# Patient Record
Sex: MALE | Race: WHITE | NOT HISPANIC OR LATINO
[De-identification: names, ages, dates, MRNs, and addresses within clinical notes are randomized per-mention and may not be internally consistent; named-entity substitution may affect disease eponyms.]

---

## 2017-01-03 ENCOUNTER — APPOINTMENT (OUTPATIENT)
Dept: ORTHOPEDIC SURGERY | Facility: CLINIC | Age: 47
End: 2017-01-03

## 2017-01-04 RX ORDER — HYALURONATE SODIUM 20 MG/2 ML
20 SYRINGE (ML) INTRAARTICULAR
Qty: 1 | Refills: 0 | Status: DISCONTINUED | COMMUNITY
Start: 2017-01-04 | End: 2017-01-04

## 2017-01-08 ENCOUNTER — APPOINTMENT (OUTPATIENT)
Dept: MRI IMAGING | Facility: CLINIC | Age: 47
End: 2017-01-08

## 2017-01-08 ENCOUNTER — OUTPATIENT (OUTPATIENT)
Dept: OUTPATIENT SERVICES | Facility: HOSPITAL | Age: 47
LOS: 1 days | End: 2017-01-08
Payer: COMMERCIAL

## 2017-01-08 DIAGNOSIS — Z90.79 ACQUIRED ABSENCE OF OTHER GENITAL ORGAN(S): Chronic | ICD-10-CM

## 2017-01-08 DIAGNOSIS — Z00.8 ENCOUNTER FOR OTHER GENERAL EXAMINATION: ICD-10-CM

## 2017-01-08 DIAGNOSIS — Z98.89 OTHER SPECIFIED POSTPROCEDURAL STATES: Chronic | ICD-10-CM

## 2017-01-08 PROCEDURE — 72148 MRI LUMBAR SPINE W/O DYE: CPT

## 2017-01-17 ENCOUNTER — APPOINTMENT (OUTPATIENT)
Dept: ORTHOPEDIC SURGERY | Facility: CLINIC | Age: 47
End: 2017-01-17

## 2017-01-24 ENCOUNTER — APPOINTMENT (OUTPATIENT)
Dept: ORTHOPEDIC SURGERY | Facility: CLINIC | Age: 47
End: 2017-01-24

## 2017-01-31 ENCOUNTER — APPOINTMENT (OUTPATIENT)
Dept: ORTHOPEDIC SURGERY | Facility: CLINIC | Age: 47
End: 2017-01-31

## 2017-01-31 VITALS — WEIGHT: 168 LBS | HEIGHT: 71 IN | BODY MASS INDEX: 23.52 KG/M2

## 2017-02-01 ENCOUNTER — RESULT REVIEW (OUTPATIENT)
Age: 47
End: 2017-02-01

## 2017-02-14 ENCOUNTER — APPOINTMENT (OUTPATIENT)
Dept: ORTHOPEDIC SURGERY | Facility: CLINIC | Age: 47
End: 2017-02-14

## 2017-04-11 ENCOUNTER — APPOINTMENT (OUTPATIENT)
Dept: ORTHOPEDIC SURGERY | Facility: CLINIC | Age: 47
End: 2017-04-11

## 2017-04-11 DIAGNOSIS — M25.531 PAIN IN RIGHT WRIST: ICD-10-CM

## 2017-04-15 ENCOUNTER — APPOINTMENT (OUTPATIENT)
Dept: MRI IMAGING | Facility: CLINIC | Age: 47
End: 2017-04-15

## 2017-04-15 ENCOUNTER — OUTPATIENT (OUTPATIENT)
Dept: OUTPATIENT SERVICES | Facility: HOSPITAL | Age: 47
LOS: 1 days | End: 2017-04-15
Payer: COMMERCIAL

## 2017-04-15 DIAGNOSIS — M25.531 PAIN IN RIGHT WRIST: ICD-10-CM

## 2017-04-15 DIAGNOSIS — Z98.89 OTHER SPECIFIED POSTPROCEDURAL STATES: Chronic | ICD-10-CM

## 2017-04-15 DIAGNOSIS — Z90.79 ACQUIRED ABSENCE OF OTHER GENITAL ORGAN(S): Chronic | ICD-10-CM

## 2017-04-15 PROCEDURE — 73221 MRI JOINT UPR EXTREM W/O DYE: CPT

## 2017-04-19 RX ORDER — HYALURONATE SODIUM 20 MG/2 ML
20 SYRINGE (ML) INTRAARTICULAR
Qty: 1 | Refills: 0 | Status: COMPLETED | OUTPATIENT
Start: 2017-01-04 | End: 2017-04-19

## 2017-09-12 ENCOUNTER — APPOINTMENT (OUTPATIENT)
Dept: ORTHOPEDIC SURGERY | Facility: CLINIC | Age: 47
End: 2017-09-12

## 2017-09-18 ENCOUNTER — APPOINTMENT (OUTPATIENT)
Dept: MRI IMAGING | Facility: CLINIC | Age: 47
End: 2017-09-18
Payer: COMMERCIAL

## 2017-09-18 ENCOUNTER — OUTPATIENT (OUTPATIENT)
Dept: OUTPATIENT SERVICES | Facility: HOSPITAL | Age: 47
LOS: 1 days | End: 2017-09-18
Payer: COMMERCIAL

## 2017-09-18 DIAGNOSIS — Z00.8 ENCOUNTER FOR OTHER GENERAL EXAMINATION: ICD-10-CM

## 2017-09-18 DIAGNOSIS — Z90.79 ACQUIRED ABSENCE OF OTHER GENITAL ORGAN(S): Chronic | ICD-10-CM

## 2017-09-18 DIAGNOSIS — Z98.89 OTHER SPECIFIED POSTPROCEDURAL STATES: Chronic | ICD-10-CM

## 2017-09-18 PROCEDURE — 72148 MRI LUMBAR SPINE W/O DYE: CPT | Mod: 26

## 2017-09-18 PROCEDURE — 72148 MRI LUMBAR SPINE W/O DYE: CPT

## 2017-09-19 ENCOUNTER — APPOINTMENT (OUTPATIENT)
Dept: ORTHOPEDIC SURGERY | Facility: CLINIC | Age: 47
End: 2017-09-19
Payer: COMMERCIAL

## 2017-09-19 DIAGNOSIS — M17.11 UNILATERAL PRIMARY OSTEOARTHRITIS, RIGHT KNEE: ICD-10-CM

## 2017-09-19 DIAGNOSIS — M17.12 UNILATERAL PRIMARY OSTEOARTHRITIS, LEFT KNEE: ICD-10-CM

## 2017-09-19 PROCEDURE — 99213 OFFICE O/P EST LOW 20 MIN: CPT | Mod: 25

## 2017-09-19 PROCEDURE — 20553 NJX 1/MLT TRIGGER POINTS 3/>: CPT | Mod: LT

## 2018-01-09 ENCOUNTER — APPOINTMENT (OUTPATIENT)
Dept: ORTHOPEDIC SURGERY | Facility: CLINIC | Age: 48
End: 2018-01-09
Payer: COMMERCIAL

## 2018-01-09 PROCEDURE — 20553 NJX 1/MLT TRIGGER POINTS 3/>: CPT

## 2018-01-09 PROCEDURE — 99213 OFFICE O/P EST LOW 20 MIN: CPT | Mod: 25

## 2018-02-28 ENCOUNTER — APPOINTMENT (OUTPATIENT)
Dept: ORTHOPEDIC SURGERY | Facility: CLINIC | Age: 48
End: 2018-02-28
Payer: COMMERCIAL

## 2018-02-28 DIAGNOSIS — M76.899 OTHER SPECIFIED ENTHESOPATHIES OF UNSPECIFIED LOWER LIMB, EXCLUDING FOOT: ICD-10-CM

## 2018-02-28 PROCEDURE — 99213 OFFICE O/P EST LOW 20 MIN: CPT

## 2018-08-04 ENCOUNTER — RESULT REVIEW (OUTPATIENT)
Age: 48
End: 2018-08-04

## 2018-08-13 ENCOUNTER — APPOINTMENT (OUTPATIENT)
Dept: SURGICAL ONCOLOGY | Facility: CLINIC | Age: 48
End: 2018-08-13
Payer: COMMERCIAL

## 2018-08-13 VITALS
RESPIRATION RATE: 15 BRPM | SYSTOLIC BLOOD PRESSURE: 121 MMHG | WEIGHT: 185 LBS | BODY MASS INDEX: 25.9 KG/M2 | HEIGHT: 71 IN | HEART RATE: 84 BPM | DIASTOLIC BLOOD PRESSURE: 78 MMHG

## 2018-08-13 DIAGNOSIS — N40.0 BENIGN PROSTATIC HYPERPLASIA WITHOUT LOWER URINARY TRACT SYMPMS: ICD-10-CM

## 2018-08-13 DIAGNOSIS — Z86.69 PERSONAL HISTORY OF OTHER DISEASES OF THE NERVOUS SYSTEM AND SENSE ORGANS: ICD-10-CM

## 2018-08-13 DIAGNOSIS — Z85.72 PERSONAL HISTORY OF NON-HODGKIN LYMPHOMAS: ICD-10-CM

## 2018-08-13 PROCEDURE — 99245 OFF/OP CONSLTJ NEW/EST HI 55: CPT

## 2018-08-15 ENCOUNTER — APPOINTMENT (OUTPATIENT)
Dept: PLASTIC SURGERY | Facility: CLINIC | Age: 48
End: 2018-08-15
Payer: COMMERCIAL

## 2018-08-15 VITALS — BODY MASS INDEX: 25.9 KG/M2 | WEIGHT: 185 LBS | HEIGHT: 71 IN

## 2018-08-15 PROCEDURE — 99202 OFFICE O/P NEW SF 15 MIN: CPT

## 2018-08-17 ENCOUNTER — OUTPATIENT (OUTPATIENT)
Dept: OUTPATIENT SERVICES | Facility: HOSPITAL | Age: 48
LOS: 1 days | End: 2018-08-17
Payer: COMMERCIAL

## 2018-08-17 VITALS
HEIGHT: 71 IN | DIASTOLIC BLOOD PRESSURE: 79 MMHG | TEMPERATURE: 98 F | SYSTOLIC BLOOD PRESSURE: 114 MMHG | OXYGEN SATURATION: 97 % | HEART RATE: 70 BPM | RESPIRATION RATE: 16 BRPM | WEIGHT: 184.09 LBS

## 2018-08-17 DIAGNOSIS — C43.9 MALIGNANT MELANOMA OF SKIN, UNSPECIFIED: ICD-10-CM

## 2018-08-17 DIAGNOSIS — Z98.89 OTHER SPECIFIED POSTPROCEDURAL STATES: Chronic | ICD-10-CM

## 2018-08-17 DIAGNOSIS — I89.9 NONINFECTIVE DISORDER OF LYMPHATIC VESSELS AND LYMPH NODES, UNSPECIFIED: Chronic | ICD-10-CM

## 2018-08-17 DIAGNOSIS — I10 ESSENTIAL (PRIMARY) HYPERTENSION: ICD-10-CM

## 2018-08-17 DIAGNOSIS — Z01.818 ENCOUNTER FOR OTHER PREPROCEDURAL EXAMINATION: ICD-10-CM

## 2018-08-17 DIAGNOSIS — Z90.79 ACQUIRED ABSENCE OF OTHER GENITAL ORGAN(S): Chronic | ICD-10-CM

## 2018-08-17 LAB
ANION GAP SERPL CALC-SCNC: 13 MMOL/L — SIGNIFICANT CHANGE UP (ref 5–17)
BUN SERPL-MCNC: 21 MG/DL — SIGNIFICANT CHANGE UP (ref 7–23)
CALCIUM SERPL-MCNC: 9.4 MG/DL — SIGNIFICANT CHANGE UP (ref 8.4–10.5)
CHLORIDE SERPL-SCNC: 97 MMOL/L — SIGNIFICANT CHANGE UP (ref 96–108)
CO2 SERPL-SCNC: 25 MMOL/L — SIGNIFICANT CHANGE UP (ref 22–31)
CREAT SERPL-MCNC: 1.16 MG/DL — SIGNIFICANT CHANGE UP (ref 0.5–1.3)
GLUCOSE SERPL-MCNC: 89 MG/DL — SIGNIFICANT CHANGE UP (ref 70–99)
HCT VFR BLD CALC: 47.2 % — SIGNIFICANT CHANGE UP (ref 39–50)
HCV AB S/CO SERPL IA: 0.06 S/CO — SIGNIFICANT CHANGE UP
HCV AB SERPL-IMP: SIGNIFICANT CHANGE UP
HGB BLD-MCNC: 15.4 G/DL — SIGNIFICANT CHANGE UP (ref 13–17)
HIV 1+2 AB+HIV1 P24 AG SERPL QL IA: SIGNIFICANT CHANGE UP
MCHC RBC-ENTMCNC: 28.6 PG — SIGNIFICANT CHANGE UP (ref 27–34)
MCHC RBC-ENTMCNC: 32.6 GM/DL — SIGNIFICANT CHANGE UP (ref 32–36)
MCV RBC AUTO: 87.7 FL — SIGNIFICANT CHANGE UP (ref 80–100)
PLATELET # BLD AUTO: 329 K/UL — SIGNIFICANT CHANGE UP (ref 150–400)
POTASSIUM SERPL-MCNC: 4.2 MMOL/L — SIGNIFICANT CHANGE UP (ref 3.5–5.3)
POTASSIUM SERPL-SCNC: 4.2 MMOL/L — SIGNIFICANT CHANGE UP (ref 3.5–5.3)
RBC # BLD: 5.38 M/UL — SIGNIFICANT CHANGE UP (ref 4.2–5.8)
RBC # FLD: 13.4 % — SIGNIFICANT CHANGE UP (ref 10.3–14.5)
SODIUM SERPL-SCNC: 135 MMOL/L — SIGNIFICANT CHANGE UP (ref 135–145)
WBC # BLD: 5.77 K/UL — SIGNIFICANT CHANGE UP (ref 3.8–10.5)
WBC # FLD AUTO: 5.77 K/UL — SIGNIFICANT CHANGE UP (ref 3.8–10.5)

## 2018-08-17 PROCEDURE — 87389 HIV-1 AG W/HIV-1&-2 AB AG IA: CPT

## 2018-08-17 PROCEDURE — 85027 COMPLETE CBC AUTOMATED: CPT

## 2018-08-17 PROCEDURE — 86803 HEPATITIS C AB TEST: CPT

## 2018-08-17 PROCEDURE — 80048 BASIC METABOLIC PNL TOTAL CA: CPT

## 2018-08-17 PROCEDURE — G0463: CPT

## 2018-08-17 RX ORDER — SODIUM CHLORIDE 9 MG/ML
3 INJECTION INTRAMUSCULAR; INTRAVENOUS; SUBCUTANEOUS EVERY 8 HOURS
Qty: 0 | Refills: 0 | Status: DISCONTINUED | OUTPATIENT
Start: 2018-08-29 | End: 2018-09-13

## 2018-08-17 RX ORDER — LIDOCAINE HCL 20 MG/ML
0.2 VIAL (ML) INJECTION ONCE
Qty: 0 | Refills: 0 | Status: DISCONTINUED | OUTPATIENT
Start: 2018-08-29 | End: 2018-09-13

## 2018-08-17 RX ORDER — CEFAZOLIN SODIUM 1 G
2000 VIAL (EA) INJECTION ONCE
Qty: 0 | Refills: 0 | Status: DISCONTINUED | OUTPATIENT
Start: 2018-08-29 | End: 2018-09-13

## 2018-08-17 NOTE — H&P PST ADULT - PMH
Headache, Migraine    HTN (hypertension)    NHL (non-Hodgkin's lymphoma)    S/P Orchiectomy    Testicular Cancer BPH (benign prostatic hyperplasia)    Colitis    GERD (gastroesophageal reflux disease)    Headache, Migraine    History of bone marrow transplant    HTN (hypertension)    Hypertriglyceridemia    NHL (non-Hodgkin's lymphoma)  1999, Radiation to left neck region  Osteoarthritis    Testicular Cancer  1994, chemotherapy

## 2018-08-17 NOTE — H&P PST ADULT - PSH
History of orchiectomy    S/P colon resection History of orchiectomy  left 1994  Lymph node disorder  s/p abdominal lymph node dissection 1994, 1996  S/P colon resection  1996

## 2018-08-17 NOTE — H&P PST ADULT - NSANTHOSAYNRD_GEN_A_CORE
No. SUE screening performed.  STOP BANG Legend: 0-2 = LOW Risk; 3-4 = INTERMEDIATE Risk; 5-8 = HIGH Risk

## 2018-08-17 NOTE — H&P PST ADULT - NEUROLOGICAL DETAILS
responds to pain/sensation intact/responds to verbal commands/normal strength/alert and oriented x 3

## 2018-08-17 NOTE — H&P PST ADULT - HISTORY OF PRESENT ILLNESS
48 year old male presents for melanoma excision of scalp & lymph node biopsy. Pt. reports recent biopsy with dermatology revealed melanoma. Pt. with a history of testicular cancer 1994 and Non Hodgkins Lymphoma 1999.

## 2018-08-22 PROBLEM — K21.9 GASTRO-ESOPHAGEAL REFLUX DISEASE WITHOUT ESOPHAGITIS: Chronic | Status: ACTIVE | Noted: 2018-08-17

## 2018-08-22 PROBLEM — N40.0 BENIGN PROSTATIC HYPERPLASIA WITHOUT LOWER URINARY TRACT SYMPTOMS: Chronic | Status: ACTIVE | Noted: 2018-08-17

## 2018-08-22 PROBLEM — E78.1 PURE HYPERGLYCERIDEMIA: Chronic | Status: ACTIVE | Noted: 2018-08-17

## 2018-08-22 PROBLEM — Z94.81 BONE MARROW TRANSPLANT STATUS: Chronic | Status: ACTIVE | Noted: 2018-08-17

## 2018-08-28 ENCOUNTER — TRANSCRIPTION ENCOUNTER (OUTPATIENT)
Age: 48
End: 2018-08-28

## 2018-08-28 ENCOUNTER — FORM ENCOUNTER (OUTPATIENT)
Age: 48
End: 2018-08-28

## 2018-08-29 ENCOUNTER — OUTPATIENT (OUTPATIENT)
Dept: OUTPATIENT SERVICES | Facility: HOSPITAL | Age: 48
LOS: 1 days | End: 2018-08-29
Payer: COMMERCIAL

## 2018-08-29 ENCOUNTER — RESULT REVIEW (OUTPATIENT)
Age: 48
End: 2018-08-29

## 2018-08-29 ENCOUNTER — APPOINTMENT (OUTPATIENT)
Dept: NUCLEAR MEDICINE | Facility: HOSPITAL | Age: 48
End: 2018-08-29

## 2018-08-29 ENCOUNTER — APPOINTMENT (OUTPATIENT)
Dept: SURGICAL ONCOLOGY | Facility: HOSPITAL | Age: 48
End: 2018-08-29

## 2018-08-29 VITALS
RESPIRATION RATE: 16 BRPM | HEIGHT: 71 IN | DIASTOLIC BLOOD PRESSURE: 77 MMHG | WEIGHT: 184.09 LBS | SYSTOLIC BLOOD PRESSURE: 121 MMHG | HEART RATE: 61 BPM | TEMPERATURE: 98 F | OXYGEN SATURATION: 98 %

## 2018-08-29 VITALS
OXYGEN SATURATION: 97 % | SYSTOLIC BLOOD PRESSURE: 102 MMHG | TEMPERATURE: 97 F | RESPIRATION RATE: 19 BRPM | HEART RATE: 82 BPM | DIASTOLIC BLOOD PRESSURE: 57 MMHG

## 2018-08-29 DIAGNOSIS — Z90.79 ACQUIRED ABSENCE OF OTHER GENITAL ORGAN(S): Chronic | ICD-10-CM

## 2018-08-29 DIAGNOSIS — C43.9 MALIGNANT MELANOMA OF SKIN, UNSPECIFIED: ICD-10-CM

## 2018-08-29 DIAGNOSIS — I89.9 NONINFECTIVE DISORDER OF LYMPHATIC VESSELS AND LYMPH NODES, UNSPECIFIED: Chronic | ICD-10-CM

## 2018-08-29 DIAGNOSIS — Z98.89 OTHER SPECIFIED POSTPROCEDURAL STATES: Chronic | ICD-10-CM

## 2018-08-29 PROCEDURE — 14302 TIS TRNFR ADDL 30 SQ CM: CPT

## 2018-08-29 PROCEDURE — 88341 IMHCHEM/IMCYTCHM EA ADD ANTB: CPT

## 2018-08-29 PROCEDURE — 38792 RA TRACER ID OF SENTINL NODE: CPT

## 2018-08-29 PROCEDURE — 38792 RA TRACER ID OF SENTINL NODE: CPT | Mod: 59

## 2018-08-29 PROCEDURE — 78195 LYMPH SYSTEM IMAGING: CPT

## 2018-08-29 PROCEDURE — 88342 IMHCHEM/IMCYTCHM 1ST ANTB: CPT | Mod: 26

## 2018-08-29 PROCEDURE — 38308 INCISION OF LYMPH CHANNELS: CPT

## 2018-08-29 PROCEDURE — 88342 IMHCHEM/IMCYTCHM 1ST ANTB: CPT

## 2018-08-29 PROCEDURE — 88305 TISSUE EXAM BY PATHOLOGIST: CPT

## 2018-08-29 PROCEDURE — 38790 INJECT FOR LYMPHATIC X-RAY: CPT

## 2018-08-29 PROCEDURE — 78195 LYMPH SYSTEM IMAGING: CPT | Mod: 26

## 2018-08-29 PROCEDURE — 88341 IMHCHEM/IMCYTCHM EA ADD ANTB: CPT | Mod: 26

## 2018-08-29 PROCEDURE — 38724 REMOVAL OF LYMPH NODES NECK: CPT

## 2018-08-29 PROCEDURE — 13121 CMPLX RPR S/A/L 2.6-7.5 CM: CPT | Mod: 59

## 2018-08-29 PROCEDURE — 14301 TIS TRNFR ANY 30.1-60 SQ CM: CPT

## 2018-08-29 PROCEDURE — A9541: CPT

## 2018-08-29 PROCEDURE — 21015 RESECT FACE/SCALP TUM < 2 CM: CPT

## 2018-08-29 PROCEDURE — 88305 TISSUE EXAM BY PATHOLOGIST: CPT | Mod: 26

## 2018-08-29 PROCEDURE — 15220 FTH/GFT FR S/A/L 20 SQ CM/<: CPT

## 2018-08-29 RX ORDER — OXYCODONE HYDROCHLORIDE 5 MG/1
5 TABLET ORAL ONCE
Qty: 0 | Refills: 0 | Status: DISCONTINUED | OUTPATIENT
Start: 2018-08-29 | End: 2018-08-29

## 2018-08-29 RX ORDER — ONDANSETRON 8 MG/1
4 TABLET, FILM COATED ORAL ONCE
Qty: 0 | Refills: 0 | Status: COMPLETED | OUTPATIENT
Start: 2018-08-29 | End: 2018-08-29

## 2018-08-29 RX ORDER — CELECOXIB 200 MG/1
200 CAPSULE ORAL ONCE
Qty: 0 | Refills: 0 | Status: COMPLETED | OUTPATIENT
Start: 2018-08-29 | End: 2018-08-29

## 2018-08-29 RX ORDER — SODIUM CHLORIDE 9 MG/ML
1000 INJECTION, SOLUTION INTRAVENOUS
Qty: 0 | Refills: 0 | Status: DISCONTINUED | OUTPATIENT
Start: 2018-08-29 | End: 2018-09-13

## 2018-08-29 RX ORDER — OXYCODONE HYDROCHLORIDE 5 MG/1
10 TABLET ORAL ONCE
Qty: 0 | Refills: 0 | Status: DISCONTINUED | OUTPATIENT
Start: 2018-08-29 | End: 2018-08-29

## 2018-08-29 RX ORDER — ACETAMINOPHEN 500 MG
1000 TABLET ORAL ONCE
Qty: 0 | Refills: 0 | Status: COMPLETED | OUTPATIENT
Start: 2018-08-29 | End: 2018-08-29

## 2018-08-29 RX ORDER — FAMOTIDINE 10 MG/ML
20 INJECTION INTRAVENOUS ONCE
Qty: 0 | Refills: 0 | Status: COMPLETED | OUTPATIENT
Start: 2018-08-29 | End: 2018-08-29

## 2018-08-29 RX ADMIN — Medication 1000 MILLIGRAM(S): at 09:53

## 2018-08-29 RX ADMIN — SODIUM CHLORIDE 3 MILLILITER(S): 9 INJECTION INTRAMUSCULAR; INTRAVENOUS; SUBCUTANEOUS at 09:54

## 2018-08-29 RX ADMIN — OXYCODONE HYDROCHLORIDE 10 MILLIGRAM(S): 5 TABLET ORAL at 12:41

## 2018-08-29 RX ADMIN — CELECOXIB 200 MILLIGRAM(S): 200 CAPSULE ORAL at 12:41

## 2018-08-29 RX ADMIN — FAMOTIDINE 20 MILLIGRAM(S): 10 INJECTION INTRAVENOUS at 10:06

## 2018-08-29 RX ADMIN — CELECOXIB 200 MILLIGRAM(S): 200 CAPSULE ORAL at 09:53

## 2018-08-29 RX ADMIN — ONDANSETRON 4 MILLIGRAM(S): 8 TABLET, FILM COATED ORAL at 12:41

## 2018-08-29 NOTE — BRIEF OPERATIVE NOTE - PROCEDURE
<<-----Click on this checkbox to enter Procedure Melanoma excision  08/29/2018  Wide local excision of scalp melanoma  Active  KRISTIE

## 2018-08-29 NOTE — ASU DISCHARGE PLAN (ADULT/PEDIATRIC). - NURSING INSTRUCTIONS
Pain medications as prescribed by MD.  Next dose OK @ 6:30pm this evening.  Please eat prior to taking any more medications.  Follow up with MD's as requested; call for appts.  You may resume all preop medications/supplements as before.

## 2018-08-29 NOTE — BRIEF OPERATIVE NOTE - OPERATION/FINDINGS
Wide local excision of scalp melanoma with local tissue rearrangement, sentinel lymph node biopsy
none

## 2018-08-29 NOTE — ASU DISCHARGE PLAN (ADULT/PEDIATRIC). - INSTRUCTIONS
Regular diet Please follow up with Dr. Chandler within 1-2 weeks after discharge from the hospital. You may call [phone] to schedule an appointment.     Please follow up with Dr. Toussaint within 1-2 weeks after discharge from the hospital. You may call [phone] to schedule an appointment.

## 2018-08-29 NOTE — ASU PATIENT PROFILE, ADULT - PSH
History of orchiectomy  left 1994  Lymph node disorder  s/p abdominal lymph node dissection 1994, 1996  S/P colon resection  1996

## 2018-08-29 NOTE — ASU PATIENT PROFILE, ADULT - PMH
BPH (benign prostatic hyperplasia)    Colitis    GERD (gastroesophageal reflux disease)    Headache, Migraine    History of bone marrow transplant    HTN (hypertension)    Hypertriglyceridemia    NHL (non-Hodgkin's lymphoma)  1999, Radiation to left neck region  Osteoarthritis    Testicular Cancer  1994, chemotherapy

## 2018-08-29 NOTE — ASU DISCHARGE PLAN (ADULT/PEDIATRIC). - SPECIAL INSTRUCTIONS
You may remove your dressings after 48 hours. You may shower at that time, but avoid shampooing your head until follow up with Dr. Toussaint. You have a transparent/purple liquid dressing called Dermabond over your neck incision. Do NOT remove the Dermabond. In a few days, the Dermabond will fall off or peel off on its own. Pat your incisions dry after getting them wet.    Please follow up with Dr. Chandler within 1-2 weeks after discharge from the hospital. You may call [phone] to schedule an appointment.   Please follow up with Dr. Toussaint within 1 week after discharge from the hospital. You may call [phone] to schedule an appointment. Keep the head dressing dry until you follow up with Dr. Toussaint. You may bathe the other parts of your body. You have a transparent/purple liquid dressing called Dermabond over your neck incision. Do NOT remove the Dermabond. In a few days, the Dermabond will fall off or peel off on its own.    Please follow up with Dr. Chandler within 1-2 weeks after discharge from the hospital. You may call [phone] to schedule an appointment.   Please follow up with Dr. Toussaint within 1 week after discharge from the hospital. You may call [phone] to schedule an appointment. Keep the head dressing dry until you follow up with Dr. Toussaint. You may remove the head wrap in 48 hours. You may bathe the other parts of your body. You have a transparent/purple liquid dressing called Dermabond over your neck incision. Do NOT remove the Dermabond. In a few days, the Dermabond will fall off or peel off on its own.    Please follow up with Dr. Chandler within 1-2 weeks after discharge from the hospital. You may call [phone] to schedule an appointment.   Please follow up with Dr. Toussaint within 1 week after discharge from the hospital. You may call [phone] to schedule an appointment.

## 2018-08-29 NOTE — ASU DISCHARGE PLAN (ADULT/PEDIATRIC). - DRESSING FT
Keep dry for 48 hours. You may remove dressing after and shower, but avoid shampoo until cleared. Keep head dressing dry until you see Dr. Toussaint in the office Keep head dressing dry until you see Dr. Toussaint in the office, you may remove head wrap in 48 hours

## 2018-08-29 NOTE — BRIEF OPERATIVE NOTE - COMMENTS
Excision performed and second part of the procedure pending plastics reconstruction and disposition.

## 2018-08-29 NOTE — BRIEF OPERATIVE NOTE - PROCEDURE
<<-----Click on this checkbox to enter Procedure Rearrangement of local tissue  08/29/2018  Scalp  Active  [id number]

## 2018-08-29 NOTE — ASU DISCHARGE PLAN (ADULT/PEDIATRIC). - MEDICATION SUMMARY - MEDICATIONS TO TAKE
I will START or STAY ON the medications listed below when I get home from the hospital:    Percocet 5/325 oral tablet  -- 1 tab(s) by mouth every 6 hours, As Needed -for severe pain MDD:4 tabs   -- Caution federal law prohibits the transfer of this drug to any person other  than the person for whom it was prescribed.  May cause drowsiness.  Alcohol may intensify this effect.  Use care when operating dangerous machinery.  This prescription cannot be refilled.  This product contains acetaminophen.  Do not use  with any other product containing acetaminophen to prevent possible liver damage.  Using more of this medication than prescribed may cause serious breathing problems.    -- Indication: For For severe pain as needed    Ultram 50 mg oral tablet  -- 1 tab(s) by mouth every 4 hours, As Needed  -- Indication: For Home med    TriCor 145 mg oral tablet  -- 1 tab(s) by mouth once a day  -- Indication: For Home med    Lipitor 40 mg oral tablet  -- 1 tab(s) by mouth once a day  -- Indication: For Home med    Toprol-XL 50 mg oral tablet, extended release  -- 1 tab(s) by mouth once a day  -- Indication: For Home med    Norvasc 5 mg oral tablet  -- 1 tab(s) by mouth once a day  -- Indication: For Home med    Zantac  -- 1 tab(s) by mouth once a day, As Needed  -- Indication: For Home med

## 2018-08-31 ENCOUNTER — EMERGENCY (EMERGENCY)
Facility: HOSPITAL | Age: 48
LOS: 1 days | Discharge: HOME | End: 2018-08-31
Attending: EMERGENCY MEDICINE
Payer: COMMERCIAL

## 2018-08-31 VITALS
SYSTOLIC BLOOD PRESSURE: 145 MMHG | RESPIRATION RATE: 17 BRPM | DIASTOLIC BLOOD PRESSURE: 84 MMHG | HEIGHT: 71 IN | OXYGEN SATURATION: 100 % | WEIGHT: 177.91 LBS | TEMPERATURE: 98 F | HEART RATE: 83 BPM

## 2018-08-31 VITALS
RESPIRATION RATE: 16 BRPM | TEMPERATURE: 98 F | OXYGEN SATURATION: 100 % | DIASTOLIC BLOOD PRESSURE: 95 MMHG | HEART RATE: 76 BPM | SYSTOLIC BLOOD PRESSURE: 136 MMHG

## 2018-08-31 DIAGNOSIS — Z98.89 OTHER SPECIFIED POSTPROCEDURAL STATES: Chronic | ICD-10-CM

## 2018-08-31 DIAGNOSIS — G89.18 OTHER ACUTE POSTPROCEDURAL PAIN: ICD-10-CM

## 2018-08-31 DIAGNOSIS — I89.9 NONINFECTIVE DISORDER OF LYMPHATIC VESSELS AND LYMPH NODES, UNSPECIFIED: Chronic | ICD-10-CM

## 2018-08-31 DIAGNOSIS — Z90.79 ACQUIRED ABSENCE OF OTHER GENITAL ORGAN(S): Chronic | ICD-10-CM

## 2018-08-31 PROCEDURE — 99283 EMERGENCY DEPT VISIT LOW MDM: CPT

## 2018-08-31 RX ORDER — OXYCODONE HYDROCHLORIDE 5 MG/1
5 TABLET ORAL ONCE
Qty: 0 | Refills: 0 | Status: DISCONTINUED | OUTPATIENT
Start: 2018-08-31 | End: 2018-08-31

## 2018-08-31 RX ADMIN — OXYCODONE HYDROCHLORIDE 5 MILLIGRAM(S): 5 TABLET ORAL at 11:29

## 2018-08-31 NOTE — ED PROVIDER NOTE - MEDICAL DECISION MAKING DETAILS
Will evaluate for skin scalp bleeding. Xeroform and dry dressing then discharge in all likelihood. Will evaluate for skin scalp bleeding. Xeroform and dry dressing then discharge in all likelihood.    Radha Payan MD - Attending Physician: Pt here with bleeding at incision. Stopped prior to arrival. Re-dress for dc

## 2018-08-31 NOTE — ED ADULT TRIAGE NOTE - CHIEF COMPLAINT QUOTE
Surgery on 08/29 for Melanoma removal, Dressing removed today and incision actively bleeding, also "ran out of pain medication and is still uncomfortable."

## 2018-08-31 NOTE — ED PROVIDER NOTE - OBJECTIVE STATEMENT
Patient is a 48 year old male PMHx non Hodgkin lymphoma who recently underpresents for melanoma excision of scalp & lymph node biopsy. Pt. reports recent biopsy with dermatology revealed melanoma. Pt. with a history of testicular cancer 1994 and Non Hodgkins Lymphoma 1999. Patient is a 48 year old male PMHx non Hodgkin lymphoma who recently underwent melanoma excision of scalp & lymph node biopsy on 8/29 (2d PTA) due to recent dermatology scalp biopsy found melanoma. At the skin site of the scalp skin graft, patient endorses  blood oozing from the suture site. Denies fatigue, palpitations, chest pain, palpitations, SOB, cough, abdominal pain, dysuria, urgency, melena or hematochezia. Patient is a 48 year old male PMHx non Hodgkin lymphoma who recently underwent melanoma excision of scalp & lymph node biopsy on 8/29 (2d PTA) due to recent dermatology scalp biopsy found melanoma. At the skin site of the scalp skin graft, patient endorses blood oozing from the suture site. Denies fatigue, palpitations, chest pain, palpitations, SOB, cough, abdominal pain, dysuria, urgency, melena or hematochezia.

## 2018-08-31 NOTE — ED PROVIDER NOTE - NS ED ROS FT
REVIEW OF SYSTEMS:  CONSTITUTIONAL: No weakness, fevers or chills  EYES/ENT: No visual changes;  No vertigo or throat pain   NECK: No pain or stiffness  RESPIRATORY: No cough, wheezing, hemoptysis; No shortness of breath  CARDIOVASCULAR: No chest pain or palpitations  GASTROINTESTINAL: No abdominal or epigastric pain. No nausea, vomiting, or hematemesis; No diarrhea or constipation. No melena or hematochezia.  GENITOURINARY: No dysuria, frequency or hematuria  NEUROLOGICAL: No numbness or weakness  SKIN: + bloody skin graft of scalp  All other review of systems is negative unless indicated above.

## 2018-08-31 NOTE — CONSULT NOTE ADULT - SUBJECTIVE AND OBJECTIVE BOX
Requesting Physician : Dr. Romano    Reason for Consultation: EASLEY    HISTORY OF PRESENT ILLNESS:  47 yo M with history of HTN, HLD who is being seen for dyspnea on exertion.  The patient presented to the ED with oozing from a melanoma biopsy site on his head.  The patient has been having EASLEY for approximately 6 months.  His symptoms have improved but he continues to have dyspnea when walking up a flight of stairs.  No chest pain, orthopnea, or le edema.  He is currently symptom free upon examination.  The patient was seen by Dr. Romano in April of 2018 at which time an echocardiogram revealed a normal EF.  He was scheduled for a nst but failed to make the appointment.        PAST MEDICAL & SURGICAL HISTORY:  Hypertriglyceridemia  History of bone marrow transplant  Osteoarthritis  BPH (benign prostatic hyperplasia)  Colitis  GERD (gastroesophageal reflux disease)  NHL (non-Hodgkin's lymphoma): 1999, Radiation to left neck region  HTN (hypertension)  Headache, Migraine  Testicular Cancer: 1994, chemotherapy  Lymph node disorder: s/p abdominal lymph node dissection 1994, 1996  S/P colon resection: 1996  History of orchiectomy: left 1994          MEDICATIONS:  MEDICATIONS  (STANDING):      Allergies    No Known Allergies    Intolerances        FAMILY HISTORY:  No pertinent family history in first degree relatives    Non-contributary for premature coronary disease or sudden cardiac death    SOCIAL HISTORY:    [x ] Non-smoker  [ ] Smoker  [ ] Alcohol      REVIEW OF SYSTEMS:  [ ]chest pain  [ x ]shortness of breath  [  ]palpitations  [  ]syncope  [ ]near syncope [ ]upper extremity weakness   [ ] lower extremity weakness  [  ]diplopia  [  ]altered mental status   [  ]fevers  [ ]chills [ ]nausea  [ ]vomitting  [  ]dysphagia    [ ]abdominal pain  [ ]melena  [ ]BRBPR    [  ]epistaxis  [  ]rash    [ ]lower extremity edema        [x ] All others negative	  [ ] Unable to obtain    PHYSICAL EXAM:  T(C): 36.7 (08-31-18 @ 11:30), Max: 37.1 (08-31-18 @ 10:37)  HR: 76 (08-31-18 @ 11:30) (70 - 83)  BP: 136/95 (08-31-18 @ 11:30) (136/95 - 145/84)  RR: 16 (08-31-18 @ 11:30) (16 - 17)  SpO2: 100% (08-31-18 @ 11:30) (95% - 100%)  Wt(kg): --  I&O's Summary        HEENT:   Normal oral mucosa, PERRL, EOMI	  Lymphatic: No lymphadenopathy , no edema  Cardiovascular: Normal S1 S2, No JVD, No murmurs , Peripheral pulses palpable 2+ bilaterally  Respiratory: Lungs clear to auscultation, normal effort 	  Gastrointestinal:  Soft, Non-tender, + BS	  Skin: No rashes, No ecchymoses, No cyanosis, warm to touch  Musculoskeletal: Normal range of motion, normal strength  Psychiatry:  Mood & affect appropriate      TELEMETRY: 	    ECG:  	  RADIOLOGY:  OTHER:     DIAGNOSTIC TESTING:  [ ] Echocardiogram: normal EF by office echo in April of 2018  [ ]  Catheterization:  [ ] Stress Test:    	  	  LABS:	 	    CARDIAC MARKERS:                proBNP:   Lipid Profile:   HgA1c:   TSH:     ASSESSMENT/PLAN: 	48yMale with history of HTN, HLD who is being seen for easley.   -currently the patient has no anginal symptoms or clinical heart failure  -his recent TTE showed normal LV function  -would recommend outpatient nst to rule out cad  -pt. to follow up with Dr. Romano next week for follow up appointment and NST  -no further inpatient cardiac workup needed at this time.     Apurva Lopez MD

## 2018-08-31 NOTE — ED ADULT NURSE NOTE - OBJECTIVE STATEMENT
48 year old male alert and oriented x 4 came to the ED accompanied by family c/o right sided head pain.  Patient had a melanoma removed on Wednesday with a flap procedure done.  Patient had a dressing in place for 48 hours which he removed this morning and had bright red bleeding from the right posterior sutures.  Patient arrived with no active bleeding and said pain is 6/10 sharp pain localized to the right posterior part of the head with swelling around the site, but unclear if swelling is new.  Patient hs tender on palpation to the area.  Patient's other sutures are CDI.  Patient has bandage on right side of neck and bandage is CDI.  Patient denies; fevers, headache, visual changes, N/V, purulent drainage.  Patient said he had been taking percocet which helped with pain, but is not helping with current pain.  Family at bedside and bed in lowest position.

## 2018-08-31 NOTE — ED ADULT NURSE NOTE - NSFALLRSKASSESSDT_ED_ALL_ED
"Rafita Baugh is a 76year old male presenting to the infusion department for Sandostatin injection. Pre-Injection Assessment: Vital signs entered., Allergies assessed as required for injection type. , Pt states feeling well, no complaints. , Pt denies signs and symptoms of infection. and Authorization completed if applicable. Refer to [facility] for type of injection and medication given. Needle Size: 21g. 1 1/2""  Patient tolerated well:Stable    Vernell Ricardo NP is supervising clinician today.       " 31-Aug-2018 10:52

## 2018-08-31 NOTE — ED PROVIDER NOTE - PHYSICAL EXAMINATION
General: WN/WD NAD  Neurology: A&Ox3, nonfocal, DOLAN x 4  Head:  Normocephalic, atraumatic  ENT:  Mucosa moist, no ulcerations  Neck:  Supple, no sinuses or palpable masses  Lymphatic:  No palpable cervical, supraclavicular, axillary or inguinal adenopathy  Respiratory: CTA B/L  CV: RRR, S1S2, no murmur  Abdominal: Soft, NT, ND no palpable mass  MSK: No edema, + peripheral pulses, FROM all 4 extremity  Incisions: intact, no erythema or drainage General: WN/WD NAD  Neurology: A&Ox3, nonfocal, DOLAN x 4  Head:  Normocephalic, atraumatic  ENT:  Mucosa moist, no ulcerations  Neck:  Supple, no sinuses or palpable masses  Lymphatic:  No palpable cervical, supraclavicular, axillary or inguinal adenopathy  Respiratory: CTA B/L  CV: RRR, S1S2, no murmur  Abdominal: Soft, NT, ND no palpable mass  MSK: No edema, + peripheral pulses, FROM all 4 extremity  Incisions: Large 10cm arch incision to back of head with sutures in place, 1cm area to right parietal scalp with overlying eschar, no active bleeding

## 2018-08-31 NOTE — ED ADULT NURSE NOTE - NSIMPLEMENTINTERV_GEN_ALL_ED
Implemented All Universal Safety Interventions:  [place] to call system. Call bell, personal items and telephone within reach. Instruct patient to call for assistance. Room bathroom lighting operational. Non-slip footwear when patient is off stretcher. Physically safe environment: no spills, clutter or unnecessary equipment. Stretcher in lowest position, wheels locked, appropriate side rails in place.

## 2018-08-31 NOTE — ED PROVIDER NOTE - ATTENDING CONTRIBUTION TO CARE
Radha Payan MD - Attending Physician: I have personally seen and examined this patient with the resident/fellow.  I have fully participated in the care of this patient. I have reviewed all pertinent clinical information, including history, physical exam, plan and the Resident/Fellow’s note and agree except as noted. See MDM

## 2018-09-05 ENCOUNTER — APPOINTMENT (OUTPATIENT)
Dept: PLASTIC SURGERY | Facility: CLINIC | Age: 48
End: 2018-09-05
Payer: COMMERCIAL

## 2018-09-05 PROCEDURE — 99024 POSTOP FOLLOW-UP VISIT: CPT

## 2018-09-06 LAB — SURGICAL PATHOLOGY STUDY: SIGNIFICANT CHANGE UP

## 2018-09-10 ENCOUNTER — APPOINTMENT (OUTPATIENT)
Dept: SURGICAL ONCOLOGY | Facility: CLINIC | Age: 48
End: 2018-09-10
Payer: COMMERCIAL

## 2018-09-10 VITALS
SYSTOLIC BLOOD PRESSURE: 149 MMHG | RESPIRATION RATE: 16 BRPM | OXYGEN SATURATION: 100 % | WEIGHT: 185 LBS | DIASTOLIC BLOOD PRESSURE: 87 MMHG | BODY MASS INDEX: 25.9 KG/M2 | HEART RATE: 73 BPM | HEIGHT: 71 IN

## 2018-09-10 DIAGNOSIS — Z72.3 LACK OF PHYSICAL EXERCISE: ICD-10-CM

## 2018-09-10 PROCEDURE — 99214 OFFICE O/P EST MOD 30 MIN: CPT | Mod: 24

## 2018-09-12 ENCOUNTER — APPOINTMENT (OUTPATIENT)
Dept: PLASTIC SURGERY | Facility: CLINIC | Age: 48
End: 2018-09-12

## 2018-09-14 ENCOUNTER — FORM ENCOUNTER (OUTPATIENT)
Age: 48
End: 2018-09-14

## 2018-09-15 ENCOUNTER — OUTPATIENT (OUTPATIENT)
Dept: OUTPATIENT SERVICES | Facility: HOSPITAL | Age: 48
LOS: 1 days | End: 2018-09-15
Payer: COMMERCIAL

## 2018-09-15 ENCOUNTER — APPOINTMENT (OUTPATIENT)
Dept: NUCLEAR MEDICINE | Facility: IMAGING CENTER | Age: 48
End: 2018-09-15
Payer: COMMERCIAL

## 2018-09-15 DIAGNOSIS — I89.9 NONINFECTIVE DISORDER OF LYMPHATIC VESSELS AND LYMPH NODES, UNSPECIFIED: Chronic | ICD-10-CM

## 2018-09-15 DIAGNOSIS — Z98.89 OTHER SPECIFIED POSTPROCEDURAL STATES: Chronic | ICD-10-CM

## 2018-09-15 DIAGNOSIS — Z90.79 ACQUIRED ABSENCE OF OTHER GENITAL ORGAN(S): Chronic | ICD-10-CM

## 2018-09-15 DIAGNOSIS — C79.9 SECONDARY MALIGNANT NEOPLASM OF UNSPECIFIED SITE: ICD-10-CM

## 2018-09-15 PROCEDURE — A9552: CPT

## 2018-09-15 PROCEDURE — 78816 PET IMAGE W/CT FULL BODY: CPT | Mod: 26,PI

## 2018-09-15 PROCEDURE — 78816 PET IMAGE W/CT FULL BODY: CPT

## 2018-09-20 ENCOUNTER — RESULT REVIEW (OUTPATIENT)
Age: 48
End: 2018-09-20

## 2018-10-03 ENCOUNTER — APPOINTMENT (OUTPATIENT)
Dept: PLASTIC SURGERY | Facility: CLINIC | Age: 48
End: 2018-10-03
Payer: COMMERCIAL

## 2018-10-03 PROCEDURE — 99024 POSTOP FOLLOW-UP VISIT: CPT

## 2018-10-04 ENCOUNTER — OUTPATIENT (OUTPATIENT)
Dept: OUTPATIENT SERVICES | Facility: HOSPITAL | Age: 48
LOS: 1 days | End: 2018-10-04
Payer: COMMERCIAL

## 2018-10-04 VITALS
HEIGHT: 71 IN | HEART RATE: 69 BPM | SYSTOLIC BLOOD PRESSURE: 130 MMHG | WEIGHT: 188.94 LBS | OXYGEN SATURATION: 98 % | DIASTOLIC BLOOD PRESSURE: 82 MMHG | RESPIRATION RATE: 16 BRPM | TEMPERATURE: 98 F

## 2018-10-04 DIAGNOSIS — K21.9 GASTRO-ESOPHAGEAL REFLUX DISEASE WITHOUT ESOPHAGITIS: ICD-10-CM

## 2018-10-04 DIAGNOSIS — C79.9 SECONDARY MALIGNANT NEOPLASM OF UNSPECIFIED SITE: ICD-10-CM

## 2018-10-04 DIAGNOSIS — Z90.79 ACQUIRED ABSENCE OF OTHER GENITAL ORGAN(S): Chronic | ICD-10-CM

## 2018-10-04 DIAGNOSIS — I10 ESSENTIAL (PRIMARY) HYPERTENSION: ICD-10-CM

## 2018-10-04 DIAGNOSIS — I89.9 NONINFECTIVE DISORDER OF LYMPHATIC VESSELS AND LYMPH NODES, UNSPECIFIED: Chronic | ICD-10-CM

## 2018-10-04 DIAGNOSIS — C43.4 MALIGNANT MELANOMA OF SCALP AND NECK: Chronic | ICD-10-CM

## 2018-10-04 DIAGNOSIS — Z98.89 OTHER SPECIFIED POSTPROCEDURAL STATES: Chronic | ICD-10-CM

## 2018-10-04 LAB
ALBUMIN SERPL ELPH-MCNC: 4.9 G/DL — SIGNIFICANT CHANGE UP (ref 3.3–5)
ALP SERPL-CCNC: 74 U/L — SIGNIFICANT CHANGE UP (ref 40–120)
ALT FLD-CCNC: 24 U/L — SIGNIFICANT CHANGE UP (ref 4–41)
AST SERPL-CCNC: 20 U/L — SIGNIFICANT CHANGE UP (ref 4–40)
BILIRUB SERPL-MCNC: 0.4 MG/DL — SIGNIFICANT CHANGE UP (ref 0.2–1.2)
BLD GP AB SCN SERPL QL: NEGATIVE — SIGNIFICANT CHANGE UP
BUN SERPL-MCNC: 21 MG/DL — SIGNIFICANT CHANGE UP (ref 7–23)
CALCIUM SERPL-MCNC: 9.5 MG/DL — SIGNIFICANT CHANGE UP (ref 8.4–10.5)
CHLORIDE SERPL-SCNC: 98 MMOL/L — SIGNIFICANT CHANGE UP (ref 98–107)
CO2 SERPL-SCNC: 27 MMOL/L — SIGNIFICANT CHANGE UP (ref 22–31)
CREAT SERPL-MCNC: 1.12 MG/DL — SIGNIFICANT CHANGE UP (ref 0.5–1.3)
GLUCOSE SERPL-MCNC: 80 MG/DL — SIGNIFICANT CHANGE UP (ref 70–99)
HCT VFR BLD CALC: 43.7 % — SIGNIFICANT CHANGE UP (ref 39–50)
HGB BLD-MCNC: 14.2 G/DL — SIGNIFICANT CHANGE UP (ref 13–17)
MCHC RBC-ENTMCNC: 29 PG — SIGNIFICANT CHANGE UP (ref 27–34)
MCHC RBC-ENTMCNC: 32.5 % — SIGNIFICANT CHANGE UP (ref 32–36)
MCV RBC AUTO: 89.4 FL — SIGNIFICANT CHANGE UP (ref 80–100)
NRBC # FLD: 0 — SIGNIFICANT CHANGE UP
PLATELET # BLD AUTO: 236 K/UL — SIGNIFICANT CHANGE UP (ref 150–400)
PMV BLD: 10 FL — SIGNIFICANT CHANGE UP (ref 7–13)
POTASSIUM SERPL-MCNC: 3.7 MMOL/L — SIGNIFICANT CHANGE UP (ref 3.5–5.3)
POTASSIUM SERPL-SCNC: 3.7 MMOL/L — SIGNIFICANT CHANGE UP (ref 3.5–5.3)
PROT SERPL-MCNC: 7.8 G/DL — SIGNIFICANT CHANGE UP (ref 6–8.3)
RBC # BLD: 4.89 M/UL — SIGNIFICANT CHANGE UP (ref 4.2–5.8)
RBC # FLD: 13.6 % — SIGNIFICANT CHANGE UP (ref 10.3–14.5)
RH IG SCN BLD-IMP: POSITIVE — SIGNIFICANT CHANGE UP
SODIUM SERPL-SCNC: 137 MMOL/L — SIGNIFICANT CHANGE UP (ref 135–145)
WBC # BLD: 5.43 K/UL — SIGNIFICANT CHANGE UP (ref 3.8–10.5)
WBC # FLD AUTO: 5.43 K/UL — SIGNIFICANT CHANGE UP (ref 3.8–10.5)

## 2018-10-04 PROCEDURE — 93010 ELECTROCARDIOGRAM REPORT: CPT

## 2018-10-04 RX ORDER — RANITIDINE HYDROCHLORIDE 150 MG/1
1 TABLET, FILM COATED ORAL
Qty: 0 | Refills: 0 | COMMUNITY

## 2018-10-04 RX ORDER — SODIUM CHLORIDE 9 MG/ML
1000 INJECTION, SOLUTION INTRAVENOUS
Qty: 0 | Refills: 0 | Status: DISCONTINUED | OUTPATIENT
Start: 2018-10-19 | End: 2018-10-19

## 2018-10-04 NOTE — H&P PST ADULT - MUSCULOSKELETAL
details… detailed exam ROM intact/no joint warmth/no joint swelling/no joint erythema/no calf tenderness

## 2018-10-04 NOTE — H&P PST ADULT - PSH
History of orchiectomy  left 1994  Lymph node disorder  s/p abdominal lymph node dissection 1994, 1996  Melanoma of scalp or neck  excision 8/18  S/P colon resection  1996

## 2018-10-04 NOTE — H&P PST ADULT - PMH
BPH (benign prostatic hyperplasia)    Colitis    GERD (gastroesophageal reflux disease)    Headache, Migraine    History of bone marrow transplant    HTN (hypertension)    Hypertriglyceridemia    NHL (non-Hodgkin's lymphoma)  1999, Radiation to left neck region  Osteoarthritis  degenerative disc L3-4  Testicular Cancer  1994, chemotherapy

## 2018-10-04 NOTE — H&P PST ADULT - HISTORY OF PRESENT ILLNESS
This is a 48 y.o. male s/p excision of melanoma of scalp 8/18  - biopsy done . Pt has secondary malignant neoplasm of unspecified site . Pt had PEt scan done . Pt now for surgery .

## 2018-10-04 NOTE — H&P PST ADULT - LYMPHATIC
anterior cervical R/posterior cervical L/posterior cervical R/supraclavicular L/anterior cervical L/supraclavicular R

## 2018-10-04 NOTE — H&P PST ADULT - RS GEN PE MLT RESP DETAILS PC
no rales/clear to auscultation bilaterally/no wheezes/breath sounds equal/respirations non-labored/no rhonchi/good air movement

## 2018-10-04 NOTE — H&P PST ADULT - TEACHING/LEARNING LEARNING PREFERENCES
pictorial/group instruction/verbal instruction/skill demonstration/computer/internet/video/written material/audio/individual instruction

## 2018-10-05 PROBLEM — M19.90 UNSPECIFIED OSTEOARTHRITIS, UNSPECIFIED SITE: Chronic | Status: ACTIVE | Noted: 2018-08-17

## 2018-10-17 ENCOUNTER — APPOINTMENT (OUTPATIENT)
Dept: PLASTIC SURGERY | Facility: CLINIC | Age: 48
End: 2018-10-17
Payer: COMMERCIAL

## 2018-10-17 PROCEDURE — 99024 POSTOP FOLLOW-UP VISIT: CPT

## 2018-10-18 ENCOUNTER — TRANSCRIPTION ENCOUNTER (OUTPATIENT)
Age: 48
End: 2018-10-18

## 2018-10-18 NOTE — ASU PATIENT PROFILE, ADULT - TEACHING/LEARNING LEARNING PREFERENCES
individual instruction/pictorial/verbal instruction/group instruction/audio/computer/internet/skill demonstration/video/written material

## 2018-10-19 ENCOUNTER — APPOINTMENT (OUTPATIENT)
Dept: SURGICAL ONCOLOGY | Facility: HOSPITAL | Age: 48
End: 2018-10-19

## 2018-10-19 ENCOUNTER — RESULT REVIEW (OUTPATIENT)
Age: 48
End: 2018-10-19

## 2018-10-19 ENCOUNTER — INPATIENT (INPATIENT)
Facility: HOSPITAL | Age: 48
LOS: 0 days | Discharge: HOME | End: 2018-10-20
Attending: SPECIALIST | Admitting: SPECIALIST
Payer: COMMERCIAL

## 2018-10-19 VITALS
WEIGHT: 188.94 LBS | HEART RATE: 70 BPM | TEMPERATURE: 98 F | DIASTOLIC BLOOD PRESSURE: 71 MMHG | SYSTOLIC BLOOD PRESSURE: 125 MMHG | RESPIRATION RATE: 16 BRPM | OXYGEN SATURATION: 97 % | HEIGHT: 71 IN

## 2018-10-19 DIAGNOSIS — Z98.89 OTHER SPECIFIED POSTPROCEDURAL STATES: Chronic | ICD-10-CM

## 2018-10-19 DIAGNOSIS — C79.9 SECONDARY MALIGNANT NEOPLASM OF UNSPECIFIED SITE: ICD-10-CM

## 2018-10-19 DIAGNOSIS — I89.9 NONINFECTIVE DISORDER OF LYMPHATIC VESSELS AND LYMPH NODES, UNSPECIFIED: Chronic | ICD-10-CM

## 2018-10-19 DIAGNOSIS — Z90.79 ACQUIRED ABSENCE OF OTHER GENITAL ORGAN(S): Chronic | ICD-10-CM

## 2018-10-19 DIAGNOSIS — C43.4 MALIGNANT MELANOMA OF SCALP AND NECK: Chronic | ICD-10-CM

## 2018-10-19 LAB — RH IG SCN BLD-IMP: POSITIVE — SIGNIFICANT CHANGE UP

## 2018-10-19 PROCEDURE — 88342 IMHCHEM/IMCYTCHM 1ST ANTB: CPT | Mod: 26

## 2018-10-19 PROCEDURE — 99238 HOSP IP/OBS DSCHRG MGMT 30/<: CPT | Mod: 24

## 2018-10-19 PROCEDURE — 38724 REMOVAL OF LYMPH NODES NECK: CPT

## 2018-10-19 PROCEDURE — 38724 REMOVAL OF LYMPH NODES NECK: CPT | Mod: 82

## 2018-10-19 PROCEDURE — 64716 REVISION OF CRANIAL NERVE: CPT

## 2018-10-19 PROCEDURE — 88307 TISSUE EXAM BY PATHOLOGIST: CPT | Mod: 26

## 2018-10-19 PROCEDURE — 12045 INTMD RPR N-HF/GENIT12.6-20: CPT | Mod: 59

## 2018-10-19 PROCEDURE — 64716 REVISION OF CRANIAL NERVE: CPT | Mod: 82

## 2018-10-19 RX ORDER — METOPROLOL TARTRATE 50 MG
50 TABLET ORAL DAILY
Qty: 0 | Refills: 0 | Status: DISCONTINUED | OUTPATIENT
Start: 2018-10-19 | End: 2018-10-20

## 2018-10-19 RX ORDER — OXYMETAZOLINE HYDROCHLORIDE 0.5 MG/ML
1 SPRAY NASAL
Qty: 0 | Refills: 0 | Status: DISCONTINUED | OUTPATIENT
Start: 2018-10-19 | End: 2018-10-20

## 2018-10-19 RX ORDER — ONDANSETRON 8 MG/1
4 TABLET, FILM COATED ORAL ONCE
Qty: 0 | Refills: 0 | Status: DISCONTINUED | OUTPATIENT
Start: 2018-10-19 | End: 2018-10-19

## 2018-10-19 RX ORDER — HYDROMORPHONE HYDROCHLORIDE 2 MG/ML
4 INJECTION INTRAMUSCULAR; INTRAVENOUS; SUBCUTANEOUS EVERY 4 HOURS
Qty: 0 | Refills: 0 | Status: DISCONTINUED | OUTPATIENT
Start: 2018-10-19 | End: 2018-10-20

## 2018-10-19 RX ORDER — ACETAMINOPHEN 500 MG
1000 TABLET ORAL ONCE
Qty: 0 | Refills: 0 | Status: COMPLETED | OUTPATIENT
Start: 2018-10-19 | End: 2018-10-19

## 2018-10-19 RX ORDER — HYDROMORPHONE HYDROCHLORIDE 2 MG/ML
0.5 INJECTION INTRAMUSCULAR; INTRAVENOUS; SUBCUTANEOUS
Qty: 0 | Refills: 0 | Status: DISCONTINUED | OUTPATIENT
Start: 2018-10-19 | End: 2018-10-19

## 2018-10-19 RX ORDER — FENTANYL CITRATE 50 UG/ML
25 INJECTION INTRAVENOUS
Qty: 0 | Refills: 0 | Status: DISCONTINUED | OUTPATIENT
Start: 2018-10-19 | End: 2018-10-19

## 2018-10-19 RX ORDER — OXYCODONE HYDROCHLORIDE 5 MG/1
10 TABLET ORAL EVERY 4 HOURS
Qty: 0 | Refills: 0 | Status: DISCONTINUED | OUTPATIENT
Start: 2018-10-19 | End: 2018-10-19

## 2018-10-19 RX ORDER — HYDROMORPHONE HYDROCHLORIDE 2 MG/ML
2 INJECTION INTRAMUSCULAR; INTRAVENOUS; SUBCUTANEOUS EVERY 4 HOURS
Qty: 0 | Refills: 0 | Status: DISCONTINUED | OUTPATIENT
Start: 2018-10-19 | End: 2018-10-20

## 2018-10-19 RX ORDER — AMLODIPINE BESYLATE 2.5 MG/1
5 TABLET ORAL DAILY
Qty: 0 | Refills: 0 | Status: DISCONTINUED | OUTPATIENT
Start: 2018-10-19 | End: 2018-10-20

## 2018-10-19 RX ORDER — SODIUM CHLORIDE 9 MG/ML
1000 INJECTION, SOLUTION INTRAVENOUS
Qty: 0 | Refills: 0 | Status: DISCONTINUED | OUTPATIENT
Start: 2018-10-19 | End: 2018-10-20

## 2018-10-19 RX ORDER — ENOXAPARIN SODIUM 100 MG/ML
40 INJECTION SUBCUTANEOUS DAILY
Qty: 0 | Refills: 0 | Status: DISCONTINUED | OUTPATIENT
Start: 2018-10-19 | End: 2018-10-20

## 2018-10-19 RX ORDER — ATORVASTATIN CALCIUM 80 MG/1
40 TABLET, FILM COATED ORAL AT BEDTIME
Qty: 0 | Refills: 0 | Status: DISCONTINUED | OUTPATIENT
Start: 2018-10-19 | End: 2018-10-20

## 2018-10-19 RX ORDER — OXYCODONE HYDROCHLORIDE 5 MG/1
5 TABLET ORAL EVERY 4 HOURS
Qty: 0 | Refills: 0 | Status: DISCONTINUED | OUTPATIENT
Start: 2018-10-19 | End: 2018-10-19

## 2018-10-19 RX ORDER — ACETAMINOPHEN 500 MG
650 TABLET ORAL EVERY 6 HOURS
Qty: 0 | Refills: 0 | Status: DISCONTINUED | OUTPATIENT
Start: 2018-10-19 | End: 2018-10-20

## 2018-10-19 RX ADMIN — SODIUM CHLORIDE 100 MILLILITER(S): 9 INJECTION, SOLUTION INTRAVENOUS at 21:39

## 2018-10-19 RX ADMIN — Medication 1000 MILLIGRAM(S): at 23:06

## 2018-10-19 RX ADMIN — HYDROMORPHONE HYDROCHLORIDE 2 MILLIGRAM(S): 2 INJECTION INTRAMUSCULAR; INTRAVENOUS; SUBCUTANEOUS at 16:31

## 2018-10-19 RX ADMIN — Medication 400 MILLIGRAM(S): at 22:36

## 2018-10-19 RX ADMIN — HYDROMORPHONE HYDROCHLORIDE 0.5 MILLIGRAM(S): 2 INJECTION INTRAMUSCULAR; INTRAVENOUS; SUBCUTANEOUS at 12:15

## 2018-10-19 RX ADMIN — HYDROMORPHONE HYDROCHLORIDE 0.5 MILLIGRAM(S): 2 INJECTION INTRAMUSCULAR; INTRAVENOUS; SUBCUTANEOUS at 12:00

## 2018-10-19 RX ADMIN — ATORVASTATIN CALCIUM 40 MILLIGRAM(S): 80 TABLET, FILM COATED ORAL at 21:39

## 2018-10-19 RX ADMIN — HYDROMORPHONE HYDROCHLORIDE 4 MILLIGRAM(S): 2 INJECTION INTRAMUSCULAR; INTRAVENOUS; SUBCUTANEOUS at 14:20

## 2018-10-19 RX ADMIN — HYDROMORPHONE HYDROCHLORIDE 4 MILLIGRAM(S): 2 INJECTION INTRAMUSCULAR; INTRAVENOUS; SUBCUTANEOUS at 15:00

## 2018-10-19 RX ADMIN — HYDROMORPHONE HYDROCHLORIDE 2 MILLIGRAM(S): 2 INJECTION INTRAMUSCULAR; INTRAVENOUS; SUBCUTANEOUS at 20:36

## 2018-10-19 RX ADMIN — ENOXAPARIN SODIUM 40 MILLIGRAM(S): 100 INJECTION SUBCUTANEOUS at 13:38

## 2018-10-19 NOTE — CONSULT NOTE ADULT - SUBJECTIVE AND OBJECTIVE BOX
Requesting Physician : Dr. Romano    Reason for Consultation: Abnormal ECG    HISTORY OF PRESENT ILLNESS:  49 yo M with history of HLD, HTN who was admitted s/p neck mass excision and LN dissection who is being seen for abnormal ECG.  The patient was admitted for mass resection and tolerated procedure well in terms of cv perspective.  He denies chest pain, sob, orthopena, lower extremity edema, or any other cardiac complaints.  The patient was recently seen by Dr. Romano at which time TTE was performed showing normal LV function.  ECG done in hospital showing early repolarization; cardiology consulted to further evaluate.         PAST MEDICAL & SURGICAL HISTORY:  Hypertriglyceridemia  History of bone marrow transplant  Osteoarthritis: degenerative disc L3-4  BPH (benign prostatic hyperplasia)  Colitis  GERD (gastroesophageal reflux disease)  NHL (non-Hodgkin's lymphoma): 1999, Radiation to left neck region  HTN (hypertension)  Headache, Migraine  Testicular Cancer: 1994, chemotherapy  Melanoma of scalp or neck: excision 8/18  Lymph node disorder: s/p abdominal lymph node dissection 1994, 1996  S/P colon resection: 1996  History of orchiectomy: left 1994          MEDICATIONS:  MEDICATIONS  (STANDING):  amLODIPine   Tablet 5 milliGRAM(s) Oral daily  atorvastatin 40 milliGRAM(s) Oral at bedtime  enoxaparin Injectable 40 milliGRAM(s) SubCutaneous daily  lactated ringers. 1000 milliLiter(s) (100 mL/Hr) IV Continuous <Continuous>  metoprolol succinate ER 50 milliGRAM(s) Oral daily      Allergies    No Known Allergies    Intolerances        FAMILY HISTORY:  No pertinent family history in first degree relatives    Non-contributary for premature coronary disease or sudden cardiac death    SOCIAL HISTORY:    [x ] Non-smoker  [ ] Smoker  [ ] Alcohol      REVIEW OF SYSTEMS:  [ ]chest pain  [  ]shortness of breath  [  ]palpitations  [  ]syncope  [ ]near syncope [ ]upper extremity weakness   [ ] lower extremity weakness  [  ]diplopia  [  ]altered mental status   [  ]fevers  [ ]chills [ ]nausea  [ ]vomitting  [  ]dysphagia    [ ]abdominal pain  [ ]melena  [ ]BRBPR    [  ]epistaxis  [  ]rash    [ ]lower extremity edema        [x ] All others negative	  [ ] Unable to obtain    PHYSICAL EXAM:  T(C): 36.9 (10-19-18 @ 10:50), Max: 36.9 (10-19-18 @ 10:50)  HR: 78 (10-19-18 @ 12:00) (70 - 90)  BP: 112/62 (10-19-18 @ 12:00) (101/61 - 125/71)  RR: 13 (10-19-18 @ 12:00) (12 - 24)  SpO2: 95% (10-19-18 @ 12:00) (94% - 97%)  Wt(kg): --  I&O's Summary        	  Lymphatic: No lymphadenopathy , no edema  Cardiovascular: Normal S1 S2,RRR No JVD, No murmurs , Peripheral pulses palpable 2+ bilaterally  Respiratory: Lungs clear to auscultation, normal effort 	  Gastrointestinal:  Soft, Non-tender, + BS	  Skin: No rashes, No ecchymoses, No cyanosis, warm to touch      TELEMETRY: SR	    ECG:  NSR, early repol	  RADIOLOGY:  OTHER:     DIAGNOSTIC TESTING:  [ ] Echocardiogram:  [ ]  Catheterization:  [ ] Stress Test:    	  	  LABS:	 	    CARDIAC MARKERS:                proBNP:   Lipid Profile:   HgA1c:   TSH:     ASSESSMENT/PLAN: 	48yMale with history of HTN, HLD admitted post neck mass resection being seen for abnormal ECG.   -Pt. with no anginal symptoms or clinical heart failure  -ECG with early repolarization and no st-t changes suggestive of ischemia  -tolerated procedure well in terms of cv perspective  -no further cardiac workup needed at this time    Apurva Lopez MD

## 2018-10-19 NOTE — PROGRESS NOTE ADULT - SUBJECTIVE AND OBJECTIVE BOX
Post op Note    s/p R modified radial neck dissection for melanoma     SUBJECTIVE: Patient seen and examined at bedside with surgical team, patient without complaints.     Vital Signs Last 24 Hrs  T(C): 36.8 (19 Oct 2018 14:00), Max: 36.9 (19 Oct 2018 10:50)  T(F): 98.2 (19 Oct 2018 14:00), Max: 98.4 (19 Oct 2018 10:50)  HR: 85 (19 Oct 2018 14:00) (70 - 90)  BP: 116/64 (19 Oct 2018 14:00) (100/63 - 125/71)  RR: 12 (19 Oct 2018 14:00) (11 - 24)  SpO2: 96% (19 Oct 2018 14:00) (94% - 97%)I&O's Detail    19 Oct 2018 07:01  -  19 Oct 2018 14:49  --------------------------------------------------------  IN:  Total IN: 0 mL    OUT:    Bulb: 25 mL  Total OUT: 25 mL  Total NET: -25 mL      MEDICATIONS  (STANDING):  amLODIPine   Tablet 5 milliGRAM(s) Oral daily  atorvastatin 40 milliGRAM(s) Oral at bedtime  enoxaparin Injectable 40 milliGRAM(s) SubCutaneous daily  lactated ringers. 1000 milliLiter(s) (100 mL/Hr) IV Continuous <Continuous>  metoprolol succinate ER 50 milliGRAM(s) Oral daily    MEDICATIONS  (PRN):  acetaminophen   Tablet .. 650 milliGRAM(s) Oral every 6 hours PRN Mild Pain (1 - 3)  fentaNYL    Injectable 25 MICROGram(s) IV Push every 5 minutes PRN Moderate Pain (4 - 6)  HYDROmorphone   Tablet 2 milliGRAM(s) Oral every 4 hours PRN Moderate Pain (4 - 6)  HYDROmorphone   Tablet 4 milliGRAM(s) Oral every 4 hours PRN Severe Pain (7 - 10)  HYDROmorphone  Injectable 0.5 milliGRAM(s) IV Push every 10 minutes PRN Severe Pain (7 - 10)  ondansetron Injectable 4 milliGRAM(s) IV Push once PRN Nausea and/or Vomiting      Physical Exam  General: A&Ox3, NAD Post op Note    s/p R modified radial neck dissection for melanoma     SUBJECTIVE: Patient seen and examined at bedside with surgical team, patient without complaints. He denies pain at this time but states he is anticipating pain. He denies nausea and vomiting, chest pain and shortness of breath    Vital Signs Last 24 Hrs  T(C): 36.8 (19 Oct 2018 14:00), Max: 36.9 (19 Oct 2018 10:50)  T(F): 98.2 (19 Oct 2018 14:00), Max: 98.4 (19 Oct 2018 10:50)  HR: 85 (19 Oct 2018 14:00) (70 - 90)  BP: 116/64 (19 Oct 2018 14:00) (100/63 - 125/71)  RR: 12 (19 Oct 2018 14:00) (11 - 24)  SpO2: 96% (19 Oct 2018 14:00) (94% - 97%)I&O's Detail    19 Oct 2018 07:01  -  19 Oct 2018 14:49  --------------------------------------------------------  IN:  Total IN: 0 mL    OUT:    Bulb: 25 mL  Total OUT: 25 mL  Total NET: -25 mL    MEDICATIONS  (STANDING):  amLODIPine   Tablet 5 milliGRAM(s) Oral daily  atorvastatin 40 milliGRAM(s) Oral at bedtime  enoxaparin Injectable 40 milliGRAM(s) SubCutaneous daily  lactated ringers. 1000 milliLiter(s) (100 mL/Hr) IV Continuous <Continuous>  metoprolol succinate ER 50 milliGRAM(s) Oral daily  MEDICATIONS  (PRN):  acetaminophen   Tablet .. 650 milliGRAM(s) Oral every 6 hours PRN Mild Pain (1 - 3)  fentaNYL    Injectable 25 MICROGram(s) IV Push every 5 minutes PRN Moderate Pain (4 - 6)  HYDROmorphone   Tablet 2 milliGRAM(s) Oral every 4 hours PRN Moderate Pain (4 - 6)  HYDROmorphone   Tablet 4 milliGRAM(s) Oral every 4 hours PRN Severe Pain (7 - 10)  HYDROmorphone  Injectable 0.5 milliGRAM(s) IV Push every 10 minutes PRN Severe Pain (7 - 10)  ondansetron Injectable 4 milliGRAM(s) IV Push once PRN Nausea and/or Vomiting    Physical Exam  General: A&Ox3, NAD  Neck: Right neck soft nontender, no drainage, no hematoma, SERJIO sanguineous

## 2018-10-19 NOTE — BRIEF OPERATIVE NOTE - PROCEDURE
<<-----Click on this checkbox to enter Procedure Modified neck dissection on right  10/19/2018    Active  [email or web address]

## 2018-10-19 NOTE — PROGRESS NOTE ADULT - ASSESSMENT
This is a 48 y.o. male s/p excision of melanoma of scalp 8/18 now s/p R modified radial neck dissection for melanoma This is a 48 y.o. male s/p excision of melanoma of scalp 8/18 now s/p R modified radial neck dissection for melanoma POD 0 seen and examined in PACU doing well    - Regular diet  - Pain control with dilaudid  - DC IVF when tolerating a regular diet  - DVT ppx  - OOB to chair, ambulate as tolerated  - SERJIO to self suction  - D/w D team

## 2018-10-20 ENCOUNTER — TRANSCRIPTION ENCOUNTER (OUTPATIENT)
Age: 48
End: 2018-10-20

## 2018-10-20 VITALS
HEART RATE: 83 BPM | SYSTOLIC BLOOD PRESSURE: 134 MMHG | TEMPERATURE: 99 F | OXYGEN SATURATION: 97 % | RESPIRATION RATE: 18 BRPM | DIASTOLIC BLOOD PRESSURE: 72 MMHG

## 2018-10-20 LAB
BUN SERPL-MCNC: 19 MG/DL — SIGNIFICANT CHANGE UP (ref 7–23)
CALCIUM SERPL-MCNC: 9.2 MG/DL — SIGNIFICANT CHANGE UP (ref 8.4–10.5)
CHLORIDE SERPL-SCNC: 97 MMOL/L — LOW (ref 98–107)
CO2 SERPL-SCNC: 23 MMOL/L — SIGNIFICANT CHANGE UP (ref 22–31)
CREAT SERPL-MCNC: 0.91 MG/DL — SIGNIFICANT CHANGE UP (ref 0.5–1.3)
GLUCOSE SERPL-MCNC: 131 MG/DL — HIGH (ref 70–99)
HCT VFR BLD CALC: 39.6 % — SIGNIFICANT CHANGE UP (ref 39–50)
HGB BLD-MCNC: 12.9 G/DL — LOW (ref 13–17)
MCHC RBC-ENTMCNC: 28.2 PG — SIGNIFICANT CHANGE UP (ref 27–34)
MCHC RBC-ENTMCNC: 32.6 % — SIGNIFICANT CHANGE UP (ref 32–36)
MCV RBC AUTO: 86.7 FL — SIGNIFICANT CHANGE UP (ref 80–100)
NRBC # FLD: 0 — SIGNIFICANT CHANGE UP
PLATELET # BLD AUTO: 279 K/UL — SIGNIFICANT CHANGE UP (ref 150–400)
PMV BLD: 10 FL — SIGNIFICANT CHANGE UP (ref 7–13)
POTASSIUM SERPL-MCNC: 3.6 MMOL/L — SIGNIFICANT CHANGE UP (ref 3.5–5.3)
POTASSIUM SERPL-SCNC: 3.6 MMOL/L — SIGNIFICANT CHANGE UP (ref 3.5–5.3)
RBC # BLD: 4.57 M/UL — SIGNIFICANT CHANGE UP (ref 4.2–5.8)
RBC # FLD: 13.6 % — SIGNIFICANT CHANGE UP (ref 10.3–14.5)
SODIUM SERPL-SCNC: 137 MMOL/L — SIGNIFICANT CHANGE UP (ref 135–145)
WBC # BLD: 9.88 K/UL — SIGNIFICANT CHANGE UP (ref 3.8–10.5)
WBC # FLD AUTO: 9.88 K/UL — SIGNIFICANT CHANGE UP (ref 3.8–10.5)

## 2018-10-20 RX ORDER — HYDROMORPHONE HYDROCHLORIDE 2 MG/ML
1 INJECTION INTRAMUSCULAR; INTRAVENOUS; SUBCUTANEOUS ONCE
Qty: 0 | Refills: 0 | Status: DISCONTINUED | OUTPATIENT
Start: 2018-10-20 | End: 2018-10-20

## 2018-10-20 RX ORDER — ATORVASTATIN CALCIUM 80 MG/1
1 TABLET, FILM COATED ORAL
Qty: 0 | Refills: 0 | COMMUNITY

## 2018-10-20 RX ORDER — POTASSIUM CHLORIDE 20 MEQ
40 PACKET (EA) ORAL ONCE
Qty: 0 | Refills: 0 | Status: COMPLETED | OUTPATIENT
Start: 2018-10-20 | End: 2018-10-20

## 2018-10-20 RX ORDER — HYDROMORPHONE HYDROCHLORIDE 2 MG/ML
1 INJECTION INTRAMUSCULAR; INTRAVENOUS; SUBCUTANEOUS
Qty: 10 | Refills: 0 | OUTPATIENT
Start: 2018-10-20 | End: 2018-10-20

## 2018-10-20 RX ORDER — ACETAMINOPHEN 500 MG
975 TABLET ORAL EVERY 6 HOURS
Qty: 0 | Refills: 0 | Status: DISCONTINUED | OUTPATIENT
Start: 2018-10-20 | End: 2018-10-20

## 2018-10-20 RX ADMIN — HYDROMORPHONE HYDROCHLORIDE 4 MILLIGRAM(S): 2 INJECTION INTRAMUSCULAR; INTRAVENOUS; SUBCUTANEOUS at 09:37

## 2018-10-20 RX ADMIN — HYDROMORPHONE HYDROCHLORIDE 4 MILLIGRAM(S): 2 INJECTION INTRAMUSCULAR; INTRAVENOUS; SUBCUTANEOUS at 13:37

## 2018-10-20 RX ADMIN — HYDROMORPHONE HYDROCHLORIDE 4 MILLIGRAM(S): 2 INJECTION INTRAMUSCULAR; INTRAVENOUS; SUBCUTANEOUS at 09:07

## 2018-10-20 RX ADMIN — HYDROMORPHONE HYDROCHLORIDE 2 MILLIGRAM(S): 2 INJECTION INTRAMUSCULAR; INTRAVENOUS; SUBCUTANEOUS at 05:06

## 2018-10-20 RX ADMIN — HYDROMORPHONE HYDROCHLORIDE 4 MILLIGRAM(S): 2 INJECTION INTRAMUSCULAR; INTRAVENOUS; SUBCUTANEOUS at 13:07

## 2018-10-20 RX ADMIN — HYDROMORPHONE HYDROCHLORIDE 1 MILLIGRAM(S): 2 INJECTION INTRAMUSCULAR; INTRAVENOUS; SUBCUTANEOUS at 00:42

## 2018-10-20 RX ADMIN — Medication 40 MILLIEQUIVALENT(S): at 11:54

## 2018-10-20 RX ADMIN — ENOXAPARIN SODIUM 40 MILLIGRAM(S): 100 INJECTION SUBCUTANEOUS at 11:54

## 2018-10-20 RX ADMIN — HYDROMORPHONE HYDROCHLORIDE 1 MILLIGRAM(S): 2 INJECTION INTRAMUSCULAR; INTRAVENOUS; SUBCUTANEOUS at 01:12

## 2018-10-20 RX ADMIN — Medication 975 MILLIGRAM(S): at 09:06

## 2018-10-20 RX ADMIN — Medication 975 MILLIGRAM(S): at 08:18

## 2018-10-20 RX ADMIN — HYDROMORPHONE HYDROCHLORIDE 2 MILLIGRAM(S): 2 INJECTION INTRAMUSCULAR; INTRAVENOUS; SUBCUTANEOUS at 05:36

## 2018-10-20 NOTE — PROGRESS NOTE ADULT - SUBJECTIVE AND OBJECTIVE BOX
Patient denies chest pain or shortness of breath.   Review of systems otherwise (-)  	  MEDICATIONS:  MEDICATIONS  (STANDING):  acetaminophen   Tablet .. 975 milliGRAM(s) Oral every 6 hours  amLODIPine   Tablet 5 milliGRAM(s) Oral daily  atorvastatin 40 milliGRAM(s) Oral at bedtime  enoxaparin Injectable 40 milliGRAM(s) SubCutaneous daily  metoprolol succinate ER 50 milliGRAM(s) Oral daily      LABS:	 	    CARDIAC MARKERS:                                12.9   9.88  )-----------( 279      ( 20 Oct 2018 07:00 )             39.6     Hemoglobin: 12.9 g/dL (10-20 @ 07:00)      10-20    137  |  97<L>  |  19  ----------------------------<  131<H>  3.6   |  23  |  0.91    Ca    9.2      20 Oct 2018 07:00      Creatinine Trend: 0.91<--, 1.12<--    COAGS:       proBNP:   Lipid Profile:   HgA1c:   TSH:       PHYSICAL EXAM:  T(C): 37 (10-20-18 @ 13:22), Max: 37.1 (10-19-18 @ 19:00)  HR: 83 (10-20-18 @ 13:22) (73 - 88)  BP: 134/72 (10-20-18 @ 13:22) (109/62 - 134/72)  RR: 18 (10-20-18 @ 13:22) (12 - 18)  SpO2: 97% (10-20-18 @ 13:22) (96% - 100%)  Wt(kg): --  I&O's Summary    19 Oct 2018 07:01  -  20 Oct 2018 07:00  --------------------------------------------------------  IN: 800 mL / OUT: 565.5 mL / NET: 234.5 mL    20 Oct 2018 07:01  -  20 Oct 2018 13:35  --------------------------------------------------------  IN: 480 mL / OUT: 17.5 mL / NET: 462.5 mL          Gen: Appears well in NAD  HEENT:  (-)icterus (-)pallor  CV: N S1 S2 1/6 ARMIN (+)2 Pulses B/l  Resp:  Clear to ausculatation B/L, normal effort  GI: (+) BS Soft, NT, ND  Lymph:  (-)Edema, (-)obvious lymphadenopathy  Skin: Warm to touch, Normal turgor  Psych: Appropriate mood and affect      ASSESSMENT/PLAN: 	48y  Male with history of HTN, HLD admitted post neck mass resection being seen for abnormal ECG.     -Pt. with no anginal symptoms or clinical heart failure  -ECG with early repolarization and no st-t changes suggestive of ischemia  -tolerated procedure well in terms of cv perspective  -no further cardiac workup needed at this time  - D/C planning per primary team    Harsh Sparks MD, FACC

## 2018-10-20 NOTE — PROGRESS NOTE ADULT - SUBJECTIVE AND OBJECTIVE BOX
ANESTHESIA POSTOP CHECK    48y Male POSTOP DAY 1 S/P     Vital Signs Last 24 Hrs  T(C): 36.8 (20 Oct 2018 06:20), Max: 37.1 (19 Oct 2018 19:00)  T(F): 98.2 (20 Oct 2018 06:20), Max: 98.7 (19 Oct 2018 19:00)  HR: 73 (20 Oct 2018 06:20) (73 - 90)  BP: 109/62 (20 Oct 2018 06:20) (100/63 - 132/60)  BP(mean): --  RR: 18 (20 Oct 2018 06:20) (11 - 24)  SpO2: 98% (20 Oct 2018 06:20) (94% - 100%)  I&O's Summary    19 Oct 2018 07:01  -  20 Oct 2018 07:00  --------------------------------------------------------  IN: 800 mL / OUT: 565.5 mL / NET: 234.5 mL        [x ] NO APPARENT ANESTHESIA COMPLICATIONS      Comments:

## 2018-10-20 NOTE — DISCHARGE NOTE ADULT - HOSPITAL COURSE
This is a 48 y.o. male s/p excision of melanoma of scalp 8/18. Pt has secondary malignant neoplasm. Patient underwent a right modified neck dissection on 10/19/18.  Patient recovered well in PACU, and was transferred to the floor.  Tolerating regular diet.  Pain controlled with PO analgesics.  Stable for discharge with outpatient follow up.

## 2018-10-20 NOTE — DISCHARGE NOTE ADULT - MEDICATION SUMMARY - MEDICATIONS TO TAKE
I will START or STAY ON the medications listed below when I get home from the hospital:    Ultram 50 mg oral tablet  -- 1 tab(s) by mouth every 4 hours, As Needed  -- Indication: For Home med    Dilaudid 2 mg oral tablet  -- 1 tab(s) by mouth every 6 hours x 1 days, As Needed  for pain MDD:4  -- Caution federal law prohibits the transfer of this drug to any person other  than the person for whom it was prescribed.  May cause drowsiness.  Alcohol may intensify this effect.  Use care when operating dangerous machinery.  This prescription cannot be refilled.  Using more of this medication than prescribed may cause serious breathing problems.    -- Indication: For Pain    naproxen  -- 400 milligram(s) by mouth once a day  -- Indication: For home med    TriCor 145 mg oral tablet  -- 1 tab(s) by mouth once a day  -- Indication: For home med    Toprol-XL 50 mg oral tablet, extended release  -- 1 tab(s) by mouth once a day  -- Indication: For home med    Norvasc 5 mg oral tablet  -- 1 tab(s) by mouth once a day  -- Indication: For home med

## 2018-10-20 NOTE — DISCHARGE NOTE ADULT - CARE PLAN
Principal Discharge DX:	Melanoma of scalp or neck  Goal:	s/p Excision of melanoma 8/18, no s/p R modified radical neck dissection  Assessment and plan of treatment:	Please follow up with your physician regarding your hospitalization. Please schedule an appointment with your provider within two weeks after your discharge to review your hospital course.  You will be discharged with a SERJIO drain. You will need to empty it and record outputs accurately. This will be taught to you by the nursing staff. Please do not remove the SERJIO drain. It will be removed in the office. Please bring to the office accurate records of output.

## 2018-10-20 NOTE — DISCHARGE NOTE ADULT - PATIENT PORTAL LINK FT
You can access the [facility] Patient Portal, offered by [facility], by registering with the following website: [email or web address]

## 2018-10-20 NOTE — DISCHARGE NOTE ADULT - CARE PROVIDER_API CALL
Sarwat Chandler (MD), Surgery  [address] [place] [id number]  Phone: [phone]  Fax: [phone]

## 2018-10-20 NOTE — PROGRESS NOTE ADULT - SUBJECTIVE AND OBJECTIVE BOX
Post op Note      SUBJECTIVE: Patient seen and examined.  No acute events overnight.  Pain controlled.     Vital Signs Last 24 Hrs  T(C): 36.9 (20 Oct 2018 08:36), Max: 37.1 (19 Oct 2018 19:00)  T(F): 98.5 (20 Oct 2018 08:36), Max: 98.7 (19 Oct 2018 19:00)  HR: 85 (20 Oct 2018 08:36) (73 - 88)  BP: 110/55 (20 Oct 2018 08:36) (100/63 - 132/60)  BP(mean): --  RR: 18 (20 Oct 2018 08:36) (11 - 18)  SpO2: 100% (20 Oct 2018 08:36) (94% - 100%)    I&O's Summary    19 Oct 2018 07:01  -  20 Oct 2018 07:00  --------------------------------------------------------  IN: 800 mL / OUT: 565.5 mL / NET: 234.5 mL    20 Oct 2018 07:01  -  20 Oct 2018 11:34  --------------------------------------------------------  IN: 480 mL / OUT: 15 mL / NET: 465 mL        MEDICATIONS  (STANDING):  amLODIPine   Tablet 5 milliGRAM(s) Oral daily  atorvastatin 40 milliGRAM(s) Oral at bedtime  enoxaparin Injectable 40 milliGRAM(s) SubCutaneous daily  lactated ringers. 1000 milliLiter(s) (100 mL/Hr) IV Continuous <Continuous>  metoprolol succinate ER 50 milliGRAM(s) Oral daily  MEDICATIONS  (PRN):  acetaminophen   Tablet .. 650 milliGRAM(s) Oral every 6 hours PRN Mild Pain (1 - 3)  fentaNYL    Injectable 25 MICROGram(s) IV Push every 5 minutes PRN Moderate Pain (4 - 6)  HYDROmorphone   Tablet 2 milliGRAM(s) Oral every 4 hours PRN Moderate Pain (4 - 6)  HYDROmorphone   Tablet 4 milliGRAM(s) Oral every 4 hours PRN Severe Pain (7 - 10)  HYDROmorphone  Injectable 0.5 milliGRAM(s) IV Push every 10 minutes PRN Severe Pain (7 - 10)  ondansetron Injectable 4 milliGRAM(s) IV Push once PRN Nausea and/or Vomiting    Physical Exam  General: A&Ox3, NAD  Neck: Right neck soft nontender, no drainage, no hematoma, SERJIO sanguinous

## 2018-10-20 NOTE — DISCHARGE NOTE ADULT - CONTRAINDICATIONS & PRECAUTIONS (SELECT ALL THAT APPLY)
Yes Moderate to severe acute illness with or without fever. Delay administration of vaccination until patient has been afebrile or illness resolved for 24hrs

## 2018-10-20 NOTE — PROGRESS NOTE ADULT - ASSESSMENT
48 y.o. male s/p excision of melanoma of scalp 8/18 now s/p R modified radial neck dissection for melanoma POD 1    - Regular diet  - Pain control with PO  - Regular diet  - DVT ppx  - OOB to chair, ambulate as tolerated  - SERJIO to self suction    Patient seen and examined with Dr. Conte

## 2018-10-20 NOTE — DISCHARGE NOTE ADULT - PLAN OF CARE
s/p Excision of melanoma 8/18, no s/p R modified radical neck dissection Please follow up with your physician regarding your hospitalization. Please schedule an appointment with your provider within two weeks after your discharge to review your hospital course.  You will be discharged with a SERJIO drain. You will need to empty it and record outputs accurately. This will be taught to you by the nursing staff. Please do not remove the SERJIO drain. It will be removed in the office. Please bring to the office accurate records of output.

## 2018-10-23 ENCOUNTER — OUTPATIENT (OUTPATIENT)
Dept: OUTPATIENT SERVICES | Facility: HOSPITAL | Age: 48
LOS: 1 days | End: 2018-10-23
Payer: COMMERCIAL

## 2018-10-23 VITALS
TEMPERATURE: 97 F | HEIGHT: 71 IN | RESPIRATION RATE: 16 BRPM | OXYGEN SATURATION: 99 % | HEART RATE: 94 BPM | DIASTOLIC BLOOD PRESSURE: 80 MMHG | SYSTOLIC BLOOD PRESSURE: 118 MMHG | WEIGHT: 184.97 LBS

## 2018-10-23 DIAGNOSIS — I89.9 NONINFECTIVE DISORDER OF LYMPHATIC VESSELS AND LYMPH NODES, UNSPECIFIED: Chronic | ICD-10-CM

## 2018-10-23 DIAGNOSIS — J34.2 DEVIATED NASAL SEPTUM: ICD-10-CM

## 2018-10-23 DIAGNOSIS — Z01.818 ENCOUNTER FOR OTHER PREPROCEDURAL EXAMINATION: ICD-10-CM

## 2018-10-23 DIAGNOSIS — J34.3 HYPERTROPHY OF NASAL TURBINATES: ICD-10-CM

## 2018-10-23 DIAGNOSIS — Z90.79 ACQUIRED ABSENCE OF OTHER GENITAL ORGAN(S): Chronic | ICD-10-CM

## 2018-10-23 DIAGNOSIS — Z98.89 OTHER SPECIFIED POSTPROCEDURAL STATES: Chronic | ICD-10-CM

## 2018-10-23 DIAGNOSIS — C43.4 MALIGNANT MELANOMA OF SCALP AND NECK: Chronic | ICD-10-CM

## 2018-10-23 LAB
ANION GAP SERPL CALC-SCNC: 13 MMOL/L — SIGNIFICANT CHANGE UP (ref 5–17)
BUN SERPL-MCNC: 24 MG/DL — HIGH (ref 7–23)
CALCIUM SERPL-MCNC: 10.7 MG/DL — HIGH (ref 8.4–10.5)
CHLORIDE SERPL-SCNC: 98 MMOL/L — SIGNIFICANT CHANGE UP (ref 96–108)
CO2 SERPL-SCNC: 25 MMOL/L — SIGNIFICANT CHANGE UP (ref 22–31)
CREAT SERPL-MCNC: 0.97 MG/DL — SIGNIFICANT CHANGE UP (ref 0.5–1.3)
GLUCOSE SERPL-MCNC: 113 MG/DL — HIGH (ref 70–99)
HCT VFR BLD CALC: 45.8 % — SIGNIFICANT CHANGE UP (ref 39–50)
HGB BLD-MCNC: 15.3 G/DL — SIGNIFICANT CHANGE UP (ref 13–17)
INR BLD: 0.98 RATIO — SIGNIFICANT CHANGE UP (ref 0.88–1.16)
MCHC RBC-ENTMCNC: 28.3 PG — SIGNIFICANT CHANGE UP (ref 27–34)
MCHC RBC-ENTMCNC: 33.4 GM/DL — SIGNIFICANT CHANGE UP (ref 32–36)
MCV RBC AUTO: 84.7 FL — SIGNIFICANT CHANGE UP (ref 80–100)
PLATELET # BLD AUTO: 320 K/UL — SIGNIFICANT CHANGE UP (ref 150–400)
POTASSIUM SERPL-MCNC: 4.3 MMOL/L — SIGNIFICANT CHANGE UP (ref 3.5–5.3)
POTASSIUM SERPL-SCNC: 4.3 MMOL/L — SIGNIFICANT CHANGE UP (ref 3.5–5.3)
PROTHROM AB SERPL-ACNC: 11.1 SEC — SIGNIFICANT CHANGE UP (ref 10–13.1)
RBC # BLD: 5.41 M/UL — SIGNIFICANT CHANGE UP (ref 4.2–5.8)
RBC # FLD: 13.6 % — SIGNIFICANT CHANGE UP (ref 10.3–14.5)
SODIUM SERPL-SCNC: 136 MMOL/L — SIGNIFICANT CHANGE UP (ref 135–145)
WBC # BLD: 6.89 K/UL — SIGNIFICANT CHANGE UP (ref 3.8–10.5)
WBC # FLD AUTO: 6.89 K/UL — SIGNIFICANT CHANGE UP (ref 3.8–10.5)

## 2018-10-23 PROCEDURE — 80048 BASIC METABOLIC PNL TOTAL CA: CPT

## 2018-10-23 PROCEDURE — 85610 PROTHROMBIN TIME: CPT

## 2018-10-23 PROCEDURE — G0463: CPT

## 2018-10-23 PROCEDURE — 85027 COMPLETE CBC AUTOMATED: CPT

## 2018-10-23 RX ORDER — SODIUM CHLORIDE 9 MG/ML
3 INJECTION INTRAMUSCULAR; INTRAVENOUS; SUBCUTANEOUS EVERY 8 HOURS
Qty: 0 | Refills: 0 | Status: DISCONTINUED | OUTPATIENT
Start: 2018-11-02 | End: 2018-11-17

## 2018-10-23 RX ORDER — LIDOCAINE HCL 20 MG/ML
0.2 VIAL (ML) INJECTION ONCE
Qty: 0 | Refills: 0 | Status: DISCONTINUED | OUTPATIENT
Start: 2018-11-02 | End: 2018-11-17

## 2018-10-23 NOTE — H&P PST ADULT - HISTORY OF PRESENT ILLNESS
48 year old male excision of melanoma of scalp 8/18 & s/p modified radical neck mass dissection/ LN dissection 10/19/18 , present in PST reports having chronic problems with deviated nasal septum, scheduled for Septoplasty, bilateral turbinectomy on 11/02/18. .

## 2018-10-23 NOTE — H&P PST ADULT - SKIN COMMENTS
steri strips dsg right neck attached to SERJIO drain with minimal drainage( about 5 prasanth)-dry & intact

## 2018-10-23 NOTE — H&P PST ADULT - PMH
BPH (benign prostatic hyperplasia)    Colitis  surgery 1996  GERD (gastroesophageal reflux disease)    Headache, Migraine    History of bone marrow transplant    HTN (hypertension)    Hypertriglyceridemia    Malignant melanoma of scalp  RSX 08/18  R neck mass dsx/ LN dsx 10/19/18  NHL (non-Hodgkin's lymphoma)  1999, Radiation to left neck region  Osteoarthritis  degenerative disc L3-4  Testicular Cancer  1994, chemotherapy

## 2018-10-23 NOTE — H&P PST ADULT - PSH
History of orchiectomy  left 1994  Lymph node disorder  s/p abdominal lymph node dissection 1994, 1996  Melanoma of scalp or neck  excision 8/18  s/p excision right neck mass & LN dissx  S/P colon resection  1996

## 2018-10-24 ENCOUNTER — APPOINTMENT (OUTPATIENT)
Dept: SURGICAL ONCOLOGY | Facility: CLINIC | Age: 48
End: 2018-10-24
Payer: COMMERCIAL

## 2018-10-24 VITALS
BODY MASS INDEX: 25.9 KG/M2 | HEIGHT: 71 IN | HEART RATE: 75 BPM | DIASTOLIC BLOOD PRESSURE: 84 MMHG | SYSTOLIC BLOOD PRESSURE: 128 MMHG | WEIGHT: 185 LBS | RESPIRATION RATE: 15 BRPM

## 2018-10-24 PROBLEM — K52.9 NONINFECTIVE GASTROENTERITIS AND COLITIS, UNSPECIFIED: Chronic | Status: ACTIVE | Noted: 2018-08-17

## 2018-10-24 PROCEDURE — 99024 POSTOP FOLLOW-UP VISIT: CPT

## 2018-10-24 PROCEDURE — 10140 I&D HMTMA SEROMA/FLUID COLLJ: CPT | Mod: 58

## 2018-10-25 PROBLEM — C43.4 MALIGNANT MELANOMA OF SCALP AND NECK: Chronic | Status: ACTIVE | Noted: 2018-10-23

## 2018-10-29 ENCOUNTER — APPOINTMENT (OUTPATIENT)
Dept: SURGICAL ONCOLOGY | Facility: CLINIC | Age: 48
End: 2018-10-29
Payer: COMMERCIAL

## 2018-10-29 VITALS
RESPIRATION RATE: 16 BRPM | DIASTOLIC BLOOD PRESSURE: 98 MMHG | HEIGHT: 71 IN | HEART RATE: 98 BPM | SYSTOLIC BLOOD PRESSURE: 146 MMHG | BODY MASS INDEX: 25.9 KG/M2 | WEIGHT: 185 LBS | OXYGEN SATURATION: 100 %

## 2018-10-29 LAB — SURGICAL PATHOLOGY STUDY: SIGNIFICANT CHANGE UP

## 2018-10-29 PROCEDURE — 99214 OFFICE O/P EST MOD 30 MIN: CPT | Mod: 24

## 2018-11-01 ENCOUNTER — TRANSCRIPTION ENCOUNTER (OUTPATIENT)
Age: 48
End: 2018-11-01

## 2018-11-01 VITALS — WEIGHT: 185.19 LBS

## 2018-11-02 ENCOUNTER — RESULT REVIEW (OUTPATIENT)
Age: 48
End: 2018-11-02

## 2018-11-02 ENCOUNTER — OUTPATIENT (OUTPATIENT)
Dept: OUTPATIENT SERVICES | Facility: HOSPITAL | Age: 48
LOS: 1 days | End: 2018-11-02
Payer: COMMERCIAL

## 2018-11-02 VITALS
DIASTOLIC BLOOD PRESSURE: 60 MMHG | RESPIRATION RATE: 20 BRPM | SYSTOLIC BLOOD PRESSURE: 102 MMHG | TEMPERATURE: 98 F | HEART RATE: 81 BPM | OXYGEN SATURATION: 98 %

## 2018-11-02 DIAGNOSIS — J34.2 DEVIATED NASAL SEPTUM: ICD-10-CM

## 2018-11-02 DIAGNOSIS — Z98.89 OTHER SPECIFIED POSTPROCEDURAL STATES: Chronic | ICD-10-CM

## 2018-11-02 DIAGNOSIS — C43.4 MALIGNANT MELANOMA OF SCALP AND NECK: Chronic | ICD-10-CM

## 2018-11-02 DIAGNOSIS — Z01.818 ENCOUNTER FOR OTHER PREPROCEDURAL EXAMINATION: ICD-10-CM

## 2018-11-02 DIAGNOSIS — Z90.79 ACQUIRED ABSENCE OF OTHER GENITAL ORGAN(S): Chronic | ICD-10-CM

## 2018-11-02 DIAGNOSIS — J34.3 HYPERTROPHY OF NASAL TURBINATES: ICD-10-CM

## 2018-11-02 DIAGNOSIS — I89.9 NONINFECTIVE DISORDER OF LYMPHATIC VESSELS AND LYMPH NODES, UNSPECIFIED: Chronic | ICD-10-CM

## 2018-11-02 PROCEDURE — 88300 SURGICAL PATH GROSS: CPT

## 2018-11-02 PROCEDURE — 30520 REPAIR OF NASAL SEPTUM: CPT

## 2018-11-02 PROCEDURE — 88300 SURGICAL PATH GROSS: CPT | Mod: 26

## 2018-11-02 PROCEDURE — 30930 THER FX NASAL INF TURBINATE: CPT | Mod: 50

## 2018-11-02 RX ORDER — SODIUM CHLORIDE 9 MG/ML
1000 INJECTION, SOLUTION INTRAVENOUS
Qty: 0 | Refills: 0 | Status: DISCONTINUED | OUTPATIENT
Start: 2018-11-02 | End: 2018-11-17

## 2018-11-02 RX ORDER — ONDANSETRON 8 MG/1
4 TABLET, FILM COATED ORAL ONCE
Qty: 0 | Refills: 0 | Status: DISCONTINUED | OUTPATIENT
Start: 2018-11-02 | End: 2018-11-17

## 2018-11-02 RX ORDER — ACETAMINOPHEN 500 MG
1000 TABLET ORAL ONCE
Qty: 0 | Refills: 0 | Status: DISCONTINUED | OUTPATIENT
Start: 2018-11-02 | End: 2018-11-17

## 2018-11-02 RX ORDER — OXYCODONE HYDROCHLORIDE 5 MG/1
5 TABLET ORAL ONCE
Qty: 0 | Refills: 0 | Status: DISCONTINUED | OUTPATIENT
Start: 2018-11-02 | End: 2018-11-02

## 2018-11-02 RX ORDER — HYDROMORPHONE HYDROCHLORIDE 2 MG/ML
0.5 INJECTION INTRAMUSCULAR; INTRAVENOUS; SUBCUTANEOUS
Qty: 0 | Refills: 0 | Status: DISCONTINUED | OUTPATIENT
Start: 2018-11-02 | End: 2018-11-02

## 2018-11-02 RX ORDER — ATORVASTATIN CALCIUM 80 MG/1
1 TABLET, FILM COATED ORAL
Qty: 0 | Refills: 0 | COMMUNITY

## 2018-11-02 NOTE — BRIEF OPERATIVE NOTE - POST-OP DX
Deviated nasal septum  11/02/2018    Active  Sameer Sheth  Nasal turbinate hypertrophy  11/02/2018    Active  Sameer Sheth

## 2018-11-02 NOTE — BRIEF OPERATIVE NOTE - PROCEDURE
<<-----Click on this checkbox to enter Procedure Septoplasty with bilateral reduction of nasal turbinates  11/02/2018    Active  [email or web address]

## 2018-11-06 LAB — SURGICAL PATHOLOGY STUDY: SIGNIFICANT CHANGE UP

## 2018-11-07 ENCOUNTER — APPOINTMENT (OUTPATIENT)
Dept: SURGICAL ONCOLOGY | Facility: CLINIC | Age: 48
End: 2018-11-07
Payer: COMMERCIAL

## 2018-11-07 VITALS
HEART RATE: 73 BPM | RESPIRATION RATE: 15 BRPM | SYSTOLIC BLOOD PRESSURE: 108 MMHG | DIASTOLIC BLOOD PRESSURE: 74 MMHG | WEIGHT: 180 LBS | HEIGHT: 71 IN | BODY MASS INDEX: 25.2 KG/M2

## 2018-11-07 PROCEDURE — 99213 OFFICE O/P EST LOW 20 MIN: CPT | Mod: 24

## 2018-11-09 ENCOUNTER — RX RENEWAL (OUTPATIENT)
Age: 48
End: 2018-11-09

## 2018-11-09 RX ORDER — OXYCODONE AND ACETAMINOPHEN 5; 325 MG/1; MG/1
5-325 TABLET ORAL
Qty: 25 | Refills: 0 | Status: COMPLETED | COMMUNITY
Start: 2018-10-22 | End: 2018-11-12

## 2018-12-18 ENCOUNTER — OUTPATIENT (OUTPATIENT)
Dept: OUTPATIENT SERVICES | Facility: HOSPITAL | Age: 48
LOS: 1 days | Discharge: HOME | End: 2018-12-18

## 2018-12-18 DIAGNOSIS — I89.9 NONINFECTIVE DISORDER OF LYMPHATIC VESSELS AND LYMPH NODES, UNSPECIFIED: Chronic | ICD-10-CM

## 2018-12-18 DIAGNOSIS — C43.4 MALIGNANT MELANOMA OF SCALP AND NECK: Chronic | ICD-10-CM

## 2018-12-18 DIAGNOSIS — Z90.79 ACQUIRED ABSENCE OF OTHER GENITAL ORGAN(S): Chronic | ICD-10-CM

## 2018-12-18 DIAGNOSIS — Z98.89 OTHER SPECIFIED POSTPROCEDURAL STATES: Chronic | ICD-10-CM

## 2018-12-18 DIAGNOSIS — C43.8 MALIGNANT MELANOMA OF OVERLAPPING SITES OF SKIN: ICD-10-CM

## 2018-12-21 ENCOUNTER — RESULT REVIEW (OUTPATIENT)
Age: 48
End: 2018-12-21

## 2018-12-21 ENCOUNTER — APPOINTMENT (OUTPATIENT)
Dept: HEMATOLOGY ONCOLOGY | Facility: CLINIC | Age: 48
End: 2018-12-21
Payer: COMMERCIAL

## 2018-12-21 VITALS
BODY MASS INDEX: 26.05 KG/M2 | HEART RATE: 95 BPM | SYSTOLIC BLOOD PRESSURE: 128 MMHG | WEIGHT: 186.07 LBS | TEMPERATURE: 98 F | DIASTOLIC BLOOD PRESSURE: 86 MMHG | HEIGHT: 70.87 IN | OXYGEN SATURATION: 96 % | RESPIRATION RATE: 16 BRPM

## 2018-12-21 DIAGNOSIS — Z86.69 PERSONAL HISTORY OF OTHER DISEASES OF THE NERVOUS SYSTEM AND SENSE ORGANS: ICD-10-CM

## 2018-12-21 DIAGNOSIS — Z87.891 PERSONAL HISTORY OF NICOTINE DEPENDENCE: ICD-10-CM

## 2018-12-21 LAB
BASOPHILS # BLD AUTO: 0 K/UL — SIGNIFICANT CHANGE UP (ref 0–0.2)
BASOPHILS NFR BLD AUTO: 0.5 % — SIGNIFICANT CHANGE UP (ref 0–2)
EOSINOPHIL # BLD AUTO: 0.1 K/UL — SIGNIFICANT CHANGE UP (ref 0–0.5)
EOSINOPHIL NFR BLD AUTO: 2.2 % — SIGNIFICANT CHANGE UP (ref 0–6)
HCT VFR BLD CALC: 41.8 % — SIGNIFICANT CHANGE UP (ref 39–50)
HGB BLD-MCNC: 14.8 G/DL — SIGNIFICANT CHANGE UP (ref 13–17)
LYMPHOCYTES # BLD AUTO: 2 K/UL — SIGNIFICANT CHANGE UP (ref 1–3.3)
LYMPHOCYTES # BLD AUTO: 32.3 % — SIGNIFICANT CHANGE UP (ref 13–44)
MCHC RBC-ENTMCNC: 29.8 PG — SIGNIFICANT CHANGE UP (ref 27–34)
MCHC RBC-ENTMCNC: 35.3 G/DL — SIGNIFICANT CHANGE UP (ref 32–36)
MCV RBC AUTO: 84.4 FL — SIGNIFICANT CHANGE UP (ref 80–100)
MONOCYTES # BLD AUTO: 0.3 K/UL — SIGNIFICANT CHANGE UP (ref 0–0.9)
MONOCYTES NFR BLD AUTO: 5 % — SIGNIFICANT CHANGE UP (ref 2–14)
NEUTROPHILS # BLD AUTO: 3.8 K/UL — SIGNIFICANT CHANGE UP (ref 1.8–7.4)
NEUTROPHILS NFR BLD AUTO: 59.9 % — SIGNIFICANT CHANGE UP (ref 43–77)
PLATELET # BLD AUTO: 242 K/UL — SIGNIFICANT CHANGE UP (ref 150–400)
RBC # BLD: 4.96 M/UL — SIGNIFICANT CHANGE UP (ref 4.2–5.8)
RBC # FLD: 12.2 % — SIGNIFICANT CHANGE UP (ref 10.3–14.5)
WBC # BLD: 6.3 K/UL — SIGNIFICANT CHANGE UP (ref 3.8–10.5)
WBC # FLD AUTO: 6.3 K/UL — SIGNIFICANT CHANGE UP (ref 3.8–10.5)

## 2018-12-21 PROCEDURE — 99244 OFF/OP CNSLTJ NEW/EST MOD 40: CPT

## 2018-12-21 NOTE — CONSULT LETTER
[Dear  ___] : Dear  [unfilled], [Courtesy Letter:] : I had the pleasure of seeing your patient, [unfilled], in my office today. [Please see my note below.] : Please see my note below. [Sincerely,] : Sincerely, [DrNichole  ___] : Dr. ALVARADO

## 2018-12-21 NOTE — PHYSICAL EXAM
[Fully active, able to carry on all pre-disease performance without restriction] : Status 0 - Fully active, able to carry on all pre-disease performance without restriction [Normal] : affect appropriate [de-identified] : mild stye superior lid right eye [de-identified] : chronic sun damage; surgical site well healed on right neck and right parietal scalp; no evidence of local skin relapse.

## 2018-12-23 LAB
AFP-TM SERPL-MCNC: 3.9 NG/ML
ALBUMIN SERPL ELPH-MCNC: 5 G/DL
ALP BLD-CCNC: 77 U/L
ALT SERPL-CCNC: 27 U/L
ANA SER IF-ACNC: NEGATIVE
ANION GAP SERPL CALC-SCNC: 12 MMOL/L
APTT BLD: 26.9 SEC
AST SERPL-CCNC: 17 U/L
BILIRUB SERPL-MCNC: 0.4 MG/DL
BUN SERPL-MCNC: 22 MG/DL
CALCIUM SERPL-MCNC: 10.1 MG/DL
CHLORIDE SERPL-SCNC: 101 MMOL/L
CO2 SERPL-SCNC: 26 MMOL/L
CREAT SERPL-MCNC: 0.91 MG/DL
CRP SERPL-MCNC: 0.1 MG/DL
FSH SERPL-MCNC: 26.8 IU/L
GLUCOSE SERPL-MCNC: 166 MG/DL
HBV CORE IGG+IGM SER QL: NONREACTIVE
HBV SURFACE AB SER QL: NONREACTIVE
HBV SURFACE AG SER QL: NONREACTIVE
HCG SERPL-MCNC: <1 MIU/ML
HCV AB SER QL: NONREACTIVE
HCV S/CO RATIO: 0.04 S/CO
INR PPP: 0.93 RATIO
LDH SERPL-CCNC: 140 U/L
POTASSIUM SERPL-SCNC: 3.8 MMOL/L
PROT SERPL-MCNC: 7.1 G/DL
PT BLD: 10.6 SEC
SODIUM SERPL-SCNC: 139 MMOL/L
TSH SERPL-ACNC: 1.56 UIU/ML

## 2018-12-28 ENCOUNTER — LABORATORY RESULT (OUTPATIENT)
Age: 48
End: 2018-12-28

## 2018-12-28 ENCOUNTER — RESULT REVIEW (OUTPATIENT)
Age: 48
End: 2018-12-28

## 2018-12-28 ENCOUNTER — APPOINTMENT (OUTPATIENT)
Dept: INFUSION THERAPY | Facility: HOSPITAL | Age: 48
End: 2018-12-28

## 2018-12-28 LAB
BASOPHILS # BLD AUTO: 0 K/UL — SIGNIFICANT CHANGE UP (ref 0–0.2)
BASOPHILS NFR BLD AUTO: 0.7 % — SIGNIFICANT CHANGE UP (ref 0–2)
EOSINOPHIL # BLD AUTO: 0.1 K/UL — SIGNIFICANT CHANGE UP (ref 0–0.5)
EOSINOPHIL NFR BLD AUTO: 2.1 % — SIGNIFICANT CHANGE UP (ref 0–6)
HCT VFR BLD CALC: 44.3 % — SIGNIFICANT CHANGE UP (ref 39–50)
HGB BLD-MCNC: 15.3 G/DL — SIGNIFICANT CHANGE UP (ref 13–17)
LYMPHOCYTES # BLD AUTO: 2.2 K/UL — SIGNIFICANT CHANGE UP (ref 1–3.3)
LYMPHOCYTES # BLD AUTO: 38.7 % — SIGNIFICANT CHANGE UP (ref 13–44)
MCHC RBC-ENTMCNC: 29 PG — SIGNIFICANT CHANGE UP (ref 27–34)
MCHC RBC-ENTMCNC: 34.5 G/DL — SIGNIFICANT CHANGE UP (ref 32–36)
MCV RBC AUTO: 84.1 FL — SIGNIFICANT CHANGE UP (ref 80–100)
MONOCYTES # BLD AUTO: 0.6 K/UL — SIGNIFICANT CHANGE UP (ref 0–0.9)
MONOCYTES NFR BLD AUTO: 11.1 % — SIGNIFICANT CHANGE UP (ref 2–14)
NEUTROPHILS # BLD AUTO: 2.7 K/UL — SIGNIFICANT CHANGE UP (ref 1.8–7.4)
NEUTROPHILS NFR BLD AUTO: 47.3 % — SIGNIFICANT CHANGE UP (ref 43–77)
PLATELET # BLD AUTO: 262 K/UL — SIGNIFICANT CHANGE UP (ref 150–400)
RBC # BLD: 5.27 M/UL — SIGNIFICANT CHANGE UP (ref 4.2–5.8)
RBC # FLD: 12.6 % — SIGNIFICANT CHANGE UP (ref 10.3–14.5)
WBC # BLD: 5.8 K/UL — SIGNIFICANT CHANGE UP (ref 3.8–10.5)
WBC # FLD AUTO: 5.8 K/UL — SIGNIFICANT CHANGE UP (ref 3.8–10.5)

## 2018-12-31 DIAGNOSIS — Z51.11 ENCOUNTER FOR ANTINEOPLASTIC CHEMOTHERAPY: ICD-10-CM

## 2018-12-31 DIAGNOSIS — C43.9 MALIGNANT MELANOMA OF SKIN, UNSPECIFIED: ICD-10-CM

## 2019-01-10 ENCOUNTER — FORM ENCOUNTER (OUTPATIENT)
Age: 49
End: 2019-01-10

## 2019-01-11 ENCOUNTER — OUTPATIENT (OUTPATIENT)
Dept: OUTPATIENT SERVICES | Facility: HOSPITAL | Age: 49
LOS: 1 days | End: 2019-01-11
Payer: COMMERCIAL

## 2019-01-11 DIAGNOSIS — C43.9 MALIGNANT MELANOMA OF SKIN, UNSPECIFIED: ICD-10-CM

## 2019-01-11 DIAGNOSIS — C43.4 MALIGNANT MELANOMA OF SCALP AND NECK: Chronic | ICD-10-CM

## 2019-01-11 DIAGNOSIS — C79.9 SECONDARY MALIGNANT NEOPLASM OF UNSPECIFIED SITE: ICD-10-CM

## 2019-01-11 DIAGNOSIS — Z90.79 ACQUIRED ABSENCE OF OTHER GENITAL ORGAN(S): Chronic | ICD-10-CM

## 2019-01-11 DIAGNOSIS — Z98.89 OTHER SPECIFIED POSTPROCEDURAL STATES: Chronic | ICD-10-CM

## 2019-01-11 DIAGNOSIS — I89.9 NONINFECTIVE DISORDER OF LYMPHATIC VESSELS AND LYMPH NODES, UNSPECIFIED: Chronic | ICD-10-CM

## 2019-01-11 PROCEDURE — C1788: CPT

## 2019-01-11 PROCEDURE — 76937 US GUIDE VASCULAR ACCESS: CPT

## 2019-01-11 PROCEDURE — C1894: CPT

## 2019-01-11 PROCEDURE — C1769: CPT

## 2019-01-11 PROCEDURE — 36560 INSERT TUNNELED CV CATH: CPT

## 2019-01-11 PROCEDURE — 77001 FLUOROGUIDE FOR VEIN DEVICE: CPT

## 2019-01-11 PROCEDURE — 36561 INSERT TUNNELED CV CATH: CPT

## 2019-01-11 PROCEDURE — 76937 US GUIDE VASCULAR ACCESS: CPT | Mod: 26

## 2019-01-11 PROCEDURE — 77001 FLUOROGUIDE FOR VEIN DEVICE: CPT | Mod: 26

## 2019-01-11 PROCEDURE — 36561 INSERT TUNNELED CV CATH: CPT | Mod: RT

## 2019-01-16 ENCOUNTER — APPOINTMENT (OUTPATIENT)
Dept: PLASTIC SURGERY | Facility: CLINIC | Age: 49
End: 2019-01-16
Payer: COMMERCIAL

## 2019-01-16 DIAGNOSIS — C43.9 MALIGNANT MELANOMA OF SKIN, UNSPECIFIED: ICD-10-CM

## 2019-01-16 DIAGNOSIS — Z45.2 ENCOUNTER FOR ADJUSTMENT AND MANAGEMENT OF VASCULAR ACCESS DEVICE: ICD-10-CM

## 2019-01-16 PROCEDURE — 99212 OFFICE O/P EST SF 10 MIN: CPT

## 2019-01-18 ENCOUNTER — OUTPATIENT (OUTPATIENT)
Dept: OUTPATIENT SERVICES | Facility: HOSPITAL | Age: 49
LOS: 1 days | Discharge: HOME | End: 2019-01-18

## 2019-01-18 DIAGNOSIS — Z90.79 ACQUIRED ABSENCE OF OTHER GENITAL ORGAN(S): Chronic | ICD-10-CM

## 2019-01-18 DIAGNOSIS — Z98.89 OTHER SPECIFIED POSTPROCEDURAL STATES: Chronic | ICD-10-CM

## 2019-01-18 DIAGNOSIS — I89.9 NONINFECTIVE DISORDER OF LYMPHATIC VESSELS AND LYMPH NODES, UNSPECIFIED: Chronic | ICD-10-CM

## 2019-01-18 DIAGNOSIS — C43.8 MALIGNANT MELANOMA OF OVERLAPPING SITES OF SKIN: ICD-10-CM

## 2019-01-18 DIAGNOSIS — C43.4 MALIGNANT MELANOMA OF SCALP AND NECK: Chronic | ICD-10-CM

## 2019-01-25 ENCOUNTER — APPOINTMENT (OUTPATIENT)
Dept: INFUSION THERAPY | Facility: HOSPITAL | Age: 49
End: 2019-01-25

## 2019-01-25 ENCOUNTER — LABORATORY RESULT (OUTPATIENT)
Age: 49
End: 2019-01-25

## 2019-01-25 ENCOUNTER — RESULT REVIEW (OUTPATIENT)
Age: 49
End: 2019-01-25

## 2019-01-25 ENCOUNTER — APPOINTMENT (OUTPATIENT)
Dept: HEMATOLOGY ONCOLOGY | Facility: CLINIC | Age: 49
End: 2019-01-25
Payer: COMMERCIAL

## 2019-01-25 VITALS
DIASTOLIC BLOOD PRESSURE: 82 MMHG | HEART RATE: 84 BPM | RESPIRATION RATE: 16 BRPM | TEMPERATURE: 98.3 F | BODY MASS INDEX: 26.26 KG/M2 | SYSTOLIC BLOOD PRESSURE: 145 MMHG | WEIGHT: 187.59 LBS | OXYGEN SATURATION: 98 %

## 2019-01-25 LAB
BASOPHILS # BLD AUTO: 0.1 K/UL — SIGNIFICANT CHANGE UP (ref 0–0.2)
BASOPHILS NFR BLD AUTO: 1 % — SIGNIFICANT CHANGE UP (ref 0–2)
EOSINOPHIL # BLD AUTO: 0.2 K/UL — SIGNIFICANT CHANGE UP (ref 0–0.5)
EOSINOPHIL NFR BLD AUTO: 3.4 % — SIGNIFICANT CHANGE UP (ref 0–6)
HCT VFR BLD CALC: 42.7 % — SIGNIFICANT CHANGE UP (ref 39–50)
HGB BLD-MCNC: 14.2 G/DL — SIGNIFICANT CHANGE UP (ref 13–17)
LYMPHOCYTES # BLD AUTO: 1.9 K/UL — SIGNIFICANT CHANGE UP (ref 1–3.3)
LYMPHOCYTES # BLD AUTO: 29.1 % — SIGNIFICANT CHANGE UP (ref 13–44)
MCHC RBC-ENTMCNC: 28.1 PG — SIGNIFICANT CHANGE UP (ref 27–34)
MCHC RBC-ENTMCNC: 33.3 G/DL — SIGNIFICANT CHANGE UP (ref 32–36)
MCV RBC AUTO: 84.2 FL — SIGNIFICANT CHANGE UP (ref 80–100)
MONOCYTES # BLD AUTO: 0.5 K/UL — SIGNIFICANT CHANGE UP (ref 0–0.9)
MONOCYTES NFR BLD AUTO: 8 % — SIGNIFICANT CHANGE UP (ref 2–14)
NEUTROPHILS # BLD AUTO: 3.8 K/UL — SIGNIFICANT CHANGE UP (ref 1.8–7.4)
NEUTROPHILS NFR BLD AUTO: 58.5 % — SIGNIFICANT CHANGE UP (ref 43–77)
PLATELET # BLD AUTO: 250 K/UL — SIGNIFICANT CHANGE UP (ref 150–400)
RBC # BLD: 5.07 M/UL — SIGNIFICANT CHANGE UP (ref 4.2–5.8)
RBC # FLD: 12.6 % — SIGNIFICANT CHANGE UP (ref 10.3–14.5)
WBC # BLD: 6.5 K/UL — SIGNIFICANT CHANGE UP (ref 3.8–10.5)
WBC # FLD AUTO: 6.5 K/UL — SIGNIFICANT CHANGE UP (ref 3.8–10.5)

## 2019-01-25 PROCEDURE — 99213 OFFICE O/P EST LOW 20 MIN: CPT

## 2019-01-28 DIAGNOSIS — Z51.11 ENCOUNTER FOR ANTINEOPLASTIC CHEMOTHERAPY: ICD-10-CM

## 2019-02-04 ENCOUNTER — APPOINTMENT (OUTPATIENT)
Dept: MRI IMAGING | Facility: CLINIC | Age: 49
End: 2019-02-04
Payer: COMMERCIAL

## 2019-02-04 ENCOUNTER — APPOINTMENT (OUTPATIENT)
Dept: RADIOLOGY | Facility: CLINIC | Age: 49
End: 2019-02-04
Payer: COMMERCIAL

## 2019-02-04 ENCOUNTER — OUTPATIENT (OUTPATIENT)
Dept: OUTPATIENT SERVICES | Facility: HOSPITAL | Age: 49
LOS: 1 days | End: 2019-02-04
Payer: COMMERCIAL

## 2019-02-04 DIAGNOSIS — Z00.8 ENCOUNTER FOR OTHER GENERAL EXAMINATION: ICD-10-CM

## 2019-02-04 DIAGNOSIS — I89.9 NONINFECTIVE DISORDER OF LYMPHATIC VESSELS AND LYMPH NODES, UNSPECIFIED: Chronic | ICD-10-CM

## 2019-02-04 DIAGNOSIS — Z98.89 OTHER SPECIFIED POSTPROCEDURAL STATES: Chronic | ICD-10-CM

## 2019-02-04 DIAGNOSIS — Z90.79 ACQUIRED ABSENCE OF OTHER GENITAL ORGAN(S): Chronic | ICD-10-CM

## 2019-02-04 DIAGNOSIS — C43.4 MALIGNANT MELANOMA OF SCALP AND NECK: Chronic | ICD-10-CM

## 2019-02-04 PROCEDURE — 73221 MRI JOINT UPR EXTREM W/O DYE: CPT | Mod: 26,RT

## 2019-02-04 PROCEDURE — 72040 X-RAY EXAM NECK SPINE 2-3 VW: CPT

## 2019-02-04 PROCEDURE — 72040 X-RAY EXAM NECK SPINE 2-3 VW: CPT | Mod: 26

## 2019-02-04 PROCEDURE — 73030 X-RAY EXAM OF SHOULDER: CPT | Mod: 26,RT

## 2019-02-04 PROCEDURE — 73221 MRI JOINT UPR EXTREM W/O DYE: CPT

## 2019-02-04 PROCEDURE — 73030 X-RAY EXAM OF SHOULDER: CPT

## 2019-02-04 NOTE — HISTORY OF PRESENT ILLNESS
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  \par \par He noticed a raised, black, pruritic lesion on his right parietal scalp.  Biopsy was performed on 8/14/2018 which was consistent with a 1.1mm melanoma with no significant mitotic figures, no ulceration and no regression.\par He was seen by Dr Sarwat Chandler on 8/13/2018 who recommended wide excision of melanoma with SLNB and reconstruction.\par On 8/29/2018 patient underwent radical resection of scalp melanoma, right posterior modified neck dissection with closure by Dr Bradley Toussaint (Plastic)\par Final Path: metastatic melanoma present in 2/2 LN, residual melanoma, negative margins, no lymphovascular invasion.  Tumor stage pT2a pN2a\par PET/CT 9/15/2018: Minimally FDG avid soft tissue thickening right scalp probably postsurgical.FDG avid focal area of soft tissue thickening right neck, favor postsurgical changes rather than residual disease. Suggest correlation with clinical exam and surgical margins. No FDG avid distant metastatic disease. \par On 10/19/2018 underwent right functional neck dissection with Dr Sarwat Chandler\par Path: no metastatic melanoma seen in eighteen LN (0/18) and benign skin/tissue findings.\par He was referred to Dr Kendall Sands (Medical Oncology) at [facility]. \par He was originally seen by Dr Kendall Sands who referred the patient to Dr Gopi Arredondo at [facility].  Patient has stage IIIa (T2aN2) BRAF testing was performed and mutation was detected.\par The patient was offered adjuvant Nivolumab 480mg every four weeks by Dr Arredondo for 13 cycles.\par \par 1/25/2019: Mr Mclaughlin is here for follow up.  He is scheduled for C#2 Nivolumab today.  He tolerated C#1 extremely well and reports no adverse events.\par \par \par \par  [de-identified] : melanoma [de-identified] : Surgical Oncology: Dr Sarwat Chandler [phone]\par Plastics: Dr Kendall Toussaint [phone]\par Medical Oncology ([facility]): Dr Kendall Sands  [phone]\par Medical Oncology: Melanoma ([facility]): Dr Gopi Arredondo [phone]\par  [Date: ____________] : Patient's last distress assessment performed on [unfilled]. [0 - No Distress] : Distress Level: 0 [Patient given social work contact information and resource sheet] : Patient was given social work contact information and resource sheet

## 2019-02-04 NOTE — PHYSICAL EXAM
[Fully active, able to carry on all pre-disease performance without restriction] : Status 0 - Fully active, able to carry on all pre-disease performance without restriction [Normal] : affect appropriate [de-identified] : mild stye superior lid right eye [de-identified] : chronic sun damage; surgical site well healed on right neck and right parietal scalp; no evidence of local skin relapse.

## 2019-02-11 ENCOUNTER — OUTPATIENT (OUTPATIENT)
Dept: OUTPATIENT SERVICES | Facility: HOSPITAL | Age: 49
LOS: 1 days | Discharge: HOME | End: 2019-02-11

## 2019-02-11 DIAGNOSIS — Z98.89 OTHER SPECIFIED POSTPROCEDURAL STATES: Chronic | ICD-10-CM

## 2019-02-11 DIAGNOSIS — C43.4 MALIGNANT MELANOMA OF SCALP AND NECK: Chronic | ICD-10-CM

## 2019-02-11 DIAGNOSIS — C43.8 MALIGNANT MELANOMA OF OVERLAPPING SITES OF SKIN: ICD-10-CM

## 2019-02-11 DIAGNOSIS — I89.9 NONINFECTIVE DISORDER OF LYMPHATIC VESSELS AND LYMPH NODES, UNSPECIFIED: Chronic | ICD-10-CM

## 2019-02-11 DIAGNOSIS — Z90.79 ACQUIRED ABSENCE OF OTHER GENITAL ORGAN(S): Chronic | ICD-10-CM

## 2019-02-22 ENCOUNTER — RESULT REVIEW (OUTPATIENT)
Age: 49
End: 2019-02-22

## 2019-02-22 ENCOUNTER — APPOINTMENT (OUTPATIENT)
Dept: HEMATOLOGY ONCOLOGY | Facility: CLINIC | Age: 49
End: 2019-02-22
Payer: COMMERCIAL

## 2019-02-22 ENCOUNTER — LABORATORY RESULT (OUTPATIENT)
Age: 49
End: 2019-02-22

## 2019-02-22 ENCOUNTER — APPOINTMENT (OUTPATIENT)
Dept: INFUSION THERAPY | Facility: HOSPITAL | Age: 49
End: 2019-02-22

## 2019-02-22 VITALS
WEIGHT: 188.72 LBS | OXYGEN SATURATION: 98 % | SYSTOLIC BLOOD PRESSURE: 120 MMHG | BODY MASS INDEX: 26.42 KG/M2 | RESPIRATION RATE: 18 BRPM | DIASTOLIC BLOOD PRESSURE: 79 MMHG | TEMPERATURE: 97.7 F | HEART RATE: 87 BPM

## 2019-02-22 LAB
BASOPHILS # BLD AUTO: 0.1 K/UL — SIGNIFICANT CHANGE UP (ref 0–0.2)
BASOPHILS NFR BLD AUTO: 1 % — SIGNIFICANT CHANGE UP (ref 0–2)
EOSINOPHIL # BLD AUTO: 0.1 K/UL — SIGNIFICANT CHANGE UP (ref 0–0.5)
EOSINOPHIL NFR BLD AUTO: 2.1 % — SIGNIFICANT CHANGE UP (ref 0–6)
HCT VFR BLD CALC: 44.1 % — SIGNIFICANT CHANGE UP (ref 39–50)
HGB BLD-MCNC: 15 G/DL — SIGNIFICANT CHANGE UP (ref 13–17)
LYMPHOCYTES # BLD AUTO: 1.8 K/UL — SIGNIFICANT CHANGE UP (ref 1–3.3)
LYMPHOCYTES # BLD AUTO: 26.9 % — SIGNIFICANT CHANGE UP (ref 13–44)
MCHC RBC-ENTMCNC: 28.9 PG — SIGNIFICANT CHANGE UP (ref 27–34)
MCHC RBC-ENTMCNC: 34 G/DL — SIGNIFICANT CHANGE UP (ref 32–36)
MCV RBC AUTO: 84.9 FL — SIGNIFICANT CHANGE UP (ref 80–100)
MONOCYTES # BLD AUTO: 0.4 K/UL — SIGNIFICANT CHANGE UP (ref 0–0.9)
MONOCYTES NFR BLD AUTO: 6 % — SIGNIFICANT CHANGE UP (ref 2–14)
NEUTROPHILS # BLD AUTO: 4.2 K/UL — SIGNIFICANT CHANGE UP (ref 1.8–7.4)
NEUTROPHILS NFR BLD AUTO: 63.9 % — SIGNIFICANT CHANGE UP (ref 43–77)
PLATELET # BLD AUTO: 301 K/UL — SIGNIFICANT CHANGE UP (ref 150–400)
RBC # BLD: 5.2 M/UL — SIGNIFICANT CHANGE UP (ref 4.2–5.8)
RBC # FLD: 12.5 % — SIGNIFICANT CHANGE UP (ref 10.3–14.5)
WBC # BLD: 6.6 K/UL — SIGNIFICANT CHANGE UP (ref 3.8–10.5)
WBC # FLD AUTO: 6.6 K/UL — SIGNIFICANT CHANGE UP (ref 3.8–10.5)

## 2019-02-22 PROCEDURE — 99213 OFFICE O/P EST LOW 20 MIN: CPT

## 2019-02-23 NOTE — HISTORY OF PRESENT ILLNESS
[Disease: _____________________] : Disease: [unfilled] [T: ___] : T[unfilled] [N: ___] : N[unfilled] [M: ___] : M[unfilled] [AJCC Stage: ____] : AJCC Stage: [unfilled] [Date: ____________] : Patient's last distress assessment performed on [unfilled]. 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  \par \par He noticed a raised, black, pruritic lesion on his right parietal scalp.  Biopsy was performed on 8/14/2018 which was consistent with a 1.1mm melanoma with no significant mitotic figures, no ulceration and no regression.\par He was seen by Dr Sarwat Chandler on 8/13/2018 who recommended wide excision of melanoma with SLNB and reconstruction.\par On 8/29/2018 patient underwent radical resection of scalp melanoma, right posterior modified neck dissection with closure by Dr Bradley Toussaint (Plastic)\par Final Path: metastatic melanoma present in 2/2 LN, residual melanoma, negative margins, no lymphovascular invasion.  Tumor stage pT2a pN2a\par PET/CT 9/15/2018: Minimally FDG avid soft tissue thickening right scalp probably postsurgical.FDG avid focal area of soft tissue thickening right neck, favor postsurgical changes rather than residual disease. Suggest correlation with clinical exam and surgical margins. No FDG avid distant metastatic disease. \par On 10/19/2018 underwent right functional neck dissection with Dr Sarwat Chandler\par Path: no metastatic melanoma seen in eighteen LN (0/18) and benign skin/tissue findings.\par He was referred to Dr Kendall Sands (Medical Oncology) at [facility]. \par He was originally seen by Dr Kendall Sands who referred the patient to Dr Gopi Arredondo at [facility].  Patient has stage IIIa (T2aN2) BRAF testing was performed and mutation was detected.\par The patient was offered adjuvant Nivolumab 480mg every four weeks by Dr Arredondo for 13 cycles.\par \par 1/25/2019: Mr Mclaughlin is here for follow up.  He is scheduled for C#2 Nivolumab today.  He tolerated C#1 extremely well and reports no adverse events.\par \par 2/22/2019: Mr Mclaughlin is here for C#3 Nivolumab today.  He feels well today and is tolerating Nivo without any adverse events.  He continues to remain active and works as a .  He does reports two episode of diarrhea x 3 weeks after his last treatment.  He took Imodium with excellent relief. \par  [de-identified] : melanoma [de-identified] : Surgical Oncology: Dr Sarwat Chandler [phone]\par Plastics: Dr Kendall Toussaint [phone]\par Medical Oncology ([facility]): Dr Kendall Sands  [phone]\par Medical Oncology: Melanoma ([facility]): Dr Gopi Arredondo [phone]\par

## 2019-02-23 NOTE — PHYSICAL EXAM
[Fully active, able to carry on all pre-disease performance without restriction] : Status 0 - Fully active, able to carry on all pre-disease performance without restriction [Normal] : affect appropriate [de-identified] : mild stye superior lid right eye [de-identified] : chronic sun damage; surgical site well healed on right neck and right parietal scalp; no evidence of local skin relapse.

## 2019-02-26 ENCOUNTER — FORM ENCOUNTER (OUTPATIENT)
Age: 49
End: 2019-02-26

## 2019-02-27 ENCOUNTER — OUTPATIENT (OUTPATIENT)
Dept: OUTPATIENT SERVICES | Facility: HOSPITAL | Age: 49
LOS: 1 days | End: 2019-02-27
Payer: COMMERCIAL

## 2019-02-27 ENCOUNTER — APPOINTMENT (OUTPATIENT)
Dept: RADIOLOGY | Facility: IMAGING CENTER | Age: 49
End: 2019-02-27
Payer: COMMERCIAL

## 2019-02-27 DIAGNOSIS — C43.9 MALIGNANT MELANOMA OF SKIN, UNSPECIFIED: ICD-10-CM

## 2019-02-27 DIAGNOSIS — C43.4 MALIGNANT MELANOMA OF SCALP AND NECK: Chronic | ICD-10-CM

## 2019-02-27 DIAGNOSIS — Z90.79 ACQUIRED ABSENCE OF OTHER GENITAL ORGAN(S): Chronic | ICD-10-CM

## 2019-02-27 DIAGNOSIS — I89.9 NONINFECTIVE DISORDER OF LYMPHATIC VESSELS AND LYMPH NODES, UNSPECIFIED: Chronic | ICD-10-CM

## 2019-02-27 DIAGNOSIS — Z98.89 OTHER SPECIFIED POSTPROCEDURAL STATES: Chronic | ICD-10-CM

## 2019-02-27 PROCEDURE — 77080 DXA BONE DENSITY AXIAL: CPT | Mod: 26

## 2019-02-27 PROCEDURE — 77080 DXA BONE DENSITY AXIAL: CPT

## 2019-02-28 ENCOUNTER — TRANSCRIPTION ENCOUNTER (OUTPATIENT)
Age: 49
End: 2019-02-28

## 2019-03-01 DIAGNOSIS — Z51.11 ENCOUNTER FOR ANTINEOPLASTIC CHEMOTHERAPY: ICD-10-CM

## 2019-03-01 DIAGNOSIS — R11.2 NAUSEA WITH VOMITING, UNSPECIFIED: ICD-10-CM

## 2019-03-12 ENCOUNTER — OUTPATIENT (OUTPATIENT)
Dept: OUTPATIENT SERVICES | Facility: HOSPITAL | Age: 49
LOS: 1 days | Discharge: HOME | End: 2019-03-12

## 2019-03-12 DIAGNOSIS — Z90.79 ACQUIRED ABSENCE OF OTHER GENITAL ORGAN(S): Chronic | ICD-10-CM

## 2019-03-12 DIAGNOSIS — C43.4 MALIGNANT MELANOMA OF SCALP AND NECK: Chronic | ICD-10-CM

## 2019-03-12 DIAGNOSIS — Z98.89 OTHER SPECIFIED POSTPROCEDURAL STATES: Chronic | ICD-10-CM

## 2019-03-12 DIAGNOSIS — I89.9 NONINFECTIVE DISORDER OF LYMPHATIC VESSELS AND LYMPH NODES, UNSPECIFIED: Chronic | ICD-10-CM

## 2019-03-12 DIAGNOSIS — C43.8 MALIGNANT MELANOMA OF OVERLAPPING SITES OF SKIN: ICD-10-CM

## 2019-03-19 ENCOUNTER — APPOINTMENT (OUTPATIENT)
Dept: HEMATOLOGY ONCOLOGY | Facility: CLINIC | Age: 49
End: 2019-03-19
Payer: SELF-PAY

## 2019-03-19 ENCOUNTER — LABORATORY RESULT (OUTPATIENT)
Age: 49
End: 2019-03-19

## 2019-03-19 PROCEDURE — 99499A: CUSTOM | Mod: NC

## 2019-03-19 PROCEDURE — 96040M: CUSTOM

## 2019-03-22 ENCOUNTER — RESULT REVIEW (OUTPATIENT)
Age: 49
End: 2019-03-22

## 2019-03-22 ENCOUNTER — LABORATORY RESULT (OUTPATIENT)
Age: 49
End: 2019-03-22

## 2019-03-22 ENCOUNTER — APPOINTMENT (OUTPATIENT)
Dept: INFUSION THERAPY | Facility: HOSPITAL | Age: 49
End: 2019-03-22

## 2019-03-22 ENCOUNTER — APPOINTMENT (OUTPATIENT)
Dept: HEMATOLOGY ONCOLOGY | Facility: CLINIC | Age: 49
End: 2019-03-22
Payer: COMMERCIAL

## 2019-03-22 VITALS
SYSTOLIC BLOOD PRESSURE: 103 MMHG | DIASTOLIC BLOOD PRESSURE: 68 MMHG | HEART RATE: 82 BPM | BODY MASS INDEX: 26.6 KG/M2 | WEIGHT: 189.99 LBS | RESPIRATION RATE: 16 BRPM | TEMPERATURE: 97.9 F | OXYGEN SATURATION: 99 %

## 2019-03-22 LAB
BASOPHILS # BLD AUTO: 0 K/UL — SIGNIFICANT CHANGE UP (ref 0–0.2)
BASOPHILS NFR BLD AUTO: 1 % — SIGNIFICANT CHANGE UP (ref 0–2)
EOSINOPHIL # BLD AUTO: 0.2 K/UL — SIGNIFICANT CHANGE UP (ref 0–0.5)
EOSINOPHIL NFR BLD AUTO: 3.2 % — SIGNIFICANT CHANGE UP (ref 0–6)
HCT VFR BLD CALC: 42.7 % — SIGNIFICANT CHANGE UP (ref 39–50)
HGB BLD-MCNC: 15.2 G/DL — SIGNIFICANT CHANGE UP (ref 13–17)
LYMPHOCYTES # BLD AUTO: 1.6 K/UL — SIGNIFICANT CHANGE UP (ref 1–3.3)
LYMPHOCYTES # BLD AUTO: 32.7 % — SIGNIFICANT CHANGE UP (ref 13–44)
MCHC RBC-ENTMCNC: 29.9 PG — SIGNIFICANT CHANGE UP (ref 27–34)
MCHC RBC-ENTMCNC: 35.5 G/DL — SIGNIFICANT CHANGE UP (ref 32–36)
MCV RBC AUTO: 84.2 FL — SIGNIFICANT CHANGE UP (ref 80–100)
MONOCYTES # BLD AUTO: 0.4 K/UL — SIGNIFICANT CHANGE UP (ref 0–0.9)
MONOCYTES NFR BLD AUTO: 8.3 % — SIGNIFICANT CHANGE UP (ref 2–14)
NEUTROPHILS # BLD AUTO: 2.7 K/UL — SIGNIFICANT CHANGE UP (ref 1.8–7.4)
NEUTROPHILS NFR BLD AUTO: 54.8 % — SIGNIFICANT CHANGE UP (ref 43–77)
PLATELET # BLD AUTO: 278 K/UL — SIGNIFICANT CHANGE UP (ref 150–400)
RBC # BLD: 5.07 M/UL — SIGNIFICANT CHANGE UP (ref 4.2–5.8)
RBC # FLD: 12.5 % — SIGNIFICANT CHANGE UP (ref 10.3–14.5)
WBC # BLD: 4.9 K/UL — SIGNIFICANT CHANGE UP (ref 3.8–10.5)
WBC # FLD AUTO: 4.9 K/UL — SIGNIFICANT CHANGE UP (ref 3.8–10.5)

## 2019-03-22 PROCEDURE — 99214 OFFICE O/P EST MOD 30 MIN: CPT

## 2019-03-22 NOTE — HISTORY OF PRESENT ILLNESS
[Disease: _____________________] : Disease: [unfilled] [T: ___] : T[unfilled] [N: ___] : N[unfilled] [M: ___] : M[unfilled] [AJCC Stage: ____] : AJCC Stage: [unfilled] [Date: ____________] : Patient's last distress assessment performed on [unfilled]. 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  \par \par He noticed a raised, black, pruritic lesion on his right parietal scalp.  Biopsy was performed on 8/14/2018 which was consistent with a 1.1mm melanoma with no significant mitotic figures, no ulceration and no regression.\par He was seen by Dr Sarwat Chandler on 8/13/2018 who recommended wide excision of melanoma with SLNB and reconstruction.\par On 8/29/2018 patient underwent radical resection of scalp melanoma, right posterior modified neck dissection with closure by Dr Bradley Toussaint (Plastic)\par Final Path: metastatic melanoma present in 2/2 LN, residual melanoma, negative margins, no lymphovascular invasion.  Tumor stage pT2a pN2a\par PET/CT 9/15/2018: Minimally FDG avid soft tissue thickening right scalp probably postsurgical.FDG avid focal area of soft tissue thickening right neck, favor postsurgical changes rather than residual disease. Suggest correlation with clinical exam and surgical margins. No FDG avid distant metastatic disease. \par On 10/19/2018 underwent right functional neck dissection with Dr Sarwat Chandler\par Path: no metastatic melanoma seen in eighteen LN (0/18) and benign skin/tissue findings.\par He was referred to Dr Kendall Sands (Medical Oncology) at [facility]. \par He was originally seen by Dr Kendall Sands who referred the patient to Dr Gopi Arredondo at [facility].  Patient has stage IIIa (T2aN2) BRAF testing was performed and mutation was detected.\par The patient was offered adjuvant Nivolumab 480mg every four weeks by Dr Arredondo for 13 cycles.\par \par 1/25/2019: Mr Mclaughlin is here for follow up.  He is scheduled for C#2 Nivolumab today.  He tolerated C#1 extremely well and reports no adverse events.\par \par 2/22/2019: Mr Mclaughlin is here for C#3 Nivolumab today.  He feels well today and is tolerating Nivo without any adverse events.  He continues to remain active and works as a .  He does reports two episode of diarrhea x 3 weeks after his last treatment.  He took Imodium with excellent relief. \par \par \par 3/22/2019: Patient Here for C#4 today. DEXA shows osteopenia. His Vit D level is 20 despite 50k units per week. No irAEs from treatment.  [de-identified] : melanoma [de-identified] : Surgical Oncology: Dr Sarwat Chandler [phone]\par Plastics: Dr Kendall Toussaint [phone]\par Medical Oncology ([facility]): Dr Kendall Sands  [phone]\par Medical Oncology: Melanoma ([facility]): Dr Gopi Arredondo [phone]\par

## 2019-03-22 NOTE — PHYSICAL EXAM
[Fully active, able to carry on all pre-disease performance without restriction] : Status 0 - Fully active, able to carry on all pre-disease performance without restriction [Normal] : affect appropriate [de-identified] : mild stye superior lid right eye [de-identified] : chronic sun damage; surgical site well healed on right neck and right parietal scalp; no evidence of local skin relapse.

## 2019-03-25 DIAGNOSIS — Z51.11 ENCOUNTER FOR ANTINEOPLASTIC CHEMOTHERAPY: ICD-10-CM

## 2019-04-08 ENCOUNTER — RESULT REVIEW (OUTPATIENT)
Age: 49
End: 2019-04-08

## 2019-04-08 ENCOUNTER — APPOINTMENT (OUTPATIENT)
Dept: HEMATOLOGY ONCOLOGY | Facility: CLINIC | Age: 49
End: 2019-04-08
Payer: COMMERCIAL

## 2019-04-08 VITALS
TEMPERATURE: 98.6 F | HEART RATE: 69 BPM | BODY MASS INDEX: 27 KG/M2 | WEIGHT: 192.88 LBS | RESPIRATION RATE: 18 BRPM | OXYGEN SATURATION: 100 % | SYSTOLIC BLOOD PRESSURE: 132 MMHG | DIASTOLIC BLOOD PRESSURE: 83 MMHG

## 2019-04-08 DIAGNOSIS — R19.7 DIARRHEA, UNSPECIFIED: ICD-10-CM

## 2019-04-08 LAB
BASOPHILS # BLD AUTO: 0 K/UL — SIGNIFICANT CHANGE UP (ref 0–0.2)
BASOPHILS NFR BLD AUTO: 0.8 % — SIGNIFICANT CHANGE UP (ref 0–2)
EOSINOPHIL # BLD AUTO: 0.1 K/UL — SIGNIFICANT CHANGE UP (ref 0–0.5)
EOSINOPHIL NFR BLD AUTO: 2.2 % — SIGNIFICANT CHANGE UP (ref 0–6)
ERYTHROCYTE [SEDIMENTATION RATE] IN BLOOD: 2 MM/HR — SIGNIFICANT CHANGE UP (ref 0–15)
HCT VFR BLD CALC: 44.7 % — SIGNIFICANT CHANGE UP (ref 39–50)
HGB BLD-MCNC: 15.9 G/DL — SIGNIFICANT CHANGE UP (ref 13–17)
LYMPHOCYTES # BLD AUTO: 1.8 K/UL — SIGNIFICANT CHANGE UP (ref 1–3.3)
LYMPHOCYTES # BLD AUTO: 30.3 % — SIGNIFICANT CHANGE UP (ref 13–44)
MCHC RBC-ENTMCNC: 30 PG — SIGNIFICANT CHANGE UP (ref 27–34)
MCHC RBC-ENTMCNC: 35.6 G/DL — SIGNIFICANT CHANGE UP (ref 32–36)
MCV RBC AUTO: 84.3 FL — SIGNIFICANT CHANGE UP (ref 80–100)
MONOCYTES # BLD AUTO: 0.6 K/UL — SIGNIFICANT CHANGE UP (ref 0–0.9)
MONOCYTES NFR BLD AUTO: 10 % — SIGNIFICANT CHANGE UP (ref 2–14)
NEUTROPHILS # BLD AUTO: 3.5 K/UL — SIGNIFICANT CHANGE UP (ref 1.8–7.4)
NEUTROPHILS NFR BLD AUTO: 56.6 % — SIGNIFICANT CHANGE UP (ref 43–77)
PLATELET # BLD AUTO: 263 K/UL — SIGNIFICANT CHANGE UP (ref 150–400)
RBC # BLD: 5.31 M/UL — SIGNIFICANT CHANGE UP (ref 4.2–5.8)
RBC # FLD: 12.6 % — SIGNIFICANT CHANGE UP (ref 10.3–14.5)
WBC # BLD: 6.1 K/UL — SIGNIFICANT CHANGE UP (ref 3.8–10.5)
WBC # FLD AUTO: 6.1 K/UL — SIGNIFICANT CHANGE UP (ref 3.8–10.5)

## 2019-04-08 PROCEDURE — 99214 OFFICE O/P EST MOD 30 MIN: CPT

## 2019-04-08 NOTE — PHYSICAL EXAM
[Fully active, able to carry on all pre-disease performance without restriction] : Status 0 - Fully active, able to carry on all pre-disease performance without restriction [Normal] : affect appropriate [de-identified] : mild stye superior lid right eye [de-identified] : Limited ROM during straight leg rise noted.  This is baseline as per patient.  Bilateral LE strength 2+.  Non TTP along spinous+paraspinous process. [de-identified] : chronic sun damage; surgical site well healed on right neck and right parietal scalp; no evidence of local skin relapse.

## 2019-04-08 NOTE — HISTORY OF PRESENT ILLNESS
[Disease: _____________________] : Disease: [unfilled] [T: ___] : T[unfilled] [N: ___] : N[unfilled] [M: ___] : M[unfilled] [AJCC Stage: ____] : AJCC Stage: [unfilled] [Date: ____________] : Patient's last distress assessment performed on [unfilled]. 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  \par \par He noticed a raised, black, pruritic lesion on his right parietal scalp.  Biopsy was performed on 8/14/2018 which was consistent with a 1.1mm melanoma with no significant mitotic figures, no ulceration and no regression.\par He was seen by Dr Sarwat Chandler on 8/13/2018 who recommended wide excision of melanoma with SLNB and reconstruction.\par On 8/29/2018 patient underwent radical resection of scalp melanoma, right posterior modified neck dissection with closure by Dr Bradley Toussaint (Plastic)\par Final Path: metastatic melanoma present in 2/2 LN, residual melanoma, negative margins, no lymphovascular invasion.  Tumor stage pT2a pN2a\par PET/CT 9/15/2018: Minimally FDG avid soft tissue thickening right scalp probably postsurgical.FDG avid focal area of soft tissue thickening right neck, favor postsurgical changes rather than residual disease. Suggest correlation with clinical exam and surgical margins. No FDG avid distant metastatic disease. \par On 10/19/2018 underwent right functional neck dissection with Dr Sarwat Chandler\par Path: no metastatic melanoma seen in eighteen LN (0/18) and benign skin/tissue findings.\par He was referred to Dr Kendall Sands (Medical Oncology) at [facility]. \par He was originally seen by Dr Kendall Sands who referred the patient to Dr Gopi Arredondo at [facility].  Patient has stage IIIa (T2aN2) BRAF testing was performed and mutation was detected.\par The patient was offered adjuvant Nivolumab 480mg every four weeks by Dr Arredondo for 13 cycles.\par \par 1/25/2019: Mr Mclaughlin is here for follow up.  He is scheduled for C#2 Nivolumab today.  He tolerated C#1 extremely well and reports no adverse events.\par \par 2/22/2019: Mr Mclaughlin is here for C#3 Nivolumab today.  He feels well today and is tolerating Nivo without any adverse events.  He continues to remain active and works as a .  He does reports two episode of diarrhea x 3 weeks after his last treatment.  He took Imodium with excellent relief. \par \par 3/22/2019: Patient Here for C#4 today. DEXA shows osteopenia. His Vit D level is 20 despite 50k units per week. No irAEs from treatment. \par \par 4/8/2019: Patient is here for an urgent visit.  Today his main complaints are diarrhea and leg cramps\par Diarrhea: As per patient had intermittent episodes of diarrhea while starting treatment.  At most had 1-2 loose bowel movements and took Imodium with excellent relief.  On 4/3/2019 noticed 4-5 bowel movement that were loose and foul smelling.  Denies eating outside food or recent sick contact.  Their is no associated nausea/vomiting, abdominal or fever/chills.  He took Imodium with moderate relief.  Still continues to complain about "loose bm".  Today only had 1 BM.  Their are no restrictions with his appetite.\par Leg cramps: Started on 4/5/2019.  Cramp is located bilateral posterior femur.  Over the weekend the pain was severe, 9/10 in severity and lasted 10-12 hours.  He was unable to sleep due to the pain.  He tried OTC NSAIDs and acetaminophen with no relief.  Pain eventually subsided on its own.  He denies any associated neuropathy, difficulty ambulating. fecal/urine incontinence or trauma to area.  Currently their is no pain.\par \par  [de-identified] : melanoma [de-identified] : Surgical Oncology: Dr Sarwat Chandler [phone]\par Plastics: Dr Kendall Toussaint [phone]\par Medical Oncology ([facility]): Dr Kendall Sands  [phone]\par Medical Oncology: Melanoma ([facility]): Dr Gopi Arredondo [phone]\par

## 2019-04-08 NOTE — REVIEW OF SYSTEMS
[Negative] : Allergic/Immunologic [Diarrhea] : diarrhea [Muscle Pain] : muscle pain [FreeTextEntry7] : Diarrhea at worst 4-5 episodes/day.  Currently having 1-2 "loose BM"  [FreeTextEntry9] : Mild curvature of thoracic spine noted. B/L Muscle cramping noted

## 2019-04-10 LAB
ALBUMIN SERPL ELPH-MCNC: 4.9 G/DL
ALDOLASE SERPL-CCNC: 6.3 U/L
ALP BLD-CCNC: 80 U/L
ALT SERPL-CCNC: 26 U/L
ANION GAP SERPL CALC-SCNC: 15 MMOL/L
AST SERPL-CCNC: 22 U/L
BILIRUB SERPL-MCNC: 0.4 MG/DL
BUN SERPL-MCNC: 22 MG/DL
CALCIUM SERPL-MCNC: 10.2 MG/DL
CHLORIDE SERPL-SCNC: 100 MMOL/L
CK SERPL-CCNC: 122 U/L
CO2 SERPL-SCNC: 23 MMOL/L
CREAT SERPL-MCNC: 1.04 MG/DL
CRP SERPL-MCNC: 0.25 MG/DL
GLUCOSE SERPL-MCNC: 82 MG/DL
LDH SERPL-CCNC: 187 U/L
POTASSIUM SERPL-SCNC: 4.4 MMOL/L
PROT SERPL-MCNC: 7.7 G/DL
SODIUM SERPL-SCNC: 138 MMOL/L

## 2019-04-19 ENCOUNTER — OUTPATIENT (OUTPATIENT)
Dept: OUTPATIENT SERVICES | Facility: HOSPITAL | Age: 49
LOS: 1 days | Discharge: HOME | End: 2019-04-19

## 2019-04-19 DIAGNOSIS — Z98.89 OTHER SPECIFIED POSTPROCEDURAL STATES: Chronic | ICD-10-CM

## 2019-04-19 DIAGNOSIS — I89.9 NONINFECTIVE DISORDER OF LYMPHATIC VESSELS AND LYMPH NODES, UNSPECIFIED: Chronic | ICD-10-CM

## 2019-04-19 DIAGNOSIS — C43.8 MALIGNANT MELANOMA OF OVERLAPPING SITES OF SKIN: ICD-10-CM

## 2019-04-19 DIAGNOSIS — Z90.79 ACQUIRED ABSENCE OF OTHER GENITAL ORGAN(S): Chronic | ICD-10-CM

## 2019-04-19 DIAGNOSIS — C43.4 MALIGNANT MELANOMA OF SCALP AND NECK: Chronic | ICD-10-CM

## 2019-04-24 ENCOUNTER — APPOINTMENT (OUTPATIENT)
Dept: INFUSION THERAPY | Facility: HOSPITAL | Age: 49
End: 2019-04-24

## 2019-04-24 ENCOUNTER — APPOINTMENT (OUTPATIENT)
Dept: HEMATOLOGY ONCOLOGY | Facility: CLINIC | Age: 49
End: 2019-04-24
Payer: COMMERCIAL

## 2019-04-24 VITALS
TEMPERATURE: 98.1 F | SYSTOLIC BLOOD PRESSURE: 122 MMHG | WEIGHT: 192.99 LBS | RESPIRATION RATE: 16 BRPM | OXYGEN SATURATION: 96 % | BODY MASS INDEX: 27.02 KG/M2 | DIASTOLIC BLOOD PRESSURE: 81 MMHG | HEART RATE: 93 BPM

## 2019-04-24 PROCEDURE — 99214 OFFICE O/P EST MOD 30 MIN: CPT

## 2019-04-24 NOTE — HISTORY OF PRESENT ILLNESS
[Disease: _____________________] : Disease: [unfilled] [N: ___] : N[unfilled] [T: ___] : T[unfilled] [AJCC Stage: ____] : AJCC Stage: [unfilled] [M: ___] : M[unfilled] [Date: ____________] : Patient's last distress assessment performed on [unfilled]. 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  \par \par He noticed a raised, black, pruritic lesion on his right parietal scalp.  Biopsy was performed on 8/14/2018 which was consistent with a 1.1mm melanoma with no significant mitotic figures, no ulceration and no regression.\par He was seen by Dr Sarwat Chandler on 8/13/2018 who recommended wide excision of melanoma with SLNB and reconstruction.\par On 8/29/2018 patient underwent radical resection of scalp melanoma, right posterior modified neck dissection with closure by Dr Bradley Toussaint (Plastic)\par Final Path: metastatic melanoma present in 2/2 LN, residual melanoma, negative margins, no lymphovascular invasion.  Tumor stage pT2a pN2a\par PET/CT 9/15/2018: Minimally FDG avid soft tissue thickening right scalp probably postsurgical.FDG avid focal area of soft tissue thickening right neck, favor postsurgical changes rather than residual disease. Suggest correlation with clinical exam and surgical margins. No FDG avid distant metastatic disease. \par On 10/19/2018 underwent right functional neck dissection with Dr Sarwat Chandler\par Path: no metastatic melanoma seen in eighteen LN (0/18) and benign skin/tissue findings.\par He was referred to Dr Kendall Sands (Medical Oncology) at [facility]. \par He was originally seen by Dr Kendall Sands who referred the patient to Dr Gopi Arredondo at [facility].  Patient has stage IIIa (T2aN2a) BRAF testing was performed and mutation was detected. The patient was offered adjuvant Nivolumab 480mg every four weeks by Dr Arredondo for 13 cycles.\par \par 1/25/2019: Mr Mclaughlin is here for follow up.  He is scheduled for C#2 Nivolumab today.  He tolerated C#1 extremely well and reports no adverse events.\par \par 2/22/2019: Mr Mclaughlin is here for C#3 Nivolumab today.  He feels well today and is tolerating Nivo without any adverse events.  He continues to remain active and works as a .  He does reports two episode of diarrhea x 3 weeks after his last treatment.  He took Imodium with excellent relief. \par \par 3/22/2019: Patient Here for C#4 today. DEXA shows osteopenia. His Vit D level is 20 despite 50k units per week. No irAEs from treatment. \par \par 4/24/2019: As of 4/5/19 patient had leg cramps in the front and back of the legs. The intensity became severe. Took Tylenol and motrin with no relief. He also noted some loose stool just prior to these onset of the leg pain. Prednisone 60 mg daily started. After 1 week there was no impact on cramps. Flexeril helps sleep but no benefit with leg pain. The prednisone was only taken for only 3 days when changed to Flexeril. He passes stool 2-3 times daily which is more than usual. No bleeding. Overall the leg cramping is less severe but still recurring. He is off of atorvastatin and remains on tricor. The muscle pains do not happen every day, but occur 3-4 days per week. He is working and is working from home more often at present.  [de-identified] : melanoma [de-identified] : Surgical Oncology: Dr Sarwat Chandler [phone]\par Plastics: Dr Kendall Toussaint [phone]\par Medical Oncology ([facility]): Dr Kendall Sands  [phone]\par Medical Oncology: Melanoma ([facility]): Dr Gopi Arredondo [phone]\par

## 2019-04-24 NOTE — CONSULT LETTER
[Dear  ___] : Dear  [unfilled], [Courtesy Letter:] : I had the pleasure of seeing your patient, [unfilled], in my office today. [Sincerely,] : Sincerely, [Please see my note below.] : Please see my note below. [DrNichole  ___] : Dr. ALVARADO

## 2019-04-24 NOTE — PHYSICAL EXAM
[Fully active, able to carry on all pre-disease performance without restriction] : Status 0 - Fully active, able to carry on all pre-disease performance without restriction [Normal] : affect appropriate [de-identified] : chronic sun damage; surgical site well healed on right neck and right parietal scalp; no evidence of local skin relapse. [de-identified] : mild stye superior lid right eye

## 2019-05-01 ENCOUNTER — APPOINTMENT (OUTPATIENT)
Dept: HEMATOLOGY ONCOLOGY | Facility: CLINIC | Age: 49
End: 2019-05-01
Payer: SELF-PAY

## 2019-05-01 PROCEDURE — 99499A: CUSTOM | Mod: NC

## 2019-05-07 ENCOUNTER — APPOINTMENT (OUTPATIENT)
Dept: HEMATOLOGY ONCOLOGY | Facility: CLINIC | Age: 49
End: 2019-05-07
Payer: COMMERCIAL

## 2019-05-07 ENCOUNTER — RESULT REVIEW (OUTPATIENT)
Age: 49
End: 2019-05-07

## 2019-05-07 VITALS
HEART RATE: 91 BPM | DIASTOLIC BLOOD PRESSURE: 89 MMHG | BODY MASS INDEX: 27.06 KG/M2 | OXYGEN SATURATION: 97 % | WEIGHT: 193.32 LBS | SYSTOLIC BLOOD PRESSURE: 132 MMHG | TEMPERATURE: 98.2 F | RESPIRATION RATE: 14 BRPM

## 2019-05-07 LAB
BASOPHILS # BLD AUTO: 0.1 K/UL — SIGNIFICANT CHANGE UP (ref 0–0.2)
BASOPHILS NFR BLD AUTO: 0.9 % — SIGNIFICANT CHANGE UP (ref 0–2)
EOSINOPHIL # BLD AUTO: 0.1 K/UL — SIGNIFICANT CHANGE UP (ref 0–0.5)
EOSINOPHIL NFR BLD AUTO: 1.4 % — SIGNIFICANT CHANGE UP (ref 0–6)
HCT VFR BLD CALC: 45.4 % — SIGNIFICANT CHANGE UP (ref 39–50)
HGB BLD-MCNC: 15.9 G/DL — SIGNIFICANT CHANGE UP (ref 13–17)
LYMPHOCYTES # BLD AUTO: 2.1 K/UL — SIGNIFICANT CHANGE UP (ref 1–3.3)
LYMPHOCYTES # BLD AUTO: 35 % — SIGNIFICANT CHANGE UP (ref 13–44)
MCHC RBC-ENTMCNC: 29.3 PG — SIGNIFICANT CHANGE UP (ref 27–34)
MCHC RBC-ENTMCNC: 35.1 G/DL — SIGNIFICANT CHANGE UP (ref 32–36)
MCV RBC AUTO: 83.6 FL — SIGNIFICANT CHANGE UP (ref 80–100)
MONOCYTES # BLD AUTO: 0.4 K/UL — SIGNIFICANT CHANGE UP (ref 0–0.9)
MONOCYTES NFR BLD AUTO: 7.2 % — SIGNIFICANT CHANGE UP (ref 2–14)
NEUTROPHILS # BLD AUTO: 3.3 K/UL — SIGNIFICANT CHANGE UP (ref 1.8–7.4)
NEUTROPHILS NFR BLD AUTO: 55.4 % — SIGNIFICANT CHANGE UP (ref 43–77)
PLATELET # BLD AUTO: 322 K/UL — SIGNIFICANT CHANGE UP (ref 150–400)
RBC # BLD: 5.43 M/UL — SIGNIFICANT CHANGE UP (ref 4.2–5.8)
RBC # FLD: 12.3 % — SIGNIFICANT CHANGE UP (ref 10.3–14.5)
WBC # BLD: 6 K/UL — SIGNIFICANT CHANGE UP (ref 3.8–10.5)
WBC # FLD AUTO: 6 K/UL — SIGNIFICANT CHANGE UP (ref 3.8–10.5)

## 2019-05-07 PROCEDURE — 99214 OFFICE O/P EST MOD 30 MIN: CPT

## 2019-05-07 RX ORDER — INDOMETHACIN 20 MG/1
20 CAPSULE ORAL
Qty: 60 | Refills: 0 | Status: DISCONTINUED | COMMUNITY
Start: 2018-01-09 | End: 2019-05-07

## 2019-05-07 RX ORDER — GABAPENTIN 300 MG/1
300 CAPSULE ORAL
Qty: 30 | Refills: 3 | Status: DISCONTINUED | COMMUNITY
Start: 2018-12-21 | End: 2019-05-07

## 2019-05-07 RX ORDER — CYCLOBENZAPRINE HYDROCHLORIDE 10 MG/1
10 TABLET, FILM COATED ORAL
Qty: 30 | Refills: 0 | Status: DISCONTINUED | COMMUNITY
Start: 2019-04-10 | End: 2019-05-07

## 2019-05-07 RX ORDER — OXYCODONE AND ACETAMINOPHEN 5; 325 MG/1; MG/1
5-325 TABLET ORAL
Qty: 20 | Refills: 0 | Status: DISCONTINUED | COMMUNITY
Start: 2018-08-31 | End: 2019-05-07

## 2019-05-08 LAB — ERYTHROCYTE [SEDIMENTATION RATE] IN BLOOD: 2 MM/HR — SIGNIFICANT CHANGE UP (ref 0–15)

## 2019-05-10 LAB
ALBUMIN SERPL ELPH-MCNC: 4.9 G/DL
ALDOLASE SERPL-CCNC: 6 U/L
ALP BLD-CCNC: 83 U/L
ALT SERPL-CCNC: 28 U/L
ANA SER IF-ACNC: NEGATIVE
ANION GAP SERPL CALC-SCNC: 14 MMOL/L
AST SERPL-CCNC: 21 U/L
BILIRUB SERPL-MCNC: 0.3 MG/DL
BUN SERPL-MCNC: 17 MG/DL
CALCIUM SERPL-MCNC: 10.1 MG/DL
CHLORIDE SERPL-SCNC: 99 MMOL/L
CHOLEST SERPL-MCNC: 205 MG/DL
CHOLEST/HDLC SERPL: 7.6 RATIO
CK SERPL-CCNC: 79 U/L
CO2 SERPL-SCNC: 24 MMOL/L
CREAT SERPL-MCNC: 1.11 MG/DL
CRP SERPL-MCNC: 0.27 MG/DL
GLUCOSE SERPL-MCNC: 86 MG/DL
HDLC SERPL-MCNC: 27 MG/DL
LDH SERPL-CCNC: 188 U/L
LDLC SERPL CALC-MCNC: NORMAL MG/DL
POTASSIUM SERPL-SCNC: 4.6 MMOL/L
PROT SERPL-MCNC: 7.5 G/DL
SODIUM SERPL-SCNC: 137 MMOL/L
TESTOST BND SERPL-MCNC: 5.6 PG/ML
TESTOST SERPL-MCNC: 283.4 NG/DL
TRIGL SERPL-MCNC: 845 MG/DL
TSH SERPL-ACNC: 2.75 UIU/ML

## 2019-05-11 ENCOUNTER — FORM ENCOUNTER (OUTPATIENT)
Age: 49
End: 2019-05-11

## 2019-05-12 ENCOUNTER — APPOINTMENT (OUTPATIENT)
Dept: MRI IMAGING | Facility: CLINIC | Age: 49
End: 2019-05-12
Payer: COMMERCIAL

## 2019-05-12 ENCOUNTER — OUTPATIENT (OUTPATIENT)
Dept: OUTPATIENT SERVICES | Facility: HOSPITAL | Age: 49
LOS: 1 days | End: 2019-05-12
Payer: COMMERCIAL

## 2019-05-12 DIAGNOSIS — Z98.89 OTHER SPECIFIED POSTPROCEDURAL STATES: Chronic | ICD-10-CM

## 2019-05-12 DIAGNOSIS — I89.9 NONINFECTIVE DISORDER OF LYMPHATIC VESSELS AND LYMPH NODES, UNSPECIFIED: Chronic | ICD-10-CM

## 2019-05-12 DIAGNOSIS — C43.4 MALIGNANT MELANOMA OF SCALP AND NECK: Chronic | ICD-10-CM

## 2019-05-12 DIAGNOSIS — C43.9 MALIGNANT MELANOMA OF SKIN, UNSPECIFIED: ICD-10-CM

## 2019-05-12 DIAGNOSIS — Z90.79 ACQUIRED ABSENCE OF OTHER GENITAL ORGAN(S): Chronic | ICD-10-CM

## 2019-05-12 PROCEDURE — 73718 MRI LOWER EXTREMITY W/O DYE: CPT

## 2019-05-12 PROCEDURE — 73718 MRI LOWER EXTREMITY W/O DYE: CPT | Mod: 26,RT

## 2019-05-21 ENCOUNTER — APPOINTMENT (OUTPATIENT)
Dept: SURGERY | Facility: CLINIC | Age: 49
End: 2019-05-21

## 2019-05-21 ENCOUNTER — INPATIENT (INPATIENT)
Facility: HOSPITAL | Age: 49
LOS: 2 days | Discharge: HOME | DRG: 948 | End: 2019-05-24
Payer: COMMERCIAL

## 2019-05-21 ENCOUNTER — APPOINTMENT (OUTPATIENT)
Dept: SURGERY | Facility: CLINIC | Age: 49
End: 2019-05-21
Payer: COMMERCIAL

## 2019-05-21 VITALS
RESPIRATION RATE: 16 BRPM | HEART RATE: 113 BPM | WEIGHT: 190.04 LBS | SYSTOLIC BLOOD PRESSURE: 118 MMHG | DIASTOLIC BLOOD PRESSURE: 76 MMHG | TEMPERATURE: 101 F | OXYGEN SATURATION: 95 % | HEIGHT: 71 IN

## 2019-05-21 DIAGNOSIS — Z98.89 OTHER SPECIFIED POSTPROCEDURAL STATES: Chronic | ICD-10-CM

## 2019-05-21 DIAGNOSIS — C43.4 MALIGNANT MELANOMA OF SCALP AND NECK: Chronic | ICD-10-CM

## 2019-05-21 DIAGNOSIS — Z90.79 ACQUIRED ABSENCE OF OTHER GENITAL ORGAN(S): Chronic | ICD-10-CM

## 2019-05-21 DIAGNOSIS — I89.9 NONINFECTIVE DISORDER OF LYMPHATIC VESSELS AND LYMPH NODES, UNSPECIFIED: Chronic | ICD-10-CM

## 2019-05-21 LAB
ALBUMIN SERPL ELPH-MCNC: 4.3 G/DL — SIGNIFICANT CHANGE UP (ref 3.3–5)
ALP SERPL-CCNC: 64 U/L — SIGNIFICANT CHANGE UP (ref 40–120)
ALT FLD-CCNC: 29 U/L — SIGNIFICANT CHANGE UP (ref 10–45)
ANION GAP SERPL CALC-SCNC: 12 MMOL/L — SIGNIFICANT CHANGE UP (ref 5–17)
APTT BLD: 32.2 SEC — SIGNIFICANT CHANGE UP (ref 27.5–36.3)
AST SERPL-CCNC: 28 U/L — SIGNIFICANT CHANGE UP (ref 10–40)
BASOPHILS # BLD AUTO: 0 K/UL — SIGNIFICANT CHANGE UP (ref 0–0.2)
BASOPHILS NFR BLD AUTO: 0.2 % — SIGNIFICANT CHANGE UP (ref 0–2)
BILIRUB SERPL-MCNC: 0.6 MG/DL — SIGNIFICANT CHANGE UP (ref 0.2–1.2)
BUN SERPL-MCNC: 17 MG/DL — SIGNIFICANT CHANGE UP (ref 7–23)
CALCIUM SERPL-MCNC: 9.5 MG/DL — SIGNIFICANT CHANGE UP (ref 8.4–10.5)
CHLORIDE SERPL-SCNC: 95 MMOL/L — LOW (ref 96–108)
CO2 SERPL-SCNC: 25 MMOL/L — SIGNIFICANT CHANGE UP (ref 22–31)
CREAT SERPL-MCNC: 1.09 MG/DL — SIGNIFICANT CHANGE UP (ref 0.5–1.3)
EOSINOPHIL # BLD AUTO: 0.1 K/UL — SIGNIFICANT CHANGE UP (ref 0–0.5)
EOSINOPHIL NFR BLD AUTO: 0.6 % — SIGNIFICANT CHANGE UP (ref 0–6)
GAS PNL BLDV: SIGNIFICANT CHANGE UP
GLUCOSE SERPL-MCNC: 127 MG/DL — HIGH (ref 70–99)
HCT VFR BLD CALC: 41.9 % — SIGNIFICANT CHANGE UP (ref 39–50)
HGB BLD-MCNC: 14.5 G/DL — SIGNIFICANT CHANGE UP (ref 13–17)
INR BLD: 1.06 RATIO — SIGNIFICANT CHANGE UP (ref 0.88–1.16)
LYMPHOCYTES # BLD AUTO: 1.2 K/UL — SIGNIFICANT CHANGE UP (ref 1–3.3)
LYMPHOCYTES # BLD AUTO: 13 % — SIGNIFICANT CHANGE UP (ref 13–44)
MCHC RBC-ENTMCNC: 29.7 PG — SIGNIFICANT CHANGE UP (ref 27–34)
MCHC RBC-ENTMCNC: 34.7 GM/DL — SIGNIFICANT CHANGE UP (ref 32–36)
MCV RBC AUTO: 85.7 FL — SIGNIFICANT CHANGE UP (ref 80–100)
MONOCYTES # BLD AUTO: 0.8 K/UL — SIGNIFICANT CHANGE UP (ref 0–0.9)
MONOCYTES NFR BLD AUTO: 8.2 % — SIGNIFICANT CHANGE UP (ref 2–14)
NEUTROPHILS # BLD AUTO: 7.2 K/UL — SIGNIFICANT CHANGE UP (ref 1.8–7.4)
NEUTROPHILS NFR BLD AUTO: 78 % — HIGH (ref 43–77)
PLATELET # BLD AUTO: 254 K/UL — SIGNIFICANT CHANGE UP (ref 150–400)
POTASSIUM SERPL-MCNC: 4.2 MMOL/L — SIGNIFICANT CHANGE UP (ref 3.5–5.3)
POTASSIUM SERPL-SCNC: 4.2 MMOL/L — SIGNIFICANT CHANGE UP (ref 3.5–5.3)
PROT SERPL-MCNC: 7.1 G/DL — SIGNIFICANT CHANGE UP (ref 6–8.3)
PROTHROM AB SERPL-ACNC: 12.1 SEC — SIGNIFICANT CHANGE UP (ref 10–12.9)
RBC # BLD: 4.89 M/UL — SIGNIFICANT CHANGE UP (ref 4.2–5.8)
RBC # FLD: 12.7 % — SIGNIFICANT CHANGE UP (ref 10.3–14.5)
SODIUM SERPL-SCNC: 132 MMOL/L — LOW (ref 135–145)
WBC # BLD: 9.2 K/UL — SIGNIFICANT CHANGE UP (ref 3.8–10.5)
WBC # FLD AUTO: 9.2 K/UL — SIGNIFICANT CHANGE UP (ref 3.8–10.5)

## 2019-05-21 PROCEDURE — 45381 COLONOSCOPY SUBMUCOUS NJX: CPT

## 2019-05-21 PROCEDURE — 71046 X-RAY EXAM CHEST 2 VIEWS: CPT | Mod: 26

## 2019-05-21 PROCEDURE — 45385 COLONOSCOPY W/LESION REMOVAL: CPT

## 2019-05-21 PROCEDURE — 99285 EMERGENCY DEPT VISIT HI MDM: CPT

## 2019-05-21 RX ORDER — HYDROMORPHONE HYDROCHLORIDE 2 MG/ML
1 INJECTION INTRAMUSCULAR; INTRAVENOUS; SUBCUTANEOUS ONCE
Refills: 0 | Status: DISCONTINUED | OUTPATIENT
Start: 2019-05-21 | End: 2019-05-21

## 2019-05-21 RX ORDER — SODIUM CHLORIDE 9 MG/ML
1000 INJECTION INTRAMUSCULAR; INTRAVENOUS; SUBCUTANEOUS ONCE
Refills: 0 | Status: COMPLETED | OUTPATIENT
Start: 2019-05-21 | End: 2019-05-21

## 2019-05-21 RX ADMIN — HYDROMORPHONE HYDROCHLORIDE 1 MILLIGRAM(S): 2 INJECTION INTRAMUSCULAR; INTRAVENOUS; SUBCUTANEOUS at 23:24

## 2019-05-21 RX ADMIN — SODIUM CHLORIDE 1000 MILLILITER(S): 9 INJECTION INTRAMUSCULAR; INTRAVENOUS; SUBCUTANEOUS at 21:10

## 2019-05-21 RX ADMIN — HYDROMORPHONE HYDROCHLORIDE 1 MILLIGRAM(S): 2 INJECTION INTRAMUSCULAR; INTRAVENOUS; SUBCUTANEOUS at 21:10

## 2019-05-21 NOTE — ED PROVIDER NOTE - ATTENDING CONTRIBUTION TO CARE
49y M hx of testicular CA (resolved), NHL (remission), melanoma on immunotx, partial colon resection w primary anastomosis 2/2 low flow state here with fever and abd pain after colonoscopy today. States had multiple polyps removed. Initially feeling fine afterward and then developed chills, body aches, nausea, fever to 102F (took 2 tylenol PTA), abd pain, pain worse with deep inspiration. No URI sx. No sick contacts. On exam pt is febrile, tachy, with epigastric, L upper and L lower abd TTP. Pain greatest in LLQ. Concern for perforation, sepsis. Check labs, upright CXR, CTaP, IVF, will need abx if perf.

## 2019-05-21 NOTE — ED PROVIDER NOTE - OBJECTIVE STATEMENT
49 year-old male with history of testicular cancer (resolved), non-Hodgkin's (resolved), melanoma and currently on immunotherapy, colon resection 2011 by Dr. Rice at [facility] presents to the Emergency Department for abdominal pain.  Patient mentions he had routine colonoscopy; multiple polyp resection performed.  Patient began to develop fever (TMax 102 at home), chills, diffuse body aches for the past few hours.  + nausea; + SOB.  No chest pain, URI symptoms, dysuria, BIS.  PCP - Flower Rice

## 2019-05-21 NOTE — ED PROVIDER NOTE - CLINICAL SUMMARY MEDICAL DECISION MAKING FREE TEXT BOX
49 year-old male with history of testicular cancer (resolved), non-Hodgkin's (resolved), melanoma and currently on immunotherapy, colon resection 2011 by Dr. Rice at [facility] presents to the Emergency Department for abdominal pain; concerns for perforation - plan to eval. with x-ray, CT and lab work. 49 year-old male with history of testicular cancer (resolved), non-Hodgkin's (resolved), melanoma and currently on immunotherapy, colon resection 2011 by Dr. Rice at [facility] presents to the Emergency Department for abdominal pain; concerns for perforation - plan to eval. with x-ray, CT and lab work.  [place]: See attending statement below

## 2019-05-21 NOTE — ED PROVIDER NOTE - PHYSICAL EXAMINATION
*Gen: NAD, AAO*3  *HEENT: NC/AT, MMM, airway patent, trachea midline  *CV: RRR, S1/S2 present, no murmurs  *Resp: no respiratory distress, LCTAB, no wheezing  *Abd: diffuse TTP; surgical scars; no guarding  *Neuro: no focal neuro deficits, moving all limbs appropriately  *Extremities: no gross deformity  *Skin: no rashes, no wounds   ~ Carlos Donis M.D.

## 2019-05-22 DIAGNOSIS — K21.9 GASTRO-ESOPHAGEAL REFLUX DISEASE WITHOUT ESOPHAGITIS: ICD-10-CM

## 2019-05-22 DIAGNOSIS — I10 ESSENTIAL (PRIMARY) HYPERTENSION: ICD-10-CM

## 2019-05-22 DIAGNOSIS — R50.9 FEVER, UNSPECIFIED: ICD-10-CM

## 2019-05-22 DIAGNOSIS — R10.9 UNSPECIFIED ABDOMINAL PAIN: ICD-10-CM

## 2019-05-22 LAB — LIDOCAIN IGE QN: 30 U/L — SIGNIFICANT CHANGE UP (ref 7–60)

## 2019-05-22 PROCEDURE — 99223 1ST HOSP IP/OBS HIGH 75: CPT

## 2019-05-22 PROCEDURE — 74177 CT ABD & PELVIS W/CONTRAST: CPT | Mod: 26

## 2019-05-22 RX ORDER — PANTOPRAZOLE SODIUM 20 MG/1
40 TABLET, DELAYED RELEASE ORAL
Refills: 0 | Status: DISCONTINUED | OUTPATIENT
Start: 2019-05-22 | End: 2019-05-24

## 2019-05-22 RX ORDER — DEXTROSE MONOHYDRATE, SODIUM CHLORIDE, AND POTASSIUM CHLORIDE 50; .745; 4.5 G/1000ML; G/1000ML; G/1000ML
1000 INJECTION, SOLUTION INTRAVENOUS
Refills: 0 | Status: DISCONTINUED | OUTPATIENT
Start: 2019-05-22 | End: 2019-05-23

## 2019-05-22 RX ORDER — ACETAMINOPHEN 500 MG
1000 TABLET ORAL ONCE
Refills: 0 | Status: COMPLETED | OUTPATIENT
Start: 2019-05-22 | End: 2019-05-22

## 2019-05-22 RX ORDER — ENOXAPARIN SODIUM 100 MG/ML
40 INJECTION SUBCUTANEOUS DAILY
Refills: 0 | Status: DISCONTINUED | OUTPATIENT
Start: 2019-05-22 | End: 2019-05-24

## 2019-05-22 RX ORDER — ACETAMINOPHEN 500 MG
1000 TABLET ORAL ONCE
Refills: 0 | Status: DISCONTINUED | OUTPATIENT
Start: 2019-05-22 | End: 2019-05-24

## 2019-05-22 RX ORDER — HYDROMORPHONE HYDROCHLORIDE 2 MG/ML
0.5 INJECTION INTRAMUSCULAR; INTRAVENOUS; SUBCUTANEOUS EVERY 4 HOURS
Refills: 0 | Status: DISCONTINUED | OUTPATIENT
Start: 2019-05-22 | End: 2019-05-23

## 2019-05-22 RX ORDER — FENOFIBRATE,MICRONIZED 130 MG
145 CAPSULE ORAL DAILY
Refills: 0 | Status: DISCONTINUED | OUTPATIENT
Start: 2019-05-22 | End: 2019-05-24

## 2019-05-22 RX ORDER — HYDROMORPHONE HYDROCHLORIDE 2 MG/ML
1 INJECTION INTRAMUSCULAR; INTRAVENOUS; SUBCUTANEOUS ONCE
Refills: 0 | Status: DISCONTINUED | OUTPATIENT
Start: 2019-05-22 | End: 2019-05-22

## 2019-05-22 RX ORDER — AMLODIPINE BESYLATE 2.5 MG/1
5 TABLET ORAL DAILY
Refills: 0 | Status: DISCONTINUED | OUTPATIENT
Start: 2019-05-22 | End: 2019-05-24

## 2019-05-22 RX ORDER — METOPROLOL TARTRATE 50 MG
25 TABLET ORAL
Refills: 0 | Status: DISCONTINUED | OUTPATIENT
Start: 2019-05-22 | End: 2019-05-24

## 2019-05-22 RX ORDER — SODIUM CHLORIDE 9 MG/ML
1000 INJECTION, SOLUTION INTRAVENOUS
Refills: 0 | Status: DISCONTINUED | OUTPATIENT
Start: 2019-05-22 | End: 2019-05-22

## 2019-05-22 RX ORDER — KETOROLAC TROMETHAMINE 30 MG/ML
15 SYRINGE (ML) INJECTION ONCE
Refills: 0 | Status: DISCONTINUED | OUTPATIENT
Start: 2019-05-22 | End: 2019-05-24

## 2019-05-22 RX ORDER — PIPERACILLIN AND TAZOBACTAM 4; .5 G/20ML; G/20ML
3.38 INJECTION, POWDER, LYOPHILIZED, FOR SOLUTION INTRAVENOUS EVERY 8 HOURS
Refills: 0 | Status: DISCONTINUED | OUTPATIENT
Start: 2019-05-22 | End: 2019-05-24

## 2019-05-22 RX ORDER — ATORVASTATIN CALCIUM 80 MG/1
40 TABLET, FILM COATED ORAL AT BEDTIME
Refills: 0 | Status: DISCONTINUED | OUTPATIENT
Start: 2019-05-22 | End: 2019-05-24

## 2019-05-22 RX ADMIN — HYDROMORPHONE HYDROCHLORIDE 1 MILLIGRAM(S): 2 INJECTION INTRAMUSCULAR; INTRAVENOUS; SUBCUTANEOUS at 02:35

## 2019-05-22 RX ADMIN — Medication 400 MILLIGRAM(S): at 11:01

## 2019-05-22 RX ADMIN — Medication 1000 MILLIGRAM(S): at 11:28

## 2019-05-22 RX ADMIN — HYDROMORPHONE HYDROCHLORIDE 0.5 MILLIGRAM(S): 2 INJECTION INTRAMUSCULAR; INTRAVENOUS; SUBCUTANEOUS at 08:09

## 2019-05-22 RX ADMIN — Medication 145 MILLIGRAM(S): at 15:49

## 2019-05-22 RX ADMIN — HYDROMORPHONE HYDROCHLORIDE 1 MILLIGRAM(S): 2 INJECTION INTRAMUSCULAR; INTRAVENOUS; SUBCUTANEOUS at 05:28

## 2019-05-22 RX ADMIN — HYDROMORPHONE HYDROCHLORIDE 0.5 MILLIGRAM(S): 2 INJECTION INTRAMUSCULAR; INTRAVENOUS; SUBCUTANEOUS at 12:12

## 2019-05-22 RX ADMIN — HYDROMORPHONE HYDROCHLORIDE 0.5 MILLIGRAM(S): 2 INJECTION INTRAMUSCULAR; INTRAVENOUS; SUBCUTANEOUS at 11:37

## 2019-05-22 RX ADMIN — AMLODIPINE BESYLATE 5 MILLIGRAM(S): 2.5 TABLET ORAL at 08:08

## 2019-05-22 RX ADMIN — ATORVASTATIN CALCIUM 40 MILLIGRAM(S): 80 TABLET, FILM COATED ORAL at 21:43

## 2019-05-22 RX ADMIN — PANTOPRAZOLE SODIUM 40 MILLIGRAM(S): 20 TABLET, DELAYED RELEASE ORAL at 08:07

## 2019-05-22 RX ADMIN — SODIUM CHLORIDE 100 MILLILITER(S): 9 INJECTION, SOLUTION INTRAVENOUS at 07:05

## 2019-05-22 RX ADMIN — HYDROMORPHONE HYDROCHLORIDE 0.5 MILLIGRAM(S): 2 INJECTION INTRAMUSCULAR; INTRAVENOUS; SUBCUTANEOUS at 16:15

## 2019-05-22 RX ADMIN — HYDROMORPHONE HYDROCHLORIDE 0.5 MILLIGRAM(S): 2 INJECTION INTRAMUSCULAR; INTRAVENOUS; SUBCUTANEOUS at 15:46

## 2019-05-22 RX ADMIN — HYDROMORPHONE HYDROCHLORIDE 0.5 MILLIGRAM(S): 2 INJECTION INTRAMUSCULAR; INTRAVENOUS; SUBCUTANEOUS at 20:02

## 2019-05-22 RX ADMIN — PIPERACILLIN AND TAZOBACTAM 25 GRAM(S): 4; .5 INJECTION, POWDER, LYOPHILIZED, FOR SOLUTION INTRAVENOUS at 21:43

## 2019-05-22 RX ADMIN — HYDROMORPHONE HYDROCHLORIDE 0.5 MILLIGRAM(S): 2 INJECTION INTRAMUSCULAR; INTRAVENOUS; SUBCUTANEOUS at 20:17

## 2019-05-22 RX ADMIN — DEXTROSE MONOHYDRATE, SODIUM CHLORIDE, AND POTASSIUM CHLORIDE 100 MILLILITER(S): 50; .745; 4.5 INJECTION, SOLUTION INTRAVENOUS at 11:37

## 2019-05-22 RX ADMIN — PIPERACILLIN AND TAZOBACTAM 25 GRAM(S): 4; .5 INJECTION, POWDER, LYOPHILIZED, FOR SOLUTION INTRAVENOUS at 13:45

## 2019-05-22 RX ADMIN — ENOXAPARIN SODIUM 40 MILLIGRAM(S): 100 INJECTION SUBCUTANEOUS at 11:02

## 2019-05-22 NOTE — H&P ADULT - NSICDXPASTSURGICALHX_GEN_ALL_CORE_FT
PAST SURGICAL HISTORY:  History of orchiectomy left 1994    Lymph node disorder s/p abdominal lymph node dissection 1994, 1996    Melanoma of scalp or neck excision 8/18  s/p excision right neck mass & LN dissx    S/P colon resection 1996

## 2019-05-22 NOTE — H&P ADULT - NSHPPHYSICALEXAM_GEN_ALL_CORE
Gen: AAOx3, NAD, mentating normally  Neuro: Cranial nerves II-XII grossly intact  HEENT: Atraumatic, normocephalic  CV: RRR, normal S1/S2, no audible m/r/g  Pulm: Breathing comfortably on RA. Equal chest rise b/l. Lung fields CTAB  Abd: Soft, non-distended, tender to gentle palpation. No rebound or guarding.  Back/flank: No CVA tenderness  Extremities: WWP. Moving all 4 extremities spontaneously. Strength 5/5. Sensation intact  Skin: No rashes or suspicious lesions

## 2019-05-22 NOTE — CONSULT NOTE ADULT - ASSESSMENT
49M pmhx testicular ca s/p orchiectomy ('94) NHL s/p chemo/rad ('98), ischemic colitis s/p gxf-[id number]-mf-open left colectomy ('11) who presents with 1 day of abdominal pain following a colonoscopy with associated fever and chills- no free air or colitis on CT  Suspect retained gas/air post-colonoscopy (s/p polypectomy x8)    RECS:  -ambulate  -limit narcotics as this may slow bowel function; this was discussed with pt and spouse  -On Zosyn  -IVF  -serial abdominal exams    Discussed with pt and spouse at bedside  Discussed with Dr Dwayne Bryan PAERIC    [facility]  [phone]  After hours and weekend coverage [phone]
49M PMH testicular ca s/p orchiectomy ('94) NHL s/p chemo/rad ('98), ischemic colitis s/p [id number]-iy-open left colectomy ('11) who presents with 1 day of abdominal pain following a colonoscopy

## 2019-05-22 NOTE — CHART NOTE - NSCHARTNOTEFT_GEN_A_CORE
General Surgery Progress Note    SUBJECTIVE:  The patient was seen and examined overnight. C/o mild diffuse abd pain, but not worse than prior. Passing minimal gas, no BM. Denies CP, SOB, N/V.     OBJECTIVE:     ** VITAL SIGNS / I&O's **    Vital Signs Last 24 Hrs  T(C): 36.6 (22 May 2019 21:18), Max: 37.1 (22 May 2019 05:31)  T(F): 97.8 (22 May 2019 21:18), Max: 98.7 (22 May 2019 05:31)  HR: 81 (22 May 2019 21:18) (69 - 90)  BP: 115/72 (22 May 2019 21:18) (104/67 - 132/78)  BP(mean): --  RR: 18 (22 May 2019 21:18) (16 - 18)  SpO2: 98% (22 May 2019 21:18) (94% - 100%)      22 May 2019 07:01  -  22 May 2019 22:08  --------------------------------------------------------  IN:    dextrose 5% + sodium chloride 0.45% with potassium chloride 20 mEq/L: 1000 mL    Solution: 100 mL  Total IN: 1100 mL    OUT:  Total OUT: 0 mL    Total NET: 1100 mL          ** PHYSICAL EXAM **    -- CONSTITUTIONAL: Alert, NAD  -- PULMONARY: non-labored respirations  -- ABDOMEN: soft, non-distended, diffuse mild TTP, no peritoneal signs  -- NEURO: A&Ox3    ** LABS **                          14.5   9.2   )-----------( 254      ( 21 May 2019 20:46 )             41.9     21 May 2019 20:46    132    |  95     |  17     ----------------------------<  127    4.2     |  25     |  1.09     Ca    9.5        21 May 2019 20:46    TPro  7.1    /  Alb  4.3    /  TBili  0.6    /  DBili  x      /  AST  28     /  ALT  29     /  AlkPhos  64     21 May 2019 20:46    PT/INR - ( 21 May 2019 20:46 )   PT: 12.1 sec;   INR: 1.06 ratio         PTT - ( 21 May 2019 20:46 )  PTT:32.2 sec  CAPILLARY BLOOD GLUCOSE            LIVER FUNCTIONS - ( 21 May 2019 20:46 )  Alb: 4.3 g/dL / Pro: 7.1 g/dL / ALK PHOS: 64 U/L / ALT: 29 U/L / AST: 28 U/L / GGT: x                 MEDICATIONS  (STANDING):  acetaminophen  IVPB .. 1000 milliGRAM(s) IV Intermittent once  amLODIPine   Tablet 5 milliGRAM(s) Oral daily  atorvastatin 40 milliGRAM(s) Oral at bedtime  dextrose 5% + sodium chloride 0.45% with potassium chloride 20 mEq/L 1000 milliLiter(s) (100 mL/Hr) IV Continuous <Continuous>  enoxaparin Injectable 40 milliGRAM(s) SubCutaneous daily  fenofibrate Tablet 145 milliGRAM(s) Oral daily  ketorolac   Injectable 15 milliGRAM(s) IV Push once  metoprolol tartrate 25 milliGRAM(s) Oral two times a day  pantoprazole    Tablet 40 milliGRAM(s) Oral before breakfast  piperacillin/tazobactam IVPB. 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    P:  - Continue to monitor for worsening abd pain  - Pain control PRN: IV tyl, dilaudid, toradol  - DIET: NPO/IVF      General Surgery Green Team [phone]

## 2019-05-22 NOTE — ED ADULT NURSE REASSESSMENT NOTE - NS ED NURSE REASSESS COMMENT FT1
Report received from COLTON Hernández . Pt AAOx4, NAD, resp nonlabored, skin warm/dry, resting comfortably in bed. Pt c/o abd pain 8/10 will medicate as per order . Pt denies headache, dizziness, chest pain, palpitations, SOB, n/v/d, urinary symptoms, fevers, chills, weakness at this time. Pt awaiting bed . Safety maintained.

## 2019-05-22 NOTE — CONSULT NOTE ADULT - SUBJECTIVE AND OBJECTIVE BOX
Patient is a 49y old  Male who presents with a chief complaint of abdominal pain    HPI:  49M pmhx testicular ca s/p orchiectomy ('94) NHL s/p chemo/rad ('98), ischemic colitis s/p mtq-idkhbiyeg-gw-open left colectomy ('11) who presents with 1 day of abdominal pain following a colonoscopy. The patient had a colonoscopy in office with Dr. Rice yesterday morning around 11am, during which he reports 8 polyps were removed (all requiring removal with snare, 3 of which were reported to have required submucosal lift). He reports feeling fine until about 6pm last night, when he suddenly felt fever (102F at home), chills, severe abdominal pain and joint pain. He called Dr. Rice's office who told him to present to the ED. In the ED, an upright CXR and CT a/p were both negative for free air, or any other significant findings. He has been requiring IV narcotics to manage his pain. (22 May 2019 06:37)    no nausea or vomiting  passing some gas earlier, feels colicky type pain  Had some passage of blood (small amount) earlier today  no diarrhea  Follows up with Dr Mcginnis at [email or web address]], had recently tested + genetic evaluation for predisposition to colon ca    Prior history of ischemic colitis requiring partial colectomy 2011- follow-up colon 2012 reportedly negative      PAST MEDICAL & SURGICAL HISTORY:  Malignant melanoma of scalp: RSX 08/18  R neck mass dsx/ LN dsx 10/19/18  Hypertriglyceridemia  History of bone marrow transplant  Osteoarthritis: degenerative disc L3-4  BPH (benign prostatic hyperplasia)  Colitis: surgery 1996  GERD (gastroesophageal reflux disease)  NHL (non-Hodgkin's lymphoma): 1999, Radiation to left neck region  HTN (hypertension)  Headache, Migraine  Testicular Cancer: 1994, chemotherapy  Melanoma of scalp or neck: excision 8/18  s/p excision right neck mass &amp; LN dissx  Lymph node disorder: s/p abdominal lymph node dissection 1994, 1996  S/P colon resection: 1996  History of orchiectomy: left 1994    Allergies  No Known Allergies      MEDICATIONS  (STANDING):  amLODIPine   Tablet 5 milliGRAM(s) Oral daily  atorvastatin 40 milliGRAM(s) Oral at bedtime  dextrose 5% + sodium chloride 0.45% with potassium chloride 20 mEq/L 1000 milliLiter(s) (100 mL/Hr) IV Continuous <Continuous>  enoxaparin Injectable 40 milliGRAM(s) SubCutaneous daily  fenofibrate Tablet 145 milliGRAM(s) Oral daily  metoprolol tartrate 25 milliGRAM(s) Oral two times a day  pantoprazole    Tablet 40 milliGRAM(s) Oral before breakfast  piperacillin/tazobactam IVPB. 3.375 Gram(s) IV Intermittent every 8 hours    MEDICATIONS  (PRN):  HYDROmorphone  Injectable 0.5 milliGRAM(s) IV Push every 4 hours PRN Severe Pain (7 - 10)      Social History:    Marital Status:  ( X  )    (   ) Single    (   )    (  )    Lives with: (  ) alone  (  ) children   (X  ) spouse   (  ) parents  (  ) other      Advanced Directives: (  X   ) None    (      ) DNR    (     ) DNI    (     ) Health Care Proxy:     Review of Systems:    General:  No wt loss,  chills, night sweats, +fever  CV:  No pain, palpitations, hypo/hypertension  Resp:  No dyspnea, cough, tachypnea, wheezing  GI:  see HPI  :  No pain, bleeding, +Hx testicualr CA s/p orchiectomy  Muscle:  No pain, weakness, see HPI  Neuro:  No weakness, tingling, memory problems  Psych:  No fatigue, insomnia, mood problems, depression  Endocrine:  No polyuria, polydypsia, cold/heat intolerance  Heme:  No petechiae, ecchymosis, easy bruisability Hx NHL, Hx testicular CA, Hx melanoma  Skin:  Hx melanoma, multiple surgeries, excision of right neck mass      Vital Signs Last 24 Hrs  T(C): 37 (22 May 2019 14:09), Max: 38.3 (21 May 2019 19:59)  T(F): 98.6 (22 May 2019 14:09), Max: 100.9 (21 May 2019 19:59)  HR: 78 (22 May 2019 14:09) (69 - 113)  BP: 132/78 (22 May 2019 14:09) (104/67 - 132/78)  BP(mean): --  RR: 18 (22 May 2019 14:09) (16 - 18)  SpO2: 98% (22 May 2019 14:09) (94% - 100%)    PHYSICAL EXAM:    Constitutional: NAD, well-developed non toxic appearing male sitting up in bed.  spouse at bedside.   Neck: +scars c/w prior surgery, s/p right neck mass excision  Respiratory: good air entry b/l  Cardiovascular: S1 and S2, RRR,  Gastrointestinal: normo-active BS x4, soft sl. distended.  WH midline scar +tender to gentle palpation, no rebound or rigidity  Extremities: No peripheral edema, neg clubbing, cyanosis  Vascular: 2+ peripheral pulses  Neurological: A/O x 3, no focal deficits  Psychiatric: Normal mood, normal affect  Skin: multiple well healed scars, no rash        LABS:                        14.5   9.2   )-----------( 254      ( 21 May 2019 20:46 )             41.9     05-21    132<L>  |  95<L>  |  17  ----------------------------<  127<H>  4.2   |  25  |  1.09    Ca    9.5      21 May 2019 20:46    TPro  7.1  /  Alb  4.3  /  TBili  0.6  /  DBili  x   /  AST  28  /  ALT  29  /  AlkPhos  64  05-21    PT/INR - ( 21 May 2019 20:46 )   PT: 12.1 sec;   INR: 1.06 ratio    PTT - ( 21 May 2019 20:46 )  PTT:32.2 sec        RADIOLOGY & ADDITIONAL TESTS:  < from: CT Abdomen and Pelvis w/ Oral Cont and w/ IV Cont (05.22.19 @ 01:28) >  FINDINGS:    LOWER CHEST: Within normal limits.    LIVER: Within normal limits.  BILE DUCTS: Normal caliber.  GALLBLADDER: Mild thickening of the gallbladder wall which is likely due   to partial contraction. No cholelithiasis.  SPLEEN: Within normal limits.  PANCREAS: Within normal limits.  ADRENALS: Within normal limits.  KIDNEYS/URETERS: Bilateral subcentimeter hypodensities, too small to   characterize. Nonobstructing 2 mm right lower pole renal calculus..    BLADDER: Within normal limits.  REPRODUCTIVE ORGANS: Prostate within normal limits.    BOWEL: Status post left hemicolectomy. No evidence of bowel obstruction   or wall thickening. Appendix within normal limits.  PERITONEUM: No pneumoperitoneum or ascites.  VESSELS:  Within normal limits.  RETROPERITONEUM: No lymphadenopathy.    ABDOMINAL WALL: Fat-containing umbilical hernia.  BONES: Stable T12 superior endplate irregularity.    IMPRESSION:     No pneumoperitoneum. No acute intra-abdominal or pelvic finding.
Patient is a 49y old  Male who presents with a chief complaint of abdominal pain    HPI:    49M PMH testicular ca s/p orchiectomy ('94) NHL s/p chemo/rad ('98), ischemic colitis s/p kcr-kxjblllup-ce-open left colectomy ('11) who presents with 1 day of abdominal pain following a colonoscopy. The patient had an outpatient colonoscopy yesterday morning around 11am, during which he reports 8 polyps were removed. He reports feeling fine until about 6pm last night, when he suddenly felt fever (102F at home), chills, severe abdominal pain and joint pain. He called Dr. Rice's office who told him to present to the ED. In the ED, an upright CXR and CT a/p were both negative for free air, or any other significant findings. Admitted for observation and pain management     PAST MEDICAL & SURGICAL HISTORY:  Malignant melanoma of scalp: RSX 08/18  R neck mass dsx/ LN dsx 10/19/18  Hypertriglyceridemia  History of bone marrow transplant  Osteoarthritis: degenerative disc L3-4  BPH (benign prostatic hyperplasia)  Colitis: surgery 1996  GERD (gastroesophageal reflux disease)  NHL (non-Hodgkin's lymphoma): 1999, Radiation to left neck region  HTN (hypertension)  Headache, Migraine  Testicular Cancer: 1994, chemotherapy  Melanoma of scalp or neck: excision 8/18  s/p excision right neck mass &amp; LN dissx  Lymph node disorder: s/p abdominal lymph node dissection 1994, 1996  S/P colon resection: 1996  History of orchiectomy: left 1994      Review of Systems:   CONSTITUTIONAL: + fever, no weight loss, or fatigue  EYES: No eye pain, visual disturbances, or discharge  ENMT:  No difficulty hearing, tinnitus, vertigo; No sinus or throat pain  NECK: No pain or stiffness  RESPIRATORY: No cough, wheezing, chills or hemoptysis; No shortness of breath  CARDIOVASCULAR: No chest pain, palpitations, dizziness, or leg swelling  GASTROINTESTINAL: see above HPI   GENITOURINARY: No dysuria, frequency, hematuria, or incontinence  NEUROLOGICAL: No headaches, memory loss, loss of strength, numbness, or tremors  SKIN: No itching, burning, rashes, or lesions   LYMPH NODES: No enlarged glands  ENDOCRINE: No heat or cold intolerance; No hair loss  MUSCULOSKELETAL: No joint pain or swelling; No muscle, back, or extremity pain  PSYCHIATRIC: No depression, anxiety, mood swings, or difficulty sleeping  HEME/LYMPH: No easy bruising, or bleeding gums  ALLERGY AND IMMUNOLOGIC: No hives or eczema    Allergies    No Known Allergies    Social History:     non smoker  no IVDA  no ETOH abuse   lives with spouse    FAMILY HISTORY:  No pertinent family history in first degree relatives      MEDICATIONS  (STANDING):  acetaminophen  IVPB .. 1000 milliGRAM(s) IV Intermittent once  amLODIPine   Tablet 5 milliGRAM(s) Oral daily  atorvastatin 40 milliGRAM(s) Oral at bedtime  dextrose 5% + sodium chloride 0.45% with potassium chloride 20 mEq/L 1000 milliLiter(s) (100 mL/Hr) IV Continuous <Continuous>  enoxaparin Injectable 40 milliGRAM(s) SubCutaneous daily  fenofibrate Tablet 145 milliGRAM(s) Oral daily  ketorolac   Injectable 15 milliGRAM(s) IV Push once  metoprolol tartrate 25 milliGRAM(s) Oral two times a day  pantoprazole    Tablet 40 milliGRAM(s) Oral before breakfast  piperacillin/tazobactam IVPB. 3.375 Gram(s) IV Intermittent every 8 hours    MEDICATIONS  (PRN):  HYDROmorphone  Injectable 0.5 milliGRAM(s) IV Push every 4 hours PRN Severe Pain (7 - 10)      CAPILLARY BLOOD GLUCOSE        I&O's Summary    22 May 2019 07:01  -  22 May 2019 15:24  --------------------------------------------------------  IN: 0 mL / OUT: 0 mL / NET: 0 mL        24hrs Vital:  T(C): 37 (05-22-19 @ 14:09), Max: 38.3 (05-21-19 @ 19:59)  HR: 78 (05-22-19 @ 14:09) (69 - 113)  BP: 132/78 (05-22-19 @ 14:09) (104/67 - 132/78)  RR: 18 (05-22-19 @ 14:09) (16 - 18)  SpO2: 98% (05-22-19 @ 14:09) (94% - 100%)    PHYSICAL EXAM:  GENERAL: NAD, well-developed  HEAD:  Atraumatic, Normocephalic  EYES: EOMI, PERRLA, conjunctiva and sclera clear  NECK: Supple, No JVD  CHEST/LUNG: Clear to auscultation bilaterally; No wheeze  port right chest  HEART: S1S2; No rubs, or gallops, no murmurs  ABDOMEN: Soft, tenderness epigastrium, Nondistended; Bowel sounds present  EXTREMITIES:  + Peripheral Pulses, No clubbing or cyanosis, no edema  PSYCH: AO x 3,   NEUROLOGY: Alert, no focal motor or sensory deficits  SKIN: No rashes or lesions    LABS:                        14.5   9.2   )-----------( 254      ( 21 May 2019 20:46 )             41.9     05-21    132<L>  |  95<L>  |  17  ----------------------------<  127<H>  4.2   |  25  |  1.09    Ca    9.5      21 May 2019 20:46    TPro  7.1  /  Alb  4.3  /  TBili  0.6  /  DBili  x   /  AST  28  /  ALT  29  /  AlkPhos  64  05-21    PT/INR - ( 21 May 2019 20:46 )   PT: 12.1 sec;   INR: 1.06 ratio         PTT - ( 21 May 2019 20:46 )  PTT:32.2 sec          RADIOLOGY & ADDITIONAL TESTS:    Consultant(s) Notes Reviewed:      Care Discussed with Consultants/Other Providers:

## 2019-05-22 NOTE — H&P ADULT - ATTENDING COMMENTS
I saw and evaluated patient at 645 this morning. I reviewed the chart, data, and CT films. Agree with resident assessment and plan

## 2019-05-22 NOTE — ED ADULT NURSE NOTE - NSIMPLEMENTINTERV_GEN_ALL_ED
Implemented All Universal Safety Interventions:  [address] to call system. Call bell, personal items and telephone within reach. Instruct patient to call for assistance. Room bathroom lighting operational. Non-slip footwear when patient is off stretcher. Physically safe environment: no spills, clutter or unnecessary equipment. Stretcher in lowest position, wheels locked, appropriate side rails in place.

## 2019-05-22 NOTE — H&P ADULT - HISTORY OF PRESENT ILLNESS
49M pmhx testicular ca s/p orchiectomy ('94) NHL s/p chemo/rad ('98), ischemic colitis s/p amy-qoxpjxbru-po-open left colectomy ('11) who presents with 1 day of abdominal pain following a colonoscopy. The patient had a colonoscopy with Dr. Rice yesterday morning around 11am, during which he reports 8 polyps were removed. He reports feeling fine until about 6pm last night, when he suddenly felt fever (102F at home), chills, severe abdominal pain and joint pain. He called Dr. Rice's office who told him to present to the ED. In the ED, an upright CXR and CT a/p were both negative for free air, or any other significant findings. He has been requiring IV narcotics to manage his pain.

## 2019-05-22 NOTE — H&P ADULT - NSHPLABSRESULTS_GEN_ALL_CORE
CBC (05-21 @ 20:46)                              14.5                           9.2     )----------------(  254        78.0<H>% Neutrophils, 13.0  % Lymphocytes, ANC: 7.2                                 41.9      BMP (05-21 @ 20:46)             132<L>  |  95<L>   |  17    		Ca++ --      Ca 9.5                ---------------------------------( 127<H>		Mg --                 4.2     |  25      |  1.09  			Ph --        LFTs (05-21 @ 20:46)      TPro 7.1 / Alb 4.3 / TBili 0.6 / DBili -- / AST 28 / ALT 29 / AlkPhos 64    Coags (05-21 @ 20:46)  aPTT 32.2 / INR 1.06 / PT 12.1        VBG (05-21 @ 20:46)     7.40 / 46 / 39 / 28 / 3.0<H> / 69%     Lactate: 1.6      < from: CT Abdomen and Pelvis w/ Oral Cont and w/ IV Cont (05.22.19 @ 01:28) >    FINDINGS:    LOWER CHEST: Within normal limits.    LIVER: Within normal limits.  BILE DUCTS: Normal caliber.  GALLBLADDER: Mild thickening of the gallbladder wall which is likely due   to partial contraction. No cholelithiasis.  SPLEEN: Within normal limits.  PANCREAS: Within normal limits.  ADRENALS: Within normal limits.  KIDNEYS/URETERS: Bilateral subcentimeter hypodensities, too small to   characterize. Nonobstructing 2 mm right lower pole renal calculus..    BLADDER: Within normal limits.  REPRODUCTIVE ORGANS: Prostate within normal limits.    BOWEL: Status post left hemicolectomy. No evidence of bowel obstruction   or wall thickening. Appendix within normal limits.  PERITONEUM: No pneumoperitoneum or ascites.  VESSELS:  Within normal limits.  RETROPERITONEUM: No lymphadenopathy.    ABDOMINAL WALL: Fat-containing umbilical hernia.  BONES: Stable T12 superior endplate irregularity.    IMPRESSION:     No pneumoperitoneum. No acute intra-abdominal or pelvic finding.    < end of copied text >      < from: Xray Chest 2 Views PA/Lat (05.21.19 @ 22:08) >    ******PRELIMINARY REPORT******    ******PRELIMINARY REPORT******              INTERPRETATION:  Clear lungs. Relative luceny under the right   hemidiaphragm likely secondary to overlying rib.    SCL              ******PRELIMINARY REPORT******    ******PRELIMINARY REPORT******         < end of copied text >

## 2019-05-22 NOTE — H&P ADULT - NSICDXPASTMEDICALHX_GEN_ALL_CORE_FT
PAST MEDICAL HISTORY:  BPH (benign prostatic hyperplasia)     Colitis surgery 1996    GERD (gastroesophageal reflux disease)     Headache, Migraine     History of bone marrow transplant     HTN (hypertension)     Hypertriglyceridemia     Malignant melanoma of scalp RSX 08/18  R neck mass dsx/ LN dsx 10/19/18    NHL (non-Hodgkin's lymphoma) 1999, Radiation to left neck region    Osteoarthritis degenerative disc L3-4    Testicular Cancer 1994, chemotherapy

## 2019-05-22 NOTE — H&P ADULT - ASSESSMENT
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    PLAN:  - Admit to [facility] under Dr. Rice  - Zosyn  - NPO  - IV fluids  - Pain control as needed  - Home meds ordered    Discussed with Dr. Dwayne Iyer, PGY-2  [facility] [phone]

## 2019-05-22 NOTE — CONSULT NOTE ADULT - PROBLEM SELECTOR RECOMMENDATION 9
at home of unclear etiology  will continue to monitor  now resolved  on IV antibiotics   pan-culture if recurs

## 2019-05-22 NOTE — ED ADULT NURSE NOTE - OBJECTIVE STATEMENT
BPH (benign prostatic hyperplasia)    Colitis  surgery 1996  GERD (gastroesophageal reflux disease)    Headache, Migraine    History of bone marrow transplant    HTN (hypertension)    Hypertriglyceridemia    Malignant melanoma of scalp  RSX 08/18  R neck mass dsx/ LN dsx 10/19/18  NHL (non-Hodgkin's lymphoma)  1999, Radiation to left neck region  Osteoarthritis  degenerative disc L3-4  Testicular Cancer  1994, chemotherap Received a 49 year-old male aaox4 ambulatory with history of testicular cancer (resolved), non-Hodgkin's (resolved), melanoma and currently on immunotherapy, colon resection 2011 by Dr. Rice at [facility] presents to the Emergency Department for abdominal pain.  Patient mentions he had routine colonoscopy; multiple polyp resection performed.  Patient began to develop fever (TMax 102 at home), chills, diffuse body aches for the past few hours.  + nausea; + SOB.  No chest pain, URI symptoms, dysuria, BIS. h/o BPH (benign prostatic hyperplasia)  Colitis  surgery 1996GERD (gastroesophageal reflux disease)  Headache, Migraine  History of bone marrow transplant  HTN (hypertension)  Hypertriglyceridemia  Malignant melanoma of scalp  RSX 08/18  R neck mass dsx/ LN dsx 10/19/18NHL (non-Hodgkin's lymphoma)  1999, Radiation to left neck region Osteoarthritis  degenerative disc L3-4Testicular Cancer  1994, chemotherapy.  Rt Power port accessed under sterile technique.

## 2019-05-23 LAB
ANION GAP SERPL CALC-SCNC: 12 MMOL/L — SIGNIFICANT CHANGE UP (ref 5–17)
BUN SERPL-MCNC: 10 MG/DL — SIGNIFICANT CHANGE UP (ref 7–23)
CALCIUM SERPL-MCNC: 9 MG/DL — SIGNIFICANT CHANGE UP (ref 8.4–10.5)
CHLORIDE SERPL-SCNC: 100 MMOL/L — SIGNIFICANT CHANGE UP (ref 96–108)
CO2 SERPL-SCNC: 24 MMOL/L — SIGNIFICANT CHANGE UP (ref 22–31)
CORE LAB BIOPSY: NORMAL
CREAT SERPL-MCNC: 0.96 MG/DL — SIGNIFICANT CHANGE UP (ref 0.5–1.3)
GLUCOSE SERPL-MCNC: 103 MG/DL — HIGH (ref 70–99)
HCT VFR BLD CALC: 43 % — SIGNIFICANT CHANGE UP (ref 39–50)
HGB BLD-MCNC: 13.7 G/DL — SIGNIFICANT CHANGE UP (ref 13–17)
MAGNESIUM SERPL-MCNC: 2 MG/DL — SIGNIFICANT CHANGE UP (ref 1.6–2.6)
MCHC RBC-ENTMCNC: 28 PG — SIGNIFICANT CHANGE UP (ref 27–34)
MCHC RBC-ENTMCNC: 31.9 GM/DL — LOW (ref 32–36)
MCV RBC AUTO: 87.9 FL — SIGNIFICANT CHANGE UP (ref 80–100)
PHOSPHATE SERPL-MCNC: 2.8 MG/DL — SIGNIFICANT CHANGE UP (ref 2.5–4.5)
PLATELET # BLD AUTO: 210 K/UL — SIGNIFICANT CHANGE UP (ref 150–400)
POTASSIUM SERPL-MCNC: 4.3 MMOL/L — SIGNIFICANT CHANGE UP (ref 3.5–5.3)
POTASSIUM SERPL-SCNC: 4.3 MMOL/L — SIGNIFICANT CHANGE UP (ref 3.5–5.3)
RBC # BLD: 4.89 M/UL — SIGNIFICANT CHANGE UP (ref 4.2–5.8)
RBC # FLD: 13.3 % — SIGNIFICANT CHANGE UP (ref 10.3–14.5)
SODIUM SERPL-SCNC: 136 MMOL/L — SIGNIFICANT CHANGE UP (ref 135–145)
WBC # BLD: 4.52 K/UL — SIGNIFICANT CHANGE UP (ref 3.8–10.5)
WBC # FLD AUTO: 4.52 K/UL — SIGNIFICANT CHANGE UP (ref 3.8–10.5)

## 2019-05-23 PROCEDURE — 99233 SBSQ HOSP IP/OBS HIGH 50: CPT

## 2019-05-23 RX ORDER — ACETAMINOPHEN 500 MG
650 TABLET ORAL EVERY 6 HOURS
Refills: 0 | Status: DISCONTINUED | OUTPATIENT
Start: 2019-05-23 | End: 2019-05-24

## 2019-05-23 RX ORDER — HYDROMORPHONE HYDROCHLORIDE 2 MG/ML
1 INJECTION INTRAMUSCULAR; INTRAVENOUS; SUBCUTANEOUS EVERY 4 HOURS
Refills: 0 | Status: DISCONTINUED | OUTPATIENT
Start: 2019-05-23 | End: 2019-05-23

## 2019-05-23 RX ORDER — DEXTROSE MONOHYDRATE, SODIUM CHLORIDE, AND POTASSIUM CHLORIDE 50; .745; 4.5 G/1000ML; G/1000ML; G/1000ML
1000 INJECTION, SOLUTION INTRAVENOUS
Refills: 0 | Status: DISCONTINUED | OUTPATIENT
Start: 2019-05-23 | End: 2019-05-24

## 2019-05-23 RX ORDER — HYDROMORPHONE HYDROCHLORIDE 2 MG/ML
1 INJECTION INTRAMUSCULAR; INTRAVENOUS; SUBCUTANEOUS EVERY 6 HOURS
Refills: 0 | Status: DISCONTINUED | OUTPATIENT
Start: 2019-05-23 | End: 2019-05-24

## 2019-05-23 RX ADMIN — HYDROMORPHONE HYDROCHLORIDE 0.5 MILLIGRAM(S): 2 INJECTION INTRAMUSCULAR; INTRAVENOUS; SUBCUTANEOUS at 05:50

## 2019-05-23 RX ADMIN — HYDROMORPHONE HYDROCHLORIDE 1 MILLIGRAM(S): 2 INJECTION INTRAMUSCULAR; INTRAVENOUS; SUBCUTANEOUS at 16:15

## 2019-05-23 RX ADMIN — HYDROMORPHONE HYDROCHLORIDE 0.5 MILLIGRAM(S): 2 INJECTION INTRAMUSCULAR; INTRAVENOUS; SUBCUTANEOUS at 00:18

## 2019-05-23 RX ADMIN — HYDROMORPHONE HYDROCHLORIDE 1 MILLIGRAM(S): 2 INJECTION INTRAMUSCULAR; INTRAVENOUS; SUBCUTANEOUS at 15:58

## 2019-05-23 RX ADMIN — PIPERACILLIN AND TAZOBACTAM 25 GRAM(S): 4; .5 INJECTION, POWDER, LYOPHILIZED, FOR SOLUTION INTRAVENOUS at 05:30

## 2019-05-23 RX ADMIN — HYDROMORPHONE HYDROCHLORIDE 1 MILLIGRAM(S): 2 INJECTION INTRAMUSCULAR; INTRAVENOUS; SUBCUTANEOUS at 21:42

## 2019-05-23 RX ADMIN — Medication 145 MILLIGRAM(S): at 12:18

## 2019-05-23 RX ADMIN — HYDROMORPHONE HYDROCHLORIDE 1 MILLIGRAM(S): 2 INJECTION INTRAMUSCULAR; INTRAVENOUS; SUBCUTANEOUS at 22:15

## 2019-05-23 RX ADMIN — PIPERACILLIN AND TAZOBACTAM 25 GRAM(S): 4; .5 INJECTION, POWDER, LYOPHILIZED, FOR SOLUTION INTRAVENOUS at 14:06

## 2019-05-23 RX ADMIN — ENOXAPARIN SODIUM 40 MILLIGRAM(S): 100 INJECTION SUBCUTANEOUS at 12:18

## 2019-05-23 RX ADMIN — ATORVASTATIN CALCIUM 40 MILLIGRAM(S): 80 TABLET, FILM COATED ORAL at 21:34

## 2019-05-23 RX ADMIN — HYDROMORPHONE HYDROCHLORIDE 1 MILLIGRAM(S): 2 INJECTION INTRAMUSCULAR; INTRAVENOUS; SUBCUTANEOUS at 09:42

## 2019-05-23 RX ADMIN — PANTOPRAZOLE SODIUM 40 MILLIGRAM(S): 20 TABLET, DELAYED RELEASE ORAL at 05:30

## 2019-05-23 RX ADMIN — Medication 650 MILLIGRAM(S): at 14:07

## 2019-05-23 RX ADMIN — Medication 25 MILLIGRAM(S): at 05:30

## 2019-05-23 RX ADMIN — HYDROMORPHONE HYDROCHLORIDE 0.5 MILLIGRAM(S): 2 INJECTION INTRAMUSCULAR; INTRAVENOUS; SUBCUTANEOUS at 05:36

## 2019-05-23 RX ADMIN — Medication 650 MILLIGRAM(S): at 14:37

## 2019-05-23 RX ADMIN — HYDROMORPHONE HYDROCHLORIDE 1 MILLIGRAM(S): 2 INJECTION INTRAMUSCULAR; INTRAVENOUS; SUBCUTANEOUS at 10:00

## 2019-05-23 RX ADMIN — AMLODIPINE BESYLATE 5 MILLIGRAM(S): 2.5 TABLET ORAL at 05:30

## 2019-05-23 RX ADMIN — PIPERACILLIN AND TAZOBACTAM 25 GRAM(S): 4; .5 INJECTION, POWDER, LYOPHILIZED, FOR SOLUTION INTRAVENOUS at 21:34

## 2019-05-23 RX ADMIN — HYDROMORPHONE HYDROCHLORIDE 0.5 MILLIGRAM(S): 2 INJECTION INTRAMUSCULAR; INTRAVENOUS; SUBCUTANEOUS at 00:03

## 2019-05-23 NOTE — PROGRESS NOTE ADULT - ASSESSMENT
49M PMH testicular ca s/p orchiectomy ('94) NHL s/p chemo/rad ('98), ischemic colitis s/p zse-epuadhsth-il-open left colectomy ('11) who presents with 1 day of abdominal pain following a colonoscopy

## 2019-05-23 NOTE — PROGRESS NOTE ADULT - SUBJECTIVE AND OBJECTIVE BOX
Surgery Progress Note    Subjective:  Yesterday pt was admitted for abdominal pain.   No acute events overnight. Pt seen at bedside. He denies nausea or vomiting, but wants to have his dilaudid increased from 0.5 to 1 mg.     Objective:  Physical Exam:  Gen: NAD  Resp: No increased work of breathing  Abd: soft, non-distended, diffuse mild TTP, no peritoneal signs        T(C): 36.5 (05-23-19 @ 05:22), Max: 37 (05-22-19 @ 14:09)  HR: 72 (05-23-19 @ 05:22) (69 - 94)  BP: 118/73 (05-23-19 @ 05:22) (104/67 - 132/78)  RR: 18 (05-23-19 @ 05:22) (16 - 18)  SpO2: 97% (05-23-19 @ 05:22) (94% - 100%)    05-22-19 @ 07:01  -  05-23-19 @ 06:49  --------------------------------------------------------  IN: 2500 mL / OUT: 0 mL / NET: 2500 mL        LABS                        14.5   9.2   )-----------( 254      ( 21 May 2019 20:46 )             41.9     05-23    136  |  100  |  10  ----------------------------<  103<H>  4.3   |  24  |  0.96    Ca    9.0      23 May 2019 05:54  Phos  2.8     05-23  Mg     2.0     05-23    TPro  7.1  /  Alb  4.3  /  TBili  0.6  /  DBili  x   /  AST  28  /  ALT  29  /  AlkPhos  64  05-21    PT/INR - ( 21 May 2019 20:46 )   PT: 12.1 sec;   INR: 1.06 ratio         PTT - ( 21 May 2019 20:46 )  PTT:32.2 sec

## 2019-05-23 NOTE — PROGRESS NOTE ADULT - SUBJECTIVE AND OBJECTIVE BOX
Patient is a 49y old  Male who presents with a chief complaint of abdominal pain (22 May 2019 15:23)      SUBJECTIVE / OVERNIGHT EVENTS: overnight events noted    ROS:  Resp: No cough no sputum production  CVS: No chest pain no palpitations no orthopnea  GI: no N/V/D  : no dysuria, no hematuria  Neuro: no weakness no paresthesias  Heme: No petechiae no easy bruising  Msk: No joint pain no swelling  Skin: No rash no itching        MEDICATIONS  (STANDING):  acetaminophen  IVPB .. 1000 milliGRAM(s) IV Intermittent once  amLODIPine   Tablet 5 milliGRAM(s) Oral daily  atorvastatin 40 milliGRAM(s) Oral at bedtime  dextrose 5% + sodium chloride 0.45% with potassium chloride 20 mEq/L 1000 milliLiter(s) (100 mL/Hr) IV Continuous <Continuous>  enoxaparin Injectable 40 milliGRAM(s) SubCutaneous daily  fenofibrate Tablet 145 milliGRAM(s) Oral daily  ketorolac   Injectable 15 milliGRAM(s) IV Push once  metoprolol tartrate 25 milliGRAM(s) Oral two times a day  pantoprazole    Tablet 40 milliGRAM(s) Oral before breakfast  piperacillin/tazobactam IVPB. 3.375 Gram(s) IV Intermittent every 8 hours    MEDICATIONS  (PRN):  acetaminophen   Tablet .. 650 milliGRAM(s) Oral every 6 hours PRN Moderate Pain (4 - 6)  HYDROmorphone  Injectable 1 milliGRAM(s) IV Push every 6 hours PRN Severe Pain (7 - 10)        CAPILLARY BLOOD GLUCOSE        I&O's Summary    22 May 2019 07:01  -  23 May 2019 07:00  --------------------------------------------------------  IN: 2500 mL / OUT: 0 mL / NET: 2500 mL    23 May 2019 07:01  -  23 May 2019 12:45  --------------------------------------------------------  IN: 260 mL / OUT: 0 mL / NET: 260 mL        Vital Signs Last 24 Hrs  T(C): 36.8 (23 May 2019 09:30), Max: 37 (22 May 2019 14:09)  T(F): 98.2 (23 May 2019 09:30), Max: 98.6 (22 May 2019 14:09)  HR: 76 (23 May 2019 09:30) (72 - 94)  BP: 128/80 (23 May 2019 09:30) (105/68 - 132/78)  BP(mean): --  RR: 18 (23 May 2019 09:30) (18 - 18)  SpO2: 96% (23 May 2019 09:30) (96% - 98%)    PHYSICAL EXAM:  GENERAL: NAD, well-developed  HEAD:  Atraumatic, Normocephalic  EYES: EOMI, PERRLA, conjunctiva and sclera clear  NECK: Supple, No JVD  CHEST/LUNG: Clear to auscultation bilaterally; No wheeze  port right chest  HEART: S1S2; No rubs, or gallops, no murmurs  ABDOMEN: Soft, still with tenderness epigastrium, Nondistended; Bowel sounds present  EXTREMITIES:  + Peripheral Pulses, No clubbing or cyanosis, no edema  PSYCH: AO x 3,   NEUROLOGY: Alert, no focal motor or sensory deficits  SKIN: No rashes or lesions    LABS:                        13.7   4.52  )-----------( 210      ( 23 May 2019 09:36 )             43.0     05-23    136  |  100  |  10  ----------------------------<  103<H>  4.3   |  24  |  0.96    Ca    9.0      23 May 2019 05:54  Phos  2.8     05-23  Mg     2.0     05-23    TPro  7.1  /  Alb  4.3  /  TBili  0.6  /  DBili  x   /  AST  28  /  ALT  29  /  AlkPhos  64  05-21    PT/INR - ( 21 May 2019 20:46 )   PT: 12.1 sec;   INR: 1.06 ratio         PTT - ( 21 May 2019 20:46 )  PTT:32.2 sec            All consultant(s) notes reviewed and care discussed with other providers        Contact Number, Dr Wilson [phone]

## 2019-05-23 NOTE — PROGRESS NOTE ADULT - SUBJECTIVE AND OBJECTIVE BOX
Patient is a 49y old  Male who presented with a chief complaint of abdominal pain (22 May 2019 15:23)      INTERVAL HPI/OVERNIGHT EVENTS:  slight improvement in abdominal pain  some flatus  no nausea, vomiting, fever or chills  started on clears    MEDICATIONS  (STANDING):  acetaminophen  IVPB .. 1000 milliGRAM(s) IV Intermittent once  amLODIPine   Tablet 5 milliGRAM(s) Oral daily  atorvastatin 40 milliGRAM(s) Oral at bedtime  dextrose 5% + sodium chloride 0.45% with potassium chloride 20 mEq/L 1000 milliLiter(s) (100 mL/Hr) IV Continuous <Continuous>  enoxaparin Injectable 40 milliGRAM(s) SubCutaneous daily  fenofibrate Tablet 145 milliGRAM(s) Oral daily  ketorolac   Injectable 15 milliGRAM(s) IV Push once  metoprolol tartrate 25 milliGRAM(s) Oral two times a day  pantoprazole    Tablet 40 milliGRAM(s) Oral before breakfast  piperacillin/tazobactam IVPB. 3.375 Gram(s) IV Intermittent every 8 hours    MEDICATIONS  (PRN):  acetaminophen   Tablet .. 650 milliGRAM(s) Oral every 6 hours PRN Moderate Pain (4 - 6)  HYDROmorphone  Injectable 1 milliGRAM(s) IV Push every 6 hours PRN Severe Pain (7 - 10)      Allergies  No Known Allergies      Review of Systems:  General:  No wt loss,  chills, night sweats, +fever  CV:  No pain, palpitations, hypo/hypertension  Resp:  No dyspnea, cough, tachypnea, wheezing  GI:  see HPI  :  No pain, bleeding, +Hx testicualr CA s/p orchiectomy  Muscle:  No pain, weakness, see HPI  Neuro:  No weakness, tingling, memory problems  Psych:  No fatigue, insomnia, mood problems, depression  Endocrine:  No polyuria, polydypsia, cold/heat intolerance  Heme:  No petechiae, ecchymosis, easy bruisability Hx NHL, Hx testicular CA, Hx melanoma  Skin:  Hx melanoma, multiple surgeries, excision of right neck mass    Vital Signs Last 24 Hrs  T(C): 36.5 (23 May 2019 05:22), Max: 37 (22 May 2019 14:09)  T(F): 97.7 (23 May 2019 05:22), Max: 98.6 (22 May 2019 14:09)  HR: 72 (23 May 2019 05:22) (72 - 94)  BP: 118/73 (23 May 2019 05:22) (105/68 - 132/78)  BP(mean): --  RR: 18 (23 May 2019 05:22) (18 - 18)  SpO2: 97% (23 May 2019 05:22) (97% - 98%)    PHYSICAL EXAM:  Constitutional: NAD, well-developed non toxic appearing male sitting up in bed.   Neck: +scars c/w prior surgery, s/p right neck mass excision  Respiratory: good air entry b/l  Cardiovascular: S1 and S2, RRR,  Gastrointestinal: + BS x4, soft sl. distended RLQ, decreased distension LLQ (left of midline scar).  WH midline scar +tender to gentle palpation, no rebound or rigidity  Extremities: No peripheral edema, neg clubbing, cyanosis  Vascular: 2+ peripheral pulses  Neurological: A/O x 3, no focal deficits  Psychiatric: Normal mood, normal affect  Skin: multiple well healed scars, no rash    LABS:                        14.5   9.2   )-----------( 254      ( 21 May 2019 20:46 )             41.9     05-23    136  |  100  |  10  ----------------------------<  103<H>  4.3   |  24  |  0.96    Ca    9.0      23 May 2019 05:54  Phos  2.8     05-23  Mg     2.0     05-23    TPro  7.1  /  Alb  4.3  /  TBili  0.6  /  DBili  x   /  AST  28  /  ALT  29  /  AlkPhos  64  05-21    PT/INR - ( 21 May 2019 20:46 )   PT: 12.1 sec;   INR: 1.06 ratio    PTT - ( 21 May 2019 20:46 )  PTT:32.2 sec    RADIOLOGY & ADDITIONAL TESTS:

## 2019-05-23 NOTE — PROGRESS NOTE ADULT - ASSESSMENT
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    - c/w zosyn  - Continue to monitor for worsening abd pain  - Pain control PRN: IV tyl, dilaudid, toradol  - NPO/IVF    [facility]  [id number]

## 2019-05-23 NOTE — PROGRESS NOTE ADULT - ASSESSMENT
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

## 2019-05-24 ENCOUNTER — TRANSCRIPTION ENCOUNTER (OUTPATIENT)
Age: 49
End: 2019-05-24

## 2019-05-24 VITALS
SYSTOLIC BLOOD PRESSURE: 118 MMHG | TEMPERATURE: 99 F | RESPIRATION RATE: 18 BRPM | OXYGEN SATURATION: 96 % | HEART RATE: 90 BPM | DIASTOLIC BLOOD PRESSURE: 85 MMHG

## 2019-05-24 LAB
ANION GAP SERPL CALC-SCNC: 11 MMOL/L — SIGNIFICANT CHANGE UP (ref 5–17)
BUN SERPL-MCNC: 10 MG/DL — SIGNIFICANT CHANGE UP (ref 7–23)
CALCIUM SERPL-MCNC: 9.5 MG/DL — SIGNIFICANT CHANGE UP (ref 8.4–10.5)
CHLORIDE SERPL-SCNC: 99 MMOL/L — SIGNIFICANT CHANGE UP (ref 96–108)
CO2 SERPL-SCNC: 26 MMOL/L — SIGNIFICANT CHANGE UP (ref 22–31)
CREAT SERPL-MCNC: 1.06 MG/DL — SIGNIFICANT CHANGE UP (ref 0.5–1.3)
GLUCOSE SERPL-MCNC: 95 MG/DL — SIGNIFICANT CHANGE UP (ref 70–99)
HCT VFR BLD CALC: 42 % — SIGNIFICANT CHANGE UP (ref 39–50)
HGB BLD-MCNC: 13.5 G/DL — SIGNIFICANT CHANGE UP (ref 13–17)
MAGNESIUM SERPL-MCNC: 2 MG/DL — SIGNIFICANT CHANGE UP (ref 1.6–2.6)
MCHC RBC-ENTMCNC: 27.7 PG — SIGNIFICANT CHANGE UP (ref 27–34)
MCHC RBC-ENTMCNC: 32.1 GM/DL — SIGNIFICANT CHANGE UP (ref 32–36)
MCV RBC AUTO: 86.2 FL — SIGNIFICANT CHANGE UP (ref 80–100)
PHOSPHATE SERPL-MCNC: 3.3 MG/DL — SIGNIFICANT CHANGE UP (ref 2.5–4.5)
PLATELET # BLD AUTO: 237 K/UL — SIGNIFICANT CHANGE UP (ref 150–400)
POTASSIUM SERPL-MCNC: 4.3 MMOL/L — SIGNIFICANT CHANGE UP (ref 3.5–5.3)
POTASSIUM SERPL-SCNC: 4.3 MMOL/L — SIGNIFICANT CHANGE UP (ref 3.5–5.3)
RBC # BLD: 4.87 M/UL — SIGNIFICANT CHANGE UP (ref 4.2–5.8)
RBC # FLD: 13.4 % — SIGNIFICANT CHANGE UP (ref 10.3–14.5)
SODIUM SERPL-SCNC: 136 MMOL/L — SIGNIFICANT CHANGE UP (ref 135–145)
WBC # BLD: 4.08 K/UL — SIGNIFICANT CHANGE UP (ref 3.8–10.5)
WBC # FLD AUTO: 4.08 K/UL — SIGNIFICANT CHANGE UP (ref 3.8–10.5)

## 2019-05-24 PROCEDURE — 83735 ASSAY OF MAGNESIUM: CPT

## 2019-05-24 PROCEDURE — 82803 BLOOD GASES ANY COMBINATION: CPT

## 2019-05-24 PROCEDURE — 84295 ASSAY OF SERUM SODIUM: CPT

## 2019-05-24 PROCEDURE — 82435 ASSAY OF BLOOD CHLORIDE: CPT

## 2019-05-24 PROCEDURE — 83690 ASSAY OF LIPASE: CPT

## 2019-05-24 PROCEDURE — 71046 X-RAY EXAM CHEST 2 VIEWS: CPT

## 2019-05-24 PROCEDURE — 99232 SBSQ HOSP IP/OBS MODERATE 35: CPT

## 2019-05-24 PROCEDURE — 85730 THROMBOPLASTIN TIME PARTIAL: CPT

## 2019-05-24 PROCEDURE — 83605 ASSAY OF LACTIC ACID: CPT

## 2019-05-24 PROCEDURE — 82330 ASSAY OF CALCIUM: CPT

## 2019-05-24 PROCEDURE — 85610 PROTHROMBIN TIME: CPT

## 2019-05-24 PROCEDURE — 74177 CT ABD & PELVIS W/CONTRAST: CPT

## 2019-05-24 PROCEDURE — 80053 COMPREHEN METABOLIC PANEL: CPT

## 2019-05-24 PROCEDURE — 96376 TX/PRO/DX INJ SAME DRUG ADON: CPT

## 2019-05-24 PROCEDURE — 96374 THER/PROPH/DIAG INJ IV PUSH: CPT | Mod: XU

## 2019-05-24 PROCEDURE — 84132 ASSAY OF SERUM POTASSIUM: CPT

## 2019-05-24 PROCEDURE — 85027 COMPLETE CBC AUTOMATED: CPT

## 2019-05-24 PROCEDURE — 80048 BASIC METABOLIC PNL TOTAL CA: CPT

## 2019-05-24 PROCEDURE — 85014 HEMATOCRIT: CPT

## 2019-05-24 PROCEDURE — 84100 ASSAY OF PHOSPHORUS: CPT

## 2019-05-24 PROCEDURE — 99285 EMERGENCY DEPT VISIT HI MDM: CPT | Mod: 25

## 2019-05-24 PROCEDURE — 82947 ASSAY GLUCOSE BLOOD QUANT: CPT

## 2019-05-24 RX ORDER — ACETAMINOPHEN 500 MG
2 TABLET ORAL
Qty: 0 | Refills: 0 | DISCHARGE
Start: 2019-05-24

## 2019-05-24 RX ORDER — TRAMADOL HYDROCHLORIDE 50 MG/1
1 TABLET ORAL
Qty: 0 | Refills: 0 | DISCHARGE

## 2019-05-24 RX ADMIN — HYDROMORPHONE HYDROCHLORIDE 1 MILLIGRAM(S): 2 INJECTION INTRAMUSCULAR; INTRAVENOUS; SUBCUTANEOUS at 03:50

## 2019-05-24 RX ADMIN — PANTOPRAZOLE SODIUM 40 MILLIGRAM(S): 20 TABLET, DELAYED RELEASE ORAL at 05:18

## 2019-05-24 RX ADMIN — HYDROMORPHONE HYDROCHLORIDE 1 MILLIGRAM(S): 2 INJECTION INTRAMUSCULAR; INTRAVENOUS; SUBCUTANEOUS at 04:20

## 2019-05-24 RX ADMIN — Medication 650 MILLIGRAM(S): at 12:28

## 2019-05-24 RX ADMIN — Medication 145 MILLIGRAM(S): at 12:29

## 2019-05-24 RX ADMIN — PIPERACILLIN AND TAZOBACTAM 25 GRAM(S): 4; .5 INJECTION, POWDER, LYOPHILIZED, FOR SOLUTION INTRAVENOUS at 05:17

## 2019-05-24 RX ADMIN — ENOXAPARIN SODIUM 40 MILLIGRAM(S): 100 INJECTION SUBCUTANEOUS at 12:29

## 2019-05-24 RX ADMIN — Medication 650 MILLIGRAM(S): at 13:00

## 2019-05-24 NOTE — DISCHARGE NOTE PROVIDER - HOSPITAL COURSE
49M pmhx testicular ca s/p orchiectomy ('94) NHL s/p chemo/rad ('98), ischemic colitis s/p mcd-mrwqwyobk-kl-open left colectomy ('11) who presents with 1 day of abdominal pain following a colonoscopy. The patient had a colonoscopy with Dr. Rice yesterday morning around 11am, during which he reports 8 polyps were removed. He reports feeling fine until about 6pm last night, when he suddenly felt fever (102F at home), chills, severe abdominal pain and joint pain. He called Dr. Rice's office who told him to present to the ED. In the ED, an upright CXR and CT a/p were both negative for free air, or any other significant findings. He has been requiring IV narcotics to manage his pain.        Pt was admitted under Dr. Rice. NPO/IV abx.  Medicine consulted for medical management. Pain improved HD1.  HD2- pt diet adv to clears, which he tolerated.  HD3- Diet adv to LRD. At the time of discharge, the patient was hemodynamically stable, was tolerating PO diet, was voiding urine and passing stool, was ambulating, and was comfortable with adequate pain control. The patient was instructed to follow up with Dr. Rice within 1-2 weeks after discharge from the hospital. The patient felt comfortable with discharge. The patient was discharged to home with po abx. The patient had no other issues.

## 2019-05-24 NOTE — DISCHARGE NOTE PROVIDER - CARE PROVIDER_API CALL
Boyd Rice)  ColonRectal Surgery; Surgery  [address], [address]  [place] [address]  Phone: [phone]  Fax: [phone]  Follow Up Time: 1 week

## 2019-05-24 NOTE — PROGRESS NOTE ADULT - SUBJECTIVE AND OBJECTIVE BOX
Patient is a 49y old  Male who presents with a chief complaint of abdominal pain (24 May 2019 06:39)    SUBJECTIVE / OVERNIGHT EVENTS: overnight events noted    ROS:  Resp: No cough no sputum production  CVS: No chest pain no palpitations no orthopnea  GI: no N/V/D  : no dysuria, no hematuria  Neuro: no weakness no paresthesias  Heme: No petechiae no easy bruising  Msk: No joint pain no swelling  Skin: No rash no itching        MEDICATIONS  (STANDING):  amLODIPine   Tablet 5 milliGRAM(s) Oral daily  atorvastatin 40 milliGRAM(s) Oral at bedtime  enoxaparin Injectable 40 milliGRAM(s) SubCutaneous daily  fenofibrate Tablet 145 milliGRAM(s) Oral daily  metoprolol tartrate 25 milliGRAM(s) Oral two times a day  pantoprazole    Tablet 40 milliGRAM(s) Oral before breakfast  piperacillin/tazobactam IVPB. 3.375 Gram(s) IV Intermittent every 8 hours    MEDICATIONS  (PRN):  acetaminophen   Tablet .. 650 milliGRAM(s) Oral every 6 hours PRN Moderate Pain (4 - 6)        CAPILLARY BLOOD GLUCOSE        I&O's Summary    23 May 2019 07:01  -  24 May 2019 07:00  --------------------------------------------------------  IN: 2820 mL / OUT: 0 mL / NET: 2820 mL    24 May 2019 07:01  -  24 May 2019 13:45  --------------------------------------------------------  IN: 340 mL / OUT: 0 mL / NET: 340 mL        Vital Signs Last 24 Hrs  T(C): 36.4 (24 May 2019 09:33), Max: 36.9 (23 May 2019 17:00)  T(F): 97.5 (24 May 2019 09:33), Max: 98.4 (23 May 2019 17:00)  HR: 91 (24 May 2019 09:33) (75 - 91)  BP: 133/86 (24 May 2019 09:33) (100/65 - 133/86)  BP(mean): --  RR: 18 (24 May 2019 09:33) (18 - 18)  SpO2: 96% (24 May 2019 09:33) (95% - 97%)    PHYSICAL EXAM:  GENERAL: NAD, well-developed  HEAD:  Atraumatic, Normocephalic  EYES: EOMI, PERRLA, conjunctiva and sclera clear  NECK: Supple, No JVD  CHEST/LUNG: Clear to auscultation bilaterally; No wheeze  port right chest  HEART: S1S2; No rubs, or gallops, no murmurs  ABDOMEN: Soft, still with tenderness epigastrium, Nondistended; Bowel sounds present  EXTREMITIES:  + Peripheral Pulses, No clubbing or cyanosis, no edema  PSYCH: AO x 3,   NEUROLOGY: Alert, no focal motor or sensory deficits  SKIN: No rashes or lesions    LABS:                        13.5   4.08  )-----------( 237      ( 24 May 2019 08:30 )             42.0     05-24    136  |  99  |  10  ----------------------------<  95  4.3   |  26  |  1.06    Ca    9.5      24 May 2019 06:34  Phos  3.3     05-24  Mg     2.0     05-24                  All consultant(s) notes reviewed and care discussed with other providers        Contact Number, Dr Wilson [phone]

## 2019-05-24 NOTE — PROGRESS NOTE ADULT - SUBJECTIVE AND OBJECTIVE BOX
Patient is a 49y old  Male who presented with a chief complaint of abdominal pain (23 May 2019 12:45)      INTERVAL HPI/OVERNIGHT EVENTS:  passed some flatus  tolerated clears  still with abdominal pain but improved since yesterday  no fever or chills  no rectal bleeding      MEDICATIONS  (STANDING):  amLODIPine   Tablet 5 milliGRAM(s) Oral daily  atorvastatin 40 milliGRAM(s) Oral at bedtime  enoxaparin Injectable 40 milliGRAM(s) SubCutaneous daily  fenofibrate Tablet 145 milliGRAM(s) Oral daily  metoprolol tartrate 25 milliGRAM(s) Oral two times a day  pantoprazole    Tablet 40 milliGRAM(s) Oral before breakfast  piperacillin/tazobactam IVPB. 3.375 Gram(s) IV Intermittent every 8 hours    MEDICATIONS  (PRN):  acetaminophen   Tablet .. 650 milliGRAM(s) Oral every 6 hours PRN Moderate Pain (4 - 6)      Allergies  No Known Allergies      Review of Systems:  General:  No wt loss,  chills, night sweats, +fever  CV:  No pain, palpitations, hypo/hypertension  Resp:  No dyspnea, cough, tachypnea, wheezing  GI:  see HPI  :  No pain, bleeding, +Hx testicualr CA s/p orchiectomy  Muscle:  No pain, weakness, see HPI  Neuro:  No weakness, tingling, memory problems  Psych:  No fatigue, insomnia, mood problems, depression  Endocrine:  No polyuria, polydypsia, cold/heat intolerance  Heme:  No petechiae, ecchymosis, easy bruisability Hx NHL, Hx testicular CA, Hx melanoma  Skin:  Hx melanoma, multiple surgeries, excision of right neck mass    Vital Signs Last 24 Hrs  T(C): 36.4 (24 May 2019 09:33), Max: 36.9 (23 May 2019 17:00)  T(F): 97.5 (24 May 2019 09:33), Max: 98.4 (23 May 2019 17:00)  HR: 91 (24 May 2019 09:33) (75 - 91)  BP: 133/86 (24 May 2019 09:33) (100/65 - 133/86)  BP(mean): --  RR: 18 (24 May 2019 09:33) (16 - 18)  SpO2: 96% (24 May 2019 09:33) (95% - 97%)    PHYSICAL EXAM:  Constitutional: NAD, well-developed non toxic appearing male sitting up in bed.   Neck: +scars c/w prior surgery, s/p right neck mass excision  Respiratory: good air entry b/l  Cardiovascular: S1 and S2, RRR,  Gastrointestinal: + BS x4, soft sl. distended RLQ (decreased from prior exam), mild distension LLQ (left of midline scar).  WH midline scar +tender to gentle palpation (improved from yesterdays exam), no rebound or rigidity  Extremities: No peripheral edema, neg clubbing, cyanosis  Vascular: 2+ peripheral pulses  Neurological: A/O x 3, no focal deficits  Psychiatric: Normal mood, normal affect  Skin: multiple well healed scars, no rash    LABS:                        13.5   4.08  )-----------( 237      ( 24 May 2019 08:30 )             42.0     05-24    136  |  99  |  10  ----------------------------<  95  4.3   |  26  |  1.06    Ca    9.5      24 May 2019 06:34  Phos  3.3     05-24  Mg     2.0     05-24        RADIOLOGY & ADDITIONAL TESTS:

## 2019-05-24 NOTE — DISCHARGE NOTE PROVIDER - NSDCCPCAREPLAN_GEN_ALL_CORE_FT
PRINCIPAL DISCHARGE DIAGNOSIS  Diagnosis: Abdominal pain  Assessment and Plan of Treatment: ANTIBIOTICS: Take Augmentin twice daily for 1 week until course completed.  NOTIFY YOUR SURGEON IF: You have any bleeding that does not stop, any fever (over 100.4 F) or chills, persistent nausea/vomiting with inability to tolerate food or liquids, persistent diarrhea, or if your pain is not controlled on your discharge pain medications.  FOLLOW-UP:  1. Please call to make a follow-up appointment within one week of discharge.   2. Please follow up with your primary care physician in one week regarding your hospitalization.      SECONDARY DISCHARGE DIAGNOSES  Diagnosis: HTN (hypertension)  Assessment and Plan of Treatment: cont home meds    Diagnosis: Fever  Assessment and Plan of Treatment: treated    Diagnosis: GERD (gastroesophageal reflux disease)  Assessment and Plan of Treatment: cont home meds

## 2019-05-24 NOTE — DISCHARGE NOTE NURSING/CASE MANAGEMENT/SOCIAL WORK - NSDCDPATPORTLINK_GEN_ALL_CORE
You can access the [facility] Patient Portal, offered by [facility], by registering with the following website: [email or web address]

## 2019-05-24 NOTE — PROGRESS NOTE ADULT - ASSESSMENT
49M PMH testicular ca s/p orchiectomy ('94) NHL s/p chemo/rad ('98), ischemic colitis s/p luy-zqjvqbikx-ad-open left colectomy ('11) who presents with 1 day of abdominal pain following a colonoscopy

## 2019-05-24 NOTE — DISCHARGE NOTE NURSING/CASE MANAGEMENT/SOCIAL WORK - NSDCPEPT PROEDMA_GEN_ALL_CORE
"NEUROLOGY NOTE    Referring Physician  Dolly Araya M.D.    CHIEF COMPLAINT:  Hot flashes in both of  Ears , lihgt headed, shaky and weak, whole body sweating, constant drastic temperature,  turning head left or right made it worse    Chief Complaint   Patient presents with   • New Patient     dizziness, light headed, blurred vision, ear issues       PRESENT ILLNESS:   Hot flashes in both of  Ears , lihgt headed, shaky and weak, whole body sweating, constant drastic temperature,  turning head left or right made it worse    Saw ENT dr Carmichael,   Inflammation in the nerves---steroid helped--the patient saw periodontist    The patient developed these spells of dizziness -on average 3 the worse 7 days each time--50 % bad days 50% good days since April 2017    Lupus was diagnosed for 2 years--- skin was affected     Arthritis and underwent hip replacement---     PAST MEDICAL HISTORY:  Past Medical History:   Diagnosis Date   • Anxiety    • Fibromyalgia        PAST SURGICAL HISTORY:  No past surgical history on file.    FAMILY HISTORY:  No family history on file.    SOCIAL HISTORY:  Social History     Social History   • Marital status:      Spouse name: N/A   • Number of children: N/A   • Years of education: N/A     Occupational History   • Not on file.     Social History Main Topics   • Smoking status: Never Smoker   • Smokeless tobacco: Not on file   • Alcohol use No   • Drug use: No   • Sexual activity: Not on file     Other Topics Concern   • Not on file     Social History Narrative   • No narrative on file     ALLERGIES:  Allergies   Allergen Reactions   • Ciprofloxacin Rash     States \"something always goes wrong\"   • Morphine Unspecified     Family history of becoming violent     TOBHX  History   Smoking Status   • Never Smoker   Smokeless Tobacco   • Not on file     ALCHX  History   Alcohol Use No     DRUGHX  History   Drug Use No           MEDICATIONS:  Current Outpatient Prescriptions   Medication " No "  • oxycodone-acetaminophen (PERCOCET-10)  MG Tab   • hydroxychloroquine (PLAQUENIL) 200 MG Tab   • meloxicam (MOBIC) 7.5 MG Tab   • alprazolam (XANAX) 0.5 MG Tab   • fluoxetine (PROZAC) 20 MG Cap   • gabapentin (NEURONTIN) 100 MG Cap   • hydrocodone/acetaminophen (NORCO)  MG Tab     No current facility-administered medications for this visit.        REVIEW OF SYSTEM:    Constitutional: Denies fevers, Denies weight changes   Eyes: Denies changes in vision, no eye pain   Ears/Nose/Throat/Mouth: Denies nasal congestion or sore throat   Cardiovascular: Denies chest pain or palpitations   Respiratory: Denies SOB.   Gastrointestinal/Hepatic: Denies abdominal pain, nausea, vomiting, diarrhea, constipation or GI bleeding   Genitourinary: Denies bladder dysfunction, dysuria or frequency   Musculoskeletal/Rheum: Denies joint pain and swelling   Skin/Breast: Denies rash, denies breast lumps or discharge   Neurological: dizziness spells - the patient preferred to call these lightheadness  Psychiatric: denies mood disorder   Endocrine: denies hx of diabetes or thyroid dysfunction   Heme/Oncology/Lymph Nodes: Denies enlarged lymph nodes, denies brusing or known bleeding disorder   Allergic/Immunologic: Denies hx of allergies         PHYSICAL AND NEUROLOGICAL EXMAINATIONS:  VITAL SIGNS: /70   Pulse 85   Temp 36.9 °C (98.4 °F)   Resp 16   Ht 1.676 m (5' 6\")   Wt 107 kg (236 lb)   SpO2 98%   BMI 38.09 kg/m²   CURRENT WEIGHT: [unfilled]  BMI: Body mass index is 38.09 kg/m².  PREVIOUS WEIGHTS:  Wt Readings from Last 25 Encounters:   11/29/17 107 kg (236 lb)   09/16/15 102.1 kg (225 lb)       General appearance of patient: WDWN(+) NAD(+)    EYES  o Fundus : Papilledem(-) Exudates(-) Hemorrhage(-)  Nervous System  Orientation to time, place and person(+)  Memory normal(-)  Language: aphasia(-)  Knowledge: past(+) Current(+)  Attention(+)  Cranial Nerves  • Nerve 2: intact  • Nerve 3,4,6: intact  • Nerve 5 : " intact  • Nerve 7: intact  • Nerve 8: intact  • Nerve 9 & 10: intact  • Nerve 11: intact  • Nerve 12: intact  Muscle Power and muscle tone: symmetric, normal in upper and lower  Sensory System: Pin sensation intact(+)  Reflexes: symmetric throughout  Cerebellar Function FNP normal   Gait : Steady(+) TandemGait steady(+)  Heart and Vascular  Peripheral Vasucular system : Edema (-) Swelling(-)  RHB, Breathing sound clear  abdomen bowel sound normoactive  Extremities freely moveable  Joints no contracture       NEUROIMAGING: I reviewed the MRI/CT of brain     Spiritual teacher      IMPRESSION:    1. Dizziness --since April 2017-- 50% of time, the patient could not even function, could not focus, come and go--responded to steroid-- the detailed symptoms -- see the patient's self-reports  2. Hot flashes in the both ears -- since April 2017  3. Hx of lupus, Fibromyalgia, Osteoarthritis (hip replacement)    PLAN/RECOMMENDATIONS:      Explain to the patient, I do agree with the CNS origin of these dizziness  These are stereotyped, recurrent and somewhat reversible-- which are characteristic of brain malfunction-- like subtle seizures  And these brain malfunction could be secondary to brain inflammation-- such as CNS lupus or autoimmune cerebritis    Infection and carcinoma are remotely possible --- less likely in my opinions    Therefore, we will offer  MRI of brain  EEG  Blood Tests    We may need to offer spinal tap after blood tests            SIGNATURE:  Lin aRscon    CC:  Dolly Araya M.D.

## 2019-05-24 NOTE — PROGRESS NOTE ADULT - SUBJECTIVE AND OBJECTIVE BOX
Surgery Progress Note    Subjective:  Yesterday pt's abdominal pain improved. Pt passed flatus and was advanced to clears. No acute events overnight. Pt seen at bedside. He denies nausea or vomiting.     Objective:  Physical Exam:  Gen: NAD  Resp: No increased work of breathing  Abd: soft, non-distended, diffuse mild TTP, no peritoneal signs        T(C): 36.7 (05-24-19 @ 05:08), Max: 36.9 (05-23-19 @ 17:00)  HR: 75 (05-24-19 @ 05:08) (75 - 79)  BP: 100/65 (05-24-19 @ 05:08) (100/65 - 128/80)  RR: 18 (05-24-19 @ 05:08) (16 - 18)  SpO2: 95% (05-24-19 @ 05:08) (95% - 97%)    05-22-19 @ 07:01  -  05-23-19 @ 07:00  --------------------------------------------------------  IN: 2500 mL / OUT: 0 mL / NET: 2500 mL    05-23-19 @ 07:01  -  05-24-19 @ 05:40  --------------------------------------------------------  IN: 2020 mL / OUT: 0 mL / NET: 2020 mL        LABS                        13.7   4.52  )-----------( 210      ( 23 May 2019 09:36 )             43.0     05-23    136  |  100  |  10  ----------------------------<  103<H>  4.3   |  24  |  0.96    Ca    9.0      23 May 2019 05:54  Phos  2.8     05-23  Mg     2.0     05-23

## 2019-05-24 NOTE — PROGRESS NOTE ADULT - ASSESSMENT
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    - c/w zosyn  - Continue to monitor for worsening abd pain  - Pain control PRN: IV tyl, dilaudid, toradol  - advance diet    [facility]  [id number]

## 2019-05-24 NOTE — PROGRESS NOTE ADULT - ASSESSMENT
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

## 2019-05-29 ENCOUNTER — APPOINTMENT (OUTPATIENT)
Dept: RHEUMATOLOGY | Facility: CLINIC | Age: 49
End: 2019-05-29
Payer: COMMERCIAL

## 2019-05-29 VITALS
RESPIRATION RATE: 16 BRPM | BODY MASS INDEX: 27.3 KG/M2 | DIASTOLIC BLOOD PRESSURE: 78 MMHG | OXYGEN SATURATION: 97 % | HEART RATE: 88 BPM | SYSTOLIC BLOOD PRESSURE: 118 MMHG | TEMPERATURE: 98.1 F | HEIGHT: 70.87 IN | WEIGHT: 195 LBS

## 2019-05-29 DIAGNOSIS — M72.9 FIBROBLASTIC DISORDER, UNSPECIFIED: ICD-10-CM

## 2019-05-29 DIAGNOSIS — Z82.49 FAMILY HISTORY OF ISCHEMIC HEART DISEASE AND OTHER DISEASES OF THE CIRCULATORY SYSTEM: ICD-10-CM

## 2019-05-29 DIAGNOSIS — M35.4 DIFFUSE (EOSINOPHILIC) FASCIITIS: ICD-10-CM

## 2019-05-29 DIAGNOSIS — Z82.61 FAMILY HISTORY OF ARTHRITIS: ICD-10-CM

## 2019-05-29 PROCEDURE — 99205 OFFICE O/P NEW HI 60 MIN: CPT

## 2019-06-19 ENCOUNTER — APPOINTMENT (OUTPATIENT)
Dept: RHEUMATOLOGY | Facility: CLINIC | Age: 49
End: 2019-06-19
Payer: COMMERCIAL

## 2019-06-19 VITALS
HEART RATE: 92 BPM | TEMPERATURE: 98.2 F | DIASTOLIC BLOOD PRESSURE: 88 MMHG | BODY MASS INDEX: 26.34 KG/M2 | RESPIRATION RATE: 16 BRPM | WEIGHT: 184 LBS | HEIGHT: 70 IN | SYSTOLIC BLOOD PRESSURE: 126 MMHG | OXYGEN SATURATION: 97 %

## 2019-06-19 PROCEDURE — 99214 OFFICE O/P EST MOD 30 MIN: CPT

## 2019-06-19 RX ORDER — ERGOCALCIFEROL 1.25 MG/1
1.25 MG CAPSULE, LIQUID FILLED ORAL
Qty: 4 | Refills: 0 | Status: ACTIVE | COMMUNITY
Start: 2019-05-06

## 2019-06-19 RX ORDER — SILDENAFIL 50 MG/1
50 TABLET ORAL
Qty: 6 | Refills: 0 | Status: ACTIVE | COMMUNITY
Start: 2019-06-10

## 2019-06-19 RX ORDER — PREDNISONE 20 MG/1
20 TABLET ORAL
Qty: 120 | Refills: 0 | Status: DISCONTINUED | COMMUNITY
Start: 2019-04-08 | End: 2019-06-19

## 2019-06-24 NOTE — PROGRESS NOTE ADULT - ATTENDING COMMENTS
I have seen and evaluated patient, and I corroborated all nursing input into this note. Patient passing gas. No nausea vomiting. I agree with resident assessment and plan. We will try to avoid narcotics because of the constipating effects. Tylenol and heating pad encouraged. Trial of clear liquids
I have seen and evaluated patient and agree with resident assessment and plan.  Much improved.  LRD and if 2 meals tolerated then DC home on 1 week augmentin
discussed with patient in detail, all questions answered
no medical contraindication to discharge if cleared by surgery
Admitted

## 2019-06-25 ENCOUNTER — RX RENEWAL (OUTPATIENT)
Age: 49
End: 2019-06-25

## 2019-07-24 ENCOUNTER — APPOINTMENT (OUTPATIENT)
Dept: RHEUMATOLOGY | Facility: CLINIC | Age: 49
End: 2019-07-24

## 2019-08-18 ENCOUNTER — APPOINTMENT (OUTPATIENT)
Dept: MRI IMAGING | Facility: CLINIC | Age: 49
End: 2019-08-18
Payer: COMMERCIAL

## 2019-08-18 ENCOUNTER — OUTPATIENT (OUTPATIENT)
Dept: OUTPATIENT SERVICES | Facility: HOSPITAL | Age: 49
LOS: 1 days | End: 2019-08-18
Payer: COMMERCIAL

## 2019-08-18 DIAGNOSIS — Z00.8 ENCOUNTER FOR OTHER GENERAL EXAMINATION: ICD-10-CM

## 2019-08-18 DIAGNOSIS — Z98.89 OTHER SPECIFIED POSTPROCEDURAL STATES: Chronic | ICD-10-CM

## 2019-08-18 DIAGNOSIS — Z90.79 ACQUIRED ABSENCE OF OTHER GENITAL ORGAN(S): Chronic | ICD-10-CM

## 2019-08-18 DIAGNOSIS — I89.9 NONINFECTIVE DISORDER OF LYMPHATIC VESSELS AND LYMPH NODES, UNSPECIFIED: Chronic | ICD-10-CM

## 2019-08-18 DIAGNOSIS — C43.4 MALIGNANT MELANOMA OF SCALP AND NECK: Chronic | ICD-10-CM

## 2019-08-18 PROCEDURE — 72148 MRI LUMBAR SPINE W/O DYE: CPT | Mod: 26

## 2019-08-18 PROCEDURE — 72148 MRI LUMBAR SPINE W/O DYE: CPT

## 2019-09-15 ENCOUNTER — EMERGENCY (EMERGENCY)
Facility: HOSPITAL | Age: 49
LOS: 1 days | Discharge: HOME | End: 2019-09-15
Attending: EMERGENCY MEDICINE
Payer: COMMERCIAL

## 2019-09-15 VITALS
TEMPERATURE: 98 F | DIASTOLIC BLOOD PRESSURE: 99 MMHG | HEART RATE: 97 BPM | WEIGHT: 184.97 LBS | OXYGEN SATURATION: 97 % | SYSTOLIC BLOOD PRESSURE: 152 MMHG | RESPIRATION RATE: 17 BRPM

## 2019-09-15 DIAGNOSIS — C43.4 MALIGNANT MELANOMA OF SCALP AND NECK: Chronic | ICD-10-CM

## 2019-09-15 DIAGNOSIS — Z90.79 ACQUIRED ABSENCE OF OTHER GENITAL ORGAN(S): Chronic | ICD-10-CM

## 2019-09-15 DIAGNOSIS — I89.9 NONINFECTIVE DISORDER OF LYMPHATIC VESSELS AND LYMPH NODES, UNSPECIFIED: Chronic | ICD-10-CM

## 2019-09-15 DIAGNOSIS — Z98.89 OTHER SPECIFIED POSTPROCEDURAL STATES: Chronic | ICD-10-CM

## 2019-09-15 PROCEDURE — 99283 EMERGENCY DEPT VISIT LOW MDM: CPT

## 2019-09-15 PROCEDURE — 73562 X-RAY EXAM OF KNEE 3: CPT

## 2019-09-15 RX ORDER — ACETAMINOPHEN 500 MG
650 TABLET ORAL ONCE
Refills: 0 | Status: COMPLETED | OUTPATIENT
Start: 2019-09-15 | End: 2019-09-15

## 2019-09-15 RX ORDER — OXYCODONE HYDROCHLORIDE 5 MG/1
5 TABLET ORAL ONCE
Refills: 0 | Status: DISCONTINUED | OUTPATIENT
Start: 2019-09-15 | End: 2019-09-15

## 2019-09-15 RX ORDER — IBUPROFEN 200 MG
600 TABLET ORAL ONCE
Refills: 0 | Status: COMPLETED | OUTPATIENT
Start: 2019-09-15 | End: 2019-09-15

## 2019-09-15 RX ADMIN — Medication 600 MILLIGRAM(S): at 23:41

## 2019-09-15 RX ADMIN — Medication 600 MILLIGRAM(S): at 23:42

## 2019-09-15 RX ADMIN — Medication 650 MILLIGRAM(S): at 23:42

## 2019-09-15 RX ADMIN — OXYCODONE HYDROCHLORIDE 5 MILLIGRAM(S): 5 TABLET ORAL at 23:42

## 2019-09-15 RX ADMIN — Medication 650 MILLIGRAM(S): at 23:41

## 2019-09-15 RX ADMIN — OXYCODONE HYDROCHLORIDE 5 MILLIGRAM(S): 5 TABLET ORAL at 23:41

## 2019-09-15 NOTE — ED PROVIDER NOTE - PHYSICAL EXAMINATION
Attending note. Patient is alert and in no acute distress when not moving his knee. When moving his knee, patient has severe pain in the left knee. Examination of the left lower extremity reveals no deformity, swelling, effusion or ecchymosis. Patient has no palpable tenderness on external structures of the knee. He is able to flex his knee to only 15°. There is no lateral or medial joint line tenderness. There is no erythema or increased heat. There is no leg edema. Distal pulses are intact. Skin is normal. Sensation is normal.

## 2019-09-15 NOTE — ED PROVIDER NOTE - CLINICAL SUMMARY MEDICAL DECISION MAKING FREE TEXT BOX
Attending note. Acute on chronic left knee pain in patient with known osteoarthritis. X-ray today we knee. Analgesia. The patient's wife works at an orthopedist office and will arrange to have him seen tomorrow.  No concern for infection or fx.

## 2019-09-15 NOTE — ED PROVIDER NOTE - PATIENT PORTAL LINK FT
You can access the FollowMyHealth Patient Portal offered by [facility] by registering at the following website: [email or web address]. By joining [facility]’s FollowMyHealth portal, you will also be able to view your health information using other applications (apps) compatible with our system.

## 2019-09-15 NOTE — ED PROVIDER NOTE - CARE PLAN
Principal Discharge DX:	Knee pain, unspecified chronicity, unspecified laterality  Goal:	acute on chronic left knee pain

## 2019-09-15 NOTE — ED PROVIDER NOTE - OBJECTIVE STATEMENT
Attending note. Patient was seen in fast track room #2. Patient is complaining of severe left anterior knee pain. Patient has known osteoarthritis and states pain became severe when he is bending over and bending his knee this evening. He denies any locking or giving out. He has received multiple injections in his left knee previously. Denies any fever or history of gout. He has no other joint pains except for the right knee. Denies any rash or lesion. It is exacerbated by bending his knee. Patient is able to weight-bear.

## 2019-09-15 NOTE — ED PROVIDER NOTE - NSFOLLOWUPINSTRUCTIONS_ED_ALL_ED_FT
Tylenol two tablets every 4-6 hours as needed or Motrin every 6-8 hours.  Call your orthopedist tomorrow for further evaluation and treatment.  Rx - Oxycodone 5 mgs every 6 hours only for severe pain if needed.

## 2019-09-16 ENCOUNTER — APPOINTMENT (OUTPATIENT)
Dept: ORTHOPEDIC SURGERY | Facility: CLINIC | Age: 49
End: 2019-09-16
Payer: COMMERCIAL

## 2019-09-16 ENCOUNTER — APPOINTMENT (OUTPATIENT)
Dept: MRI IMAGING | Facility: CLINIC | Age: 49
End: 2019-09-16
Payer: COMMERCIAL

## 2019-09-16 ENCOUNTER — OUTPATIENT (OUTPATIENT)
Dept: OUTPATIENT SERVICES | Facility: HOSPITAL | Age: 49
LOS: 1 days | End: 2019-09-16
Payer: COMMERCIAL

## 2019-09-16 VITALS
BODY MASS INDEX: 26.48 KG/M2 | SYSTOLIC BLOOD PRESSURE: 151 MMHG | WEIGHT: 185 LBS | HEIGHT: 70 IN | HEART RATE: 82 BPM | DIASTOLIC BLOOD PRESSURE: 99 MMHG

## 2019-09-16 DIAGNOSIS — M25.562 PAIN IN LEFT KNEE: ICD-10-CM

## 2019-09-16 DIAGNOSIS — Z00.8 ENCOUNTER FOR OTHER GENERAL EXAMINATION: ICD-10-CM

## 2019-09-16 DIAGNOSIS — I89.9 NONINFECTIVE DISORDER OF LYMPHATIC VESSELS AND LYMPH NODES, UNSPECIFIED: Chronic | ICD-10-CM

## 2019-09-16 DIAGNOSIS — C43.4 MALIGNANT MELANOMA OF SCALP AND NECK: Chronic | ICD-10-CM

## 2019-09-16 DIAGNOSIS — Z90.79 ACQUIRED ABSENCE OF OTHER GENITAL ORGAN(S): Chronic | ICD-10-CM

## 2019-09-16 DIAGNOSIS — Z98.89 OTHER SPECIFIED POSTPROCEDURAL STATES: Chronic | ICD-10-CM

## 2019-09-16 PROCEDURE — 99213 OFFICE O/P EST LOW 20 MIN: CPT

## 2019-09-16 PROCEDURE — 72195 MRI PELVIS W/O DYE: CPT

## 2019-09-16 PROCEDURE — 72195 MRI PELVIS W/O DYE: CPT | Mod: 26

## 2019-09-16 PROCEDURE — 73562 X-RAY EXAM OF KNEE 3: CPT | Mod: 26,LT

## 2019-09-16 RX ORDER — OXYCODONE HYDROCHLORIDE 5 MG/1
1 TABLET ORAL
Qty: 10 | Refills: 0
Start: 2019-09-16

## 2019-09-16 NOTE — ED ADULT NURSE NOTE - OBJECTIVE STATEMENT
pt c/o "left knee pain that has gotten worse since this evening. It gets worse whenever I bend over or when I bend the knee." pt denies any fall, injury, or trauma to the knee. pt has PMH of osteoarthritis and gets injections for pain control.

## 2019-09-16 NOTE — CONSULT NOTE ADULT - SUBJECTIVE AND OBJECTIVE BOX
CC:   HPI:   49yMale with chronic b/l knee pain due to known b/l knee OA presents c/o worsening L knee pain. The patient was kneeling in his home and when he stood up he felt severe pain in his left knee. Denies any trauma. No fevers/chills. Denies numbness/tingling. Denies any knee swelling. No other bone/joint complaints.     Review of systems: As per HPI, otherwise negative.     PAST MEDICAL & SURGICAL HISTORY:  First degree heart block  Malignant melanoma of scalp: RSX 08/18  R neck mass dsx/ LN dsx 10/19/18  Hypertriglyceridemia  History of bone marrow transplant  Osteoarthritis: degenerative disc L3-4  BPH (benign prostatic hyperplasia)  Colitis: surgery 1996  GERD (gastroesophageal reflux disease)  NHL (non-Hodgkin's lymphoma): 1999, Radiation to left neck region  HTN (hypertension)  No pertinent past medical history  [place]): secondary to chemo  Headache, Migraine  S/P Lymph Node Biopsy: abd LN via midline laparotomy  S/P Orchiectomy  Colitis  Testicular Cancer: 1994, chemotherapy  Melanoma of scalp or neck: excision 8/18  s/p excision right neck mass &amp; LN dissx  Lymph node disorder: s/p abdominal lymph node dissection 1994, 1996  S/P colon resection: 1996  History of orchiectomy: left 1994  No significant past surgical history    FAMILY HISTORY:  No pertinent family history in first degree relatives      MEDICATIONS  (STANDING):    MEDICATIONS  (PRN):      T(C): 36.7 (09-15-19 @ 21:47), Max: 36.7 (09-15-19 @ 21:47)  HR: 97 (09-15-19 @ 21:47) (97 - 97)  BP: 152/99 (09-15-19 @ 21:47) (152/99 - 152/99)  RR: 17 (09-15-19 @ 21:47) (17 - 17)  SpO2: 97% (09-15-19 @ 21:47) (97% - 97%)  Wt(kg): --      XRs L knee: OA of the knee      EXAM:  Awake, Alert and in no acute distress  Resp: Nonlabored  LLE:  skin intact  ROM 0-90 (pain at 90)  no effusion  no ecchymosis  SILT SP/DP/Solis/Sap/T  Fires EHL/FHL/TA/G/S  ABle to SLR  WWP  2+ DP  No pain on compression of patella        A/P: 49yoM with exacerbation of left knee OA    - pain control (NSAIDs)  - ice  - elevation  - rest  - FU PRN with Dr. Randhawa. [phone]

## 2019-09-30 ENCOUNTER — RX RENEWAL (OUTPATIENT)
Age: 49
End: 2019-09-30

## 2019-10-11 ENCOUNTER — RESULT REVIEW (OUTPATIENT)
Age: 49
End: 2019-10-11

## 2019-10-11 ENCOUNTER — FORM ENCOUNTER (OUTPATIENT)
Age: 49
End: 2019-10-11

## 2019-10-12 ENCOUNTER — APPOINTMENT (OUTPATIENT)
Dept: MRI IMAGING | Facility: CLINIC | Age: 49
End: 2019-10-12
Payer: COMMERCIAL

## 2019-10-12 ENCOUNTER — OUTPATIENT (OUTPATIENT)
Dept: OUTPATIENT SERVICES | Facility: HOSPITAL | Age: 49
LOS: 1 days | End: 2019-10-12
Payer: COMMERCIAL

## 2019-10-12 DIAGNOSIS — C43.4 MALIGNANT MELANOMA OF SCALP AND NECK: Chronic | ICD-10-CM

## 2019-10-12 DIAGNOSIS — Z98.89 OTHER SPECIFIED POSTPROCEDURAL STATES: Chronic | ICD-10-CM

## 2019-10-12 DIAGNOSIS — M25.562 PAIN IN LEFT KNEE: ICD-10-CM

## 2019-10-12 DIAGNOSIS — I89.9 NONINFECTIVE DISORDER OF LYMPHATIC VESSELS AND LYMPH NODES, UNSPECIFIED: Chronic | ICD-10-CM

## 2019-10-12 DIAGNOSIS — Z90.79 ACQUIRED ABSENCE OF OTHER GENITAL ORGAN(S): Chronic | ICD-10-CM

## 2019-10-12 PROCEDURE — 73721 MRI JNT OF LWR EXTRE W/O DYE: CPT | Mod: 26,LT

## 2019-10-12 PROCEDURE — 73721 MRI JNT OF LWR EXTRE W/O DYE: CPT

## 2019-10-19 ENCOUNTER — OUTPATIENT (OUTPATIENT)
Dept: OUTPATIENT SERVICES | Facility: HOSPITAL | Age: 49
LOS: 1 days | End: 2019-10-19
Payer: COMMERCIAL

## 2019-10-19 ENCOUNTER — APPOINTMENT (OUTPATIENT)
Dept: MRI IMAGING | Facility: CLINIC | Age: 49
End: 2019-10-19
Payer: COMMERCIAL

## 2019-10-19 DIAGNOSIS — I89.9 NONINFECTIVE DISORDER OF LYMPHATIC VESSELS AND LYMPH NODES, UNSPECIFIED: Chronic | ICD-10-CM

## 2019-10-19 DIAGNOSIS — Z00.8 ENCOUNTER FOR OTHER GENERAL EXAMINATION: ICD-10-CM

## 2019-10-19 DIAGNOSIS — Z90.79 ACQUIRED ABSENCE OF OTHER GENITAL ORGAN(S): Chronic | ICD-10-CM

## 2019-10-19 DIAGNOSIS — C43.4 MALIGNANT MELANOMA OF SCALP AND NECK: Chronic | ICD-10-CM

## 2019-10-19 DIAGNOSIS — Z98.89 OTHER SPECIFIED POSTPROCEDURAL STATES: Chronic | ICD-10-CM

## 2019-10-19 PROCEDURE — 72146 MRI CHEST SPINE W/O DYE: CPT

## 2019-10-19 PROCEDURE — 72146 MRI CHEST SPINE W/O DYE: CPT | Mod: 26

## 2019-10-20 ENCOUNTER — TRANSCRIPTION ENCOUNTER (OUTPATIENT)
Age: 49
End: 2019-10-20

## 2019-10-24 ENCOUNTER — RESULT REVIEW (OUTPATIENT)
Age: 49
End: 2019-10-24

## 2019-10-24 ENCOUNTER — OUTPATIENT (OUTPATIENT)
Dept: OUTPATIENT SERVICES | Facility: HOSPITAL | Age: 49
LOS: 1 days | Discharge: HOME | End: 2019-10-24

## 2019-10-24 DIAGNOSIS — I89.9 NONINFECTIVE DISORDER OF LYMPHATIC VESSELS AND LYMPH NODES, UNSPECIFIED: Chronic | ICD-10-CM

## 2019-10-24 DIAGNOSIS — Z90.79 ACQUIRED ABSENCE OF OTHER GENITAL ORGAN(S): Chronic | ICD-10-CM

## 2019-10-24 DIAGNOSIS — C43.4 MALIGNANT MELANOMA OF SCALP AND NECK: Chronic | ICD-10-CM

## 2019-10-24 DIAGNOSIS — Z98.89 OTHER SPECIFIED POSTPROCEDURAL STATES: Chronic | ICD-10-CM

## 2019-10-24 DIAGNOSIS — C43.8 MALIGNANT MELANOMA OF OVERLAPPING SITES OF SKIN: ICD-10-CM

## 2019-10-24 NOTE — HISTORY OF PRESENT ILLNESS
[Disease: _____________________] : Disease: [unfilled] [N: ___] : N[unfilled] [T: ___] : T[unfilled] [AJCC Stage: ____] : AJCC Stage: [unfilled] [M: ___] : M[unfilled] [de-identified] : Mr Mclaughlin is a 48 year old male with past medical history of stage II testicular CA (1994) treated with surgery, chemotherapy (BEP) and an autologous BMT and Non-Hodgkins lymphoma right neck neck (1998) for which he underwent radiation therapy presenting for transfer of care for melanoma.  \par \par He noticed a raised, black, pruritic lesion on his right parietal scalp.  Biopsy was performed on 8/14/2018 which was consistent with a 1.1mm melanoma with no significant mitotic figures, no ulceration and no regression.\par He was seen by Dr Sarwat Chandler on 8/13/2018 who recommended wide excision of melanoma with SLNB and reconstruction.\par On 8/29/2018 patient underwent radical resection of scalp melanoma, right posterior modified neck dissection with closure by Dr Bradley Toussaint (Plastic)\par Final Path: metastatic melanoma present in 2/2 LN, residual melanoma, negative margins, no lymphovascular invasion.  Tumor stage pT2a pN2a\par PET/CT 9/15/2018: Minimally FDG avid soft tissue thickening right scalp probably postsurgical.FDG avid focal area of soft tissue thickening right neck, favor postsurgical changes rather than residual disease. Suggest correlation with clinical exam and surgical margins. No FDG avid distant metastatic disease. \par On 10/19/2018 underwent right functional neck dissection with Dr Sarwat Chandler\par Path: no metastatic melanoma seen in eighteen LN (0/18) and benign skin/tissue findings.\par He was referred to Dr Kendall Sands (Medical Oncology) at [facility]. \par He was originally seen by Dr Kendall Sands who referred the patient to Dr Gopi Arredondo at [facility].  Patient has stage IIIa (T2aN2a) BRAF testing was performed and mutation was detected. The patient was offered adjuvant Nivolumab 480mg every four weeks by Dr Arredondo for 13 cycles.\par \par 1/25/2019: Mr Mclaughlin is here for follow up.  He is scheduled for C#2 Nivolumab today.  He tolerated C#1 extremely well and reports no adverse events.\par \par 2/22/2019: Mr Mclaughlin is here for C#3 Nivolumab today.  He feels well today and is tolerating Nivo without any adverse events.  He continues to remain active and works as a .  He does reports two episode of diarrhea x 3 weeks after his last treatment.  He took Imodium with excellent relief. \par \par 3/22/2019: Patient Here for C#4 today. DEXA shows osteopenia. His Vit D level is 20 despite 50k units per week. No irAEs from treatment. \par \par 4/24/2019: As of 4/5/19 patient had leg cramps in the front and back of the legs. The intensity became severe. Took Tylenol and motrin with no relief. He also noted some loose stool just prior to these onset of the leg pain. Prednisone 60 mg daily started. After 1 week there was no impact on cramps. Flexeril helps sleep but no benefit with leg pain. The prednisone was only taken for only 3 days when changed to Flexeril. He passes stool 2-3 times daily which is more than usual. No bleeding. Overall the leg cramping is less severe but still recurring. He is off of atorvastatin and remains on tricor. The muscle pains do not happen every day, but occur 3-4 days per week. He is working and is working from home more often at present. \par \par 5/7/2019: Mr Mclaughlin is here for follow up.  He is currently on Prednisone 40mg PO for LE myalgia with little to no benefit.  As per patient the "cramping" is intermittent , last episode was on Sunday.  Generally each episodes starts before going to bed and last 8-10 hours. He uses Percoset 2 pills 1 - 2 times daily. It does not occur daily; rather 3 times per week. \par The diarrhea he was having is resolved.\par He feels emotionally better at present. [de-identified] : melanoma [de-identified] : Surgical Oncology: Dr Sarwat Chandler [phone]\par Plastics: Dr Kendall Toussaint [phone]\par Medical Oncology ([facility]): Dr Kendall Sands  [phone]\par Medical Oncology: Melanoma ([facility]): Dr Gopi Arredondo [phone]\par

## 2019-10-24 NOTE — PHYSICAL EXAM
[Restricted in physically strenuous activity but ambulatory and able to carry out work of a light or sedentary nature] : Status 1- Restricted in physically strenuous activity but ambulatory and able to carry out work of a light or sedentary nature, e.g., light house work, office work [Normal] : PERRL, EOMI, no conjunctival infection, anicteric [de-identified] : chronic sun damage; surgical site well healed on right neck and right parietal scalp; no evidence of local skin relapse.

## 2019-10-25 ENCOUNTER — LABORATORY RESULT (OUTPATIENT)
Age: 49
End: 2019-10-25

## 2019-10-25 ENCOUNTER — RESULT REVIEW (OUTPATIENT)
Age: 49
End: 2019-10-25

## 2019-10-25 ENCOUNTER — APPOINTMENT (OUTPATIENT)
Dept: HEMATOLOGY ONCOLOGY | Facility: CLINIC | Age: 49
End: 2019-10-25
Payer: COMMERCIAL

## 2019-10-25 ENCOUNTER — APPOINTMENT (OUTPATIENT)
Dept: INFUSION THERAPY | Facility: HOSPITAL | Age: 49
End: 2019-10-25

## 2019-10-25 VITALS
BODY MASS INDEX: 26.6 KG/M2 | RESPIRATION RATE: 18 BRPM | TEMPERATURE: 97.8 F | WEIGHT: 185.39 LBS | DIASTOLIC BLOOD PRESSURE: 89 MMHG | SYSTOLIC BLOOD PRESSURE: 154 MMHG | HEART RATE: 86 BPM | OXYGEN SATURATION: 98 %

## 2019-10-25 LAB
BASOPHILS # BLD AUTO: 0 K/UL — SIGNIFICANT CHANGE UP (ref 0–0.2)
BASOPHILS NFR BLD AUTO: 0.5 % — SIGNIFICANT CHANGE UP (ref 0–2)
EOSINOPHIL # BLD AUTO: 0.2 K/UL — SIGNIFICANT CHANGE UP (ref 0–0.5)
EOSINOPHIL NFR BLD AUTO: 2.5 % — SIGNIFICANT CHANGE UP (ref 0–6)
HCT VFR BLD CALC: 48 % — SIGNIFICANT CHANGE UP (ref 39–50)
HGB BLD-MCNC: 16.8 G/DL — SIGNIFICANT CHANGE UP (ref 13–17)
LYMPHOCYTES # BLD AUTO: 2.6 K/UL — SIGNIFICANT CHANGE UP (ref 1–3.3)
LYMPHOCYTES # BLD AUTO: 41.9 % — SIGNIFICANT CHANGE UP (ref 13–44)
MCHC RBC-ENTMCNC: 30.5 PG — SIGNIFICANT CHANGE UP (ref 27–34)
MCHC RBC-ENTMCNC: 35 G/DL — SIGNIFICANT CHANGE UP (ref 32–36)
MCV RBC AUTO: 87.3 FL — SIGNIFICANT CHANGE UP (ref 80–100)
MONOCYTES # BLD AUTO: 0.4 K/UL — SIGNIFICANT CHANGE UP (ref 0–0.9)
MONOCYTES NFR BLD AUTO: 6.6 % — SIGNIFICANT CHANGE UP (ref 2–14)
NEUTROPHILS # BLD AUTO: 3 K/UL — SIGNIFICANT CHANGE UP (ref 1.8–7.4)
NEUTROPHILS NFR BLD AUTO: 48.5 % — SIGNIFICANT CHANGE UP (ref 43–77)
PLATELET # BLD AUTO: 238 K/UL — SIGNIFICANT CHANGE UP (ref 150–400)
RBC # BLD: 5.5 M/UL — SIGNIFICANT CHANGE UP (ref 4.2–5.8)
RBC # FLD: 14.3 % — SIGNIFICANT CHANGE UP (ref 10.3–14.5)
WBC # BLD: 6.3 K/UL — SIGNIFICANT CHANGE UP (ref 3.8–10.5)
WBC # FLD AUTO: 6.3 K/UL — SIGNIFICANT CHANGE UP (ref 3.8–10.5)

## 2019-10-25 PROCEDURE — 99215 OFFICE O/P EST HI 40 MIN: CPT

## 2019-10-25 RX ORDER — SODIUM PICOSULFATE, MAGNESIUM OXIDE, AND ANHYDROUS CITRIC ACID 10; 3.5; 12 MG/160ML; G/160ML; G/160ML
10-3.5-12 MG-GM LIQUID ORAL
Qty: 1 | Refills: 0 | Status: DISCONTINUED | COMMUNITY
Start: 2019-05-15 | End: 2019-10-25

## 2019-10-25 RX ORDER — DICLOFENAC SODIUM 10 MG/G
1 GEL TOPICAL
Qty: 240 | Refills: 0 | Status: DISCONTINUED | COMMUNITY
Start: 2017-09-19 | End: 2019-10-25

## 2019-10-25 RX ORDER — CHOLECALCIFEROL (VITAMIN D3) 125 MCG
TABLET ORAL
Refills: 0 | Status: DISCONTINUED | COMMUNITY
End: 2019-10-25

## 2019-10-25 RX ORDER — METHYLPREDNISOLONE 8 MG/1
8 TABLET ORAL
Qty: 100 | Refills: 0 | Status: DISCONTINUED | COMMUNITY
Start: 2019-05-29 | End: 2019-10-25

## 2019-10-25 RX ORDER — TIZANIDINE 2 MG/1
2 TABLET ORAL EVERY 6 HOURS
Qty: 120 | Refills: 0 | Status: DISCONTINUED | COMMUNITY
Start: 2019-05-29 | End: 2019-10-25

## 2019-10-25 NOTE — CONSULT LETTER
[Courtesy Letter:] : I had the pleasure of seeing your patient, [unfilled], in my office today. [Please see my note below.] : Please see my note below. [Sincerely,] : Sincerely, [Dear  ___] : Dear  [unfilled], [DrNichole  ___] : Dr. ALVARADO

## 2019-10-25 NOTE — HISTORY OF PRESENT ILLNESS
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  \par \par He noticed a raised, black, pruritic lesion on his right parietal scalp.  Biopsy was performed on 8/14/2018 which was consistent with a 1.1mm melanoma with no significant mitotic figures, no ulceration and no regression.\par He was seen by Dr Sarwat Chandler on 8/13/2018 who recommended wide excision of melanoma with SLNB and reconstruction.\par On 8/29/2018 patient underwent radical resection of scalp melanoma, right posterior modified neck dissection with closure by Dr Bradley Toussaint (Plastic)\par Final Path: metastatic melanoma present in 2/2 LN, residual melanoma, negative margins, no lymphovascular invasion.  Tumor stage pT2a pN2a\par PET/CT 9/15/2018: Minimally FDG avid soft tissue thickening right scalp probably postsurgical.FDG avid focal area of soft tissue thickening right neck, favor postsurgical changes rather than residual disease. Suggest correlation with clinical exam and surgical margins. No FDG avid distant metastatic disease. \par On 10/19/2018 underwent right functional neck dissection with Dr Sarwat Chandler\par Path: no metastatic melanoma seen in eighteen LN (0/18) and benign skin/tissue findings.\par He was referred to Dr Kendall Sands (Medical Oncology) at [facility]. \par He was originally seen by Dr Kendall Sands who referred the patient to Dr Gopi Arredondo at [facility].  Patient has stage IIIa (T2aN2a) BRAF testing was performed and mutation was detected. The patient was offered adjuvant Nivolumab 480mg every four weeks by Dr Arredondo for 13 cycles.\par \par 1/25/2019: Mr Mclaughlin is here for follow up.  He is scheduled for C#2 Nivolumab today.  He tolerated C#1 extremely well and reports no adverse events.\par \par 2/22/2019: Mr Mclaughlin is here for C#3 Nivolumab today.  He feels well today and is tolerating Nivo without any adverse events.  He continues to remain active and works as a .  He does reports two episode of diarrhea x 3 weeks after his last treatment.  He took Imodium with excellent relief. \par \par 3/22/2019: Patient Here for C#4 today. DEXA shows osteopenia. His Vit D level is 20 despite 50k units per week. No irAEs from treatment. \par \par 4/24/2019: As of 4/5/19 patient had leg cramps in the front and back of the legs. The intensity became severe. Took Tylenol and motrin with no relief. He also noted some loose stool just prior to these onset of the leg pain. Prednisone 60 mg daily started. After 1 week there was no impact on cramps. Flexeril helps sleep but no benefit with leg pain. The prednisone was only taken for only 3 days when changed to Flexeril. He passes stool 2-3 times daily which is more than usual. No bleeding. Overall the leg cramping is less severe but still recurring. He is off of atorvastatin and remains on Tricor. The muscle pains do not happen every day, but occur 3-4 days per week. He is working and is working from home more often at present. \par \par 10/25/2019: Patient has noticed the development of multiple new pigmented lesions on his scalp and right upper arm. He saw his dermatologist and 3 biopsies were done. The one on the arm had an epidermal component; whereas neither of the scalp lesions had epidermal component. Both were dermal only, and suggested metastatic disease. This would suggest in-transit relapse since the lesions are close to his right scalp resection from 2018. To summarize, he had received adjuvant nivolumab from 1228/2018 to 3/22/2019. It was discontinued due to persistent thigh pain and fasciitis discovered on MRI. He had also had grade 2 diarrhea that was self limited. Since the biopsies 2 weeks ago, he has noticed additional lesions on the left supraclavicular area and the left pectoral region. These are both pigmented and ~4-5 mm in diameter. There are also new lesions on the scalp. Overall he feels well and has no new symptoms, and no residual immune related symptoms from the prior treatment.  [de-identified] : melanoma [de-identified] : Surgical Oncology: Sarwat Chandler [phone]\par Plastics: Kendall Toussaint [phone]\par Medical Oncology ([facility]): Kendall Sands  [phone]\par Medical Oncology: Melanoma ([facility]): Gopi Arredondo [phone]\par Dermatology: Mariah Spring; [phone]\par

## 2019-10-25 NOTE — REVIEW OF SYSTEMS
[Negative] : Allergic/Immunologic [FreeTextEntry9] : Mild curvature of thoracic spine noted. Has chronic back pain and left knee pain.  [de-identified] : new skin lesions.

## 2019-10-25 NOTE — PHYSICAL EXAM
[Restricted in physically strenuous activity but ambulatory and able to carry out work of a light or sedentary nature] : Status 1- Restricted in physically strenuous activity but ambulatory and able to carry out work of a light or sedentary nature, e.g., light house work, office work [Normal] : PERRL, EOMI, no conjunctival infection, anicteric [de-identified] : several small (~2-3 mm) pigmented lesions on the scalp. 2 more nodular lesions on the left pectoral and supraclavicular area (~4-5 mm).

## 2019-10-29 ENCOUNTER — FORM ENCOUNTER (OUTPATIENT)
Age: 49
End: 2019-10-29

## 2019-10-30 ENCOUNTER — APPOINTMENT (OUTPATIENT)
Dept: NUCLEAR MEDICINE | Facility: IMAGING CENTER | Age: 49
End: 2019-10-30
Payer: COMMERCIAL

## 2019-10-30 ENCOUNTER — APPOINTMENT (OUTPATIENT)
Dept: MRI IMAGING | Facility: IMAGING CENTER | Age: 49
End: 2019-10-30
Payer: COMMERCIAL

## 2019-10-30 ENCOUNTER — OUTPATIENT (OUTPATIENT)
Dept: OUTPATIENT SERVICES | Facility: HOSPITAL | Age: 49
LOS: 1 days | End: 2019-10-30
Payer: COMMERCIAL

## 2019-10-30 DIAGNOSIS — C43.4 MALIGNANT MELANOMA OF SCALP AND NECK: Chronic | ICD-10-CM

## 2019-10-30 DIAGNOSIS — Z98.89 OTHER SPECIFIED POSTPROCEDURAL STATES: Chronic | ICD-10-CM

## 2019-10-30 DIAGNOSIS — C43.9 MALIGNANT MELANOMA OF SKIN, UNSPECIFIED: ICD-10-CM

## 2019-10-30 DIAGNOSIS — Z90.79 ACQUIRED ABSENCE OF OTHER GENITAL ORGAN(S): Chronic | ICD-10-CM

## 2019-10-30 DIAGNOSIS — I89.9 NONINFECTIVE DISORDER OF LYMPHATIC VESSELS AND LYMPH NODES, UNSPECIFIED: Chronic | ICD-10-CM

## 2019-10-30 PROCEDURE — 78816 PET IMAGE W/CT FULL BODY: CPT | Mod: 26,PI

## 2019-10-30 PROCEDURE — 70553 MRI BRAIN STEM W/O & W/DYE: CPT

## 2019-10-30 PROCEDURE — A9585: CPT

## 2019-10-30 PROCEDURE — 70553 MRI BRAIN STEM W/O & W/DYE: CPT | Mod: 26

## 2019-10-30 PROCEDURE — 78816 PET IMAGE W/CT FULL BODY: CPT

## 2019-10-30 PROCEDURE — A9552: CPT

## 2019-11-11 NOTE — CONSULT LETTER
[Dear  ___] : Dear  [unfilled], [Courtesy Letter:] : I had the pleasure of seeing your patient, [unfilled], in my office today. [Please see my note below.] : Please see my note below. [DrNichole  ___] : Dr. ALVARADO [Sincerely,] : Sincerely,

## 2019-11-12 ENCOUNTER — RESULT REVIEW (OUTPATIENT)
Age: 49
End: 2019-11-12

## 2019-11-12 ENCOUNTER — LABORATORY RESULT (OUTPATIENT)
Age: 49
End: 2019-11-12

## 2019-11-12 ENCOUNTER — APPOINTMENT (OUTPATIENT)
Dept: HEMATOLOGY ONCOLOGY | Facility: CLINIC | Age: 49
End: 2019-11-12
Payer: COMMERCIAL

## 2019-11-12 ENCOUNTER — APPOINTMENT (OUTPATIENT)
Dept: INFUSION THERAPY | Facility: HOSPITAL | Age: 49
End: 2019-11-12

## 2019-11-12 VITALS
SYSTOLIC BLOOD PRESSURE: 122 MMHG | BODY MASS INDEX: 26.79 KG/M2 | OXYGEN SATURATION: 99 % | WEIGHT: 186.73 LBS | HEART RATE: 89 BPM | RESPIRATION RATE: 20 BRPM | DIASTOLIC BLOOD PRESSURE: 86 MMHG

## 2019-11-12 LAB
BASOPHILS # BLD AUTO: 0.1 K/UL — SIGNIFICANT CHANGE UP (ref 0–0.2)
BASOPHILS NFR BLD AUTO: 1 % — SIGNIFICANT CHANGE UP (ref 0–2)
EOSINOPHIL # BLD AUTO: 0.1 K/UL — SIGNIFICANT CHANGE UP (ref 0–0.5)
EOSINOPHIL NFR BLD AUTO: 1.8 % — SIGNIFICANT CHANGE UP (ref 0–6)
HCT VFR BLD CALC: 44.8 % — SIGNIFICANT CHANGE UP (ref 39–50)
HGB BLD-MCNC: 15.7 G/DL — SIGNIFICANT CHANGE UP (ref 13–17)
LYMPHOCYTES # BLD AUTO: 2.6 K/UL — SIGNIFICANT CHANGE UP (ref 1–3.3)
LYMPHOCYTES # BLD AUTO: 35.7 % — SIGNIFICANT CHANGE UP (ref 13–44)
MCHC RBC-ENTMCNC: 30.5 PG — SIGNIFICANT CHANGE UP (ref 27–34)
MCHC RBC-ENTMCNC: 35 G/DL — SIGNIFICANT CHANGE UP (ref 32–36)
MCV RBC AUTO: 87.1 FL — SIGNIFICANT CHANGE UP (ref 80–100)
MONOCYTES # BLD AUTO: 0.6 K/UL — SIGNIFICANT CHANGE UP (ref 0–0.9)
MONOCYTES NFR BLD AUTO: 7.9 % — SIGNIFICANT CHANGE UP (ref 2–14)
NEUTROPHILS # BLD AUTO: 3.8 K/UL — SIGNIFICANT CHANGE UP (ref 1.8–7.4)
NEUTROPHILS NFR BLD AUTO: 53.7 % — SIGNIFICANT CHANGE UP (ref 43–77)
PLATELET # BLD AUTO: 256 K/UL — SIGNIFICANT CHANGE UP (ref 150–400)
RBC # BLD: 5.14 M/UL — SIGNIFICANT CHANGE UP (ref 4.2–5.8)
RBC # FLD: 12.8 % — SIGNIFICANT CHANGE UP (ref 10.3–14.5)
WBC # BLD: 7.2 K/UL — SIGNIFICANT CHANGE UP (ref 3.8–10.5)
WBC # FLD AUTO: 7.2 K/UL — SIGNIFICANT CHANGE UP (ref 3.8–10.5)

## 2019-11-12 PROCEDURE — 99214 OFFICE O/P EST MOD 30 MIN: CPT

## 2019-11-13 DIAGNOSIS — Z51.11 ENCOUNTER FOR ANTINEOPLASTIC CHEMOTHERAPY: ICD-10-CM

## 2019-11-13 LAB — ERYTHROCYTE [SEDIMENTATION RATE] IN BLOOD: 2 MM/HR — SIGNIFICANT CHANGE UP (ref 0–15)

## 2019-11-20 NOTE — PHYSICAL EXAM
[Restricted in physically strenuous activity but ambulatory and able to carry out work of a light or sedentary nature] : Status 1- Restricted in physically strenuous activity but ambulatory and able to carry out work of a light or sedentary nature, e.g., light house work, office work [Normal] : affect appropriate [de-identified] : several small (~2-3 mm) pigmented lesions on the scalp. 2 more nodular lesions on the left pectoral and supraclavicular area (~4-5 mm).

## 2019-11-20 NOTE — REVIEW OF SYSTEMS
[Negative] : Allergic/Immunologic [FreeTextEntry9] : Mild curvature of thoracic spine noted. Has chronic back pain and left knee pain.  [de-identified] : new skin lesions.

## 2019-11-20 NOTE — HISTORY OF PRESENT ILLNESS
[Disease: _____________________] : Disease: [unfilled] [T: ___] : T[unfilled] [N: ___] : N[unfilled] [M: ___] : M[unfilled] [AJCC Stage: ____] : AJCC Stage: [unfilled] [de-identified] : melanoma [de-identified] : Mr Mclaughlin is a 48 year old male with past medical history of stage II testicular CA (1994) treated with surgery, chemotherapy (BEP) and an autologous BMT and Non-Hodgkins lymphoma right neck neck (1998) for which he underwent radiation therapy presenting for transfer of care for melanoma.  \par \par He noticed a raised, black, pruritic lesion on his right parietal scalp.  Biopsy was performed on 8/14/2018 which was consistent with a 1.1mm melanoma with no significant mitotic figures, no ulceration and no regression.\par He was seen by Dr Sarwat Chandler on 8/13/2018 who recommended wide excision of melanoma with SLNB and reconstruction.\par On 8/29/2018 patient underwent radical resection of scalp melanoma, right posterior modified neck dissection with closure by Dr Bradley Toussaint (Plastic)\par Final Path: metastatic melanoma present in 2/2 LN, residual melanoma, negative margins, no lymphovascular invasion.  Tumor stage pT2a pN2a\par PET/CT 9/15/2018: Minimally FDG avid soft tissue thickening right scalp probably postsurgical.FDG avid focal area of soft tissue thickening right neck, favor postsurgical changes rather than residual disease. Suggest correlation with clinical exam and surgical margins. No FDG avid distant metastatic disease. \par On 10/19/2018 underwent right functional neck dissection with Dr Sarwat Chandler\par Path: no metastatic melanoma seen in eighteen LN (0/18) and benign skin/tissue findings.\par He was referred to Dr Kendall Sands (Medical Oncology) at [facility]. \par He was originally seen by Dr Kendall Sands who referred the patient to Dr Gopi Arredondo at [facility].  Patient has stage IIIa (T2aN2a) BRAF testing was performed and mutation was detected. The patient was offered adjuvant Nivolumab 480mg every four weeks by Dr Arredondo for 13 cycles.\par \par 1/25/2019: Mr Mclaughlin is here for follow up.  He is scheduled for C#2 Nivolumab today.  He tolerated C#1 extremely well and reports no adverse events.\par \par 2/22/2019: Mr Mclaughlin is here for C#3 Nivolumab today.  He feels well today and is tolerating Nivo without any adverse events.  He continues to remain active and works as a .  He does reports two episode of diarrhea x 3 weeks after his last treatment.  He took Imodium with excellent relief. \par \par 3/22/2019: Patient Here for C#4 today. DEXA shows osteopenia. His Vit D level is 20 despite 50k units per week. No irAEs from treatment. \par \par 4/24/2019: As of 4/5/19 patient had leg cramps in the front and back of the legs. The intensity became severe. Took Tylenol and motrin with no relief. He also noted some loose stool just prior to these onset of the leg pain. Prednisone 60 mg daily started. After 1 week there was no impact on cramps. Flexeril helps sleep but no benefit with leg pain. The prednisone was only taken for only 3 days when changed to Flexeril. He passes stool 2-3 times daily which is more than usual. No bleeding. Overall the leg cramping is less severe but still recurring. He is off of atorvastatin and remains on Tricor. The muscle pains do not happen every day, but occur 3-4 days per week. He is working and is working from home more often at present. \par \par 10/25/2019: Patient has noticed the development of multiple new pigmented lesions on his scalp and right upper arm. He saw his dermatologist and 3 biopsies were done. The one on the arm had an epidermal component; whereas neither of the scalp lesions had epidermal component. Both were dermal only, and suggested metastatic disease. This would suggest in-transit relapse since the lesions are close to his right scalp resection from 2018. To summarize, he had received adjuvant nivolumab from 1228/2018 to 3/22/2019. It was discontinued due to persistent thigh pain and fasciitis discovered on MRI. He had also had grade 2 diarrhea that was self limited. Since the biopsies 2 weeks ago, he has noticed additional lesions on the left supraclavicular area and the left pectoral region. These are both pigmented and ~4-5 mm in diameter. There are also new lesions on the scalp. Overall he feels well and has no new symptoms, and no residual immune related symptoms from the prior treatment. \par \par 11/12/2019: Patient here for C#1 D#1 of ipilimumab and nivolumab. We re-reviewed potential side effects and benefits. He continues to note the development of new small melanoma lesions on his scalp and upper chest. He is aware that PET and MRI showed no internal disease. We discussed that BRAFi+MEKi could be considered as an alternative based on his historic BRAF V600 mutation. He wants to proceed with immunotherapy and understands that side effects can emerge. [de-identified] : Surgical Oncology: Sarwat Chandler [phone]\par Plastics: Kendall Toussaint [phone]\par Medical Oncology ([facility]): Kendall Sands  [phone]\par Medical Oncology: Melanoma ([facility]): Gopi Arredondo [phone]\par Dermatology: Mariah Spring; [phone]\par

## 2019-11-25 ENCOUNTER — OUTPATIENT (OUTPATIENT)
Dept: OUTPATIENT SERVICES | Facility: HOSPITAL | Age: 49
LOS: 1 days | End: 2019-11-25
Payer: COMMERCIAL

## 2019-11-25 ENCOUNTER — RESULT REVIEW (OUTPATIENT)
Age: 49
End: 2019-11-25

## 2019-11-25 ENCOUNTER — APPOINTMENT (OUTPATIENT)
Dept: SURGERY | Facility: HOSPITAL | Age: 49
End: 2019-11-25
Payer: COMMERCIAL

## 2019-11-25 DIAGNOSIS — Z98.89 OTHER SPECIFIED POSTPROCEDURAL STATES: Chronic | ICD-10-CM

## 2019-11-25 DIAGNOSIS — I89.9 NONINFECTIVE DISORDER OF LYMPHATIC VESSELS AND LYMPH NODES, UNSPECIFIED: Chronic | ICD-10-CM

## 2019-11-25 DIAGNOSIS — C43.4 MALIGNANT MELANOMA OF SCALP AND NECK: Chronic | ICD-10-CM

## 2019-11-25 DIAGNOSIS — Z86.010 PERSONAL HISTORY OF COLONIC POLYPS: ICD-10-CM

## 2019-11-25 DIAGNOSIS — Z90.79 ACQUIRED ABSENCE OF OTHER GENITAL ORGAN(S): Chronic | ICD-10-CM

## 2019-11-25 PROCEDURE — 45385 COLONOSCOPY W/LESION REMOVAL: CPT | Mod: PT

## 2019-11-25 PROCEDURE — 45385 COLONOSCOPY W/LESION REMOVAL: CPT

## 2019-11-25 PROCEDURE — 45380 COLONOSCOPY AND BIOPSY: CPT | Mod: PT,XS

## 2019-11-25 PROCEDURE — 88305 TISSUE EXAM BY PATHOLOGIST: CPT | Mod: 26

## 2019-11-25 PROCEDURE — 88305 TISSUE EXAM BY PATHOLOGIST: CPT

## 2019-11-26 ENCOUNTER — OUTPATIENT (OUTPATIENT)
Dept: OUTPATIENT SERVICES | Facility: HOSPITAL | Age: 49
LOS: 1 days | Discharge: HOME | End: 2019-11-26

## 2019-11-26 DIAGNOSIS — C43.4 MALIGNANT MELANOMA OF SCALP AND NECK: Chronic | ICD-10-CM

## 2019-11-26 DIAGNOSIS — Z98.89 OTHER SPECIFIED POSTPROCEDURAL STATES: Chronic | ICD-10-CM

## 2019-11-26 DIAGNOSIS — I89.9 NONINFECTIVE DISORDER OF LYMPHATIC VESSELS AND LYMPH NODES, UNSPECIFIED: Chronic | ICD-10-CM

## 2019-11-26 DIAGNOSIS — C43.8 MALIGNANT MELANOMA OF OVERLAPPING SITES OF SKIN: ICD-10-CM

## 2019-11-26 DIAGNOSIS — Z90.79 ACQUIRED ABSENCE OF OTHER GENITAL ORGAN(S): Chronic | ICD-10-CM

## 2019-11-26 LAB — SURGICAL PATHOLOGY STUDY: SIGNIFICANT CHANGE UP

## 2019-11-27 ENCOUNTER — RESULT REVIEW (OUTPATIENT)
Age: 49
End: 2019-11-27

## 2019-11-27 ENCOUNTER — APPOINTMENT (OUTPATIENT)
Dept: HEMATOLOGY ONCOLOGY | Facility: CLINIC | Age: 49
End: 2019-11-27

## 2019-11-27 LAB
ALBUMIN SERPL ELPH-MCNC: 4.7 G/DL
ALP BLD-CCNC: 73 U/L
ALT SERPL-CCNC: 34 U/L
ANION GAP SERPL CALC-SCNC: 12 MMOL/L
AST SERPL-CCNC: 22 U/L
BASOPHILS # BLD AUTO: 0 K/UL — SIGNIFICANT CHANGE UP (ref 0–0.2)
BASOPHILS NFR BLD AUTO: 0.6 % — SIGNIFICANT CHANGE UP (ref 0–2)
BILIRUB SERPL-MCNC: 0.7 MG/DL
BUN SERPL-MCNC: 23 MG/DL
CALCIUM SERPL-MCNC: 9.9 MG/DL
CHLORIDE SERPL-SCNC: 99 MMOL/L
CO2 SERPL-SCNC: 26 MMOL/L
CREAT SERPL-MCNC: 1.02 MG/DL
CRP SERPL-MCNC: 0.28 MG/DL
EOSINOPHIL # BLD AUTO: 0.3 K/UL — SIGNIFICANT CHANGE UP (ref 0–0.5)
EOSINOPHIL NFR BLD AUTO: 4.2 % — SIGNIFICANT CHANGE UP (ref 0–6)
ERYTHROCYTE [SEDIMENTATION RATE] IN BLOOD: 6 MM/HR — SIGNIFICANT CHANGE UP (ref 0–15)
GLUCOSE SERPL-MCNC: 107 MG/DL
HCT VFR BLD CALC: 45.9 % — SIGNIFICANT CHANGE UP (ref 39–50)
HGB BLD-MCNC: 15.4 G/DL — SIGNIFICANT CHANGE UP (ref 13–17)
LDH SERPL-CCNC: 173 U/L
LYMPHOCYTES # BLD AUTO: 2.3 K/UL — SIGNIFICANT CHANGE UP (ref 1–3.3)
LYMPHOCYTES # BLD AUTO: 32.8 % — SIGNIFICANT CHANGE UP (ref 13–44)
MCHC RBC-ENTMCNC: 29.1 PG — SIGNIFICANT CHANGE UP (ref 27–34)
MCHC RBC-ENTMCNC: 33.6 G/DL — SIGNIFICANT CHANGE UP (ref 32–36)
MCV RBC AUTO: 86.8 FL — SIGNIFICANT CHANGE UP (ref 80–100)
MONOCYTES # BLD AUTO: 0.6 K/UL — SIGNIFICANT CHANGE UP (ref 0–0.9)
MONOCYTES NFR BLD AUTO: 8.5 % — SIGNIFICANT CHANGE UP (ref 2–14)
NEUTROPHILS # BLD AUTO: 3.7 K/UL — SIGNIFICANT CHANGE UP (ref 1.8–7.4)
NEUTROPHILS NFR BLD AUTO: 53.8 % — SIGNIFICANT CHANGE UP (ref 43–77)
PLATELET # BLD AUTO: 246 K/UL — SIGNIFICANT CHANGE UP (ref 150–400)
POTASSIUM SERPL-SCNC: 5 MMOL/L
PROT SERPL-MCNC: 7.4 G/DL
RBC # BLD: 5.29 M/UL — SIGNIFICANT CHANGE UP (ref 4.2–5.8)
RBC # FLD: 12.8 % — SIGNIFICANT CHANGE UP (ref 10.3–14.5)
SODIUM SERPL-SCNC: 137 MMOL/L
TSH SERPL-ACNC: 2.46 UIU/ML
WBC # BLD: 6.9 K/UL — SIGNIFICANT CHANGE UP (ref 3.8–10.5)
WBC # FLD AUTO: 6.9 K/UL — SIGNIFICANT CHANGE UP (ref 3.8–10.5)

## 2019-12-03 ENCOUNTER — RESULT REVIEW (OUTPATIENT)
Age: 49
End: 2019-12-03

## 2019-12-03 ENCOUNTER — APPOINTMENT (OUTPATIENT)
Dept: INFUSION THERAPY | Facility: HOSPITAL | Age: 49
End: 2019-12-03

## 2019-12-03 ENCOUNTER — LABORATORY RESULT (OUTPATIENT)
Age: 49
End: 2019-12-03

## 2019-12-03 ENCOUNTER — OTHER (OUTPATIENT)
Age: 49
End: 2019-12-03

## 2019-12-03 LAB
BASOPHILS # BLD AUTO: 0.1 K/UL — SIGNIFICANT CHANGE UP (ref 0–0.2)
BASOPHILS NFR BLD AUTO: 1.2 % — SIGNIFICANT CHANGE UP (ref 0–2)
EOSINOPHIL # BLD AUTO: 0.3 K/UL — SIGNIFICANT CHANGE UP (ref 0–0.5)
EOSINOPHIL NFR BLD AUTO: 4.9 % — SIGNIFICANT CHANGE UP (ref 0–6)
HCT VFR BLD CALC: 42.2 % — SIGNIFICANT CHANGE UP (ref 39–50)
HGB BLD-MCNC: 14.2 G/DL — SIGNIFICANT CHANGE UP (ref 13–17)
LYMPHOCYTES # BLD AUTO: 1.9 K/UL — SIGNIFICANT CHANGE UP (ref 1–3.3)
LYMPHOCYTES # BLD AUTO: 32 % — SIGNIFICANT CHANGE UP (ref 13–44)
MCHC RBC-ENTMCNC: 29.2 PG — SIGNIFICANT CHANGE UP (ref 27–34)
MCHC RBC-ENTMCNC: 33.7 G/DL — SIGNIFICANT CHANGE UP (ref 32–36)
MCV RBC AUTO: 86.7 FL — SIGNIFICANT CHANGE UP (ref 80–100)
MONOCYTES # BLD AUTO: 0.7 K/UL — SIGNIFICANT CHANGE UP (ref 0–0.9)
MONOCYTES NFR BLD AUTO: 11.2 % — SIGNIFICANT CHANGE UP (ref 2–14)
NEUTROPHILS # BLD AUTO: 3.1 K/UL — SIGNIFICANT CHANGE UP (ref 1.8–7.4)
NEUTROPHILS NFR BLD AUTO: 50.7 % — SIGNIFICANT CHANGE UP (ref 43–77)
PLATELET # BLD AUTO: 217 K/UL — SIGNIFICANT CHANGE UP (ref 150–400)
RBC # BLD: 4.86 M/UL — SIGNIFICANT CHANGE UP (ref 4.2–5.8)
RBC # FLD: 12.9 % — SIGNIFICANT CHANGE UP (ref 10.3–14.5)
WBC # BLD: 6.1 K/UL — SIGNIFICANT CHANGE UP (ref 3.8–10.5)
WBC # FLD AUTO: 6.1 K/UL — SIGNIFICANT CHANGE UP (ref 3.8–10.5)

## 2019-12-04 DIAGNOSIS — Z51.11 ENCOUNTER FOR ANTINEOPLASTIC CHEMOTHERAPY: ICD-10-CM

## 2019-12-13 ENCOUNTER — TRANSCRIPTION ENCOUNTER (OUTPATIENT)
Age: 49
End: 2019-12-13

## 2019-12-20 ENCOUNTER — RESULT REVIEW (OUTPATIENT)
Age: 49
End: 2019-12-20

## 2019-12-20 ENCOUNTER — APPOINTMENT (OUTPATIENT)
Dept: HEMATOLOGY ONCOLOGY | Facility: CLINIC | Age: 49
End: 2019-12-20

## 2019-12-20 LAB
ALBUMIN SERPL ELPH-MCNC: 4.6 G/DL
ALP BLD-CCNC: 73 U/L
ALT SERPL-CCNC: 46 U/L
ANION GAP SERPL CALC-SCNC: 16 MMOL/L
AST SERPL-CCNC: 26 U/L
BASOPHILS # BLD AUTO: 0 K/UL — SIGNIFICANT CHANGE UP (ref 0–0.2)
BASOPHILS NFR BLD AUTO: 0.1 % — SIGNIFICANT CHANGE UP (ref 0–2)
BILIRUB SERPL-MCNC: 0.3 MG/DL
BUN SERPL-MCNC: 21 MG/DL
CALCIUM SERPL-MCNC: 10.2 MG/DL
CHLORIDE SERPL-SCNC: 98 MMOL/L
CO2 SERPL-SCNC: 24 MMOL/L
CREAT SERPL-MCNC: 0.9 MG/DL
CRP SERPL-MCNC: 0.32 MG/DL
EOSINOPHIL # BLD AUTO: 0 K/UL — SIGNIFICANT CHANGE UP (ref 0–0.5)
EOSINOPHIL NFR BLD AUTO: 0.1 % — SIGNIFICANT CHANGE UP (ref 0–6)
ERYTHROCYTE [SEDIMENTATION RATE] IN BLOOD: 8 MM/HR — SIGNIFICANT CHANGE UP (ref 0–15)
GLUCOSE SERPL-MCNC: 167 MG/DL
HCT VFR BLD CALC: 40.9 % — SIGNIFICANT CHANGE UP (ref 39–50)
HGB BLD-MCNC: 13.8 G/DL — SIGNIFICANT CHANGE UP (ref 13–17)
LYMPHOCYTES # BLD AUTO: 1.6 K/UL — SIGNIFICANT CHANGE UP (ref 1–3.3)
LYMPHOCYTES # BLD AUTO: 18.7 % — SIGNIFICANT CHANGE UP (ref 13–44)
MCHC RBC-ENTMCNC: 29.6 PG — SIGNIFICANT CHANGE UP (ref 27–34)
MCHC RBC-ENTMCNC: 33.8 G/DL — SIGNIFICANT CHANGE UP (ref 32–36)
MCV RBC AUTO: 87.7 FL — SIGNIFICANT CHANGE UP (ref 80–100)
MONOCYTES # BLD AUTO: 0.2 K/UL — SIGNIFICANT CHANGE UP (ref 0–0.9)
MONOCYTES NFR BLD AUTO: 2.8 % — SIGNIFICANT CHANGE UP (ref 2–14)
NEUTROPHILS # BLD AUTO: 6.6 K/UL — SIGNIFICANT CHANGE UP (ref 1.8–7.4)
NEUTROPHILS NFR BLD AUTO: 78.3 % — HIGH (ref 43–77)
PLATELET # BLD AUTO: 269 K/UL — SIGNIFICANT CHANGE UP (ref 150–400)
POTASSIUM SERPL-SCNC: 4.3 MMOL/L
PROT SERPL-MCNC: 7.1 G/DL
RBC # BLD: 4.66 M/UL — SIGNIFICANT CHANGE UP (ref 4.2–5.8)
RBC # FLD: 13.3 % — SIGNIFICANT CHANGE UP (ref 10.3–14.5)
RHEUMATOID FACT SER QL: 13 IU/ML
SODIUM SERPL-SCNC: 138 MMOL/L
TSH SERPL-ACNC: 0.5 UIU/ML
WBC # BLD: 8.4 K/UL — SIGNIFICANT CHANGE UP (ref 3.8–10.5)
WBC # FLD AUTO: 8.4 K/UL — SIGNIFICANT CHANGE UP (ref 3.8–10.5)

## 2019-12-24 ENCOUNTER — RESULT REVIEW (OUTPATIENT)
Age: 49
End: 2019-12-24

## 2019-12-24 ENCOUNTER — LABORATORY RESULT (OUTPATIENT)
Age: 49
End: 2019-12-24

## 2019-12-24 ENCOUNTER — APPOINTMENT (OUTPATIENT)
Dept: INFUSION THERAPY | Facility: HOSPITAL | Age: 49
End: 2019-12-24

## 2019-12-24 LAB
BASOPHILS # BLD AUTO: 0.1 K/UL — SIGNIFICANT CHANGE UP (ref 0–0.2)
BASOPHILS NFR BLD AUTO: 0.7 % — SIGNIFICANT CHANGE UP (ref 0–2)
DSDNA AB SER-ACNC: <12 IU/ML
EOSINOPHIL # BLD AUTO: 0.4 K/UL — SIGNIFICANT CHANGE UP (ref 0–0.5)
EOSINOPHIL NFR BLD AUTO: 4.9 % — SIGNIFICANT CHANGE UP (ref 0–6)
ERYTHROCYTE [SEDIMENTATION RATE] IN BLOOD: 7 MM/HR — SIGNIFICANT CHANGE UP (ref 0–15)
HCT VFR BLD CALC: 46.6 % — SIGNIFICANT CHANGE UP (ref 39–50)
HGB BLD-MCNC: 15.8 G/DL — SIGNIFICANT CHANGE UP (ref 13–17)
LYMPHOCYTES # BLD AUTO: 3.1 K/UL — SIGNIFICANT CHANGE UP (ref 1–3.3)
LYMPHOCYTES # BLD AUTO: 37.6 % — SIGNIFICANT CHANGE UP (ref 13–44)
MCHC RBC-ENTMCNC: 29.5 PG — SIGNIFICANT CHANGE UP (ref 27–34)
MCHC RBC-ENTMCNC: 33.9 G/DL — SIGNIFICANT CHANGE UP (ref 32–36)
MCV RBC AUTO: 87 FL — SIGNIFICANT CHANGE UP (ref 80–100)
MONOCYTES # BLD AUTO: 0.6 K/UL — SIGNIFICANT CHANGE UP (ref 0–0.9)
MONOCYTES NFR BLD AUTO: 7.9 % — SIGNIFICANT CHANGE UP (ref 2–14)
NEUTROPHILS # BLD AUTO: 4 K/UL — SIGNIFICANT CHANGE UP (ref 1.8–7.4)
NEUTROPHILS NFR BLD AUTO: 48.9 % — SIGNIFICANT CHANGE UP (ref 43–77)
PLATELET # BLD AUTO: 293 K/UL — SIGNIFICANT CHANGE UP (ref 150–400)
RBC # BLD: 5.36 M/UL — SIGNIFICANT CHANGE UP (ref 4.2–5.8)
RBC # FLD: 13 % — SIGNIFICANT CHANGE UP (ref 10.3–14.5)
WBC # BLD: 8.2 K/UL — SIGNIFICANT CHANGE UP (ref 3.8–10.5)
WBC # FLD AUTO: 8.2 K/UL — SIGNIFICANT CHANGE UP (ref 3.8–10.5)

## 2019-12-25 LAB — ANA SER IF-ACNC: NEGATIVE

## 2019-12-26 ENCOUNTER — INBOUND DOCUMENT (OUTPATIENT)
Age: 49
End: 2019-12-26

## 2019-12-30 ENCOUNTER — INBOUND DOCUMENT (OUTPATIENT)
Age: 49
End: 2019-12-30

## 2020-01-01 ENCOUNTER — TRANSCRIPTION ENCOUNTER (OUTPATIENT)
Age: 50
End: 2020-01-01

## 2020-01-08 NOTE — ASU PATIENT PROFILE, ADULT - ALCOHOL USE HISTORY SINGLE SELECT
never Double O-Z Plasty Text: The defect edges were debeveled with a #15 scalpel blade.  Given the location of the defect, shape of the defect and the proximity to free margins a Double O-Z plasty (double transposition flap) was deemed most appropriate.  Using a sterile surgical marker, the appropriate transposition flaps were drawn incorporating the defect and placing the expected incisions within the relaxed skin tension lines where possible. The area thus outlined was incised deep to adipose tissue with a #15 scalpel blade.  The skin margins were undermined to an appropriate distance in all directions utilizing iris scissors.  Hemostasis was achieved with electrocautery.  The flaps were then transposed into place, one clockwise and the other counterclockwise, and anchored with interrupted buried subcutaneous sutures.

## 2020-01-10 ENCOUNTER — OUTPATIENT (OUTPATIENT)
Dept: OUTPATIENT SERVICES | Facility: HOSPITAL | Age: 50
LOS: 1 days | Discharge: HOME | End: 2020-01-10

## 2020-01-10 DIAGNOSIS — Z98.89 OTHER SPECIFIED POSTPROCEDURAL STATES: Chronic | ICD-10-CM

## 2020-01-10 DIAGNOSIS — C43.8 MALIGNANT MELANOMA OF OVERLAPPING SITES OF SKIN: ICD-10-CM

## 2020-01-10 DIAGNOSIS — I89.9 NONINFECTIVE DISORDER OF LYMPHATIC VESSELS AND LYMPH NODES, UNSPECIFIED: Chronic | ICD-10-CM

## 2020-01-10 DIAGNOSIS — Z90.79 ACQUIRED ABSENCE OF OTHER GENITAL ORGAN(S): Chronic | ICD-10-CM

## 2020-01-10 DIAGNOSIS — C43.4 MALIGNANT MELANOMA OF SCALP AND NECK: Chronic | ICD-10-CM

## 2020-01-13 NOTE — CONSULT LETTER
[Courtesy Letter:] : I had the pleasure of seeing your patient, [unfilled], in my office today. [Dear  ___] : Dear  [unfilled], [Sincerely,] : Sincerely, [Please see my note below.] : Please see my note below. [DrNichole  ___] : Dr. ALVARADO

## 2020-01-14 ENCOUNTER — RESULT REVIEW (OUTPATIENT)
Age: 50
End: 2020-01-14

## 2020-01-14 ENCOUNTER — APPOINTMENT (OUTPATIENT)
Dept: HEMATOLOGY ONCOLOGY | Facility: CLINIC | Age: 50
End: 2020-01-14
Payer: SELF-PAY

## 2020-01-14 VITALS
SYSTOLIC BLOOD PRESSURE: 123 MMHG | BODY MASS INDEX: 25.72 KG/M2 | OXYGEN SATURATION: 99 % | WEIGHT: 179.21 LBS | HEART RATE: 80 BPM | RESPIRATION RATE: 18 BRPM | TEMPERATURE: 97.6 F | DIASTOLIC BLOOD PRESSURE: 83 MMHG

## 2020-01-14 LAB
BASOPHILS # BLD AUTO: 0.1 K/UL — SIGNIFICANT CHANGE UP (ref 0–0.2)
BASOPHILS NFR BLD AUTO: 0.8 % — SIGNIFICANT CHANGE UP (ref 0–2)
EOSINOPHIL # BLD AUTO: 0.2 K/UL — SIGNIFICANT CHANGE UP (ref 0–0.5)
EOSINOPHIL NFR BLD AUTO: 2.4 % — SIGNIFICANT CHANGE UP (ref 0–6)
ERYTHROCYTE [SEDIMENTATION RATE] IN BLOOD: 19 MM/HR — SIGNIFICANT CHANGE UP (ref 0–20)
HCT VFR BLD CALC: 47.2 % — SIGNIFICANT CHANGE UP (ref 39–50)
HGB BLD-MCNC: 15.8 G/DL — SIGNIFICANT CHANGE UP (ref 13–17)
LYMPHOCYTES # BLD AUTO: 3 K/UL — SIGNIFICANT CHANGE UP (ref 1–3.3)
LYMPHOCYTES # BLD AUTO: 34.4 % — SIGNIFICANT CHANGE UP (ref 13–44)
MCHC RBC-ENTMCNC: 29.6 PG — SIGNIFICANT CHANGE UP (ref 27–34)
MCHC RBC-ENTMCNC: 33.6 G/DL — SIGNIFICANT CHANGE UP (ref 32–36)
MCV RBC AUTO: 88.1 FL — SIGNIFICANT CHANGE UP (ref 80–100)
MONOCYTES # BLD AUTO: 0.6 K/UL — SIGNIFICANT CHANGE UP (ref 0–0.9)
MONOCYTES NFR BLD AUTO: 6.7 % — SIGNIFICANT CHANGE UP (ref 2–14)
NEUTROPHILS # BLD AUTO: 4.8 K/UL — SIGNIFICANT CHANGE UP (ref 1.8–7.4)
NEUTROPHILS NFR BLD AUTO: 55.7 % — SIGNIFICANT CHANGE UP (ref 43–77)
PLATELET # BLD AUTO: 367 K/UL — SIGNIFICANT CHANGE UP (ref 150–400)
RBC # BLD: 5.36 M/UL — SIGNIFICANT CHANGE UP (ref 4.2–5.8)
RBC # FLD: 12.6 % — SIGNIFICANT CHANGE UP (ref 10.3–14.5)
WBC # BLD: 8.6 K/UL — SIGNIFICANT CHANGE UP (ref 3.8–10.5)
WBC # FLD AUTO: 8.6 K/UL — SIGNIFICANT CHANGE UP (ref 3.8–10.5)

## 2020-01-14 PROCEDURE — 99214 OFFICE O/P EST MOD 30 MIN: CPT

## 2020-01-14 NOTE — REVIEW OF SYSTEMS
[Negative] : Allergic/Immunologic [de-identified] : new skin lesions. [FreeTextEntry9] : Mild curvature of thoracic spine noted. Has chronic back pain and left knee pain.

## 2020-01-14 NOTE — PHYSICAL EXAM
[Restricted in physically strenuous activity but ambulatory and able to carry out work of a light or sedentary nature] : Status 1- Restricted in physically strenuous activity but ambulatory and able to carry out work of a light or sedentary nature, e.g., light house work, office work [Normal] : grossly intact [de-identified] : scattered small (~2-3 mm) pigmented lesions on the scalp. These are not changed in size or number.

## 2020-01-14 NOTE — HISTORY OF PRESENT ILLNESS
[Disease: _____________________] : Disease: [unfilled] [N: ___] : N[unfilled] [T: ___] : T[unfilled] [M: ___] : M[unfilled] [AJCC Stage: ____] : AJCC Stage: [unfilled] [de-identified] : Mr Mclaughlin is a 48 year old male with past medical history of stage II testicular CA (1994) treated with surgery, chemotherapy (BEP) and an autologous BMT and Non-Hodgkins lymphoma right neck neck (1998) for which he underwent radiation therapy presenting for transfer of care for melanoma.  \par \par He noticed a raised, black, pruritic lesion on his right parietal scalp.  Biopsy was performed on 8/14/2018 which was consistent with a 1.1mm melanoma with no significant mitotic figures, no ulceration and no regression.\par He was seen by Dr Sarwat Chandler on 8/13/2018 who recommended wide excision of melanoma with SLNB and reconstruction.\par On 8/29/2018 patient underwent radical resection of scalp melanoma, right posterior modified neck dissection with closure by Dr Bradley Toussaint (Plastic)\par Final Path: metastatic melanoma present in 2/2 LN, residual melanoma, negative margins, no lymphovascular invasion.  Tumor stage pT2a pN2a\par PET/CT 9/15/2018: Minimally FDG avid soft tissue thickening right scalp probably postsurgical.FDG avid focal area of soft tissue thickening right neck, favor postsurgical changes rather than residual disease. Suggest correlation with clinical exam and surgical margins. No FDG avid distant metastatic disease. \par On 10/19/2018 underwent right functional neck dissection with Dr Sarwat Chandler\par Path: no metastatic melanoma seen in eighteen LN (0/18) and benign skin/tissue findings.\par He was referred to Dr Kendall Sands (Medical Oncology) at [facility]. \par He was originally seen by Dr Kendall Sands who referred the patient to Dr Gopi Arredondo at [facility].  Patient has stage IIIa (T2aN2a) BRAF testing was performed and mutation was detected. The patient was offered adjuvant Nivolumab 480mg every four weeks by Dr Arredondo for 13 cycles.\par \par 1/25/2019: Mr Mclaughlin is here for follow up.  He is scheduled for C#2 Nivolumab today.  He tolerated C#1 extremely well and reports no adverse events.\par \par 2/22/2019: Mr Mclaughlin is here for C#3 Nivolumab today.  He feels well today and is tolerating Nivo without any adverse events.  He continues to remain active and works as a .  He does reports two episode of diarrhea x 3 weeks after his last treatment.  He took Imodium with excellent relief. \par \par 3/22/2019: Patient Here for C#4 today. DEXA shows osteopenia. His Vit D level is 20 despite 50k units per week. No irAEs from treatment. \par \par 4/24/2019: As of 4/5/19 patient had leg cramps in the front and back of the legs. The intensity became severe. Took Tylenol and motrin with no relief. He also noted some loose stool just prior to these onset of the leg pain. Prednisone 60 mg daily started. After 1 week there was no impact on cramps. Flexeril helps sleep but no benefit with leg pain. The prednisone was only taken for only 3 days when changed to Flexeril. He passes stool 2-3 times daily which is more than usual. No bleeding. Overall the leg cramping is less severe but still recurring. He is off of atorvastatin and remains on Tricor. The muscle pains do not happen every day, but occur 3-4 days per week. He is working and is working from home more often at present. \par \par 10/25/2019: Patient has noticed the development of multiple new pigmented lesions on his scalp and right upper arm. He saw his dermatologist and 3 biopsies were done. The one on the arm had an epidermal component; whereas neither of the scalp lesions had epidermal component. Both were dermal only, and suggested metastatic disease. This would suggest in-transit relapse since the lesions are close to his right scalp resection from 2018. To summarize, he had received adjuvant nivolumab from 1228/2018 to 3/22/2019. It was discontinued due to persistent thigh pain and fasciitis discovered on MRI. He had also had grade 2 diarrhea that was self limited. Since the biopsies 2 weeks ago, he has noticed additional lesions on the left supraclavicular area and the left pectoral region. These are both pigmented and ~4-5 mm in diameter. There are also new lesions on the scalp. Overall he feels well and has no new symptoms, and no residual immune related symptoms from the prior treatment. \par \par 1/14/2020: On 11/12/2019 C#1 D#1 of ipilimumab and nivolumab. He notes no development of new small melanoma lesions on his scalp or chest. Existing ones remian stable. He is aware that PET and MRI showed no internal disease. We discussed that BRAFi+MEKi could be considered as an alternative based on his historic BRAF V600 mutation. He is using Immodium 2 tablets daily on occasion, and not needed daily. Given lack of progression or significant irAEs, will continue with IPI and NIVO.  [de-identified] : melanoma [de-identified] : Surgical Oncology: Sarwat Chandler [phone]\par Plastics: Kendall Toussaint [phone]\par Medical Oncology ([facility]): Kendall Sands  [phone]\par Medical Oncology: Melanoma ([facility]): Gopi Arredondo [phone]\par Dermatology: Mariah Spring; [phone]\par

## 2020-01-15 ENCOUNTER — APPOINTMENT (OUTPATIENT)
Dept: INFUSION THERAPY | Facility: HOSPITAL | Age: 50
End: 2020-01-15

## 2020-01-16 DIAGNOSIS — Z51.11 ENCOUNTER FOR ANTINEOPLASTIC CHEMOTHERAPY: ICD-10-CM

## 2020-01-16 DIAGNOSIS — R11.2 NAUSEA WITH VOMITING, UNSPECIFIED: ICD-10-CM

## 2020-01-16 LAB
ALBUMIN SERPL ELPH-MCNC: 4.9 G/DL
ALDOLASE SERPL-CCNC: 3.6 U/L
ALP BLD-CCNC: 97 U/L
ALT SERPL-CCNC: 20 U/L
ANION GAP SERPL CALC-SCNC: 16 MMOL/L
AST SERPL-CCNC: 15 U/L
BILIRUB SERPL-MCNC: 0.4 MG/DL
BUN SERPL-MCNC: 20 MG/DL
CALCIUM SERPL-MCNC: 10.7 MG/DL
CHLORIDE SERPL-SCNC: 95 MMOL/L
CK SERPL-CCNC: 66 U/L
CO2 SERPL-SCNC: 27 MMOL/L
CREAT SERPL-MCNC: 0.96 MG/DL
CRP SERPL-MCNC: 0.29 MG/DL
GLUCOSE SERPL-MCNC: 102 MG/DL
POTASSIUM SERPL-SCNC: 4.5 MMOL/L
PROT SERPL-MCNC: 7.7 G/DL
SODIUM SERPL-SCNC: 138 MMOL/L
TSH SERPL-ACNC: 2.08 UIU/ML

## 2020-02-02 ENCOUNTER — EMERGENCY (EMERGENCY)
Facility: HOSPITAL | Age: 50
LOS: 1 days | Discharge: HOME | End: 2020-02-02
Attending: STUDENT IN AN ORGANIZED HEALTH CARE EDUCATION/TRAINING PROGRAM
Payer: COMMERCIAL

## 2020-02-02 VITALS
OXYGEN SATURATION: 95 % | DIASTOLIC BLOOD PRESSURE: 113 MMHG | SYSTOLIC BLOOD PRESSURE: 149 MMHG | HEIGHT: 71 IN | TEMPERATURE: 97 F | WEIGHT: 184.97 LBS | HEART RATE: 92 BPM | RESPIRATION RATE: 18 BRPM

## 2020-02-02 VITALS
SYSTOLIC BLOOD PRESSURE: 135 MMHG | HEART RATE: 75 BPM | TEMPERATURE: 98 F | RESPIRATION RATE: 18 BRPM | OXYGEN SATURATION: 97 % | DIASTOLIC BLOOD PRESSURE: 90 MMHG

## 2020-02-02 DIAGNOSIS — C43.4 MALIGNANT MELANOMA OF SCALP AND NECK: Chronic | ICD-10-CM

## 2020-02-02 DIAGNOSIS — Z98.89 OTHER SPECIFIED POSTPROCEDURAL STATES: Chronic | ICD-10-CM

## 2020-02-02 DIAGNOSIS — I89.9 NONINFECTIVE DISORDER OF LYMPHATIC VESSELS AND LYMPH NODES, UNSPECIFIED: Chronic | ICD-10-CM

## 2020-02-02 DIAGNOSIS — Z90.79 ACQUIRED ABSENCE OF OTHER GENITAL ORGAN(S): Chronic | ICD-10-CM

## 2020-02-02 LAB
ALBUMIN SERPL ELPH-MCNC: 4.5 G/DL — SIGNIFICANT CHANGE UP (ref 3.3–5)
ALP SERPL-CCNC: 70 U/L — SIGNIFICANT CHANGE UP (ref 40–120)
ALT FLD-CCNC: 21 U/L — SIGNIFICANT CHANGE UP (ref 10–45)
ANION GAP SERPL CALC-SCNC: 11 MMOL/L — SIGNIFICANT CHANGE UP (ref 5–17)
AST SERPL-CCNC: 18 U/L — SIGNIFICANT CHANGE UP (ref 10–40)
BASOPHILS # BLD AUTO: 0.07 K/UL — SIGNIFICANT CHANGE UP (ref 0–0.2)
BASOPHILS NFR BLD AUTO: 1 % — SIGNIFICANT CHANGE UP (ref 0–2)
BILIRUB SERPL-MCNC: 0.3 MG/DL — SIGNIFICANT CHANGE UP (ref 0.2–1.2)
BUN SERPL-MCNC: 20 MG/DL — SIGNIFICANT CHANGE UP (ref 7–23)
CALCIUM SERPL-MCNC: 9.8 MG/DL — SIGNIFICANT CHANGE UP (ref 8.4–10.5)
CHLORIDE SERPL-SCNC: 98 MMOL/L — SIGNIFICANT CHANGE UP (ref 96–108)
CO2 SERPL-SCNC: 25 MMOL/L — SIGNIFICANT CHANGE UP (ref 22–31)
CREAT SERPL-MCNC: 0.95 MG/DL — SIGNIFICANT CHANGE UP (ref 0.5–1.3)
EOSINOPHIL # BLD AUTO: 0.29 K/UL — SIGNIFICANT CHANGE UP (ref 0–0.5)
EOSINOPHIL NFR BLD AUTO: 4.2 % — SIGNIFICANT CHANGE UP (ref 0–6)
GLUCOSE SERPL-MCNC: 89 MG/DL — SIGNIFICANT CHANGE UP (ref 70–99)
HCT VFR BLD CALC: 46.8 % — SIGNIFICANT CHANGE UP (ref 39–50)
HGB BLD-MCNC: 14.6 G/DL — SIGNIFICANT CHANGE UP (ref 13–17)
IMM GRANULOCYTES NFR BLD AUTO: 0.1 % — SIGNIFICANT CHANGE UP (ref 0–1.5)
LYMPHOCYTES # BLD AUTO: 2.62 K/UL — SIGNIFICANT CHANGE UP (ref 1–3.3)
LYMPHOCYTES # BLD AUTO: 38.1 % — SIGNIFICANT CHANGE UP (ref 13–44)
MCHC RBC-ENTMCNC: 28.2 PG — SIGNIFICANT CHANGE UP (ref 27–34)
MCHC RBC-ENTMCNC: 31.2 GM/DL — LOW (ref 32–36)
MCV RBC AUTO: 90.5 FL — SIGNIFICANT CHANGE UP (ref 80–100)
MONOCYTES # BLD AUTO: 0.46 K/UL — SIGNIFICANT CHANGE UP (ref 0–0.9)
MONOCYTES NFR BLD AUTO: 6.7 % — SIGNIFICANT CHANGE UP (ref 2–14)
NEUTROPHILS # BLD AUTO: 3.43 K/UL — SIGNIFICANT CHANGE UP (ref 1.8–7.4)
NEUTROPHILS NFR BLD AUTO: 49.9 % — SIGNIFICANT CHANGE UP (ref 43–77)
NRBC # BLD: 0 /100 WBCS — SIGNIFICANT CHANGE UP (ref 0–0)
PLATELET # BLD AUTO: 241 K/UL — SIGNIFICANT CHANGE UP (ref 150–400)
POTASSIUM SERPL-MCNC: 3.9 MMOL/L — SIGNIFICANT CHANGE UP (ref 3.5–5.3)
POTASSIUM SERPL-SCNC: 3.9 MMOL/L — SIGNIFICANT CHANGE UP (ref 3.5–5.3)
PROT SERPL-MCNC: 7.5 G/DL — SIGNIFICANT CHANGE UP (ref 6–8.3)
RBC # BLD: 5.17 M/UL — SIGNIFICANT CHANGE UP (ref 4.2–5.8)
RBC # FLD: 13.7 % — SIGNIFICANT CHANGE UP (ref 10.3–14.5)
SODIUM SERPL-SCNC: 134 MMOL/L — LOW (ref 135–145)
WBC # BLD: 6.88 K/UL — SIGNIFICANT CHANGE UP (ref 3.8–10.5)
WBC # FLD AUTO: 6.88 K/UL — SIGNIFICANT CHANGE UP (ref 3.8–10.5)

## 2020-02-02 PROCEDURE — 80053 COMPREHEN METABOLIC PANEL: CPT

## 2020-02-02 PROCEDURE — 96375 TX/PRO/DX INJ NEW DRUG ADDON: CPT

## 2020-02-02 PROCEDURE — 85027 COMPLETE CBC AUTOMATED: CPT

## 2020-02-02 PROCEDURE — 96366 THER/PROPH/DIAG IV INF ADDON: CPT

## 2020-02-02 PROCEDURE — 96365 THER/PROPH/DIAG IV INF INIT: CPT

## 2020-02-02 PROCEDURE — 70450 CT HEAD/BRAIN W/O DYE: CPT | Mod: 26

## 2020-02-02 PROCEDURE — 99285 EMERGENCY DEPT VISIT HI MDM: CPT

## 2020-02-02 PROCEDURE — 96372 THER/PROPH/DIAG INJ SC/IM: CPT | Mod: XU

## 2020-02-02 PROCEDURE — 70450 CT HEAD/BRAIN W/O DYE: CPT

## 2020-02-02 PROCEDURE — 99284 EMERGENCY DEPT VISIT MOD MDM: CPT | Mod: 25

## 2020-02-02 RX ORDER — SUMATRIPTAN SUCCINATE 4 MG/.5ML
6 INJECTION, SOLUTION SUBCUTANEOUS ONCE
Refills: 0 | Status: COMPLETED | OUTPATIENT
Start: 2020-02-02 | End: 2020-02-02

## 2020-02-02 RX ORDER — METOCLOPRAMIDE HCL 10 MG
10 TABLET ORAL ONCE
Refills: 0 | Status: COMPLETED | OUTPATIENT
Start: 2020-02-02 | End: 2020-02-02

## 2020-02-02 RX ORDER — ACETAMINOPHEN 500 MG
975 TABLET ORAL ONCE
Refills: 0 | Status: COMPLETED | OUTPATIENT
Start: 2020-02-02 | End: 2020-02-02

## 2020-02-02 RX ORDER — MAGNESIUM SULFATE 500 MG/ML
2 VIAL (ML) INJECTION ONCE
Refills: 0 | Status: COMPLETED | OUTPATIENT
Start: 2020-02-02 | End: 2020-02-02

## 2020-02-02 RX ORDER — METOCLOPRAMIDE HCL 10 MG
10 TABLET ORAL ONCE
Refills: 0 | Status: DISCONTINUED | OUTPATIENT
Start: 2020-02-02 | End: 2020-02-02

## 2020-02-02 RX ORDER — KETOROLAC TROMETHAMINE 30 MG/ML
15 SYRINGE (ML) INJECTION ONCE
Refills: 0 | Status: DISCONTINUED | OUTPATIENT
Start: 2020-02-02 | End: 2020-02-02

## 2020-02-02 RX ORDER — SODIUM CHLORIDE 9 MG/ML
1000 INJECTION INTRAMUSCULAR; INTRAVENOUS; SUBCUTANEOUS ONCE
Refills: 0 | Status: COMPLETED | OUTPATIENT
Start: 2020-02-02 | End: 2020-02-02

## 2020-02-02 RX ADMIN — Medication 15 MILLIGRAM(S): at 18:14

## 2020-02-02 RX ADMIN — Medication 2 GRAM(S): at 18:35

## 2020-02-02 RX ADMIN — Medication 975 MILLIGRAM(S): at 18:35

## 2020-02-02 RX ADMIN — SODIUM CHLORIDE 1000 MILLILITER(S): 9 INJECTION INTRAMUSCULAR; INTRAVENOUS; SUBCUTANEOUS at 15:12

## 2020-02-02 RX ADMIN — Medication 10 MILLIGRAM(S): at 15:11

## 2020-02-02 RX ADMIN — SODIUM CHLORIDE 1000 MILLILITER(S): 9 INJECTION INTRAMUSCULAR; INTRAVENOUS; SUBCUTANEOUS at 18:35

## 2020-02-02 RX ADMIN — Medication 50 GRAM(S): at 15:12

## 2020-02-02 RX ADMIN — SUMATRIPTAN SUCCINATE 6 MILLIGRAM(S): 4 INJECTION, SOLUTION SUBCUTANEOUS at 21:52

## 2020-02-02 RX ADMIN — Medication 975 MILLIGRAM(S): at 18:14

## 2020-02-02 RX ADMIN — Medication 15 MILLIGRAM(S): at 18:35

## 2020-02-02 RX ADMIN — SUMATRIPTAN SUCCINATE 6 MILLIGRAM(S): 4 INJECTION, SOLUTION SUBCUTANEOUS at 23:20

## 2020-02-02 NOTE — ED PROVIDER NOTE - NS ED ROS FT
REVIEW OF SYSTEMS:    CONSTITUTIONAL: No weakness, fevers or chills  EYES/ENT: No visual changes;  No vertigo or throat pain   NECK: No pain or stiffness  RESPIRATORY: No cough, wheezing, hemoptysis; No shortness of breath  CARDIOVASCULAR: No chest pain or palpitations  GASTROINTESTINAL: No abdominal or epigastric pain. No nausea, vomiting, or hematemesis; No diarrhea or constipation. No melena or hematochezia.  GENITOURINARY: No dysuria, frequency or hematuria  NEUROLOGICAL: + HA  SKIN: No itching, burning, rashes, or lesions   All other review of systems is negative unless indicated above.

## 2020-02-02 NOTE — CONSULT NOTE ADULT - SUBJECTIVE AND OBJECTIVE BOX
HPI: 50M pmhx testicular ca s/p orchiectomy ('94) NHL s/p chemo/rad ('98), ischemic colitis s/p prk-iujcmjicm-pi-open left colectomy ('11) here w/ intractable HA. Has been ongoing since past Monday, waxing and waning, 10/10 intensity.     ROS: as above    PAST MEDICAL & SURGICAL HISTORY:  Malignant melanoma of scalp: RSX 08/18  R neck mass dsx/ LN dsx 10/19/18  Hypertriglyceridemia  History of bone marrow transplant  Osteoarthritis: degenerative disc L3-4  BPH (benign prostatic hyperplasia)  Colitis: surgery 1996  GERD (gastroesophageal reflux disease)  NHL (non-Hodgkin's lymphoma): 1999, Radiation to left neck region  HTN (hypertension)  Headache, Migraine  Testicular Cancer: 1994, chemotherapy  Melanoma of scalp or neck: excision 8/18  s/p excision right neck mass &amp; LN dissx  Lymph node disorder: s/p abdominal lymph node dissection 1994, 1996  S/P colon resection: 1996  History of orchiectomy: left 1994    FAMILY HISTORY:  No pertinent family history in first degree relatives    SOCIAL HISTORY:   T/E/D:   Occupation:   Lives with:     MEDICATIONS (HOME):  Home Medications:  acetaminophen 325 mg oral tablet: 2 tab(s) orally every 6 hours, As needed, Moderate Pain (4 - 6) (24 Dec 2019 10:18)  Lipitor 40 mg oral tablet: 1 tab(s) orally once a day (24 Dec 2019 10:18)  naproxen: 400 milligram(s) orally once a day (24 Dec 2019 10:18)  Norvasc 5 mg oral tablet: 1 tab(s) orally once a day (24 Dec 2019 10:18)  omeprazole 40 mg oral delayed release capsule: 1 cap(s) orally once a day (24 Dec 2019 10:18)  Toprol-XL 50 mg oral tablet, extended release: 1 tab(s) orally once a day (24 Dec 2019 10:18)  TriCor 145 mg oral tablet: 1 tab(s) orally once a day (24 Dec 2019 10:18)  Vitamin B12 50 mcg oral tablet: 1 tab(s) orally once a day (24 Dec 2019 10:18)    MEDICATIONS  (STANDING):    MEDICATIONS  (PRN):    ALLERGIES/INTOLERANCES:  Allergies  No Known Allergies    Intolerances    VITALS & EXAMINATION:  Vital Signs Last 24 Hrs  T(C): 36.6 (02 Feb 2020 19:40), Max: 36.9 (02 Feb 2020 15:32)  T(F): 97.8 (02 Feb 2020 19:40), Max: 98.4 (02 Feb 2020 15:32)  HR: 99 (02 Feb 2020 19:40) (70 - 99)  BP: 146/84 (02 Feb 2020 19:40) (142/89 - 160/98)  BP(mean): 100 (02 Feb 2020 19:40) (100 - 100)  RR: 18 (02 Feb 2020 19:40) (12 - 18)  SpO2: 97% (02 Feb 2020 19:40) (95% - 100%)    PE:  MS: Awake, alert, oriented to person, place, situation, time. Normal affect. Follows all commands.    Language: Speech is clear, fluent with good repetition & comprehension     CNs: eyes moving spontaneously in all directions. well developed masseter muscles b/l. No facial asymmetry b/l. Symmetric palate elevation in midline. Gag reflex deferred. Tongue midline, normal movements, no atrophy.    Motor: Normal muscle bulk & tone. No noticeable tremor or seizure. No pronator drift.  Moving all extremities equally without laterality    Sensation: Intact to LT      LABORATORY:  CBC                       14.6   6.88  )-----------( 241      ( 02 Feb 2020 15:27 )             46.8     Chem 02-02    134<L>  |  98  |  20  ----------------------------<  89  3.9   |  25  |  0.95    Ca    9.8      02 Feb 2020 15:27    TPro  7.5  /  Alb  4.5  /  TBili  0.3  /  DBili  x   /  AST  18  /  ALT  21  /  AlkPhos  70  02-02    LFTs LIVER FUNCTIONS - ( 02 Feb 2020 15:27 )  Alb: 4.5 g/dL / Pro: 7.5 g/dL / ALK PHOS: 70 U/L / ALT: 21 U/L / AST: 18 U/L / GGT: x           Coagulopathy   Lipid Panel   A1c   Cardiac enzymes     U/A   CSF  Immunological  Other    STUDIES & IMAGING:  Studies (EKG, EEG, EMG, etc):     Radiology (XR, CT, MR, U/S, TTE/TITO): HPI: 50M pmhx testicular ca s/p orchiectomy ('94) NHL s/p chemo/rad ('98), ischemic colitis s/p wzm-endlfepzr-yx-open left colectomy ('11) here w/ intractable HA. Has been ongoing since past Monday, waxing and waning, 10/10 intensity. Very focused in the front right above R eye. Patient states that he's gotten dilaudid in the past for pains, nothing else has helped. No reported n/v/photo or phonophobia. Has seen Dr. Hawkins in the past.     ROS: as above    PAST MEDICAL & SURGICAL HISTORY:  Malignant melanoma of scalp: RSX 08/18  R neck mass dsx/ LN dsx 10/19/18  Hypertriglyceridemia  History of bone marrow transplant  Osteoarthritis: degenerative disc L3-4  BPH (benign prostatic hyperplasia)  Colitis: surgery 1996  GERD (gastroesophageal reflux disease)  NHL (non-Hodgkin's lymphoma): 1999, Radiation to left neck region  HTN (hypertension)  Headache, Migraine  Testicular Cancer: 1994, chemotherapy  Melanoma of scalp or neck: excision 8/18  s/p excision right neck mass &amp; LN dissx  Lymph node disorder: s/p abdominal lymph node dissection 1994, 1996  S/P colon resection: 1996  History of orchiectomy: left 1994    FAMILY HISTORY:  No pertinent family history in first degree relatives    SOCIAL HISTORY:   T/E/D:   Occupation:   Lives with:     MEDICATIONS (HOME):  Home Medications:  acetaminophen 325 mg oral tablet: 2 tab(s) orally every 6 hours, As needed, Moderate Pain (4 - 6) (24 Dec 2019 10:18)  Lipitor 40 mg oral tablet: 1 tab(s) orally once a day (24 Dec 2019 10:18)  naproxen: 400 milligram(s) orally once a day (24 Dec 2019 10:18)  Norvasc 5 mg oral tablet: 1 tab(s) orally once a day (24 Dec 2019 10:18)  omeprazole 40 mg oral delayed release capsule: 1 cap(s) orally once a day (24 Dec 2019 10:18)  Toprol-XL 50 mg oral tablet, extended release: 1 tab(s) orally once a day (24 Dec 2019 10:18)  TriCor 145 mg oral tablet: 1 tab(s) orally once a day (24 Dec 2019 10:18)  Vitamin B12 50 mcg oral tablet: 1 tab(s) orally once a day (24 Dec 2019 10:18)    MEDICATIONS  (STANDING):    MEDICATIONS  (PRN):    ALLERGIES/INTOLERANCES:  Allergies  No Known Allergies    Intolerances    VITALS & EXAMINATION:  Vital Signs Last 24 Hrs  T(C): 36.6 (02 Feb 2020 19:40), Max: 36.9 (02 Feb 2020 15:32)  T(F): 97.8 (02 Feb 2020 19:40), Max: 98.4 (02 Feb 2020 15:32)  HR: 99 (02 Feb 2020 19:40) (70 - 99)  BP: 146/84 (02 Feb 2020 19:40) (142/89 - 160/98)  BP(mean): 100 (02 Feb 2020 19:40) (100 - 100)  RR: 18 (02 Feb 2020 19:40) (12 - 18)  SpO2: 97% (02 Feb 2020 19:40) (95% - 100%)      PE:  MS: Awake, alert, oriented to person, place, situation, time. Normal affect. Follows all commands.    Language: Speech is clear, fluent with good repetition & comprehension     CNs: eyes moving spontaneously in all directions. well developed masseter muscles b/l. No facial asymmetry b/l. Symmetric palate elevation in midline. Gag reflex deferred. Tongue midline, normal movements, no atrophy.    Motor: Normal muscle bulk & tone. No noticeable tremor or seizure. No pronator drift.  Moving all extremities equally without laterality    Sensation: Intact to LT      LABORATORY:  CBC                       14.6   6.88  )-----------( 241      ( 02 Feb 2020 15:27 )             46.8     Chem 02-02    134<L>  |  98  |  20  ----------------------------<  89  3.9   |  25  |  0.95    Ca    9.8      02 Feb 2020 15:27    TPro  7.5  /  Alb  4.5  /  TBili  0.3  /  DBili  x   /  AST  18  /  ALT  21  /  AlkPhos  70  02-02    LFTs LIVER FUNCTIONS - ( 02 Feb 2020 15:27 )  Alb: 4.5 g/dL / Pro: 7.5 g/dL / ALK PHOS: 70 U/L / ALT: 21 U/L / AST: 18 U/L / GGT: x           Coagulopathy   Lipid Panel   A1c   Cardiac enzymes     U/A   CSF  Immunological  Other    STUDIES & IMAGING:  Studies (EKG, EEG, EMG, etc):     Radiology (XR, CT, MR, U/S, TTE/TITO):

## 2020-02-02 NOTE — ED ADULT NURSE REASSESSMENT NOTE - NS ED NURSE REASSESS COMMENT FT1
Pt says Reglan did not help headache. MD Grossman made aware
Pt says Toradol did not help, MD Grossman made aware. Patient eating, family at the bed side
Report received from COLTON Zazueta. Pt is resting comfortably in bed. Pt A&Ox4. Pt informed of POC and awaiting neuro consult. Pt has no complaining of 7/10 pain at this time with no relief since arrival. Pt safety maintained.
pt continues to c/o persistent frontal h/a 7/10 even post ivf's.  meds administered per md's orders.  will continue to monitor.
Received report this PM from Carlita SEGURA and Sanjay RN. PT observed resting comfortably in bed. Pt states that his headache has not changed in severity. pt updated on plan of care and pending neurology consult. Pt denies any needs at this time. Warm blankets provided to patient.  Pt educated on call bell system and provided call bell. Bed in lowest position, wheels locked, and patient placed in position of comfort.

## 2020-02-02 NOTE — ED PROVIDER NOTE - OBJECTIVE STATEMENT
50M pmhx testicular ca s/p orchiectomy ('94) NHL s/p chemo/rad ('98), ischemic colitis s/p vzy-gvgbjgscy-sg-open left colectomy ('11) presents for HA.      Last chemo tx w/ Nivolumab and Iplilimumab was on 1/15/2020. 50M pmhx testicular ca s/p orchiectomy ('94) NHL s/p chemo/rad ('98), ischemic colitis s/p ibg-rwjyfhqgz-ns-open left colectomy ('11) presents for HA.  BERNAL is central, 10/10 when present, started on Monday and has not improved w/ Tylenol, Motrin, and Tramadol.  Pt denies associated vision change, nausea, vomiting, weakness, or changes in sensation.  Pt also denies concomitant photo and phonophobia.  Pt recently got over a viral illness but denies fevers, chills, SOB, CP, no sick contacts or recent travel.  Pt requested dilaudid 2 mg saying "that's what usually works."      Last chemo tx w/ Nivolumab and Iplilimumab was on 1/15/2020.

## 2020-02-02 NOTE — ED ADULT NURSE NOTE - RESPIRATORY WDL
ARTIFICIAL TEARS to affected eye(s) as needed. Breathing spontaneous and unlabored. Breath sounds clear and equal bilaterally with regular rhythm.

## 2020-02-02 NOTE — ED ADULT NURSE NOTE - NSIMPLEMENTINTERV_GEN_ALL_ED
Implemented All Universal Safety Interventions:  [place] to call system. Call bell, personal items and telephone within reach. Instruct patient to call for assistance. Room bathroom lighting operational. Non-slip footwear when patient is off stretcher. Physically safe environment: no spills, clutter or unnecessary equipment. Stretcher in lowest position, wheels locked, appropriate side rails in place.

## 2020-02-02 NOTE — ED PROVIDER NOTE - CLINICAL SUMMARY MEDICAL DECISION MAKING FREE TEXT BOX
50 M w/ multiple oncologic issues on  PD1 inhibitors presenting for HA since Monday; will check CTH, give reglan and magnesium

## 2020-02-02 NOTE — ED PROVIDER NOTE - PATIENT PORTAL LINK FT
You can access the FollowMyHealth Patient Portal offered by [facility] by registering at the following website: [email or web address]. By joining [email or web address]’s FollowMyHealth portal, you will also be able to view your health information using other applications (apps) compatible with our system.

## 2020-02-02 NOTE — ED PROVIDER NOTE - NSFOLLOWUPCLINICS_GEN_ALL_ED_FT
[facility]  Neurology  [address] [place] [address] [address] [place] [address]  Phone: [phone]  Fax:   Follow Up Time:

## 2020-02-02 NOTE — ED ADULT NURSE NOTE - CHPI ED NUR SYMPTOMS NEG
no confusion/no weakness/no change in level of consciousness/no loss of consciousness/no dizziness/no nausea/no numbness/no vomiting/no blurred vision/no fever

## 2020-02-02 NOTE — ED PROVIDER NOTE - PROGRESS NOTE DETAILS
[id number]: pt received at sign-out. Spoke to neurology resident re: intractable headache, will see patient in ED shortly. attending Zackary: pt with significantly improved HA. Will dc with neuro follow-up and strict return precautions

## 2020-02-02 NOTE — ED PROVIDER NOTE - PHYSICAL EXAMINATION
PHYSICAL EXAM:  GENERAL: NAD, well-groomed, well-developed  HEAD:  Atraumatic, Normocephalic  EYES: EOMI, PERRLA, conjunctiva and sclera clear  ENMT: No tonsillar erythema, exudates, or enlargement; Moist mucous membranes, Good dentition, No lesions  NECK: Supple, No JVD, Normal thyroid  CHEST/LUNG: Clear to percussion bilaterally; No rales, rhonchi, wheezing, or rubs  HEART: Regular rate and rhythm; No murmurs, rubs, or gallops  ABDOMEN: Soft, Nontender, Nondistended; Bowel sounds present  EXTREMITIES:  2+ Peripheral Pulses, No clubbing, cyanosis, or edema  LYMPH: No lymphadenopathy noted  SKIN: No rashes or lesions  NERVOUS SYSTEM:  Alert & Oriented X3, Good concentration; Motor Strength 5/5 B/L upper and lower extremities; DTRs 2+ intact and symmetric PHYSICAL EXAM:  GENERAL: NAD  HEAD:  Atraumatic, Normocephalic  EYES: EOMI, PERRLA, conjunctiva and sclera clear  ENMT: mmm  NECK: Supple, No JVD, Normal thyroid  CHEST/LUNG: Clear to ausc bilaterally; No rales, rhonchi, wheezing, or rubs  HEART: Regular rate and rhythm; No murmurs, rubs, or gallops  ABDOMEN: Soft, Nontender, Nondistended; Bowel sounds present  EXTREMITIES:  2+ Peripheral Pulses, No clubbing, cyanosis, or edema  LYMPH: No lymphadenopathy noted  SKIN: No rashes or lesions  NERVOUS SYSTEM:  Alert & Oriented X3, Good concentration; Motor Strength 5/5 B/L upper and lower extremities

## 2020-02-02 NOTE — ED PROVIDER NOTE - NSFOLLOWUPINSTRUCTIONS_ED_ALL_ED_FT
Stay well hydrated.  Follow-up with neurology- info attached- within 1 week.   Bring a copy of your results with you  Return to an ER for worsening symptoms or any other concerns.

## 2020-02-02 NOTE — ED ADULT NURSE NOTE - OBJECTIVE STATEMENT
Pt is a 51 y/o male complaining of a headache for 5/6 days. Pt says Tylenol and Motrin have not helped. Pt has testicular cancer and He arrives with port in his upper right chest, his last chemo treatment was 2 weeks ago. Pt denies shortness of breath, chest pain, N + V, fever, chills, changes in vision and changes in voiding. On cardiac monitor, normal sinus rhythm. Will continue to monitor. Pt is a 49 y/o male complaining of a headache for 5/6 days. Pt says Tylenol and Motrin have not helped. Pt PERRL, strength strong in all extremities. No altered gait. Pt has testicular cancer and He arrives with port in his upper right chest, his last chemo treatment was 2 weeks ago. Port clean and dry, no dressing no redness. Pt denies shortness of breath, chest pain, N + V, fever, chills, changes in vision and changes in voiding. On cardiac monitor, normal sinus rhythm. Will continue to monitor.

## 2020-02-02 NOTE — ED ADULT NURSE NOTE - SUICIDE SCREENING QUESTION 1
"Chief Complaint   Patient presents with     RECHECK     Follow Up Preauricular Cyst       Initial /60   Pulse 80   Temp 98.1  F (36.7  C) (Tympanic)   Ht 1.676 m (5' 6\")   Wt 77.1 kg (170 lb)   SpO2 94%   BMI 27.44 kg/m   Estimated body mass index is 27.44 kg/m  as calculated from the following:    Height as of this encounter: 1.676 m (5' 6\").    Weight as of this encounter: 77.1 kg (170 lb).  Medication Reconciliation: complete  Aubree Tirado LPN  " No

## 2020-02-02 NOTE — ED PROVIDER NOTE - CARE PROVIDER_API CALL
Michael Hawkins (MD)  Neurology; Pain Medicine; Psychiatry  [address]  [place] [address]  Phone: [phone]  Fax: [phone]  Follow Up Time:

## 2020-02-02 NOTE — CONSULT NOTE ADULT - ASSESSMENT
50M pmhx testicular ca s/p orchiectomy ('94) NHL s/p chemo/rad ('98), ischemic colitis s/p esc-vyujxygsg-tk-open left colectomy ('11) here w/ intractable HA. CT head w/o contrast negative.     Recs: 50M pmhx testicular ca s/p orchiectomy ('94) NHL s/p chemo/rad ('98), ischemic colitis s/p hfk-hetpaxavh-sb-open left colectomy ('11) here w/ intractable HA. CT head w/o contrast negative.     Recs:  Imitrex 6mg subq    If patient without relief, would try VPA, then corticosteroids, then Thorazine    Patient can follow up with Dr. Michael Hawkins([phone])

## 2020-02-02 NOTE — ED PROVIDER NOTE - ATTENDING CONTRIBUTION TO CARE
50 M w/ PMH of metastatic melanoma of the scalp to the L shoulder and cervical lymph nodes s/p resection of lymph nodes on the R in October 2019, currently on immunologic immunotherapy Yerovoy and optivo w/ last infusion on Jan 18 2020, presents w/ headache since monday intermittent in the center frontal aspect of the head tried ecedrin, ultram and percocet w/ no relief today. he states that it is slow in onset, lasts for 1 hour and resolves w/ no specific activity. no arm nor leg weakness, no numbness, no nausea no vomiting, no shortness of breath, no chest pain, reports recent runny nose w/ ear infection that has self resolved, no diziness no lightheadedness.   Neuro: 5/5 bilateral elbow flexion/extension, 5/5 bilateral wrist flexion/extension, 5/5 bilateral hand , 5/5 bilateral hip flexion/extension, 5/5 bilateral knee flexion/extension, 5/5 bilateral dorsiflexion/plantarflexion, intact sensation in the bilateral arms and legs to light touch, normal finger to nose bilaterally w/ no dysmetria, EOMI, CN2-12 intact, pupils are 4 mm and reactive bilaterally  plan for labs, and ct head  suspect tension headache, will treat w/ ivf, mg and reglan w/ reassessment. CT head given malignancy hx

## 2020-02-04 ENCOUNTER — APPOINTMENT (OUTPATIENT)
Dept: PAIN MANAGEMENT | Facility: CLINIC | Age: 50
End: 2020-02-04
Payer: COMMERCIAL

## 2020-02-04 VITALS
DIASTOLIC BLOOD PRESSURE: 101 MMHG | SYSTOLIC BLOOD PRESSURE: 146 MMHG | HEIGHT: 70 IN | BODY MASS INDEX: 26.48 KG/M2 | WEIGHT: 185 LBS | HEART RATE: 80 BPM

## 2020-02-04 DIAGNOSIS — G43.909 MIGRAINE, UNSPECIFIED, NOT INTRACTABLE, W/OUT STATUS MIGRAINOSUS: ICD-10-CM

## 2020-02-04 DIAGNOSIS — G44.51 HEMICRANIA CONTINUA: ICD-10-CM

## 2020-02-04 DIAGNOSIS — R51 HEADACHE: ICD-10-CM

## 2020-02-04 PROCEDURE — 99204 OFFICE O/P NEW MOD 45 MIN: CPT

## 2020-02-07 ENCOUNTER — APPOINTMENT (OUTPATIENT)
Dept: HEMATOLOGY ONCOLOGY | Facility: CLINIC | Age: 50
End: 2020-02-07

## 2020-02-09 PROBLEM — R51 CHRONIC DAILY HEADACHE: Status: ACTIVE | Noted: 2020-02-09

## 2020-02-09 PROBLEM — G44.51 HEMICRANIA CONTINUA: Status: ACTIVE | Noted: 2020-02-09

## 2020-02-09 NOTE — REVIEW OF SYSTEMS
[Feeling Poorly] : feeling poorly [Feeling Tired] : feeling tired [Eye Pain] : eye pain [Back Pain] : ~T back pain [Arthralgias] : arthralgias [Neck Pain] : neck pain [Chills] : no chills [Fever] : no fever [Recent Weight Loss (___ Lbs)] : no recent weight loss [Recent Weight Gain (___ Lbs)] : no recent weight gain [Loss Of Hearing] : no hearing loss [Palpitations] : no palpitations [Chest Pain] : no chest pain [Cough] : no cough [Constipation] : no constipation [Skin Wound] : no skin wound [Skin Lesions] : no skin lesions [Itching] : no itching [As Noted in HPI] : as noted in HPI [Sleep Disturbances] : sleep disturbances

## 2020-02-09 NOTE — PHYSICAL EXAM
[General Appearance - Well Nourished] : well nourished [General Appearance - Well Developed] : well developed [General Appearance - Alert] : alert [General Appearance - Well-Appearing] : healthy appearing [Oriented To Time, Place, And Person] : oriented to person, place, and time [Memory Recent] : recent memory was not impaired [Impaired Insight] : insight and judgment were intact [Person] : oriented to person [Memory Remote] : remote memory was not impaired [Place] : oriented to place [Time] : oriented to time [Remote Intact] : remote memory intact [Short Term Intact] : short term memory intact [Concentration Intact] : a decrease in concentrating ability was observed [Registration Intact] : recent registration memory intact [Naming Objects] : no difficulty naming common objects [Writing A Sentence] : no difficulty writing a sentence [Fluency] : fluency intact [Reading] : reading intact [Comprehension] : comprehension intact [Current Events] : adequate knowledge of current events [Past History] : adequate knowledge of personal past history [Vocabulary] : adequate range of vocabulary [Cranial Nerves Oculomotor (III)] : extraocular motion intact [Cranial Nerves Trigeminal (V)] : facial sensation intact symmetrically [Cranial Nerves Facial (VII)] : face symmetrical [Cranial Nerves Hypoglossal (XII)] : there was no tongue deviation with protrusion [Cranial Nerves Vestibulocochlear (VIII)] : hearing was intact bilaterally [Involuntary Movements] : no involuntary movements were seen [Motor Handedness Right-Handed] : the patient is right hand dominant [Motor Strength Upper Extremities Bilaterally] : strength was normal in both upper extremities [Motor Strength Lower Extremities Bilaterally] : strength was normal in both lower extremities [Allodynia] : ~T allodynia present [Past-pointing] : there was no past-pointing [Limited Balance] : balance was intact [No EAN] : no internuclear ophthalmoplegia [Sclera] : the sclera and conjunctiva were normal [Neck Cervical Mass (___cm)] : no neck mass was observed [Outer Ear] : the ears and nose were normal in appearance [FreeTextEntry1] : Decrease range of motion in neck with ? fragmented dystonia to right [Abnormal Walk] : normal gait [Edema] : there was no peripheral edema [Exaggerated Use Of Accessory Muscles For Inspiration] : no accessory muscle use [Musculoskeletal - Swelling] : no joint swelling seen [Skin Color & Pigmentation] : normal skin color and pigmentation [Skin Turgor] : normal skin turgor [] : no rash

## 2020-02-09 NOTE — ASSESSMENT
[FreeTextEntry1] : Chronic daily headache with acute exacerbation.  r/o hemicrania continua with minimal autonomic symptoms. \par r/o chronic migraine vs chronic migrainous headache.  \par No other red flags for secondary headaches.\par To start nabumetone/ hydroxyzine combination.\par WOuld consider for indomethacin acutely\par consider use of Ubrelvy\par May consider for Occipital nerve block\par would not use chronic opiate or barbiturate use in patient with high frequency headache disorder.  Likely to cause, or exacerbate rebound issue and worsen condition.\par Consider use of cgrp antibody\par consider use of Lasmiditan.

## 2020-02-09 NOTE — HISTORY OF PRESENT ILLNESS
[Chronic Headache] : chronic headache [FreeTextEntry1] : Pt notes a long history of headaches for which he overall has been cared for by Dr. Salamanca.  Feels that he is overall doing well but does note continued issues.\par About 1 week ago noted that he had "small localized headache" over the right eye for which he used Excedrin/ tylenol without much benefit.  Founds that this has continually returned and has gotten progressively more severe.  Denies pain to neck, side and back.  Has tried "many different meds for this" which have not been beneficial.  Had been treated at [facility] where they gave him iv reglan/ benadryl/ toradol and did not choose to give narcotic "which has helped in the past."\par Had been about [age] with 1st severe headache.  No family history\par Notes that he had seen Dr. Salamanca starting about 5 years ago and has been through a range of medications. was diagnosed with cluster in the past after he was getting increased frequency over time and that led to referral from PMD to Dr. Salamanca.  Had gotten to be more regular over time.  Was getting those every other day at a period of time.  Was "subtle compared to what is happening now."  Had been on Celebrex, Effexor, Elavil, Flexeril, Lyrica, Mg, Gabapnetin, Norvasc, Toprol, Ultram, Xanax, ibuprofen, aleve, asa, celebrex, fdarvocet, dilaudid, excedrin, fioricet, flexeril, hydrocodone, sumatriptan, ibuprofen, oxycodone, metoclopramide, ketorolac, diazepm, vioxx, tizanidine.\par  Has found little relief from meds except for the use of ultram and excedrin.\par Will usually use escedrin then onto tramadol.\par "Never had a headache I was unable to get rid of" and has been up for > 1-2 hour.\par Feels exacerbation currently and with some light sensitivity.  Does note regular use of afrin as well and does have nasal congestion.  \par Has history of nasal septum repair as well.\par Does have some concentration and attention on the headache as well.\par Notes poor sleep with about 4 hours/ night and wakes feeling rested.  Can wake up 4-5 times/ night and then will use ambien on occasion as well with moderate efficacy.\par Does have a history of stage 2 testicular cancer currently being treated.\par Slight change in weight recently when he was travelling out of country with general stability 160-180 over time.  High of 190-195.\par Mild neck pain in past with lymph node dissection of neck with some benefit with use of gabapentin for which he was on for 6 months with moderate benefit.  Still notes numbness on the right side of face over the area where he had dissection on right.  (Form ear to around right nipple)  Does have port on right which he receives immunotherapy.\par As our discussion continued, his initial comment of "overall feeling better with more acute issue" becomes less clear as he has had continued care with a wide range of medications over course of life.  While this may represent acute exacerbation, he has clearly had a chronic refractory condition.  He and wife note benefit from use of opiates historically.  I noted that this was not something that we use for chronic, primary headache disorders.  The risk of overuse and rebound may occur at more than 5 day use/ month and not worth the brief benefit over longer risk.\par We did discuss his extensive primary medical conditions and the implication that has with care as well.    Last ov for that in Jan. [Neck Pain] : no neck pain

## 2020-02-10 ENCOUNTER — OUTPATIENT (OUTPATIENT)
Dept: OUTPATIENT SERVICES | Facility: HOSPITAL | Age: 50
LOS: 1 days | Discharge: HOME | End: 2020-02-10

## 2020-02-10 DIAGNOSIS — C43.4 MALIGNANT MELANOMA OF SCALP AND NECK: Chronic | ICD-10-CM

## 2020-02-10 DIAGNOSIS — C43.8 MALIGNANT MELANOMA OF OVERLAPPING SITES OF SKIN: ICD-10-CM

## 2020-02-10 DIAGNOSIS — Z90.79 ACQUIRED ABSENCE OF OTHER GENITAL ORGAN(S): Chronic | ICD-10-CM

## 2020-02-10 DIAGNOSIS — I89.9 NONINFECTIVE DISORDER OF LYMPHATIC VESSELS AND LYMPH NODES, UNSPECIFIED: Chronic | ICD-10-CM

## 2020-02-10 DIAGNOSIS — Z98.89 OTHER SPECIFIED POSTPROCEDURAL STATES: Chronic | ICD-10-CM

## 2020-02-14 ENCOUNTER — LABORATORY RESULT (OUTPATIENT)
Age: 50
End: 2020-02-14

## 2020-02-14 ENCOUNTER — APPOINTMENT (OUTPATIENT)
Dept: INFUSION THERAPY | Facility: HOSPITAL | Age: 50
End: 2020-02-14

## 2020-02-18 ENCOUNTER — APPOINTMENT (OUTPATIENT)
Dept: PAIN MANAGEMENT | Facility: CLINIC | Age: 50
End: 2020-02-18

## 2020-02-18 DIAGNOSIS — Z51.11 ENCOUNTER FOR ANTINEOPLASTIC CHEMOTHERAPY: ICD-10-CM

## 2020-02-24 ENCOUNTER — APPOINTMENT (OUTPATIENT)
Dept: INFUSION THERAPY | Facility: HOSPITAL | Age: 50
End: 2020-02-24

## 2020-02-24 ENCOUNTER — LABORATORY RESULT (OUTPATIENT)
Age: 50
End: 2020-02-24

## 2020-02-24 ENCOUNTER — APPOINTMENT (OUTPATIENT)
Dept: HEMATOLOGY ONCOLOGY | Facility: CLINIC | Age: 50
End: 2020-02-24

## 2020-02-24 ENCOUNTER — APPOINTMENT (OUTPATIENT)
Dept: HEMATOLOGY ONCOLOGY | Facility: CLINIC | Age: 50
End: 2020-02-24
Payer: COMMERCIAL

## 2020-02-24 VITALS
HEART RATE: 92 BPM | RESPIRATION RATE: 16 BRPM | DIASTOLIC BLOOD PRESSURE: 95 MMHG | BODY MASS INDEX: 26.3 KG/M2 | OXYGEN SATURATION: 99 % | TEMPERATURE: 98.1 F | WEIGHT: 183.29 LBS | SYSTOLIC BLOOD PRESSURE: 148 MMHG

## 2020-02-24 PROCEDURE — 99215 OFFICE O/P EST HI 40 MIN: CPT

## 2020-02-24 RX ORDER — RIZATRIPTAN BENZOATE 10 MG/1
10 TABLET ORAL
Qty: 18 | Refills: 1 | Status: DISCONTINUED | COMMUNITY
Start: 2020-02-07 | End: 2020-02-24

## 2020-02-24 RX ORDER — INDOMETHACIN 50 MG/1
50 CAPSULE ORAL 3 TIMES DAILY
Qty: 30 | Refills: 0 | Status: DISCONTINUED | COMMUNITY
Start: 2020-02-04 | End: 2020-02-24

## 2020-02-24 RX ORDER — SODIUM PICOSULFATE, MAGNESIUM OXIDE, AND ANHYDROUS CITRIC ACID 10; 3.5; 12 MG/160ML; G/160ML; G/160ML
10-3.5-12 MG-GM LIQUID ORAL
Qty: 1 | Refills: 0 | Status: DISCONTINUED | COMMUNITY
Start: 2019-11-13 | End: 2020-02-24

## 2020-02-24 RX ORDER — GABAPENTIN 300 MG/1
300 CAPSULE ORAL
Qty: 90 | Refills: 1 | Status: DISCONTINUED | COMMUNITY
Start: 2019-06-19 | End: 2020-02-24

## 2020-02-24 RX ORDER — NABUMETONE 750 MG/1
750 TABLET, FILM COATED ORAL
Qty: 14 | Refills: 1 | Status: DISCONTINUED | COMMUNITY
Start: 2020-02-05 | End: 2020-02-24

## 2020-02-24 NOTE — PHYSICAL EXAM
[Restricted in physically strenuous activity but ambulatory and able to carry out work of a light or sedentary nature] : Status 1- Restricted in physically strenuous activity but ambulatory and able to carry out work of a light or sedentary nature, e.g., light house work, office work [Normal] : affect appropriate [de-identified] : scattered small (~2-3 mm) pigmented lesions on the scalp. There are no new skin lesions. Of note, several of the scalp skin lesions appear smaller and less pigmented than prior exams.

## 2020-02-24 NOTE — HISTORY OF PRESENT ILLNESS
[Disease: _____________________] : Disease: [unfilled] [T: ___] : T[unfilled] [N: ___] : N[unfilled] [AJCC Stage: ____] : AJCC Stage: [unfilled] [M: ___] : M[unfilled] [de-identified] : melanoma [de-identified] : Mr Mclaughlin is a 48 year old male with past medical history of stage II testicular CA (1994) treated with surgery, chemotherapy (BEP) and an autologous BMT and Non-Hodgkins lymphoma right neck neck (1998) for which he underwent radiation therapy presenting for transfer of care for melanoma.  \par \par He noticed a raised, black, pruritic lesion on his right parietal scalp.  Biopsy was performed on 8/14/2018 which was consistent with a 1.1mm melanoma with no significant mitotic figures, no ulceration and no regression.\par He was seen by Dr Sarwat Chandler on 8/13/2018 who recommended wide excision of melanoma with SLNB and reconstruction.\par On 8/29/2018 patient underwent radical resection of scalp melanoma, right posterior modified neck dissection with closure by Dr Bradley Toussaint (Plastic)\par Final Path: metastatic melanoma present in 2/2 LN, residual melanoma, negative margins, no lymphovascular invasion.  Tumor stage pT2a pN2a\par PET/CT 9/15/2018: Minimally FDG avid soft tissue thickening right scalp probably postsurgical.FDG avid focal area of soft tissue thickening right neck, favor postsurgical changes rather than residual disease. Suggest correlation with clinical exam and surgical margins. No FDG avid distant metastatic disease. \par On 10/19/2018 underwent right functional neck dissection with Dr Sarwat Chandler\par Path: no metastatic melanoma seen in eighteen LN (0/18) and benign skin/tissue findings.\par He was referred to Dr Kendall Sands (Medical Oncology) at [facility]. \par He was originally seen by Dr Kendall Sands who referred the patient to Dr Gopi Arredondo at [facility].  \par Patient has stage IIIa (T2aN2a) BRAF testing was performed and mutation was detected. The patient was offered adjuvant Nivolumab 480mg every four weeks by Dr Arredondo for 13 cycles.\par \par 1/25/2019: Mr Mclaughlin is here for follow up.  He is scheduled for C#2 Nivolumab today.  He tolerated C#1 extremely well and reports no adverse events.\par \par 2/22/2019: Mr Mclaughlin is here for C#3 Nivolumab today.  He feels well today and is tolerating Nivo without any adverse events.  He continues to remain active and works as a .  He does reports two episode of diarrhea x 3 weeks after his last treatment.  He took Imodium with excellent relief. \par \par 3/22/2019: Patient Here for C#4 today. DEXA shows osteopenia. His Vit D level is 20 despite 50k units per week. No irAEs from treatment. \par \par 4/24/2019: As of 4/5/19 patient had leg cramps in the front and back of the legs. The intensity became severe. Took Tylenol and motrin with no relief. He also noted some loose stool just prior to these onset of the leg pain. Prednisone 60 mg daily started. After 1 week there was no impact on cramps. Flexeril helps sleep but no benefit with leg pain. The prednisone was only taken for only 3 days when changed to Flexeril. He passes stool 2-3 times daily which is more than usual. No bleeding. Overall the leg cramping is less severe but still recurring. He is off of atorvastatin and remains on Tricor. The muscle pains do not happen every day, but occur 3-4 days per week. He is working and is working from home more often at present. \par \par 10/25/2019: Patient has noticed the development of multiple new pigmented lesions on his scalp and right upper arm. He saw his dermatologist and 3 biopsies were done. The one on the arm had an epidermal component; whereas neither of the scalp lesions had epidermal component. Both were dermal only, and suggested metastatic disease. This would suggest in-transit relapse since the lesions are close to his right scalp resection from 2018. To summarize, he had received adjuvant nivolumab from 1228/2018 to 3/22/2019. It was discontinued due to persistent thigh pain and fasciitis discovered on MRI. He had also had grade 2 diarrhea that was self limited. Since the biopsies 2 weeks ago, he has noticed additional lesions on the left supraclavicular area and the left pectoral region. These are both pigmented and ~4-5 mm in diameter. There are also new lesions on the scalp. Overall he feels well and has no new symptoms, and no residual immune related symptoms from the prior treatment. \par \par 1/14/2020: On 11/12/2019 C#1 D#1 of ipilimumab and nivolumab. He notes no development of new small melanoma lesions on his scalp or chest. Existing ones remian stable. He is aware that PET and MRI showed no internal disease. We discussed that BRAFi+MEKi could be considered as an alternative based on his historic BRAF V600 mutation. He is using Immodium 2 tablets daily on occasion, and not needed daily. Given lack of progression or significant irAEs, will continue with IPI and NIVO. \par \par 2/24/2020: Patient completed all 4 cycles of Ipilimumab and Nivolumab. Over past week he has noticed new stiffness & soreness in his bilateral shoulders and knees. He continues to have thigh pain with moderate discomfort that requires up to 6 tablets of Percoset daily. He has also noted an increase in right orbital head pain that is consistent with his prior history of migrane/cluster headache. He states that the pains are worse than the past, and has been to his neurologist and tried several medications. He has been successfully helped with Ubelvy. He is here to start maintenance nivolumab post-induction. He has no other complaints of symptoms or other issues attributable to immune-related adverse events. [de-identified] : Surgical Oncology: Sarwat Chandler [phone]\par Plastics: Kendall Toussaint [phone]\par Medical Oncology ([facility]): Kendall Sands  [phone]\par Medical Oncology: Melanoma ([facility]): Gopi Arredondo [phone]\par Dermatology: Mariah Spring; [phone]\par

## 2020-02-24 NOTE — REVIEW OF SYSTEMS
[Negative] : Allergic/Immunologic [de-identified] : No new skin lesions. [FreeTextEntry9] : Mild curvature of thoracic spine noted. Has chronic back pain and left knee pain.

## 2020-03-07 ENCOUNTER — RESULT REVIEW (OUTPATIENT)
Age: 50
End: 2020-03-07

## 2020-03-07 ENCOUNTER — LABORATORY RESULT (OUTPATIENT)
Age: 50
End: 2020-03-07

## 2020-03-07 ENCOUNTER — APPOINTMENT (OUTPATIENT)
Dept: INFUSION THERAPY | Facility: HOSPITAL | Age: 50
End: 2020-03-07

## 2020-03-07 LAB
BASOPHILS # BLD AUTO: 0.09 K/UL — SIGNIFICANT CHANGE UP (ref 0–0.2)
BASOPHILS NFR BLD AUTO: 1.3 % — SIGNIFICANT CHANGE UP (ref 0–2)
EOSINOPHIL # BLD AUTO: 0.19 K/UL — SIGNIFICANT CHANGE UP (ref 0–0.5)
EOSINOPHIL NFR BLD AUTO: 2.8 % — SIGNIFICANT CHANGE UP (ref 0–6)
HCT VFR BLD CALC: 42.1 % — SIGNIFICANT CHANGE UP (ref 39–50)
HGB BLD-MCNC: 13.9 G/DL — SIGNIFICANT CHANGE UP (ref 13–17)
IMM GRANULOCYTES NFR BLD AUTO: 0.1 % — SIGNIFICANT CHANGE UP (ref 0–1.5)
LYMPHOCYTES # BLD AUTO: 2.7 K/UL — SIGNIFICANT CHANGE UP (ref 1–3.3)
LYMPHOCYTES # BLD AUTO: 39.7 % — SIGNIFICANT CHANGE UP (ref 13–44)
MCHC RBC-ENTMCNC: 29.1 PG — SIGNIFICANT CHANGE UP (ref 27–34)
MCHC RBC-ENTMCNC: 33 GM/DL — SIGNIFICANT CHANGE UP (ref 32–36)
MCV RBC AUTO: 88.3 FL — SIGNIFICANT CHANGE UP (ref 80–100)
MONOCYTES # BLD AUTO: 0.4 K/UL — SIGNIFICANT CHANGE UP (ref 0–0.9)
MONOCYTES NFR BLD AUTO: 5.9 % — SIGNIFICANT CHANGE UP (ref 2–14)
NEUTROPHILS # BLD AUTO: 3.41 K/UL — SIGNIFICANT CHANGE UP (ref 1.8–7.4)
NEUTROPHILS NFR BLD AUTO: 50.2 % — SIGNIFICANT CHANGE UP (ref 43–77)
NRBC # BLD: 0 /100 WBCS — SIGNIFICANT CHANGE UP (ref 0–0)
PLATELET # BLD AUTO: 362 K/UL — SIGNIFICANT CHANGE UP (ref 150–400)
RBC # BLD: 4.77 M/UL — SIGNIFICANT CHANGE UP (ref 4.2–5.8)
RBC # FLD: 13.5 % — SIGNIFICANT CHANGE UP (ref 10.3–14.5)
WBC # BLD: 7.11 K/UL — SIGNIFICANT CHANGE UP (ref 3.8–10.5)
WBC # FLD AUTO: 7.11 K/UL — SIGNIFICANT CHANGE UP (ref 3.8–10.5)

## 2020-03-27 ENCOUNTER — OUTPATIENT (OUTPATIENT)
Dept: OUTPATIENT SERVICES | Facility: HOSPITAL | Age: 50
LOS: 1 days | Discharge: HOME | End: 2020-03-27

## 2020-03-27 DIAGNOSIS — Z98.89 OTHER SPECIFIED POSTPROCEDURAL STATES: Chronic | ICD-10-CM

## 2020-03-27 DIAGNOSIS — I89.9 NONINFECTIVE DISORDER OF LYMPHATIC VESSELS AND LYMPH NODES, UNSPECIFIED: Chronic | ICD-10-CM

## 2020-03-27 DIAGNOSIS — Z90.79 ACQUIRED ABSENCE OF OTHER GENITAL ORGAN(S): Chronic | ICD-10-CM

## 2020-03-27 DIAGNOSIS — C43.4 MALIGNANT MELANOMA OF SCALP AND NECK: Chronic | ICD-10-CM

## 2020-03-27 DIAGNOSIS — C43.8 MALIGNANT MELANOMA OF OVERLAPPING SITES OF SKIN: ICD-10-CM

## 2020-04-04 ENCOUNTER — RESULT REVIEW (OUTPATIENT)
Age: 50
End: 2020-04-04

## 2020-04-04 ENCOUNTER — LABORATORY RESULT (OUTPATIENT)
Age: 50
End: 2020-04-04

## 2020-04-04 ENCOUNTER — APPOINTMENT (OUTPATIENT)
Dept: INFUSION THERAPY | Facility: HOSPITAL | Age: 50
End: 2020-04-04

## 2020-04-04 LAB
BASOPHILS # BLD AUTO: 0.08 K/UL — SIGNIFICANT CHANGE UP (ref 0–0.2)
BASOPHILS NFR BLD AUTO: 1.2 % — SIGNIFICANT CHANGE UP (ref 0–2)
EOSINOPHIL # BLD AUTO: 0.37 K/UL — SIGNIFICANT CHANGE UP (ref 0–0.5)
EOSINOPHIL NFR BLD AUTO: 5.3 % — SIGNIFICANT CHANGE UP (ref 0–6)
HCT VFR BLD CALC: 42.2 % — SIGNIFICANT CHANGE UP (ref 39–50)
HGB BLD-MCNC: 13.8 G/DL — SIGNIFICANT CHANGE UP (ref 13–17)
IMM GRANULOCYTES NFR BLD AUTO: 0.3 % — SIGNIFICANT CHANGE UP (ref 0–1.5)
LYMPHOCYTES # BLD AUTO: 2.89 K/UL — SIGNIFICANT CHANGE UP (ref 1–3.3)
LYMPHOCYTES # BLD AUTO: 41.6 % — SIGNIFICANT CHANGE UP (ref 13–44)
MCHC RBC-ENTMCNC: 29 PG — SIGNIFICANT CHANGE UP (ref 27–34)
MCHC RBC-ENTMCNC: 32.7 GM/DL — SIGNIFICANT CHANGE UP (ref 32–36)
MCV RBC AUTO: 88.7 FL — SIGNIFICANT CHANGE UP (ref 80–100)
MONOCYTES # BLD AUTO: 0.49 K/UL — SIGNIFICANT CHANGE UP (ref 0–0.9)
MONOCYTES NFR BLD AUTO: 7.1 % — SIGNIFICANT CHANGE UP (ref 2–14)
NEUTROPHILS # BLD AUTO: 3.09 K/UL — SIGNIFICANT CHANGE UP (ref 1.8–7.4)
NEUTROPHILS NFR BLD AUTO: 44.5 % — SIGNIFICANT CHANGE UP (ref 43–77)
NRBC # BLD: 0 /100 WBCS — SIGNIFICANT CHANGE UP (ref 0–0)
PLATELET # BLD AUTO: 294 K/UL — SIGNIFICANT CHANGE UP (ref 150–400)
RBC # BLD: 4.76 M/UL — SIGNIFICANT CHANGE UP (ref 4.2–5.8)
RBC # FLD: 13.7 % — SIGNIFICANT CHANGE UP (ref 10.3–14.5)
WBC # BLD: 6.94 K/UL — SIGNIFICANT CHANGE UP (ref 3.8–10.5)
WBC # FLD AUTO: 6.94 K/UL — SIGNIFICANT CHANGE UP (ref 3.8–10.5)

## 2020-04-06 DIAGNOSIS — Z51.11 ENCOUNTER FOR ANTINEOPLASTIC CHEMOTHERAPY: ICD-10-CM

## 2020-04-22 ENCOUNTER — RESULT REVIEW (OUTPATIENT)
Age: 50
End: 2020-04-22

## 2020-04-22 ENCOUNTER — APPOINTMENT (OUTPATIENT)
Dept: NUCLEAR MEDICINE | Facility: IMAGING CENTER | Age: 50
End: 2020-04-22
Payer: COMMERCIAL

## 2020-04-22 ENCOUNTER — OUTPATIENT (OUTPATIENT)
Dept: OUTPATIENT SERVICES | Facility: HOSPITAL | Age: 50
LOS: 1 days | End: 2020-04-22
Payer: COMMERCIAL

## 2020-04-22 DIAGNOSIS — C43.4 MALIGNANT MELANOMA OF SCALP AND NECK: Chronic | ICD-10-CM

## 2020-04-22 DIAGNOSIS — Z90.79 ACQUIRED ABSENCE OF OTHER GENITAL ORGAN(S): Chronic | ICD-10-CM

## 2020-04-22 DIAGNOSIS — I89.9 NONINFECTIVE DISORDER OF LYMPHATIC VESSELS AND LYMPH NODES, UNSPECIFIED: Chronic | ICD-10-CM

## 2020-04-22 DIAGNOSIS — Z98.89 OTHER SPECIFIED POSTPROCEDURAL STATES: Chronic | ICD-10-CM

## 2020-04-22 DIAGNOSIS — C43.9 MALIGNANT MELANOMA OF SKIN, UNSPECIFIED: ICD-10-CM

## 2020-04-22 PROCEDURE — A9552: CPT

## 2020-04-22 PROCEDURE — 78816 PET IMAGE W/CT FULL BODY: CPT

## 2020-04-22 PROCEDURE — 78816 PET IMAGE W/CT FULL BODY: CPT | Mod: 26,PS

## 2020-04-23 ENCOUNTER — RESULT REVIEW (OUTPATIENT)
Age: 50
End: 2020-04-23

## 2020-04-23 ENCOUNTER — APPOINTMENT (OUTPATIENT)
Dept: HEMATOLOGY ONCOLOGY | Facility: CLINIC | Age: 50
End: 2020-04-23

## 2020-04-23 LAB
BASOPHILS # BLD AUTO: 0.06 K/UL — SIGNIFICANT CHANGE UP (ref 0–0.2)
BASOPHILS NFR BLD AUTO: 0.7 % — SIGNIFICANT CHANGE UP (ref 0–2)
EOSINOPHIL # BLD AUTO: 0.42 K/UL — SIGNIFICANT CHANGE UP (ref 0–0.5)
EOSINOPHIL NFR BLD AUTO: 5.1 % — SIGNIFICANT CHANGE UP (ref 0–6)
ERYTHROCYTE [SEDIMENTATION RATE] IN BLOOD: 11 MM/HR — SIGNIFICANT CHANGE UP (ref 0–20)
HCT VFR BLD CALC: 42.7 % — SIGNIFICANT CHANGE UP (ref 39–50)
HGB BLD-MCNC: 14.4 G/DL — SIGNIFICANT CHANGE UP (ref 13–17)
IMM GRANULOCYTES NFR BLD AUTO: 0.2 % — SIGNIFICANT CHANGE UP (ref 0–1.5)
LYMPHOCYTES # BLD AUTO: 2.72 K/UL — SIGNIFICANT CHANGE UP (ref 1–3.3)
LYMPHOCYTES # BLD AUTO: 33.2 % — SIGNIFICANT CHANGE UP (ref 13–44)
MCHC RBC-ENTMCNC: 29.6 PG — SIGNIFICANT CHANGE UP (ref 27–34)
MCHC RBC-ENTMCNC: 33.7 GM/DL — SIGNIFICANT CHANGE UP (ref 32–36)
MCV RBC AUTO: 87.9 FL — SIGNIFICANT CHANGE UP (ref 80–100)
MONOCYTES # BLD AUTO: 0.51 K/UL — SIGNIFICANT CHANGE UP (ref 0–0.9)
MONOCYTES NFR BLD AUTO: 6.2 % — SIGNIFICANT CHANGE UP (ref 2–14)
NEUTROPHILS # BLD AUTO: 4.46 K/UL — SIGNIFICANT CHANGE UP (ref 1.8–7.4)
NEUTROPHILS NFR BLD AUTO: 54.6 % — SIGNIFICANT CHANGE UP (ref 43–77)
NRBC # BLD: 0 /100 WBCS — SIGNIFICANT CHANGE UP (ref 0–0)
PLATELET # BLD AUTO: 289 K/UL — SIGNIFICANT CHANGE UP (ref 150–400)
RBC # BLD: 4.86 M/UL — SIGNIFICANT CHANGE UP (ref 4.2–5.8)
RBC # FLD: 13.3 % — SIGNIFICANT CHANGE UP (ref 10.3–14.5)
WBC # BLD: 8.19 K/UL — SIGNIFICANT CHANGE UP (ref 3.8–10.5)
WBC # FLD AUTO: 8.19 K/UL — SIGNIFICANT CHANGE UP (ref 3.8–10.5)

## 2020-04-25 LAB
ALBUMIN SERPL ELPH-MCNC: 4.8 G/DL
ALP BLD-CCNC: 49 U/L
ALT SERPL-CCNC: 16 U/L
ANION GAP SERPL CALC-SCNC: 12 MMOL/L
AST SERPL-CCNC: 20 U/L
BILIRUB SERPL-MCNC: 0.4 MG/DL
BUN SERPL-MCNC: 23 MG/DL
CALCIUM SERPL-MCNC: 10 MG/DL
CHLORIDE SERPL-SCNC: 101 MMOL/L
CO2 SERPL-SCNC: 27 MMOL/L
CREAT SERPL-MCNC: 1.13 MG/DL
GLUCOSE SERPL-MCNC: 102 MG/DL
LDH SERPL-CCNC: 159 U/L
POTASSIUM SERPL-SCNC: 4.4 MMOL/L
PROT SERPL-MCNC: 7 G/DL
SODIUM SERPL-SCNC: 141 MMOL/L

## 2020-04-27 ENCOUNTER — OUTPATIENT (OUTPATIENT)
Dept: OUTPATIENT SERVICES | Facility: HOSPITAL | Age: 50
LOS: 1 days | Discharge: HOME | End: 2020-04-27

## 2020-04-27 ENCOUNTER — LABORATORY RESULT (OUTPATIENT)
Age: 50
End: 2020-04-27

## 2020-04-27 DIAGNOSIS — Z90.79 ACQUIRED ABSENCE OF OTHER GENITAL ORGAN(S): Chronic | ICD-10-CM

## 2020-04-27 DIAGNOSIS — C43.8 MALIGNANT MELANOMA OF OVERLAPPING SITES OF SKIN: ICD-10-CM

## 2020-04-27 DIAGNOSIS — Z98.89 OTHER SPECIFIED POSTPROCEDURAL STATES: Chronic | ICD-10-CM

## 2020-04-27 DIAGNOSIS — I89.9 NONINFECTIVE DISORDER OF LYMPHATIC VESSELS AND LYMPH NODES, UNSPECIFIED: Chronic | ICD-10-CM

## 2020-04-27 DIAGNOSIS — C43.4 MALIGNANT MELANOMA OF SCALP AND NECK: Chronic | ICD-10-CM

## 2020-04-30 NOTE — CONSULT LETTER
[Courtesy Letter:] : I had the pleasure of seeing your patient, [unfilled], in my office today. [Dear  ___] : Dear  [unfilled], [Please see my note below.] : Please see my note below. [Sincerely,] : Sincerely, [DrNichole  ___] : Dr. ALVARADO

## 2020-05-01 ENCOUNTER — APPOINTMENT (OUTPATIENT)
Dept: HEMATOLOGY ONCOLOGY | Facility: CLINIC | Age: 50
End: 2020-05-01
Payer: COMMERCIAL

## 2020-05-01 ENCOUNTER — LABORATORY RESULT (OUTPATIENT)
Age: 50
End: 2020-05-01

## 2020-05-01 PROCEDURE — 99214 OFFICE O/P EST MOD 30 MIN: CPT | Mod: 95

## 2020-05-01 NOTE — HISTORY OF PRESENT ILLNESS
[Disease: _____________________] : Disease: [unfilled] [T: ___] : T[unfilled] [N: ___] : N[unfilled] [M: ___] : M[unfilled] [AJCC Stage: ____] : AJCC Stage: [unfilled] [Home] : at home, [unfilled] , at the time of the visit. [Medical Office: ([facility])___] : at the medical office located in  [de-identified] : Mr Mclaughlin is a 48 year old male with past medical history of stage II testicular CA (1994) treated with surgery, chemotherapy (BEP) and an autologous BMT and Non-Hodgkins lymphoma right neck neck (1998) for which he underwent radiation therapy presenting for transfer of care for melanoma.  \par \par He noticed a raised, black, pruritic lesion on his right parietal scalp.  Biopsy was performed on 8/14/2018 which was consistent with a 1.1mm melanoma with no significant mitotic figures, no ulceration and no regression.\par He was seen by Dr Sarwat Chandler on 8/13/2018 who recommended wide excision of melanoma with SLNB and reconstruction.\par On 8/29/2018 patient underwent radical resection of scalp melanoma, right posterior modified neck dissection with closure by Dr Bradley Toussaint (Plastic)\par Final Path: metastatic melanoma present in 2/2 LN, residual melanoma, negative margins, no lymphovascular invasion.  Tumor stage pT2a pN2a\par PET/CT 9/15/2018: Minimally FDG avid soft tissue thickening right scalp probably postsurgical.FDG avid focal area of soft tissue thickening right neck, favor postsurgical changes rather than residual disease. Suggest correlation with clinical exam and surgical margins. No FDG avid distant metastatic disease. \par On 10/19/2018 underwent right functional neck dissection with Dr Sarwat Chandler\par Path: no metastatic melanoma seen in eighteen LN (0/18) and benign skin/tissue findings.\par He was referred to Dr Kendall Sands (Medical Oncology) at [facility]. \par He was originally seen by Dr Kendall Sands who referred the patient to Dr Gopi Arredondo at [facility].  \par Patient has stage IIIa (T2aN2a) BRAF testing was performed and mutation was detected. The patient was offered adjuvant Nivolumab 480mg every four weeks by Dr Arredondo for 13 cycles.\par \par 1/25/2019: Mr Mclaughlin is here for follow up.  He is scheduled for C#2 Nivolumab today.  He tolerated C#1 extremely well and reports no adverse events.\par \par 2/22/2019: Mr Mclaughlin is here for C#3 Nivolumab today.  He feels well today and is tolerating Nivo without any adverse events.  He continues to remain active and works as a .  He does reports two episode of diarrhea x 3 weeks after his last treatment.  He took Imodium with excellent relief. \par \par 3/22/2019: Patient Here for C#4 today. DEXA shows osteopenia. His Vit D level is 20 despite 50k units per week. No irAEs from treatment. \par \par 4/24/2019: As of 4/5/19 patient had leg cramps in the front and back of the legs. The intensity became severe. Took Tylenol and motrin with no relief. He also noted some loose stool just prior to these onset of the leg pain. Prednisone 60 mg daily started. After 1 week there was no impact on cramps. Flexeril helps sleep but no benefit with leg pain. The prednisone was only taken for only 3 days when changed to Flexeril. He passes stool 2-3 times daily which is more than usual. No bleeding. Overall the leg cramping is less severe but still recurring. He is off of atorvastatin and remains on Tricor. The muscle pains do not happen every day, but occur 3-4 days per week. He is working and is working from home more often at present. \par \par 10/25/2019: Patient has noticed the development of multiple new pigmented lesions on his scalp and right upper arm. He saw his dermatologist and 3 biopsies were done. The one on the arm had an epidermal component; whereas neither of the scalp lesions had epidermal component. Both were dermal only, and suggested metastatic disease. This would suggest in-transit relapse since the lesions are close to his right scalp resection from 2018. To summarize, he had received adjuvant nivolumab from 1228/2018 to 3/22/2019. It was discontinued due to persistent thigh pain and fasciitis discovered on MRI. He had also had grade 2 diarrhea that was self limited. Since the biopsies 2 weeks ago, he has noticed additional lesions on the left supraclavicular area and the left pectoral region. These are both pigmented and ~4-5 mm in diameter. There are also new lesions on the scalp. Overall he feels well and has no new symptoms, and no residual immune related symptoms from the prior treatment. \par \par 1/14/2020: On 11/12/2019 C#1 D#1 of ipilimumab and nivolumab. He notes no development of new small melanoma lesions on his scalp or chest. Existing ones remian stable. He is aware that PET and MRI showed no internal disease. We discussed that BRAFi+MEKi could be considered as an alternative based on his historic BRAF V600 mutation. He is using Immodium 2 tablets daily on occasion, and not needed daily. Given lack of progression or significant irAEs, will continue with IPI and NIVO. \par \par 2/24/2020: Patient completed all 4 cycles of Ipilimumab and Nivolumab. Over past week he has noticed new stiffness & soreness in his bilateral shoulders and knees. He continues to have thigh pain with moderate discomfort that requires up to 6 tablets of Percoset daily. He has also noted an increase in right orbital head pain that is consistent with his prior history of migrane/cluster headache. He states that the pains are worse than the past, and has been to his neurologist and tried several medications. He has been successfully helped with Ubelvy. He is here to start maintenance nivolumab post-induction. He has no other complaints of symptoms or other issues attributable to immune-related adverse events.\par \par 5/1/2020: TEB follow up appointment today, verbal consent obtained.\par 1) Melanoma: He completed Ipi/Nivo x 4 on 1/15/2020.  Afterwards patient started on single agent Nivolumab.  Tomorrow (5/2/2020) is C7 Nivolumab.\par As per patient he's tolerating treatment well and reports no significant adverse events.  Patient underwent PET/CT on 4/22/2020 which demonstrated colitis.  No evidence of metastatic disease or recurrent disease.\par 2) Colitis: Patient was advised to blood work and stool samples.  No acute pathology was noted.  He denies diarrhea today but does admit to blood when wiping on his toilet paper.  \par 3) Nausea: started x 2 weeks ago.  Denies vomiting.  Patient is using Zofran with excellent relief.  As per patient eating "triggers" the nausea.  \par 4) Appetite loss: started ~ 2 weeks ago.  Denies fever, chills, cough or change in taste.  As per patient he lost ~ 20 lbs in 4 weeks.\par 5) Myositis: Patient is currently off prednisone 10 mg.  Patient is using Percocet 2 tablets three times daily.  At times the pain does wake patient up in the middle of the night.\par 6) Fatigue: Started ~ x 1 week.  As per patient he feels more fatigued over the last couple of days.  Patient states he is taking more naps then usual.\par \par \par  \par  [de-identified] : melanoma [de-identified] : Surgical Oncology: Sarwat Chandler [phone]\par Plastics: Kendall Toussaint [phone]\par Medical Oncology ([facility]): Kendall Sands  [phone]\par Medical Oncology: Melanoma ([facility]): Gopi Arredondo [phone]\par Dermatology: Mariah Spring; [phone]\par

## 2020-05-01 NOTE — PHYSICAL EXAM
[Restricted in physically strenuous activity but ambulatory and able to carry out work of a light or sedentary nature] : Status 1- Restricted in physically strenuous activity but ambulatory and able to carry out work of a light or sedentary nature, e.g., light house work, office work [Normal] : affect appropriate [de-identified] : scattered small (~2-3 mm) pigmented lesions on the scalp. There are no new skin lesions. Of note, several of the scalp skin lesions appear smaller and less pigmented than prior exams.

## 2020-05-01 NOTE — REVIEW OF SYSTEMS
[Negative] : Endocrine [Fatigue] : fatigue [Recent Change In Weight] : ~T recent weight change [FreeTextEntry2] : grade one fatigue (CTCAE 5.0). Weight loss 20 lb weight loss in 4 weeks. [FreeTextEntry7] : +nausea. [FreeTextEntry9] : Mild curvature of thoracic spine noted. Has chronic back pain and left knee pain.  [de-identified] : No new skin lesions.

## 2020-05-02 ENCOUNTER — APPOINTMENT (OUTPATIENT)
Dept: INFUSION THERAPY | Facility: HOSPITAL | Age: 50
End: 2020-05-02

## 2020-05-02 LAB
ALBUMIN SERPL ELPH-MCNC: 4.5 G/DL
ALP BLD-CCNC: 56 U/L
ALT SERPL-CCNC: 17 U/L
ANION GAP SERPL CALC-SCNC: 12 MMOL/L
AST SERPL-CCNC: 15 U/L
BASOPHILS # BLD AUTO: 0.07 K/UL
BASOPHILS NFR BLD AUTO: 0.9 %
BILIRUB SERPL-MCNC: 0.5 MG/DL
BUN SERPL-MCNC: 20 MG/DL
CALCIUM SERPL-MCNC: 10 MG/DL
CHLORIDE SERPL-SCNC: 98 MMOL/L
CK SERPL-CCNC: 55 U/L
CO2 SERPL-SCNC: 28 MMOL/L
CREAT SERPL-MCNC: 1.16 MG/DL
CRP SERPL-MCNC: 0.49 MG/DL
EOSINOPHIL # BLD AUTO: 0.33 K/UL
EOSINOPHIL NFR BLD AUTO: 4.4 %
ERYTHROCYTE [SEDIMENTATION RATE] IN BLOOD BY WESTERGREN METHOD: 17 MM/HR
FSH SERPL-MCNC: 20 IU/L
GLUCOSE SERPL-MCNC: 91 MG/DL
HCT VFR BLD CALC: 44.9 %
HGB BLD-MCNC: 14.7 G/DL
IMM GRANULOCYTES NFR BLD AUTO: 0.4 %
LDH SERPL-CCNC: 142 U/L
LH SERPL-ACNC: 8.6 IU/L
LYMPHOCYTES # BLD AUTO: 2.87 K/UL
LYMPHOCYTES NFR BLD AUTO: 38.2 %
MAN DIFF?: NORMAL
MCHC RBC-ENTMCNC: 29.3 PG
MCHC RBC-ENTMCNC: 32.7 GM/DL
MCV RBC AUTO: 89.4 FL
MONOCYTES # BLD AUTO: 0.46 K/UL
MONOCYTES NFR BLD AUTO: 6.1 %
NEUTROPHILS # BLD AUTO: 3.76 K/UL
NEUTROPHILS NFR BLD AUTO: 50 %
PLATELET # BLD AUTO: 296 K/UL
POTASSIUM SERPL-SCNC: 4.4 MMOL/L
PROLACTIN SERPL-MCNC: 14.4 NG/ML
PROT SERPL-MCNC: 6.8 G/DL
RBC # BLD: 5.02 M/UL
RBC # FLD: 13.2 %
SODIUM SERPL-SCNC: 138 MMOL/L
TSH SERPL-ACNC: 5.55 UIU/ML
WBC # FLD AUTO: 7.52 K/UL

## 2020-05-05 ENCOUNTER — LABORATORY RESULT (OUTPATIENT)
Age: 50
End: 2020-05-05

## 2020-05-06 LAB — ALDOLASE SERPL-CCNC: 4 U/L

## 2020-05-07 ENCOUNTER — APPOINTMENT (OUTPATIENT)
Dept: ENDOCRINOLOGY | Facility: CLINIC | Age: 50
End: 2020-05-07
Payer: COMMERCIAL

## 2020-05-07 ENCOUNTER — RECORD ABSTRACTING (OUTPATIENT)
Age: 50
End: 2020-05-07

## 2020-05-07 ENCOUNTER — APPOINTMENT (OUTPATIENT)
Dept: CARDIOLOGY | Facility: CLINIC | Age: 50
End: 2020-05-07
Payer: COMMERCIAL

## 2020-05-07 VITALS
SYSTOLIC BLOOD PRESSURE: 118 MMHG | HEIGHT: 70 IN | OXYGEN SATURATION: 98 % | HEART RATE: 104 BPM | TEMPERATURE: 98 F | BODY MASS INDEX: 24.97 KG/M2 | DIASTOLIC BLOOD PRESSURE: 80 MMHG | WEIGHT: 174.38 LBS

## 2020-05-07 DIAGNOSIS — R79.89 OTHER SPECIFIED ABNORMAL FINDINGS OF BLOOD CHEMISTRY: ICD-10-CM

## 2020-05-07 PROCEDURE — 93356 MYOCRD STRAIN IMG SPCKL TRCK: CPT

## 2020-05-07 PROCEDURE — 99204 OFFICE O/P NEW MOD 45 MIN: CPT

## 2020-05-07 PROCEDURE — 93306 TTE W/DOPPLER COMPLETE: CPT

## 2020-05-08 ENCOUNTER — RESULT REVIEW (OUTPATIENT)
Age: 50
End: 2020-05-08

## 2020-05-08 ENCOUNTER — LABORATORY RESULT (OUTPATIENT)
Age: 50
End: 2020-05-08

## 2020-05-08 ENCOUNTER — APPOINTMENT (OUTPATIENT)
Dept: HEMATOLOGY ONCOLOGY | Facility: CLINIC | Age: 50
End: 2020-05-08

## 2020-05-08 LAB
ACTH SER-ACNC: <1.5 PG/ML
BASOPHILS # BLD AUTO: 0.07 K/UL — SIGNIFICANT CHANGE UP (ref 0–0.2)
BASOPHILS NFR BLD AUTO: 0.9 % — SIGNIFICANT CHANGE UP (ref 0–2)
EOSINOPHIL # BLD AUTO: 0.33 K/UL — SIGNIFICANT CHANGE UP (ref 0–0.5)
EOSINOPHIL NFR BLD AUTO: 4.3 % — SIGNIFICANT CHANGE UP (ref 0–6)
HCT VFR BLD CALC: 45 % — SIGNIFICANT CHANGE UP (ref 39–50)
HGB BLD-MCNC: 14.6 G/DL — SIGNIFICANT CHANGE UP (ref 13–17)
IMM GRANULOCYTES NFR BLD AUTO: 0.3 % — SIGNIFICANT CHANGE UP (ref 0–1.5)
LYMPHOCYTES # BLD AUTO: 3.61 K/UL — HIGH (ref 1–3.3)
LYMPHOCYTES # BLD AUTO: 46.6 % — HIGH (ref 13–44)
MCHC RBC-ENTMCNC: 29.2 PG — SIGNIFICANT CHANGE UP (ref 27–34)
MCHC RBC-ENTMCNC: 32.4 GM/DL — SIGNIFICANT CHANGE UP (ref 32–36)
MCV RBC AUTO: 90 FL — SIGNIFICANT CHANGE UP (ref 80–100)
MONOCYTES # BLD AUTO: 0.4 K/UL — SIGNIFICANT CHANGE UP (ref 0–0.9)
MONOCYTES NFR BLD AUTO: 5.2 % — SIGNIFICANT CHANGE UP (ref 2–14)
NEUTROPHILS # BLD AUTO: 3.31 K/UL — SIGNIFICANT CHANGE UP (ref 1.8–7.4)
NEUTROPHILS NFR BLD AUTO: 42.7 % — LOW (ref 43–77)
NRBC # BLD: 0 /100 WBCS — SIGNIFICANT CHANGE UP (ref 0–0)
PLATELET # BLD AUTO: 360 K/UL — SIGNIFICANT CHANGE UP (ref 150–400)
RBC # BLD: 5 M/UL — SIGNIFICANT CHANGE UP (ref 4.2–5.8)
RBC # FLD: 13.1 % — SIGNIFICANT CHANGE UP (ref 10.3–14.5)
TROPONIN-T, HIGH SENSITIVITY: 14 NG/L
WBC # BLD: 7.74 K/UL — SIGNIFICANT CHANGE UP (ref 3.8–10.5)
WBC # FLD AUTO: 7.74 K/UL — SIGNIFICANT CHANGE UP (ref 3.8–10.5)

## 2020-05-11 LAB
CORTICOSTEROID BIND GLOBULIN: 2.4 MG/DL
CORTIS SERPL-MCNC: <1 UG/DL
CORTISOL, FREE: <0.02 UG/DL
PFCX: <2.3 %
TESTOST BND SERPL-MCNC: 6.3 PG/ML
TESTOST SERPL-MCNC: 369.8 NG/DL

## 2020-05-13 ENCOUNTER — APPOINTMENT (OUTPATIENT)
Dept: CARDIOLOGY | Facility: CLINIC | Age: 50
End: 2020-05-13

## 2020-05-13 ENCOUNTER — APPOINTMENT (OUTPATIENT)
Dept: HEMATOLOGY ONCOLOGY | Facility: CLINIC | Age: 50
End: 2020-05-13
Payer: COMMERCIAL

## 2020-05-13 PROCEDURE — 99442: CPT

## 2020-05-14 PROBLEM — R79.89 ABNORMAL TSH: Status: ACTIVE | Noted: 2020-05-14

## 2020-05-14 NOTE — REVIEW OF SYSTEMS
[Decreased Appetite] : decreased appetite [Fatigue] : fatigue [Shortness Of Breath] : shortness of breath [Recent Weight Loss (___ Lbs)] : recent weight loss: [unfilled] lbs [Myalgia] : myalgia  [Muscle Weakness] : muscle weakness [Galactorrhea] : no galactorrhea  [Insomnia] : insomnia [Negative] : Genitourinary

## 2020-05-14 NOTE — HISTORY OF PRESENT ILLNESS
[FreeTextEntry1] : Mr. CANTU  is a 50 year year old male  who presents for endocrine consultation. He presents via the kind courtesy of Dr Shawn Mcginnis .  . 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 re his immune therapy.\par He does have hx of testicualr ca along with hx of NHL and of alte has been treated for scalp melanoma\par Now on 7th cycle of Nivilumab\par Has had LE myositis, colitis loss of appetite and weight loss along with significant fatigue.\par Has been on Prednisone 10 mg and has been tapering off.He feels he may have last used about 1 month go.\par TSH on 050/2020 /1was slightly up at 5.5\par Additional medical history includes that of hyperlipidemia and hypertension\par \par OPn 05/01/2020 too cortisol total and free were suppressed and acth was less than 1.5 LH, FSH and Prolactin were normal.

## 2020-05-14 NOTE — ADDENDUM
[FreeTextEntry1] : I will discuss results with pt and plan for adrenal replacement and too keep Dr. Mcginnis updated along the way.

## 2020-05-18 ENCOUNTER — OUTPATIENT (OUTPATIENT)
Dept: OUTPATIENT SERVICES | Facility: HOSPITAL | Age: 50
LOS: 1 days | End: 2020-05-18
Payer: COMMERCIAL

## 2020-05-18 ENCOUNTER — APPOINTMENT (OUTPATIENT)
Dept: MRI IMAGING | Facility: CLINIC | Age: 50
End: 2020-05-18

## 2020-05-18 DIAGNOSIS — Z00.8 ENCOUNTER FOR OTHER GENERAL EXAMINATION: ICD-10-CM

## 2020-05-18 DIAGNOSIS — I89.9 NONINFECTIVE DISORDER OF LYMPHATIC VESSELS AND LYMPH NODES, UNSPECIFIED: Chronic | ICD-10-CM

## 2020-05-18 DIAGNOSIS — Z98.89 OTHER SPECIFIED POSTPROCEDURAL STATES: Chronic | ICD-10-CM

## 2020-05-18 DIAGNOSIS — C43.4 MALIGNANT MELANOMA OF SCALP AND NECK: Chronic | ICD-10-CM

## 2020-05-18 DIAGNOSIS — Z90.79 ACQUIRED ABSENCE OF OTHER GENITAL ORGAN(S): Chronic | ICD-10-CM

## 2020-05-18 PROCEDURE — 70553 MRI BRAIN STEM W/O & W/DYE: CPT

## 2020-05-18 PROCEDURE — A9585: CPT

## 2020-05-18 PROCEDURE — 70553 MRI BRAIN STEM W/O & W/DYE: CPT | Mod: 26

## 2020-05-30 ENCOUNTER — INPATIENT (INPATIENT)
Facility: HOSPITAL | Age: 50
LOS: 4 days | Discharge: HOME | DRG: 643 | End: 2020-06-04
Attending: HOSPITALIST | Admitting: HOSPITALIST
Payer: COMMERCIAL

## 2020-05-30 VITALS
WEIGHT: 164.91 LBS | DIASTOLIC BLOOD PRESSURE: 85 MMHG | HEIGHT: 71 IN | TEMPERATURE: 98 F | HEART RATE: 84 BPM | OXYGEN SATURATION: 98 % | SYSTOLIC BLOOD PRESSURE: 118 MMHG | RESPIRATION RATE: 16 BRPM

## 2020-05-30 DIAGNOSIS — Z98.89 OTHER SPECIFIED POSTPROCEDURAL STATES: Chronic | ICD-10-CM

## 2020-05-30 DIAGNOSIS — Z90.79 ACQUIRED ABSENCE OF OTHER GENITAL ORGAN(S): Chronic | ICD-10-CM

## 2020-05-30 DIAGNOSIS — C43.4 MALIGNANT MELANOMA OF SCALP AND NECK: Chronic | ICD-10-CM

## 2020-05-30 DIAGNOSIS — R53.83 OTHER FATIGUE: ICD-10-CM

## 2020-05-30 DIAGNOSIS — I89.9 NONINFECTIVE DISORDER OF LYMPHATIC VESSELS AND LYMPH NODES, UNSPECIFIED: Chronic | ICD-10-CM

## 2020-05-30 LAB
ALBUMIN SERPL ELPH-MCNC: 4.6 G/DL — SIGNIFICANT CHANGE UP (ref 3.3–5)
ALP SERPL-CCNC: 50 U/L — SIGNIFICANT CHANGE UP (ref 40–120)
ALT FLD-CCNC: 15 U/L — SIGNIFICANT CHANGE UP (ref 10–45)
ANION GAP SERPL CALC-SCNC: 10 MMOL/L — SIGNIFICANT CHANGE UP (ref 5–17)
APPEARANCE UR: CLEAR — SIGNIFICANT CHANGE UP
APTT BLD: 60.4 SEC — HIGH (ref 27.5–36.3)
AST SERPL-CCNC: 12 U/L — SIGNIFICANT CHANGE UP (ref 10–40)
BACTERIA # UR AUTO: NEGATIVE — SIGNIFICANT CHANGE UP
BASOPHILS # BLD AUTO: 0.07 K/UL — SIGNIFICANT CHANGE UP (ref 0–0.2)
BASOPHILS NFR BLD AUTO: 0.8 % — SIGNIFICANT CHANGE UP (ref 0–2)
BILIRUB SERPL-MCNC: 0.4 MG/DL — SIGNIFICANT CHANGE UP (ref 0.2–1.2)
BILIRUB UR-MCNC: NEGATIVE — SIGNIFICANT CHANGE UP
BUN SERPL-MCNC: 20 MG/DL — SIGNIFICANT CHANGE UP (ref 7–23)
CALCIUM SERPL-MCNC: 10 MG/DL — SIGNIFICANT CHANGE UP (ref 8.4–10.5)
CHLORIDE SERPL-SCNC: 102 MMOL/L — SIGNIFICANT CHANGE UP (ref 96–108)
CO2 SERPL-SCNC: 26 MMOL/L — SIGNIFICANT CHANGE UP (ref 22–31)
COLOR SPEC: SIGNIFICANT CHANGE UP
CREAT SERPL-MCNC: 1.02 MG/DL — SIGNIFICANT CHANGE UP (ref 0.5–1.3)
DIFF PNL FLD: NEGATIVE — SIGNIFICANT CHANGE UP
EOSINOPHIL # BLD AUTO: 0.28 K/UL — SIGNIFICANT CHANGE UP (ref 0–0.5)
EOSINOPHIL NFR BLD AUTO: 3 % — SIGNIFICANT CHANGE UP (ref 0–6)
EPI CELLS # UR: 0 /HPF — SIGNIFICANT CHANGE UP
GLUCOSE SERPL-MCNC: 90 MG/DL — SIGNIFICANT CHANGE UP (ref 70–99)
GLUCOSE UR QL: NEGATIVE — SIGNIFICANT CHANGE UP
HCT VFR BLD CALC: 43 % — SIGNIFICANT CHANGE UP (ref 39–50)
HGB BLD-MCNC: 13.7 G/DL — SIGNIFICANT CHANGE UP (ref 13–17)
IMM GRANULOCYTES NFR BLD AUTO: 0.2 % — SIGNIFICANT CHANGE UP (ref 0–1.5)
INR BLD: 1.02 RATIO — SIGNIFICANT CHANGE UP (ref 0.88–1.16)
KETONES UR-MCNC: NEGATIVE — SIGNIFICANT CHANGE UP
LEUKOCYTE ESTERASE UR-ACNC: NEGATIVE — SIGNIFICANT CHANGE UP
LYMPHOCYTES # BLD AUTO: 3.5 K/UL — HIGH (ref 1–3.3)
LYMPHOCYTES # BLD AUTO: 38 % — SIGNIFICANT CHANGE UP (ref 13–44)
MAGNESIUM SERPL-MCNC: 2 MG/DL — SIGNIFICANT CHANGE UP (ref 1.6–2.6)
MCHC RBC-ENTMCNC: 28.2 PG — SIGNIFICANT CHANGE UP (ref 27–34)
MCHC RBC-ENTMCNC: 31.9 GM/DL — LOW (ref 32–36)
MCV RBC AUTO: 88.7 FL — SIGNIFICANT CHANGE UP (ref 80–100)
MONOCYTES # BLD AUTO: 0.49 K/UL — SIGNIFICANT CHANGE UP (ref 0–0.9)
MONOCYTES NFR BLD AUTO: 5.3 % — SIGNIFICANT CHANGE UP (ref 2–14)
NEUTROPHILS # BLD AUTO: 4.84 K/UL — SIGNIFICANT CHANGE UP (ref 1.8–7.4)
NEUTROPHILS NFR BLD AUTO: 52.7 % — SIGNIFICANT CHANGE UP (ref 43–77)
NITRITE UR-MCNC: NEGATIVE — SIGNIFICANT CHANGE UP
NRBC # BLD: 0 /100 WBCS — SIGNIFICANT CHANGE UP (ref 0–0)
NT-PROBNP SERPL-SCNC: 42 PG/ML — SIGNIFICANT CHANGE UP (ref 0–300)
PH UR: 6 — SIGNIFICANT CHANGE UP (ref 5–8)
PHOSPHATE SERPL-MCNC: 4.4 MG/DL — SIGNIFICANT CHANGE UP (ref 2.5–4.5)
PLATELET # BLD AUTO: 266 K/UL — SIGNIFICANT CHANGE UP (ref 150–400)
POTASSIUM SERPL-MCNC: 3.6 MMOL/L — SIGNIFICANT CHANGE UP (ref 3.5–5.3)
POTASSIUM SERPL-SCNC: 3.6 MMOL/L — SIGNIFICANT CHANGE UP (ref 3.5–5.3)
PROT SERPL-MCNC: 6.9 G/DL — SIGNIFICANT CHANGE UP (ref 6–8.3)
PROT UR-MCNC: SIGNIFICANT CHANGE UP
PROTHROM AB SERPL-ACNC: 11.7 SEC — SIGNIFICANT CHANGE UP (ref 10–12.9)
RBC # BLD: 4.85 M/UL — SIGNIFICANT CHANGE UP (ref 4.2–5.8)
RBC # FLD: 12.4 % — SIGNIFICANT CHANGE UP (ref 10.3–14.5)
RBC CASTS # UR COMP ASSIST: 1 /HPF — SIGNIFICANT CHANGE UP (ref 0–4)
SARS-COV-2 RNA SPEC QL NAA+PROBE: SIGNIFICANT CHANGE UP
SODIUM SERPL-SCNC: 138 MMOL/L — SIGNIFICANT CHANGE UP (ref 135–145)
SP GR SPEC: 1.05 — HIGH (ref 1.01–1.02)
TROPONIN T, HIGH SENSITIVITY RESULT: <6 NG/L — SIGNIFICANT CHANGE UP (ref 0–51)
UROBILINOGEN FLD QL: NEGATIVE — SIGNIFICANT CHANGE UP
WBC # BLD: 9.2 K/UL — SIGNIFICANT CHANGE UP (ref 3.8–10.5)
WBC # FLD AUTO: 9.2 K/UL — SIGNIFICANT CHANGE UP (ref 3.8–10.5)
WBC UR QL: 0 /HPF — SIGNIFICANT CHANGE UP (ref 0–5)

## 2020-05-30 PROCEDURE — 99223 1ST HOSP IP/OBS HIGH 75: CPT | Mod: GC

## 2020-05-30 PROCEDURE — 71275 CT ANGIOGRAPHY CHEST: CPT | Mod: 26

## 2020-05-30 PROCEDURE — 93010 ELECTROCARDIOGRAM REPORT: CPT

## 2020-05-30 PROCEDURE — 99285 EMERGENCY DEPT VISIT HI MDM: CPT

## 2020-05-30 RX ORDER — OXYCODONE AND ACETAMINOPHEN 5; 325 MG/1; MG/1
1 TABLET ORAL ONCE
Refills: 0 | Status: DISCONTINUED | OUTPATIENT
Start: 2020-05-30 | End: 2020-05-30

## 2020-05-30 RX ORDER — SODIUM CHLORIDE 9 MG/ML
1000 INJECTION INTRAMUSCULAR; INTRAVENOUS; SUBCUTANEOUS ONCE
Refills: 0 | Status: COMPLETED | OUTPATIENT
Start: 2020-05-30 | End: 2020-05-30

## 2020-05-30 RX ORDER — HYDROCORTISONE 20 MG
100 TABLET ORAL ONCE
Refills: 0 | Status: COMPLETED | OUTPATIENT
Start: 2020-05-30 | End: 2020-05-30

## 2020-05-30 RX ADMIN — SODIUM CHLORIDE 1000 MILLILITER(S): 9 INJECTION INTRAMUSCULAR; INTRAVENOUS; SUBCUTANEOUS at 21:11

## 2020-05-30 RX ADMIN — Medication 100 MILLIGRAM(S): at 19:13

## 2020-05-30 RX ADMIN — OXYCODONE AND ACETAMINOPHEN 1 TABLET(S): 5; 325 TABLET ORAL at 20:02

## 2020-05-30 RX ADMIN — SODIUM CHLORIDE 1000 MILLILITER(S): 9 INJECTION INTRAMUSCULAR; INTRAVENOUS; SUBCUTANEOUS at 19:13

## 2020-05-30 NOTE — ED ADULT NURSE REASSESSMENT NOTE - NS ED NURSE REASSESS COMMENT FT1
Ok to access port as per MD. Right chest wall power port accessed under sterile technique with 2 RNs present to maintain sterility. Pt tolerated procedure well, labs drawn and sent as ordered. Pt resting comfortably in bed, call bell at bedside, safety maintained. Ok to access port as per MD. Right chest wall power port accessed under sterile technique with 2 RNs present to maintain sterility. Port flushed easily, no resistance/swelling noted. Pt tolerated procedure well, labs drawn and sent as ordered. Pt resting comfortably in bed, call bell at bedside, safety maintained.

## 2020-05-30 NOTE — ED PROVIDER NOTE - OBJECTIVE STATEMENT
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 Pt states he becomes very fatigued and short of breath with exertion. Also reports BL LE cramps x months since beginning immunotherapy. Pt has been worked up by his oncologist Dr. Shawn Mcginnis and endocrinologist Dr. Shahram Sanchez. Was found to have abnormal hormone levels. Completed 4 day course of steroids without improvement in symptoms. Had normal MRI 2 weeks ago for concerns of pituitary mass. PET scan 1 month ago showed incidental finding of colitis, otherwise normal, denies abdominal pain, NVD. Denies fever, chills, CP, SOB at rest, URI sx, sick contacts. Tested negative for COVID on 5/2/20 with negative antibodies. Denies any leg swelling, hemoptysis, history of DVT/PE, recent surgery, immobilization, or trauma. Was recommended by his oncologist and endocrinologist to present to ER for further eval. 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 Pt states he becomes very fatigued and short of breath with exertion. Also reports BL LE cramps x months since beginning immunotherapy. Pt has been worked up by his oncologist Dr. Shawn Mcginnis and endocrinologist Dr. Shahram Sanchez. Was found to have abnormal hormone levels, had concern . Completed 4 day course of steroids without improvement in symptoms. Had normal MRI 2 weeks ago for concerns of pituitary mass. PET scan 1 month ago showed incidental finding of colitis, otherwise normal, denies abdominal pain, NVD. Denies fever, chills, CP, SOB at rest, URI sx, sick contacts. Tested negative for COVID on 5/2/20 with negative antibodies. Denies any leg swelling, hemoptysis, history of DVT/PE, recent surgery, immobilization, or trauma. Was recommended by his oncologist and endocrinologist to present to ER for further eval.

## 2020-05-30 NOTE — H&P ADULT - PROBLEM SELECTOR PLAN 6
Transitions of Care Status:  1.  Name of PCP: Shawn Mcginnis   2.  PCP Contacted on Admission: [ ] Y  [ ] N   [ ] N/A  3.  PCP contacted at Discharge:    [ ] Y  [ ] N   [ ] N/A  4.  Post-Discharge Appointment Date and Location:  5.  Summary of Handoff given to PCP:

## 2020-05-30 NOTE — ED PROVIDER NOTE - ATTENDING CONTRIBUTION TO CARE
Attending MD Irizarry: I personally have seen and examined this patient.  Resident note reviewed and agree on plan of care and except where noted.  See below for details.     Seen in [facility] (Old 40)  Heme/Onc Dr. Shawn Mcginnis  Endo Dr. Shahram Sanchez    50M  with extensive PMH/PSH including Non Hodgkins Lymphoma (93, chemo/rads), testicular cancer s/p orchiectomy, LE myositis and bilateral LE cramping, R scalp melanoma (dx'ed 8/2019) on immunotherapy (6 cycles, last in March) presents to the ED with Fatigue, shortness of breath, weight loss.  Review of EMR ([facility]) reveals last PET scan was 1 mo ago and showed incidental colitis (from Heme/Onc note from 4/22/20: PET/CT on 4/22/2020 demonstrated nonspecific hypermetabolic wall thickening, distal descending colon/proximal sigmoid colon, new as compared to PET/CT dated 10/30/2019, concerning for colitis, no evidence of metastatic or recurrent disease), MRI of head to rule out pituitary pathology (review of endo note from 5/7/20 reveals possible hypothalamic pituitary dysfunction, cortisol level possibly from adrenal insufficiency from immune therapy, adrenal hypofunction, TSH also abnormal) about 2 weeks ago. Attending MD Irizarry: I personally have seen and examined this patient.  Resident note reviewed and agree on plan of care and except where noted.  See below for details.     Seen in [facility] (Old 40)  Heme/Onc Dr. Shawn Sanchez    50M  with extensive PMH/PSH including Non Hodgkins Lymphoma (93, chemo/rads), testicular cancer s/p orchiectomy, LE myositis and bilateral LE cramping, s/p ex lap and colon resection (reports had colonic damage after high dose chemo), R scalp melanoma (dx'ed 8/2019) on immunotherapy (6 cycles, last in March) presents to the ED with fatigue, shortness of breath, weight loss.  Review of EMR ([facility]) reveals last PET scan was 1 mo ago and showed incidental colitis (from Heme/Onc note from 4/22/20: PET/CT on 4/22/2020 demonstrated nonspecific hypermetabolic wall thickening, distal descending colon/proximal sigmoid colon, new as compared to PET/CT dated 10/30/2019, concerning for colitis, no evidence of metastatic or recurrent disease), MRI of head to rule out pituitary pathology (review of endo note from 5/7/20 reveals possible hypothalamic pituitary dysfunction, cortisol level possibly from adrenal insufficiency from immune therapy, adrenal hypofunction, TSH also abnormal) about 2 weeks ago.  Reports that the symptoms have been ongoing since his last immunotherapy (end of March 2020).  Reports that was seen by Endo and Heme/Onc.  Reports was found to have hormonal abnormality and was started on hydrocortisone but it has not changed his symptoms.  Reports dyspnea on exertion with basic tasks like showering, walking from room to room that previously did not elicit shortness of breath.  Denies chest pain, palpitations. Denies abdominal pain, nausea, vomiting, diarrhea, blood in stools, dark stools.  Denies dysuria, hematuria, change in urinary habits including frequency, urgency. Reports chronic LE cramps, unchanged.  Denies change in vision, double vision, sudden loss of vision, headache, change in hearing, tinnitus, anosmia.  Denies recent illness, cough, fevers, chills, known COVID contacts.  Reports that was tested for COVID in early May and was negative.  On exam, NAD, head NCAT, healed surgical scar, PERRL, FROM at neck, no tenderness to midline palpation, no stepoffs along length of spine, lungs CTAB with good inspiratory effort, no wheezing, no rhonchi, no rales, +S1S2, no m/r/g, abdomen soft with +BS, NT, ND, well healed surgical scar, no CVAT, moving all extremities with 5/5 strength bilateral upper and lower extremities, good and equal  strength bilaterally, no calf tenderness, swelling, erythema or warmth; A/P: 50M with fatigue, dyspnea on exertion DDx includes PE given active malignancy and history, will obtain labs, CTA Chest r/o PE, will swab for COVID given prevalence but lower suspicion, unclear etiology of fatigue, will also obtain EKG trop and discuss with Heme/Onc or Endo given has had visits and some work up since onset of symptoms

## 2020-05-30 NOTE — ED PROVIDER NOTE - CLINICAL SUMMARY MEDICAL DECISION MAKING FREE TEXT BOX
Donnie, PGY1 - 50M with extensive medical hx including R scalp melanoma on immunotherapy (s/p 6 cycles, last session in March) p/w fatigue, EASLEY x 2 weeks. A/w unintentional weight loss of 25lb in past month, intermittent BL leg cramps x months. Vitals WNL, well-appearing on exam. DDx includes PE, electrolyte/metabolic derangement, infectious etiology less likely. Plan: labs, CTA. Will discuss case further with pt's oncologist and endocrinologist.

## 2020-05-30 NOTE — ED PROVIDER NOTE - CARE PLAN
Principal Discharge DX:	Fatigue  Secondary Diagnosis:	EASLEY (dyspnea on exertion)  Secondary Diagnosis:	Malignant melanoma of scalp

## 2020-05-30 NOTE — ED PROVIDER NOTE - NS ED ROS FT
General: +Unintentional weight loss of 20lb in past month, fatigue. Denies fevers or chills  Eyes: Denies vision changes  ENMT: Denies trouble swallowing or speaking, or sore throat  CV: Denies chest pain or palpitations  Resp: +EASLEY. Denies cough, SOB at rest  GI: Denies abdominal pain, nausea, vomiting, diarrhea, or constipation   : Denies painful urination, increased urinary frequency, or blood in urine  Skin: Denies new rashes  Neuro: Denies headache, numbness, or focal weakness  MSK: +Intermittent BL leg cramps. Denies recent trauma

## 2020-05-30 NOTE — H&P ADULT - NSHPPHYSICALEXAM_GEN_ALL_CORE
Vital Signs Last 24 Hrs  T(C): 36.7 (30 May 2020 23:07), Max: 36.8 (30 May 2020 21:43)  T(F): 98.1 (30 May 2020 23:07), Max: 98.2 (30 May 2020 21:43)  HR: 70 (30 May 2020 23:07) (60 - 84)  BP: 137/86 (30 May 2020 23:07) (106/75 - 137/86)  RR: 16 (30 May 2020 23:07) (16 - 16)  SpO2: 100% (30 May 2020 23:07) (97% - 100%)    PHYSICAL EXAM:  GENERAL: NAD, well-groomed, well-appearing   HEAD:  Atraumatic, Normocephalic  EYES: EOMI, conjunctiva and sclera clear  ENMT: No tonsillar erythema, exudates, or enlargement; Moist mucous membranes, Good dentition  NECK: Supple, No JVD  NERVOUS SYSTEM: AOX3, motor and sensation grossly intact in b/l UE and b/l LE, no facial droop, smile symmetrical   PSYCHIATRIC: Appropriate affect and mood  CHEST/LUNG: Clear to auscultation bilaterally; No rales, rhonchi, wheezing, or rubs  HEART: Regular rate and rhythm; No murmurs, rubs, or gallops. No LE edema  ABDOMEN: Soft, Nontender, Nondistended; Bowel sounds present  EXTREMITIES:  2+ Peripheral Pulses, No clubbing, cyanosis  SKIN: No rashes or lesions

## 2020-05-30 NOTE — H&P ADULT - ATTENDING COMMENTS
Pt seen and examined and case discussed with resident physician Dr. Elliott. Will need endocrine and cardiology evaluation given new onset EASLEY and endocrine abnormalities. Would consider MUGA +- stress testing for cardiac workup. Has already had significant endo workup previously, is s/p stress dose steroids in ED so doubt value of ACTH/Cortisol levels at this time.

## 2020-05-30 NOTE — H&P ADULT - PROBLEM SELECTOR PLAN 1
Patient reporting 2 weeks of progressive SOB and dyspnea on exertion; impacting his quality of his life  DDx fatigue related to immunotherapy, hypothyroidism, cardiovascular, or adrenal/pituitary insufficiency   Outpatient TTE reviewed EF 49%, no sig valve abnormalities noted  Patient additionally reports recent PFT that is within normal limits   COVID neg PCR x2, neg antibodies   - f/u TSH, ATCH, am cortisol levels   - Endocrine consult in AM   - Cardiology consult, if patient meets criteria for inpatient ischemia workup  - consider repeat TTE Patient reporting 2 weeks of progressive SOB and dyspnea on exertion; impacting his quality of his life  DDx fatigue related to immunotherapy, hypothyroidism, cardiovascular, or adrenal/pituitary insufficiency   Outpatient TTE reviewed EF 49%, no sig valve abnormalities noted  Patient additionally reports recent PFT that is within normal limits   COVID neg PCR x2, neg antibodies   - c/w cortisol 50mg IV Q8hr   - Endocrine consult in AM   - Cardiology consult, if patient meets criteria for inpatient ischemia workup  - consider repeat TTE Patient reporting 2 weeks of progressive SOB and dyspnea on exertion; impacting his quality of his life  DDx fatigue related to immunotherapy, hypothyroidism, cardiovascular, or adrenal/pituitary insufficiency   Outpatient TTE reviewed EF 49%, no sig valve abnormalities noted  Patient additionally reports recent PFT that is within normal limits   COVID neg PCR x2, neg antibodies   - c/w cortisol 50mg IV Q8hr   - Endocrine consult in AM   - Cardiology consult, if patient meets criteria for inpatient nuclear stress test vs MUGA

## 2020-05-30 NOTE — ED ADULT NURSE REASSESSMENT NOTE - NS ED NURSE REASSESS COMMENT FT1
Ok to access port as per MD. Right chest wall power port accessed under sterile technique with 2 RNs present to maintain sterility. Pt tolerated procedure well, labs drawn and sent as ordered. Pt resting comfortably in bed, call bell at bedside, safety maintained.

## 2020-05-30 NOTE — H&P ADULT - PROBLEM SELECTOR PLAN 3
On immunotherapy (Ipilimumab and Nivolumab s/p 6 cycles)- Last session 03/2020  - Oncology consult in AM,  - further immunotherapy per Onc

## 2020-05-30 NOTE — H&P ADULT - NSHPREVIEWOFSYSTEMS_GEN_ALL_CORE
CONSTITUTIONAL: +Fatigue, weight loss (25 lbs)  EYES: No eye pain, visual disturbances, or discharge  ENMT:   No sinus or throat pain  RESPIRATORY: +shortness of breath. No cough, wheezing, chills or hemoptysis;   CARDIOVASCULAR: No chest pain, palpitations, dizziness, or leg swelling  GASTROINTESTINAL: No abdominal pain. No nausea, vomiting. No diarrhea or constipation.  GENITOURINARY: No dysuria  NEUROLOGICAL: No headaches, loss of strength, numbness, or tremors  SKIN: No itching, rashes  LYMPH NODES: No enlarged glands  ENDOCRINE: No polydipsia or polyuria  MUSCULOSKELETAL: No joint pain or swelling;   PSYCHIATRIC: Denies depression, anxiety

## 2020-05-30 NOTE — ED ADULT NURSE NOTE - OBJECTIVE STATEMENT
Pt is a 51 y/o M, PMH BPH, HTN, BMT, NHL ('99), testicular cancer ('94, s/p orchiectomy), malignant melanoma of scalp ('18, s/p excision '19, on immunotherapy 6 cycles so far last in march), presenting to the ED from home c/o fatigue, weight loss, leg spasms x months. Pt states that he has EASLEY and an unintentional 25 lb weight loss over the past month. Pt states his leg cramps began when he started immunotherapy. Pt went to MD, found abnormal hormone levels in blood work, was prescribed steroids (took for 4 days) with improvement of sx. Pt is A&Ox4, moves all extremities, VSS. Pt has a power port in his right upper chest wall. Pt denies headache, chest pain, palpitations, cough, SOB, abdominal pain, n/v/d, urinary symptoms, fevers, chills at this time. Pt is resting comfortably in bed, call bell at bedside, safety maintained. Pt is a 49 y/o M, PMH BPH, HTN, Non-Hodgkin's Lymphoma ('99) s/p BMT, testicular cancer ('94, s/p orchiectomy), malignant melanoma of scalp ('18, s/p excision '19, on immunotherapy 6 cycles so far last in march), presenting to the ED from home c/o fatigue, weight loss, leg spasms x months. Pt states that he has EASLEY and an unintentional 25 lb weight loss over the past month. Pt states his leg cramps began when he started immunotherapy. Pt went to MD, found abnormal hormone levels in blood work, was prescribed steroids (took for 4 days) with improvement of sx. Pt is A&Ox4, moves all extremities, VSS. Pt states he has a power port in his right upper chest wall. Pt denies headache, chest pain, palpitations, cough, SOB, abdominal pain, n/v/d, urinary symptoms, fevers, chills at this time. Pt is resting comfortably in bed, call bell at bedside, safety maintained.

## 2020-05-30 NOTE — ED PROVIDER NOTE - PHYSICAL EXAMINATION
I have reviewed the triage vital signs.  Const: AAOx3, in NAD, well-appearing male  Eyes: no conjunctival injection  HENT: NCAT, Neck supple, oral mucosa moist  CV: RRR, +S1, S2  Resp: CTAB, no respiratory distress  GI: Abdomen soft, NTND, no guarding, no CVA tenderness  Extremities: No peripheral edema,  2+ radial and DP pulses  Skin: Warm, well perfused, no rash  MSK: No gross deformities appreciated. No leg swelling or TTP  Neuro: No focal sensory or motor deficits  Psych: Appropriate mood and affect

## 2020-05-30 NOTE — H&P ADULT - ASSESSMENT
49 yo M PMHx of testicular ca s/p orchiectomy, NHL s/p chemo/rad, ischemic colitis s/p lap left colectomy, malignant melanoma of the scalp on immunotherapy s/p 6 cycles last session 03/2020REBEL myositis presents with two weeks of fatigue and dyspnea on exertion

## 2020-05-30 NOTE — H&P ADULT - NSHPLABSRESULTS_GEN_ALL_CORE
LABS:                        13.7   9.20  )-----------( 266      ( 30 May 2020 18:29 )             43.0     30 May 2020 18:29    138    |  102    |  20     ----------------------------<  90     3.6     |  26     |  1.02     Ca    10.0       30 May 2020 18:29  Phos  4.4       30 May 2020 18:29  Mg     2.0       30 May 2020 18:29    TPro  6.9    /  Alb  4.6    /  TBili  0.4    /  DBili  x      /  AST  12     /  ALT  15     /  AlkPhos  50     30 May 2020 18:29    PT/INR - ( 30 May 2020 18:29 )   PT: 11.7 sec;   INR: 1.02 ratio    PTT - ( 30 May 2020 18:29 )  PTT:60.4 sec    EKG: NSR, no REGAN   RADIOLOGY & ADDITIONAL TESTS:  CTA chest, clear lungs, no PE     Imaging Personally Reviewed:  [x] YES

## 2020-05-30 NOTE — ED ADULT NURSE REASSESSMENT NOTE - NS ED NURSE REASSESS COMMENT FT1
received a 49y/o male from RN Ramonita, patient aox4 ambulatory with c/o generalized weakness accompanied by EASLEY and bilateral lower extremity cramping for 2 months now after he started on immunotherapy for malignant melanoma of the scalp. Rt chest wall power port accessed by previous RN, due meds given tolerated well. Covid swab performed, patient awaiting CT scan. Currently c/o lower extremity cramping, MD made aware. VS wnl. will continue to monitor.

## 2020-05-30 NOTE — H&P ADULT - HISTORY OF PRESENT ILLNESS
49 yo M PMHx of testicular ca s/p orchiectomy, NHL s/p chemo/rad, ischemic colitis s/p lap left colectomy, malignant melanoma of the scalp on immunotherapy s/p 6 cycles last session 03/2020, LE myositis presents with two weeks of fatigue and dyspnea on exertion; also with 25 lb weight loss. He reports being in his usual state of health prior to onset of his symptoms. He cannot walk more than a few feet in his home before becoming significantly short of breath. He also reports that he can only go up half a flight of steps without having to stop. Prior to these past two weeks he ambulated without difficulty. He was had outpatient workup including TTE and PFT which were both reportedly within normal limits. Outpatient workup concerning for adrenal insufficiency so he was started on oral hydrocortisone 40-60mg PO daily divided in BID dosing. He felt an improvement in his symptoms when steroids were initiated but the effect soon wore off and his symptoms worsened. Presents today to the ED per instruction of his oncologist for further workup. He denies fevers, chill, chest pain, leg swelling, palpitations. No cough or wheezing.

## 2020-05-30 NOTE — ED PROVIDER NOTE - PROGRESS NOTE DETAILS
Donnie, PGY1 - Attempted to contact oncologist Dr. Mcginnis. Left callback number with answering service. Donnie, PGY1 - Pt with adrenal insufficiency, ACTH and adrenal cortisol low, was treated with PO stress dose steroids x 1 month without adequate improvement. Recommending stress dose steroids, solucortef 100mg IV x1, ivf, reassess. States he spoke to pt last night & to Dr. Mcginnis, would like pt evaluated for SOB. Donnie, PGY1 - Spoke to oncologist fellow, who discussed case with Dr. Mcginnis. Would like pt TBA for pituitary failure possible 2/2 to immunotherapy, pneumonitis, and cardiac workup. To be seen by heme/onc in the AM. Attending MD Irizarry: MD called to bedside, reports that has leg cramping and that typically is given Dilaudid.  Reviewed [facility] (Ref #[id number]), no Rx for Dilaudid.  Asked patient about Dilaudid as none prescribed.  Then stated that at home takes something else but in hospital gets Dilaudid.  Review of [facility] reveals 4/13/20 dispensed Percocet 150 tabs, 25 day supply by Dr. Asher.  Will give dose now. Donnie, PGY1 - Pt endorsed to Dr. Campos. Requested repeat EKG.

## 2020-05-31 ENCOUNTER — TRANSCRIPTION ENCOUNTER (OUTPATIENT)
Age: 50
End: 2020-05-31

## 2020-05-31 DIAGNOSIS — R06.00 DYSPNEA, UNSPECIFIED: ICD-10-CM

## 2020-05-31 DIAGNOSIS — E03.9 HYPOTHYROIDISM, UNSPECIFIED: ICD-10-CM

## 2020-05-31 DIAGNOSIS — Z02.9 ENCOUNTER FOR ADMINISTRATIVE EXAMINATIONS, UNSPECIFIED: ICD-10-CM

## 2020-05-31 DIAGNOSIS — C43.4 MALIGNANT MELANOMA OF SCALP AND NECK: ICD-10-CM

## 2020-05-31 DIAGNOSIS — Z29.9 ENCOUNTER FOR PROPHYLACTIC MEASURES, UNSPECIFIED: ICD-10-CM

## 2020-05-31 DIAGNOSIS — E27.40 UNSPECIFIED ADRENOCORTICAL INSUFFICIENCY: ICD-10-CM

## 2020-05-31 DIAGNOSIS — E23.0 HYPOPITUITARISM: ICD-10-CM

## 2020-05-31 LAB
GLUCOSE BLDC GLUCOMTR-MCNC: 113 MG/DL — HIGH (ref 70–99)
GLUCOSE BLDC GLUCOMTR-MCNC: 84 MG/DL — SIGNIFICANT CHANGE UP (ref 70–99)

## 2020-05-31 PROCEDURE — 99233 SBSQ HOSP IP/OBS HIGH 50: CPT | Mod: GC

## 2020-05-31 PROCEDURE — 99223 1ST HOSP IP/OBS HIGH 75: CPT | Mod: GC

## 2020-05-31 RX ORDER — HYDROCORTISONE 20 MG
50 TABLET ORAL EVERY 8 HOURS
Refills: 0 | Status: DISCONTINUED | OUTPATIENT
Start: 2020-05-31 | End: 2020-05-31

## 2020-05-31 RX ORDER — METOPROLOL TARTRATE 50 MG
50 TABLET ORAL DAILY
Refills: 0 | Status: DISCONTINUED | OUTPATIENT
Start: 2020-05-31 | End: 2020-06-04

## 2020-05-31 RX ORDER — OXYCODONE AND ACETAMINOPHEN 5; 325 MG/1; MG/1
2 TABLET ORAL EVERY 6 HOURS
Refills: 0 | Status: DISCONTINUED | OUTPATIENT
Start: 2020-05-31 | End: 2020-06-04

## 2020-05-31 RX ORDER — SODIUM CHLORIDE 9 MG/ML
1000 INJECTION, SOLUTION INTRAVENOUS
Refills: 0 | Status: DISCONTINUED | OUTPATIENT
Start: 2020-05-31 | End: 2020-06-04

## 2020-05-31 RX ORDER — HYDROCORTISONE 20 MG
40 TABLET ORAL
Refills: 0 | Status: DISCONTINUED | OUTPATIENT
Start: 2020-05-31 | End: 2020-06-03

## 2020-05-31 RX ORDER — DEXTROSE 50 % IN WATER 50 %
15 SYRINGE (ML) INTRAVENOUS ONCE
Refills: 0 | Status: DISCONTINUED | OUTPATIENT
Start: 2020-05-31 | End: 2020-06-04

## 2020-05-31 RX ORDER — DRONABINOL 2.5 MG
2.5 CAPSULE ORAL
Refills: 0 | Status: DISCONTINUED | OUTPATIENT
Start: 2020-05-31 | End: 2020-06-04

## 2020-05-31 RX ORDER — HYDROCORTISONE 20 MG
20 TABLET ORAL
Refills: 0 | Status: DISCONTINUED | OUTPATIENT
Start: 2020-05-31 | End: 2020-06-04

## 2020-05-31 RX ORDER — DEXTROSE 50 % IN WATER 50 %
25 SYRINGE (ML) INTRAVENOUS ONCE
Refills: 0 | Status: DISCONTINUED | OUTPATIENT
Start: 2020-05-31 | End: 2020-06-04

## 2020-05-31 RX ORDER — ENOXAPARIN SODIUM 100 MG/ML
40 INJECTION SUBCUTANEOUS DAILY
Refills: 0 | Status: DISCONTINUED | OUTPATIENT
Start: 2020-05-31 | End: 2020-06-04

## 2020-05-31 RX ORDER — LEVOTHYROXINE SODIUM 125 MCG
50 TABLET ORAL DAILY
Refills: 0 | Status: DISCONTINUED | OUTPATIENT
Start: 2020-05-31 | End: 2020-06-04

## 2020-05-31 RX ORDER — OMEPRAZOLE 10 MG/1
1 CAPSULE, DELAYED RELEASE ORAL
Qty: 0 | Refills: 0 | DISCHARGE

## 2020-05-31 RX ORDER — AMLODIPINE BESYLATE 2.5 MG/1
5 TABLET ORAL DAILY
Refills: 0 | Status: DISCONTINUED | OUTPATIENT
Start: 2020-05-31 | End: 2020-06-04

## 2020-05-31 RX ORDER — GLUCAGON INJECTION, SOLUTION 0.5 MG/.1ML
1 INJECTION, SOLUTION SUBCUTANEOUS ONCE
Refills: 0 | Status: DISCONTINUED | OUTPATIENT
Start: 2020-05-31 | End: 2020-06-04

## 2020-05-31 RX ORDER — ATORVASTATIN CALCIUM 80 MG/1
40 TABLET, FILM COATED ORAL AT BEDTIME
Refills: 0 | Status: DISCONTINUED | OUTPATIENT
Start: 2020-05-31 | End: 2020-06-04

## 2020-05-31 RX ORDER — FENOFIBRATE,MICRONIZED 130 MG
145 CAPSULE ORAL DAILY
Refills: 0 | Status: DISCONTINUED | OUTPATIENT
Start: 2020-05-31 | End: 2020-06-04

## 2020-05-31 RX ORDER — HYDROMORPHONE HYDROCHLORIDE 2 MG/ML
0.5 INJECTION INTRAMUSCULAR; INTRAVENOUS; SUBCUTANEOUS
Refills: 0 | Status: DISCONTINUED | OUTPATIENT
Start: 2020-05-31 | End: 2020-06-04

## 2020-05-31 RX ORDER — DEXTROSE 50 % IN WATER 50 %
12.5 SYRINGE (ML) INTRAVENOUS ONCE
Refills: 0 | Status: DISCONTINUED | OUTPATIENT
Start: 2020-05-31 | End: 2020-06-04

## 2020-05-31 RX ADMIN — AMLODIPINE BESYLATE 5 MILLIGRAM(S): 2.5 TABLET ORAL at 05:12

## 2020-05-31 RX ADMIN — Medication 50 MILLIGRAM(S): at 14:10

## 2020-05-31 RX ADMIN — OXYCODONE AND ACETAMINOPHEN 2 TABLET(S): 5; 325 TABLET ORAL at 15:38

## 2020-05-31 RX ADMIN — OXYCODONE AND ACETAMINOPHEN 2 TABLET(S): 5; 325 TABLET ORAL at 03:51

## 2020-05-31 RX ADMIN — Medication 50 MILLIGRAM(S): at 05:12

## 2020-05-31 RX ADMIN — Medication 50 MILLIGRAM(S): at 08:31

## 2020-05-31 RX ADMIN — Medication 145 MILLIGRAM(S): at 12:40

## 2020-05-31 RX ADMIN — Medication 50 MICROGRAM(S): at 05:12

## 2020-05-31 RX ADMIN — OXYCODONE AND ACETAMINOPHEN 2 TABLET(S): 5; 325 TABLET ORAL at 22:46

## 2020-05-31 RX ADMIN — HYDROMORPHONE HYDROCHLORIDE 0.5 MILLIGRAM(S): 2 INJECTION INTRAMUSCULAR; INTRAVENOUS; SUBCUTANEOUS at 14:10

## 2020-05-31 RX ADMIN — ENOXAPARIN SODIUM 40 MILLIGRAM(S): 100 INJECTION SUBCUTANEOUS at 12:40

## 2020-05-31 RX ADMIN — HYDROMORPHONE HYDROCHLORIDE 0.5 MILLIGRAM(S): 2 INJECTION INTRAMUSCULAR; INTRAVENOUS; SUBCUTANEOUS at 21:58

## 2020-05-31 RX ADMIN — ATORVASTATIN CALCIUM 40 MILLIGRAM(S): 80 TABLET, FILM COATED ORAL at 21:54

## 2020-05-31 RX ADMIN — Medication 2.5 MILLIGRAM(S): at 17:38

## 2020-05-31 NOTE — CONSULT NOTE ADULT - SUBJECTIVE AND OBJECTIVE BOX
Hematology/Oncology Consult Note    HPI:  50 year old male with PMHx of Non Hodgkins Lymphoma (93, chemo/rads), testicular cancer s/p orchiectomy, R scalp melanoma (dx'ed 2019) on immunotherapy (iplimumab and nivolumab6 cycles, last in March), ischemic colitis s/p lap left colectomy, LE myositis and bilateral LE cramping  presents with 2-week history of fatigue and dyspnea on exertion, and 25 lb weight loss.     He reports being in his usual state of health until 2 weeks ago when he developed symptoms as above. He had outpatient workup including TTE and PFT which were both reportedly within normal limits. Outpatient workup concerning for adrenal insufficiency so he was started on oral hydrocortisone 40-60mg PO daily divided in BID dosing. He felt an improvement in his symptoms when steroids were initiated but the effect soon wore off and his symptoms worsened.     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 Trial of steroid w/o improvement. MRI 2 weeks ago showed unremarkable pituitary, no mass.       PAST MEDICAL & SURGICAL HISTORY:  Malignant melanoma of scalp: RSX   R neck mass dsx/ LN dsx 10/19/18  Hypertriglyceridemia  History of bone marrow transplant  Osteoarthritis: degenerative disc L3-4  BPH (benign prostatic hyperplasia)  Colitis: surgery   GERD (gastroesophageal reflux disease)  NHL (non-Hodgkin's lymphoma): , Radiation to left neck region  HTN (hypertension)  Headache, Migraine  Testicular Cancer: , chemotherapy  Melanoma of scalp or neck: excision   s/p excision right neck mass &amp; LN dissx  Lymph node disorder: s/p abdominal lymph node dissection ,   S/P colon resection:   History of orchiectomy: left       FAMILY HISTORY:      MEDICATIONS  (STANDING):  amLODIPine   Tablet 5 milliGRAM(s) Oral daily  atorvastatin 40 milliGRAM(s) Oral at bedtime  enoxaparin Injectable 40 milliGRAM(s) SubCutaneous daily  fenofibrate Tablet 145 milliGRAM(s) Oral daily  hydrocortisone sodium succinate Injectable 50 milliGRAM(s) IV Push every 8 hours  levothyroxine 50 MICROGram(s) Oral daily  metoprolol succinate ER 50 milliGRAM(s) Oral daily    MEDICATIONS  (PRN):  HYDROmorphone  Injectable 0.5 milliGRAM(s) IV Push two times a day PRN Severe Pain (7 - 10)  oxycodone    5 mG/acetaminophen 325 mG 2 Tablet(s) Oral every 6 hours PRN Severe Pain (7 - 10)      Allergies    No Known Allergies      VITAL SIGNS:  Height (cm): 180.3 ( @ 23:07)  Weight (kg): 76.7 ( @ 23:07)  BMI (kg/m2): 23.6 ( @ 23:07)  BSA (m2): 1.96 ( @ 23:07)  T(F): 97.9 (20 @ 05:11), Max: 98.2 (20 @ 21:43)  HR: 66 (20 @ 08:22)  BP: 122/72 (20 @ 08:22)  RR: 18 (20 @ 08:22)  SpO2: 98% (20 @ 08:22)  Wt(kg): --    PHYSICAL EXAMINATION:  Constitutional: NAD   Eyes: PERRLA, EOMI, sclera non-icteric, conjunctiva non-anemia  Neck: supple, no masses, no elevated JVP  Mouth: No mucositis, no exudate, no ulcer.   Respiratory: CTA b/l  Cardiovascular: Normal rate regular rhythm. normal S1S2, no murmur  Gastrointestinal: soft and flat, no tenderness to palpation, no masses palpable, normoactive bowel sound, no HSM  Extremities:  No c/c/e  MSK: No joint swelling, erythema, or tenderness   Neurological: AOx3, Cranial nerves II-XII grossly intact  Skin: No rash  Psych: Normal affect    LABS:                        13.7   9.20  )-----------( 266      ( 30 May 2020 18:29 )             43.0     05    138  |  102  |  20  ----------------------------<  90  3.6   |  26  |  1.02    Ca    10.0      30 May 2020 18:29  Phos  4.4     0530  Mg     2.0         TPro  6.9  /  Alb  4.6  /  TBili  0.4  /  DBili  x   /  AST  12  /  ALT  15  /  AlkPhos  50  0530    PT/INR - ( 30 May 2020 18:29 )   PT: 11.7 sec;   INR: 1.02 ratio         PTT - ( 30 May 2020 18:29 )  PTT:60.4 sec Magnesium, Serum: 2.0 mg/dL ( @ 18:29)  Phosphorus Level, Serum: 4.4 mg/dL ( @ 18:29)    Urinalysis Basic - ( 30 May 2020 23:11 )    Color: Light Yellow / Appearance: Clear / S.049 / pH: x  Gluc: x / Ketone: Negative  / Bili: Negative / Urobili: Negative   Blood: x / Protein: Trace / Nitrite: Negative   Leuk Esterase: Negative / RBC: 1 /hpf / WBC 0 /HPF   Sq Epi: x / Non Sq Epi: 0 /hpf / Bacteria: Negative        RADIOLOGY & ADDITIONAL TESTS: Hematology/Oncology Consult Note    HPI:  50 year old male with PMHx of Non Hodgkins Lymphoma (93, chemo/rads), testicular cancer s/p orchiectomy, R scalp melanoma (dx'ed 2019) on immunotherapy (iplimumab and nivolumab6 cycles, last in March), ischemic colitis s/p lap left colectomy, LE myositis and bilateral LE cramping  presents with 2-week history of fatigue and dyspnea on exertion, and 25 lb weight loss. He reports being in his usual state of health until 2 weeks ago when he developed symptoms as above. He had outpatient workup including TTE and PFT which were both reportedly within normal limits. Outpatient workup concerning for adrenal insufficiency so he was started on oral hydrocortisone 40-60mg PO daily divided in BID dosing. He felt an improvement in his symptoms when steroids were initiated but the effect soon wore off and his symptoms worsened.     PAST MEDICAL & SURGICAL HISTORY:  Malignant melanoma of scalp: RSX   R neck mass dsx/ LN dsx 10/19/18  Hypertriglyceridemia  History of bone marrow transplant  Osteoarthritis: degenerative disc L3-4  BPH (benign prostatic hyperplasia)  Colitis: surgery   GERD (gastroesophageal reflux disease)  NHL (non-Hodgkin's lymphoma): , Radiation to left neck region  HTN (hypertension)  Headache, Migraine  Testicular Cancer: , chemotherapy  Melanoma of scalp or neck: excision   s/p excision right neck mass &amp; LN dissx  Lymph node disorder: s/p abdominal lymph node dissection ,   S/P colon resection:   History of orchiectomy: left       FAMILY HISTORY:      MEDICATIONS  (STANDING):  amLODIPine   Tablet 5 milliGRAM(s) Oral daily  atorvastatin 40 milliGRAM(s) Oral at bedtime  enoxaparin Injectable 40 milliGRAM(s) SubCutaneous daily  fenofibrate Tablet 145 milliGRAM(s) Oral daily  hydrocortisone sodium succinate Injectable 50 milliGRAM(s) IV Push every 8 hours  levothyroxine 50 MICROGram(s) Oral daily  metoprolol succinate ER 50 milliGRAM(s) Oral daily    MEDICATIONS  (PRN):  HYDROmorphone  Injectable 0.5 milliGRAM(s) IV Push two times a day PRN Severe Pain (7 - 10)  oxycodone    5 mG/acetaminophen 325 mG 2 Tablet(s) Oral every 6 hours PRN Severe Pain (7 - 10)      Allergies    No Known Allergies      VITAL SIGNS:  Height (cm): 180.3 ( @ 23:07)  Weight (kg): 76.7 ( 23:07)  BMI (kg/m2): 23.6 ( 23:07)  BSA (m2): 1.96 ( 23:07)  T(F): 97.9 (20 @ 05:11), Max: 98.2 (20 @ 21:43)  HR: 66 (20 @ 08:22)  BP: 122/72 (20 @ 08:22)  RR: 18 (20 @ 08:22)  SpO2: 98% (20 @ 08:22)  Wt(kg): --    PHYSICAL EXAMINATION:  Constitutional: NAD   Eyes: PERRLA, EOMI, sclera non-icteric, conjunctiva non-anemia  Neck: supple, no masses, no elevated JVP  Mouth: No mucositis, no exudate, no ulcer.   Respiratory: CTA b/l  Cardiovascular: Normal rate regular rhythm. normal S1S2, no murmur  Gastrointestinal: soft and flat, no tenderness to palpation, no masses palpable, normoactive bowel sound, no HSM  Extremities:  No c/c/e  MSK: No joint swelling, erythema, or tenderness   Neurological: AOx3, Cranial nerves II-XII grossly intact  Skin: No rash  Psych: Normal affect    LABS:                        13.7   9.20  )-----------( 266      ( 30 May 2020 18:29 )             43.0         138  |  102  |  20  ----------------------------<  90  3.6   |  26  |  1.02    Ca    10.0      30 May 2020 18:29  Phos  4.4     05-  Mg     2.0         TPro  6.9  /  Alb  4.6  /  TBili  0.4  /  DBili  x   /  AST  12  /  ALT  15  /  AlkPhos  50  30    PT/INR - ( 30 May 2020 18:29 )   PT: 11.7 sec;   INR: 1.02 ratio         PTT - ( 30 May 2020 18:29 )  PTT:60.4 sec Magnesium, Serum: 2.0 mg/dL ( @ 18:29)  Phosphorus Level, Serum: 4.4 mg/dL ( @ 18:29)    Urinalysis Basic - ( 30 May 2020 23:11 )    Color: Light Yellow / Appearance: Clear / S.049 / pH: x  Gluc: x / Ketone: Negative  / Bili: Negative / Urobili: Negative   Blood: x / Protein: Trace / Nitrite: Negative   Leuk Esterase: Negative / RBC: 1 /hpf / WBC 0 /HPF   Sq Epi: x / Non Sq Epi: 0 /hpf / Bacteria: Negative        RADIOLOGY & ADDITIONAL TESTS: Hematology/Oncology Consult Note    HPI:  50 year old male with PMHx of Non Hodgkins Lymphoma (93, chemo/rads), testicular cancer s/p orchiectomy, R scalp melanoma (dx'ed 2019) on immunotherapy (iplimumab and nivolumab6 cycles, last in March), ischemic colitis s/p lap left colectomy, LE myositis and bilateral LE cramping  presents with 2-week history of fatigue and dyspnea on exertion, and 25 lb weight loss. He reports being in his usual state of health until 2 weeks ago when he developed symptoms as above. He had outpatient workup including TTE and PFT which were both reportedly within normal limits. Outpatient workup concerning for adrenal insufficiency so he was started on oral hydrocortisone 40-60mg PO daily divided in BID dosing. He felt an improvement in his symptoms when steroids were initiated but the effect soon wore off and his symptoms worsened.     PAST MEDICAL & SURGICAL HISTORY:  Malignant melanoma of scalp: RSX   R neck mass dsx/ LN dsx 10/19/18  Hypertriglyceridemia  History of bone marrow transplant  Osteoarthritis: degenerative disc L3-4  BPH (benign prostatic hyperplasia)  Colitis: surgery   GERD (gastroesophageal reflux disease)  NHL (non-Hodgkin's lymphoma): , Radiation to left neck region  HTN (hypertension)  Headache, Migraine  Testicular Cancer: , chemotherapy  Melanoma of scalp or neck: excision   s/p excision right neck mass &amp; LN dissx  Lymph node disorder: s/p abdominal lymph node dissection ,   S/P colon resection:   History of orchiectomy: left       FAMILY HISTORY:      MEDICATIONS  (STANDING):  amLODIPine   Tablet 5 milliGRAM(s) Oral daily  atorvastatin 40 milliGRAM(s) Oral at bedtime  enoxaparin Injectable 40 milliGRAM(s) SubCutaneous daily  fenofibrate Tablet 145 milliGRAM(s) Oral daily  hydrocortisone sodium succinate Injectable 50 milliGRAM(s) IV Push every 8 hours  levothyroxine 50 MICROGram(s) Oral daily  metoprolol succinate ER 50 milliGRAM(s) Oral daily    MEDICATIONS  (PRN):  HYDROmorphone  Injectable 0.5 milliGRAM(s) IV Push two times a day PRN Severe Pain (7 - 10)  oxycodone    5 mG/acetaminophen 325 mG 2 Tablet(s) Oral every 6 hours PRN Severe Pain (7 - 10)      Allergies    No Known Allergies      VITAL SIGNS:  Height (cm): 180.3 ( @ 23:07)  Weight (kg): 76.7 ( 23:07)  BMI (kg/m2): 23.6 ( @ 23:07)  BSA (m2): 1.96 ( 23:07)  T(F): 97.9 (20 @ 05:11), Max: 98.2 (20 @ 21:43)  HR: 66 (20 @ 08:22)  BP: 122/72 (20 @ 08:22)  RR: 18 (20 @ 08:22)  SpO2: 98% (20 @ 08:22)  Wt(kg): --    PHYSICAL EXAMINATION:  Defer to primary team    LABS:                        13.7   9.20  )-----------( 266      ( 30 May 2020 18:29 )             43.0     05    138  |  102  |  20  ----------------------------<  90  3.6   |  26  |  1.02    Ca    10.0      30 May 2020 18:29  Phos  4.4       Mg     2.0         TPro  6.9  /  Alb  4.6  /  TBili  0.4  /  DBili  x   /  AST  12  /  ALT  15  /  AlkPhos  50  -30    PT/INR - ( 30 May 2020 18:29 )   PT: 11.7 sec;   INR: 1.02 ratio         PTT - ( 30 May 2020 18:29 )  PTT:60.4 sec Magnesium, Serum: 2.0 mg/dL ( @ 18:29)  Phosphorus Level, Serum: 4.4 mg/dL ( @ 18:29)    Urinalysis Basic - ( 30 May 2020 23:11 )    Color: Light Yellow / Appearance: Clear / S.049 / pH: x  Gluc: x / Ketone: Negative  / Bili: Negative / Urobili: Negative   Blood: x / Protein: Trace / Nitrite: Negative   Leuk Esterase: Negative / RBC: 1 /hpf / WBC 0 /HPF   Sq Epi: x / Non Sq Epi: 0 /hpf / Bacteria: Negative      RADIOLOGY & ADDITIONAL TESTS:

## 2020-05-31 NOTE — DIETITIAN INITIAL EVALUATION ADULT. - ENERGY NEEDS
Pertinent information as per chart: Pt 51 y/o M with PMH: testicular cancer S/P orchiectomy, NHL S/P chemo/rad, ischemic colitis S/P lap left colectomy, malignant melanoma of the scalp on immunotherapy S/P 6 cycles last session (03/2020), LE myositis presents with two weeks of fatigue and dyspnea on exertion, Pituitary hypofunction.

## 2020-05-31 NOTE — DIETITIAN INITIAL EVALUATION ADULT. - ADD RECOMMEND
1. Will continue to monitor PO intake, weight, labs, skin, GI status, diet. 2. Encourage PO intake and obtain food preferences. 3. Continue appetite stimulant as medically feasible. 4. Provided recommendations to optimize PO and protein intake - made aware RD remains available. 5. Malnutrition notification placed in chart - spoke to provider.

## 2020-05-31 NOTE — DIETITIAN INITIAL EVALUATION ADULT. - NS FNS WEIGHT CHANGE REASON
Pt reports 20 pounds weight loss x 1.5 months PTA due to decreased PO intake, from 190 to 170 pounds. Weight as per flow sheets (05/30) 169 pounds - will continue to monitor.

## 2020-05-31 NOTE — DISCHARGE NOTE PROVIDER - NSDCMRMEDTOKEN_GEN_ALL_CORE_FT
acetaminophen 325 mg oral tablet: 2 tab(s) orally every 6 hours, As needed, Moderate Pain (4 - 6)  hydrOXYzine hydrochloride 50 mg oral tablet: 1 tab(s) orally every 8 hours  levothyroxine 50 mcg (0.05 mg) oral tablet: 1 tab(s) orally once a day  Lipitor 40 mg oral tablet: 1 tab(s) orally once a day  Norvasc 5 mg oral tablet: 1 tab(s) orally once a day  oxyCODONE 5 mg oral tablet: 1 tab(s) orally every 4 to 6 hours, As Needed - for severe pain MDD:6 tabs  predniSONE 10 mg oral tablet: 1 tab(s) orally 2 times a day  sildenafil 50 mg oral tablet: 1 tab(s) orally once a day  Toprol-XL 50 mg oral tablet, extended release: 1 tab(s) orally once a day  TriCor 145 mg oral tablet: 1 tab(s) orally once a day  Ubrelvy 100 mg oral tablet: 1 tab(s) orally once, As Needed Migraine   Vitamin B12 50 mcg oral tablet: 1 tab(s) orally once a day  Vitamin D3 50,000 intl units (1250 mcg) oral capsule: 1 cap(s) orally once a week acetaminophen 325 mg oral tablet: 2 tab(s) orally every 6 hours, As needed, Moderate Pain (4 - 6)  dronabinol 2.5 mg oral capsule: 1 cap(s) orally 2 times a day (before meals) MDD:5mg  hydrocortisone 10 mg oral tablet: 3 tab(s) orally   hydrocortisone 20 mg oral tablet: 1 tab(s) orally   hydrOXYzine hydrochloride 50 mg oral tablet: 1 tab(s) orally every 8 hours  levothyroxine 50 mcg (0.05 mg) oral tablet: 1 tab(s) orally once a day  Lipitor 40 mg oral tablet: 1 tab(s) orally once a day  magnesium oxide 400 mg (241.3 mg elemental magnesium) oral tablet: 1 tab(s) orally 3 times a day (with meals)  Norvasc 5 mg oral tablet: 1 tab(s) orally once a day  oxyCODONE 5 mg oral tablet: 1 tab(s) orally every 4 to 6 hours, As Needed - for severe pain MDD:6 tabs  sildenafil 50 mg oral tablet: 1 tab(s) orally once a day  Toprol-XL 50 mg oral tablet, extended release: 1 tab(s) orally once a day  TriCor 145 mg oral tablet: 1 tab(s) orally once a day  Ubrelvy 100 mg oral tablet: 1 tab(s) orally once, As Needed Migraine   Vitamin B12 50 mcg oral tablet: 1 tab(s) orally once a day  Vitamin D3 50,000 intl units (1250 mcg) oral capsule: 1 cap(s) orally once a week

## 2020-05-31 NOTE — PROGRESS NOTE ADULT - ASSESSMENT
51 yo M PMHx of testicular ca s/p orchiectomy, NHL s/p chemo/rad, ischemic colitis s/p lap left colectomy, malignant melanoma of the scalp on immunotherapy s/p 6 cycles last session 03/[id number] myositis presents with two weeks of fatigue and dyspnea on exertion 51 yo M PMHx of testicular ca s/p orchiectomy, NHL s/p chemo/rad, ischemic colitis s/p lap left colectomy, malignant melanoma of the scalp on immunotherapy s/p 6 cycles last session 03/2020, REBEL myositis presents with two weeks of fatigue and dyspnea on exertion.

## 2020-05-31 NOTE — PROGRESS NOTE ADULT - ATTENDING COMMENTS
Seen, examined the patient with house staff  - Resting in bed, c/o low appetite, tired and loss of weight about 25 Lbs since March, no acute SOB. O2 sat 98% in RA  - reviewed labs, imaging. CTA chest no PE  - His weight loss and low appetite possibly related to Immunotherapy and adrenal insufficiency    Heme consult and plan appreciated. Trial of Megace or Marinol if Heme agrees   - will ask Endocrine consult ( for adrenal insufficiency, steroid adjust), repeat TSH, nutrition consult    daily weight  - Will do Echo ( reportedly normal, PFTs ok)  - c/w other meds

## 2020-05-31 NOTE — CONSULT NOTE ADULT - ASSESSMENT
49 yo M PMHx of testicular ca s/p orchiectomy, NHL s/p chemo/rad, ischemic colitis s/p lap left colectomy, malignant melanoma of the scalp on immunotherapy s/p 6 cycles last session 04/2020, LE myositis presents with two weeks of fatigue and dyspnea on exertion; also with 20 lb weight loss over the past month. Was recently seen by outpatient Endo and started on hydrocortisone for Adrenal Insufficiency secondary to immunotherapy , but even on high doses patient not feeling well so endocrine team consulted for inpatient management and further evaluation. 51 yo M PMHx of testicular ca s/p orchiectomy, NHL s/p chemo/rad, ischemic colitis s/p lap left colectomy, malignant melanoma of the scalp on immunotherapy (currently on maintenance Nivo (PDL-1) previously on ipi,  s/p 6 cycles last session 04/2020, LE myositis presents with two weeks of fatigue and dyspnea on exertion; also with 20 lb weight loss over the past month. Was recently seen by outpatient Endo and started on hydrocortisone for secondary Adrenal Insufficiency thought to be due to mmunotherapy , but even on high doses patient not feeling well so endocrine team consulted for inpatient management and further evaluation.

## 2020-05-31 NOTE — PROGRESS NOTE ADULT - PROBLEM SELECTOR PLAN 3
On immunotherapy (Ipilimumab and Nivolumab s/p 6 cycles)- Last session 03/2020  - Oncology consult in AM,  - further immunotherapy per Onc On immunotherapy (Ipilimumab and Nivolumab s/p 6 cycles)- Last session 03/2020  - Oncology consult appreciated    - further immunotherapy per Onc

## 2020-05-31 NOTE — PHYSICAL THERAPY INITIAL EVALUATION ADULT - PERTINENT HX OF CURRENT PROBLEM, REHAB EVAL
51 yo M PMHx of testicular ca s/p orchiectomy, NHL s/p chemo/rad, ischemic colitis s/p lap left colectomy, malignant melanoma of the scalp on immunotherapy s/p 6 cycles last session 03/[id number] myositis presents with two weeks of fatigue and dyspnea on exertion

## 2020-05-31 NOTE — DIETITIAN INITIAL EVALUATION ADULT. - PHYSICAL APPEARANCE
other (specify)/well nourished/No visual signs of muscle/fat loss noted; nutrition focused physical exam deferred at this time 2/2 precautionary measures surrounding the current COVID-19 pandemic. Ht: 71 inches Wt: 169 pounds BMI: 23.6 kg/m2 IBW: 172 (+/-10%) 98.3 %IBW  No noted edema as per flow sheets.   Skin: no noted pressure injuries as per documentation.

## 2020-05-31 NOTE — DIETITIAN INITIAL EVALUATION ADULT. - CONTINUE CURRENT NUTRITION CARE PLAN
yes/1. Recommend continue regular diet. Will continue to monitor and adjust as needed. 2. Recommend Ensure Enlive 3x daily (1050 evin and 60 gm protein) to optimize kcal and protein intake (one in each meal). Spoke to provider.

## 2020-05-31 NOTE — CONSULT NOTE ADULT - PROBLEM SELECTOR RECOMMENDATION 2
- TSH checked on 5/1/20 as outpatient noted to be 5.5, currently on LT4 50 mcg qd  - Recommend to recheck TSH and free T4 in am. 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  - TSH checked on 5/1/20 as outpatient noted to be 5.5, currently on LT4 50 mcg qd  - Recommend to recheck TSH and also check free T4 in am.  - Free T4 is preferred test to evaluate secondary hypothyroidism related to immunotherapy. - unclear of this is primary or secondary. IPI/nivo combination can lead to both primary or secondary hypothyroidism due to hypophysitis   - TSH checked on 5/1/20 as outpatient noted to be 5.5, currently on LT4 50 mcg qd  - Recommend to recheck TSH and also check free T4 in am.  - Free T4 is preferred test to evaluate secondary hypothyroidism related to immunotherapy.

## 2020-05-31 NOTE — PHYSICAL THERAPY INITIAL EVALUATION ADULT - ADDITIONAL COMMENTS
Pt lives in a private house, no stairs to enter, 1 flight to bed/bathroom, +handrail. Pt does not own any DMEs.    CT ANGIO CHEST 5/30: No pulmonary embolism.

## 2020-05-31 NOTE — DISCHARGE NOTE PROVIDER - NSDCFUADDAPPT_GEN_ALL_CORE_FT
You should follow up with your endocrinologist and your oncologist in 1 week.  Endocrine will likely want to go down on your steroids as an outpatient as your tolerate it.

## 2020-05-31 NOTE — CONSULT NOTE ADULT - SUBJECTIVE AND OBJECTIVE BOX
HPI:  49 yo M PMHx of testicular ca s/p orchiectomy, NHL s/p chemo/rad, ischemic colitis s/p lap left colectomy, malignant melanoma of the scalp on immunotherapy s/p 6 cycles last session 03/2020, LE myositis presents with two weeks of fatigue and dyspnea on exertion; also with 20 lb weight loss over the past month. (30 May 2020 23:15)      Endocrine history:  Patient is a 50 year old male here with dyspnea on minimal exertion, poor appetite and fatigue x 2 weeks and weight loss of 20 lbs over the past month. Patient with diagnosis of malignant melanoma and being managed with immunotherapy, per oncology was treated with Ipilimumab and Nivolumab initially and now on maintenance Nivolumab, last dose in 4/2020. Patient was seen by Endo Dr. Shahram Sanchez earlier this month for concern for AI / hypophysitis. Lab work done earlier this month showed low AM cortisol total <1.0 and free <0.02 with low ACTH of <1.5. Patient was initially started on prednisone, says did not help so was switched over to high dose hydrocortisone 40 mg in am and 20 mg in pm for few days and when tapered to low doses he complained of poor appetite and fatigue so was again started back on high doses but still no improvement so sent to ED. Additionally patient stated that in past he has received steroids on multiple occasions due to lower back pain, most recently was on steroid taper earlier this year, but says was off of it for a month prior to the lab work done with Dr. Sanchez. Patient also uses percocet q6h as needed at home for his lower back pain.  Per outpatient labs review, other hormonal testing including Prolactin, FSH and LH were wnl. TSH was initially noted to be 5.5, was started on :T4 50 mcg qd.   Currently denies any abdominal pain, nausea, vomiting, urinary complaints, dizziness, headache, salt craving. Only complaints of EASLEY, leg cramping , lower back pain and poor appetite.         PAST MEDICAL & SURGICAL HISTORY:  Malignant melanoma of scalp: RSX 08/18  R neck mass dsx/ LN dsx 10/19/18  Hypertriglyceridemia  History of bone marrow transplant  Osteoarthritis: degenerative disc L3-4  BPH (benign prostatic hyperplasia)  Colitis: surgery 1996  GERD (gastroesophageal reflux disease)  NHL (non-Hodgkin's lymphoma): 1999, Radiation to left neck region  HTN (hypertension)  Headache, Migraine  Testicular Cancer: 1994, chemotherapy  Melanoma of scalp or neck: excision 8/18  s/p excision right neck mass &amp; LN dissx  Lymph node disorder: s/p abdominal lymph node dissection 1994, 1996  S/P colon resection: 1996  History of orchiectomy: left 1994      FAMILY HISTORY:  No significant family history of endocrine disorders     Social History:  No tobacco, alcohol or illicit drug use reported.      Outpatient Medications:  · 	oxyCODONE 5 mg oral tablet: Last Dose Taken:  , 1 tab(s) orally every 4 to 6 hours, As Needed - for severe pain MDD:6 tabs  · 	acetaminophen 325 mg oral tablet: Last Dose Taken:  , 2 tab(s) orally every 6 hours, As needed, Moderate Pain (4 - 6)  · 	TriCor 145 mg oral tablet: Last Dose Taken:  , 1 tab(s) orally once a day  · 	Toprol-XL 50 mg oral tablet, extended release: Last Dose Taken:  , 1 tab(s) orally once a day  · 	Norvasc 5 mg oral tablet: Last Dose Taken:  , 1 tab(s) orally once a day  · 	Lipitor 40 mg oral tablet: Last Dose Taken:  , 1 tab(s) orally once a day  · 	Vitamin B12 50 mcg oral tablet: Last Dose Taken:  , 1 tab(s) orally once a day  · 	hydrOXYzine hydrochloride 50 mg oral tablet: Last Dose Taken:  , 1 tab(s) orally every 8 hours  · 	predniSONE 10 mg oral tablet: Last Dose Taken:  , 1 tab(s) orally 2 times a day  · 	sildenafil 50 mg oral tablet: Last Dose Taken:  , 1 tab(s) orally once a day  · 	Ubrelvy 100 mg oral tablet: 1 tab(s) orally once, As Needed Migraine   · 	Vitamin D3 50,000 intl units (1250 mcg) oral capsule: 1 cap(s) orally once a week  · 	levothyroxine 50 mcg (0.05 mg) oral tablet: Last Dose Taken:  , 1 tab(s) orally once a day        MEDICATIONS  (STANDING):  amLODIPine   Tablet 5 milliGRAM(s) Oral daily  atorvastatin 40 milliGRAM(s) Oral at bedtime  dextrose 5%. 1000 milliLiter(s) (50 mL/Hr) IV Continuous <Continuous>  dextrose 50% Injectable 12.5 Gram(s) IV Push once  dextrose 50% Injectable 25 Gram(s) IV Push once  dextrose 50% Injectable 25 Gram(s) IV Push once  dronabinol 2.5 milliGRAM(s) Oral two times a day before meals  enoxaparin Injectable 40 milliGRAM(s) SubCutaneous daily  fenofibrate Tablet 145 milliGRAM(s) Oral daily  hydrocortisone sodium succinate Injectable 50 milliGRAM(s) IV Push every 8 hours  levothyroxine 50 MICROGram(s) Oral daily  metoprolol succinate ER 50 milliGRAM(s) Oral daily    MEDICATIONS  (PRN):  dextrose 40% Gel 15 Gram(s) Oral once PRN Blood Glucose LESS THAN 70 milliGRAM(s)/deciLiter  glucagon  Injectable 1 milliGRAM(s) IntraMuscular once PRN Glucose <70 milliGRAM(s)/deciLiter  HYDROmorphone  Injectable 0.5 milliGRAM(s) IV Push two times a day PRN Severe Pain (7 - 10)  oxycodone    5 mG/acetaminophen 325 mG 2 Tablet(s) Oral every 6 hours PRN Severe Pain (7 - 10)      Allergies  No Known Allergies      Review of Systems:  Constitutional: No fever, + poor appetite/po intake, + weight loss   Eyes: No blurry vision, no diplopia  Neuro: No tremors  HEENT: No pain  Cardiovascular: No chest pain, palpitations  Respiratory: + SOB, no cough  GI: No nausea, vomiting, no abdominal pain, no diarrhea   : No dysuria, hematuria  Skin: no rash or skin darkening   Endocrine: no polyuria, polydipsia  Hem/lymph: no swelling  Musculoskeletal lower back pain, leg cramps   ALL OTHER SYSTEMS REVIEWED AND NEGATIVE      PHYSICAL EXAM:  VITALS: T(C): 36.6 (05-31-20 @ 12:57)  T(F): 97.8 (05-31-20 @ 12:57), Max: 98.2 (05-30-20 @ 21:43)  HR: 72 (05-31-20 @ 12:58) (58 - 84)  BP: 130/82 (05-31-20 @ 12:58) (106/75 - 137/86)  RR:  (16 - 19)  SpO2:  (96% - 100%)  Wt(kg): --  GENERAL: NAD, well-groomed, well-developed  EYES: No proptosis, anicteric  HEENT:  Atraumatic, Normocephalic, moist mucous membranes  THYROID: Normal size, no palpable nodules  RESPIRATORY: Clear to auscultation bilaterally; No rales, rhonchi, wheezing, or rubs  CARDIOVASCULAR: Regular rate and rhythm; No murmurs; no peripheral edema  GI: Soft, nontender, non distended, normal bowel sounds  SKIN: Dry, intact, No rashes or lesions, no skin pigmentation noted   NEURO: AAO x 3, no gross or focal deficit   PSYCH: reactive affect, euthymic mood      POCT Blood Glucose.: 84 mg/dL (05-31-20 @ 12:46)                            13.7   9.20  )-----------( 266      ( 30 May 2020 18:29 )             43.0       05-30    138  |  102  |  20  ----------------------------<  90  3.6   |  26  |  1.02    EGFR if : 99  EGFR if non : 85    Ca    10.0      05-30  Mg     2.0     05-30  Phos  4.4     05-30    TPro  6.9  /  Alb  4.6  /  TBili  0.4  /  DBili  x   /  AST  12  /  ALT  15  /  AlkPhos  50  05-30

## 2020-05-31 NOTE — DISCHARGE NOTE PROVIDER - NSDCCPCAREPLAN_GEN_ALL_CORE_FT
PRINCIPAL DISCHARGE DIAGNOSIS  Diagnosis: Fatigue  Assessment and Plan of Treatment: You came into the hospital with fatigue, weakness, and shortness of breath. We obtained an echocardiogram which showed your heart pumping function was normal. There was no evidence that you had ischemia or poor blood flow to your heart based on your labs and EKG. A CT scan of your chest showed you didn't have any blood clots or any abnormalities in your lungs. It is possible your symptoms are related to your adrenal insufficiency, however, endocrine did not want to increase the dose of your steroids any further because you symptoms did not improve quickly with stress dose steroids (which is a higher dose). We also sent off some lab work that showed your muscle cells were not dying. It is unclear what is causing your symptoms but we have ruled out most of the dangerous things which is reassuring. We are starting marinol for you which will help with your appetite. We are also starting magnesium supplements for you to see if it can help with your muscle cramping.   You should continue to follow up with your oncologist as well as endocrinology. PRINCIPAL DISCHARGE DIAGNOSIS  Diagnosis: Fatigue  Assessment and Plan of Treatment: You came into the hospital with fatigue, weakness, and shortness of breath. We obtained an echocardiogram which showed your heart pumping function was normal. There was no evidence that you had ischemia or poor blood flow to your heart based on your labs and EKG. A CT scan of your chest showed you didn't have any blood clots or any abnormalities in your lungs. It is possible your symptoms are related to your adrenal insufficiency, however, endocrine did not want to increase the dose of your steroids any further because you symptoms did not improve quickly with stress dose steroids (which is a higher dose). We also sent off some lab work that showed your muscle cells were not dying. It is unclear what is causing your symptoms but we have ruled out most of the dangerous things which is reassuring. We are starting marinol for you which will help with your appetite. We are also starting magnesium supplements for you to see if it can help with your muscle cramping.   You should continue to follow up with your oncologist as well as endocrinology.  Please continue to take Hydrocortisone 30mg every day in the morning (around 8 AM), and hydrocortisone 20mg every day around 3pm until you see your endocrinologist.

## 2020-05-31 NOTE — CONSULT NOTE ADULT - PROBLEM SELECTOR RECOMMENDATION 9
- Patient likely has secondary AI related to immunotherapy use in addition to periodic high dose steroid and opoid use   - Currently hemodynamically stable and no electrolyte abnormalities noted on lab work done inpatient.   - Recommend to .... - Patient likely has secondary AI related to immunotherapy  - MRI 5/18/20 did not demonstrate hypophysitis   - Currently hemodynamically stable and no electrolyte abnormalities noted on lab work done inpatient concerning for AI such as hyponatremia, hyperkalemia, hypercalcemia, hypoglycemia.   - Ideally stress dose steroids are given in hemodynamically unstable and critically ill patients and also patient should start feeling better if symptoms are related to AI after single dose of stress dose steroids.  - Since staring stress dose steroids , patient does not report any improvement in his symptoms, thus his symptoms are unlikely related to AI and other etiology needs to be evaluated. Note that immunotherapy is known to cause wide range of dysfunction in various organ systems.   - Recommend to discontinue stress dose steroids, starting tomorrow resume hydrocortisone 40 mg at 8 am and 20 mg at 3 pm and eventually taper down to 30/20 , 20/20 and 20/10.   - If patient becomes hemodynamically unstable at any point , can resume stress doses steroids hydrocortisone 50 IV q8h.   Discussed with team. 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   - Ideally stress dose steroids are given in hemodynamically unstable and critically ill patients.  Additionally, if pts were truly adrenally insufficent, the patient should start feeling better if symptoms are related to AI after single dose of stress dose steroids.  - Since staring stress dose steroids , patient does not report any improvement in his symptoms, thus his symptoms are unlikely related to AI and other etiologies need to be evaluated.   Please Note that immunotherapy is known to cause wide range of dysfunction in various organ systems.   Immuntx is associated with autoimmune toxicities, which can affect multiple organ systems.   Among these are rheumatologic irAE, including inflammatory arthritis, myositis, vasculitis, and others.  Other etiologies of his SOB and weakness need to be evaluated in the setting of immunotx.  - Recommend to discontinue stress dose steroids, starting tomorrow resume hydrocortisone 40 mg at 8 am and 20 mg at 3 pm and eventually taper down to 30/20 , 20/20 and 20/10 based on clinical status   - If patient becomes hemodynamically unstable at any point , can resume stress doses steroids hydrocortisone 50 IV q8h.   Discussed with team.

## 2020-05-31 NOTE — CONSULT NOTE ADULT - ASSESSMENT
50 year old male with PMHx of Non Hodgkins Lymphoma (93, chemo/rads), testicular cancer s/p orchiectomy, R scalp melanoma (dx'ed 8/2019) on immunotherapy (iplimumab and nivolumab6 cycles, last in March), ischemic colitis s/p lap left colectomy, LE myositis and bilateral LE cramping  presents with 2-week history of fatigue and dyspnea on exertion, and 25 lb weight loss.     He reports being in his usual state of health until 2 weeks ago when he developed symptoms as above. He had outpatient workup including TTE and PFT which were both reportedly within normal limits. Outpatient workup concerning for adrenal insufficiency so he was started on oral hydrocortisone 40-60mg PO daily divided in BID dosing. He felt an improvement in his symptoms when steroids were initiated but the effect soon wore off and his symptoms worsened.     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 Trial of steroid w/o improvement. MRI 2 weeks ago showed unremarkable pituitary, no mass.       # Adrenal insufficiency secondary to ipi and nivo  primary vs hypophysiti    TSH on 050/2020 /1was slightly up at 5.5  On 05/01/2020 cortisol total and free were suppressed and acth was less than 1.5 LH, FSH and Prolactin were normal.   total and free cortisol level low end.     Followed by Dr. Sanchez, endocrine outpatient    s/p hydrocortison 100mg IV followed by 50mg q8H  lovothyroxine 50 mcg  -Endocrine (Followed by Dr. Sanchez, endocrine outpatient)    # Melanoma   -Hold systemic therapy while in hospital  -F/U with Dr. Mcginnis outpatient after discharge    # EASLEY  CTA negative for PE on this admission. SpO2 % on RA.     cardiac vs pulmonary vs adrenal insufficiency 50 year old male with PMHx of Non Hodgkins Lymphoma (93, chemo/rads), testicular cancer s/p orchiectomy, adjuvant BEP and autoBMT in 1994, R scalp melanoma (dx'ed 8/2019), stage IIIa (T2aN2a) BRAF+, s/p resection followed by adjuvant nivvo with recurrence, currently on ipi and nivolumab s/p 6 cycles, last in March, ischemic colitis s/p lap left colectomy, LE myositis and bilateral LE cramping  presents with 2-week history of fatigue and dyspnea on exertion, and 25 lb weight loss.     He reports being in his usual state of health until 2 weeks ago when he developed symptoms as above. He had outpatient workup including TTE and PFT which were both reportedly within normal limits. Outpatient workup concerning for adrenal insufficiency so he was started on oral hydrocortisone 40-60mg PO daily divided in BID dosing. He felt an improvement in his symptoms when steroids were initiated but the effect soon wore off and his symptoms worsened.     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 Trial of steroid w/o improvement. MRI 2 weeks ago showed unremarkable pituitary, no mass.       # Adrenal insufficiency secondary to ipi and nivo  primary vs hypophysiti    TSH on 050/2020 /1was slightly up at 5.5  On 05/01/2020 cortisol total and free were suppressed and acth was less than 1.5 LH, FSH and Prolactin were normal.   total and free cortisol level low end.     Followed by Dr. Sanchez, endocrine outpatient    s/p hydrocortison 100mg IV followed by 50mg q8H  lovothyroxine 50 mcg  -Endocrine (Followed by Dr. Sanchez, endocrine outpatient)    # Melanoma   Diagnosed with melanoma in [place] in 8/2018. Initially stage IIIa (T2aN2a) [id number]+, s/p resection followed by adjuvant nivo with recurrence to skin. Treated with ipi and nivo x4 cycles followed by maintenance nivo. last in March. PET in 4/2020 showed no active disease.     -Hold systemic therapy while in hospital  -F/U with Dr. Mcginnis outpatient after discharge    # EASLEY  CTA negative for PE on this admission. SpO2 % on RA.     cardiac vs pulmonary vs adrenal insufficiency 50 year old male with PMHx of Non Hodgkins Lymphoma (93, chemo/rads), testicular cancer s/p orchiectomy, adjuvant BEP and autoBMT in 1994, R scalp melanoma (dx'ed 8/2019), stage IIIa (T2aN2a) BRAF+, s/p resection followed by adjuvant nivvo with recurrence, ischemic colitis s/p lap left colectomy, LE myositis and bilateral LE cramping  presents with 2-week history of fatigue and dyspnea on exertion, and 25 lb weight loss.     He reports being in his usual state of health until 2 weeks ago when he developed symptoms as above. He had outpatient workup including TTE and PFT which were both reportedly within normal limits. Outpatient workup concerning for adrenal insufficiency so he was started on oral hydrocortisone 40-60mg PO daily divided in BID dosing. He felt an improvement in his symptoms when steroids were initiated but the effect soon wore off and his symptoms worsened.     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 Trial of steroid w/o improvement. MRI 2 weeks ago showed unremarkable pituitary, no mass.       # Adrenal insufficiency secondary to ipi and nivo  primary vs hypophysiti    TSH on 050/2020 /1was slightly up at 5.5  On 05/01/2020 cortisol total and free were suppressed and acth was less than 1.5 LH, FSH and Prolactin were normal.   total and free cortisol level low end.     Followed by Dr. Sanchez, endocrine outpatient    s/p hydrocortison 100mg IV followed by 50mg q8H  lovothyroxine 50 mcg  -Endocrine (Followed by Dr. Sanchez, endocrine outpatient)    # Melanoma   Diagnosed with melanoma in [place] in 8/2018. Initially stage IIIa (T2aN2a) [id number]+, s/p resection followed by adjuvant nivo with recurrence to skin. Treated with ipi and nivo x4 cycles followed by maintenance nivo x2 (Last dose on 4/4/2020). PET in 4/2020 showed no active disease.     -Hold systemic therapy while in hospital  -F/U with Dr. Mcginnis outpatient after discharge    # EASLEY  CTA negative for PE on this admission. SpO2 % on RA.     cardiac vs pulmonary vs adrenal insufficiency 50 year old male with PMHx of Non Hodgkins Lymphoma (93, chemo/rads), testicular cancer s/p orchiectomy, adjuvant BEP and autoBMT in 1994, R scalp melanoma (dx'ed 8/2019), stage IIIa (T2aN2a) BRAF+, s/p resection followed by adjuvant nivvo with recurrence, ischemic colitis s/p lap left colectomy, LE myositis and bilateral LE cramping  presents with 2-week history of fatigue and dyspnea on exertion, and 25 lb weight loss. Outpatient workup concerning for adrenal insufficiency so he was started on oral hydrocortisone. He felt an improvement in his symptoms when steroids were initiated but the effect soon wore off and his symptoms worsened.     # Adrenal insufficiency secondary to ipi and nivo  primary vs hypophysiti. TSH on 050/2020 was slightly up at 5.55. On 05/01/2020 cortisol total and free were low with low ACTH. LH, FSH and Prolactin were normal. Followed by Dr. Sanchez, endocrine outpatient. Since aadmission s/p hydrocortison 100mg IV followed by 50mg q8H  -Appreciate Endocrine's recs (Followed by Dr. Sanchez, endocrine outpatient)  -Continue lovothyroxine and systemic steroid    # Melanoma   Diagnosed with melanoma in [place] in 8/2018. Initially stage IIIa (T2aN2a) [id number] s/p resection followed by adjuvant nivo with recurrence to skin. Treated with ipi and nivo x4 cycles followed by maintenance nivo x2 (Last dose on 4/4/2020). PET in 4/2020 showed no active disease.   -Hold systemic therapy while in hospital  -F/U with Dr. Mcginnis outpatient after discharge    # EASLEY  CTA negative for PE on this admission. SpO2 % on RA. Cardiac vs pulmonary vs adrenal insufficiency.   -F/U TTE    The case was discussed with Dr. Suzanne Mandel MD, MPH  Hematology/Oncology Fellow  Pager: [phone]  Please page on call person on weekends and after 5pm on weekdays

## 2020-05-31 NOTE — DISCHARGE NOTE PROVIDER - HOSPITAL COURSE
49 yo M with a distant history of testicular cancer and NHL now with malignant melanoma on his right scalp s/p excision and then lymph node dissection revealing malignancy and then a further dissection revealing clean lymph nodes now on immunotherapy (initially ipilimumab and nivolumab, and now nivolumab only) last received April 2020. He presented on 5/30 with 2-3 weeks of dyspnea on exertion. Outpatient work up included normal TTE and PFTs. He was found to have adrenal insufficiency when AM cortisol total was < 1.0 and free < 0.02 with a low ACTH of <1.5. As an outpatient, he was initially tried on prednisone 10 mg BID. He was then transitioned to hydrocortisone 40 mg in AM and 20 mg in PM, and when tapered to low doses he complained of relapse of symptoms refractory to returning to original dose. When he presented to the ED on 5/30/2020 with these symptoms, he was started on stress dose steroids at hydrocortisone 50 mg q8h. There were no electrolyte abnormalities. CTA was negative for PE. Endocrinology was consulted and recommended decreasing to hydrocortisone 40 mg in AM and 20 mg in PM. 49 yo M with a distant history of testicular cancer and NHL now with malignant melanoma on his right scalp s/p excision and then lymph node dissection revealing malignancy and then a further dissection revealing clean lymph nodes now on immunotherapy (initially ipilimumab and nivolumab, and now nivolumab only) last received April 2020. He presented on 5/30 with 2-3 weeks of dyspnea on exertion. Outpatient work up included normal TTE and PFTs. He was found to have adrenal insufficiency when AM cortisol total was < 1.0 and free < 0.02 with a low ACTH of <1.5. As an outpatient, he was initially tried on prednisone 10 mg BID. He was then transitioned to hydrocortisone 40 mg in AM and 20 mg in PM, and when tapered to low doses he complained of relapse of symptoms refractory to returning to original dose. When he presented to the ED on 5/30/2020 with these symptoms, he was started on stress dose steroids at hydrocortisone 50 mg q8h. There were no electrolyte abnormalities. CTA was negative for PE. Endocrinology was consulted and recommended decreasing to hydrocortisone 40 mg in AM and 20 mg in PM. He was weaned down to 30/20. Although his symptoms persisted endocrine did not want any further increases in his steroids as he did not obviously improve even with stress dose steroids which they would expect if his symptoms were due purely to his adrenal insufficiency. He had a TTE during his admission with EF 55-60% grossly normal LV function. His CK was wnl. He was continued on percocet for his pain related to muscle cramping        5/31: CTA- No PE, endocrine consulted, s/p Solu-cortef 100mg in ED, on 50mg q8 right now    6/1: on 40/20 hydrocortisone. symptoms persistent. Pending TTE. on marinol for appetite.     6/2: TTE performed with EF 55-60% grossly normal LV function. Symptoms unchanged    6/3:  CK within normal limits. Symptoms improving. Will begin hydrocortisone 30/20 as per endo 49 yo M with a distant history of testicular cancer and NHL now with malignant melanoma on his right scalp s/p excision and then lymph node dissection revealing malignancy and then a further dissection revealing clean lymph nodes now on immunotherapy (initially ipilimumab and nivolumab, and now nivolumab only) last received April 2020. He presented on 5/30 with 2-3 weeks of dyspnea on exertion. Outpatient work up included normal TTE and PFTs. He was found to have adrenal insufficiency when AM cortisol total was < 1.0 and free < 0.02 with a low ACTH of <1.5. As an outpatient, he was initially tried on prednisone 10 mg BID. He was then transitioned to hydrocortisone 40 mg in AM and 20 mg in PM, and when tapered to low doses he complained of relapse of symptoms refractory to returning to original dose. When he presented to the ED on 5/30/2020 with these symptoms, he was started on stress dose steroids at hydrocortisone 50 mg q8h. There were no electrolyte abnormalities. CTA was negative for PE. Endocrinology was consulted and recommended decreasing to hydrocortisone 40 mg in AM and 20 mg in PM. He was weaned down to 30/20. Although his symptoms persisted endocrine did not want any further increases in his steroids as he did not obviously improve even with stress dose steroids which they would expect if his symptoms were due purely to his adrenal insufficiency. He had a TTE during his admission with EF 55-60% grossly normal LV function. His CK was wnl. He was continued on percocet for his pain related to muscle cramping and was started on magnesium oxide supplementation. His symptoms were not thought to be due to steroid myopathy given the time course of his symptoms.

## 2020-05-31 NOTE — DISCHARGE NOTE PROVIDER - CARE PROVIDER_API CALL
Shawn Mcginnis [place] [id number]  Phone: [phone]  Fax: [phone]  Follow Up Time: 1 week Shawn Mcginnis [place] [id number]  Phone: [phone]  Fax: [phone]  Follow Up Time: 1 week

## 2020-05-31 NOTE — DIETITIAN INITIAL EVALUATION ADULT. - REASON INDICATOR FOR ASSESSMENT
Nutrition consult received for assessment and education.  Information obtained from: medical record and pt.

## 2020-05-31 NOTE — DIETITIAN INITIAL EVALUATION ADULT. - OTHER INFO
Pt reports decreased appetite and PO intake x 1.5 months PTA due to immunotherapy. Confirms NKFA. Pt reports not following any type of diet or restriction at home. Reports taking Vitamin D3 and drinking "[place] supplement" PTA.     Pt reports fair appetite and PO intake. Offered nutritional supplement - pt agreed to Ensure Enlive. Denies difficulty chewing/swallowing. Pt denies nausea or vomiting. Reports constipation with last BM 1 week ago; refused prunes/prune juice.     Provided recommendations to optimize PO and protein intake, recommended small frequent meals by ordering nutrient-dense snacks and leaving non-perishable food away from tray for later consumption during the day or between meals, to start with protein, and sips of supplement throughout the day; reviewed foods with protein, nutrient-dense snacks, and menu order procedures in hospital. Pt amenable for recommendations - made aware RD remains available.

## 2020-05-31 NOTE — DIETITIAN INITIAL EVALUATION ADULT. - PROBLEM SELECTOR PLAN 2
Likely an adverse effect of immunotherapy   Worked up by Endocrine as outpatient, and has been treated with pulse dose steroids in the past week   s/p stress dose steroids hydrocortisone 100mg IV, will assess response  Vitals wnl  - Endocrine consult in AM in regards to further steroid dosing

## 2020-05-31 NOTE — CONSULT NOTE ADULT - ATTENDING COMMENTS
50 year old male with PMHx of Non Hodgkins Lymphoma (93, chemo/rads), testicular cancer s/p orchiectomy, adjuvant BEP and autoBMT in 1994, R scalp melanoma (dx'ed 8/2019), stage IIIa (T2aN2a) BRAF+, s/p resection followed by adjuvant nivo with recurrence, ischemic colitis s/p lap left colectomy, LE myositis and bilateral LE cramping  presents with 2-week history of fatigue and dyspnea on exertion, and 25 lb weight loss. Outpatient workup concerning for adrenal insufficiency so he was started on oral hydrocortisone. He felt an improvement in his symptoms when steroids were initiated but the effect soon wore off and his symptoms worsened. Sx likely 2/2 Adrenal insufficiency. TSH on 050/2020 was slightly up at 5.55. On 05/01/2020 cortisol total and free were low with low ACTH. LH, FSH and Prolactin were normal. Followed by Dr. Sanchez, endocrine outpatient. Since admission s/p hydrocortison 100mg IV followed by 50mg q8H. Endocrine f/u. Last nivo 4/4/2020/ Hold chemo. CTA negative for PE on this admission. SpO2 % on RA. Cardiac vs pulmonary vs adrenal insufficiency. TTE pending

## 2020-05-31 NOTE — PROGRESS NOTE ADULT - PROBLEM SELECTOR PLAN 1
Patient reporting 2 weeks of progressive SOB and dyspnea on exertion; impacting his quality of his life  DDx fatigue related to immunotherapy, hypothyroidism, cardiovascular, or adrenal/pituitary insufficiency   Outpatient TTE reviewed EF 49%, no sig valve abnormalities noted  Patient additionally reports recent PFT that is within normal limits   COVID neg PCR x2, neg antibodies   - c/w cortisol 50mg IV Q8hr   - Endocrine consult in AM   - Cardiology consult, if patient meets criteria for inpatient nuclear stress test vs MUGA Dx included  fatigue related to immunotherapy, hypothyroidism, cardiovascular, or adrenal/pituitary insufficiency   - CT angio: negative for PE   - EKG reviewed: no ischemic change   - Outpatient TTE on 5/7/20: reviewed EF 54%, no sig valve abnormalities noted  - Patient additionally reports recent PFT that is within normal limits   - COVID neg PCR x2, neg antibodies   - pt follows up Dr. Shahram Sanchez, per chart review, free cortisol very low, ACTH low, testosterone low, FSH/LH/TSH wnl, concern for primary hypophysitis  - Endocrine consulted, will f/u recs regarding starting steroid   - pt was given stress steroid in outpt with minimal improvement in symptoms

## 2020-05-31 NOTE — CHART NOTE - NSCHARTNOTEFT_GEN_A_CORE
Upon Nutritional Assessment by the Registered Dietitian your patient was determined to meet criteria / has evidence of the following diagnosis/diagnoses:          [ ]  Mild Protein Calorie Malnutrition        [ ]  Moderate Protein Calorie Malnutrition        [x] Severe Protein Calorie Malnutrition        [ ] Unspecified Protein Calorie Malnutrition        [ ] Underweight / BMI <19        [ ] Morbid Obesity / BMI > 40      Findings as based on:  [x] Comprehensive nutrition assessment   [ ] Nutrition Focused Physical Exam  [x] Other: PO intake <75% and 11.0% weight loss x 1.5 months      Nutrition Plan/Recommendations:    1. Recommend continue regular diet. Will continue to monitor and adjust as needed.   2. Recommend Ensure Enlive 3x daily (1050 evin and 60 gm protein) to optimize kcal and protein intake (one in each meal).   3. Encourage PO intake and obtain food preferences.   4. Continue appetite stimulant as medically feasible.   5. Provided recommendations to optimize PO and protein intake - made aware RD remains available.    RD remains available  Anum Ballard MS, RDN CDN #[id number]    PROVIDER Section:     By signing this assessment you are acknowledging and agree with the diagnosis/diagnoses assigned by the Registered Dietitian    Comments:

## 2020-05-31 NOTE — PROGRESS NOTE ADULT - SUBJECTIVE AND OBJECTIVE BOX
INCOMPLETE NOTE Jace Louis, PGY2  Pager: [phone] ([facility]), [phone] ([facility])   After 7: Night Float pager    Patient is a 50y old  Male who presents with a chief complaint of Dyspnea on exertion (31 May 2020 10:17)    SUBJECTIVE / OVERNIGHT EVENTS: pt admitted overnight, seen and examined in AM, denies fever, chills, SOB, CP, n/v.  + reduced appetite, + weight loss, eats about 1 meal per day.      MEDICATIONS  (STANDING):  amLODIPine   Tablet 5 milliGRAM(s) Oral daily  atorvastatin 40 milliGRAM(s) Oral at bedtime  dextrose 5%. 1000 milliLiter(s) (50 mL/Hr) IV Continuous <Continuous>  dextrose 50% Injectable 12.5 Gram(s) IV Push once  dextrose 50% Injectable 25 Gram(s) IV Push once  dextrose 50% Injectable 25 Gram(s) IV Push once  enoxaparin Injectable 40 milliGRAM(s) SubCutaneous daily  fenofibrate Tablet 145 milliGRAM(s) Oral daily  hydrocortisone sodium succinate Injectable 50 milliGRAM(s) IV Push every 8 hours  levothyroxine 50 MICROGram(s) Oral daily  metoprolol succinate ER 50 milliGRAM(s) Oral daily    MEDICATIONS  (PRN):  dextrose 40% Gel 15 Gram(s) Oral once PRN Blood Glucose LESS THAN 70 milliGRAM(s)/deciLiter  glucagon  Injectable 1 milliGRAM(s) IntraMuscular once PRN Glucose <70 milliGRAM(s)/deciLiter  HYDROmorphone  Injectable 0.5 milliGRAM(s) IV Push two times a day PRN Severe Pain (7 - 10)  oxycodone    5 mG/acetaminophen 325 mG 2 Tablet(s) Oral every 6 hours PRN Severe Pain (7 - 10)      Vital Signs Last 24 Hrs  T(C): 36.6 (31 May 2020 05:11), Max: 36.8 (30 May 2020 21:43)  T(F): 97.9 (31 May 2020 05:11), Max: 98.2 (30 May 2020 21:43)  HR: 66 (31 May 2020 08:22) (58 - 84)  BP: 122/72 (31 May 2020 08:22) (106/75 - 137/86)  BP(mean): --  RR: 18 (31 May 2020 08:22) (16 - 18)  SpO2: 98% (31 May 2020 08:22) (96% - 100%)  CAPILLARY BLOOD GLUCOSE        I&O's Summary    30 May 2020 07:01  -  31 May 2020 07:00  --------------------------------------------------------  IN: 0 mL / OUT: 400 mL / NET: -400 mL    PHYSICAL EXAM:  GENERAL: NAD, well-developed  EYES: EOMI, PERRLA, conjunctiva and sclera clear  NECK:  No JVD  CHEST/LUNG: CTABL ; No wheeze  HEART: RRR; No murmurs  ABDOMEN: Soft, Nontender, Nondistended; Bowel sounds present  EXTREMITIES:  2+ Peripheral Pulses, No edema  MUSCULOSKEL: no deformity    PSYCH: AAOx 3   NEUROLOGY: moves all 4 extremities spontaneously   SKIN: No rashes or lesions. No sacral ulcer    LABS:                        13.7   9.20  )-----------( 266      ( 30 May 2020 18:29 )             43.0     05-30    138  |  102  |  20  ----------------------------<  90  3.6   |  26  |  1.02    Ca    10.0      30 May 2020 18:29  Phos  4.4     05-30  Mg     2.0     05-30    TPro  6.9  /  Alb  4.6  /  TBili  0.4  /  DBili  x   /  AST  12  /  ALT  15  /  AlkPhos  50  05-30    PT/INR - ( 30 May 2020 18:29 )   PT: 11.7 sec;   INR: 1.02 ratio    PTT - ( 30 May 2020 18:29 )  PTT:60.4 sec    Urinalysis Basic - ( 30 May 2020 23:11 )  Color: Light Yellow / Appearance: Clear / S.049 / pH: x  Gluc: x / Ketone: Negative  / Bili: Negative / Urobili: Negative   Blood: x / Protein: Trace / Nitrite: Negative   Leuk Esterase: Negative / RBC: 1 /hpf / WBC 0 /HPF   Sq Epi: x / Non Sq Epi: 0 /hpf / Bacteria: Negative      RADIOLOGY & ADDITIONAL TESTS:   < from: CT Angio Chest w/ IV Cont (20 @ 20:35) >  FINDINGS:  LUNGS AND AIRWAYS: Patent central airways.  Bilateral dependent atelectasis. Lungs are clear.  PLEURA: No pleural effusion.  MEDIASTINUM AND RIOS: No lymphadenopathy.  VESSELS: No pulmonary embolism.  HEART: Heart size is normal. No pericardial effusion.  CHEST WALL AND LOWER NECK: Within normal limits.  VISUALIZED UPPER ABDOMEN: Surgical clips are seen in the retroperitoneum.  BONES: Well-circumscribed sclerotic focus seen in the anterior L2 vertebral body, likely represents a bone island.    IMPRESSION:   No pulmonary embolism.  < end of copied text >    < from: MR Head w/wo IV Cont (20 @ 11:03) >  BRAIN MR:  Redemonstration sequela status post right melanoma scalp resection, partially seen right radical neck dissection with right parietal scalp scar tissue and foci decreased T1 signal in left parietal scalp (15:167), correlate with visual inspection. Brain parenchyma is unchanged and unremarkable from prior, there is redemonstration of mild volume loss, with enlarged ventricles and sulci and nonspecific T2 and white matter FLAIR hyperintensity likely microvascular disease. There is no new intracranial mass, extra-axial fluid collection, midline shift, infarct or focal mass effect. No new susceptibility foci are identified. There is no abnormal enhancement postcontrast. Basal cisterns remain patent and unchanged from prior. 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 Superior pituitary border remains convex in the midline.  No new abnormal signal or contrast-enhancement is noted in the pituitary gland.  The pituitary stalk is unchanged from prior. Posterior pituitary bright spot is identified. Cavernous sinuses and cavernous carotid arteries remain unchanged and unremarkable. No new evidence for any compression of the optic nerves or optic chiasm.    EXTRACRANIAL: Orbital globes are unchanged from prior, there is mucosal thickening within the paranasal sinuses without air-fluid levels there is opacification with air-fluid levels in the right greater than left mastoid tip. Redemonstration of postsurgical sequela with hardware right maxillary antra with susceptibly artifact limiting assessment, suggest correlation with visual inspection.    IMPRESSION:  No significant change from 10/30/2019, redemonstration of postsurgical sequela right scalp with tiny foci decreased decreased T1 signal in the left scalp correlate with visual inspection.    Redemonstration mild volume loss and microvascular disease, pituitary remains unchanged in appearance from prior, no new abnormal enhancement, hemorrhage infarct or midline shift.  < end of copied text >    Consultant(s) Notes Reviewed:  [id number]

## 2020-05-31 NOTE — PROGRESS NOTE ADULT - PROBLEM SELECTOR PLAN 2
Likely an adverse effect of immunotherapy   Worked up by Endocrine as outpatient, and has been treated with pulse dose steroids in the past week   s/p stress dose steroids hydrocortisone 100mg IV, will assess response  Vitals wnl  - Endocrine consult in AM in regards to further steroid dosing Likely an adverse effect of immunotherapy   - pt was given stress steroid in outpt with minimal improvement in symptoms  - s/p Solu-cortef 100mg in ED, will continue 500mg q8 pending endocrine recs Likely an adverse effect of immunotherapy   - pt was given stress steroid in outpt with minimal improvement in symptoms  - 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

## 2020-05-31 NOTE — DIETITIAN INITIAL EVALUATION ADULT. - PROBLEM SELECTOR PLAN 1
Patient reporting 2 weeks of progressive SOB and dyspnea on exertion; impacting his quality of his life  DDx fatigue related to immunotherapy, hypothyroidism, cardiovascular, or adrenal/pituitary insufficiency   Outpatient TTE reviewed EF 49%, no sig valve abnormalities noted  Patient additionally reports recent PFT that is within normal limits   COVID neg PCR x2, neg antibodies   - c/w cortisol 50mg IV Q8hr   - Endocrine consult in AM   - Cardiology consult, if patient meets criteria for inpatient nuclear stress test vs MUGA

## 2020-06-01 LAB
ALBUMIN SERPL ELPH-MCNC: 4.2 G/DL — SIGNIFICANT CHANGE UP (ref 3.3–5)
ALP SERPL-CCNC: 43 U/L — SIGNIFICANT CHANGE UP (ref 40–120)
ALT FLD-CCNC: 11 U/L — SIGNIFICANT CHANGE UP (ref 10–45)
ANION GAP SERPL CALC-SCNC: 10 MMOL/L — SIGNIFICANT CHANGE UP (ref 5–17)
AST SERPL-CCNC: 10 U/L — SIGNIFICANT CHANGE UP (ref 10–40)
BASOPHILS # BLD AUTO: 0.04 K/UL — SIGNIFICANT CHANGE UP (ref 0–0.2)
BASOPHILS NFR BLD AUTO: 0.6 % — SIGNIFICANT CHANGE UP (ref 0–2)
BILIRUB SERPL-MCNC: 0.6 MG/DL — SIGNIFICANT CHANGE UP (ref 0.2–1.2)
BUN SERPL-MCNC: 15 MG/DL — SIGNIFICANT CHANGE UP (ref 7–23)
CALCIUM SERPL-MCNC: 9.4 MG/DL — SIGNIFICANT CHANGE UP (ref 8.4–10.5)
CHLORIDE SERPL-SCNC: 102 MMOL/L — SIGNIFICANT CHANGE UP (ref 96–108)
CO2 SERPL-SCNC: 26 MMOL/L — SIGNIFICANT CHANGE UP (ref 22–31)
CREAT SERPL-MCNC: 0.94 MG/DL — SIGNIFICANT CHANGE UP (ref 0.5–1.3)
CULTURE RESULTS: NO GROWTH — SIGNIFICANT CHANGE UP
EOSINOPHIL # BLD AUTO: 0.28 K/UL — SIGNIFICANT CHANGE UP (ref 0–0.5)
EOSINOPHIL NFR BLD AUTO: 3.9 % — SIGNIFICANT CHANGE UP (ref 0–6)
GLUCOSE BLDC GLUCOMTR-MCNC: 114 MG/DL — HIGH (ref 70–99)
GLUCOSE BLDC GLUCOMTR-MCNC: 85 MG/DL — SIGNIFICANT CHANGE UP (ref 70–99)
GLUCOSE BLDC GLUCOMTR-MCNC: 86 MG/DL — SIGNIFICANT CHANGE UP (ref 70–99)
GLUCOSE SERPL-MCNC: 90 MG/DL — SIGNIFICANT CHANGE UP (ref 70–99)
HCT VFR BLD CALC: 41.1 % — SIGNIFICANT CHANGE UP (ref 39–50)
HGB BLD-MCNC: 13.1 G/DL — SIGNIFICANT CHANGE UP (ref 13–17)
IMM GRANULOCYTES NFR BLD AUTO: 0.4 % — SIGNIFICANT CHANGE UP (ref 0–1.5)
LYMPHOCYTES # BLD AUTO: 3.25 K/UL — SIGNIFICANT CHANGE UP (ref 1–3.3)
LYMPHOCYTES # BLD AUTO: 44.8 % — HIGH (ref 13–44)
MAGNESIUM SERPL-MCNC: 1.9 MG/DL — SIGNIFICANT CHANGE UP (ref 1.6–2.6)
MCHC RBC-ENTMCNC: 28.2 PG — SIGNIFICANT CHANGE UP (ref 27–34)
MCHC RBC-ENTMCNC: 31.9 GM/DL — LOW (ref 32–36)
MCV RBC AUTO: 88.6 FL — SIGNIFICANT CHANGE UP (ref 80–100)
MONOCYTES # BLD AUTO: 0.39 K/UL — SIGNIFICANT CHANGE UP (ref 0–0.9)
MONOCYTES NFR BLD AUTO: 5.4 % — SIGNIFICANT CHANGE UP (ref 2–14)
NEUTROPHILS # BLD AUTO: 3.26 K/UL — SIGNIFICANT CHANGE UP (ref 1.8–7.4)
NEUTROPHILS NFR BLD AUTO: 44.9 % — SIGNIFICANT CHANGE UP (ref 43–77)
NRBC # BLD: 0 /100 WBCS — SIGNIFICANT CHANGE UP (ref 0–0)
PHOSPHATE SERPL-MCNC: 3.1 MG/DL — SIGNIFICANT CHANGE UP (ref 2.5–4.5)
PLATELET # BLD AUTO: 250 K/UL — SIGNIFICANT CHANGE UP (ref 150–400)
POTASSIUM SERPL-MCNC: 3.5 MMOL/L — SIGNIFICANT CHANGE UP (ref 3.5–5.3)
POTASSIUM SERPL-SCNC: 3.5 MMOL/L — SIGNIFICANT CHANGE UP (ref 3.5–5.3)
PROT SERPL-MCNC: 6.5 G/DL — SIGNIFICANT CHANGE UP (ref 6–8.3)
RBC # BLD: 4.64 M/UL — SIGNIFICANT CHANGE UP (ref 4.2–5.8)
RBC # FLD: 12.3 % — SIGNIFICANT CHANGE UP (ref 10.3–14.5)
SODIUM SERPL-SCNC: 138 MMOL/L — SIGNIFICANT CHANGE UP (ref 135–145)
SPECIMEN SOURCE: SIGNIFICANT CHANGE UP
T4 AB SER-ACNC: 7.3 UG/DL — SIGNIFICANT CHANGE UP (ref 4.6–12)
T4 FREE SERPL-MCNC: 1.3 NG/DL — SIGNIFICANT CHANGE UP (ref 0.9–1.8)
TSH SERPL-MCNC: 2.48 UIU/ML — SIGNIFICANT CHANGE UP (ref 0.27–4.2)
WBC # BLD: 7.25 K/UL — SIGNIFICANT CHANGE UP (ref 3.8–10.5)
WBC # FLD AUTO: 7.25 K/UL — SIGNIFICANT CHANGE UP (ref 3.8–10.5)

## 2020-06-01 PROCEDURE — 99233 SBSQ HOSP IP/OBS HIGH 50: CPT | Mod: GC

## 2020-06-01 PROCEDURE — 93306 TTE W/DOPPLER COMPLETE: CPT | Mod: 26

## 2020-06-01 RX ADMIN — Medication 20 MILLIGRAM(S): at 17:21

## 2020-06-01 RX ADMIN — OXYCODONE AND ACETAMINOPHEN 2 TABLET(S): 5; 325 TABLET ORAL at 21:10

## 2020-06-01 RX ADMIN — ENOXAPARIN SODIUM 40 MILLIGRAM(S): 100 INJECTION SUBCUTANEOUS at 12:01

## 2020-06-01 RX ADMIN — Medication 2.5 MILLIGRAM(S): at 07:52

## 2020-06-01 RX ADMIN — Medication 145 MILLIGRAM(S): at 12:01

## 2020-06-01 RX ADMIN — Medication 40 MILLIGRAM(S): at 05:58

## 2020-06-01 RX ADMIN — OXYCODONE AND ACETAMINOPHEN 2 TABLET(S): 5; 325 TABLET ORAL at 12:51

## 2020-06-01 RX ADMIN — Medication 2.5 MILLIGRAM(S): at 17:17

## 2020-06-01 RX ADMIN — HYDROMORPHONE HYDROCHLORIDE 0.5 MILLIGRAM(S): 2 INJECTION INTRAMUSCULAR; INTRAVENOUS; SUBCUTANEOUS at 11:49

## 2020-06-01 RX ADMIN — HYDROMORPHONE HYDROCHLORIDE 0.5 MILLIGRAM(S): 2 INJECTION INTRAMUSCULAR; INTRAVENOUS; SUBCUTANEOUS at 23:05

## 2020-06-01 RX ADMIN — AMLODIPINE BESYLATE 5 MILLIGRAM(S): 2.5 TABLET ORAL at 05:58

## 2020-06-01 RX ADMIN — Medication 50 MILLIGRAM(S): at 05:58

## 2020-06-01 RX ADMIN — Medication 50 MICROGRAM(S): at 05:58

## 2020-06-01 NOTE — PROGRESS NOTE ADULT - ATTENDING COMMENTS
Seen, examined the patient with house staff on 6/1/20 in am  Afebrile, tired, has low appetite, tired and loss of weight about 25 Lbs since March, O2 sat 98% in RA  - reviewed labs, imaging. CTA chest no PE  - His weight loss and low appetite possibly related to Immunotherapy and adrenal insufficiency, noted severe protein calorie malnutrition     Endocrine f/u plan noted. Off IV steroid. On Hydrocortisone 40mg 8 am, 20mg 3 pm now fro 2ndary adrenal insufficiency, repeat TSH,  - would ask Heme if he would need CT abd/pelvis now given his weight loss     appreciated Nutrition consult and plan- supplementing Ensure Enlive 3 cans daily  - Will do Echo ( reportedly normal, PFTs ok)  - c/w other meds

## 2020-06-01 NOTE — PROGRESS NOTE ADULT - PROBLEM SELECTOR PLAN 3
On immunotherapy (Ipilimumab and Nivolumab s/p 6 cycles)- Last session 03/2020  - Oncology consult appreciated    - further immunotherapy per Onc

## 2020-06-01 NOTE — PROGRESS NOTE ADULT - SUBJECTIVE AND OBJECTIVE BOX
Jace Heriberto, PGY2  Pager: [phone] ([facility]), [phone] ([facility])   After 7: Night Float pager    Patient is a 50y old male who presents with a chief complaint of dyspnea on exertion.    SUBJECTIVE / OVERNIGHT EVENTS:    No acute events overnight. Patient was seen at bedside this morning. He denies any change in symptoms since admission. Continues to endorse fatigue, loss of appetite, and shortness of breath after walking to the bathroom or speaking for long periods of time. Patient says he is most uncomfortable due to bilateral leg pain and cramping, which he experienced prior to admission and associates with immunotherapy treatment. He denies fever, cough, chest pain, palpitations, nausea, vomiting, or changes in bowel/bladder pattern.       MEDICATIONS  (STANDING):  amLODIPine   Tablet 5 milliGRAM(s) Oral daily  atorvastatin 40 milliGRAM(s) Oral at bedtime  dextrose 5%. 1000 milliLiter(s) (50 mL/Hr) IV Continuous <Continuous>  dextrose 50% Injectable 12.5 Gram(s) IV Push once  dextrose 50% Injectable 25 Gram(s) IV Push once  dextrose 50% Injectable 25 Gram(s) IV Push once  enoxaparin Injectable 40 milliGRAM(s) SubCutaneous daily  fenofibrate Tablet 145 milliGRAM(s) Oral daily  hydrocortisone sodium succinate Injectable 50 milliGRAM(s) IV Push every 8 hours  levothyroxine 50 MICROGram(s) Oral daily  metoprolol succinate ER 50 milliGRAM(s) Oral daily    MEDICATIONS  (PRN):  dextrose 40% Gel 15 Gram(s) Oral once PRN Blood Glucose LESS THAN 70 milliGRAM(s)/deciLiter  glucagon  Injectable 1 milliGRAM(s) IntraMuscular once PRN Glucose <70 milliGRAM(s)/deciLiter  HYDROmorphone  Injectable 0.5 milliGRAM(s) IV Push two times a day PRN Severe Pain (7 - 10)  oxycodone    5 mG/acetaminophen 325 mG 2 Tablet(s) Oral every 6 hours PRN Severe Pain (7 - 10)      Vital Signs Last 24 Hrs  T(C): 36.6 (2020 05:33), Max: 36.6 (31 May 2020 12:57)  T(F): 97.9 (2020 05:33), Max: 97.9 (2020 05:33)  HR: 70 (2020 05:33) (68 - 72)  BP: 121/73 (2020 05:33) (99/63 - 130/82)  BP(mean): --  RR: 18 (2020 05:33) (18 - 19)  SpO2: 96% (2020 05:33) (96% - 100%)      PHYSICAL EXAM:  GENERAL: NAD, well-developed  EYES: EOMI, PERRLA, conjunctiva and sclera clear  NECK:  No JVD  CHEST/LUNG: CTABL ; No wheeze  HEART: RRR; No murmurs  ABDOMEN: Soft, Nontender, Nondistended; Bowel sounds present  EXTREMITIES:  2+ Peripheral Pulses, No edema  MUSCULOSKEL: no deformity, tenderness to palpation on left knee  PSYCH: AAOx 3   NEUROLOGY: moves all 4 extremities spontaneously   SKIN: No rashes or lesions. No sacral ulcer    LABS: All Labs Reviewed                        13.1   7.25  )-----------( 250      ( 2020 06:11 )             41.1     06-01      138  |  102  |  15  ----------------------------<  90  3.5   |  26  |  0.94    Ca    9.4      2020 06:11  Phos  3.1     06-  Mg     1.9     06-    TPro  6.5  /  Alb  4.2  /  TBili  0.6  /  DBili  x   /  AST  10  /  ALT  11  /  AlkPhos  43  06-01    PT/INR - ( 30 May 2020 18:29 )   PT: 11.7 sec;   INR: 1.02 ratio         PTT - ( 30 May 2020 18:29 )  PTT:60.4 sec    Urinalysis Basic - ( 30 May 2020 23:11 )    Color: Light Yellow / Appearance: Clear / S.049 / pH: x  Gluc: x / Ketone: Negative  / Bili: Negative / Urobili: Negative   Blood: x / Protein: Trace / Nitrite: Negative   Leuk Esterase: Negative / RBC: 1 /hpf / WBC 0 /HPF   Sq Epi: x / Non Sq Epi: 0 /hpf / Bacteria: Negative    Blood Culture:  @ 04:06  Organism --  Gram Stain Blood -- Gram Stain --  Specimen Source .Urine Clean Catch (Midstream)  Culture-Blood --    COVID-19 PCR: NotDetec (30 May 2020 19:04)    RADIOLOGY & ADDITIONAL TESTS:   < from: CT Angio Chest w/ IV Cont (20 @ 20:35) >  FINDINGS:  LUNGS AND AIRWAYS: Patent central airways.  Bilateral dependent atelectasis. Lungs are clear.  PLEURA: No pleural effusion.  MEDIASTINUM AND RIOS: No lymphadenopathy.  VESSELS: No pulmonary embolism.  HEART: Heart size is normal. No pericardial effusion.  CHEST WALL AND LOWER NECK: Within normal limits.  VISUALIZED UPPER ABDOMEN: Surgical clips are seen in the retroperitoneum.  BONES: Well-circumscribed sclerotic focus seen in the anterior L2 vertebral body, likely represents a bone island.    IMPRESSION:   No pulmonary embolism.  < end of copied text >    < from: MR Head w/wo IV Cont (20 @ 11:03) >  BRAIN MR:  Redemonstration sequela status post right melanoma scalp resection, partially seen right radical neck dissection with right parietal scalp scar tissue and foci decreased T1 signal in left parietal scalp (15:167), correlate with visual inspection. Brain parenchyma is unchanged and unremarkable from prior, there is redemonstration of mild volume loss, with enlarged ventricles and sulci and nonspecific T2 and white matter FLAIR hyperintensity likely microvascular disease. There is no new intracranial mass, extra-axial fluid collection, midline shift, infarct or focal mass effect. No new susceptibility foci are identified. There is no abnormal enhancement postcontrast. Basal cisterns remain patent and unchanged from prior. 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 Superior pituitary border remains convex in the midline.  No new abnormal signal or contrast-enhancement is noted in the pituitary gland.  The pituitary stalk is unchanged from prior. Posterior pituitary bright spot is identified. Cavernous sinuses and cavernous carotid arteries remain unchanged and unremarkable. No new evidence for any compression of the optic nerves or optic chiasm.    EXTRACRANIAL: Orbital globes are unchanged from prior, there is mucosal thickening within the paranasal sinuses without air-fluid levels there is opacification with air-fluid levels in the right greater than left mastoid tip. Redemonstration of postsurgical sequela with hardware right maxillary antra with susceptibly artifact limiting assessment, suggest correlation with visual inspection.    IMPRESSION:  No significant change from 10/30/2019, redemonstration of postsurgical sequela right scalp with tiny foci decreased decreased T1 signal in the left scalp correlate with visual inspection.    Redemonstration mild volume loss and microvascular disease, pituitary remains unchanged in appearance from prior, no new abnormal enhancement, hemorrhage infarct or midline shift.  < end of copied text >    Consultant(s) Notes Reviewed:  [email or web address]

## 2020-06-01 NOTE — PROGRESS NOTE ADULT - ATTENDING COMMENTS
Seen, examined the patient with house staff  - Resting in bed, c/o low appetite, tired and loss of weight about 25 Lbs since March, no acute SOB. O2 sat 98% in RA  - reviewed labs, imaging. CTA chest no PE  - His weight loss and low appetite possibly related to Immunotherapy and adrenal insufficiency    Heme consult and plan appreciated. Trial of Megace or Marinol if Heme agrees   - will ask Endocrine consult ( for adrenal insufficiency, steroid adjust), repeat TSH, nutrition consult    daily weight  - Will do Echo ( reportedly normal, PFTs ok)  - c/w other meds Seen, examined the patient  Afebrile, tired, has low appetite, tired and loss of weight about 25 Lbs since March, O2 sat 98% in RA  - reviewed labs, imaging. CTA chest no PE  - His weight loss and low appetite possibly related to Immunotherapy and adrenal insufficiency, noted severe protein calorie malnutrition     Endocrine f/u plan noted. Off IV steroid. On Hydrocortisone 40mg 8 am, 20mg 3 pm now fro 2ndary adrenal insufficiency, repeat TSH,  - would ask Heme if he would need CT abd/pelvis now given his weight loss     appreciated Nutrition consult and plan- supplementing Ensure Enlive 3 cans daily  - Will do Echo ( reportedly normal, PFTs ok)  - c/w other meds

## 2020-06-01 NOTE — PROGRESS NOTE ADULT - PROBLEM SELECTOR PLAN 2
Likely an adverse effect of immunotherapy   - pt was given stress steroid in outpt with minimal improvement in symptoms  - MRI 05/2020 showed incidental pars intermedia cyst, no change from previous MR

## 2020-06-01 NOTE — PROGRESS NOTE ADULT - SUBJECTIVE AND OBJECTIVE BOX
PROGRESS NOTE:   Authored by   Ahmet Garcia PGY-1  Pager [phone]     Patient is a 50y old  Male who presents with a chief complaint of Dyspnea on exertion (2020 08:15)      SUBJECTIVE / OVERNIGHT EVENTS:  no events    ADDITIONAL REVIEW OF SYSTEMS:  fatigue worse as compared to yesterday. SOB present after walking back and forth to bathroom.     MEDICATIONS  (STANDING):  amLODIPine   Tablet 5 milliGRAM(s) Oral daily  atorvastatin 40 milliGRAM(s) Oral at bedtime  dextrose 5%. 1000 milliLiter(s) (50 mL/Hr) IV Continuous <Continuous>  dextrose 50% Injectable 12.5 Gram(s) IV Push once  dextrose 50% Injectable 25 Gram(s) IV Push once  dextrose 50% Injectable 25 Gram(s) IV Push once  dronabinol 2.5 milliGRAM(s) Oral two times a day before meals  enoxaparin Injectable 40 milliGRAM(s) SubCutaneous daily  fenofibrate Tablet 145 milliGRAM(s) Oral daily  hydrocortisone 40 milliGRAM(s) Oral <User Schedule>  hydrocortisone 20 milliGRAM(s) Oral <User Schedule>  levothyroxine 50 MICROGram(s) Oral daily  metoprolol succinate ER 50 milliGRAM(s) Oral daily    MEDICATIONS  (PRN):  dextrose 40% Gel 15 Gram(s) Oral once PRN Blood Glucose LESS THAN 70 milliGRAM(s)/deciLiter  glucagon  Injectable 1 milliGRAM(s) IntraMuscular once PRN Glucose <70 milliGRAM(s)/deciLiter  HYDROmorphone  Injectable 0.5 milliGRAM(s) IV Push two times a day PRN Severe Pain (7 - 10)  oxycodone    5 mG/acetaminophen 325 mG 2 Tablet(s) Oral every 6 hours PRN Severe Pain (7 - 10)      CAPILLARY BLOOD GLUCOSE      POCT Blood Glucose.: 86 mg/dL (2020 12:23)  POCT Blood Glucose.: 85 mg/dL (2020 06:04)  POCT Blood Glucose.: 113 mg/dL (31 May 2020 21:12)    I&O's Summary    31 May 2020 07:01  -  2020 07:00  --------------------------------------------------------  IN: 780 mL / OUT: 350 mL / NET: 430 mL        PHYSICAL EXAM:  Vital Signs Last 24 Hrs  T(C): 36.6 (2020 12:19), Max: 36.6 (2020 05:33)  T(F): 97.9 (2020 12:19), Max: 97.9 (2020 05:33)  HR: 54 (2020 12:) (54 - 70)  BP: 114/76 (2020 12:19) (99/63 - 130/78)  BP(mean): --  RR: 18 (2020 12:) (18 - 19)  SpO2: 96% (:) (96% - 100%)    GENERAL: NAD, well-developed  EYES: EOMI, PERRLA, conjunctiva and sclera clear  NECK:  No JVD  CHEST/LUNG: CTABL ; No wheeze  HEART: RRR; No murmurs  ABDOMEN: Soft, Nontender, Nondistended; Bowel sounds present  EXTREMITIES:  2+ Peripheral Pulses, No edema  MUSCULOSKEL: no deformity, tenderness to palpation on left knee  PSYCH: AAOx 3   NEUROLOGY: moves all 4 extremities spontaneously   SKIN: No rashes or lesions. No sacral ulcer    LABS:                        13.1   7.25  )-----------( 250      ( 2020 06:11 )             41.1     06-01    138  |  102  |  15  ----------------------------<  90  3.5   |  26  |  0.94    Ca    9.4      2020 06:11  Phos  3.1     06-01  Mg     1.9     06-01    TPro  6.5  /  Alb  4.2  /  TBili  0.6  /  DBili  x   /  AST  10  /  ALT  11  /  AlkPhos  43  06-01    PT/INR - ( 30 May 2020 18:29 )   PT: 11.7 sec;   INR: 1.02 ratio         PTT - ( 30 May 2020 18:29 )  PTT:60.4 sec      Urinalysis Basic - ( 30 May 2020 23:11 )    Color: Light Yellow / Appearance: Clear / S.049 / pH: x  Gluc: x / Ketone: Negative  / Bili: Negative / Urobili: Negative   Blood: x / Protein: Trace / Nitrite: Negative   Leuk Esterase: Negative / RBC: 1 /hpf / WBC 0 /HPF   Sq Epi: x / Non Sq Epi: 0 /hpf / Bacteria: Negative        Culture - Urine (collected 31 May 2020 04:06)  Source: .Urine Clean Catch (Midstream)  Final Report (2020 07:46):    No growth        RADIOLOGY & ADDITIONAL TESTS:  Results Reviewed:   Imaging Personally Reviewed:  Electrocardiogram Personally Reviewed:    COORDINATION OF CARE:  Care Discussed with Consultants/Other Providers [Y/N]:  Prior or Outpatient Records Reviewed [Y/N]:

## 2020-06-01 NOTE — PROGRESS NOTE ADULT - PROBLEM SELECTOR PLAN 1
Dx included  fatigue related to immunotherapy, hypothyroidism, cardiovascular, or adrenal/pituitary insufficiency   - CT angio: negative for PE. No evidence of autoimmune pneumonitis or bleomycin related fibrosis.   - EKG reviewed: no ischemic change   - Outpatient TTE on 5/7/20: reviewed EF 54%, no sig valve abnormalities noted  - Patient additionally reports recent PFT that is within normal limits   - COVID neg PCR x2, neg antibodies   - pt follows up Dr. 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  -TTE pending.

## 2020-06-01 NOTE — PROGRESS NOTE ADULT - PROBLEM SELECTOR PLAN 2
Likely an adverse effect of immunotherapy   - pt was given stress steroid in outpt with minimal improvement in symptoms  - 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

## 2020-06-01 NOTE — PROGRESS NOTE ADULT - PROBLEM SELECTOR PLAN 1
Dx included  fatigue related to immunotherapy, hypothyroidism, cardiovascular, or adrenal/pituitary insufficiency   - CT angio: negative for PE   - EKG reviewed: no ischemic change   - Outpatient TTE on 5/7/20: reviewed EF 54%, no sig valve abnormalities noted  - Patient additionally reports recent PFT that is within normal limits   - COVID neg PCR x2, neg antibodies   - pt follows up Dr. Shahram Sanchez, per chart review, free cortisol very low, ACTH low, testosterone low, FSH/LH/TSH wnl, concern for primary hypophysitis  - Endocrine consulted, will f/u recs regarding starting steroid   - pt was given stress steroid in outpt with minimal improvement in symptoms

## 2020-06-01 NOTE — PROGRESS NOTE ADULT - ASSESSMENT
51 yo M PMHx of testicular ca s/p orchiectomy, NHL s/p chemo/rad, ischemic colitis s/p lap left colectomy, malignant melanoma of the scalp on immunotherapy s/p 6 cycles last session 03/2020, REBEL myositis presents with two weeks of fatigue and dyspnea on exertion.

## 2020-06-02 LAB
ANION GAP SERPL CALC-SCNC: 14 MMOL/L — SIGNIFICANT CHANGE UP (ref 5–17)
BUN SERPL-MCNC: 20 MG/DL — SIGNIFICANT CHANGE UP (ref 7–23)
CALCIUM SERPL-MCNC: 9.9 MG/DL — SIGNIFICANT CHANGE UP (ref 8.4–10.5)
CHLORIDE SERPL-SCNC: 100 MMOL/L — SIGNIFICANT CHANGE UP (ref 96–108)
CO2 SERPL-SCNC: 24 MMOL/L — SIGNIFICANT CHANGE UP (ref 22–31)
CREAT SERPL-MCNC: 0.93 MG/DL — SIGNIFICANT CHANGE UP (ref 0.5–1.3)
GLUCOSE BLDC GLUCOMTR-MCNC: 104 MG/DL — HIGH (ref 70–99)
GLUCOSE BLDC GLUCOMTR-MCNC: 107 MG/DL — HIGH (ref 70–99)
GLUCOSE BLDC GLUCOMTR-MCNC: 136 MG/DL — HIGH (ref 70–99)
GLUCOSE BLDC GLUCOMTR-MCNC: 77 MG/DL — SIGNIFICANT CHANGE UP (ref 70–99)
GLUCOSE SERPL-MCNC: 95 MG/DL — SIGNIFICANT CHANGE UP (ref 70–99)
HCT VFR BLD CALC: 45.7 % — SIGNIFICANT CHANGE UP (ref 39–50)
HGB BLD-MCNC: 14.5 G/DL — SIGNIFICANT CHANGE UP (ref 13–17)
MAGNESIUM SERPL-MCNC: 2 MG/DL — SIGNIFICANT CHANGE UP (ref 1.6–2.6)
MCHC RBC-ENTMCNC: 28.2 PG — SIGNIFICANT CHANGE UP (ref 27–34)
MCHC RBC-ENTMCNC: 31.7 GM/DL — LOW (ref 32–36)
MCV RBC AUTO: 88.7 FL — SIGNIFICANT CHANGE UP (ref 80–100)
NRBC # BLD: 0 /100 WBCS — SIGNIFICANT CHANGE UP (ref 0–0)
PHOSPHATE SERPL-MCNC: 3.6 MG/DL — SIGNIFICANT CHANGE UP (ref 2.5–4.5)
PLATELET # BLD AUTO: 281 K/UL — SIGNIFICANT CHANGE UP (ref 150–400)
POTASSIUM SERPL-MCNC: 3.5 MMOL/L — SIGNIFICANT CHANGE UP (ref 3.5–5.3)
POTASSIUM SERPL-SCNC: 3.5 MMOL/L — SIGNIFICANT CHANGE UP (ref 3.5–5.3)
RBC # BLD: 5.15 M/UL — SIGNIFICANT CHANGE UP (ref 4.2–5.8)
RBC # FLD: 12.1 % — SIGNIFICANT CHANGE UP (ref 10.3–14.5)
SODIUM SERPL-SCNC: 138 MMOL/L — SIGNIFICANT CHANGE UP (ref 135–145)
WBC # BLD: 7.58 K/UL — SIGNIFICANT CHANGE UP (ref 3.8–10.5)
WBC # FLD AUTO: 7.58 K/UL — SIGNIFICANT CHANGE UP (ref 3.8–10.5)

## 2020-06-02 PROCEDURE — 99232 SBSQ HOSP IP/OBS MODERATE 35: CPT

## 2020-06-02 PROCEDURE — 99233 SBSQ HOSP IP/OBS HIGH 50: CPT

## 2020-06-02 RX ORDER — CHLORHEXIDINE GLUCONATE 213 G/1000ML
1 SOLUTION TOPICAL DAILY
Refills: 0 | Status: DISCONTINUED | OUTPATIENT
Start: 2020-06-02 | End: 2020-06-04

## 2020-06-02 RX ADMIN — Medication 40 MILLIGRAM(S): at 06:11

## 2020-06-02 RX ADMIN — OXYCODONE AND ACETAMINOPHEN 2 TABLET(S): 5; 325 TABLET ORAL at 14:05

## 2020-06-02 RX ADMIN — ENOXAPARIN SODIUM 40 MILLIGRAM(S): 100 INJECTION SUBCUTANEOUS at 11:49

## 2020-06-02 RX ADMIN — HYDROMORPHONE HYDROCHLORIDE 0.5 MILLIGRAM(S): 2 INJECTION INTRAMUSCULAR; INTRAVENOUS; SUBCUTANEOUS at 08:47

## 2020-06-02 RX ADMIN — OXYCODONE AND ACETAMINOPHEN 2 TABLET(S): 5; 325 TABLET ORAL at 20:46

## 2020-06-02 RX ADMIN — Medication 2.5 MILLIGRAM(S): at 06:14

## 2020-06-02 RX ADMIN — Medication 2.5 MILLIGRAM(S): at 16:28

## 2020-06-02 RX ADMIN — Medication 20 MILLIGRAM(S): at 15:13

## 2020-06-02 RX ADMIN — ATORVASTATIN CALCIUM 40 MILLIGRAM(S): 80 TABLET, FILM COATED ORAL at 06:11

## 2020-06-02 RX ADMIN — Medication 50 MICROGRAM(S): at 06:11

## 2020-06-02 RX ADMIN — Medication 145 MILLIGRAM(S): at 11:49

## 2020-06-02 RX ADMIN — OXYCODONE AND ACETAMINOPHEN 2 TABLET(S): 5; 325 TABLET ORAL at 07:44

## 2020-06-02 RX ADMIN — HYDROMORPHONE HYDROCHLORIDE 0.5 MILLIGRAM(S): 2 INJECTION INTRAMUSCULAR; INTRAVENOUS; SUBCUTANEOUS at 15:13

## 2020-06-02 NOTE — PROVIDER CONTACT NOTE (OTHER) - ASSESSMENT
pt a&o4, usually independent, denies SOB, chest pain, distress, headache, dizziness. pt orthostatics BP lying down 115/71 HR 66, Sitting 108/65 HR 76, standing 93/61 HR 78. Pt denies dizziness's or feeling light headed standing up, pt asymptomatic.

## 2020-06-02 NOTE — PROGRESS NOTE ADULT - SUBJECTIVE AND OBJECTIVE BOX
Chief Complaint:  Adrenal insuffiency and Hypothyroidism     History: Patient seen and examined at bedside. Reports continued weakness on ambulation. Reporting today however, appetite is slightly improved. Does not report nausea, vomiting or abdominal pain. Vitals are stable.     MEDICATIONS  (STANDING):  amLODIPine   Tablet 5 milliGRAM(s) Oral daily  atorvastatin 40 milliGRAM(s) Oral at bedtime  chlorhexidine 4% Liquid 1 Application(s) Topical daily  dextrose 5%. 1000 milliLiter(s) (50 mL/Hr) IV Continuous <Continuous>  dextrose 50% Injectable 12.5 Gram(s) IV Push once  dextrose 50% Injectable 25 Gram(s) IV Push once  dextrose 50% Injectable 25 Gram(s) IV Push once  dronabinol 2.5 milliGRAM(s) Oral two times a day before meals  enoxaparin Injectable 40 milliGRAM(s) SubCutaneous daily  fenofibrate Tablet 145 milliGRAM(s) Oral daily  hydrocortisone 40 milliGRAM(s) Oral <User Schedule>  hydrocortisone 20 milliGRAM(s) Oral <User Schedule>  levothyroxine 50 MICROGram(s) Oral daily  metoprolol succinate ER 50 milliGRAM(s) Oral daily    MEDICATIONS  (PRN):  dextrose 40% Gel 15 Gram(s) Oral once PRN Blood Glucose LESS THAN 70 milliGRAM(s)/deciLiter  glucagon  Injectable 1 milliGRAM(s) IntraMuscular once PRN Glucose <70 milliGRAM(s)/deciLiter  HYDROmorphone  Injectable 0.5 milliGRAM(s) IV Push two times a day PRN Severe Pain (7 - 10)  oxycodone    5 mG/acetaminophen 325 mG 2 Tablet(s) Oral every 6 hours PRN Severe Pain (7 - 10)    PHYSICAL EXAM:  VITALS: T(C): 36.4 (06-02-20 @ 12:14)  T(F): 97.6 (06-02-20 @ 12:14), Max: 97.8 (06-01-20 @ 20:39)  HR: 59 (06-02-20 @ 12:14) (58 - 62)  BP: 100/58 (06-02-20 @ 12:14) (100/58 - 104/66)  RR:  (18 - 18)  SpO2:  (97% - 99%)  Wt(kg): 76.7kg   GENERAL: NAD, well-groomed, well-developed  THYROID: Normal size, no palpable nodules, nontender   RESPIRATORY: Clear to auscultation bilaterally; No rales, rhonchi, wheezing, or rubs  CARDIOVASCULAR: Regular rate and rhythm; No murmurs; no peripheral edema  GI: Soft, nontender, non distended  MUSCULOSKELETAL: Full range of motion, normal strength  NEURO: sensation intact, extraocular movements intact, no tremor  PSYCH: Alert and oriented x 3, reactive affect     POCT Blood Glucose.: 136 mg/dL (06-02-20 @ 12:00)  POCT Blood Glucose.: 107 mg/dL (06-02-20 @ 06:16)  POCT Blood Glucose.: 77 mg/dL (06-02-20 @ 05:35)  POCT Blood Glucose.: 114 mg/dL (06-01-20 @ 21:06)  POCT Blood Glucose.: 86 mg/dL (06-01-20 @ 12:23)  POCT Blood Glucose.: 85 mg/dL (06-01-20 @ 06:04)  POCT Blood Glucose.: 113 mg/dL (05-31-20 @ 21:12)  POCT Blood Glucose.: 84 mg/dL (05-31-20 @ 12:46)      06-02    138  |  100  |  20  ----------------------------<  95  3.5   |  24  |  0.93    EGFR if : 111  EGFR if non : 95    Ca    9.9      06-02  Mg     2.0     06-02  Phos  3.6     06-02    TPro  6.5  /  Alb  4.2  /  TBili  0.6  /  DBili  x   /  AST  10  /  ALT  11  /  AlkPhos  43  06-01      Thyroid Function Tests:  06-01 @ 08:28 TSH 2.48 FreeT4 1.3 Chief Complaint:  Adrenal insuffiency and Hypothyroidism     History: Patient seen and examined at bedside. Reports continued weakness on ambulation. Reporting today however, appetite is slightly improved. Does not report nausea, vomiting or abdominal pain. Vitals are stable.     MEDICATIONS  (STANDING):  amLODIPine   Tablet 5 milliGRAM(s) Oral daily  atorvastatin 40 milliGRAM(s) Oral at bedtime  chlorhexidine 4% Liquid 1 Application(s) Topical daily  dextrose 5%. 1000 milliLiter(s) (50 mL/Hr) IV Continuous <Continuous>  dextrose 50% Injectable 12.5 Gram(s) IV Push once  dextrose 50% Injectable 25 Gram(s) IV Push once  dextrose 50% Injectable 25 Gram(s) IV Push once  dronabinol 2.5 milliGRAM(s) Oral two times a day before meals  enoxaparin Injectable 40 milliGRAM(s) SubCutaneous daily  fenofibrate Tablet 145 milliGRAM(s) Oral daily  hydrocortisone 40 milliGRAM(s) Oral <User Schedule>  hydrocortisone 20 milliGRAM(s) Oral <User Schedule>  levothyroxine 50 MICROGram(s) Oral daily  metoprolol succinate ER 50 milliGRAM(s) Oral daily    MEDICATIONS  (PRN):  dextrose 40% Gel 15 Gram(s) Oral once PRN Blood Glucose LESS THAN 70 milliGRAM(s)/deciLiter  glucagon  Injectable 1 milliGRAM(s) IntraMuscular once PRN Glucose <70 milliGRAM(s)/deciLiter  HYDROmorphone  Injectable 0.5 milliGRAM(s) IV Push two times a day PRN Severe Pain (7 - 10)  oxycodone    5 mG/acetaminophen 325 mG 2 Tablet(s) Oral every 6 hours PRN Severe Pain (7 - 10)    PHYSICAL EXAM:  VITALS: T(C): 36.4 (06-02-20 @ 12:14)  T(F): 97.6 (06-02-20 @ 12:14), Max: 97.8 (06-01-20 @ 20:39)  HR: 59 (06-02-20 @ 12:14) (58 - 62)  BP: 100/58 (06-02-20 @ 12:14) (100/58 - 104/66)  RR:  (18 - 18)  SpO2:  (97% - 99%)  Wt(kg): 76.7kg   GENERAL: NAD, well-groomed, well-developed  RESPIRATORY: Clear to auscultation bilaterally; No rales, rhonchi, wheezing, or rubs  CARDIOVASCULAR: Regular rate and rhythm; No murmurs; no peripheral edema  GI: Soft, nontender, non distended  PSYCH: Alert and oriented x 3, reactive affect     POCT Blood Glucose.: 136 mg/dL (06-02-20 @ 12:00)  POCT Blood Glucose.: 107 mg/dL (06-02-20 @ 06:16)  POCT Blood Glucose.: 77 mg/dL (06-02-20 @ 05:35)  POCT Blood Glucose.: 114 mg/dL (06-01-20 @ 21:06)  POCT Blood Glucose.: 86 mg/dL (06-01-20 @ 12:23)  POCT Blood Glucose.: 85 mg/dL (06-01-20 @ 06:04)  POCT Blood Glucose.: 113 mg/dL (05-31-20 @ 21:12)  POCT Blood Glucose.: 84 mg/dL (05-31-20 @ 12:46)      06-02    138  |  100  |  20  ----------------------------<  95  3.5   |  24  |  0.93    EGFR if : 111  EGFR if non : 95    Ca    9.9      06-02  Mg     2.0     06-02  Phos  3.6     06-02    TPro  6.5  /  Alb  4.2  /  TBili  0.6  /  DBili  x   /  AST  10  /  ALT  11  /  AlkPhos  43  06-01      Thyroid Function Tests:  06-01 @ 08:28 TSH 2.48 FreeT4 1.3

## 2020-06-02 NOTE — PROVIDER CONTACT NOTE (OTHER) - ACTION/TREATMENT ORDERED:
MD notified. No intervention at this time. Will continue to monitor. Pt instructed to call Rn for help out of bed.

## 2020-06-02 NOTE — PROVIDER CONTACT NOTE (OTHER) - ACTION/TREATMENT ORDERED:
MD Lopez notified. Stated he will pass onto day team. Will pass onto day RN. will continue to monitor

## 2020-06-02 NOTE — PROGRESS NOTE ADULT - ATTENDING COMMENTS
Seen, examined the patient  Little better overall, has low appetite, tired, O2 sat 98% in RA  - reviewed labs, imaging. CTA chest no PE, Echo- normal EF, LV function  - His weight loss and low appetite possibly related to Immunotherapy and adrenal insufficiency,     noted severe protein calorie malnutrition with loss of weight 25 Lbs    Endocrine f/u plan noted. On Hydrocortisone 40mg 8 am, 20mg 3 pm now from 2ndary adrenal insufficiency, normal TSH,  - Reviewed PET/CT recently ( april 2020) showed left sided possible colitis. Had colonoscopy 6 months ago, no cancerous polyp, mass  - Nutrition recommended supplements Ensure Enlive 3 cans daily  - c/w other meds w trial of Marinol  - will f/u progress and heme plans

## 2020-06-02 NOTE — PROGRESS NOTE ADULT - PROBLEM SELECTOR PLAN 1
Dx included  fatigue related to immunotherapy, hypothyroidism, cardiovascular, or adrenal/pituitary insufficiency   - CT angio: negative for PE. No evidence of autoimmune pneumonitis or bleomycin related fibrosis.   - EKG reviewed: no ischemic change   - Outpatient TTE on 5/7/20: reviewed EF 54%, no sig valve abnormalities noted  - Patient additionally reports recent PFT that is within normal limits   - COVID neg PCR x2, neg antibodies   - pt follows up Dr. 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  -TTE revealed EF 55-60% and grossly normal LV function Dx included  fatigue related to immunotherapy, hypothyroidism, cardiovascular, or adrenal/pituitary insufficiency   - CT angio: negative for PE. No evidence of autoimmune pneumonitis or bleomycin related fibrosis.   - EKG reviewed: no ischemic change   - Outpatient TTE on 5/7/20: reviewed EF 54%, no sig valve abnormalities noted  - Patient additionally reports recent PFT that is within normal limits   - COVID neg PCR x2, neg antibodies   - pt follows up Dr. 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  -TTE revealed EF 55-60% and grossly normal LV function  -will touch base with endocrine regarding steroid titration given that symptoms are unchanged.

## 2020-06-02 NOTE — PROGRESS NOTE ADULT - ASSESSMENT
51 yo M with history of testicular ca s/p orchiectomy, NHL s/p chemo/rad, ischemic colitis s/p lap left colectomy, malignant melanoma of the scalp on immunotherapy (currently on maintenance Nivo (PDL-1) previously on ipi,  s/p 6 cycles last session 04/2020, LE myositis presents with two weeks of fatigue and dyspnea on exertion; also with 20 lb weight loss over the past month. Was recently seen by outpatient Endo and started on hydrocortisone for secondary Adrenal Insufficiency thought to be due to immunotherapy. Even on high doses patient not feeling well. Endocrine following for management of AI.       Secondary adrenal insuffiencey   Due to immunotherapy   Patient was initially started on Solumedrol 100mg IV x1 dose, followed by Solumedrol 50mg q8h x2 doses. As patient saw minimal improvement on stress dosing, it was recommended to consider other etiology for weakness and weight. Patient was restarted on Hydrocortisone 40mg qam and 20mg qpm on 6/1.   Patient remain hemodynamically stable without electrolyte abnormalities. Patient reporting some improvement in symptoms.   Please order Hydrocortisone 40mg for 8am.    Please discontinue FS   If patient becomes hemodynamically unstable recommend restart stress dose steroids, hydrocortisone 50mg q8h.   On discharge patient to continue Hydrocortisone and titrate as outpt, per outpatient endocrinologist.       Hypothyroidism   TSH and FT4 wnl   Recommend continue Synthroid 50mcg qam   Synthroid should be taking on empty stomach, without other medications           Timoteo Beach MD  Endocrine Fellow   Pager  51 yo M with history of testicular ca s/p orchiectomy, NHL s/p chemo/rad, ischemic colitis s/p lap left colectomy, malignant melanoma of the scalp on immunotherapy (currently on maintenance Nivo (PDL-1) previously on ipi,  s/p 7 cycles last session 04/2020, LE myositis presents with two weeks of fatigue and dyspnea on exertion; also with 20 lb weight loss over the past month. Was recently seen by outpatient Endo and started on hydrocortisone for secondary Adrenal Insufficiency thought to be due to immunotherapy. Even on high doses patient not feeling well. Endocrine following for management of AI.       Secondary adrenal insuffiencey   Due to immunotherapy   Patient was initially started on Solumedrol 100mg IV x1 dose, followed by Solumedrol 50mg q8h x2 doses. As patient saw minimal improvement on stress dosing, it was recommended to consider other etiology for weakness and weight. Patient was restarted on Hydrocortisone 40mg qam and 20mg qpm on 6/1.   Patient remain hemodynamically stable without electrolyte abnormalities. Patient reporting some improvement in symptoms.   Current regimen is still higher than recommended physiologic dose. Would recommend to begin titrate of hydrocortisone starting tomorrow to prevent Cushings. Recommend decrease Hydrocortisone to 30mg q8am and continue Hydrocortisone 20mg q3pm starting tmw, 6/3/20.   Please discontinue FS   If patient becomes hemodynamically unstable recommend restart stress dose steroids, hydrocortisone 50mg q8h IV.   On discharge patient to continue Hydrocortisone, dose to be determined based on titration and follow up with outpatient endocrinologist, Dr Sanchez.      Hypothyroidism   TSH and FT4 wnl   Recommend continue Synthroid 50mcg qam   Synthroid should be taking on empty stomach, without other medications           Timoteo Beach MD  Endocrine Fellow   Pager  51 yo M with history of testicular ca s/p orchiectomy, NHL s/p chemo/rad, ischemic colitis s/p lap left colectomy, malignant melanoma of the scalp on immunotherapy (currently on maintenance Nivo (PDL-1) previously on ipi,  s/p 7 cycles last session 04/2020, LE myositis presents with two weeks of fatigue and dyspnea on exertion; also with 20 lb weight loss over the past month. Was recently seen by outpatient Endo and started on hydrocortisone for secondary Adrenal Insufficiency thought to be due to immunotherapy. Even on high doses patient not feeling well. Endocrine following for management of AI.       Secondary adrenal insuffiency   Due to immunotherapy   Patient was initially started on Solumedrol 100mg IV x1 dose, followed by Solumedrol 50mg q8h x2 doses. As patient saw minimal improvement on stress dosing, it was recommended to consider other etiology for weakness and weight. Patient was restarted on Hydrocortisone 40mg qam and 20mg qpm on 6/1.   Patient remain hemodynamically stable without electrolyte abnormalities. Patient reporting some improvement in symptoms.   Current regimen is still higher than recommended physiologic dose. Would recommend to begin titrate of hydrocortisone starting tomorrow to prevent Cushings. Recommend decrease Hydrocortisone to 30mg q8am and continue Hydrocortisone 20mg q3pm starting tmw, 6/3/20.   Please discontinue FS   If patient becomes hemodynamically unstable recommend restart stress dose steroids, hydrocortisone 50mg q8h IV.   On discharge patient to continue Hydrocortisone, dose to be determined based on titration and follow up with outpatient endocrinologist, Dr Sanchez.      Hypothyroidism   TSH and FT4 wnl   Recommend continue Synthroid 50mcg qam   Synthroid should be taking on empty stomach, without other medications           Timoteo Beach MD  Endocrine Fellow   Pager

## 2020-06-02 NOTE — PROGRESS NOTE ADULT - SUBJECTIVE AND OBJECTIVE BOX
PROGRESS NOTE:   Authored by   Ahmet Garcia PGY-1  Pager [phone]     Patient is a 50y old  Male who presents with a chief complaint of Dyspnea on exertion (01 Jun 2020 17:33)      SUBJECTIVE / OVERNIGHT EVENTS:    ADDITIONAL REVIEW OF SYSTEMS:    MEDICATIONS  (STANDING):  amLODIPine   Tablet 5 milliGRAM(s) Oral daily  atorvastatin 40 milliGRAM(s) Oral at bedtime  dextrose 5%. 1000 milliLiter(s) (50 mL/Hr) IV Continuous <Continuous>  dextrose 50% Injectable 12.5 Gram(s) IV Push once  dextrose 50% Injectable 25 Gram(s) IV Push once  dextrose 50% Injectable 25 Gram(s) IV Push once  dronabinol 2.5 milliGRAM(s) Oral two times a day before meals  enoxaparin Injectable 40 milliGRAM(s) SubCutaneous daily  fenofibrate Tablet 145 milliGRAM(s) Oral daily  hydrocortisone 40 milliGRAM(s) Oral <User Schedule>  hydrocortisone 20 milliGRAM(s) Oral <User Schedule>  levothyroxine 50 MICROGram(s) Oral daily  metoprolol succinate ER 50 milliGRAM(s) Oral daily    MEDICATIONS  (PRN):  dextrose 40% Gel 15 Gram(s) Oral once PRN Blood Glucose LESS THAN 70 milliGRAM(s)/deciLiter  glucagon  Injectable 1 milliGRAM(s) IntraMuscular once PRN Glucose <70 milliGRAM(s)/deciLiter  HYDROmorphone  Injectable 0.5 milliGRAM(s) IV Push two times a day PRN Severe Pain (7 - 10)  oxycodone    5 mG/acetaminophen 325 mG 2 Tablet(s) Oral every 6 hours PRN Severe Pain (7 - 10)      CAPILLARY BLOOD GLUCOSE      POCT Blood Glucose.: 136 mg/dL (02 Jun 2020 12:00)  POCT Blood Glucose.: 107 mg/dL (02 Jun 2020 06:16)  POCT Blood Glucose.: 77 mg/dL (02 Jun 2020 05:35)  POCT Blood Glucose.: 114 mg/dL (01 Jun 2020 21:06)    I&O's Summary      PHYSICAL EXAM:  Vital Signs Last 24 Hrs  T(C): 36.4 (02 Jun 2020 12:14), Max: 36.6 (01 Jun 2020 20:39)  T(F): 97.6 (02 Jun 2020 12:14), Max: 97.8 (01 Jun 2020 20:39)  HR: 59 (02 Jun 2020 12:14) (58 - 62)  BP: 100/58 (02 Jun 2020 12:14) (100/58 - 104/66)  BP(mean): --  RR: 18 (02 Jun 2020 12:14) (18 - 18)  SpO2: 97% (02 Jun 2020 12:14) (97% - 99%)    CONSTITUTIONAL: NAD, well-developed  RESPIRATORY: Normal respiratory effort; lungs are clear to auscultation bilaterally  CARDIOVASCULAR: Regular rate and rhythm, normal S1 and S2, no murmur/rub/gallop; No lower extremity edema; Peripheral pulses are 2+ bilaterally  ABDOMEN: Nontender to palpation, normoactive bowel sounds, no rebound/guarding  MUSCLOSKELETAL: no clubbing or cyanosis of digits; no joint swelling or tenderness to palpation  PSYCH: A+O to conversation; affect appropriate    LABS:                        14.5   7.58  )-----------( 281      ( 02 Jun 2020 06:57 )             45.7     06-02    138  |  100  |  20  ----------------------------<  95  3.5   |  24  |  0.93    Ca    9.9      02 Jun 2020 06:57  Phos  3.6     06-02  Mg     2.0     06-02    TPro  6.5  /  Alb  4.2  /  TBili  0.6  /  DBili  x   /  AST  10  /  ALT  11  /  AlkPhos  43  06-01              Culture - Urine (collected 31 May 2020 04:06)  Source: .Urine Clean Catch (Midstream)  Final Report (01 Jun 2020 07:46):    No growth        RADIOLOGY & ADDITIONAL TESTS:  Results Reviewed:   Imaging Personally Reviewed:  Electrocardiogram Personally Reviewed:    COORDINATION OF CARE:  Care Discussed with Consultants/Other Providers [Y/N]:  Prior or Outpatient Records Reviewed [Y/N]: PROGRESS NOTE:   Authored by   Ahmet Garcia PGY-1  Pager [phone]     Patient is a 50y old  Male who presents with a chief complaint of Dyspnea on exertion (01 Jun 2020 17:33)      SUBJECTIVE / OVERNIGHT EVENTS:    Patient was interviewed at bedside this morning. He continues to have fatigue and shortness of breath on exertion and occasionally while at rest. Appetite remains poor, but he reports improvement with Marinol.     ADDITIONAL REVIEW OF SYSTEMS:     Patient additionally endorses occasional salty food cravings and dizziness when standing or sitting up quickly.  All other review of systems is negative unless indicated above    MEDICATIONS  (STANDING):  amLODIPine   Tablet 5 milliGRAM(s) Oral daily  atorvastatin 40 milliGRAM(s) Oral at bedtime  dextrose 5%. 1000 milliLiter(s) (50 mL/Hr) IV Continuous <Continuous>  dextrose 50% Injectable 12.5 Gram(s) IV Push once  dextrose 50% Injectable 25 Gram(s) IV Push once  dextrose 50% Injectable 25 Gram(s) IV Push once  dronabinol 2.5 milliGRAM(s) Oral two times a day before meals  enoxaparin Injectable 40 milliGRAM(s) SubCutaneous daily  fenofibrate Tablet 145 milliGRAM(s) Oral daily  hydrocortisone 40 milliGRAM(s) Oral <User Schedule>  hydrocortisone 20 milliGRAM(s) Oral <User Schedule>  levothyroxine 50 MICROGram(s) Oral daily  metoprolol succinate ER 50 milliGRAM(s) Oral daily    MEDICATIONS  (PRN):  dextrose 40% Gel 15 Gram(s) Oral once PRN Blood Glucose LESS THAN 70 milliGRAM(s)/deciLiter  glucagon  Injectable 1 milliGRAM(s) IntraMuscular once PRN Glucose <70 milliGRAM(s)/deciLiter  HYDROmorphone  Injectable 0.5 milliGRAM(s) IV Push two times a day PRN Severe Pain (7 - 10)  oxycodone    5 mG/acetaminophen 325 mG 2 Tablet(s) Oral every 6 hours PRN Severe Pain (7 - 10)      CAPILLARY BLOOD GLUCOSE    POCT Blood Glucose.: 136 mg/dL (02 Jun 2020 12:00)  POCT Blood Glucose.: 107 mg/dL (02 Jun 2020 06:16)  POCT Blood Glucose.: 77 mg/dL (02 Jun 2020 05:35)  POCT Blood Glucose.: 114 mg/dL (01 Jun 2020 21:06)    PHYSICAL EXAM:  Vital Signs Last 24 Hrs  T(C): 36.4 (02 Jun 2020 12:14), Max: 36.6 (01 Jun 2020 20:39)  T(F): 97.6 (02 Jun 2020 12:14), Max: 97.8 (01 Jun 2020 20:39)  HR: 59 (02 Jun 2020 12:14) (58 - 62)  BP: 100/58 (02 Jun 2020 12:14) (100/58 - 104/66)  BP(mean): --  RR: 18 (02 Jun 2020 12:14) (18 - 18)  SpO2: 97% (02 Jun 2020 12:14) (97% - 99%)    CONSTITUTIONAL: NAD, well-developed  RESPIRATORY: Normal respiratory effort; lungs are clear to auscultation bilaterally  CARDIOVASCULAR: Regular rate and rhythm, normal S1 and S2, no murmur/rub/gallop; No lower extremity edema; Peripheral pulses are 2+ bilaterally  ABDOMEN: Nontender to palpation, normoactive bowel sounds, no rebound/guarding  MUSCLOSKELETAL: no clubbing or cyanosis of digits; no joint swelling or tenderness to palpation  PSYCH: A+O to conversation; affect appropriate    LABS:                        14.5   7.58  )-----------( 281      ( 02 Jun 2020 06:57 )             45.7     06-02    138  |  100  |  20  ----------------------------<  95  3.5   |  24  |  0.93    Ca    9.9      02 Jun 2020 06:57  Phos  3.6     06-02  Mg     2.0     06-02    TPro  6.5  /  Alb  4.2  /  TBili  0.6  /  DBili  x   /  AST  10  /  ALT  11  /  AlkPhos  43  06-01        Culture - Urine (collected 31 May 2020 04:06)  Source: .Urine Clean Catch (Midstream)  Final Report (01 Jun 2020 07:46):    No growth    RADIOLOGY & ADDITIONAL TESTS:    TRANSTHORACIC ECHOCARDIOGRAM:    Observations:  Mitral Valve: Normal mitral valve. Minimal mitral  regurgitation.  Aortic Valve/Aorta: Aortic valve not well visualized;  probably normal. Peak transaortic valve gradient equals 3  mm Hg, estimated aortic valve area equals 2.5 sqcm. Minimal  aortic regurgitation.  Peak left ventricular outflow tract gradient equals 1 mm Hg.  Aortic Root: 3.5 cm.  LVOT diameter: 2.2 cm.  Left Atrium: Normal left atrium.  LA volume index = 19  cc/m2.  Left Ventricle: Endocardium not well visualized; grossly normal left ventricular systolic function. Regional wall motion could not be accurately assessed. Consider repeat  limited study with definity contrast if clinically indicated.  Normal left ventricular internal dimensions and wall thicknesses. Normal diastolic function  Right Heart: Normal right atrium. Normal right ventricular size and function. Normal tricuspid valve. Minimal tricuspid regurgitation. Pulmonic valve not well visualized, probably normal. Minimal pulmonic regurgitation.  Pericardium/Pleura: Normal pericardium with trace pericardial effusion.  Hemodynamic: Estimated right atrial pressure is 8 mm Hg.  Estimated right ventricular systolic pressure equals 13 mmHg, assuming right atrial pressure equals 8 mm Hg, consistent with normal pulmonary pressures.  ------------------------------------------------------------------------  Conclusions:  1. Normal left ventricular internal dimensions and wallthicknesses.  2. Endocardium not well visualized; grossly normal leftventricular systolic function. Regional wall motion could  not be accurately assessed. Consider repeat limited study with definity contrast if clinically indicated.  3. Normal right ventricular size and function.  4. Estimated right ventricular systolic pressure equals 13mm Hg, assuming right atrial pressure equals 8 mm Hg,consistent with normal pulmonary pressures. PROGRESS NOTE:   Authored by   Ahmet Garcia PGY-1  Pager [phone]     Patient is a 50y old  Male who presents with a chief complaint of Dyspnea on exertion (01 Jun 2020 17:33)      SUBJECTIVE / OVERNIGHT EVENTS:    Patient was interviewed at bedside this morning. He continues to have fatigue and shortness of breath on exertion and occasionally while at rest. Appetite remains poor, but he reports improvement with Marinol.     ADDITIONAL REVIEW OF SYSTEMS:     Patient additionally endorses occasional salty food cravings and dizziness when standing or sitting up quickly.  All other review of systems is negative unless indicated above    MEDICATIONS  (STANDING):  amLODIPine   Tablet 5 milliGRAM(s) Oral daily  atorvastatin 40 milliGRAM(s) Oral at bedtime  dextrose 5%. 1000 milliLiter(s) (50 mL/Hr) IV Continuous <Continuous>  dextrose 50% Injectable 12.5 Gram(s) IV Push once  dextrose 50% Injectable 25 Gram(s) IV Push once  dextrose 50% Injectable 25 Gram(s) IV Push once  dronabinol 2.5 milliGRAM(s) Oral two times a day before meals  enoxaparin Injectable 40 milliGRAM(s) SubCutaneous daily  fenofibrate Tablet 145 milliGRAM(s) Oral daily  hydrocortisone 40 milliGRAM(s) Oral <User Schedule>  hydrocortisone 20 milliGRAM(s) Oral <User Schedule>  levothyroxine 50 MICROGram(s) Oral daily  metoprolol succinate ER 50 milliGRAM(s) Oral daily    MEDICATIONS  (PRN):  dextrose 40% Gel 15 Gram(s) Oral once PRN Blood Glucose LESS THAN 70 milliGRAM(s)/deciLiter  glucagon  Injectable 1 milliGRAM(s) IntraMuscular once PRN Glucose <70 milliGRAM(s)/deciLiter  HYDROmorphone  Injectable 0.5 milliGRAM(s) IV Push two times a day PRN Severe Pain (7 - 10)  oxycodone    5 mG/acetaminophen 325 mG 2 Tablet(s) Oral every 6 hours PRN Severe Pain (7 - 10)      CAPILLARY BLOOD GLUCOSE    POCT Blood Glucose.: 136 mg/dL (02 Jun 2020 12:00)  POCT Blood Glucose.: 107 mg/dL (02 Jun 2020 06:16)  POCT Blood Glucose.: 77 mg/dL (02 Jun 2020 05:35)  POCT Blood Glucose.: 114 mg/dL (01 Jun 2020 21:06)    PHYSICAL EXAM:  Vital Signs Last 24 Hrs  T(C): 36.4 (02 Jun 2020 12:14), Max: 36.6 (01 Jun 2020 20:39)  T(F): 97.6 (02 Jun 2020 12:14), Max: 97.8 (01 Jun 2020 20:39)  HR: 59 (02 Jun 2020 12:14) (58 - 62)  BP: 100/58 (02 Jun 2020 12:14) (100/58 - 104/66)  BP(mean): --  RR: 18 (02 Jun 2020 12:14) (18 - 18)  SpO2: 97% (02 Jun 2020 12:14) (97% - 99%)    CONSTITUTIONAL: NAD, well-developed  RESPIRATORY: Normal respiratory effort; lungs are clear to auscultation bilaterally  CARDIOVASCULAR: Regular rate and rhythm, normal S1 and S2, no murmur/rub/gallop; No lower extremity edema; Peripheral pulses are 2+ bilaterally  ABDOMEN: Nontender to palpation, normoactive bowel sounds, no rebound/guarding  MUSCLOSKELETAL: no clubbing or cyanosis of digits; no joint swelling or tenderness to palpation  PSYCH: A+O to conversation; affect appropriate    LABS:                        14.5   7.58  )-----------( 281      ( 02 Jun 2020 06:57 )             45.7     06-02    138  |  100  |  20  ----------------------------<  95  3.5   |  24  |  0.93    Ca    9.9      02 Jun 2020 06:57  Phos  3.6     06-02  Mg     2.0     06-02    TPro  6.5  /  Alb  4.2  /  TBili  0.6  /  DBili  x   /  AST  10  /  ALT  11  /  AlkPhos  43  06-01      Culture - Urine (collected 31 May 2020 04:06)  Source: .Urine Clean Catch (Midstream)  Final Report (01 Jun 2020 07:46):    No growth    RADIOLOGY & ADDITIONAL TESTS:    TRANSTHORACIC ECHOCARDIOGRAM:    Conclusions:  1. Normal left ventricular internal dimensions and wall thicknesses.  2. Endocardium not well visualized; grossly normal left ventricular systolic function. Regional wall motion could  not be accurately assessed. Consider repeat limited study with definity contrast if clinically indicated.  3. Normal right ventricular size and function.  4. Estimated right ventricular systolic pressure equals 13mm Hg, assuming right atrial pressure equals 8 mm Hg,consistent with normal pulmonary pressures.    12 LEAD ECG:    Ventricular Rate 74 BPM  Atrial Rate 74 BPM  P-R Interval 196 ms  QRS Duration 96 ms  Q-T Interval 394 ms  QTC Calculation(Bezet) 437 ms  P Axis 48 degrees  R Axis 18 degrees  T Axis 32 degrees  Diagnosis Line: NORMAL SINUS RHYTHM, ST ELEVATION, CONSIDER EARLY REPOLARIZATION    CT ANGIO CHEST w/ IV CONTRAST:    LUNGS AND AIRWAYS: Patent central airways.  Bilateral dependent atelectasis. Lungs are clear.  PLEURA: No pleural effusion.  MEDIASTINUM AND RIOS: No lymphadenopathy.  VESSELS: No pulmonary embolism.  HEART: Heart size is normal. No pericardial effusion.  CHEST WALL AND LOWER NECK: Within normal limits.  VISUALIZED UPPER ABDOMEN: Surgical clips are seen in the retroperitoneum.  BONES: Well-circumscribed sclerotic focus seen in the anterior L2 vertebral body, likely represents a bone island.  IMPRESSION:   No pulmonary embolism.    MR HEAD w/wo CONTRAST:    BRAIN MR:  Redemonstration sequela status post right melanoma scalp resection, partially seen right radical neck dissection with right parietal scalp scar tissue and foci decreased T1 signal in left parietal scalp (15:167), correlate with visual inspection. Brain parenchyma is unchanged and unremarkable from prior, there is redemonstration of mild volume loss, with enlarged ventricles and sulci and nonspecific T2 and white matter FLAIR hyperintensity likely microvascular disease. There is no new intracranial mass, extra-axial fluid collection, midline shift, infarct or focal mass effect. No new susceptibility foci are identified. There is no abnormal enhancement postcontrast. Basal cisterns remain patent and unchanged from prior. 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 Superior pituitary border remains convex in the midline.  No new abnormal signal or contrast-enhancement is noted in the pituitary gland.  The pituitary stalk is unchanged from prior. Posterior pituitary bright spot is identified. Cavernous sinuses and cavernous carotid arteries remain unchanged and unremarkable. No new evidence for any compression of the optic nerves or optic chiasm.    EXTRACRANIAL: Orbital globes are unchanged from prior, there is mucosal thickening within the paranasal sinuses without air-fluid levels there is opacification with air-fluid levels in the right greater than left mastoid tip. Redemonstration of postsurgical sequela with hardware right maxillary antra with susceptibly artifact limiting assessment, suggest correlation with visual inspection.    IMPRESSION:  No significant change from 10/30/2019, redemonstration of postsurgical sequela right scalp with tiny foci decreased decreased T1 signal in the left scalp correlate with visual inspection. Redemonstration mild volume loss and microvascular disease, pituitary remains unchanged in appearance from prior, no new abnormal enhancement, hemorrhage infarct or midline shift.

## 2020-06-02 NOTE — PROVIDER CONTACT NOTE (OTHER) - ASSESSMENT
pt a&o4, VSS, pt R chest wall mediport no blood return. Upon start of shift mediport was able to flush and had blood return. This AM unable to draw labs through port due to no blood return. No s/s of dislodge, redness, distress to site. pt a&o4, VSS, pt R chest wall mediport no blood return. Upon start of shift mediport was able to flush and had blood return. This AM unable to draw labs through port due to no blood return. No s/s of dislodge, redness, swelling, distress to site.

## 2020-06-02 NOTE — PROGRESS NOTE ADULT - ATTENDING COMMENTS
Patient seen and examined. Agree with note as above with addendum: discussed with patient my concern for Cushingoid appearance for using such a high dose of HC. Will need to taper to 20mg po qam and 10mg po qpm.  Lashae Grider MD  Pager: [phone], between 9am-6pm  Nights and Weekends: [phone]

## 2020-06-02 NOTE — PROGRESS NOTE ADULT - PROBLEM SELECTOR PLAN 3
On immunotherapy (Ipilimumab and Nivolumab s/p 6 cycles)- Last session 03/2020  - Oncology consult appreciated    - Further immunotherapy outpatient as per onc  - Follow up CK to rule out myositis related to immunotherapy On immunotherapy (Ipilimumab and Nivolumab s/p 6 cycles)- Last session 03/2020  - Oncology consult appreciated    - Further immunotherapy outpatient as per onc  - Follow up CK to rule out myositis related to immunotherapy  -onc not recommending any further imaging at this time. On immunotherapy (Ipilimumab and Nivolumab s/p 6 cycles)- Last session 03/2020  - Oncology consult appreciated    - Further immunotherapy outpatient as per onc  - Follow up CK to rule out myositis related to immunotherapy  -onc not recommending any further imaging at this time.  - colonoscopy from 11/2019- polyps removed with low grade dysplasia.

## 2020-06-03 LAB
ALBUMIN SERPL ELPH-MCNC: 4.4 G/DL — SIGNIFICANT CHANGE UP (ref 3.3–5)
ALP SERPL-CCNC: 42 U/L — SIGNIFICANT CHANGE UP (ref 40–120)
ALT FLD-CCNC: 13 U/L — SIGNIFICANT CHANGE UP (ref 10–45)
ANION GAP SERPL CALC-SCNC: 10 MMOL/L — SIGNIFICANT CHANGE UP (ref 5–17)
AST SERPL-CCNC: 12 U/L — SIGNIFICANT CHANGE UP (ref 10–40)
BASOPHILS # BLD AUTO: 0.04 K/UL — SIGNIFICANT CHANGE UP (ref 0–0.2)
BASOPHILS NFR BLD AUTO: 0.6 % — SIGNIFICANT CHANGE UP (ref 0–2)
BILIRUB SERPL-MCNC: 0.6 MG/DL — SIGNIFICANT CHANGE UP (ref 0.2–1.2)
BUN SERPL-MCNC: 20 MG/DL — SIGNIFICANT CHANGE UP (ref 7–23)
CALCIUM SERPL-MCNC: 9.6 MG/DL — SIGNIFICANT CHANGE UP (ref 8.4–10.5)
CANCER AG19-9 SERPL-ACNC: 9 U/ML — SIGNIFICANT CHANGE UP
CEA SERPL-MCNC: <0.6 NG/ML — SIGNIFICANT CHANGE UP (ref 0–3.8)
CHLORIDE SERPL-SCNC: 104 MMOL/L — SIGNIFICANT CHANGE UP (ref 96–108)
CK SERPL-CCNC: 35 U/L — SIGNIFICANT CHANGE UP (ref 30–200)
CO2 SERPL-SCNC: 27 MMOL/L — SIGNIFICANT CHANGE UP (ref 22–31)
CORTIS AM PEAK SERPL-MCNC: 0.6 UG/DL — LOW (ref 6–18.4)
CREAT SERPL-MCNC: 0.91 MG/DL — SIGNIFICANT CHANGE UP (ref 0.5–1.3)
EOSINOPHIL # BLD AUTO: 0.27 K/UL — SIGNIFICANT CHANGE UP (ref 0–0.5)
EOSINOPHIL NFR BLD AUTO: 4.3 % — SIGNIFICANT CHANGE UP (ref 0–6)
GLUCOSE BLDC GLUCOMTR-MCNC: 112 MG/DL — HIGH (ref 70–99)
GLUCOSE BLDC GLUCOMTR-MCNC: 79 MG/DL — SIGNIFICANT CHANGE UP (ref 70–99)
GLUCOSE SERPL-MCNC: 80 MG/DL — SIGNIFICANT CHANGE UP (ref 70–99)
HCT VFR BLD CALC: 41.4 % — SIGNIFICANT CHANGE UP (ref 39–50)
HGB BLD-MCNC: 13.8 G/DL — SIGNIFICANT CHANGE UP (ref 13–17)
IMM GRANULOCYTES NFR BLD AUTO: 0.2 % — SIGNIFICANT CHANGE UP (ref 0–1.5)
LYMPHOCYTES # BLD AUTO: 3.05 K/UL — SIGNIFICANT CHANGE UP (ref 1–3.3)
LYMPHOCYTES # BLD AUTO: 48.3 % — HIGH (ref 13–44)
MAGNESIUM SERPL-MCNC: 2 MG/DL — SIGNIFICANT CHANGE UP (ref 1.6–2.6)
MCHC RBC-ENTMCNC: 29.1 PG — SIGNIFICANT CHANGE UP (ref 27–34)
MCHC RBC-ENTMCNC: 33.3 GM/DL — SIGNIFICANT CHANGE UP (ref 32–36)
MCV RBC AUTO: 87.2 FL — SIGNIFICANT CHANGE UP (ref 80–100)
MONOCYTES # BLD AUTO: 0.33 K/UL — SIGNIFICANT CHANGE UP (ref 0–0.9)
MONOCYTES NFR BLD AUTO: 5.2 % — SIGNIFICANT CHANGE UP (ref 2–14)
NEUTROPHILS # BLD AUTO: 2.62 K/UL — SIGNIFICANT CHANGE UP (ref 1.8–7.4)
NEUTROPHILS NFR BLD AUTO: 41.4 % — LOW (ref 43–77)
NRBC # BLD: 0 /100 WBCS — SIGNIFICANT CHANGE UP (ref 0–0)
PHOSPHATE SERPL-MCNC: 3.5 MG/DL — SIGNIFICANT CHANGE UP (ref 2.5–4.5)
PLATELET # BLD AUTO: 240 K/UL — SIGNIFICANT CHANGE UP (ref 150–400)
POTASSIUM SERPL-MCNC: 3.5 MMOL/L — SIGNIFICANT CHANGE UP (ref 3.5–5.3)
POTASSIUM SERPL-SCNC: 3.5 MMOL/L — SIGNIFICANT CHANGE UP (ref 3.5–5.3)
PROT SERPL-MCNC: 6.7 G/DL — SIGNIFICANT CHANGE UP (ref 6–8.3)
RBC # BLD: 4.75 M/UL — SIGNIFICANT CHANGE UP (ref 4.2–5.8)
RBC # FLD: 12.1 % — SIGNIFICANT CHANGE UP (ref 10.3–14.5)
SODIUM SERPL-SCNC: 141 MMOL/L — SIGNIFICANT CHANGE UP (ref 135–145)
WBC # BLD: 6.32 K/UL — SIGNIFICANT CHANGE UP (ref 3.8–10.5)
WBC # FLD AUTO: 6.32 K/UL — SIGNIFICANT CHANGE UP (ref 3.8–10.5)

## 2020-06-03 PROCEDURE — 99232 SBSQ HOSP IP/OBS MODERATE 35: CPT | Mod: GC

## 2020-06-03 RX ORDER — GABAPENTIN 400 MG/1
100 CAPSULE ORAL THREE TIMES A DAY
Refills: 0 | Status: DISCONTINUED | OUTPATIENT
Start: 2020-06-03 | End: 2020-06-03

## 2020-06-03 RX ORDER — MAGNESIUM OXIDE 400 MG ORAL TABLET 241.3 MG
400 TABLET ORAL
Refills: 0 | Status: DISCONTINUED | OUTPATIENT
Start: 2020-06-03 | End: 2020-06-04

## 2020-06-03 RX ORDER — HYDROCORTISONE 20 MG
30 TABLET ORAL
Refills: 0 | Status: DISCONTINUED | OUTPATIENT
Start: 2020-06-04 | End: 2020-06-04

## 2020-06-03 RX ADMIN — Medication 2.5 MILLIGRAM(S): at 16:09

## 2020-06-03 RX ADMIN — HYDROMORPHONE HYDROCHLORIDE 0.5 MILLIGRAM(S): 2 INJECTION INTRAMUSCULAR; INTRAVENOUS; SUBCUTANEOUS at 07:51

## 2020-06-03 RX ADMIN — Medication 2.5 MILLIGRAM(S): at 05:39

## 2020-06-03 RX ADMIN — MAGNESIUM OXIDE 400 MG ORAL TABLET 400 MILLIGRAM(S): 241.3 TABLET ORAL at 17:50

## 2020-06-03 RX ADMIN — OXYCODONE AND ACETAMINOPHEN 2 TABLET(S): 5; 325 TABLET ORAL at 11:27

## 2020-06-03 RX ADMIN — HYDROMORPHONE HYDROCHLORIDE 0.5 MILLIGRAM(S): 2 INJECTION INTRAMUSCULAR; INTRAVENOUS; SUBCUTANEOUS at 20:29

## 2020-06-03 RX ADMIN — Medication 40 MILLIGRAM(S): at 05:39

## 2020-06-03 RX ADMIN — CHLORHEXIDINE GLUCONATE 1 APPLICATION(S): 213 SOLUTION TOPICAL at 17:32

## 2020-06-03 RX ADMIN — Medication 20 MILLIGRAM(S): at 17:33

## 2020-06-03 RX ADMIN — Medication 50 MICROGRAM(S): at 05:39

## 2020-06-03 RX ADMIN — Medication 50 MILLIGRAM(S): at 05:39

## 2020-06-03 RX ADMIN — ATORVASTATIN CALCIUM 40 MILLIGRAM(S): 80 TABLET, FILM COATED ORAL at 05:39

## 2020-06-03 RX ADMIN — GABAPENTIN 100 MILLIGRAM(S): 400 CAPSULE ORAL at 16:09

## 2020-06-03 RX ADMIN — ENOXAPARIN SODIUM 40 MILLIGRAM(S): 100 INJECTION SUBCUTANEOUS at 11:27

## 2020-06-03 RX ADMIN — OXYCODONE AND ACETAMINOPHEN 2 TABLET(S): 5; 325 TABLET ORAL at 17:33

## 2020-06-03 RX ADMIN — AMLODIPINE BESYLATE 5 MILLIGRAM(S): 2.5 TABLET ORAL at 05:39

## 2020-06-03 RX ADMIN — Medication 145 MILLIGRAM(S): at 11:27

## 2020-06-03 NOTE — CHART NOTE - NSCHARTNOTEFT_GEN_A_CORE
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 Chart reviewed, events noted.     Source: comprehensive chart review, patient    Diet: Regular  Supplement: Ensure Enlive 3 daily     Patient reports appetite and intake started to improve yesterday (6/2), reports now consuming >50% of meals and drinking 1-2 Ensure daily. Denies nausea/vomiting/diarrhea/constipation. Encouraged PO intake as tolerated with emphasis on protein. Discussed with pt to consume protein first at mealtimes. Pt with no nutrition-related questions or concerns at this time. Pt made aware RD remains available.     PO intake: 50-75%    Source for PO intake: patient    Enteral /Parenteral Nutrition: N/A    Current Weight/% Weight Change: no new weights to assess, will continue to monitor and trend weights     Pertinent Medications: MEDICATIONS  (STANDING):  amLODIPine   Tablet 5 milliGRAM(s) Oral daily  atorvastatin 40 milliGRAM(s) Oral at bedtime  chlorhexidine 4% Liquid 1 Application(s) Topical daily  dextrose 5%. 1000 milliLiter(s) (50 mL/Hr) IV Continuous <Continuous>  dextrose 50% Injectable 12.5 Gram(s) IV Push once  dextrose 50% Injectable 25 Gram(s) IV Push once  dextrose 50% Injectable 25 Gram(s) IV Push once  dronabinol 2.5 milliGRAM(s) Oral two times a day before meals  enoxaparin Injectable 40 milliGRAM(s) SubCutaneous daily  fenofibrate Tablet 145 milliGRAM(s) Oral daily  hydrocortisone 20 milliGRAM(s) Oral <User Schedule>  levothyroxine 50 MICROGram(s) Oral daily  metoprolol succinate ER 50 milliGRAM(s) Oral daily    MEDICATIONS  (PRN):  dextrose 40% Gel 15 Gram(s) Oral once PRN Blood Glucose LESS THAN 70 milliGRAM(s)/deciLiter  glucagon  Injectable 1 milliGRAM(s) IntraMuscular once PRN Glucose <70 milliGRAM(s)/deciLiter  HYDROmorphone  Injectable 0.5 milliGRAM(s) IV Push two times a day PRN Severe Pain (7 - 10)  oxycodone    5 mG/acetaminophen 325 mG 2 Tablet(s) Oral every 6 hours PRN Severe Pain (7 - 10)    Pertinent Labs:  06-03 Na141 mmol/L Glu 80 mg/dL K+ 3.5 mmol/L Cr  0.91 mg/dL BUN 20 mg/dL 06-03 Phos 3.5 mg/dL 06-03 Alb 4.4 g/dL    Skin: no pressure injuries per flowsheets   Edema: no edema per flowsheets     Estimated Needs: no change since previous assessment, based on dosing weight 169 pounds, defer fluids to team  Estimated Energy Needs (25-30 calories/kg): 4981-6998 kcal/day  Estimated Protein Needs (1.2-1.4 gm/kg):  g/day    Previous Nutrition Diagnosis: Severe malnutrition  Nutrition Diagnosis is ongoing, nutrition care plan in progress, being addressed with PO Regular diet, Ensure Enlive 3x/day, encouragement of PO intake    New Nutrition Diagnosis: not applicable    Recommend  1) Continue Regular diet with Ensure Enlive 3x/day (350 kcal, 20 g protein in each), monitor/adjust as needed   2) Encouraged PO intake as tolerated with emphasis on protein, pt made aware RD remains available  3) Obtain current weight to identify changes, if any     Monitoring and Evaluation: Monitor PO intake, weight, labs, skin, GI status, diet     RD remains available upon request and will follow up per protocol.   Osiris Garland, MS, RD, CDN Pager #[phone] Nutrition Follow Up Note  Patient seen for: malnutrition follow-up     Hospital course as per chart: Patient is a 51 yo male with PMH of testicular ca s/p orchiectomy, NHL s/p chemo/rad, ischemic colitis s/p lap left colectomy, malignant melanoma of the scalp on immunotherapy, LE myositis, who presented with 2 weeks of fatigue and EASLEY, weight loss, admitted 5/30. Chart reviewed, events noted.     Source: comprehensive chart review, patient    Diet: Regular  Supplement: Ensure Enlive 3 daily     Patient reports appetite and intake started to improve yesterday (6/2), reports now consuming >50% of meals and drinking 1-2 Ensure daily. Attributes increase in appetite to Marinol. Denies nausea/vomiting/diarrhea/constipation. Encouraged PO intake as tolerated with emphasis on protein. Discussed with pt to consume protein first at mealtimes. Pt with no nutrition-related questions or concerns at this time. Pt made aware RD remains available.     PO intake: 50-75% of meals + 1-2 Ensure Enlive daily    Source for PO intake: patient    Enteral /Parenteral Nutrition: N/A    Current Weight/% Weight Change: no new weights to assess, will continue to monitor and trend weights     Pertinent Medications: MEDICATIONS  (STANDING):  amLODIPine   Tablet 5 milliGRAM(s) Oral daily  atorvastatin 40 milliGRAM(s) Oral at bedtime  chlorhexidine 4% Liquid 1 Application(s) Topical daily  dextrose 5%. 1000 milliLiter(s) (50 mL/Hr) IV Continuous <Continuous>  dextrose 50% Injectable 12.5 Gram(s) IV Push once  dextrose 50% Injectable 25 Gram(s) IV Push once  dextrose 50% Injectable 25 Gram(s) IV Push once  dronabinol 2.5 milliGRAM(s) Oral two times a day before meals  enoxaparin Injectable 40 milliGRAM(s) SubCutaneous daily  fenofibrate Tablet 145 milliGRAM(s) Oral daily  hydrocortisone 20 milliGRAM(s) Oral <User Schedule>  levothyroxine 50 MICROGram(s) Oral daily  metoprolol succinate ER 50 milliGRAM(s) Oral daily    MEDICATIONS  (PRN):  dextrose 40% Gel 15 Gram(s) Oral once PRN Blood Glucose LESS THAN 70 milliGRAM(s)/deciLiter  glucagon  Injectable 1 milliGRAM(s) IntraMuscular once PRN Glucose <70 milliGRAM(s)/deciLiter  HYDROmorphone  Injectable 0.5 milliGRAM(s) IV Push two times a day PRN Severe Pain (7 - 10)  oxycodone    5 mG/acetaminophen 325 mG 2 Tablet(s) Oral every 6 hours PRN Severe Pain (7 - 10)    Pertinent Labs:  06-03 Na141 mmol/L Glu 80 mg/dL K+ 3.5 mmol/L Cr  0.91 mg/dL BUN 20 mg/dL 06-03 Phos 3.5 mg/dL 06-03 Alb 4.4 g/dL    Skin: no pressure injuries per flowsheets   Edema: no edema per flowsheets     Estimated Needs: no change since previous assessment, based on dosing weight 169 pounds, defer fluids to team  Estimated Energy Needs (25-30 calories/kg): 5126-3114 kcal/day  Estimated Protein Needs (1.2-1.4 gm/kg):  g/day    Previous Nutrition Diagnosis: Severe malnutrition  Nutrition Diagnosis is ongoing, nutrition care plan in progress, being addressed with PO Regular diet, Ensure Enlive 3x/day, encouragement of PO intake, medical management (Marinol)     New Nutrition Diagnosis: not applicable    Recommend  1) Continue Regular diet with Ensure Enlive 3x/day (350 kcal, 20 g protein in each), monitor/adjust as needed   2) Encouraged PO intake as tolerated with emphasis on protein, pt made aware RD remains available  3) Obtain current weight to identify changes, if any   4) Continue appetite stimulant as per team    Monitoring and Evaluation: Monitor PO intake, weight, labs, skin, GI status, diet     RD remains available upon request and will follow up per protocol.   Osiris Garland, MS, RD, CDN Pager #[phone]

## 2020-06-03 NOTE — PROGRESS NOTE ADULT - SUBJECTIVE AND OBJECTIVE BOX
PROGRESS NOTE:   Authored by   Ahmet Garcia PGY-1  Pager [phone]     Patient is a 50y old  Male who presents with a chief complaint of Dyspnea on exertion (02 Jun 2020 15:59)      SUBJECTIVE / OVERNIGHT EVENTS:    ADDITIONAL REVIEW OF SYSTEMS:    MEDICATIONS  (STANDING):  amLODIPine   Tablet 5 milliGRAM(s) Oral daily  atorvastatin 40 milliGRAM(s) Oral at bedtime  chlorhexidine 4% Liquid 1 Application(s) Topical daily  dextrose 5%. 1000 milliLiter(s) (50 mL/Hr) IV Continuous <Continuous>  dextrose 50% Injectable 12.5 Gram(s) IV Push once  dextrose 50% Injectable 25 Gram(s) IV Push once  dextrose 50% Injectable 25 Gram(s) IV Push once  dronabinol 2.5 milliGRAM(s) Oral two times a day before meals  enoxaparin Injectable 40 milliGRAM(s) SubCutaneous daily  fenofibrate Tablet 145 milliGRAM(s) Oral daily  hydrocortisone 20 milliGRAM(s) Oral <User Schedule>  levothyroxine 50 MICROGram(s) Oral daily  metoprolol succinate ER 50 milliGRAM(s) Oral daily    MEDICATIONS  (PRN):  dextrose 40% Gel 15 Gram(s) Oral once PRN Blood Glucose LESS THAN 70 milliGRAM(s)/deciLiter  glucagon  Injectable 1 milliGRAM(s) IntraMuscular once PRN Glucose <70 milliGRAM(s)/deciLiter  HYDROmorphone  Injectable 0.5 milliGRAM(s) IV Push two times a day PRN Severe Pain (7 - 10)  oxycodone    5 mG/acetaminophen 325 mG 2 Tablet(s) Oral every 6 hours PRN Severe Pain (7 - 10)      CAPILLARY BLOOD GLUCOSE      POCT Blood Glucose.: 112 mg/dL (03 Jun 2020 06:39)  POCT Blood Glucose.: 79 mg/dL (03 Jun 2020 05:28)  POCT Blood Glucose.: 104 mg/dL (02 Jun 2020 21:51)  POCT Blood Glucose.: 136 mg/dL (02 Jun 2020 12:00)    I&O's Summary    02 Jun 2020 07:01  -  03 Jun 2020 07:00  --------------------------------------------------------  IN: 400 mL / OUT: 0 mL / NET: 400 mL        PHYSICAL EXAM:  Vital Signs Last 24 Hrs  T(C): 36.6 (03 Jun 2020 05:40), Max: 36.6 (02 Jun 2020 20:32)  T(F): 97.9 (03 Jun 2020 05:40), Max: 97.9 (03 Jun 2020 05:40)  HR: 71 (03 Jun 2020 05:40) (59 - 71)  BP: 125/78 (03 Jun 2020 05:40) (100/58 - 125/78)  BP(mean): --  RR: 18 (03 Jun 2020 05:40) (18 - 18)  SpO2: 100% (03 Jun 2020 05:40) (96% - 100%)    CONSTITUTIONAL: NAD, well-developed  RESPIRATORY: Normal respiratory effort; lungs are clear to auscultation bilaterally  CARDIOVASCULAR: Regular rate and rhythm, normal S1 and S2, no murmur/rub/gallop; No lower extremity edema; Peripheral pulses are 2+ bilaterally  ABDOMEN: Nontender to palpation, normoactive bowel sounds, no rebound/guarding  MUSCLOSKELETAL: no clubbing or cyanosis of digits; no joint swelling or tenderness to palpation  PSYCH: A+O to conversation; affect appropriate    LABS:                        13.8   6.32  )-----------( 240      ( 03 Jun 2020 06:42 )             41.4     06-03    141  |  104  |  20  ----------------------------<  80  3.5   |  27  |  0.91    Ca    9.6      03 Jun 2020 06:42  Phos  3.5     06-03  Mg     2.0     06-03    TPro  6.7  /  Alb  4.4  /  TBili  0.6  /  DBili  x   /  AST  12  /  ALT  13  /  AlkPhos  42  06-03      CARDIAC MARKERS ( 03 Jun 2020 06:42 )  x     / x     / 35 U/L / x     / x                RADIOLOGY & ADDITIONAL TESTS:  Results Reviewed:   Imaging Personally Reviewed:  Electrocardiogram Personally Reviewed:    COORDINATION OF CARE:  Care Discussed with Consultants/Other Providers [Y/N]:  Prior or Outpatient Records Reviewed [Y/N]: PROGRESS NOTE:   Authored by   Ahmet Garcia PGY-1  Pager [phone]     Patient is a 50y old  Male who presents with a chief complaint of Dyspnea on exertion (02 Jun 2020 15:59)      SUBJECTIVE / OVERNIGHT EVENTS:    No acute events overnight. Patient reports improved appetite and shortness of breath. Continues to endorse weakness, fatigue, and leg cramping. Denies fever, chest pain, nausea, vomiting, constipation, or diarrhea.     ADDITIONAL REVIEW OF SYSTEMS:    Review of systems otherwise negative.     MEDICATIONS  (STANDING):  amLODIPine   Tablet 5 milliGRAM(s) Oral daily  atorvastatin 40 milliGRAM(s) Oral at bedtime  chlorhexidine 4% Liquid 1 Application(s) Topical daily  dextrose 5%. 1000 milliLiter(s) (50 mL/Hr) IV Continuous <Continuous>  dextrose 50% Injectable 12.5 Gram(s) IV Push once  dextrose 50% Injectable 25 Gram(s) IV Push once  dextrose 50% Injectable 25 Gram(s) IV Push once  dronabinol 2.5 milliGRAM(s) Oral two times a day before meals  enoxaparin Injectable 40 milliGRAM(s) SubCutaneous daily  fenofibrate Tablet 145 milliGRAM(s) Oral daily  hydrocortisone 20 milliGRAM(s) Oral <User Schedule>  levothyroxine 50 MICROGram(s) Oral daily  metoprolol succinate ER 50 milliGRAM(s) Oral daily    MEDICATIONS  (PRN):  dextrose 40% Gel 15 Gram(s) Oral once PRN Blood Glucose LESS THAN 70 milliGRAM(s)/deciLiter  glucagon  Injectable 1 milliGRAM(s) IntraMuscular once PRN Glucose <70 milliGRAM(s)/deciLiter  HYDROmorphone  Injectable 0.5 milliGRAM(s) IV Push two times a day PRN Severe Pain (7 - 10)  oxycodone    5 mG/acetaminophen 325 mG 2 Tablet(s) Oral every 6 hours PRN Severe Pain (7 - 10)    CAPILLARY BLOOD GLUCOSE    POCT Blood Glucose.: 112 mg/dL (03 Jun 2020 06:39)  POCT Blood Glucose.: 79 mg/dL (03 Jun 2020 05:28)  POCT Blood Glucose.: 104 mg/dL (02 Jun 2020 21:51)  POCT Blood Glucose.: 136 mg/dL (02 Jun 2020 12:00)    I&O's Summary    02 Jun 2020 07:01  -  03 Jun 2020 07:00  --------------------------------------------------------  IN: 400 mL / OUT: 0 mL / NET: 400 mL      PHYSICAL EXAM:  Vital Signs Last 24 Hrs  T(C): 36.6 (03 Jun 2020 05:40), Max: 36.6 (02 Jun 2020 20:32)  T(F): 97.9 (03 Jun 2020 05:40), Max: 97.9 (03 Jun 2020 05:40)  HR: 71 (03 Jun 2020 05:40) (59 - 71)  BP: 125/78 (03 Jun 2020 05:40) (100/58 - 125/78)  BP(mean): --  RR: 18 (03 Jun 2020 05:40) (18 - 18)  SpO2: 100% (03 Jun 2020 05:40) (96% - 100%)    CONSTITUTIONAL: NAD, well-developed  RESPIRATORY: Normal respiratory effort; lungs are clear to auscultation bilaterally  CARDIOVASCULAR: Regular rate and rhythm, normal S1 and S2, no murmur/rub/gallop; No lower extremity edema; Peripheral pulses are 2+ bilaterally  ABDOMEN: Nontender to palpation, normoactive bowel sounds, no rebound/guarding  MUSCLOSKELETAL: no clubbing or cyanosis of digits; no joint swelling or tenderness to palpation  PSYCH: A+O to conversation; affect appropriate    LABS:                        13.8   6.32  )-----------( 240      ( 03 Jun 2020 06:42 )             41.4     06-03    141  |  104  |  20  ----------------------------<  80  3.5   |  27  |  0.91    Ca    9.6      03 Jun 2020 06:42  Phos  3.5     06-03  Mg     2.0     06-03    TPro  6.7  /  Alb  4.4  /  TBili  0.6  /  DBili  x   /  AST  12  /  ALT  13  /  AlkPhos  42  06-03      CARDIAC MARKERS ( 03 Jun 2020 06:42 )  x     / x     / 35 U/L / x     / x        Creatinine Kinase: 35    RADIOLOGY & ADDITIONAL TESTS:    TRANSTHORACIC ECHOCARDIOGRAM:    Conclusions:  1. Normal left ventricular internal dimensions and wall thicknesses.  2. Endocardium not well visualized; grossly normal left ventricular systolic function. Regional wall motion could  not be accurately assessed. Consider repeat limited study with definity contrast if clinically indicated.  3. Normal right ventricular size and function.  4. Estimated right ventricular systolic pressure equals 13mm Hg, assuming right atrial pressure equals 8 mm Hg,consistent with normal pulmonary pressures.    12 LEAD ECG:    Ventricular Rate 74 BPM  Atrial Rate 74 BPM  P-R Interval 196 ms  QRS Duration 96 ms  Q-T Interval 394 ms  QTC Calculation(Bezet) 437 ms  P Axis 48 degrees  R Axis 18 degrees  T Axis 32 degrees  Diagnosis Line: NORMAL SINUS RHYTHM, ST ELEVATION, CONSIDER EARLY REPOLARIZATION    CT ANGIO CHEST w/ IV CONTRAST:    LUNGS AND AIRWAYS: Patent central airways.  Bilateral dependent atelectasis. Lungs are clear.  PLEURA: No pleural effusion.  MEDIASTINUM AND RIOS: No lymphadenopathy.  VESSELS: No pulmonary embolism.  HEART: Heart size is normal. No pericardial effusion.  CHEST WALL AND LOWER NECK: Within normal limits.  VISUALIZED UPPER ABDOMEN: Surgical clips are seen in the retroperitoneum.  BONES: Well-circumscribed sclerotic focus seen in the anterior L2 vertebral body, likely represents a bone island.  IMPRESSION:   No pulmonary embolism.    MR HEAD w/wo CONTRAST:    BRAIN MR:  Redemonstration sequela status post right melanoma scalp resection, partially seen right radical neck dissection with right parietal scalp scar tissue and foci decreased T1 signal in left parietal scalp (15:167), correlate with visual inspection. Brain parenchyma is unchanged and unremarkable from prior, there is redemonstration of mild volume loss, with enlarged ventricles and sulci and nonspecific T2 and white matter FLAIR hyperintensity likely microvascular disease. There is no new intracranial mass, extra-axial fluid collection, midline shift, infarct or focal mass effect. No new susceptibility foci are identified. There is no abnormal enhancement postcontrast. Basal cisterns remain patent and unchanged from prior. 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 Superior pituitary border remains convex in the midline.  No new abnormal signal or contrast-enhancement is noted in the pituitary gland.  The pituitary stalk is unchanged from prior. Posterior pituitary bright spot is identified. Cavernous sinuses and cavernous carotid arteries remain unchanged and unremarkable. No new evidence for any compression of the optic nerves or optic chiasm.    EXTRACRANIAL: Orbital globes are unchanged from prior, there is mucosal thickening within the paranasal sinuses without air-fluid levels there is opacification with air-fluid levels in the right greater than left mastoid tip. Redemonstration of postsurgical sequela with hardware right maxillary antra with susceptibly artifact limiting assessment, suggest correlation with visual inspection.    IMPRESSION:  No significant change from 10/30/2019, redemonstration of postsurgical sequela right scalp with tiny foci decreased decreased T1 signal in the left scalp correlate with visual inspection. Redemonstration mild volume loss and microvascular disease, pituitary remains unchanged in appearance from prior, no new abnormal enhancement, hemorrhage infarct or midline shift.                      RADIOLOGY & ADDITIONAL TESTS:  Results Reviewed:   Imaging Personally Reviewed:  Electrocardiogram Personally Reviewed:    COORDINATION OF CARE:  Care Discussed with Consultants/Other Providers [Y/N]:  Prior or Outpatient Records Reviewed [Y/N]: PROGRESS NOTE:   Authored by   Ahmet Garcia PGY-1  Pager [phone]     Patient is a 50y old  Male who presents with a chief complaint of Dyspnea on exertion (02 Jun 2020 15:59)      SUBJECTIVE / OVERNIGHT EVENTS:    No acute events overnight. Patient reports improved appetite and shortness of breath. Continues to endorse weakness, fatigue, and leg cramping. Denies fever, chest pain, nausea, vomiting, constipation, or diarrhea.     ADDITIONAL REVIEW OF SYSTEMS:    Review of systems otherwise negative.     MEDICATIONS  (STANDING):  amLODIPine   Tablet 5 milliGRAM(s) Oral daily  atorvastatin 40 milliGRAM(s) Oral at bedtime  chlorhexidine 4% Liquid 1 Application(s) Topical daily  dextrose 5%. 1000 milliLiter(s) (50 mL/Hr) IV Continuous <Continuous>  dextrose 50% Injectable 12.5 Gram(s) IV Push once  dextrose 50% Injectable 25 Gram(s) IV Push once  dextrose 50% Injectable 25 Gram(s) IV Push once  dronabinol 2.5 milliGRAM(s) Oral two times a day before meals  enoxaparin Injectable 40 milliGRAM(s) SubCutaneous daily  fenofibrate Tablet 145 milliGRAM(s) Oral daily  hydrocortisone 20 milliGRAM(s) Oral <User Schedule>  levothyroxine 50 MICROGram(s) Oral daily  metoprolol succinate ER 50 milliGRAM(s) Oral daily    MEDICATIONS  (PRN):  dextrose 40% Gel 15 Gram(s) Oral once PRN Blood Glucose LESS THAN 70 milliGRAM(s)/deciLiter  glucagon  Injectable 1 milliGRAM(s) IntraMuscular once PRN Glucose <70 milliGRAM(s)/deciLiter  HYDROmorphone  Injectable 0.5 milliGRAM(s) IV Push two times a day PRN Severe Pain (7 - 10)  oxycodone    5 mG/acetaminophen 325 mG 2 Tablet(s) Oral every 6 hours PRN Severe Pain (7 - 10)    CAPILLARY BLOOD GLUCOSE    POCT Blood Glucose.: 112 mg/dL (03 Jun 2020 06:39)  POCT Blood Glucose.: 79 mg/dL (03 Jun 2020 05:28)  POCT Blood Glucose.: 104 mg/dL (02 Jun 2020 21:51)  POCT Blood Glucose.: 136 mg/dL (02 Jun 2020 12:00)    I&O's Summary    02 Jun 2020 07:01  -  03 Jun 2020 07:00  --------------------------------------------------------  IN: 400 mL / OUT: 0 mL / NET: 400 mL      PHYSICAL EXAM:  Vital Signs Last 24 Hrs  T(C): 36.6 (03 Jun 2020 05:40), Max: 36.6 (02 Jun 2020 20:32)  T(F): 97.9 (03 Jun 2020 05:40), Max: 97.9 (03 Jun 2020 05:40)  HR: 71 (03 Jun 2020 05:40) (59 - 71)  BP: 125/78 (03 Jun 2020 05:40) (100/58 - 125/78)  BP(mean): --  RR: 18 (03 Jun 2020 05:40) (18 - 18)  SpO2: 100% (03 Jun 2020 05:40) (96% - 100%)    CONSTITUTIONAL: NAD, well-developed  RESPIRATORY: Normal respiratory effort; lungs are clear to auscultation bilaterally  CARDIOVASCULAR: Regular rate and rhythm, normal S1 and S2, no murmur/rub/gallop; No lower extremity edema; Peripheral pulses are 2+ bilaterally  ABDOMEN: Nontender to palpation, normoactive bowel sounds, no rebound/guarding  MUSCLOSKELETAL: no clubbing or cyanosis of digits; no joint swelling or tenderness to palpation  PSYCH: A+O to conversation; affect appropriate    LABS:                        13.8   6.32  )-----------( 240      ( 03 Jun 2020 06:42 )             41.4     06-03    141  |  104  |  20  ----------------------------<  80  3.5   |  27  |  0.91    Ca    9.6      03 Jun 2020 06:42  Phos  3.5     06-03  Mg     2.0     06-03    TPro  6.7  /  Alb  4.4  /  TBili  0.6  /  DBili  x   /  AST  12  /  ALT  13  /  AlkPhos  42  06-03      CARDIAC MARKERS ( 03 Jun 2020 06:42 )  x     / x     / 35 U/L / x     / x        Creatinine Kinase: 35    RADIOLOGY & ADDITIONAL TESTS:    TRANSTHORACIC ECHOCARDIOGRAM:    Conclusions:  1. Normal left ventricular internal dimensions and wall thicknesses.  2. Endocardium not well visualized; grossly normal left ventricular systolic function. Regional wall motion could  not be accurately assessed. Consider repeat limited study with definity contrast if clinically indicated.  3. Normal right ventricular size and function.  4. Estimated right ventricular systolic pressure equals 13mm Hg, assuming right atrial pressure equals 8 mm Hg,consistent with normal pulmonary pressures.    12 LEAD ECG:    Ventricular Rate 74 BPM  Atrial Rate 74 BPM  P-R Interval 196 ms  QRS Duration 96 ms  Q-T Interval 394 ms  QTC Calculation(Bezet) 437 ms  P Axis 48 degrees  R Axis 18 degrees  T Axis 32 degrees  Diagnosis Line: NORMAL SINUS RHYTHM, ST ELEVATION, CONSIDER EARLY REPOLARIZATION    CT ANGIO CHEST w/ IV CONTRAST:    LUNGS AND AIRWAYS: Patent central airways.  Bilateral dependent atelectasis. Lungs are clear.  PLEURA: No pleural effusion.  MEDIASTINUM AND RIOS: No lymphadenopathy.  VESSELS: No pulmonary embolism.  HEART: Heart size is normal. No pericardial effusion.  CHEST WALL AND LOWER NECK: Within normal limits.  VISUALIZED UPPER ABDOMEN: Surgical clips are seen in the retroperitoneum.  BONES: Well-circumscribed sclerotic focus seen in the anterior L2 vertebral body, likely represents a bone island.  IMPRESSION:   No pulmonary embolism.    MR HEAD w/wo CONTRAST:    BRAIN MR:  Redemonstration sequela status post right melanoma scalp resection, partially seen right radical neck dissection with right parietal scalp scar tissue and foci decreased T1 signal in left parietal scalp (15:167), correlate with visual inspection. Brain parenchyma is unchanged and unremarkable from prior, there is redemonstration of mild volume loss, with enlarged ventricles and sulci and nonspecific T2 and white matter FLAIR hyperintensity likely microvascular disease. There is no new intracranial mass, extra-axial fluid collection, midline shift, infarct or focal mass effect. No new susceptibility foci are identified. There is no abnormal enhancement postcontrast. Basal cisterns remain patent and unchanged from prior. 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 Superior pituitary border remains convex in the midline.  No new abnormal signal or contrast-enhancement is noted in the pituitary gland.  The pituitary stalk is unchanged from prior. Posterior pituitary bright spot is identified. Cavernous sinuses and cavernous carotid arteries remain unchanged and unremarkable. No new evidence for any compression of the optic nerves or optic chiasm.    EXTRACRANIAL: Orbital globes are unchanged from prior, there is mucosal thickening within the paranasal sinuses without air-fluid levels there is opacification with air-fluid levels in the right greater than left mastoid tip. Redemonstration of postsurgical sequela with hardware right maxillary antra with susceptibly artifact limiting assessment, suggest correlation with visual inspection.    IMPRESSION:  No significant change from 10/30/2019, redemonstration of postsurgical sequela right scalp with tiny foci decreased decreased T1 signal in the left scalp correlate with visual inspection. Redemonstration mild volume loss and microvascular disease, pituitary remains unchanged in appearance from prior, no new abnormal enhancement, hemorrhage infarct or midline shift.

## 2020-06-03 NOTE — PROGRESS NOTE ADULT - PROBLEM SELECTOR PLAN 3
On immunotherapy (Ipilimumab and Nivolumab s/p 6 cycles)- Last session 03/2020  - Oncology consult appreciated    - Further immunotherapy outpatient as per onc  - Follow up CK to rule out myositis related to immunotherapy  -onc not recommending any further imaging at this time.  - colonoscopy from 11/2019- polyps removed with low grade dysplasia. On immunotherapy (Ipilimumab and Nivolumab s/p 6 cycles)- Last session 03/2020  - Oncology consult appreciated    - Further immunotherapy outpatient as per onc  - CK to rule out myositis was within normal limits  - Onc not recommending any further imaging at this time.  - Colonoscopy from 11/2019- polyps removed with low grade dysplasia.

## 2020-06-03 NOTE — PROGRESS NOTE ADULT - PROBLEM SELECTOR PLAN 1
Dx included  fatigue related to immunotherapy, hypothyroidism, cardiovascular, or adrenal/pituitary insufficiency   - CT angio: negative for PE. No evidence of autoimmune pneumonitis or bleomycin related fibrosis.   - EKG reviewed: no ischemic change   - Outpatient TTE on 5/7/20: reviewed EF 54%, no sig valve abnormalities noted  - Patient additionally reports recent PFT that is within normal limits   - COVID neg PCR x2, neg antibodies   - pt follows up Dr. 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  -TTE revealed EF 55-60% and grossly normal LV function  -will touch base with endocrine regarding steroid titration given that symptoms are unchanged. Dx included  fatigue related to immunotherapy, hypothyroidism, cardiovascular, or adrenal/pituitary insufficiency   - CT angio: negative for PE. No evidence of autoimmune pneumonitis or bleomycin related fibrosis.   - EKG reviewed: no ischemic change   - Outpatient TTE on 5/7/20: reviewed EF 54%, no sig valve abnormalities noted  - Patient additionally reports recent PFT that is within normal limits   - COVID neg PCR x2, neg antibodies   - pt follows up Dr. 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

## 2020-06-03 NOTE — PROGRESS NOTE ADULT - ATTENDING COMMENTS
Seen, examined the patient  Sitting in bed, Getting better with appetite- eating more, but still tired and SOB. Also c/o leg cramp. O2 sat 98% in RA  - reviewed labs, imaging. CTA chest no PE, Echo- normal EF, LV function  - His weight loss and low appetite possibly related to Immunotherapy and adrenal insufficiency,     severe protein calorie malnutrition with loss of weight 25 Lbs    Endocrine f/u plan noted. On Hydrocortisone 30mg 8 am, 20mg 3 pm now from 2ndary adrenal insufficiency, normal TSH,  - Reviewed PET/CT recently ( april 2020) showed left sided possible colitis. Had colonoscopy 6 months ago, no cancerous polyp, mass  - Nutrition recommended supplements Ensure Enlive 3 cans daily  - Will ask Heme if he will need Neurontin or MgOxide for peripheral neuropathy from Immunotherapy ( Leg cramps)  - c/w other meds w trial of Marinol  - d/c planning tomorrow if stable

## 2020-06-04 ENCOUNTER — TRANSCRIPTION ENCOUNTER (OUTPATIENT)
Age: 50
End: 2020-06-04

## 2020-06-04 VITALS
TEMPERATURE: 97 F | OXYGEN SATURATION: 96 % | SYSTOLIC BLOOD PRESSURE: 128 MMHG | HEART RATE: 52 BPM | RESPIRATION RATE: 18 BRPM | DIASTOLIC BLOOD PRESSURE: 76 MMHG

## 2020-06-04 LAB
ANION GAP SERPL CALC-SCNC: 10 MMOL/L — SIGNIFICANT CHANGE UP (ref 5–17)
BUN SERPL-MCNC: 26 MG/DL — HIGH (ref 7–23)
CALCIUM SERPL-MCNC: 9.9 MG/DL — SIGNIFICANT CHANGE UP (ref 8.4–10.5)
CHLORIDE SERPL-SCNC: 100 MMOL/L — SIGNIFICANT CHANGE UP (ref 96–108)
CO2 SERPL-SCNC: 27 MMOL/L — SIGNIFICANT CHANGE UP (ref 22–31)
CREAT SERPL-MCNC: 0.91 MG/DL — SIGNIFICANT CHANGE UP (ref 0.5–1.3)
GLUCOSE SERPL-MCNC: 88 MG/DL — SIGNIFICANT CHANGE UP (ref 70–99)
HCT VFR BLD CALC: 42.5 % — SIGNIFICANT CHANGE UP (ref 39–50)
HGB BLD-MCNC: 14.2 G/DL — SIGNIFICANT CHANGE UP (ref 13–17)
MAGNESIUM SERPL-MCNC: 2.1 MG/DL — SIGNIFICANT CHANGE UP (ref 1.6–2.6)
MCHC RBC-ENTMCNC: 29.2 PG — SIGNIFICANT CHANGE UP (ref 27–34)
MCHC RBC-ENTMCNC: 33.4 GM/DL — SIGNIFICANT CHANGE UP (ref 32–36)
MCV RBC AUTO: 87.4 FL — SIGNIFICANT CHANGE UP (ref 80–100)
NRBC # BLD: 0 /100 WBCS — SIGNIFICANT CHANGE UP (ref 0–0)
PHOSPHATE SERPL-MCNC: 3.8 MG/DL — SIGNIFICANT CHANGE UP (ref 2.5–4.5)
PLATELET # BLD AUTO: 227 K/UL — SIGNIFICANT CHANGE UP (ref 150–400)
POTASSIUM SERPL-MCNC: 3.8 MMOL/L — SIGNIFICANT CHANGE UP (ref 3.5–5.3)
POTASSIUM SERPL-SCNC: 3.8 MMOL/L — SIGNIFICANT CHANGE UP (ref 3.5–5.3)
RBC # BLD: 4.86 M/UL — SIGNIFICANT CHANGE UP (ref 4.2–5.8)
RBC # FLD: 12.3 % — SIGNIFICANT CHANGE UP (ref 10.3–14.5)
SODIUM SERPL-SCNC: 137 MMOL/L — SIGNIFICANT CHANGE UP (ref 135–145)
WBC # BLD: 7.46 K/UL — SIGNIFICANT CHANGE UP (ref 3.8–10.5)
WBC # FLD AUTO: 7.46 K/UL — SIGNIFICANT CHANGE UP (ref 3.8–10.5)

## 2020-06-04 PROCEDURE — 93306 TTE W/DOPPLER COMPLETE: CPT

## 2020-06-04 PROCEDURE — 84443 ASSAY THYROID STIM HORMONE: CPT

## 2020-06-04 PROCEDURE — 83735 ASSAY OF MAGNESIUM: CPT

## 2020-06-04 PROCEDURE — 83880 ASSAY OF NATRIURETIC PEPTIDE: CPT

## 2020-06-04 PROCEDURE — 96361 HYDRATE IV INFUSION ADD-ON: CPT

## 2020-06-04 PROCEDURE — 82378 CARCINOEMBRYONIC ANTIGEN: CPT

## 2020-06-04 PROCEDURE — 99285 EMERGENCY DEPT VISIT HI MDM: CPT | Mod: 25

## 2020-06-04 PROCEDURE — 84484 ASSAY OF TROPONIN QUANT: CPT

## 2020-06-04 PROCEDURE — 80048 BASIC METABOLIC PNL TOTAL CA: CPT

## 2020-06-04 PROCEDURE — 85730 THROMBOPLASTIN TIME PARTIAL: CPT

## 2020-06-04 PROCEDURE — 87086 URINE CULTURE/COLONY COUNT: CPT

## 2020-06-04 PROCEDURE — 99232 SBSQ HOSP IP/OBS MODERATE 35: CPT | Mod: GC

## 2020-06-04 PROCEDURE — 82962 GLUCOSE BLOOD TEST: CPT

## 2020-06-04 PROCEDURE — 82533 TOTAL CORTISOL: CPT

## 2020-06-04 PROCEDURE — 85027 COMPLETE CBC AUTOMATED: CPT

## 2020-06-04 PROCEDURE — 86301 IMMUNOASSAY TUMOR CA 19-9: CPT

## 2020-06-04 PROCEDURE — 84436 ASSAY OF TOTAL THYROXINE: CPT

## 2020-06-04 PROCEDURE — 80053 COMPREHEN METABOLIC PANEL: CPT

## 2020-06-04 PROCEDURE — 71275 CT ANGIOGRAPHY CHEST: CPT

## 2020-06-04 PROCEDURE — 81001 URINALYSIS AUTO W/SCOPE: CPT

## 2020-06-04 PROCEDURE — 84439 ASSAY OF FREE THYROXINE: CPT

## 2020-06-04 PROCEDURE — 85610 PROTHROMBIN TIME: CPT

## 2020-06-04 PROCEDURE — 82550 ASSAY OF CK (CPK): CPT

## 2020-06-04 PROCEDURE — 93005 ELECTROCARDIOGRAM TRACING: CPT

## 2020-06-04 PROCEDURE — 84100 ASSAY OF PHOSPHORUS: CPT

## 2020-06-04 PROCEDURE — 97161 PT EVAL LOW COMPLEX 20 MIN: CPT

## 2020-06-04 PROCEDURE — 96374 THER/PROPH/DIAG INJ IV PUSH: CPT | Mod: XU

## 2020-06-04 PROCEDURE — 99239 HOSP IP/OBS DSCHRG MGMT >30: CPT | Mod: GC

## 2020-06-04 RX ORDER — HYDROCORTISONE 20 MG
3 TABLET ORAL
Qty: 45 | Refills: 0
Start: 2020-06-04 | End: 2020-06-18

## 2020-06-04 RX ORDER — HYDROCORTISONE 20 MG
2 TABLET ORAL
Qty: 0 | Refills: 0 | DISCHARGE
Start: 2020-06-04 | End: 2020-06-18

## 2020-06-04 RX ORDER — DRONABINOL 2.5 MG
1 CAPSULE ORAL
Qty: 60 | Refills: 0
Start: 2020-06-04 | End: 2020-07-03

## 2020-06-04 RX ORDER — HYDROCORTISONE 20 MG
1 TABLET ORAL
Qty: 0 | Refills: 0 | DISCHARGE
Start: 2020-06-04

## 2020-06-04 RX ORDER — HYDROCORTISONE 20 MG
3 TABLET ORAL
Qty: 0 | Refills: 0 | DISCHARGE
Start: 2020-06-04

## 2020-06-04 RX ORDER — MAGNESIUM OXIDE 400 MG ORAL TABLET 241.3 MG
1 TABLET ORAL
Qty: 42 | Refills: 0
Start: 2020-06-04 | End: 2020-06-17

## 2020-06-04 RX ORDER — HYDROCORTISONE 20 MG
1 TABLET ORAL
Qty: 15 | Refills: 0
Start: 2020-06-04 | End: 2020-06-18

## 2020-06-04 RX ORDER — CYCLOBENZAPRINE HYDROCHLORIDE 10 MG/1
5 TABLET, FILM COATED ORAL THREE TIMES A DAY
Refills: 0 | Status: DISCONTINUED | OUTPATIENT
Start: 2020-06-04 | End: 2020-06-04

## 2020-06-04 RX ADMIN — Medication 30 MILLIGRAM(S): at 10:15

## 2020-06-04 RX ADMIN — ENOXAPARIN SODIUM 40 MILLIGRAM(S): 100 INJECTION SUBCUTANEOUS at 13:10

## 2020-06-04 RX ADMIN — MAGNESIUM OXIDE 400 MG ORAL TABLET 400 MILLIGRAM(S): 241.3 TABLET ORAL at 09:17

## 2020-06-04 RX ADMIN — AMLODIPINE BESYLATE 5 MILLIGRAM(S): 2.5 TABLET ORAL at 09:18

## 2020-06-04 RX ADMIN — MAGNESIUM OXIDE 400 MG ORAL TABLET 400 MILLIGRAM(S): 241.3 TABLET ORAL at 13:10

## 2020-06-04 RX ADMIN — HYDROMORPHONE HYDROCHLORIDE 0.5 MILLIGRAM(S): 2 INJECTION INTRAMUSCULAR; INTRAVENOUS; SUBCUTANEOUS at 09:17

## 2020-06-04 RX ADMIN — OXYCODONE AND ACETAMINOPHEN 2 TABLET(S): 5; 325 TABLET ORAL at 05:28

## 2020-06-04 RX ADMIN — ATORVASTATIN CALCIUM 40 MILLIGRAM(S): 80 TABLET, FILM COATED ORAL at 05:28

## 2020-06-04 RX ADMIN — Medication 50 MILLIGRAM(S): at 09:17

## 2020-06-04 RX ADMIN — Medication 145 MILLIGRAM(S): at 13:10

## 2020-06-04 RX ADMIN — Medication 2.5 MILLIGRAM(S): at 05:28

## 2020-06-04 RX ADMIN — Medication 50 MICROGRAM(S): at 05:28

## 2020-06-04 NOTE — PROGRESS NOTE ADULT - PROBLEM SELECTOR PROBLEM 1
Adrenal insufficiency
EASLEY (dyspnea on exertion)

## 2020-06-04 NOTE — PROGRESS NOTE ADULT - SUBJECTIVE AND OBJECTIVE BOX
Oncology Follow-up    INTERVAL HPI/OVERNIGHT EVENTS:  Saw patient during rounds, reports that continues to be have fatigue and dyspnea on exertion, but improved and manageable. Denies any other symptoms at this time, including fevers, chills, nausea, loss of appetite, abdominal pain, or musculoskeletal pain.0    Review of Systems: ROS otherwise negative    VITAL SIGNS:  T(F): 97.2 (06-04-20 @ 13:18)  HR: 52 (06-04-20 @ 13:18)  BP: 128/76 (06-04-20 @ 13:18)  RR: 18 (06-04-20 @ 13:18)  SpO2: 96% (06-04-20 @ 13:18)  Wt(kg): --    06-03-20 @ 07:01  -  06-04-20 @ 07:00  --------------------------------------------------------  IN: 720 mL / OUT: 0 mL / NET: 720 mL        PHYSICAL EXAM:    Constitutional: AAOx3, NAD  Eyes: PERRL, EOMI, sclera non-icteric  Neck: supple, no masses, no JVD, no lymphadenopathy  Respiratory: CTA b/l, no wheezing, rhonchi, rales, with normal respiratory effort  Cardiovascular: RRR, normal S1S2, no M/R/G  Gastrointestinal: soft, NTND, no masses palpable, BS normal in all four quadrants, no HSM  Extremities:  no edema  MSK: no obvious abnormalities, normal ROM, no lymphadenopathy  Neurological: Grossly intact  Skin: Normal temperature, no rash, no echymoses, no petichiae  Psych: normal affect    MEDICATIONS  (STANDING):  amLODIPine   Tablet 5 milliGRAM(s) Oral daily  atorvastatin 40 milliGRAM(s) Oral at bedtime  chlorhexidine 4% Liquid 1 Application(s) Topical daily  dextrose 5%. 1000 milliLiter(s) (50 mL/Hr) IV Continuous <Continuous>  dextrose 50% Injectable 12.5 Gram(s) IV Push once  dextrose 50% Injectable 25 Gram(s) IV Push once  dextrose 50% Injectable 25 Gram(s) IV Push once  dronabinol 2.5 milliGRAM(s) Oral two times a day before meals  enoxaparin Injectable 40 milliGRAM(s) SubCutaneous daily  fenofibrate Tablet 145 milliGRAM(s) Oral daily  hydrocortisone 20 milliGRAM(s) Oral <User Schedule>  hydrocortisone 30 milliGRAM(s) Oral <User Schedule>  levothyroxine 50 MICROGram(s) Oral daily  magnesium oxide 400 milliGRAM(s) Oral three times a day with meals  metoprolol succinate ER 50 milliGRAM(s) Oral daily    MEDICATIONS  (PRN):  cyclobenzaprine 5 milliGRAM(s) Oral three times a day PRN Muscle Spasm  dextrose 40% Gel 15 Gram(s) Oral once PRN Blood Glucose LESS THAN 70 milliGRAM(s)/deciLiter  glucagon  Injectable 1 milliGRAM(s) IntraMuscular once PRN Glucose <70 milliGRAM(s)/deciLiter  HYDROmorphone  Injectable 0.5 milliGRAM(s) IV Push two times a day PRN Severe Pain (7 - 10)  oxycodone    5 mG/acetaminophen 325 mG 2 Tablet(s) Oral every 6 hours PRN Severe Pain (7 - 10)      No Known Allergies      LABS:                        14.2   7.46  )-----------( 227      ( 04 Jun 2020 05:45 )             42.5     06-04    137  |  100  |  26<H>  ----------------------------<  88  3.8   |  27  |  0.91    Ca    9.9      04 Jun 2020 05:45  Phos  3.8     06-04  Mg     2.1     06-04    TPro  6.7  /  Alb  4.4  /  TBili  0.6  /  DBili  x   /  AST  12  /  ALT  13  /  AlkPhos  42  06-03    ADIOLOGY & ADDITIONAL TESTS:  Studies reviewed.    < from: CT Angio Chest w/ IV Cont (05.30.20 @ 20:35) >    EXAM:  CT ANGIO CHEST (W)AW IC                          PROCEDURE DATE:  05/30/2020      INTERPRETATION:  CLINICAL INFORMATION: History of cancer, presenting with fatigue, evaluate for PE.     COMPARISON: CT chest 11/15/2011    PROCEDURE:   CT Angiography of the Chest.  43 ml of Omnipaque 350 was injected intravenously. 57 ml were discarded.  Sagittal and coronal reformats were performed as well as 3D (MIP) reconstructions.    FINDINGS:  LUNGS AND AIRWAYS: Patent central airways.  Bilateral dependent atelectasis. Lungs are clear.  PLEURA: No pleural effusion.  MEDIASTINUM AND RIOS: No lymphadenopathy.  VESSELS: No pulmonary embolism.  HEART: Heart size is normal. No pericardial effusion.  CHEST WALL AND LOWER NECK: Within normal limits.  VISUALIZED UPPER ABDOMEN: Surgical clips are seen in the retroperitoneum.  BONES: Well-circumscribed sclerotic focus seen in the anterior L2 vertebral body, likely represents a bone island.    IMPRESSION:   No pulmonary embolism.    TERRELL SIMONS M.D., RADIOLOGY RESIDENT  This document has been electronically signed.  YASIR CALLE   This document has been electronically signed. May 30 2020  9:13PM    < end of copied text >    < from: Transthoracic Echocardiogram (06.01.20 @ 17:43) >    Patient name: CARLOTA CANTU  YOB: 1970   Age: 50 (M)   MR#: [id number]  Study Date: 6/1/2020  Location: [address] [place], [place] [id number]: Arianne Moore RUST  Study quality: Technically fair  Referring Physician: Mohsinuzzaman Khan, MD  BloodPressure: 114/76 mmHg  Height: 180 cm  Weight: 77 kg  BSA: 2 m2  ------------------------------------------------------------------------  PROCEDURE: Transthoracic echocardiogram with 2-D, M-Mode  and complete spectral and color flow Doppler.  INDICATION: Heart failure, unspecified (I50.9)  ------------------------------------------------------------------------  Dimensions:    Normal Values:  LA:     4.2    2.0 - 4.0 cm  Ao:     3.5    2.0 - 3.8 cm  SEPTUM: 0.9    0.6 - 1.2 cm  PWT:    0.8    0.6 - 1.1 cm  LVIDd:  4.6    3.0 - 5.6 cm  LVIDs:  3.1    1.8 - 4.0 cm  Derived variables:  LVMI: 65 g/m2  RWT: 0.34  Fractional short: 33 %  EF (Visual Estimate): 55-60 %  Doppler Peak Velocity (m/sec): AoV=0.9  ------------------------------------------------------------------------  Observations:  Mitral Valve: Normal mitral valve. Minimal mitral  regurgitation.  Aortic Valve/Aorta: Aortic valve not well visualized;  probably normal. Peak transaortic valve gradient equals 3  mm Hg, estimated aortic valve area equals 2.5 sqcm. Minimal  aortic regurgitation.  Peak left ventricular outflow tract  gradient equals 1 mm Hg.  Aortic Root: 3.5 cm.  LVOT diameter: 2.2 cm.  Left Atrium: Normal left atrium.  LA volume index = 19  cc/m2.  Left Ventricle:Endocardium not well visualized; grossly  normal left ventricular systolic function. Regional wall  motion could not be accurately assessed. Consider repeat  limited study with definity contrast if clinically  indicated.  Normal left ventricular internal dimensions and  wall thicknesses. Normal diastolic function  Right Heart: Normal right atrium. Normal right ventricular  size and function. Normal tricuspid valve. Minimal  tricuspid regurgitation. Pulmonic valve not well  visualized, probably normal. Minimal pulmonic  regurgitation.  Pericardium/Pleura: Normal pericardium with trace  pericardial effusion.  Hemodynamic: Estimated right atrial pressure is 8 mm Hg.  Estimated right ventricular systolic pressure equals 13 mm  Hg, assuming right atrial pressure equals 8 mm Hg,  consistent with normal pulmonary pressures.  ------------------------------------------------------------------------  Conclusions:  1. Normal left ventricular internal dimensions and wall  thicknesses.  2. Endocardiumnot well visualized; grossly normal left  ventricular systolic function. Regional wall motion could  not be accurately assessed. Consider repeat limited study  with definity contrast if clinically indicated.  3. Normal right ventricular size and function.  4. Estimated right ventricular systolic pressure equals 13  mm Hg, assuming right atrial pressure equals 8 mm Hg,  consistent with normal pulmonary pressures.  *** No previous Echo exam.  ------------------------------------------------------------------------  Confirmed on  6/1/2020 - 20:10:31 by Marvin Gonzales M.D.  ------------------------------------------------------------------------    < end of copied text >

## 2020-06-04 NOTE — PROGRESS NOTE ADULT - ASSESSMENT
50 year old male with PMHx of Non Hodgkins Lymphoma (93, chemo/rads), testicular cancer s/p orchiectomy, adjuvant BEP and autoBMT in 1994, R scalp melanoma (dx'ed 8/2019), stage IIIa (T2aN2a) BRAF+, s/p resection followed by adjuvant nivvo with recurrence, ischemic colitis s/p lap left colectomy, LE myositis and bilateral LE cramping  presents with 2-week history of fatigue and dyspnea on exertion, and 25 lb weight loss. Outpatient workup concerning for adrenal insufficiency so he was started on oral hydrocortisone. He felt an improvement in his symptoms when steroids were initiated but the effect soon wore off and his symptoms worsened.     # [place]  CTA negative for PE on this admission. SpO2 % on RA. Cardiac vs pulmonary vs adrenal insufficiency.   -Repeat TTE shows preserved EF, no new changes  -PFTs as an outpatient reportedly normal, so less likely 2/2 bleomycin pulmonary toxicity from prior tx hx  -adrenal insufficiency as above  -occasionally immunotherapy can be associated with fatigue after treatment, though diagnosis of exclusion; given improvement and lack of other etiologies found despite appropriate work up, can continue to monitor and discuss with outpatient oncologist    # Adrenal insufficiency secondary to ipi and nivo  Primary vs hypophysitis. TSH on 050/2020 was slightly up at 5.55. On 05/01/2020 cortisol total and free were low with low ACTH. LH, FSH and Prolactin were normal. Followed by Dr. Sanchez, endocrine outpatient. Since admission s/p hydrocortisone 100mg IV followed by 50mg q8H  -Appreciate Endocrine's recs (Followed by Dr. Sanchez, endocrine outpatient); steroids titrated per endocrine  -Continue levothyroxine per endocrinology service  -despite decreasing steroids this admission per endocrinology recs, patient reports that fatigue not worse; would continue to monitor    # Melanoma   Diagnosed with melanoma in [place] in 8/2018. Initially stage IIIa (T2aN2a) [id number]+, s/p resection followed by adjuvant nivo with recurrence to skin. Treated with ipi and nivo x4 cycles followed by maintenance nivo x2 (Last dose on 4/4/2020). PET in 4/2020 showed no active disease.   -Hold systemic therapy while in hospital  -F/U with Dr. Mcginnis outpatient after discharge    Case discussed with primary team.    Ranjit Blake MD, MPH  Hematology/Oncology Fellow  p

## 2020-06-04 NOTE — DISCHARGE NOTE NURSING/CASE MANAGEMENT/SOCIAL WORK - PATIENT PORTAL LINK FT
You can access the FollowMyHealth Patient Portal offered by [facility] by registering at the following website: [email or web address]. By joining [employer]’s FollowMyHealth portal, you will also be able to view your health information using other applications (apps) compatible with our system.

## 2020-06-04 NOTE — PROGRESS NOTE ADULT - PROBLEM SELECTOR PLAN 1
Dx included  fatigue related to immunotherapy, hypothyroidism, cardiovascular, or adrenal/pituitary insufficiency   - CT angio: negative for PE. No evidence of autoimmune pneumonitis or bleomycin related fibrosis.   - EKG reviewed: no ischemic change   - Outpatient TTE on 5/7/20: reviewed EF 54%, no sig valve abnormalities noted  - Patient additionally reports recent PFT that is within normal limits   - COVID neg PCR x2, neg antibodies   - pt follows up Dr. 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

## 2020-06-04 NOTE — PROGRESS NOTE ADULT - SUBJECTIVE AND OBJECTIVE BOX
PROGRESS NOTE:   Authored by   Ahmet Garcia PGY-1  Pager [phone]     Patient is a 50y old  Male who presents with a chief complaint of Dyspnea on exertion (03 Jun 2020 07:49)      SUBJECTIVE / OVERNIGHT EVENTS:    ADDITIONAL REVIEW OF SYSTEMS:    MEDICATIONS  (STANDING):  amLODIPine   Tablet 5 milliGRAM(s) Oral daily  atorvastatin 40 milliGRAM(s) Oral at bedtime  chlorhexidine 4% Liquid 1 Application(s) Topical daily  dextrose 5%. 1000 milliLiter(s) (50 mL/Hr) IV Continuous <Continuous>  dextrose 50% Injectable 12.5 Gram(s) IV Push once  dextrose 50% Injectable 25 Gram(s) IV Push once  dextrose 50% Injectable 25 Gram(s) IV Push once  dronabinol 2.5 milliGRAM(s) Oral two times a day before meals  enoxaparin Injectable 40 milliGRAM(s) SubCutaneous daily  fenofibrate Tablet 145 milliGRAM(s) Oral daily  hydrocortisone 20 milliGRAM(s) Oral <User Schedule>  hydrocortisone 30 milliGRAM(s) Oral <User Schedule>  levothyroxine 50 MICROGram(s) Oral daily  magnesium oxide 400 milliGRAM(s) Oral three times a day with meals  metoprolol succinate ER 50 milliGRAM(s) Oral daily    MEDICATIONS  (PRN):  cyclobenzaprine 5 milliGRAM(s) Oral three times a day PRN Muscle Spasm  dextrose 40% Gel 15 Gram(s) Oral once PRN Blood Glucose LESS THAN 70 milliGRAM(s)/deciLiter  glucagon  Injectable 1 milliGRAM(s) IntraMuscular once PRN Glucose <70 milliGRAM(s)/deciLiter  HYDROmorphone  Injectable 0.5 milliGRAM(s) IV Push two times a day PRN Severe Pain (7 - 10)  oxycodone    5 mG/acetaminophen 325 mG 2 Tablet(s) Oral every 6 hours PRN Severe Pain (7 - 10)      CAPILLARY BLOOD GLUCOSE        I&O's Summary    03 Jun 2020 07:01  -  04 Jun 2020 07:00  --------------------------------------------------------  IN: 720 mL / OUT: 0 mL / NET: 720 mL        PHYSICAL EXAM:  Vital Signs Last 24 Hrs  T(C): 36.5 (04 Jun 2020 04:49), Max: 36.9 (03 Jun 2020 10:40)  T(F): 97.7 (04 Jun 2020 04:49), Max: 98.4 (03 Jun 2020 10:40)  HR: 65 (04 Jun 2020 08:58) (59 - 66)  BP: 127/78 (04 Jun 2020 08:58) (97/66 - 127/78)  BP(mean): --  RR: 18 (04 Jun 2020 04:49) (16 - 18)  SpO2: 96% (04 Jun 2020 04:49) (94% - 96%)    CONSTITUTIONAL: NAD, well-developed  RESPIRATORY: Normal respiratory effort; lungs are clear to auscultation bilaterally  CARDIOVASCULAR: Regular rate and rhythm, normal S1 and S2, no murmur/rub/gallop; No lower extremity edema; Peripheral pulses are 2+ bilaterally  ABDOMEN: Nontender to palpation, normoactive bowel sounds, no rebound/guarding  MUSCLOSKELETAL: no clubbing or cyanosis of digits; no joint swelling or tenderness to palpation  PSYCH: A+O to conversation; affect appropriate    LABS:                        14.2   7.46  )-----------( 227      ( 04 Jun 2020 05:45 )             42.5     06-04    137  |  100  |  26<H>  ----------------------------<  88  3.8   |  27  |  0.91    Ca    9.9      04 Jun 2020 05:45  Phos  3.8     06-04  Mg     2.1     06-04    TPro  6.7  /  Alb  4.4  /  TBili  0.6  /  DBili  x   /  AST  12  /  ALT  13  /  AlkPhos  42  06-03      CARDIAC MARKERS ( 03 Jun 2020 06:42 )  x     / x     / 35 U/L / x     / x                RADIOLOGY & ADDITIONAL TESTS:  Results Reviewed:   Imaging Personally Reviewed:  Electrocardiogram Personally Reviewed:    COORDINATION OF CARE:  Care Discussed with Consultants/Other Providers [Y/N]:  Prior or Outpatient Records Reviewed [Y/N]: PROGRESS NOTE:   Authored by   Ahmet Garcia PGY-1  Pager [phone]     Patient is a 50y old  Male who presents with a chief complaint of Dyspnea on exertion (03 Jun 2020 07:49)      SUBJECTIVE / OVERNIGHT EVENTS:    No acute events overnight. Patient reports improvement in fatigue, weakness, shortness of breath, and appetite. Continues to have leg pain requiring pain medication. Additionally endorses occasional lightheadedness and dizziness.    ADDITIONAL REVIEW OF SYSTEMS:    Review of systems otherwise negative unless stated in HPI.     MEDICATIONS  (STANDING):  amLODIPine   Tablet 5 milliGRAM(s) Oral daily  atorvastatin 40 milliGRAM(s) Oral at bedtime  chlorhexidine 4% Liquid 1 Application(s) Topical daily  dextrose 5%. 1000 milliLiter(s) (50 mL/Hr) IV Continuous <Continuous>  dextrose 50% Injectable 12.5 Gram(s) IV Push once  dextrose 50% Injectable 25 Gram(s) IV Push once  dextrose 50% Injectable 25 Gram(s) IV Push once  dronabinol 2.5 milliGRAM(s) Oral two times a day before meals  enoxaparin Injectable 40 milliGRAM(s) SubCutaneous daily  fenofibrate Tablet 145 milliGRAM(s) Oral daily  hydrocortisone 20 milliGRAM(s) Oral <User Schedule>  hydrocortisone 30 milliGRAM(s) Oral <User Schedule>  levothyroxine 50 MICROGram(s) Oral daily  magnesium oxide 400 milliGRAM(s) Oral three times a day with meals  metoprolol succinate ER 50 milliGRAM(s) Oral daily    MEDICATIONS  (PRN):  cyclobenzaprine 5 milliGRAM(s) Oral three times a day PRN Muscle Spasm  dextrose 40% Gel 15 Gram(s) Oral once PRN Blood Glucose LESS THAN 70 milliGRAM(s)/deciLiter  glucagon  Injectable 1 milliGRAM(s) IntraMuscular once PRN Glucose <70 milliGRAM(s)/deciLiter  HYDROmorphone  Injectable 0.5 milliGRAM(s) IV Push two times a day PRN Severe Pain (7 - 10)  oxycodone    5 mG/acetaminophen 325 mG 2 Tablet(s) Oral every 6 hours PRN Severe Pain (7 - 10)  CAPILLARY BLOOD GLUCOSE    I&O's Summary    03 Jun 2020 07:01  -  04 Jun 2020 07:00  --------------------------------------------------------  IN: 720 mL / OUT: 0 mL / NET: 720 mL    PHYSICAL EXAM:  Vital Signs Last 24 Hrs  T(C): 36.5 (04 Jun 2020 04:49), Max: 36.9 (03 Jun 2020 10:40)  T(F): 97.7 (04 Jun 2020 04:49), Max: 98.4 (03 Jun 2020 10:40)  HR: 65 (04 Jun 2020 08:58) (59 - 66)  BP: 127/78 (04 Jun 2020 08:58) (97/66 - 127/78)  BP(mean): --  RR: 18 (04 Jun 2020 04:49) (16 - 18)  SpO2: 96% (04 Jun 2020 04:49) (94% - 96%)    CONSTITUTIONAL: NAD, well-developed  RESPIRATORY: Normal respiratory effort; lungs are clear to auscultation bilaterally  CARDIOVASCULAR: Regular rate and rhythm, normal S1 and S2, no murmur/rub/gallop; No lower extremity edema; Peripheral pulses are 2+ bilaterally  ABDOMEN: Nontender to palpation, normoactive bowel sounds, no rebound/guarding  MUSCLOSKELETAL: no clubbing or cyanosis of digits; no joint swelling or tenderness to palpation  PSYCH: A+O to conversation; affect appropriate    LABS:                        14.2   7.46  )-----------( 227      ( 04 Jun 2020 05:45 )             42.5     06-04    137  |  100  |  26<H>  ----------------------------<  88  3.8   |  27  |  0.91    Ca    9.9      04 Jun 2020 05:45  Phos  3.8     06-04  Mg     2.1     06-04    TPro  6.7  /  Alb  4.4  /  TBili  0.6  /  DBili  x   /  AST  12  /  ALT  13  /  AlkPhos  42  06-03      CARDIAC MARKERS ( 03 Jun 2020 06:42 )  x     / x     / 35 U/L / x     / x          Creatinine Kinase: 35    RADIOLOGY & ADDITIONAL TESTS:    TRANSTHORACIC ECHOCARDIOGRAM:  1. Normal left ventricular internal dimensions and wall thicknesses.  2. Endocardium not well visualized; grossly normal left ventricular systolic function. Regional wall motion could  not be accurately assessed. Consider repeat limited study with definity contrast if clinically indicated.  3. Normal right ventricular size and function.  4. Estimated right ventricular systolic pressure equals 13mm Hg, assuming right atrial pressure equals 8 mm Hg,consistent with normal pulmonary pressures.    12 LEAD ECG:  Ventricular Rate 74 BPM  Atrial Rate 74 BPM  P-R Interval 196 ms  QRS Duration 96 ms  Q-T Interval 394 ms  QTC Calculation(Bezet) 437 ms  P Axis 48 degrees  R Axis 18 degrees  T Axis 32 degrees  Diagnosis Line: NORMAL SINUS RHYTHM, ST ELEVATION, CONSIDER EARLY REPOLARIZATION    CT ANGIO CHEST w/ IV CONTRAST:  LUNGS AND AIRWAYS: Patent central airways.  Bilateral dependent atelectasis. Lungs are clear.  PLEURA: No pleural effusion.  MEDIASTINUM AND RIOS: No lymphadenopathy.  VESSELS: No pulmonary embolism.  HEART: Heart size is normal. No pericardial effusion.  CHEST WALL AND LOWER NECK: Within normal limits.  VISUALIZED UPPER ABDOMEN: Surgical clips are seen in the retroperitoneum.  BONES: Well-circumscribed sclerotic focus seen in the anterior L2 vertebral body, likely represents a bone island.  IMPRESSION:   No pulmonary embolism.    MR HEAD w/wo CONTRAST:  BRAIN MR:  Redemonstration sequela status post right melanoma scalp resection, partially seen right radical neck dissection with right parietal scalp scar tissue and foci decreased T1 signal in left parietal scalp (15:167), correlate with visual inspection. Brain parenchyma is unchanged and unremarkable from prior, there is redemonstration of mild volume loss, with enlarged ventricles and sulci and nonspecific T2 and white matter FLAIR hyperintensity likely microvascular disease. There is no new intracranial mass, extra-axial fluid collection, midline shift, infarct or focal mass effect. No new susceptibility foci are identified. There is no abnormal enhancement postcontrast. Basal cisterns remain patent and unchanged from prior. 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 Superior pituitary border remains convex in the midline.  No new abnormal signal or contrast-enhancement is noted in the pituitary gland.  The pituitary stalk is unchanged from prior. Posterior pituitary bright spot is identified. Cavernous sinuses and cavernous carotid arteries remain unchanged and unremarkable. No new evidence for any compression of the optic nerves or optic chiasm.    EXTRACRANIAL: Orbital globes are unchanged from prior, there is mucosal thickening within the paranasal sinuses without air-fluid levels there is opacification with air-fluid levels in the right greater than left mastoid tip. Redemonstration of postsurgical sequela with hardware right maxillary antra with susceptibly artifact limiting assessment, suggest correlation with visual inspection.    IMPRESSION:  No significant change from 10/30/2019, redemonstration of postsurgical sequela right scalp with tiny foci decreased decreased T1 signal in the left scalp correlate with visual inspection. Redemonstration mild volume loss and microvascular disease, pituitary remains unchanged in appearance from prior, no new abnormal enhancement, hemorrhage infarct or midline shift.

## 2020-06-04 NOTE — PROGRESS NOTE ADULT - PROBLEM SELECTOR PLAN 5
DVT:  Lovenox  Diet: Regular   Dispo: Pending PT eval

## 2020-06-04 NOTE — PROGRESS NOTE ADULT - PROBLEM SELECTOR PROBLEM 4
Hypothyroidism, unspecified type

## 2020-06-04 NOTE — PROGRESS NOTE ADULT - PROBLEM SELECTOR PROBLEM 2
Hypothyroidism, unspecified type
Pituitary hypofunction

## 2020-06-04 NOTE — PROGRESS NOTE ADULT - PROBLEM SELECTOR PLAN 3
On immunotherapy (Ipilimumab and Nivolumab s/p 6 cycles)- Last session 03/2020  - Oncology consult appreciated    - Further immunotherapy outpatient as per onc  - CK to rule out myositis was within normal limits  - Onc not recommending any further imaging at this time.  - Colonoscopy from 11/2019- polyps removed with low grade dysplasia.

## 2020-06-04 NOTE — PROGRESS NOTE ADULT - PROBLEM SELECTOR PLAN 4
c/w Levothyroxine 50mcg daily

## 2020-06-04 NOTE — PROGRESS NOTE ADULT - PROBLEM SELECTOR PROBLEM 3
Malignant melanoma of scalp

## 2020-06-04 NOTE — PROGRESS NOTE ADULT - REASON FOR ADMISSION
Dyspnea on exertion

## 2020-06-11 ENCOUNTER — OUTPATIENT (OUTPATIENT)
Dept: OUTPATIENT SERVICES | Facility: HOSPITAL | Age: 50
LOS: 1 days | Discharge: HOME | End: 2020-06-11

## 2020-06-11 DIAGNOSIS — I89.9 NONINFECTIVE DISORDER OF LYMPHATIC VESSELS AND LYMPH NODES, UNSPECIFIED: Chronic | ICD-10-CM

## 2020-06-11 DIAGNOSIS — C43.4 MALIGNANT MELANOMA OF SCALP AND NECK: Chronic | ICD-10-CM

## 2020-06-11 DIAGNOSIS — C43.0 MALIGNANT MELANOMA OF LIP: ICD-10-CM

## 2020-06-11 DIAGNOSIS — Z98.89 OTHER SPECIFIED POSTPROCEDURAL STATES: Chronic | ICD-10-CM

## 2020-06-11 DIAGNOSIS — Z90.79 ACQUIRED ABSENCE OF OTHER GENITAL ORGAN(S): Chronic | ICD-10-CM

## 2020-06-15 ENCOUNTER — APPOINTMENT (OUTPATIENT)
Dept: CARDIOLOGY | Facility: CLINIC | Age: 50
End: 2020-06-15
Payer: COMMERCIAL

## 2020-06-15 ENCOUNTER — NON-APPOINTMENT (OUTPATIENT)
Age: 50
End: 2020-06-15

## 2020-06-15 VITALS
WEIGHT: 171 LBS | OXYGEN SATURATION: 95 % | DIASTOLIC BLOOD PRESSURE: 70 MMHG | HEART RATE: 79 BPM | HEIGHT: 70 IN | BODY MASS INDEX: 24.48 KG/M2 | TEMPERATURE: 98 F | SYSTOLIC BLOOD PRESSURE: 110 MMHG

## 2020-06-15 DIAGNOSIS — R51 HEADACHE: ICD-10-CM

## 2020-06-15 PROCEDURE — 99214 OFFICE O/P EST MOD 30 MIN: CPT

## 2020-06-15 PROCEDURE — 93000 ELECTROCARDIOGRAM COMPLETE: CPT

## 2020-06-15 NOTE — HISTORY OF PRESENT ILLNESS
[FreeTextEntry1] : Conrad is a 49yo male with PMH of testicular ca s/p orchiectomy, HTN, HLD, melanoma, low serum cortisol level. Patient reports that he was advised to go to the ED for fatigue, weakness, and dyspnea and was admitted to [facility] and discharged 5 days later. Patient was advised to f/u with his PCP and is here to establish care today. He had CT angio done which was negative for PE. patient reports his Hydrocortisone was changed to 50mg daily and he was also started on Marinol and Magnesium. patient reports mild improvement in his symptoms today. He also reports weight loss of 20lbs over the last 3-4 months. He has been experiencing dizziness when changing positions as well, this usually resolves after a few minutes.

## 2020-06-15 NOTE — PHYSICAL EXAM
[Normal Appearance] : normal appearance [General Appearance - Well Developed] : well developed [General Appearance - Well Nourished] : well nourished [Well Groomed] : well groomed [No Deformities] : no deformities [General Appearance - In No Acute Distress] : no acute distress [Normal Conjunctiva] : the conjunctiva exhibited no abnormalities [Eyelids - No Xanthelasma] : the eyelids demonstrated no xanthelasmas [No Oral Pallor] : no oral pallor [Normal Oral Mucosa] : normal oral mucosa [Normal Jugular Venous A Waves Present] : normal jugular venous A waves present [No Oral Cyanosis] : no oral cyanosis [Normal Jugular Venous V Waves Present] : normal jugular venous V waves present [No Jugular Venous Corbett A Waves] : no jugular venous corbett A waves [Heart Rate And Rhythm] : heart rate and rhythm were normal [Heart Sounds] : normal S1 and S2 [Murmurs] : no murmurs present [Respiration, Rhythm And Depth] : normal respiratory rhythm and effort [Exaggerated Use Of Accessory Muscles For Inspiration] : no accessory muscle use [Auscultation Breath Sounds / Voice Sounds] : lungs were clear to auscultation bilaterally [Abdomen Soft] : soft [Abdomen Tenderness] : non-tender [Abnormal Walk] : normal gait [Abdomen Mass (___ Cm)] : no abdominal mass palpated [Nail Clubbing] : no clubbing of the fingernails [Cyanosis, Localized] : no localized cyanosis [Gait - Sufficient For Exercise Testing] : the gait was sufficient for exercise testing [Petechial Hemorrhages (___cm)] : no petechial hemorrhages [Skin Color & Pigmentation] : normal skin color and pigmentation [] : no ischemic changes [Skin Lesions] : no skin lesions [No Venous Stasis] : no venous stasis [No Skin Ulcers] : no skin ulcer [No Xanthoma] : no  xanthoma was observed [Oriented To Time, Place, And Person] : oriented to person, place, and time [Affect] : the affect was normal [Mood] : the mood was normal [No Anxiety] : not feeling anxious

## 2020-06-15 NOTE — REVIEW OF SYSTEMS
[Headache] : headache [Feeling Fatigued] : feeling fatigued [Blurry Vision] : blurred vision [Shortness Of Breath] : shortness of breath [see HPI] : see HPI [Dizziness] : dizziness [Negative] : Heme/Lymph

## 2020-06-16 ENCOUNTER — APPOINTMENT (OUTPATIENT)
Dept: HEMATOLOGY ONCOLOGY | Facility: CLINIC | Age: 50
End: 2020-06-16
Payer: COMMERCIAL

## 2020-06-16 DIAGNOSIS — R11.0 NAUSEA: ICD-10-CM

## 2020-06-16 PROCEDURE — 99214 OFFICE O/P EST MOD 30 MIN: CPT | Mod: 95

## 2020-06-16 RX ORDER — ONDANSETRON 8 MG/1
8 TABLET ORAL
Qty: 90 | Refills: 1 | Status: ACTIVE | COMMUNITY
Start: 2020-04-15 | End: 1900-01-01

## 2020-06-16 RX ORDER — PREDNISONE 10 MG/1
10 TABLET ORAL DAILY
Qty: 60 | Refills: 1 | Status: DISCONTINUED | COMMUNITY
Start: 2020-02-24 | End: 2020-06-16

## 2020-06-16 NOTE — HISTORY OF PRESENT ILLNESS
[Disease: _____________________] : Disease: [unfilled] [T: ___] : T[unfilled] [N: ___] : N[unfilled] [M: ___] : M[unfilled] [AJCC Stage: ____] : AJCC Stage: [unfilled] [Medical Office: ([facility])___] : at the medical office located in  [Patient] : the patient [Home] : at home, [unfilled] , at the time of the visit. [de-identified] : Mr Mclaughlin is a 48 year old male with past medical history of stage II testicular CA (1994) treated with surgery, chemotherapy (BEP) and an autologous BMT and Non-Hodgkins lymphoma right neck neck (1998) for which he underwent radiation therapy presenting for transfer of care for melanoma.  \par \par He noticed a raised, black, pruritic lesion on his right parietal scalp.  Biopsy was performed on 8/14/2018 which was consistent with a 1.1mm melanoma with no significant mitotic figures, no ulceration and no regression.\par He was seen by Dr Sarwat Chandler on 8/13/2018 who recommended wide excision of melanoma with SLNB and reconstruction.\par On 8/29/2018 patient underwent radical resection of scalp melanoma, right posterior modified neck dissection with closure by Dr Bradley Toussaint (Plastic)\par Final Path: metastatic melanoma present in 2/2 LN, residual melanoma, negative margins, no lymphovascular invasion.  Tumor stage pT2a pN2a\par PET/CT 9/15/2018: Minimally FDG avid soft tissue thickening right scalp probably postsurgical.FDG avid focal area of soft tissue thickening right neck, favor postsurgical changes rather than residual disease. Suggest correlation with clinical exam and surgical margins. No FDG avid distant metastatic disease. \par On 10/19/2018 underwent right functional neck dissection with Dr Sarwat Chandler\par Path: no metastatic melanoma seen in eighteen LN (0/18) and benign skin/tissue findings.\par He was referred to Dr Kendall Sands (Medical Oncology) at [facility]. \par He was originally seen by Dr Kendall Sands who referred the patient to Dr Gopi Arredondo at [facility].  \par Patient has stage IIIa (T2aN2a) BRAF testing was performed and mutation was detected. The patient was offered adjuvant Nivolumab 480mg every four weeks by Dr Arredondo for 13 cycles.\par \par 1/25/2019: Mr Mclaughlin is here for follow up.  He is scheduled for C#2 Nivolumab today.  He tolerated C#1 extremely well and reports no adverse events.\par \par 2/22/2019: Mr Mclaughlin is here for C#3 Nivolumab today.  He feels well today and is tolerating Nivo without any adverse events.  He continues to remain active and works as a .  He does reports two episode of diarrhea x 3 weeks after his last treatment.  He took Imodium with excellent relief. \par \par 3/22/2019: Patient Here for C#4 today. DEXA shows osteopenia. His Vit D level is 20 despite 50k units per week. No irAEs from treatment. \par \par 4/24/2019: As of 4/5/19 patient had leg cramps in the front and back of the legs. The intensity became severe. Took Tylenol and motrin with no relief. He also noted some loose stool just prior to these onset of the leg pain. Prednisone 60 mg daily started. After 1 week there was no impact on cramps. Flexeril helps sleep but no benefit with leg pain. The prednisone was only taken for only 3 days when changed to Flexeril. He passes stool 2-3 times daily which is more than usual. No bleeding. Overall the leg cramping is less severe but still recurring. He is off of atorvastatin and remains on Tricor. The muscle pains do not happen every day, but occur 3-4 days per week. He is working and is working from home more often at present. \par \par 10/25/2019: Patient has noticed the development of multiple new pigmented lesions on his scalp and right upper arm. He saw his dermatologist and 3 biopsies were done. The one on the arm had an epidermal component; whereas neither of the scalp lesions had epidermal component. Both were dermal only, and suggested metastatic disease. This would suggest in-transit relapse since the lesions are close to his right scalp resection from 2018. To summarize, he had received adjuvant nivolumab from 1228/2018 to 3/22/2019. It was discontinued due to persistent thigh pain and fasciitis discovered on MRI. He had also had grade 2 diarrhea that was self limited. Since the biopsies 2 weeks ago, he has noticed additional lesions on the left supraclavicular area and the left pectoral region. These are both pigmented and ~4-5 mm in diameter. There are also new lesions on the scalp. Overall he feels well and has no new symptoms, and no residual immune related symptoms from the prior treatment. \par \par 1/14/2020: On 11/12/2019 C#1 D#1 of ipilimumab and nivolumab. He notes no development of new small melanoma lesions on his scalp or chest. Existing ones remian stable. He is aware that PET and MRI showed no internal disease. We discussed that BRAFi+MEKi could be considered as an alternative based on his historic BRAF V600 mutation. He is using Immodium 2 tablets daily on occasion, and not needed daily. Given lack of progression or significant irAEs, will continue with IPI and NIVO. \par \par 2/24/2020: Patient completed all 4 cycles of Ipilimumab and Nivolumab. Over past week he has noticed new stiffness & soreness in his bilateral shoulders and knees. He continues to have thigh pain with moderate discomfort that requires up to 6 tablets of Percoset daily. He has also noted an increase in right orbital head pain that is consistent with his prior history of migrane/cluster headache. He states that the pains are worse than the past, and has been to his neurologist and tried several medications. He has been successfully helped with Ubelvy. He is here to start maintenance nivolumab post-induction. He has no other complaints of symptoms or other issues attributable to immune-related adverse events.\par \par 6/16/2020: TEB follow up appointment today, verbal consent obtained.\par 1) Melanoma: He completed Ipi/Nivo x 4 on 1/15/2020.  Afterwards patient started on single agent Nivolumab.  He did not received the nivolumab on 5/2 due to severe fatigue and ? colitis on PET. he notes no new skin lesions. Patient underwent PET/CT on 4/22/2020 which demonstrated colitis.  No evidence of metastatic disease or recurrent disease.\par 2) ? Colitis: Patient was advised to blood work and stool samples.  No acute pathology was noted.  He denied diarrhea or any symptoms attributable to colitis.\par 3) Nausea: this has improved significantly. He is eating better since on steroid replacement and Marinol (from hospital). \par 4) Appetite loss: is improving on Marinol (also helps nausea).\par 5) Myositis: Patient is using Percocet 1-2 tablets q6 hours; three times per week. At times the pain still does wake patient up in the middle of the night.\par 6) Fatigue: Improving but not back to baseline. [de-identified] : Surgical Oncology: Sarwat Chandler [phone]\par Plastics: Kendall Toussaint [phone]\par Medical Oncology ([facility]): Kendall Sands  [phone]\par Medical Oncology: Melanoma ([facility]): Gopi Arredondo [phone]\par Dermatology: Mariah Spring; [phone]\par  [de-identified] : melanoma

## 2020-06-16 NOTE — PHYSICAL EXAM
[Restricted in physically strenuous activity but ambulatory and able to carry out work of a light or sedentary nature] : Status 1- Restricted in physically strenuous activity but ambulatory and able to carry out work of a light or sedentary nature, e.g., light house work, office work [Normal] : affect appropriate [de-identified] : scattered small (~2-3 mm) pigmented lesions on the scalp. There are no new skin lesions. Of note, several of the scalp skin lesions appear smaller and less pigmented than prior exams.

## 2020-06-16 NOTE — REVIEW OF SYSTEMS
[Fatigue] : fatigue [Recent Change In Weight] : ~T recent weight change [Negative] : Allergic/Immunologic [FreeTextEntry2] : grade one fatigue (CTCAE 5.0). Weight loss 20 lb weight loss in 4 weeks. [FreeTextEntry7] : +nausea. [FreeTextEntry9] : Mild curvature of thoracic spine noted. Has chronic back pain and left knee pain.  [de-identified] : No new skin lesions.

## 2020-06-17 ENCOUNTER — APPOINTMENT (OUTPATIENT)
Dept: HEMATOLOGY ONCOLOGY | Facility: CLINIC | Age: 50
End: 2020-06-17
Payer: COMMERCIAL

## 2020-06-17 ENCOUNTER — APPOINTMENT (OUTPATIENT)
Dept: HEMATOLOGY ONCOLOGY | Facility: CLINIC | Age: 50
End: 2020-06-17

## 2020-06-17 PROCEDURE — 99441: CPT

## 2020-06-21 LAB
25(OH)D3 SERPL-MCNC: 18.9 NG/ML
ACRM BINDING ANTIBODY: 0 NMOL/L
ALBUMIN SERPL ELPH-MCNC: 4.8 G/DL
ALP BLD-CCNC: 78 U/L
ALT SERPL-CCNC: 100 U/L
ANA SER IF-ACNC: NEGATIVE
ANION GAP SERPL CALC-SCNC: 20 MMOL/L
APPEARANCE: CLEAR
AST SERPL-CCNC: 83 U/L
B BURGDOR AB SER-IMP: NEGATIVE
B BURGDOR IGM PATRN SER IB-IMP: NEGATIVE
B BURGDOR18KD IGG SER QL IB: NORMAL
B BURGDOR23KD IGG SER QL IB: NORMAL
B BURGDOR23KD IGM SER QL IB: NORMAL
B BURGDOR28KD IGG SER QL IB: NORMAL
B BURGDOR30KD IGG SER QL IB: NORMAL
B BURGDOR31KD IGG SER QL IB: NORMAL
B BURGDOR39KD IGG SER QL IB: NORMAL
B BURGDOR39KD IGM SER QL IB: NORMAL
B BURGDOR41KD IGG SER QL IB: NORMAL
B BURGDOR41KD IGM SER QL IB: NORMAL
B BURGDOR45KD IGG SER QL IB: NORMAL
B BURGDOR58KD IGG SER QL IB: PRESENT
B BURGDOR66KD IGG SER QL IB: PRESENT
B BURGDOR93KD IGG SER QL IB: NORMAL
BACTERIA: NEGATIVE
BASOPHILS # BLD AUTO: 0.05 K/UL
BASOPHILS NFR BLD AUTO: 0.6 %
BILIRUB DIRECT SERPL-MCNC: 0.1 MG/DL
BILIRUB INDIRECT SERPL-MCNC: 0.1 MG/DL
BILIRUB SERPL-MCNC: 0.2 MG/DL
BILIRUBIN URINE: NEGATIVE
BLOOD URINE: NEGATIVE
BUN SERPL-MCNC: 19 MG/DL
CALCIUM SERPL-MCNC: 10.3 MG/DL
CCP AB SER IA-ACNC: <8 UNITS
CHLORIDE SERPL-SCNC: 97 MMOL/L
CHOLEST SERPL-MCNC: 276 MG/DL
CHOLEST/HDLC SERPL: 9.2 RATIO
CK SERPL-CCNC: 78 U/L
CO2 SERPL-SCNC: 20 MMOL/L
COLOR: NORMAL
CREAT SERPL-MCNC: 0.94 MG/DL
CRP SERPL-MCNC: 0.49 MG/DL
DSDNA AB SER-ACNC: <12 IU/ML
EOSINOPHIL # BLD AUTO: 0.41 K/UL
EOSINOPHIL NFR BLD AUTO: 4.9 %
ERYTHROCYTE [SEDIMENTATION RATE] IN BLOOD BY WESTERGREN METHOD: 17 MM/HR
ESTIMATED AVERAGE GLUCOSE: 117 MG/DL
FERRITIN SERPL-MCNC: 264 NG/ML
FOLATE SERPL-MCNC: 5.2 NG/ML
GLUCOSE QUALITATIVE U: NEGATIVE
GLUCOSE SERPL-MCNC: 96 MG/DL
HBA1C MFR BLD HPLC: 5.7 %
HCT VFR BLD CALC: 43.8 %
HDLC SERPL-MCNC: 30 MG/DL
HGB BLD-MCNC: 13.7 G/DL
HYALINE CASTS: 0 /LPF
IMM GRANULOCYTES NFR BLD AUTO: 0.1 %
IRON SATN MFR SERPL: 22 %
IRON SERPL-MCNC: 66 UG/DL
KETONES URINE: NEGATIVE
LDLC SERPL CALC-MCNC: NORMAL MG/DL
LDLC SERPL DIRECT ASSAY-MCNC: 47 MG/DL
LEUKOCYTE ESTERASE URINE: NEGATIVE
LYMPHOCYTES # BLD AUTO: 1.63 K/UL
LYMPHOCYTES NFR BLD AUTO: 19.3 %
MAN DIFF?: NORMAL
MCHC RBC-ENTMCNC: 28.7 PG
MCHC RBC-ENTMCNC: 31.3 GM/DL
MCV RBC AUTO: 91.8 FL
MICROSCOPIC-UA: NORMAL
MONOCYTES # BLD AUTO: 0.41 K/UL
MONOCYTES NFR BLD AUTO: 4.9 %
NEUTROPHILS # BLD AUTO: 5.94 K/UL
NEUTROPHILS NFR BLD AUTO: 70.2 %
NITRITE URINE: NEGATIVE
PH URINE: 5.5
PLATELET # BLD AUTO: 278 K/UL
POTASSIUM SERPL-SCNC: 4.7 MMOL/L
PROT SERPL-MCNC: 7.3 G/DL
PROTEIN URINE: NEGATIVE
RBC # BLD: 4.77 M/UL
RBC # FLD: 12.8 %
RED BLOOD CELLS URINE: 0 /HPF
RF+CCP IGG SER-IMP: NEGATIVE
RHEUMATOID FACT SER QL: 11 IU/ML
SODIUM SERPL-SCNC: 138 MMOL/L
SPECIFIC GRAVITY URINE: 1.02
SQUAMOUS EPITHELIAL CELLS: 0 /HPF
T4 FREE SERPL-MCNC: 1 NG/DL
TIBC SERPL-MCNC: 300 UG/DL
TRIGL SERPL-MCNC: 1207 MG/DL
TSH SERPL-ACNC: 2.15 UIU/ML
UIBC SERPL-MCNC: 234 UG/DL
UROBILINOGEN URINE: NORMAL
VIT B12 SERPL-MCNC: 398 PG/ML
WBC # FLD AUTO: 8.45 K/UL
WHITE BLOOD CELLS URINE: 0 /HPF

## 2020-06-23 ENCOUNTER — NON-APPOINTMENT (OUTPATIENT)
Age: 50
End: 2020-06-23

## 2020-06-23 ENCOUNTER — RESULT REVIEW (OUTPATIENT)
Age: 50
End: 2020-06-23

## 2020-06-23 ENCOUNTER — APPOINTMENT (OUTPATIENT)
Dept: HEMATOLOGY ONCOLOGY | Facility: CLINIC | Age: 50
End: 2020-06-23

## 2020-06-23 LAB
BASOPHILS # BLD AUTO: 0.04 K/UL — SIGNIFICANT CHANGE UP (ref 0–0.2)
BASOPHILS NFR BLD AUTO: 0.5 % — SIGNIFICANT CHANGE UP (ref 0–2)
EOSINOPHIL # BLD AUTO: 0.32 K/UL — SIGNIFICANT CHANGE UP (ref 0–0.5)
EOSINOPHIL NFR BLD AUTO: 4.1 % — SIGNIFICANT CHANGE UP (ref 0–6)
ERYTHROCYTE [SEDIMENTATION RATE] IN BLOOD: 9 MM/HR — SIGNIFICANT CHANGE UP (ref 0–20)
HCT VFR BLD CALC: 43 % — SIGNIFICANT CHANGE UP (ref 39–50)
HGB BLD-MCNC: 14 G/DL — SIGNIFICANT CHANGE UP (ref 13–17)
IMM GRANULOCYTES NFR BLD AUTO: 0.3 % — SIGNIFICANT CHANGE UP (ref 0–1.5)
LYMPHOCYTES # BLD AUTO: 2.86 K/UL — SIGNIFICANT CHANGE UP (ref 1–3.3)
LYMPHOCYTES # BLD AUTO: 36.9 % — SIGNIFICANT CHANGE UP (ref 13–44)
MCHC RBC-ENTMCNC: 28.7 PG — SIGNIFICANT CHANGE UP (ref 27–34)
MCHC RBC-ENTMCNC: 32.6 GM/DL — SIGNIFICANT CHANGE UP (ref 32–36)
MCV RBC AUTO: 88.3 FL — SIGNIFICANT CHANGE UP (ref 80–100)
MONOCYTES # BLD AUTO: 0.41 K/UL — SIGNIFICANT CHANGE UP (ref 0–0.9)
MONOCYTES NFR BLD AUTO: 5.3 % — SIGNIFICANT CHANGE UP (ref 2–14)
NEUTROPHILS # BLD AUTO: 4.1 K/UL — SIGNIFICANT CHANGE UP (ref 1.8–7.4)
NEUTROPHILS NFR BLD AUTO: 52.9 % — SIGNIFICANT CHANGE UP (ref 43–77)
NRBC # BLD: 0 /100 WBCS — SIGNIFICANT CHANGE UP (ref 0–0)
PLATELET # BLD AUTO: 309 K/UL — SIGNIFICANT CHANGE UP (ref 150–400)
RBC # BLD: 4.87 M/UL — SIGNIFICANT CHANGE UP (ref 4.2–5.8)
RBC # FLD: 13.1 % — SIGNIFICANT CHANGE UP (ref 10.3–14.5)
WBC # BLD: 7.75 K/UL — SIGNIFICANT CHANGE UP (ref 3.8–10.5)
WBC # FLD AUTO: 7.75 K/UL — SIGNIFICANT CHANGE UP (ref 3.8–10.5)

## 2020-06-23 PROCEDURE — 93224 XTRNL ECG REC UP TO 48 HRS: CPT

## 2020-06-24 LAB
ALBUMIN SERPL ELPH-MCNC: 4.8 G/DL
ALP BLD-CCNC: 64 U/L
ALT SERPL-CCNC: 26 U/L
ANION GAP SERPL CALC-SCNC: 15 MMOL/L
AST SERPL-CCNC: 14 U/L
BILIRUB SERPL-MCNC: 0.4 MG/DL
BUN SERPL-MCNC: 16 MG/DL
CALCIUM SERPL-MCNC: 10.5 MG/DL
CHLORIDE SERPL-SCNC: 99 MMOL/L
CO2 SERPL-SCNC: 25 MMOL/L
CREAT SERPL-MCNC: 0.95 MG/DL
CRP SERPL-MCNC: 0.23 MG/DL
GLUCOSE SERPL-MCNC: 95 MG/DL
LDH SERPL-CCNC: 131 U/L
POTASSIUM SERPL-SCNC: 4.2 MMOL/L
PROT SERPL-MCNC: 7.3 G/DL
SODIUM SERPL-SCNC: 140 MMOL/L
TSH SERPL-ACNC: 3.58 UIU/ML

## 2020-06-25 ENCOUNTER — APPOINTMENT (OUTPATIENT)
Dept: CARDIOLOGY | Facility: CLINIC | Age: 50
End: 2020-06-25

## 2020-06-29 LAB
CORTICOSTEROID BIND GLOBULIN: 2.6 MG/DL
CORTIS SERPL-MCNC: <1 UG/DL
CORTISOL, FREE: <0.02 UG/DL
PFCX: <2.1 %

## 2020-06-30 ENCOUNTER — TRANSCRIPTION ENCOUNTER (OUTPATIENT)
Age: 50
End: 2020-06-30

## 2020-07-07 ENCOUNTER — APPOINTMENT (OUTPATIENT)
Dept: CARDIOLOGY | Facility: CLINIC | Age: 50
End: 2020-07-07
Payer: COMMERCIAL

## 2020-07-07 PROCEDURE — 93015 CV STRESS TEST SUPVJ I&R: CPT

## 2020-07-07 PROCEDURE — 78452 HT MUSCLE IMAGE SPECT MULT: CPT

## 2020-07-07 PROCEDURE — A9500: CPT

## 2020-07-12 LAB — ACHR BLOCK AB SER QL: <15

## 2020-07-15 ENCOUNTER — APPOINTMENT (OUTPATIENT)
Dept: ENDOCRINOLOGY | Facility: CLINIC | Age: 50
End: 2020-07-15
Payer: COMMERCIAL

## 2020-07-15 VITALS
SYSTOLIC BLOOD PRESSURE: 120 MMHG | BODY MASS INDEX: 23.39 KG/M2 | RESPIRATION RATE: 16 BRPM | DIASTOLIC BLOOD PRESSURE: 68 MMHG | WEIGHT: 163 LBS | OXYGEN SATURATION: 98 % | HEART RATE: 93 BPM

## 2020-07-15 VITALS — TEMPERATURE: 97.8 F

## 2020-07-15 PROCEDURE — 36415 COLL VENOUS BLD VENIPUNCTURE: CPT

## 2020-07-15 PROCEDURE — 99214 OFFICE O/P EST MOD 30 MIN: CPT | Mod: 25

## 2020-07-17 ENCOUNTER — APPOINTMENT (OUTPATIENT)
Dept: HEMATOLOGY ONCOLOGY | Facility: CLINIC | Age: 50
End: 2020-07-17
Payer: COMMERCIAL

## 2020-07-17 PROCEDURE — 99441: CPT

## 2020-07-22 ENCOUNTER — RESULT REVIEW (OUTPATIENT)
Age: 50
End: 2020-07-22

## 2020-07-22 ENCOUNTER — OUTPATIENT (OUTPATIENT)
Dept: OUTPATIENT SERVICES | Facility: HOSPITAL | Age: 50
LOS: 1 days | Discharge: HOME | End: 2020-07-22

## 2020-07-22 ENCOUNTER — APPOINTMENT (OUTPATIENT)
Dept: HEMATOLOGY ONCOLOGY | Facility: CLINIC | Age: 50
End: 2020-07-22

## 2020-07-22 DIAGNOSIS — C43.8 MALIGNANT MELANOMA OF OVERLAPPING SITES OF SKIN: ICD-10-CM

## 2020-07-22 DIAGNOSIS — C43.4 MALIGNANT MELANOMA OF SCALP AND NECK: Chronic | ICD-10-CM

## 2020-07-22 DIAGNOSIS — Z98.89 OTHER SPECIFIED POSTPROCEDURAL STATES: Chronic | ICD-10-CM

## 2020-07-22 DIAGNOSIS — Z90.79 ACQUIRED ABSENCE OF OTHER GENITAL ORGAN(S): Chronic | ICD-10-CM

## 2020-07-22 DIAGNOSIS — I89.9 NONINFECTIVE DISORDER OF LYMPHATIC VESSELS AND LYMPH NODES, UNSPECIFIED: Chronic | ICD-10-CM

## 2020-07-22 LAB
BASOPHILS # BLD AUTO: 0.05 K/UL — SIGNIFICANT CHANGE UP (ref 0–0.2)
BASOPHILS NFR BLD AUTO: 0.6 % — SIGNIFICANT CHANGE UP (ref 0–2)
EOSINOPHIL # BLD AUTO: 0.25 K/UL — SIGNIFICANT CHANGE UP (ref 0–0.5)
EOSINOPHIL NFR BLD AUTO: 3.2 % — SIGNIFICANT CHANGE UP (ref 0–6)
ERYTHROCYTE [SEDIMENTATION RATE] IN BLOOD: 4 MM/HR — SIGNIFICANT CHANGE UP (ref 0–20)
HCT VFR BLD CALC: 47.4 % — SIGNIFICANT CHANGE UP (ref 39–50)
HGB BLD-MCNC: 15.4 G/DL — SIGNIFICANT CHANGE UP (ref 13–17)
IMM GRANULOCYTES NFR BLD AUTO: 0.3 % — SIGNIFICANT CHANGE UP (ref 0–1.5)
LYMPHOCYTES # BLD AUTO: 2.58 K/UL — SIGNIFICANT CHANGE UP (ref 1–3.3)
LYMPHOCYTES # BLD AUTO: 32.6 % — SIGNIFICANT CHANGE UP (ref 13–44)
MCHC RBC-ENTMCNC: 28.5 PG — SIGNIFICANT CHANGE UP (ref 27–34)
MCHC RBC-ENTMCNC: 32.5 GM/DL — SIGNIFICANT CHANGE UP (ref 32–36)
MCV RBC AUTO: 87.6 FL — SIGNIFICANT CHANGE UP (ref 80–100)
MONOCYTES # BLD AUTO: 0.3 K/UL — SIGNIFICANT CHANGE UP (ref 0–0.9)
MONOCYTES NFR BLD AUTO: 3.8 % — SIGNIFICANT CHANGE UP (ref 2–14)
NEUTROPHILS # BLD AUTO: 4.71 K/UL — SIGNIFICANT CHANGE UP (ref 1.8–7.4)
NEUTROPHILS NFR BLD AUTO: 59.5 % — SIGNIFICANT CHANGE UP (ref 43–77)
NRBC # BLD: 0 /100 WBCS — SIGNIFICANT CHANGE UP (ref 0–0)
PLATELET # BLD AUTO: 304 K/UL — SIGNIFICANT CHANGE UP (ref 150–400)
RBC # BLD: 5.41 M/UL — SIGNIFICANT CHANGE UP (ref 4.2–5.8)
RBC # FLD: 13.2 % — SIGNIFICANT CHANGE UP (ref 10.3–14.5)
WBC # BLD: 7.91 K/UL — SIGNIFICANT CHANGE UP (ref 3.8–10.5)
WBC # FLD AUTO: 7.91 K/UL — SIGNIFICANT CHANGE UP (ref 3.8–10.5)

## 2020-07-23 LAB
ALBUMIN SERPL ELPH-MCNC: 5.2 G/DL
ALP BLD-CCNC: 52 U/L
ALT SERPL-CCNC: 39 U/L
ANION GAP SERPL CALC-SCNC: 15 MMOL/L
AST SERPL-CCNC: 29 U/L
BILIRUB SERPL-MCNC: 0.8 MG/DL
BUN SERPL-MCNC: 18 MG/DL
CALCIUM SERPL-MCNC: 10.7 MG/DL
CHLORIDE SERPL-SCNC: 97 MMOL/L
CO2 SERPL-SCNC: 28 MMOL/L
CREAT SERPL-MCNC: 1.19 MG/DL
CRP SERPL-MCNC: 0.16 MG/DL
GLUCOSE SERPL-MCNC: 79 MG/DL
LDH SERPL-CCNC: 167 U/L
POTASSIUM SERPL-SCNC: 5.2 MMOL/L
PROT SERPL-MCNC: 7.8 G/DL
SODIUM SERPL-SCNC: 140 MMOL/L
TSH SERPL-ACNC: 1.13 UIU/ML

## 2020-07-29 LAB — MAGNESIUM SERPL-MCNC: 2 MG/DL

## 2020-07-29 NOTE — PHYSICAL EXAM
[Well Nourished] : well nourished [Alert] : alert [Well Developed] : well developed [Normal Sclera/Conjunctiva] : normal sclera/conjunctiva [No Acute Distress] : no acute distress [No Proptosis] : no proptosis [EOMI] : extra ocular movement intact [Normal Oropharynx] : the oropharynx was normal [No Respiratory Distress] : no respiratory distress [No Thyroid Nodules] : no palpable thyroid nodules [Thyroid Not Enlarged] : the thyroid was not enlarged [Normal S1, S2] : normal S1 and S2 [No Accessory Muscle Use] : no accessory muscle use [Clear to Auscultation] : lungs were clear to auscultation bilaterally [No Edema] : no peripheral edema [Regular Rhythm] : with a regular rhythm [Normal Rate] : heart rate was normal [Not Tender] : non-tender [Pedal Pulses Normal] : the pedal pulses are present [Normal Bowel Sounds] : normal bowel sounds [Soft] : abdomen soft [Not Distended] : not distended [Normal Anterior Cervical Nodes] : no anterior cervical lymphadenopathy [No Spinal Tenderness] : no spinal tenderness [Spine Straight] : spine straight [Normal Posterior Cervical Nodes] : no posterior cervical lymphadenopathy [No Stigmata of Cushings Syndrome] : no stigmata of Cushings Syndrome [Normal Gait] : normal gait [No Rash] : no rash [Normal Strength/Tone] : muscle strength and tone were normal [Acanthosis Nigricans] : no acanthosis nigricans [Normal Reflexes] : deep tendon reflexes were 2+ and symmetric [No Tremors] : no tremors [Oriented x3] : oriented to person, place, and time

## 2020-07-29 NOTE — HISTORY OF PRESENT ILLNESS
[FreeTextEntry1] : Mr. CANTU  is a 50 year year old male  who presents for endocrine follow up\par He does have hx of secondary adrenal insufficiency felt to be associated with his immune therapy treatment.\par Had cardiac eval and echo/stress neg however, with with sob.\par hs not seen pulm ct chest said to be stable.\par On Vit d   50,000 iu 2x per week.  Level not rising has been on for years  Not taking with fatty meal and taking the D2 form.\par He does have hx of testicular ca along with hx of NHL and of late has been treated for scalp melanoma\par Appetite is relatively better and his energy improves in spurts but still overall fatigued and notes sob with minimal exertion.\par Additional medical history includes that of hyperlipidemia and hypertension\par Is on Magnesium 400 mf daily of the oxide type\par Is on Lt4 50 mcg with normal tsh from mid June\par  Was hospitalized several weeks ago for fatigue and sob without clear etiologies note. Did receive "stress dose " corticosteroids without significant improvement in his symptoms of fatigue or sob. NOw on hydrocortisone 20 mg am and 10 mg about 3 pm. Bp has not been low and denies nausea or abdominal discomfort.\par Prior pituitary hormone eval was otherwise negative froml laboratory perspective.  testosterone lower end normal

## 2020-07-29 NOTE — REVIEW OF SYSTEMS
[Fatigue] : fatigue [Shortness Of Breath] : shortness of breath [Muscle Weakness] : muscle weakness [Negative] : Endocrine

## 2020-08-11 ENCOUNTER — APPOINTMENT (OUTPATIENT)
Dept: HEMATOLOGY ONCOLOGY | Facility: CLINIC | Age: 50
End: 2020-08-11
Payer: COMMERCIAL

## 2020-08-11 VITALS
SYSTOLIC BLOOD PRESSURE: 117 MMHG | DIASTOLIC BLOOD PRESSURE: 84 MMHG | RESPIRATION RATE: 17 BRPM | BODY MASS INDEX: 23.47 KG/M2 | TEMPERATURE: 97.2 F | WEIGHT: 163.58 LBS | HEART RATE: 111 BPM | OXYGEN SATURATION: 99 %

## 2020-08-11 PROCEDURE — 99214 OFFICE O/P EST MOD 30 MIN: CPT

## 2020-08-11 PROCEDURE — 99215 OFFICE O/P EST HI 40 MIN: CPT

## 2020-08-11 NOTE — HISTORY OF PRESENT ILLNESS
[Disease: _____________________] : Disease: [unfilled] [T: ___] : T[unfilled] [N: ___] : N[unfilled] [M: ___] : M[unfilled] [AJCC Stage: ____] : AJCC Stage: [unfilled] [Home] : at home, [unfilled] , at the time of the visit. [Medical Office: ([facility])___] : at the medical office located in  [Patient] : the patient [de-identified] : Mr Mclaughlin is a 48 year old male with past medical history of stage II testicular CA (1994) treated with surgery, chemotherapy (BEP) and an autologous BMT and Non-Hodgkins lymphoma right neck neck (1998) for which he underwent radiation therapy presenting for transfer of care for melanoma.  \par \par He noticed a raised, black, pruritic lesion on his right parietal scalp.  Biopsy was performed on 8/14/2018 which was consistent with a 1.1mm melanoma with no significant mitotic figures, no ulceration and no regression.\par He was seen by Dr Sarwat Chandler on 8/13/2018 who recommended wide excision of melanoma with SLNB and reconstruction.\par On 8/29/2018 patient underwent radical resection of scalp melanoma, right posterior modified neck dissection with closure by Dr Bradley Toussaint (Plastic)\par Final Path: metastatic melanoma present in 2/2 LN, residual melanoma, negative margins, no lymphovascular invasion.  Tumor stage pT2a pN2a\par PET/CT 9/15/2018: Minimally FDG avid soft tissue thickening right scalp probably postsurgical.FDG avid focal area of soft tissue thickening right neck, favor postsurgical changes rather than residual disease. Suggest correlation with clinical exam and surgical margins. No FDG avid distant metastatic disease. \par On 10/19/2018 underwent right functional neck dissection with Dr Sarwat Chandler\par Path: no metastatic melanoma seen in eighteen LN (0/18) and benign skin/tissue findings.\par He was referred to Dr Kendall Sands (Medical Oncology) at [facility]. \par He was originally seen by Dr Kendall Sansd who referred the patient to Dr Gopi Arredondo at [facility].  \par Patient has stage IIIa (T2aN2a) BRAF testing was performed and mutation was detected. The patient was offered adjuvant Nivolumab 480mg every four weeks by Dr Arredondo for 13 cycles.\par \par 1/25/2019: Mr Mclaughlin is here for follow up.  He is scheduled for C#2 Nivolumab today.  He tolerated C#1 extremely well and reports no adverse events.\par \par 2/22/2019: Mr Mclaughlin is here for C#3 Nivolumab today.  He feels well today and is tolerating Nivo without any adverse events.  He continues to remain active and works as a .  He does reports two episode of diarrhea x 3 weeks after his last treatment.  He took Imodium with excellent relief. \par \par 3/22/2019: Patient Here for C#4 today. DEXA shows osteopenia. His Vit D level is 20 despite 50k units per week. No irAEs from treatment. \par \par 4/24/2019: As of 4/5/19 patient had leg cramps in the front and back of the legs. The intensity became severe. Took Tylenol and motrin with no relief. He also noted some loose stool just prior to these onset of the leg pain. Prednisone 60 mg daily started. After 1 week there was no impact on cramps. Flexeril helps sleep but no benefit with leg pain. The prednisone was only taken for only 3 days when changed to Flexeril. He passes stool 2-3 times daily which is more than usual. No bleeding. Overall the leg cramping is less severe but still recurring. He is off of atorvastatin and remains on Tricor. The muscle pains do not happen every day, but occur 3-4 days per week. He is working and is working from home more often at present. \par \par 10/25/2019: Patient has noticed the development of multiple new pigmented lesions on his scalp and right upper arm. He saw his dermatologist and 3 biopsies were done. The one on the arm had an epidermal component; whereas neither of the scalp lesions had epidermal component. Both were dermal only, and suggested metastatic disease. This would suggest in-transit relapse since the lesions are close to his right scalp resection from 2018. To summarize, he had received adjuvant nivolumab from 1228/2018 to 3/22/2019. It was discontinued due to persistent thigh pain and fasciitis discovered on MRI. He had also had grade 2 diarrhea that was self limited. Since the biopsies 2 weeks ago, he has noticed additional lesions on the left supraclavicular area and the left pectoral region. These are both pigmented and ~4-5 mm in diameter. There are also new lesions on the scalp. Overall he feels well and has no new symptoms, and no residual immune related symptoms from the prior treatment. \par \par 1/14/2020: On 11/12/2019 C#1 D#1 of ipilimumab and nivolumab. He notes no development of new small melanoma lesions on his scalp or chest. Existing ones remian stable. He is aware that PET and MRI showed no internal disease. We discussed that BRAFi+MEKi could be considered as an alternative based on his historic BRAF V600 mutation. He is using Immodium 2 tablets daily on occasion, and not needed daily. Given lack of progression or significant irAEs, will continue with IPI and NIVO. \par \par 2/24/2020: Patient completed all 4 cycles of Ipilimumab and Nivolumab. Over past week he has noticed new stiffness & soreness in his bilateral shoulders and knees. He continues to have thigh pain with moderate discomfort that requires up to 6 tablets of Percoset daily. He has also noted an increase in right orbital head pain that is consistent with his prior history of migrane/cluster headache. He states that the pains are worse than the past, and has been to his neurologist and tried several medications. He has been successfully helped with Ubelvy. He is here to start maintenance nivolumab post-induction. He has no other complaints of symptoms or other issues attributable to immune-related adverse events.\par \par 6/16/2020: TEB follow up appointment today, verbal consent obtained.\par 1) Melanoma: He completed Ipi/Nivo x 4 on 1/15/2020.  Afterwards patient started on single agent Nivolumab x 2 cycles, the second of which was given in 4/2020, and stopped due to apparent immune-related side effects. He did not received the nivolumab on 5/2 due to severe fatigue and ? colitis on PET. he notes no new skin lesions. Patient underwent PET/CT on 4/22/2020 which demonstrated colitis.  No evidence of metastatic disease or recurrent disease.\par 2) ? Colitis: Patient was advised to blood work and stool samples.  No acute pathology was noted.  He denied diarrhea or any symptoms attributable to colitis.\par 3) Nausea: this has improved significantly. He is eating better since on steroid replacement and Marinol (from hospital). \par 4) Appetite loss: is improving on Marinol (also helps nausea).\par 5) Myositis: Patient is using Percocet 1-2 tablets q6 hours; three times per week. At times the pain still does wake patient up in the middle of the night.\par 6) Fatigue: Improving but not back to baseline.\par \par 8/11/2020:\par Patient here for follow-up of metastatic melanoma, especially located to skin of scalp. He has noted no new lesions and the prior ones continue to resolve or diminish. He completed Ipilimumab + Nivolumab x 4 from 11/2019 to 1/15/2020.  Afterwards, patient started on single agent Nivolumab x 2 cycles, the second of which was given in 4/2020, and stopped due to apparent immune-related side effects.\par His main complaints are as follows:\par Fatigue - this is continual and chronic. He has had to stop work and start disability. He is working with Dr. Sanchez for endocrine treatment of his adrenal gland and thyroid.\par SOB usually with mild exertion - unclear origin. He has seen pulmonary for PFTs and cardiology for echocardiogram and stress test without abnormality noted. \par Joint pains especially of the knees and shoulders - this has gotten worse and pain medication (Percoset) is not helping. \par Muscle cramps in thighs - remains mild to moderate, and has intermittent severe cramps. \par Weight loss - seems to be multifactorial, and he has no taste for food and poor appetite. [de-identified] : melanoma [de-identified] : Surgical Oncology: Sarwat Chandler [phone]\par Plastics: Kendall Toussaint [phone]\par Medical Oncology ([facility]): Kendall Sands  [phone]\par Medical Oncology: Melanoma ([facility]): Gopi Arredondo [phone]\par Dermatology: Mariah Spring; [phone]\par

## 2020-08-11 NOTE — REVIEW OF SYSTEMS
[Fatigue] : fatigue [Recent Change In Weight] : ~T recent weight change [Negative] : Heme/Lymph [FreeTextEntry2] : grade two fatigue (CTCAE 5.0). Weight loss 20 lb weight loss in 4 weeks. [FreeTextEntry7] : +nausea. [de-identified] : No new skin lesions. [FreeTextEntry9] : Mild curvature of thoracic spine noted. Has chronic back pain and left knee pain.

## 2020-08-11 NOTE — PHYSICAL EXAM
[Ambulatory and capable of all self care but unable to carry out any work activities] : Status 2- Ambulatory and capable of all self care but unable to carry out any work activities. Up and about more than 50% of waking hours [Thin] : thin [Normal] : grossly intact [de-identified] : old incision is normal and no hernia [de-identified] : scattered small (~2-3 mm) pigmented lesions on the scalp. There are no new skin lesions. Of note, several of the scalp skin lesions appear smaller and less pigmented than prior exams.

## 2020-08-12 NOTE — HISTORY OF PRESENT ILLNESS
[de-identified] : Case reviewed in detail w dr Shetty.In essence this 51 y/o w/m w h/o met melanoma has been treated w immunotherapy doublet f/b singlet Nivo completed in April, and has developed side effects from the treatment which have included muscle and joint pains, alyssia worse  recently.Of note is pt's prev cancer treatment history.Has received chemo ( BEP ) for testicular cancer as well as a BMT for NHL prior to developing melanoma.His pain in muscles and jopints were worsened this past week w no known reason, ie no trauma or other triggers.No overt swelling ;the pain and stiffness is worse in the mornings.Other rel s/s include poor appetite , improved w donabinol, after pt has lost about 30 lbs during this treatment course.

## 2020-08-12 NOTE — REVIEW OF SYSTEMS
[Joint Pain] : joint pain [Joint Stiffness] : joint stiffness [Muscle Pain] : muscle pain [Muscle Weakness] : muscle weakness [Difficulty Walking] : difficulty walking [Negative] : Allergic/Immunologic

## 2020-08-12 NOTE — ASSESSMENT
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 CBD as next step.

## 2020-08-17 ENCOUNTER — APPOINTMENT (OUTPATIENT)
Dept: ENDOCRINOLOGY | Facility: CLINIC | Age: 50
End: 2020-08-17
Payer: COMMERCIAL

## 2020-08-17 ENCOUNTER — NON-APPOINTMENT (OUTPATIENT)
Age: 50
End: 2020-08-17

## 2020-08-17 ENCOUNTER — APPOINTMENT (OUTPATIENT)
Dept: CARDIOLOGY | Facility: CLINIC | Age: 50
End: 2020-08-17
Payer: COMMERCIAL

## 2020-08-17 VITALS
WEIGHT: 162 LBS | HEART RATE: 91 BPM | HEIGHT: 70 IN | BODY MASS INDEX: 23.19 KG/M2 | DIASTOLIC BLOOD PRESSURE: 80 MMHG | OXYGEN SATURATION: 98 % | SYSTOLIC BLOOD PRESSURE: 100 MMHG | TEMPERATURE: 97.7 F

## 2020-08-17 VITALS
WEIGHT: 163.58 LBS | BODY MASS INDEX: 23.42 KG/M2 | OXYGEN SATURATION: 98 % | TEMPERATURE: 97.7 F | HEART RATE: 101 BPM | HEIGHT: 70 IN | SYSTOLIC BLOOD PRESSURE: 100 MMHG | DIASTOLIC BLOOD PRESSURE: 80 MMHG

## 2020-08-17 DIAGNOSIS — H53.9 UNSPECIFIED VISUAL DISTURBANCE: ICD-10-CM

## 2020-08-17 DIAGNOSIS — E55.9 VITAMIN D DEFICIENCY, UNSPECIFIED: ICD-10-CM

## 2020-08-17 DIAGNOSIS — R42 DIZZINESS AND GIDDINESS: ICD-10-CM

## 2020-08-17 DIAGNOSIS — R13.10 DYSPHAGIA, UNSPECIFIED: ICD-10-CM

## 2020-08-17 DIAGNOSIS — G43.709 CHRONIC MIGRAINE W/OUT AURA, NOT INTRACTABLE, W/OUT STATUS MIGRAINOSUS: ICD-10-CM

## 2020-08-17 DIAGNOSIS — E61.2 MAGNESIUM DEFICIENCY: ICD-10-CM

## 2020-08-17 PROCEDURE — 99214 OFFICE O/P EST MOD 30 MIN: CPT

## 2020-08-17 PROCEDURE — 93000 ELECTROCARDIOGRAM COMPLETE: CPT

## 2020-08-17 NOTE — HISTORY OF PRESENT ILLNESS
[FreeTextEntry1] : Mr. CANTU  is a 50 year year old male  who presents for endocrine follow up\par He does have hx of secondary adrenal insufficiency felt to be associated with his immune therapy treatment.\par Had cardiac eval and echo/stress- neg- however, still with sob.Still with some ongoing fatigue -but the fatigue is somewhat better.\par Now on Hydrocortisone 20 mg am and 10 mg late afternoon.\par On Vit d   50,000 iu 2x per week.  Level not rising has been on for years  Not taking with fatty meal and taking the D2 form.\par He does have hx of testicular ca along with hx of NHL and of late has been treated for scalp melanoma\par Appetite is relatively better and his energy improves in spurts but still overall fatigued and notes sob with minimal exertion.\par Additional medical history includes that of hyperlipidemia and hypertension\par Is on Magnesium 400 mf daily of the oxide type\par Is on Lt4 50 mcg with normal tsh from recent labs for Dr. Mcginnis.\par Prior pituitary hormone eval was otherwise negative from laboratory perspective.  testosterone lower end normal\par Is on hold with his immunotherapy since end of May.\par is due to see neurologist and Pulmonologist.

## 2020-08-17 NOTE — PHYSICAL EXAM
[Alert] : alert [Well Nourished] : well nourished [No Acute Distress] : no acute distress [Normal Sclera/Conjunctiva] : normal sclera/conjunctiva [Well Developed] : well developed [EOMI] : extra ocular movement intact [Normal Oropharynx] : the oropharynx was normal [No Proptosis] : no proptosis [Thyroid Not Enlarged] : the thyroid was not enlarged [No Respiratory Distress] : no respiratory distress [No Thyroid Nodules] : no palpable thyroid nodules [Clear to Auscultation] : lungs were clear to auscultation bilaterally [No Accessory Muscle Use] : no accessory muscle use [Normal S1, S2] : normal S1 and S2 [Normal Rate] : heart rate was normal [No Edema] : no peripheral edema [Regular Rhythm] : with a regular rhythm [Normal Bowel Sounds] : normal bowel sounds [Pedal Pulses Normal] : the pedal pulses are present [Not Tender] : non-tender [Soft] : abdomen soft [Not Distended] : not distended [Normal Anterior Cervical Nodes] : no anterior cervical lymphadenopathy [Normal Posterior Cervical Nodes] : no posterior cervical lymphadenopathy [No Spinal Tenderness] : no spinal tenderness [No Stigmata of Cushings Syndrome] : no stigmata of Cushings Syndrome [Spine Straight] : spine straight [Normal Strength/Tone] : muscle strength and tone were normal [Normal Gait] : normal gait [Acanthosis Nigricans] : no acanthosis nigricans [No Rash] : no rash [Normal Reflexes] : deep tendon reflexes were 2+ and symmetric [No Tremors] : no tremors [Oriented x3] : oriented to person, place, and time

## 2020-08-17 NOTE — PHYSICAL EXAM
[Normal Appearance] : normal appearance [General Appearance - Well Developed] : well developed [Well Groomed] : well groomed [General Appearance - In No Acute Distress] : no acute distress [General Appearance - Well Nourished] : well nourished [No Deformities] : no deformities [Eyelids - No Xanthelasma] : the eyelids demonstrated no xanthelasmas [Normal Conjunctiva] : the conjunctiva exhibited no abnormalities [No Oral Cyanosis] : no oral cyanosis [Normal Oral Mucosa] : normal oral mucosa [No Oral Pallor] : no oral pallor [Normal Jugular Venous V Waves Present] : normal jugular venous V waves present [Normal Jugular Venous A Waves Present] : normal jugular venous A waves present [No Jugular Venous Corbett A Waves] : no jugular venous corbett A waves [Respiration, Rhythm And Depth] : normal respiratory rhythm and effort [Exaggerated Use Of Accessory Muscles For Inspiration] : no accessory muscle use [Auscultation Breath Sounds / Voice Sounds] : lungs were clear to auscultation bilaterally [Heart Sounds] : normal S1 and S2 [Heart Rate And Rhythm] : heart rate and rhythm were normal [Murmurs] : no murmurs present [Abdomen Tenderness] : non-tender [Abdomen Soft] : soft [Abdomen Mass (___ Cm)] : no abdominal mass palpated [Nail Clubbing] : no clubbing of the fingernails [Gait - Sufficient For Exercise Testing] : the gait was sufficient for exercise testing [Abnormal Walk] : normal gait [Cyanosis, Localized] : no localized cyanosis [Petechial Hemorrhages (___cm)] : no petechial hemorrhages [Skin Color & Pigmentation] : normal skin color and pigmentation [No Venous Stasis] : no venous stasis [] : no rash [Skin Lesions] : no skin lesions [No Xanthoma] : no  xanthoma was observed [No Skin Ulcers] : no skin ulcer [Affect] : the affect was normal [Oriented To Time, Place, And Person] : oriented to person, place, and time [Mood] : the mood was normal [No Anxiety] : not feeling anxious

## 2020-08-17 NOTE — REVIEW OF SYSTEMS
[Headache] : headache [Feeling Fatigued] : feeling fatigued [Recent Weight Loss (___ Lbs)] : recent [unfilled] ~Ulb weight loss [Dyspnea on exertion] : dyspnea during exertion [Shortness Of Breath] : shortness of breath [Chest Pain] : chest pain [Joint Pain] : joint pain [Negative] : Heme/Lymph [Recent Weight Gain (___ Lbs)] : no recent weight gain [Fever] : no fever [Chills] : no chills [Chest  Pressure] : no chest pressure [Lower Ext Edema] : no extremity edema [Leg Claudication] : no intermittent leg claudication [Palpitations] : no palpitations [Joint Swelling] : no joint swelling [Joint Stiffness] : no joint stiffness [Muscle Cramps] : no muscle cramps [Limb Weakness (Paresis)] : no limb weakness

## 2020-08-17 NOTE — HISTORY OF PRESENT ILLNESS
[FreeTextEntry1] : This is a 50 year old gentlemen with a PMH of testicular ca s/p orchiectomy, HTN, HLD, melanoma, low serum cortisol level presents today for follow up. Patient was recently seen in our office in June 2020 with multiple issues including dyspnea, dizziness, fatigue, weightloss, visual changes, joint pain, swallowing problems and headaches. Patient presents today to readdress some of those issues. He states that the fatigue and SOB have become increasingly worse and have become daily issues. Patient had one episode of chest pain recently, s/p cardiac workup.  Patient denies palpitations, nausea, vomiting, dizziness and lightheadedness.\par

## 2020-08-18 ENCOUNTER — OUTPATIENT (OUTPATIENT)
Dept: OUTPATIENT SERVICES | Facility: HOSPITAL | Age: 50
LOS: 1 days | End: 2020-08-18
Payer: COMMERCIAL

## 2020-08-18 DIAGNOSIS — Z90.79 ACQUIRED ABSENCE OF OTHER GENITAL ORGAN(S): Chronic | ICD-10-CM

## 2020-08-18 DIAGNOSIS — Z98.89 OTHER SPECIFIED POSTPROCEDURAL STATES: Chronic | ICD-10-CM

## 2020-08-18 DIAGNOSIS — I89.9 NONINFECTIVE DISORDER OF LYMPHATIC VESSELS AND LYMPH NODES, UNSPECIFIED: Chronic | ICD-10-CM

## 2020-08-18 DIAGNOSIS — Z11.59 ENCOUNTER FOR SCREENING FOR OTHER VIRAL DISEASES: ICD-10-CM

## 2020-08-18 DIAGNOSIS — C43.4 MALIGNANT MELANOMA OF SCALP AND NECK: Chronic | ICD-10-CM

## 2020-08-18 LAB — SARS-COV-2 RNA SPEC QL NAA+PROBE: SIGNIFICANT CHANGE UP

## 2020-08-18 PROCEDURE — U0003: CPT

## 2020-08-21 ENCOUNTER — OUTPATIENT (OUTPATIENT)
Dept: OUTPATIENT SERVICES | Facility: HOSPITAL | Age: 50
LOS: 1 days | End: 2020-08-21
Payer: COMMERCIAL

## 2020-08-21 VITALS
HEART RATE: 65 BPM | DIASTOLIC BLOOD PRESSURE: 61 MMHG | SYSTOLIC BLOOD PRESSURE: 103 MMHG | RESPIRATION RATE: 16 BRPM | OXYGEN SATURATION: 97 %

## 2020-08-21 VITALS
OXYGEN SATURATION: 98 % | WEIGHT: 162.04 LBS | DIASTOLIC BLOOD PRESSURE: 80 MMHG | HEIGHT: 71 IN | SYSTOLIC BLOOD PRESSURE: 123 MMHG | RESPIRATION RATE: 18 BRPM | HEART RATE: 71 BPM | TEMPERATURE: 98 F

## 2020-08-21 DIAGNOSIS — Z90.79 ACQUIRED ABSENCE OF OTHER GENITAL ORGAN(S): Chronic | ICD-10-CM

## 2020-08-21 DIAGNOSIS — I89.9 NONINFECTIVE DISORDER OF LYMPHATIC VESSELS AND LYMPH NODES, UNSPECIFIED: Chronic | ICD-10-CM

## 2020-08-21 DIAGNOSIS — Z98.89 OTHER SPECIFIED POSTPROCEDURAL STATES: Chronic | ICD-10-CM

## 2020-08-21 DIAGNOSIS — C43.4 MALIGNANT MELANOMA OF SCALP AND NECK: Chronic | ICD-10-CM

## 2020-08-21 DIAGNOSIS — R06.00 DYSPNEA, UNSPECIFIED: ICD-10-CM

## 2020-08-21 LAB
ALBUMIN SERPL ELPH-MCNC: 4.9 G/DL — SIGNIFICANT CHANGE UP (ref 3.3–5)
ALP SERPL-CCNC: 58 U/L — SIGNIFICANT CHANGE UP (ref 40–120)
ALT FLD-CCNC: 17 U/L — SIGNIFICANT CHANGE UP (ref 10–45)
ANION GAP SERPL CALC-SCNC: 13 MMOL/L — SIGNIFICANT CHANGE UP (ref 5–17)
AST SERPL-CCNC: 23 U/L — SIGNIFICANT CHANGE UP (ref 10–40)
BILIRUB SERPL-MCNC: 0.4 MG/DL — SIGNIFICANT CHANGE UP (ref 0.2–1.2)
BUN SERPL-MCNC: 24 MG/DL — HIGH (ref 7–23)
CALCIUM SERPL-MCNC: 10.3 MG/DL — SIGNIFICANT CHANGE UP (ref 8.4–10.5)
CHLORIDE SERPL-SCNC: 99 MMOL/L — SIGNIFICANT CHANGE UP (ref 96–108)
CO2 SERPL-SCNC: 25 MMOL/L — SIGNIFICANT CHANGE UP (ref 22–31)
CREAT SERPL-MCNC: 1.07 MG/DL — SIGNIFICANT CHANGE UP (ref 0.5–1.3)
GLUCOSE SERPL-MCNC: 95 MG/DL — SIGNIFICANT CHANGE UP (ref 70–99)
HCT VFR BLD CALC: 46.3 % — SIGNIFICANT CHANGE UP (ref 39–50)
HGB BLD-MCNC: 14.7 G/DL — SIGNIFICANT CHANGE UP (ref 13–17)
MCHC RBC-ENTMCNC: 28.2 PG — SIGNIFICANT CHANGE UP (ref 27–34)
MCHC RBC-ENTMCNC: 31.7 GM/DL — LOW (ref 32–36)
MCV RBC AUTO: 88.7 FL — SIGNIFICANT CHANGE UP (ref 80–100)
NRBC # BLD: 0 /100 WBCS — SIGNIFICANT CHANGE UP (ref 0–0)
PLATELET # BLD AUTO: 418 K/UL — HIGH (ref 150–400)
POTASSIUM SERPL-MCNC: 4.3 MMOL/L — SIGNIFICANT CHANGE UP (ref 3.5–5.3)
POTASSIUM SERPL-SCNC: 4.3 MMOL/L — SIGNIFICANT CHANGE UP (ref 3.5–5.3)
PROT SERPL-MCNC: 7.6 G/DL — SIGNIFICANT CHANGE UP (ref 6–8.3)
RBC # BLD: 5.22 M/UL — SIGNIFICANT CHANGE UP (ref 4.2–5.8)
RBC # FLD: 14 % — SIGNIFICANT CHANGE UP (ref 10.3–14.5)
SODIUM SERPL-SCNC: 137 MMOL/L — SIGNIFICANT CHANGE UP (ref 135–145)
WBC # BLD: 8.92 K/UL — SIGNIFICANT CHANGE UP (ref 3.8–10.5)
WBC # FLD AUTO: 8.92 K/UL — SIGNIFICANT CHANGE UP (ref 3.8–10.5)

## 2020-08-21 PROCEDURE — 99152 MOD SED SAME PHYS/QHP 5/>YRS: CPT | Mod: GC

## 2020-08-21 PROCEDURE — 99152 MOD SED SAME PHYS/QHP 5/>YRS: CPT

## 2020-08-21 PROCEDURE — 93005 ELECTROCARDIOGRAM TRACING: CPT

## 2020-08-21 PROCEDURE — C1769: CPT

## 2020-08-21 PROCEDURE — 85027 COMPLETE CBC AUTOMATED: CPT

## 2020-08-21 PROCEDURE — 93010 ELECTROCARDIOGRAM REPORT: CPT

## 2020-08-21 PROCEDURE — C1887: CPT

## 2020-08-21 PROCEDURE — 93460 R&L HRT ART/VENTRICLE ANGIO: CPT

## 2020-08-21 PROCEDURE — 93460 R&L HRT ART/VENTRICLE ANGIO: CPT | Mod: 26,GC

## 2020-08-21 PROCEDURE — C1894: CPT

## 2020-08-21 PROCEDURE — 99153 MOD SED SAME PHYS/QHP EA: CPT

## 2020-08-21 PROCEDURE — 80053 COMPREHEN METABOLIC PANEL: CPT

## 2020-08-21 RX ORDER — CHOLECALCIFEROL (VITAMIN D3) 125 MCG
1 CAPSULE ORAL
Qty: 0 | Refills: 0 | DISCHARGE

## 2020-08-21 RX ORDER — ATORVASTATIN CALCIUM 80 MG/1
1 TABLET, FILM COATED ORAL
Qty: 0 | Refills: 0 | DISCHARGE

## 2020-08-21 RX ORDER — HYDROXYZINE HCL 10 MG
1 TABLET ORAL
Qty: 0 | Refills: 0 | DISCHARGE

## 2020-08-21 RX ORDER — PREGABALIN 225 MG/1
1 CAPSULE ORAL
Qty: 0 | Refills: 0 | DISCHARGE

## 2020-08-21 NOTE — ASU DISCHARGE PLAN (ADULT/PEDIATRIC) - ASU DC SPECIAL INSTRUCTIONSFT
Wound Care:   the day AFTER your procedure remove bandage GENTLY, and clean using  mild soap and gentle warm, water stream, pat dry. leave OPEN to air. YOU MAY SHOWER   DO NOT apply lotions, creams, ointments, powder, perfumes to your incision site  DO NOT SOAK your site for 1 week ( no baths, no pools, no tubs, etc...)  Check  your groin and /or wirst daily.A small amount of bruising, and soarness are normal    ACTIVITY: for 24 hours   - DO NOT DRIVE  - DO NOT make any important decisions or sign legal documents   - DO NOT operate heavy machineries   - you may resume sexual activity in 48 hours, unless otherwise instructed by your cardiologist     If your procedure was done through the WRIST: for the NEXT 3DAYS:  - avoid pushing, pulling, with that affected wrist   - avoid repeated movement of that hand and wrist ( eg: typing, hammering)  - DO NOT LIFT anything more than 5 lbs     If your procedure was done through the GROIN: for the NEXT 5 DAYS  - Limit climbing stairs, DO NOT soak in bathtub or pool  - no strenuous activities, pushing, pulling, straining  - Do not lift anything 10lbs or heavier     MEDICATION:   take your medications as explained ( see discharge paperwork)   If you received a STENT, you will be taking antiplatelet medications to KEEP YOUR STENT OPEN ( eg: Aspirin, Plavix, Brilinta, Effient, etc).  Take as prescribed DO NOT STOP taking them without consulting with your cardiologist first.     Follow heart healthy diet recommended by your doctor, , if you smoke STOP SMOKING ( may call [phone] for center of tobacco control if you need assistance)     CALL your doctor to make appointment in 2 WEEKS     ***CALL YOUR DOCTOR***  if you experience: fever, chills, body aches, or severe pain, swelling, redness, heat or yellow discharge at incision site  If you experience Bleeding or excruciating pain at the procedural site, swelling ( golf ball size) at your procedural site  If you experience CHEST PAIN  If you experience extremity numbness, tingling, temperature change ( of your procedural site)   If you are unable to reach your doctor, you may contact:   -Cardiology Office at [facility] at [phone] or   - [facility] [phone]  - [facility] [phone]

## 2020-08-21 NOTE — ASU DISCHARGE PLAN (ADULT/PEDIATRIC) - CARE PROVIDER_API CALL
Gopi Marks)  Cardiology; Internal Medicine  [address] [facility] [address], [address]  [place] [address]  Phone: [phone]  Fax: [phone]  Established Patient  Follow Up Time:

## 2020-08-21 NOTE — H&P CARDIOLOGY - HISTORY OF PRESENT ILLNESS
This is a 50 year old gentlemen with a PMH of testicular ca s/p orchiectomy, HTN, HLD, melanoma on immunotherapy last infusion 4/2020, low serum cortisol level presented to cardiology, Dr. Marks,  for follow up. Patient was recently seen June 2020 with multiple issues including dyspnea, dizziness, fatigue, weight loss, visual changes, joint pain, swallowing problems and headaches. He states that the fatigue and SOB have become increasingly worse and have become daily issues. Patient had one episode of chest pain recently, s/p cardiac workup. Patient denies palpitations, nausea, vomiting, dizziness and lightheadedness.  NST 7/7/2020 EF 63% normal myocardial perfusion scan. Pt. continues to have SOB and presents for further evaluation and RHC/LHC. This is a 50 year old gentlemen former smoker 20 pack years with a PMH of testicular ca s/p orchiectomy, HTN, HLD, melanoma on immunotherapy (right chest wall mediport) last infusion 4/2020, hypothyroidism secondary adrenal insufficiency, low serum cortisol level presented to cardiology, Dr. Marks,  for follow up. Patient was recently seen June 2020 with multiple issues including dyspnea, dizziness, fatigue, weight loss, visual changes, joint pain, swallowing problems and headaches. He states that the fatigue and SOB have become increasingly worse and have become daily issues. Patient had one episode of chest pain recently, s/p cardiac workup. Patient denies palpitations, nausea, vomiting, dizziness and lightheadedness.  TTE 6/1/2020 EF 55-60%. NST 7/7/2020 EF 63% normal myocardial perfusion scan. Pt. continues to have SOB and presents for further evaluation and RHC/LHC. This is a 50 year old gentlemen former smoker 20 pack years with a PMH of testicular ca s/p orchiectomy, HTN, HLD, melanoma on immunotherapy (right chest wall mediport) last infusion 4/2020, hypothyroidism secondary adrenal insufficiency, low serum cortisol level presented to cardiology, Dr. Marks,  for follow up. Patient was recently seen June 2020 with multiple issues including dyspnea, dizziness, fatigue, weight loss, visual changes, joint pain, swallowing problems and headaches. He states that the fatigue and SOB have become increasingly worse and have become daily issues. Patient had one episode of chest pain recently, s/p cardiac workup. Patient denies palpitations, nausea, vomiting, dizziness and lightheadedness.  TTE 6/1/2020 EF 55-60%. NST 7/7/2020 EF 63% normal myocardial perfusion scan. Pt. continues to have SOB and presents for further evaluation and RHC/LHC.     Symptoms:        Angina (Class):        Ischemic Symptoms: SOB  Heart Failure:        Systolic/Diastolic/Combined:        NYHA Class (within 2 weeks):   Assessment of LVEF:       EF: 55       Assessed by: TTE       Date: 6/1/2020  Prior Cardiac Interventions:       PCI's: no       CABG: no  Noninvasive Testing:   Stress Test: Date: 7/7/2020       Protocol: Shankar        Duration of Exercise: 8 minutes       Symptoms: fatigue       EKG Changes: none       DTS:        Myocardial Imaging:        Risk Assessment:     Echo: see HPI  Antianginal Therapies:        Beta Blockers:  Metoprolol       Calcium Channel Blockers: no	       Long Acting Nitrates: no       Ranexa: no    Associated Risk Factors:        Cerebrovascular Disease: N/A       Chronic Lung Disease: N/A       Peripheral Arterial Disease: N/A       Chronic Kidney Disease (if yes, what is GFR): N/A       Uncontrolled Diabetes (if yes, what is HgbA1C or FBS): N/A       Poorly Controlled Hypertension (if yes, what is SBP): N/A       Morbid Obesity (if yes, what is BMI): N/A       History of Recent Ventricular Arrhythmia: N/A       Inability to Ambulate Safely: N/A       Need for Therapeutic Anticoagulation: N/A       Antiplatelet or Contrast Allergy: N/A

## 2020-08-24 LAB
BASOPHILS # BLD AUTO: 0.05 K/UL
BASOPHILS NFR BLD AUTO: 0.6 %
DEPRECATED D DIMER PPP IA-ACNC: <150 NG/ML DDU
EOSINOPHIL # BLD AUTO: 0.25 K/UL
EOSINOPHIL NFR BLD AUTO: 2.9 %
HCT VFR BLD CALC: 47.3 %
HGB BLD-MCNC: 14.7 G/DL
IMM GRANULOCYTES NFR BLD AUTO: 0.3 %
LYMPHOCYTES # BLD AUTO: 3.01 K/UL
LYMPHOCYTES NFR BLD AUTO: 34.9 %
MAN DIFF?: NORMAL
MCHC RBC-ENTMCNC: 28.3 PG
MCHC RBC-ENTMCNC: 31.1 GM/DL
MCV RBC AUTO: 91 FL
MONOCYTES # BLD AUTO: 0.45 K/UL
MONOCYTES NFR BLD AUTO: 5.2 %
NEUTROPHILS # BLD AUTO: 4.84 K/UL
NEUTROPHILS NFR BLD AUTO: 56.1 %
PLATELET # BLD AUTO: 394 K/UL
RBC # BLD: 5.2 M/UL
RBC # FLD: 14.1 %
WBC # FLD AUTO: 8.63 K/UL

## 2020-08-25 ENCOUNTER — APPOINTMENT (OUTPATIENT)
Dept: HEMATOLOGY ONCOLOGY | Facility: CLINIC | Age: 50
End: 2020-08-25

## 2020-08-25 ENCOUNTER — OUTPATIENT (OUTPATIENT)
Dept: OUTPATIENT SERVICES | Facility: HOSPITAL | Age: 50
LOS: 1 days | Discharge: HOME | End: 2020-08-25

## 2020-08-25 ENCOUNTER — RESULT REVIEW (OUTPATIENT)
Age: 50
End: 2020-08-25

## 2020-08-25 ENCOUNTER — APPOINTMENT (OUTPATIENT)
Dept: NEUROLOGY | Facility: CLINIC | Age: 50
End: 2020-08-25
Payer: COMMERCIAL

## 2020-08-25 DIAGNOSIS — Z98.89 OTHER SPECIFIED POSTPROCEDURAL STATES: Chronic | ICD-10-CM

## 2020-08-25 DIAGNOSIS — C43.8 MALIGNANT MELANOMA OF OVERLAPPING SITES OF SKIN: ICD-10-CM

## 2020-08-25 DIAGNOSIS — C43.4 MALIGNANT MELANOMA OF SCALP AND NECK: Chronic | ICD-10-CM

## 2020-08-25 DIAGNOSIS — I89.9 NONINFECTIVE DISORDER OF LYMPHATIC VESSELS AND LYMPH NODES, UNSPECIFIED: Chronic | ICD-10-CM

## 2020-08-25 DIAGNOSIS — Z90.79 ACQUIRED ABSENCE OF OTHER GENITAL ORGAN(S): Chronic | ICD-10-CM

## 2020-08-25 LAB
BASOPHILS # BLD AUTO: 0.09 K/UL — SIGNIFICANT CHANGE UP (ref 0–0.2)
BASOPHILS NFR BLD AUTO: 1.2 % — SIGNIFICANT CHANGE UP (ref 0–2)
EOSINOPHIL # BLD AUTO: 0.7 K/UL — HIGH (ref 0–0.5)
EOSINOPHIL NFR BLD AUTO: 9 % — HIGH (ref 0–6)
HCT VFR BLD CALC: 48.8 % — SIGNIFICANT CHANGE UP (ref 39–50)
HGB BLD-MCNC: 15.8 G/DL — SIGNIFICANT CHANGE UP (ref 13–17)
IMM GRANULOCYTES NFR BLD AUTO: 0.4 % — SIGNIFICANT CHANGE UP (ref 0–1.5)
LYMPHOCYTES # BLD AUTO: 3.09 K/UL — SIGNIFICANT CHANGE UP (ref 1–3.3)
LYMPHOCYTES # BLD AUTO: 39.7 % — SIGNIFICANT CHANGE UP (ref 13–44)
MCHC RBC-ENTMCNC: 28.7 PG — SIGNIFICANT CHANGE UP (ref 27–34)
MCHC RBC-ENTMCNC: 32.4 GM/DL — SIGNIFICANT CHANGE UP (ref 32–36)
MCV RBC AUTO: 88.6 FL — SIGNIFICANT CHANGE UP (ref 80–100)
MONOCYTES # BLD AUTO: 0.52 K/UL — SIGNIFICANT CHANGE UP (ref 0–0.9)
MONOCYTES NFR BLD AUTO: 6.7 % — SIGNIFICANT CHANGE UP (ref 2–14)
NEUTROPHILS # BLD AUTO: 3.36 K/UL — SIGNIFICANT CHANGE UP (ref 1.8–7.4)
NEUTROPHILS NFR BLD AUTO: 43 % — SIGNIFICANT CHANGE UP (ref 43–77)
NRBC # BLD: 0 /100 WBCS — SIGNIFICANT CHANGE UP (ref 0–0)
PLATELET # BLD AUTO: 429 K/UL — HIGH (ref 150–400)
RBC # BLD: 5.51 M/UL — SIGNIFICANT CHANGE UP (ref 4.2–5.8)
RBC # FLD: 13.7 % — SIGNIFICANT CHANGE UP (ref 10.3–14.5)
WBC # BLD: 7.79 K/UL — SIGNIFICANT CHANGE UP (ref 3.8–10.5)
WBC # FLD AUTO: 7.79 K/UL — SIGNIFICANT CHANGE UP (ref 3.8–10.5)

## 2020-08-25 PROCEDURE — 99214 OFFICE O/P EST MOD 30 MIN: CPT

## 2020-08-25 PROCEDURE — 99205 OFFICE O/P NEW HI 60 MIN: CPT

## 2020-08-26 ENCOUNTER — LABORATORY RESULT (OUTPATIENT)
Age: 50
End: 2020-08-26

## 2020-08-26 ENCOUNTER — APPOINTMENT (OUTPATIENT)
Dept: PULMONOLOGY | Facility: CLINIC | Age: 50
End: 2020-08-26
Payer: COMMERCIAL

## 2020-08-26 VITALS
RESPIRATION RATE: 16 BRPM | DIASTOLIC BLOOD PRESSURE: 73 MMHG | TEMPERATURE: 98 F | HEART RATE: 78 BPM | OXYGEN SATURATION: 99 % | SYSTOLIC BLOOD PRESSURE: 104 MMHG

## 2020-08-26 LAB
ALBUMIN SERPL ELPH-MCNC: 5 G/DL
ALP BLD-CCNC: 65 U/L
ALT SERPL-CCNC: 20 U/L
ANION GAP SERPL CALC-SCNC: 11 MMOL/L
AST SERPL-CCNC: 20 U/L
BILIRUB SERPL-MCNC: 0.5 MG/DL
BUN SERPL-MCNC: 19 MG/DL
CALCIUM SERPL-MCNC: 10.6 MG/DL
CHLORIDE SERPL-SCNC: 98 MMOL/L
CO2 SERPL-SCNC: 27 MMOL/L
CREAT SERPL-MCNC: 1.19 MG/DL
CRP SERPL-MCNC: 0.18 MG/DL
ERYTHROCYTE [SEDIMENTATION RATE] IN BLOOD BY WESTERGREN METHOD: 15 MM/HR
FSH SERPL-MCNC: 18.5 IU/L
GLUCOSE SERPL-MCNC: 88 MG/DL
LDH SERPL-CCNC: 166 U/L
LH SERPL-ACNC: 7.9 IU/L
POTASSIUM SERPL-SCNC: 4.9 MMOL/L
PROLACTIN SERPL-MCNC: 8.6 NG/ML
PROT SERPL-MCNC: 7.3 G/DL
SODIUM SERPL-SCNC: 136 MMOL/L
TSH SERPL-ACNC: 3.17 UIU/ML

## 2020-08-26 PROCEDURE — 36415 COLL VENOUS BLD VENIPUNCTURE: CPT

## 2020-08-26 PROCEDURE — 99204 OFFICE O/P NEW MOD 45 MIN: CPT | Mod: 25

## 2020-08-26 PROCEDURE — 71046 X-RAY EXAM CHEST 2 VIEWS: CPT

## 2020-08-27 NOTE — PROCEDURE
[FreeTextEntry1] : \par  Xray Chest 2 Views PA/Lat             Final\par   \par Chest x-ray PA and lateral views performed in my office today showed s/p Mediport, clear lungs, no evidence of infiltrates or pleural effusions. \par \par \par  Ordered by: OSCAR CHO       Collected/Examined: [id number] 02:15PM       \par Verification Required       Stage: Final       \par  Performed at: In Office       Performed by: OSCAR CHO       Resulted: [id number] 02:15PM       Last Updated: [id number] 02:15PM

## 2020-08-27 NOTE — HISTORY OF PRESENT ILLNESS
[TextBox_4] : Conrad is a pleasant 50-year-old gentleman with history of hypertension, hyper, thyroidism he was diagnosed with right scalp melanoma 18, cervical lymph node dissection/surgery he subsequently underwent therapy which led to secondary insufficiency, he came in complaining of worsening exertional dyspnea for the last 2 months, he has no chest pain, cough, fever or chills.  He reports that he recently underwent a negative cardiac catheterization.

## 2020-08-27 NOTE — ASSESSMENT
[FreeTextEntry1] : Venipuncture with labs drawn in office\par I will perform a pulmonary function test for further evaluation

## 2020-08-27 NOTE — DISCUSSION/SUMMARY
[FreeTextEntry1] : Conrad is a patient with worsening exertional dyspnea, rule out asthma/bronchitis in this\par ex-Cigarette smoker, rule out PE, rule out anxiety/stress

## 2020-08-27 NOTE — CONSULT LETTER
[Dear  ___] : Dear  [unfilled], [Courtesy Letter:] : I had the pleasure of seeing your patient, [unfilled], in my office today. [Please see my note below.] : Please see my note below. [Sincerely,] : Sincerely, [FreeTextEntry3] : Dr. Nan Quezada [DrNichole  ___] : Dr. ALVARADO

## 2020-08-28 ENCOUNTER — APPOINTMENT (OUTPATIENT)
Dept: PULMONOLOGY | Facility: CLINIC | Age: 50
End: 2020-08-28
Payer: COMMERCIAL

## 2020-08-28 VITALS
RESPIRATION RATE: 16 BRPM | BODY MASS INDEX: 23.19 KG/M2 | DIASTOLIC BLOOD PRESSURE: 65 MMHG | HEIGHT: 70 IN | WEIGHT: 162 LBS | SYSTOLIC BLOOD PRESSURE: 96 MMHG | TEMPERATURE: 98 F | HEART RATE: 68 BPM | OXYGEN SATURATION: 100 %

## 2020-08-28 PROCEDURE — 95012 NITRIC OXIDE EXP GAS DETER: CPT

## 2020-08-28 PROCEDURE — 94010 BREATHING CAPACITY TEST: CPT

## 2020-08-28 PROCEDURE — 88738 HGB QUANT TRANSCUTANEOUS: CPT

## 2020-08-28 PROCEDURE — 94750: CPT

## 2020-08-28 PROCEDURE — 94726 PLETHYSMOGRAPHY LUNG VOLUMES: CPT

## 2020-08-28 PROCEDURE — 94729 DIFFUSING CAPACITY: CPT

## 2020-08-28 PROCEDURE — 99214 OFFICE O/P EST MOD 30 MIN: CPT | Mod: 25

## 2020-08-30 NOTE — PHYSICAL EXAM
[No Acute Distress] : no acute distress [Normal Oropharynx] : normal oropharynx [Normal Appearance] : normal appearance [Normal Rate/Rhythm] : normal rate/rhythm [No Neck Mass] : no neck mass [Normal S1, S2] : normal s1, s2 [No Murmurs] : no murmurs [No Resp Distress] : no resp distress [Clear to Auscultation Bilaterally] : clear to auscultation bilaterally [No Abnormalities] : no abnormalities [Normal Gait] : normal gait [Benign] : benign [No Clubbing] : no clubbing [No Cyanosis] : no cyanosis [No Edema] : no edema [FROM] : FROM [Normal Color/ Pigmentation] : normal color/ pigmentation [Oriented x3] : oriented x3 [No Focal Deficits] : no focal deficits [Normal Affect] : normal affect

## 2020-08-30 NOTE — PROCEDURE
[FreeTextEntry1] : Pulmonary Function Test: Lung Volume: Within normal limits; Spirometry: Within normal limits; Resistance: Within normal limits; diffusion: Within normal limits.\par \par \par  Exhaled Nitric Oxide             Final\par \par No Documents Attached\par \par \par   Test   Result   Flag Reference Goal Last Verified \par   Exhaled Nitric Oxide 25      REQUIRED \par \par  Ordered by: OSCAR CHO       Collected/Examined: 28Aug2020 02:49PM       \par Verification Required       Stage: Final       \par  Performed at: Other       Performed by: OSCAR CHO       Resulted: 28Aug2020 02:49PM       Last Updated: 28Aug2020 02:56PM       \par

## 2020-08-31 ENCOUNTER — OUTPATIENT (OUTPATIENT)
Dept: OUTPATIENT SERVICES | Facility: HOSPITAL | Age: 50
LOS: 1 days | End: 2020-08-31
Payer: COMMERCIAL

## 2020-08-31 ENCOUNTER — APPOINTMENT (OUTPATIENT)
Dept: CT IMAGING | Facility: CLINIC | Age: 50
End: 2020-08-31
Payer: COMMERCIAL

## 2020-08-31 DIAGNOSIS — Z90.79 ACQUIRED ABSENCE OF OTHER GENITAL ORGAN(S): Chronic | ICD-10-CM

## 2020-08-31 DIAGNOSIS — I89.9 NONINFECTIVE DISORDER OF LYMPHATIC VESSELS AND LYMPH NODES, UNSPECIFIED: Chronic | ICD-10-CM

## 2020-08-31 DIAGNOSIS — Z98.89 OTHER SPECIFIED POSTPROCEDURAL STATES: Chronic | ICD-10-CM

## 2020-08-31 DIAGNOSIS — R06.00 DYSPNEA, UNSPECIFIED: ICD-10-CM

## 2020-08-31 DIAGNOSIS — C43.4 MALIGNANT MELANOMA OF SCALP AND NECK: Chronic | ICD-10-CM

## 2020-08-31 LAB
ACE BLD-CCNC: 91 U/L
ANA SER IF-ACNC: NEGATIVE
ANION GAP SERPL CALC-SCNC: 29 MMOL/L
BASOPHILS # BLD AUTO: 0.09 K/UL
BASOPHILS NFR BLD AUTO: 0.7 %
BUN SERPL-MCNC: 23 MG/DL
CALCIUM SERPL-MCNC: 10.9 MG/DL
CHLORIDE SERPL-SCNC: 99 MMOL/L
CK SERPL-CCNC: 155 U/L
CO2 SERPL-SCNC: 12 MMOL/L
CREAT SERPL-MCNC: 1.14 MG/DL
CRP SERPL-MCNC: 0.18 MG/DL
DEPRECATED D DIMER PPP IA-ACNC: <150 NG/ML DDU
ENA JO1 AB SER IA-ACNC: <0.2 AL
ENA SCL70 IGG SER IA-ACNC: <0.2 AL
EOSINOPHIL # BLD AUTO: 0.55 K/UL
EOSINOPHIL NFR BLD AUTO: 4.5 %
ERYTHROCYTE [SEDIMENTATION RATE] IN BLOOD BY WESTERGREN METHOD: 11 MM/HR
GLUCOSE SERPL-MCNC: 93 MG/DL
HCT VFR BLD CALC: 48.6 %
HGB BLD-MCNC: 15.4 G/DL
HIV1+2 AB SPEC QL IA.RAPID: NONREACTIVE
IMM GRANULOCYTES NFR BLD AUTO: 0.3 %
LYMPHOCYTES # BLD AUTO: 2.75 K/UL
LYMPHOCYTES NFR BLD AUTO: 22.6 %
MAN DIFF?: NORMAL
MCHC RBC-ENTMCNC: 28.3 PG
MCHC RBC-ENTMCNC: 31.7 GM/DL
MCV RBC AUTO: 89.3 FL
MONOCYTES # BLD AUTO: 0.5 K/UL
MONOCYTES NFR BLD AUTO: 4.1 %
MPO AB + PR3 PNL SER: NORMAL
NEUTROPHILS # BLD AUTO: 8.25 K/UL
NEUTROPHILS NFR BLD AUTO: 67.8 %
PLATELET # BLD AUTO: 393 K/UL
POTASSIUM SERPL-SCNC: 5.2 MMOL/L
RBC # BLD: 5.44 M/UL
RBC # FLD: 14 %
RHEUMATOID FACT SER QL: <10 IU/ML
SARS-COV-2 N GENE NPH QL NAA+PROBE: NOT DETECTED
SODIUM SERPL-SCNC: 140 MMOL/L
TESTOST BND SERPL-MCNC: 3.5 PG/ML
TESTOST SERPL-MCNC: 309.5 NG/DL
TOTAL IGE SMQN RAST: 45 KU/L
WBC # FLD AUTO: 12.18 K/UL

## 2020-08-31 PROCEDURE — 71260 CT THORAX DX C+: CPT | Mod: 26

## 2020-08-31 PROCEDURE — 71260 CT THORAX DX C+: CPT

## 2020-09-04 ENCOUNTER — APPOINTMENT (OUTPATIENT)
Dept: PULMONOLOGY | Facility: CLINIC | Age: 50
End: 2020-09-04
Payer: COMMERCIAL

## 2020-09-04 VITALS
OXYGEN SATURATION: 98 % | RESPIRATION RATE: 15 BRPM | SYSTOLIC BLOOD PRESSURE: 95 MMHG | TEMPERATURE: 98.3 F | DIASTOLIC BLOOD PRESSURE: 66 MMHG | HEART RATE: 80 BPM

## 2020-09-04 LAB
CORTICOSTEROID BIND GLOBULIN: 2.7 MG/DL
CORTIS SERPL-MCNC: <1 UG/DL
CORTISOL, FREE: <0.02 UG/DL
PFCX: <2 %

## 2020-09-04 PROCEDURE — 99214 OFFICE O/P EST MOD 30 MIN: CPT

## 2020-09-04 NOTE — PHYSICAL EXAM
[No Acute Distress] : no acute distress [Normal Oropharynx] : normal oropharynx [Normal Appearance] : normal appearance [Normal Rate/Rhythm] : normal rate/rhythm [No Neck Mass] : no neck mass [Normal S1, S2] : normal s1, s2 [No Murmurs] : no murmurs [Clear to Auscultation Bilaterally] : clear to auscultation bilaterally [No Resp Distress] : no resp distress [No Abnormalities] : no abnormalities [Benign] : benign [Normal Gait] : normal gait [No Clubbing] : no clubbing [No Cyanosis] : no cyanosis [No Edema] : no edema [FROM] : FROM [Normal Color/ Pigmentation] : normal color/ pigmentation [No Focal Deficits] : no focal deficits [Oriented x3] : oriented x3 [Normal Affect] : normal affect

## 2020-09-08 ENCOUNTER — APPOINTMENT (OUTPATIENT)
Dept: HEMATOLOGY ONCOLOGY | Facility: CLINIC | Age: 50
End: 2020-09-08
Payer: COMMERCIAL

## 2020-09-08 ENCOUNTER — APPOINTMENT (OUTPATIENT)
Dept: RHEUMATOLOGY | Facility: CLINIC | Age: 50
End: 2020-09-08
Payer: COMMERCIAL

## 2020-09-08 VITALS
WEIGHT: 163 LBS | BODY MASS INDEX: 23.34 KG/M2 | TEMPERATURE: 96.4 F | DIASTOLIC BLOOD PRESSURE: 77 MMHG | RESPIRATION RATE: 15 BRPM | HEIGHT: 70 IN | OXYGEN SATURATION: 98 % | SYSTOLIC BLOOD PRESSURE: 114 MMHG | HEART RATE: 82 BPM

## 2020-09-08 DIAGNOSIS — M25.562 PAIN IN RIGHT KNEE: ICD-10-CM

## 2020-09-08 DIAGNOSIS — M25.561 PAIN IN RIGHT KNEE: ICD-10-CM

## 2020-09-08 DIAGNOSIS — M25.50 PAIN IN UNSPECIFIED JOINT: ICD-10-CM

## 2020-09-08 LAB
ALTERN TENCAPG(M6): <2 MCG/ML
ASPER FUMCAPG(M3): 7.9 MCG/ML
AUREOBASCAPG(M12): 2.4 MCG/ML
MICROPOLYCAPG(M22): <2 MCG/ML
PENIC CHRYCAPG(M1): 6.6 MCG/ML
PHOMA BETAE IGG: <2 MCG/ML
THERMOCAPG(M23): 10.1 MCG/ML
TRICHODERMA VIRIDE IGG: 2.1 MCG/ML

## 2020-09-08 PROCEDURE — 99441: CPT

## 2020-09-08 PROCEDURE — 99214 OFFICE O/P EST MOD 30 MIN: CPT

## 2020-09-08 PROCEDURE — 99203 OFFICE O/P NEW LOW 30 MIN: CPT

## 2020-09-09 DIAGNOSIS — E83.52 HYPERCALCEMIA: ICD-10-CM

## 2020-09-10 ENCOUNTER — APPOINTMENT (OUTPATIENT)
Dept: HEMATOLOGY ONCOLOGY | Facility: CLINIC | Age: 50
End: 2020-09-10
Payer: COMMERCIAL

## 2020-09-10 VITALS
DIASTOLIC BLOOD PRESSURE: 76 MMHG | TEMPERATURE: 97.9 F | OXYGEN SATURATION: 95 % | BODY MASS INDEX: 24.09 KG/M2 | SYSTOLIC BLOOD PRESSURE: 117 MMHG | RESPIRATION RATE: 16 BRPM | HEART RATE: 75 BPM | WEIGHT: 170.2 LBS | HEIGHT: 70.47 IN

## 2020-09-10 DIAGNOSIS — Z51.5 ENCOUNTER FOR PALLIATIVE CARE: ICD-10-CM

## 2020-09-10 DIAGNOSIS — Z79.899 OTHER LONG TERM (CURRENT) DRUG THERAPY: ICD-10-CM

## 2020-09-10 DIAGNOSIS — R52 PAIN, UNSPECIFIED: ICD-10-CM

## 2020-09-10 LAB
25(OH)D3 SERPL-MCNC: 48.8 NG/ML
ALBUMIN SERPL ELPH-MCNC: 5.1 G/DL
ALP BLD-CCNC: 65 U/L
ALT SERPL-CCNC: 16 U/L
ANION GAP SERPL CALC-SCNC: 12 MMOL/L
AST SERPL-CCNC: 15 U/L
BILIRUB SERPL-MCNC: 0.4 MG/DL
BUN SERPL-MCNC: 18 MG/DL
CALCIUM SERPL-MCNC: 10.7 MG/DL
CHLORIDE SERPL-SCNC: 99 MMOL/L
CO2 SERPL-SCNC: 27 MMOL/L
CREAT SERPL-MCNC: 0.98 MG/DL
DHEA SERPL-MCNC: <20 NG/DL
GLUCOSE SERPL-MCNC: 100 MG/DL
MAGNESIUM SERPL-MCNC: 2.2 MG/DL
POTASSIUM SERPL-SCNC: 5 MMOL/L
PROT SERPL-MCNC: 7.5 G/DL
SODIUM SERPL-SCNC: 138 MMOL/L
T3FREE SERPL-MCNC: 3.28 PG/ML
T4 FREE SERPL-MCNC: 1.2 NG/DL
TSH SERPL-ACNC: 1.36 UIU/ML
VIT B12 SERPL-MCNC: 464 PG/ML

## 2020-09-10 PROCEDURE — 99215 OFFICE O/P EST HI 40 MIN: CPT

## 2020-09-10 NOTE — ASSESSMENT
[FreeTextEntry1] : Start Celexa 10 mg p.o. daily\par Medications reviewed. Continue present medications.\par

## 2020-09-10 NOTE — PROCEDURE
[FreeTextEntry1] : \par \par \par   CT Chest w/ IV Cont             Final\par \par No Documents Attached\par \par \par \par \par   EXAM:  CT CHEST IC\par \par \par PROCEDURE DATE:  08/31/2020\par \par \par \par INTERPRETATION:  CLINICAL INDICATION: Evaluate for sarcoidosis, dyspnea, elevated ACE.\par \par CT of the chest was performed following intravenous administration of 40 cc of Omnipaque 350. 10 cc of contrast was discarded. MIP images are submitted.\par \par Right-sided Mediport with the tip in the superior vena cava.\par \par No enlarged axillary, mediastinal or hilar lymph nodes.\par \par Heart size is normal. No pericardial effusion. Coronary artery calcification. No pleural effusions. Mild atherosclerotic disease of the aorta.\par \par Evaluation of the upper abdomen demonstrate retroperitoneal surgical clips.\par \par Evaluation of the lungs demonstrate biapical scarring unchanged.\par \par 2 mm left upper lobe pulmonary nodule on image 57 of series 3 is unchanged since November 15, 2011.\par 2 mm left lower lobe calcified granuloma is unchanged November 15, 2011.\par 2 mm right lower lobe pulmonary nodule on image 85 of series 3 is unchanged November 15, 2011.\par \par No pneumonia. No central endobronchial lesions. No interstitial lung disease.\par \par Evaluation of the bones demonstrate degenerative changes of the spine.\par \par IMPRESSION: No mediastinal or hilar lymphadenopathy.\par \par No interstitial lung disease.\par \par \par \par \par \par \par \par JAYCOB RUVALCABA M.D., ATTENDING RADIOLOGIST\par This document has been electronically signed. Sep  2 2020  9:25AM\par \par  \par \par  Ordered by: OSCAR CHO       Collected/Examined: [id number] 04:05PM       \par Verification Required       Stage: Final       \par  Performed at: [facility] [place]       Resulted: 02Sep2020 09:28AM       Last Updated: 02Sep2020 09:29AM       Accession: [id number]       \par

## 2020-09-10 NOTE — ASSESSMENT
[FreeTextEntry1] : Neuromuscular /somatic/neuropathic pain syndrome/polyarthralgia, in heavily pre-treated pt w h/o multiple cancers.Underlying chronic polyneuropathy and neuroplastic syndrome a significant component of his pain, but is having a flare.Not uncommon in this type of tretament related situation.Have bolvtr4j piotentioal benfits and risks of MM, pt aopears to not have a high risk profile..will therefore start MM and re-assess  in 1-2 mos.Instructions given re dosing  recommendations per [place].

## 2020-09-10 NOTE — REVIEW OF SYSTEMS
[Joint Pain] : joint pain [Joint Stiffness] : joint stiffness [Muscle Pain] : muscle pain [Muscle Weakness] : muscle weakness [Difficulty Walking] : difficulty walking [Negative] : Endocrine

## 2020-09-20 DIAGNOSIS — Z01.818 ENCOUNTER FOR OTHER PREPROCEDURAL EXAMINATION: ICD-10-CM

## 2020-09-22 ENCOUNTER — APPOINTMENT (OUTPATIENT)
Dept: DISASTER EMERGENCY | Facility: CLINIC | Age: 50
End: 2020-09-22

## 2020-09-23 LAB — SARS-COV-2 N GENE NPH QL NAA+PROBE: NOT DETECTED

## 2020-09-25 ENCOUNTER — RESULT REVIEW (OUTPATIENT)
Age: 50
End: 2020-09-25

## 2020-09-25 ENCOUNTER — OUTPATIENT (OUTPATIENT)
Dept: OUTPATIENT SERVICES | Facility: HOSPITAL | Age: 50
LOS: 1 days | Discharge: HOME | End: 2020-09-25
Payer: COMMERCIAL

## 2020-09-25 ENCOUNTER — APPOINTMENT (OUTPATIENT)
Dept: SURGERY | Facility: HOSPITAL | Age: 50
End: 2020-09-25
Payer: COMMERCIAL

## 2020-09-25 VITALS
SYSTOLIC BLOOD PRESSURE: 110 MMHG | RESPIRATION RATE: 20 BRPM | TEMPERATURE: 97 F | HEIGHT: 70 IN | WEIGHT: 164.91 LBS | DIASTOLIC BLOOD PRESSURE: 77 MMHG | HEART RATE: 70 BPM | OXYGEN SATURATION: 96 %

## 2020-09-25 VITALS
OXYGEN SATURATION: 100 % | DIASTOLIC BLOOD PRESSURE: 73 MMHG | HEART RATE: 64 BPM | SYSTOLIC BLOOD PRESSURE: 110 MMHG | RESPIRATION RATE: 16 BRPM

## 2020-09-25 DIAGNOSIS — Z98.89 OTHER SPECIFIED POSTPROCEDURAL STATES: Chronic | ICD-10-CM

## 2020-09-25 DIAGNOSIS — Z90.79 ACQUIRED ABSENCE OF OTHER GENITAL ORGAN(S): Chronic | ICD-10-CM

## 2020-09-25 DIAGNOSIS — C43.4 MALIGNANT MELANOMA OF SCALP AND NECK: Chronic | ICD-10-CM

## 2020-09-25 DIAGNOSIS — I89.9 NONINFECTIVE DISORDER OF LYMPHATIC VESSELS AND LYMPH NODES, UNSPECIFIED: Chronic | ICD-10-CM

## 2020-09-25 DIAGNOSIS — Z86.010 PERSONAL HISTORY OF COLONIC POLYPS: ICD-10-CM

## 2020-09-25 PROCEDURE — 45380 COLONOSCOPY AND BIOPSY: CPT | Mod: XS

## 2020-09-25 PROCEDURE — 88305 TISSUE EXAM BY PATHOLOGIST: CPT | Mod: 26

## 2020-09-25 PROCEDURE — 45385 COLONOSCOPY W/LESION REMOVAL: CPT

## 2020-09-25 PROCEDURE — 88305 TISSUE EXAM BY PATHOLOGIST: CPT

## 2020-09-25 RX ORDER — SODIUM CHLORIDE 9 MG/ML
1000 INJECTION INTRAMUSCULAR; INTRAVENOUS; SUBCUTANEOUS
Refills: 0 | Status: DISCONTINUED | OUTPATIENT
Start: 2020-09-25 | End: 2020-10-10

## 2020-09-25 RX ADMIN — SODIUM CHLORIDE 30 MILLILITER(S): 9 INJECTION INTRAMUSCULAR; INTRAVENOUS; SUBCUTANEOUS at 16:07

## 2020-09-25 NOTE — PRE PROCEDURE NOTE - PRE PROCEDURE EVALUATION
Attending Physician:         Dwayne                   Procedure: Colonoscopy    Indication for Procedure: Hx of polypectomy (x8 in 2019), hx of metastatic cancer  ________________________________________________________  PAST MEDICAL & SURGICAL HISTORY:  Malignant melanoma of scalp  RSX 08/18  R neck mass dsx/ LN dsx 10/19/18    Hypertriglyceridemia    History of bone marrow transplant    Osteoarthritis  degenerative disc L3-4    BPH (benign prostatic hyperplasia)    Colitis  surgery 1996    GERD (gastroesophageal reflux disease)    NHL (non-Hodgkin&#x27;s lymphoma)  1999, Radiation to left neck region    HTN (hypertension)    Headache, Migraine    Testicular Cancer  1994, chemotherapy    Melanoma of scalp or neck  excision 8/18  s/p excision right neck mass &amp; LN dissx    Lymph node disorder  s/p abdominal lymph node dissection 1994, 1996    S/P colon resection  1996    History of orchiectomy  left 1994      ALLERGIES:  No Known Allergies    HOME MEDICATIONS:  atorvastatin 80 mg oral tablet: 1 tab(s) orally once a day  levothyroxine 50 mcg (0.05 mg) oral tablet: 1 tab(s) orally once a day  Migranal 4 mg/mL nasal spray: 1 spray(s) nasal every 15 minutes, As Needed  Norvasc 5 mg oral tablet: 1 tab(s) orally once a day  sildenafil 50 mg oral tablet: 1 tab(s) orally once a day, As Needed  Toprol-XL 50 mg oral tablet, extended release: 1 tab(s) orally once a day  TriCor 145 mg oral tablet: 1 tab(s) orally once a day  Ubrelvy 100 mg oral tablet: 1 tab(s) orally once, As Needed Migraine     AICD/PPM: [ ] yes   [ ] no    PERTINENT LAB DATA:                      PHYSICAL EXAMINATION:    T(C): --  HR: --  BP: --  RR: --  SpO2: --    Constitutional: NAD  HEENT: PERRLA, EOMI,    Neck:  No JVD  Respiratory: CTAB/L  Cardiovascular: S1 and S2  Gastrointestinal: BS+, soft, NT/ND  Extremities: No peripheral edema  Neurological: A/O x 3, no focal deficits  Psychiatric: Normal mood, normal affect  Skin: No rashes    ASA Class: I [ ]  II [ ]  III [ ]  IV [ ]    COMMENTS:    The patient is a suitable candidate for the planned procedure unless box checked [ ]  No, explain:

## 2020-09-25 NOTE — ASU DISCHARGE PLAN (ADULT/PEDIATRIC) - CARE PROVIDER_API CALL
Boyd Rice  COLON/RECTAL SURGERY  [address] [place] [address]  Phone: [phone]  Fax: [phone]  Follow Up Time:

## 2020-09-25 NOTE — PRE-ANESTHESIA EVALUATION ADULT - NSANTHPMHFT_GEN_ALL_CORE
This is a 50 year old gentlemen former smoker 20 pack years with a PMH of testicular ca s/p orchiectomy, HTN, HLD, s/p colon resection

## 2020-09-28 LAB — SURGICAL PATHOLOGY STUDY: SIGNIFICANT CHANGE UP

## 2020-10-02 ENCOUNTER — APPOINTMENT (OUTPATIENT)
Dept: PULMONOLOGY | Facility: CLINIC | Age: 50
End: 2020-10-02
Payer: COMMERCIAL

## 2020-10-02 VITALS
OXYGEN SATURATION: 98 % | TEMPERATURE: 98.2 F | HEART RATE: 82 BPM | SYSTOLIC BLOOD PRESSURE: 110 MMHG | DIASTOLIC BLOOD PRESSURE: 77 MMHG

## 2020-10-02 DIAGNOSIS — F32.9 ANXIETY DISORDER, UNSPECIFIED: ICD-10-CM

## 2020-10-02 DIAGNOSIS — R06.00 DYSPNEA, UNSPECIFIED: ICD-10-CM

## 2020-10-02 DIAGNOSIS — F41.9 ANXIETY DISORDER, UNSPECIFIED: ICD-10-CM

## 2020-10-02 PROCEDURE — 99214 OFFICE O/P EST MOD 30 MIN: CPT

## 2020-10-04 PROBLEM — R06.00 DYSPNEA: Status: ACTIVE | Noted: 2020-06-15

## 2020-10-04 NOTE — ASSESSMENT
[FreeTextEntry1] : Start Effexor  mg p.o. daily for anxiety/depression, discontinue Celexa.\par Return to the office for follow-up visit in 1 month.

## 2020-10-06 ENCOUNTER — APPOINTMENT (OUTPATIENT)
Dept: NUCLEAR MEDICINE | Facility: IMAGING CENTER | Age: 50
End: 2020-10-06
Payer: COMMERCIAL

## 2020-10-06 ENCOUNTER — OUTPATIENT (OUTPATIENT)
Dept: OUTPATIENT SERVICES | Facility: HOSPITAL | Age: 50
LOS: 1 days | End: 2020-10-06
Payer: COMMERCIAL

## 2020-10-06 DIAGNOSIS — Z90.79 ACQUIRED ABSENCE OF OTHER GENITAL ORGAN(S): Chronic | ICD-10-CM

## 2020-10-06 DIAGNOSIS — I89.9 NONINFECTIVE DISORDER OF LYMPHATIC VESSELS AND LYMPH NODES, UNSPECIFIED: Chronic | ICD-10-CM

## 2020-10-06 DIAGNOSIS — Z98.89 OTHER SPECIFIED POSTPROCEDURAL STATES: Chronic | ICD-10-CM

## 2020-10-06 DIAGNOSIS — C43.9 MALIGNANT MELANOMA OF SKIN, UNSPECIFIED: ICD-10-CM

## 2020-10-06 DIAGNOSIS — C43.4 MALIGNANT MELANOMA OF SCALP AND NECK: Chronic | ICD-10-CM

## 2020-10-06 PROCEDURE — 78816 PET IMAGE W/CT FULL BODY: CPT | Mod: 26,PS

## 2020-10-06 PROCEDURE — 78816 PET IMAGE W/CT FULL BODY: CPT

## 2020-10-06 PROCEDURE — A9552: CPT

## 2020-11-04 ENCOUNTER — OUTPATIENT (OUTPATIENT)
Dept: OUTPATIENT SERVICES | Facility: HOSPITAL | Age: 50
LOS: 1 days | Discharge: HOME | End: 2020-11-04

## 2020-11-04 DIAGNOSIS — C43.4 MALIGNANT MELANOMA OF SCALP AND NECK: Chronic | ICD-10-CM

## 2020-11-04 DIAGNOSIS — C43.8 MALIGNANT MELANOMA OF OVERLAPPING SITES OF SKIN: ICD-10-CM

## 2020-11-04 DIAGNOSIS — Z98.89 OTHER SPECIFIED POSTPROCEDURAL STATES: Chronic | ICD-10-CM

## 2020-11-04 DIAGNOSIS — I89.9 NONINFECTIVE DISORDER OF LYMPHATIC VESSELS AND LYMPH NODES, UNSPECIFIED: Chronic | ICD-10-CM

## 2020-11-04 DIAGNOSIS — Z90.79 ACQUIRED ABSENCE OF OTHER GENITAL ORGAN(S): Chronic | ICD-10-CM

## 2020-11-05 ENCOUNTER — APPOINTMENT (OUTPATIENT)
Dept: HEMATOLOGY ONCOLOGY | Facility: CLINIC | Age: 50
End: 2020-11-05
Payer: COMMERCIAL

## 2020-11-05 ENCOUNTER — APPOINTMENT (OUTPATIENT)
Dept: NEUROLOGY | Facility: CLINIC | Age: 50
End: 2020-11-05
Payer: COMMERCIAL

## 2020-11-05 VITALS
RESPIRATION RATE: 16 BRPM | HEART RATE: 71 BPM | SYSTOLIC BLOOD PRESSURE: 107 MMHG | TEMPERATURE: 97.5 F | OXYGEN SATURATION: 100 % | BODY MASS INDEX: 24.66 KG/M2 | HEIGHT: 70.47 IN | WEIGHT: 174.17 LBS | DIASTOLIC BLOOD PRESSURE: 74 MMHG

## 2020-11-05 DIAGNOSIS — R25.2 CRAMP AND SPASM: ICD-10-CM

## 2020-11-05 PROCEDURE — 99072 ADDL SUPL MATRL&STAF TM PHE: CPT

## 2020-11-05 PROCEDURE — 99215 OFFICE O/P EST HI 40 MIN: CPT

## 2020-11-05 RX ORDER — VENLAFAXINE HYDROCHLORIDE 150 MG/1
150 CAPSULE, EXTENDED RELEASE ORAL
Qty: 90 | Refills: 3 | Status: DISCONTINUED | COMMUNITY
Start: 2020-10-02 | End: 2020-11-05

## 2020-11-05 RX ORDER — CITALOPRAM HYDROBROMIDE 10 MG/1
10 TABLET, FILM COATED ORAL
Qty: 90 | Refills: 3 | Status: DISCONTINUED | COMMUNITY
Start: 2020-09-04 | End: 2020-11-05

## 2020-11-05 RX ORDER — DIHYDROERGOTAMINE MESYLATE 4 MG/ML
4 SPRAY NASAL
Qty: 2 | Refills: 2 | Status: DISCONTINUED | COMMUNITY
Start: 2020-02-04 | End: 2020-11-05

## 2020-11-05 RX ORDER — HYDROCORTISONE 10 MG/1
10 TABLET ORAL
Qty: 90 | Refills: 1 | Status: DISCONTINUED | COMMUNITY
Start: 2020-05-13 | End: 2020-11-05

## 2020-11-05 RX ORDER — OMEGA-3/DHA/EPA/FISH OIL 300-1000MG
1000 CAPSULE ORAL
Qty: 180 | Refills: 1 | Status: DISCONTINUED | COMMUNITY
Start: 2020-06-22 | End: 2020-11-05

## 2020-11-05 RX ORDER — DRONABINOL 2.5 MG/1
2.5 CAPSULE ORAL
Qty: 90 | Refills: 0 | Status: DISCONTINUED | COMMUNITY
Start: 2020-06-16 | End: 2020-11-05

## 2020-11-05 RX ORDER — UBROGEPANT 100 MG/1
100 TABLET ORAL
Qty: 10 | Refills: 2 | Status: DISCONTINUED | COMMUNITY
Start: 2020-02-07 | End: 2020-11-05

## 2020-11-05 RX ORDER — HYDROCORTISONE 20 MG/1
20 TABLET ORAL
Qty: 90 | Refills: 1 | Status: DISCONTINUED | COMMUNITY
Start: 2020-06-23 | End: 2020-11-05

## 2020-11-05 RX ORDER — HYDROXYZINE HYDROCHLORIDE 50 MG/1
50 TABLET ORAL
Qty: 60 | Refills: 0 | Status: DISCONTINUED | COMMUNITY
Start: 2020-02-04 | End: 2020-11-05

## 2020-11-05 RX ORDER — SODIUM PICOSULFATE, MAGNESIUM OXIDE, AND ANHYDROUS CITRIC ACID 10; 3.5; 12 MG/160ML; G/160ML; G/160ML
10-3.5-12 MG-GM LIQUID ORAL
Qty: 1 | Refills: 0 | Status: DISCONTINUED | COMMUNITY
Start: 2020-09-11 | End: 2020-11-05

## 2020-11-05 NOTE — REVIEW OF SYSTEMS
[Fatigue] : fatigue [Negative] : Gastrointestinal [Recent Change In Weight] : ~T no recent weight change [FreeTextEntry9] : Mild curvature of thoracic spine noted. Has chronic back pain and left knee pain.  [de-identified] : No new skin lesions.

## 2020-11-05 NOTE — HISTORY OF PRESENT ILLNESS
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  \par \par He noticed a raised, black, pruritic lesion on his right parietal scalp.  Biopsy was performed on 8/14/2018 which was consistent with a 1.1mm melanoma with no significant mitotic figures, no ulceration and no regression.\par He was seen by Dr Sarwat Chandler on 8/13/2018 who recommended wide excision of melanoma with SLNB and reconstruction.\par On 8/29/2018 patient underwent radical resection of scalp melanoma, right posterior modified neck dissection with closure by Dr Bradley Toussaint (Plastic)\par Final Path: metastatic melanoma present in 2/2 LN, residual melanoma, negative margins, no lymphovascular invasion.  Tumor stage pT2a pN2a\par PET/CT 9/15/2018: Minimally FDG avid soft tissue thickening right scalp probably postsurgical.FDG avid focal area of soft tissue thickening right neck, favor postsurgical changes rather than residual disease. Suggest correlation with clinical exam and surgical margins. No FDG avid distant metastatic disease. \par On 10/19/2018 underwent right functional neck dissection with Dr Sarwat Chandler\par Path: no metastatic melanoma seen in eighteen LN (0/18) and benign skin/tissue findings.\par He was referred to Dr Kendall Sands (Medical Oncology) at [facility]. \par He was originally seen by Dr Kendall Sands who referred the patient to Dr Gopi Arredondo at [facility].  \par Patient has stage IIIa (T2aN2a) BRAF testing was performed and mutation was detected. The patient was offered adjuvant Nivolumab 480mg every four weeks by Dr Arredondo for 13 cycles.\par \par 1/25/2019: Mr Mclaughlin is here for follow up.  He is scheduled for C#2 Nivolumab today.  He tolerated C#1 extremely well and reports no adverse events.\par \par 2/22/2019: Mr Mclaughlin is here for C#3 Nivolumab today.  He feels well today and is tolerating Nivo without any adverse events.  He continues to remain active and works as a .  He does reports two episode of diarrhea x 3 weeks after his last treatment.  He took Imodium with excellent relief. \par \par 3/22/2019: Patient Here for C#4 today. DEXA shows osteopenia. His Vit D level is 20 despite 50k units per week. No irAEs from treatment. \par \par 4/24/2019: As of 4/5/19 patient had leg cramps in the front and back of the legs. The intensity became severe. Took Tylenol and motrin with no relief. He also noted some loose stool just prior to these onset of the leg pain. Prednisone 60 mg daily started. After 1 week there was no impact on cramps. Flexeril helps sleep but no benefit with leg pain. The prednisone was only taken for only 3 days when changed to Flexeril. He passes stool 2-3 times daily which is more than usual. No bleeding. Overall the leg cramping is less severe but still recurring. He is off of atorvastatin and remains on Tricor. The muscle pains do not happen every day, but occur 3-4 days per week. He is working and is working from home more often at present. \par \par 10/25/2019: Patient has noticed the development of multiple new pigmented lesions on his scalp and right upper arm. He saw his dermatologist and 3 biopsies were done. The one on the arm had an epidermal component; whereas neither of the scalp lesions had epidermal component. Both were dermal only, and suggested metastatic disease. This would suggest in-transit relapse since the lesions are close to his right scalp resection from 2018. To summarize, he had received adjuvant nivolumab from 1228/2018 to 3/22/2019. It was discontinued due to persistent thigh pain and fasciitis discovered on MRI. He had also had grade 2 diarrhea that was self limited. Since the biopsies 2 weeks ago, he has noticed additional lesions on the left supraclavicular area and the left pectoral region. These are both pigmented and ~4-5 mm in diameter. There are also new lesions on the scalp. Overall he feels well and has no new symptoms, and no residual immune related symptoms from the prior treatment. \par \par 1/14/2020: On 11/12/2019 C#1 D#1 of ipilimumab and nivolumab. He notes no development of new small melanoma lesions on his scalp or chest. Existing ones remian stable. He is aware that PET and MRI showed no internal disease. We discussed that BRAFi+MEKi could be considered as an alternative based on his historic BRAF V600 mutation. He is using Immodium 2 tablets daily on occasion, and not needed daily. Given lack of progression or significant irAEs, will continue with IPI and NIVO. \par \par 2/24/2020: Patient completed all 4 cycles of Ipilimumab and Nivolumab. Over past week he has noticed new stiffness & soreness in his bilateral shoulders and knees. He continues to have thigh pain with moderate discomfort that requires up to 6 tablets of Percoset daily. He has also noted an increase in right orbital head pain that is consistent with his prior history of migrane/cluster headache. He states that the pains are worse than the past, and has been to his neurologist and tried several medications. He has been successfully helped with Ubelvy. He is here to start maintenance nivolumab post-induction. He has no other complaints of symptoms or other issues attributable to immune-related adverse events.\par \par 6/16/2020: TEB follow up appointment today, verbal consent obtained.\par 1) Melanoma: He completed Ipi/Nivo x 4 on 1/15/2020.  Afterwards patient started on single agent Nivolumab x 2 cycles, the second of which was given in 4/2020, and stopped due to apparent immune-related side effects. He did not received the nivolumab on 5/2 due to severe fatigue and ? colitis on PET. he notes no new skin lesions. Patient underwent PET/CT on 4/22/2020 which demonstrated colitis.  No evidence of metastatic disease or recurrent disease.\par 2) ? Colitis: Patient was advised to blood work and stool samples.  No acute pathology was noted.  He denied diarrhea or any symptoms attributable to colitis.\par 3) Nausea: this has improved significantly. He is eating better since on steroid replacement and Marinol (from hospital). \par 4) Appetite loss: is improving on Marinol (also helps nausea).\par 5) Myositis: Patient is using Percocet 1-2 tablets q6 hours; three times per week. At times the pain still does wake patient up in the middle of the night.\par 6) Fatigue: Improving but not back to baseline.\par \par 8/11/2020:\par Patient here for follow-up of metastatic melanoma, especially located to skin of scalp. He has noted no new lesions and the prior ones continue to resolve or diminish. He completed Ipilimumab + Nivolumab x 4 from 11/2019 to 1/15/2020.  Afterwards, patient started on single agent Nivolumab x 2 cycles, the second of which was given in 4/2020, and stopped due to apparent immune-related side effects.\par Fatigue - this is continual and chronic. He has had to stop work and start disability since 5/2020. \par Adrenal insufficiency: He is working with Dr. Sanchez for endocrine treatment of his adrenal gland and thyroid.\par SOB usually with mild exertion - unclear origin. He has seen pulmonary for PFTs and cardiology for echocardiogram and stress test without abnormality noted. \par Joint pains especially of the knees and shoulders - this has gotten worse and pain medication (Percoset) is not helping. \par Muscle cramps in thighs - remains mild to moderate, and has intermittent severe cramps. \par Weight loss - seems to be multifactorial, and he has no taste for food and poor appetite.\par \par 11/5/2020:\par Metastatic melanoma - especially located to skin of scalp. He has noted no new lesions and the prior ones continue to resolve or diminish. He completed Ipilimumab + Nivolumab x 4 from 11/2019 to 1/15/2020.  Afterwards, patient started on single agent Nivolumab x 2 cycles, the second of which was given in 4/2020, and stopped due to apparent immune-related side effects. PET negative 10/2020.\par Fatigue - this is continual and chronic. He has had to stop work and remains on disability since 5/2020 (through [employer]).\par Adrenal insufficiency - He is working with Dr. Sanchez for endocrine treatment of his adrenal gland and thyroid.\par SOB - usually with mild exertion - unclear origin. He has seen pulmonary for PFTs and cardiology for echocardiogram and stress test without abnormality noted. \par Joint pains - especially of the knees and shoulders - the medical marijuana helped with appetite, weight and shoulder pain. Inhales 1-2 times of 0.5 mg THC: 0.5 mg CBD daily. \par Muscle cramps in thighs - remains mild to moderate, and has intermittent severe cramps. This has gotten worse especially last week. Only Percoset helps. Tries to avoid Percoset during the day.\par  [de-identified] : melanoma [de-identified] : Surgical Oncology: Sarwat Chandler [phone]\par Plastics: Kendall Toussaint [phone]\par Medical Oncology (Health system): Kendall Sands  [phone]\par Medical Oncology: Melanoma (Health system): Gopi Arredondo [phone]\par Dermatology: Mariah Spring; [phone]\par

## 2020-11-05 NOTE — PHYSICAL EXAM
[Ambulatory and capable of all self care but unable to carry out any work activities] : Status 2- Ambulatory and capable of all self care but unable to carry out any work activities. Up and about more than 50% of waking hours [Thin] : thin [Normal] : affect appropriate [de-identified] : old incision is normal and no hernia [de-identified] : scattered small (~2-3 mm) pigmented lesions on the scalp. There are no new skin lesions.

## 2020-11-09 ENCOUNTER — RESULT REVIEW (OUTPATIENT)
Age: 50
End: 2020-11-09

## 2020-11-09 ENCOUNTER — LABORATORY RESULT (OUTPATIENT)
Age: 50
End: 2020-11-09

## 2020-11-09 ENCOUNTER — APPOINTMENT (OUTPATIENT)
Dept: INFUSION THERAPY | Facility: HOSPITAL | Age: 50
End: 2020-11-09

## 2020-11-09 LAB
BASOPHILS # BLD AUTO: 0.04 K/UL — SIGNIFICANT CHANGE UP (ref 0–0.2)
BASOPHILS NFR BLD AUTO: 0.8 % — SIGNIFICANT CHANGE UP (ref 0–2)
EOSINOPHIL # BLD AUTO: 0.49 K/UL — SIGNIFICANT CHANGE UP (ref 0–0.5)
EOSINOPHIL NFR BLD AUTO: 9.3 % — HIGH (ref 0–6)
HCT VFR BLD CALC: 38.1 % — LOW (ref 39–50)
HGB BLD-MCNC: 12.7 G/DL — LOW (ref 13–17)
IMM GRANULOCYTES NFR BLD AUTO: 0.2 % — SIGNIFICANT CHANGE UP (ref 0–1.5)
LYMPHOCYTES # BLD AUTO: 2.51 K/UL — SIGNIFICANT CHANGE UP (ref 1–3.3)
LYMPHOCYTES # BLD AUTO: 47.5 % — HIGH (ref 13–44)
MCHC RBC-ENTMCNC: 28.9 PG — SIGNIFICANT CHANGE UP (ref 27–34)
MCHC RBC-ENTMCNC: 32.5 G/DL — SIGNIFICANT CHANGE UP (ref 32–36)
MCV RBC AUTO: 88.8 FL — SIGNIFICANT CHANGE UP (ref 80–100)
MONOCYTES # BLD AUTO: 0.36 K/UL — SIGNIFICANT CHANGE UP (ref 0–0.9)
MONOCYTES NFR BLD AUTO: 6.8 % — SIGNIFICANT CHANGE UP (ref 2–14)
NEUTROPHILS # BLD AUTO: 1.87 K/UL — SIGNIFICANT CHANGE UP (ref 1.8–7.4)
NEUTROPHILS NFR BLD AUTO: 35.4 % — LOW (ref 43–77)
NRBC # BLD: 0 /100 WBCS — SIGNIFICANT CHANGE UP (ref 0–0)
PLATELET # BLD AUTO: 219 K/UL — SIGNIFICANT CHANGE UP (ref 150–400)
RBC # BLD: 4.29 M/UL — SIGNIFICANT CHANGE UP (ref 4.2–5.8)
RBC # FLD: 13.3 % — SIGNIFICANT CHANGE UP (ref 10.3–14.5)
WBC # BLD: 5.28 K/UL — SIGNIFICANT CHANGE UP (ref 3.8–10.5)
WBC # FLD AUTO: 5.28 K/UL — SIGNIFICANT CHANGE UP (ref 3.8–10.5)

## 2020-11-09 NOTE — DISCUSSION/SUMMARY
[FreeTextEntry1] : LEFT CALF PAIN -- Likely related to the disc impinging on the nerve root. I would not give a medrol dose pack because (1) that was given in the past and he derived no benefit and (2) I do not want to interfere with his checkpoint therapy.\par \par DISPO -- He will revisit his pains with his "back pain doctor" and return to my office on an as needed basis.

## 2020-11-09 NOTE — DATA REVIEWED
[de-identified] : I personally reviewed neuroimaging dated\par 10/6/2020 (PET)	4/22/2020 (PET)	1/31/2016 (MRI)	8/18/2019(MRI)\par 1/8/2017(MRI)	6/27/2016(MRI)		\par \par In reviewing these images, I find an L3-L4 vertebral disc that is eccentrically to the left and impressing upon the exiting nerve root, which appears progressively worse on more recent imaging compared with earlier studies.\par The PET imaging discloses no PET-avid vertebral body disease.\par Selected imaging was provided to the patient, along with a detailed verbal explanation of the issues at hand as outlined above.\par

## 2020-11-09 NOTE — HISTORY OF PRESENT ILLNESS
[FreeTextEntry1] : 50 year old right handed man with a past medical history of testicular cancer (JANNETH since 1994 after resection and BEP), non-Hodgkin’s lymphoma (JANNETH s/p RT) and recent Stage IIIa BRAF mutant melanoma of the scalp now presenting with severe pain and some functional weakness in the shoulders, hips and knees.\par \par His melanoma was diagnosed 8/14/2018. He underwent an extensive resection by Allyssa Chandler and Norbert (plastics), where 2/2 lymph nodes were found to harbor melanoma although margins were negative and there was no lymphovascular invasion. A PET-CT performed on 9/15/18 showed no distant metastases. He underwent a “functional neck dissection” on 10/19/2018 and 0/18 lymph nodes were positive for disease. He has tolerated Nivo 480mg x 13 cycles (from 12/28/2018 to 3/22/2019) poorly, with muscle cramps requiring steroid administration at cycle 4 (4/24/2019) and “fasciitis” seen on MRI. On 11/12/2019 he started combination therapy with Ipi and Nivo and completed cycle 4 of this regimen on 2/24/2020, followed by maintenance Nivo, complicated by colitis (seen on PET 4/22/2020). He has had the following adverse effects from immunotherapy: hypothyroidism, colitis, adrenal insufficiency, fatigue, SOB (normal PFTs and normal cardiac evaluation), and anorexia. \par \par 8/25/2020 - he presented to me (Neuro) for evaluation of painful shoulders and knees. He played Track in high school, but neither runs marathons nor plays any sports that are hard on the knees in adulthood. It is difficult for him to get up due to severe pain. Once he is up, he can ambulate well and while sitting, pain is minimal (“1/10”).His symptoms are severe enough that he can no longer work.\par \par Given the immunomodulatory therapies, I sent CK, aldolase, CPK, troponins, Jo1 antibodies, SRP antibodies, and myoglobin, which were unrevealing.\par \par 11/5/2020 - he returns for evaluation, with complaints more isolated (but not wholly isolated) to his left calf. On detailed, directed questioning, he admits to having seen a "back pain" doctor in the past and having had mutliple MRIs for a slipped disc. Despite the lack of radicular features in his history, he admits that the pain is similar.\par

## 2020-11-25 NOTE — DISCHARGE NOTE ADULT - RECOGNIZE DANGER SITUATIONS. FOR EXAMPLE, STRESS, DRINKING ALCOHOL, URGES TO SMOKE, SMOKING CUES, CIGARETTE AVAILABILITY
What Type Of Note Output Would You Prefer (Optional)?: Bullet Format How Severe Are Your Spot(S)?: mild Have Your Spot(S) Been Treated In The Past?: has not been treated Hpi Title: Evaluation of Skin Lesions Family Member: Father Statement Selected

## 2020-12-03 NOTE — PRE-ANESTHESIA EVALUATION ADULT - NSANTHNECKRD_ENT_A_CORE
No Carac Pregnancy And Lactation Text: This medication is Pregnancy Category X and contraindicated in pregnancy and in women who may become pregnant. It is unknown if this medication is excreted in breast milk.

## 2021-01-27 ENCOUNTER — OUTPATIENT (OUTPATIENT)
Dept: OUTPATIENT SERVICES | Facility: HOSPITAL | Age: 51
LOS: 1 days | Discharge: HOME | End: 2021-01-27

## 2021-01-27 DIAGNOSIS — Z90.79 ACQUIRED ABSENCE OF OTHER GENITAL ORGAN(S): Chronic | ICD-10-CM

## 2021-01-27 DIAGNOSIS — C43.8 MALIGNANT MELANOMA OF OVERLAPPING SITES OF SKIN: ICD-10-CM

## 2021-01-27 DIAGNOSIS — I89.9 NONINFECTIVE DISORDER OF LYMPHATIC VESSELS AND LYMPH NODES, UNSPECIFIED: Chronic | ICD-10-CM

## 2021-01-27 DIAGNOSIS — Z98.89 OTHER SPECIFIED POSTPROCEDURAL STATES: Chronic | ICD-10-CM

## 2021-01-27 DIAGNOSIS — C43.4 MALIGNANT MELANOMA OF SCALP AND NECK: Chronic | ICD-10-CM

## 2021-01-29 ENCOUNTER — LABORATORY RESULT (OUTPATIENT)
Age: 51
End: 2021-01-29

## 2021-01-29 ENCOUNTER — RESULT REVIEW (OUTPATIENT)
Age: 51
End: 2021-01-29

## 2021-01-29 ENCOUNTER — APPOINTMENT (OUTPATIENT)
Dept: INFUSION THERAPY | Facility: HOSPITAL | Age: 51
End: 2021-01-29

## 2021-01-29 LAB
BASOPHILS # BLD AUTO: 0.03 K/UL — SIGNIFICANT CHANGE UP (ref 0–0.2)
BASOPHILS NFR BLD AUTO: 0.6 % — SIGNIFICANT CHANGE UP (ref 0–2)
EOSINOPHIL # BLD AUTO: 0.42 K/UL — SIGNIFICANT CHANGE UP (ref 0–0.5)
EOSINOPHIL NFR BLD AUTO: 8.8 % — HIGH (ref 0–6)
HCT VFR BLD CALC: 37.9 % — LOW (ref 39–50)
HGB BLD-MCNC: 12.6 G/DL — LOW (ref 13–17)
IMM GRANULOCYTES NFR BLD AUTO: 0.2 % — SIGNIFICANT CHANGE UP (ref 0–1.5)
LYMPHOCYTES # BLD AUTO: 2.31 K/UL — SIGNIFICANT CHANGE UP (ref 1–3.3)
LYMPHOCYTES # BLD AUTO: 48.1 % — HIGH (ref 13–44)
MCHC RBC-ENTMCNC: 29.1 PG — SIGNIFICANT CHANGE UP (ref 27–34)
MCHC RBC-ENTMCNC: 33.2 G/DL — SIGNIFICANT CHANGE UP (ref 32–36)
MCV RBC AUTO: 87.5 FL — SIGNIFICANT CHANGE UP (ref 80–100)
MONOCYTES # BLD AUTO: 0.23 K/UL — SIGNIFICANT CHANGE UP (ref 0–0.9)
MONOCYTES NFR BLD AUTO: 4.8 % — SIGNIFICANT CHANGE UP (ref 2–14)
NEUTROPHILS # BLD AUTO: 1.8 K/UL — SIGNIFICANT CHANGE UP (ref 1.8–7.4)
NEUTROPHILS NFR BLD AUTO: 37.5 % — LOW (ref 43–77)
NRBC # BLD: 0 /100 WBCS — SIGNIFICANT CHANGE UP (ref 0–0)
PLATELET # BLD AUTO: 203 K/UL — SIGNIFICANT CHANGE UP (ref 150–400)
RBC # BLD: 4.33 M/UL — SIGNIFICANT CHANGE UP (ref 4.2–5.8)
RBC # FLD: 12.7 % — SIGNIFICANT CHANGE UP (ref 10.3–14.5)
WBC # BLD: 4.8 K/UL — SIGNIFICANT CHANGE UP (ref 3.8–10.5)
WBC # FLD AUTO: 4.8 K/UL — SIGNIFICANT CHANGE UP (ref 3.8–10.5)

## 2021-02-17 ENCOUNTER — APPOINTMENT (OUTPATIENT)
Dept: ENDOCRINOLOGY | Facility: CLINIC | Age: 51
End: 2021-02-17
Payer: COMMERCIAL

## 2021-02-17 VITALS
HEART RATE: 62 BPM | TEMPERATURE: 98 F | SYSTOLIC BLOOD PRESSURE: 104 MMHG | BODY MASS INDEX: 22.9 KG/M2 | RESPIRATION RATE: 18 BRPM | HEIGHT: 70 IN | WEIGHT: 160 LBS | OXYGEN SATURATION: 98 % | DIASTOLIC BLOOD PRESSURE: 60 MMHG

## 2021-02-17 DIAGNOSIS — E27.49 OTHER ADRENOCORTICAL INSUFFICIENCY: ICD-10-CM

## 2021-02-17 PROCEDURE — 99214 OFFICE O/P EST MOD 30 MIN: CPT

## 2021-02-17 PROCEDURE — 99072 ADDL SUPL MATRL&STAF TM PHE: CPT

## 2021-02-20 PROBLEM — E27.49 SECONDARY ADRENAL INSUFFICIENCY: Status: ACTIVE | Noted: 2020-07-29

## 2021-02-20 NOTE — PHYSICAL EXAM
[Well Nourished] : well nourished [Alert] : alert [No Acute Distress] : no acute distress [Well Developed] : well developed [Normal Sclera/Conjunctiva] : normal sclera/conjunctiva [EOMI] : extra ocular movement intact [No Proptosis] : no proptosis [Normal Oropharynx] : the oropharynx was normal [Thyroid Not Enlarged] : the thyroid was not enlarged [No Thyroid Nodules] : no palpable thyroid nodules [No Respiratory Distress] : no respiratory distress [No Accessory Muscle Use] : no accessory muscle use [Clear to Auscultation] : lungs were clear to auscultation bilaterally [Normal S1, S2] : normal S1 and S2 [Normal Rate] : heart rate was normal [No Edema] : no peripheral edema [Regular Rhythm] : with a regular rhythm [Pedal Pulses Normal] : the pedal pulses are present [Normal Bowel Sounds] : normal bowel sounds [Not Tender] : non-tender [Not Distended] : not distended [Soft] : abdomen soft [Normal Anterior Cervical Nodes] : no anterior cervical lymphadenopathy [Normal Posterior Cervical Nodes] : no posterior cervical lymphadenopathy [No Spinal Tenderness] : no spinal tenderness [Spine Straight] : spine straight [No Stigmata of Cushings Syndrome] : no stigmata of Cushings Syndrome [Normal Gait] : normal gait [Normal Strength/Tone] : muscle strength and tone were normal [No Rash] : no rash [Normal Reflexes] : deep tendon reflexes were 2+ and symmetric [No Tremors] : no tremors [Oriented x3] : oriented to person, place, and time [Acanthosis Nigricans] : no acanthosis nigricans

## 2021-02-20 NOTE — HISTORY OF PRESENT ILLNESS
[FreeTextEntry1] : Mr. 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 Still with some ongoing fatigue -improved but has gotten worse in the past 2 weeks. 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 \par Taking one a day nature's best vitamin. had been on vit d 50,000 in the past.\par He does have hx of testicular ca along with hx of NHL and was treated for scalp melanoma.\par Appetite is relatively better and his energy improves in spurts but still overall fatigued and notes sob with minimal exertion.\par Additional medical history includes that of hyperlipidemia and hypertension\par Is on Magnesium 400 mf daily of the oxide type\par Is on LT4 50 mcg with normal TSH from recent labs for Dr. Mcginnis.\par Prior pituitary hormone eval was otherwise negative from laboratory perspective. testosterone lower end normal.\par  \par .

## 2021-02-22 ENCOUNTER — NON-APPOINTMENT (OUTPATIENT)
Age: 51
End: 2021-02-22

## 2021-02-23 RX ORDER — LEVOTHYROXINE SODIUM 0.05 MG/1
50 TABLET ORAL DAILY
Qty: 90 | Refills: 1 | Status: DISCONTINUED | COMMUNITY
Start: 2020-05-07 | End: 2021-02-23

## 2021-03-10 ENCOUNTER — OUTPATIENT (OUTPATIENT)
Dept: OUTPATIENT SERVICES | Facility: HOSPITAL | Age: 51
LOS: 1 days | Discharge: HOME | End: 2021-03-10

## 2021-03-10 DIAGNOSIS — C43.8 MALIGNANT MELANOMA OF OVERLAPPING SITES OF SKIN: ICD-10-CM

## 2021-03-10 DIAGNOSIS — C43.4 MALIGNANT MELANOMA OF SCALP AND NECK: Chronic | ICD-10-CM

## 2021-03-10 DIAGNOSIS — Z90.79 ACQUIRED ABSENCE OF OTHER GENITAL ORGAN(S): Chronic | ICD-10-CM

## 2021-03-10 DIAGNOSIS — Z98.89 OTHER SPECIFIED POSTPROCEDURAL STATES: Chronic | ICD-10-CM

## 2021-03-10 DIAGNOSIS — I89.9 NONINFECTIVE DISORDER OF LYMPHATIC VESSELS AND LYMPH NODES, UNSPECIFIED: Chronic | ICD-10-CM

## 2021-03-11 ENCOUNTER — RESULT REVIEW (OUTPATIENT)
Age: 51
End: 2021-03-11

## 2021-03-11 ENCOUNTER — APPOINTMENT (OUTPATIENT)
Dept: INFUSION THERAPY | Facility: HOSPITAL | Age: 51
End: 2021-03-11

## 2021-03-11 LAB
BASOPHILS # BLD AUTO: 0.05 K/UL — SIGNIFICANT CHANGE UP (ref 0–0.2)
BASOPHILS NFR BLD AUTO: 0.6 % — SIGNIFICANT CHANGE UP (ref 0–2)
EOSINOPHIL # BLD AUTO: 0.47 K/UL — SIGNIFICANT CHANGE UP (ref 0–0.5)
EOSINOPHIL NFR BLD AUTO: 6.1 % — HIGH (ref 0–6)
HCT VFR BLD CALC: 41.8 % — SIGNIFICANT CHANGE UP (ref 39–50)
HGB BLD-MCNC: 13.9 G/DL — SIGNIFICANT CHANGE UP (ref 13–17)
IMM GRANULOCYTES NFR BLD AUTO: 0.4 % — SIGNIFICANT CHANGE UP (ref 0–1.5)
LYMPHOCYTES # BLD AUTO: 3.79 K/UL — HIGH (ref 1–3.3)
LYMPHOCYTES # BLD AUTO: 49.2 % — HIGH (ref 13–44)
MCHC RBC-ENTMCNC: 28.8 PG — SIGNIFICANT CHANGE UP (ref 27–34)
MCHC RBC-ENTMCNC: 33.3 G/DL — SIGNIFICANT CHANGE UP (ref 32–36)
MCV RBC AUTO: 86.7 FL — SIGNIFICANT CHANGE UP (ref 80–100)
MONOCYTES # BLD AUTO: 0.41 K/UL — SIGNIFICANT CHANGE UP (ref 0–0.9)
MONOCYTES NFR BLD AUTO: 5.3 % — SIGNIFICANT CHANGE UP (ref 2–14)
NEUTROPHILS # BLD AUTO: 2.95 K/UL — SIGNIFICANT CHANGE UP (ref 1.8–7.4)
NEUTROPHILS NFR BLD AUTO: 38.4 % — LOW (ref 43–77)
NRBC # BLD: 0 /100 WBCS — SIGNIFICANT CHANGE UP (ref 0–0)
PLATELET # BLD AUTO: 257 K/UL — SIGNIFICANT CHANGE UP (ref 150–400)
RBC # BLD: 4.82 M/UL — SIGNIFICANT CHANGE UP (ref 4.2–5.8)
RBC # FLD: 13.2 % — SIGNIFICANT CHANGE UP (ref 10.3–14.5)
WBC # BLD: 7.7 K/UL — SIGNIFICANT CHANGE UP (ref 3.8–10.5)
WBC # FLD AUTO: 7.7 K/UL — SIGNIFICANT CHANGE UP (ref 3.8–10.5)

## 2021-03-18 ENCOUNTER — OUTPATIENT (OUTPATIENT)
Dept: OUTPATIENT SERVICES | Facility: HOSPITAL | Age: 51
LOS: 1 days | Discharge: HOME | End: 2021-03-18

## 2021-03-18 DIAGNOSIS — Z90.79 ACQUIRED ABSENCE OF OTHER GENITAL ORGAN(S): Chronic | ICD-10-CM

## 2021-03-18 DIAGNOSIS — Z98.89 OTHER SPECIFIED POSTPROCEDURAL STATES: Chronic | ICD-10-CM

## 2021-03-18 DIAGNOSIS — C43.8 MALIGNANT MELANOMA OF OVERLAPPING SITES OF SKIN: ICD-10-CM

## 2021-03-18 DIAGNOSIS — I89.9 NONINFECTIVE DISORDER OF LYMPHATIC VESSELS AND LYMPH NODES, UNSPECIFIED: Chronic | ICD-10-CM

## 2021-03-18 DIAGNOSIS — C43.4 MALIGNANT MELANOMA OF SCALP AND NECK: Chronic | ICD-10-CM

## 2021-03-19 ENCOUNTER — RESULT REVIEW (OUTPATIENT)
Age: 51
End: 2021-03-19

## 2021-03-19 ENCOUNTER — APPOINTMENT (OUTPATIENT)
Dept: HEMATOLOGY ONCOLOGY | Facility: CLINIC | Age: 51
End: 2021-03-19
Payer: COMMERCIAL

## 2021-03-19 VITALS
SYSTOLIC BLOOD PRESSURE: 132 MMHG | WEIGHT: 156.53 LBS | RESPIRATION RATE: 16 BRPM | DIASTOLIC BLOOD PRESSURE: 81 MMHG | OXYGEN SATURATION: 97 % | TEMPERATURE: 97.5 F | HEART RATE: 94 BPM | BODY MASS INDEX: 22.46 KG/M2

## 2021-03-19 LAB
BASOPHILS # BLD AUTO: 0.05 K/UL — SIGNIFICANT CHANGE UP (ref 0–0.2)
BASOPHILS NFR BLD AUTO: 0.6 % — SIGNIFICANT CHANGE UP (ref 0–2)
EOSINOPHIL # BLD AUTO: 0.54 K/UL — HIGH (ref 0–0.5)
EOSINOPHIL NFR BLD AUTO: 7 % — HIGH (ref 0–6)
HCT VFR BLD CALC: 46.9 % — SIGNIFICANT CHANGE UP (ref 39–50)
HGB BLD-MCNC: 15.1 G/DL — SIGNIFICANT CHANGE UP (ref 13–17)
IMM GRANULOCYTES NFR BLD AUTO: 0.1 % — SIGNIFICANT CHANGE UP (ref 0–1.5)
LYMPHOCYTES # BLD AUTO: 3.13 K/UL — SIGNIFICANT CHANGE UP (ref 1–3.3)
LYMPHOCYTES # BLD AUTO: 40.4 % — SIGNIFICANT CHANGE UP (ref 13–44)
MCHC RBC-ENTMCNC: 28 PG — SIGNIFICANT CHANGE UP (ref 27–34)
MCHC RBC-ENTMCNC: 32.2 G/DL — SIGNIFICANT CHANGE UP (ref 32–36)
MCV RBC AUTO: 87 FL — SIGNIFICANT CHANGE UP (ref 80–100)
MONOCYTES # BLD AUTO: 0.52 K/UL — SIGNIFICANT CHANGE UP (ref 0–0.9)
MONOCYTES NFR BLD AUTO: 6.7 % — SIGNIFICANT CHANGE UP (ref 2–14)
NEUTROPHILS # BLD AUTO: 3.5 K/UL — SIGNIFICANT CHANGE UP (ref 1.8–7.4)
NEUTROPHILS NFR BLD AUTO: 45.2 % — SIGNIFICANT CHANGE UP (ref 43–77)
NRBC # BLD: 0 /100 WBCS — SIGNIFICANT CHANGE UP (ref 0–0)
PLATELET # BLD AUTO: 310 K/UL — SIGNIFICANT CHANGE UP (ref 150–400)
RBC # BLD: 5.39 M/UL — SIGNIFICANT CHANGE UP (ref 4.2–5.8)
RBC # FLD: 13.2 % — SIGNIFICANT CHANGE UP (ref 10.3–14.5)
WBC # BLD: 7.75 K/UL — SIGNIFICANT CHANGE UP (ref 3.8–10.5)
WBC # FLD AUTO: 7.75 K/UL — SIGNIFICANT CHANGE UP (ref 3.8–10.5)

## 2021-03-19 PROCEDURE — 99072 ADDL SUPL MATRL&STAF TM PHE: CPT

## 2021-03-19 PROCEDURE — 99213 OFFICE O/P EST LOW 20 MIN: CPT

## 2021-03-19 NOTE — HISTORY OF PRESENT ILLNESS
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  \par \par He noticed a raised, black, pruritic lesion on his right parietal scalp.  Biopsy was performed on 8/14/2018 which was consistent with a 1.1mm melanoma with no significant mitotic figures, no ulceration and no regression.\par He was seen by Dr Sarwat Chandler on 8/13/2018 who recommended wide excision of melanoma with SLNB and reconstruction.\par On 8/29/2018 patient underwent radical resection of scalp melanoma, right posterior modified neck dissection with closure by Dr Bradley Toussaint (Plastic)\par Final Path: metastatic melanoma present in 2/2 LN, residual melanoma, negative margins, no lymphovascular invasion.  Tumor stage pT2a pN2a\par PET/CT 9/15/2018: Minimally FDG avid soft tissue thickening right scalp probably postsurgical.FDG avid focal area of soft tissue thickening right neck, favor postsurgical changes rather than residual disease. Suggest correlation with clinical exam and surgical margins. No FDG avid distant metastatic disease. \par On 10/19/2018 underwent right functional neck dissection with Dr Sarwat Chandler\par Path: no metastatic melanoma seen in eighteen LN (0/18) and benign skin/tissue findings.\par He was referred to Dr Kendall Sands (Medical Oncology) at [facility]. \par He was originally seen by Dr Kendall Sands who referred the patient to Dr Gopi Arredondo at [facility].  \par Patient has stage IIIa (T2aN2a) BRAF testing was performed and mutation was detected. The patient was offered adjuvant Nivolumab 480mg every four weeks by Dr Arredondo for 13 cycles.\par \par 1/25/2019: Mr Mclaughlin is here for follow up.  He is scheduled for C#2 Nivolumab today.  He tolerated C#1 extremely well and reports no adverse events.\par \par 2/22/2019: Mr Mclaughlin is here for C#3 Nivolumab today.  He feels well today and is tolerating Nivo without any adverse events.  He continues to remain active and works as a .  He does reports two episode of diarrhea x 3 weeks after his last treatment.  He took Imodium with excellent relief. \par \par 3/22/2019: Patient Here for C#4 today. DEXA shows osteopenia. His Vit D level is 20 despite 50k units per week. No irAEs from treatment. \par \par 4/24/2019: As of 4/5/19 patient had leg cramps in the front and back of the legs. The intensity became severe. Took Tylenol and motrin with no relief. He also noted some loose stool just prior to these onset of the leg pain. Prednisone 60 mg daily started. After 1 week there was no impact on cramps. Flexeril helps sleep but no benefit with leg pain. The prednisone was only taken for only 3 days when changed to Flexeril. He passes stool 2-3 times daily which is more than usual. No bleeding. Overall the leg cramping is less severe but still recurring. He is off of atorvastatin and remains on Tricor. The muscle pains do not happen every day, but occur 3-4 days per week. He is working and is working from home more often at present. \par \par 10/25/2019: Patient has noticed the development of multiple new pigmented lesions on his scalp and right upper arm. He saw his dermatologist and 3 biopsies were done. The one on the arm had an epidermal component; whereas neither of the scalp lesions had epidermal component. Both were dermal only, and suggested metastatic disease. This would suggest in-transit relapse since the lesions are close to his right scalp resection from 2018. To summarize, he had received adjuvant nivolumab from 1228/2018 to 3/22/2019. It was discontinued due to persistent thigh pain and fasciitis discovered on MRI. He had also had grade 2 diarrhea that was self limited. Since the biopsies 2 weeks ago, he has noticed additional lesions on the left supraclavicular area and the left pectoral region. These are both pigmented and ~4-5 mm in diameter. There are also new lesions on the scalp. Overall he feels well and has no new symptoms, and no residual immune related symptoms from the prior treatment. \par \par 1/14/2020: On 11/12/2019 C#1 D#1 of ipilimumab and nivolumab. He notes no development of new small melanoma lesions on his scalp or chest. Existing ones remian stable. He is aware that PET and MRI showed no internal disease. We discussed that BRAFi+MEKi could be considered as an alternative based on his historic BRAF V600 mutation. He is using Immodium 2 tablets daily on occasion, and not needed daily. Given lack of progression or significant irAEs, will continue with IPI and NIVO. \par \par 2/24/2020: Patient completed all 4 cycles of Ipilimumab and Nivolumab. Over past week he has noticed new stiffness & soreness in his bilateral shoulders and knees. He continues to have thigh pain with moderate discomfort that requires up to 6 tablets of Percoset daily. He has also noted an increase in right orbital head pain that is consistent with his prior history of migrane/cluster headache. He states that the pains are worse than the past, and has been to his neurologist and tried several medications. He has been successfully helped with Ubelvy. He is here to start maintenance nivolumab post-induction. He has no other complaints of symptoms or other issues attributable to immune-related adverse events.\par \par 6/16/2020: TEB follow up appointment today, verbal consent obtained.\par 1) Melanoma: He completed Ipi/Nivo x 4 on 1/15/2020.  Afterwards patient started on single agent Nivolumab x 2 cycles, the second of which was given in 4/2020, and stopped due to apparent immune-related side effects. He did not received the nivolumab on 5/2 due to severe fatigue and ? colitis on PET. he notes no new skin lesions. Patient underwent PET/CT on 4/22/2020 which demonstrated colitis.  No evidence of metastatic disease or recurrent disease.\par 2) ? Colitis: Patient was advised to blood work and stool samples.  No acute pathology was noted.  He denied diarrhea or any symptoms attributable to colitis.\par 3) Nausea: this has improved significantly. He is eating better since on steroid replacement and Marinol (from hospital). \par 4) Appetite loss: is improving on Marinol (also helps nausea).\par 5) Myositis: Patient is using Percocet 1-2 tablets q6 hours; three times per week. At times the pain still does wake patient up in the middle of the night.\par 6) Fatigue: Improving but not back to baseline.\par \par 8/11/2020:\par Patient here for follow-up of metastatic melanoma, especially located to skin of scalp. He has noted no new lesions and the prior ones continue to resolve or diminish. He completed Ipilimumab + Nivolumab x 4 from 11/2019 to 1/15/2020.  Afterwards, patient started on single agent Nivolumab x 2 cycles, the second of which was given in 4/2020, and stopped due to apparent immune-related side effects.\par Fatigue - this is continual and chronic. He has had to stop work and start disability since 5/2020. \par Adrenal insufficiency: He is working with Dr. Sanchez for endocrine treatment of his adrenal gland and thyroid.\par SOB usually with mild exertion - unclear origin. He has seen pulmonary for PFTs and cardiology for echocardiogram and stress test without abnormality noted. \par Joint pains especially of the knees and shoulders - this has gotten worse and pain medication (Percoset) is not helping. \par Muscle cramps in thighs - remains mild to moderate, and has intermittent severe cramps. \par Weight loss - seems to be multifactorial, and he has no taste for food and poor appetite.\par \par 11/5/2020:\par Metastatic melanoma - especially located to skin of scalp. He has noted no new lesions and the prior ones continue to resolve or diminish. He completed Ipilimumab + Nivolumab x 4 from 11/2019 to 1/15/2020.  Afterwards, patient started on single agent Nivolumab x 2 cycles, the second of which was given in 4/2020, and stopped due to apparent immune-related side effects. PET negative 10/2020.\par Fatigue - this is continual and chronic. He has had to stop work and remains on disability since 5/2020 (through [employer]).\par Adrenal insufficiency - He is working with Dr. Sanchez for endocrine treatment of his adrenal gland and thyroid.\par SOB - usually with mild exertion - unclear origin. He has seen pulmonary for PFTs and cardiology for echocardiogram and stress test without abnormality noted. \par Joint pains - especially of the knees and shoulders - the medical marijuana helped with appetite, weight and shoulder pain. Inhales 1-2 times of 0.5 mg THC: 0.5 mg CBD daily. \par Muscle cramps in thighs - remains mild to moderate, and has intermittent severe cramps. This has gotten worse especially last week. Only Percoset helps. Tries to avoid Percoset during the day.\par \par \par 3/19/2021:\par 1) Melanoma: Last PET//20 no evidence of metastatic disease.  Has not been seen dermatology for skin examination.  No new skin lesions noted.  Last Nivolumab treatment was on 4/2020.\par 2) Endocrine: He continues to have fatigue which has progressed over the last 2 months.  He is currently is Hydrocortisone 20mg in AM and 10mg in PM.  He is currently still on disability and has not returned back to work.  He was strongly advised to make follow up with Dr. Sanchez this month.\par 3) Weight loss: Patient is currently 156 lbs.  As per patient this appetite is stable and has full course meals ~ 3/day.\par 4) Myalgia: improved significantly.\par 5) Arthralgia: patient noticed in bilateral knee and shoulder.  The shoulder pain is 6/10 in severity and using Percocet ATC with relief. [de-identified] : melanoma [de-identified] : Surgical Oncology: Sarwat Chandler [phone]\par Plastics: Kendall Toussaint [phone]\par Medical Oncology ([facility]): Kendall Sands  [phone]\par Medical Oncology: Melanoma ([facility]): Gopi Arredondo [phone]\par Dermatology: Mariah Spring; [phone]\par

## 2021-03-19 NOTE — PHYSICAL EXAM
[Ambulatory and capable of all self care but unable to carry out any work activities] : Status 2- Ambulatory and capable of all self care but unable to carry out any work activities. Up and about more than 50% of waking hours [Thin] : thin [Normal] : affect appropriate [de-identified] : old incision is normal and no hernia [de-identified] : No new lesions noted on scalp

## 2021-03-19 NOTE — REVIEW OF SYSTEMS
[Fatigue] : fatigue [Negative] : Allergic/Immunologic [Recent Change In Weight] : ~T no recent weight change [FreeTextEntry9] : Mild curvature of thoracic spine noted. Has chronic back pain and left knee pain.  [de-identified] : No new skin lesions.

## 2021-03-22 LAB
ALBUMIN SERPL ELPH-MCNC: 4.9 G/DL
ALP BLD-CCNC: 61 U/L
ALT SERPL-CCNC: 44 U/L
ANION GAP SERPL CALC-SCNC: 9 MMOL/L
AST SERPL-CCNC: 32 U/L
BILIRUB SERPL-MCNC: 0.5 MG/DL
BUN SERPL-MCNC: 17 MG/DL
CALCIUM SERPL-MCNC: 10.5 MG/DL
CHLORIDE SERPL-SCNC: 102 MMOL/L
CO2 SERPL-SCNC: 30 MMOL/L
CREAT SERPL-MCNC: 1.05 MG/DL
GLUCOSE SERPL-MCNC: 77 MG/DL
LDH SERPL-CCNC: 181 U/L
POTASSIUM SERPL-SCNC: 4.2 MMOL/L
PROT SERPL-MCNC: 7.4 G/DL
SODIUM SERPL-SCNC: 141 MMOL/L
TSH SERPL-ACNC: 3.47 UIU/ML

## 2021-03-27 ENCOUNTER — RESULT REVIEW (OUTPATIENT)
Age: 51
End: 2021-03-27

## 2021-04-01 ENCOUNTER — APPOINTMENT (OUTPATIENT)
Dept: CT IMAGING | Facility: IMAGING CENTER | Age: 51
End: 2021-04-01
Payer: COMMERCIAL

## 2021-04-01 ENCOUNTER — OUTPATIENT (OUTPATIENT)
Dept: OUTPATIENT SERVICES | Facility: HOSPITAL | Age: 51
LOS: 1 days | End: 2021-04-01
Payer: COMMERCIAL

## 2021-04-01 DIAGNOSIS — C43.4 MALIGNANT MELANOMA OF SCALP AND NECK: Chronic | ICD-10-CM

## 2021-04-01 DIAGNOSIS — Z00.8 ENCOUNTER FOR OTHER GENERAL EXAMINATION: ICD-10-CM

## 2021-04-01 DIAGNOSIS — C43.9 MALIGNANT MELANOMA OF SKIN, UNSPECIFIED: ICD-10-CM

## 2021-04-01 DIAGNOSIS — Z90.79 ACQUIRED ABSENCE OF OTHER GENITAL ORGAN(S): Chronic | ICD-10-CM

## 2021-04-01 DIAGNOSIS — I89.9 NONINFECTIVE DISORDER OF LYMPHATIC VESSELS AND LYMPH NODES, UNSPECIFIED: Chronic | ICD-10-CM

## 2021-04-01 DIAGNOSIS — Z98.89 OTHER SPECIFIED POSTPROCEDURAL STATES: Chronic | ICD-10-CM

## 2021-04-01 PROCEDURE — 71260 CT THORAX DX C+: CPT

## 2021-04-01 PROCEDURE — 71260 CT THORAX DX C+: CPT | Mod: 26

## 2021-04-01 PROCEDURE — 82565 ASSAY OF CREATININE: CPT

## 2021-04-01 PROCEDURE — 74177 CT ABD & PELVIS W/CONTRAST: CPT

## 2021-04-01 PROCEDURE — 74177 CT ABD & PELVIS W/CONTRAST: CPT | Mod: 26

## 2021-04-06 ENCOUNTER — NON-APPOINTMENT (OUTPATIENT)
Age: 51
End: 2021-04-06

## 2021-04-06 ENCOUNTER — OUTPATIENT (OUTPATIENT)
Dept: OUTPATIENT SERVICES | Facility: HOSPITAL | Age: 51
LOS: 1 days | End: 2021-04-06
Payer: COMMERCIAL

## 2021-04-06 ENCOUNTER — APPOINTMENT (OUTPATIENT)
Dept: UROLOGY | Facility: CLINIC | Age: 51
End: 2021-04-06
Payer: COMMERCIAL

## 2021-04-06 VITALS
TEMPERATURE: 97 F | SYSTOLIC BLOOD PRESSURE: 125 MMHG | DIASTOLIC BLOOD PRESSURE: 83 MMHG | HEART RATE: 84 BPM | RESPIRATION RATE: 16 BRPM

## 2021-04-06 DIAGNOSIS — I89.9 NONINFECTIVE DISORDER OF LYMPHATIC VESSELS AND LYMPH NODES, UNSPECIFIED: Chronic | ICD-10-CM

## 2021-04-06 DIAGNOSIS — Z90.79 ACQUIRED ABSENCE OF OTHER GENITAL ORGAN(S): Chronic | ICD-10-CM

## 2021-04-06 DIAGNOSIS — Z98.89 OTHER SPECIFIED POSTPROCEDURAL STATES: Chronic | ICD-10-CM

## 2021-04-06 DIAGNOSIS — C43.4 MALIGNANT MELANOMA OF SCALP AND NECK: Chronic | ICD-10-CM

## 2021-04-06 PROCEDURE — 99212 OFFICE O/P EST SF 10 MIN: CPT | Mod: 25

## 2021-04-06 PROCEDURE — 51798 US URINE CAPACITY MEASURE: CPT

## 2021-04-06 PROCEDURE — 99072 ADDL SUPL MATRL&STAF TM PHE: CPT

## 2021-04-06 PROCEDURE — 51701 INSERT BLADDER CATHETER: CPT

## 2021-04-06 NOTE — HISTORY OF PRESENT ILLNESS
[FreeTextEntry1] : Pt.presented with urinary retention. \par PVR 386ml/\par A catheter was inserted but drained poorly.....only worked when he increased his abdominal pressure.\par This pt,has had many problems with his lumbar spine. \par He had Testicular cancer in 1996\par Malignant melanoma of neck last year. \par He obviously requires UDS and will schedule it for next week.

## 2021-04-07 ENCOUNTER — APPOINTMENT (OUTPATIENT)
Dept: UROLOGY | Facility: CLINIC | Age: 51
End: 2021-04-07

## 2021-04-07 ENCOUNTER — EMERGENCY (EMERGENCY)
Facility: HOSPITAL | Age: 51
LOS: 1 days | Discharge: HOME | End: 2021-04-07
Attending: EMERGENCY MEDICINE
Payer: COMMERCIAL

## 2021-04-07 ENCOUNTER — NON-APPOINTMENT (OUTPATIENT)
Age: 51
End: 2021-04-07

## 2021-04-07 VITALS
WEIGHT: 158.95 LBS | RESPIRATION RATE: 18 BRPM | DIASTOLIC BLOOD PRESSURE: 82 MMHG | HEIGHT: 70 IN | TEMPERATURE: 98 F | OXYGEN SATURATION: 97 % | SYSTOLIC BLOOD PRESSURE: 124 MMHG | HEART RATE: 118 BPM

## 2021-04-07 DIAGNOSIS — I89.9 NONINFECTIVE DISORDER OF LYMPHATIC VESSELS AND LYMPH NODES, UNSPECIFIED: Chronic | ICD-10-CM

## 2021-04-07 DIAGNOSIS — C43.4 MALIGNANT MELANOMA OF SCALP AND NECK: Chronic | ICD-10-CM

## 2021-04-07 DIAGNOSIS — Z90.79 ACQUIRED ABSENCE OF OTHER GENITAL ORGAN(S): Chronic | ICD-10-CM

## 2021-04-07 DIAGNOSIS — Z98.89 OTHER SPECIFIED POSTPROCEDURAL STATES: Chronic | ICD-10-CM

## 2021-04-07 PROCEDURE — 99284 EMERGENCY DEPT VISIT MOD MDM: CPT

## 2021-04-07 NOTE — ED ADULT NURSE NOTE - NSIMPLEMENTINTERV_GEN_ALL_ED
Implemented All Universal Safety Interventions:  [place] to call system. Call bell, personal items and telephone within reach. Instruct patient to call for assistance. Room bathroom lighting operational. Non-slip footwear when patient is off stretcher. Physically safe environment: no spills, clutter or unnecessary equipment. Stretcher in lowest position, wheels locked, appropriate side rails in place.

## 2021-04-07 NOTE — ED ADULT NURSE NOTE - OBJECTIVE STATEMENT
51 y male presents from home with pain at indwelling urinary catheter leg bag and suprapubic area. Patient reports to having Indwelling Granado catheter placed on Monday after difficulty urinating over last week. Reports to difficulty beginning urination- placed outpatient at Urology. Reports to pain while emptying leg bag. Denies fevers, chills, SOB, lightheadedness, dizziness, N/V/D. Indwelling catheter draining dark yellow urine. A&Ox3. Skin warm dry and intact. Breathing comfortably in bed- no distress. Abdomen soft nondistended. Safety maintained- bed locked in lowest position.

## 2021-04-08 ENCOUNTER — NON-APPOINTMENT (OUTPATIENT)
Age: 51
End: 2021-04-08

## 2021-04-08 VITALS
RESPIRATION RATE: 18 BRPM | TEMPERATURE: 98 F | OXYGEN SATURATION: 97 % | DIASTOLIC BLOOD PRESSURE: 69 MMHG | SYSTOLIC BLOOD PRESSURE: 108 MMHG | HEART RATE: 78 BPM

## 2021-04-08 LAB
ALBUMIN SERPL ELPH-MCNC: 4.2 G/DL — SIGNIFICANT CHANGE UP (ref 3.3–5)
ALP SERPL-CCNC: 65 U/L — SIGNIFICANT CHANGE UP (ref 40–120)
ALT FLD-CCNC: 17 U/L — SIGNIFICANT CHANGE UP (ref 10–45)
ANION GAP SERPL CALC-SCNC: 13 MMOL/L — SIGNIFICANT CHANGE UP (ref 5–17)
APPEARANCE UR: ABNORMAL
AST SERPL-CCNC: 21 U/L — SIGNIFICANT CHANGE UP (ref 10–40)
BACTERIA # UR AUTO: NEGATIVE — SIGNIFICANT CHANGE UP
BASOPHILS # BLD AUTO: 0.04 K/UL — SIGNIFICANT CHANGE UP (ref 0–0.2)
BASOPHILS NFR BLD AUTO: 0.5 % — SIGNIFICANT CHANGE UP (ref 0–2)
BILIRUB SERPL-MCNC: 0.4 MG/DL — SIGNIFICANT CHANGE UP (ref 0.2–1.2)
BILIRUB UR-MCNC: NEGATIVE — SIGNIFICANT CHANGE UP
BUN SERPL-MCNC: 22 MG/DL — SIGNIFICANT CHANGE UP (ref 7–23)
CALCIUM SERPL-MCNC: 9.8 MG/DL — SIGNIFICANT CHANGE UP (ref 8.4–10.5)
CHLORIDE SERPL-SCNC: 103 MMOL/L — SIGNIFICANT CHANGE UP (ref 96–108)
CO2 SERPL-SCNC: 23 MMOL/L — SIGNIFICANT CHANGE UP (ref 22–31)
COD CRY URNS QL: ABNORMAL
COLOR SPEC: YELLOW — SIGNIFICANT CHANGE UP
CREAT SERPL-MCNC: 0.93 MG/DL — SIGNIFICANT CHANGE UP (ref 0.5–1.3)
DIFF PNL FLD: ABNORMAL
EOSINOPHIL # BLD AUTO: 0.5 K/UL — SIGNIFICANT CHANGE UP (ref 0–0.5)
EOSINOPHIL NFR BLD AUTO: 6.2 % — HIGH (ref 0–6)
EPI CELLS # UR: 1 /HPF — SIGNIFICANT CHANGE UP
GLUCOSE SERPL-MCNC: 96 MG/DL — SIGNIFICANT CHANGE UP (ref 70–99)
GLUCOSE UR QL: NEGATIVE — SIGNIFICANT CHANGE UP
HCT VFR BLD CALC: 43.4 % — SIGNIFICANT CHANGE UP (ref 39–50)
HGB BLD-MCNC: 13.7 G/DL — SIGNIFICANT CHANGE UP (ref 13–17)
HYALINE CASTS # UR AUTO: 2 /LPF — SIGNIFICANT CHANGE UP (ref 0–2)
IMM GRANULOCYTES NFR BLD AUTO: 0.1 % — SIGNIFICANT CHANGE UP (ref 0–1.5)
KETONES UR-MCNC: NEGATIVE — SIGNIFICANT CHANGE UP
LEUKOCYTE ESTERASE UR-ACNC: NEGATIVE — SIGNIFICANT CHANGE UP
LYMPHOCYTES # BLD AUTO: 3.29 K/UL — SIGNIFICANT CHANGE UP (ref 1–3.3)
LYMPHOCYTES # BLD AUTO: 40.5 % — SIGNIFICANT CHANGE UP (ref 13–44)
MCHC RBC-ENTMCNC: 28 PG — SIGNIFICANT CHANGE UP (ref 27–34)
MCHC RBC-ENTMCNC: 31.6 GM/DL — LOW (ref 32–36)
MCV RBC AUTO: 88.6 FL — SIGNIFICANT CHANGE UP (ref 80–100)
MONOCYTES # BLD AUTO: 0.66 K/UL — SIGNIFICANT CHANGE UP (ref 0–0.9)
MONOCYTES NFR BLD AUTO: 8.1 % — SIGNIFICANT CHANGE UP (ref 2–14)
NEUTROPHILS # BLD AUTO: 3.62 K/UL — SIGNIFICANT CHANGE UP (ref 1.8–7.4)
NEUTROPHILS NFR BLD AUTO: 44.6 % — SIGNIFICANT CHANGE UP (ref 43–77)
NITRITE UR-MCNC: NEGATIVE — SIGNIFICANT CHANGE UP
NRBC # BLD: 0 /100 WBCS — SIGNIFICANT CHANGE UP (ref 0–0)
PH UR: 6 — SIGNIFICANT CHANGE UP (ref 5–8)
PLATELET # BLD AUTO: 264 K/UL — SIGNIFICANT CHANGE UP (ref 150–400)
POTASSIUM SERPL-MCNC: 4.3 MMOL/L — SIGNIFICANT CHANGE UP (ref 3.5–5.3)
POTASSIUM SERPL-SCNC: 4.3 MMOL/L — SIGNIFICANT CHANGE UP (ref 3.5–5.3)
PROT SERPL-MCNC: 7 G/DL — SIGNIFICANT CHANGE UP (ref 6–8.3)
PROT UR-MCNC: ABNORMAL
RBC # BLD: 4.9 M/UL — SIGNIFICANT CHANGE UP (ref 4.2–5.8)
RBC # FLD: 14.3 % — SIGNIFICANT CHANGE UP (ref 10.3–14.5)
RBC CASTS # UR COMP ASSIST: 115 /HPF — HIGH (ref 0–4)
SODIUM SERPL-SCNC: 139 MMOL/L — SIGNIFICANT CHANGE UP (ref 135–145)
SP GR SPEC: 1.02 — SIGNIFICANT CHANGE UP (ref 1.01–1.02)
UROBILINOGEN FLD QL: NEGATIVE — SIGNIFICANT CHANGE UP
WBC # BLD: 8.12 K/UL — SIGNIFICANT CHANGE UP (ref 3.8–10.5)
WBC # FLD AUTO: 8.12 K/UL — SIGNIFICANT CHANGE UP (ref 3.8–10.5)
WBC UR QL: 8 /HPF — HIGH (ref 0–5)

## 2021-04-08 PROCEDURE — 81001 URINALYSIS AUTO W/SCOPE: CPT

## 2021-04-08 PROCEDURE — 99283 EMERGENCY DEPT VISIT LOW MDM: CPT

## 2021-04-08 PROCEDURE — 87086 URINE CULTURE/COLONY COUNT: CPT

## 2021-04-08 PROCEDURE — 80053 COMPREHEN METABOLIC PANEL: CPT

## 2021-04-08 PROCEDURE — 85025 COMPLETE CBC W/AUTO DIFF WBC: CPT

## 2021-04-08 RX ORDER — ATROPA BELLADONNA AND OPIUM 16.2; 6 MG/1; MG/1
1 SUPPOSITORY RECTAL ONCE
Refills: 0 | Status: DISCONTINUED | OUTPATIENT
Start: 2021-04-08 | End: 2021-04-08

## 2021-04-08 RX ORDER — OXYBUTYNIN CHLORIDE 5 MG
1 TABLET ORAL
Qty: 12 | Refills: 0
Start: 2021-04-08 | End: 2021-04-11

## 2021-04-08 RX ORDER — ATROPA BELLADONNA AND OPIUM 16.2; 6 MG/1; MG/1
1 SUPPOSITORY RECTAL
Qty: 12 | Refills: 0
Start: 2021-04-08 | End: 2021-04-11

## 2021-04-08 RX ORDER — OXYBUTYNIN CHLORIDE 5 MG
5 TABLET ORAL ONCE
Refills: 0 | Status: COMPLETED | OUTPATIENT
Start: 2021-04-08 | End: 2021-04-08

## 2021-04-08 RX ADMIN — ATROPA BELLADONNA AND OPIUM 1 SUPPOSITORY(S): 16.2; 6 SUPPOSITORY RECTAL at 01:12

## 2021-04-08 RX ADMIN — Medication 5 MILLIGRAM(S): at 01:12

## 2021-04-08 NOTE — ED PROVIDER NOTE - CLINICAL SUMMARY MEDICAL DECISION MAKING FREE TEXT BOX
Havedel, PGY3- intermittent bladder pain in setting of leonard, concerning for spasm. Denies fever, hematuria, retention or NVD. Given has good uro f/u will asses for cystitis, changes in Scr/lytes. Offer meds here in ER and to go home with and be seen in clinic in next 1-2 days for further evaluation.

## 2021-04-08 NOTE — ED PROVIDER NOTE - OBJECTIVE STATEMENT
51 yoM, PMHx of testicular CA, adrenal insufficiency, HTN, BPH presenting to ED with indwelling leonard issue, pain with emptying leg bag. Reports he had leonard placed 48 hours ago for retention. Had a hx of hesitancy in the past but never had catheter (outside of perioperative setting). States since having leonard placed has intense pain when bag is being emptied. Lasts for minutes that resolved. No pain currently at rest. Tried nsaids at home. Denies any fever, NVD, or back pain. Follows at [facility] urology. Dr. VALENTINA Lugo placed leonard at clinic 2 days ago.

## 2021-04-08 NOTE — ED PROVIDER NOTE - PROGRESS NOTE DETAILS
Haverty, PGY3- sxs returned after RN drained leonard bag. Lasted for minutes then resolved. Likely spasm induced by pressure differential. Given belladona and oxybutynin. ERX sent. Feeling much improved. Advised to call uro tomorrow for clinic visit. UA with RBC but no overt signs of infection. Will follow cx. Sx not consistent with renal colic. Stable for d/c. Pt understands and amenable to plan.

## 2021-04-08 NOTE — ED PROVIDER NOTE - ATTENDING CONTRIBUTION TO CARE
Afebrile. Awake and Alert. Abdomen soft NTND. CN II-XII grossly intact. Moves all extremities without lateralization. Clear yellow urine in Granado bag,    [place] abdominal exam  Spasm pain in pelvis when emptying leg bag: Supportive medications for bladder spasm  r/o UTI/CHANDNI

## 2021-04-08 NOTE — ED POST DISCHARGE NOTE - ADDITIONAL DOCUMENTATION
4/8/21: Pt wife called, states unable to fill Rx for Belladona-opium suppository as both [facility] and [facility] do not have this medication in stock. I spoke with urology who states oxybutynin may be used for bladder spasm as well, but it appears an Rx for oxybutynin was already ERx'ed. I called house pharmacist who advised me to call [facility]. I spoke with the pharmacist at [facility] who states they may be able to order it if they are provided with an Rx, I esubmitted an Rx with call back number if there are issues ordering the medication. I discussed this update with pt/wife who will attempt to fill Rx at [facility], given admin# for callback if needed. Advised pt/wife to return to ED if pt with persistent pain or urinary obstruction. All questions answered. I spoke to Hari, the pharmacist from [facility] where Rx was originally sent, and instructed him to DC the Rx on his end. -Derik Andres PA-C

## 2021-04-08 NOTE — ED PROVIDER NOTE - NSFOLLOWUPINSTRUCTIONS_ED_ALL_ED_FT
You likely have bladder spasm.    Take the medications before/after/around when you drain your bag.    Follow with [facility] this week.    Please return to ER for new or concerning symptoms.

## 2021-04-08 NOTE — ED PROVIDER NOTE - PHYSICAL EXAMINATION
General: alert, conversant, vitals reassuring, mild distress   Head: atraumatic, normocephalic  Eyes: PERRL, EOMI, no scleral icterus  ENT: no epistaxis, normal phonation, airway patent  Neck: full ROM, no midline ttp  CV: RRR, HR 90-95, no murmurs, HDS  Pulm: lungs CTA b/l, no wheezing, no respiratory distress  GI: abd soft, non tender, no guarding/rebound/masses, leonard in place w/ leg bag, dark urine in bag, no blood/clots   Back: normal ROM, no midline ttp, no signs of trauma, no CVA ttp  Extremities: normal ROM, joints stable, distal pulses intact, no edema  Neuro: alert, oriented x3, moving all extremities, interactive, normal speech/memory   Derm: warm, dry, normal color, no rash/wounds

## 2021-04-08 NOTE — ED PROVIDER NOTE - PATIENT PORTAL LINK FT
You can access the FollowMyHealth Patient Portal offered by [facility] by registering at the following website: [email or web address]. By joining [facility]’s FollowMyHealth portal, you will also be able to view your health information using other applications (apps) compatible with our system.

## 2021-04-09 ENCOUNTER — APPOINTMENT (OUTPATIENT)
Dept: UROLOGY | Facility: CLINIC | Age: 51
End: 2021-04-09
Payer: COMMERCIAL

## 2021-04-09 DIAGNOSIS — N32.89 OTHER SPECIFIED DISORDERS OF BLADDER: ICD-10-CM

## 2021-04-09 LAB
CULTURE RESULTS: NO GROWTH — SIGNIFICANT CHANGE UP
SPECIMEN SOURCE: SIGNIFICANT CHANGE UP

## 2021-04-09 PROCEDURE — 99072 ADDL SUPL MATRL&STAF TM PHE: CPT

## 2021-04-09 PROCEDURE — 99212 OFFICE O/P EST SF 10 MIN: CPT

## 2021-04-09 NOTE — HISTORY OF PRESENT ILLNESS
[FreeTextEntry1] : Pt,has indwelling catheter and had bladder spasms. \par Went to ER and Oxybutynin was ordered, \par Change to Oxybutynin L/A \par Stop taking tabs on Sunday night UDS on Tues.

## 2021-04-10 DIAGNOSIS — R33.8 OTHER RETENTION OF URINE: ICD-10-CM

## 2021-04-12 ENCOUNTER — NON-APPOINTMENT (OUTPATIENT)
Age: 51
End: 2021-04-12

## 2021-04-13 ENCOUNTER — OUTPATIENT (OUTPATIENT)
Dept: OUTPATIENT SERVICES | Facility: HOSPITAL | Age: 51
LOS: 1 days | End: 2021-04-13
Payer: COMMERCIAL

## 2021-04-13 ENCOUNTER — APPOINTMENT (OUTPATIENT)
Dept: UROLOGY | Facility: CLINIC | Age: 51
End: 2021-04-13
Payer: COMMERCIAL

## 2021-04-13 VITALS
OXYGEN SATURATION: 99 % | RESPIRATION RATE: 18 BRPM | HEART RATE: 79 BPM | SYSTOLIC BLOOD PRESSURE: 96 MMHG | DIASTOLIC BLOOD PRESSURE: 68 MMHG

## 2021-04-13 VITALS — SYSTOLIC BLOOD PRESSURE: 111 MMHG | DIASTOLIC BLOOD PRESSURE: 77 MMHG

## 2021-04-13 VITALS — DIASTOLIC BLOOD PRESSURE: 73 MMHG | SYSTOLIC BLOOD PRESSURE: 104 MMHG

## 2021-04-13 DIAGNOSIS — Z90.79 ACQUIRED ABSENCE OF OTHER GENITAL ORGAN(S): Chronic | ICD-10-CM

## 2021-04-13 DIAGNOSIS — I89.9 NONINFECTIVE DISORDER OF LYMPHATIC VESSELS AND LYMPH NODES, UNSPECIFIED: Chronic | ICD-10-CM

## 2021-04-13 DIAGNOSIS — R35.0 FREQUENCY OF MICTURITION: ICD-10-CM

## 2021-04-13 DIAGNOSIS — C43.4 MALIGNANT MELANOMA OF SCALP AND NECK: Chronic | ICD-10-CM

## 2021-04-13 DIAGNOSIS — Z98.89 OTHER SPECIFIED POSTPROCEDURAL STATES: Chronic | ICD-10-CM

## 2021-04-13 DIAGNOSIS — N32.0 BLADDER-NECK OBSTRUCTION: ICD-10-CM

## 2021-04-13 PROCEDURE — 51728 CYSTOMETROGRAM W/VP: CPT | Mod: 26

## 2021-04-13 PROCEDURE — 51797 INTRAABDOMINAL PRESSURE TEST: CPT | Mod: 26

## 2021-04-13 PROCEDURE — 51784 ANAL/URINARY MUSCLE STUDY: CPT

## 2021-04-13 PROCEDURE — 99212 OFFICE O/P EST SF 10 MIN: CPT | Mod: 25

## 2021-04-13 PROCEDURE — 51741 ELECTRO-UROFLOWMETRY FIRST: CPT | Mod: 26

## 2021-04-13 PROCEDURE — 51797 INTRAABDOMINAL PRESSURE TEST: CPT

## 2021-04-13 PROCEDURE — 51728 CYSTOMETROGRAM W/VP: CPT

## 2021-04-13 PROCEDURE — 51741 ELECTRO-UROFLOWMETRY FIRST: CPT

## 2021-04-13 PROCEDURE — 51784 ANAL/URINARY MUSCLE STUDY: CPT | Mod: 26

## 2021-04-13 NOTE — HISTORY OF PRESENT ILLNESS
[FreeTextEntry1] : Pt.had UDS which shows significant obstruction (Bladder pressure [id number]\par Following catheter removal he emptied his bladder.\par Start Tamsulosin \par Discussed options of therapy for some time. \par O/E 20 gram prostate \par ? Bladder neck obstruction. \par

## 2021-04-13 NOTE — PHYSICAL EXAM
[Prostate Tenderness] : the prostate was not tender [No Prostate Nodules] : no prostate nodules [Prostate Size ___ gm] : prostate size [unfilled] gm

## 2021-04-13 NOTE — ASSESSMENT
[FreeTextEntry1] : Pt.had UDS which shows significant obstruction (Bladder pressure [id number]\par Following catheter removal he emptied his bladder.\par Start Tamsulosin \par Discussed options of therapy for some time. \par O/E 20 gram prostate \par ? Bladder neck obstruction. \par

## 2021-04-16 ENCOUNTER — APPOINTMENT (OUTPATIENT)
Dept: UROLOGY | Facility: CLINIC | Age: 51
End: 2021-04-16
Payer: COMMERCIAL

## 2021-04-16 PROCEDURE — 99212 OFFICE O/P EST SF 10 MIN: CPT

## 2021-04-16 PROCEDURE — 99072 ADDL SUPL MATRL&STAF TM PHE: CPT

## 2021-04-16 PROCEDURE — 51798 US URINE CAPACITY MEASURE: CPT

## 2021-04-16 NOTE — HISTORY OF PRESENT ILLNESS
[FreeTextEntry1] : The patient is a 51 one year old male presenting for a follow up regarding a history of retention. His previous UDS illustrated an obstructive pattern, although his prostate is not enlarged. He was prescribed Tamsulosin at his last visit on 4/13/21 and reports no significant side effects from it.\par \par  His PVR on 4/16/21 is 0cc. \par \par I will keep him on Tamsulosin to continue treating the urinary retention.\par \par I recommend that the patient follow up in 3 months for reevaluation. \par \par

## 2021-04-16 NOTE — ASSESSMENT
[FreeTextEntry1] : The patient is a 51 one year old male presenting for a follow up regarding a history of retention. His previous UDS illustrated an obstructive pattern, although his prostate is not enlarged. He was prescribed Tamsulosin at his last visit on 4/13/21 and reports no significant side effects from it.\par \par  His PVR on 4/16/21 is 0cc. \par \par I will keep him on Tamsulosin to continue treating the urinary retention.\par \par I recommend that the patient follow up in 3 months for reevaluation. \par \par I spent 15 minutes evaluating and treating the patient today.

## 2021-04-26 LAB — RENIN ACTIVITY, PLASMA: 0.9 NG/ML/HR

## 2021-04-28 DIAGNOSIS — N32.0 BLADDER-NECK OBSTRUCTION: ICD-10-CM

## 2021-05-10 ENCOUNTER — OUTPATIENT (OUTPATIENT)
Dept: OUTPATIENT SERVICES | Facility: HOSPITAL | Age: 51
LOS: 1 days | Discharge: HOME | End: 2021-05-10

## 2021-05-10 ENCOUNTER — TRANSCRIPTION ENCOUNTER (OUTPATIENT)
Age: 51
End: 2021-05-10

## 2021-05-10 DIAGNOSIS — Z90.79 ACQUIRED ABSENCE OF OTHER GENITAL ORGAN(S): Chronic | ICD-10-CM

## 2021-05-10 DIAGNOSIS — Z98.89 OTHER SPECIFIED POSTPROCEDURAL STATES: Chronic | ICD-10-CM

## 2021-05-10 DIAGNOSIS — I89.9 NONINFECTIVE DISORDER OF LYMPHATIC VESSELS AND LYMPH NODES, UNSPECIFIED: Chronic | ICD-10-CM

## 2021-05-10 DIAGNOSIS — C43.4 MALIGNANT MELANOMA OF SCALP AND NECK: Chronic | ICD-10-CM

## 2021-05-10 DIAGNOSIS — C43.8 MALIGNANT MELANOMA OF OVERLAPPING SITES OF SKIN: ICD-10-CM

## 2021-05-11 ENCOUNTER — RESULT REVIEW (OUTPATIENT)
Age: 51
End: 2021-05-11

## 2021-05-11 ENCOUNTER — APPOINTMENT (OUTPATIENT)
Dept: HEMATOLOGY ONCOLOGY | Facility: CLINIC | Age: 51
End: 2021-05-11
Payer: COMMERCIAL

## 2021-05-11 VITALS
BODY MASS INDEX: 22.34 KG/M2 | WEIGHT: 156.07 LBS | TEMPERATURE: 97.9 F | RESPIRATION RATE: 16 BRPM | HEART RATE: 85 BPM | DIASTOLIC BLOOD PRESSURE: 85 MMHG | HEIGHT: 70 IN | SYSTOLIC BLOOD PRESSURE: 125 MMHG | OXYGEN SATURATION: 97 %

## 2021-05-11 LAB
BASOPHILS # BLD AUTO: 0.06 K/UL — SIGNIFICANT CHANGE UP (ref 0–0.2)
BASOPHILS NFR BLD AUTO: 0.8 % — SIGNIFICANT CHANGE UP (ref 0–2)
EOSINOPHIL # BLD AUTO: 0.57 K/UL — HIGH (ref 0–0.5)
EOSINOPHIL NFR BLD AUTO: 7.2 % — HIGH (ref 0–6)
ERYTHROCYTE [SEDIMENTATION RATE] IN BLOOD: 8 MM/HR — SIGNIFICANT CHANGE UP (ref 0–20)
HCT VFR BLD CALC: 43.8 % — SIGNIFICANT CHANGE UP (ref 39–50)
HGB BLD-MCNC: 14.4 G/DL — SIGNIFICANT CHANGE UP (ref 13–17)
IMM GRANULOCYTES NFR BLD AUTO: 0.1 % — SIGNIFICANT CHANGE UP (ref 0–1.5)
LYMPHOCYTES # BLD AUTO: 3.04 K/UL — SIGNIFICANT CHANGE UP (ref 1–3.3)
LYMPHOCYTES # BLD AUTO: 38.5 % — SIGNIFICANT CHANGE UP (ref 13–44)
MCHC RBC-ENTMCNC: 29.1 PG — SIGNIFICANT CHANGE UP (ref 27–34)
MCHC RBC-ENTMCNC: 32.9 G/DL — SIGNIFICANT CHANGE UP (ref 32–36)
MCV RBC AUTO: 88.5 FL — SIGNIFICANT CHANGE UP (ref 80–100)
MONOCYTES # BLD AUTO: 0.46 K/UL — SIGNIFICANT CHANGE UP (ref 0–0.9)
MONOCYTES NFR BLD AUTO: 5.8 % — SIGNIFICANT CHANGE UP (ref 2–14)
NEUTROPHILS # BLD AUTO: 3.75 K/UL — SIGNIFICANT CHANGE UP (ref 1.8–7.4)
NEUTROPHILS NFR BLD AUTO: 47.6 % — SIGNIFICANT CHANGE UP (ref 43–77)
NRBC # BLD: 0 /100 WBCS — SIGNIFICANT CHANGE UP (ref 0–0)
PLATELET # BLD AUTO: 333 K/UL — SIGNIFICANT CHANGE UP (ref 150–400)
RBC # BLD: 4.95 M/UL — SIGNIFICANT CHANGE UP (ref 4.2–5.8)
RBC # FLD: 14.4 % — SIGNIFICANT CHANGE UP (ref 10.3–14.5)
WBC # BLD: 7.89 K/UL — SIGNIFICANT CHANGE UP (ref 3.8–10.5)
WBC # FLD AUTO: 7.89 K/UL — SIGNIFICANT CHANGE UP (ref 3.8–10.5)

## 2021-05-11 PROCEDURE — 99072 ADDL SUPL MATRL&STAF TM PHE: CPT

## 2021-05-11 PROCEDURE — 99214 OFFICE O/P EST MOD 30 MIN: CPT

## 2021-05-11 RX ORDER — OXYBUTYNIN CHLORIDE 5 MG/1
5 TABLET, EXTENDED RELEASE ORAL
Qty: 28 | Refills: 0 | Status: DISCONTINUED | COMMUNITY
Start: 2021-04-09 | End: 2021-05-11

## 2021-05-11 NOTE — PHYSICAL EXAM
[Ambulatory and capable of all self care but unable to carry out any work activities] : Status 2- Ambulatory and capable of all self care but unable to carry out any work activities. Up and about more than 50% of waking hours [Thin] : thin [Normal] : affect appropriate [de-identified] : old incision is normal and no hernia [de-identified] : No new lesions noted on scalp

## 2021-05-11 NOTE — REVIEW OF SYSTEMS
[Fatigue] : fatigue [Negative] : Allergic/Immunologic [Recent Change In Weight] : ~T no recent weight change [FreeTextEntry9] : Mild curvature of thoracic spine noted. Has chronic back pain and left knee pain.  [de-identified] : No new skin lesions.

## 2021-05-11 NOTE — HISTORY OF PRESENT ILLNESS
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  \par \par He noticed a raised, black, pruritic lesion on his right parietal scalp.  Biopsy was performed on 8/14/2018 which was consistent with a 1.1mm melanoma with no significant mitotic figures, no ulceration and no regression.\par He was seen by Dr Sarwat Chandler on 8/13/2018 who recommended wide excision of melanoma with SLNB and reconstruction.\par On 8/29/2018 patient underwent radical resection of scalp melanoma, right posterior modified neck dissection with closure by Dr Bradley Toussaint (Plastic)\par Final Path: metastatic melanoma present in 2/2 LN, residual melanoma, negative margins, no lymphovascular invasion.  Tumor stage pT2a pN2a\par PET/CT 9/15/2018: Minimally FDG avid soft tissue thickening right scalp probably postsurgical.FDG avid focal area of soft tissue thickening right neck, favor postsurgical changes rather than residual disease. Suggest correlation with clinical exam and surgical margins. No FDG avid distant metastatic disease. \par On 10/19/2018 underwent right functional neck dissection with Dr Sarwat Chandler\par Path: no metastatic melanoma seen in eighteen LN (0/18) and benign skin/tissue findings.\par He was referred to Dr Kendall Sands (Medical Oncology) at [facility]. \par He was originally seen by Dr Kendall Sands who referred the patient to Dr Gopi Arredondo at [facility].  \par Patient has stage IIIa (T2aN2a) BRAF testing was performed and mutation was detected. The patient was offered adjuvant Nivolumab 480mg every four weeks by Dr Arredondo for 13 cycles.\par \par 1/25/2019: Mr Mclaughlin is here for follow up.  He is scheduled for C#2 Nivolumab today.  He tolerated C#1 extremely well and reports no adverse events.\par \par 2/22/2019: Mr Mclaughlin is here for C#3 Nivolumab today.  He feels well today and is tolerating Nivo without any adverse events.  He continues to remain active and works as a .  He does reports two episode of diarrhea x 3 weeks after his last treatment.  He took Imodium with excellent relief. \par \par 3/22/2019: Patient Here for C#4 today. DEXA shows osteopenia. His Vit D level is 20 despite 50k units per week. No irAEs from treatment. \par \par 4/24/2019: As of 4/5/19 patient had leg cramps in the front and back of the legs. The intensity became severe. Took Tylenol and motrin with no relief. He also noted some loose stool just prior to these onset of the leg pain. Prednisone 60 mg daily started. After 1 week there was no impact on cramps. Flexeril helps sleep but no benefit with leg pain. The prednisone was only taken for only 3 days when changed to Flexeril. He passes stool 2-3 times daily which is more than usual. No bleeding. Overall the leg cramping is less severe but still recurring. He is off of atorvastatin and remains on Tricor. The muscle pains do not happen every day, but occur 3-4 days per week. He is working and is working from home more often at present. \par \par 10/25/2019: Patient has noticed the development of multiple new pigmented lesions on his scalp and right upper arm. He saw his dermatologist and 3 biopsies were done. The one on the arm had an epidermal component; whereas neither of the scalp lesions had epidermal component. Both were dermal only, and suggested metastatic disease. This would suggest in-transit relapse since the lesions are close to his right scalp resection from 2018. To summarize, he had received adjuvant nivolumab from 1228/2018 to 3/22/2019. It was discontinued due to persistent thigh pain and fasciitis discovered on MRI. He had also had grade 2 diarrhea that was self limited. Since the biopsies 2 weeks ago, he has noticed additional lesions on the left supraclavicular area and the left pectoral region. These are both pigmented and ~4-5 mm in diameter. There are also new lesions on the scalp. Overall he feels well and has no new symptoms, and no residual immune related symptoms from the prior treatment. \par \par 1/14/2020: On 11/12/2019 C#1 D#1 of ipilimumab and nivolumab. He notes no development of new small melanoma lesions on his scalp or chest. Existing ones remian stable. He is aware that PET and MRI showed no internal disease. We discussed that BRAFi+MEKi could be considered as an alternative based on his historic BRAF V600 mutation. He is using Immodium 2 tablets daily on occasion, and not needed daily. Given lack of progression or significant irAEs, will continue with IPI and NIVO. \par \par 2/24/2020: Patient completed all 4 cycles of Ipilimumab and Nivolumab. Over past week he has noticed new stiffness & soreness in his bilateral shoulders and knees. He continues to have thigh pain with moderate discomfort that requires up to 6 tablets of Percoset daily. He has also noted an increase in right orbital head pain that is consistent with his prior history of migrane/cluster headache. He states that the pains are worse than the past, and has been to his neurologist and tried several medications. He has been successfully helped with Ubelvy. He is here to start maintenance nivolumab post-induction. He has no other complaints of symptoms or other issues attributable to immune-related adverse events.\par \par 6/16/2020: TEB follow up appointment today, verbal consent obtained.\par 1) Melanoma: He completed Ipi/Nivo x 4 on 1/15/2020.  Afterwards patient started on single agent Nivolumab x 2 cycles, the second of which was given in 4/2020, and stopped due to apparent immune-related side effects. He did not received the nivolumab on 5/2 due to severe fatigue and ? colitis on PET. he notes no new skin lesions. Patient underwent PET/CT on 4/22/2020 which demonstrated colitis.  No evidence of metastatic disease or recurrent disease.\par 2) ? Colitis: Patient was advised to blood work and stool samples.  No acute pathology was noted.  He denied diarrhea or any symptoms attributable to colitis.\par 3) Nausea: this has improved significantly. He is eating better since on steroid replacement and Marinol (from hospital). \par 4) Appetite loss: is improving on Marinol (also helps nausea).\par 5) Myositis: Patient is using Percocet 1-2 tablets q6 hours; three times per week. At times the pain still does wake patient up in the middle of the night.\par 6) Fatigue: Improving but not back to baseline.\par \par 8/11/2020:\par Patient here for follow-up of metastatic melanoma, especially located to skin of scalp. He has noted no new lesions and the prior ones continue to resolve or diminish. He completed Ipilimumab + Nivolumab x 4 from 11/2019 to 1/15/2020.  Afterwards, patient started on single agent Nivolumab x 2 cycles, the second of which was given in 4/2020, and stopped due to apparent immune-related side effects.\par Fatigue - this is continual and chronic. He has had to stop work and start disability since 5/2020. \par Adrenal insufficiency: He is working with Dr. Sanchez for endocrine treatment of his adrenal gland and thyroid.\par SOB usually with mild exertion - unclear origin. He has seen pulmonary for PFTs and cardiology for echocardiogram and stress test without abnormality noted. \par Joint pains especially of the knees and shoulders - this has gotten worse and pain medication (Percoset) is not helping. \par Muscle cramps in thighs - remains mild to moderate, and has intermittent severe cramps. \par Weight loss - seems to be multifactorial, and he has no taste for food and poor appetite.\par \par 11/5/2020:\par Metastatic melanoma - especially located to skin of scalp. He has noted no new lesions and the prior ones continue to resolve or diminish. He completed Ipilimumab + Nivolumab x 4 from 11/2019 to 1/15/2020.  Afterwards, patient started on single agent Nivolumab x 2 cycles, the second of which was given in 4/2020, and stopped due to apparent immune-related side effects. PET negative 10/2020.\par Fatigue - this is continual and chronic. He has had to stop work and remains on disability since 5/2020 (through [employer]).\par Adrenal insufficiency - He is working with Dr. Sanchez for endocrine treatment of his adrenal gland and thyroid.\par SOB - usually with mild exertion - unclear origin. He has seen pulmonary for PFTs and cardiology for echocardiogram and stress test without abnormality noted. \par Joint pains - especially of the knees and shoulders - the medical marijuana helped with appetite, weight and shoulder pain. Inhales 1-2 times of 0.5 mg THC: 0.5 mg CBD daily. \par Muscle cramps in thighs - remains mild to moderate, and has intermittent severe cramps. This has gotten worse especially last week. Only Percoset helps. Tries to avoid Percoset during the day.\par \par \par 5/11/2021:\par 1) Melanoma: Last CT 4/2021 no evidence of metastatic disease. Skin lesions on scalp are gone.\par 2) Endocrine: He continues to have fatigue which has progressed over the last 2 months.  He is currently is Hydrocortisone 20mg in AM and 10mg in PM.  He is currently still on disability and has not returned back to work.\par 3) Weight loss: Patient is down a couple more pounds. As per patient this appetite is stable and has full course meals ~ 3/day.\par 4) Arthralgia: patient noticed in bilateral knee and shoulder.  The shoulder pain is 6/10 in severity and using Percocet ATC with relief. Needs better long term, chronic pain management.  [de-identified] : melanoma [de-identified] : Surgical Oncology: Sarwat Chandler [phone]\par Plastics: Kendall Toussaint [phone]\par Medical Oncology ([facility]): Kendall Sands  [phone]\par Medical Oncology: Melanoma ([facility]): Gopi Arredondo [phone]\par Dermatology: Mariah Spring; [phone]\par

## 2021-05-12 LAB
ALBUMIN SERPL ELPH-MCNC: 4.9 G/DL
ALP BLD-CCNC: 71 U/L
ALT SERPL-CCNC: 25 U/L
ANION GAP SERPL CALC-SCNC: 12 MMOL/L
AST SERPL-CCNC: 25 U/L
BILIRUB SERPL-MCNC: 0.7 MG/DL
BUN SERPL-MCNC: 17 MG/DL
CALCIUM SERPL-MCNC: 10.6 MG/DL
CHLORIDE SERPL-SCNC: 100 MMOL/L
CO2 SERPL-SCNC: 26 MMOL/L
CREAT SERPL-MCNC: 1.08 MG/DL
CRP SERPL-MCNC: <3 MG/L
ENA SS-A AB SER IA-ACNC: <0.2 AL
ENA SS-B AB SER IA-ACNC: <0.2 AL
GLUCOSE SERPL-MCNC: 99 MG/DL
POTASSIUM SERPL-SCNC: 5.5 MMOL/L
PROT SERPL-MCNC: 7.7 G/DL
SODIUM SERPL-SCNC: 138 MMOL/L
T3 SERPL-MCNC: 155 NG/DL
T4 FREE SERPL-MCNC: 1.1 NG/DL
TSH SERPL-ACNC: 4.69 UIU/ML

## 2021-05-13 ENCOUNTER — NON-APPOINTMENT (OUTPATIENT)
Age: 51
End: 2021-05-13

## 2021-05-13 DIAGNOSIS — R63.4 ABNORMAL WEIGHT LOSS: ICD-10-CM

## 2021-05-14 ENCOUNTER — APPOINTMENT (OUTPATIENT)
Dept: HEMATOLOGY ONCOLOGY | Facility: CLINIC | Age: 51
End: 2021-05-14

## 2021-05-14 ENCOUNTER — LABORATORY RESULT (OUTPATIENT)
Age: 51
End: 2021-05-14

## 2021-05-14 ENCOUNTER — RESULT REVIEW (OUTPATIENT)
Age: 51
End: 2021-05-14

## 2021-05-14 LAB
BASOPHILS # BLD AUTO: 0.06 K/UL — SIGNIFICANT CHANGE UP (ref 0–0.2)
BASOPHILS NFR BLD AUTO: 0.9 % — SIGNIFICANT CHANGE UP (ref 0–2)
EOSINOPHIL # BLD AUTO: 0.44 K/UL — SIGNIFICANT CHANGE UP (ref 0–0.5)
EOSINOPHIL NFR BLD AUTO: 6.7 % — HIGH (ref 0–6)
HCT VFR BLD CALC: 40.8 % — SIGNIFICANT CHANGE UP (ref 39–50)
HGB BLD-MCNC: 13.4 G/DL — SIGNIFICANT CHANGE UP (ref 13–17)
IMM GRANULOCYTES NFR BLD AUTO: 0.2 % — SIGNIFICANT CHANGE UP (ref 0–1.5)
LYMPHOCYTES # BLD AUTO: 2.93 K/UL — SIGNIFICANT CHANGE UP (ref 1–3.3)
LYMPHOCYTES # BLD AUTO: 44.4 % — HIGH (ref 13–44)
MCHC RBC-ENTMCNC: 29.1 PG — SIGNIFICANT CHANGE UP (ref 27–34)
MCHC RBC-ENTMCNC: 32.8 G/DL — SIGNIFICANT CHANGE UP (ref 32–36)
MCV RBC AUTO: 88.7 FL — SIGNIFICANT CHANGE UP (ref 80–100)
MONOCYTES # BLD AUTO: 0.39 K/UL — SIGNIFICANT CHANGE UP (ref 0–0.9)
MONOCYTES NFR BLD AUTO: 5.9 % — SIGNIFICANT CHANGE UP (ref 2–14)
NEUTROPHILS # BLD AUTO: 2.77 K/UL — SIGNIFICANT CHANGE UP (ref 1.8–7.4)
NEUTROPHILS NFR BLD AUTO: 41.9 % — LOW (ref 43–77)
NRBC # BLD: 0 /100 WBCS — SIGNIFICANT CHANGE UP (ref 0–0)
PLATELET # BLD AUTO: 277 K/UL — SIGNIFICANT CHANGE UP (ref 150–400)
RBC # BLD: 4.6 M/UL — SIGNIFICANT CHANGE UP (ref 4.2–5.8)
RBC # FLD: 13.9 % — SIGNIFICANT CHANGE UP (ref 10.3–14.5)
WBC # BLD: 6.6 K/UL — SIGNIFICANT CHANGE UP (ref 3.8–10.5)
WBC # FLD AUTO: 6.6 K/UL — SIGNIFICANT CHANGE UP (ref 3.8–10.5)

## 2021-05-15 LAB
APPEARANCE: CLEAR
BACTERIA: NEGATIVE
BILIRUBIN URINE: NEGATIVE
BLOOD URINE: NEGATIVE
COLOR: NORMAL
GLUCOSE QUALITATIVE U: NEGATIVE
HYALINE CASTS: 0 /LPF
KETONES URINE: NEGATIVE
LEUKOCYTE ESTERASE URINE: NEGATIVE
MICROSCOPIC-UA: NORMAL
NITRITE URINE: NEGATIVE
PH URINE: 6
PROTEIN URINE: NEGATIVE
RED BLOOD CELLS URINE: 0 /HPF
SPECIFIC GRAVITY URINE: 1.01
SQUAMOUS EPITHELIAL CELLS: 0 /HPF
UROBILINOGEN URINE: NORMAL
WHITE BLOOD CELLS URINE: 0 /HPF

## 2021-05-18 LAB — COMPREHENSIVE SCREEN URINE: NORMAL

## 2021-05-19 ENCOUNTER — APPOINTMENT (OUTPATIENT)
Dept: PAIN MANAGEMENT | Facility: CLINIC | Age: 51
End: 2021-05-19
Payer: COMMERCIAL

## 2021-05-19 VITALS
HEIGHT: 70 IN | DIASTOLIC BLOOD PRESSURE: 75 MMHG | SYSTOLIC BLOOD PRESSURE: 119 MMHG | BODY MASS INDEX: 22.48 KG/M2 | WEIGHT: 157 LBS | HEART RATE: 72 BPM

## 2021-05-19 VITALS — TEMPERATURE: 96.3 F

## 2021-05-19 PROCEDURE — 99244 OFF/OP CNSLTJ NEW/EST MOD 40: CPT

## 2021-05-19 PROCEDURE — 99072 ADDL SUPL MATRL&STAF TM PHE: CPT

## 2021-05-21 ENCOUNTER — NON-APPOINTMENT (OUTPATIENT)
Age: 51
End: 2021-05-21

## 2021-06-04 ENCOUNTER — NON-APPOINTMENT (OUTPATIENT)
Age: 51
End: 2021-06-04

## 2021-06-07 ENCOUNTER — INPATIENT (INPATIENT)
Facility: HOSPITAL | Age: 51
LOS: 1 days | Discharge: HOME | DRG: 392 | End: 2021-06-09
Admitting: INTERNAL MEDICINE
Payer: COMMERCIAL

## 2021-06-07 ENCOUNTER — NON-APPOINTMENT (OUTPATIENT)
Age: 51
End: 2021-06-07

## 2021-06-07 VITALS
DIASTOLIC BLOOD PRESSURE: 81 MMHG | SYSTOLIC BLOOD PRESSURE: 114 MMHG | RESPIRATION RATE: 19 BRPM | TEMPERATURE: 98 F | OXYGEN SATURATION: 97 % | WEIGHT: 154.98 LBS | HEIGHT: 70 IN | HEART RATE: 95 BPM

## 2021-06-07 DIAGNOSIS — I10 ESSENTIAL (PRIMARY) HYPERTENSION: ICD-10-CM

## 2021-06-07 DIAGNOSIS — C43.4 MALIGNANT MELANOMA OF SCALP AND NECK: ICD-10-CM

## 2021-06-07 DIAGNOSIS — E27.40 UNSPECIFIED ADRENOCORTICAL INSUFFICIENCY: ICD-10-CM

## 2021-06-07 DIAGNOSIS — I89.9 NONINFECTIVE DISORDER OF LYMPHATIC VESSELS AND LYMPH NODES, UNSPECIFIED: Chronic | ICD-10-CM

## 2021-06-07 DIAGNOSIS — K56.699 OTHER INTESTINAL OBSTRUCTION UNSPECIFIED AS TO PARTIAL VERSUS COMPLETE OBSTRUCTION: ICD-10-CM

## 2021-06-07 DIAGNOSIS — R33.9 RETENTION OF URINE, UNSPECIFIED: ICD-10-CM

## 2021-06-07 DIAGNOSIS — C43.4 MALIGNANT MELANOMA OF SCALP AND NECK: Chronic | ICD-10-CM

## 2021-06-07 DIAGNOSIS — E03.9 HYPOTHYROIDISM, UNSPECIFIED: ICD-10-CM

## 2021-06-07 DIAGNOSIS — Z98.89 OTHER SPECIFIED POSTPROCEDURAL STATES: Chronic | ICD-10-CM

## 2021-06-07 DIAGNOSIS — K52.9 NONINFECTIVE GASTROENTERITIS AND COLITIS, UNSPECIFIED: ICD-10-CM

## 2021-06-07 DIAGNOSIS — Z90.79 ACQUIRED ABSENCE OF OTHER GENITAL ORGAN(S): Chronic | ICD-10-CM

## 2021-06-07 LAB
ALBUMIN SERPL ELPH-MCNC: 4.5 G/DL — SIGNIFICANT CHANGE UP (ref 3.3–5)
ALP SERPL-CCNC: 61 U/L — SIGNIFICANT CHANGE UP (ref 40–120)
ALT FLD-CCNC: 15 U/L — SIGNIFICANT CHANGE UP (ref 10–45)
ANION GAP SERPL CALC-SCNC: 14 MMOL/L — SIGNIFICANT CHANGE UP (ref 5–17)
APPEARANCE UR: CLEAR — SIGNIFICANT CHANGE UP
AST SERPL-CCNC: 19 U/L — SIGNIFICANT CHANGE UP (ref 10–40)
BACTERIA # UR AUTO: NEGATIVE — SIGNIFICANT CHANGE UP
BASE EXCESS BLDV CALC-SCNC: 2.6 MMOL/L — HIGH (ref -2–2)
BASOPHILS # BLD AUTO: 0.06 K/UL — SIGNIFICANT CHANGE UP (ref 0–0.2)
BASOPHILS NFR BLD AUTO: 0.5 % — SIGNIFICANT CHANGE UP (ref 0–2)
BILIRUB SERPL-MCNC: 0.6 MG/DL — SIGNIFICANT CHANGE UP (ref 0.2–1.2)
BILIRUB UR-MCNC: NEGATIVE — SIGNIFICANT CHANGE UP
BUN SERPL-MCNC: 19 MG/DL — SIGNIFICANT CHANGE UP (ref 7–23)
CA-I SERPL-SCNC: 1.19 MMOL/L — SIGNIFICANT CHANGE UP (ref 1.12–1.3)
CALCIUM SERPL-MCNC: 10.4 MG/DL — SIGNIFICANT CHANGE UP (ref 8.4–10.5)
CHLORIDE BLDV-SCNC: 103 MMOL/L — SIGNIFICANT CHANGE UP (ref 96–108)
CHLORIDE SERPL-SCNC: 99 MMOL/L — SIGNIFICANT CHANGE UP (ref 96–108)
CO2 BLDV-SCNC: 29 MMOL/L — SIGNIFICANT CHANGE UP (ref 22–30)
CO2 SERPL-SCNC: 22 MMOL/L — SIGNIFICANT CHANGE UP (ref 22–31)
COLOR SPEC: SIGNIFICANT CHANGE UP
CREAT SERPL-MCNC: 0.96 MG/DL — SIGNIFICANT CHANGE UP (ref 0.5–1.3)
DIFF PNL FLD: NEGATIVE — SIGNIFICANT CHANGE UP
EOSINOPHIL # BLD AUTO: 0.26 K/UL — SIGNIFICANT CHANGE UP (ref 0–0.5)
EOSINOPHIL NFR BLD AUTO: 2.2 % — SIGNIFICANT CHANGE UP (ref 0–6)
EPI CELLS # UR: 1 /HPF — SIGNIFICANT CHANGE UP
GAS PNL BLDV: 134 MMOL/L — LOW (ref 135–145)
GAS PNL BLDV: SIGNIFICANT CHANGE UP
GAS PNL BLDV: SIGNIFICANT CHANGE UP
GLUCOSE BLDV-MCNC: 94 MG/DL — SIGNIFICANT CHANGE UP (ref 70–99)
GLUCOSE SERPL-MCNC: 92 MG/DL — SIGNIFICANT CHANGE UP (ref 70–99)
GLUCOSE UR QL: NEGATIVE — SIGNIFICANT CHANGE UP
HCO3 BLDV-SCNC: 28 MMOL/L — SIGNIFICANT CHANGE UP (ref 21–29)
HCT VFR BLD CALC: 44.3 % — SIGNIFICANT CHANGE UP (ref 39–50)
HCT VFR BLDA CALC: 46 % — SIGNIFICANT CHANGE UP (ref 39–50)
HGB BLD CALC-MCNC: 15.2 G/DL — SIGNIFICANT CHANGE UP (ref 13–17)
HGB BLD-MCNC: 14.3 G/DL — SIGNIFICANT CHANGE UP (ref 13–17)
IMM GRANULOCYTES NFR BLD AUTO: 0.3 % — SIGNIFICANT CHANGE UP (ref 0–1.5)
KETONES UR-MCNC: NEGATIVE — SIGNIFICANT CHANGE UP
LACTATE BLDV-MCNC: 1.5 MMOL/L — SIGNIFICANT CHANGE UP (ref 0.7–2)
LEUKOCYTE ESTERASE UR-ACNC: NEGATIVE — SIGNIFICANT CHANGE UP
LIDOCAIN IGE QN: 61 U/L — HIGH (ref 7–60)
LYMPHOCYTES # BLD AUTO: 39 % — SIGNIFICANT CHANGE UP (ref 13–44)
LYMPHOCYTES # BLD AUTO: 4.63 K/UL — HIGH (ref 1–3.3)
MAGNESIUM SERPL-MCNC: 1.8 MG/DL — SIGNIFICANT CHANGE UP (ref 1.6–2.6)
MCHC RBC-ENTMCNC: 28 PG — SIGNIFICANT CHANGE UP (ref 27–34)
MCHC RBC-ENTMCNC: 32.3 GM/DL — SIGNIFICANT CHANGE UP (ref 32–36)
MCV RBC AUTO: 86.9 FL — SIGNIFICANT CHANGE UP (ref 80–100)
MONOCYTES # BLD AUTO: 0.65 K/UL — SIGNIFICANT CHANGE UP (ref 0–0.9)
MONOCYTES NFR BLD AUTO: 5.5 % — SIGNIFICANT CHANGE UP (ref 2–14)
NEUTROPHILS # BLD AUTO: 6.22 K/UL — SIGNIFICANT CHANGE UP (ref 1.8–7.4)
NEUTROPHILS NFR BLD AUTO: 52.5 % — SIGNIFICANT CHANGE UP (ref 43–77)
NITRITE UR-MCNC: NEGATIVE — SIGNIFICANT CHANGE UP
NRBC # BLD: 0 /100 WBCS — SIGNIFICANT CHANGE UP (ref 0–0)
PCO2 BLDV: 47 MMHG — SIGNIFICANT CHANGE UP (ref 35–50)
PH BLDV: 7.39 — SIGNIFICANT CHANGE UP (ref 7.35–7.45)
PH UR: 6 — SIGNIFICANT CHANGE UP (ref 5–8)
PLATELET # BLD AUTO: 388 K/UL — SIGNIFICANT CHANGE UP (ref 150–400)
PO2 BLDV: 34 MMHG — SIGNIFICANT CHANGE UP (ref 25–45)
POTASSIUM BLDV-SCNC: 3.4 MMOL/L — LOW (ref 3.5–5.3)
POTASSIUM SERPL-MCNC: 3.5 MMOL/L — SIGNIFICANT CHANGE UP (ref 3.5–5.3)
POTASSIUM SERPL-SCNC: 3.5 MMOL/L — SIGNIFICANT CHANGE UP (ref 3.5–5.3)
PROT SERPL-MCNC: 7.6 G/DL — SIGNIFICANT CHANGE UP (ref 6–8.3)
PROT UR-MCNC: ABNORMAL
RBC # BLD: 5.1 M/UL — SIGNIFICANT CHANGE UP (ref 4.2–5.8)
RBC # FLD: 13.5 % — SIGNIFICANT CHANGE UP (ref 10.3–14.5)
RBC CASTS # UR COMP ASSIST: 1 /HPF — SIGNIFICANT CHANGE UP (ref 0–4)
SAO2 % BLDV: 62 % — LOW (ref 67–88)
SODIUM SERPL-SCNC: 135 MMOL/L — SIGNIFICANT CHANGE UP (ref 135–145)
SP GR SPEC: 1.05 — HIGH (ref 1.01–1.02)
UROBILINOGEN FLD QL: NEGATIVE — SIGNIFICANT CHANGE UP
WBC # BLD: 11.86 K/UL — HIGH (ref 3.8–10.5)
WBC # FLD AUTO: 11.86 K/UL — HIGH (ref 3.8–10.5)
WBC UR QL: 1 /HPF — SIGNIFICANT CHANGE UP (ref 0–5)

## 2021-06-07 PROCEDURE — 99223 1ST HOSP IP/OBS HIGH 75: CPT

## 2021-06-07 PROCEDURE — 71045 X-RAY EXAM CHEST 1 VIEW: CPT | Mod: 26

## 2021-06-07 PROCEDURE — 93010 ELECTROCARDIOGRAM REPORT: CPT

## 2021-06-07 PROCEDURE — 74177 CT ABD & PELVIS W/CONTRAST: CPT | Mod: 26,MA

## 2021-06-07 PROCEDURE — 99285 EMERGENCY DEPT VISIT HI MDM: CPT

## 2021-06-07 PROCEDURE — 99252 IP/OBS CONSLTJ NEW/EST SF 35: CPT | Mod: GC

## 2021-06-07 RX ORDER — ENOXAPARIN SODIUM 100 MG/ML
40 INJECTION SUBCUTANEOUS DAILY
Refills: 0 | Status: DISCONTINUED | OUTPATIENT
Start: 2021-06-07 | End: 2021-06-09

## 2021-06-07 RX ORDER — HYDROMORPHONE HYDROCHLORIDE 2 MG/ML
1 INJECTION INTRAMUSCULAR; INTRAVENOUS; SUBCUTANEOUS ONCE
Refills: 0 | Status: DISCONTINUED | OUTPATIENT
Start: 2021-06-07 | End: 2021-06-07

## 2021-06-07 RX ORDER — HYDROCORTISONE 20 MG
10 TABLET ORAL
Refills: 0 | Status: DISCONTINUED | OUTPATIENT
Start: 2021-06-07 | End: 2021-06-09

## 2021-06-07 RX ORDER — ACETAMINOPHEN 500 MG
650 TABLET ORAL EVERY 6 HOURS
Refills: 0 | Status: DISCONTINUED | OUTPATIENT
Start: 2021-06-07 | End: 2021-06-08

## 2021-06-07 RX ORDER — ERGOCALCIFEROL 1.25 MG/1
0 CAPSULE ORAL
Qty: 0 | Refills: 0 | DISCHARGE

## 2021-06-07 RX ORDER — DIHYDROERGOTAMINE MESYLATE 1 MG/ML
1 INJECTION INTRAMUSCULAR; INTRAVENOUS; SUBCUTANEOUS
Qty: 0 | Refills: 0 | DISCHARGE

## 2021-06-07 RX ORDER — ATORVASTATIN CALCIUM 80 MG/1
1 TABLET, FILM COATED ORAL
Qty: 0 | Refills: 0 | DISCHARGE

## 2021-06-07 RX ORDER — POLYETHYLENE GLYCOL 3350 17 G/17G
17 POWDER, FOR SOLUTION ORAL DAILY
Refills: 0 | Status: DISCONTINUED | OUTPATIENT
Start: 2021-06-07 | End: 2021-06-08

## 2021-06-07 RX ORDER — MAGNESIUM OXIDE 400 MG ORAL TABLET 241.3 MG
0 TABLET ORAL
Qty: 0 | Refills: 0 | DISCHARGE

## 2021-06-07 RX ORDER — SODIUM CHLORIDE 9 MG/ML
1000 INJECTION, SOLUTION INTRAVENOUS
Refills: 0 | Status: DISCONTINUED | OUTPATIENT
Start: 2021-06-07 | End: 2021-06-08

## 2021-06-07 RX ORDER — ATORVASTATIN CALCIUM 80 MG/1
40 TABLET, FILM COATED ORAL AT BEDTIME
Refills: 0 | Status: DISCONTINUED | OUTPATIENT
Start: 2021-06-07 | End: 2021-06-09

## 2021-06-07 RX ORDER — LEVOTHYROXINE SODIUM 125 MCG
75 TABLET ORAL DAILY
Refills: 0 | Status: DISCONTINUED | OUTPATIENT
Start: 2021-06-07 | End: 2021-06-09

## 2021-06-07 RX ORDER — FENOFIBRATE,MICRONIZED 130 MG
1 CAPSULE ORAL
Qty: 0 | Refills: 0 | DISCHARGE

## 2021-06-07 RX ORDER — LEVOTHYROXINE SODIUM 125 MCG
1 TABLET ORAL
Qty: 0 | Refills: 0 | DISCHARGE

## 2021-06-07 RX ORDER — HYDROMORPHONE HYDROCHLORIDE 2 MG/ML
2 INJECTION INTRAMUSCULAR; INTRAVENOUS; SUBCUTANEOUS EVERY 4 HOURS
Refills: 0 | Status: DISCONTINUED | OUTPATIENT
Start: 2021-06-07 | End: 2021-06-08

## 2021-06-07 RX ORDER — DRONABINOL 2.5 MG
1 CAPSULE ORAL
Qty: 0 | Refills: 0 | DISCHARGE

## 2021-06-07 RX ORDER — ICOSAPENT ETHYL 500 MG/1
2 CAPSULE, LIQUID FILLED ORAL
Qty: 0 | Refills: 0 | DISCHARGE

## 2021-06-07 RX ORDER — HYDROCORTISONE 20 MG
20 TABLET ORAL
Refills: 0 | Status: DISCONTINUED | OUTPATIENT
Start: 2021-06-07 | End: 2021-06-09

## 2021-06-07 RX ORDER — TAMSULOSIN HYDROCHLORIDE 0.4 MG/1
0.8 CAPSULE ORAL AT BEDTIME
Refills: 0 | Status: DISCONTINUED | OUTPATIENT
Start: 2021-06-07 | End: 2021-06-09

## 2021-06-07 RX ORDER — SENNA PLUS 8.6 MG/1
2 TABLET ORAL AT BEDTIME
Refills: 0 | Status: DISCONTINUED | OUTPATIENT
Start: 2021-06-07 | End: 2021-06-09

## 2021-06-07 RX ORDER — UBROGEPANT 100 MG/1
1 TABLET ORAL
Qty: 0 | Refills: 0 | DISCHARGE

## 2021-06-07 RX ORDER — ZOLPIDEM TARTRATE 10 MG/1
5 TABLET ORAL AT BEDTIME
Refills: 0 | Status: DISCONTINUED | OUTPATIENT
Start: 2021-06-07 | End: 2021-06-09

## 2021-06-07 RX ORDER — AMLODIPINE BESYLATE 2.5 MG/1
1 TABLET ORAL
Qty: 0 | Refills: 0 | DISCHARGE

## 2021-06-07 RX ORDER — METOPROLOL TARTRATE 50 MG
1 TABLET ORAL
Qty: 0 | Refills: 0 | DISCHARGE

## 2021-06-07 RX ORDER — METOPROLOL TARTRATE 50 MG
50 TABLET ORAL DAILY
Refills: 0 | Status: DISCONTINUED | OUTPATIENT
Start: 2021-06-07 | End: 2021-06-09

## 2021-06-07 RX ORDER — AMLODIPINE BESYLATE 2.5 MG/1
5 TABLET ORAL DAILY
Refills: 0 | Status: DISCONTINUED | OUTPATIENT
Start: 2021-06-07 | End: 2021-06-08

## 2021-06-07 RX ADMIN — HYDROMORPHONE HYDROCHLORIDE 1 MILLIGRAM(S): 2 INJECTION INTRAMUSCULAR; INTRAVENOUS; SUBCUTANEOUS at 14:11

## 2021-06-07 RX ADMIN — HYDROMORPHONE HYDROCHLORIDE 1 MILLIGRAM(S): 2 INJECTION INTRAMUSCULAR; INTRAVENOUS; SUBCUTANEOUS at 22:30

## 2021-06-07 RX ADMIN — HYDROMORPHONE HYDROCHLORIDE 1 MILLIGRAM(S): 2 INJECTION INTRAMUSCULAR; INTRAVENOUS; SUBCUTANEOUS at 21:36

## 2021-06-07 RX ADMIN — SODIUM CHLORIDE 75 MILLILITER(S): 9 INJECTION, SOLUTION INTRAVENOUS at 14:54

## 2021-06-07 RX ADMIN — SENNA PLUS 2 TABLET(S): 8.6 TABLET ORAL at 22:49

## 2021-06-07 NOTE — H&P ADULT - PROBLEM SELECTOR PLAN 7
Outpatient follow up Outpatient follow up  In lieu of outpatient regimen of Percocet and Oxycontin will provide PRN tylenol & dilaudid

## 2021-06-07 NOTE — ED PROVIDER NOTE - ATTENDING CONTRIBUTION TO CARE
50 y/o m with pmhx GERD, HTN, HLD, NHL, OA, testicular Ca, migraines, s/p colon resection, presents for abd pain. began yesterday. had last bm yesterday morning without diarrhea. no fever or chills. no urinary complaints. here with wife at the bedside. He took regular meds at home including pain medication without relief.   Onc Dr. Rahel Lu.  no acute distress  HEENT:  perrl eomi  Lungs:  b/l bs  CVS: S1S2   Abd;  soft non distended. surgical scar intact. small area of fullness on right periumbilical area with ttp. no guarding. no rlq tend.   Ext: no deformity. no edema or erythema  Neuro: aaox3 no focal deficits  MSK: strength 5/5 b/l upper and lower ext.

## 2021-06-07 NOTE — ED ADULT NURSE NOTE - OBJECTIVE STATEMENT
52 y/o male presenting to the ER c/o abd pain. Pt reports generalized abd pain that is non-radiating, described as "a sharp squeezing pain", which began yesterday evening associated w/ decreased appetite x 1 week. Pt presents to [facility], A&Ox3, port noted to R. upper chest wall, pt endorses generalized abd pain x yesterday evening, decreased appetite x 1 week, and body/joint aches associated w/ fatigue and SOB upon exertion starting Saturday, pt states "when I do eat I am able to hold down food, but over the past week my appetite has been low", upon assessment RLQ/LLQ pain/tenderness noted by palpation.  PMHx hypertriglceridemia, melanoma, BPH, GERD, osteoarthritis, HTN, non-Hodgkins lymphoma, testicular CA. Pt A&Ox3,  abd soft, nondistended/tender, IV locked and patent, pt instructed on use of call bell, bed locked and in lowest position. 52 y/o male presenting to the ER c/o abd pain. Pt reports generalized abd pain that is non-radiating, described as "a sharp squeezing pain", which began yesterday evening associated w/ decreased appetite x 1 week. Pt presents to [facility], A&Ox3, port noted to R. upper chest wall, pt endorses generalized abd pain x yesterday evening, decreased appetite x 1 week, and body/joint aches associated w/ fatigue and SOB upon exertion starting Saturday, pt states "when I do eat I am able to hold down food, but over the past week my appetite has been low", upon assessment RLQ/LLQ pain/tenderness noted by palpation.  PMHx hypertriglceridemia, melanoma, BPH, GERD, osteoarthritis, HTN, non-Hodgkins lymphoma, testicular CA. Pt denies chest pain, difficulty breathing, fever/chills, HA, N/V/D, lightheadedness/dizziness, numbness/tingling. Pt A&Ox3,  abd soft, nondistended/tender, IV locked and patent, pt instructed on use of call bell, bed locked and in lowest position.

## 2021-06-07 NOTE — H&P ADULT - NSHPREVIEWOFSYSTEMS_GEN_ALL_CORE
Gen: no night sweats or change in appetite  Eyes: no changes in vision or diplopia   ENT: no epistaxis, sinus pain, gingival bleeding, odynophagia or dysphagia  CV: no CP, PND or palpitations  Resp: no cough, wheezing, or hemoptysis  GI: no hematemesis, hematochezia, or melena  : no dysuria, polyuria, or hematuria  MSK: no arthralgias or joint swelling   Neuro: no gross sensory changes, numbness, focal deficits  Psych: no depression or changes in concentration  Heme/Onc: no purpura, petechiae or night sweats  Skin: no pruritus, hair loss or skin lesions  All: no photosensitivity, no complaints of anaphylaxis (SOB, throat swelling) Gen: no night sweats or change in appetite  Eyes: no changes in vision or diplopia   ENT: no epistaxis, sinus pain, gingival bleeding, odynophagia or dysphagia  CV: no CP, PND or palpitations  Resp: no cough, wheezing, or hemoptysis  GI: no hematemesis, hematochezia, or melena; +LLQ abdominal pain  : no dysuria, polyuria, or hematuria; +urinary hesitancy  MSK: no arthralgias or joint swelling   Neuro: no gross sensory changes, numbness, focal deficits  Psych: no depression or changes in concentration  Heme/Onc: no purpura, petechiae or night sweats  Skin: no pruritus, hair loss or skin lesions  All: no photosensitivity, no complaints of anaphylaxis (SOB, throat swelling)

## 2021-06-07 NOTE — H&P ADULT - HISTORY OF PRESENT ILLNESS
51M with PMHx of adrenal insufficiency, hypothyroidism, melanoma (currently on immunotherapy last received one month prior), prior urinary retention (unknown etiology), history of colonic resection 2/2 to ischemic bowel and NHL (post-chemo and radiation with orchiectomy) presents to ED with one day history of urinary retention and LLQ abdominal pain. Patient states it developed suddenly. With regards to urinary retention occurred one day prior, patient with sensation to urinate but could not. Patient also developed LLQ abdominal pain which he described as constant and not related to food with relief with his long acting oxycontin. In addition, patient with weakness likely generalized for past several months. Patient endorses last bowel movement one day prior to admission. He is still passing flatus. Patient denies fever, chills, nausea, vomiting or decrease PO intake. Patient with bladder scan in ED showing >675 cc; thus, Granado placed. Patient had CT A&P showing concentric wall thickening of rectum and colonic anastomosis and likely luminal narrowing with large stool in the distal colon, proximal to the anastomosis with associated mild concentric wall thickening and adjacent stranding consistent with anastomotic stricture and development of stercoral colitis proximally. Patient with no significant complaints currently.

## 2021-06-07 NOTE — CONSULT NOTE ADULT - ATTENDING COMMENTS
Patient with narcotic induced stercoral colitis.  Surgery was in 2011 and colonoscopy 2020 wide anastomosis and no stricture.  Needs clean out and will start Movantic or Relistor.  IV abx.  IV fluids.  Will see if ok to transfer to my service and have medicine consult since bowel management will be managed by my team

## 2021-06-07 NOTE — ED ADULT NURSE NOTE - NSIMPLEMENTINTERV_GEN_ALL_ED
Implemented All Universal Safety Interventions:  [place] to call system. Call bell, personal items and telephone within reach. Instruct patient to call for assistance. Room bathroom lighting operational. Non-slip footwear when patient is off stretcher. Physically safe environment: no spills, clutter or unnecessary equipment. Stretcher in lowest position, wheels locked, appropriate side rails in place.

## 2021-06-07 NOTE — ED PROVIDER NOTE - NSTIMEPROVIDERCAREINITIATE_GEN_ER
Upon Nutritional Assessment by the Registered Dietitian your patient was determined to meet criteria / has evidence of the following diagnosis/diagnoses:          [ ]  Mild Protein Calorie Malnutrition        [ ]  Moderate Protein Calorie Malnutrition        [x ] Severe Protein Calorie Malnutrition  CHRONIC        [ ] Unspecified Protein Calorie Malnutrition        [ ] Underweight / BMI <19        [ ] Morbid Obesity / BMI > 40      Findings as based on:  •  Comprehensive nutrition assessment and consultation  •  Calorie counts (nutrient intake analysis)  •  Food acceptance and intake status from observations by staff  •  Follow up  •  Patient education  •  Intervention secondary to interdisciplinary rounds  •   concerns      Treatment:    The following diet has been recommended: Rec Diabaraceli TID. Encouraged healthy diet.      PROVIDER Section:     By signing this assessment you are acknowledging and agree with the diagnosis/diagnoses assigned by the Registered Dietitian    Comments: 07-Jun-2021 09:34

## 2021-06-07 NOTE — H&P ADULT - PROBLEM SELECTOR PLAN 3
Patient with Granado placed for urinary retention  Monitor I&Os  Increase Flomax from 0.4 mg to 0.8 mg  Will attempt TOV prior to discharge after regular bowel movement

## 2021-06-07 NOTE — H&P ADULT - ASSESSMENT
51M with PMHx of adrenal insufficiency, hypothyroidism, melanoma (currently on immunotherapy last received one month prior), prior urinary retention (unknown etiology), history of colonic resection 2/2 to ischemic bowel and NHL (post-chemo and radiation with orchiectomy) presents to ED with one day history of urinary retention and LLQ abdominal pain. Patient with bladder scan in ED showing >675 cc. Patient had CT A&P showing concentric wall thickening of rectum and colonic anastomosis and likely luminal narrowing with large stool in the distal colon, proximal to the anastomosis with associated mild concentric wall thickening and adjacent stranding consistent with anastomotic stricture and development of stercoral colitis proximally. Patient being admitted for urinary retention and stercoral colitis.

## 2021-06-07 NOTE — CONSULT NOTE ADULT - ASSESSMENT
51 year old man with history of left hemicolectomy for ischemic colitis (2011), history of testicular cancer, lymphoma and melanoma presents with urinary retention and CT findings concerning for stercoral colitis proximal to anastomosis with stricture    Recommendations:  - standing Miralax daily until patient is having regular bowel movements  -recommend medical admission for management of constipation and stercoral colitis in setting of new opioid regimen  -recommend catheterization of bladder, urology follow up  -d/w Dr. Mac Adams, R3  # [id number] 51 year old man with history of left hemicolectomy for ischemic colitis (2011), history of testicular cancer, lymphoma and melanoma presents with urinary retention and CT findings concerning for stercoral colitis proximal to anastomosis with stricture    Recommendations:  - standing Miralax daily until patient is having regular bowel movements  -recommend admission for management of constipation and stercoral colitis in setting of new opioid regimen  -recommend catheterization of bladder, urology follow up  -d/w Dr. Mac Adams, R3  # [id number]

## 2021-06-07 NOTE — ED PROVIDER NOTE - PHYSICAL EXAMINATION
GENERAL: no acute distress, non-toxic appearing  HEENT: normal conjunctiva, oral mucosa moist  CARDIAC: regular rate and regular rhythm  PULM: no increased wob  GI: abdomen nondistended, soft, lower abd tenderness bilaterally without rebound, negative murphys sign  : no CVA tenderness, no suprapubic tenderness  NEURO: alert and oriented x 3, normal speech, moving all extremities without lateralization  MSK: no visible deformities, no peripheral edema, calf tenderness/redness/swelling  SKIN: no visible rashes  PSYCH: appropriate mood and affect

## 2021-06-07 NOTE — ED ADULT NURSE REASSESSMENT NOTE - NS ED NURSE REASSESS COMMENT FT1
Pt taken to CT scan in stretcher, pt instructed on need for urine sample to send specimen to laboratory for testing, pt endorses providing urine sample after CT scan

## 2021-06-07 NOTE — ED ADULT NURSE REASSESSMENT NOTE - NS ED NURSE REASSESS COMMENT FT1
report received by break coverage RN, pt resting in bed comfortably, XRay and performed pending results to confirm port placement.

## 2021-06-07 NOTE — H&P ADULT - PROBLEM SELECTOR PLAN 1
No fever and very minor leukocytosis so will defer ABX for now, monitor fever curve and WBC trend  Standing Miralax and Senna  One dose of Dulcolax now  Diet as tolerated

## 2021-06-07 NOTE — ED ADULT NURSE REASSESSMENT NOTE - NS ED NURSE REASSESS COMMENT FT1
W/ 2 RNs at bedside, per MD order, leonard catheter inserted utilizing sterile technique, no urine drainage from leonard catheter, pt endorsed difficulty in past w/ leonard insertions, leonard removed, pt urinated in cup w/o leonard catheter in place, MD aware

## 2021-06-07 NOTE — ED PROVIDER NOTE - CLINICAL SUMMARY MEDICAL DECISION MAKING FREE TEXT BOX
ATTG: : abd pain with sig surgical hx concern for hernia / obstruction check ct ap, ivf, pain medication, discuss with heme onc team.

## 2021-06-07 NOTE — ED PROVIDER NOTE - PROGRESS NOTE DETAILS
Jignesh, PGY2: pt with possible anastamotic stricture of colon with prox stool burden/stercoral colitis, pts surgery done by dr. Rice in 2011, surg consult placed Jignesh, PGY2: surg saw pt, he's tba medicine for bowel regimen with surg following for stercoral colitis  leonard placed for urinary retention

## 2021-06-07 NOTE — H&P ADULT - NSHPPHYSICALEXAM_GEN_ALL_CORE
T(C): 36.4 (06-07-21 @ 20:17), Max: 36.7 (06-07-21 @ 14:00)  HR: 81 (06-07-21 @ 20:17) (81 - 100)  BP: 100/81 (06-07-21 @ 20:17) (100/81 - 115/79)  RR: 20 (06-07-21 @ 20:17) (18 - 20)  SpO2: 100% (06-07-21 @ 20:17) (97% - 100%)    Gen: (fe)male in NAD, appears comfortable, no diaphoresis  HEENT: NCAT, MMM, neck soft and supple  CV: +S1/S2, no m/r/g  Resp: CTAB, no w/r/r  GI: normoactive BS, soft, NTND, no rebounding/guarding  Ext: No LE edema, extremities appear warm and well perfused   Neuro: AOx3, no focal deficits, CNII-XII grossly intact  Psych: No SI/HI/AVH, appropriate affect  Skin: no petechiae, ecchymosis or maculopapular rash noted T(C): 36.4 (06-07-21 @ 20:17), Max: 36.7 (06-07-21 @ 14:00)  HR: 81 (06-07-21 @ 20:17) (81 - 100)  BP: 100/81 (06-07-21 @ 20:17) (100/81 - 115/79)  RR: 20 (06-07-21 @ 20:17) (18 - 20)  SpO2: 100% (06-07-21 @ 20:17) (97% - 100%)    Gen: male in NAD, appears comfortable, no diaphoresis  HEENT: NCAT, MMM, neck soft and supple  CV: +S1/S2, no m/r/g  Resp: CTAB, no w/r/r  GI: normoactive BS, soft, ND, no rebounding/guarding, tender to deep palpation in LLQ, clean surgical incision  Ext: No LE edema, extremities appear warm and well perfused   Neuro: AOx3, no focal deficits, CNII-XII grossly intact  Psych: No SI/HI/AVH, appropriate affect  Skin: no petechiae, ecchymosis or maculopapular rash noted

## 2021-06-07 NOTE — ED PROVIDER NOTE - OBJECTIVE STATEMENT
51M PMH Melanoma (last chemo 1 mo ago), Urinary retention (needing leonard in past), Colon resection (2/2 ischemic bowel), presents to the ED with lower abd pain and fatigue for the past 3 days. Pt has tried his home regimen for pain without much relief. Pain is sharp in quality. Denies any fevers/chills, uri sxs, cough, chest pain, sob, lightheadedness, palpitations, vomiting/diarrhea, dysuria/increased urinary frequency.

## 2021-06-08 LAB
ANION GAP SERPL CALC-SCNC: 15 MMOL/L — SIGNIFICANT CHANGE UP (ref 5–17)
BUN SERPL-MCNC: 16 MG/DL — SIGNIFICANT CHANGE UP (ref 7–23)
CALCIUM SERPL-MCNC: 9.9 MG/DL — SIGNIFICANT CHANGE UP (ref 8.4–10.5)
CHLORIDE SERPL-SCNC: 98 MMOL/L — SIGNIFICANT CHANGE UP (ref 96–108)
CO2 SERPL-SCNC: 21 MMOL/L — LOW (ref 22–31)
CREAT SERPL-MCNC: 0.91 MG/DL — SIGNIFICANT CHANGE UP (ref 0.5–1.3)
GLUCOSE SERPL-MCNC: 90 MG/DL — SIGNIFICANT CHANGE UP (ref 70–99)
HCT VFR BLD CALC: 44 % — SIGNIFICANT CHANGE UP (ref 39–50)
HGB BLD-MCNC: 14.5 G/DL — SIGNIFICANT CHANGE UP (ref 13–17)
MCHC RBC-ENTMCNC: 28.7 PG — SIGNIFICANT CHANGE UP (ref 27–34)
MCHC RBC-ENTMCNC: 33 GM/DL — SIGNIFICANT CHANGE UP (ref 32–36)
MCV RBC AUTO: 87 FL — SIGNIFICANT CHANGE UP (ref 80–100)
NRBC # BLD: 0 /100 WBCS — SIGNIFICANT CHANGE UP (ref 0–0)
PLATELET # BLD AUTO: 330 K/UL — SIGNIFICANT CHANGE UP (ref 150–400)
POTASSIUM SERPL-MCNC: 3.6 MMOL/L — SIGNIFICANT CHANGE UP (ref 3.5–5.3)
POTASSIUM SERPL-SCNC: 3.6 MMOL/L — SIGNIFICANT CHANGE UP (ref 3.5–5.3)
RBC # BLD: 5.06 M/UL — SIGNIFICANT CHANGE UP (ref 4.2–5.8)
RBC # FLD: 13.7 % — SIGNIFICANT CHANGE UP (ref 10.3–14.5)
SARS-COV-2 RNA SPEC QL NAA+PROBE: SIGNIFICANT CHANGE UP
SODIUM SERPL-SCNC: 134 MMOL/L — LOW (ref 135–145)
WBC # BLD: 12.11 K/UL — HIGH (ref 3.8–10.5)
WBC # FLD AUTO: 12.11 K/UL — HIGH (ref 3.8–10.5)

## 2021-06-08 RX ORDER — AMLODIPINE BESYLATE 2.5 MG/1
5 TABLET ORAL DAILY
Refills: 0 | Status: DISCONTINUED | OUTPATIENT
Start: 2021-06-09 | End: 2021-06-09

## 2021-06-08 RX ORDER — NALOXEGOL OXALATE 12.5 MG/1
1 TABLET, FILM COATED ORAL
Qty: 14 | Refills: 0
Start: 2021-06-08 | End: 2021-06-21

## 2021-06-08 RX ORDER — OXYCODONE HYDROCHLORIDE 5 MG/1
10 TABLET ORAL THREE TIMES A DAY
Refills: 0 | Status: DISCONTINUED | OUTPATIENT
Start: 2021-06-08 | End: 2021-06-08

## 2021-06-08 RX ORDER — PIPERACILLIN AND TAZOBACTAM 4; .5 G/20ML; G/20ML
3.38 INJECTION, POWDER, LYOPHILIZED, FOR SOLUTION INTRAVENOUS ONCE
Refills: 0 | Status: COMPLETED | OUTPATIENT
Start: 2021-06-08 | End: 2021-06-08

## 2021-06-08 RX ORDER — OXYCODONE HYDROCHLORIDE 5 MG/1
10 TABLET ORAL EVERY 8 HOURS
Refills: 0 | Status: DISCONTINUED | OUTPATIENT
Start: 2021-06-08 | End: 2021-06-09

## 2021-06-08 RX ORDER — HYDROMORPHONE HYDROCHLORIDE 2 MG/ML
1 INJECTION INTRAMUSCULAR; INTRAVENOUS; SUBCUTANEOUS ONCE
Refills: 0 | Status: DISCONTINUED | OUTPATIENT
Start: 2021-06-08 | End: 2021-06-08

## 2021-06-08 RX ORDER — NALOXEGOL OXALATE 12.5 MG/1
25 TABLET, FILM COATED ORAL DAILY
Refills: 0 | Status: DISCONTINUED | OUTPATIENT
Start: 2021-06-08 | End: 2021-06-09

## 2021-06-08 RX ORDER — POLYETHYLENE GLYCOL 3350 17 G/17G
17 POWDER, FOR SOLUTION ORAL
Refills: 0 | Status: COMPLETED | OUTPATIENT
Start: 2021-06-08 | End: 2021-06-08

## 2021-06-08 RX ORDER — ACETAMINOPHEN 500 MG
1000 TABLET ORAL ONCE
Refills: 0 | Status: COMPLETED | OUTPATIENT
Start: 2021-06-08 | End: 2021-06-08

## 2021-06-08 RX ORDER — IBUPROFEN 200 MG
400 TABLET ORAL EVERY 6 HOURS
Refills: 0 | Status: DISCONTINUED | OUTPATIENT
Start: 2021-06-08 | End: 2021-06-09

## 2021-06-08 RX ORDER — ACETAMINOPHEN 500 MG
975 TABLET ORAL EVERY 6 HOURS
Refills: 0 | Status: DISCONTINUED | OUTPATIENT
Start: 2021-06-08 | End: 2021-06-09

## 2021-06-08 RX ORDER — PIPERACILLIN AND TAZOBACTAM 4; .5 G/20ML; G/20ML
3.38 INJECTION, POWDER, LYOPHILIZED, FOR SOLUTION INTRAVENOUS EVERY 8 HOURS
Refills: 0 | Status: DISCONTINUED | OUTPATIENT
Start: 2021-06-08 | End: 2021-06-09

## 2021-06-08 RX ADMIN — OXYCODONE HYDROCHLORIDE 10 MILLIGRAM(S): 5 TABLET ORAL at 16:40

## 2021-06-08 RX ADMIN — POLYETHYLENE GLYCOL 3350 17 GRAM(S): 17 POWDER, FOR SOLUTION ORAL at 13:49

## 2021-06-08 RX ADMIN — Medication 400 MILLIGRAM(S): at 13:14

## 2021-06-08 RX ADMIN — POLYETHYLENE GLYCOL 3350 17 GRAM(S): 17 POWDER, FOR SOLUTION ORAL at 08:48

## 2021-06-08 RX ADMIN — POLYETHYLENE GLYCOL 3350 17 GRAM(S): 17 POWDER, FOR SOLUTION ORAL at 17:47

## 2021-06-08 RX ADMIN — Medication 75 MICROGRAM(S): at 05:36

## 2021-06-08 RX ADMIN — POLYETHYLENE GLYCOL 3350 17 GRAM(S): 17 POWDER, FOR SOLUTION ORAL at 12:04

## 2021-06-08 RX ADMIN — HYDROMORPHONE HYDROCHLORIDE 2 MILLIGRAM(S): 2 INJECTION INTRAMUSCULAR; INTRAVENOUS; SUBCUTANEOUS at 05:30

## 2021-06-08 RX ADMIN — Medication 975 MILLIGRAM(S): at 21:23

## 2021-06-08 RX ADMIN — Medication 10 MILLIGRAM(S): at 16:46

## 2021-06-08 RX ADMIN — HYDROMORPHONE HYDROCHLORIDE 2 MILLIGRAM(S): 2 INJECTION INTRAMUSCULAR; INTRAVENOUS; SUBCUTANEOUS at 01:20

## 2021-06-08 RX ADMIN — ZOLPIDEM TARTRATE 5 MILLIGRAM(S): 10 TABLET ORAL at 02:03

## 2021-06-08 RX ADMIN — SENNA PLUS 2 TABLET(S): 8.6 TABLET ORAL at 22:30

## 2021-06-08 RX ADMIN — HYDROMORPHONE HYDROCHLORIDE 2 MILLIGRAM(S): 2 INJECTION INTRAMUSCULAR; INTRAVENOUS; SUBCUTANEOUS at 06:13

## 2021-06-08 RX ADMIN — Medication 50 MILLIGRAM(S): at 05:36

## 2021-06-08 RX ADMIN — Medication 400 MILLIGRAM(S): at 23:00

## 2021-06-08 RX ADMIN — PIPERACILLIN AND TAZOBACTAM 25 GRAM(S): 4; .5 INJECTION, POWDER, LYOPHILIZED, FOR SOLUTION INTRAVENOUS at 17:47

## 2021-06-08 RX ADMIN — POLYETHYLENE GLYCOL 3350 17 GRAM(S): 17 POWDER, FOR SOLUTION ORAL at 20:42

## 2021-06-08 RX ADMIN — TAMSULOSIN HYDROCHLORIDE 0.8 MILLIGRAM(S): 0.4 CAPSULE ORAL at 22:30

## 2021-06-08 RX ADMIN — Medication 20 MILLIGRAM(S): at 08:24

## 2021-06-08 RX ADMIN — PIPERACILLIN AND TAZOBACTAM 200 GRAM(S): 4; .5 INJECTION, POWDER, LYOPHILIZED, FOR SOLUTION INTRAVENOUS at 08:48

## 2021-06-08 RX ADMIN — Medication 400 MILLIGRAM(S): at 22:30

## 2021-06-08 RX ADMIN — AMLODIPINE BESYLATE 5 MILLIGRAM(S): 2.5 TABLET ORAL at 05:36

## 2021-06-08 RX ADMIN — NALOXEGOL OXALATE 25 MILLIGRAM(S): 12.5 TABLET, FILM COATED ORAL at 16:46

## 2021-06-08 RX ADMIN — OXYCODONE HYDROCHLORIDE 10 MILLIGRAM(S): 5 TABLET ORAL at 16:10

## 2021-06-08 RX ADMIN — Medication 975 MILLIGRAM(S): at 20:53

## 2021-06-08 RX ADMIN — POLYETHYLENE GLYCOL 3350 17 GRAM(S): 17 POWDER, FOR SOLUTION ORAL at 10:20

## 2021-06-08 RX ADMIN — ENOXAPARIN SODIUM 40 MILLIGRAM(S): 100 INJECTION SUBCUTANEOUS at 16:10

## 2021-06-08 RX ADMIN — HYDROMORPHONE HYDROCHLORIDE 1 MILLIGRAM(S): 2 INJECTION INTRAMUSCULAR; INTRAVENOUS; SUBCUTANEOUS at 08:48

## 2021-06-08 RX ADMIN — ATORVASTATIN CALCIUM 40 MILLIGRAM(S): 80 TABLET, FILM COATED ORAL at 22:29

## 2021-06-08 NOTE — ED ADULT NURSE REASSESSMENT NOTE - NS ED NURSE REASSESS COMMENT FT1
Pt complaining of pain. RN Alex spoke to SEVERIANO Swanson, SEVERIANO Swanson states okay to give PRN dose of oral Dilaudid at this time

## 2021-06-08 NOTE — PHYSICAL THERAPY INITIAL EVALUATION ADULT - GAIT DISTANCE, PT EVAL
15 feet with no device, limited by SOB, requiring seated rest. Ambluation with walker 100 feet with 2 short standing rest breaks, <5 secs each.

## 2021-06-08 NOTE — PROGRESS NOTE ADULT - SUBJECTIVE AND OBJECTIVE BOX
GENERAL SURGERY PROGRESS NOTE    SUBJECTIVE  Patient seen and examined      OBJECTIVE    PHYSICAL EXAM  General: Appears well, NAD  CHEST: breathing comfortably  CV: appears well perfused  Abdomen: soft, nontender, nondistended, no rebound or guarding  Extremities: Grossly symmetric    T(C): 36.4 (21 @ 04:29), Max: 36.7 (21 @ 14:00)  HR: 92 (21 @ 04:29) (76 - 100)  BP: 113/76 (21 @ 04:29) (100/81 - 120/78)  RR: 18 (21 @ 04:29) (16 - 20)  SpO2: 100% (21 @ 04:29) (97% - 100%)      MEDICATIONS  acetaminophen   Tablet .. 650 milliGRAM(s) Oral every 6 hours PRN  amLODIPine   Tablet 5 milliGRAM(s) Oral daily  atorvastatin 40 milliGRAM(s) Oral at bedtime  enoxaparin Injectable 40 milliGRAM(s) SubCutaneous daily  hydrocortisone 20 milliGRAM(s) Oral <User Schedule>  hydrocortisone 10 milliGRAM(s) Oral <User Schedule>  HYDROmorphone   Tablet 2 milliGRAM(s) Oral every 4 hours PRN  lactated ringers. 1000 milliLiter(s) IV Continuous <Continuous>  levothyroxine 75 MICROGram(s) Oral daily  metoprolol succinate ER 50 milliGRAM(s) Oral daily  polyethylene glycol 3350 17 Gram(s) Oral daily  senna 2 Tablet(s) Oral at bedtime  tamsulosin 0.8 milliGRAM(s) Oral at bedtime  zolpidem 5 milliGRAM(s) Oral at bedtime PRN  zolpidem 5 milliGRAM(s) Oral at bedtime PRN      LABS                        14.3   11.86 )-----------( 388      ( 2021 14:29 )             44.3     06-07    135  |  99  |  19  ----------------------------<  92  3.5   |  22  |  0.96    Ca    10.4      2021 14:29  Mg     1.8     06-07    TPro  7.6  /  Alb  4.5  /  TBili  0.6  /  DBili  x   /  AST  19  /  ALT  15  /  AlkPhos  61  06-07      Urinalysis Basic - ( 2021 19:52 )    Color: Light Yellow / Appearance: Clear / S.053 / pH: x  Gluc: x / Ketone: Negative  / Bili: Negative / Urobili: Negative   Blood: x / Protein: Trace / Nitrite: Negative   Leuk Esterase: Negative / RBC: 1 /hpf / WBC 1 /HPF   Sq Epi: x / Non Sq Epi: 1 /hpf / Bacteria: Negative        RADIOLOGY & ADDITIONAL STUDIES GENERAL SURGERY PROGRESS NOTE    SUBJECTIVE  Patient seen and examined      OBJECTIVE    PHYSICAL EXAM  General: Appears well, NAD  CHEST: breathing comfortably  CV: appears well perfused  Abdomen: soft, mild LLQ TTP, nondistended, no rebound or guarding  Extremities: Grossly symmetric    T(C): 36.4 (21 @ 04:29), Max: 36.7 (21 @ 14:00)  HR: 92 (21 @ 04:29) (76 - 100)  BP: 113/76 (21 @ 04:29) (100/81 - 120/78)  RR: 18 (21 @ 04:29) (16 - 20)  SpO2: 100% (21 @ 04:29) (97% - 100%)      MEDICATIONS  acetaminophen   Tablet .. 650 milliGRAM(s) Oral every 6 hours PRN  amLODIPine   Tablet 5 milliGRAM(s) Oral daily  atorvastatin 40 milliGRAM(s) Oral at bedtime  enoxaparin Injectable 40 milliGRAM(s) SubCutaneous daily  hydrocortisone 20 milliGRAM(s) Oral <User Schedule>  hydrocortisone 10 milliGRAM(s) Oral <User Schedule>  HYDROmorphone   Tablet 2 milliGRAM(s) Oral every 4 hours PRN  lactated ringers. 1000 milliLiter(s) IV Continuous <Continuous>  levothyroxine 75 MICROGram(s) Oral daily  metoprolol succinate ER 50 milliGRAM(s) Oral daily  polyethylene glycol 3350 17 Gram(s) Oral daily  senna 2 Tablet(s) Oral at bedtime  tamsulosin 0.8 milliGRAM(s) Oral at bedtime  zolpidem 5 milliGRAM(s) Oral at bedtime PRN  zolpidem 5 milliGRAM(s) Oral at bedtime PRN      LABS                        14.3   11.86 )-----------( 388      ( 2021 14:29 )             44.3     06-07    135  |  99  |  19  ----------------------------<  92  3.5   |  22  |  0.96    Ca    10.4      2021 14:29  Mg     1.8     06-07    TPro  7.6  /  Alb  4.5  /  TBili  0.6  /  DBili  x   /  AST  19  /  ALT  15  /  AlkPhos  61  06-07      Urinalysis Basic - ( 2021 19:52 )    Color: Light Yellow / Appearance: Clear / S.053 / pH: x  Gluc: x / Ketone: Negative  / Bili: Negative / Urobili: Negative   Blood: x / Protein: Trace / Nitrite: Negative   Leuk Esterase: Negative / RBC: 1 /hpf / WBC 1 /HPF   Sq Epi: x / Non Sq Epi: 1 /hpf / Bacteria: Negative        RADIOLOGY & ADDITIONAL STUDIES

## 2021-06-08 NOTE — CONSULT NOTE ADULT - ASSESSMENT
51M with PMHx of adrenal insufficiency, hypothyroidism, melanoma (currently on immunotherapy last received one month prior), prior urinary retention (unknown etiology), history of colonic resection 2/2 to ischemic bowel and NHL (post-chemo and radiation with orchiectomy) presents to ED with one day history of urinary retention and LLQ abdominal pain.

## 2021-06-08 NOTE — CONSULT NOTE ADULT - SUBJECTIVE AND OBJECTIVE BOX
CC: Patient is a 51y old  Male who presents with a chief complaint of       Patient is a 51y year old male with PMHx testicular ca s/p orchiectomy () NHL s/p chemo/rad (), ischemic colitis s/p [id number] left colectomy () metastatic melanoma s/p 3 cycles of chemotherapy who presents with lower abdominal pain that started 2 days ago. He describes this pain as similar to the urinary retention he has had in the past. He states he last voided yesterday. Of note, he also has had decreased PO intake over the last 3 days. He had one episode of vomiting in the ED. He states he usually has bowel movements daily but did not have one yesterday.  2 weeks ago he started a pain regimen of oxy ER, and percocet. He occasionally takes senna PRN for constipation.  He last had a colonoscopy with polypectomy with Dr. Rice 2020 and was admitted post procedure for post polypectomy syndrome.    PMH  Testicular Cancer    Colitis    S/P Orchiectomy    S/P Lymph Node Biopsy    Headache, Migraine    [place] ([place])    No pertinent past medical history    HTN (hypertension)    NHL (non-Hodgkin&#x27;s lymphoma)    GERD (gastroesophageal reflux disease)    Colitis    BPH (benign prostatic hyperplasia)    Osteoarthritis    History of bone marrow transplant    Hypertriglyceridemia    Malignant melanoma of scalp    First degree heart block      PSH  No significant past surgical history    History of orchiectomy    S/P colon resection    Lymph node disorder    Melanoma of scalp or neck      MEDS    Allergies    No Known Allergies    Intolerances        Physical Exam  T(C): 36.4 (21 @ 20:17), Max: 36.7 (21 @ 14:00)  HR: 81 (21 @ 20:17) (81 - 100)  BP: 100/81 (21 @ 20:17) (100/81 - 115/79)  RR: 20 (21 @ 20:17) (18 - 20)  SpO2: 100% (21 @ 20:17) (97% - 100%)  Wt(kg): --  Tmax: T(C): , Max: 36.7 (21 @ 14:00)  Wt(kg): --    Gen: NAD  HEENT: normocephalic, atraumatic, no scleral icterus  CV: S1, S2, RRR  Pulm: CTA B/L  Abd: Soft, thin, well healed laparotomy scar, suprapubic tenderness  Ext: warm, no edema      Labs:                        14.3   11.86 )-----------( 388      ( 2021 14:29 )             44.3     06-07    135  |  99  |  19  ----------------------------<  92  3.5   |  22  |  0.96    Ca    10.4      2021 14:29  Mg     1.8     -    TPro  7.6  /  Alb  4.5  /  TBili  0.6  /  DBili  x   /  AST  19  /  ALT  15  /  AlkPhos  61        Urinalysis Basic - ( 2021 19:52 )    Color: Light Yellow / Appearance: Clear / S.053 / pH: x  Gluc: x / Ketone: Negative  / Bili: Negative / Urobili: Negative   Blood: x / Protein: Trace / Nitrite: Negative   Leuk Esterase: Negative / RBC: 1 /hpf / WBC 1 /HPF   Sq Epi: x / Non Sq Epi: 1 /hpf / Bacteria: Negative            Imaging    EXAM:  CT ABDOMEN AND PELVIS OC IC                            PROCEDURE DATE:  2021            INTERPRETATION:  CLINICAL INFORMATION: Lower abdominal pain. History of colon resection. History of melanoma.    COMPARISON: Contrast-enhanced CT of the abdomen and pelvis dated 2021.    PROCEDURE:  CT of the Abdomen and Pelvis was performed with oral Gastroview and 90 cc of Omnipaque intravenous contrast.  Sagittal and coronal reformats were performed.    FINDINGS:  LOWER CHEST: Within normal limits.    LIVER: Within normal limits.  BILE DUCTS: Normal caliber.  GALLBLADDER: Within normal limits.  SPLEEN: Within normal limits.  PANCREAS: Within normal limits.  ADRENALS: Within normal limits.  KIDNEYS/URETERS: Within normal limits.    BLADDER: Within normal limits.  REPRODUCTIVE ORGANS: The prostate is prominent.    BOWEL: The patient is status post left hemicolectomy with reanastomosis of the mid transverse colon with the rectum. There is moderate concentric wall thickening of the rectum extending proximally to the anastomosis, new since prior CT, with associated severe luminal narrowing and subtle adjacent stranding. There is large stool in the distal colon, proximal to the anastomosis with mild concentric wall thickening and adjacent stranding. These findings are suggestive of a likely anastomotic stricture and development of stercoral colitis proximal to the anastomosis. There is no evidence of pneumatosis or extraluminal gas. The appendix appears to be coiled adjacent to the cecal base in the right deep pelvis.    The collapsed stomach and small bowel are unremarkable with no evidence of obstruction or inflammatory stranding of the mesenteric fat.    PERITONEUM: No ascites.  VESSELS: Within normal limits.  RETROPERITONEUM/LYMPH NODES: No lymphadenopathy.  ABDOMINAL WALL: Within normal limits.  BONES: Within normal limits.    IMPRESSION: Concentric wall thickening of the rectum and colonic anastomosis and likely luminal narrowing. Large stool in the distal colon, proximal to the anastomosis with associated mild concentric wall thickening and adjacent stranding. Again, these findings are suggestive of a likely anastomotic stricture and development of stercoral colitis proximally.                JOSÉ ANTONIO OLIVER M.D., ATTENDING RADIOLOGIST  This document has been electronically signed. 2021  4:32PM
DATE OF SERVICE: 21 @ 12:29    Patient is a 51y old  Male who presents with a chief complaint of Abdominal Pain (2021 06:16)      HPI:  51M with PMHx of adrenal insufficiency, hypothyroidism, melanoma (currently on immunotherapy last received one month prior), prior urinary retention (unknown etiology), history of colonic resection 2/2 to ischemic bowel and NHL (post-chemo and radiation with orchiectomy) presents to ED with one day history of urinary retention and LLQ abdominal pain. Patient states it developed suddenly. With regards to urinary retention occurred one day prior, patient with sensation to urinate but could not. Patient also developed LLQ abdominal pain which he described as constant. The patient also c/o generalized weakness for past several months. Patient endorses last bowel movement one day prior to admission. He does pass flatus    PAST MEDICAL & SURGICAL HISTORY:  Testicular Cancer  , chemotherapy    Headache, Migraine    HTN (hypertension)    NHL (non-Hodgkin&#x27;s lymphoma)  , Radiation to left neck region    GERD (gastroesophageal reflux disease)    Colitis  surgery     BPH (benign prostatic hyperplasia)    Osteoarthritis  degenerative disc L3-4    History of bone marrow transplant    Hypertriglyceridemia    Malignant melanoma of scalp  RSX   R neck mass dsx/ LN dsx 10/19/18    History of orchiectomy  left     S/P colon resection      Lymph node disorder  s/p abdominal lymph node dissection 1996    Melanoma of scalp or neck  excision   s/p excision right neck mass &amp; LN dissection        Review of Systems:   CONSTITUTIONAL: No fever, weight loss, or fatigue see above HPI   EYES: No eye pain, visual disturbances, or discharge  ENMT:  No difficulty hearing, tinnitus, vertigo; No sinus or throat pain  NECK: No pain or stiffness  RESPIRATORY: No cough, wheezing, chills or hemoptysis; No shortness of breath  CARDIOVASCULAR: No chest pain, palpitations, dizziness, leg swelling or sob  GASTROINTESTINAL: see above HPI   GENITOURINARY: No dysuria, frequency, hematuria, or incontinence  NEUROLOGICAL: No headaches, memory loss, loss of strength, numbness, or tremors  SKIN: No itching, burning, rashes, or lesions   LYMPH NODES: No enlarged glands  ENDOCRINE: No heat or cold intolerance; No hair loss  MUSCULOSKELETAL: No joint pain or swelling; No muscle, back, or extremity pain  PSYCHIATRIC: No depression, anxiety, mood swings, or difficulty sleeping  HEME/LYMPH: No easy bruising, or bleeding gums  ALLERGY AND IMMUNOLOGIC: No hives or eczema    Allergies    No Known Allergies      Social History: ex-smoker  no IVDA  no ETOH abuse   lives with wife    FAMILY HISTORY:  Hypertension (Father)        MEDICATIONS  (STANDING):  acetaminophen   Tablet .. 975 milliGRAM(s) Oral every 6 hours  amLODIPine   Tablet 5 milliGRAM(s) Oral daily  atorvastatin 40 milliGRAM(s) Oral at bedtime  enoxaparin Injectable 40 milliGRAM(s) SubCutaneous daily  hydrocortisone 20 milliGRAM(s) Oral <User Schedule>  hydrocortisone 10 milliGRAM(s) Oral <User Schedule>  lactated ringers. 1000 milliLiter(s) (75 mL/Hr) IV Continuous <Continuous>  levothyroxine 75 MICROGram(s) Oral daily  metoprolol succinate ER 50 milliGRAM(s) Oral daily  naloxegol 25 milliGRAM(s) Oral daily  piperacillin/tazobactam IVPB.. 3.375 Gram(s) IV Intermittent every 8 hours  polyethylene glycol 3350 17 Gram(s) Oral <User Schedule>  senna 2 Tablet(s) Oral at bedtime  tamsulosin 0.8 milliGRAM(s) Oral at bedtime    MEDICATIONS  (PRN):  HYDROmorphone   Tablet 2 milliGRAM(s) Oral every 4 hours PRN Severe Pain (7 - 10)  zolpidem 5 milliGRAM(s) Oral at bedtime PRN Insomnia  zolpidem 5 milliGRAM(s) Oral at bedtime PRN Insomnia      CAPILLARY BLOOD GLUCOSE        I&O's Summary      24hrs Vital:  T(C): 36.7 (21 @ 09:50), Max: 36.7 (21 @ 14:00)  HR: 81 (21 @ 11:18) (76 - 100)  BP: 99/64 (21 @ 11:18) (92/51 - 120/78)  RR: 18 (21 @ 09:50) (16 - 20)  SpO2: 99% (21 @ 11:19) (97% - 100%)    PHYSICAL EXAM:  GENERAL: NAD, asthenic  HEAD:  Atraumatic, Normocephalic  EYES: EOMI, PERRLA, conjunctiva and sclera clear  NECK: Supple, No JVD  CHEST/LUNG: Clear right Mediport clean right ACW  HEART: S1S2; No rubs, or gallops, no murmurs  ABDOMEN: Soft, Nontender; Bowel sounds present  EXTREMITIES:  + Peripheral Pulses, No clubbing or cyanosis, no edema  PSYCH: AO x 3,   NEUROLOGY: Alert, no focal motor or sensory deficits  SKIN: No rashes or lesions    LABS:                        14.5   12.11 )-----------( 330      ( 2021 06:16 )             44.0     06-08    134<L>  |  98  |  16  ----------------------------<  90  3.6   |  21<L>  |  0.91    Ca    9.9      2021 06:16  Mg     1.8     06-07    TPro  7.6  /  Alb  4.5  /  TBili  0.6  /  DBili  x   /  AST  19  /  ALT  15  /  AlkPhos  61  06-07          Urinalysis Basic - ( 2021 19:52 )    Color: Light Yellow / Appearance: Clear / S.053 / pH: x  Gluc: x / Ketone: Negative  / Bili: Negative / Urobili: Negative   Blood: x / Protein: Trace / Nitrite: Negative   Leuk Esterase: Negative / RBC: 1 /hpf / WBC 1 /HPF   Sq Epi: x / Non Sq Epi: 1 /hpf / Bacteria: Negative        RADIOLOGY & ADDITIONAL TESTS:    Consultant(s) Notes Reviewed:      Care Discussed with Consultants/Other Providers:

## 2021-06-08 NOTE — PHYSICAL THERAPY INITIAL EVALUATION ADULT - PRECAUTIONS/LIMITATIONS, REHAB EVAL
melanoma (currently on immunotherapy last received one month prior), prior urinary retention (unknown etiology), history of colonic resection 2/2 to ischemic bowel and NHL (post-chemo and radiation with orchiectomy)./no known precautions/limitations

## 2021-06-08 NOTE — CONSULT NOTE ADULT - PROBLEM SELECTOR RECOMMENDATION 9
agree with continuing home dose po hydrocortisone  if patient deteriorates will consider changing to stress doses IV

## 2021-06-08 NOTE — PROGRESS NOTE ADULT - NUTRITIONAL ASSESSMENT
51M hx left hemicolectomy for ischemic colitis (2011), history of testicular cancer s/p chemo/radiation, lymphoma and melanoma presents with urinary retention and CT findings concerning for stercoral colitis proximal to anastomosis with stricture    Recommendations:  - c/w standing Miralax daily until patient is having regular bowel movements  -appreciate primary team care for management of constipation and stercoral colitis in setting of new opioid regimen  -leonard for urinary retention, urology follow up    [facility]  [id number] 51M hx left hemicolectomy for ischemic colitis (2011), history of testicular cancer s/p chemo/radiation, lymphoma and melanoma presents with urinary retention and CT findings concerning for stercoral colitis proximal to anastomosis with stricture    Recommendations:  - Miralax for full colon bowel prep  - Zosyn   -appreciate primary team care for management of constipation and stercoral colitis in setting of new opioid regimen  -leonard for urinary retention, urology follow up    [facility]  [id number] 51M hx left hemicolectomy for ischemic colitis (2011), history of testicular cancer s/p chemo/radiation, lymphoma and melanoma presents with urinary retention and CT findings concerning for stercoral colitis proximal to anastomosis with stricture    Recommendations:  - colonoscopy from 2020 reviewed by attending- open with no stricture--> Miralax for full colon bowel prep  - Zosyn   -appreciate primary team care for management of constipation and stercoral colitis in setting of new opioid regimen  -leonard for urinary retention, urology follow up    [facility]  [id number]

## 2021-06-08 NOTE — PHYSICAL THERAPY INITIAL EVALUATION ADULT - PERTINENT HX OF CURRENT PROBLEM, REHAB EVAL
51 y/oM admitted 6/7 with one day history of urinary retention and LLQ abdominal pain. In addition, patient with weakness likely generalized for past several months. Bladder scan in ED showing >675 cc. CT A&P showing stercoral colitis proximal to anastomosis with stricture. Colorectal surg consult recommending bowel regimen, leonard for retention, urology follow up.  As per H&P, pt with no fever and very minor leukocytosis. PMH adrenal insufficiency, hypothyroidism, (cont below)

## 2021-06-08 NOTE — PROGRESS NOTE ADULT - ASSESSMENT
51M hx left hemicolectomy for ischemic colitis (2011), history of testicular cancer s/p chemo/radiation, lymphoma and melanoma presents with urinary retention and CT findings concerning for stercoral colitis proximal to anastomosis with stricture    Recommendations:  - colonoscopy from 2020 reviewed by attending- open with no stricture--> Miralax for full colon bowel prep  - Zosyn   -appreciate primary team care for management of constipation and stercoral colitis in setting of new opioid regimen  -leonard for urinary retention, urology follow up    [facility]  [id number] 51M hx left hemicolectomy for ischemic colitis (2011), history of testicular cancer s/p chemo/radiation, lymphoma and melanoma presents with urinary retention and CT findings concerning for stercoral colitis proximal to anastomosis with stricture    Recommendations:  - colonoscopy from 2020 reviewed by attending- open with no stricture--> Miralax for full colon bowel prep  - Zosyn   -leonard for urinary retention, urology follow up    [facility]  [id number]

## 2021-06-09 ENCOUNTER — TRANSCRIPTION ENCOUNTER (OUTPATIENT)
Age: 51
End: 2021-06-09

## 2021-06-09 VITALS
HEART RATE: 78 BPM | SYSTOLIC BLOOD PRESSURE: 93 MMHG | TEMPERATURE: 98 F | DIASTOLIC BLOOD PRESSURE: 64 MMHG | RESPIRATION RATE: 18 BRPM | OXYGEN SATURATION: 99 %

## 2021-06-09 LAB
ANION GAP SERPL CALC-SCNC: 15 MMOL/L — SIGNIFICANT CHANGE UP (ref 5–17)
BUN SERPL-MCNC: 18 MG/DL — SIGNIFICANT CHANGE UP (ref 7–23)
CALCIUM SERPL-MCNC: 9.8 MG/DL — SIGNIFICANT CHANGE UP (ref 8.4–10.5)
CHLORIDE SERPL-SCNC: 98 MMOL/L — SIGNIFICANT CHANGE UP (ref 96–108)
CO2 SERPL-SCNC: 22 MMOL/L — SIGNIFICANT CHANGE UP (ref 22–31)
COVID-19 SPIKE DOMAIN AB INTERP: POSITIVE
COVID-19 SPIKE DOMAIN ANTIBODY RESULT: 80.9 U/ML — HIGH
CREAT SERPL-MCNC: 1.22 MG/DL — SIGNIFICANT CHANGE UP (ref 0.5–1.3)
CULTURE RESULTS: NO GROWTH — SIGNIFICANT CHANGE UP
GLUCOSE SERPL-MCNC: 106 MG/DL — HIGH (ref 70–99)
HCT VFR BLD CALC: 41.9 % — SIGNIFICANT CHANGE UP (ref 39–50)
HGB BLD-MCNC: 13.7 G/DL — SIGNIFICANT CHANGE UP (ref 13–17)
MAGNESIUM SERPL-MCNC: 2 MG/DL — SIGNIFICANT CHANGE UP (ref 1.6–2.6)
MCHC RBC-ENTMCNC: 28.8 PG — SIGNIFICANT CHANGE UP (ref 27–34)
MCHC RBC-ENTMCNC: 32.7 GM/DL — SIGNIFICANT CHANGE UP (ref 32–36)
MCV RBC AUTO: 88.2 FL — SIGNIFICANT CHANGE UP (ref 80–100)
NRBC # BLD: 0 /100 WBCS — SIGNIFICANT CHANGE UP (ref 0–0)
PHOSPHATE SERPL-MCNC: 4.5 MG/DL — SIGNIFICANT CHANGE UP (ref 2.5–4.5)
PLATELET # BLD AUTO: 350 K/UL — SIGNIFICANT CHANGE UP (ref 150–400)
POTASSIUM SERPL-MCNC: 3.7 MMOL/L — SIGNIFICANT CHANGE UP (ref 3.5–5.3)
POTASSIUM SERPL-SCNC: 3.7 MMOL/L — SIGNIFICANT CHANGE UP (ref 3.5–5.3)
RBC # BLD: 4.75 M/UL — SIGNIFICANT CHANGE UP (ref 4.2–5.8)
RBC # FLD: 13.6 % — SIGNIFICANT CHANGE UP (ref 10.3–14.5)
SARS-COV-2 IGG+IGM SERPL QL IA: 80.9 U/ML — HIGH
SARS-COV-2 IGG+IGM SERPL QL IA: POSITIVE
SODIUM SERPL-SCNC: 135 MMOL/L — SIGNIFICANT CHANGE UP (ref 135–145)
SPECIMEN SOURCE: SIGNIFICANT CHANGE UP
TSH SERPL-MCNC: 1.87 UIU/ML — SIGNIFICANT CHANGE UP (ref 0.27–4.2)
WBC # BLD: 6.75 K/UL — SIGNIFICANT CHANGE UP (ref 3.8–10.5)
WBC # FLD AUTO: 6.75 K/UL — SIGNIFICANT CHANGE UP (ref 3.8–10.5)

## 2021-06-09 PROCEDURE — 84100 ASSAY OF PHOSPHORUS: CPT

## 2021-06-09 PROCEDURE — 81001 URINALYSIS AUTO W/SCOPE: CPT

## 2021-06-09 PROCEDURE — 99285 EMERGENCY DEPT VISIT HI MDM: CPT | Mod: 25

## 2021-06-09 PROCEDURE — 85014 HEMATOCRIT: CPT

## 2021-06-09 PROCEDURE — 97162 PT EVAL MOD COMPLEX 30 MIN: CPT

## 2021-06-09 PROCEDURE — 86769 SARS-COV-2 COVID-19 ANTIBODY: CPT

## 2021-06-09 PROCEDURE — 85027 COMPLETE CBC AUTOMATED: CPT

## 2021-06-09 PROCEDURE — 87086 URINE CULTURE/COLONY COUNT: CPT

## 2021-06-09 PROCEDURE — U0003: CPT

## 2021-06-09 PROCEDURE — 82330 ASSAY OF CALCIUM: CPT

## 2021-06-09 PROCEDURE — 82947 ASSAY GLUCOSE BLOOD QUANT: CPT

## 2021-06-09 PROCEDURE — 83605 ASSAY OF LACTIC ACID: CPT

## 2021-06-09 PROCEDURE — 85025 COMPLETE CBC W/AUTO DIFF WBC: CPT

## 2021-06-09 PROCEDURE — 84295 ASSAY OF SERUM SODIUM: CPT

## 2021-06-09 PROCEDURE — 85018 HEMOGLOBIN: CPT

## 2021-06-09 PROCEDURE — 82435 ASSAY OF BLOOD CHLORIDE: CPT

## 2021-06-09 PROCEDURE — 82565 ASSAY OF CREATININE: CPT

## 2021-06-09 PROCEDURE — 80053 COMPREHEN METABOLIC PANEL: CPT

## 2021-06-09 PROCEDURE — 82803 BLOOD GASES ANY COMBINATION: CPT

## 2021-06-09 PROCEDURE — 84132 ASSAY OF SERUM POTASSIUM: CPT

## 2021-06-09 PROCEDURE — U0005: CPT

## 2021-06-09 PROCEDURE — 84443 ASSAY THYROID STIM HORMONE: CPT

## 2021-06-09 PROCEDURE — 74177 CT ABD & PELVIS W/CONTRAST: CPT

## 2021-06-09 PROCEDURE — 83735 ASSAY OF MAGNESIUM: CPT

## 2021-06-09 PROCEDURE — 71045 X-RAY EXAM CHEST 1 VIEW: CPT

## 2021-06-09 PROCEDURE — 80048 BASIC METABOLIC PNL TOTAL CA: CPT

## 2021-06-09 PROCEDURE — 83690 ASSAY OF LIPASE: CPT

## 2021-06-09 RX ORDER — POLYETHYLENE GLYCOL 3350 17 G/17G
17 POWDER, FOR SOLUTION ORAL
Qty: 1020 | Refills: 0
Start: 2021-06-09 | End: 2021-07-08

## 2021-06-09 RX ORDER — CHLORHEXIDINE GLUCONATE 213 G/1000ML
1 SOLUTION TOPICAL DAILY
Refills: 0 | Status: DISCONTINUED | OUTPATIENT
Start: 2021-06-09 | End: 2021-06-09

## 2021-06-09 RX ORDER — ACETAMINOPHEN 500 MG
3 TABLET ORAL
Qty: 0 | Refills: 0 | DISCHARGE
Start: 2021-06-09

## 2021-06-09 RX ORDER — POTASSIUM CHLORIDE 20 MEQ
40 PACKET (EA) ORAL ONCE
Refills: 0 | Status: COMPLETED | OUTPATIENT
Start: 2021-06-09 | End: 2021-06-09

## 2021-06-09 RX ORDER — IBUPROFEN 200 MG
1 TABLET ORAL
Qty: 0 | Refills: 0 | DISCHARGE
Start: 2021-06-09

## 2021-06-09 RX ORDER — NALOXEGOL OXALATE 12.5 MG/1
1 TABLET, FILM COATED ORAL
Qty: 30 | Refills: 0
Start: 2021-06-09 | End: 2021-07-08

## 2021-06-09 RX ORDER — SENNA PLUS 8.6 MG/1
2 TABLET ORAL
Qty: 0 | Refills: 0 | DISCHARGE
Start: 2021-06-09

## 2021-06-09 RX ADMIN — AMLODIPINE BESYLATE 5 MILLIGRAM(S): 2.5 TABLET ORAL at 06:08

## 2021-06-09 RX ADMIN — Medication 975 MILLIGRAM(S): at 11:49

## 2021-06-09 RX ADMIN — Medication 975 MILLIGRAM(S): at 12:19

## 2021-06-09 RX ADMIN — Medication 40 MILLIEQUIVALENT(S): at 09:35

## 2021-06-09 RX ADMIN — OXYCODONE HYDROCHLORIDE 10 MILLIGRAM(S): 5 TABLET ORAL at 00:54

## 2021-06-09 RX ADMIN — Medication 400 MILLIGRAM(S): at 15:59

## 2021-06-09 RX ADMIN — Medication 75 MICROGRAM(S): at 06:08

## 2021-06-09 RX ADMIN — OXYCODONE HYDROCHLORIDE 10 MILLIGRAM(S): 5 TABLET ORAL at 09:22

## 2021-06-09 RX ADMIN — OXYCODONE HYDROCHLORIDE 10 MILLIGRAM(S): 5 TABLET ORAL at 08:52

## 2021-06-09 RX ADMIN — Medication 975 MILLIGRAM(S): at 06:38

## 2021-06-09 RX ADMIN — CHLORHEXIDINE GLUCONATE 1 APPLICATION(S): 213 SOLUTION TOPICAL at 11:50

## 2021-06-09 RX ADMIN — NALOXEGOL OXALATE 25 MILLIGRAM(S): 12.5 TABLET, FILM COATED ORAL at 12:22

## 2021-06-09 RX ADMIN — ENOXAPARIN SODIUM 40 MILLIGRAM(S): 100 INJECTION SUBCUTANEOUS at 11:50

## 2021-06-09 RX ADMIN — OXYCODONE HYDROCHLORIDE 10 MILLIGRAM(S): 5 TABLET ORAL at 16:29

## 2021-06-09 RX ADMIN — Medication 400 MILLIGRAM(S): at 16:29

## 2021-06-09 RX ADMIN — Medication 400 MILLIGRAM(S): at 09:23

## 2021-06-09 RX ADMIN — Medication 10 MILLIGRAM(S): at 16:00

## 2021-06-09 RX ADMIN — ZOLPIDEM TARTRATE 5 MILLIGRAM(S): 10 TABLET ORAL at 02:26

## 2021-06-09 RX ADMIN — Medication 50 MILLIGRAM(S): at 06:08

## 2021-06-09 RX ADMIN — Medication 400 MILLIGRAM(S): at 08:53

## 2021-06-09 RX ADMIN — PIPERACILLIN AND TAZOBACTAM 25 GRAM(S): 4; .5 INJECTION, POWDER, LYOPHILIZED, FOR SOLUTION INTRAVENOUS at 09:35

## 2021-06-09 RX ADMIN — Medication 20 MILLIGRAM(S): at 08:53

## 2021-06-09 RX ADMIN — OXYCODONE HYDROCHLORIDE 10 MILLIGRAM(S): 5 TABLET ORAL at 01:24

## 2021-06-09 RX ADMIN — PIPERACILLIN AND TAZOBACTAM 25 GRAM(S): 4; .5 INJECTION, POWDER, LYOPHILIZED, FOR SOLUTION INTRAVENOUS at 01:23

## 2021-06-09 RX ADMIN — Medication 975 MILLIGRAM(S): at 06:08

## 2021-06-09 RX ADMIN — OXYCODONE HYDROCHLORIDE 10 MILLIGRAM(S): 5 TABLET ORAL at 15:59

## 2021-06-09 NOTE — PROGRESS NOTE ADULT - SUBJECTIVE AND OBJECTIVE BOX
GENERAL SURGERY PROGRESS NOTE    SUBJECTIVE  Patient seen and examined      OBJECTIVE    PHYSICAL EXAM  General: Appears well, NAD  CHEST: breathing comfortably  CV: appears well perfused  Abdomen: soft, mild LLQ TTP, nondistended, no rebound or guarding  Extremities: Grossly symmetric    T(C): 36.4 (21 @ 04:29), Max: 36.7 (21 @ 14:00)  HR: 92 (21 @ 04:29) (76 - 100)  BP: 113/76 (21 @ 04:29) (100/81 - 120/78)  RR: 18 (21 @ 04:29) (16 - 20)  SpO2: 100% (21 @ 04:29) (97% - 100%)      MEDICATIONS  acetaminophen   Tablet .. 650 milliGRAM(s) Oral every 6 hours PRN  amLODIPine   Tablet 5 milliGRAM(s) Oral daily  atorvastatin 40 milliGRAM(s) Oral at bedtime  enoxaparin Injectable 40 milliGRAM(s) SubCutaneous daily  hydrocortisone 20 milliGRAM(s) Oral <User Schedule>  hydrocortisone 10 milliGRAM(s) Oral <User Schedule>  HYDROmorphone   Tablet 2 milliGRAM(s) Oral every 4 hours PRN  lactated ringers. 1000 milliLiter(s) IV Continuous <Continuous>  levothyroxine 75 MICROGram(s) Oral daily  metoprolol succinate ER 50 milliGRAM(s) Oral daily  polyethylene glycol 3350 17 Gram(s) Oral daily  senna 2 Tablet(s) Oral at bedtime  tamsulosin 0.8 milliGRAM(s) Oral at bedtime  zolpidem 5 milliGRAM(s) Oral at bedtime PRN  zolpidem 5 milliGRAM(s) Oral at bedtime PRN      LABS                        14.3   11.86 )-----------( 388      ( 2021 14:29 )             44.3     06-07    135  |  99  |  19  ----------------------------<  92  3.5   |  22  |  0.96    Ca    10.4      2021 14:29  Mg     1.8     06-07    TPro  7.6  /  Alb  4.5  /  TBili  0.6  /  DBili  x   /  AST  19  /  ALT  15  /  AlkPhos  61  06-07      Urinalysis Basic - ( 2021 19:52 )    Color: Light Yellow / Appearance: Clear / S.053 / pH: x  Gluc: x / Ketone: Negative  / Bili: Negative / Urobili: Negative   Blood: x / Protein: Trace / Nitrite: Negative   Leuk Esterase: Negative / RBC: 1 /hpf / WBC 1 /HPF   Sq Epi: x / Non Sq Epi: 1 /hpf / Bacteria: Negative        RADIOLOGY & ADDITIONAL STUDIES

## 2021-06-09 NOTE — DISCHARGE NOTE PROVIDER - NSDCCPCAREPLAN_GEN_ALL_CORE_FT
PRINCIPAL DISCHARGE DIAGNOSIS  Diagnosis: Colitis  Assessment and Plan of Treatment: 2/2 constipation  continue with angitiotics x 7 days  continue stool softeners      SECONDARY DISCHARGE DIAGNOSES  Diagnosis: Colonic stricture  Assessment and Plan of Treatment:

## 2021-06-09 NOTE — PROGRESS NOTE ADULT - ATTENDING COMMENTS
Patient had multiple bowel movements with MiraLAX.  His white blood count is better and he has less pain and tenderness.  Possible discharge later today on oral antibiotics with Movantik Senokot and MiraLAX.
Patient with narcotic induced stercoral colitis.  Surgery was in 2011 and colonoscopy 2020 wide anastomosis and no stricture.  Needs clean out and will start Movantic or Relistor.  IV abx.  IV fluids.  Will see if ok to transfer to my service and have medicine consult since bowel management will be managed by my team

## 2021-06-09 NOTE — DISCHARGE NOTE PROVIDER - NSDCMRMEDTOKEN_GEN_ALL_CORE_FT
amLODIPine 5 mg oral tablet: 1 tab(s) orally once a day  atorvastatin 40 mg oral tablet: 1 tab(s) orally once a day  fenofibrate 145 mg oral tablet: 1 tab(s) orally once a day  hydrocortisone 10 mg oral tablet: 2 tab(s) orally once a day  hydrocortisone 10 mg oral tablet: 1 tab(s) orally once a day afternoon  levothyroxine 75 mcg (0.075 mg) oral tablet: 1 tab(s) orally once a day  metoprolol succinate 50 mg oral tablet, extended release: 1 tab(s) orally once a day  naloxegol 25 mg oral tablet: 1 tab(s) orally once a day  OxyCONTIN 10 mg oral tablet, extended release: 1 tab(s) orally 3 times a day, As Needed  Percocet 5 mg-325 mg oral tablet: 1 tab(s) orally every 6 hours, As Needed  tamsulosin 0.4 mg oral capsule: 1 cap(s) orally once a day  Vascepa 1 g oral capsule: 1 cap(s) orally 3 times a day  zolpidem 10 mg oral tablet: 1 tab(s) orally once a day (at bedtime), As Needed   acetaminophen 325 mg oral tablet: 3 tab(s) orally every 6 hours, As Needed for pain  amLODIPine 5 mg oral tablet: 1 tab(s) orally once a day  atorvastatin 40 mg oral tablet: 1 tab(s) orally once a day  fenofibrate 145 mg oral tablet: 1 tab(s) orally once a day  hydrocortisone 10 mg oral tablet: 2 tab(s) orally once a day  hydrocortisone 10 mg oral tablet: 1 tab(s) orally once a day afternoon  ibuprofen 400 mg oral tablet: 1 tab(s) orally every 6 hours, As Needed for pain  levothyroxine 75 mcg (0.075 mg) oral tablet: 1 tab(s) orally once a day  metoprolol succinate 50 mg oral tablet, extended release: 1 tab(s) orally once a day  OxyCONTIN 10 mg oral tablet, extended release: 1 tab(s) orally 3 times a day, As Needed  Percocet 5 mg-325 mg oral tablet: 1 tab(s) orally every 6 hours, As Needed  senna oral tablet: 2 tab(s) orally once a day (at bedtime)  tamsulosin 0.4 mg oral capsule: 1 cap(s) orally once a day  Vascepa 1 g oral capsule: 1 cap(s) orally 3 times a day  zolpidem 10 mg oral tablet: 1 tab(s) orally once a day (at bedtime), As Needed

## 2021-06-09 NOTE — DISCHARGE NOTE NURSING/CASE MANAGEMENT/SOCIAL WORK - PATIENT PORTAL LINK FT
You can access the FollowMyHealth Patient Portal offered by [facility] by registering at the following website: [email or web address]. By joining [facility]’s FollowMyHealth portal, you will also be able to view your health information using other applications (apps) compatible with our system.

## 2021-06-09 NOTE — PROGRESS NOTE ADULT - ASSESSMENT
51M hx left hemicolectomy for ischemic colitis (2011), history of testicular cancer s/p chemo/radiation, lymphoma and melanoma presents with urinary retention and CT findings concerning for stercoral colitis proximal to anastomosis with stricture    Recommendations:  - regular diet  - colonoscopy from 2020 reviewed by attending- open with no stricture--> Miralax for full colon bowel prep  - Zosyn   -leonard for urinary retention, urology follow up    [place] [facility]  [id number]

## 2021-06-09 NOTE — PROGRESS NOTE ADULT - SUBJECTIVE AND OBJECTIVE BOX
Patient is a 51y old  Male who presents with a chief complaint of Abdominal Pain (2021 11:29)      DATE OF SERVICE: 21 @ 16:42    SUBJECTIVE / OVERNIGHT EVENTS: overnight events noted    ROS:  Resp: No cough no sputum production  CVS: No chest pain no palpitations no orthopnea  GI: no N/V/D  : no dysuria, no hematuria  Neuro: no weakness no paresthesias        MEDICATIONS  (STANDING):  acetaminophen   Tablet .. 975 milliGRAM(s) Oral every 6 hours  amLODIPine   Tablet 5 milliGRAM(s) Oral daily  atorvastatin 40 milliGRAM(s) Oral at bedtime  chlorhexidine 2% Cloths 1 Application(s) Topical daily  enoxaparin Injectable 40 milliGRAM(s) SubCutaneous daily  hydrocortisone 20 milliGRAM(s) Oral <User Schedule>  hydrocortisone 10 milliGRAM(s) Oral <User Schedule>  ibuprofen  Tablet. 400 milliGRAM(s) Oral every 6 hours  levothyroxine 75 MICROGram(s) Oral daily  metoprolol succinate ER 50 milliGRAM(s) Oral daily  naloxegol 25 milliGRAM(s) Oral daily  oxyCODONE  ER Tablet 10 milliGRAM(s) Oral every 8 hours  piperacillin/tazobactam IVPB.. 3.375 Gram(s) IV Intermittent every 8 hours  senna 2 Tablet(s) Oral at bedtime  tamsulosin 0.8 milliGRAM(s) Oral at bedtime    MEDICATIONS  (PRN):  zolpidem 5 milliGRAM(s) Oral at bedtime PRN Insomnia  zolpidem 5 milliGRAM(s) Oral at bedtime PRN Insomnia        CAPILLARY BLOOD GLUCOSE        I&O's Summary    2021 07:  -  2021 07:00  --------------------------------------------------------  IN: 600 mL / OUT: 725 mL / NET: -125 mL    2021 07:01  -  2021 16:42  --------------------------------------------------------  IN: 700 mL / OUT: 625 mL / NET: 75 mL        Vital Signs Last 24 Hrs  T(C): 36.6 (2021 16:34), Max: 36.8 (2021 21:37)  T(F): 97.8 (2021 16:34), Max: 98.2 (2021 21:37)  HR: 78 (2021 16:34) (66 - 78)  BP: 93/64 (2021 16:34) (93/64 - 128/76)  BP(mean): --  RR: 18 (2021 16:34) (18 - 19)  SpO2: 99% (2021 16:34) (98% - 100%)    PHYSICAL EXAM:  GENERAL: NAD,  EYES: EOMI, PERRLA,   NECK: Supple, No JVD  CHEST/LUNG: Clear   HEART: S1S2; no murmurs  ABDOMEN: Soft, Nontender  EXTREMITIES:   no edema  PSYCH: AO x 3,   NEUROLOGY: Alert, no focal motor or sensory deficits  SKIN: No rashes or lesions    LABS:                        13.7   6.75  )-----------( 350      ( 2021 07:11 )             41.9     06-09    135  |  98  |  18  ----------------------------<  106<H>  3.7   |  22  |  1.22    Ca    9.8      2021 07:10  Phos  4.5     06-09  Mg     2.0     06-09            Urinalysis Basic - ( 2021 19:52 )    Color: Light Yellow / Appearance: Clear / S.053 / pH: x  Gluc: x / Ketone: Negative  / Bili: Negative / Urobili: Negative   Blood: x / Protein: Trace / Nitrite: Negative   Leuk Esterase: Negative / RBC: 1 /hpf / WBC 1 /HPF   Sq Epi: x / Non Sq Epi: 1 /hpf / Bacteria: Negative          All consultant(s) notes reviewed and care discussed with other providers        Contact Number, Dr Wilson [phone]

## 2021-06-09 NOTE — DISCHARGE NOTE PROVIDER - HOSPITAL COURSE
51M with PMHx of adrenal insufficiency, hypothyroidism, melanoma (currently on immunotherapy last received one month prior), prior urinary retention (unknown etiology), history of colonic resection 2/2 to ischemic bowel and NHL (post-chemo and radiation with orchiectomy) presents to ED with one day history of urinary retention and LLQ abdominal pain. Patient states it developed suddenly. With regards to urinary retention occurred one day prior, patient with sensation to urinate but could not. Patient also developed LLQ abdominal pain which he described as constant and not related to food with relief with his long acting oxycontin. In addition, patient with weakness likely generalized for past several months. Patient endorses last bowel movement one day prior to admission. He is still passing flatus. Patient denies fever, chills, nausea, vomiting or decrease PO intake. Patient with bladder scan in ED showing >675 cc; thus, Granado placed. Patient had CT A&P showing concentric wall thickening of rectum and colonic anastomosis and likely luminal narrowing with large stool in the distal colon, proximal to the anastomosis with associated mild concentric wall thickening and adjacent stranding consistent with anastomotic stricture and development of stercoral colitis proximally. Patient with no significant complaints currently.  He was admitted to the colorectal surgical service and was started on IV antibiotics.  He was evaluated by PT who determined he had no PT needs.  Medicine was consulted, recommended continuing PO steroids, TOV when feasible.  He is ambulating, voiding, tolerating a diet, and is stable for discharge home.    PLEASE REVIEW AND UPDATE THIS SUMMARY WHEN THE PT IS DISCHARGED 51M with PMHx of adrenal insufficiency, hypothyroidism, melanoma (currently on immunotherapy last received one month prior), prior urinary retention (unknown etiology), history of colonic resection 2/2 to ischemic bowel and NHL (post-chemo and radiation with orchiectomy) presents to ED with one day history of urinary retention and LLQ abdominal pain. Patient states it developed suddenly. With regards to urinary retention occurred one day prior, patient with sensation to urinate but could not. Patient also developed LLQ abdominal pain which he described as constant and not related to food with relief with his long acting oxycontin. In addition, patient with weakness likely generalized for past several months. Patient endorses last bowel movement one day prior to admission. He is still passing flatus. Patient denies fever, chills, nausea, vomiting or decrease PO intake. Patient with bladder scan in ED showing >675 cc; thus, Granado placed. Patient had CT A&P showing concentric wall thickening of rectum and colonic anastomosis and likely luminal narrowing with large stool in the distal colon, proximal to the anastomosis with associated mild concentric wall thickening and adjacent stranding consistent with anastomotic stricture and development of stercoral colitis proximally. Patient with no significant complaints currently.  He was admitted to the colorectal surgical service and was started on IV antibiotics.  He was evaluated by PT who determined he had no PT needs.  Medicine was consulted, recommended continuing PO steroids,  He is ambulating, voiding, tolerating a diet, and is stable for discharge home. patient to go home on 1 week of Augmentin and follow up with Dr. Rice in 1 week.

## 2021-06-14 LAB
BASOPHILS # BLD AUTO: 0.05 K/UL
BASOPHILS NFR BLD AUTO: 0.8 %
EOSINOPHIL # BLD AUTO: 0.54 K/UL
EOSINOPHIL NFR BLD AUTO: 9.2 %
FSH SERPL-MCNC: 19.5 IU/L
HCT VFR BLD CALC: 39.5 %
HGB BLD-MCNC: 13 G/DL
IMM GRANULOCYTES NFR BLD AUTO: 0.2 %
LH SERPL-ACNC: 10 IU/L
LYMPHOCYTES # BLD AUTO: 2.58 K/UL
LYMPHOCYTES NFR BLD AUTO: 43.8 %
MAN DIFF?: NORMAL
MCHC RBC-ENTMCNC: 29.5 PG
MCHC RBC-ENTMCNC: 32.9 GM/DL
MCV RBC AUTO: 89.6 FL
MONOCYTES # BLD AUTO: 0.41 K/UL
MONOCYTES NFR BLD AUTO: 7 %
NEUTROPHILS # BLD AUTO: 2.3 K/UL
NEUTROPHILS NFR BLD AUTO: 39 %
PLATELET # BLD AUTO: 268 K/UL
PSA SERPL-MCNC: 0.47 NG/ML
RBC # BLD: 4.41 M/UL
RBC # FLD: 13.7 %
TESTOST BND SERPL-MCNC: 3.5 PG/ML
TESTOST SERPL-MCNC: 374.3 NG/DL
WBC # FLD AUTO: 5.89 K/UL

## 2021-06-21 ENCOUNTER — OUTPATIENT (OUTPATIENT)
Dept: OUTPATIENT SERVICES | Facility: HOSPITAL | Age: 51
LOS: 1 days | Discharge: HOME | End: 2021-06-21

## 2021-06-21 DIAGNOSIS — I89.9 NONINFECTIVE DISORDER OF LYMPHATIC VESSELS AND LYMPH NODES, UNSPECIFIED: Chronic | ICD-10-CM

## 2021-06-21 DIAGNOSIS — Z90.79 ACQUIRED ABSENCE OF OTHER GENITAL ORGAN(S): Chronic | ICD-10-CM

## 2021-06-21 DIAGNOSIS — Z98.89 OTHER SPECIFIED POSTPROCEDURAL STATES: Chronic | ICD-10-CM

## 2021-06-21 DIAGNOSIS — C43.8 MALIGNANT MELANOMA OF OVERLAPPING SITES OF SKIN: ICD-10-CM

## 2021-06-21 DIAGNOSIS — C43.4 MALIGNANT MELANOMA OF SCALP AND NECK: Chronic | ICD-10-CM

## 2021-06-22 ENCOUNTER — APPOINTMENT (OUTPATIENT)
Dept: HEMATOLOGY ONCOLOGY | Facility: CLINIC | Age: 51
End: 2021-06-22
Payer: COMMERCIAL

## 2021-06-22 VITALS
HEART RATE: 69 BPM | TEMPERATURE: 97.9 F | OXYGEN SATURATION: 100 % | HEIGHT: 70 IN | WEIGHT: 168.65 LBS | DIASTOLIC BLOOD PRESSURE: 80 MMHG | RESPIRATION RATE: 18 BRPM | BODY MASS INDEX: 24.14 KG/M2 | SYSTOLIC BLOOD PRESSURE: 114 MMHG

## 2021-06-22 DIAGNOSIS — K52.9 NONINFECTIVE GASTROENTERITIS AND COLITIS, UNSPECIFIED: ICD-10-CM

## 2021-06-22 PROCEDURE — 99072 ADDL SUPL MATRL&STAF TM PHE: CPT

## 2021-06-22 PROCEDURE — 99215 OFFICE O/P EST HI 40 MIN: CPT

## 2021-06-22 NOTE — PHYSICAL EXAM
[Ambulatory and capable of all self care but unable to carry out any work activities] : Status 2- Ambulatory and capable of all self care but unable to carry out any work activities. Up and about more than 50% of waking hours [Thin] : thin [Normal] : affect appropriate [de-identified] : No new lesions noted on scalp [de-identified] : old incision is normal and no hernia

## 2021-06-22 NOTE — REVIEW OF SYSTEMS
[Fatigue] : fatigue [Negative] : Allergic/Immunologic [Recent Change In Weight] : ~T no recent weight change [FreeTextEntry9] : Mild curvature of thoracic spine noted. Has chronic back pain and left knee pain.  [de-identified] : No new skin lesions.

## 2021-06-22 NOTE — HISTORY OF PRESENT ILLNESS
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  \par \par He noticed a raised, black, pruritic lesion on his right parietal scalp.  Biopsy was performed on 8/14/2018 which was consistent with a 1.1mm melanoma with no significant mitotic figures, no ulceration and no regression.\par He was seen by Dr Sarwat Chandler on 8/13/2018 who recommended wide excision of melanoma with SLNB and reconstruction.\par On 8/29/2018 patient underwent radical resection of scalp melanoma, right posterior modified neck dissection with closure by Dr Bradley Toussaint (Plastic)\par Final Path: metastatic melanoma present in 2/2 LN, residual melanoma, negative margins, no lymphovascular invasion.  Tumor stage pT2a pN2a\par PET/CT 9/15/2018: Minimally FDG avid soft tissue thickening right scalp probably postsurgical.FDG avid focal area of soft tissue thickening right neck, favor postsurgical changes rather than residual disease. Suggest correlation with clinical exam and surgical margins. No FDG avid distant metastatic disease. \par On 10/19/2018 underwent right functional neck dissection with Dr Sarwat Chandler\par Path: no metastatic melanoma seen in eighteen LN (0/18) and benign skin/tissue findings.\par He was referred to Dr Kendall Sands (Medical Oncology) at [facility]. \par He was originally seen by Dr Kendall Sands who referred the patient to Dr Gopi Arredondo at [facility].  \par Patient has stage IIIa (T2aN2a) BRAF testing was performed and mutation was detected. The patient was offered adjuvant Nivolumab 480mg every four weeks by Dr Arredondo for 13 cycles.\par \par 1/25/2019: Mr Mclaughlin is here for follow up.  He is scheduled for C#2 Nivolumab today.  He tolerated C#1 extremely well and reports no adverse events.\par \par 2/22/2019: Mr Mclaughlin is here for C#3 Nivolumab today.  He feels well today and is tolerating Nivo without any adverse events.  He continues to remain active and works as a .  He does reports two episode of diarrhea x 3 weeks after his last treatment.  He took Imodium with excellent relief. \par \par 3/22/2019: Patient Here for C#4 today. DEXA shows osteopenia. His Vit D level is 20 despite 50k units per week. No irAEs from treatment. \par \par 4/24/2019: As of 4/5/19 patient had leg cramps in the front and back of the legs. The intensity became severe. Took Tylenol and motrin with no relief. He also noted some loose stool just prior to these onset of the leg pain. Prednisone 60 mg daily started. After 1 week there was no impact on cramps. Flexeril helps sleep but no benefit with leg pain. The prednisone was only taken for only 3 days when changed to Flexeril. He passes stool 2-3 times daily which is more than usual. No bleeding. Overall the leg cramping is less severe but still recurring. He is off of atorvastatin and remains on Tricor. The muscle pains do not happen every day, but occur 3-4 days per week. He is working and is working from home more often at present. \par \par 10/25/2019: Patient has noticed the development of multiple new pigmented lesions on his scalp and right upper arm. He saw his dermatologist and 3 biopsies were done. The one on the arm had an epidermal component; whereas neither of the scalp lesions had epidermal component. Both were dermal only, and suggested metastatic disease. This would suggest in-transit relapse since the lesions are close to his right scalp resection from 2018. To summarize, he had received adjuvant nivolumab from 1228/2018 to 3/22/2019. It was discontinued due to persistent thigh pain and fasciitis discovered on MRI. He had also had grade 2 diarrhea that was self limited. Since the biopsies 2 weeks ago, he has noticed additional lesions on the left supraclavicular area and the left pectoral region. These are both pigmented and ~4-5 mm in diameter. There are also new lesions on the scalp. Overall he feels well and has no new symptoms, and no residual immune related symptoms from the prior treatment. \par \par 1/14/2020: On 11/12/2019 C#1 D#1 of ipilimumab and nivolumab. He notes no development of new small melanoma lesions on his scalp or chest. Existing ones remian stable. He is aware that PET and MRI showed no internal disease. We discussed that BRAFi+MEKi could be considered as an alternative based on his historic BRAF V600 mutation. He is using Immodium 2 tablets daily on occasion, and not needed daily. Given lack of progression or significant irAEs, will continue with IPI and NIVO. \par \par 2/24/2020: Patient completed all 4 cycles of Ipilimumab and Nivolumab. Over past week he has noticed new stiffness & soreness in his bilateral shoulders and knees. He continues to have thigh pain with moderate discomfort that requires up to 6 tablets of Percoset daily. He has also noted an increase in right orbital head pain that is consistent with his prior history of migrane/cluster headache. He states that the pains are worse than the past, and has been to his neurologist and tried several medications. He has been successfully helped with Ubelvy. He is here to start maintenance nivolumab post-induction. He has no other complaints of symptoms or other issues attributable to immune-related adverse events.\par \par 6/16/2020: TEB follow up appointment today, verbal consent obtained.\par 1) Melanoma: He completed Ipi/Nivo x 4 on 1/15/2020.  Afterwards patient started on single agent Nivolumab x 2 cycles, the second of which was given in 4/2020, and stopped due to apparent immune-related side effects. He did not received the nivolumab on 5/2 due to severe fatigue and ? colitis on PET. he notes no new skin lesions. Patient underwent PET/CT on 4/22/2020 which demonstrated colitis.  No evidence of metastatic disease or recurrent disease.\par 2) ? Colitis: Patient was advised to blood work and stool samples.  No acute pathology was noted.  He denied diarrhea or any symptoms attributable to colitis.\par 3) Nausea: this has improved significantly. He is eating better since on steroid replacement and Marinol (from hospital). \par 4) Appetite loss: is improving on Marinol (also helps nausea).\par 5) Myositis: Patient is using Percocet 1-2 tablets q6 hours; three times per week. At times the pain still does wake patient up in the middle of the night.\par 6) Fatigue: Improving but not back to baseline.\par \par 8/11/2020:\par Patient here for follow-up of metastatic melanoma, especially located to skin of scalp. He has noted no new lesions and the prior ones continue to resolve or diminish. He completed Ipilimumab + Nivolumab x 4 from 11/2019 to 1/15/2020.  Afterwards, patient started on single agent Nivolumab x 2 cycles, the second of which was given in 4/2020, and stopped due to apparent immune-related side effects.\par Fatigue - this is continual and chronic. He has had to stop work and start disability since 5/2020. \par Adrenal insufficiency: He is working with Dr. Sanchez for endocrine treatment of his adrenal gland and thyroid.\par SOB usually with mild exertion - unclear origin. He has seen pulmonary for PFTs and cardiology for echocardiogram and stress test without abnormality noted. \par Joint pains especially of the knees and shoulders - this has gotten worse and pain medication (Percoset) is not helping. \par Muscle cramps in thighs - remains mild to moderate, and has intermittent severe cramps. \par Weight loss - seems to be multifactorial, and he has no taste for food and poor appetite.\par \par 11/5/2020:\par Metastatic melanoma - especially located to skin of scalp. He has noted no new lesions and the prior ones continue to resolve or diminish. He completed Ipilimumab + Nivolumab x 4 from 11/2019 to 1/15/2020.  Afterwards, patient started on single agent Nivolumab x 2 cycles, the second of which was given in 4/2020, and stopped due to apparent immune-related side effects. PET negative 10/2020.\par Fatigue - this is continual and chronic. He has had to stop work and remains on disability since 5/2020 (through [employer]).\par Adrenal insufficiency - He is working with Dr. Sanchez for endocrine treatment of his adrenal gland and thyroid.\par SOB - usually with mild exertion - unclear origin. He has seen pulmonary for PFTs and cardiology for echocardiogram and stress test without abnormality noted. \par Joint pains - especially of the knees and shoulders - the medical marijuana helped with appetite, weight and shoulder pain. Inhales 1-2 times of 0.5 mg THC: 0.5 mg CBD daily. \par Muscle cramps in thighs - remains mild to moderate, and has intermittent severe cramps. This has gotten worse especially last week. Only Percoset helps. Tries to avoid Percoset during the day.\par \par 5/11/2021:\par 1) Melanoma: Last CT 4/2021 no evidence of metastatic disease. Skin lesions on scalp are gone.\par 2) Endocrine: Adrenal insufficiency. He continues to have fatigue which has progressed over the last 2 months.  He is currently is Hydrocortisone 20mg in AM and 10mg in PM.  He is currently still on disability and has not returned back to work.\par 3) Weight loss: Patient is down a couple more pounds. As per patient this appetite is stable and has full course meals ~ 3/day.\par 4) Arthralgia: patient noticed in bilateral knee and shoulder.  The shoulder pain is 6/10 in severity and using Percocet ATC with relief. Needs better long term, chronic pain management. \par \par 6/22/21\par Melanoma: patient's skin scalp lesions have been in remission. He completed Ipilimumab + Nivolumab x 4 from 11/2019 to 1/15/2020.  Afterwards, patient started on single agent Nivolumab x 2 cycles, the second of which was given in 4/2020, and stopped due to apparent immune-related side effects. PET negative 10/2020.\par \par Colon blockage / Colitis: Patient admitted to [facility] for obstruction. CT scan 6/7/21: Concentric wall thickening of the rectum and colonic anastomosis and likely luminal narrowing. Large stool in the distal colon, proximal to the anastomosis with associated mild concentric wall thickening and adjacent stranding. Again, these findings are suggestive of a likely anastomotic stricture and development of stercoral colitis proximally. NOTE - Stercoral colitis is an inflammatory colitis related to increased intraluminal pressure from impacted fecal material in the colon. Patient had multiple bowel movements with MiraLAX.  His white blood count is better and he has less pain and tenderness.  Possible discharge later today on oral antibiotics with Movantik, Senokot and MiraLAX. This all seems to have occurred after starting Oxycodone 10 mg ER q8, as prescribed by Dr Rosen.\par \par Severe persistent fatigue and joint pains: Will discuss with Dr Rosen about methadone instead of oxycodone. Also, I have contacted Dr. Pineda from Rheum to review case and possibly consult as well. [de-identified] : Surgical Oncology: Sarwat Chandler [phone]\par Plastics: Kendall Toussaint [phone]\par Medical Oncology ([facility]): Kendall Sands  [phone]\par Medical Oncology: Melanoma ([facility]): Gopi Arredondo [phone]\par Dermatology: Mariah Spring; [phone]\par  [de-identified] : melanoma

## 2021-06-23 ENCOUNTER — NON-APPOINTMENT (OUTPATIENT)
Age: 51
End: 2021-06-23

## 2021-06-23 RX ORDER — LEVOTHYROXINE SODIUM 0.07 MG/1
75 TABLET ORAL
Qty: 90 | Refills: 1 | Status: DISCONTINUED | COMMUNITY
Start: 2021-02-23 | End: 2021-06-23

## 2021-07-06 ENCOUNTER — APPOINTMENT (OUTPATIENT)
Dept: PAIN MANAGEMENT | Facility: CLINIC | Age: 51
End: 2021-07-06
Payer: COMMERCIAL

## 2021-07-06 VITALS
HEART RATE: 104 BPM | WEIGHT: 150 LBS | HEIGHT: 70 IN | BODY MASS INDEX: 21.47 KG/M2 | SYSTOLIC BLOOD PRESSURE: 115 MMHG | DIASTOLIC BLOOD PRESSURE: 79 MMHG

## 2021-07-06 PROCEDURE — 99214 OFFICE O/P EST MOD 30 MIN: CPT

## 2021-07-06 PROCEDURE — 99072 ADDL SUPL MATRL&STAF TM PHE: CPT

## 2021-08-04 ENCOUNTER — NON-APPOINTMENT (OUTPATIENT)
Age: 51
End: 2021-08-04

## 2021-08-24 ENCOUNTER — TRANSCRIPTION ENCOUNTER (OUTPATIENT)
Age: 51
End: 2021-08-24

## 2021-08-24 NOTE — PHYSICAL EXAM
[FreeTextEntry1] : Constitutional: No signs of distress. No signs of toxicity. \par MS: Alert and well oriented. Speech fluent. No aphasia. Fund of knowledge intact. \par Psychiatric: Mood stable.\par CN: PERRLA:  No facial asymmetry. palate elevates symmetrically, tongue midline\par Motor: Adequate bulk, tone, strength. 5/5 strength\par DTR: present and symmetrical; no clonus\par Sensory: intact to primary and secondary modalities;\par Cerebellar and gait: intact\par Eyes: no redness or swelling\par HEENT: intact\par Neck: No masses noted\par Pulmonary: no respiratory distress\par Vascular: no temperature,color changes; no edema\par Musculoskeletal: examination of the cervical spine reveals no midline tenderness, range of motion full upon flexion, extension and lateral rotation. Negative facet tenderness, Negative Spurlings bilaterally. examination of the lumbar spine reveals no midline or paraspinal tenderness; Range of motion full upon flexion, extension and lateral rotation; negative facet loading, No tenderness of sciatic notch, No tenderness of bilateral greater trochanters, Negative BRITTA, negative SLRT bilaterally, No joint swelling noted.\par Abd: non tender\par Skin: No rash.\par

## 2021-08-24 NOTE — ASSESSMENT
[Opioids] : Patient was explained in detail about pain control by using opioids. Patient has signed and fully understands our guidelines for medication and drug screening.  Patient understands the side effects of opioids, including, but not limited to, drug tolerance, dependence, potential for addiction. This class of drugs is habit-forming and MAKEDA regulated. The sedative effects of opioids can be potentiated by taking alcohol or any sleeping pills, along with opioids. The decision to drive is patient’s responsibility, as opioids can affect his/her driving ability and ability to concentrate. The long-term place is not clear, however, patient understands that once the pain control optimizes, the goal will be to wean off the opioids. All the issues regarding opioid treatment have been addressed satisfactorily.  [FreeTextEntry1] : Is a case of a 51-year-old gentleman with chronic joint pain that he reports is secondary to chemotherapy.  He was under the assumption that he was to take OxyContin on a as needed basis and not in twice daily basis.  Therefore, I am recommending again that he take OxyContin 10 mg twice daily continuously in place of any as needed oxycodone.  He should also engage in physical therapy.  A prescription was provided.   Consider genicular knee block. We also discussed other nonopiate therapy such as nonsteroidal anti-inflammatories which will be reconsidered at our next follow-up visit. BM much better on Oxycontin. Upon further review of records, patient had Bowel Obtruction  and has  a ho colitis. Will try to contact Dr. Lazaro Rice, GI to discuss further plan of care.I stop was reviewed and urinary drug testing was performed.  He is to follow-up in 3 to 4 weeks.\par

## 2021-08-24 NOTE — HISTORY OF PRESENT ILLNESS
[FreeTextEntry1] : Location: JOINTS SHOULDERS, KNEES, and LOWER BACK PAIN and HEADACHES\par Description: Sharp, stabbing, \par Onset:2019- immunotherapy for melanoma right scalp sp removal started 3 cycles of chemotherapy until early 2020. During treatment, he noticed some pain but once rx stopped "all hell broke lose" - severe leg cramps in both lower extremities usually began after 4 pm lasting 8 hours. Severity increased over time, the joints began to hurt. \par Frequency: Intermittent\par Severity:  5 / 10 avg,  9  /10  worst,  3  / 10 best\par Aggravating factors:movement\par Alleviating factors: Percocet\par Negative weakness, sensory level, bowel or bladder dysfunction\par Interferes with function: putting on clothes or taking off clothes; standing, sitting\par Comorbidities:\par Imaging: MRI joints; lumbar - reviewed \par Prior medications: NSAIDS, all others enclosed \par Current medications: Percocet 5/325 2 tabs q 4-6 hours only lasting 2 hours in duration. \par Interventions: nONE\par ORT 0\par Goals:Improve pain\par Exit strategy: one month re -eval\par \par PMH Testicular cancer 1994; Non hodgkins 1996; \par SHH: [facility]\par AL: NKA \par FMG: No pain condition; Mother lung cancer; Dad brain cancer

## 2021-08-24 NOTE — HISTORY OF PRESENT ILLNESS
[FreeTextEntry1] : This is a case of 51-year-old gentleman with a history of chronic multiple joint pain following chemotherapy. \par \par He states that a trial of OxyContin was taken 10 mg as needed and not on a regular basis provided good relief for only 6 8 hours.  He has not taken Percocet for 1 month.  He reports BM, qd,  are better with oxycontin compared to Percocet. QD with senekot..\par \par He denies any new medical problems denies any side effects to the current treatment.

## 2021-08-24 NOTE — ASSESSMENT
[FreeTextEntry1] : Chronic joint pain secondary to immunotherapy\par HO Hodgkins and Metastatic melanoma\par On chronic opioid therapy.\par Discussed other non opioid options\par Consider genicular knee block for knee pain which reportedly is the major pain.\par Since Percocet is short acting and not lasting more than 2-3 hours. will initiate Oxycontin 10 mgs tid. Maintain good bowel regime. Discussed with patient. [Opioids] : Patient was explained in detail about pain control by using opioids. Patient has signed and fully understands our guidelines for medication and drug screening.  Patient understands the side effects of opioids, including, but not limited to, drug tolerance, dependence, potential for addiction. This class of drugs is habit-forming and MAKEDA regulated. The sedative effects of opioids can be potentiated by taking alcohol or any sleeping pills, along with opioids. The decision to drive is patient’s responsibility, as opioids can affect his/her driving ability and ability to concentrate. The long-term place is not clear, however, patient understands that once the pain control optimizes, the goal will be to wean off the opioids. All the issues regarding opioid treatment have been addressed satisfactorily.

## 2021-08-24 NOTE — PHYSICAL EXAM
[FreeTextEntry1] : Constitutional: No signs of distress. No signs of toxicity. \par MS: Alert and well oriented. Speech fluent. No aphasia. Fund of knowledge intact. \par Psychiatric: Mood stable.\par CN: PERRLA:\par Motor: Adequate bulk, tone, strength. 5/5 strength\par DTR: present and symmetrical; no clonus\par Sensory: intact to primary and secondary modalities;\par Cerebellar and gait: intact\par Eyes: no redness or swelling\par HEENT: intact\par Neck: No masses noted\par Pulmonary: no respiratory distress\par Vascular: no temperature,color changes; no edema\par Musculoskeletal: examination of the cervical spine reveals no midline tenderness, range of motion full upon flexion, extension and lateral rotation. Negative facet tenderness, Negative Spurlings bilaterally. \par Skin: No rash.\par

## 2021-08-27 ENCOUNTER — NON-APPOINTMENT (OUTPATIENT)
Age: 51
End: 2021-08-27

## 2021-09-01 ENCOUNTER — APPOINTMENT (OUTPATIENT)
Dept: UROLOGY | Facility: CLINIC | Age: 51
End: 2021-09-01
Payer: COMMERCIAL

## 2021-09-01 LAB
APPEARANCE: CLEAR
BACTERIA: NEGATIVE
BILIRUBIN URINE: NEGATIVE
BLOOD URINE: NEGATIVE
COLOR: NORMAL
GLUCOSE QUALITATIVE U: NEGATIVE
HYALINE CASTS: 0 /LPF
KETONES URINE: NEGATIVE
LEUKOCYTE ESTERASE URINE: NEGATIVE
MICROSCOPIC-UA: NORMAL
NITRITE URINE: NEGATIVE
PH URINE: 6
PROTEIN URINE: NEGATIVE
RED BLOOD CELLS URINE: 0 /HPF
SPECIFIC GRAVITY URINE: 1.02
SQUAMOUS EPITHELIAL CELLS: 0 /HPF
UROBILINOGEN URINE: NORMAL
WHITE BLOOD CELLS URINE: 0 /HPF

## 2021-09-01 PROCEDURE — 99214 OFFICE O/P EST MOD 30 MIN: CPT

## 2021-09-02 ENCOUNTER — APPOINTMENT (OUTPATIENT)
Dept: PAIN MANAGEMENT | Facility: CLINIC | Age: 51
End: 2021-09-02
Payer: COMMERCIAL

## 2021-09-02 PROCEDURE — 99213 OFFICE O/P EST LOW 20 MIN: CPT | Mod: 95

## 2021-09-02 NOTE — PHYSICAL EXAM
[General Appearance - Alert] : alert [Oriented To Time, Place, And Person] : oriented to person, place, and time [FreeTextEntry1] : no signs of toxicity

## 2021-09-02 NOTE — REVIEW OF SYSTEMS
[Constipation] : constipation [Joint Pain] : joint pain [Joint Stiffness] : joint stiffness [Limb Pain] : limb pain [Back Pain] : ~T back pain [As Noted in HPI] : as noted in HPI [Negative] : Heme/Lymph

## 2021-09-02 NOTE — HISTORY OF PRESENT ILLNESS
[Home] : at home, [unfilled] , at the time of the visit. 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  Patient with chronic pain including muscles knees> shoulders on chronic opiates complicated by bowel obstruction, urinary retention presents for follow up for pain.  \par \par Today he reports chronic persistent pain in B.L knees, shoulders and lower back, constant achy pain 4-5/10 on average but increases to 8/10 daily . Pain increased with movement, activity, performing routine tasks, transfers, bathing, dressing etc and impeding on QOL.  He was previously on percocet in the past which causes severe constipation.  He was admitted to [facility] in June 2021 for impaction.  He is following up with GI for colitis and constipation and is on Movantik daily as well as senna 2x/week and has been having BM daily since. \par \par Pain is better controlled on Oxycontin 10 mg BID and occasionally requiring TID.

## 2021-09-02 NOTE — ASSESSMENT
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  His pain management has been complicated by episode of fecal impaction in the context of Colitis and constipation for which he is following up with GI as well as urinary retention for which he is seeing Urology.  His constipation is being treated with Movantik daily as well as senna 2x/week and has been having BM daily since. Pain is better controlled on Oxycontin 10 mg BID and occasionally requiring TID.  \par \par \par I-Stop reviewed,  reference #: [id number]\par No signs of aberrant behavior and will continue to monitor for signs of toxicity.\par Reminded to continue to avoid alcohol.\par UDS from July 2021 reviewed , + tramodol but negative for oxycodone. Will repeat UDS on next visit. \par Continue  Oxycontin 10 mg BID for now, no refill needed today. \par Case to be discussed with Pain team. \par \par I am seeing CARLOTA CANTU as incident to service. Dr. Hawkins is present in the office suite immediately available and able to provide assistance and direction throughout the time the service was performed.\par \par

## 2021-09-03 LAB — URINE CYTOLOGY: NORMAL

## 2021-09-14 RX ORDER — OXYCODONE AND ACETAMINOPHEN 5; 325 MG/1; MG/1
5-325 TABLET ORAL
Qty: 150 | Refills: 0 | Status: DISCONTINUED | COMMUNITY
Start: 2019-04-24 | End: 2021-09-14

## 2021-09-22 LAB — SARS-COV-2 N GENE NPH QL NAA+PROBE: NOT DETECTED

## 2021-09-27 ENCOUNTER — APPOINTMENT (OUTPATIENT)
Dept: PAIN MANAGEMENT | Facility: CLINIC | Age: 51
End: 2021-09-27
Payer: COMMERCIAL

## 2021-09-27 VITALS
BODY MASS INDEX: 21.19 KG/M2 | DIASTOLIC BLOOD PRESSURE: 79 MMHG | HEIGHT: 70 IN | WEIGHT: 148 LBS | SYSTOLIC BLOOD PRESSURE: 118 MMHG | HEART RATE: 111 BPM

## 2021-09-27 PROCEDURE — 99214 OFFICE O/P EST MOD 30 MIN: CPT

## 2021-09-27 NOTE — ASSESSMENT
[Opioids] : Patient was explained in detail about pain control by using opioids. Patient has signed and fully understands our guidelines for medication and drug screening.  Patient understands the side effects of opioids, including, but not limited to, drug tolerance, dependence, potential for addiction. This class of drugs is habit-forming and MAKEDA regulated. The sedative effects of opioids can be potentiated by taking alcohol or any sleeping pills, along with opioids. The decision to drive is patient’s responsibility, as opioids can affect his/her driving ability and ability to concentrate. The long-term place is not clear, however, patient understands that once the pain control optimizes, the goal will be to wean off the opioids. All the issues regarding opioid treatment have been addressed satisfactorily.  [FreeTextEntry1] : Chronic pain syndrome\par Knee pain - Will provide Genicular knee block in 2 weeks\par Stable with oxycontin except 50% time states he would benefit  from one extra dose. \par BMs have been regular with Movantik\par \par Will provide one SA oxycodone 5 mgs #15 per month.\par Patient advised to get letter of clearance from GI doctor.\par Repeat UDS by mouth swab\par : [id number]

## 2021-09-27 NOTE — PHYSICAL EXAM
[FreeTextEntry1] : Constitutional: No signs of distress. No signs of toxicity. \par MS: Alert and well oriented. Speech fluent. No aphasia. Fund of knowledge intact. \par Psychiatric: Mood stable.\par CN: PERRLA: No papilledema; No VFC: No Ronni. V1-3 intact. No facial asymmetry. palate elevates symmetrically, tongue midline\par Motor: Adequate bulk, tone, strength. 5/5 strength\par DTR: present and symmetrical; no clonus\par Sensory: intact to primary and secondary modalities; neg Romberg\par Cerebellar and gait: intact\par Eyes: no redness or swelling\par HEENT: intact\par Neck: No masses noted\par Pulmonary: no respiratory distress\par Vascular: no temperature,color changes; no edema\par Skin: No rash.\par

## 2021-09-27 NOTE — HISTORY OF PRESENT ILLNESS
[FreeTextEntry1] : Patient reports being stable on oxycontin 10 mgs bis. notes good relief but sometimes needs a middle dose sometimes throughout the month. Upon questioning, needs an extra dose 15 times per month. No new medical problems.\par \par Reviewed last UDS\par \par BM good on Movantik - BM have been excellent

## 2021-10-06 ENCOUNTER — APPOINTMENT (OUTPATIENT)
Dept: UROLOGY | Facility: CLINIC | Age: 51
End: 2021-10-06
Payer: COMMERCIAL

## 2021-10-06 ENCOUNTER — OUTPATIENT (OUTPATIENT)
Dept: OUTPATIENT SERVICES | Facility: HOSPITAL | Age: 51
LOS: 1 days | End: 2021-10-06
Payer: COMMERCIAL

## 2021-10-06 DIAGNOSIS — Z90.79 ACQUIRED ABSENCE OF OTHER GENITAL ORGAN(S): Chronic | ICD-10-CM

## 2021-10-06 DIAGNOSIS — Z98.89 OTHER SPECIFIED POSTPROCEDURAL STATES: Chronic | ICD-10-CM

## 2021-10-06 DIAGNOSIS — C43.4 MALIGNANT MELANOMA OF SCALP AND NECK: Chronic | ICD-10-CM

## 2021-10-06 DIAGNOSIS — I89.9 NONINFECTIVE DISORDER OF LYMPHATIC VESSELS AND LYMPH NODES, UNSPECIFIED: Chronic | ICD-10-CM

## 2021-10-06 DIAGNOSIS — R35.0 FREQUENCY OF MICTURITION: ICD-10-CM

## 2021-10-06 PROCEDURE — 88112 CYTOPATH CELL ENHANCE TECH: CPT | Mod: 26

## 2021-10-06 PROCEDURE — 99213 OFFICE O/P EST LOW 20 MIN: CPT | Mod: 25

## 2021-10-06 PROCEDURE — 52000 CYSTOURETHROSCOPY: CPT

## 2021-10-07 LAB
APPEARANCE: CLEAR
BACTERIA: NEGATIVE
BILIRUBIN URINE: NEGATIVE
BLOOD URINE: NEGATIVE
COLOR: NORMAL
GLUCOSE QUALITATIVE U: NEGATIVE
HYALINE CASTS: 0 /LPF
KETONES URINE: NEGATIVE
LEUKOCYTE ESTERASE URINE: NEGATIVE
MICROSCOPIC-UA: NORMAL
NITRITE URINE: NEGATIVE
PH URINE: 6.5
PROTEIN URINE: NEGATIVE
RED BLOOD CELLS URINE: 0 /HPF
SPECIFIC GRAVITY URINE: 1.01
SQUAMOUS EPITHELIAL CELLS: 0 /HPF
UROBILINOGEN URINE: NORMAL
WHITE BLOOD CELLS URINE: 1 /HPF

## 2021-10-08 LAB — BACTERIA UR CULT: NORMAL

## 2021-10-10 LAB — URINE CYTOLOGY: NORMAL

## 2021-10-14 RX ORDER — MAGNESIUM OXIDE 400 MG
400 (241.3 MG) TABLET ORAL
Qty: 60 | Refills: 4 | Status: ACTIVE | COMMUNITY
Start: 2020-11-05 | End: 1900-01-01

## 2021-10-15 ENCOUNTER — OUTPATIENT (OUTPATIENT)
Dept: OUTPATIENT SERVICES | Facility: HOSPITAL | Age: 51
LOS: 1 days | Discharge: HOME | End: 2021-10-15

## 2021-10-15 DIAGNOSIS — Z98.89 OTHER SPECIFIED POSTPROCEDURAL STATES: Chronic | ICD-10-CM

## 2021-10-15 DIAGNOSIS — C43.4 MALIGNANT MELANOMA OF SCALP AND NECK: Chronic | ICD-10-CM

## 2021-10-15 DIAGNOSIS — Z90.79 ACQUIRED ABSENCE OF OTHER GENITAL ORGAN(S): Chronic | ICD-10-CM

## 2021-10-15 DIAGNOSIS — C43.8 MALIGNANT MELANOMA OF OVERLAPPING SITES OF SKIN: ICD-10-CM

## 2021-10-15 DIAGNOSIS — I89.9 NONINFECTIVE DISORDER OF LYMPHATIC VESSELS AND LYMPH NODES, UNSPECIFIED: Chronic | ICD-10-CM

## 2021-10-18 ENCOUNTER — RESULT REVIEW (OUTPATIENT)
Age: 51
End: 2021-10-18

## 2021-10-18 ENCOUNTER — APPOINTMENT (OUTPATIENT)
Dept: HEMATOLOGY ONCOLOGY | Facility: CLINIC | Age: 51
End: 2021-10-18
Payer: COMMERCIAL

## 2021-10-18 VITALS
HEART RATE: 71 BPM | BODY MASS INDEX: 21.55 KG/M2 | SYSTOLIC BLOOD PRESSURE: 126 MMHG | WEIGHT: 150.55 LBS | OXYGEN SATURATION: 99 % | RESPIRATION RATE: 16 BRPM | TEMPERATURE: 97.3 F | DIASTOLIC BLOOD PRESSURE: 84 MMHG | HEIGHT: 70 IN

## 2021-10-18 LAB
BASOPHILS # BLD AUTO: 0.07 K/UL — SIGNIFICANT CHANGE UP (ref 0–0.2)
BASOPHILS NFR BLD AUTO: 0.7 % — SIGNIFICANT CHANGE UP (ref 0–2)
EOSINOPHIL # BLD AUTO: 0.21 K/UL — SIGNIFICANT CHANGE UP (ref 0–0.5)
EOSINOPHIL NFR BLD AUTO: 2.2 % — SIGNIFICANT CHANGE UP (ref 0–6)
HCT VFR BLD CALC: 49 % — SIGNIFICANT CHANGE UP (ref 39–50)
HGB BLD-MCNC: 15.8 G/DL — SIGNIFICANT CHANGE UP (ref 13–17)
IMM GRANULOCYTES NFR BLD AUTO: 0.3 % — SIGNIFICANT CHANGE UP (ref 0–1.5)
LYMPHOCYTES # BLD AUTO: 3.05 K/UL — SIGNIFICANT CHANGE UP (ref 1–3.3)
LYMPHOCYTES # BLD AUTO: 32.3 % — SIGNIFICANT CHANGE UP (ref 13–44)
MCHC RBC-ENTMCNC: 28.5 PG — SIGNIFICANT CHANGE UP (ref 27–34)
MCHC RBC-ENTMCNC: 32.2 G/DL — SIGNIFICANT CHANGE UP (ref 32–36)
MCV RBC AUTO: 88.3 FL — SIGNIFICANT CHANGE UP (ref 80–100)
MONOCYTES # BLD AUTO: 0.38 K/UL — SIGNIFICANT CHANGE UP (ref 0–0.9)
MONOCYTES NFR BLD AUTO: 4 % — SIGNIFICANT CHANGE UP (ref 2–14)
NEUTROPHILS # BLD AUTO: 5.7 K/UL — SIGNIFICANT CHANGE UP (ref 1.8–7.4)
NEUTROPHILS NFR BLD AUTO: 60.5 % — SIGNIFICANT CHANGE UP (ref 43–77)
NRBC # BLD: 0 /100 WBCS — SIGNIFICANT CHANGE UP (ref 0–0)
PLATELET # BLD AUTO: 338 K/UL — SIGNIFICANT CHANGE UP (ref 150–400)
RBC # BLD: 5.55 M/UL — SIGNIFICANT CHANGE UP (ref 4.2–5.8)
RBC # FLD: 13.9 % — SIGNIFICANT CHANGE UP (ref 10.3–14.5)
WBC # BLD: 9.44 K/UL — SIGNIFICANT CHANGE UP (ref 3.8–10.5)
WBC # FLD AUTO: 9.44 K/UL — SIGNIFICANT CHANGE UP (ref 3.8–10.5)

## 2021-10-18 PROCEDURE — 99214 OFFICE O/P EST MOD 30 MIN: CPT

## 2021-10-18 RX ORDER — DICLOFENAC POTASSIUM 50 MG/1
50 TABLET, COATED ORAL
Qty: 60 | Refills: 0 | Status: DISCONTINUED | COMMUNITY
Start: 2021-06-22 | End: 2021-10-18

## 2021-10-18 NOTE — PHYSICAL EXAM
[Ambulatory and capable of all self care but unable to carry out any work activities] : Status 2- Ambulatory and capable of all self care but unable to carry out any work activities. Up and about more than 50% of waking hours [Thin] : thin [Normal] : affect appropriate [de-identified] : No new lesions noted on scalp

## 2021-10-18 NOTE — HISTORY OF PRESENT ILLNESS
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  \par \par He noticed a raised, black, pruritic lesion on his right parietal scalp.  Biopsy was performed on 8/14/2018 which was consistent with a 1.1mm melanoma with no significant mitotic figures, no ulceration and no regression.\par He was seen by Dr Sarwat Chandler on 8/13/2018 who recommended wide excision of melanoma with SLNB and reconstruction.\par On 8/29/2018 patient underwent radical resection of scalp melanoma, right posterior modified neck dissection with closure by Dr Bradley Toussaint (Plastic)\par Final Path: metastatic melanoma present in 2/2 LN, residual melanoma, negative margins, no lymphovascular invasion.  Tumor stage pT2a pN2a\par PET/CT 9/15/2018: Minimally FDG avid soft tissue thickening right scalp probably postsurgical.FDG avid focal area of soft tissue thickening right neck, favor postsurgical changes rather than residual disease. Suggest correlation with clinical exam and surgical margins. No FDG avid distant metastatic disease. \par On 10/19/2018 underwent right functional neck dissection with Dr Sarwat Chandler\par Path: no metastatic melanoma seen in eighteen LN (0/18) and benign skin/tissue findings.\par He was referred to Dr Kendall Sands (Medical Oncology) at [facility]. \par He was originally seen by Dr Kendall Sands who referred the patient to Dr Gopi Arredondo at [facility].  \par Patient has stage IIIa (T2aN2a) BRAF testing was performed and mutation was detected. The patient was offered adjuvant Nivolumab 480mg every four weeks by Dr Arredondo for 13 cycles.\par \par 1/25/2019: Mr Mclaughlin is here for follow up.  He is scheduled for C#2 Nivolumab today.  He tolerated C#1 extremely well and reports no adverse events.\par \par 2/22/2019: Mr Mclaughlin is here for C#3 Nivolumab today.  He feels well today and is tolerating Nivo without any adverse events.  He continues to remain active and works as a .  He does reports two episode of diarrhea x 3 weeks after his last treatment.  He took Imodium with excellent relief. \par \par 3/22/2019: Patient Here for C#4 today. DEXA shows osteopenia. His Vit D level is 20 despite 50k units per week. No irAEs from treatment. \par \par 4/24/2019: As of 4/5/19 patient had leg cramps in the front and back of the legs. The intensity became severe. Took Tylenol and motrin with no relief. He also noted some loose stool just prior to these onset of the leg pain. Prednisone 60 mg daily started. After 1 week there was no impact on cramps. Flexeril helps sleep but no benefit with leg pain. The prednisone was only taken for only 3 days when changed to Flexeril. He passes stool 2-3 times daily which is more than usual. No bleeding. Overall the leg cramping is less severe but still recurring. He is off of atorvastatin and remains on Tricor. The muscle pains do not happen every day, but occur 3-4 days per week. He is working and is working from home more often at present. \par \par 10/25/2019: Patient has noticed the development of multiple new pigmented lesions on his scalp and right upper arm. He saw his dermatologist and 3 biopsies were done. The one on the arm had an epidermal component; whereas neither of the scalp lesions had epidermal component. Both were dermal only, and suggested metastatic disease. This would suggest in-transit relapse since the lesions are close to his right scalp resection from 2018. To summarize, he had received adjuvant nivolumab from 1228/2018 to 3/22/2019. It was discontinued due to persistent thigh pain and fasciitis discovered on MRI. He had also had grade 2 diarrhea that was self limited. Since the biopsies 2 weeks ago, he has noticed additional lesions on the left supraclavicular area and the left pectoral region. These are both pigmented and ~4-5 mm in diameter. There are also new lesions on the scalp. Overall he feels well and has no new symptoms, and no residual immune related symptoms from the prior treatment. \par \par 1/14/2020: On 11/12/2019 C#1 D#1 of ipilimumab and nivolumab. He notes no development of new small melanoma lesions on his scalp or chest. Existing ones remian stable. He is aware that PET and MRI showed no internal disease. We discussed that BRAFi+MEKi could be considered as an alternative based on his historic BRAF V600 mutation. He is using Immodium 2 tablets daily on occasion, and not needed daily. Given lack of progression or significant irAEs, will continue with IPI and NIVO. \par \par 2/24/2020: Patient completed all 4 cycles of Ipilimumab and Nivolumab. Over past week he has noticed new stiffness & soreness in his bilateral shoulders and knees. He continues to have thigh pain with moderate discomfort that requires up to 6 tablets of Percoset daily. He has also noted an increase in right orbital head pain that is consistent with his prior history of migrane/cluster headache. He states that the pains are worse than the past, and has been to his neurologist and tried several medications. He has been successfully helped with Ubelvy. He is here to start maintenance nivolumab post-induction. He has no other complaints of symptoms or other issues attributable to immune-related adverse events.\par \par 6/16/2020: TEB follow up appointment today, verbal consent obtained.\par 1) Melanoma: He completed Ipi/Nivo x 4 on 1/15/2020.  Afterwards patient started on single agent Nivolumab x 2 cycles, the second of which was given in 4/2020, and stopped due to apparent immune-related side effects. He did not received the nivolumab on 5/2 due to severe fatigue and ? colitis on PET. he notes no new skin lesions. Patient underwent PET/CT on 4/22/2020 which demonstrated colitis.  No evidence of metastatic disease or recurrent disease.\par 2) ? Colitis: Patient was advised to blood work and stool samples.  No acute pathology was noted.  He denied diarrhea or any symptoms attributable to colitis.\par 3) Nausea: this has improved significantly. He is eating better since on steroid replacement and Marinol (from hospital). \par 4) Appetite loss: is improving on Marinol (also helps nausea).\par 5) Myositis: Patient is using Percocet 1-2 tablets q6 hours; three times per week. At times the pain still does wake patient up in the middle of the night.\par 6) Fatigue: Improving but not back to baseline.\par \par 8/11/2020:\par Patient here for follow-up of metastatic melanoma, especially located to skin of scalp. He has noted no new lesions and the prior ones continue to resolve or diminish. He completed Ipilimumab + Nivolumab x 4 from 11/2019 to 1/15/2020.  Afterwards, patient started on single agent Nivolumab x 2 cycles, the second of which was given in 4/2020, and stopped due to apparent immune-related side effects.\par Fatigue - this is continual and chronic. He has had to stop work and start disability since 5/2020. \par Adrenal insufficiency: He is working with Dr. Sanchez for endocrine treatment of his adrenal gland and thyroid.\par SOB usually with mild exertion - unclear origin. He has seen pulmonary for PFTs and cardiology for echocardiogram and stress test without abnormality noted. \par Joint pains especially of the knees and shoulders - this has gotten worse and pain medication (Percoset) is not helping. \par Muscle cramps in thighs - remains mild to moderate, and has intermittent severe cramps. \par Weight loss - seems to be multifactorial, and he has no taste for food and poor appetite.\par \par 11/5/2020:\par Metastatic melanoma - especially located to skin of scalp. He has noted no new lesions and the prior ones continue to resolve or diminish. He completed Ipilimumab + Nivolumab x 4 from 11/2019 to 1/15/2020.  Afterwards, patient started on single agent Nivolumab x 2 cycles, the second of which was given in 4/2020, and stopped due to apparent immune-related side effects. PET negative 10/2020.\par Fatigue - this is continual and chronic. He has had to stop work and remains on disability since 5/2020 (through [employer]).\par Adrenal insufficiency - He is working with Dr. Sanchez for endocrine treatment of his adrenal gland and thyroid.\par SOB - usually with mild exertion - unclear origin. He has seen pulmonary for PFTs and cardiology for echocardiogram and stress test without abnormality noted. \par Joint pains - especially of the knees and shoulders - the medical marijuana helped with appetite, weight and shoulder pain. Inhales 1-2 times of 0.5 mg THC: 0.5 mg CBD daily. \par Muscle cramps in thighs - remains mild to moderate, and has intermittent severe cramps. This has gotten worse especially last week. Only Percoset helps. Tries to avoid Percoset during the day.\par \par \par 5/11/2021:\par 1) Melanoma: Last CT 4/2021 no evidence of metastatic disease. Skin lesions on scalp are gone.\par 2) Endocrine: He continues to have fatigue which has progressed over the last 2 months.  He is currently is Hydrocortisone 20mg in AM and 10mg in PM.  He is currently still on disability and has not returned back to work.\par 3) Weight loss: Patient is down a couple more pounds. As per patient this appetite is stable and has full course meals ~ 3/day.\par 4) Arthralgia: patient noticed in bilateral knee and shoulder.  The shoulder pain is 6/10 in severity and using Percocet ATC with relief. Needs better long term, chronic pain management. \par \par 6/22/21\par Melanoma: patient's skin scalp lesions have been in remission. He completed Ipilimumab + Nivolumab x 4 from 11/2019 to 1/15/2020. Afterwards, patient started on single agent Nivolumab x 2 cycles, the second of which was given in 4/2020, and stopped due to apparent immune-related side effects. PET negative 10/2020.\par \par Colon blockage / Colitis: Patient admitted to [facility] for obstruction. CT scan 6/7/21: Concentric wall thickening of the rectum and colonic anastomosis and likely luminal narrowing. Large stool in the distal colon, proximal to the anastomosis with associated mild concentric wall thickening and adjacent stranding. Again, these findings are suggestive of a likely anastomotic stricture and development of stercoral colitis proximally. NOTE - Stercoral colitis is an inflammatory colitis related to increased intraluminal pressure from impacted fecal material in the colon. Patient had multiple bowel movements with MiraLAX. His white blood count is better and he has less pain and tenderness. Possible discharge later today on oral antibiotics with Movantik, Senokot and MiraLAX. This all seems to have occurred after starting Oxycodone 10 mg ER q8, as prescribed by Dr Rosen.\par \par Severe persistent fatigue and joint pains: Will discuss with Dr Rosen about methadone instead of oxycodone. Also, I have contacted Dr. Pineda from [facility] to review case and possibly consult as well. \par \par \par 10/18/2021:\par 1) Melanoma: Remains in remission clinically.  Last imaging on 6/2021 demonstrates no evidence of recurrent and/or metastatic disease.\par 2) Arthralgia:  He is being followed by Dr. Rosen for diffuse arthralgia (shoulder, knees, elbows) Patient is currently on OxyContin 10mg twice daily and Oxycodone PRN. \par 3) Colitis: His bowel are moving well.  He is scheduled for colonoscopy at the end of 11/2021 with Dr. Rice.\par 4) Urinary retention: Patient is being followed by Urology, Dr. Hurtado.   He is currently on Flomax and Proscar.  As per urology, this is likely secondary inflammation to his urethra due to multiple bowel surgery.\par 5) Adrenal insufficiency: Patient is currently being followed by endocrine, Dr. Sanchez.  He is currently on Hydrocortisone 20 mg in AM and 10 mg in PM.\par 5) Social: Patient currently on disability.\par J and J vaccine on 4/2021.\par Patient received Influenza vaccine last week. [de-identified] : melanoma [de-identified] : Surgical Oncology: Sarwat Chandler [phone]\par Plastics: Kendall Toussaint [phone]\par Medical Oncology ([facility]): Kendall Sands  [phone]\par Medical Oncology: Melanoma ([facility]): Gopi Arredondo [phone]\par Dermatology: Mariah Spring; [phone]\par

## 2021-10-18 NOTE — REVIEW OF SYSTEMS
[Fatigue] : fatigue [Negative] : Allergic/Immunologic [Joint Pain] : joint pain [Joint Stiffness] : joint stiffness [Recent Change In Weight] : ~T no recent weight change [Muscle Pain] : no muscle pain [FreeTextEntry9] : Mild curvature of thoracic spine noted. Has chronic back pain and left knee pain.  [de-identified] : No new skin lesions.

## 2021-10-19 LAB
ALBUMIN SERPL ELPH-MCNC: 5.4 G/DL
ALP BLD-CCNC: 74 U/L
ALT SERPL-CCNC: 13 U/L
ANION GAP SERPL CALC-SCNC: 14 MMOL/L
APTT BLD: 33.6 SEC
AST SERPL-CCNC: 15 U/L
BILIRUB SERPL-MCNC: 0.7 MG/DL
BUN SERPL-MCNC: 22 MG/DL
CALCIUM SERPL-MCNC: 11.4 MG/DL
CHLORIDE SERPL-SCNC: 99 MMOL/L
CO2 SERPL-SCNC: 25 MMOL/L
CREAT SERPL-MCNC: 1.02 MG/DL
GLUCOSE SERPL-MCNC: 94 MG/DL
INR PPP: 0.96 RATIO
POTASSIUM SERPL-SCNC: 6 MMOL/L
PROT SERPL-MCNC: 8.1 G/DL
PT BLD: 11.3 SEC
SODIUM SERPL-SCNC: 138 MMOL/L
TSH SERPL-ACNC: 1.71 UIU/ML

## 2021-10-20 ENCOUNTER — APPOINTMENT (OUTPATIENT)
Dept: HEMATOLOGY ONCOLOGY | Facility: CLINIC | Age: 51
End: 2021-10-20

## 2021-10-21 DIAGNOSIS — C62.90 MALIGNANT NEOPLASM OF UNSPECIFIED TESTIS, UNSPECIFIED WHETHER DESCENDED OR UNDESCENDED: ICD-10-CM

## 2021-10-21 DIAGNOSIS — N13.8 OTHER OBSTRUCTIVE AND REFLUX UROPATHY: ICD-10-CM

## 2021-10-21 DIAGNOSIS — N40.1 BENIGN PROSTATIC HYPERPLASIA WITH LOWER URINARY TRACT SYMPTOMS: ICD-10-CM

## 2021-10-22 ENCOUNTER — APPOINTMENT (OUTPATIENT)
Dept: PAIN MANAGEMENT | Facility: CLINIC | Age: 51
End: 2021-10-22
Payer: COMMERCIAL

## 2021-10-22 PROCEDURE — 99214 OFFICE O/P EST MOD 30 MIN: CPT

## 2021-10-24 NOTE — ASSESSMENT
[Opioids] : Patient was explained in detail about pain control by using opioids. Patient has signed and fully understands our guidelines for medication and drug screening.  Patient understands the side effects of opioids, including, but not limited to, drug tolerance, dependence, potential for addiction. This class of drugs is habit-forming and MAKEDA regulated. The sedative effects of opioids can be potentiated by taking alcohol or any sleeping pills, along with opioids. The decision to drive is patient’s responsibility, as opioids can affect his/her driving ability and ability to concentrate. The long-term place is not clear, however, patient understands that once the pain control optimizes, the goal will be to wean off the opioids. All the issues regarding opioid treatment have been addressed satisfactorily.  [FreeTextEntry1] : Chronic pain syndrome\par Knee pain - Will provide Genicular knee block in January\par Much better only taking oxycodone 1 qd prn.  \par BMs have been regular with Movantik\par \par

## 2021-10-24 NOTE — PHYSICAL EXAM
[FreeTextEntry1] : Constitutional: No signs of distress. No signs of toxicity. \par MS: Alert and well oriented. Speech fluent. No aphasia. Fund of knowledge intact. \par Psychiatric: Mood stable.\par Motor: Adequate bulk, tone, strength. 5/5 strength\par DTR: present and symmetrical; no clonus\par Sensory: intact to primary and secondary modalities; neg Romberg\par Cerebellar and gait: intact\par Eyes: no redness or swelling\par HEENT: intact\par Neck: No masses noted\par Pulmonary: no respiratory distress\par Vascular: no temperature,color changes; no edema\par Skin: No rash.\par

## 2021-11-03 NOTE — HISTORY OF PRESENT ILLNESS
[de-identified] : Case reviewed in detail w dr Shetty.In essence this 49 y/o w/m w h/o met melanoma has been treated w immunotherapy doublet f/b singlet Nivo completed in April, and has developed side effects from the treatment which have included muscle and joint pains, alyssia worse  recently.Of note is pt's prev cancer treatment history.Has received chemo ( BEP ) for testicular cancer as well as a BMT for NHL prior to developing melanoma.His pain in muscles and jopints were worsened this past week w no known reason, ie no trauma or other triggers.No overt swelling ;the pain and stiffness is worse in the mornings.Other rel s/s include poor appetite , improved w donabinol, after pt has lost about 30 lbs during this treatment course. [de-identified] : pain persists .Only Oxy and IV dilaudid have helped him over the 27 y of cancer treatments.Wants to try MM. 83

## 2021-11-06 ENCOUNTER — OUTPATIENT (OUTPATIENT)
Dept: OUTPATIENT SERVICES | Facility: HOSPITAL | Age: 51
LOS: 1 days | End: 2021-11-06
Payer: COMMERCIAL

## 2021-11-06 ENCOUNTER — APPOINTMENT (OUTPATIENT)
Dept: CT IMAGING | Facility: CLINIC | Age: 51
End: 2021-11-06
Payer: COMMERCIAL

## 2021-11-06 ENCOUNTER — RESULT REVIEW (OUTPATIENT)
Age: 51
End: 2021-11-06

## 2021-11-06 DIAGNOSIS — Z98.89 OTHER SPECIFIED POSTPROCEDURAL STATES: Chronic | ICD-10-CM

## 2021-11-06 DIAGNOSIS — Z00.8 ENCOUNTER FOR OTHER GENERAL EXAMINATION: ICD-10-CM

## 2021-11-06 DIAGNOSIS — C79.9 SECONDARY MALIGNANT NEOPLASM OF UNSPECIFIED SITE: ICD-10-CM

## 2021-11-06 DIAGNOSIS — Z90.79 ACQUIRED ABSENCE OF OTHER GENITAL ORGAN(S): Chronic | ICD-10-CM

## 2021-11-06 DIAGNOSIS — C43.4 MALIGNANT MELANOMA OF SCALP AND NECK: Chronic | ICD-10-CM

## 2021-11-06 DIAGNOSIS — I89.9 NONINFECTIVE DISORDER OF LYMPHATIC VESSELS AND LYMPH NODES, UNSPECIFIED: Chronic | ICD-10-CM

## 2021-11-06 PROCEDURE — 74177 CT ABD & PELVIS W/CONTRAST: CPT

## 2021-11-06 PROCEDURE — 71260 CT THORAX DX C+: CPT

## 2021-11-06 PROCEDURE — 74177 CT ABD & PELVIS W/CONTRAST: CPT | Mod: 26

## 2021-11-06 PROCEDURE — 82565 ASSAY OF CREATININE: CPT

## 2021-11-06 PROCEDURE — 71260 CT THORAX DX C+: CPT | Mod: 26

## 2021-11-09 NOTE — H&P PST ADULT - TRANSFUSION REACTION, PREVIOUS, PROFILE
SIERRA Treviño with Dec called togive clinical on 71/F in [facility]     B HX of paranoid schizophrenia. Living in [place] by self, currently lives with family in [place] due to worsening paranoia and not taking care of self. Stopped taking Zyprexa back in March as meds were having side effects. VH of seeing someone trying to get her and shadows on tv, no AH. Worsening paranoia of someone trying to hurt her, not sleeping or eating well. Patient walking around with  knife but not hurting anyone with it. Denied thoughts of SI or HI. But will hurt if they try to hurt her. Does have diabetes type two. Ambulatory, eating drinking okay. Was given Zyprexa but did not take, spit it out, refusing meds. No substance concerns.     A [facility]    R In [facility] awaiting placement, [facility] preferred    Patient cleared and ready for behavioral bed placement: Yes         no

## 2021-11-12 ENCOUNTER — OUTPATIENT (OUTPATIENT)
Dept: OUTPATIENT SERVICES | Facility: HOSPITAL | Age: 51
LOS: 1 days | End: 2021-11-12
Payer: COMMERCIAL

## 2021-11-12 DIAGNOSIS — I89.9 NONINFECTIVE DISORDER OF LYMPHATIC VESSELS AND LYMPH NODES, UNSPECIFIED: Chronic | ICD-10-CM

## 2021-11-12 DIAGNOSIS — Z11.52 ENCOUNTER FOR SCREENING FOR COVID-19: ICD-10-CM

## 2021-11-12 DIAGNOSIS — C43.4 MALIGNANT MELANOMA OF SCALP AND NECK: Chronic | ICD-10-CM

## 2021-11-12 DIAGNOSIS — Z98.89 OTHER SPECIFIED POSTPROCEDURAL STATES: Chronic | ICD-10-CM

## 2021-11-12 DIAGNOSIS — Z90.79 ACQUIRED ABSENCE OF OTHER GENITAL ORGAN(S): Chronic | ICD-10-CM

## 2021-11-12 LAB — SARS-COV-2 RNA SPEC QL NAA+PROBE: SIGNIFICANT CHANGE UP

## 2021-11-12 PROCEDURE — C9803: CPT

## 2021-11-12 PROCEDURE — U0003: CPT

## 2021-11-12 PROCEDURE — U0005: CPT

## 2021-11-15 ENCOUNTER — OUTPATIENT (OUTPATIENT)
Dept: OUTPATIENT SERVICES | Facility: HOSPITAL | Age: 51
LOS: 1 days | End: 2021-11-15
Payer: COMMERCIAL

## 2021-11-15 ENCOUNTER — RESULT REVIEW (OUTPATIENT)
Age: 51
End: 2021-11-15

## 2021-11-15 VITALS
SYSTOLIC BLOOD PRESSURE: 107 MMHG | HEART RATE: 69 BPM | RESPIRATION RATE: 16 BRPM | DIASTOLIC BLOOD PRESSURE: 64 MMHG | OXYGEN SATURATION: 100 %

## 2021-11-15 VITALS
SYSTOLIC BLOOD PRESSURE: 139 MMHG | WEIGHT: 153 LBS | TEMPERATURE: 98 F | HEART RATE: 70 BPM | DIASTOLIC BLOOD PRESSURE: 94 MMHG | HEIGHT: 71 IN | RESPIRATION RATE: 18 BRPM | OXYGEN SATURATION: 100 %

## 2021-11-15 DIAGNOSIS — I89.9 NONINFECTIVE DISORDER OF LYMPHATIC VESSELS AND LYMPH NODES, UNSPECIFIED: Chronic | ICD-10-CM

## 2021-11-15 DIAGNOSIS — C79.9 SECONDARY MALIGNANT NEOPLASM OF UNSPECIFIED SITE: ICD-10-CM

## 2021-11-15 DIAGNOSIS — C43.4 MALIGNANT MELANOMA OF SCALP AND NECK: Chronic | ICD-10-CM

## 2021-11-15 DIAGNOSIS — Z90.79 ACQUIRED ABSENCE OF OTHER GENITAL ORGAN(S): Chronic | ICD-10-CM

## 2021-11-15 DIAGNOSIS — Z98.89 OTHER SPECIFIED POSTPROCEDURAL STATES: Chronic | ICD-10-CM

## 2021-11-15 PROCEDURE — 36590 REMOVAL TUNNELED CV CATH: CPT

## 2021-11-15 PROCEDURE — 77001 FLUOROGUIDE FOR VEIN DEVICE: CPT

## 2021-11-15 PROCEDURE — 77001 FLUOROGUIDE FOR VEIN DEVICE: CPT | Mod: 26

## 2021-11-15 RX ORDER — ONDANSETRON 8 MG/1
4 TABLET, FILM COATED ORAL ONCE
Refills: 0 | Status: DISCONTINUED | OUTPATIENT
Start: 2021-11-15 | End: 2021-11-29

## 2021-11-15 RX ORDER — HYDROMORPHONE HYDROCHLORIDE 2 MG/ML
0.5 INJECTION INTRAMUSCULAR; INTRAVENOUS; SUBCUTANEOUS
Refills: 0 | Status: DISCONTINUED | OUTPATIENT
Start: 2021-11-15 | End: 2021-11-15

## 2021-11-15 RX ORDER — DEXAMETHASONE 0.5 MG/5ML
8 ELIXIR ORAL ONCE
Refills: 0 | Status: DISCONTINUED | OUTPATIENT
Start: 2021-11-15 | End: 2021-11-29

## 2021-11-15 RX ORDER — HYDROMORPHONE HYDROCHLORIDE 2 MG/ML
1 INJECTION INTRAMUSCULAR; INTRAVENOUS; SUBCUTANEOUS
Refills: 0 | Status: DISCONTINUED | OUTPATIENT
Start: 2021-11-15 | End: 2021-11-15

## 2021-11-15 NOTE — PROCEDURE NOTE - PROCEDURE FINDINGS AND DETAILS
Removal of right internal jugular mediport catheter. The incision was closed via sutures and adhesive dressing. A sterile dressing was placed.

## 2021-11-15 NOTE — PRE PROCEDURE NOTE - PRE PROCEDURE EVALUATION
Interventional Radiology    HPI: 51y Male with hx of scalp melanoma s/p systemic chemotherapy & immunotherapy completion presents to IR for chest port removal as requested by Dr Mcginnis.         PAST MEDICAL & SURGICAL HISTORY:  Testicular Cancer  1994, chemotherapy    Headache, Migraine    HTN (hypertension)    NHL (non-Hodgkin&#x27;s lymphoma)  1999, Radiation to left neck region    GERD (gastroesophageal reflux disease)    Colitis  surgery 1996    BPH (benign prostatic hyperplasia)    Osteoarthritis  degenerative disc L3-4    History of bone marrow transplant    Hypertriglyceridemia    Malignant melanoma of scalp  RSX 08/18  R neck mass dsx/ LN dsx 10/19/18    History of orchiectomy  left 1994    S/P colon resection  1996    Lymph node disorder  s/p abdominal lymph node dissection 1994, 1996    Melanoma of scalp or neck  excision 8/18  s/p excision right neck mass & LN dissx      Allergies    No Known Allergies    Intolerances        Medications (Abx/Cardiac/Anticoagulation/Blood Products)  Home Medications:  acetaminophen 325 mg oral tablet: 3 tab(s) orally every 6 hours, As Needed for pain (15 Nov 2021 07:33)  amLODIPine 5 mg oral tablet: 1 tab(s) orally once a day (15 Nov 2021 07:33)  atorvastatin 40 mg oral tablet: 1 tab(s) orally once a day (15 Nov 2021 07:33)  fenofibrate 145 mg oral tablet: 1 tab(s) orally once a day (15 Nov 2021 07:33)  hydrocortisone 10 mg oral tablet: 2 tab(s) orally once a day (15 Nov 2021 07:33)  hydrocortisone 10 mg oral tablet: 1 tab(s) orally once a day afternoon (15 Nov 2021 07:33)  ibuprofen 400 mg oral tablet: 1 tab(s) orally every 6 hours, As Needed for pain (15 Nov 2021 07:33)  levothyroxine 75 mcg (0.075 mg) oral tablet: 1 tab(s) orally once a day (15 Nov 2021 07:33)  metoprolol succinate 50 mg oral tablet, extended release: 1 tab(s) orally once a day (15 Nov 2021 07:33)  OxyCONTIN 10 mg oral tablet, extended release: 1 tab(s) orally 3 times a day, As Needed (15 Nov 2021 07:33)  Percocet 5 mg-325 mg oral tablet: 1 tab(s) orally every 6 hours, As Needed (15 Nov 2021 07:33)  senna oral tablet: 2 tab(s) orally once a day (at bedtime) (15 Nov 2021 07:33)  tamsulosin 0.4 mg oral capsule: 1 cap(s) orally once a day (15 Nov 2021 07:33)  Vascepa 1 g oral capsule: 1 cap(s) orally 3 times a day (15 Nov 2021 07:33)  zolpidem 10 mg oral tablet: 1 tab(s) orally once a day (at bedtime), As Needed (15 Nov 2021 07:33)        Data:  180.3  69.4  T(C): 36.7  HR: 70  BP: 139/94  RR: 18  SpO2: 100%    Exam  General: No acute distress  Chest: Non labored breathing  Abdomen: Non-distended  Extremities: No swelling, warm            Pertinent labs:  CBC for Oncology (10.18.21 @ 11:34)    WBC Count: 9.44 K/uL    RBC Count: 5.55 M/uL    Hemoglobin: 15.8 g/dL    Hematocrit: 49.0 %    Mean Cell Volume: 88.3 fl    Mean Cell Hemoglobin: 28.5 pg    Mean Cell Hemoglobin Conc: 32.2 g/dL    Red Cell Distrib Width: 13.9 %    Platelet Count - Automated: 338 K/uL    COVID-19 PCR . (11.12.21 @ 13:20)    COVID-19 PCR: NotDetec: You can help in the fight against COVID-19. [facility] may contact  you to see if you are interested in voluntarily participating in one of  our clinical trials.  Testing is performed using polymerase chain reaction (PCR) or  transcription mediated amplification (TMA). This COVID-19 (SARS-CoV-2)  nucleic acid amplification test was validated by [facility] and is  in use under the FDA Emergency Use Authorization (EUA) for clinical labs  CLIA-certified to perform high complexity testing. Test results should be  correlated with clinical presentation, patient history, and epidemiology.    10/18/21 inr .96, cr 1.02                  Plan: 51y Male presents for chest port removal s/p completion of immunotherapy for metastatic melanoma.  -npo since 7 pm 11/14  - denies nsaids or a/c usage    -Risks/Benefits/alternatives explained with the patient / healthcare proxy and witnessed informed consent obtained after pt's / healthcare proxy's comprehension was confirmed.    Chelsea Noland [facility]- [facility]  ext [phone]  # [phone]

## 2021-11-15 NOTE — PRE PROCEDURE NOTE - ATTENDING COMMENTS
Plan: 51y Male presents for chest port removal s/p completion of immunotherapy for metastatic melanoma.  -npo since 7 pm 11/14  - denies nsaids or a/c usage    -Risks/Benefits/alternatives explained with the patient / healthcare proxy and witnessed informed consent obtained after pt's / healthcare proxy's comprehension was confirmed.

## 2021-11-15 NOTE — ASU DISCHARGE PLAN (ADULT/PEDIATRIC) - ASU DC SPECIAL INSTRUCTIONSFT
Chest Port Removal    Discharge Instructions  - You have had a chest port removed from your chest.   - You may shower in 48 hours. No soaking or swimming for 3 weeks or until the site is completely healed.  - Keep the area covered and dry for the next 2 days.  - Do not perform any heavy lifting or put tension on the area for the next week or until the site is healed.  - You may resume your normal diet.  - You may resume your normal medications however you should wait 48 hours before restarting aspirin, plavix, or blood thinners.  - It is normal to experience some pain over the site for the next few days. You may take apply ice to the area (20 minutes on, 20 minutes off) and take Tylenol for that pain. Do not take more frequently than every 6 hours and do not exceed more than 3000mg of Tylenol in a 24 hour period.    - You were given conscious sedation which may make you drowsy, therefore you need someone to stay with you until the morning following the procedure.  - Do not drive, engage in heavy lifting or strenuous activity, or drink any alcoholic beverages for the next 24 hours.   - You may resume normal activity in 24 hours.    Notify your primary physician and/or Interventional Radiology IMMEDIATELY if you experience any of the following       - Fever of 101F or 38C       - Chills or Rigors/ Shakes       - Swelling and/or Redness in the area around the port removal site       - Worsening Pain       - Blood soaked bandages or worsening bleeding       - Lightheadedness and/or dizziness upon standing       - Chest Pain/ Tightness       - Shortness of Breath       - Difficulty walking    If you have a problem that you believe requires IMMEDIATE attention, please go to your NEAREST Emergency Room. If you believe your problem can safely wait until you speak to a physician, please call Interventional Radiology at [phone] for any concerns.    During Normal Weekday Business Hours- You can contact the Interventional Radiology department at [phone] during normal business hours via telephone.  During Evenings and Weekends- If you need to contact Interventional Radiology during off hours, do so by calling the hospital at [phone] and request to be connected to the Interventional Radiology Resident on call.

## 2021-11-16 ENCOUNTER — APPOINTMENT (OUTPATIENT)
Dept: ENDOCRINOLOGY | Facility: CLINIC | Age: 51
End: 2021-11-16

## 2021-11-16 NOTE — HISTORY OF PRESENT ILLNESS
[FreeTextEntry1] : Mr. 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 Still with some ongoing fatigue -improved but has gotten worse in the past 2 weeks. 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 \par Taking one a day nature's best vitamin. had been on vit d 50,000 in the past.\par He does have hx of testicular ca along with hx of NHL and was treated for scalp melanoma.\par Appetite is relatively better and his energy improves in spurts but still overall fatigued and notes sob with minimal exertion.\par Additional medical history includes that of hyperlipidemia and hypertension\par Is on Magnesium 400 mf daily of the oxide type\par Is on LT4 50 mcg with normal TSH from recent labs for Dr. Mcginnis.\par Prior pituitary hormone eval was otherwise negative from laboratory perspective. testosterone lower end normal.

## 2021-11-17 DIAGNOSIS — Z45.2 ENCOUNTER FOR ADJUSTMENT AND MANAGEMENT OF VASCULAR ACCESS DEVICE: ICD-10-CM

## 2021-11-17 DIAGNOSIS — C43.9 MALIGNANT MELANOMA OF SKIN, UNSPECIFIED: ICD-10-CM

## 2021-11-29 ENCOUNTER — APPOINTMENT (OUTPATIENT)
Dept: PAIN MANAGEMENT | Facility: CLINIC | Age: 51
End: 2021-11-29
Payer: COMMERCIAL

## 2021-11-29 VITALS
SYSTOLIC BLOOD PRESSURE: 123 MMHG | WEIGHT: 150 LBS | DIASTOLIC BLOOD PRESSURE: 80 MMHG | BODY MASS INDEX: 21.47 KG/M2 | HEIGHT: 70 IN | HEART RATE: 80 BPM

## 2021-11-29 DIAGNOSIS — M54.9 DORSALGIA, UNSPECIFIED: ICD-10-CM

## 2021-11-29 PROCEDURE — 99214 OFFICE O/P EST MOD 30 MIN: CPT

## 2021-11-29 NOTE — PHYSICAL EXAM
[General Appearance - Alert] : alert [Oriented To Time, Place, And Person] : oriented to person, place, and time [Person] : oriented to person [Sclera] : the sclera and conjunctiva were normal [Outer Ear] : the ears and nose were normal in appearance [Neck Appearance] : the appearance of the neck was normal [] : no respiratory distress

## 2021-12-05 NOTE — ASSESSMENT
[FreeTextEntry1] : Chronic back pain\par \par Will recommend CT scan of the lumbar region.\par Continue current medications for now. \par \par ISTOP reviewed.

## 2021-12-05 NOTE — HISTORY OF PRESENT ILLNESS
[FreeTextEntry1] : Since last visit, patient reports Oxycontin 10 mgs bid and Oxycodone 5 mgs qd. for his bilateral, knees, bilateral shoulders, and more recently localized low back pain. Pain is localized too the lumbar region. This low back pain is more severe interfering with his QOL He can not walk straight.

## 2021-12-20 ENCOUNTER — OUTPATIENT (OUTPATIENT)
Dept: OUTPATIENT SERVICES | Facility: HOSPITAL | Age: 51
LOS: 1 days | End: 2021-12-20
Payer: COMMERCIAL

## 2021-12-20 ENCOUNTER — APPOINTMENT (OUTPATIENT)
Dept: CT IMAGING | Facility: CLINIC | Age: 51
End: 2021-12-20
Payer: COMMERCIAL

## 2021-12-20 DIAGNOSIS — M54.9 DORSALGIA, UNSPECIFIED: ICD-10-CM

## 2021-12-20 DIAGNOSIS — Z98.89 OTHER SPECIFIED POSTPROCEDURAL STATES: Chronic | ICD-10-CM

## 2021-12-20 DIAGNOSIS — Z90.79 ACQUIRED ABSENCE OF OTHER GENITAL ORGAN(S): Chronic | ICD-10-CM

## 2021-12-20 DIAGNOSIS — C43.4 MALIGNANT MELANOMA OF SCALP AND NECK: Chronic | ICD-10-CM

## 2021-12-20 DIAGNOSIS — I89.9 NONINFECTIVE DISORDER OF LYMPHATIC VESSELS AND LYMPH NODES, UNSPECIFIED: Chronic | ICD-10-CM

## 2021-12-20 PROCEDURE — 72131 CT LUMBAR SPINE W/O DYE: CPT | Mod: 26

## 2021-12-20 PROCEDURE — 72131 CT LUMBAR SPINE W/O DYE: CPT

## 2022-01-06 ENCOUNTER — APPOINTMENT (OUTPATIENT)
Dept: ORTHOPEDIC SURGERY | Facility: CLINIC | Age: 52
End: 2022-01-06
Payer: COMMERCIAL

## 2022-01-06 VITALS
WEIGHT: 150 LBS | HEIGHT: 70 IN | SYSTOLIC BLOOD PRESSURE: 92 MMHG | DIASTOLIC BLOOD PRESSURE: 51 MMHG | BODY MASS INDEX: 21.47 KG/M2 | HEART RATE: 80 BPM

## 2022-01-06 PROCEDURE — 99214 OFFICE O/P EST MOD 30 MIN: CPT

## 2022-01-06 NOTE — PHYSICAL EXAM
[Normal] : Gait: normal [Brudzinski Sign] : negative Brudzinski Sign [Watson's Sign] : negative Watson's sign [Pronator Drift] : negative pronator drift [de-identified] : 5 out of 5 motor strength, sensation is intact and symmetrical full range of motion flexion extension and rotation, \par no palpatory tenderness full range of motion of hips knees shoulders and elbows (all four extremities), \par no atrophy, negative straight leg raise, no pathological reflexes, no swelling, normal ambulation, \par no apparent distress skin is intact, no palpable lymph nodes, no upper or lower extremity instability, \par alert and oriented x3 and normal mood. Normal finger-to nose test. Pain with range of motion of his lumbar spine. No percussion tenderness.\par  [de-identified] : CT of the LUMBAR SPINE done 12/20/21  reviewed with patient- \par Lumbar spondylosis, most notably at L4/L5, resulting in mild spinal canal narrowing and mild left and moderate right foraminal narrowing.

## 2022-01-06 NOTE — HISTORY OF PRESENT ILLNESS
[Stable] : stable [de-identified] : Patient is a 51 year-old male who presents today for initial evaluation of lower back pain. He is currently on disability secondary to chronic body pain as sequelae after cancer treatments. It has been present for a few years, on and off. It started as lower back pain and has been progressively getting worse. He sees Dr. Rosen for pain management for his back pain and other issues post cancer. He has multiple other joint pains and body pains as a result of his immunotherapy. Over the past 3 weeks, he is having problems ambulating, getting up out of bed, or getting up from a sitting position due to the pain. Dr. Rosne and his oncologist have been assisting him with working up his lower back pain. He is stating that his quality of life is much lower now because of the pain. He was sent for a CT scan of his lower back recently. He denies weakness, however in the legs bilaterally. He notes the pain does not radiate down both legs, but rather stays in the lower thoracic, upper lumbar spine. He has been having urinary issues, such as difficulty urinating, but not having difficulty holding his urine in. He denies any bowel dysfunction. He denies any paresthesias in the lower legs. Currently, for pain he takes Oxycontin BID, Percocet (PRN) which do not have much effect on the back pain. No fever chills sweats nausea vomiting no bowel or bladder dysfunction, no recent weight loss or gain no night pain. This history is in addition to the intake form that I personally reviewed. \par He has tried Physical Therapy, Acupuncture and Chiropractic care with no relief.\par Of note, he has had testicular cancer (1994), NHL (1999), and 2019 (Melanoma of Right Side of Head)\par No fever chills sweats nausea vomiting no bowel or bladder dysfunction, no recent weight loss or gain no night pain. This history is in addition to the intake form that I personally reviewed.

## 2022-01-06 NOTE — DISCUSSION/SUMMARY
[de-identified] : Lumbar degenerative disc disease.\par All options discussed including rest, medicine, home exercise, acupuncture, Chiropractic care, Physical Therapy, Pain management, and last resort surgery. \par Since he does have a history of cancer I would like to obtain a lumbar MRI for continued severe low back pain.\par MRI lumbar spine\par Lumbar HEP brochure.\par Followup after the MRI.\par All questions were answered, all alternatives discussed and the patient is in complete agreement with that plan. Follow-up appointment as instructed. Any issues and the patient will call or come in sooner.

## 2022-01-10 NOTE — PROGRESS NOTE ADULT - PROBLEM/PLAN-7
Can you send a copy of my note to patient's PCP (referring physician) and remove me as her PCP. Thanks.
DISPLAY PLAN FREE TEXT

## 2022-01-12 ENCOUNTER — TRANSCRIPTION ENCOUNTER (OUTPATIENT)
Age: 52
End: 2022-01-12

## 2022-01-13 NOTE — PATIENT PROFILE ADULT - NSPROPOAPRESSUREINJURY_GEN_A_NUR
Procedure   Large Joint Arthrocentesis: L knee  Date/Time: 1/13/2022 2:56 PM  Consent given by: patient  Site marked: site marked  Timeout: Immediately prior to procedure a time out was called to verify the correct patient, procedure, equipment, support staff and site/side marked as required   Supporting Documentation  Indications: pain   Procedure Details  Location: knee - L knee  Preparation: Patient was prepped and draped in the usual sterile fashion  Needle size: 22 G  Approach: anterolateral  Medications administered: 3 mL bupivacaine (PF) 0.25 %; 3 mL lidocaine PF 1% 1 %; 80 mg triamcinolone acetonide 40 MG/ML  Patient tolerance: patient tolerated the procedure well with no immediate complications            no

## 2022-01-18 ENCOUNTER — APPOINTMENT (OUTPATIENT)
Dept: PAIN MANAGEMENT | Facility: CLINIC | Age: 52
End: 2022-01-18

## 2022-01-22 ENCOUNTER — OUTPATIENT (OUTPATIENT)
Dept: OUTPATIENT SERVICES | Facility: HOSPITAL | Age: 52
LOS: 1 days | End: 2022-01-22
Payer: COMMERCIAL

## 2022-01-22 ENCOUNTER — APPOINTMENT (OUTPATIENT)
Dept: MRI IMAGING | Facility: CLINIC | Age: 52
End: 2022-01-22
Payer: COMMERCIAL

## 2022-01-22 DIAGNOSIS — Z00.00 ENCOUNTER FOR GENERAL ADULT MEDICAL EXAMINATION WITHOUT ABNORMAL FINDINGS: ICD-10-CM

## 2022-01-22 DIAGNOSIS — Z98.89 OTHER SPECIFIED POSTPROCEDURAL STATES: Chronic | ICD-10-CM

## 2022-01-22 DIAGNOSIS — Z90.79 ACQUIRED ABSENCE OF OTHER GENITAL ORGAN(S): Chronic | ICD-10-CM

## 2022-01-22 DIAGNOSIS — C43.4 MALIGNANT MELANOMA OF SCALP AND NECK: Chronic | ICD-10-CM

## 2022-01-22 DIAGNOSIS — I89.9 NONINFECTIVE DISORDER OF LYMPHATIC VESSELS AND LYMPH NODES, UNSPECIFIED: Chronic | ICD-10-CM

## 2022-01-22 DIAGNOSIS — Z00.8 ENCOUNTER FOR OTHER GENERAL EXAMINATION: ICD-10-CM

## 2022-01-22 PROCEDURE — 72148 MRI LUMBAR SPINE W/O DYE: CPT | Mod: 26

## 2022-01-22 PROCEDURE — 72148 MRI LUMBAR SPINE W/O DYE: CPT

## 2022-01-31 NOTE — PROGRESS NOTE ADULT - PROBLEM/PLAN-3
----- Message from Irvin Moore DO sent at 1/31/2022  7:55 AM CST -----  Please notify the patient of normal results.  Follow-up as planned.  
DISPLAY PLAN FREE TEXT

## 2022-02-01 ENCOUNTER — APPOINTMENT (OUTPATIENT)
Dept: PAIN MANAGEMENT | Facility: CLINIC | Age: 52
End: 2022-02-01

## 2022-02-01 ENCOUNTER — OUTPATIENT (OUTPATIENT)
Dept: OUTPATIENT SERVICES | Facility: HOSPITAL | Age: 52
LOS: 1 days | End: 2022-02-01
Payer: COMMERCIAL

## 2022-02-01 ENCOUNTER — APPOINTMENT (OUTPATIENT)
Dept: PAIN MANAGEMENT | Facility: CLINIC | Age: 52
End: 2022-02-01
Payer: COMMERCIAL

## 2022-02-01 DIAGNOSIS — M54.16 RADICULOPATHY, LUMBAR REGION: ICD-10-CM

## 2022-02-01 DIAGNOSIS — I89.9 NONINFECTIVE DISORDER OF LYMPHATIC VESSELS AND LYMPH NODES, UNSPECIFIED: Chronic | ICD-10-CM

## 2022-02-01 DIAGNOSIS — Z90.79 ACQUIRED ABSENCE OF OTHER GENITAL ORGAN(S): Chronic | ICD-10-CM

## 2022-02-01 DIAGNOSIS — C43.4 MALIGNANT MELANOMA OF SCALP AND NECK: Chronic | ICD-10-CM

## 2022-02-01 DIAGNOSIS — Z98.89 OTHER SPECIFIED POSTPROCEDURAL STATES: Chronic | ICD-10-CM

## 2022-02-01 PROCEDURE — 62323 NJX INTERLAMINAR LMBR/SAC: CPT

## 2022-02-01 RX ORDER — OXYCODONE 5 MG/1
5 TABLET ORAL
Qty: 15 | Refills: 0 | Status: DISCONTINUED | COMMUNITY
Start: 2021-09-27 | End: 2022-02-01

## 2022-02-02 ENCOUNTER — APPOINTMENT (OUTPATIENT)
Dept: ORTHOPEDIC SURGERY | Facility: CLINIC | Age: 52
End: 2022-02-02
Payer: COMMERCIAL

## 2022-02-02 DIAGNOSIS — M47.817 SPONDYLOSIS W/OUT MYELOPATHY OR RADICULOPATHY, LUMBOSACRAL REGION: ICD-10-CM

## 2022-02-02 DIAGNOSIS — M48.07 SPINAL STENOSIS, LUMBOSACRAL REGION: ICD-10-CM

## 2022-02-02 PROCEDURE — 73565 X-RAY EXAM OF KNEES: CPT

## 2022-02-02 PROCEDURE — 99214 OFFICE O/P EST MOD 30 MIN: CPT

## 2022-02-02 PROCEDURE — 72100 X-RAY EXAM L-S SPINE 2/3 VWS: CPT

## 2022-02-02 NOTE — PHYSICAL EXAM
[Normal] : Gait: normal [Brudzinski Sign] : negative Brudzinski Sign [Watson's Sign] : negative Watson's sign [Pronator Drift] : negative pronator drift [de-identified] : 5 out of 5 motor strength, sensation is intact and symmetrical full range of motion flexion extension and rotation, \par no palpatory tenderness full range of motion of hips knees shoulders and elbows (all four extremities), \par no atrophy, negative straight leg raise, no pathological reflexes, no swelling, normal ambulation, \par no apparent distress skin is intact, no palpable lymph nodes, no upper or lower extremity instability, \par alert and oriented x3 and normal mood. Normal finger-to nose test. Pain with range of motion of his lumbar spine. No percussion tenderness.\par  [de-identified] : AP bilateral knees shows no appreciable change from prior x-rays with minimal to no arthritis.\par AP lateral lumbar shows degenerative disc disease L3-L5 correlating with his MRI.\par \par MR SPINE LUMBAR    01/22/2022    (PACS) \par \par FINDINGS:\par \par DISC LEVEL EVALUATION: Multilevel disc degeneration. Moderate disc height loss at T11-T12, T12-L1, L2-L3, L3-L4, and L4-L5.\par \par T11/T12: Evaluated in the sagittal plane only. Minimal disc bulge. No central spinal canal or neural foraminal narrowing.\par T12/L1: Evaluated in the sagittal plane only. Minimal disc bulge. No central spinal canal or neural foraminal narrowing.\par L1/L2: No central spinal canal or neural foraminal narrowing.\par L2/L3: Left foraminal and extraforaminal disc osteophyte complex contacting the exiting left L2 nerve root. No central spinal canal stenosis. Unchanged.\par L3/L4: Mild diffuse disc bulge and left foraminal and extraforaminal disc osteophyte complex contacting the exiting left L3 nerve root. No central spinal canal stenosis. Unchanged.\par L4/L5: Mild/moderate disc bulge and marginal osseous ridging with left foraminal annular fissure. Mild to moderate right neural foraminal narrowing and mild left neural foraminal narrowing. No central spinal canal stenosis. Unchanged.\par L5/S1: Minimal disc bulge. No central spinal canal or neural foraminal narrowing. Unchanged.\par \par SPINAL ALIGNMENT: No spondylolisthesis. Unchanged minimal levocurvature of the lumbar spine.\par DISTAL CORD AND CONUS: No signal abnormality. The conus terminates at L1-L2.\par SI JOINTS: Unremarkable.\par MARROW: Mild reactive endplate edema at L3-L4 and L4-5. Scattered intraosseous hemangiomas.\par PARASPINAL MUSCLE AND SOFT TISSUES: Normal.\par INTRAABDOMINAL/INTRAPELVIC SOFT TISSUES: 1.3 cm right renal cyst.\par \par IMPRESSION:\par No change in mild multilevel lumbar spondylosis compared to 8/18/2019. Again seen are disc osteophyte complexes contacting the left L2 nerve root at L2-L3 and left L3 nerve root at L3-L4.\par \par --- End of Report ---\par \par \par CT of the LUMBAR SPINE done 12/20/21  reviewed with patient- \par Lumbar spondylosis, most notably at L4/L5, resulting in mild spinal canal narrowing and mild left and moderate right foraminal narrowing.

## 2022-02-02 NOTE — DISCUSSION/SUMMARY
[de-identified] : Lumbar degenerative disc disease.\par Lumbago\par Bilateral knee pain.\par We discussed all options.\par He'll follow up with a  Dr. Donato.\par Physical therapy was recommended.\par All options discussed including rest, medicine, home exercise, acupuncture, Chiropractic care, Physical Therapy, Pain management, and last resort surgery. \par All questions were answered, all alternatives discussed and the patient is in complete agreement with that plan. Follow-up appointment as instructed. Any issues and the patient will call or come in sooner.\par His wife agrees with the plan.

## 2022-02-02 NOTE — HISTORY OF PRESENT ILLNESS
[Stable] : stable [de-identified] : Patient is a 52 year-old male who presents today for initial evaluation of lower back pain. He is currently on disability secondary to chronic body pain as sequelae after cancer treatments. It has been present for a few years, on and off. It started as lower back pain and has been progressively getting worse. He sees Dr. Rosen for pain management for his back pain and other issues post cancer. He has multiple other joint pains and body pains as a result of his immunotherapy. Over the past 3 weeks, he is having problems ambulating, getting up out of bed, or getting up from a sitting position due to the pain. Dr. Rosen and his oncologist have been assisting him with working up his lower back pain. He is stating that his quality of life is much lower now because of the pain. He was sent for a CT scan of his lower back recently. He denies weakness, however in the legs bilaterally. He notes the pain does not radiate down both legs, but rather stays in the lower thoracic, upper lumbar spine. He has been having urinary issues, such as difficulty urinating, but not having difficulty holding his urine in. He denies any bowel dysfunction. He denies any paresthesias in the lower legs. Currently, for pain he takes Oxycontin BID, Percocet (PRN) which do not have much effect on the back pain. No fever chills sweats nausea vomiting no bowel or bladder dysfunction, no recent weight loss or gain no night pain. This history is in addition to the intake form that I personally reviewed. \par He has tried Physical Therapy, Acupuncture and Chiropractic care with no relief.\par Of note, he has had testicular cancer (1994), NHL (1999), and 2019 (Melanoma of Right Side of Head)\par Presents today for MRI Lumbar review. \par No fever chills sweats nausea vomiting no bowel or bladder dysfunction, no recent weight loss or gain no night pain. This history is in addition to the intake form that I personally reviewed.

## 2022-02-08 NOTE — PROCEDURE
[FreeTextEntry3] : Procedure performed: Lumbar epidural steroid injection\par \par Preoperative diagnosis: Lumbar sacral radiculopathy \par Postoperative diagnosis: Lumbosacral radiculopathy\par Procedure: Lumbar epidural steroid injection under fluoroscopy\par Physician: Amos Rosen MD\par Anesthesia: Local\par \par Indications:\par \par The patient continues to complain of low back pain with radiation into both lower extremities.  This pain has remained chronic, significant and interferes with her quality of life despite other conservative measures including pharmacological management.  We discussed the risks and benefits expectations of this procedure and other alternative options.  The patient signed informed consent and agreed to proceed.\par \par Procedure:\par \par The patient was transported to the procedure room placed in the prone position and monitored noninvasively with stable vital signs.  The patient's back was prepped 3 times with Betadine and draped in a meticulous sterile fashion.  The L4-L5 interspace was identified by fluoroscopy in AP and lateral views.  The skin overlying this level was infiltrated with 5 cc of 1% lidocaine using a 25-gauge 1 inch needle.  The epidural was approached and entered at this level with a 20-gauge Touhy needle by loss-of-resistance technique.\par \par Following the loss of resistance to preservative-free normal saline, aspiration was negative for CSF or blood.  Then, 2 cc of Omnipaque 240 were injected and an epidurogram revealed good spread along the L4-5 epidural space.  Depo-Medrol 80 mg was then injected with 1 cc of preservative-free 1% lidocaine.  The needle track was flushed with 2 cc of 1% Lidoderm cane and the needle was removed.  A sterile bandage was applied.\par \par The patient tolerated the procedure well without complaints or complication.  The patient was observed for 30  minutes in stable condition after the procedure before being discharged to home in the company of an adult.  Patient was advised not to drive home.  She was provided full written instructions.  The patient will be seen for follow-up in my office but was advised to contact me or go to the local emergency room if symptoms worsen.\par \par Amos Rosen MD\par

## 2022-02-14 ENCOUNTER — APPOINTMENT (OUTPATIENT)
Dept: ORTHOPEDIC SURGERY | Facility: CLINIC | Age: 52
End: 2022-02-14
Payer: COMMERCIAL

## 2022-02-14 DIAGNOSIS — G89.29 PAIN IN RIGHT KNEE: ICD-10-CM

## 2022-02-14 DIAGNOSIS — M25.562 PAIN IN RIGHT KNEE: ICD-10-CM

## 2022-02-14 DIAGNOSIS — M25.561 PAIN IN RIGHT KNEE: ICD-10-CM

## 2022-02-14 PROCEDURE — 99213 OFFICE O/P EST LOW 20 MIN: CPT

## 2022-02-14 PROCEDURE — 73564 X-RAY EXAM KNEE 4 OR MORE: CPT | Mod: RT

## 2022-02-14 NOTE — REVIEW OF SYSTEMS
Yamel Lindsay  [address] [id number]      May 30, 2018    Dear Yamel,  This letter is to inform you of the results of your pathology report on your EGD.  FINAL DIAGNOSIS:   Gastroesophageal junction biopsy:   - Stratified squamous mucosa with mild reactive changes, nonspecific.   - Cardia and cardia-oxyntic mucosa with mild chronic inflammation and   reactive changes   - No evidence of goblet cells, dysplasia or malignancy   Some mild inflammation only, no Oliva's    If you have questions please feel free to call the clinic at [phone].    Sincerely,     Alexander Bautista M.D.                     [Joint Pain] : joint pain

## 2022-02-16 ENCOUNTER — APPOINTMENT (OUTPATIENT)
Dept: UROLOGY | Facility: CLINIC | Age: 52
End: 2022-02-16
Payer: COMMERCIAL

## 2022-02-16 PROCEDURE — 99214 OFFICE O/P EST MOD 30 MIN: CPT

## 2022-02-25 PROBLEM — M25.561 CHRONIC PAIN OF BOTH KNEES: Status: ACTIVE | Noted: 2022-02-25

## 2022-02-25 NOTE — PHYSICAL EXAM
[de-identified] : Oriented to time, place, person\par Mood: Normal\par Affect: Normal\par Appearance: Healthy, well appearing, no acute distress.\par Gait: Normal\par Assistive Devices: None\par \par Right Knee Exam:\par \par Skin: Clean, dry, intact\par Inspection: No obvious malalignment, no masses, no swelling, no effusion\par Pulses: 2+ DP/PT pulses \par ROM: 0-135 degrees of flexion. No pain with deep knee flexion/extension.\par Tenderness: No MJLT. No LJLT. No pain over the patella facets. No pain to the quadriceps tendon. No pain to the patella tendon. No posterior knee tenderness.\par Stability: Stable to varus, valgus. Negative Lachman testing. Negative anterior drawer, negative posterior drawer.\par Strength: 5/5 Q/H/TA/GS/EHL, without atrophy\par Neuro: Intact to light touch throughout, DTRs normal\par Additional Tests: Negative Rafael's test, Negative patellar grind test \par \par Left Knee Exam:\par \par Skin: Clean, dry, intact\par Inspection: No obvious malalignment, no masses, no swelling, no effusion\par Pulses: 2+ DP/PT pulses \par ROM: 0-135 degrees of flexion. + pain with deep knee flexion\par Tenderness: mild MJLT. No LJLT. No pain over the patella facets. No pain to the quadriceps tendon. No pain to the patella tendon. No posterior knee tenderness.\par Stability: Stable to varus, valgus. Negative Lachman testing. Negative anterior drawer, negative posterior drawer.\par Strength: 5/5 Q/H/TA/GS/EHL, without atrophy\par Neuro: Intact to light touch throughout, DTRs normal\par Additional Tests: Negative Rafael's test, Negative patellar grind test  [de-identified] : The following radiographs were ordered and read by me during this patients visit. I reviewed each radiograph in detail with the patient and discussed the findings as highlighted below. \par \par 4 views of the bilateral knee were obtained today, 02/14/2022, that show no acute fracture or dislocation. There is no medial, no lateral and no patellofemoral degenerative changes seen. There is no significant malalignment. No significant other obvious osseous abnormality, otherwise unremarkable.

## 2022-02-25 NOTE — DISCUSSION/SUMMARY
[de-identified] : 51 y/o male with bilateral knee pain. \par \par Patient presents with continued chronic bilateral knee pain. Symptoms are mild currently, but patient reports issues with standing after prolonged sitting consistent with persistent lower extremity deconditioning.  We discussed consideration of trial of physical therapy for strengthening and conditioning of the quad and lower extremity. I do not suspect any acute internal derangement at this time as this presentation appears to be related to his underlying degenerative arthropathy.\par \par Recommendations: Begin trial of PT, Rx given.  Continued conservative care & observation. NSAID's or acetaminophen as tolerated, with application of ice to the area 2-3x daily for 20 minutes after periods of activity. \par \par Follow-up as needed.

## 2022-02-25 NOTE — HISTORY OF PRESENT ILLNESS
[de-identified] : 52 year old male previously seen for bilateral knee pain presents today with worsening of chronic bilateral knee pain. No injury/trauma reported. He received cortisone injection in 2016 and 2012 by Dr. Smalls in both knees which were helpful at the time. HA injection in in 2017 did not provide any significant improvement. PT in the past was not beneficial. The pain is worse standing after sitting and sitting down. Localizes pain to the quad. \par \par The patient's past medical history, past surgical history, medications and allergies were reviewed by me today with the patient and documented accordingly. In addition, the patient's family and social history, which were noncontributory to this visit, were reviewed also.

## 2022-02-25 NOTE — ADDENDUM
[FreeTextEntry1] : This note was written by Alona Goodman on 02/14/2022 acting solely as a scribe for Dr. Louis Donato.\par \par All medical record entries made by the Scribe were at my, Dr. Louis Donato, direction and personally dictated by me on 02/14/2022. I have personally reviewed the chart and agree that the record accurately reflects my personal performance of the history, physical exam, assessment and plan. 
210.1

## 2022-03-09 ENCOUNTER — APPOINTMENT (OUTPATIENT)
Dept: PAIN MANAGEMENT | Facility: CLINIC | Age: 52
End: 2022-03-09
Payer: COMMERCIAL

## 2022-03-09 VITALS
DIASTOLIC BLOOD PRESSURE: 94 MMHG | SYSTOLIC BLOOD PRESSURE: 133 MMHG | HEART RATE: 95 BPM | TEMPERATURE: 97.9 F | BODY MASS INDEX: 21.47 KG/M2 | WEIGHT: 150 LBS | OXYGEN SATURATION: 98 % | HEIGHT: 70 IN

## 2022-03-09 PROCEDURE — 99214 OFFICE O/P EST MOD 30 MIN: CPT

## 2022-03-09 NOTE — HISTORY OF PRESENT ILLNESS
[FreeTextEntry1] : Since last visit, patient reports no relief from  ARAMIS. No AEs. \par Pain continues to be in low back region non radicular in nature.\par No new medical problems.  \par \par On tramadol for cluster headaches ---only thing that has worked.taking it 1-2 twice day up 3 per day; Continues on Oxycontin 10 1 bid

## 2022-03-09 NOTE — ASSESSMENT
[Opioids] : Patient was explained in detail about pain control by using opioids. Patient has signed and fully understands our guidelines for medication and drug screening.  Patient understands the side effects of opioids, including, but not limited to, drug tolerance, dependence, potential for addiction. This class of drugs is habit-forming and MAKEDA regulated. The sedative effects of opioids can be potentiated by taking alcohol or any sleeping pills, along with opioids. The decision to drive is patient’s responsibility, as opioids can affect his/her driving ability and ability to concentrate. The long-term place is not clear, however, patient understands that once the pain control optimizes, the goal will be to wean off the opioids. All the issues regarding opioid treatment have been addressed satisfactorily.  [FreeTextEntry1] : Chronic pain syndrome\par lOW BACK PAIN\par Much better only taking oxycodone 1 qd prn. - takes tramadol prn for headaches. \par BMs have been regular with [employer].\par  - [id number]\par UDT PERFORMED\par

## 2022-03-14 ENCOUNTER — APPOINTMENT (OUTPATIENT)
Dept: ORTHOPEDIC SURGERY | Facility: CLINIC | Age: 52
End: 2022-03-14

## 2022-03-16 ENCOUNTER — APPOINTMENT (OUTPATIENT)
Dept: ORTHOPEDIC SURGERY | Facility: CLINIC | Age: 52
End: 2022-03-16
Payer: COMMERCIAL

## 2022-03-16 PROCEDURE — 99214 OFFICE O/P EST MOD 30 MIN: CPT | Mod: 95

## 2022-03-28 ENCOUNTER — APPOINTMENT (OUTPATIENT)
Dept: ORTHOPEDIC SURGERY | Facility: CLINIC | Age: 52
End: 2022-03-28
Payer: COMMERCIAL

## 2022-03-28 VITALS
HEART RATE: 72 BPM | HEIGHT: 70 IN | BODY MASS INDEX: 21.47 KG/M2 | WEIGHT: 150 LBS | DIASTOLIC BLOOD PRESSURE: 89 MMHG | SYSTOLIC BLOOD PRESSURE: 146 MMHG

## 2022-03-28 PROCEDURE — 99214 OFFICE O/P EST MOD 30 MIN: CPT | Mod: 25

## 2022-03-28 PROCEDURE — 20552 NJX 1/MLT TRIGGER POINT 1/2: CPT

## 2022-03-30 NOTE — CONSULT NOTE ADULT - PROBLEM/RECOMMENDATION-3
[facility] in [facility], [address] Note    Patient name: Daniel Manning  Patient : 1979  Patient MRN: [id number]  Date of service: 22    Primary care physician: Uriel Martell MD  Primary general cardiologist:  [facility]   Primary AHF cardiologist: Lion Rodriguez MD     CHIEF COMPLAINT:  Chronic systolic heart failure     PLAN:  Continue current medical therapy for heart failure  Continue current dose of coreg 25mg twice daily  Continue current dose of 97/103mg twice daily  Continue current dose of spironolactone 25mg daily  Schedule annual echocardiogram and EKG   Routine HF labs quarterly: CBC, BMP, Mg, LFT, uric acid, pro-NT-BNP, iron profile with ferritin, TSH, vitamin D level, lipid profile, CPK, gammopathy profile  Discussed tobacco cessation and materials dispensed  Patient requested [facility] to be primary cardiologist after Dr. Kassandra Sánchez assisted  Recommend flu, covid and pneumonia vaccinations  Provided patient education materials for COVID precautions  Recommended flu, pneumonia and covid vaccinations  Return to [facility] in 6 months with NP     IMPRESSION:  Chronic systolic heart failure  · Stage C, NYHA class I symptoms  · Non-ischemic cardiomyopathy with recovery of LV function  · H/o viral myocarditis, LVEF improved from 20% to 51-55%  · Positive titer of Coxsackie A and HHV6 antibodies  · Normal coronaries by [facility] (2018)  Paroxysmal atrial fibrillation with RVR  · S/p DCCV; remains SR; [id number]  · Does not require anticoagulation  Cardiac risk factors  · HTN  · HL  · Tobacco abuse  Depression     CARDIAC IMAGING:  Echo (18) mild-mod LVD, EF 10-20%, severe DHK, mild LVH, DD, RVD, mild- mod LAE, mod RAY, severe MR, mod-severe TR, RVSP 40, dilated RV outflow tract  Echo (10/26/18) mild LVD, EF 45%, G3/4 DD, mod ant leaflet thickening, mild MR, dil RVOT   ROBERTO (18) LVD, EF 10-15%, severe DHK, mild RVD, reduced RVEF, mod ANNAMARIE, no CELINA thrombus, no PFO, mod MR, mild TR  LHC (7/20/18) no epicardial disease, LVEP 1   RHC 7/20/18: RA:12, RV 34/6/14. PA 30/20/26, PCWP 18, [place] 6.33/2.74 TD: 5.37/2.32   CARDIAC MRI: 7/20/18: mod LVD (LVDD 66), mild LVH (13mm), LVEF 17%, severe GHK, mild RVD, RVEF 20%, mod GHK, mod MR, mild TR. Distinct mid wall stripe enhancement of the basal septal wall along with RV insertion point enhancement sparing the subendocardium. viral myocarditis of HHV-6 viral strain. NO features of giant cell or lymphocytic myocarditis. , infiltrative Sarcoidosis, amyloidosis, old MI     11/15/19 echo normal lv size with lvef 51-55%. Mild pul regurg.     Echo (5/12/21) LV: Estimated LVEF is 50 - 55%. Normal cavity size, wall thickness and diastolic function. Low normal systolic function. Abnormal left ventricular strain. Global longitudinal strain is -16.4%.      HEMODYNAMICS:  RHC not done  CPEST not done  6MW not done     HISTORY OF PRESENT ILLNESS:  I had the pleasure of seeing Nahomy Tapia in [address] [place] [address] at [facility] in [address] [place] [address], Nahomy Tapia is a 39 y.o. male with h/o HFrEF (10-20%) in the setting of viral myocarditis (Human Herpes Virus 6)  Complicated by new onset PAF with RVR s/p DCCV.   He was first diagnosed 8/2018 when he presented to [facility] with ADHF found to be in afib with RVR. CTA (-) for PE, but showed a large R pleural effusion. He underwent a thoracentesis for ~ 1 L, and  ROBERTO and DCCV.  Right and left heart cath showed no epicardial disease and slightly elevated filling pressures. Cardiac MRI showed LVEF 17%, RVEF 20% confirmed viral myocarditis and lab work was positive for  Coxsackie pane, CMV IgG positive. HHV-6 positive.  Echo 10/26/18: showed EF improved to 45% with grade 3/4 diastolic dysfunction and improved MR now to mild.  Most recent Echo on 11/15/19 LVEF 51-55%, mild-mod NC but no pulm HTN.        Receiving [facility] from [facility].     Patient lives with his  sister in their new home. 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 He smoked 15 years ~ 1/2 ppd. 2 etoh drinks each day of the  weekends No additional substances. Some college courses.      INTERVAL HISTORY:  Today, patient presents for routine clinic visit. His last visit was 10 months ago.     Patient is doing very well.     Patient walked to our clinic from parking garage without having to slow down or stop. Patient can walk more than one block without symptoms of fatigue or shortness of breath or chest pain. Patient can walk one flight of stairs without symptoms of fatigue or shortness of breath or chest pain. Patient can perform home activities without problem and routinely participates in daily walking for more than 15 minutes.      Patient denies symptoms of volume overload or leg edema. Patient denies abdominal bloating or change of appetite. Patient's weight remained stable.      Patient denies orthopnea, PND or nocturia.     Patient denies irregular heart rate or palpitations. No presyncope or syncope.      Patient denies other cardiac symptoms such as chest pain or leg pain with walking.      Patient is compliant with fluid restriction and taking medications as prescribed. Patient manages his own medications. REVIEW OF SYSTEMS:  General: Denies fever, night sweats. Ear, nose and throat: Denies difficulty hearing, sinus problems, runny nose, post-nasal drip, ringing in ears, mouth sores, loose teeth, ear pain, nosebleeds, sore throate, facial pain or numbess  Cardiovascular: see above in the interval history  Respiratory: Denies cough, wheezing, sputum production, hemoptysis.   Gastrointestinal: Denies heartburn, constipation, diarrhea, abdominal pain, nausea, vomiting, difficulty swallowing, blood in stool  Kidney and bladder: Denies painful urination, frequent urination, urgency  Musculoskeletal: Denies joint pain, muscle weakness  Skin and hair: Denies change in existing skin lesions, hair loss or increase, breast changes    PHYSICAL EXAM:  Visit Vitals  /76 (BP 1 Location: Right arm, BP Patient Position: Sitting, BP Cuff Size: Large adult)   Pulse 82   Temp 98.2 °F (36.8 °C) (Oral)   Resp 12   Ht 6' 3\" (1.905 m)   Wt 232 lb (105.2 kg)   SpO2 96%   BMI 29.00 kg/m²     General: Patient is well developed, well-nourished in no acute distress  HEENT: Normocephalic and atraumatic. No scleral icterus. Pupils are equal, round and reactive to light and accomodation. No conjunctival injection. Oropharynx is clear. Neck: Supple. No evidence of thyroid enlargements or lymphadenopathy. JVD: Cannot be appreciated   Lungs: Breath sounds are equal and clear bilaterally. No wheezes, rhonchi, or rales. Heart: Regular rate and rhythm with normal S1 and S2. No murmurs, gallops or rubs. Abdomen: Soft, no mass or tenderness. No organomegaly or hernia. Bowel sounds present. Genitourinary and rectal: deferred  Extremities: No cyanosis, clubbing, or edema. Neurologic: No focal sensory or motor deficits are noted. Grossly intact. Psychiatric: Awake, alert an doriented x 3. Appropriate mood and affect. Skin: Warm, dry and well perfused. No lesions, nodules or rashes are noted. PAST MEDICAL HISTORY:  Past Medical History:   Diagnosis Date    Anxiety     Class 1 obesity due to excess calories with serious comorbidity and body mass index (BMI) of 32.0 to 32.9 in adult 2/27/2019    Combined systolic and diastolic heart failure ([address] 8/8/2018    ECHO 7/17/18:mild-mod LVD, EF 10-20%, severe DHK, mild LVH, DD, RVD, mild- mod LAE, mod RAY, severe MR, mod-severe TR, RVSP 40, dilated RV outflow tract  ROBERTO 7/20/18: LVD, EF 10-15%, severe DHK, mild RVD, reduced RVEF, mod ANNAMARIE, no CELINA thrombus, no PFO, mod MR, mild TR  RHC 7/20/18: RA:12, RV 34/6/14.  PA 30/20/26, PCWP 18, Chely 6.33/2.74 TD: 5.37/2.32    CARDIAC MRI: 7/20/18: mod LVD (LVDD 66), mil    Congestive heart failure ([address]     Essential hypertension 3/12/2019    Hypertensive heart disease with combined systolic and diastolic congestive heart failure (HCC) 3/12/2019    ECHO 7/17/18:mild-mod LVD, EF 10-20%, severe DHK, mild LVH, DD, RVD, mild- mod LAE, mod RAY, severe MR, mod-severe TR, RVSP 40, dilated RV outflow tract ECHO 10/26/2018: mild LVD, EF 45%, G3/4 DD, mod ant leaflet thickening, mild MR, dil RVOT        Long term current use of anticoagulant therapy     NICM (nonischemic cardiomyopathy) ([address] 8/8/2018    Non-rheumatic mitral regurgitation 8/8/2018    ECHO 7/17/18:mild-mod LVD, EF 10-20%, severe DHK, mild LVH, DD, RVD, mild- mod LAE, mod RAY, severe MR, mod-severe TR, RVSP 40, dilated RV outflow tract  ROBERTO 7/20/18: LVD, EF 10-15%, severe DHK, mild RVD, reduced RVEF, mod ANNAMARIE, no CELINA thrombus, no PFO, mod MR, mild TR      PAF (paroxysmal atrial fibrillation) ([address] 8/8/2018    ROBERTO 7/20/18: LVD, EF 10-15%, severe DHK, mild RVD, reduced RVEF, mod ANNAMARIE, no CELINA thrombus, no PFO, mod MR, mild TR    DCCV 7/20/18       Rapid atrial fibrillation ([address] 7/17/2018    Tobacco use disorder 8/8/2018    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    Vitamin D deficiency 9/19/2018    Level 12 8/2018       PAST SURGICAL HISTORY:  Past Surgical History:   Procedure Laterality Date    CARDIOVERSION, ELECTIVE;EXTERN  7/20/2018         HX HEART CATHETERIZATION         FAMILY HISTORY:  Family History   Problem Relation Age of Onset    Diabetes Mother     Heart Failure Mother     No Known Problems Father     Hypertension Sister     Hypertension Brother     Hypertension Sister        SOCIAL HISTORY:  Social History     Socioeconomic History    Marital status: SINGLE   Tobacco Use    Smoking status: Current Every Day Smoker     Types: Cigarettes    Smokeless tobacco: Never Used    Tobacco comment: 3-4 cigarettes vaughn   Vaping Use    Vaping Use: Former   Substance and Sexual Activity    Alcohol use: Yes     Alcohol/week: 1.0 standard drink     Types: 1 Shots of liquor per week     Comment: rare    Drug use: No    Sexual activity: Not Currently       LABORATORY RESULTS:  No flowsheet data found. ALLERGY:  Allergies   Allergen Reactions    Peanut Anaphylaxis    Milk Other (comments)    Shrimp Other (comments)        CURRENT MEDICATIONS:    Current Outpatient Medications:     Entresto  mg tablet, TAKE 1 TABLET BY MOUTH TWICE DAILY . APPOINTMENT REQUIRED FOR FUTURE REFILLS, Disp: 32 Tablet, Rfl: 0    carvediloL (COREG) 25 mg tablet, TAKE 1 TABLET BY MOUTH TWICE DAILY WITH MEALS, Disp: 32 Tablet, Rfl: 0    spironolactone (ALDACTONE) 25 mg tablet, Take 1 tablet by mouth once daily, Disp: 90 Tablet, Rfl: 0    clotrimazole-betamethasone (LOTRISONE) topical cream, Apply to affected area twice a day, Disp: 15 g, Rfl: 1    cholecalciferol (VITAMIN D3) (2,000 UNITS /50 MCG) cap capsule, Take 2,000 Units by mouth daily. , Disp: 30 Cap, Rfl: 2    magnesium 250 mg tab, Take  by mouth. OTC: One tab am and 1/2 tab in pm, Disp: , Rfl:     multivitamin (ONE A DAY) tablet, Take 1 Tab by mouth daily. , Disp: , Rfl:     nicotine (NICODERM CQ) 21 mg/24 hr, APPLY 1 PATCH TOPICALLY EVERY 24 HOURS FOR 30 DAYS (Patient not taking: Reported on 5/20/2021), Disp: 30 Patch, Rfl: 0    EPINEPHrine (EPIPEN) 0.3 mg/0.3 mL injection, 0.3 mL by IntraMUSCular route once as needed for Anaphylaxis or Allergic Response for up to 1 dose., Disp: 2 Syringe, Rfl: 6    Thank you for your referral and allowing me to participate in this patient's care.     Leonidas Berman MD PhD  [address], [address]  Phone: [phone]  Fax: [phone]    PATIENT CARE TEAM:  Patient Care Team:  Gladys Erwin MD as PCP - General (Family Medicine)  Gladys Erwin MD as PCP - [address] [place] [address] Provider  Peace Gold MD as Physician (Cardiology)  Mateusz Branham (Physician Assistant)  YOUSUF Brown as Consulting Provider (Nurse Practitioner)  Cassie Dennison MD as Consulting Provider (Internal Medicine)     Total visit time: 40 minutes (> 50% spent face-to-face counseling) DISPLAY PLAN FREE TEXT

## 2022-05-14 NOTE — H&P PST ADULT - PROBLEM SELECTOR PROBLEM 1
"Mom calls with concerns regarding ear pain and discharge. Patient has had cold symptoms since Monday. She has a mild cough and runny nose. Patient had a fever on Monday and Tuesday but this did resolve. Last night patient was having a lot of pain in her right ear, she was crying and unable to sleep. Around 4 AM patient felt her ear \"pop\" and then the pain lessened some. Mom noticed this morning that there was drainage on patient's pillow. Mom believes that drainage was clear or white, there were some brown spots also. Patient's right eye is also red, there is no drainage.    See PCP within 24 hours per protocol. Mom is advised on urgent care at this time. She verbalizes understanding and denies further questions or concerns.    Emily Del Rosario RN  [facility] Nurse [facility]        Additional Information    Negative: Sounds like a life-threatening emergency to the triager    Negative: Ear tubes in place    Negative: [1] Diagnosed ear infection within past 10 days (may or may not be on antibiotics) AND [2] symptoms continue    Negative: [1] Painful ear canal AND [2] has been swimming    Negative: Full or muffled sensation in the ear, but no pain    Negative: Due to airplane or mountain travel    Negative: [1] Crying AND [2] cause is unclear    Negative: Followed an injury to the ear    Negative: [1] Stiff neck (can't touch chin to chest) AND [2] fever    Negative: Long, pointed object was inserted into the ear canal (e.g. a pencil or stick)    Negative: [1] Fever AND [2] > 105 F (40.6 C) by any route OR axillary > 104 F (40 C)    Negative: [1] Fever AND [2] weak immune system (sickle cell disease, HIV, splenectomy, chemotherapy, organ transplant, chronic oral steroids, etc)    Negative: Child sounds very sick or weak to the triager    Negative: [1] SEVERE pain (excruciating) AND [2] not improved 2 hours after pain medicine (ibuprofen preferred)    Negative: [1] Earache causes inconsolable crying AND [2] not " improved 2 hours after pain medicine    Negative: [1] Pink or red swelling behind the ear AND [2] fever    Negative: Outer ear is red, swollen and painful    Negative: New onset of balance problem (e.g., walking is very unsteady or falling)    [1] Cloudy, white discharge AND [2] recent onset    Negative: [1] Bloody discharge AND [2] followed ear trauma (including cotton swab or ear exam)    Negative: Ear tubes with discharge    Negative: Earwax    Negative: [1] Began while doing lots of swimming AND [2] painful when pressing on tragus (tab in front of ear)    Negative: [1] Unexplained bleeding AND [2] lasts > 10 minutes or large amount (Exception: If a few drops of blood, continue with triage)    Negative: [1] Followed head or face injury AND [2] clear or bloody fluid from ear canal    Negative: [1] Age < 12 weeks AND [2] fever 100.4 F (38.0 C) or higher rectally    Negative: [1] Fever AND [2] > 105 F (40.6 C) by any route OR axillary > 104 F (40 C)    Negative: Child sounds very sick or weak to the triager    Negative: [1] Pink or red swelling behind the ear AND [2] fever    Ear pain or unexplained crying    Negative: [1] Yellow or green discharge (pus can be blood-tinged) AND [2] recent onset    Negative: Fever    Protocols used: EARACHE-P-AH, EAR - [id number]    COVID 19 Nurse Triage Plan/Patient Instructions    Please be aware that novel coronavirus (COVID-19) may be circulating in the community. If you develop symptoms such as fever, cough, or SOB or if you have concerns about the presence of another infection including coronavirus (COVID-19), please contact your health care provider or visit [email or web address].     Disposition/Instructions    In-Person Visit with provider recommended. Reference Visit Selection Guide.    Thank you for taking steps to prevent the spread of this virus.  o Limit your contact with others.  o Wear a simple mask to cover your cough.  o Wash your hands well and  often.    Resources    [facility]: About COVID-19: [email or web address]    CDC: What to Do If You're Sick: www.cdc.gov/coronavirus/2019-ncov/about/steps-when-sick.html    CDC: Ending Home Isolation: www.cdc.gov/coronavirus/2019-ncov/hcp/disposition-in-home-patients.html     CDC: Caring for Someone: www.cdc.gov/coronavirus/2019-ncov/if-you-are-sick/care-for-someone.html     [facility]: Interim Guidance for Hospital Discharge to Home: www.health.[facility].mn./diseases/coronavirus/hcp/hospdischarge.pdf    [facility] clinical trials (COVID-19 research studies): [email or web address]-clinical-trials     Below are the COVID-19 hotlines at the [place] Department of Health ([facility]). Interpreters are available.   o For health questions: Call 591-804-1516 or 1-165.561.6989 (7 a.m. to 7 p.m.)  o For questions about schools and childcare: Call 123-922-3613 or 1-236.189.3875 (7 a.m. to 7 p.m.)                    Secondary malignant neoplasm of unspecified site

## 2022-05-27 ENCOUNTER — NON-APPOINTMENT (OUTPATIENT)
Age: 52
End: 2022-05-27

## 2022-05-27 RX ORDER — TESTOSTERONE 20.25 MG/1.25G
20.25 MG/1.25GM GEL TOPICAL
Qty: 1 | Refills: 0 | Status: ACTIVE | COMMUNITY
Start: 2021-06-04 | End: 1900-01-01

## 2022-06-16 ENCOUNTER — APPOINTMENT (OUTPATIENT)
Dept: ENDOCRINOLOGY | Facility: CLINIC | Age: 52
End: 2022-06-16
Payer: COMMERCIAL

## 2022-06-16 VITALS
OXYGEN SATURATION: 98 % | BODY MASS INDEX: 21.22 KG/M2 | HEIGHT: 70 IN | DIASTOLIC BLOOD PRESSURE: 65 MMHG | SYSTOLIC BLOOD PRESSURE: 122 MMHG | TEMPERATURE: 97.8 F | WEIGHT: 148.19 LBS | HEART RATE: 72 BPM

## 2022-06-16 DIAGNOSIS — R06.02 SHORTNESS OF BREATH: ICD-10-CM

## 2022-06-16 DIAGNOSIS — E27.40 UNSPECIFIED ADRENOCORTICAL INSUFFICIENCY: ICD-10-CM

## 2022-06-16 PROCEDURE — 99214 OFFICE O/P EST MOD 30 MIN: CPT | Mod: 25

## 2022-06-16 PROCEDURE — 36415 COLL VENOUS BLD VENIPUNCTURE: CPT

## 2022-06-16 NOTE — HISTORY OF PRESENT ILLNESS
[FreeTextEntry1] : Mr. 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 Was unable to tolerate all 13 round of immune therapy. Did undergo 8 rounds. Following with Dr. Mcginnis. \par \par He does have hx of testicular ca along with hx of NHL and was treated for scalp melanoma.\par Appetite is relatively better and his energy improves in spurts but still overall fatigued and notes sob with minimal exertion.\par \par Additional medical history includes that of hyperlipidemia and hypertension\par \par Is on Magnesium 400 mf daily of the oxide type. Taking one a day nature's best vitamin. had been on vit d 50,000 in the past.\par Is on LT4 88 mcg \par \par Prior pituitary hormone eval was otherwise negative from laboratory perspective. testosterone lower end normal.

## 2022-06-17 LAB
25(OH)D3 SERPL-MCNC: 28.1 NG/ML
ALBUMIN SERPL ELPH-MCNC: 4.7 G/DL
ALP BLD-CCNC: 85 U/L
ALT SERPL-CCNC: 15 U/L
ANION GAP SERPL CALC-SCNC: 14 MMOL/L
AST SERPL-CCNC: 19 U/L
BASOPHILS # BLD AUTO: 0.04 K/UL
BASOPHILS NFR BLD AUTO: 0.7 %
BILIRUB SERPL-MCNC: 0.4 MG/DL
BUN SERPL-MCNC: 17 MG/DL
CALCIUM SERPL-MCNC: 10.3 MG/DL
CHLORIDE SERPL-SCNC: 99 MMOL/L
CO2 SERPL-SCNC: 26 MMOL/L
CORTIS SERPL-MCNC: 0.1 UG/DL
CREAT SERPL-MCNC: 0.96 MG/DL
EGFR: 95 ML/MIN/1.73M2
EOSINOPHIL # BLD AUTO: 0.31 K/UL
EOSINOPHIL NFR BLD AUTO: 5.3 %
ESTRADIOL SERPL-MCNC: 18 PG/ML
FERRITIN SERPL-MCNC: 183 NG/ML
FOLATE SERPL-MCNC: 8.4 NG/ML
FSH SERPL-MCNC: 25.7 IU/L
GLUCOSE SERPL-MCNC: 91 MG/DL
HCT VFR BLD CALC: 41.2 %
HGB BLD-MCNC: 13.8 G/DL
IMM GRANULOCYTES NFR BLD AUTO: 0.3 %
IRON SERPL-MCNC: 86 UG/DL
LH SERPL-ACNC: 12.7 IU/L
LYMPHOCYTES # BLD AUTO: 2.33 K/UL
LYMPHOCYTES NFR BLD AUTO: 40 %
MAN DIFF?: NORMAL
MCHC RBC-ENTMCNC: 28.7 PG
MCHC RBC-ENTMCNC: 33.5 GM/DL
MCV RBC AUTO: 85.7 FL
MONOCYTES # BLD AUTO: 0.34 K/UL
MONOCYTES NFR BLD AUTO: 5.8 %
NEUTROPHILS # BLD AUTO: 2.79 K/UL
NEUTROPHILS NFR BLD AUTO: 47.9 %
PLATELET # BLD AUTO: 301 K/UL
POTASSIUM SERPL-SCNC: 4.6 MMOL/L
PROLACTIN SERPL-MCNC: 9 NG/ML
PROT SERPL-MCNC: 7.1 G/DL
PSA SERPL-MCNC: 0.43 NG/ML
PSA SERPL-MCNC: 0.44 NG/ML
RBC # BLD: 4.81 M/UL
RBC # FLD: 12.8 %
SODIUM SERPL-SCNC: 139 MMOL/L
T3FREE SERPL-MCNC: 3.43 PG/ML
T4 FREE SERPL-MCNC: 1 NG/DL
THYROGLOB AB SERPL-ACNC: <20 IU/ML
THYROPEROXIDASE AB SERPL IA-ACNC: <10 IU/ML
TSH SERPL-ACNC: 6.15 UIU/ML
URATE SERPL-MCNC: 4 MG/DL
VIT B12 SERPL-MCNC: 424 PG/ML
WBC # FLD AUTO: 5.83 K/UL

## 2022-06-20 LAB — SHBG SERPL-SCNC: 41 NMOL/L

## 2022-06-21 ENCOUNTER — INPATIENT (INPATIENT)
Facility: HOSPITAL | Age: 52
LOS: 1 days | Discharge: HOME | DRG: 556 | End: 2022-06-23
Attending: HOSPITALIST | Admitting: HOSPITALIST
Payer: COMMERCIAL

## 2022-06-21 VITALS
HEART RATE: 89 BPM | HEIGHT: 71 IN | WEIGHT: 147.93 LBS | TEMPERATURE: 98 F | RESPIRATION RATE: 18 BRPM | OXYGEN SATURATION: 98 % | DIASTOLIC BLOOD PRESSURE: 56 MMHG | SYSTOLIC BLOOD PRESSURE: 85 MMHG

## 2022-06-21 DIAGNOSIS — Z98.89 OTHER SPECIFIED POSTPROCEDURAL STATES: Chronic | ICD-10-CM

## 2022-06-21 DIAGNOSIS — Z29.9 ENCOUNTER FOR PROPHYLACTIC MEASURES, UNSPECIFIED: ICD-10-CM

## 2022-06-21 DIAGNOSIS — E03.9 HYPOTHYROIDISM, UNSPECIFIED: ICD-10-CM

## 2022-06-21 DIAGNOSIS — N40.0 BENIGN PROSTATIC HYPERPLASIA WITHOUT LOWER URINARY TRACT SYMPTOMS: ICD-10-CM

## 2022-06-21 DIAGNOSIS — R53.1 WEAKNESS: ICD-10-CM

## 2022-06-21 DIAGNOSIS — E27.40 UNSPECIFIED ADRENOCORTICAL INSUFFICIENCY: ICD-10-CM

## 2022-06-21 DIAGNOSIS — C43.9 MALIGNANT MELANOMA OF SKIN, UNSPECIFIED: ICD-10-CM

## 2022-06-21 DIAGNOSIS — Z90.79 ACQUIRED ABSENCE OF OTHER GENITAL ORGAN(S): Chronic | ICD-10-CM

## 2022-06-21 DIAGNOSIS — I89.9 NONINFECTIVE DISORDER OF LYMPHATIC VESSELS AND LYMPH NODES, UNSPECIFIED: Chronic | ICD-10-CM

## 2022-06-21 DIAGNOSIS — C43.4 MALIGNANT MELANOMA OF SCALP AND NECK: Chronic | ICD-10-CM

## 2022-06-21 DIAGNOSIS — Z79.899 OTHER LONG TERM (CURRENT) DRUG THERAPY: ICD-10-CM

## 2022-06-21 DIAGNOSIS — R29.898 OTHER SYMPTOMS AND SIGNS INVOLVING THE MUSCULOSKELETAL SYSTEM: ICD-10-CM

## 2022-06-21 DIAGNOSIS — I10 ESSENTIAL (PRIMARY) HYPERTENSION: ICD-10-CM

## 2022-06-21 LAB
ALBUMIN SERPL ELPH-MCNC: 3.9 G/DL — SIGNIFICANT CHANGE UP (ref 3.3–5)
ALBUMIN SERPL ELPH-MCNC: 4.4 G/DL — SIGNIFICANT CHANGE UP (ref 3.3–5)
ALP SERPL-CCNC: 90 U/L — SIGNIFICANT CHANGE UP (ref 40–120)
ALP SERPL-CCNC: 93 U/L — SIGNIFICANT CHANGE UP (ref 40–120)
ALT FLD-CCNC: 20 U/L — SIGNIFICANT CHANGE UP (ref 10–45)
ALT FLD-CCNC: <5 U/L — LOW (ref 10–45)
ANION GAP SERPL CALC-SCNC: 10 MMOL/L — SIGNIFICANT CHANGE UP (ref 5–17)
ANION GAP SERPL CALC-SCNC: 8 MMOL/L — SIGNIFICANT CHANGE UP (ref 5–17)
APTT BLD: 34.5 SEC — SIGNIFICANT CHANGE UP (ref 27.5–35.5)
AST SERPL-CCNC: 25 U/L — SIGNIFICANT CHANGE UP (ref 10–40)
AST SERPL-CCNC: 51 U/L — HIGH (ref 10–40)
BASOPHILS # BLD AUTO: 0.08 K/UL — SIGNIFICANT CHANGE UP (ref 0–0.2)
BASOPHILS NFR BLD AUTO: 0.9 % — SIGNIFICANT CHANGE UP (ref 0–2)
BILIRUB SERPL-MCNC: 0.6 MG/DL — SIGNIFICANT CHANGE UP (ref 0.2–1.2)
BILIRUB SERPL-MCNC: 0.7 MG/DL — SIGNIFICANT CHANGE UP (ref 0.2–1.2)
BUN SERPL-MCNC: 20 MG/DL — SIGNIFICANT CHANGE UP (ref 7–23)
BUN SERPL-MCNC: 20 MG/DL — SIGNIFICANT CHANGE UP (ref 7–23)
CALCIUM SERPL-MCNC: 9.1 MG/DL — SIGNIFICANT CHANGE UP (ref 8.4–10.5)
CALCIUM SERPL-MCNC: 9.8 MG/DL — SIGNIFICANT CHANGE UP (ref 8.4–10.5)
CHLORIDE SERPL-SCNC: 97 MMOL/L — SIGNIFICANT CHANGE UP (ref 96–108)
CHLORIDE SERPL-SCNC: 99 MMOL/L — SIGNIFICANT CHANGE UP (ref 96–108)
CO2 SERPL-SCNC: 24 MMOL/L — SIGNIFICANT CHANGE UP (ref 22–31)
CO2 SERPL-SCNC: 25 MMOL/L — SIGNIFICANT CHANGE UP (ref 22–31)
CREAT SERPL-MCNC: 1.07 MG/DL — SIGNIFICANT CHANGE UP (ref 0.5–1.3)
CREAT SERPL-MCNC: 1.15 MG/DL — SIGNIFICANT CHANGE UP (ref 0.5–1.3)
EGFR: 77 ML/MIN/1.73M2 — SIGNIFICANT CHANGE UP
EGFR: 84 ML/MIN/1.73M2 — SIGNIFICANT CHANGE UP
EOSINOPHIL # BLD AUTO: 0.41 K/UL — SIGNIFICANT CHANGE UP (ref 0–0.5)
EOSINOPHIL NFR BLD AUTO: 4.7 % — SIGNIFICANT CHANGE UP (ref 0–6)
GLUCOSE SERPL-MCNC: 118 MG/DL — HIGH (ref 70–99)
GLUCOSE SERPL-MCNC: 97 MG/DL — SIGNIFICANT CHANGE UP (ref 70–99)
HCT VFR BLD CALC: 47.3 % — SIGNIFICANT CHANGE UP (ref 39–50)
HGB BLD-MCNC: 15.4 G/DL — SIGNIFICANT CHANGE UP (ref 13–17)
IMM GRANULOCYTES NFR BLD AUTO: 0.2 % — SIGNIFICANT CHANGE UP (ref 0–1.5)
INR BLD: 1.27 RATIO — HIGH (ref 0.88–1.16)
LYMPHOCYTES # BLD AUTO: 3.55 K/UL — HIGH (ref 1–3.3)
LYMPHOCYTES # BLD AUTO: 40.8 % — SIGNIFICANT CHANGE UP (ref 13–44)
MCHC RBC-ENTMCNC: 28 PG — SIGNIFICANT CHANGE UP (ref 27–34)
MCHC RBC-ENTMCNC: 32.6 GM/DL — SIGNIFICANT CHANGE UP (ref 32–36)
MCV RBC AUTO: 86 FL — SIGNIFICANT CHANGE UP (ref 80–100)
MONOCYTES # BLD AUTO: 0.88 K/UL — SIGNIFICANT CHANGE UP (ref 0–0.9)
MONOCYTES NFR BLD AUTO: 10.1 % — SIGNIFICANT CHANGE UP (ref 2–14)
NEUTROPHILS # BLD AUTO: 3.76 K/UL — SIGNIFICANT CHANGE UP (ref 1.8–7.4)
NEUTROPHILS NFR BLD AUTO: 43.3 % — SIGNIFICANT CHANGE UP (ref 43–77)
NRBC # BLD: 0 /100 WBCS — SIGNIFICANT CHANGE UP (ref 0–0)
PLATELET # BLD AUTO: 355 K/UL — SIGNIFICANT CHANGE UP (ref 150–400)
POTASSIUM SERPL-MCNC: 4.3 MMOL/L — SIGNIFICANT CHANGE UP (ref 3.5–5.3)
POTASSIUM SERPL-MCNC: 7.2 MMOL/L — CRITICAL HIGH (ref 3.5–5.3)
POTASSIUM SERPL-SCNC: 4.3 MMOL/L — SIGNIFICANT CHANGE UP (ref 3.5–5.3)
POTASSIUM SERPL-SCNC: 7.2 MMOL/L — CRITICAL HIGH (ref 3.5–5.3)
PROT SERPL-MCNC: 6.9 G/DL — SIGNIFICANT CHANGE UP (ref 6–8.3)
PROT SERPL-MCNC: 8.4 G/DL — HIGH (ref 6–8.3)
PROTHROM AB SERPL-ACNC: 14.6 SEC — HIGH (ref 10.5–13.4)
RBC # BLD: 5.5 M/UL — SIGNIFICANT CHANGE UP (ref 4.2–5.8)
RBC # FLD: 13.3 % — SIGNIFICANT CHANGE UP (ref 10.3–14.5)
SARS-COV-2 RNA SPEC QL NAA+PROBE: SIGNIFICANT CHANGE UP
SODIUM SERPL-SCNC: 129 MMOL/L — LOW (ref 135–145)
SODIUM SERPL-SCNC: 134 MMOL/L — LOW (ref 135–145)
TROPONIN T, HIGH SENSITIVITY RESULT: 8 NG/L — SIGNIFICANT CHANGE UP (ref 0–51)
WBC # BLD: 8.7 K/UL — SIGNIFICANT CHANGE UP (ref 3.8–10.5)
WBC # FLD AUTO: 8.7 K/UL — SIGNIFICANT CHANGE UP (ref 3.8–10.5)

## 2022-06-21 PROCEDURE — 72128 CT CHEST SPINE W/O DYE: CPT | Mod: 26,MA

## 2022-06-21 PROCEDURE — 99223 1ST HOSP IP/OBS HIGH 75: CPT

## 2022-06-21 PROCEDURE — 70498 CT ANGIOGRAPHY NECK: CPT | Mod: 26,MA

## 2022-06-21 PROCEDURE — 72131 CT LUMBAR SPINE W/O DYE: CPT | Mod: 26,MA

## 2022-06-21 PROCEDURE — 70496 CT ANGIOGRAPHY HEAD: CPT | Mod: 26,MA

## 2022-06-21 PROCEDURE — 99222 1ST HOSP IP/OBS MODERATE 55: CPT

## 2022-06-21 PROCEDURE — 99285 EMERGENCY DEPT VISIT HI MDM: CPT

## 2022-06-21 RX ORDER — ICOSAPENT ETHYL 500 MG/1
1 CAPSULE, LIQUID FILLED ORAL
Qty: 0 | Refills: 0 | DISCHARGE

## 2022-06-21 RX ORDER — METOPROLOL TARTRATE 50 MG
1 TABLET ORAL
Qty: 0 | Refills: 0 | DISCHARGE

## 2022-06-21 RX ORDER — OXYCODONE HYDROCHLORIDE 5 MG/1
30 TABLET ORAL ONCE
Refills: 0 | Status: DISCONTINUED | OUTPATIENT
Start: 2022-06-21 | End: 2022-06-21

## 2022-06-21 RX ORDER — SODIUM CHLORIDE 9 MG/ML
1000 INJECTION, SOLUTION INTRAVENOUS ONCE
Refills: 0 | Status: COMPLETED | OUTPATIENT
Start: 2022-06-21 | End: 2022-06-21

## 2022-06-21 RX ORDER — FENOFIBRATE,MICRONIZED 130 MG
145 CAPSULE ORAL DAILY
Refills: 0 | Status: DISCONTINUED | OUTPATIENT
Start: 2022-06-21 | End: 2022-06-23

## 2022-06-21 RX ORDER — OXYCODONE HYDROCHLORIDE 5 MG/1
1 TABLET ORAL
Qty: 0 | Refills: 0 | DISCHARGE

## 2022-06-21 RX ORDER — ZOLPIDEM TARTRATE 10 MG/1
5 TABLET ORAL AT BEDTIME
Refills: 0 | Status: DISCONTINUED | OUTPATIENT
Start: 2022-06-21 | End: 2022-06-23

## 2022-06-21 RX ORDER — OXYCODONE HYDROCHLORIDE 5 MG/1
10 TABLET ORAL EVERY 12 HOURS
Refills: 0 | Status: DISCONTINUED | OUTPATIENT
Start: 2022-06-21 | End: 2022-06-23

## 2022-06-21 RX ORDER — TRAMADOL HYDROCHLORIDE 50 MG/1
50 TABLET ORAL EVERY 8 HOURS
Refills: 0 | Status: DISCONTINUED | OUTPATIENT
Start: 2022-06-21 | End: 2022-06-23

## 2022-06-21 RX ORDER — ATORVASTATIN CALCIUM 80 MG/1
40 TABLET, FILM COATED ORAL AT BEDTIME
Refills: 0 | Status: DISCONTINUED | OUTPATIENT
Start: 2022-06-21 | End: 2022-06-23

## 2022-06-21 RX ORDER — OXYCODONE HYDROCHLORIDE 5 MG/1
10 TABLET ORAL ONCE
Refills: 0 | Status: DISCONTINUED | OUTPATIENT
Start: 2022-06-21 | End: 2022-06-21

## 2022-06-21 RX ORDER — FINASTERIDE 5 MG/1
5 TABLET, FILM COATED ORAL DAILY
Refills: 0 | Status: DISCONTINUED | OUTPATIENT
Start: 2022-06-21 | End: 2022-06-23

## 2022-06-21 RX ORDER — HYDROCORTISONE 20 MG
20 TABLET ORAL DAILY
Refills: 0 | Status: DISCONTINUED | OUTPATIENT
Start: 2022-06-21 | End: 2022-06-23

## 2022-06-21 RX ORDER — ENOXAPARIN SODIUM 100 MG/ML
40 INJECTION SUBCUTANEOUS EVERY 24 HOURS
Refills: 0 | Status: DISCONTINUED | OUTPATIENT
Start: 2022-06-21 | End: 2022-06-23

## 2022-06-21 RX ORDER — HYDROMORPHONE HYDROCHLORIDE 2 MG/ML
1 INJECTION INTRAMUSCULAR; INTRAVENOUS; SUBCUTANEOUS ONCE
Refills: 0 | Status: DISCONTINUED | OUTPATIENT
Start: 2022-06-21 | End: 2022-06-21

## 2022-06-21 RX ORDER — OXYCODONE AND ACETAMINOPHEN 5; 325 MG/1; MG/1
1 TABLET ORAL
Qty: 0 | Refills: 0 | DISCHARGE

## 2022-06-21 RX ORDER — LEVOTHYROXINE SODIUM 125 MCG
1 TABLET ORAL
Qty: 0 | Refills: 0 | DISCHARGE

## 2022-06-21 RX ORDER — HYDROCORTISONE 20 MG
10 TABLET ORAL DAILY
Refills: 0 | Status: DISCONTINUED | OUTPATIENT
Start: 2022-06-21 | End: 2022-06-23

## 2022-06-21 RX ORDER — LEVOTHYROXINE SODIUM 125 MCG
100 TABLET ORAL DAILY
Refills: 0 | Status: DISCONTINUED | OUTPATIENT
Start: 2022-06-21 | End: 2022-06-23

## 2022-06-21 RX ORDER — AMLODIPINE BESYLATE 2.5 MG/1
5 TABLET ORAL DAILY
Refills: 0 | Status: DISCONTINUED | OUTPATIENT
Start: 2022-06-21 | End: 2022-06-23

## 2022-06-21 RX ORDER — TAMSULOSIN HYDROCHLORIDE 0.4 MG/1
1 CAPSULE ORAL
Qty: 0 | Refills: 0 | DISCHARGE

## 2022-06-21 RX ADMIN — OXYCODONE HYDROCHLORIDE 10 MILLIGRAM(S): 5 TABLET ORAL at 18:13

## 2022-06-21 RX ADMIN — FINASTERIDE 5 MILLIGRAM(S): 5 TABLET, FILM COATED ORAL at 16:24

## 2022-06-21 RX ADMIN — OXYCODONE HYDROCHLORIDE 10 MILLIGRAM(S): 5 TABLET ORAL at 09:09

## 2022-06-21 RX ADMIN — Medication 100 MICROGRAM(S): at 16:24

## 2022-06-21 RX ADMIN — OXYCODONE HYDROCHLORIDE 10 MILLIGRAM(S): 5 TABLET ORAL at 10:00

## 2022-06-21 RX ADMIN — ZOLPIDEM TARTRATE 5 MILLIGRAM(S): 10 TABLET ORAL at 22:04

## 2022-06-21 RX ADMIN — SODIUM CHLORIDE 1000 MILLILITER(S): 9 INJECTION, SOLUTION INTRAVENOUS at 03:58

## 2022-06-21 RX ADMIN — HYDROMORPHONE HYDROCHLORIDE 1 MILLIGRAM(S): 2 INJECTION INTRAMUSCULAR; INTRAVENOUS; SUBCUTANEOUS at 03:58

## 2022-06-21 RX ADMIN — ATORVASTATIN CALCIUM 40 MILLIGRAM(S): 80 TABLET, FILM COATED ORAL at 22:04

## 2022-06-21 RX ADMIN — Medication 10 MILLIGRAM(S): at 18:13

## 2022-06-21 RX ADMIN — ENOXAPARIN SODIUM 40 MILLIGRAM(S): 100 INJECTION SUBCUTANEOUS at 16:24

## 2022-06-21 NOTE — ED ADULT NURSE REASSESSMENT NOTE - NS ED NURSE REASSESS COMMENT FT1
Admitting medications ordered. Pt states he normally take 20mg hydrocortisone in the AM and 10mg hydrocortisone at 1600. Medications requested from pharmacy. Akira NP states do not administer 20mg hydrocortisone and this will resume tomorrow 6/22, but do administer 10mg hydrocortisone. Will administer upon retrieval from pharmacy. 1 Principal Discharge DX:	Toxic effect of pepper spray, accidental or unintentional, sequela

## 2022-06-21 NOTE — H&P ADULT - PROBLEM SELECTOR PLAN 5
History of NHL s/p radiation, malignant melanoma of the scalp s/p excision   Continue home medication Oxycontin Q12H, Tramadol PRN    #Insomnia  Continue home medication Ambien.

## 2022-06-21 NOTE — ED ADULT NURSE REASSESSMENT NOTE - NS ED NURSE REASSESS COMMENT FT1
Hourly neuro checks cancelled. Chauncey GUARDADO order pt home dose of oxycodone. Pt admitted to medicine, RTM. Pt aware of plan of care. Will continue to monitor. Hourly neuro checks cancelled. Chauncey GUARDADO order pt home dose of oxycodone. Pt admitted to medicine, RTM. Neurology at bedside- pt ambulating with steady gait. Breakfast tray provided. Pt aware of plan of care. Will continue to monitor.

## 2022-06-21 NOTE — CONSULT NOTE ADULT - ATTENDING COMMENTS
51yo R handed M with pmh of Testicular Cx s/p chemo, Migraines, HTN, Non-Hodgkins lymphoma, Melanoma w/mets, HLD, BPH, SUE, Colitis presents to [facility] for generalized LE weakness starting Sunday afternoon around 4 hrs after receiving 1st dose of shingles vaccine. Patient has known lumbar disc and osteophytic disease with penetrance of multiple nerve roots. CT head and CTA negative. Pt seen and examined on rounds, pertinent findings are right gaze nystagmus which extinguished, no dysmetria, 4+/5 left hip flexion, knee, extension, knee flexion, decreased vibration at the toes. His gait was normal, but unable to perform tandem gait, Romberg was somewhat abnormal with significant swaying. He has 3+ patellar and 4+ ankle with 2-3 beath clonus bilat.   His exam doesn't localize to any specific region which would explain his difficulty with gait. Some can be attributed to his known lumbar disease. HIs past MR lumbar spine in 1/2022 showed moderate foraminal narrowing with contact of the L3 and L4 nerve roots, but nothing significant to cause gait abnormality. Would not expect much change since then. CT thoracic and lumbar spine were also unremarkable.     Pt with gait disorder after vaccine. NO not think that this is related to vaccine, no signs or hx that would be consistent with GBS.   exam findings may localize to cerebellum vs peripheral nerve but not strongly.   In addition to peripheral neuropathy w/u would obtain MR brain w/o  PT eval. 53yo R handed M with pmh of Testicular Cx s/p chemo, Migraines, HTN, Non-Hodgkins lymphoma, Melanoma w/mets, HLD, BPH, SUE, Colitis presents to [facility] for generalized LE weakness starting Sunday afternoon around 4 hrs after receiving 1st dose of shingles vaccine. Patient has known lumbar disc and osteophytic disease with penetrance of multiple nerve roots. CT head and CTA negative. Pt seen and examined on rounds, pertinent findings are right gaze nystagmus which extinguished, no dysmetria, 4+/5 left hip flexion, knee, extension, knee flexion, decreased vibration at the toes. His gait was normal, but unable to perform tandem gait, Romberg was somewhat abnormal with significant swaying. He has 3+ patellar and 4+ ankle with 2-3 beath clonus bilat.   His exam doesn't localize to any specific region which would explain his difficulty with gait. Some can be attributed to his known lumbar disease. HIs past MR lumbar spine in 1/2022 showed moderate foraminal narrowing with contact of the L3 and L4 nerve roots, but nothing significant to cause gait abnormality. Would not expect much change since then. CT thoracic and lumbar spine were also unremarkable.     Pt with gait disorder after vaccine. NO not think that this is related to vaccine, no signs or hx that would be consistent with GBS.   exam findings may localize to cerebellum vs peripheral nerve but not strongly.   In addition to peripheral neuropathy w/u would obtain MR brain w/o although stroke is also doubtful  Will hold aspirin for now unless MRI positive.   PT eval.

## 2022-06-21 NOTE — H&P ADULT - PROBLEM SELECTOR PLAN 4
Continue home medications Cortef 20mg in the AM, 10mg in the afternoon  Also takes Vitamin D Qweekly

## 2022-06-21 NOTE — ED ADULT NURSE NOTE - PRIMARY CARE PROVIDER
Patient requested follow up with Dr Carolina Soto via [facility]  Left message for patient to call back to schedule  [place]

## 2022-06-21 NOTE — CONSULT NOTE ADULT - ASSESSMENT
Patient is a 53yo Rt handed M with pmh of Testicular Cx s/p chemo, Migraines, HTN, Non-Hodgkins lymphoma, Melanoma w/mets, HLD, BPH, SUE, Colitis presents to [facility] for generalized LE weakness. Patient states his LE weakness began on Sunday afternoon around 4 hrs after receiving 1st dose of shingles vaccine. Patient has known lumbar disc disease with penetrance of multiple nerve roots. CT head and CTA negative.       Impression   Acute onset of LE weakness 2/2 to possible metabolic vs infectious etiology in the setting of cancer hx and receiving shingles vaccination. less likely GBS given acute onset with reflexes intact   recommendation   Continue current medication regiment   B12, Methylmalonic acid, Homocysteine, B1,  folate, tsh, hgb a1c, heavy metal screen   Ct head and CTA neg  Lumbar spine imaging warranted  Cervical and thoracic spine ordered by ED will continue to follow   U/A   PT/OT   adequate fluid   correction of electrolytes as needed     Case to be discussed with Neurology Attending, Dr. Mckeon.

## 2022-06-21 NOTE — H&P ADULT - PROBLEM SELECTOR PLAN 8
Med recs done on admission per list provided by [facility]. Per pharmacist, Gabapentin and Metoprolol are no longer active meds.     ISTOP  Declan Mejia | Reference #: [id number]    Patient Name: Conrad Marie Date: 1970  Address: [address] [place] [id number]: Male  Rx Written	Rx Dispensed	Drug	Quantity	Days Supply	Prescriber Name	Prescriber Susi #	Payment Method	Dispenser  06/06/2022	06/06/2022	zolpidem tartrate 10 mg tablet	30	30	Jose Antonio Garrison MD	[id number]	Insurance	[facility] #[id number]  05/25/2022	05/25/2022	oxycontin er 10 mg tablet	60	30	Arian Carter	[id number]	Insurance	[facility] #[id number]  04/15/2022	05/24/2022	tramadol hcl 50 mg tablet	90	30	Speedy Carias	[id number]	Insurance	[facility] #[id number]  04/04/2022	05/04/2022	zolpidem tartrate 10 mg tablet	30	30	Speedy Carias	[id number]	Insurance	[facility] #[id number]  04/26/2022	04/26/2022	oxycontin er 10 mg tablet	60	30	Arian Carter	[id number]	Insurance	[facility] #[id number]  04/15/2022	04/15/2022	tramadol hcl 50 mg tablet	90	30	Speedy Carias	[id number]	Insurance	[facility] #[id number]

## 2022-06-21 NOTE — H&P ADULT - NSHPPHYSICALEXAM_GEN_ALL_CORE
Vital Signs Last 24 Hrs  T(C): 36.4 (21 Jun 2022 15:00), Max: 36.8 (21 Jun 2022 02:53)  T(F): 97.6 (21 Jun 2022 15:00), Max: 98.3 (21 Jun 2022 02:53)  HR: 71 (21 Jun 2022 15:00) (68 - 89)  BP: 94/61 (21 Jun 2022 15:00) (85/56 - 113/64)  BP(mean): 74 (21 Jun 2022 11:00) (71 - 83)  RR: 16 (21 Jun 2022 15:00) (15 - 20)  SpO2: 99% (21 Jun 2022 15:00) (96% - 100%) Vital Signs Last 24 Hrs  T(C): 36.4 (21 Jun 2022 15:00), Max: 36.8 (21 Jun 2022 02:53)  T(F): 97.6 (21 Jun 2022 15:00), Max: 98.3 (21 Jun 2022 02:53)  HR: 71 (21 Jun 2022 15:00) (68 - 89)  BP: 94/61 (21 Jun 2022 15:00) (85/56 - 113/64)  BP(mean): 74 (21 Jun 2022 11:00) (71 - 83)  RR: 16 (21 Jun 2022 15:00) (15 - 20)  SpO2: 99% (21 Jun 2022 15:00) (96% - 100%)    CONSTITUTIONAL: Well groomed, no apparent distress, sitting in stretcher   EYES: PERRLA and symmetric, EOMI, No conjunctival or scleral injection, non-icteric  ENMT: Oral mucosa with moist membranes. No external nasal lesions; normal dentition  NECK: Supple, symmetric  RESPIRATORY: No respiratory distress, no use of accessory muscles; CTA b/l, no wheezes, rales or rhonchi  CARDIOVASCULAR: RRR, +S1S2, no murmur, no rubs, no gallops; no peripheral edema  Vascular:  radial pulses palpable, dorsalis pedis pulses palpable  GASTROINTESTINAL: Soft, non tender, non distended, no rebound, no guarding; No palpable masses  MUSCULOSKELETAL: no digital clubbing or cyanosis; normal range of motion without pain. normal tone  SKIN: erythema, mild edema over left deltoid injection site   NEUROLOGIC: no gross sensory deficits. motor strength 5/5 BL UE, 4/5 BL LE  BACK: no neck, back or spinal tenderness   PSYCHIATRIC: Appropriate insight/judgment; A+O x 3, mood and affect appropriate, recent/remote memory intact

## 2022-06-21 NOTE — H&P ADULT - NSHPOUTPATIENTPROVIDERS_GEN_ALL_CORE
Chief Complaint   Patient presents with    Hypotension    Dizziness     Verified patient with two types of identifiers. Verified medications with the patient. Verified patient's pharmacy     Per Dr Carlita Winston discontinued all medications not taken. PCP Rashizabel Crenshaw

## 2022-06-21 NOTE — ED PROVIDER NOTE - CLINICAL SUMMARY MEDICAL DECISION MAKING FREE TEXT BOX
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 BP at triage 80s systolic so brought back to TrA. Rpt 110s systolic. 4/5 musle str LE bilateral but no other nueorvacs deficits. Slow to ambulate but can bear weight with assitance. Speech sounds slow as well. Unlikely neuromucular path or GBS based on history. Eval for primary neurovasc path in [place] such as stroke vs metabolic derrangement vs infectious. Check labs, cardiac enzymes, CTA head and neck, CXR, EKG, discuss with neurology.

## 2022-06-21 NOTE — ED ADULT NURSE NOTE - OBJECTIVE STATEMENT
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 Presenting to the ED for bilat lower extremity weakness and hypotension x2 days. Pt states that he got the shingles vaccine on Sunday and started to experience symptoms shortly after. Pt endorses diffculty ambulating, ambulates independently at baseline. Pt also endorses 1 episode  of chest pain at home, nonradiating with relief now at [facility] ED. Pt endorses chronic joint pains to all extremities.   AOx3, moves all extremities with weakness to lower extremities, distal pulses intact, abd soft nontender/nondistended with old surgical scar present, skin warm/dry/intact. Denies c/p, SOB, n/v/d, fevers, chills, dizziness, weakness. 2 large bore IV's placed, pt placed on cardiac monitor. Bed in lowest position, wheels locked, appropriate siderails up. Safety maintained, comfort provided. Will continue to monitor via tele.

## 2022-06-21 NOTE — ED ADULT NURSE REASSESSMENT NOTE - NS ED NURSE REASSESS COMMENT FT1
Pt complaining of shoulder pain and given 1mg of dilaudid. Systolic BP's in the 90,s MD made aware and ok to give 1mg of dilaudid with 1 liter of LR.

## 2022-06-21 NOTE — ED ADULT NURSE REASSESSMENT NOTE - NS ED NURSE REASSESS COMMENT FT1
Report received from Gonzalo RN at 0700. Pt A&Ox3 and VSS. Present to ED c/o b/l lower extremity weakness and hypotension s/p Shingles vaccine Sunday. Pt c/o chronic back pain 6/10. Awaiting pain medication order from MD. Upon exam, b/l lower extremity weakness noted. Please see neuro flow sheet in paper chart for continued neuro checks. Will continue to monitor.

## 2022-06-21 NOTE — ED ADULT TRIAGE NOTE - CHIEF COMPLAINT QUOTE
received first Shingles vaccine on Sunday; later that day pt felt weakness BLE; denies numbness or tingling; had chest pain Monday night, worse when lying down; pmh melanoma, chronic back pain

## 2022-06-21 NOTE — H&P ADULT - ASSESSMENT
Patient is a 52 year old male with past medical history of adrenal insufficiency chronic pain, testicular CA, non hodgkin's lymphoma, melanoma with mets, migraine headache, hyperlipidemia, BPH, SUE, colitis s/p resection, presented to the ED with complaint of lower extremity weakness. Patient is admitted for further evaluation and management pending neurological workup including CT spine.

## 2022-06-21 NOTE — ED ADULT NURSE REASSESSMENT NOTE - NS ED NURSE REASSESS COMMENT FT1
Pt moved from [place] A to [place]. Pt has no complaints at this time. Lunch tray provided. Will continue to monitor.

## 2022-06-21 NOTE — H&P ADULT - PROBLEM SELECTOR PLAN 1
LE weakness began on Sunday afternoon post 1st shingles vaccine dose  #Acute onset of LE weakness suspected 2/2 metabolic vs infectious etiology in the setting of cancer history and receiving shingles vaccination. Less likely GBS given acute onset with intact reflexes     CT head and CTA negative  Check B12, Methylmalonic acid, Homocysteine, B1, folate, TSH, A1C, heavy metal screen, UA  Check lumbar, cervical and thoracic spine imaging  PT/OT eval   Encourage po intake as tolerated  Monitor BMP, Mg, Phos   F/U neuro team for further recs LE weakness began on Sunday afternoon post 1st shingles vaccine dose  #Acute onset of LE weakness suspected 2/2 metabolic vs infectious etiology in the setting of cancer history and receiving shingles vaccination. Less likely GBS given acute onset with intact reflexes per neuro assessment     CT head and CTA negative  Check B12, Methylmalonic acid, Homocysteine, B1, folate, TSH, A1C, heavy metal screen, UA  Check lumbar, cervical and thoracic spine imaging  PT/OT eval   Encourage po intake as tolerated  Monitor BMP, Mg, Phos   F/U neuro team for further recs

## 2022-06-21 NOTE — ED PROVIDER NOTE - PROGRESS NOTE DETAILS
Rick Mckinley D.O., PGY3 (Resident)  CMP with hemolyzed K+. Other labs and CT nonactionable. Weakness still present. Given onc hx, will get CT T/L spine to eval for bony lesion Grossman, PGY3 - +unchanged sclerotic lesions in T and L spine on CT. pt w/ chronic low back pain reports takes oxycodone 30mg at home. will give home meds, admit. pt signed out to hospitalist

## 2022-06-21 NOTE — CONSULT NOTE ADULT - SUBJECTIVE AND OBJECTIVE BOX
[id number]  Patient is a 52y old  Male who presents with a chief complaint of   HPI:      PAST MEDICAL & SURGICAL HISTORY:  Testicular Cancer  1994, chemotherapy      Headache, Migraine      HTN (hypertension)      NHL (non-Hodgkin&#x27;s lymphoma)  1999, Radiation to left neck region      GERD (gastroesophageal reflux disease)      Colitis  surgery 1996      BPH (benign prostatic hyperplasia)      Osteoarthritis  degenerative disc L3-4      History of bone marrow transplant      Hypertriglyceridemia      Malignant melanoma of scalp  RSX 08/18  R neck mass dsx/ LN dsx 10/19/18      History of orchiectomy  left 1994      S/P colon resection  1996      Lymph node disorder  s/p abdominal lymph node dissection 1994, 1996      Melanoma of scalp or neck  excision 8/18  s/p excision right neck mass &amp; LN dissx        FAMILY HISTORY:  Hypertension (Father)      Social Hx:  Nonsmoker, no drug or alcohol use    Home Medications:  acetaminophen 325 mg oral tablet: 3 tab(s) orally every 6 hours, As Needed for pain (15 Nov 2021 07:33)  amLODIPine 5 mg oral tablet: 1 tab(s) orally once a day (15 Nov 2021 07:33)  atorvastatin 40 mg oral tablet: 1 tab(s) orally once a day (15 Nov 2021 07:33)  fenofibrate 145 mg oral tablet: 1 tab(s) orally once a day (15 Nov 2021 07:33)  hydrocortisone 10 mg oral tablet: 2 tab(s) orally once a day (15 Nov 2021 07:33)  hydrocortisone 10 mg oral tablet: 1 tab(s) orally once a day afternoon (15 Nov 2021 07:33)  levothyroxine 75 mcg (0.075 mg) oral tablet: 1 tab(s) orally once a day (15 Nov 2021 07:33)  metoprolol succinate 50 mg oral tablet, extended release: 1 tab(s) orally once a day (15 Nov 2021 07:33)  OxyCONTIN 10 mg oral tablet, extended release: 1 tab(s) orally 3 times a day, As Needed (15 Nov 2021 07:33)  Percocet 5 mg-325 mg oral tablet: 1 tab(s) orally every 6 hours, As Needed (15 Nov 2021 07:33)  senna oral tablet: 2 tab(s) orally once a day (at bedtime) (15 Nov 2021 07:33)  tamsulosin 0.4 mg oral capsule: 1 cap(s) orally once a day (15 Nov 2021 07:33)  Vascepa 1 g oral capsule: 1 cap(s) orally 3 times a day (15 Nov 2021 07:33)  zolpidem 10 mg oral tablet: 1 tab(s) orally once a day (at bedtime), As Needed (15 Nov 2021 07:33)    MEDICATIONS  (STANDING):    MEDICATIONS  (PRN):    Allergies  No Known Allergies    Intolerances      REVIEW OF SYSTEMS  General:		  Ophthalmologic:  Respiratory and Thorax:	  Cardiovascular:	  Gastrointestinal:	  Musculoskeletal:	  Neurological:	    ROS: Pertinent positives in HPI, all other ROS were reviewed and are negative.      Vital Signs Last 24 Hrs  T(C): 36.6 (21 Jun 2022 03:50), Max: 36.8 (21 Jun 2022 02:53)  T(F): 97.9 (21 Jun 2022 03:50), Max: 98.3 (21 Jun 2022 02:53)  HR: 72 (21 Jun 2022 03:50) (72 - 89)  BP: 99/68 (21 Jun 2022 03:50) (85/56 - 113/64)  BP(mean): 78 (21 Jun 2022 03:50) (78 - 78)  RR: 16 (21 Jun 2022 03:50) (16 - 20)  SpO2: 100% (21 Jun 2022 03:50) (98% - 100%)    GENERAL EXAM:  Constitutional: awake and alert. NAD  HEENT: PERRL, EOMI  Musculoskeletal: no joint swelling/tenderness, no abnormal movements  Skin: no rashes    NEUROLOGICAL EXAM:  MS: AAOX3, fluent, attends b/l; recent and remote memory intact; normal attention, language and fund of knowledge.   CN: VFF, EOMI, PERRL   V1-3 intact, no facial asymmetry, t/p midline, SCM/trap intact.  Eyes-Fundi: no papilledema.  Motor: Strength: 5/5 4x.   Tone: normal. Bulk: normal.   DTR 2+ symm.    Plantar flex b/l. Sensation: intact to LT/PP/Vibration/Position/Temperature 4x.   Coordination: intact 4x.   Gait:  Romberg negative, pull test negative; walks with narrow base, pivots in 2 steps.    NIHSS  mRS    Labs:   cbc                      15.4   8.70  )-----------( 355      ( 21 Jun 2022 03:14 )             47.3     [id number]    129<L>  |  97  |  20  ----------------------------<  118<H>  7.2<HH>   |  24  |  1.15    Ca    9.8      21 Jun 2022 03:14    TPro  8.4<H>  /  Alb  4.4  /  TBili  0.7  /  DBili  x   /  AST  51<H>  /  ALT  <5<L>  /  AlkPhos  93  06-21    CoagsPT/INR - ( 21 Jun 2022 03:14 )   PT: 14.6 sec;   INR: 1.27 ratio         PTT - ( 21 Jun 2022 03:14 )  PTT:34.5 sec    LFTsLIVER FUNCTIONS - ( 21 Jun 2022 03:14 )  Alb: 4.4 g/dL / Pro: 8.4 g/dL / ALK PHOS: 93 U/L / ALT: <5 U/L / AST: 51 U/L / GGT: x             Radiology:   [id number]  Patient is a 52y old  Male who presents with a chief complaint of   HPI:  Patient is a 51yo Rt handed M with pmh of Testicular Cx s/p chemo, Migraines, HTN, Non-Hodgkins lymphoma, Melanoma w/mets, HLD, BPH, SUE, Colitis presents to [facility] for generalized LE weakness. Patient states his LE weakness began on Sunday afternoon around 4 hrs after receiving 1st dose of shingles vaccine. Patient states he started to walk in zig zag lines. Patient states he normally is able to walk unassisted but would require effort to standup from sitting due to chronic joint pain. Patient was noted to be hypotensive on admission. Patient had CT head and CTA H/N looked unremarkable Patient has known lumbar disc disease with osteophytes protruding through multiple nerve root plexuses. Patient denies fever, chills, numbness and tingling.     PAST MEDICAL & SURGICAL HISTORY:  Testicular Cancer  1994, chemotherapy      Headache, Migraine      HTN (hypertension)      NHL (non-Hodgkin&#x27;s lymphoma)  1999, Radiation to left neck region      GERD (gastroesophageal reflux disease)      Colitis  surgery 1996      BPH (benign prostatic hyperplasia)      Osteoarthritis  degenerative disc L3-4      History of bone marrow transplant      Hypertriglyceridemia      Malignant melanoma of scalp  RSX 08/18  R neck mass dsx/ LN dsx 10/19/18      History of orchiectomy  left 1994      S/P colon resection  1996      Lymph node disorder  s/p abdominal lymph node dissection 1994, 1996      Melanoma of scalp or neck  excision 8/18  s/p excision right neck mass &amp; LN dissx        FAMILY HISTORY:  Hypertension (Father)      Social Hx:  Nonsmoker, no drug or alcohol use    Home Medications:  acetaminophen 325 mg oral tablet: 3 tab(s) orally every 6 hours, As Needed for pain (15 Nov 2021 07:33)  amLODIPine 5 mg oral tablet: 1 tab(s) orally once a day (15 Nov 2021 07:33)  atorvastatin 40 mg oral tablet: 1 tab(s) orally once a day (15 Nov 2021 07:33)  fenofibrate 145 mg oral tablet: 1 tab(s) orally once a day (15 Nov 2021 07:33)  hydrocortisone 10 mg oral tablet: 2 tab(s) orally once a day (15 Nov 2021 07:33)  hydrocortisone 10 mg oral tablet: 1 tab(s) orally once a day afternoon (15 Nov 2021 07:33)  levothyroxine 75 mcg (0.075 mg) oral tablet: 1 tab(s) orally once a day (15 Nov 2021 07:33)  metoprolol succinate 50 mg oral tablet, extended release: 1 tab(s) orally once a day (15 Nov 2021 07:33)  OxyCONTIN 10 mg oral tablet, extended release: 1 tab(s) orally 3 times a day, As Needed (15 Nov 2021 07:33)  Percocet 5 mg-325 mg oral tablet: 1 tab(s) orally every 6 hours, As Needed (15 Nov 2021 07:33)  senna oral tablet: 2 tab(s) orally once a day (at bedtime) (15 Nov 2021 07:33)  tamsulosin 0.4 mg oral capsule: 1 cap(s) orally once a day (15 Nov 2021 07:33)  Vascepa 1 g oral capsule: 1 cap(s) orally 3 times a day (15 Nov 2021 07:33)  zolpidem 10 mg oral tablet: 1 tab(s) orally once a day (at bedtime), As Needed (15 Nov 2021 07:33)    MEDICATIONS  (STANDING):    MEDICATIONS  (PRN):    Allergies  No Known Allergies    Intolerances      REVIEW OF SYSTEMS  General: Denies fever and chills	  Ophthalmologic: denies blurred vision   Respiratory and Thorax:	Denies sob   Cardiovascular:	Denies CP  Gastrointestinal:	 Denies N, V   Musculoskeletal:	 Mentions Joint pain   Neurological:	Denies headaches.     ROS: Pertinent positives in HPI, all other ROS were reviewed and are negative.      Vital Signs Last 24 Hrs  T(C): 36.6 (21 Jun 2022 03:50), Max: 36.8 (21 Jun 2022 02:53)  T(F): 97.9 (21 Jun 2022 03:50), Max: 98.3 (21 Jun 2022 02:53)  HR: 72 (21 Jun 2022 03:50) (72 - 89)  BP: 99/68 (21 Jun 2022 03:50) (85/56 - 113/64)  BP(mean): 78 (21 Jun 2022 03:50) (78 - 78)  RR: 16 (21 Jun 2022 03:50) (16 - 20)  SpO2: 100% (21 Jun 2022 03:50) (98% - 100%)    GENERAL EXAM:  Constitutional: awake and alert. NAD  HEENT: PERRL, EOMI  Musculoskeletal: no joint swelling/tenderness, no abnormal movements  Skin: no rashes    NEUROLOGICAL EXAM:  MS: AAOX3 to verbal stimulation. Speech is fluent. Follows simple and complex commands.   CN: VFF, EOMI, PERRL  V1-3 intact, no facial asymmetry, t/p midline, SCM/trap intact.  Motor: Strength: 5/5 in the UE b/l.   + 4/ 5 proximally and distally b/l.    Tone: normal. Bulk: normal.   DTR 2+ symm.  in biceps/triceps/brachoradialis/patellar b/l   Plantar flex b/l.   Sensation: intact to LT/PP/Vibration/Position/Temperature 4x.   Coordination: FTN intact b/l.   Gait:  Deferred    Labs:   cbc                      15.4   8.70  )-----------( 355      ( 21 Jun 2022 03:14 )             47.3     [id number]    129<L>  |  97  |  20  ----------------------------<  118<H>  7.2<HH>   |  24  |  1.15    Ca    9.8      21 Jun 2022 03:14    TPro  8.4<H>  /  Alb  4.4  /  TBili  0.7  /  DBili  x   /  AST  51<H>  /  ALT  <5<L>  /  AlkPhos  93  06-21    CoagsPT/INR - ( 21 Jun 2022 03:14 )   PT: 14.6 sec;   INR: 1.27 ratio         PTT - ( 21 Jun 2022 03:14 )  PTT:34.5 sec    LFTsLIVER FUNCTIONS - ( 21 Jun 2022 03:14 )  Alb: 4.4 g/dL / Pro: 8.4 g/dL / ALK PHOS: 93 U/L / ALT: <5 U/L / AST: 51 U/L / GGT: x             Radiology:  Noncontrast CT Head: No acute intracranal hemorrhage, mass effect, or   evidence of acute vascular territorial infarct. If clinical symptoms   persist or worsen, more sensitive evaluation with brain MRI may be   obtained, if no contraindications exist.    CTA Neck: No flow-limiting stenosis or evidence of acute dissection   within the cervical carotid or vertebral arteries.    CTA Head: No proximal large vessel occlusion.   [id number]  Patient is a 52y old  Male who presents with a chief complaint of   HPI:  Patient is a 51yo Rt handed M with pmh of adrenal insufficiency, Chronic pain ( Followed by [facility]),  Testicular Cx s/p chemo, Migraines ( Dr. Hawkins) , HTN, Non-Hodgkins lymphoma, Melanoma w/mets, HLD, BPH, SUE, Colitis presents to [facility] for generalized LE weakness. Patient states his LE weakness began on Sunday afternoon around 4 hrs after receiving 1st dose of shingles vaccine. Patient states he started to walk in zig zag lines. Patient states he normally is able to walk unassisted but would require effort to standup from sitting due to chronic joint pain. Patient was noted to be hypotensive on admission. Patient had CT head and CTA H/N looked unremarkable Patient has known lumbar disc disease with osteophytes protruding through multiple nerve root plexuses. Patient denies fever, chills, numbness and tingling.     PAST MEDICAL & SURGICAL HISTORY:  Testicular Cancer  1994, chemotherapy      Headache, Migraine      HTN (hypertension)      NHL (non-Hodgkin&#x27;s lymphoma)  1999, Radiation to left neck region      GERD (gastroesophageal reflux disease)      Colitis  surgery 1996      BPH (benign prostatic hyperplasia)      Osteoarthritis  degenerative disc L3-4      History of bone marrow transplant      Hypertriglyceridemia      Malignant melanoma of scalp  RSX 08/18  R neck mass dsx/ LN dsx 10/19/18      History of orchiectomy  left 1994      S/P colon resection  1996      Lymph node disorder  s/p abdominal lymph node dissection 1994, 1996      Melanoma of scalp or neck  excision 8/18  s/p excision right neck mass &amp; LN dissx        FAMILY HISTORY:  Hypertension (Father)      Social Hx:  Nonsmoker, no drug or alcohol use    Home Medications:  acetaminophen 325 mg oral tablet: 3 tab(s) orally every 6 hours, As Needed for pain (15 Nov 2021 07:33)  amLODIPine 5 mg oral tablet: 1 tab(s) orally once a day (15 Nov 2021 07:33)  atorvastatin 40 mg oral tablet: 1 tab(s) orally once a day (15 Nov 2021 07:33)  fenofibrate 145 mg oral tablet: 1 tab(s) orally once a day (15 Nov 2021 07:33)  hydrocortisone 10 mg oral tablet: 2 tab(s) orally once a day (15 Nov 2021 07:33)  hydrocortisone 10 mg oral tablet: 1 tab(s) orally once a day afternoon (15 Nov 2021 07:33)  levothyroxine 75 mcg (0.075 mg) oral tablet: 1 tab(s) orally once a day (15 Nov 2021 07:33)  metoprolol succinate 50 mg oral tablet, extended release: 1 tab(s) orally once a day (15 Nov 2021 07:33)  OxyCONTIN 10 mg oral tablet, extended release: 1 tab(s) orally 3 times a day, As Needed (15 Nov 2021 07:33)  Percocet 5 mg-325 mg oral tablet: 1 tab(s) orally every 6 hours, As Needed (15 Nov 2021 07:33)  senna oral tablet: 2 tab(s) orally once a day (at bedtime) (15 Nov 2021 07:33)  tamsulosin 0.4 mg oral capsule: 1 cap(s) orally once a day (15 Nov 2021 07:33)  Vascepa 1 g oral capsule: 1 cap(s) orally 3 times a day (15 Nov 2021 07:33)  zolpidem 10 mg oral tablet: 1 tab(s) orally once a day (at bedtime), As Needed (15 Nov 2021 07:33)    MEDICATIONS  (STANDING):    MEDICATIONS  (PRN):    Allergies  No Known Allergies    Intolerances      REVIEW OF SYSTEMS  General: Denies fever and chills	  Ophthalmologic: denies blurred vision   Respiratory and Thorax:	Denies sob   Cardiovascular:	Denies CP  Gastrointestinal:	 Denies N, V   Musculoskeletal:	 Mentions Joint pain   Neurological:	Denies headaches.     ROS: Pertinent positives in HPI, all other ROS were reviewed and are negative.      Vital Signs Last 24 Hrs  T(C): 36.6 (21 Jun 2022 03:50), Max: 36.8 (21 Jun 2022 02:53)  T(F): 97.9 (21 Jun 2022 03:50), Max: 98.3 (21 Jun 2022 02:53)  HR: 72 (21 Jun 2022 03:50) (72 - 89)  BP: 99/68 (21 Jun 2022 03:50) (85/56 - 113/64)  BP(mean): 78 (21 Jun 2022 03:50) (78 - 78)  RR: 16 (21 Jun 2022 03:50) (16 - 20)  SpO2: 100% (21 Jun 2022 03:50) (98% - 100%)    GENERAL EXAM:  Constitutional: awake and alert. NAD  HEENT: PERRL, EOMI  Musculoskeletal: no joint swelling/tenderness, no abnormal movements  Skin: no rashes    NEUROLOGICAL EXAM:  MS: AAOX3 to verbal stimulation. Speech is fluent. Follows simple and complex commands.   CN: VFF, EOMI, PERRL  V1-3 intact, no facial asymmetry, t/p midline, SCM/trap intact.  Motor: Strength: 5/5 in the UE b/l.   + 4/ 5 proximally and distally b/l.    Tone: normal. Bulk: normal.   DTR 2+ symm.  in biceps/triceps/brachoradialis/patellar b/l   Plantar flex b/l.   Sensation: intact to LT/PP/Vibration/Position/Temperature 4x.   Coordination: FTN intact b/l.   Gait:  Deferred    Labs:   cbc                      15.4   8.70  )-----------( 355      ( 21 Jun 2022 03:14 )             47.3     [id number]    129<L>  |  97  |  20  ----------------------------<  118<H>  7.2<HH>   |  24  |  1.15    Ca    9.8      21 Jun 2022 03:14    TPro  8.4<H>  /  Alb  4.4  /  TBili  0.7  /  DBili  x   /  AST  51<H>  /  ALT  <5<L>  /  AlkPhos  93  06-21    CoagsPT/INR - ( 21 Jun 2022 03:14 )   PT: 14.6 sec;   INR: 1.27 ratio         PTT - ( 21 Jun 2022 03:14 )  PTT:34.5 sec    LFTsLIVER FUNCTIONS - ( 21 Jun 2022 03:14 )  Alb: 4.4 g/dL / Pro: 8.4 g/dL / ALK PHOS: 93 U/L / ALT: <5 U/L / AST: 51 U/L / GGT: x             Radiology:  Noncontrast CT Head: No acute intracranal hemorrhage, mass effect, or   evidence of acute vascular territorial infarct. If clinical symptoms   persist or worsen, more sensitive evaluation with brain MRI may be   obtained, if no contraindications exist.    CTA Neck: No flow-limiting stenosis or evidence of acute dissection   within the cervical carotid or vertebral arteries.    CTA Head: No proximal large vessel occlusion.   [id number]  Patient is a 52y old  Male who presents with a chief complaint of   HPI:  Patient is a 51yo Rt handed M with pmh of adrenal insufficiency, Chronic pain ( Followed by [facility]),  Testicular Cx s/p chemo, Migraines ( Dr. Hawkins) , HTN, Non-Hodgkins lymphoma, Melanoma w/mets, HLD, BPH, SUE, Colitis presents to [facility] for generalized LE weakness. Patient states his LE weakness began on Sunday afternoon around 4 hrs after receiving 1st dose of shingles vaccine. Patient states he started to walk in zig zag lines. Patient states he normally is able to walk unassisted but would require effort to standup from sitting due to chronic joint pain. Patient was noted to be hypotensive on admission. Patient had CT head and CTA H/N looked unremarkable Patient has known lumbar disc disease with osteophytes protruding through multiple nerve root plexuses. Patient denies fever, chills, numbness and tingling.     PAST MEDICAL & SURGICAL HISTORY:  Testicular Cancer 1994, chemotherapy    Headache, Migraine    HTN (hypertension)    NHL (non-Hodgkin&#x27;s lymphoma)  1999, Radiation to left neck region    GERD (gastroesophageal reflux disease)    Colitis surgery 1996    BPH (benign prostatic hyperplasia)    Osteoarthritis  degenerative disc L3-4      History of bone marrow transplant      Hypertriglyceridemia      Malignant melanoma of scalp  RSX 08/18  R neck mass dsx/ LN dsx 10/19/18      History of orchiectomy left 1994      S/P colon resection 1996    Lymph node disorder  s/p abdominal lymph node dissection 1994, 1996    Melanoma of scalp or neck  excision 8/18  s/p excision right neck mass &amp; LN dissx      FAMILY HISTORY:  Hypertension (Father)      Social Hx:  Nonsmoker, no drug or alcohol use    Home Medications:  acetaminophen 325 mg oral tablet: 3 tab(s) orally every 6 hours, As Needed for pain (15 Nov 2021 07:33)  amLODIPine 5 mg oral tablet: 1 tab(s) orally once a day (15 Nov 2021 07:33)  atorvastatin 40 mg oral tablet: 1 tab(s) orally once a day (15 Nov 2021 07:33)  fenofibrate 145 mg oral tablet: 1 tab(s) orally once a day (15 Nov 2021 07:33)  hydrocortisone 10 mg oral tablet: 2 tab(s) orally once a day (15 Nov 2021 07:33)  hydrocortisone 10 mg oral tablet: 1 tab(s) orally once a day afternoon (15 Nov 2021 07:33)  levothyroxine 75 mcg (0.075 mg) oral tablet: 1 tab(s) orally once a day (15 Nov 2021 07:33)  metoprolol succinate 50 mg oral tablet, extended release: 1 tab(s) orally once a day (15 Nov 2021 07:33)  OxyCONTIN 10 mg oral tablet, extended release: 1 tab(s) orally 3 times a day, As Needed (15 Nov 2021 07:33)  Percocet 5 mg-325 mg oral tablet: 1 tab(s) orally every 6 hours, As Needed (15 Nov 2021 07:33)  senna oral tablet: 2 tab(s) orally once a day (at bedtime) (15 Nov 2021 07:33)  tamsulosin 0.4 mg oral capsule: 1 cap(s) orally once a day (15 Nov 2021 07:33)  Vascepa 1 g oral capsule: 1 cap(s) orally 3 times a day (15 Nov 2021 07:33)  zolpidem 10 mg oral tablet: 1 tab(s) orally once a day (at bedtime), As Needed (15 Nov 2021 07:33)    MEDICATIONS  (STANDING):    MEDICATIONS  (PRN):    Allergies  No Known Allergies    Intolerances      REVIEW OF SYSTEMS  General: Denies fever and chills	  Ophthalmologic: denies blurred vision   Respiratory and Thorax:	Denies sob   Cardiovascular:	Denies CP  Gastrointestinal:	 Denies N, V   Musculoskeletal:	 Mentions Joint pain   Neurological:	Denies headaches.     ROS: Pertinent positives in HPI, all other ROS were reviewed and are negative.      Vital Signs Last 24 Hrs  T(C): 36.6 (21 Jun 2022 03:50), Max: 36.8 (21 Jun 2022 02:53)  T(F): 97.9 (21 Jun 2022 03:50), Max: 98.3 (21 Jun 2022 02:53)  HR: 72 (21 Jun 2022 03:50) (72 - 89)  BP: 99/68 (21 Jun 2022 03:50) (85/56 - 113/64)  BP(mean): 78 (21 Jun 2022 03:50) (78 - 78)  RR: 16 (21 Jun 2022 03:50) (16 - 20)  SpO2: 100% (21 Jun 2022 03:50) (98% - 100%)    GENERAL EXAM:  Constitutional: awake and alert. NAD  HEENT: PERRL, EOMI  Musculoskeletal: no joint swelling/tenderness, no abnormal movements  Skin: no rashes    NEUROLOGICAL EXAM:  MS: AAOX3 to verbal stimulation. Speech is fluent. Follows simple and complex commands.   CN: VFF, EOMI, PERRL  V1-3 intact, no facial asymmetry, t/p midline, SCM/trap intact.  Motor: Strength: 5/5 in the UE b/l.   + 4/ 5 proximally and distally b/l.    Tone: normal. Bulk: normal.   DTR 2+ symm.  in biceps/triceps/brachoradialis/patellar b/l   Plantar flex b/l.   Sensation: intact to LT/PP/Vibration/Position/Temperature 4x.   Coordination: FTN intact b/l.   Gait:  Deferred    Labs:   cbc                      15.4   8.70  )-----------( 355      ( 21 Jun 2022 03:14 )             47.3     [id number]    129<L>  |  97  |  20  ----------------------------<  118<H>  7.2<HH>   |  24  |  1.15    Ca    9.8      21 Jun 2022 03:14    TPro  8.4<H>  /  Alb  4.4  /  TBili  0.7  /  DBili  x   /  AST  51<H>  /  ALT  <5<L>  /  AlkPhos  93  06-21    CoagsPT/INR - ( 21 Jun 2022 03:14 )   PT: 14.6 sec;   INR: 1.27 ratio         PTT - ( 21 Jun 2022 03:14 )  PTT:34.5 sec    LFTsLIVER FUNCTIONS - ( 21 Jun 2022 03:14 )  Alb: 4.4 g/dL / Pro: 8.4 g/dL / ALK PHOS: 93 U/L / ALT: <5 U/L / AST: 51 U/L / GGT: x             Radiology:  Noncontrast CT Head: No acute intracranal hemorrhage, mass effect, or   evidence of acute vascular territorial infarct. If clinical symptoms   persist or worsen, more sensitive evaluation with brain MRI may be   obtained, if no contraindications exist.    CTA Neck: No flow-limiting stenosis or evidence of acute dissection   within the cervical carotid or vertebral arteries.    CTA Head: No proximal large vessel occlusion.

## 2022-06-21 NOTE — H&P ADULT - HISTORY OF PRESENT ILLNESS
Patient is a 52 year old male with past medical history of adrenal insufficiency chronic pain, testicular CA, non hodgkin's lymphoma, melanoma with mets, migraine headache, hyperlipidemia, BPH, SUE, colitis s/p resection, presented to the ED with complaint of lower extremity weakness. Per patient, he started experiencing leg weakness on Sunday afternoon after receiving first dose of Shingles vaccine. His gait was unsteady and he also felt dizzy. He has some soreness at vaccine site in left arm and some weakness in both arms. Denies headache, fever, chills, syncope, fall, vision change, tinnitus, ear fullness, hearing change, change in speech, confusion, dysphagia, throat pain, cough, chest pain, dyspnea, abdominal mayuri, nausea, vomiting, change in bowel habits, melena, dysuria, hematuria, numbness, or swelling.

## 2022-06-21 NOTE — ED PROVIDER NOTE - PHYSICAL EXAMINATION
GENERAL: middle aged male, lying in bed, NAD. Vital signs are within normal limits  EYES: Conjunctiva noninjected or pale, sclera anicteric  HENT: NC/AT, moist mucous membranes  NECK: Supple, trachea midline  LUNG: Nonlabored respirations, no wheezes, rales  CV: RRR, no audible murmurs, Pulses- Radial/dorsalis pedis: 2+ bilateral and equal  ABDOMEN: Nondistended, nontender, no guarding  MSK: No visible deformities, nontender extremities, 5/5 muscle str bilateral UE, 4/5 muscle str bilateral LE  SKIN: No rashes, bruises  NEURO: AAOx4 (to person, place, time, event), no tremor, slow gait, sensation intact, reflexes at patella 2+, no clonus, no pronator drift, finger to nose and heel to shin doable but slow  -Cranial Nerves:  --CN II: PERRL 3mm  --CN III, IV, VI: EOMI bilateral no nystagmus  --CN V1-3: Facial sensation intact to touch  --CN VII: No facial asymmetry or droop  --CN VIII: Hearing intact to rubbing fingers b/l  --CN IX, X: Palate elevates symmetrically. Normal phonation  --CN XI: Heading turning and shoulder shrug intact b/l  --CN XII: Tongue midline with normal movements   PSYCH: Normal mood and affect

## 2022-06-21 NOTE — ED PROVIDER NOTE - NS ED ROS FT
CONSTITUTIONAL: No fevers, chills, fatigue, weakness, unexpected weight change  CV: No current chest pain  PULM: No shortness of breath  GI: No abdominal pain, nausea, vomiting, diarrhea, constipation, bowel incontinence  : No dysuria, polyuria, hematuria, bladder incontinence  SKIN: No rashes, swelling  MSK: + LE weakness  NEURO: No headache, numbness, tingling, sciatica, seizures, saddle anesthesia  HEME: No nodules  PSYCH: No SI

## 2022-06-21 NOTE — PATIENT PROFILE ADULT - FALL HARM RISK - RISK INTERVENTIONS
Assistance OOB with selected safe patient handling equipment/Assistance with ambulation/Communicate Fall Risk and Risk Factors to all staff, patient, and family/Discuss with provider need for PT consult/Monitor gait and stability/Reinforce activity limits and safety measures with patient and family/Visual Cue: Yellow wristband/Bed in lowest position, wheels locked, appropriate side rails in place/Call bell, personal items and telephone in reach/Instruct patient to call for assistance before getting out of bed or chair/Non-slip footwear when patient is out of bed/[place] to call system/Physically safe environment - no spills, clutter or unnecessary equipment/Purposeful Proactive Rounding/Room/bathroom lighting operational, light cord in reach

## 2022-06-21 NOTE — ED PROVIDER NOTE - OBJECTIVE STATEMENT
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 Denies saddle anesthesia, bowel/baldder dysfxn, sensory change. States difficulty walking. Denies headache, visual changes. Brief episode of chest pain yesterday, nonradiating, nonexertional, nonpleuritic lasting for seconds/minutes w/o modifying factors, w/o vomiting, diaphoresis, shortness of breath. Since resolved. 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 Denies saddle anesthesia, bowel/baldder dysfxn, sensory change. States difficulty walking. Denies headache, visual changes. Brief episode of chest pain yesterday, nonradiating, nonexertional, nonpleuritic lasting for seconds/minutes w/o modifying factors, w/o vomiting, diaphoresis, shortness of breath. Since resolved. Denies urinary complaints, other GI complaints    Attendinyo male presents with lower extremity weakness for 2 days.  occurred the same day he got his shingles vaccine.  no fever or chills.  has generalized weakness.

## 2022-06-22 LAB
A1C WITH ESTIMATED AVERAGE GLUCOSE RESULT: 5.4 % — SIGNIFICANT CHANGE UP (ref 4–5.6)
ALBUMIN SERPL ELPH-MCNC: 4 G/DL — SIGNIFICANT CHANGE UP (ref 3.3–5)
ALP SERPL-CCNC: 85 U/L — SIGNIFICANT CHANGE UP (ref 40–120)
ALT FLD-CCNC: 15 U/L — SIGNIFICANT CHANGE UP (ref 10–45)
ANION GAP SERPL CALC-SCNC: 11 MMOL/L — SIGNIFICANT CHANGE UP (ref 5–17)
AST SERPL-CCNC: 15 U/L — SIGNIFICANT CHANGE UP (ref 10–40)
BILIRUB SERPL-MCNC: 0.5 MG/DL — SIGNIFICANT CHANGE UP (ref 0.2–1.2)
BUN SERPL-MCNC: 14 MG/DL — SIGNIFICANT CHANGE UP (ref 7–23)
CALCIUM SERPL-MCNC: 9.7 MG/DL — SIGNIFICANT CHANGE UP (ref 8.4–10.5)
CHLORIDE SERPL-SCNC: 101 MMOL/L — SIGNIFICANT CHANGE UP (ref 96–108)
CO2 SERPL-SCNC: 26 MMOL/L — SIGNIFICANT CHANGE UP (ref 22–31)
CREAT SERPL-MCNC: 0.9 MG/DL — SIGNIFICANT CHANGE UP (ref 0.5–1.3)
EGFR: 103 ML/MIN/1.73M2 — SIGNIFICANT CHANGE UP
ESTIMATED AVERAGE GLUCOSE: 108 MG/DL — SIGNIFICANT CHANGE UP (ref 68–114)
FOLATE SERPL-MCNC: 8.2 NG/ML — SIGNIFICANT CHANGE UP
GLUCOSE SERPL-MCNC: 97 MG/DL — SIGNIFICANT CHANGE UP (ref 70–99)
HCT VFR BLD CALC: 45 % — SIGNIFICANT CHANGE UP (ref 39–50)
HCYS SERPL-MCNC: 10.8 UMOL/L — SIGNIFICANT CHANGE UP
HGB BLD-MCNC: 14.5 G/DL — SIGNIFICANT CHANGE UP (ref 13–17)
MAGNESIUM SERPL-MCNC: 2 MG/DL — SIGNIFICANT CHANGE UP (ref 1.6–2.6)
MCHC RBC-ENTMCNC: 28 PG — SIGNIFICANT CHANGE UP (ref 27–34)
MCHC RBC-ENTMCNC: 32.2 GM/DL — SIGNIFICANT CHANGE UP (ref 32–36)
MCV RBC AUTO: 87 FL — SIGNIFICANT CHANGE UP (ref 80–100)
NRBC # BLD: 0 /100 WBCS — SIGNIFICANT CHANGE UP (ref 0–0)
PHOSPHATE SERPL-MCNC: 3.4 MG/DL — SIGNIFICANT CHANGE UP (ref 2.5–4.5)
PLATELET # BLD AUTO: 285 K/UL — SIGNIFICANT CHANGE UP (ref 150–400)
POTASSIUM SERPL-MCNC: 4.2 MMOL/L — SIGNIFICANT CHANGE UP (ref 3.5–5.3)
POTASSIUM SERPL-SCNC: 4.2 MMOL/L — SIGNIFICANT CHANGE UP (ref 3.5–5.3)
PROT SERPL-MCNC: 7.1 G/DL — SIGNIFICANT CHANGE UP (ref 6–8.3)
RBC # BLD: 5.17 M/UL — SIGNIFICANT CHANGE UP (ref 4.2–5.8)
RBC # FLD: 13.2 % — SIGNIFICANT CHANGE UP (ref 10.3–14.5)
SODIUM SERPL-SCNC: 138 MMOL/L — SIGNIFICANT CHANGE UP (ref 135–145)
TESTOST FREE SERPL-MCNC: 1.9 PG/ML
TESTOST SERPL-MCNC: 338 NG/DL
TSH SERPL-MCNC: 6.59 UIU/ML — HIGH (ref 0.27–4.2)
UBIQUINONE10 SERPL-MCNC: 0.66 UG/ML
VIT B12 SERPL-MCNC: 455 PG/ML — SIGNIFICANT CHANGE UP (ref 232–1245)
WBC # BLD: 5.14 K/UL — SIGNIFICANT CHANGE UP (ref 3.8–10.5)
WBC # FLD AUTO: 5.14 K/UL — SIGNIFICANT CHANGE UP (ref 3.8–10.5)

## 2022-06-22 PROCEDURE — 70553 MRI BRAIN STEM W/O & W/DYE: CPT | Mod: 26

## 2022-06-22 PROCEDURE — 99233 SBSQ HOSP IP/OBS HIGH 50: CPT

## 2022-06-22 RX ADMIN — Medication 145 MILLIGRAM(S): at 14:40

## 2022-06-22 RX ADMIN — OXYCODONE HYDROCHLORIDE 10 MILLIGRAM(S): 5 TABLET ORAL at 06:00

## 2022-06-22 RX ADMIN — TRAMADOL HYDROCHLORIDE 50 MILLIGRAM(S): 50 TABLET ORAL at 14:40

## 2022-06-22 RX ADMIN — Medication 20 MILLIGRAM(S): at 05:08

## 2022-06-22 RX ADMIN — ENOXAPARIN SODIUM 40 MILLIGRAM(S): 100 INJECTION SUBCUTANEOUS at 17:59

## 2022-06-22 RX ADMIN — FINASTERIDE 5 MILLIGRAM(S): 5 TABLET, FILM COATED ORAL at 14:40

## 2022-06-22 RX ADMIN — Medication 10 MILLIGRAM(S): at 17:59

## 2022-06-22 RX ADMIN — OXYCODONE HYDROCHLORIDE 10 MILLIGRAM(S): 5 TABLET ORAL at 18:00

## 2022-06-22 RX ADMIN — ATORVASTATIN CALCIUM 40 MILLIGRAM(S): 80 TABLET, FILM COATED ORAL at 22:17

## 2022-06-22 RX ADMIN — TRAMADOL HYDROCHLORIDE 50 MILLIGRAM(S): 50 TABLET ORAL at 15:10

## 2022-06-22 RX ADMIN — AMLODIPINE BESYLATE 5 MILLIGRAM(S): 2.5 TABLET ORAL at 05:08

## 2022-06-22 RX ADMIN — Medication 100 MICROGRAM(S): at 05:08

## 2022-06-22 RX ADMIN — ZOLPIDEM TARTRATE 5 MILLIGRAM(S): 10 TABLET ORAL at 22:17

## 2022-06-22 RX ADMIN — OXYCODONE HYDROCHLORIDE 10 MILLIGRAM(S): 5 TABLET ORAL at 18:30

## 2022-06-22 RX ADMIN — OXYCODONE HYDROCHLORIDE 10 MILLIGRAM(S): 5 TABLET ORAL at 05:08

## 2022-06-22 NOTE — PHYSICAL THERAPY INITIAL EVALUATION ADULT - ADDITIONAL COMMENTS
Pt lives with wife and daughter in private house, no stairs to enter, 1 flight to bedroom, (+)rail. Was independent ambulator with no device pta. Recently limited ambulation 2/2 SOB. Negotiates stairs slowly, independently. There is a landing 3 stairs from the top where can rest if needed.  Wife is available and able to assist if needed. Copied forward from June 2021 and confirmed with pt; Pt lives with wife and daughter in private house, no stairs to enter, 1 flight to bedroom, (+)rail. Was independent ambulator with no device pta. Recently limited ambulation 2/2 SOB. Negotiates stairs slowly, independently. There is a landing 3 stairs from the top where can rest if needed.  Wife is available and able to assist if needed.

## 2022-06-22 NOTE — PHYSICAL THERAPY INITIAL EVALUATION ADULT - PRECAUTIONS/LIMITATIONS, REHAB EVAL
Hospital course;  6/21 CT head No acute intracranal hemorrhage, mass effect, or evidence of acute vascular erritorial infarct, CTA Neck No flow-limiting stenosis or evidence of acute dissection within the cervical carotid or vertebral arteries. CTA Head: No proximal large vessel occlusion. CT Spine Small sclerotic lesions in the T6, T10 and L2 vertebral bodies, as well as the right sacrum which appear unchanged from 11/6/2021. No evidence for acute displaced fracture or traumatic malalignment.

## 2022-06-22 NOTE — PROGRESS NOTE ADULT - SUBJECTIVE AND OBJECTIVE BOX
INCOMPLETE NOTE    Alicia Bullard M.D.  Division of Hospital Medicine  Available on TEAMS    Patient is a 52y old  Male who presents with a chief complaint of LE weakness (21 Jun 2022 16:01)    SUBJECTIVE / OVERNIGHT EVENTS: Patient seen and examined. No acute overnight events, no subjective complaints.    OBJECTIVE:  Vital Signs Last 24 Hrs  T(F): 98.1 (22 Jun 2022 08:51), Max: 98.4 (21 Jun 2022 21:49)  HR: 75 (22 Jun 2022 09:58) (67 - 78)  BP: 110/69 (22 Jun 2022 09:58) (87/60 - 126/82)  RR: 17 (22 Jun 2022 08:51) (16 - 18)  SpO2: 98% (22 Jun 2022 09:58) (96% - 100%)    I&O's Summary  21 Jun 2022 07:01  -  22 Jun 2022 07:00  --------------------------------------------------------  IN: 700 mL / OUT: 300 mL / NET: 400 mL    Physical Examination:  GEN: thin man, laying in stretcher in NAD  PSYCH: A&Ox3, mood and affect appear appropriate   SKIN: intact, no e/o rash  NEURO: no focal neurologic deficits appreciated; CN II-XII intact, sensation intact B/L, motor 5/5 in all mm groups  EYES: PERRL, anicteric, EOMI, no vertical/horizontal nystagmus  HEAD: NC, AT  NECK: supple  RESPI: no accessory muscle use, B/L air entry, CTAB   CARDIO: regular rate/rhythm, no LE edema B/L  ABD: soft, NT, ND, +BS  EXT: patient able to move all extremities spontaneously  VASC: peripheral pulses palpated    Labs:                      14.5   5.14  )-----------( 285      ( 22 Jun 2022 07:25 )             45.0     06-22  138  |  101  |  14  ----------------------------<  97  4.2   |  26  |  0.90    Ca    9.7      22 Jun 2022 07:22  Phos  3.4     06-22  Mg     2.0     06-22    TPro  7.1  /  Alb  4.0  /  TBili  0.5  /  DBili  x   /  AST  15  /  ALT  15  /  AlkPhos  85  06-22    PT/INR - ( 21 Jun 2022 03:14 )   PT: 14.6 sec;   INR: 1.27 ratio    PTT - ( 21 Jun 2022 03:14 )  PTT:34.5 sec    Imaging Personally Reviewed:  - CT T-Spine/L-Spine w/o contrast as reported: Small sclerotic lesions in the T6, T10 and L2 vertebral bodies, as well as the right sacrum which appear unchanged from 11/6/2021. No evidence for acute displaced fracture or traumatic malalignment. Mild degenerative spondylosis, as described above.    Consultant(s) Notes Reviewed: Neurology  Care Discussed with Consultants/Other Providers:    MEDICATIONS  (STANDING):  amLODIPine   Tablet 5 milliGRAM(s) Oral daily  atorvastatin 40 milliGRAM(s) Oral at bedtime  enoxaparin Injectable 40 milliGRAM(s) SubCutaneous every 24 hours  fenofibrate Tablet 145 milliGRAM(s) Oral daily  finasteride 5 milliGRAM(s) Oral daily  hydrocortisone 20 milliGRAM(s) Oral daily  hydrocortisone 10 milliGRAM(s) Oral daily  levothyroxine 100 MICROGram(s) Oral daily  oxyCODONE  ER Tablet 10 milliGRAM(s) Oral every 12 hours    MEDICATIONS  (PRN):  traMADol 50 milliGRAM(s) Oral every 8 hours PRN Severe Pain (7 - 10)  zolpidem 5 milliGRAM(s) Oral at bedtime PRN Insomnia  zolpidem 5 milliGRAM(s) Oral at bedtime PRN Insomnia Alicia Bullard M.D.  Division of Hospital Medicine  Available on TEAMS    Patient is a 52y old  Male who presents with a chief complaint of LE weakness (21 Jun 2022 16:01)    SUBJECTIVE / OVERNIGHT EVENTS: Patient seen and examined. No acute overnight events, no subjective complaints as patient states he feels much improved, though with mild intermittent dizziness. Gait instability has also improved today.    OBJECTIVE:  Vital Signs Last 24 Hrs  T(F): 98.1 (22 Jun 2022 08:51), Max: 98.4 (21 Jun 2022 21:49)  HR: 75 (22 Jun 2022 09:58) (67 - 78)  BP: 110/69 (22 Jun 2022 09:58) (87/60 - 126/82)  RR: 17 (22 Jun 2022 08:51) (16 - 18)  SpO2: 98% (22 Jun 2022 09:58) (96% - 100%)    I&O's Summary  21 Jun 2022 07:01  -  22 Jun 2022 07:00  --------------------------------------------------------  IN: 700 mL / OUT: 300 mL / NET: 400 mL    Physical Examination:  GEN: young man, laying in bed in NAD  PSYCH: A&Ox3, mood and affect appear appropriate   NEURO: no focal neurologic deficits appreciated, gait has improved  NECK: supple, no e/o elevated JVP  RESPI: no accessory muscle use, B/L air entry, CTAB   CARDIO: regular rate/rhythm, no LE edema B/L  ABD: soft, NT, ND  EXT: patient able to move all extremities spontaneously  VASC: peripheral pulses palpated    Labs:                      14.5   5.14  )-----------( 285      ( 22 Jun 2022 07:25 )             45.0     06-22  138  |  101  |  14  ----------------------------<  97  4.2   |  26  |  0.90    Ca    9.7      22 Jun 2022 07:22  Phos  3.4     06-22  Mg     2.0     06-22    TPro  7.1  /  Alb  4.0  /  TBili  0.5  /  DBili  x   /  AST  15  /  ALT  15  /  AlkPhos  85  06-22    PT/INR - ( 21 Jun 2022 03:14 )   PT: 14.6 sec;   INR: 1.27 ratio    PTT - ( 21 Jun 2022 03:14 )  PTT:34.5 sec    Imaging Personally Reviewed:  - CT T-Spine/L-Spine w/o contrast as reported: Small sclerotic lesions in the T6, T10 and L2 vertebral bodies, as well as the right sacrum which appear unchanged from 11/6/2021. No evidence for acute displaced fracture or traumatic malalignment. Mild degenerative spondylosis, as described above.    Consultant(s) Notes Reviewed: Neurology  Care Discussed with Consultants/Other Providers:    MEDICATIONS  (STANDING):  amLODIPine   Tablet 5 milliGRAM(s) Oral daily  atorvastatin 40 milliGRAM(s) Oral at bedtime  enoxaparin Injectable 40 milliGRAM(s) SubCutaneous every 24 hours  fenofibrate Tablet 145 milliGRAM(s) Oral daily  finasteride 5 milliGRAM(s) Oral daily  hydrocortisone 20 milliGRAM(s) Oral daily  hydrocortisone 10 milliGRAM(s) Oral daily  levothyroxine 100 MICROGram(s) Oral daily  oxyCODONE  ER Tablet 10 milliGRAM(s) Oral every 12 hours    MEDICATIONS  (PRN):  traMADol 50 milliGRAM(s) Oral every 8 hours PRN Severe Pain (7 - 10)  zolpidem 5 milliGRAM(s) Oral at bedtime PRN Insomnia  zolpidem 5 milliGRAM(s) Oral at bedtime PRN Insomnia

## 2022-06-22 NOTE — PHYSICAL THERAPY INITIAL EVALUATION ADULT - PERTINENT HX OF CURRENT PROBLEM, REHAB EVAL
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 Denies saddle anesthesia, bowel/baldder dysfxn, sensory change. States difficulty walking.

## 2022-06-23 ENCOUNTER — TRANSCRIPTION ENCOUNTER (OUTPATIENT)
Age: 52
End: 2022-06-23

## 2022-06-23 VITALS
DIASTOLIC BLOOD PRESSURE: 64 MMHG | SYSTOLIC BLOOD PRESSURE: 100 MMHG | RESPIRATION RATE: 18 BRPM | TEMPERATURE: 98 F | OXYGEN SATURATION: 100 % | HEART RATE: 79 BPM

## 2022-06-23 LAB
APPEARANCE UR: CLEAR — SIGNIFICANT CHANGE UP
BILIRUB UR-MCNC: NEGATIVE — SIGNIFICANT CHANGE UP
COLOR SPEC: SIGNIFICANT CHANGE UP
CORTICOSTEROID BIND GLOBULIN: 2.6 MG/DL
DIFF PNL FLD: NEGATIVE — SIGNIFICANT CHANGE UP
GLUCOSE UR QL: ABNORMAL
KETONES UR-MCNC: NEGATIVE — SIGNIFICANT CHANGE UP
LEUKOCYTE ESTERASE UR-ACNC: NEGATIVE — SIGNIFICANT CHANGE UP
NITRITE UR-MCNC: NEGATIVE — SIGNIFICANT CHANGE UP
PH UR: 6 — SIGNIFICANT CHANGE UP (ref 5–8)
PROT UR-MCNC: NEGATIVE — SIGNIFICANT CHANGE UP
SP GR SPEC: 1.02 — SIGNIFICANT CHANGE UP (ref 1.01–1.02)
UROBILINOGEN FLD QL: NEGATIVE — SIGNIFICANT CHANGE UP

## 2022-06-23 PROCEDURE — 96374 THER/PROPH/DIAG INJ IV PUSH: CPT

## 2022-06-23 PROCEDURE — 82962 GLUCOSE BLOOD TEST: CPT

## 2022-06-23 PROCEDURE — 80053 COMPREHEN METABOLIC PANEL: CPT

## 2022-06-23 PROCEDURE — 99233 SBSQ HOSP IP/OBS HIGH 50: CPT

## 2022-06-23 PROCEDURE — 83921 ORGANIC ACID SINGLE QUANT: CPT

## 2022-06-23 PROCEDURE — 82175 ASSAY OF ARSENIC: CPT

## 2022-06-23 PROCEDURE — 82947 ASSAY GLUCOSE BLOOD QUANT: CPT

## 2022-06-23 PROCEDURE — 82330 ASSAY OF CALCIUM: CPT

## 2022-06-23 PROCEDURE — 85730 THROMBOPLASTIN TIME PARTIAL: CPT

## 2022-06-23 PROCEDURE — 36415 COLL VENOUS BLD VENIPUNCTURE: CPT

## 2022-06-23 PROCEDURE — 70450 CT HEAD/BRAIN W/O DYE: CPT | Mod: MA

## 2022-06-23 PROCEDURE — 84443 ASSAY THYROID STIM HORMONE: CPT

## 2022-06-23 PROCEDURE — 84100 ASSAY OF PHOSPHORUS: CPT

## 2022-06-23 PROCEDURE — 85014 HEMATOCRIT: CPT

## 2022-06-23 PROCEDURE — 85025 COMPLETE CBC W/AUTO DIFF WBC: CPT

## 2022-06-23 PROCEDURE — 83735 ASSAY OF MAGNESIUM: CPT

## 2022-06-23 PROCEDURE — 84295 ASSAY OF SERUM SODIUM: CPT

## 2022-06-23 PROCEDURE — 81003 URINALYSIS AUTO W/O SCOPE: CPT

## 2022-06-23 PROCEDURE — 85610 PROTHROMBIN TIME: CPT

## 2022-06-23 PROCEDURE — 72131 CT LUMBAR SPINE W/O DYE: CPT | Mod: MA

## 2022-06-23 PROCEDURE — 70553 MRI BRAIN STEM W/O & W/DYE: CPT

## 2022-06-23 PROCEDURE — 82435 ASSAY OF BLOOD CHLORIDE: CPT

## 2022-06-23 PROCEDURE — 99285 EMERGENCY DEPT VISIT HI MDM: CPT | Mod: 25

## 2022-06-23 PROCEDURE — 84132 ASSAY OF SERUM POTASSIUM: CPT

## 2022-06-23 PROCEDURE — 85018 HEMOGLOBIN: CPT

## 2022-06-23 PROCEDURE — 97161 PT EVAL LOW COMPLEX 20 MIN: CPT

## 2022-06-23 PROCEDURE — 82746 ASSAY OF FOLIC ACID SERUM: CPT

## 2022-06-23 PROCEDURE — 85027 COMPLETE CBC AUTOMATED: CPT

## 2022-06-23 PROCEDURE — A9585: CPT

## 2022-06-23 PROCEDURE — 70496 CT ANGIOGRAPHY HEAD: CPT | Mod: MA

## 2022-06-23 PROCEDURE — 87635 SARS-COV-2 COVID-19 AMP PRB: CPT

## 2022-06-23 PROCEDURE — 70498 CT ANGIOGRAPHY NECK: CPT | Mod: MA

## 2022-06-23 PROCEDURE — 84484 ASSAY OF TROPONIN QUANT: CPT

## 2022-06-23 PROCEDURE — 82803 BLOOD GASES ANY COMBINATION: CPT

## 2022-06-23 PROCEDURE — 82607 VITAMIN B-12: CPT

## 2022-06-23 PROCEDURE — 83605 ASSAY OF LACTIC ACID: CPT

## 2022-06-23 PROCEDURE — 83036 HEMOGLOBIN GLYCOSYLATED A1C: CPT

## 2022-06-23 PROCEDURE — 72128 CT CHEST SPINE W/O DYE: CPT | Mod: MA

## 2022-06-23 PROCEDURE — 84425 ASSAY OF VITAMIN B-1: CPT

## 2022-06-23 PROCEDURE — 83090 ASSAY OF HOMOCYSTEINE: CPT

## 2022-06-23 RX ORDER — FENOFIBRATE,MICRONIZED 130 MG
1 CAPSULE ORAL
Qty: 0 | Refills: 0 | DISCHARGE
Start: 2022-06-23

## 2022-06-23 RX ORDER — LEVOTHYROXINE SODIUM 125 MCG
1 TABLET ORAL
Qty: 0 | Refills: 0 | DISCHARGE

## 2022-06-23 RX ORDER — LEVOTHYROXINE SODIUM 125 MCG
1 TABLET ORAL
Qty: 0 | Refills: 0 | DISCHARGE
Start: 2022-06-23

## 2022-06-23 RX ORDER — FENOFIBRATE,MICRONIZED 130 MG
1 CAPSULE ORAL
Qty: 0 | Refills: 0 | DISCHARGE

## 2022-06-23 RX ADMIN — Medication 145 MILLIGRAM(S): at 13:23

## 2022-06-23 RX ADMIN — Medication 100 MICROGRAM(S): at 05:26

## 2022-06-23 RX ADMIN — Medication 20 MILLIGRAM(S): at 05:27

## 2022-06-23 RX ADMIN — FINASTERIDE 5 MILLIGRAM(S): 5 TABLET, FILM COATED ORAL at 13:22

## 2022-06-23 RX ADMIN — OXYCODONE HYDROCHLORIDE 10 MILLIGRAM(S): 5 TABLET ORAL at 06:30

## 2022-06-23 RX ADMIN — OXYCODONE HYDROCHLORIDE 10 MILLIGRAM(S): 5 TABLET ORAL at 17:28

## 2022-06-23 RX ADMIN — ENOXAPARIN SODIUM 40 MILLIGRAM(S): 100 INJECTION SUBCUTANEOUS at 17:28

## 2022-06-23 RX ADMIN — OXYCODONE HYDROCHLORIDE 10 MILLIGRAM(S): 5 TABLET ORAL at 18:31

## 2022-06-23 RX ADMIN — AMLODIPINE BESYLATE 5 MILLIGRAM(S): 2.5 TABLET ORAL at 05:26

## 2022-06-23 RX ADMIN — Medication 10 MILLIGRAM(S): at 17:28

## 2022-06-23 RX ADMIN — OXYCODONE HYDROCHLORIDE 10 MILLIGRAM(S): 5 TABLET ORAL at 05:25

## 2022-06-23 NOTE — PROGRESS NOTE ADULT - PROBLEM SELECTOR PLAN 4
Continue home medications Cortef 20mg in the AM, 10mg in the afternoon  Also takes Vitamin D Qweekly
Continue home medications Cortef 20mg in the AM, 10mg in the afternoon  Also takes Vitamin D Qweekly

## 2022-06-23 NOTE — PROGRESS NOTE ADULT - SUBJECTIVE AND OBJECTIVE BOX
INCOMPLETE NOTE    Alicia Bullard M.D.  Division of Hospital Medicine  Available on TEAMS    Patient is a 52y old  Male who presents with a chief complaint of LE weakness (2022 10:02)    SUBJECTIVE / OVERNIGHT EVENTS: Patient seen and examined. No acute overnight events, no subjective complaints.    OBJECTIVE:  Vital Signs Last 24 Hrs  T(F): 97.4 (2022 09:06), Max: 98.3 (2022 12:14)  HR: 68 (2022 09:06) (68 - 91)  BP: 99/65 (2022 09:06) (99/65 - 126/78)  RR: 18 (2022 09:06) (18 - 18)  SpO2: 98% (2022 09:06) (97% - 100%)    I&O's Summary  2022 07:01  -  2022 07:00  --------------------------------------------------------  IN: 1240 mL / OUT: 450 mL / NET: 790 mL    2022 07:01  -  2022 11:09  --------------------------------------------------------  IN: 0 mL / OUT: 100 mL / NET: -100 mL    Physical Examination:  GEN: young man, laying in bed in NAD  PSYCH: A&Ox3, mood and affect appear appropriate   NEURO: no focal neurologic deficits appreciated, gait has improved  NECK: supple, no e/o elevated JVP  RESPI: no accessory muscle use, B/L air entry, CTAB   CARDIO: regular rate/rhythm, no LE edema B/L  ABD: soft, NT, ND  EXT: patient able to move all extremities spontaneously  VASC: peripheral pulses palpated    Labs:                      14.5   5.14  )-----------( 285      ( 2022 07:25 )             45.0     06-22  138  |  101  |  14  ----------------------------<  97  4.2   |  26  |  0.90    Ca    9.7      2022 07:22  Phos  3.4     -  Mg     2.0         TPro  7.1  /  Alb  4.0  /  TBili  0.5  /  DBili  x   /  AST  15  /  ALT  15  /  AlkPhos  85      Urinalysis Basic - ( 2022 06:59 )  Color: Light Yellow / Appearance: Clear / S.025 / pH: x  Gluc: x / Ketone: Negative  / Bili: Negative / Urobili: Negative   Blood: x / Protein: Negative / Nitrite: Negative   Leuk Esterase: Negative / RBC: x / WBC x   Sq Epi: x / Non Sq Epi: x / Bacteria: x    Imaging Personally Reviewed:  - MRI Brain w/ and w/o IV contrast as reported: There is no mass, intracranial hemorrhage, or acute infarction.    Consultant(s) Notes Reviewed: PT  Care Discussed with Consultants/Other Providers: MOSHE Marc    MEDICATIONS  (STANDING):  amLODIPine   Tablet 5 milliGRAM(s) Oral daily  atorvastatin 40 milliGRAM(s) Oral at bedtime  enoxaparin Injectable 40 milliGRAM(s) SubCutaneous every 24 hours  fenofibrate Tablet 145 milliGRAM(s) Oral daily  finasteride 5 milliGRAM(s) Oral daily  hydrocortisone 20 milliGRAM(s) Oral daily  hydrocortisone 10 milliGRAM(s) Oral daily  levothyroxine 100 MICROGram(s) Oral daily  oxyCODONE  ER Tablet 10 milliGRAM(s) Oral every 12 hours    MEDICATIONS  (PRN):  traMADol 50 milliGRAM(s) Oral every 8 hours PRN Severe Pain (7 - 10)  zolpidem 5 milliGRAM(s) Oral at bedtime PRN Insomnia  zolpidem 5 milliGRAM(s) Oral at bedtime PRN Insomnia Alicia Bullard M.D.  Division of Hospital Medicine  Available on TEAMS    Patient is a 52y old  Male who presents with a chief complaint of LE weakness (2022 10:02)    SUBJECTIVE / OVERNIGHT EVENTS: Patient seen and examined. No acute overnight events, no subjective complaints. Patient stable, improved, and eager to be discharged home today w/ O/P f/u w/ PMD/endo and neurology.     OBJECTIVE:  Vital Signs Last 24 Hrs  T(F): 97.4 (2022 09:06), Max: 98.3 (2022 12:14)  HR: 68 (2022 09:06) (68 - 91)  BP: 99/65 (2022 09:06) (99/65 - 126/78)  RR: 18 (2022 09:06) (18 - 18)  SpO2: 98% (2022 09:06) (97% - 100%)    I&O's Summary  2022 07:01  -  2022 07:00  --------------------------------------------------------  IN: 1240 mL / OUT: 450 mL / NET: 790 mL    2022 07:01  -  2022 11:09  --------------------------------------------------------  IN: 0 mL / OUT: 100 mL / NET: -100 mL    Physical Examination:  GEN: young man, laying in bed in NAD  PSYCH: A&Ox3, mood and affect appear appropriate   NEURO: no focal neurologic deficits appreciated, gait has improved  NECK: supple, no e/o elevated JVP  RESPI: no accessory muscle use, B/L air entry, CTAB   CARDIO: regular rate/rhythm, no LE edema B/L  ABD: soft, NT, ND  EXT: patient able to move all extremities spontaneously  VASC: peripheral pulses palpated    Labs:                      14.5   5.14  )-----------( 285      ( 2022 07:25 )             45.0     06-  138  |  101  |  14  ----------------------------<  97  4.2   |  26  |  0.90    Ca    9.7      2022 07:22  Phos  3.4     06-  Mg     2.0         TPro  7.1  /  Alb  4.0  /  TBili  0.5  /  DBili  x   /  AST  15  /  ALT  15  /  AlkPhos  85  -    Urinalysis Basic - ( 2022 06:59 )  Color: Light Yellow / Appearance: Clear / S.025 / pH: x  Gluc: x / Ketone: Negative  / Bili: Negative / Urobili: Negative   Blood: x / Protein: Negative / Nitrite: Negative   Leuk Esterase: Negative / RBC: x / WBC x   Sq Epi: x / Non Sq Epi: x / Bacteria: x    Imaging Personally Reviewed:  - MRI Brain w/ and w/o IV contrast as reported: There is no mass, intracranial hemorrhage, or acute infarction.    Consultant(s) Notes Reviewed: PT  Care Discussed with Consultants/Other Providers: MOSHE Marc    MEDICATIONS  (STANDING):  amLODIPine   Tablet 5 milliGRAM(s) Oral daily  atorvastatin 40 milliGRAM(s) Oral at bedtime  enoxaparin Injectable 40 milliGRAM(s) SubCutaneous every 24 hours  fenofibrate Tablet 145 milliGRAM(s) Oral daily  finasteride 5 milliGRAM(s) Oral daily  hydrocortisone 20 milliGRAM(s) Oral daily  hydrocortisone 10 milliGRAM(s) Oral daily  levothyroxine 100 MICROGram(s) Oral daily  oxyCODONE  ER Tablet 10 milliGRAM(s) Oral every 12 hours    MEDICATIONS  (PRN):  traMADol 50 milliGRAM(s) Oral every 8 hours PRN Severe Pain (7 - 10)  zolpidem 5 milliGRAM(s) Oral at bedtime PRN Insomnia  zolpidem 5 milliGRAM(s) Oral at bedtime PRN Insomnia

## 2022-06-23 NOTE — DISCHARGE NOTE NURSING/CASE MANAGEMENT/SOCIAL WORK - NSDCPEFALRISK_GEN_ALL_CORE
For information on Fall & Injury Prevention, visit: [email or web address] OR  [email or web address] OR  https://www.cdc.gov/steadi/patient.html

## 2022-06-23 NOTE — DISCHARGE NOTE PROVIDER - NSFOLLOWUPCLINICS_GEN_ALL_ED_FT
[facility]  Neurology  [address] [place] [address] [address] [place] [address]  Phone: [phone]  Fax:   Follow Up Time: 1 week

## 2022-06-23 NOTE — PROGRESS NOTE ADULT - PROBLEM SELECTOR PLAN 6
History of testicular CA s/p chemo, resection   Continue home medication Finasteride
History of testicular CA s/p chemo, resection   Continue home medication Finasteride

## 2022-06-23 NOTE — DISCHARGE NOTE PROVIDER - NSDCFUADDAPPT_GEN_ALL_CORE_FT
APPTS ARE READY TO BE MADE: [X] YES    Best Family or Patient Contact (if needed):    Additional Information about above appointments (if needed):    1:   2:   3:     Other comments or requests:    APPTS ARE READY TO BE MADE: [X] YES    Best Family or Patient Contact (if needed):    Additional Information about above appointments (if needed):    1: Patient was provided with follow up request details and was advised to call to schedule follow up within specified time frame.   2: Patient was provided with follow up request details and was advised to call to schedule follow up within specified time frame.   3:     Other comments or requests:

## 2022-06-23 NOTE — PROGRESS NOTE ADULT - PROBLEM SELECTOR PLAN 2
Continue home medication Amlodipine  Continue home medication Lipitor
Continue home medication Amlodipine  Continue home medication Lipitor

## 2022-06-23 NOTE — DISCHARGE NOTE PROVIDER - HOSPITAL COURSE
52yoF w/ PMHx of adrenal insufficiency chronic pain, testicular CA, non hodgkin's lymphoma, melanoma with mets, migraine headache, hyperlipidemia, BPH, SUE, colitis s/p resection, presented to the ED with complaints of lower extremity weakness. Per patient, he started experiencing leg weakness on Sunday afternoon after receiving first dose of Shingles vaccine. His gait was unsteady and he also felt dizzy. He has some soreness at vaccine site in left arm and some weakness in both arms. Denied headache, fever, chills, syncope, fall, vision change, tinnitus, ear fullness, hearing change, change in speech, confusion, dysphagia, throat pain, cough, chest pain, dyspnea, abdominal pain, nausea, vomiting, change in bowel habits, melena, dysuria, hematuria, numbness, or swelling.     Acute Gait Instability  LE weakness began on Sunday afternoon post 1st shingles vaccine dose  Acute onset of LE weakness suspected 2/2 metabolic vs infectious etiology in the setting of cancer history and receiving shingles vaccination. Less likely GBS given acute onset with intact reflexes per neuro assessment   CT head and CTA negative  TSH = 6.59  PT/OT eval - independent, no needs; gait stabilized as of 6/22  Monitor BMP, Mg, Phos   MR Brain performed, pending Neuro f/u evaluation and recommendations prior to D/C as of 6/23    Hypertension.   Continue home medication Amlodipine  Continue home medication Lipitor.    Hypothyroidism.   Continue home medication Synthroid 100mcg QD (dose recently increased from 88mcg per pharmacist)  TSH = 6.59 - f/u O/P w/ PCP within 1 week of D/C    Adrenal insufficiency.   Continue home medications Cortef 20mg in the AM, 10mg in the afternoon  Also takes Vitamin D Qweekly.    Malignant melanoma.   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    BPH (benign prostatic hyperplasia).   History of testicular CA s/p chemo, resection   Continue home medication Finasteride.   52yoF w/ PMHx of adrenal insufficiency chronic pain, testicular CA, non hodgkin's lymphoma, melanoma with mets, migraine headache, hyperlipidemia, BPH, SUE, colitis s/p resection, presented to the ED with complaints of lower extremity weakness. Per patient, he started experiencing leg weakness on Sunday afternoon after receiving first dose of Shingles vaccine. His gait was unsteady and he also felt dizzy. He has some soreness at vaccine site in left arm and some weakness in both arms. Denied headache, fever, chills, syncope, fall, vision change, tinnitus, ear fullness, hearing change, change in speech, confusion, dysphagia, throat pain, cough, chest pain, dyspnea, abdominal pain, nausea, vomiting, change in bowel habits, melena, dysuria, hematuria, numbness, or swelling.     Acute Gait Instability  LE weakness began on Sunday afternoon post 1st shingles vaccine dose  Acute onset of LE weakness suspected 2/2 metabolic vs infectious etiology in the setting of cancer history and receiving shingles vaccination. Less likely GBS given acute onset with intact reflexes per neuro assessment   CT head and CTA negative  TSH = 6.59  PT/OT eval - independent, no needs; gait stabilized as of 6/22  Monitor BMP, Mg, Phos    MRI Head :there is no mass, intracranial hemorrhage, or acute infarction.  Patient presented with gait disorder after vaccine. At this time there is no evidence for CVA and do not think that this is related to vaccine, no signs or hx that would be consistent with GBS. F/u peripheral neuropathy labs.  No further inpatient neurological workup needed.  Follow-up in general neurology clinic at [address] [place] [address] ([phone]) within 2-4 weeks for further management and care.    Hypertension.   Continue home medication Amlodipine  Continue home medication Lipitor.    Hypothyroidism.   Continue home medication Synthroid 100mcg QD (dose recently increased from 88mcg per pharmacist)  TSH = 6.59 - f/u O/P w/ PCP within 1 week of D/C    Adrenal insufficiency.   Continue home medications Cortef 20mg in the AM, 10mg in the afternoon  Also takes Vitamin D Qweekly.    Malignant melanoma.   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    BPH (benign prostatic hyperplasia).   History of testicular CA s/p chemo, resection   Continue home medication Finasteride.

## 2022-06-23 NOTE — PROGRESS NOTE ADULT - PROBLEM SELECTOR PLAN 1
LE weakness began on Sunday afternoon post 1st shingles vaccine dose  Acute onset of LE weakness suspected 2/2 metabolic vs infectious etiology in the setting of cancer history and receiving shingles vaccination. Less likely GBS given acute onset with intact reflexes per neuro assessment   CT head and CTA negative  TSH = 6.59  PT/OT eval - independent, no needs; gait stabilized as of 6/22  Monitor BMP, Mg, Phos    MRI Head :there is no mass, intracranial hemorrhage, or acute infarction.  Patient presented with gait disorder after vaccine. At this time there is no evidence for CVA and do not think that this is related to vaccine, no signs or hx that would be consistent with GBS. F/u peripheral neuropathy labs.  No further inpatient neurological workup needed.  Follow-up in general neurology clinic at [address] [place] [address] ([phone]) within 2-4 weeks for further management and care.
LE weakness began on Sunday afternoon post 1st shingles vaccine dose  #Acute onset of LE weakness suspected 2/2 metabolic vs infectious etiology in the setting of cancer history and receiving shingles vaccination. Less likely GBS given acute onset with intact reflexes per neuro assessment     CT head and CTA negative  f/u B12, Methylmalonic acid, Homocysteine, B1, folate, TSH, A1C, heavy metal screen, UA  CT imaging as above  PT/OT eval   Encourage po intake as tolerated  Monitor BMP, Mg, Phos   F/U neuro team for further recs - MRI Brain ordered as recommended

## 2022-06-23 NOTE — PROGRESS NOTE ADULT - ASSESSMENT
52yoF w/ PMHx of adrenal insufficiency chronic pain, testicular CA, non hodgkin's lymphoma, melanoma with mets, migraine headache, hyperlipidemia, BPH, SUE, colitis s/p resection, presented to the ED with complaint of lower extremity weakness, improved. 
52yoF w/ PMHx of adrenal insufficiency chronic pain, testicular CA, non hodgkin's lymphoma, melanoma with mets, migraine headache, hyperlipidemia, BPH, SUE, colitis s/p resection, presented to the ED with complaint of lower extremity weakness. Patient is admitted for further evaluation and management pending neurological workup including CT spine.

## 2022-06-23 NOTE — DISCHARGE NOTE PROVIDER - NSDCCPCAREPLAN_GEN_ALL_CORE_FT
PRINCIPAL DISCHARGE DIAGNOSIS  Diagnosis: Lower extremity weakness  Assessment and Plan of Treatment: Patient presented with gait disorder after vaccine. At this time there is no evidence for CVA( cerebral vascular accident or stroke ; and do not think that this is related to vaccine, no signs or history  that would be consistent with GBS. F/u peripheral neuropathy labs.  MRI Head :there is no mass, intracranial hemorrhage, or acute infarction.  No further inpatient neurological workup needed.  Follow-up in general neurology clinic at [address] [place] [address] ([phone]) within 2-4 weeks for further management and care.        SECONDARY DISCHARGE DIAGNOSES  Diagnosis: Hypothyroidism  Assessment and Plan of Treatment: you do not make enough thyroid hormone  signs & symptoms of low levels of thyroid hormone - tired, getting cold easily, coarse or thin hair, constipation, shortness of breath, swelling, irregular periods  your doctor will do thyroid hormone blood tests at least once a year to monitor if medication dose is adequate  take your thyroid medicine as directed by your doctor & on empty stomach      Diagnosis: Adrenal insufficiency  Assessment and Plan of Treatment: Continue home medications Cortef 20mg in the AM, 10mg in the afternoon  Also takes Vitamin D Qweekly.      Diagnosis: Malignant melanoma  Assessment and Plan of Treatment: Please follow up with your Oncologist    Diagnosis: Hypertension  Assessment and Plan of Treatment: Low salt diet  Activity as tolerated.  Take all medication as prescribed.  Follow up with your medical doctor for routine blood pressure monitoring at your next visit.  Notify your doctor if you have any of the following symptoms:   Dizziness, Lightheadedness, Blurry vision, Headache, Chest pain, Shortness of breath      Diagnosis: BPH (benign prostatic hyperplasia)  Assessment and Plan of Treatment: You have an enlarged prostate gland which gets bigger as men get older - it is a very common problem and has nothing to do with prostate cancer  Call your doctor if you are urinating more frequently, have trouble starting to urinate, have weak stream, urine leaking or dribbling, and feeling as though bladder is not empty after urination  Your doctor will monitor your prostate with a rectal exam as well as urine or blood testing  You can help yourself by reducing the amount of fluid you drink before going to bed, limiting the amount of alcohol & caffeine you drink   Avoid cold & allergy medication that contain decongestants or antihistamines which make BPH symptoms worse  You can also "double void" by waiting a moment after urinating & trying again  Take your medication as prescribed - one medication helps to relax the muscle around the urethra and the other medication you may take prevents the prostate from growing more or even shrinking the prostate      Diagnosis: History of insomnia  Assessment and Plan of Treatment: continue with ambien as needed

## 2022-06-23 NOTE — DISCHARGE NOTE PROVIDER - CARE PROVIDER_API CALL
Tammy Pickett (NP; RN)  NP in Family Health  [address] [facility], [address]  [place] [address]  Phone: [phone]  Fax: [phone]  Follow Up Time: 1-3 days

## 2022-06-23 NOTE — PROGRESS NOTE ADULT - PROBLEM SELECTOR PLAN 3
Continue home medication Synthroid 100mcg QD (dose recently increased from 88mcg per pharmacist)  Rec close f/u w/ PMD/endo within 7 days of D/C
Continue home medication Synthroid 100mcg QD (dose recently increased from 88mcg per pharmacist)

## 2022-06-23 NOTE — PROGRESS NOTE ADULT - PROBLEM SELECTOR PLAN 5
History of NHL s/p radiation, malignant melanoma of the scalp s/p excision   Continue home medication Oxycontin Q12H, Tramadol PRN  O/P f/u w/ Dr. Rosen for chronic pain management    #Insomnia  Continue home medication Ambien.
History of NHL s/p radiation, malignant melanoma of the scalp s/p excision   Continue home medication Oxycontin Q12H, Tramadol PRN    #Insomnia  Continue home medication Ambien.
oral

## 2022-06-23 NOTE — DISCHARGE NOTE NURSING/CASE MANAGEMENT/SOCIAL WORK - NSDCFUADDAPPT_GEN_ALL_CORE_FT
APPTS ARE READY TO BE MADE: [X] YES    Best Family or Patient Contact (if needed):    Additional Information about above appointments (if needed):    1:   2:   3:     Other comments or requests:

## 2022-06-23 NOTE — DISCHARGE NOTE NURSING/CASE MANAGEMENT/SOCIAL WORK - PATIENT PORTAL LINK FT
You can access the FollowMyHealth Patient Portal offered by [facility] by registering at the following website: [email or web address]. By joining [employer]’s FollowMyHealth portal, you will also be able to view your health information using other applications (apps) compatible with our system.

## 2022-06-23 NOTE — DISCHARGE NOTE PROVIDER - NSDCMRMEDTOKEN_GEN_ALL_CORE_FT
amLODIPine 5 mg oral tablet: 1 tab(s) orally once a day  atorvastatin 40 mg oral tablet: 1 tab(s) orally once a day  fenofibrate 145 mg oral tablet: 1 tab(s) orally once a day  finasteride 5 mg oral tablet: 1 tab(s) orally once a day  hydrocortisone 10 mg oral tablet: 2 tab(s) orally once a day  hydrocortisone 10 mg oral tablet: 1 tab(s) orally once a day afternoon  levothyroxine 100 mcg (0.1 mg) oral tablet: 1 tab(s) orally once a day  naloxegol 25 mg oral tablet: 1 tab(s) orally once a day  OxyCONTIN 10 mg oral tablet, extended release: 1 tab(s) orally every 12 hours  traMADol 50 mg oral tablet: 1 tab(s) orally every 8 hours, As Needed  Vitamin D3 1250 mcg (50,000 intl units) oral capsule: 1 cap(s) orally once a week  zolpidem 10 mg oral tablet: 1 tab(s) orally once a day (at bedtime), As Needed   amLODIPine 5 mg oral tablet: 1 tab(s) orally once a day  atorvastatin 40 mg oral tablet: 1 tab(s) orally once a day  fenofibrate 145 mg oral tablet: 1 tab(s) orally once a day  finasteride 5 mg oral tablet: 1 tab(s) orally once a day  hydrocortisone 10 mg oral tablet: 2 tab(s) orally once a day  hydrocortisone 10 mg oral tablet: 1 tab(s) orally once a day afternoon  levothyroxine 100 mcg (0.1 mg) oral tablet: 1 tab(s) orally once a day  OxyCONTIN 10 mg oral tablet, extended release: 1 tab(s) orally every 12 hours  traMADol 50 mg oral tablet: 1 tab(s) orally every 8 hours, As Needed  Vitamin D3 1250 mcg (50,000 intl units) oral capsule: 1 cap(s) orally once a week  zolpidem 10 mg oral tablet: 1 tab(s) orally once a day (at bedtime), As Needed

## 2022-06-23 NOTE — DISCHARGE NOTE PROVIDER - NSDCFUSCHEDAPPT_GEN_ALL_CORE_FT
[facility]  [address]  Scheduled Appointment: 08/09/2022

## 2022-06-23 NOTE — CHART NOTE - NSCHARTNOTEFT_GEN_A_CORE
Neurology chart note:    Reviewed MR brain w/o contrast:  MR Head w/wo IV Cont (06.22.22 @ 21:53)  IMPRESSION:  There is no mass, intracranial hemorrhage, or acute infarction.    Patient presented with gait disorder after vaccine. At this time there is no evidence for CVA and do not think that this is related to vaccine, no signs or hx that would be consistent with GBS. F/u peripheral neuropathy labs.    No further inpatient neurological workup needed.  Follow-up in general neurology clinic at [address] [place] [address] ([phone]) within 2-4 weeks for further management and care.    Case discussed with neurology attending Dr. Mckeon.

## 2022-06-27 LAB
ARSENIC SERPL-MCNC: 2 UG/L — SIGNIFICANT CHANGE UP (ref 0–9)
CADMIUM SERPL-MCNC: <0.5 UG/L — SIGNIFICANT CHANGE UP (ref 0–1.2)
CORTICOSTEROID BIND GLOBULIN: 2.5 MG/DL
CORTIS SERPL-MCNC: <1 UG/DL
CORTISOL, FREE: <0.02 UG/DL
LEAD BLD-MCNC: <1 UG/DL — SIGNIFICANT CHANGE UP (ref 0–4)
MERCURY SERPL-MCNC: 1 UG/L — SIGNIFICANT CHANGE UP (ref 0–14.9)
PFCX: <2.2 %

## 2022-06-28 LAB
ARSENIC UR-MCNC: 15 MCG/L — SIGNIFICANT CHANGE UP
ARSENIC/CREAT UR: 11 MCG/G CR — SIGNIFICANT CHANGE UP
CADMIUM/CREAT UR: <0.5 MCG/G CR — SIGNIFICANT CHANGE UP
CREAT UR-MCNC: 135 MG/DL — SIGNIFICANT CHANGE UP (ref 16–326)
LEAD/CREAT UR: <1 MCG/G CR — SIGNIFICANT CHANGE UP
MERCURY/CREAT UR: <2 MCG/G CR — SIGNIFICANT CHANGE UP
METHYLMALONATE SERPL-SCNC: 164 NMOL/L — SIGNIFICANT CHANGE UP (ref 0–378)

## 2022-07-06 LAB — VIT B1 SERPL-MCNC: 106 NMOL/L — SIGNIFICANT CHANGE UP (ref 66.5–200)

## 2022-08-09 ENCOUNTER — NON-APPOINTMENT (OUTPATIENT)
Age: 52
End: 2022-08-09

## 2022-08-09 ENCOUNTER — APPOINTMENT (OUTPATIENT)
Dept: PAIN MANAGEMENT | Facility: CLINIC | Age: 52
End: 2022-08-09

## 2022-08-09 VITALS
HEART RATE: 68 BPM | HEIGHT: 70 IN | DIASTOLIC BLOOD PRESSURE: 72 MMHG | SYSTOLIC BLOOD PRESSURE: 114 MMHG | WEIGHT: 150 LBS | BODY MASS INDEX: 21.47 KG/M2

## 2022-08-09 PROCEDURE — 99214 OFFICE O/P EST MOD 30 MIN: CPT

## 2022-08-09 RX ORDER — NALOXONE HYDROCHLORIDE 4 MG/.1ML
4 SPRAY NASAL
Qty: 1 | Refills: 1 | Status: ACTIVE | COMMUNITY
Start: 2022-08-09 | End: 1900-01-01

## 2022-08-09 NOTE — HISTORY OF PRESENT ILLNESS
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  He is now s/p LESI without significant improvement. he continues to have some form of pain daily, including lower back as well as knees, occasionally shoulders 4/10 on average with current pain meds but can be up 7-8/10.   Pain increased with movement, activity, performing routine tasks, transfers, bathing, dressing etc and impeding on QOL.\par \par BM daily with Movantik \par \par Prior meds:  Oxycodone 5 mg daily\par Current meds include: Oxycontin 10 mg BID, Tramadol 1-2x/day( Dr. Roberts only med that works), Zolpidem 10 mg q hs ( Dr. Roberts) , Movantik

## 2022-08-09 NOTE — ASSESSMENT
[Opioids] : Patient was explained in detail about pain control by using opioids. Patient has signed and fully understands our guidelines for medication and drug screening.  Patient understands the side effects of opioids, including, but not limited to, drug tolerance, dependence, potential for addiction. This class of drugs is habit-forming and MAKEDA regulated. The sedative effects of opioids can be potentiated by taking alcohol or any sleeping pills, along with opioids. The decision to drive is patient’s responsibility, as opioids can affect his/her driving ability and ability to concentrate. The long-term place is not clear, however, patient understands that once the pain control optimizes, the goal will be to wean off the opioids. All the issues regarding opioid treatment have been addressed satisfactorily.  [FreeTextEntry1] : Chronic pain syndrome / Lower back pain stable on current meds. \par \par Planning to see Rheum. \par naloxone prescribed for prn use at home \par UDS performed today. \par I-Stop reviewed,  reference #: [id number]\par No signs of aberrant behavior and will continue to monitor for signs of toxicity.\par Reminded to continue to avoid alcohol.\par

## 2022-08-25 ENCOUNTER — RESULT REVIEW (OUTPATIENT)
Age: 52
End: 2022-08-25

## 2022-09-13 ENCOUNTER — NON-APPOINTMENT (OUTPATIENT)
Age: 52
End: 2022-09-13

## 2022-09-13 ENCOUNTER — INPATIENT (INPATIENT)
Facility: HOSPITAL | Age: 52
LOS: 5 days | Discharge: HOME | DRG: 871 | End: 2022-09-19
Attending: INTERNAL MEDICINE | Admitting: INTERNAL MEDICINE
Payer: COMMERCIAL

## 2022-09-13 VITALS
RESPIRATION RATE: 22 BRPM | HEIGHT: 71 IN | WEIGHT: 14.99 LBS | OXYGEN SATURATION: 100 % | HEART RATE: 118 BPM | DIASTOLIC BLOOD PRESSURE: 65 MMHG | SYSTOLIC BLOOD PRESSURE: 101 MMHG | TEMPERATURE: 100 F

## 2022-09-13 DIAGNOSIS — Z98.89 OTHER SPECIFIED POSTPROCEDURAL STATES: Chronic | ICD-10-CM

## 2022-09-13 DIAGNOSIS — Z90.79 ACQUIRED ABSENCE OF OTHER GENITAL ORGAN(S): Chronic | ICD-10-CM

## 2022-09-13 DIAGNOSIS — C43.4 MALIGNANT MELANOMA OF SCALP AND NECK: Chronic | ICD-10-CM

## 2022-09-13 DIAGNOSIS — I89.9 NONINFECTIVE DISORDER OF LYMPHATIC VESSELS AND LYMPH NODES, UNSPECIFIED: Chronic | ICD-10-CM

## 2022-09-13 DIAGNOSIS — U07.1 COVID-19: ICD-10-CM

## 2022-09-13 LAB
ALBUMIN SERPL ELPH-MCNC: 3.8 G/DL — SIGNIFICANT CHANGE UP (ref 3.3–5)
ALP SERPL-CCNC: 80 U/L — SIGNIFICANT CHANGE UP (ref 40–120)
ALT FLD-CCNC: 16 U/L — SIGNIFICANT CHANGE UP (ref 10–45)
ANION GAP SERPL CALC-SCNC: 14 MMOL/L — SIGNIFICANT CHANGE UP (ref 5–17)
APTT BLD: 28.2 SEC — SIGNIFICANT CHANGE UP (ref 27.5–35.5)
AST SERPL-CCNC: 22 U/L — SIGNIFICANT CHANGE UP (ref 10–40)
B PERT DNA SPEC QL NAA+PROBE: SIGNIFICANT CHANGE UP
BASE EXCESS BLDV CALC-SCNC: 2.7 MMOL/L — SIGNIFICANT CHANGE UP (ref -2–3)
BASOPHILS # BLD AUTO: 0.01 K/UL — SIGNIFICANT CHANGE UP (ref 0–0.2)
BASOPHILS NFR BLD AUTO: 0.2 % — SIGNIFICANT CHANGE UP (ref 0–2)
BILIRUB SERPL-MCNC: 0.4 MG/DL — SIGNIFICANT CHANGE UP (ref 0.2–1.2)
BUN SERPL-MCNC: 11 MG/DL — SIGNIFICANT CHANGE UP (ref 7–23)
C PNEUM DNA SPEC QL NAA+PROBE: SIGNIFICANT CHANGE UP
CA-I SERPL-SCNC: 1.11 MMOL/L — LOW (ref 1.15–1.33)
CALCIUM SERPL-MCNC: 8.3 MG/DL — LOW (ref 8.4–10.5)
CHLORIDE BLDV-SCNC: 97 MMOL/L — SIGNIFICANT CHANGE UP (ref 96–108)
CHLORIDE SERPL-SCNC: 100 MMOL/L — SIGNIFICANT CHANGE UP (ref 96–108)
CO2 BLDV-SCNC: 31 MMOL/L — HIGH (ref 22–26)
CO2 SERPL-SCNC: 22 MMOL/L — SIGNIFICANT CHANGE UP (ref 22–31)
CREAT SERPL-MCNC: 1.15 MG/DL — SIGNIFICANT CHANGE UP (ref 0.5–1.3)
EGFR: 77 ML/MIN/1.73M2 — SIGNIFICANT CHANGE UP
EOSINOPHIL # BLD AUTO: 0.01 K/UL — SIGNIFICANT CHANGE UP (ref 0–0.5)
EOSINOPHIL NFR BLD AUTO: 0.2 % — SIGNIFICANT CHANGE UP (ref 0–6)
FLUAV H1 2009 PAND RNA SPEC QL NAA+PROBE: SIGNIFICANT CHANGE UP
FLUAV H1 RNA SPEC QL NAA+PROBE: SIGNIFICANT CHANGE UP
FLUAV H3 RNA SPEC QL NAA+PROBE: SIGNIFICANT CHANGE UP
FLUAV SUBTYP SPEC NAA+PROBE: SIGNIFICANT CHANGE UP
FLUBV RNA SPEC QL NAA+PROBE: SIGNIFICANT CHANGE UP
GAS PNL BLDV: 131 MMOL/L — LOW (ref 136–145)
GAS PNL BLDV: SIGNIFICANT CHANGE UP
GAS PNL BLDV: SIGNIFICANT CHANGE UP
GLUCOSE BLDV-MCNC: 88 MG/DL — SIGNIFICANT CHANGE UP (ref 70–99)
GLUCOSE SERPL-MCNC: 93 MG/DL — SIGNIFICANT CHANGE UP (ref 70–99)
HADV DNA SPEC QL NAA+PROBE: SIGNIFICANT CHANGE UP
HCO3 BLDV-SCNC: 29 MMOL/L — SIGNIFICANT CHANGE UP (ref 22–29)
HCOV PNL SPEC NAA+PROBE: SIGNIFICANT CHANGE UP
HCT VFR BLD CALC: 35.8 % — LOW (ref 39–50)
HCT VFR BLDA CALC: 36 % — LOW (ref 39–51)
HGB BLD CALC-MCNC: 11.9 G/DL — LOW (ref 12.6–17.4)
HGB BLD-MCNC: 11.7 G/DL — LOW (ref 13–17)
HMPV RNA SPEC QL NAA+PROBE: SIGNIFICANT CHANGE UP
HPIV1 RNA SPEC QL NAA+PROBE: SIGNIFICANT CHANGE UP
HPIV2 RNA SPEC QL NAA+PROBE: SIGNIFICANT CHANGE UP
HPIV3 RNA SPEC QL NAA+PROBE: SIGNIFICANT CHANGE UP
HPIV4 RNA SPEC QL NAA+PROBE: SIGNIFICANT CHANGE UP
IMM GRANULOCYTES NFR BLD AUTO: 0.5 % — SIGNIFICANT CHANGE UP (ref 0–1.5)
INR BLD: 1.32 RATIO — HIGH (ref 0.88–1.16)
LACTATE BLDV-MCNC: 1.2 MMOL/L — SIGNIFICANT CHANGE UP (ref 0.5–2)
LYMPHOCYTES # BLD AUTO: 1.25 K/UL — SIGNIFICANT CHANGE UP (ref 1–3.3)
LYMPHOCYTES # BLD AUTO: 19.3 % — SIGNIFICANT CHANGE UP (ref 13–44)
MCHC RBC-ENTMCNC: 28.1 PG — SIGNIFICANT CHANGE UP (ref 27–34)
MCHC RBC-ENTMCNC: 32.7 GM/DL — SIGNIFICANT CHANGE UP (ref 32–36)
MCV RBC AUTO: 85.9 FL — SIGNIFICANT CHANGE UP (ref 80–100)
MONOCYTES # BLD AUTO: 0.38 K/UL — SIGNIFICANT CHANGE UP (ref 0–0.9)
MONOCYTES NFR BLD AUTO: 5.9 % — SIGNIFICANT CHANGE UP (ref 2–14)
MRSA PCR RESULT.: SIGNIFICANT CHANGE UP
NEUTROPHILS # BLD AUTO: 4.79 K/UL — SIGNIFICANT CHANGE UP (ref 1.8–7.4)
NEUTROPHILS NFR BLD AUTO: 73.9 % — SIGNIFICANT CHANGE UP (ref 43–77)
NRBC # BLD: 0 /100 WBCS — SIGNIFICANT CHANGE UP (ref 0–0)
PCO2 BLDV: 53 MMHG — SIGNIFICANT CHANGE UP (ref 42–55)
PH BLDV: 7.35 — SIGNIFICANT CHANGE UP (ref 7.32–7.43)
PLATELET # BLD AUTO: 136 K/UL — LOW (ref 150–400)
PO2 BLDV: 26 MMHG — SIGNIFICANT CHANGE UP (ref 25–45)
POTASSIUM BLDV-SCNC: 3.3 MMOL/L — LOW (ref 3.5–5.1)
POTASSIUM SERPL-MCNC: 3.6 MMOL/L — SIGNIFICANT CHANGE UP (ref 3.5–5.3)
POTASSIUM SERPL-SCNC: 3.6 MMOL/L — SIGNIFICANT CHANGE UP (ref 3.5–5.3)
PROT SERPL-MCNC: 6.4 G/DL — SIGNIFICANT CHANGE UP (ref 6–8.3)
PROTHROM AB SERPL-ACNC: 15.3 SEC — HIGH (ref 10.5–13.4)
RAPID RVP RESULT: DETECTED
RBC # BLD: 4.17 M/UL — LOW (ref 4.2–5.8)
RBC # FLD: 13.9 % — SIGNIFICANT CHANGE UP (ref 10.3–14.5)
RV+EV RNA SPEC QL NAA+PROBE: SIGNIFICANT CHANGE UP
S AUREUS DNA NOSE QL NAA+PROBE: SIGNIFICANT CHANGE UP
SAO2 % BLDV: 39.9 % — LOW (ref 67–88)
SARS-COV-2 RNA SPEC QL NAA+PROBE: DETECTED
SODIUM SERPL-SCNC: 136 MMOL/L — SIGNIFICANT CHANGE UP (ref 135–145)
WBC # BLD: 6.47 K/UL — SIGNIFICANT CHANGE UP (ref 3.8–10.5)
WBC # FLD AUTO: 6.47 K/UL — SIGNIFICANT CHANGE UP (ref 3.8–10.5)

## 2022-09-13 PROCEDURE — 99223 1ST HOSP IP/OBS HIGH 75: CPT | Mod: GC

## 2022-09-13 PROCEDURE — 71045 X-RAY EXAM CHEST 1 VIEW: CPT | Mod: 26

## 2022-09-13 PROCEDURE — 93010 ELECTROCARDIOGRAM REPORT: CPT

## 2022-09-13 PROCEDURE — 99291 CRITICAL CARE FIRST HOUR: CPT | Mod: 25

## 2022-09-13 RX ORDER — REMDESIVIR 5 MG/ML
100 INJECTION INTRAVENOUS EVERY 24 HOURS
Refills: 0 | Status: ACTIVE | OUTPATIENT
Start: 2022-09-14 | End: 2022-09-17

## 2022-09-13 RX ORDER — ATORVASTATIN CALCIUM 80 MG/1
40 TABLET, FILM COATED ORAL AT BEDTIME
Refills: 0 | Status: DISCONTINUED | OUTPATIENT
Start: 2022-09-13 | End: 2022-09-19

## 2022-09-13 RX ORDER — GABAPENTIN 400 MG/1
300 CAPSULE ORAL
Refills: 0 | Status: DISCONTINUED | OUTPATIENT
Start: 2022-09-13 | End: 2022-09-19

## 2022-09-13 RX ORDER — PIPERACILLIN AND TAZOBACTAM 4; .5 G/20ML; G/20ML
3.38 INJECTION, POWDER, LYOPHILIZED, FOR SOLUTION INTRAVENOUS ONCE
Refills: 0 | Status: COMPLETED | OUTPATIENT
Start: 2022-09-13 | End: 2022-09-14

## 2022-09-13 RX ORDER — ESCITALOPRAM OXALATE 10 MG/1
10 TABLET, FILM COATED ORAL DAILY
Refills: 0 | Status: DISCONTINUED | OUTPATIENT
Start: 2022-09-13 | End: 2022-09-19

## 2022-09-13 RX ORDER — FENOFIBRATE,MICRONIZED 130 MG
145 CAPSULE ORAL DAILY
Refills: 0 | Status: DISCONTINUED | OUTPATIENT
Start: 2022-09-13 | End: 2022-09-19

## 2022-09-13 RX ORDER — FINASTERIDE 5 MG/1
5 TABLET, FILM COATED ORAL DAILY
Refills: 0 | Status: DISCONTINUED | OUTPATIENT
Start: 2022-09-13 | End: 2022-09-19

## 2022-09-13 RX ORDER — AMLODIPINE BESYLATE 2.5 MG/1
1 TABLET ORAL
Qty: 0 | Refills: 0 | DISCHARGE

## 2022-09-13 RX ORDER — HYDROCORTISONE 20 MG
1 TABLET ORAL
Qty: 0 | Refills: 0 | DISCHARGE

## 2022-09-13 RX ORDER — CHLORHEXIDINE GLUCONATE 213 G/1000ML
1 SOLUTION TOPICAL DAILY
Refills: 0 | Status: DISCONTINUED | OUTPATIENT
Start: 2022-09-13 | End: 2022-09-19

## 2022-09-13 RX ORDER — SODIUM CHLORIDE 9 MG/ML
1000 INJECTION, SOLUTION INTRAVENOUS
Refills: 0 | Status: DISCONTINUED | OUTPATIENT
Start: 2022-09-13 | End: 2022-09-15

## 2022-09-13 RX ORDER — SODIUM CHLORIDE 9 MG/ML
1000 INJECTION, SOLUTION INTRAVENOUS ONCE
Refills: 0 | Status: COMPLETED | OUTPATIENT
Start: 2022-09-13 | End: 2022-09-13

## 2022-09-13 RX ORDER — ENOXAPARIN SODIUM 100 MG/ML
40 INJECTION SUBCUTANEOUS EVERY 24 HOURS
Refills: 0 | Status: DISCONTINUED | OUTPATIENT
Start: 2022-09-13 | End: 2022-09-19

## 2022-09-13 RX ORDER — CHOLECALCIFEROL (VITAMIN D3) 125 MCG
1 CAPSULE ORAL
Qty: 0 | Refills: 0 | DISCHARGE

## 2022-09-13 RX ORDER — GUAIFENESIN/DEXTROMETHORPHAN 600MG-30MG
10 TABLET, EXTENDED RELEASE 12 HR ORAL
Refills: 0 | Status: DISCONTINUED | OUTPATIENT
Start: 2022-09-13 | End: 2022-09-19

## 2022-09-13 RX ORDER — IPRATROPIUM/ALBUTEROL SULFATE 18-103MCG
3 AEROSOL WITH ADAPTER (GRAM) INHALATION
Refills: 0 | Status: DISCONTINUED | OUTPATIENT
Start: 2022-09-13 | End: 2022-09-15

## 2022-09-13 RX ORDER — ACETAMINOPHEN 500 MG
975 TABLET ORAL ONCE
Refills: 0 | Status: COMPLETED | OUTPATIENT
Start: 2022-09-13 | End: 2022-09-13

## 2022-09-13 RX ORDER — PANTOPRAZOLE SODIUM 20 MG/1
40 TABLET, DELAYED RELEASE ORAL
Refills: 0 | Status: DISCONTINUED | OUTPATIENT
Start: 2022-09-13 | End: 2022-09-19

## 2022-09-13 RX ORDER — SODIUM CHLORIDE 9 MG/ML
500 INJECTION INTRAMUSCULAR; INTRAVENOUS; SUBCUTANEOUS ONCE
Refills: 0 | Status: COMPLETED | OUTPATIENT
Start: 2022-09-13 | End: 2022-09-13

## 2022-09-13 RX ORDER — ATORVASTATIN CALCIUM 80 MG/1
1 TABLET, FILM COATED ORAL
Qty: 0 | Refills: 0 | DISCHARGE

## 2022-09-13 RX ORDER — CEFEPIME 1 G/1
1000 INJECTION, POWDER, FOR SOLUTION INTRAMUSCULAR; INTRAVENOUS ONCE
Refills: 0 | Status: COMPLETED | OUTPATIENT
Start: 2022-09-13 | End: 2022-09-13

## 2022-09-13 RX ORDER — TRAMADOL HYDROCHLORIDE 50 MG/1
1 TABLET ORAL
Qty: 0 | Refills: 0 | DISCHARGE

## 2022-09-13 RX ORDER — BUDESONIDE, MICRONIZED 100 %
0.5 POWDER (GRAM) MISCELLANEOUS EVERY 12 HOURS
Refills: 0 | Status: DISCONTINUED | OUTPATIENT
Start: 2022-09-13 | End: 2022-09-19

## 2022-09-13 RX ORDER — SODIUM CHLORIDE 9 MG/ML
1000 INJECTION INTRAMUSCULAR; INTRAVENOUS; SUBCUTANEOUS
Refills: 0 | Status: DISCONTINUED | OUTPATIENT
Start: 2022-09-13 | End: 2022-09-13

## 2022-09-13 RX ORDER — FINASTERIDE 5 MG/1
1 TABLET, FILM COATED ORAL
Qty: 0 | Refills: 0 | DISCHARGE

## 2022-09-13 RX ORDER — ZOLPIDEM TARTRATE 10 MG/1
1 TABLET ORAL
Qty: 0 | Refills: 0 | DISCHARGE

## 2022-09-13 RX ORDER — REMDESIVIR 5 MG/ML
200 INJECTION INTRAVENOUS EVERY 24 HOURS
Refills: 0 | Status: COMPLETED | OUTPATIENT
Start: 2022-09-13 | End: 2022-09-13

## 2022-09-13 RX ORDER — SODIUM CHLORIDE 9 MG/ML
1000 INJECTION INTRAMUSCULAR; INTRAVENOUS; SUBCUTANEOUS ONCE
Refills: 0 | Status: COMPLETED | OUTPATIENT
Start: 2022-09-13 | End: 2022-09-13

## 2022-09-13 RX ORDER — LEVOTHYROXINE SODIUM 125 MCG
100 TABLET ORAL DAILY
Refills: 0 | Status: DISCONTINUED | OUTPATIENT
Start: 2022-09-13 | End: 2022-09-19

## 2022-09-13 RX ORDER — HYDROCORTISONE 20 MG
50 TABLET ORAL EVERY 8 HOURS
Refills: 0 | Status: DISCONTINUED | OUTPATIENT
Start: 2022-09-13 | End: 2022-09-13

## 2022-09-13 RX ORDER — PIPERACILLIN AND TAZOBACTAM 4; .5 G/20ML; G/20ML
3.38 INJECTION, POWDER, LYOPHILIZED, FOR SOLUTION INTRAVENOUS ONCE
Refills: 0 | Status: COMPLETED | OUTPATIENT
Start: 2022-09-13 | End: 2022-09-13

## 2022-09-13 RX ORDER — REMDESIVIR 5 MG/ML
INJECTION INTRAVENOUS
Refills: 0 | Status: ACTIVE | OUTPATIENT
Start: 2022-09-13 | End: 2022-09-17

## 2022-09-13 RX ORDER — VANCOMYCIN HCL 1 G
1000 VIAL (EA) INTRAVENOUS EVERY 12 HOURS
Refills: 0 | Status: DISCONTINUED | OUTPATIENT
Start: 2022-09-13 | End: 2022-09-15

## 2022-09-13 RX ORDER — TRAMADOL HYDROCHLORIDE 50 MG/1
50 TABLET ORAL ONCE
Refills: 0 | Status: DISCONTINUED | OUTPATIENT
Start: 2022-09-13 | End: 2022-09-14

## 2022-09-13 RX ORDER — PIPERACILLIN AND TAZOBACTAM 4; .5 G/20ML; G/20ML
3.38 INJECTION, POWDER, LYOPHILIZED, FOR SOLUTION INTRAVENOUS EVERY 8 HOURS
Refills: 0 | Status: DISCONTINUED | OUTPATIENT
Start: 2022-09-14 | End: 2022-09-14

## 2022-09-13 RX ORDER — IBUPROFEN 200 MG
600 TABLET ORAL ONCE
Refills: 0 | Status: COMPLETED | OUTPATIENT
Start: 2022-09-13 | End: 2022-09-13

## 2022-09-13 RX ORDER — VANCOMYCIN HCL 1 G
1000 VIAL (EA) INTRAVENOUS ONCE
Refills: 0 | Status: COMPLETED | OUTPATIENT
Start: 2022-09-13 | End: 2022-09-13

## 2022-09-13 RX ORDER — OXYCODONE HYDROCHLORIDE 5 MG/1
1 TABLET ORAL
Qty: 0 | Refills: 0 | DISCHARGE

## 2022-09-13 RX ADMIN — REMDESIVIR 200 MILLIGRAM(S): 5 INJECTION INTRAVENOUS at 20:06

## 2022-09-13 RX ADMIN — SODIUM CHLORIDE 500 MILLILITER(S): 9 INJECTION INTRAMUSCULAR; INTRAVENOUS; SUBCUTANEOUS at 15:02

## 2022-09-13 RX ADMIN — Medication 10 MILLILITER(S): at 19:04

## 2022-09-13 RX ADMIN — Medication 600 MILLIGRAM(S): at 10:22

## 2022-09-13 RX ADMIN — Medication 250 MILLIGRAM(S): at 13:26

## 2022-09-13 RX ADMIN — Medication 20 MILLIGRAM(S): at 23:14

## 2022-09-13 RX ADMIN — SODIUM CHLORIDE 1000 MILLILITER(S): 9 INJECTION, SOLUTION INTRAVENOUS at 16:05

## 2022-09-13 RX ADMIN — SODIUM CHLORIDE 100 MILLILITER(S): 9 INJECTION, SOLUTION INTRAVENOUS at 16:05

## 2022-09-13 RX ADMIN — Medication 0.5 MILLIGRAM(S): at 19:05

## 2022-09-13 RX ADMIN — Medication 10 MILLILITER(S): at 23:13

## 2022-09-13 RX ADMIN — Medication 20 MILLIGRAM(S): at 19:05

## 2022-09-13 RX ADMIN — ATORVASTATIN CALCIUM 40 MILLIGRAM(S): 80 TABLET, FILM COATED ORAL at 23:13

## 2022-09-13 RX ADMIN — ENOXAPARIN SODIUM 40 MILLIGRAM(S): 100 INJECTION SUBCUTANEOUS at 19:05

## 2022-09-13 RX ADMIN — SODIUM CHLORIDE 100 MILLILITER(S): 9 INJECTION, SOLUTION INTRAVENOUS at 22:32

## 2022-09-13 RX ADMIN — PIPERACILLIN AND TAZOBACTAM 200 GRAM(S): 4; .5 INJECTION, POWDER, LYOPHILIZED, FOR SOLUTION INTRAVENOUS at 22:29

## 2022-09-13 RX ADMIN — Medication 600 MILLIGRAM(S): at 11:00

## 2022-09-13 RX ADMIN — SODIUM CHLORIDE 500 MILLILITER(S): 9 INJECTION INTRAMUSCULAR; INTRAVENOUS; SUBCUTANEOUS at 13:50

## 2022-09-13 RX ADMIN — Medication 125 MILLIGRAM(S): at 13:50

## 2022-09-13 RX ADMIN — SODIUM CHLORIDE 100 MILLILITER(S): 9 INJECTION INTRAMUSCULAR; INTRAVENOUS; SUBCUTANEOUS at 14:03

## 2022-09-13 RX ADMIN — GABAPENTIN 300 MILLIGRAM(S): 400 CAPSULE ORAL at 17:59

## 2022-09-13 RX ADMIN — Medication 975 MILLIGRAM(S): at 14:02

## 2022-09-13 RX ADMIN — SODIUM CHLORIDE 100 MILLILITER(S): 9 INJECTION, SOLUTION INTRAVENOUS at 19:27

## 2022-09-13 RX ADMIN — Medication 975 MILLIGRAM(S): at 12:10

## 2022-09-13 RX ADMIN — SODIUM CHLORIDE 100 MILLILITER(S): 9 INJECTION INTRAMUSCULAR; INTRAVENOUS; SUBCUTANEOUS at 15:06

## 2022-09-13 RX ADMIN — CEFEPIME 100 MILLIGRAM(S): 1 INJECTION, POWDER, FOR SOLUTION INTRAMUSCULAR; INTRAVENOUS at 12:35

## 2022-09-13 RX ADMIN — Medication 3 MILLILITER(S): at 23:16

## 2022-09-13 RX ADMIN — SODIUM CHLORIDE 1000 MILLILITER(S): 9 INJECTION INTRAMUSCULAR; INTRAVENOUS; SUBCUTANEOUS at 11:20

## 2022-09-13 RX ADMIN — SODIUM CHLORIDE 1000 MILLILITER(S): 9 INJECTION INTRAMUSCULAR; INTRAVENOUS; SUBCUTANEOUS at 10:23

## 2022-09-13 NOTE — ED PROVIDER NOTE - NS ED ROS FT
Constitutional: + fevers no chills.   CV: no chest pain, no palpitations   Respiratory: +SOB +cough   GI: no abdominal pain, no nausea no vomiting   Neuro: no headache, + weakness, no numbness.  all other ROS is negative except as documented as HPI.

## 2022-09-13 NOTE — ED PROVIDER NOTE - PHYSICAL EXAMINATION
Const: no acute distress, Well-developed, chronicially ill appearing,   Eyes: no conjunctival injection and no scleral icterus   ENMT: dry mucus membranes  CVS: +S1/S2 tachycardic, radial/DP pulse 2+ bilaterally  RESP: Unlabored respiratory effort, diminished breath sounds at the bases  GI: Nontender/Nondistended soft abdomen, no CVA tenderness   MSK: Extremities w/o deformity or ttp, no lower extremity edema   Psych: Awake, Alert, & Orientedx3;  Appropriate mood and affect, cooperative

## 2022-09-13 NOTE — ED ADULT NURSE NOTE - OBJECTIVE STATEMENT
0945 52 yr old WM brought to ER via ambulance on stretcher for further eval and tx of SOB. Diagnosed with COVID 19 yesterday. Pulse ox was 89-93% on room air per EMS and pt was hypotensive and tachycardic. Took tylenol at home. A&Ox4. Received 900cc bolus enroute to ER. IV intact #18 LACF. PMH Lymphoma. Last chemo 6 months ago. Fall risk precautions maintained. contact and airborne isolation maintained. Pulse ox 100% on 2lpm NC

## 2022-09-13 NOTE — ED PROVIDER NOTE - OBJECTIVE STATEMENT
52 M former smoker 20 pack years with a PMH of testicular ca s/p orchiectomy, HTN, HLD, melanoma on immunotherapy (right chest wall mediport--> now removed), hypothyroidism secondary adrenal insufficiency, presents to the ER w/ cough and sob. sunday had body aches and felt cold, that night pt had a rapid covid test and was positive, on monday pt reports body aches, and he wasn't talking, pt states he had a sore throat, took temp on 9/12 was 100.7 took APAP, this AM family spoke w/ Dr. Mcginnis and recommended pt present to the hospital. pt states he felt week this AM, was able to ambulate down a flight of stairs and was weak, pt had a positive contact on 9/8/2022. pt is not on oxygen. bromphenir-pheudoephrine on cough medication. pt states that he was positive for covid yesterday, no cp, no lightheadedness, states trouble breathing dec po intake, no nausea no vomiting no abd pain, spoke w/ physician who referred to the Er pt states he took 30 mg of hydrocortisone today. pt w/ genearlized body aches and weakness. pt  upon arrival, pt w/ fever temp 37.7,

## 2022-09-13 NOTE — ED ADULT NURSE REASSESSMENT NOTE - NS ED NURSE REASSESS COMMENT FT1
Report received from RN LAUREN. Pt is A&Ox3, pt is sitting restfully on stretcher, pt was readjusted for comfort. No further RN interventions needed at this time.

## 2022-09-13 NOTE — ED PROVIDER NOTE - CRITICAL CARE ATTENDING CONTRIBUTION TO CARE
This was a shared visit with the ABDELRAHMAN. I reviewed and verified the documentation and independently performed the documented:     History, Exam and Medical Decision Making.     Attending Contribution to Care: see note.

## 2022-09-13 NOTE — H&P ADULT - ASSESSMENT
52 M former smoker 20 pack years with a PMH of testicular ca s/p orchiectomy, HTN, HLD, melanoma on immunotherapy (right chest wall mediport--> now removed), hypothyroidism secondary adrenal insufficiency, presents to the ER w/ cough and sob. sunday had body aches and felt cold, that night pt had a rapid covid test and was positive, on monday pt reports body aches, and he wasn't talking, pt states he had a sore throat, took temp on 9/12 was 100.7 took APAP, this AM family spoke w/ Dr. Mcginnis and recommended pt present to the hospital. pt states he felt week this AM, was able to ambulate down a flight of stairs and was weak, pt had a positive contact on 9/8/2022. pt is not on oxygen. bromphenir-pheudoephrine on cough medication. pt states that he was positive for covid yesterday, no cp, no lightheadedness, states trouble breathing dec po intake, no nausea no vomiting no abd pain, spoke w/ physician who referred to the Er pt states he took 30 mg of hydrocortisone today. pt w/ genearlized body aches and weakness. pt  upon arrival, pt w/ fever temp 37.7,    covid 19 infection  - keep O2 saturation above 92%  - remdesivir vs paxlovid  - id consult called  - follow up inflammatory markers    hypotension and shock  - adrenal insuff  - stress dose steroids    hypothyroid  - c/w synthroid  - TSH    HTN  - hold home BP meds due to hypotension    dvt px  - lovenox

## 2022-09-13 NOTE — CONSULT NOTE ADULT - ATTENDING COMMENTS
Attending Attestation:    Patient seen and examined with resident/fellow.  Agree with above except as noted.  51 yo male with h/o testicular cancer s/p orchiectomy, melanoma on chronic steroids for adrenal insufficiency. now presents to ED with fever cough and sore throat. Pt says he aches everywhere.  He is not SOB but coughing. Pt has been exposed to his mother who had covid 5 days ago. Pt had symptoms starting  Sunday and was advised to come to ED by Dr. Mcginnis.  In ED pt found to be hypotensive and given 2 liters of NS. On POCUS exam ther is normal LV systolic function,  with A line predominant Chest. No consolidation or B lines. IVC is on the smaller size ~1.5 cm    51 yo male on chronic steroids for adrenal insufficieny now remain  borderline hypotensive  with Covid infection. Last MAP 65.    Recommend:  1. Pt still clinically dehydrated. Would give addition al 1-2 liters of LR not NS.  2.Stress does steroids with hydrocortisone. NOT solumedrol. Hydrocortisone 50mg Q 6 hrs.  3. Oxygenation 96-97% room air. No need for decadron for covid. CXR is normal  4. Stop antibiotics. No evidence of bacterial infection. No pneumonia.  5.Consider Paxlovid . Pt symptomatic x 3 days.  6. Control fever with Tylenol and NSAIDs as long as renal function intact.    Pt is not a MICU consult at this time.       This patient is critically ill and required frequent bedside visits with therapy change.  Total critical care time spent was __ minutes.

## 2022-09-13 NOTE — ED PROVIDER NOTE - ATTENDING APP SHARED VISIT CONTRIBUTION OF CARE
52 M former smoker 20 pack years with a PMH of testicular ca s/p orchiectomy, HTN, HLD, melanoma on immunotherapy (right chest wall mediport--> now removed), hypothyroidism secondary adrenal  insufficiency, presents to the ER w/ cough and sob, pt states that he was positive for covid yesterday, no cp, no lightheadedness, states trouble breathing dec po intake, no nausea no vomiting no abd pain, spoke w/ physician who referred to the Er pt states he took 30 mg of hydrocortisone today. pt w/ genearlized body aches and weakness, On exam, pt is awake and alert in no distress, has clear lungs soft abdomen no lower ext edema, pt w/ 5/5 upper and lower extremity strength oriented x3, slow to respond. Plan for labs imaging and admission for covied positive, was initially hypotensive per medics received 900 cc of IVF, pt  upon arrival, pt w/ fever temp 37.7, plan for imaging and reassessment. see note

## 2022-09-13 NOTE — CHART NOTE - NSCHARTNOTEFT_GEN_A_CORE
Patient Name: Conrad Marie Date: 1970  Address: [address] [place] [id number]: Male  Rx Written	Rx Dispensed	Drug	Quantity	Days Supply	Prescriber Name	Prescriber Susi #	Payment Method	Dispenser  09/07/2022	09/07/2022	zolpidem tartrate 10 mg tablet	30	30	Yueh, Carrie	[id number]	Insurance	[facility] #[id number]  09/07/2022	09/07/2022	tramadol hcl 50 mg tablet	90	30	Yueh, Carrie	[id number]	Insurance	[facility] #[id number]  08/09/2022	08/17/2022	oxycontin er 10 mg tablet	60	30	Arian Carter	[id number]	Insurance	[facility] #[id number]  07/30/2022	08/03/2022	tramadol hcl 50 mg tablet	90	30	Yueh, Carrie	[id number]	Insurance	[facility] #[id number]  07/30/2022	07/30/2022	zolpidem tartrate 10 mg tablet	30	30	Yueh, Carrie	[id number]	Insurance	[facility] #[id number]  07/19/2022	07/19/2022	oxycontin er 10 mg tablet	60	30	Arian Carter	[id number]	Insurance	[facility] #[id number]  06/16/2022	06/22/2022	oxycontin er 10 mg tablet	60	30	Arian Carter	[id number]	Insurance	[facility] #[id number]  06/06/2022	06/21/2022	tramadol hcl 50 mg tablet	60	30	Jose Antonio Garrison MD	[id number]	Insurance	[facility] #[id number]  06/06/2022	06/06/2022	zolpidem tartrate 10 mg tablet	30	30	Jose Antonio Garrison MD	[id number]	Insurance	[facility] #[id number]  05/25/2022	05/25/2022	oxycontin er 10 mg tablet	60	30	Arian Carter	[id number]	Insurance	[facility] #[id number]  04/15/2022	05/24/2022	tramadol hcl 50 mg tablet	90	30	Speedy Carias	[id number]	Insurance	[facility] #[id number]  04/04/2022	05/04/2022	zolpidem tartrate 10 mg tablet	30	30	Speedy Carias	[id number]	Insurance	[facility] #[id number]  04/26/2022	04/26/2022	oxycontin er 10 mg tablet	60	30	Arian Carter	[id number]	Insurance	[facility] #[id number]  04/15/2022	04/15/2022	tramadol hcl 50 mg tablet	90	30	ArethaSpeedy	[id number]	Insurance	[facility] #[id number]  04/04/2022	04/04/2022	zolpidem tartrate 10 mg tablet	30	30	ArethaSpeedy	[id number]	Insurance	[facility] #[id number]  03/24/2022	03/25/2022	oxycontin er 10 mg tablet	60	30	Arian Carter	[id number]	Insurance	[facility] #[id number]  03/07/2022	03/07/2022	oxycontin er 10 mg tablet	30	15	Arian Carter	[id number]	Insurance	[facility] #[id number]  03/04/2022	03/04/2022	tramadol hcl 50 mg tablet	90	30	ArethaSpeedy	[id number]	Insurance	[facility] #[id number]  03/04/2022	03/04/2022	zolpidem tartrate 10 mg tablet	30	30	ArethaSpeedy	[id number]	Insurance	[facility] #[id number]  11/10/2021	02/01/2022	zolpidem tartrate 10 mg tablet	30	30	ArethaSpeedy	[id number]	Insurance	[facility] #[id number]  02/01/2022	02/01/2022	oxycontin er 10 mg tablet	60	30	Arian Carter	[id number]	Insurance	[facility] #[id number]  11/10/2021	01/28/2022	tramadol hcl 50 mg tablet	120	30	ArethaSpeedy	[id number]	Insurance	[facility] #[id number]  11/10/2021	01/01/2022	zolpidem tartrate 10 mg tablet	30	30	ArethaSpeedy	[id number]	Insurance	[facility] #[id number]  12/30/2021	12/30/2021	oxycontin er 10 mg tablet	60	30	Brook Munoz NP	[id number]	Insurance	[facility] #[id number]  12/30/2021	12/30/2021	oxycodone hcl (ir) 5 mg tablet	15	15	Brook Munoz NP	[id number]	Insurance	[facility] #[id number]  11/10/2021	12/22/2021	tramadol hcl 50 mg tablet	120	30	ArethaSpeedy MAGNOLIA	[id number]	Insurance	[facility] #[id number]  11/10/2021	12/02/2021	zolpidem tartrate 10 mg tablet	30	30	Speedy Carias MAGNOLIA	[id number]	Insurance	[facility] #[id number]  11/29/2021	11/29/2021	oxycontin er 10 mg tablet	60	30	Rosen-Amos Cano MD	[id number]	Insurance	[facility] #[id number]  11/29/2021	11/29/2021	oxycodone hcl (ir) 5 mg tablet	15	15	Rosen-Amos Cano MD	[id number]	Insurance	[facility] #[id number]  11/10/2021	11/10/2021	tramadol hcl 50 mg tablet	120	30	ArethaSpeedy MAGNOLIA	[id number]	Insurance	[facility] #[id number]  09/01/2021	11/03/2021	zolpidem tartrate 10 mg tablet	30	30	ArethaSpeedy MAGNOLIA	[id number]	Insurance	[facility] #[id number]  10/28/2021	10/28/2021	oxycontin er 10 mg tablet	60	30	Rosen-Amos Cano MD	[id number]	[facility] #[id number]  10/28/2021	10/28/2021	oxycodone hcl (ir) 5 mg tablet	15	15	Amos Dominguez MD	[id number]	[facility] #[id number]  10/14/2021	10/17/2021	testosterone 1.62%(1.25 g) pkt	38gm	30	Radha Kaplan	[id number]	[facility] #[id number]  09/01/2021	10/04/2021	zolpidem tartrate 10 mg tablet	30	30	Aretha Speedy MAGNOLIA	[id number]	Insurance	[facility] #[id number]  09/27/2021	09/27/2021	oxycontin er 10 mg tablet	60	30	Silas-Amos Cano MD	[id number]	Insurance	[facility] #[id number]  09/27/2021	09/27/2021	oxycodone hcl (ir) 5 mg tablet	15	15	Silas-Amos Cano MD	[id number]	[facility] #[id number]  09/14/2021	09/14/2021	oxycontin er 10 mg tablet	30	15	Arian Carter	[id number]	[facility] #[id number]  * - Drugs marked with an asterisk are compound drugs. If the compound drug is made up of more than one controlled substance, then each controlled substance will be a separate row in the

## 2022-09-13 NOTE — ED PROVIDER NOTE - NS ED ATTENDING STATEMENT MOD
This was a shared visit with the ABDELRAHMAN. I reviewed and verified the documentation and independently performed the documented: I have personally provided the amount of critical care time documented below excluding time spent on separate procedures.

## 2022-09-13 NOTE — CONSULT NOTE ADULT - SUBJECTIVE AND OBJECTIVE BOX
CHIEF COMPLAINT:    HPI:  52M Hx testicular ca s/p orchiectomy, HTN, HLD, melanoma on immunotherapy, hypothyroidism w/ secondary adrenal insufficiency, p/w cough and sob after COVID+. MICU consulted for hypotension after 1.9L bolus, vanc and cefepime i/s/o PCT 1.23 and given solumedrol 125. Patient mentating and overall complaining w/ body aches.     PAST MEDICAL & SURGICAL HISTORY:  Testicular Cancer  1994, chemotherapy      Headache, Migraine      HTN (hypertension)      NHL (non-Hodgkin&#x27;s lymphoma)  1999, Radiation to left neck region      GERD (gastroesophageal reflux disease)      Colitis  surgery 1996      BPH (benign prostatic hyperplasia)      Osteoarthritis  degenerative disc L3-4      History of bone marrow transplant      Hypertriglyceridemia      Malignant melanoma of scalp  RSX 08/18  R neck mass dsx/ LN dsx 10/19/18      History of orchiectomy  left 1994      S/P colon resection  1996      Lymph node disorder  s/p abdominal lymph node dissection 1994, 1996      Melanoma of scalp or neck  excision 8/18  s/p excision right neck mass &amp; LN dissx          FAMILY HISTORY:  Hypertension (Father)      Allergies    No Known Allergies    Intolerances        HOME MEDICATIONS:    REVIEW OF SYSTEMS:  CONSTITUTIONAL: +weakness, +fevers, +chills, +sick contacts. No unintended weight loss  EYES: No visual changes or vertigo  ENT: +throat pain. No rhinorrhea, or hearing loss   NECK: No pain or stiffness  RESPIRATORY: No cough, wheezing, hemoptysis; No shortness of breath  CARDIOVASCULAR: No chest pain or palpitations  GASTROINTESTINAL: No abdominal or epigastric pain. No nausea, vomiting, or hematemesis; No diarrhea or constipation. No melena or hematochezia.  GENITOURINARY: No dysuria, frequency or hematuria  NEUROLOGICAL: No numbness or weakness  SKIN: No itching, rashes, or bruises  Psych: Good mood, no substance use    OBJECTIVE:  ICU Vital Signs Last 24 Hrs  T(C): 36.9 (13 Sep 2022 17:39), Max: 38.8 (13 Sep 2022 09:55)  T(F): 98.5 (13 Sep 2022 17:39), Max: 101.9 (13 Sep 2022 09:55)  HR: 95 (13 Sep 2022 18:45) (92 - 124)  BP: 98/65 (13 Sep 2022 18:45) (86/58 - 127/71)  BP(mean): 69 (13 Sep 2022 13:52) (65 - 80)  ABP: --  ABP(mean): --  RR: 22 (13 Sep 2022 17:39) (22 - 32)  SpO2: 100% (13 Sep 2022 17:39) (97% - 100%)    O2 Parameters below as of 13 Sep 2022 17:39  Patient On (Oxygen Delivery Method): nasal cannula  O2 Flow (L/min): 2            CAPILLARY BLOOD GLUCOSE          PHYSICAL EXAM:  GENERAL: NAD, well-groomed, well-developed, warm to touch   HEAD:  Atraumatic, Normocephalic  EYES: EOMI, PERRLA, conjunctiva and sclera clear  CHEST/LUNG: Clear to auscultation bilaterally; No rales, rhonchi, wheezing, or rubs  HEART: Tachycardic rate and rhythm; No murmurs, rubs, or gallops  ABDOMEN: Soft, Nontender, Nondistended; Bowel sounds present  NERVOUS SYSTEM:  Alert & Oriented X4, Good concentration  PSYCH: Normal Affect. Speaking in Full Sentences. Laying in bed comfortably; not agitated     HOSPITAL MEDICATIONS:  MEDICATIONS  (STANDING):  albuterol/ipratropium for Nebulization 3 milliLiter(s) Nebulizer <User Schedule>  atorvastatin 40 milliGRAM(s) Oral at bedtime  buDESOnide    Inhalation Suspension 0.5 milliGRAM(s) Inhalation every 12 hours  chlorhexidine 2% Cloths 1 Application(s) Topical daily  enoxaparin Injectable 40 milliGRAM(s) SubCutaneous every 24 hours  escitalopram 10 milliGRAM(s) Oral daily  fenofibrate Tablet 145 milliGRAM(s) Oral daily  finasteride 5 milliGRAM(s) Oral daily  gabapentin 300 milliGRAM(s) Oral two times a day  guaifenesin/dextromethorphan Oral Liquid 10 milliLiter(s) Oral four times a day  lactated ringers. 1000 milliLiter(s) (100 mL/Hr) IV Continuous <Continuous>  levothyroxine 100 MICROGram(s) Oral daily  methylPREDNISolone sodium succinate Injectable 20 milliGRAM(s) IV Push every 6 hours  pantoprazole    Tablet 40 milliGRAM(s) Oral before breakfast  piperacillin/tazobactam IVPB. 3.375 Gram(s) IV Intermittent once  piperacillin/tazobactam IVPB.- 3.375 Gram(s) IV Intermittent once  remdesivir  IVPB   IV Intermittent   remdesivir  IVPB 200 milliGRAM(s) IV Intermittent every 24 hours  vancomycin  IVPB 1000 milliGRAM(s) IV Intermittent every 12 hours    MEDICATIONS  (PRN):      LABS:                        11.7   6.47  )-----------( 136      ( 13 Sep 2022 10:15 )             35.8     09-13    136  |  100  |  11  ----------------------------<  93  3.6   |  22  |  1.15    Ca    8.3<L>      13 Sep 2022 10:15    TPro  6.4  /  Alb  3.8  /  TBili  0.4  /  DBili  x   /  AST  22  /  ALT  16  /  AlkPhos  80  09-13    PT/INR - ( 13 Sep 2022 11:03 )   PT: 15.3 sec;   INR: 1.32 ratio         PTT - ( 13 Sep 2022 11:03 )  PTT:28.2 sec      Venous Blood Gas:  09-13 @ 10:20  7.35/53/26/29/39.9  VBG Lactate: 1.2      MICROBIOLOGY:     RADIOLOGY:  [ ] Reviewed and interpreted by me    EKG:

## 2022-09-13 NOTE — H&P ADULT - NSHPLABSRESULTS_GEN_ALL_CORE
LABS:                        11.7   6.47  )-----------( 136      ( 13 Sep 2022 10:15 )             35.8     09-13    136  |  100  |  11  ----------------------------<  93  3.6   |  22  |  1.15    Ca    8.3<L>      13 Sep 2022 10:15    TPro  6.4  /  Alb  3.8  /  TBili  0.4  /  DBili  x   /  AST  22  /  ALT  16  /  AlkPhos  80  09-13    PT/INR - ( 13 Sep 2022 11:03 )   PT: 15.3 sec;   INR: 1.32 ratio         PTT - ( 13 Sep 2022 11:03 )  PTT:28.2 sec          RADIOLOGY & ADDITIONAL TESTS:

## 2022-09-13 NOTE — CONSULT NOTE ADULT - ASSESSMENT
Full Note to Follow:   Recommendations:     Recommend full stress dose hydrocortisone 50 q6; additional 2L LR, and fever control to stem vasodilation     Currently not a micu candidate at this time, please feel free to reconsult as needed 52M Hx testicular ca s/p orchiectomy, HTN, HLD, melanoma on immunotherapy, hypothyroidism w/ secondary adrenal insufficiency, p/w cough and sob after COVID+. MICU consulted for hypotension.     Shock State:   Likely multifactorial but not actually requiring O2, CXR clear   Recommend full stress dose hydrocortisone 50 q6; additional 2L LR, and fever control to stem vasodilation, no need for abx at this time for any lung pathology. Given within 5 days of symptoms, consider paxlovid?     Currently not a micu candidate at this time, please feel free to reconsult as needed

## 2022-09-13 NOTE — ED ADULT NURSE NOTE - ED STAT RN HANDOFF DETAILS
hand off given to oncoming RN's Yolanda Martines and Tiffany Hidalgo. Pt A&Ox4. EKG being done. remains tachypneic

## 2022-09-13 NOTE — ED PROVIDER NOTE - CLINICAL SUMMARY MEDICAL DECISION MAKING FREE TEXT BOX
pt pcp is dr. Carrie Roberts  Plan for labs and imaging and admission for covid hypoxic, on RA pt follows w/ Dr. smalls pt pcp is dr. Carrie Roberts  Plan for labs and imaging and admission for covid initially hypotensive,  hypoxic athome, pt chronically ill appearing, found to have elevated procalcitonin, covered w/ antibiotics, pt on chronic steroids will admit to hospital,

## 2022-09-13 NOTE — CONSULT NOTE ADULT - ASSESSMENT
PLAN/RECOMMENDATIONS:    stress dose steroids/fluid resuscitaton/antipyretics ASSESSMENT:    52 year old gentleman, former smoker, without history of intrinsic lung disease. He has history of melanoma of the scalp s/p resection including a right sided lymph node dissection now on immunotherapy due to metastatic disease, testicular cancer s/p orchiectomy followed by chemotherapy and bone marrow transplantation and non Hodgkin's lymphoma s/p resection and radiation therapy. He has adrenal insufficiency dye to immunotherapy and chronic pain. The patient comes to the ER with fatigue, malaise, myalgias and weakness following a shingles vaccine. He tested positive for COVID 2 days ago. His gait has been unsteady and he has been lightheaded. The patient has mild shortness of breath and hypoxemia on room air. He has a cough productive copious amounts of sputum which he is having difficulty expectorating. He has chest congestion and wheeze. He has fevers, chills and sweats. No chest pain/pressure or palpitations. RVP -> COVID-10 infection. In the ER the patient was found to be hypotensive being treated with stress dose steroids and IV fluids.    markedly immunocompromised host following bone marrow transplantion ~ 25 years ago for recurrent testicular cancer, lymphoma s/p resection and radiation therapy and metastatic melanoma currently on immunotherapy    admitted with shock in the setting of COVID infection and adrenal insufficiency on chronic hydrocortisone    PLAN/RECOMMENDATIONS:    admit to telemetry with close hemodynamic monitoring - low threshold for ICU transfer  oxygen supplementation to keep saturation greater than 92% - currently on a 2lpm nasal canula  VBG - 7.35/53/26/29/39.9 c/w a mild acute respiratory acidosis - no indication for NIV at this point - follow closely for increased work of breathing, resistant hypoxemia or worsening respiratory acidosis  would continue vanco/zosyn pending a chest CT to better evaluate the lung parenchyma given hemodynamic instability and elevated procalcitonin level  remdesivir x 5 days - follow renal and liver function  solumedrol 20mg IVP q6h for stress dose steroids, bronchospasm and COVID with hypoxemia  diligent DVT prophylaxis - SQ lovenox  albuterol/atrovent nebs q6h  pulmicort 0.5mg nebs q12h - give after duoneb - rinse mouth after use  acapella device  following inflammatory markers - CRP - d-dimer - ferritin  stress dose steroids/fluid resuscitaton/antipyretics  cardiac meds: norvasc/toprol XL/tricor/tricor - holding antihypertensive medications  GI/DVT prophylaxis - protonix/SQ lovenox  continue lexapro/proscar/gabapentin/synthroid - holding narcotics watching for withdrawal    Thank you for the courtesy of this referral. Plan of care discussed with the patient, his wife and his RN at bedside and with the dedicated floor NP. Patient is critically ill and is at risk of rapid progression of his COVID infection    Ellis Lorenzo MD, [facility]  [phone]  Pulmonary Medicine   ASSESSMENT:    52 year old gentleman, former smoker, without history of intrinsic lung disease. He has history of melanoma of the scalp s/p resection including a right sided lymph node dissection now on immunotherapy due to metastatic disease, testicular cancer s/p orchiectomy followed by chemotherapy and bone marrow transplantation and non Hodgkin's lymphoma s/p resection and radiation therapy. He has adrenal insufficiency dye to immunotherapy and chronic pain. The patient comes to the ER with fatigue, malaise, myalgias and weakness following a shingles vaccine. He tested positive for COVID 2 days ago. His gait has been unsteady and he has been lightheaded. The patient has mild shortness of breath and hypoxemia on room air. He has a cough productive copious amounts of sputum which he is having difficulty expectorating. He has chest congestion and wheeze. He has fevers, chills and sweats. No chest pain/pressure or palpitations. RVP -> COVID-10 infection. In the ER the patient was found to be hypotensive being treated with stress dose steroids and IV fluids.    markedly immunocompromised host following bone marrow transplantion ~ 25 years ago for recurrent testicular cancer, lymphoma s/p resection and radiation therapy and metastatic melanoma currently on immunotherapy    admitted with shock in the setting of COVID infection and adrenal insufficiency on chronic hydrocortisone    PLAN/RECOMMENDATIONS:    admit to telemetry with close hemodynamic monitoring - low threshold for ICU transfer  oxygen supplementation to keep saturation greater than 92% - currently on a 2lpm nasal canula  VBG - 7.35/53/26/29/39.9 c/w a mild acute respiratory acidosis - no indication for NIV at this point - follow closely for increased work of breathing, resistant hypoxemia or worsening respiratory acidosis  would continue vanco/zosyn pending a chest CT to better evaluate the lung parenchyma given hemodynamic instability and elevated procalcitonin level  remdesivir x 5 days - follow renal and liver function  solumedrol 20mg IVP q6h for stress dose steroids, bronchospasm and COVID with hypoxemia  diligent DVT prophylaxis - SQ lovenox  albuterol/atrovent nebs q4h holding 2am dose  pulmicort 0.5mg nebs q12h - give after duoneb - rinse mouth after use  acapella device  following inflammatory markers - CRP - d-dimer - ferritin  stress dose steroids/fluid resuscitaton/antipyretics  cardiac meds: norvasc/toprol XL/tricor/tricor - holding antihypertensive medications  GI/DVT prophylaxis - protonix/SQ lovenox  continue lexapro/proscar/gabapentin/synthroid - holding narcotics watching for withdrawal    Thank you for the courtesy of this referral. Plan of care discussed with the patient, his wife and his RN at bedside and with the dedicated floor NP. Patient is critically ill and is at risk of rapid progression of his COVID infection    Ellis Lorenzo MD, [facility]  [phone]  Pulmonary Medicine   ASSESSMENT:    52 year old gentleman, former smoker, without history of intrinsic lung disease. He has history of melanoma of the scalp s/p resection including a right sided lymph node dissection now on immunotherapy due to metastatic disease, testicular cancer s/p orchiectomy followed by chemotherapy and bone marrow transplantation and non Hodgkin's lymphoma s/p resection and radiation therapy. He has adrenal insufficiency dye to immunotherapy and chronic pain. The patient comes to the ER with fatigue, malaise, myalgias and weakness following a shingles vaccine. He tested positive for COVID 2 days ago. His gait has been unsteady and he has been lightheaded. The patient has mild shortness of breath and hypoxemia on room air. He has a cough productive copious amounts of sputum which he is having difficulty expectorating. He has chest congestion and wheeze. He has fevers, chills and sweats. No chest pain/pressure or palpitations. RVP -> COVID-10 infection. In the ER the patient was found to be hypotensive being treated with stress dose steroids and IV fluids.    markedly immunocompromised host following bone marrow transplantion ~ 25 years ago for recurrent testicular cancer, lymphoma s/p resection and radiation therapy and metastatic melanoma currently on immunotherapy    admitted with shock in the setting of COVID infection and adrenal insufficiency maintained on chronic hydrocortisone - viral induced bronchospasm - weak cough - mucous plugging - mild hypoxemia - no evidence of pneumonia on an AP portable CXR    PLAN/RECOMMENDATIONS:    admit to telemetry with close hemodynamic monitoring - low threshold for ICU transfer  oxygen supplementation to keep saturation greater than 92% - currently on a 2lpm nasal canula  VBG - 7.35/53/26/29/39.9 c/w a mild acute respiratory acidosis - no indication for NIV at this point - follow closely for increased work of breathing, resistant hypoxemia or worsening respiratory acidosis  would continue vanco/zosyn pending a chest CT to better evaluate the lung parenchyma given hemodynamic instability and elevated procalcitonin level  remdesivir x 5 days - follow renal and liver function  solumedrol 20mg IVP q6h for stress dose steroids, bronchospasm and COVID with hypoxemia  diligent DVT prophylaxis - SQ lovenox  albuterol/atrovent nebs q4h holding 2am dose  pulmicort 0.5mg nebs q12h - give after duoneb - rinse mouth after use  acapella device  following inflammatory markers - CRP - d-dimer - ferritin  stress dose steroids/fluid resuscitation/antipyretics  cardiac meds: norvasc/toprol XL/tricor/tricor - holding antihypertensive medications  GI/DVT prophylaxis - protonix/SQ lovenox  continue lexapro/proscar/gabapentin/synthroid - holding narcotics watching for withdrawal    Thank you for the courtesy of this referral. Plan of care discussed with the patient, his wife and his RN at bedside and with the dedicated floor NP. Patient is critically ill and is at risk of rapid progression of his COVID infection    Ellis Lorenzo MD, [facility]  [phone]  Pulmonary Medicine

## 2022-09-13 NOTE — ED PROVIDER NOTE - PROGRESS NOTE DETAILS
Spoke with Dr. Mitchell who accepted pt for admission  Will give abx given elevated procal  Vasiligalo Osman PA-C MAP at 65, will stress dose, and give 500 cc IVF bolus, spoke w/ Dr mitchell recommending MICU consultation prior to transfer to the floor, MAP currently 67, micu called per Dr. Mitchell recommendation, pt not on pressors, wife called, no response Spoke with MICU, not candidate for ICU  Recommending switching NS maintenance to LR. Also recommended additional LR bolus.   Stress dose steroids of IV hydrocortisone 50mg q8hrs  No additional abx at this time. Advised ID cx for paxlovid given sxs within window of onset  Spoke with Dr. Mitchell who recommended admission on tele  Raul Osman PA-C

## 2022-09-13 NOTE — ED ADULT NURSE REASSESSMENT NOTE - NS ED NURSE REASSESS COMMENT FT1
called admitting MD Butler, updated on BP after bolus and informed her that pt is only complaining of muscle/joint pain and is requesting pain medication. MD will speak to attending and will get back to me in regards to next plan of care.

## 2022-09-13 NOTE — CONSULT NOTE ADULT - CONSULT REASON
COVID-19 infection COVID-19 infection; immunocompromised host shock; COVID-19 infection; bronchospasm; weak cough; mucous plugging; immunocompromised host; adrenal insufficiency

## 2022-09-13 NOTE — CONSULT NOTE ADULT - SUBJECTIVE AND OBJECTIVE BOX
[facility] - [facility] - CONSULT NOTE    HPI: 52 year old gentleman, former smoker, without history of intrinsic lung disease. He has obesity related obstructive sleep apnea. The patient has a history of melanoma of the scalp s/p resection now on immunotherapy due to metastatic disease, testicular cancer s/p orchiectomy followed by chemotherapy and non Hodgkin's lymphoma s/p resection and radiation therapy. He has adrenal insufficiency and chronic pain. The patient comes to the ER with fatigue, malaise, myalgias and weakness following a shingles vaccine. He tested positive for COVID 2 days ago. His gait has been unsteady and he has been lightheaded. The patient has mild shortness of breath and mild hypoxemia on room air. He has a cough productive of scant sputum production without hemoptysis, chest congestion or wheeze. He has fevers, chills and sweats. No chest pain/pressure or palpitations. RVP -> COVID-10 infection. Asked to evaluate.     PMHX:  NHL (non-Hodgkin's lymphoma)  Testicular Cancer - 1994  Malignant melanoma of scalp - metastatic   HTN (hypertension)  HLD (hypertriglyceridemia)  Hypothyroidism  Colitis  Migraine headache  [place]  GERD (gastroesophageal reflux disease)  BPH (benign prostatic hyperplasia)  Osteoarthritis  Adrenal insufficiency  First degree heart block    PSHX:  Orchiectomy - 1994  Colon resection - 1996  Lymph node dissection 2018  Melanoma of scalp or neck resection - 2018      FAMILY HISTORY:  father - HTN    SOCIAL HISTORY:  former smoker    Pulmonary Medications:       Antimicrobials:      Cardiology:      Other:  atorvastatin 40 milliGRAM(s) Oral at bedtime  chlorhexidine 2% Cloths 1 Application(s) Topical daily  enoxaparin Injectable 40 milliGRAM(s) SubCutaneous every 24 hours  escitalopram 10 milliGRAM(s) Oral daily  fenofibrate Tablet 145 milliGRAM(s) Oral daily  finasteride 5 milliGRAM(s) Oral daily  gabapentin 300 milliGRAM(s) Oral two times a day  hydrocortisone sodium succinate Injectable 50 milliGRAM(s) IV Push every 8 hours  lactated ringers. 1000 milliLiter(s) IV Continuous <Continuous>  levothyroxine 100 MICROGram(s) Oral daily  pantoprazole    Tablet 40 milliGRAM(s) Oral before breakfast    Allergies    No Known Allergies    HOME MEDICATIONS: see  H & P    REVIEW OF SYSTEMS:  Constitutional: As per HPI  HEENT: Within normal limits  CV: As per HPI  Resp: As per HPI  GI: Within normal limits   : Within normal limits  Musculoskeletal: Within normal limits  Skin: Within normal limits  Neurological: Within normal limits  Psychiatric: Within normal limits  Endocrine: Within normal limits  Hematologic/Lymphatic: Within normal limits  Allergic/Immunologic: Within normal limits    [x] All other systems negative    OBJECTIVE:    Daily Height in cm: 180.34 (13 Sep 2022 09:40)      PHYSICAL EXAM:  ICU Vital Signs Last 24 Hrs  T(C): 37.2 (13 Sep 2022 13:52), Max: 38.8 (13 Sep 2022 09:55)  T(F): 99 (13 Sep 2022 13:52), Max: 101.9 (13 Sep 2022 09:55)  HR: 118 (13 Sep 2022 13:52) (115 - 124)  BP: 89/59 (13 Sep 2022 13:52) (86/58 - 127/71)  BP(mean): 69 (13 Sep 2022 13:52) (65 - 80)  ABP: --  ABP(mean): --  RR: 24 (13 Sep 2022 13:52) (22 - 32)  SpO2: 100% (13 Sep 2022 13:52) (97% - 100%) on 2lpm nasal canula    General: Awake. Alert. Cooperative. No distress. Appears stated age 	  HEENT:   Atraumatic. Normocephalic. Anicteric. Normal oral mucosa. PERRL. EOMI.  Neck: Supple. Trachea midline. Thyroid without enlargement/tenderness/nodules. No carotid bruit. No JVD.	  Cardiovascular: Regular rate and rhythm. S1 S2 normal. No murmurs, rubs or gallops.  Respiratory: Respirations unlabored. Clear to auscultation and percussion bilaterally. No curvature.  Abdomen: Soft. Non-tender. Non-distended. No organomegaly. No masses. Normal bowel sounds.  Extremities: Warm to touch. No clubbing or cyanosis. No pedal edema.  Pulses: 2+ peripheral pulses all extremities.	  Skin: Normal skin color. No rashes or lesions. No ecchymoses. No cyanosis. Warm to touch.  Lymph Nodes: Cervical, supraclavicular and axillary nodes normal  Neurological: Motor and sensory examination equal and normal. A and O x 3  Psychiatry: Appropriate mood and affect.      LABS:                          11.7   6.47  )-----------( 136      ( 13 Sep 2022 10:15 )             35.8     CBC    WBC  6.47 <==    Hemoglobin  11.7 <<==    Hematocrit  35.8 <==    Platelets  136 <==      136  |  100  |  11  ----------------------------<  93    09-13  3.6   |  22  |  1.15    LYTES    sodium  136 <==    potassium   3.6 <==    chloride  100 <==    carbon dioxide  22 <==    =============================================================================================  RENAL FUNCTION:    Creatinine:   1.15  <<==    BUN:   11 <==    ============================================================================================    calcium   8.3 <==  ============================================================================================  LFTs    AST:   22 <==     ALT:  16  <==     AP:  80  <=    Bili:  0.4  <=    PT/INR - ( 13 Sep 2022 11:03 )   PT: 15.3 sec;   INR: 1.32 ratio       PTT - ( 13 Sep 2022 11:03 )  PTT:28.2 sec    Venous Blood Gas:  09-13 @ 10:20  7.35/53/26/29/39.9  VBG Lactate: 1.2    Procalcitonin, Serum: 1.23 ng/mL (09-13 @ 10:15)    < from: Transthoracic Echocardiogram (06.01.20 @ 17:43) >    Patient name: CARLOTA CANTU  YOB: 1970   Age: 50 (M)   MR#: [id number]  Study Date: 6/1/2020    Dimensions:    Normal Values:  LA:     4.2    2.0 - 4.0 cm  Ao:     3.5    2.0 - 3.8 cm  SEPTUM: 0.9    0.6 - 1.2 cm  PWT:    0.8    0.6 - 1.1 cm  LVIDd:  4.6    3.0 - 5.6 cm  LVIDs:  3.1    1.8 - 4.0 cm  Derived variables:  LVMI: 65 g/m2  RWT: 0.34  Fractional short: 33 %  EF (Visual Estimate): 55-60 %  Doppler Peak Velocity (m/sec): AoV=0.9  ------------------------------------------------------------------------  Conclusions:  1. Normal left ventricular internal dimensions and wall  thicknesses.  2. Endocardiumnot well visualized; grossly normal left  ventricular systolic function. Regional wall motion could  not be accurately assessed. Consider repeat limited study  with definity contrast if clinically indicated.  3. Normal right ventricular size and function.  4. Estimated right ventricular systolic pressure equals 13  mm Hg, assuming right atrial pressure equals 8 mm Hg,  consistent with normal pulmonary pressures.  *** No previous Echo exam.  ------------------------------------------------------------------------  Confirmed on  6/1/2020 - 20:10:31 by Marvin Gangireddy,  M.D.  ------------------------------------------------------------------------  ---------------------------------------------------------------------------------------------------------------    MICROBIOLOGY:     Respiratory Viral Panel with COVID-19 by LARA (09.13.22 @ 11:00)   Rapid RVP Result: Detected   SARS-CoV-2: Detected: This Respiratory Panel uses polymerase chain reaction (PCR) to detect for   adenovirus; coronavirus (HKU1, NL63, 229E, OC43); human metapneumovirus   (hMPV); human enterovirus/rhinovirus (Entero/RV); influenza A; influenza   A/H1; influenza A/H3; influenza A/H1-2009; influenza B; parainfluenza   viruses 1, 2, 3, 4; respiratory syncytial virus; Mycoplasma pneumoniae;   Chlamydophila pneumoniae; and SARS-CoV-2.   Adenovirus (RapRVP): NotDetec   Influenza A (RapRVP): NotDetec   Influenza AH1 2009 (RapRVP): NotDetec   Influenza AH1 (RapRVP): NotDetec   Influenza AH3 (RapRVP): NotDetec   Influenza B (RapRVP): NotDetec   Parainfluenza 1 (RapRVP): NotDetec   Parainfluenza 2 (RapRVP): NotDetec   Parainfluenza 3 (RapRVP): NotDetec   Parainfluenza 4 (RapRVP): NotDetec   Chlamydia pneumoniae (RapRVP): NotDetec   Mycoplasma pneumoniae (RapRVP): NotDetec   Entero/Rhinovirus (RapRVP): NotDetec   hMPV (RapRVP): [facility]   Coronavirus (229E,HKU1,NL63,OC43): [facility]     RADIOLOGY:  [x ] Chest radiographs reviewed and interpreted by me    EXAM:  XR CHEST PORTABLE URGENT 1V                          PROCEDURE DATE:  09/13/2022      FINDINGS:      The heart is normal in size.  The lungs are clear.  No pleural effusion or pneumothorax.    IMPRESSION:  Clear lungs.    JOSE GUERRERO MD; Resident Radiology  This document has been electronically signed.  MARIANGEL MCKINLEY MD; Attending Radiologist  This document has been electronically signed. Sep 13 2022 12:14PM  ---------------------------------------------------------------------------------------------------------------           [facility] - [facility] - CONSULT NOTE    HPI: 52 year old gentleman, former smoker, without history of intrinsic lung disease. He has history of melanoma of the scalp s/p resection including a right sided lymph node dissection now on immunotherapy due to metastatic disease, testicular cancer s/p orchiectomy followed by chemotherapy and bone marrow transplantation and non Hodgkin's lymphoma s/p resection and radiation therapy. He has adrenal insufficiency dye to immunotherapy and chronic pain. The patient comes to the ER with fatigue, malaise, myalgias and weakness following a shingles vaccine. He tested positive for COVID 2 days ago. His gait has been unsteady and he has been lightheaded. The patient has mild shortness of breath and hypoxemia on room air. He has a cough productive copious amounts of sputum which he is having difficulty expectorating. He has chest congestion and wheeze. He has fevers, chills and sweats. No chest pain/pressure or palpitations. RVP -> COVID-10 infection. In the ER the patient was found to be hypotensive being treated with stress dose steroids and IV fluids. Asked to evaluate.     PMHX:  NHL (non-Hodgkin's lymphoma)  Testicular Cancer - 1994 ->bone marrow transplantation for recurrent disease  Malignant melanoma of scalp - metastatic   HTN (hypertension)  HLD (hypertriglyceridemia)  Hypothyroidism  Ulcerative colitis  Migraine headache  [place] ([place])  GERD (gastroesophageal reflux disease)  BPH (benign prostatic hyperplasia)  Osteoarthritis  Adrenal insufficiency  First degree heart block    PSHX:  Orchiectomy - 1994  Partial colon resection - 1996  Lymph node dissection 2018  Melanoma of scalp or neck resection - 2019      FAMILY HISTORY:  father - HTN    SOCIAL HISTORY:  former smoker;  - lives with his wife; disabled    Pulmonary Medications:       Antimicrobials:      Cardiology:      Other:  atorvastatin 40 milliGRAM(s) Oral at bedtime  chlorhexidine 2% Cloths 1 Application(s) Topical daily  enoxaparin Injectable 40 milliGRAM(s) SubCutaneous every 24 hours  escitalopram 10 milliGRAM(s) Oral daily  fenofibrate Tablet 145 milliGRAM(s) Oral daily  finasteride 5 milliGRAM(s) Oral daily  gabapentin 300 milliGRAM(s) Oral two times a day  hydrocortisone sodium succinate Injectable 50 milliGRAM(s) IV Push every 8 hours  lactated ringers. 1000 milliLiter(s) IV Continuous <Continuous>  levothyroxine 100 MICROGram(s) Oral daily  pantoprazole    Tablet 40 milliGRAM(s) Oral before breakfast    Allergies    No Known Allergies    HOME MEDICATIONS: see  H & P    REVIEW OF SYSTEMS:  Constitutional: As per HPI  HEENT: Within normal limits  CV: As per HPI  Resp: As per HPI  GI: ulcerative colitis  : h/o testicular cancer  Musculoskeletal: Within normal limits  Skin: h/o melanoma - metastatic  Neurological: Within normal limits  Psychiatric: Within normal limits  Endocrine: Within normal limits  Hematologic/Lymphatic: immune compromised -   Allergic/Immunologic: Within normal limits    [x] All other systems negative    OBJECTIVE:    Daily Height in cm: 180.34 (13 Sep 2022 09:40)      PHYSICAL EXAM:  ICU Vital Signs Last 24 Hrs  T(C): 37.2 (13 Sep 2022 13:52), Max: 38.8 (13 Sep 2022 09:55)  T(F): 99 (13 Sep 2022 13:52), Max: 101.9 (13 Sep 2022 09:55)  HR: 118 (13 Sep 2022 13:52) (115 - 124)  BP: 89/59 (13 Sep 2022 13:52) (86/58 - 127/71)  BP(mean): 69 (13 Sep 2022 13:52) (65 - 80)  ABP: --  ABP(mean): --  RR: 24 (13 Sep 2022 13:52) (22 - 32)  SpO2: 100% (13 Sep 2022 13:52) (97% - 100%) on 2lpm nasal canula    General: Lethargic but easily arousable. Somewhat cooperative. Audible wheeze. Appears stated age 	  HEENT: Atraumatic. Bitemporal wasting. Anicteric. Normal oral mucosa. PERRL. EOMI.  Neck: Supple. Trachea midline. Thyroid without enlargement/tenderness/nodules. No carotid bruit. No JVD. Loss of bilateral supraclavicular fat pads.  Cardiovascular: Tachycardic rate and rhythm. S1 S2 normal. No murmurs, rubs or gallops.  Respiratory: Respirations unlabored. Diffuse rhonchi and wheeze. No curvature.  Abdomen: Soft. Non-tender. Non-distended. No organomegaly. No masses. Normal bowel sounds.  Extremities: Cool to touch. No clubbing or cyanosis. No pedal edema.  Pulses: 2+ peripheral pulses all extremities.	  Skin: Normal skin color. No rashes or lesions. No ecchymoses. No cyanosis. Warm to touch.  Lymph Nodes: Cervical, supraclavicular and axillary nodes normal  Neurological: Motor and sensory examination equal and normal. A and O x 3  Psychiatry: Appropriate mood and affect.      LABS:                          11.7   6.47  )-----------( 136      ( 13 Sep 2022 10:15 )             35.8     CBC    WBC  6.47 <==    Hemoglobin  11.7 <<==    Hematocrit  35.8 <==    Platelets  136 <==      136  |  100  |  11  ----------------------------<  93    09-13  3.6   |  22  |  1.15    LYTES    sodium  136 <==    potassium   3.6 <==    chloride  100 <==    carbon dioxide  22 <==    =============================================================================================  RENAL FUNCTION:    Creatinine:   1.15  <<==    BUN:   11 <==    ============================================================================================    calcium   8.3 <==  ============================================================================================  LFTs    AST:   22 <==     ALT:  16  <==     AP:  80  <=    Bili:  0.4  <=    PT/INR - ( 13 Sep 2022 11:03 )   PT: 15.3 sec;   INR: 1.32 ratio       PTT - ( 13 Sep 2022 11:03 )  PTT:28.2 sec    Venous Blood Gas:  09-13 @ 10:20  7.35/53/26/29/39.9  VBG Lactate: 1.2    Procalcitonin, Serum: 1.23 ng/mL (09-13 @ 10:15)    < from: Transthoracic Echocardiogram (06.01.20 @ 17:43) >    Patient name: CARLOTA CANTU  YOB: 1970   Age: 50 (M)   MR#: [id number]  Study Date: 6/1/2020    Dimensions:    Normal Values:  LA:     4.2    2.0 - 4.0 cm  Ao:     3.5    2.0 - 3.8 cm  SEPTUM: 0.9    0.6 - 1.2 cm  PWT:    0.8    0.6 - 1.1 cm  LVIDd:  4.6    3.0 - 5.6 cm  LVIDs:  3.1    1.8 - 4.0 cm  Derived variables:  LVMI: 65 g/m2  RWT: 0.34  Fractional short: 33 %  EF (Visual Estimate): 55-60 %  Doppler Peak Velocity (m/sec): AoV=0.9  ------------------------------------------------------------------------  Conclusions:  1. Normal left ventricular internal dimensions and wall  thicknesses.  2. Endocardium not well visualized; grossly normal left  ventricular systolic function. Regional wall motion could  not be accurately assessed. Consider repeat limited study  with definity contrast if clinically indicated.  3. Normal right ventricular size and function.  4. Estimated right ventricular systolic pressure equals 13  mm Hg, assuming right atrial pressure equals 8 mm Hg,  consistent with normal pulmonary pressures.  *** No previous Echo exam.  ------------------------------------------------------------------------  Confirmed on  6/1/2020 - 20:10:31 by Marvin Gonzales M.D.  ------------------------------------------------------------------------  ---------------------------------------------------------------------------------------------------------------    MICROBIOLOGY:     Respiratory Viral Panel with COVID-19 by LARA (09.13.22 @ 11:00)   Rapid RVP Result: Detected   SARS-CoV-2: Detected: This Respiratory Panel uses polymerase chain reaction (PCR) to detect for   adenovirus; coronavirus (HKU1, NL63, 229E, OC43); human metapneumovirus   (hMPV); human enterovirus/rhinovirus (Entero/RV); influenza A; influenza   A/H1; influenza A/H3; influenza A/H1-2009; influenza B; parainfluenza   viruses 1, 2, 3, 4; respiratory syncytial virus; Mycoplasma pneumoniae;     [x ] Chest radiographs reviewed and interpreted by me    EXAM:  XR CHEST PORTABLE URGENT 1V                          PROCEDURE DATE:  09/13/2022      FINDINGS:      The heart is normal in size.  The lungs are clear.  No pleural effusion or pneumothorax.    IMPRESSION:  Clear lungs.    JOSE GUERRERO MD; Resident Radiology  This document has been electronically signed.  MARIANGEL MCKINLEY MD; Attending Radiologist  This document has been electronically signed. Sep 13 2022 12:14PM  ---------------------------------------------------------------------------------------------------------------           [facility] - [facility] - CONSULT NOTE    HPI: 52 year old gentleman, former smoker, without history of intrinsic lung disease. He has history of melanoma of the scalp s/p resection including a right sided lymph node dissection now on immunotherapy due to metastatic disease; testicular cancer s/p orchiectomy followed by chemotherapy and bone marrow transplantation; non Hodgkin's lymphoma s/p resection and radiation therapy. He has adrenal insufficiency dye to immunotherapy and chronic pain. The patient comes to the ER with fatigue, malaise, myalgias and weakness following a shingles vaccine. He tested positive for COVID 2 days ago. His gait has been unsteady and he has been lightheaded. The patient has mild shortness of breath and hypoxemia on room air. He has a cough productive copious amounts of sputum which he is having difficulty expectorating. He has chest congestion and wheeze. He has fevers, chills and sweats. No chest pain/pressure or palpitations. RVP -> COVID-19. In the ER the patient was found to be hypotensive being treated with stress dose steroids and IV fluids. Asked to evaluate.     PMHX:  NHL (non-Hodgkin's lymphoma)  Testicular Cancer - 1994 ->bone marrow transplantation for recurrent disease  Malignant melanoma of scalp - metastatic   HTN (hypertension)  HLD (hypertriglyceridemia)  Hypothyroidism  Ulcerative colitis  Migraine headache  [place] ([place])  GERD (gastroesophageal reflux disease)  BPH (benign prostatic hyperplasia)  Osteoarthritis  Adrenal insufficiency  First degree heart block    PSHX:  Orchiectomy - 1994  Partial colon resection - 1996  Lymph node dissection 2018  Melanoma of scalp or neck resection - 2019      FAMILY HISTORY:  father - HTN    SOCIAL HISTORY:  former smoker;  - lives with his wife; disabled    Pulmonary Medications:       Antimicrobials:      Cardiology:      Other:  atorvastatin 40 milliGRAM(s) Oral at bedtime  chlorhexidine 2% Cloths 1 Application(s) Topical daily  enoxaparin Injectable 40 milliGRAM(s) SubCutaneous every 24 hours  escitalopram 10 milliGRAM(s) Oral daily  fenofibrate Tablet 145 milliGRAM(s) Oral daily  finasteride 5 milliGRAM(s) Oral daily  gabapentin 300 milliGRAM(s) Oral two times a day  hydrocortisone sodium succinate Injectable 50 milliGRAM(s) IV Push every 8 hours  lactated ringers. 1000 milliLiter(s) IV Continuous <Continuous>  levothyroxine 100 MICROGram(s) Oral daily  pantoprazole    Tablet 40 milliGRAM(s) Oral before breakfast    Allergies    No Known Allergies    HOME MEDICATIONS: see  H & P    REVIEW OF SYSTEMS:  Constitutional: As per HPI  HEENT: Within normal limits  CV: As per HPI  Resp: As per HPI  GI: ulcerative colitis  : h/o testicular cancer  Musculoskeletal: Within normal limits  Skin: h/o melanoma - metastatic  Neurological: Within normal limits  Psychiatric: Within normal limits  Endocrine: Within normal limits  Hematologic/Lymphatic: immune compromised -   Allergic/Immunologic: Within normal limits    [x] All other systems negative    OBJECTIVE:    Daily Height in cm: 180.34 (13 Sep 2022 09:40)      PHYSICAL EXAM:  ICU Vital Signs Last 24 Hrs  T(C): 37.2 (13 Sep 2022 13:52), Max: 38.8 (13 Sep 2022 09:55)  T(F): 99 (13 Sep 2022 13:52), Max: 101.9 (13 Sep 2022 09:55)  HR: 118 (13 Sep 2022 13:52) (115 - 124)  BP: 89/59 (13 Sep 2022 13:52) (86/58 - 127/71)  BP(mean): 69 (13 Sep 2022 13:52) (65 - 80)  ABP: --  ABP(mean): --  RR: 24 (13 Sep 2022 13:52) (22 - 32)  SpO2: 100% (13 Sep 2022 13:52) (97% - 100%) on 2lpm nasal canula    General: Lethargic but easily arousable. Somewhat cooperative. Audible wheeze. Appears stated age 	  HEENT: Atraumatic. Bitemporal wasting. Anicteric. Normal oral mucosa. PERRL. EOMI.  Neck: Supple. Trachea midline. Thyroid without enlargement/tenderness/nodules. No carotid bruit. No JVD. Loss of bilateral supraclavicular fat pads.  Cardiovascular: Tachycardic rate and rhythm. S1 S2 normal. No murmurs, rubs or gallops.  Respiratory: Respirations unlabored. Diffuse rhonchi and wheeze. No curvature.  Abdomen: Soft. Non-tender. Non-distended. No organomegaly. No masses. Normal bowel sounds.  Extremities: Cool to touch. No clubbing or cyanosis. No pedal edema.  Pulses: 2+ peripheral pulses all extremities.	  Skin: Normal skin color. No rashes or lesions. No ecchymoses. No cyanosis. Warm to touch.  Lymph Nodes: Cervical, supraclavicular and axillary nodes normal  Neurological: Motor and sensory examination equal and normal. A and O x 3  Psychiatry: Appropriate mood and affect.      LABS:                          11.7   6.47  )-----------( 136      ( 13 Sep 2022 10:15 )             35.8     CBC    WBC  6.47 <==    Hemoglobin  11.7 <<==    Hematocrit  35.8 <==    Platelets  136 <==      136  |  100  |  11  ----------------------------<  93    09-13  3.6   |  22  |  1.15    LYTES    sodium  136 <==    potassium   3.6 <==    chloride  100 <==    carbon dioxide  22 <==    =============================================================================================  RENAL FUNCTION:    Creatinine:   1.15  <<==    BUN:   11 <==    ============================================================================================    calcium   8.3 <==  ============================================================================================  LFTs    AST:   22 <==     ALT:  16  <==     AP:  80  <=    Bili:  0.4  <=    PT/INR - ( 13 Sep 2022 11:03 )   PT: 15.3 sec;   INR: 1.32 ratio       PTT - ( 13 Sep 2022 11:03 )  PTT:28.2 sec    Venous Blood Gas:  09-13 @ 10:20  7.35/53/26/29/39.9  VBG Lactate: 1.2    Procalcitonin, Serum: 1.23 ng/mL (09-13 @ 10:15)    < from: Transthoracic Echocardiogram (06.01.20 @ 17:43) >    Patient name: CARLOTA CANTU  YOB: 1970   Age: 50 (M)   MR#: [id number]  Study Date: 6/1/2020    Dimensions:    Normal Values:  LA:     4.2    2.0 - 4.0 cm  Ao:     3.5    2.0 - 3.8 cm  SEPTUM: 0.9    0.6 - 1.2 cm  PWT:    0.8    0.6 - 1.1 cm  LVIDd:  4.6    3.0 - 5.6 cm  LVIDs:  3.1    1.8 - 4.0 cm  Derived variables:  LVMI: 65 g/m2  RWT: 0.34  Fractional short: 33 %  EF (Visual Estimate): 55-60 %  Doppler Peak Velocity (m/sec): AoV=0.9  ------------------------------------------------------------------------  Conclusions:  1. Normal left ventricular internal dimensions and wall  thicknesses.  2. Endocardium not well visualized; grossly normal left  ventricular systolic function. Regional wall motion could  not be accurately assessed. Consider repeat limited study  with definity contrast if clinically indicated.  3. Normal right ventricular size and function.  4. Estimated right ventricular systolic pressure equals 13  mm Hg, assuming right atrial pressure equals 8 mm Hg,  consistent with normal pulmonary pressures.  *** No previous Echo exam.  ------------------------------------------------------------------------  Confirmed on  6/1/2020 - 20:10:31 by Marvin Gonzales M.D.  ------------------------------------------------------------------------  ---------------------------------------------------------------------------------------------------------------    MICROBIOLOGY:     Respiratory Viral Panel with COVID-19 by LARA (09.13.22 @ 11:00)   Rapid RVP Result: Detected   SARS-CoV-2: Detected  This Respiratory Panel uses polymerase chain reaction (PCR) to detect for   adenovirus; coronavirus (HKU1, NL63, 229E, OC43); human metapneumovirus   (hMPV); human enterovirus/rhinovirus (Entero/RV); influenza A; influenza   A/H1; influenza A/H3; influenza A/H1-2009; influenza B; parainfluenza   viruses 1, 2, 3, 4; respiratory syncytial virus; Mycoplasma pneumoniae;     [x ] Chest radiographs reviewed and interpreted by me    EXAM:  XR CHEST PORTABLE URGENT 1V                          PROCEDURE DATE:  09/13/2022      FINDINGS:      The heart is normal in size.  The lungs are clear.  No pleural effusion or pneumothorax.    IMPRESSION:  Clear lungs.    JOSE GUERRERO MD; Resident Radiology  This document has been electronically signed.  MARIANGEL MCKINLEY MD; Attending Radiologist  This document has been electronically signed. Sep 13 2022 12:14PM  ---------------------------------------------------------------------------------------------------------------

## 2022-09-14 LAB
ALBUMIN SERPL ELPH-MCNC: 3.8 G/DL — SIGNIFICANT CHANGE UP (ref 3.3–5)
ALP SERPL-CCNC: 112 U/L — SIGNIFICANT CHANGE UP (ref 40–120)
ALT FLD-CCNC: 20 U/L — SIGNIFICANT CHANGE UP (ref 10–45)
ANION GAP SERPL CALC-SCNC: 12 MMOL/L — SIGNIFICANT CHANGE UP (ref 5–17)
ANION GAP SERPL CALC-SCNC: 15 MMOL/L — SIGNIFICANT CHANGE UP (ref 5–17)
AST SERPL-CCNC: 30 U/L — SIGNIFICANT CHANGE UP (ref 10–40)
BASE EXCESS BLDA CALC-SCNC: -0.6 MMOL/L — SIGNIFICANT CHANGE UP (ref -2–3)
BASOPHILS # BLD AUTO: 0 K/UL — SIGNIFICANT CHANGE UP (ref 0–0.2)
BASOPHILS NFR BLD AUTO: 0 % — SIGNIFICANT CHANGE UP (ref 0–2)
BILIRUB DIRECT SERPL-MCNC: 0.2 MG/DL — SIGNIFICANT CHANGE UP (ref 0–0.3)
BILIRUB INDIRECT FLD-MCNC: 0.3 MG/DL — SIGNIFICANT CHANGE UP (ref 0.2–1)
BILIRUB SERPL-MCNC: 0.5 MG/DL — SIGNIFICANT CHANGE UP (ref 0.2–1.2)
BUN SERPL-MCNC: 10 MG/DL — SIGNIFICANT CHANGE UP (ref 7–23)
BUN SERPL-MCNC: 15 MG/DL — SIGNIFICANT CHANGE UP (ref 7–23)
CALCIUM SERPL-MCNC: 9 MG/DL — SIGNIFICANT CHANGE UP (ref 8.4–10.5)
CALCIUM SERPL-MCNC: 9.4 MG/DL — SIGNIFICANT CHANGE UP (ref 8.4–10.5)
CHLORIDE SERPL-SCNC: 104 MMOL/L — SIGNIFICANT CHANGE UP (ref 96–108)
CHLORIDE SERPL-SCNC: 105 MMOL/L — SIGNIFICANT CHANGE UP (ref 96–108)
CO2 BLDA-SCNC: 24 MMOL/L — SIGNIFICANT CHANGE UP (ref 19–24)
CO2 SERPL-SCNC: 20 MMOL/L — LOW (ref 22–31)
CO2 SERPL-SCNC: 23 MMOL/L — SIGNIFICANT CHANGE UP (ref 22–31)
CREAT SERPL-MCNC: 0.83 MG/DL — SIGNIFICANT CHANGE UP (ref 0.5–1.3)
CREAT SERPL-MCNC: 0.91 MG/DL — SIGNIFICANT CHANGE UP (ref 0.5–1.3)
CRP SERPL-MCNC: 168 MG/L — HIGH (ref 0–4)
CULTURE RESULTS: NO GROWTH — SIGNIFICANT CHANGE UP
D DIMER BLD IA.RAPID-MCNC: 405 NG/ML DDU — HIGH
EGFR: 101 ML/MIN/1.73M2 — SIGNIFICANT CHANGE UP
EGFR: 105 ML/MIN/1.73M2 — SIGNIFICANT CHANGE UP
EOSINOPHIL # BLD AUTO: 0 K/UL — SIGNIFICANT CHANGE UP (ref 0–0.5)
EOSINOPHIL NFR BLD AUTO: 0 % — SIGNIFICANT CHANGE UP (ref 0–6)
ERYTHROCYTE [SEDIMENTATION RATE] IN BLOOD: 52 MM/HR — HIGH (ref 0–20)
FERRITIN SERPL-MCNC: 463 NG/ML — HIGH (ref 30–400)
FOLATE SERPL-MCNC: 14.1 NG/ML — SIGNIFICANT CHANGE UP
GLUCOSE SERPL-MCNC: 141 MG/DL — HIGH (ref 70–99)
GLUCOSE SERPL-MCNC: 151 MG/DL — HIGH (ref 70–99)
HCO3 BLDA-SCNC: 23 MMOL/L — SIGNIFICANT CHANGE UP (ref 21–28)
HCT VFR BLD CALC: 40.6 % — SIGNIFICANT CHANGE UP (ref 39–50)
HGB BLD-MCNC: 12.9 G/DL — LOW (ref 13–17)
IMM GRANULOCYTES NFR BLD AUTO: 0.8 % — SIGNIFICANT CHANGE UP (ref 0–1.5)
LACTATE BLDV-MCNC: 3.1 MMOL/L — HIGH (ref 0.5–2)
LACTATE SERPL-SCNC: 3 MMOL/L — HIGH (ref 0.5–2)
LYMPHOCYTES # BLD AUTO: 0.67 K/UL — LOW (ref 1–3.3)
LYMPHOCYTES # BLD AUTO: 8.1 % — LOW (ref 13–44)
MAGNESIUM SERPL-MCNC: 1.7 MG/DL — SIGNIFICANT CHANGE UP (ref 1.6–2.6)
MCHC RBC-ENTMCNC: 27.4 PG — SIGNIFICANT CHANGE UP (ref 27–34)
MCHC RBC-ENTMCNC: 31.8 GM/DL — LOW (ref 32–36)
MCV RBC AUTO: 86.4 FL — SIGNIFICANT CHANGE UP (ref 80–100)
MONOCYTES # BLD AUTO: 0.29 K/UL — SIGNIFICANT CHANGE UP (ref 0–0.9)
MONOCYTES NFR BLD AUTO: 3.5 % — SIGNIFICANT CHANGE UP (ref 2–14)
NEUTROPHILS # BLD AUTO: 7.24 K/UL — SIGNIFICANT CHANGE UP (ref 1.8–7.4)
NEUTROPHILS NFR BLD AUTO: 87.6 % — HIGH (ref 43–77)
NRBC # BLD: 0 /100 WBCS — SIGNIFICANT CHANGE UP (ref 0–0)
PCO2 BLDA: 35 MMHG — SIGNIFICANT CHANGE UP (ref 35–48)
PH BLDA: 7.43 — SIGNIFICANT CHANGE UP (ref 7.35–7.45)
PHOSPHATE SERPL-MCNC: 2.4 MG/DL — LOW (ref 2.5–4.5)
PLATELET # BLD AUTO: 141 K/UL — LOW (ref 150–400)
PO2 BLDA: 103 MMHG — SIGNIFICANT CHANGE UP (ref 83–108)
POTASSIUM SERPL-MCNC: 3.7 MMOL/L — SIGNIFICANT CHANGE UP (ref 3.5–5.3)
POTASSIUM SERPL-MCNC: 4.3 MMOL/L — SIGNIFICANT CHANGE UP (ref 3.5–5.3)
POTASSIUM SERPL-SCNC: 3.7 MMOL/L — SIGNIFICANT CHANGE UP (ref 3.5–5.3)
POTASSIUM SERPL-SCNC: 4.3 MMOL/L — SIGNIFICANT CHANGE UP (ref 3.5–5.3)
PROT SERPL-MCNC: 7.2 G/DL — SIGNIFICANT CHANGE UP (ref 6–8.3)
RBC # BLD: 4.7 M/UL — SIGNIFICANT CHANGE UP (ref 4.2–5.8)
RBC # FLD: 13.8 % — SIGNIFICANT CHANGE UP (ref 10.3–14.5)
SAO2 % BLDA: 99.2 % — HIGH (ref 94–98)
SODIUM SERPL-SCNC: 139 MMOL/L — SIGNIFICANT CHANGE UP (ref 135–145)
SODIUM SERPL-SCNC: 140 MMOL/L — SIGNIFICANT CHANGE UP (ref 135–145)
SPECIMEN SOURCE: SIGNIFICANT CHANGE UP
TSH SERPL-MCNC: 1.18 UIU/ML — SIGNIFICANT CHANGE UP (ref 0.27–4.2)
VIT B12 SERPL-MCNC: 660 PG/ML — SIGNIFICANT CHANGE UP (ref 232–1245)
WBC # BLD: 8.27 K/UL — SIGNIFICANT CHANGE UP (ref 3.8–10.5)
WBC # FLD AUTO: 8.27 K/UL — SIGNIFICANT CHANGE UP (ref 3.8–10.5)

## 2022-09-14 PROCEDURE — 71250 CT THORAX DX C-: CPT | Mod: 26

## 2022-09-14 RX ORDER — HYDROMORPHONE HYDROCHLORIDE 2 MG/ML
1 INJECTION INTRAMUSCULAR; INTRAVENOUS; SUBCUTANEOUS EVERY 6 HOURS
Refills: 0 | Status: DISCONTINUED | OUTPATIENT
Start: 2022-09-14 | End: 2022-09-19

## 2022-09-14 RX ORDER — OXYCODONE HYDROCHLORIDE 5 MG/1
10 TABLET ORAL EVERY 12 HOURS
Refills: 0 | Status: DISCONTINUED | OUTPATIENT
Start: 2022-09-14 | End: 2022-09-19

## 2022-09-14 RX ORDER — PIPERACILLIN AND TAZOBACTAM 4; .5 G/20ML; G/20ML
3.38 INJECTION, POWDER, LYOPHILIZED, FOR SOLUTION INTRAVENOUS EVERY 8 HOURS
Refills: 0 | Status: DISCONTINUED | OUTPATIENT
Start: 2022-09-14 | End: 2022-09-19

## 2022-09-14 RX ORDER — BENZOCAINE AND MENTHOL 5; 1 G/100ML; G/100ML
1 LIQUID ORAL
Refills: 0 | Status: DISCONTINUED | OUTPATIENT
Start: 2022-09-14 | End: 2022-09-15

## 2022-09-14 RX ORDER — ACETAMINOPHEN 500 MG
1000 TABLET ORAL ONCE
Refills: 0 | Status: COMPLETED | OUTPATIENT
Start: 2022-09-14 | End: 2022-09-14

## 2022-09-14 RX ADMIN — PANTOPRAZOLE SODIUM 40 MILLIGRAM(S): 20 TABLET, DELAYED RELEASE ORAL at 05:20

## 2022-09-14 RX ADMIN — Medication 0.5 MILLIGRAM(S): at 05:23

## 2022-09-14 RX ADMIN — TRAMADOL HYDROCHLORIDE 50 MILLIGRAM(S): 50 TABLET ORAL at 01:03

## 2022-09-14 RX ADMIN — Medication 20 MILLIGRAM(S): at 12:58

## 2022-09-14 RX ADMIN — PIPERACILLIN AND TAZOBACTAM 25 GRAM(S): 4; .5 INJECTION, POWDER, LYOPHILIZED, FOR SOLUTION INTRAVENOUS at 02:00

## 2022-09-14 RX ADMIN — Medication 20 MILLIGRAM(S): at 17:47

## 2022-09-14 RX ADMIN — Medication 20 MILLIGRAM(S): at 23:29

## 2022-09-14 RX ADMIN — OXYCODONE HYDROCHLORIDE 10 MILLIGRAM(S): 5 TABLET ORAL at 17:40

## 2022-09-14 RX ADMIN — ENOXAPARIN SODIUM 40 MILLIGRAM(S): 100 INJECTION SUBCUTANEOUS at 18:44

## 2022-09-14 RX ADMIN — TRAMADOL HYDROCHLORIDE 50 MILLIGRAM(S): 50 TABLET ORAL at 00:32

## 2022-09-14 RX ADMIN — GABAPENTIN 300 MILLIGRAM(S): 400 CAPSULE ORAL at 17:40

## 2022-09-14 RX ADMIN — Medication 1000 MILLIGRAM(S): at 05:49

## 2022-09-14 RX ADMIN — Medication 400 MILLIGRAM(S): at 04:17

## 2022-09-14 RX ADMIN — Medication 20 MILLIGRAM(S): at 05:20

## 2022-09-14 RX ADMIN — Medication 3 MILLILITER(S): at 17:53

## 2022-09-14 RX ADMIN — Medication 250 MILLIGRAM(S): at 05:21

## 2022-09-14 RX ADMIN — Medication 100 MICROGRAM(S): at 05:20

## 2022-09-14 RX ADMIN — GABAPENTIN 300 MILLIGRAM(S): 400 CAPSULE ORAL at 05:20

## 2022-09-14 RX ADMIN — ESCITALOPRAM OXALATE 10 MILLIGRAM(S): 10 TABLET, FILM COATED ORAL at 12:56

## 2022-09-14 RX ADMIN — FINASTERIDE 5 MILLIGRAM(S): 5 TABLET, FILM COATED ORAL at 12:55

## 2022-09-14 RX ADMIN — Medication 145 MILLIGRAM(S): at 12:56

## 2022-09-14 RX ADMIN — REMDESIVIR 500 MILLIGRAM(S): 5 INJECTION INTRAVENOUS at 17:51

## 2022-09-14 RX ADMIN — CHLORHEXIDINE GLUCONATE 1 APPLICATION(S): 213 SOLUTION TOPICAL at 12:55

## 2022-09-14 RX ADMIN — PIPERACILLIN AND TAZOBACTAM 25 GRAM(S): 4; .5 INJECTION, POWDER, LYOPHILIZED, FOR SOLUTION INTRAVENOUS at 09:39

## 2022-09-14 RX ADMIN — Medication 10 MILLILITER(S): at 23:29

## 2022-09-14 RX ADMIN — OXYCODONE HYDROCHLORIDE 10 MILLIGRAM(S): 5 TABLET ORAL at 18:34

## 2022-09-14 RX ADMIN — PIPERACILLIN AND TAZOBACTAM 25 GRAM(S): 4; .5 INJECTION, POWDER, LYOPHILIZED, FOR SOLUTION INTRAVENOUS at 19:20

## 2022-09-14 RX ADMIN — Medication 3 MILLILITER(S): at 22:08

## 2022-09-14 RX ADMIN — Medication 3 MILLILITER(S): at 09:40

## 2022-09-14 RX ADMIN — Medication 10 MILLILITER(S): at 05:20

## 2022-09-14 RX ADMIN — OXYCODONE HYDROCHLORIDE 10 MILLIGRAM(S): 5 TABLET ORAL at 09:40

## 2022-09-14 RX ADMIN — Medication 10 MILLILITER(S): at 12:57

## 2022-09-14 RX ADMIN — Medication 100 MILLIGRAM(S): at 02:38

## 2022-09-14 RX ADMIN — Medication 3 MILLILITER(S): at 05:24

## 2022-09-14 RX ADMIN — Medication 0.5 MILLIGRAM(S): at 18:44

## 2022-09-14 RX ADMIN — HYDROMORPHONE HYDROCHLORIDE 1 MILLIGRAM(S): 2 INJECTION INTRAMUSCULAR; INTRAVENOUS; SUBCUTANEOUS at 19:17

## 2022-09-14 RX ADMIN — Medication 250 MILLIGRAM(S): at 17:45

## 2022-09-14 RX ADMIN — ATORVASTATIN CALCIUM 40 MILLIGRAM(S): 80 TABLET, FILM COATED ORAL at 22:08

## 2022-09-14 RX ADMIN — HYDROMORPHONE HYDROCHLORIDE 1 MILLIGRAM(S): 2 INJECTION INTRAMUSCULAR; INTRAVENOUS; SUBCUTANEOUS at 22:34

## 2022-09-14 RX ADMIN — OXYCODONE HYDROCHLORIDE 10 MILLIGRAM(S): 5 TABLET ORAL at 18:55

## 2022-09-14 RX ADMIN — Medication 10 MILLILITER(S): at 17:41

## 2022-09-14 NOTE — PROGRESS NOTE ADULT - ASSESSMENT
52 M former smoker 20 pack years with a PMH of testicular ca s/p orchiectomy, HTN, HLD, melanoma on immunotherapy (right chest wall mediport--> now removed), hypothyroidism secondary adrenal insufficiency, presents to the ER w/ cough and sob. sunday had body aches and felt cold, that night pt had a rapid covid test and was positive, on monday pt reports body aches, and he wasn't talking, pt states he had a sore throat, took temp on 9/12 was 100.7 took APAP, this AM family spoke w/ Dr. Mcginnis and recommended pt present to the hospital. pt states he felt week this AM, was able to ambulate down a flight of stairs and was weak, pt had a positive contact on 9/8/2022. pt is not on oxygen. bromphenir-pheudoephrine on cough medication. pt states that he was positive for covid yesterday, no cp, no lightheadedness, states trouble breathing dec po intake, no nausea no vomiting no abd pain, spoke w/ physician who referred to the Er pt states he took 30 mg of hydrocortisone today. pt w/ genearlized body aches and weakness. pt  upon arrival, pt w/ fever temp 37.7,    hypoxic resp failure 2/2 covid 19 infection  - on telemetry with close hemodynamic monitoring - low threshold for ICU transfer  airborne and contact isolation  oxygen supplementation to keep saturation greater than 92% - currently on a 2lpm nasal canula  ABG 9/14 - 7.43/35/103/99%  c/w a resolved mild acute respiratory acidosis with adequate oxygenation  consider NIV for increased work of breathing, resistant hypoxemia or worsening respiratory acidosis  would continue vanco/zosyn pending a chest CT to better evaluate the lung parenchyma given hemodynamic instability and elevated procalcitonin level  remdesivir x 5 days - following renal and liver function which remains normal  solumedrol 20mg IVP q6h for stress dose steroids, bronchospasm and COVID with hypoxemia  albuterol/atrovent nebs q4h holding 2am dose  pulmicort 0.5mg nebs q12h - give after duoneb - rinse mouth after use  acapella device - for some reason it has been removed from the patient's room  following inflammatory markers - CRP (markedly elevated) - d-dimer (mildly elevated) - ferritin  stress dose steroids/fluid resuscitation/antipyretics    hypotension and shock  - adrenal insuff  - stress dose steroids    hypothyroid  - c/w synthroid  - TSH 1.18    HTN  - hold home BP meds due to hypotension    dvt px  - lovenox

## 2022-09-14 NOTE — PROVIDER CONTACT NOTE (OTHER) - ACTION/TREATMENT ORDERED:
Provider notified & assessed. STAT labs  ABG ordered & drawn. Administer IVPB tylenol for generalized pain.

## 2022-09-14 NOTE — PROGRESS NOTE ADULT - ASSESSMENT
ASSESSMENT:    52 year old gentleman, former smoker, without history of intrinsic lung disease. He has history of melanoma of the scalp s/p resection including a right sided lymph node dissection now on immunotherapy due to metastatic disease, testicular cancer s/p orchiectomy followed by chemotherapy and bone marrow transplantation for recurrent disease and non Hodgkin's lymphoma s/p resection and radiation therapy. He has adrenal insufficiency due to immunotherapy and chronic pain related to chemotherapy. The patient comes to the ER with fatigue, malaise, myalgias and weakness following a shingles vaccine. He tested positive for COVID 2 days ago. His gait has been unsteady and he has been lightheaded. The patient has mild shortness of breath and hypoxemia on room air. He has a cough productive copious amounts of sputum which he is having difficulty expectorating. He has chest congestion and wheeze. He has fevers, chills and sweats. No chest pain/pressure or palpitations. RVP -> COVID-10 infection. In the ER the patient was found to be hypotensive being treated with stress dose steroids and IV fluids.    markedly immunocompromised host following bone marrow transplantation ~ 25 years ago for recurrent testicular cancer, lymphoma s/p resection and radiation therapy and metastatic melanoma currently on immunotherapy    admitted with shock in the setting of COVID infection and adrenal insufficiency maintained on chronic hydrocortisone - viral induced bronchospasm - weak cough - mucous plugging - mild hypoxemia - no evidence of pneumonia on an AP portable CXR    9/14 -  looks much better - awake and alert - phonating with much less difficulty; increasing oxygen requirements without shortness of breath or hypoxemia on a 6lpm nasal canula; ongoing cough productive of copious amounts of sputum which he is having difficulty expectorating; persistent chest congestion and wheeze; no recent fevers, chills or sweats; no chest pain/pressure or palpitations; no fatigue, malaise or weakness: better appetite      PLAN/RECOMMENDATIONS:    on telemetry with close hemodynamic monitoring - low threshold for ICU transfer  airborne and contact isolation  oxygen supplementation to keep saturation greater than 92% - currently on a 2lpm nasal canula  ABG 9/14 - 7.43/35/103/99%  c/w a resolved mild acute respiratory acidosis with adequate oxygenation  consider NIV for increased work of breathing, resistant hypoxemia or worsening respiratory acidosis  would continue vanco/zosyn pending a chest CT to better evaluate the lung parenchyma given hemodynamic instability and elevated procalcitonin level  remdesivir x 5 days - following renal and liver function which remains normal  solumedrol 20mg IVP q6h for stress dose steroids, bronchospasm and COVID with hypoxemia  albuterol/atrovent nebs q4h holding 2am dose  pulmicort 0.5mg nebs q12h - give after duoneb - rinse mouth after use  acapella device - for some reason it has been removed from the patient's room  following inflammatory markers - CRP (markedly elevated) - d-dimer (mildly elevated) - ferritin  stress dose steroids/fluid resuscitation/antipyretics  cardiac meds: norvasc/toprol XL/tricor/tricor - holding antihypertensive medications  GI/DVT prophylaxis - protonix/SQ lovenox  continue lexapro/proscar/gabapentin/synthroid - cautiously restarting patient's chronic oxycontin 10mg 2 times daily with dilaudid 1mg IVP as needed for breakthrough pain    Will follow with you. Plan of care discussed with the patient and his wife at bedside and with the dedicated floor NP.    Ellis Lorenzo MD, [facility]  [phone]  Pulmonary Medicine

## 2022-09-14 NOTE — CHART NOTE - NSCHARTNOTEFT_GEN_A_CORE
Medicine PA Note     CARLOTA CANTU  MRN-[id number]  Allergies    No Known Allergies    Intolerances         Notified by RN for hypoxia to 80's on RA. Pt placed on 6L, now satting 95% at time of evaluation. Pt reporting new SOB, improved now after placement of 6L. Pt also endorsing generalized body aches and headaches x2 days since diagnosis of covid. Pt denies chest pain, sob, n/v/d, weakness, dizziness.    Vital Signs Last 24 Hrs  T(C): 36.7 (09-14-22 @ 00:10), Max: 38.8 (09-13-22 @ 09:55)  T(F): 98.1 (09-14-22 @ 00:10), Max: 101.9 (09-13-22 @ 09:55)  HR: 93 (09-14-22 @ 00:10) (92 - 124)  BP: 109/74 (09-14-22 @ 00:10) (86/58 - 127/71)  BP(mean): 69 (09-13-22 @ 13:52) (65 - 80)  RR: 20 (09-14-22 @ 03:32) (20 - 32)  SpO2: 90% (09-14-22 @ 03:32) (83% - 100%)                        11.7   6.47  )-----------( 136      ( 13 Sep 2022 10:15 )             35.8     09-13    136  |  100  |  11  ----------------------------<  93  3.6   |  22  |  1.15    Ca    8.3<L>      13 Sep 2022 10:15    TPro  6.4  /  Alb  3.8  /  TBili  0.4  /  DBili  x   /  AST  22  /  ALT  16  /  AlkPhos  80  09-13    PT/INR - ( 13 Sep 2022 11:03 )   PT: 15.3 sec;   INR: 1.32 ratio         PTT - ( 13 Sep 2022 11:03 )  PTT:28.2 sec      PHYSICAL EXAM:  GENERAL: NAD, well-developed  CHEST/LUNG: Clear to auscultation bilaterally; No wheezes, rhonchi or rales appreciated  HEART: Regular rate and rhythm; No murmurs, rubs, or gallops  ABDOMEN: Soft, Nontender, Nondistended; Bowel sounds present. No rebound, or guarding noted.  EXTREMITIES:  2+ Peripheral Pulses, No clubbing, cyanosis, or edema        Assessment/Plan: HPI:  52 M former smoker 20 pack years with a PMH of testicular ca s/p orchiectomy, HTN, HLD, melanoma on immunotherapy (right chest wall mediport--> now removed), hypothyroidism secondary adrenal insufficiency, presents to the ER w/ cough and sob. sunday had body aches and felt cold, that night pt had a rapid covid test and was positive, on monday pt reports body aches, and he wasn't talking, pt states he had a sore throat, took temp on 9/12 was 100.7 took APAP, this AM family spoke w/ Dr. Mcginnis and recommended pt present to the hospital. pt states he felt week this AM, was able to ambulate down a flight of stairs and was weak, pt had a positive contact on 9/8/2022. pt is not on oxygen. bromphenir-pheudoephrine on cough medication. pt states that he was positive for covid yesterday, no cp, no lightheadedness, states trouble breathing dec po intake, no nausea no vomiting no abd pain, spoke w/ physician who referred to the Er pt states he took 30 mg of hydrocortisone today. pt w/ genearlized body aches and weakness. pt  upon arrival, pt w/ fever temp 37.7, (13 Sep 2022 16:11)    Hypoxemia likely due to COVID-19  >VS hemodynamically stable aside from O2 sat-95% on 6L NC from RA  >ABG ordered - eval for hypercapnea  >Will draw AM labs early - CBC, CMP, d-dimer, inflammatory labs  >C/w Remdesivir and Solumedrol  >C/w zosyn and vanco empirically  >CXR reviewed from 9/13 with no evidence of PNA  >Consider CT Chest in AM to further evaluate lung parenchyma in setting of rapid hypoxia  >Pulm follow up in AM  > Will endorse to AM team, attending to follow    Leatha Rios PA-C,   Department of Medicine

## 2022-09-15 LAB
ALBUMIN SERPL ELPH-MCNC: 3.3 G/DL — SIGNIFICANT CHANGE UP (ref 3.3–5)
ALP SERPL-CCNC: 71 U/L — SIGNIFICANT CHANGE UP (ref 40–120)
ALT FLD-CCNC: 14 U/L — SIGNIFICANT CHANGE UP (ref 10–45)
ANION GAP SERPL CALC-SCNC: 10 MMOL/L — SIGNIFICANT CHANGE UP (ref 5–17)
AST SERPL-CCNC: 21 U/L — SIGNIFICANT CHANGE UP (ref 10–40)
BILIRUB SERPL-MCNC: 0.3 MG/DL — SIGNIFICANT CHANGE UP (ref 0.2–1.2)
BUN SERPL-MCNC: 17 MG/DL — SIGNIFICANT CHANGE UP (ref 7–23)
CALCIUM SERPL-MCNC: 8.6 MG/DL — SIGNIFICANT CHANGE UP (ref 8.4–10.5)
CHLORIDE SERPL-SCNC: 103 MMOL/L — SIGNIFICANT CHANGE UP (ref 96–108)
CO2 SERPL-SCNC: 24 MMOL/L — SIGNIFICANT CHANGE UP (ref 22–31)
CREAT SERPL-MCNC: 0.83 MG/DL — SIGNIFICANT CHANGE UP (ref 0.5–1.3)
EGFR: 105 ML/MIN/1.73M2 — SIGNIFICANT CHANGE UP
GLUCOSE SERPL-MCNC: 150 MG/DL — HIGH (ref 70–99)
HCT VFR BLD CALC: 30.5 % — LOW (ref 39–50)
HCT VFR BLD CALC: 32.3 % — LOW (ref 39–50)
HGB BLD-MCNC: 10.3 G/DL — LOW (ref 13–17)
HGB BLD-MCNC: 9.8 G/DL — LOW (ref 13–17)
LACTATE SERPL-SCNC: 2.3 MMOL/L — HIGH (ref 0.5–2)
MCHC RBC-ENTMCNC: 27.4 PG — SIGNIFICANT CHANGE UP (ref 27–34)
MCHC RBC-ENTMCNC: 28.1 PG — SIGNIFICANT CHANGE UP (ref 27–34)
MCHC RBC-ENTMCNC: 31.9 GM/DL — LOW (ref 32–36)
MCHC RBC-ENTMCNC: 32.1 GM/DL — SIGNIFICANT CHANGE UP (ref 32–36)
MCV RBC AUTO: 85.9 FL — SIGNIFICANT CHANGE UP (ref 80–100)
MCV RBC AUTO: 87.4 FL — SIGNIFICANT CHANGE UP (ref 80–100)
NRBC # BLD: 0 /100 WBCS — SIGNIFICANT CHANGE UP (ref 0–0)
NRBC # BLD: 0 /100 WBCS — SIGNIFICANT CHANGE UP (ref 0–0)
PLATELET # BLD AUTO: 139 K/UL — LOW (ref 150–400)
PLATELET # BLD AUTO: 144 K/UL — LOW (ref 150–400)
POTASSIUM SERPL-MCNC: 4 MMOL/L — SIGNIFICANT CHANGE UP (ref 3.5–5.3)
POTASSIUM SERPL-SCNC: 4 MMOL/L — SIGNIFICANT CHANGE UP (ref 3.5–5.3)
PROT SERPL-MCNC: 6.4 G/DL — SIGNIFICANT CHANGE UP (ref 6–8.3)
RBC # BLD: 3.49 M/UL — LOW (ref 4.2–5.8)
RBC # BLD: 3.76 M/UL — LOW (ref 4.2–5.8)
RBC # FLD: 14.2 % — SIGNIFICANT CHANGE UP (ref 10.3–14.5)
RBC # FLD: 14.2 % — SIGNIFICANT CHANGE UP (ref 10.3–14.5)
SODIUM SERPL-SCNC: 137 MMOL/L — SIGNIFICANT CHANGE UP (ref 135–145)
WBC # BLD: 3.47 K/UL — LOW (ref 3.8–10.5)
WBC # BLD: 4.54 K/UL — SIGNIFICANT CHANGE UP (ref 3.8–10.5)
WBC # FLD AUTO: 3.47 K/UL — LOW (ref 3.8–10.5)
WBC # FLD AUTO: 4.54 K/UL — SIGNIFICANT CHANGE UP (ref 3.8–10.5)

## 2022-09-15 RX ORDER — IPRATROPIUM/ALBUTEROL SULFATE 18-103MCG
3 AEROSOL WITH ADAPTER (GRAM) INHALATION EVERY 6 HOURS
Refills: 0 | Status: DISCONTINUED | OUTPATIENT
Start: 2022-09-15 | End: 2022-09-19

## 2022-09-15 RX ORDER — LANOLIN ALCOHOL/MO/W.PET/CERES
5 CREAM (GRAM) TOPICAL ONCE
Refills: 0 | Status: DISCONTINUED | OUTPATIENT
Start: 2022-09-15 | End: 2022-09-19

## 2022-09-15 RX ORDER — SODIUM CHLORIDE 9 MG/ML
1000 INJECTION INTRAMUSCULAR; INTRAVENOUS; SUBCUTANEOUS
Refills: 0 | Status: DISCONTINUED | OUTPATIENT
Start: 2022-09-15 | End: 2022-09-19

## 2022-09-15 RX ORDER — BENZOCAINE AND MENTHOL 5; 1 G/100ML; G/100ML
1 LIQUID ORAL
Refills: 0 | Status: ACTIVE | OUTPATIENT
Start: 2022-09-15 | End: 2023-08-14

## 2022-09-15 RX ORDER — NYSTATIN 500MM UNIT
500000 POWDER (EA) MISCELLANEOUS
Refills: 0 | Status: DISCONTINUED | OUTPATIENT
Start: 2022-09-15 | End: 2022-09-19

## 2022-09-15 RX ADMIN — BENZOCAINE AND MENTHOL 1 LOZENGE: 5; 1 LIQUID ORAL at 22:29

## 2022-09-15 RX ADMIN — Medication 500000 UNIT(S): at 12:16

## 2022-09-15 RX ADMIN — PIPERACILLIN AND TAZOBACTAM 25 GRAM(S): 4; .5 INJECTION, POWDER, LYOPHILIZED, FOR SOLUTION INTRAVENOUS at 01:10

## 2022-09-15 RX ADMIN — Medication 250 MILLIGRAM(S): at 05:51

## 2022-09-15 RX ADMIN — HYDROMORPHONE HYDROCHLORIDE 1 MILLIGRAM(S): 2 INJECTION INTRAMUSCULAR; INTRAVENOUS; SUBCUTANEOUS at 08:50

## 2022-09-15 RX ADMIN — Medication 3 MILLILITER(S): at 05:51

## 2022-09-15 RX ADMIN — Medication 3 MILLILITER(S): at 17:27

## 2022-09-15 RX ADMIN — Medication 3 MILLILITER(S): at 09:40

## 2022-09-15 RX ADMIN — OXYCODONE HYDROCHLORIDE 10 MILLIGRAM(S): 5 TABLET ORAL at 17:20

## 2022-09-15 RX ADMIN — GABAPENTIN 300 MILLIGRAM(S): 400 CAPSULE ORAL at 17:20

## 2022-09-15 RX ADMIN — REMDESIVIR 500 MILLIGRAM(S): 5 INJECTION INTRAVENOUS at 17:27

## 2022-09-15 RX ADMIN — Medication 0.5 MILLIGRAM(S): at 05:51

## 2022-09-15 RX ADMIN — BENZOCAINE AND MENTHOL 1 LOZENGE: 5; 1 LIQUID ORAL at 01:11

## 2022-09-15 RX ADMIN — HYDROMORPHONE HYDROCHLORIDE 1 MILLIGRAM(S): 2 INJECTION INTRAMUSCULAR; INTRAVENOUS; SUBCUTANEOUS at 07:39

## 2022-09-15 RX ADMIN — Medication 0.5 MILLIGRAM(S): at 18:30

## 2022-09-15 RX ADMIN — BENZOCAINE AND MENTHOL 1 LOZENGE: 5; 1 LIQUID ORAL at 22:25

## 2022-09-15 RX ADMIN — Medication 10 MILLILITER(S): at 05:51

## 2022-09-15 RX ADMIN — Medication 10 MILLILITER(S): at 12:16

## 2022-09-15 RX ADMIN — OXYCODONE HYDROCHLORIDE 10 MILLIGRAM(S): 5 TABLET ORAL at 18:27

## 2022-09-15 RX ADMIN — HYDROMORPHONE HYDROCHLORIDE 1 MILLIGRAM(S): 2 INJECTION INTRAMUSCULAR; INTRAVENOUS; SUBCUTANEOUS at 01:11

## 2022-09-15 RX ADMIN — HYDROMORPHONE HYDROCHLORIDE 1 MILLIGRAM(S): 2 INJECTION INTRAMUSCULAR; INTRAVENOUS; SUBCUTANEOUS at 14:36

## 2022-09-15 RX ADMIN — Medication 145 MILLIGRAM(S): at 12:14

## 2022-09-15 RX ADMIN — CHLORHEXIDINE GLUCONATE 1 APPLICATION(S): 213 SOLUTION TOPICAL at 12:13

## 2022-09-15 RX ADMIN — Medication 10 MILLILITER(S): at 17:20

## 2022-09-15 RX ADMIN — BENZOCAINE AND MENTHOL 1 LOZENGE: 5; 1 LIQUID ORAL at 17:20

## 2022-09-15 RX ADMIN — Medication 500000 UNIT(S): at 17:22

## 2022-09-15 RX ADMIN — PIPERACILLIN AND TAZOBACTAM 25 GRAM(S): 4; .5 INJECTION, POWDER, LYOPHILIZED, FOR SOLUTION INTRAVENOUS at 18:02

## 2022-09-15 RX ADMIN — ATORVASTATIN CALCIUM 40 MILLIGRAM(S): 80 TABLET, FILM COATED ORAL at 22:29

## 2022-09-15 RX ADMIN — ESCITALOPRAM OXALATE 10 MILLIGRAM(S): 10 TABLET, FILM COATED ORAL at 12:14

## 2022-09-15 RX ADMIN — HYDROMORPHONE HYDROCHLORIDE 1 MILLIGRAM(S): 2 INJECTION INTRAMUSCULAR; INTRAVENOUS; SUBCUTANEOUS at 02:05

## 2022-09-15 RX ADMIN — HYDROMORPHONE HYDROCHLORIDE 1 MILLIGRAM(S): 2 INJECTION INTRAMUSCULAR; INTRAVENOUS; SUBCUTANEOUS at 21:20

## 2022-09-15 RX ADMIN — HYDROMORPHONE HYDROCHLORIDE 1 MILLIGRAM(S): 2 INJECTION INTRAMUSCULAR; INTRAVENOUS; SUBCUTANEOUS at 20:40

## 2022-09-15 RX ADMIN — PANTOPRAZOLE SODIUM 40 MILLIGRAM(S): 20 TABLET, DELAYED RELEASE ORAL at 05:50

## 2022-09-15 RX ADMIN — GABAPENTIN 300 MILLIGRAM(S): 400 CAPSULE ORAL at 05:50

## 2022-09-15 RX ADMIN — BENZOCAINE AND MENTHOL 1 LOZENGE: 5; 1 LIQUID ORAL at 12:15

## 2022-09-15 RX ADMIN — FINASTERIDE 5 MILLIGRAM(S): 5 TABLET, FILM COATED ORAL at 12:14

## 2022-09-15 RX ADMIN — OXYCODONE HYDROCHLORIDE 10 MILLIGRAM(S): 5 TABLET ORAL at 05:50

## 2022-09-15 RX ADMIN — Medication 20 MILLIGRAM(S): at 17:22

## 2022-09-15 RX ADMIN — Medication 20 MILLIGRAM(S): at 12:17

## 2022-09-15 RX ADMIN — ENOXAPARIN SODIUM 40 MILLIGRAM(S): 100 INJECTION SUBCUTANEOUS at 18:29

## 2022-09-15 RX ADMIN — Medication 100 MICROGRAM(S): at 05:51

## 2022-09-15 RX ADMIN — SODIUM CHLORIDE 75 MILLILITER(S): 9 INJECTION INTRAMUSCULAR; INTRAVENOUS; SUBCUTANEOUS at 14:40

## 2022-09-15 RX ADMIN — OXYCODONE HYDROCHLORIDE 10 MILLIGRAM(S): 5 TABLET ORAL at 06:46

## 2022-09-15 RX ADMIN — HYDROMORPHONE HYDROCHLORIDE 1 MILLIGRAM(S): 2 INJECTION INTRAMUSCULAR; INTRAVENOUS; SUBCUTANEOUS at 15:54

## 2022-09-15 RX ADMIN — Medication 20 MILLIGRAM(S): at 05:53

## 2022-09-15 RX ADMIN — PIPERACILLIN AND TAZOBACTAM 25 GRAM(S): 4; .5 INJECTION, POWDER, LYOPHILIZED, FOR SOLUTION INTRAVENOUS at 09:41

## 2022-09-15 NOTE — PROGRESS NOTE ADULT - ASSESSMENT
52 M former smoker 20 pack years with a PMH of testicular ca s/p orchiectomy, HTN, HLD, melanoma on immunotherapy (right chest wall mediport--> now removed), hypothyroidism secondary adrenal insufficiency, presents to the ER w/ cough and sob. sunday had body aches and felt cold, that night pt had a rapid covid test and was positive, on monday pt reports body aches, and he wasn't talking, pt states he had a sore throat, took temp on 9/12 was 100.7 took APAP, this AM family spoke w/ Dr. Mcginnis and recommended pt present to the hospital. pt states he felt week this AM, was able to ambulate down a flight of stairs and was weak, pt had a positive contact on 9/8/2022. pt is not on oxygen. bromphenir-pheudoephrine on cough medication. pt states that he was positive for covid yesterday, no cp, no lightheadedness, states trouble breathing dec po intake, no nausea no vomiting no abd pain, spoke w/ physician who referred to the Er pt states he took 30 mg of hydrocortisone today. pt w/ genearlized body aches and weakness. pt  upon arrival, pt w/ fever temp 37.7,    hypoxic resp failure 2/2 covid 19 infection  - on telemetry with close hemodynamic monitoring - low threshold for ICU transfer  airborne and contact isolation  oxygen supplementation to keep saturation greater than 92% - currently on a 2lpm nasal canula  ABG 9/14 - 7.43/35/103/99%  c/w a resolved mild acute respiratory acidosis with adequate oxygenation  consider NIV for increased work of breathing, resistant hypoxemia or worsening respiratory acidosis  would continue vanco/zosyn pending a chest CT to better evaluate the lung parenchyma given hemodynamic instability and elevated procalcitonin level  remdesivir x 5 days - following renal and liver function which remains normal  solumedrol 20mg IVP q6h for stress dose steroids, bronchospasm and COVID with hypoxemia  albuterol/atrovent nebs q4h holding 2am dose  pulmicort 0.5mg nebs q12h - give after duoneb - rinse mouth after use  acapella device - for some reason it has been removed from the patient's room  following inflammatory markers - CRP (markedly elevated) - d-dimer (mildly elevated) - ferritin  stress dose steroids/fluid resuscitation/antipyretics    hypotension and shock  - adrenal insuff  - stress dose steroids    lactic acidosis  - iv fluids  - repeat in am    hypothyroid  - c/w synthroid  - TSH 1.18    HTN  - hold home BP meds due to hypotension    dvt px  - lovenox

## 2022-09-15 NOTE — PROGRESS NOTE ADULT - ASSESSMENT
ASSESSMENT:    52 year old gentleman, former smoker, without history of intrinsic lung disease. He has history of melanoma of the scalp s/p resection including a right sided lymph node dissection now on immunotherapy due to metastatic disease, testicular cancer s/p orchiectomy followed by chemotherapy and bone marrow transplantation for recurrent disease and non Hodgkin's lymphoma s/p resection and radiation therapy. He has adrenal insufficiency due to immunotherapy and chronic pain related to chemotherapy. The patient comes to the ER with fatigue, malaise, myalgias and weakness following a shingles vaccine. He tested positive for COVID 2 days ago. His gait has been unsteady and he has been lightheaded. The patient has mild shortness of breath and hypoxemia on room air. He has a cough productive copious amounts of sputum which he is having difficulty expectorating. He has chest congestion and wheeze. He has fevers, chills and sweats. No chest pain/pressure or palpitations. RVP -> COVID-10 infection. In the ER the patient was found to be hypotensive being treated with stress dose steroids and IV fluids.    markedly immunocompromised host following bone marrow transplantation ~ 25 years ago for recurrent testicular cancer, lymphoma s/p resection and radiation therapy and metastatic melanoma currently on immunotherapy    admitted with shock in the setting of COVID infection and adrenal insufficiency maintained on chronic hydrocortisone - viral induced bronchospasm - weak cough - mucous plugging - mild hypoxemia - no evidence of pneumonia on an AP portable CXR    9/14 -  looks much better - awake and alert - phonating with much less difficulty; increasing oxygen requirements without shortness of breath or hypoxemia on a 6lpm nasal canula; ongoing cough productive of copious amounts of sputum which he is having difficulty expectorating; persistent chest congestion and wheeze; no recent fevers, chills or sweats; no chest pain/pressure or palpitations; no fatigue, malaise or weakness: better appetite      PLAN/RECOMMENDATIONS:    stable oxygenation on a 6lpm nasal canula -> tapered to 4lpm  airborne and contact isolation  ABG 9/14 - 7.43/35/103/99%  c/w a resolved mild acute respiratory acidosis with adequate oxygenation  consider NIV for increased work of breathing, resistant hypoxemia or worsening respiratory acidosis  CT -> mucous plugging/debris within left lower lobe airways - multifocal consolidation, groundglass opacities, and tree-in-bud opacities throughout the left greater than right lower lobes - trace right pleural effusion.  continue vanco/zosyn -> CT scan is c/w multifocal bacterial infection  remdesivir x 5 days - following renal and liver function which remain normal  transition solumedrol to baseline dose of hydrocortisone  albuterol/atrovent nebs q6h  pulmicort 0.5mg nebs q12h - give after duoneb - rinse mouth after use  acapella device - the patient is using the device well and frequently  following inflammatory markers - CRP (markedly elevated) - d-dimer (mildly elevated) - ferritin (moderately elevated)  cardiac meds: norvasc/toprol XL/tricor/tricor - holding antihypertensive medications  GI/DVT prophylaxis - protonix/SQ lovenox  continue lexapro/proscar/gabapentin/synthroid  restarted on his chronic oxycontin 10mg 2 times daily with dilaudid 1mg IVP as needed for breakthrough pain    Will follow with you. Plan of care discussed with the patient and his wife at bedside and with Dr. Mitchell.    I will be away from Friday 9/16 until Monday 9/19. Dr. Jag Marie  will be covering our service. Please call [phone] with questions or clinical changes. Thank you.      Ellis Lorenzo MD, [facility]  [phone]  Pulmonary Medicine

## 2022-09-16 LAB
ALBUMIN SERPL ELPH-MCNC: 3.5 G/DL — SIGNIFICANT CHANGE UP (ref 3.3–5)
ALBUMIN SERPL ELPH-MCNC: 3.7 G/DL — SIGNIFICANT CHANGE UP (ref 3.3–5)
ALP SERPL-CCNC: 58 U/L — SIGNIFICANT CHANGE UP (ref 40–120)
ALP SERPL-CCNC: 59 U/L — SIGNIFICANT CHANGE UP (ref 40–120)
ALT FLD-CCNC: 13 U/L — SIGNIFICANT CHANGE UP (ref 10–45)
ALT FLD-CCNC: 13 U/L — SIGNIFICANT CHANGE UP (ref 10–45)
ANION GAP SERPL CALC-SCNC: 9 MMOL/L — SIGNIFICANT CHANGE UP (ref 5–17)
AST SERPL-CCNC: 14 U/L — SIGNIFICANT CHANGE UP (ref 10–40)
AST SERPL-CCNC: 15 U/L — SIGNIFICANT CHANGE UP (ref 10–40)
BILIRUB DIRECT SERPL-MCNC: 0.1 MG/DL — SIGNIFICANT CHANGE UP (ref 0–0.3)
BILIRUB INDIRECT FLD-MCNC: 0.2 MG/DL — SIGNIFICANT CHANGE UP (ref 0.2–1)
BILIRUB SERPL-MCNC: 0.3 MG/DL — SIGNIFICANT CHANGE UP (ref 0.2–1.2)
BILIRUB SERPL-MCNC: 0.4 MG/DL — SIGNIFICANT CHANGE UP (ref 0.2–1.2)
BUN SERPL-MCNC: 16 MG/DL — SIGNIFICANT CHANGE UP (ref 7–23)
CALCIUM SERPL-MCNC: 8.7 MG/DL — SIGNIFICANT CHANGE UP (ref 8.4–10.5)
CHLORIDE SERPL-SCNC: 102 MMOL/L — SIGNIFICANT CHANGE UP (ref 96–108)
CO2 SERPL-SCNC: 27 MMOL/L — SIGNIFICANT CHANGE UP (ref 22–31)
CREAT SERPL-MCNC: 0.78 MG/DL — SIGNIFICANT CHANGE UP (ref 0.5–1.3)
CREAT SERPL-MCNC: 0.83 MG/DL — SIGNIFICANT CHANGE UP (ref 0.5–1.3)
D DIMER BLD IA.RAPID-MCNC: 232 NG/ML DDU — HIGH
EGFR: 105 ML/MIN/1.73M2 — SIGNIFICANT CHANGE UP
EGFR: 107 ML/MIN/1.73M2 — SIGNIFICANT CHANGE UP
GLUCOSE SERPL-MCNC: 160 MG/DL — HIGH (ref 70–99)
HCT VFR BLD CALC: 32.1 % — LOW (ref 39–50)
HGB BLD-MCNC: 10.1 G/DL — LOW (ref 13–17)
INR BLD: 1.2 RATIO — HIGH (ref 0.88–1.16)
LACTATE SERPL-SCNC: 1.5 MMOL/L — SIGNIFICANT CHANGE UP (ref 0.5–2)
MCHC RBC-ENTMCNC: 27.1 PG — SIGNIFICANT CHANGE UP (ref 27–34)
MCHC RBC-ENTMCNC: 31.5 GM/DL — LOW (ref 32–36)
MCV RBC AUTO: 86.1 FL — SIGNIFICANT CHANGE UP (ref 80–100)
NRBC # BLD: 0 /100 WBCS — SIGNIFICANT CHANGE UP (ref 0–0)
PLATELET # BLD AUTO: 183 K/UL — SIGNIFICANT CHANGE UP (ref 150–400)
POTASSIUM SERPL-MCNC: 3.6 MMOL/L — SIGNIFICANT CHANGE UP (ref 3.5–5.3)
POTASSIUM SERPL-SCNC: 3.6 MMOL/L — SIGNIFICANT CHANGE UP (ref 3.5–5.3)
PROT SERPL-MCNC: 6.2 G/DL — SIGNIFICANT CHANGE UP (ref 6–8.3)
PROT SERPL-MCNC: 6.3 G/DL — SIGNIFICANT CHANGE UP (ref 6–8.3)
PROTHROM AB SERPL-ACNC: 13.9 SEC — HIGH (ref 10.5–13.4)
RBC # BLD: 3.73 M/UL — LOW (ref 4.2–5.8)
RBC # FLD: 13.9 % — SIGNIFICANT CHANGE UP (ref 10.3–14.5)
SODIUM SERPL-SCNC: 138 MMOL/L — SIGNIFICANT CHANGE UP (ref 135–145)
WBC # BLD: 4.02 K/UL — SIGNIFICANT CHANGE UP (ref 3.8–10.5)
WBC # FLD AUTO: 4.02 K/UL — SIGNIFICANT CHANGE UP (ref 3.8–10.5)

## 2022-09-16 RX ADMIN — Medication 20 MILLIGRAM(S): at 11:04

## 2022-09-16 RX ADMIN — SODIUM CHLORIDE 75 MILLILITER(S): 9 INJECTION INTRAMUSCULAR; INTRAVENOUS; SUBCUTANEOUS at 06:29

## 2022-09-16 RX ADMIN — REMDESIVIR 500 MILLIGRAM(S): 5 INJECTION INTRAVENOUS at 18:18

## 2022-09-16 RX ADMIN — OXYCODONE HYDROCHLORIDE 10 MILLIGRAM(S): 5 TABLET ORAL at 17:13

## 2022-09-16 RX ADMIN — BENZOCAINE AND MENTHOL 1 LOZENGE: 5; 1 LIQUID ORAL at 18:18

## 2022-09-16 RX ADMIN — Medication 10 MILLILITER(S): at 00:56

## 2022-09-16 RX ADMIN — PIPERACILLIN AND TAZOBACTAM 25 GRAM(S): 4; .5 INJECTION, POWDER, LYOPHILIZED, FOR SOLUTION INTRAVENOUS at 01:09

## 2022-09-16 RX ADMIN — CHLORHEXIDINE GLUCONATE 1 APPLICATION(S): 213 SOLUTION TOPICAL at 11:06

## 2022-09-16 RX ADMIN — Medication 20 MILLIGRAM(S): at 00:55

## 2022-09-16 RX ADMIN — OXYCODONE HYDROCHLORIDE 10 MILLIGRAM(S): 5 TABLET ORAL at 06:53

## 2022-09-16 RX ADMIN — GABAPENTIN 300 MILLIGRAM(S): 400 CAPSULE ORAL at 06:15

## 2022-09-16 RX ADMIN — Medication 10 MILLILITER(S): at 17:13

## 2022-09-16 RX ADMIN — GABAPENTIN 300 MILLIGRAM(S): 400 CAPSULE ORAL at 17:13

## 2022-09-16 RX ADMIN — FINASTERIDE 5 MILLIGRAM(S): 5 TABLET, FILM COATED ORAL at 11:04

## 2022-09-16 RX ADMIN — PIPERACILLIN AND TAZOBACTAM 25 GRAM(S): 4; .5 INJECTION, POWDER, LYOPHILIZED, FOR SOLUTION INTRAVENOUS at 10:29

## 2022-09-16 RX ADMIN — Medication 0.5 MILLIGRAM(S): at 17:14

## 2022-09-16 RX ADMIN — HYDROMORPHONE HYDROCHLORIDE 1 MILLIGRAM(S): 2 INJECTION INTRAMUSCULAR; INTRAVENOUS; SUBCUTANEOUS at 03:12

## 2022-09-16 RX ADMIN — Medication 3 MILLILITER(S): at 17:13

## 2022-09-16 RX ADMIN — BENZOCAINE AND MENTHOL 1 LOZENGE: 5; 1 LIQUID ORAL at 11:06

## 2022-09-16 RX ADMIN — OXYCODONE HYDROCHLORIDE 10 MILLIGRAM(S): 5 TABLET ORAL at 17:45

## 2022-09-16 RX ADMIN — OXYCODONE HYDROCHLORIDE 10 MILLIGRAM(S): 5 TABLET ORAL at 06:14

## 2022-09-16 RX ADMIN — HYDROMORPHONE HYDROCHLORIDE 1 MILLIGRAM(S): 2 INJECTION INTRAMUSCULAR; INTRAVENOUS; SUBCUTANEOUS at 03:50

## 2022-09-16 RX ADMIN — BENZOCAINE AND MENTHOL 1 LOZENGE: 5; 1 LIQUID ORAL at 03:10

## 2022-09-16 RX ADMIN — ATORVASTATIN CALCIUM 40 MILLIGRAM(S): 80 TABLET, FILM COATED ORAL at 21:14

## 2022-09-16 RX ADMIN — Medication 500000 UNIT(S): at 06:15

## 2022-09-16 RX ADMIN — HYDROMORPHONE HYDROCHLORIDE 1 MILLIGRAM(S): 2 INJECTION INTRAMUSCULAR; INTRAVENOUS; SUBCUTANEOUS at 11:10

## 2022-09-16 RX ADMIN — Medication 145 MILLIGRAM(S): at 11:04

## 2022-09-16 RX ADMIN — HYDROMORPHONE HYDROCHLORIDE 1 MILLIGRAM(S): 2 INJECTION INTRAMUSCULAR; INTRAVENOUS; SUBCUTANEOUS at 20:06

## 2022-09-16 RX ADMIN — Medication 20 MILLIGRAM(S): at 06:17

## 2022-09-16 RX ADMIN — BENZOCAINE AND MENTHOL 1 LOZENGE: 5; 1 LIQUID ORAL at 21:14

## 2022-09-16 RX ADMIN — HYDROMORPHONE HYDROCHLORIDE 1 MILLIGRAM(S): 2 INJECTION INTRAMUSCULAR; INTRAVENOUS; SUBCUTANEOUS at 20:46

## 2022-09-16 RX ADMIN — BENZOCAINE AND MENTHOL 1 LOZENGE: 5; 1 LIQUID ORAL at 06:15

## 2022-09-16 RX ADMIN — BENZOCAINE AND MENTHOL 1 LOZENGE: 5; 1 LIQUID ORAL at 16:16

## 2022-09-16 RX ADMIN — Medication 20 MILLIGRAM(S): at 17:18

## 2022-09-16 RX ADMIN — ESCITALOPRAM OXALATE 10 MILLIGRAM(S): 10 TABLET, FILM COATED ORAL at 11:04

## 2022-09-16 RX ADMIN — ENOXAPARIN SODIUM 40 MILLIGRAM(S): 100 INJECTION SUBCUTANEOUS at 20:01

## 2022-09-16 RX ADMIN — BENZOCAINE AND MENTHOL 1 LOZENGE: 5; 1 LIQUID ORAL at 13:31

## 2022-09-16 RX ADMIN — Medication 0.5 MILLIGRAM(S): at 06:29

## 2022-09-16 RX ADMIN — Medication 500000 UNIT(S): at 17:13

## 2022-09-16 RX ADMIN — Medication 500000 UNIT(S): at 11:06

## 2022-09-16 RX ADMIN — Medication 3 MILLILITER(S): at 10:28

## 2022-09-16 RX ADMIN — BENZOCAINE AND MENTHOL 1 LOZENGE: 5; 1 LIQUID ORAL at 00:55

## 2022-09-16 RX ADMIN — HYDROMORPHONE HYDROCHLORIDE 1 MILLIGRAM(S): 2 INJECTION INTRAMUSCULAR; INTRAVENOUS; SUBCUTANEOUS at 10:39

## 2022-09-16 RX ADMIN — Medication 500000 UNIT(S): at 00:56

## 2022-09-16 RX ADMIN — PIPERACILLIN AND TAZOBACTAM 25 GRAM(S): 4; .5 INJECTION, POWDER, LYOPHILIZED, FOR SOLUTION INTRAVENOUS at 17:14

## 2022-09-16 RX ADMIN — PANTOPRAZOLE SODIUM 40 MILLIGRAM(S): 20 TABLET, DELAYED RELEASE ORAL at 06:14

## 2022-09-16 RX ADMIN — Medication 3 MILLILITER(S): at 06:15

## 2022-09-16 RX ADMIN — Medication 10 MILLILITER(S): at 06:15

## 2022-09-16 RX ADMIN — BENZOCAINE AND MENTHOL 1 LOZENGE: 5; 1 LIQUID ORAL at 20:01

## 2022-09-16 RX ADMIN — Medication 3 MILLILITER(S): at 00:56

## 2022-09-16 RX ADMIN — Medication 10 MILLILITER(S): at 11:05

## 2022-09-16 RX ADMIN — Medication 100 MICROGRAM(S): at 06:15

## 2022-09-16 NOTE — PROGRESS NOTE ADULT - ASSESSMENT
ASSESSMENT:    52 year old gentleman, former smoker, without history of intrinsic lung disease. He has history of melanoma of the scalp s/p resection including a right sided lymph node dissection now on immunotherapy due to metastatic disease, testicular cancer s/p orchiectomy followed by chemotherapy and bone marrow transplantation for recurrent disease and non Hodgkin's lymphoma s/p resection and radiation therapy. He has adrenal insufficiency due to immunotherapy and chronic pain related to chemotherapy. The patient comes to the ER with fatigue, malaise, myalgias and weakness following a shingles vaccine. He tested positive for COVID 2 days ago. His gait has been unsteady and he has been lightheaded. The patient has mild shortness of breath and hypoxemia on room air. He has a cough productive copious amounts of sputum which he is having difficulty expectorating. He has chest congestion and wheeze. He has fevers, chills and sweats. No chest pain/pressure or palpitations. RVP -> COVID-10 infection. In the ER the patient was found to be hypotensive being treated with stress dose steroids and IV fluids.    markedly immunocompromised host following bone marrow transplantation ~ 25 years ago for recurrent testicular cancer, lymphoma s/p resection and radiation therapy and metastatic melanoma currently on immunotherapy    admitted with shock in the setting of COVID infection and adrenal insufficiency maintained on chronic hydrocortisone - viral induced bronchospasm - weak cough - mucous plugging - mild hypoxemia - no evidence of pneumonia on an AP portable CXR    9/14 -  looks much better - awake and alert - phonating with much less difficulty; increasing oxygen requirements without shortness of breath or hypoxemia on a 6lpm nasal canula; ongoing cough productive of copious amounts of sputum which he is having difficulty expectorating; persistent chest congestion and wheeze; no recent fevers, chills or sweats; no chest pain/pressure or palpitations; no fatigue, malaise or weakness: better appetite    9/16- feels better, less cough, off O2      PLAN/RECOMMENDATIONS:    O2 for sat >90% as needed  continue vanco/zosyn -> CT scan is c/w multifocal bacterial infection  remdesivir x 5 days - following renal and liver function which remain normal  to transition solumedrol to baseline dose of hydrocortisone  albuterol/atrovent nebs q6h  pulmicort 0.5mg nebs q12h - give after duoneb - rinse mouth after use  acapella device - the patient is using the device well and frequently  following inflammatory markers - CRP (markedly elevated) - d-dimer (mildly elevated) - ferritin (moderately elevated)  GI/DVT prophylaxis - protonix/SQ lovenox    Jag Marie MD  Pulmonary Medicine  [phone]

## 2022-09-16 NOTE — PROGRESS NOTE ADULT - ASSESSMENT
52 M former smoker 20 pack years with a PMH of testicular ca s/p orchiectomy, HTN, HLD, melanoma on immunotherapy (right chest wall mediport--> now removed), hypothyroidism secondary adrenal insufficiency, presents to the ER w/ cough and sob. sunday had body aches and felt cold, that night pt had a rapid covid test and was positive, on monday pt reports body aches, and he wasn't talking, pt states he had a sore throat, took temp on 9/12 was 100.7 took APAP, this AM family spoke w/ Dr. Mcginnis and recommended pt present to the hospital. pt states he felt week this AM, was able to ambulate down a flight of stairs and was weak, pt had a positive contact on 9/8/2022. pt is not on oxygen. bromphenir-pheudoephrine on cough medication. pt states that he was positive for covid yesterday, no cp, no lightheadedness, states trouble breathing dec po intake, no nausea no vomiting no abd pain, spoke w/ physician who referred to the Er pt states he took 30 mg of hydrocortisone today. pt w/ genearlized body aches and weakness. pt  upon arrival, pt w/ fever temp 37.7,    hypoxic resp failure 2/2 covid 19 infection  - O2 for sat >90% as needed  continue vanco/zosyn -> CT scan is c/w multifocal bacterial infection  remdesivir x 5 days - following renal and liver function which remain normal  to transition solumedrol to baseline dose of hydrocortisone  albuterol/atrovent nebs q6h  pulmicort 0.5mg nebs q12h - give after duoneb - rinse mouth after use  acapella device - the patient is using the device well and frequently  following inflammatory markers - CRP (markedly elevated) - d-dimer (mildly elevated) - ferritin (moderately elevated)  - 9/16- feels better, less cough, off O2    hypotension and shock  - adrenal insuff  - stress dose steroids    lactic acidosis  s/p iv fluids  - now normal    hypothyroid  - c/w synthroid  - TSH 1.18    HTN  - hold home BP meds due to hypotension    dvt px  - lovenox

## 2022-09-17 LAB
ALBUMIN SERPL ELPH-MCNC: 3.7 G/DL — SIGNIFICANT CHANGE UP (ref 3.3–5)
ALP SERPL-CCNC: 54 U/L — SIGNIFICANT CHANGE UP (ref 40–120)
ALT FLD-CCNC: 13 U/L — SIGNIFICANT CHANGE UP (ref 10–45)
ANION GAP SERPL CALC-SCNC: 10 MMOL/L — SIGNIFICANT CHANGE UP (ref 5–17)
AST SERPL-CCNC: 12 U/L — SIGNIFICANT CHANGE UP (ref 10–40)
BILIRUB DIRECT SERPL-MCNC: <0.1 MG/DL — SIGNIFICANT CHANGE UP (ref 0–0.3)
BILIRUB SERPL-MCNC: 0.4 MG/DL — SIGNIFICANT CHANGE UP (ref 0.2–1.2)
BUN SERPL-MCNC: 15 MG/DL — SIGNIFICANT CHANGE UP (ref 7–23)
CALCIUM SERPL-MCNC: 8.8 MG/DL — SIGNIFICANT CHANGE UP (ref 8.4–10.5)
CHLORIDE SERPL-SCNC: 102 MMOL/L — SIGNIFICANT CHANGE UP (ref 96–108)
CO2 SERPL-SCNC: 26 MMOL/L — SIGNIFICANT CHANGE UP (ref 22–31)
CREAT SERPL-MCNC: 0.68 MG/DL — SIGNIFICANT CHANGE UP (ref 0.5–1.3)
EGFR: 112 ML/MIN/1.73M2 — SIGNIFICANT CHANGE UP
GLUCOSE SERPL-MCNC: 173 MG/DL — HIGH (ref 70–99)
INR BLD: 1.22 RATIO — HIGH (ref 0.88–1.16)
POTASSIUM SERPL-MCNC: 3.6 MMOL/L — SIGNIFICANT CHANGE UP (ref 3.5–5.3)
POTASSIUM SERPL-SCNC: 3.6 MMOL/L — SIGNIFICANT CHANGE UP (ref 3.5–5.3)
PROT SERPL-MCNC: 6.2 G/DL — SIGNIFICANT CHANGE UP (ref 6–8.3)
PROTHROM AB SERPL-ACNC: 14.1 SEC — HIGH (ref 10.5–13.4)
SODIUM SERPL-SCNC: 138 MMOL/L — SIGNIFICANT CHANGE UP (ref 135–145)

## 2022-09-17 RX ORDER — BENZOCAINE AND MENTHOL 5; 1 G/100ML; G/100ML
1 LIQUID ORAL
Refills: 0 | Status: DISCONTINUED | OUTPATIENT
Start: 2022-09-17 | End: 2022-09-19

## 2022-09-17 RX ADMIN — GABAPENTIN 300 MILLIGRAM(S): 400 CAPSULE ORAL at 05:14

## 2022-09-17 RX ADMIN — PIPERACILLIN AND TAZOBACTAM 25 GRAM(S): 4; .5 INJECTION, POWDER, LYOPHILIZED, FOR SOLUTION INTRAVENOUS at 12:03

## 2022-09-17 RX ADMIN — Medication 20 MILLIGRAM(S): at 00:10

## 2022-09-17 RX ADMIN — OXYCODONE HYDROCHLORIDE 10 MILLIGRAM(S): 5 TABLET ORAL at 05:14

## 2022-09-17 RX ADMIN — Medication 10 MILLILITER(S): at 05:14

## 2022-09-17 RX ADMIN — Medication 500000 UNIT(S): at 05:14

## 2022-09-17 RX ADMIN — HYDROMORPHONE HYDROCHLORIDE 1 MILLIGRAM(S): 2 INJECTION INTRAMUSCULAR; INTRAVENOUS; SUBCUTANEOUS at 02:55

## 2022-09-17 RX ADMIN — OXYCODONE HYDROCHLORIDE 10 MILLIGRAM(S): 5 TABLET ORAL at 06:00

## 2022-09-17 RX ADMIN — Medication 10 MILLILITER(S): at 12:04

## 2022-09-17 RX ADMIN — PIPERACILLIN AND TAZOBACTAM 25 GRAM(S): 4; .5 INJECTION, POWDER, LYOPHILIZED, FOR SOLUTION INTRAVENOUS at 02:15

## 2022-09-17 RX ADMIN — Medication 20 MILLIGRAM(S): at 05:15

## 2022-09-17 RX ADMIN — PANTOPRAZOLE SODIUM 40 MILLIGRAM(S): 20 TABLET, DELAYED RELEASE ORAL at 05:17

## 2022-09-17 RX ADMIN — Medication 3 MILLILITER(S): at 00:10

## 2022-09-17 RX ADMIN — FINASTERIDE 5 MILLIGRAM(S): 5 TABLET, FILM COATED ORAL at 12:06

## 2022-09-17 RX ADMIN — Medication 500000 UNIT(S): at 12:05

## 2022-09-17 RX ADMIN — HYDROMORPHONE HYDROCHLORIDE 1 MILLIGRAM(S): 2 INJECTION INTRAMUSCULAR; INTRAVENOUS; SUBCUTANEOUS at 09:14

## 2022-09-17 RX ADMIN — Medication 100 MICROGRAM(S): at 05:14

## 2022-09-17 RX ADMIN — HYDROMORPHONE HYDROCHLORIDE 1 MILLIGRAM(S): 2 INJECTION INTRAMUSCULAR; INTRAVENOUS; SUBCUTANEOUS at 02:16

## 2022-09-17 RX ADMIN — HYDROMORPHONE HYDROCHLORIDE 1 MILLIGRAM(S): 2 INJECTION INTRAMUSCULAR; INTRAVENOUS; SUBCUTANEOUS at 15:33

## 2022-09-17 RX ADMIN — BENZOCAINE AND MENTHOL 1 LOZENGE: 5; 1 LIQUID ORAL at 05:15

## 2022-09-17 RX ADMIN — HYDROMORPHONE HYDROCHLORIDE 1 MILLIGRAM(S): 2 INJECTION INTRAMUSCULAR; INTRAVENOUS; SUBCUTANEOUS at 08:42

## 2022-09-17 RX ADMIN — HYDROMORPHONE HYDROCHLORIDE 1 MILLIGRAM(S): 2 INJECTION INTRAMUSCULAR; INTRAVENOUS; SUBCUTANEOUS at 16:00

## 2022-09-17 RX ADMIN — HYDROMORPHONE HYDROCHLORIDE 1 MILLIGRAM(S): 2 INJECTION INTRAMUSCULAR; INTRAVENOUS; SUBCUTANEOUS at 21:52

## 2022-09-17 RX ADMIN — Medication 3 MILLILITER(S): at 05:15

## 2022-09-17 RX ADMIN — Medication 20 MILLIGRAM(S): at 12:04

## 2022-09-17 RX ADMIN — BENZOCAINE AND MENTHOL 1 LOZENGE: 5; 1 LIQUID ORAL at 00:10

## 2022-09-17 RX ADMIN — ATORVASTATIN CALCIUM 40 MILLIGRAM(S): 80 TABLET, FILM COATED ORAL at 21:51

## 2022-09-17 RX ADMIN — Medication 145 MILLIGRAM(S): at 12:07

## 2022-09-17 RX ADMIN — Medication 500000 UNIT(S): at 00:09

## 2022-09-17 RX ADMIN — OXYCODONE HYDROCHLORIDE 10 MILLIGRAM(S): 5 TABLET ORAL at 18:43

## 2022-09-17 RX ADMIN — HYDROMORPHONE HYDROCHLORIDE 1 MILLIGRAM(S): 2 INJECTION INTRAMUSCULAR; INTRAVENOUS; SUBCUTANEOUS at 22:40

## 2022-09-17 RX ADMIN — ENOXAPARIN SODIUM 40 MILLIGRAM(S): 100 INJECTION SUBCUTANEOUS at 20:02

## 2022-09-17 RX ADMIN — CHLORHEXIDINE GLUCONATE 1 APPLICATION(S): 213 SOLUTION TOPICAL at 12:05

## 2022-09-17 RX ADMIN — BENZOCAINE AND MENTHOL 1 LOZENGE: 5; 1 LIQUID ORAL at 02:16

## 2022-09-17 RX ADMIN — Medication 3 MILLILITER(S): at 12:06

## 2022-09-17 RX ADMIN — Medication 10 MILLILITER(S): at 00:09

## 2022-09-17 RX ADMIN — Medication 0.5 MILLIGRAM(S): at 05:15

## 2022-09-17 RX ADMIN — SODIUM CHLORIDE 75 MILLILITER(S): 9 INJECTION INTRAMUSCULAR; INTRAVENOUS; SUBCUTANEOUS at 15:34

## 2022-09-17 RX ADMIN — ESCITALOPRAM OXALATE 10 MILLIGRAM(S): 10 TABLET, FILM COATED ORAL at 12:07

## 2022-09-17 NOTE — PROGRESS NOTE ADULT - ASSESSMENT
52 M former smoker 20 pack years with a PMH of testicular ca s/p orchiectomy, HTN, HLD, melanoma on immunotherapy (right chest wall mediport--> now removed), hypothyroidism secondary adrenal insufficiency, presents to the ER w/ cough and sob. sunday had body aches and felt cold, that night pt had a rapid covid test and was positive, on monday pt reports body aches, and he wasn't talking, pt states he had a sore throat, took temp on 9/12 was 100.7 took APAP, this AM family spoke w/ Dr. Mcginnis and recommended pt present to the hospital. pt states he felt week this AM, was able to ambulate down a flight of stairs and was weak, pt had a positive contact on 9/8/2022. pt is not on oxygen. bromphenir-pheudoephrine on cough medication. pt states that he was positive for covid yesterday, no cp, no lightheadedness, states trouble breathing dec po intake, no nausea no vomiting no abd pain, spoke w/ physician who referred to the Er pt states he took 30 mg of hydrocortisone today. pt w/ genearlized body aches and weakness. pt  upon arrival, pt w/ fever temp 37.7,    hypoxic resp failure 2/2 covid 19 infection  - O2 for sat >90% as needed  continue vanco/zosyn -> CT scan is c/w multifocal bacterial infection  remdesivir x 5 days - following renal and liver function which remain normal  to transition solumedrol to baseline dose of hydrocortisone  albuterol/atrovent nebs q6h  pulmicort 0.5mg nebs q12h - give after duoneb - rinse mouth after use  acapella device - the patient is using the device well and frequently  following inflammatory markers - CRP (markedly elevated) - d-dimer (mildly elevated) - ferritin (moderately elevated)  - 9/16- feels better, less cough, off O2    hypotension and shock  - adrenal insuff  - stress dose steroids taper    lactic acidosis  s/p iv fluids  - now normal    hypothyroid  - c/w synthroid  - TSH 1.18    HTN  - hold home BP meds due to hypotension    dvt px  - lovenox

## 2022-09-17 NOTE — PROGRESS NOTE ADULT - ASSESSMENT
ASSESSMENT:    52 year old gentleman, former smoker, without history of intrinsic lung disease. He has history of melanoma of the scalp s/p resection including a right sided lymph node dissection now on immunotherapy due to metastatic disease, testicular cancer s/p orchiectomy followed by chemotherapy and bone marrow transplantation for recurrent disease and non Hodgkin's lymphoma s/p resection and radiation therapy. He has adrenal insufficiency due to immunotherapy and chronic pain related to chemotherapy. The patient comes to the ER with fatigue, malaise, myalgias and weakness following a shingles vaccine. He tested positive for COVID 2 days ago. His gait has been unsteady and he has been lightheaded. The patient has mild shortness of breath and hypoxemia on room air. He has a cough productive copious amounts of sputum which he is having difficulty expectorating. He has chest congestion and wheeze. He has fevers, chills and sweats. No chest pain/pressure or palpitations. RVP -> COVID-10 infection. In the ER the patient was found to be hypotensive being treated with stress dose steroids and IV fluids.    markedly immunocompromised host following bone marrow transplantation ~ 25 years ago for recurrent testicular cancer, lymphoma s/p resection and radiation therapy and metastatic melanoma currently on immunotherapy    admitted with shock in the setting of COVID infection and adrenal insufficiency maintained on chronic hydrocortisone - viral induced bronchospasm - weak cough - mucous plugging - mild hypoxemia - no evidence of pneumonia on an AP portable CXR    9/14 -  looks much better - awake and alert - phonating with much less difficulty; increasing oxygen requirements without shortness of breath or hypoxemia on a 6lpm nasal canula; ongoing cough productive of copious amounts of sputum which he is having difficulty expectorating; persistent chest congestion and wheeze; no recent fevers, chills or sweats; no chest pain/pressure or palpitations; no fatigue, malaise or weakness: better appetite    9/16- feels better, less cough, off O2      PLAN/RECOMMENDATIONS:    O2 for sat >90% as needed  continue vanco/zosyn -> CT scan is c/w multifocal bacterial infection  remdesivir x 5 days - following renal and liver function which remain normal  to transition solumedrol to baseline dose of hydrocortisone when improved further, continue 20mg Q6  albuterol/atrovent nebs q6h  pulmicort 0.5mg nebs q12h - give after duoneb - rinse mouth after use  acapella device - the patient is using the device well and frequently  following inflammatory markers - CRP (markedly elevated) - d-dimer (mildly elevated) - ferritin (moderately elevated) repeat ordered for 9/18 am  GI/DVT prophylaxis - protonix/SQ lovenox  pain management per PMD    Jag Marie MD  Pulmonary Medicine  [phone]

## 2022-09-18 LAB
ALBUMIN SERPL ELPH-MCNC: 4 G/DL — SIGNIFICANT CHANGE UP (ref 3.3–5)
ALP SERPL-CCNC: 58 U/L — SIGNIFICANT CHANGE UP (ref 40–120)
ALT FLD-CCNC: 13 U/L — SIGNIFICANT CHANGE UP (ref 10–45)
AST SERPL-CCNC: 11 U/L — SIGNIFICANT CHANGE UP (ref 10–40)
BILIRUB DIRECT SERPL-MCNC: 0.2 MG/DL — SIGNIFICANT CHANGE UP (ref 0–0.3)
BILIRUB INDIRECT FLD-MCNC: 0.5 MG/DL — SIGNIFICANT CHANGE UP (ref 0.2–1)
BILIRUB SERPL-MCNC: 0.7 MG/DL — SIGNIFICANT CHANGE UP (ref 0.2–1.2)
CREAT SERPL-MCNC: 0.68 MG/DL — SIGNIFICANT CHANGE UP (ref 0.5–1.3)
CRP SERPL-MCNC: 14 MG/L — HIGH (ref 0–4)
CULTURE RESULTS: SIGNIFICANT CHANGE UP
CULTURE RESULTS: SIGNIFICANT CHANGE UP
EGFR: 112 ML/MIN/1.73M2 — SIGNIFICANT CHANGE UP
ERYTHROCYTE [SEDIMENTATION RATE] IN BLOOD: 33 MM/HR — HIGH (ref 0–20)
FERRITIN SERPL-MCNC: 317 NG/ML — SIGNIFICANT CHANGE UP (ref 30–400)
INR BLD: 1.25 RATIO — HIGH (ref 0.88–1.16)
PROT SERPL-MCNC: 6.7 G/DL — SIGNIFICANT CHANGE UP (ref 6–8.3)
PROTHROM AB SERPL-ACNC: 14.5 SEC — HIGH (ref 10.5–13.4)
SPECIMEN SOURCE: SIGNIFICANT CHANGE UP
SPECIMEN SOURCE: SIGNIFICANT CHANGE UP

## 2022-09-18 RX ADMIN — HYDROMORPHONE HYDROCHLORIDE 1 MILLIGRAM(S): 2 INJECTION INTRAMUSCULAR; INTRAVENOUS; SUBCUTANEOUS at 04:42

## 2022-09-18 RX ADMIN — HYDROMORPHONE HYDROCHLORIDE 1 MILLIGRAM(S): 2 INJECTION INTRAMUSCULAR; INTRAVENOUS; SUBCUTANEOUS at 11:15

## 2022-09-18 RX ADMIN — OXYCODONE HYDROCHLORIDE 10 MILLIGRAM(S): 5 TABLET ORAL at 06:50

## 2022-09-18 RX ADMIN — SODIUM CHLORIDE 75 MILLILITER(S): 9 INJECTION INTRAMUSCULAR; INTRAVENOUS; SUBCUTANEOUS at 01:23

## 2022-09-18 RX ADMIN — Medication 500000 UNIT(S): at 06:18

## 2022-09-18 RX ADMIN — PIPERACILLIN AND TAZOBACTAM 25 GRAM(S): 4; .5 INJECTION, POWDER, LYOPHILIZED, FOR SOLUTION INTRAVENOUS at 10:43

## 2022-09-18 RX ADMIN — Medication 3 MILLILITER(S): at 11:36

## 2022-09-18 RX ADMIN — ENOXAPARIN SODIUM 40 MILLIGRAM(S): 100 INJECTION SUBCUTANEOUS at 22:59

## 2022-09-18 RX ADMIN — HYDROMORPHONE HYDROCHLORIDE 1 MILLIGRAM(S): 2 INJECTION INTRAMUSCULAR; INTRAVENOUS; SUBCUTANEOUS at 10:44

## 2022-09-18 RX ADMIN — GABAPENTIN 300 MILLIGRAM(S): 400 CAPSULE ORAL at 17:06

## 2022-09-18 RX ADMIN — GABAPENTIN 300 MILLIGRAM(S): 400 CAPSULE ORAL at 06:19

## 2022-09-18 RX ADMIN — HYDROMORPHONE HYDROCHLORIDE 1 MILLIGRAM(S): 2 INJECTION INTRAMUSCULAR; INTRAVENOUS; SUBCUTANEOUS at 17:02

## 2022-09-18 RX ADMIN — PIPERACILLIN AND TAZOBACTAM 25 GRAM(S): 4; .5 INJECTION, POWDER, LYOPHILIZED, FOR SOLUTION INTRAVENOUS at 01:22

## 2022-09-18 RX ADMIN — ESCITALOPRAM OXALATE 10 MILLIGRAM(S): 10 TABLET, FILM COATED ORAL at 11:40

## 2022-09-18 RX ADMIN — Medication 20 MILLIGRAM(S): at 22:57

## 2022-09-18 RX ADMIN — ATORVASTATIN CALCIUM 40 MILLIGRAM(S): 80 TABLET, FILM COATED ORAL at 22:58

## 2022-09-18 RX ADMIN — Medication 20 MILLIGRAM(S): at 00:55

## 2022-09-18 RX ADMIN — OXYCODONE HYDROCHLORIDE 10 MILLIGRAM(S): 5 TABLET ORAL at 06:20

## 2022-09-18 RX ADMIN — OXYCODONE HYDROCHLORIDE 10 MILLIGRAM(S): 5 TABLET ORAL at 18:18

## 2022-09-18 RX ADMIN — Medication 100 MICROGRAM(S): at 06:19

## 2022-09-18 RX ADMIN — Medication 0.5 MILLIGRAM(S): at 17:06

## 2022-09-18 RX ADMIN — Medication 0.5 MILLIGRAM(S): at 06:19

## 2022-09-18 RX ADMIN — Medication 500000 UNIT(S): at 11:36

## 2022-09-18 RX ADMIN — Medication 10 MILLILITER(S): at 00:55

## 2022-09-18 RX ADMIN — PANTOPRAZOLE SODIUM 40 MILLIGRAM(S): 20 TABLET, DELAYED RELEASE ORAL at 06:19

## 2022-09-18 RX ADMIN — OXYCODONE HYDROCHLORIDE 10 MILLIGRAM(S): 5 TABLET ORAL at 17:07

## 2022-09-18 RX ADMIN — Medication 500000 UNIT(S): at 00:55

## 2022-09-18 RX ADMIN — Medication 10 MILLILITER(S): at 06:19

## 2022-09-18 RX ADMIN — HYDROMORPHONE HYDROCHLORIDE 1 MILLIGRAM(S): 2 INJECTION INTRAMUSCULAR; INTRAVENOUS; SUBCUTANEOUS at 17:30

## 2022-09-18 RX ADMIN — Medication 3 MILLILITER(S): at 00:56

## 2022-09-18 RX ADMIN — PIPERACILLIN AND TAZOBACTAM 25 GRAM(S): 4; .5 INJECTION, POWDER, LYOPHILIZED, FOR SOLUTION INTRAVENOUS at 17:07

## 2022-09-18 RX ADMIN — Medication 145 MILLIGRAM(S): at 13:08

## 2022-09-18 RX ADMIN — Medication 20 MILLIGRAM(S): at 06:21

## 2022-09-18 RX ADMIN — Medication 3 MILLILITER(S): at 17:14

## 2022-09-18 RX ADMIN — Medication 20 MILLIGRAM(S): at 13:09

## 2022-09-18 RX ADMIN — Medication 10 MILLILITER(S): at 11:36

## 2022-09-18 RX ADMIN — Medication 3 MILLILITER(S): at 06:19

## 2022-09-18 RX ADMIN — HYDROMORPHONE HYDROCHLORIDE 1 MILLIGRAM(S): 2 INJECTION INTRAMUSCULAR; INTRAVENOUS; SUBCUTANEOUS at 05:20

## 2022-09-18 RX ADMIN — FINASTERIDE 5 MILLIGRAM(S): 5 TABLET, FILM COATED ORAL at 11:40

## 2022-09-18 RX ADMIN — HYDROMORPHONE HYDROCHLORIDE 1 MILLIGRAM(S): 2 INJECTION INTRAMUSCULAR; INTRAVENOUS; SUBCUTANEOUS at 22:57

## 2022-09-18 RX ADMIN — Medication 500000 UNIT(S): at 17:14

## 2022-09-18 RX ADMIN — Medication 10 MILLILITER(S): at 17:05

## 2022-09-18 RX ADMIN — CHLORHEXIDINE GLUCONATE 1 APPLICATION(S): 213 SOLUTION TOPICAL at 11:37

## 2022-09-18 NOTE — PROGRESS NOTE ADULT - ASSESSMENT
ASSESSMENT:    52 year old gentleman, former smoker, without history of intrinsic lung disease. He has history of melanoma of the scalp s/p resection including a right sided lymph node dissection now on immunotherapy due to metastatic disease, testicular cancer s/p orchiectomy followed by chemotherapy and bone marrow transplantation for recurrent disease and non Hodgkin's lymphoma s/p resection and radiation therapy. He has adrenal insufficiency due to immunotherapy and chronic pain related to chemotherapy. The patient comes to the ER with fatigue, malaise, myalgias and weakness following a shingles vaccine. He tested positive for COVID 2 days ago. His gait has been unsteady and he has been lightheaded. The patient has mild shortness of breath and hypoxemia on room air. He has a cough productive copious amounts of sputum which he is having difficulty expectorating. He has chest congestion and wheeze. He has fevers, chills and sweats. No chest pain/pressure or palpitations. RVP -> COVID-10 infection. In the ER the patient was found to be hypotensive being treated with stress dose steroids and IV fluids.    markedly immunocompromised host following bone marrow transplantation ~ 25 years ago for recurrent testicular cancer, lymphoma s/p resection and radiation therapy and metastatic melanoma currently on immunotherapy    admitted with shock in the setting of COVID infection and adrenal insufficiency maintained on chronic hydrocortisone - viral induced bronchospasm - weak cough - mucous plugging - mild hypoxemia - no evidence of pneumonia on an AP portable CXR    9/14 -  looks much better - awake and alert - phonating with much less difficulty; increasing oxygen requirements without shortness of breath or hypoxemia on a 6lpm nasal canula; ongoing cough productive of copious amounts of sputum which he is having difficulty expectorating; persistent chest congestion and wheeze; no recent fevers, chills or sweats; no chest pain/pressure or palpitations; no fatigue, malaise or weakness: better appetite    9/18- feels better, less cough, off O2, inflammatory markers down      PLAN/RECOMMENDATIONS:    O2 for sat >90% as needed  continue zosyn -> CT scan is c/w multifocal bacterial infection- to complete 7 days  remdesivir x 5 days - complete  to transition solumedrol to baseline dose of hydrocortisone when improved further, decrease 20mg Q8 today  albuterol/atrovent nebs q6h  pulmicort 0.5mg nebs q12h - give after duoneb - rinse mouth after use  acapella device - the patient is using the device well and frequently  following inflammatory markers - CRP and ESR decreased  GI/DVT prophylaxis - protonix/SQ lovenox  pain management per PMD    Jag Marie MD  Pulmonary Medicine  [phone]

## 2022-09-19 ENCOUNTER — TRANSCRIPTION ENCOUNTER (OUTPATIENT)
Age: 52
End: 2022-09-19

## 2022-09-19 VITALS
TEMPERATURE: 98 F | DIASTOLIC BLOOD PRESSURE: 68 MMHG | RESPIRATION RATE: 18 BRPM | OXYGEN SATURATION: 95 % | SYSTOLIC BLOOD PRESSURE: 110 MMHG | HEART RATE: 74 BPM

## 2022-09-19 LAB
ALBUMIN SERPL ELPH-MCNC: 4.3 G/DL — SIGNIFICANT CHANGE UP (ref 3.3–5)
ALP SERPL-CCNC: 62 U/L — SIGNIFICANT CHANGE UP (ref 40–120)
ALT FLD-CCNC: 15 U/L — SIGNIFICANT CHANGE UP (ref 10–45)
AST SERPL-CCNC: 13 U/L — SIGNIFICANT CHANGE UP (ref 10–40)
BILIRUB DIRECT SERPL-MCNC: 0.2 MG/DL — SIGNIFICANT CHANGE UP (ref 0–0.3)
BILIRUB INDIRECT FLD-MCNC: 0.5 MG/DL — SIGNIFICANT CHANGE UP (ref 0.2–1)
BILIRUB SERPL-MCNC: 0.7 MG/DL — SIGNIFICANT CHANGE UP (ref 0.2–1.2)
CREAT SERPL-MCNC: 0.72 MG/DL — SIGNIFICANT CHANGE UP (ref 0.5–1.3)
EGFR: 110 ML/MIN/1.73M2 — SIGNIFICANT CHANGE UP
INR BLD: 1.16 RATIO — SIGNIFICANT CHANGE UP (ref 0.88–1.16)
PROT SERPL-MCNC: 7.5 G/DL — SIGNIFICANT CHANGE UP (ref 6–8.3)
PROTHROM AB SERPL-ACNC: 13.4 SEC — SIGNIFICANT CHANGE UP (ref 10.5–13.4)

## 2022-09-19 PROCEDURE — 36415 COLL VENOUS BLD VENIPUNCTURE: CPT

## 2022-09-19 PROCEDURE — 82728 ASSAY OF FERRITIN: CPT

## 2022-09-19 PROCEDURE — 87040 BLOOD CULTURE FOR BACTERIA: CPT

## 2022-09-19 PROCEDURE — 85025 COMPLETE CBC W/AUTO DIFF WBC: CPT

## 2022-09-19 PROCEDURE — 82565 ASSAY OF CREATININE: CPT

## 2022-09-19 PROCEDURE — 82435 ASSAY OF BLOOD CHLORIDE: CPT

## 2022-09-19 PROCEDURE — 82248 BILIRUBIN DIRECT: CPT

## 2022-09-19 PROCEDURE — 87640 STAPH A DNA AMP PROBE: CPT

## 2022-09-19 PROCEDURE — 83605 ASSAY OF LACTIC ACID: CPT

## 2022-09-19 PROCEDURE — 0225U NFCT DS DNA&RNA 21 SARSCOV2: CPT

## 2022-09-19 PROCEDURE — 82746 ASSAY OF FOLIC ACID SERUM: CPT

## 2022-09-19 PROCEDURE — 36600 WITHDRAWAL OF ARTERIAL BLOOD: CPT

## 2022-09-19 PROCEDURE — 85379 FIBRIN DEGRADATION QUANT: CPT

## 2022-09-19 PROCEDURE — 80053 COMPREHEN METABOLIC PANEL: CPT

## 2022-09-19 PROCEDURE — 82947 ASSAY GLUCOSE BLOOD QUANT: CPT

## 2022-09-19 PROCEDURE — 84100 ASSAY OF PHOSPHORUS: CPT

## 2022-09-19 PROCEDURE — 82330 ASSAY OF CALCIUM: CPT

## 2022-09-19 PROCEDURE — 71250 CT THORAX DX C-: CPT

## 2022-09-19 PROCEDURE — 82607 VITAMIN B-12: CPT

## 2022-09-19 PROCEDURE — 71045 X-RAY EXAM CHEST 1 VIEW: CPT

## 2022-09-19 PROCEDURE — 85027 COMPLETE CBC AUTOMATED: CPT

## 2022-09-19 PROCEDURE — 83735 ASSAY OF MAGNESIUM: CPT

## 2022-09-19 PROCEDURE — 84132 ASSAY OF SERUM POTASSIUM: CPT

## 2022-09-19 PROCEDURE — 80048 BASIC METABOLIC PNL TOTAL CA: CPT

## 2022-09-19 PROCEDURE — 96360 HYDRATION IV INFUSION INIT: CPT

## 2022-09-19 PROCEDURE — 94640 AIRWAY INHALATION TREATMENT: CPT

## 2022-09-19 PROCEDURE — 99291 CRITICAL CARE FIRST HOUR: CPT | Mod: 25

## 2022-09-19 PROCEDURE — 87641 MR-STAPH DNA AMP PROBE: CPT

## 2022-09-19 PROCEDURE — 85730 THROMBOPLASTIN TIME PARTIAL: CPT

## 2022-09-19 PROCEDURE — 84295 ASSAY OF SERUM SODIUM: CPT

## 2022-09-19 PROCEDURE — 84443 ASSAY THYROID STIM HORMONE: CPT

## 2022-09-19 PROCEDURE — 85014 HEMATOCRIT: CPT

## 2022-09-19 PROCEDURE — 86140 C-REACTIVE PROTEIN: CPT

## 2022-09-19 PROCEDURE — 85610 PROTHROMBIN TIME: CPT

## 2022-09-19 PROCEDURE — 82803 BLOOD GASES ANY COMBINATION: CPT

## 2022-09-19 PROCEDURE — 87086 URINE CULTURE/COLONY COUNT: CPT

## 2022-09-19 PROCEDURE — 85018 HEMOGLOBIN: CPT

## 2022-09-19 PROCEDURE — 84145 PROCALCITONIN (PCT): CPT

## 2022-09-19 PROCEDURE — 85652 RBC SED RATE AUTOMATED: CPT

## 2022-09-19 PROCEDURE — 80076 HEPATIC FUNCTION PANEL: CPT

## 2022-09-19 RX ORDER — TRAMADOL HYDROCHLORIDE 50 MG/1
1 TABLET ORAL
Qty: 0 | Refills: 0 | DISCHARGE

## 2022-09-19 RX ORDER — HYDROCORTISONE 20 MG
0 TABLET ORAL
Qty: 0 | Refills: 0 | DISCHARGE

## 2022-09-19 RX ORDER — HYDROCORTISONE 20 MG
1 TABLET ORAL
Qty: 0 | Refills: 0 | DISCHARGE
Start: 2022-09-19

## 2022-09-19 RX ORDER — HYDROCORTISONE 20 MG
10 TABLET ORAL
Refills: 0 | Status: DISCONTINUED | OUTPATIENT
Start: 2022-09-19 | End: 2022-09-19

## 2022-09-19 RX ORDER — AMLODIPINE BESYLATE 2.5 MG/1
1 TABLET ORAL
Qty: 0 | Refills: 0 | DISCHARGE

## 2022-09-19 RX ORDER — OXYCODONE HYDROCHLORIDE 5 MG/1
5 TABLET ORAL EVERY 4 HOURS
Refills: 0 | Status: DISCONTINUED | OUTPATIENT
Start: 2022-09-19 | End: 2022-09-19

## 2022-09-19 RX ORDER — HYDROCORTISONE 20 MG
20 TABLET ORAL DAILY
Refills: 0 | Status: DISCONTINUED | OUTPATIENT
Start: 2022-09-19 | End: 2022-09-19

## 2022-09-19 RX ADMIN — PIPERACILLIN AND TAZOBACTAM 25 GRAM(S): 4; .5 INJECTION, POWDER, LYOPHILIZED, FOR SOLUTION INTRAVENOUS at 10:45

## 2022-09-19 RX ADMIN — CHLORHEXIDINE GLUCONATE 1 APPLICATION(S): 213 SOLUTION TOPICAL at 12:38

## 2022-09-19 RX ADMIN — Medication 3 MILLILITER(S): at 17:47

## 2022-09-19 RX ADMIN — OXYCODONE HYDROCHLORIDE 10 MILLIGRAM(S): 5 TABLET ORAL at 06:49

## 2022-09-19 RX ADMIN — OXYCODONE HYDROCHLORIDE 10 MILLIGRAM(S): 5 TABLET ORAL at 17:46

## 2022-09-19 RX ADMIN — PIPERACILLIN AND TAZOBACTAM 25 GRAM(S): 4; .5 INJECTION, POWDER, LYOPHILIZED, FOR SOLUTION INTRAVENOUS at 04:34

## 2022-09-19 RX ADMIN — OXYCODONE HYDROCHLORIDE 10 MILLIGRAM(S): 5 TABLET ORAL at 05:29

## 2022-09-19 RX ADMIN — FINASTERIDE 5 MILLIGRAM(S): 5 TABLET, FILM COATED ORAL at 12:38

## 2022-09-19 RX ADMIN — PANTOPRAZOLE SODIUM 40 MILLIGRAM(S): 20 TABLET, DELAYED RELEASE ORAL at 05:30

## 2022-09-19 RX ADMIN — Medication 0.5 MILLIGRAM(S): at 05:27

## 2022-09-19 RX ADMIN — Medication 3 MILLILITER(S): at 05:28

## 2022-09-19 RX ADMIN — Medication 500000 UNIT(S): at 11:40

## 2022-09-19 RX ADMIN — Medication 10 MILLILITER(S): at 11:42

## 2022-09-19 RX ADMIN — Medication 20 MILLIGRAM(S): at 14:38

## 2022-09-19 RX ADMIN — HYDROMORPHONE HYDROCHLORIDE 1 MILLIGRAM(S): 2 INJECTION INTRAMUSCULAR; INTRAVENOUS; SUBCUTANEOUS at 11:35

## 2022-09-19 RX ADMIN — ENOXAPARIN SODIUM 40 MILLIGRAM(S): 100 INJECTION SUBCUTANEOUS at 22:41

## 2022-09-19 RX ADMIN — Medication 500000 UNIT(S): at 05:30

## 2022-09-19 RX ADMIN — ESCITALOPRAM OXALATE 10 MILLIGRAM(S): 10 TABLET, FILM COATED ORAL at 11:40

## 2022-09-19 RX ADMIN — HYDROMORPHONE HYDROCHLORIDE 1 MILLIGRAM(S): 2 INJECTION INTRAMUSCULAR; INTRAVENOUS; SUBCUTANEOUS at 01:02

## 2022-09-19 RX ADMIN — HYDROMORPHONE HYDROCHLORIDE 1 MILLIGRAM(S): 2 INJECTION INTRAMUSCULAR; INTRAVENOUS; SUBCUTANEOUS at 12:38

## 2022-09-19 RX ADMIN — Medication 3 MILLILITER(S): at 11:39

## 2022-09-19 RX ADMIN — HYDROMORPHONE HYDROCHLORIDE 1 MILLIGRAM(S): 2 INJECTION INTRAMUSCULAR; INTRAVENOUS; SUBCUTANEOUS at 06:49

## 2022-09-19 RX ADMIN — ATORVASTATIN CALCIUM 40 MILLIGRAM(S): 80 TABLET, FILM COATED ORAL at 22:41

## 2022-09-19 RX ADMIN — GABAPENTIN 300 MILLIGRAM(S): 400 CAPSULE ORAL at 17:46

## 2022-09-19 RX ADMIN — Medication 100 MICROGRAM(S): at 05:30

## 2022-09-19 RX ADMIN — GABAPENTIN 300 MILLIGRAM(S): 400 CAPSULE ORAL at 05:30

## 2022-09-19 RX ADMIN — OXYCODONE HYDROCHLORIDE 5 MILLIGRAM(S): 5 TABLET ORAL at 15:00

## 2022-09-19 RX ADMIN — Medication 20 MILLIGRAM(S): at 05:29

## 2022-09-19 RX ADMIN — HYDROMORPHONE HYDROCHLORIDE 1 MILLIGRAM(S): 2 INJECTION INTRAMUSCULAR; INTRAVENOUS; SUBCUTANEOUS at 05:30

## 2022-09-19 RX ADMIN — Medication 500000 UNIT(S): at 17:46

## 2022-09-19 RX ADMIN — Medication 145 MILLIGRAM(S): at 11:39

## 2022-09-19 RX ADMIN — OXYCODONE HYDROCHLORIDE 5 MILLIGRAM(S): 5 TABLET ORAL at 14:32

## 2022-09-19 RX ADMIN — PIPERACILLIN AND TAZOBACTAM 25 GRAM(S): 4; .5 INJECTION, POWDER, LYOPHILIZED, FOR SOLUTION INTRAVENOUS at 17:49

## 2022-09-19 RX ADMIN — Medication 10 MILLILITER(S): at 05:30

## 2022-09-19 RX ADMIN — Medication 0.5 MILLIGRAM(S): at 17:48

## 2022-09-19 RX ADMIN — Medication 10 MILLILITER(S): at 17:46

## 2022-09-19 NOTE — DISCHARGE NOTE PROVIDER - NSDCMRMEDTOKEN_GEN_ALL_CORE_FT
atorvastatin 40 mg oral tablet: 1 tab(s) orally once a day  dronabinol 2.5 mg oral capsule:   escitalopram 10 mg oral tablet: 1 tab(s) orally once a day  fenofibrate 145 mg oral tablet: 1 tab(s) orally once a day  finasteride 5 mg oral tablet: 1 tab(s) orally once a day  gabapentin 300 mg oral capsule: 1 cap(s) orally 2 times a day  hydrocortisone 10 mg oral tablet: 1 tab(s) orally once a day  hydrocortisone 20 mg oral tablet: 1 tab(s) orally once a day  levothyroxine 100 mcg (0.1 mg) oral tablet: 1 tab(s) orally once a day  metoprolol succinate 50 mg oral tablet, extended release: 1 tab(s) orally once a day  omeprazole 40 mg oral delayed release capsule: 1 cap(s) orally once a day  oxyCODONE 10 mg oral tablet, extended release: 1 tab(s) orally 2 times a day, As Needed  testosterone 20.25 mg/actuation (1.62%) transdermal gel: last fill date october 2021. family member state he has his own med supply  Vitamin D2 1.25 mg (50,000 intl units) oral capsule: 1 cap(s) orally once a week  Zofran:   zolpidem 10 mg oral tablet: 1 tab(s) orally once a day (at bedtime)

## 2022-09-19 NOTE — DISCHARGE NOTE PROVIDER - HOSPITAL COURSE
52 M former smoker 20 pack years with a PMH of testicular ca s/p orchiectomy, HTN, HLD, melanoma on immunotherapy (right chest wall mediport--> now removed), hypothyroidism secondary adrenal insufficiency, presents to the ER w/ cough and sob. sunday had body aches and felt cold, that night pt had a rapid covid test and was positive, on monday pt reports body aches, and he wasn't talking, pt states he had a sore throat, took temp on 9/12 was 100.7 took APAP, this AM family spoke w/ Dr. Mcginnis and recommended pt present to the hospital. pt states he felt week this AM, was able to ambulate down a flight of stairs and was weak, pt had a positive contact on 9/8/2022. pt is not on oxygen. bromphenir-pheudoephrine on cough medication. pt states that he was positive for covid yesterday, no cp, no lightheadedness, states trouble breathing dec po intake, no nausea no vomiting no abd pain, spoke w/ physician who referred to the Er pt states he took 30 mg of hydrocortisone today. pt w/ genearlized body aches and weakness. pt  upon arrival, pt w/ fever temp 37.7,    Admitted with hypoxic resp failure 2/2 covid 19 infection. was hypotensive. initially requiring oxygen  6 LPM, CT showed multifocal pneumonia.   s/p Remdesivir X 5 days and stress dose Solumedrol IV. bacterial pneumonia treated with Zosyn.   Gradually tapered off oxygen. Now on RA, O2 sat > 95%   Lactic acidosis improved with IVF   BP medications were held for hypotension   Now medically stable for discharge on home dose steroid. s/p completion  of 7 days of ABX  pt to follow up with PCP and pulmonary doctor

## 2022-09-19 NOTE — DISCHARGE NOTE PROVIDER - NSDCFUSCHEDAPPT_GEN_ALL_CORE_FT
Delisa Pillai  [facility]  [address]  Scheduled Appointment: 10/06/2022

## 2022-09-19 NOTE — DISCHARGE NOTE NURSING/CASE MANAGEMENT/SOCIAL WORK - NSDCPEFALRISK_GEN_ALL_CORE
For information on Fall & Injury Prevention, visit: [email or web address] OR  [email or web address] OR  https://www.cdc.gov/steadi/patient.html

## 2022-09-19 NOTE — DISCHARGE NOTE NURSING/CASE MANAGEMENT/SOCIAL WORK - PATIENT PORTAL LINK FT
You can access the FollowMyHealth Patient Portal offered by [facility] by registering at the following website: [email or web address]. By joining [facility]’s FollowMyHealth portal, you will also be able to view your health information using other applications (apps) compatible with our system.

## 2022-09-19 NOTE — PROGRESS NOTE ADULT - ASSESSMENT
ASSESSMENT:    52 year old gentleman, former smoker, without history of intrinsic lung disease. He has history of melanoma of the scalp s/p resection including a right sided lymph node dissection now on immunotherapy due to metastatic disease, testicular cancer s/p orchiectomy followed by chemotherapy and bone marrow transplantation for recurrent disease and non Hodgkin's lymphoma s/p resection and radiation therapy. He has adrenal insufficiency due to immunotherapy and chronic pain related to chemotherapy. The patient comes to the ER with fatigue, malaise, myalgias and weakness following a shingles vaccine. He tested positive for COVID 2 days ago. His gait has been unsteady and he has been lightheaded. The patient has mild shortness of breath and hypoxemia on room air. He has a cough productive copious amounts of sputum which he is having difficulty expectorating. He has chest congestion and wheeze. He has fevers, chills and sweats. No chest pain/pressure or palpitations. RVP -> COVID-10 infection. In the ER the patient was found to be hypotensive being treated with stress dose steroids and IV fluids.    markedly immunocompromised host following bone marrow transplantation ~ 25 years ago for recurrent testicular cancer, lymphoma s/p resection and radiation therapy and metastatic melanoma currently on immunotherapy    admitted with shock in the setting of COVID infection, pneumonia and adrenal insufficiency maintained on chronic hydrocortisone - viral induced bronchospasm - weak cough - mucous plugging - mild hypoxemia     9/14 -  looks much better - awake and alert - phonating with much less difficulty; increasing oxygen requirements without shortness of breath or hypoxemia on a 6lpm nasal canula; ongoing cough productive of copious amounts of sputum which he is having difficulty expectorating; persistent chest congestion and wheeze; no recent fevers, chills or sweats; no chest pain/pressure or palpitations; no fatigue, malaise or weakness: better appetite    9/19 - looks well; awake and alert; still taking dilaudid for breakthrough throat pain; no shortness of breath or hypoxemia on room air walking about his room; occasional cough productive of scant sputum; no chest congestion or wheeze; no fevers, chills or sweats; no chest pain/pressure or palpitations; no fatigue, malaise or weakness: good appetite        PLAN/RECOMMENDATIONS:    stable oxygenation on room air  airborne and contact isolation  repeat CXR today  CT -> mucous plugging/debris within left lower lobe airways - multifocal consolidation, groundglass opacities, and tree-in-bud opacities throughout the left greater than right lower lobes - trace right pleural effusion.  can transition zosyn -> augmentin to complete a 7 day course of antibiotics -> CT scan is c/w multifocal bacterial infection  has completed a 5 days course of remdesivir x 5 days - renal and liver function remain normal  transition solumedrol to baseline dose of hydrocortisone  discontinue albuterol/atrovent nebs when discharged  discontinue pulmicort 0.5mg nebs when discharged  discontinue robitussin DM when discharged  discontinue the acapella device   following inflammatory markers - CRP - d-dimer - ferritin -> all moderating  cardiac meds: tricor/lipitor - should we restart his antihypertensive medications  GI/DVT prophylaxis - protonix/SQ lovenox  continue lexapro/proscar/gabapentin/synthroid  narcotic analgesics are being adjusted by Dr. Mitchell    Will follow with you. Plan of care discussed with the patient at bedside and with Dr. Mitchell.    Ellis Lorenzo MD, [facility]  [phone]  Pulmonary Medicine     ASSESSMENT:    52 year old gentleman, former smoker, without history of intrinsic lung disease. He has history of melanoma of the scalp s/p resection including a right sided lymph node dissection now on immunotherapy due to metastatic disease, testicular cancer s/p orchiectomy followed by chemotherapy and bone marrow transplantation for recurrent disease and non Hodgkin's lymphoma s/p resection and radiation therapy. He has adrenal insufficiency due to immunotherapy and chronic pain related to chemotherapy. The patient comes to the ER with fatigue, malaise, myalgias and weakness following a shingles vaccine. He tested positive for COVID 2 days ago. His gait has been unsteady and he has been lightheaded. The patient has mild shortness of breath and hypoxemia on room air. He has a cough productive copious amounts of sputum which he is having difficulty expectorating. He has chest congestion and wheeze. He has fevers, chills and sweats. No chest pain/pressure or palpitations. RVP -> COVID-10 infection. In the ER the patient was found to be hypotensive being treated with stress dose steroids and IV fluids.    markedly immunocompromised host following bone marrow transplantation ~ 25 years ago for recurrent testicular cancer, lymphoma s/p resection and radiation therapy and metastatic melanoma currently on immunotherapy    admitted with shock in the setting of COVID infection, pneumonia and adrenal insufficiency maintained on chronic hydrocortisone - viral induced bronchospasm - weak cough - mucous plugging - mild hypoxemia     9/14 -  looks much better - awake and alert - phonating with much less difficulty; increasing oxygen requirements without shortness of breath or hypoxemia on a 6lpm nasal canula; ongoing cough productive of copious amounts of sputum which he is having difficulty expectorating; persistent chest congestion and wheeze; no recent fevers, chills or sweats; no chest pain/pressure or palpitations; no fatigue, malaise or weakness: better appetite    9/19 - looks well; awake and alert; still taking dilaudid for breakthrough throat pain; no shortness of breath or hypoxemia on room air walking about his room; occasional cough productive of scant sputum; no chest congestion or wheeze; no fevers, chills or sweats; no chest pain/pressure or palpitations; no fatigue, malaise or weakness: good appetite        PLAN/RECOMMENDATIONS:    stable oxygenation on room air  airborne and contact isolation  repeat CXR today  CT -> mucous plugging/debris within left lower lobe airways - multifocal consolidation, groundglass opacities, and tree-in-bud opacities throughout the left greater than right lower lobes - trace right pleural effusion.  can transition zosyn -> augmentin to complete a 7 day course of antibiotics when ready for discharge -> CT scan is c/w multifocal bacterial infection  has completed a 5 days course of remdesivir x 5 days - renal and liver function remain normal  transition solumedrol to baseline dose of hydrocortisone  discontinue albuterol/atrovent nebs when discharged  discontinue pulmicort 0.5mg nebs when discharged  discontinue robitussin DM when discharged  discontinue the acapella device   following inflammatory markers - CRP - d-dimer - ferritin -> all moderating  cardiac meds: tricor/lipitor - should we restart his antihypertensive medications  GI/DVT prophylaxis - protonix/SQ lovenox  continue lexapro/proscar/gabapentin/synthroid  narcotic analgesics are being adjusted by Dr. Mitchell    Will follow with you. Plan of care discussed with the patient at bedside and with Dr. Mitchell.    Ellis Lorenzo MD, [facility]  [phone]  Pulmonary Medicine

## 2022-09-19 NOTE — DISCHARGE NOTE PROVIDER - CARE PROVIDER_API CALL
[place] [address]  Phone: [phone]  Fax: [phone]  Follow Up Time: 1 week    Nan Quezada (DO)  Critical Care Medicine; Internal Medicine; Pulmonary Disease  [address] [place] [address], [address]  [place] [address]  Phone: [phone]  Fax: [phone]  Follow Up Time: 2 weeks

## 2022-09-19 NOTE — CHART NOTE - NSCHARTNOTEFT_GEN_A_CORE
Patient being discharged post evening dose of Zosyn. CXR to be preformed outpatient w/ pulmonology. Discussed with Dr. Lorenzo; in agreement.    Radha SHAW  Medicine

## 2022-09-19 NOTE — DISCHARGE NOTE PROVIDER - PROVIDER TOKENS
PROVIDER:[TOKEN:[[id number]],FOLLOWUP:[1 week]],PROVIDER:[TOKEN:[[id number]],FOLLOWUP:[2 weeks]]

## 2022-09-19 NOTE — DISCHARGE NOTE PROVIDER - CARE PROVIDERS DIRECT ADDRESSES
,[email or web address],[email or web address]

## 2022-09-19 NOTE — DISCHARGE NOTE PROVIDER - NSDCCPCAREPLAN_GEN_ALL_CORE_FT
PRINCIPAL DISCHARGE DIAGNOSIS  Diagnosis: COVID  Assessment and Plan of Treatment: You were treated with Remdesivir (antiviral) for 5 days and Stress dose steroid.   Now off oxygen.   Continue with supportive care, take cough medicine as needed, Tylenol for pain or fevers, and drink plenty of fluids to keep hydrated. Follow up with your medical doctor within 1 weeks of discharge. Continue to quarantine for 10 days from your initial positive test, wash your hands frequently, and wear your mask around others. If you experience worsening shortness of breath, difficulty breathing, chest pain, lower extremity pain or swelling, coughing up blood, or persistent fevers, contact your medical doctor or return to the emergency room.   Please follow up with your pulmonary doctor   Please follow up with your primray care phsycian in one week      SECONDARY DISCHARGE DIAGNOSES  Diagnosis: Sepsis with hypotension  Assessment and Plan of Treatment: s/p stress dose steroid   BP medictions were on hold   No sepsis resolved and BPs stable    Diagnosis: Multifocal pneumonia  Assessment and Plan of Treatment: You were treated with IV antibiotic for 7 days   Pneumonia is a lung infection that can cause a fever, cough, and trouble breathing.  Continue all antibiotics as ordered until complete.  Nutrition is important, eat small frequent meals.  Get lots of rest and drink fluids.  Call your health care provider upon arrival home from hospital and make a follow up appointment for one week.  If your cough worsens, you develop fever greater than 101', you have shaking chills, a fast heartbeat, trouble breathing and/or feel your are breathing much faster than usual, call your healthcare provider.  Make sure you wash your hands frequently.    Diagnosis: Melanoma  Assessment and Plan of Treatment: please follow up with your Heme/Onc doctor  for further treatment

## 2022-09-19 NOTE — PROGRESS NOTE ADULT - SUBJECTIVE AND OBJECTIVE BOX
DATE OF SERVICE: 09-19-22 @ 12:22    Patient is a 52y old  Male who presents with a chief complaint of fever (18 Sep 2022 20:11)      SUBJECTIVE / OVERNIGHT EVENTS:  No chest pain. No shortness of breath. No complaints. No events overnight.     MEDICATIONS  (STANDING):  albuterol/ipratropium for Nebulization 3 milliLiter(s) Nebulizer every 6 hours  atorvastatin 40 milliGRAM(s) Oral at bedtime  buDESOnide    Inhalation Suspension 0.5 milliGRAM(s) Inhalation every 12 hours  chlorhexidine 2% Cloths 1 Application(s) Topical daily  enoxaparin Injectable 40 milliGRAM(s) SubCutaneous every 24 hours  escitalopram 10 milliGRAM(s) Oral daily  fenofibrate Tablet 145 milliGRAM(s) Oral daily  finasteride 5 milliGRAM(s) Oral daily  gabapentin 300 milliGRAM(s) Oral two times a day  guaifenesin/dextromethorphan Oral Liquid 10 milliLiter(s) Oral four times a day  levothyroxine 100 MICROGram(s) Oral daily  melatonin 5 milliGRAM(s) Oral once  methylPREDNISolone sodium succinate Injectable 20 milliGRAM(s) IV Push every 8 hours  nystatin    Suspension 523372 Unit(s) Oral four times a day  oxyCODONE  ER Tablet 10 milliGRAM(s) Oral every 12 hours  pantoprazole    Tablet 40 milliGRAM(s) Oral before breakfast  piperacillin/tazobactam IVPB.. 3.375 Gram(s) IV Intermittent every 8 hours  sodium chloride 0.9%. 1000 milliLiter(s) (75 mL/Hr) IV Continuous <Continuous>    MEDICATIONS  (PRN):  benzocaine 15 mG/menthol 3.6 mG Lozenge 1 Lozenge Oral every 2 hours PRN Sore Throat  HYDROmorphone  Injectable 1 milliGRAM(s) IV Push every 6 hours PRN Severe Pain (7 - 10)      Vital Signs Last 24 Hrs  T(C): 36.7 (19 Sep 2022 11:09), Max: 36.8 (18 Sep 2022 21:35)  T(F): 98.1 (19 Sep 2022 11:09), Max: 98.3 (18 Sep 2022 21:35)  HR: 79 (19 Sep 2022 11:09) (62 - 79)  BP: 107/64 (19 Sep 2022 11:09) (107/64 - 144/77)  BP(mean): --  RR: 18 (19 Sep 2022 11:09) (18 - 20)  SpO2: 96% (19 Sep 2022 11:09) (96% - 98%)    Parameters below as of 19 Sep 2022 11:09  Patient On (Oxygen Delivery Method): room air      CAPILLARY BLOOD GLUCOSE        I&O's Summary    18 Sep 2022 07:01  -  19 Sep 2022 07:00  --------------------------------------------------------  IN: 2380 mL / OUT: 0 mL / NET: 2380 mL        PHYSICAL EXAM:  GENERAL: NAD, well-developed  HEAD:  Atraumatic, Normocephalic  EYES: EOMI, PERRLA, conjunctiva and sclera clear  NECK: Supple, No JVD  CHEST/LUNG: Clear to auscultation bilaterally; No wheeze  HEART: Regular rate and rhythm; No murmurs, rubs, or gallops  ABDOMEN: Soft, Nontender, Nondistended; Bowel sounds present  EXTREMITIES:  2+ Peripheral Pulses, No clubbing, cyanosis, or edema  PSYCH: AAOx3  NEUROLOGY: non-focal  SKIN: No rashes or lesions    LABS:    09-19    x   |  x   |  x   ----------------------------<  x   x    |  x   |  0.72      TPro  7.5  /  Alb  4.3  /  TBili  0.7  /  DBili  0.2  /  AST  13  /  ALT  15  /  AlkPhos  62  09-19    PT/INR - ( 19 Sep 2022 05:53 )   PT: 13.4 sec;   INR: 1.16 ratio                   RADIOLOGY & ADDITIONAL TESTS:    Imaging Personally Reviewed:    Consultant(s) Notes Reviewed:      Care Discussed with Consultants/Other Providers:  
DATE OF SERVICE: 09-14-22 @ 13:07    Patient is a 52y old  Male who presents with a chief complaint of fever (13 Sep 2022 16:13)      SUBJECTIVE / OVERNIGHT EVENTS:   looks much better - awake and alert - phonating with much less difficulty; increasing oxygen requirements without shortness of breath or hypoxemia on a 6lpm nasal canula; ongoing cough productive of copious amounts of sputum which he is having difficulty expectorating; persistent chest congestion and wheeze; no recent fevers, chills or sweats; no chest pain/pressure or palpitations; no fatigue, malaise or weakness: better appetite    MEDICATIONS  (STANDING):  albuterol/ipratropium for Nebulization 3 milliLiter(s) Nebulizer <User Schedule>  atorvastatin 40 milliGRAM(s) Oral at bedtime  buDESOnide    Inhalation Suspension 0.5 milliGRAM(s) Inhalation every 12 hours  chlorhexidine 2% Cloths 1 Application(s) Topical daily  enoxaparin Injectable 40 milliGRAM(s) SubCutaneous every 24 hours  escitalopram 10 milliGRAM(s) Oral daily  fenofibrate Tablet 145 milliGRAM(s) Oral daily  finasteride 5 milliGRAM(s) Oral daily  gabapentin 300 milliGRAM(s) Oral two times a day  guaifenesin/dextromethorphan Oral Liquid 10 milliLiter(s) Oral four times a day  lactated ringers. 1000 milliLiter(s) (100 mL/Hr) IV Continuous <Continuous>  levothyroxine 100 MICROGram(s) Oral daily  methylPREDNISolone sodium succinate Injectable 20 milliGRAM(s) IV Push every 6 hours  oxyCODONE  ER Tablet 10 milliGRAM(s) Oral every 12 hours  pantoprazole    Tablet 40 milliGRAM(s) Oral before breakfast  piperacillin/tazobactam IVPB.. 3.375 Gram(s) IV Intermittent every 8 hours  remdesivir  IVPB   IV Intermittent   remdesivir  IVPB 100 milliGRAM(s) IV Intermittent every 24 hours  vancomycin  IVPB 1000 milliGRAM(s) IV Intermittent every 12 hours    MEDICATIONS  (PRN):  HYDROmorphone  Injectable 1 milliGRAM(s) IV Push every 6 hours PRN Severe Pain (7 - 10)      Vital Signs Last 24 Hrs  T(C): 36.6 (14 Sep 2022 10:41), Max: 37.2 (13 Sep 2022 13:35)  T(F): 97.8 (14 Sep 2022 10:41), Max: 99 (13 Sep 2022 13:35)  HR: 99 (14 Sep 2022 10:41) (92 - 124)  BP: 99/61 (14 Sep 2022 10:41) (86/58 - 110/78)  BP(mean): 69 (13 Sep 2022 13:52) (65 - 69)  RR: 19 (14 Sep 2022 10:41) (19 - 25)  SpO2: 97% (14 Sep 2022 10:41) (83% - 100%)    Parameters below as of 14 Sep 2022 10:41  Patient On (Oxygen Delivery Method): nasal cannula  O2 Flow (L/min): 6    CAPILLARY BLOOD GLUCOSE        I&O's Summary      PHYSICAL EXAM:  GENERAL: NAD, well-developed  HEAD:  Atraumatic, Normocephalic  EYES: EOMI, PERRLA, conjunctiva and sclera clear  NECK: Supple, No JVD  CHEST/LUNG: Clear to auscultation bilaterally; No wheeze  HEART: Regular rate and rhythm; No murmurs, rubs, or gallops  ABDOMEN: Soft, Nontender, Nondistended; Bowel sounds present  EXTREMITIES:  2+ Peripheral Pulses, No clubbing, cyanosis, or edema  PSYCH: AAOx3  NEUROLOGY: non-focal  SKIN: No rashes or lesions    LABS:                        12.9   8.27  )-----------( 141      ( 14 Sep 2022 05:57 )             40.6     09-14    140  |  105  |  10  ----------------------------<  151<H>  3.7   |  20<L>  |  0.83    Ca    9.4      14 Sep 2022 05:57  Phos  2.4     09-14  Mg     1.7     09-14    TPro  7.2  /  Alb  3.8  /  TBili  0.5  /  DBili  0.2  /  AST  30  /  ALT  20  /  AlkPhos  112  09-14    PT/INR - ( 13 Sep 2022 11:03 )   PT: 15.3 sec;   INR: 1.32 ratio         PTT - ( 13 Sep 2022 11:03 )  PTT:28.2 sec          RADIOLOGY & ADDITIONAL TESTS:    Imaging Personally Reviewed:    Consultant(s) Notes Reviewed:      Care Discussed with Consultants/Other Providers:  
DATE OF SERVICE: 09-15-22 @ 14:09    Patient is a 52y old  Male who presents with a chief complaint of fever (14 Sep 2022 13:06)      SUBJECTIVE / OVERNIGHT EVENTS:  No chest pain. No shortness of breath. No complaints. No events overnight.      MEDICATIONS  (STANDING):  albuterol/ipratropium for Nebulization 3 milliLiter(s) Nebulizer every 6 hours  atorvastatin 40 milliGRAM(s) Oral at bedtime  benzocaine 15 mG/menthol 3.6 mG Lozenge 1 Lozenge Oral every 2 hours  buDESOnide    Inhalation Suspension 0.5 milliGRAM(s) Inhalation every 12 hours  chlorhexidine 2% Cloths 1 Application(s) Topical daily  enoxaparin Injectable 40 milliGRAM(s) SubCutaneous every 24 hours  escitalopram 10 milliGRAM(s) Oral daily  fenofibrate Tablet 145 milliGRAM(s) Oral daily  finasteride 5 milliGRAM(s) Oral daily  gabapentin 300 milliGRAM(s) Oral two times a day  guaifenesin/dextromethorphan Oral Liquid 10 milliLiter(s) Oral four times a day  lactated ringers. 1000 milliLiter(s) (100 mL/Hr) IV Continuous <Continuous>  levothyroxine 100 MICROGram(s) Oral daily  melatonin 5 milliGRAM(s) Oral once  methylPREDNISolone sodium succinate Injectable 20 milliGRAM(s) IV Push every 6 hours  nystatin    Suspension 725360 Unit(s) Oral four times a day  oxyCODONE  ER Tablet 10 milliGRAM(s) Oral every 12 hours  pantoprazole    Tablet 40 milliGRAM(s) Oral before breakfast  piperacillin/tazobactam IVPB.. 3.375 Gram(s) IV Intermittent every 8 hours  remdesivir  IVPB 100 milliGRAM(s) IV Intermittent every 24 hours  remdesivir  IVPB   IV Intermittent   sodium chloride 0.9%. 1000 milliLiter(s) (75 mL/Hr) IV Continuous <Continuous>    MEDICATIONS  (PRN):  HYDROmorphone  Injectable 1 milliGRAM(s) IV Push every 6 hours PRN Severe Pain (7 - 10)      Vital Signs Last 24 Hrs  T(C): 36.7 (15 Sep 2022 10:40), Max: 36.9 (14 Sep 2022 21:56)  T(F): 98.1 (15 Sep 2022 10:40), Max: 98.4 (14 Sep 2022 21:56)  HR: 98 (15 Sep 2022 10:40) (75 - 98)  BP: 94/55 (15 Sep 2022 10:40) (94/55 - 118/76)  BP(mean): --  RR: 18 (15 Sep 2022 10:40) (18 - 18)  SpO2: 97% (15 Sep 2022 10:40) (96% - 99%)    Parameters below as of 15 Sep 2022 10:40  Patient On (Oxygen Delivery Method): nasal cannula  O2 Flow (L/min): 6    CAPILLARY BLOOD GLUCOSE        I&O's Summary    14 Sep 2022 07:01  -  15 Sep 2022 07:00  --------------------------------------------------------  IN: 1680 mL / OUT: 0 mL / NET: 1680 mL    15 Sep 2022 07:01  -  15 Sep 2022 14:09  --------------------------------------------------------  IN: 120 mL / OUT: 400 mL / NET: -280 mL        PHYSICAL EXAM:  GENERAL: NAD, well-developed  HEAD:  Atraumatic, Normocephalic  EYES: EOMI, PERRLA, conjunctiva and sclera clear  NECK: Supple, No JVD  CHEST/LUNG: matthew rales  HEART: Regular rate and rhythm; No murmurs, rubs, or gallops  ABDOMEN: Soft, Nontender, Nondistended; Bowel sounds present  EXTREMITIES:  2+ Peripheral Pulses, No clubbing, cyanosis, or edema  PSYCH: AAOx3  NEUROLOGY: non-focal  SKIN: No rashes or lesions    LABS:                        9.8    3.47  )-----------( 139      ( 15 Sep 2022 12:59 )             30.5     09-15    137  |  103  |  17  ----------------------------<  150<H>  4.0   |  24  |  0.83    Ca    8.6      15 Sep 2022 06:28  Phos  2.4     09-14  Mg     1.7     09-14    TPro  6.4  /  Alb  3.3  /  TBili  0.3  /  DBili  x   /  AST  21  /  ALT  14  /  AlkPhos  71  09-15              RADIOLOGY & ADDITIONAL TESTS:    Imaging Personally Reviewed:    Consultant(s) Notes Reviewed:      Care Discussed with Consultants/Other Providers:  
DATE OF SERVICE: 09-18-22 @ 20:12    Patient is a 52y old  Male who presents with a chief complaint of fever (18 Sep 2022 10:17)      SUBJECTIVE / OVERNIGHT EVENTS:  No chest pain. No shortness of breath. No complaints. No events overnight.     MEDICATIONS  (STANDING):  albuterol/ipratropium for Nebulization 3 milliLiter(s) Nebulizer every 6 hours  atorvastatin 40 milliGRAM(s) Oral at bedtime  buDESOnide    Inhalation Suspension 0.5 milliGRAM(s) Inhalation every 12 hours  chlorhexidine 2% Cloths 1 Application(s) Topical daily  enoxaparin Injectable 40 milliGRAM(s) SubCutaneous every 24 hours  escitalopram 10 milliGRAM(s) Oral daily  fenofibrate Tablet 145 milliGRAM(s) Oral daily  finasteride 5 milliGRAM(s) Oral daily  gabapentin 300 milliGRAM(s) Oral two times a day  guaifenesin/dextromethorphan Oral Liquid 10 milliLiter(s) Oral four times a day  levothyroxine 100 MICROGram(s) Oral daily  melatonin 5 milliGRAM(s) Oral once  methylPREDNISolone sodium succinate Injectable 20 milliGRAM(s) IV Push every 8 hours  nystatin    Suspension 855895 Unit(s) Oral four times a day  oxyCODONE  ER Tablet 10 milliGRAM(s) Oral every 12 hours  pantoprazole    Tablet 40 milliGRAM(s) Oral before breakfast  piperacillin/tazobactam IVPB.. 3.375 Gram(s) IV Intermittent every 8 hours  sodium chloride 0.9%. 1000 milliLiter(s) (75 mL/Hr) IV Continuous <Continuous>    MEDICATIONS  (PRN):  benzocaine 15 mG/menthol 3.6 mG Lozenge 1 Lozenge Oral every 2 hours PRN Sore Throat  HYDROmorphone  Injectable 1 milliGRAM(s) IV Push every 6 hours PRN Severe Pain (7 - 10)      Vital Signs Last 24 Hrs  T(C): 37 (18 Sep 2022 11:36), Max: 37 (18 Sep 2022 11:36)  T(F): 98.6 (18 Sep 2022 11:36), Max: 98.6 (18 Sep 2022 11:36)  HR: 60 (18 Sep 2022 11:36) (60 - 69)  BP: 130/77 (18 Sep 2022 11:36) (129/68 - 130/77)  BP(mean): --  RR: 18 (18 Sep 2022 11:36) (18 - 18)  SpO2: 95% (18 Sep 2022 11:36) (95% - 95%)    Parameters below as of 18 Sep 2022 11:36  Patient On (Oxygen Delivery Method): room air      CAPILLARY BLOOD GLUCOSE        I&O's Summary    17 Sep 2022 07:01  -  18 Sep 2022 07:00  --------------------------------------------------------  IN: 1250 mL / OUT: 0 mL / NET: 1250 mL    18 Sep 2022 07:01  -  18 Sep 2022 20:12  --------------------------------------------------------  IN: 1000 mL / OUT: 0 mL / NET: 1000 mL        PHYSICAL EXAM:  GENERAL: NAD, well-developed  HEAD:  Atraumatic, Normocephalic  EYES: EOMI, PERRLA, conjunctiva and sclera clear  NECK: Supple, No JVD  CHEST/LUNG: Clear to auscultation bilaterally; No wheeze  HEART: Regular rate and rhythm; No murmurs, rubs, or gallops  ABDOMEN: Soft, Nontender, Nondistended; Bowel sounds present  EXTREMITIES:  2+ Peripheral Pulses, No clubbing, cyanosis, or edema  PSYCH: AAOx3  NEUROLOGY: non-focal  SKIN: No rashes or lesions    LABS:    09-18    x   |  x   |  x   ----------------------------<  x   x    |  x   |  0.68    Ca    8.8      17 Sep 2022 06:54    TPro  6.7  /  Alb  4.0  /  TBili  0.7  /  DBili  0.2  /  AST  11  /  ALT  13  /  AlkPhos  58  09-18    PT/INR - ( 18 Sep 2022 06:26 )   PT: 14.5 sec;   INR: 1.25 ratio                   RADIOLOGY & ADDITIONAL TESTS:    Imaging Personally Reviewed:    Consultant(s) Notes Reviewed:      Care Discussed with Consultants/Other Providers:  
[facility] - [facility] - PROGRESS NOTE    CHIEF COMPLAINT: shock -> resolved; acute hypoxic respiratory failure; COVID-19 infection; pneumonia; bronchospasm; weak cough; mucous plugging; atelectasis; immunocompromised host; adrenal insufficiency    INTERVAL HISTORY: looks much better - awake and alert - hemodynamically stable - oxygen requirements are stable - chronic pain is well controlled; persistent sore throat; decreasing cough with less sputum production; improving chest congestion and wheeze; no recent fevers, chills or sweats; no chest pain/pressure or palpitations; no fatigue, malaise or weakness: better appetite      REVIEW OF SYSTEMS:  Constitutional: As per interval history  HEENT: sore throat  CV: As per interval history  Resp: As per interval history  GI: ulcerative colitis  : h/o testicular cancer  Musculoskeletal: Within normal limits  Skin: h/o melanoma - metastatic  Neurological: Within normal limits  Psychiatric: Within normal limits  Endocrine: Within normal limits  Hematologic/Lymphatic: immune compromised   Allergic/Immunologic: Within normal limits      MEDICATIONS:     Pulmonary "  albuterol/ipratropium for Nebulization 3 milliLiter(s) Nebulizer <User Schedule>  buDESOnide    Inhalation Suspension 0.5 milliGRAM(s) Inhalation every 12 hours  guaifenesin/dextromethorphan Oral Liquid 10 milliLiter(s) Oral four times a day    Anti-microbials:  piperacillin/tazobactam IVPB.. 3.375 Gram(s) IV Intermittent every 8 hours  remdesivir  IVPB   IV Intermittent   remdesivir  IVPB 100 milliGRAM(s) IV Intermittent every 24 hours  vancomycin  IVPB 1000 milliGRAM(s) IV Intermittent every 12 hours    Cardiovascular:    Other:  atorvastatin 40 milliGRAM(s) Oral at bedtime  chlorhexidine 2% Cloths 1 Application(s) Topical daily  enoxaparin Injectable 40 milliGRAM(s) SubCutaneous every 24 hours  escitalopram 10 milliGRAM(s) Oral daily  fenofibrate Tablet 145 milliGRAM(s) Oral daily  finasteride 5 milliGRAM(s) Oral daily  gabapentin 300 milliGRAM(s) Oral two times a day  lactated ringers. 1000 milliLiter(s) IV Continuous <Continuous>  levothyroxine 100 MICROGram(s) Oral daily  melatonin 5 milliGRAM(s) Oral once  methylPREDNISolone sodium succinate Injectable 20 milliGRAM(s) IV Push every 6 hours  oxyCODONE  ER Tablet 10 milliGRAM(s) Oral every 12 hours  pantoprazole    Tablet 40 milliGRAM(s) Oral before breakfast    MEDICATIONS  (PRN):  benzocaine 15 mG/menthol 3.6 mG Lozenge 1 Lozenge Oral four times a day PRN Sore Throat  HYDROmorphone  Injectable 1 milliGRAM(s) IV Push every 6 hours PRN Severe Pain (7 - 10)    OBJECTIVE:    PHYSICAL EXAM:       ICU Vital Signs Last 24 Hrs  T(C): 36.7 (15 Sep 2022 04:52), Max: 36.9 (14 Sep 2022 21:56)  T(F): 98 (15 Sep 2022 04:52), Max: 98.4 (14 Sep 2022 21:56)  HR: 92 (15 Sep 2022 04:52) (75 - 99)  BP: 118/76 (15 Sep 2022 04:52) (99/61 - 118/76)  BP(mean): --  ABP: --  ABP(mean): --  RR: 18 (15 Sep 2022 04:52) (18 - 19)  SpO2: 96% (15 Sep 2022 04:52) (96% - 99%) on 6lpm nasal canula    General: Awake. Alert. Cooperative. No distress. Appears stated age 	  HEENT: Atraumatic. Bitemporal wasting. Anicteric. Normal oral mucosa. PERRL. EOMI.  Neck: Supple. Trachea midline. Thyroid without enlargement/tenderness/nodules. No carotid bruit. No JVD. Loss of bilateral supraclavicular fat pads.  Cardiovascular: Regular rate and rhythm. S1 S2 normal. No murmurs, rubs or gallops.  Respiratory: Respirations unlabored. Minimal wheeze. Bilateral rales greatest at the left base. No curvature.  Abdomen: Soft. Non-tender. Non-distended. No organomegaly. No masses. Normal bowel sounds.  Extremities: Warm to touch. No clubbing or cyanosis. No pedal edema.  Pulses: 2+ peripheral pulses all extremities.	  Skin: Normal skin color. No rashes or lesions. No ecchymoses. No cyanosis. Warm to touch.  Lymph Nodes: Cervical, supraclavicular and axillary nodes normal  Neurological: Motor and sensory examination equal and normal. A and O x 3  Psychiatry: Appropriate mood and affect.    LABS:                          10.3   4.54  )-----------( 144      ( 15 Sep 2022 06:26 )             32.3     CBC    WBC  4.54 <==, 8.27 <==, 6.47 <==    Hemoglobin  10.3 <<==, 12.9 <<==, 11.7 <<==    Hematocrit  32.3 <==, 40.6 <==, 35.8 <==    Platelets  144 <==, 141 <==, 136 <==      137  |  103  |  17  ----------------------------<  150<H>    09-15  4.0   |  24  |  0.83      LYTES    sodium  137 <==, 139 <==, 140 <==, 136 <==    potassium   4.0 <==, 4.3 <==, 3.7 <==, 3.6 <==    chloride  103 <==, 104 <==, 105 <==, 100 <==    carbon dioxide  24 <==, 23 <==, 20 <==, 22 <==    =============================================================================================  RENAL FUNCTION:    Creatinine:   0.83  <<==, 0.91  <<==, 0.83  <<==, 1.15  <<==    BUN:   17 <==, 15 <==, 10 <==, 11 <==    ============================================================================================    calcium   8.6 <==, 9.0 <==, 9.4 <==, 8.3 <==    phos   2.4 <==    mag   1.7 <==    ============================================================================================  LFTs    AST:   21 <== , 30 <== , 22 <==     ALT:  14  <== , 20  <== , 16  <==     AP:  71  <=, 112  <=, 80  <=    Bili:  0.3  <=, 0.5  <=, 0.4  <=    PT/INR - ( 13 Sep 2022 11:03 )   PT: 15.3 sec;   INR: 1.32 ratio       PTT - ( 13 Sep 2022 11:03 )  PTT:28.2 sec    Venous Blood Gas:  09-14 @ 06:02  --/--/--/--/--  VBG Lactate: 3.1      ABG - ( 14 Sep 2022 04:51 )  pH: 7.43  /  pCO2: 35    /  pO2: 103   / HCO3: 23    / Base Excess: -0.6  /  SaO2: 99.2      Venous Blood Gas:  09-13 @ 10:20  7.35/53/26/29/39.9  VBG Lactate: 1.2    Procalcitonin, Serum: 1.23 ng/mL (09-13 @ 10:15)    C-Reactive Protein, Serum (09.14.22 @ 05:57)   C-Reactive Protein, Serum: 168 mg/L     D-Dimer Assay, Quantitative (09.14.22 @ 05:57)   D-Dimer Assay, Quantitative: 405:    Ferritin, Serum (09.14.22 @ 05:57)   Ferritin, Serum: 463 ng/mL       < from: Transthoracic Echocardiogram (06.01.20 @ 17:43) >    Patient name: CARLOTA CANTU  YOB: 1970   Age: 50 (M)   MR#: [id number]  Study Date: 6/1/2020    Dimensions:    Normal Values:  LA:     4.2    2.0 - 4.0 cm  Ao:     3.5    2.0 - 3.8 cm  SEPTUM: 0.9    0.6 - 1.2 cm  PWT:    0.8    0.6 - 1.1 cm  LVIDd:  4.6    3.0 - 5.6 cm  LVIDs:  3.1    1.8 - 4.0 cm  Derived variables:  LVMI: 65 g/m2  RWT: 0.34  Fractional short: 33 %  EF (Visual Estimate): 55-60 %  Doppler Peak Velocity (m/sec): AoV=0.9  ------------------------------------------------------------------------  Conclusions:  1. Normal left ventricular internal dimensions and wall  thicknesses.  2. Endocardium not well visualized; grossly normal left  ventricular systolic function. Regional wall motion could  not be accurately assessed. Consider repeat limited study  with definity contrast if clinically indicated.  3. Normal right ventricular size and function.  4. Estimated right ventricular systolic pressure equals 13  mm Hg, assuming right atrial pressure equals 8 mm Hg,  consistent with normal pulmonary pressures.  *** No previous Echo exam.  ------------------------------------------------------------------------  Confirmed on  6/1/2020 - 20:10:31 by Marvin Gonzales M.D.  ------------------------------------------------------------------------  ---------------------------------------------------------------------------------------------------------------    MICROBIOLOGY:     Respiratory Viral Panel with COVID-19 by LARA (09.13.22 @ 11:00)   Rapid RVP Result: Detected   SARS-CoV-2: Detected  This Respiratory Panel uses polymerase chain reaction (PCR) to detect for   adenovirus; coronavirus (HKU1, NL63, 229E, OC43); human metapneumovirus   (hMPV); human enterovirus/rhinovirus (Entero/RV); influenza A; influenza   A/H1; influenza A/H3; influenza A/H1-2009; influenza B; parainfluenza   viruses 1, 2, 3, 4; respiratory syncytial virus; Mycoplasma pneumoniae;     Culture - Urine (09.13.22 @ 17:29)   Specimen Source: Clean Catch Clean Catch (Midstream)   Culture Results:   No growth     Culture - Blood (09.13.22 @ 10:20)   Specimen Source: .Blood Blood-Peripheral   Culture Results:   No growth to date.     Culture - Blood (09.13.22 @ 09:50)   Specimen Source: .Blood Blood-Peripheral   Culture Results:   No growth to date.    [x ] Chest radiographs reviewed and interpreted by me    EXAM:  XR CHEST PORTABLE URGENT 1V                          PROCEDURE DATE:  09/13/2022      FINDINGS:      The heart is normal in size.  The lungs are clear.  No pleural effusion or pneumothorax.    IMPRESSION:  Clear lungs.    JOSE GUERRERO MD; Resident Radiology  This document has been electronically signed.  MARIANGEL MCKINLEY MD; Attending Radiologist  This document has been electronically signed. Sep 13 2022 12:14PM  ---------------------------------------------------------------------------------------------------------------  EXAM:  CT CHEST                          PROCEDURE DATE:  09/14/2022      FINDINGS:    AIRWAYS, LUNGS, PLEURA: Mucous plugging/debris within left lower lobe   airways. Multifocal consolidation, groundglass opacities, and tree-in-bud   opacities throughout the left greater than right lower lobes. Trace right   pleural effusion.    MEDIASTINUM: Normal heart size. Coronary calcifications. No pericardial   effusion. Thoracic aorta normal caliber.  No large mediastinal lymph   nodes.    IMAGED ABDOMEN: Unremarkable.    SOFT TISSUES: Unremarkable.    BONES: Unremarkable.      IMPRESSION:.    Multifocal pneumonia throughout the left greater than right lower lobes.    Coronary atherosclerosis.    TONG BRYAN MD; Attending Radiologist  This document has been electronically signed. Sep 15 2022 6:02AM  ---------------------------------------------------------------------------------------------------------------          
[facility] - [facility] - PROGRESS NOTE    CHIEF COMPLAINT: shock -> resolved; acute hypoxic respiratory failure; COVID-19 infection; pneumonia; bronchospasm; weak cough; mucous plugging; atelectasis; immunocompromised host; adrenal insufficiency    INTERVAL HISTORY: looks well; awake and alert; still taking dilaudid for breakthrough throat pain; no shortness of breath or hypoxemia on room air walking about his room; occasional cough productive of scant sputum; no chest congestion or wheeze; no fevers, chills or sweats; no chest pain/pressure or palpitations; no fatigue, malaise or weakness: good appetite    REVIEW OF SYSTEMS:  Constitutional: As per interval history  HEENT: sore throat  CV: As per interval history  Resp: As per interval history  GI: ulcerative colitis  : h/o testicular cancer  Musculoskeletal: Within normal limits  Skin: h/o melanoma - metastatic  Neurological: Within normal limits  Psychiatric: Within normal limits  Endocrine: Within normal limits  Hematologic/Lymphatic: immune compromised   Allergic/Immunologic: Within normal limits    MEDICATIONS:     Pulmonary "  albuterol/ipratropium for Nebulization 3 milliLiter(s) Nebulizer every 6 hours  buDESOnide    Inhalation Suspension 0.5 milliGRAM(s) Inhalation every 12 hours  guaifenesin/dextromethorphan Oral Liquid 10 milliLiter(s) Oral four times a day    Anti-microbials:  nystatin    Suspension [id number] Unit(s) Oral four times a day  piperacillin/tazobactam IVPB.. 3.375 Gram(s) IV Intermittent every 8 hours    Cardiovascular:    Other:  atorvastatin 40 milliGRAM(s) Oral at bedtime  chlorhexidine 2% Cloths 1 Application(s) Topical daily  enoxaparin Injectable 40 milliGRAM(s) SubCutaneous every 24 hours  escitalopram 10 milliGRAM(s) Oral daily  fenofibrate Tablet 145 milliGRAM(s) Oral daily  finasteride 5 milliGRAM(s) Oral daily  gabapentin 300 milliGRAM(s) Oral two times a day  levothyroxine 100 MICROGram(s) Oral daily  melatonin 5 milliGRAM(s) Oral once  methylPREDNISolone sodium succinate Injectable 20 milliGRAM(s) IV Push every 8 hours  oxyCODONE  ER Tablet 10 milliGRAM(s) Oral every 12 hours  pantoprazole    Tablet 40 milliGRAM(s) Oral before breakfast    MEDICATIONS  (PRN):  benzocaine 15 mG/menthol 3.6 mG Lozenge 1 Lozenge Oral every 2 hours PRN Sore Throat  oxyCODONE    IR 5 milliGRAM(s) Oral every 4 hours PRN Moderate Pain (4 - 6)    OBJECTIVE:    PHYSICAL EXAM:       ICU Vital Signs Last 24 Hrs  T(C): 36.7 (19 Sep 2022 11:09), Max: 36.8 (18 Sep 2022 21:35)  T(F): 98.1 (19 Sep 2022 11:09), Max: 98.3 (18 Sep 2022 21:35)  HR: 79 (19 Sep 2022 11:09) (62 - 79)  BP: 107/64 (19 Sep 2022 11:09) (107/64 - 144/77)  BP(mean): --  ABP: --  ABP(mean): --  RR: 18 (19 Sep 2022 11:09) (18 - 20)  SpO2: 96% (19 Sep 2022 11:09) (96% - 98%) on room air    General: Awake. Alert. Cooperative. No distress. Appears stated age 	  HEENT: Atraumatic. Bitemporal wasting. Anicteric. Normal oral mucosa. PERRL. EOMI.  Neck: Supple. Trachea midline. Thyroid without enlargement/tenderness/nodules. No carotid bruit. No JVD. Loss of bilateral supraclavicular fat pads.  Cardiovascular: Regular rate and rhythm. S1 S2 normal. No murmurs, rubs or gallops.  Respiratory: Respirations unlabored. Resolved wheeze. Right lower lung field rales. No curvature.  Abdomen: Soft. Non-tender. Non-distended. No organomegaly. No masses. Normal bowel sounds.  Extremities: Warm to touch. No clubbing or cyanosis. No pedal edema.  Pulses: 2+ peripheral pulses all extremities.	  Skin: Normal skin color. No rashes or lesions. No ecchymoses. No cyanosis. Warm to touch.  Lymph Nodes: Cervical, supraclavicular and axillary nodes normal  Neurological: Motor and sensory examination equal and normal. A and O x 3  Psychiatry: Appropriate mood and affect.    LABS:      CBC    WBC  4.02 <==, 3.47 <==, 4.54 <==, 8.27 <==, 6.47 <==    Hemoglobin  10.1 <<==, 9.8 <<==, 10.3 <<==, 12.9 <<==, 11.7 <<==    Hematocrit  32.1 <==, 30.5 <==, 32.3 <==, 40.6 <==, 35.8 <==    Platelets  183 <==, 139 <==, 144 <==, 141 <==, 136 <==      x   |  x   |  x   ----------------------------<  x     09-19  x    |  x   |  0.72      LYTES    sodium  138 <==, 138 <==, 137 <==, 139 <==, 140 <==, 136 <==    potassium   3.6 <==, 3.6 <==, 4.0 <==, 4.3 <==, 3.7 <==, 3.6 <==    chloride  102 <==, 102 <==, 103 <==, 104 <==, 105 <==, 100 <==    carbon dioxide  26 <==, 27 <==, 24 <==, 23 <==, 20 <==, 22 <==    =============================================================================================  RENAL FUNCTION:    Creatinine:   0.72  <<==, 0.68  <<==, 0.68  <<==, 0.78  <<==, 0.83  <<==, 0.83  <<==, 0.91  <<==    BUN:   15 <==, 16 <==, 17 <==, 15 <==, 10 <==, 11 <==    ============================================================================================    calcium   8.8 <==, 8.7 <==, 8.6 <==, 9.0 <==, 9.4 <==, 8.3 <==    phos   2.4 <==    mag   1.7 <==    ============================================================================================  LFTs    AST:   13 <== , 11 <== , 12 <== , 15 <== , 14 <== , 21 <== , 30 <==     ALT:  15  <== , 13  <== , 13  <== , 13  <== , 13  <== , 14  <== , 20  <==     AP:  62  <=, 58  <=, 54  <=, 58  <=, 59  <=, 71  <=, 112  <=    Bili:  0.7  <=, 0.7  <=, 0.4  <=, 0.4  <=, 0.3  <=, 0.3  <=, 0.5  <=    PT/INR - ( 19 Sep 2022 05:53 )   PT: 13.4 sec;   INR: 1.16 ratio      PTT - ( 13 Sep 2022 11:03 )  PTT:28.2 sec    Venous Blood Gas:  09-14 @ 06:02  --/--/--/--/--  VBG Lactate: 3.1      ABG - ( 14 Sep 2022 04:51 )  pH: 7.43  /  pCO2: 35    /  pO2: 103   / HCO3: 23    / Base Excess: -0.6  /  SaO2: 99.2      Venous Blood Gas:  09-13 @ 10:20  7.35/53/26/29/39.9  VBG Lactate: 1.2    Procalcitonin, Serum: 1.23 ng/mL (09-13 @ 10:15)    C-Reactive Protein, Serum (09.18.22 @ 06:26)   C-Reactive Protein, Serum: 14 mg/L   C-Reactive Protein, Serum (09.14.22 @ 05:57)   C-Reactive Protein, Serum: 168 mg/L     D-Dimer Assay, Quantitative (09.16.22 @ 05:38)   D-Dimer Assay, Quantitative: 232:  D-Dimer Assay, Quantitative (09.14.22 @ 05:57)   D-Dimer Assay, Quantitative: 405:      Ferritin, Serum in AM (09.18.22 @ 06:26)   Ferritin, Serum: 317 ng/mL   Ferritin, Serum (09.14.22 @ 05:57)   Ferritin, Serum: 463 ng/mL       < from: Transthoracic Echocardiogram (06.01.20 @ 17:43) >    Patient name: CARLOTA CANTU  YOB: 1970   Age: 50 (M)   MR#: [id number]  Study Date: 6/1/2020    Dimensions:    Normal Values:  LA:     4.2    2.0 - 4.0 cm  Ao:     3.5    2.0 - 3.8 cm  SEPTUM: 0.9    0.6 - 1.2 cm  PWT:    0.8    0.6 - 1.1 cm  LVIDd:  4.6    3.0 - 5.6 cm  LVIDs:  3.1    1.8 - 4.0 cm  Derived variables:  LVMI: 65 g/m2  RWT: 0.34  Fractional short: 33 %  EF (Visual Estimate): 55-60 %  Doppler Peak Velocity (m/sec): AoV=0.9  ------------------------------------------------------------------------  Conclusions:  1. Normal left ventricular internal dimensions and wall  thicknesses.  2. Endocardium not well visualized; grossly normal left  ventricular systolic function. Regional wall motion could  not be accurately assessed. Consider repeat limited study  with definity contrast if clinically indicated.  3. Normal right ventricular size and function.  4. Estimated right ventricular systolic pressure equals 13  mm Hg, assuming right atrial pressure equals 8 mm Hg,  consistent with normal pulmonary pressures.  *** No previous Echo exam.  ------------------------------------------------------------------------  Confirmed on  6/1/2020 - 20:10:31 by Marvin Gonzales M.D.  ------------------------------------------------------------------------  ---------------------------------------------------------------------------------------------------------------    MICROBIOLOGY:     Respiratory Viral Panel with COVID-19 by [facility]ARA (09.13.22 @ 11:00)   Rapid RVP Result: Detected   SARS-CoV-2: Detected  This Respiratory Panel uses polymerase chain reaction (PCR) to detect for   adenovirus; coronavirus (HKU1, NL63, 229E, OC43); human metapneumovirus   (hMPV); human enterovirus/rhinovirus (Entero/RV); influenza A; influenza   A/H1; influenza A/H3; influenza A/H1-2009; influenza B; parainfluenza   viruses 1, 2, 3, 4; respiratory syncytial virus; Mycoplasma pneumoniae;     Culture - Urine (09.13.22 @ 17:29)   Specimen Source: Clean Catch Clean Catch (Midstream)   Culture Results:   No growth     Culture - Blood (09.13.22 @ 10:20)   Specimen Source: .Blood Blood-Peripheral   Culture Results:   No growth to date.     Culture - Blood (09.13.22 @ 09:50)   Specimen Source: .Blood Blood-Peripheral   Culture Results:   No growth to date.      [x ] Chest radiographs reviewed and interpreted by me    EXAM:  XR CHEST PORTABLE URGENT 1V                          PROCEDURE DATE:  09/13/2022      FINDINGS:    The heart is normal in size.  The lungs are clear.  No pleural effusion or pneumothorax.    IMPRESSION:  Clear lungs.    JOSE GUERRERO MD; Resident Radiology  This document has been electronically signed.  MARIANGEL MCKINLEY MD; Attending Radiologist  This document has been electronically signed. Sep 13 2022 12:14PM  ---------------------------------------------------------------------------------------------------------------  EXAM:  CT CHEST                          PROCEDURE DATE:  09/14/2022      FINDINGS:    AIRWAYS, LUNGS, PLEURA: Mucous plugging/debris within left lower lobe   airways. Multifocal consolidation, groundglass opacities, and tree-in-bud   opacities throughout the left greater than right lower lobes. Trace right   pleural effusion.    MEDIASTINUM: Normal heart size. Coronary calcifications. No pericardial   effusion. Thoracic aorta normal caliber.  No large mediastinal lymph   nodes.    IMAGED ABDOMEN: Unremarkable.    SOFT TISSUES: Unremarkable.    BONES: Unremarkable.      IMPRESSION:.    Multifocal pneumonia throughout the left greater than right lower lobes.    Coronary atherosclerosis.    TONG BRYAN MD; Attending Radiologist  This document has been electronically signed. Sep 15 2022 6:02AM  ---------------------------------------------------------------------------------------------------------------        
DATE OF SERVICE: 09-17-22 @ 10:38    Patient is a 52y old  Male who presents with a chief complaint of fever (17 Sep 2022 10:03)      SUBJECTIVE / OVERNIGHT EVENTS:  No chest pain. No shortness of breath. No complaints. No events overnight.     MEDICATIONS  (STANDING):  albuterol/ipratropium for Nebulization 3 milliLiter(s) Nebulizer every 6 hours  atorvastatin 40 milliGRAM(s) Oral at bedtime  benzocaine 15 mG/menthol 3.6 mG Lozenge 1 Lozenge Oral every 2 hours  buDESOnide    Inhalation Suspension 0.5 milliGRAM(s) Inhalation every 12 hours  chlorhexidine 2% Cloths 1 Application(s) Topical daily  enoxaparin Injectable 40 milliGRAM(s) SubCutaneous every 24 hours  escitalopram 10 milliGRAM(s) Oral daily  fenofibrate Tablet 145 milliGRAM(s) Oral daily  finasteride 5 milliGRAM(s) Oral daily  gabapentin 300 milliGRAM(s) Oral two times a day  guaifenesin/dextromethorphan Oral Liquid 10 milliLiter(s) Oral four times a day  levothyroxine 100 MICROGram(s) Oral daily  melatonin 5 milliGRAM(s) Oral once  methylPREDNISolone sodium succinate Injectable 20 milliGRAM(s) IV Push every 6 hours  nystatin    Suspension [id number] Unit(s) Oral four times a day  oxyCODONE  ER Tablet 10 milliGRAM(s) Oral every 12 hours  pantoprazole    Tablet 40 milliGRAM(s) Oral before breakfast  piperacillin/tazobactam IVPB.. 3.375 Gram(s) IV Intermittent every 8 hours  remdesivir  IVPB   IV Intermittent   remdesivir  IVPB 100 milliGRAM(s) IV Intermittent every 24 hours  sodium chloride 0.9%. 1000 milliLiter(s) (75 mL/Hr) IV Continuous <Continuous>    MEDICATIONS  (PRN):  HYDROmorphone  Injectable 1 milliGRAM(s) IV Push every 6 hours PRN Severe Pain (7 - 10)      Vital Signs Last 24 Hrs  T(C): 36.9 (17 Sep 2022 08:01), Max: 36.9 (17 Sep 2022 08:01)  T(F): 98.4 (17 Sep 2022 08:01), Max: 98.4 (17 Sep 2022 08:01)  HR: 76 (17 Sep 2022 08:01) (76 - 98)  BP: 143/87 (17 Sep 2022 08:01) (129/81 - 143/87)  BP(mean): --  RR: 18 (17 Sep 2022 08:01) (18 - 19)  SpO2: 95% (17 Sep 2022 08:01) (93% - 96%)    Parameters below as of 17 Sep 2022 08:01  Patient On (Oxygen Delivery Method): room air      CAPILLARY BLOOD GLUCOSE        I&O's Summary    16 Sep 2022 07:01  -  17 Sep 2022 07:00  --------------------------------------------------------  IN: 1680 mL / OUT: 0 mL / NET: 1680 mL        PHYSICAL EXAM:  GENERAL: NAD, well-developed  HEAD:  Atraumatic, Normocephalic  EYES: EOMI, PERRLA, conjunctiva and sclera clear  NECK: Supple, No JVD  CHEST/LUNG: Clear to auscultation bilaterally; No wheeze  HEART: Regular rate and rhythm; No murmurs, rubs, or gallops  ABDOMEN: Soft, Nontender, Nondistended; Bowel sounds present  EXTREMITIES:  2+ Peripheral Pulses, No clubbing, cyanosis, or edema  PSYCH: AAOx3  NEUROLOGY: non-focal  SKIN: No rashes or lesions    LABS:                        10.1   4.02  )-----------( 183      ( 16 Sep 2022 05:38 )             32.1     09-17    138  |  102  |  15  ----------------------------<  173<H>  3.6   |  26  |  0.68    Ca    8.8      17 Sep 2022 06:54    TPro  6.2  /  Alb  3.7  /  TBili  0.4  /  DBili  <0.1  /  AST  12  /  ALT  13  /  AlkPhos  54  09-17    PT/INR - ( 17 Sep 2022 06:43 )   PT: 14.1 sec;   INR: 1.22 ratio                   RADIOLOGY & ADDITIONAL TESTS:    Imaging Personally Reviewed:    Consultant(s) Notes Reviewed:      Care Discussed with Consultants/Other Providers:  
Follow-up Pulm Progress Note    The patient was seen and examined. Notes reviewed and discussed with staff/team as applicable      No new respiratory events overnight. Feels better. Chest discomfort after coughing continues     Denies: increased SOB, Chest pain, colored phlegm, hemoptysis, N/V/D, neck stiffness, dysuria      Vital Signs Last 24 Hrs  T(C): 36.7 (18 Sep 2022 05:59), Max: 36.7 (17 Sep 2022 12:36)  T(F): 98.1 (18 Sep 2022 05:59), Max: 98.1 (18 Sep 2022 05:59)  HR: 69 (18 Sep 2022 05:59) (69 - 89)  BP: 129/68 (18 Sep 2022 05:59) (124/71 - 129/68)  BP(mean): --  RR: 18 (18 Sep 2022 05:59) (18 - 18)  SpO2: 95% (18 Sep 2022 05:59) (95% - 96%)    Parameters below as of 18 Sep 2022 05:59  Patient On (Oxygen Delivery Method): room air              09-17 @ 07:01  -  09-18 @ 07:00  --------------------------------------------------------  IN: 1250 mL / OUT: 0 mL / NET: 1250 mL          Medications:  MEDICATIONS  (STANDING):  albuterol/ipratropium for Nebulization 3 milliLiter(s) Nebulizer every 6 hours  atorvastatin 40 milliGRAM(s) Oral at bedtime  buDESOnide    Inhalation Suspension 0.5 milliGRAM(s) Inhalation every 12 hours  chlorhexidine 2% Cloths 1 Application(s) Topical daily  enoxaparin Injectable 40 milliGRAM(s) SubCutaneous every 24 hours  escitalopram 10 milliGRAM(s) Oral daily  fenofibrate Tablet 145 milliGRAM(s) Oral daily  finasteride 5 milliGRAM(s) Oral daily  gabapentin 300 milliGRAM(s) Oral two times a day  guaifenesin/dextromethorphan Oral Liquid 10 milliLiter(s) Oral four times a day  levothyroxine 100 MICROGram(s) Oral daily  melatonin 5 milliGRAM(s) Oral once  methylPREDNISolone sodium succinate Injectable 20 milliGRAM(s) IV Push every 6 hours  nystatin    Suspension [id number] Unit(s) Oral four times a day  oxyCODONE  ER Tablet 10 milliGRAM(s) Oral every 12 hours  pantoprazole    Tablet 40 milliGRAM(s) Oral before breakfast  piperacillin/tazobactam IVPB.. 3.375 Gram(s) IV Intermittent every 8 hours  sodium chloride 0.9%. 1000 milliLiter(s) (75 mL/Hr) IV Continuous <Continuous>    MEDICATIONS  (PRN):  benzocaine 15 mG/menthol 3.6 mG Lozenge 1 Lozenge Oral every 2 hours PRN Sore Throat  HYDROmorphone  Injectable 1 milliGRAM(s) IV Push every 6 hours PRN Severe Pain (7 - 10)      Allergies    No Known Allergies        Physical Examination:    Pleasant thin white man, NAD  Neck: no JVD, LAD, accessory muscle use  PULM: Clear to auscultation bilaterally, no wheezes, rales, rhonchi  CVS: Regular rate and rhythm, S1S2, no murmurs, rubs, or gallops  Abdomen: soft NT, prior surgery  Extremities: no edema  Neuro: non focal      LABS:    09-18    x   |  x   |  x   ----------------------------<  x   x    |  x   |  0.68    Ca    8.8      17 Sep 2022 06:54    TPro  6.7  /  Alb  4.0  /  TBili  0.7  /  DBili  0.2  /  AST  11  /  ALT  13  /  AlkPhos  58  09-18          CAPILLARY BLOOD GLUCOSE        PT/INR - ( 18 Sep 2022 06:26 )   PT: 14.5 sec;   INR: 1.25 ratio                   CULTURES:  Culture Results:   No growth (09-13 @ 17:29)  Culture Results:   No growth to date. (09-13 @ 10:20)  Culture Results:   No growth to date. (09-13 @ 09:50)    Most recent blood culture -- 09-13 @ 17:29   -- -- Clean Catch Clean Catch (Midstream) 09-13 @ 17:29  Most recent blood culture -- 09-13 @ 10:20   -- -- .Blood Blood-Peripheral 09-13 @ 10:20    
[facility] - [facility] - PROGRESS NOTE    CHIEF COMPLAINT: shock -> resolved; acute hypoxic respiratory failure; COVID-19 infection; bronchospasm; weak cough; mucous plugging; atelectasis; immunocompromised host; adrenal insufficiency    INTERVAL HISTORY: looks much better - awake and alert - phonating with much less difficulty; increasing oxygen requirements without shortness of breath or hypoxemia on a 6lpm nasal canula; ongoing cough productive of copious amounts of sputum which he is having difficulty expectorating; persistent chest congestion and wheeze; no recent fevers, chills or sweats; no chest pain/pressure or palpitations; no fatigue, malaise or weakness: better appetite      REVIEW OF SYSTEMS:  Constitutional: As per interval history  HEENT: Within normal limits  CV: As per interval history  Resp: As per interval history  GI: ulcerative colitis  : h/o testicular cancer  Musculoskeletal: Within normal limits  Skin: h/o melanoma - metastatic  Neurological: Within normal limits  Psychiatric: Within normal limits  Endocrine: Within normal limits  Hematologic/Lymphatic: immune compromised -   Allergic/Immunologic: Within normal limits    MEDICATIONS:     Pulmonary "  albuterol/ipratropium for Nebulization 3 milliLiter(s) Nebulizer <User Schedule>  buDESOnide    Inhalation Suspension 0.5 milliGRAM(s) Inhalation every 12 hours  guaifenesin/dextromethorphan Oral Liquid 10 milliLiter(s) Oral four times a day    Anti-microbials:  piperacillin/tazobactam IVPB.. 3.375 Gram(s) IV Intermittent every 8 hours  remdesivir  IVPB   IV Intermittent   remdesivir  IVPB 100 milliGRAM(s) IV Intermittent every 24 hours  vancomycin  IVPB 1000 milliGRAM(s) IV Intermittent every 12 hours    Cardiovascular:    Other:  atorvastatin 40 milliGRAM(s) Oral at bedtime  chlorhexidine 2% Cloths 1 Application(s) Topical daily  enoxaparin Injectable 40 milliGRAM(s) SubCutaneous every 24 hours  escitalopram 10 milliGRAM(s) Oral daily  fenofibrate Tablet 145 milliGRAM(s) Oral daily  finasteride 5 milliGRAM(s) Oral daily  gabapentin 300 milliGRAM(s) Oral two times a day  lactated ringers. 1000 milliLiter(s) IV Continuous <Continuous>  levothyroxine 100 MICROGram(s) Oral daily  methylPREDNISolone sodium succinate Injectable 20 milliGRAM(s) IV Push every 6 hours  oxyCODONE  ER Tablet 10 milliGRAM(s) Oral every 12 hours  pantoprazole    Tablet 40 milliGRAM(s) Oral before breakfast    MEDICATIONS  (PRN):  HYDROmorphone  Injectable 1 milliGRAM(s) IV Push every 6 hours PRN Severe Pain (7 - 10)      OBJECTIVE:    Daily Height in cm: 180.34 (13 Sep 2022 09:40)      PHYSICAL EXAM:       ICU Vital Signs Last 24 Hrs  T(C): 36.5 (14 Sep 2022 03:40), Max: 38.8 (13 Sep 2022 09:55)  T(F): 97.7 (14 Sep 2022 03:40), Max: 101.9 (13 Sep 2022 09:55)  HR: 94 (14 Sep 2022 03:40) (92 - 124)  BP: 110/75 (14 Sep 2022 03:40) (86/58 - 127/71)  BP(mean): 69 (13 Sep 2022 13:52) (65 - 80)  ABP: --  ABP(mean): --  RR: 20 (14 Sep 2022 03:40) (20 - 32)  SpO2: 94% (14 Sep 2022 03:40) (83% on 2lpm nasal canula -> 100% on 6lpm nasal canula    General: Awake. Alert. Cooperative. Audible wheeze. Appears stated age 	  HEENT: Atraumatic. Bitemporal wasting. Anicteric. Normal oral mucosa. PERRL. EOMI.  Neck: Supple. Trachea midline. Thyroid without enlargement/tenderness/nodules. No carotid bruit. No JVD. Loss of bilateral supraclavicular fat pads.  Cardiovascular: Tachycardic rate and rhythm. S1 S2 normal. No murmurs, rubs or gallops.  Respiratory: Respirations unlabored. Diffuse rhonchi and wheeze. No curvature.  Abdomen: Soft. Non-tender. Non-distended. No organomegaly. No masses. Normal bowel sounds.  Extremities: Warm to touch. No clubbing or cyanosis. No pedal edema.  Pulses: 2+ peripheral pulses all extremities.	  Skin: Normal skin color. No rashes or lesions. No ecchymoses. No cyanosis. Warm to touch.  Lymph Nodes: Cervical, supraclavicular and axillary nodes normal  Neurological: Motor and sensory examination equal and normal. A and O x 3  Psychiatry: Appropriate mood and affect.      LABS:                          12.9   8.27  )-----------( 141      ( 14 Sep 2022 05:57 )             40.6     CBC    WBC  8.27 <==, 6.47 <==    Hemoglobin  12.9 <<==, 11.7 <<==    Hematocrit  40.6 <==, 35.8 <==    Platelets  141 <==, 136 <==      140  |  105  |  10  ----------------------------<  151<H>    09-14  3.7   |  20<L>  |  0.83      LYTES    sodium  140 <==, 136 <==    potassium   3.7 <==, 3.6 <==    chloride  105 <==, 100 <==    carbon dioxide  20 <==, 22 <==    =============================================================================================  RENAL FUNCTION:    Creatinine:   0.83  <<==, 1.15  <<==    BUN:   10 <==, 11 <==    ============================================================================================    calcium   9.4 <==, 8.3 <==    phos   2.4 <==    mag   1.7 <==    ============================================================================================  LFTs    AST:   30 <== , 22 <==     ALT:  20  <== , 16  <==     AP:  112  <=, 80  <=    Bili:  0.5  <=, 0.4  <=    PT/INR - ( 13 Sep 2022 11:03 )   PT: 15.3 sec;   INR: 1.32 ratio       PTT - ( 13 Sep 2022 11:03 )  PTT:28.2 sec    Venous Blood Gas:  09-14 @ 06:02  --/--/--/--/--  VBG Lactate: 3.1    ABG - ( 14 Sep 2022 04:51 )  pH: 7.43  /  pCO2: 35    /  pO2: 103   / HCO3: 23    / Base Excess: -0.6  /  SaO2: 99.2      Venous Blood Gas:  09-13 @ 10:20  7.35/53/26/29/39.9  VBG Lactate: 1.2    Procalcitonin, Serum: 1.23 ng/mL (09-13 @ 10:15)    C-Reactive Protein, Serum (09.14.22 @ 05:57)   C-Reactive Protein, Serum: 168 mg/L     D-Dimer Assay, Quantitative (09.14.22 @ 05:57)   D-Dimer Assay, Quantitative: 405:      < from: Transthoracic Echocardiogram (06.01.20 @ 17:43) >    Patient name: CARLOTA CANTU  YOB: 1970   Age: 50 (M)   MR#: [id number]  Study Date: 6/1/2020    Dimensions:    Normal Values:  LA:     4.2    2.0 - 4.0 cm  Ao:     3.5    2.0 - 3.8 cm  SEPTUM: 0.9    0.6 - 1.2 cm  PWT:    0.8    0.6 - 1.1 cm  LVIDd:  4.6    3.0 - 5.6 cm  LVIDs:  3.1    1.8 - 4.0 cm  Derived variables:  LVMI: 65 g/m2  RWT: 0.34  Fractional short: 33 %  EF (Visual Estimate): 55-60 %  Doppler Peak Velocity (m/sec): AoV=0.9  ------------------------------------------------------------------------  Conclusions:  1. Normal left ventricular internal dimensions and wall  thicknesses.  2. Endocardium not well visualized; grossly normal left  ventricular systolic function. Regional wall motion could  not be accurately assessed. Consider repeat limited study  with definity contrast if clinically indicated.  3. Normal right ventricular size and function.  4. Estimated right ventricular systolic pressure equals 13  mm Hg, assuming right atrial pressure equals 8 mm Hg,  consistent with normal pulmonary pressures.  *** No previous Echo exam.  ------------------------------------------------------------------------  Confirmed on  6/1/2020 - 20:10:31 by Marvin Gonzales M.D.  ------------------------------------------------------------------------  ---------------------------------------------------------------------------------------------------------------    MICROBIOLOGY:     Respiratory Viral Panel with COVID-19 by LARA (09.13.22 @ 11:00)   Rapid RVP Result: Detected   SARS-CoV-2: Detected  This Respiratory Panel uses polymerase chain reaction (PCR) to detect for   adenovirus; coronavirus (HKU1, NL63, 229E, OC43); human metapneumovirus   (hMPV); human enterovirus/rhinovirus (Entero/RV); influenza A; influenza   A/H1; influenza A/H3; influenza A/H1-2009; influenza B; parainfluenza   viruses 1, 2, 3, 4; respiratory syncytial virus; Mycoplasma pneumoniae;     [x ] Chest radiographs reviewed and interpreted by me    EXAM:  XR CHEST PORTABLE URGENT 1V                          PROCEDURE DATE:  09/13/2022      FINDINGS:      The heart is normal in size.  The lungs are clear.  No pleural effusion or pneumothorax.    IMPRESSION:  Clear lungs.    JOSE GUERRERO MD; Resident Radiology  This document has been electronically signed.  MARIANGEL MCKINLEY MD; Attending Radiologist  This document has been electronically signed. Sep 13 2022 12:14PM  ---------------------------------------------------------------------------------------------------------------        
DATE OF SERVICE: 09-16-22 @ 13:00    Patient is a 52y old  Male who presents with a chief complaint of fever (16 Sep 2022 10:53)      SUBJECTIVE / OVERNIGHT EVENTS:  No chest pain. No shortness of breath. No complaints. No events overnight.     MEDICATIONS  (STANDING):  albuterol/ipratropium for Nebulization 3 milliLiter(s) Nebulizer every 6 hours  atorvastatin 40 milliGRAM(s) Oral at bedtime  benzocaine 15 mG/menthol 3.6 mG Lozenge 1 Lozenge Oral every 2 hours  buDESOnide    Inhalation Suspension 0.5 milliGRAM(s) Inhalation every 12 hours  chlorhexidine 2% Cloths 1 Application(s) Topical daily  enoxaparin Injectable 40 milliGRAM(s) SubCutaneous every 24 hours  escitalopram 10 milliGRAM(s) Oral daily  fenofibrate Tablet 145 milliGRAM(s) Oral daily  finasteride 5 milliGRAM(s) Oral daily  gabapentin 300 milliGRAM(s) Oral two times a day  guaifenesin/dextromethorphan Oral Liquid 10 milliLiter(s) Oral four times a day  levothyroxine 100 MICROGram(s) Oral daily  melatonin 5 milliGRAM(s) Oral once  methylPREDNISolone sodium succinate Injectable 20 milliGRAM(s) IV Push every 6 hours  nystatin    Suspension 740152 Unit(s) Oral four times a day  oxyCODONE  ER Tablet 10 milliGRAM(s) Oral every 12 hours  pantoprazole    Tablet 40 milliGRAM(s) Oral before breakfast  piperacillin/tazobactam IVPB.. 3.375 Gram(s) IV Intermittent every 8 hours  remdesivir  IVPB   IV Intermittent   remdesivir  IVPB 100 milliGRAM(s) IV Intermittent every 24 hours  sodium chloride 0.9%. 1000 milliLiter(s) (75 mL/Hr) IV Continuous <Continuous>    MEDICATIONS  (PRN):  HYDROmorphone  Injectable 1 milliGRAM(s) IV Push every 6 hours PRN Severe Pain (7 - 10)      Vital Signs Last 24 Hrs  T(C): 36.6 (16 Sep 2022 09:08), Max: 36.8 (16 Sep 2022 00:10)  T(F): 97.9 (16 Sep 2022 09:08), Max: 98.3 (16 Sep 2022 00:10)  HR: 111 (16 Sep 2022 09:08) (96 - 111)  BP: 119/66 (16 Sep 2022 09:08) (119/66 - 143/82)  BP(mean): --  RR: 18 (16 Sep 2022 09:08) (18 - 18)  SpO2: 95% (16 Sep 2022 09:08) (94% - 95%)    Parameters below as of 16 Sep 2022 09:08  Patient On (Oxygen Delivery Method): room air      CAPILLARY BLOOD GLUCOSE        I&O's Summary    15 Sep 2022 07:01  -  16 Sep 2022 07:00  --------------------------------------------------------  IN: 1815 mL / OUT: 400 mL / NET: 1415 mL        PHYSICAL EXAM:  GENERAL: NAD, well-developed  HEAD:  Atraumatic, Normocephalic  EYES: EOMI, PERRLA, conjunctiva and sclera clear  NECK: Supple, No JVD  CHEST/LUNG: Clear to auscultation bilaterally; No wheeze  HEART: Regular rate and rhythm; No murmurs, rubs, or gallops  ABDOMEN: Soft, Nontender, Nondistended; Bowel sounds present  EXTREMITIES:  2+ Peripheral Pulses, No clubbing, cyanosis, or edema  PSYCH: AAOx3  NEUROLOGY: non-focal  SKIN: No rashes or lesions    LABS:                        10.1   4.02  )-----------( 183      ( 16 Sep 2022 05:38 )             32.1     09-16    138  |  102  |  16  ----------------------------<  160<H>  3.6   |  27  |  0.78    Ca    8.7      16 Sep 2022 05:38    TPro  6.3  /  Alb  3.5  /  TBili  0.4  /  DBili  x   /  AST  15  /  ALT  13  /  AlkPhos  58  09-16    PT/INR - ( 16 Sep 2022 03:35 )   PT: 13.9 sec;   INR: 1.20 ratio                   RADIOLOGY & ADDITIONAL TESTS:    Imaging Personally Reviewed:    Consultant(s) Notes Reviewed:      Care Discussed with Consultants/Other Providers:  
Follow-up Pulm Progress Note    The patient was seen and examined. Notes reviewed and discussed with staff/team as applicable      No new respiratory events overnight.  Still feels "raw". Pain persists    Denies: SOB, increased cough, colored phlegm, hemoptysis, N/V/D, neck stiffness, dysuria      Vital Signs Last 24 Hrs  T(C): 36.9 (17 Sep 2022 08:01), Max: 36.9 (17 Sep 2022 08:01)  T(F): 98.4 (17 Sep 2022 08:01), Max: 98.4 (17 Sep 2022 08:01)  HR: 76 (17 Sep 2022 08:01) (76 - 98)  BP: 143/87 (17 Sep 2022 08:01) (129/81 - 143/87)  BP(mean): --  RR: 18 (17 Sep 2022 08:01) (18 - 19)  SpO2: 95% (17 Sep 2022 08:01) (93% - 96%)    Parameters below as of 17 Sep 2022 08:01  Patient On (Oxygen Delivery Method): room air              09-16 @ 07:01  -  09-17 @ 07:00  --------------------------------------------------------  IN: 1680 mL / OUT: 0 mL / NET: 1680 mL          Medications:  MEDICATIONS  (STANDING):  albuterol/ipratropium for Nebulization 3 milliLiter(s) Nebulizer every 6 hours  atorvastatin 40 milliGRAM(s) Oral at bedtime  benzocaine 15 mG/menthol 3.6 mG Lozenge 1 Lozenge Oral every 2 hours  buDESOnide    Inhalation Suspension 0.5 milliGRAM(s) Inhalation every 12 hours  chlorhexidine 2% Cloths 1 Application(s) Topical daily  enoxaparin Injectable 40 milliGRAM(s) SubCutaneous every 24 hours  escitalopram 10 milliGRAM(s) Oral daily  fenofibrate Tablet 145 milliGRAM(s) Oral daily  finasteride 5 milliGRAM(s) Oral daily  gabapentin 300 milliGRAM(s) Oral two times a day  guaifenesin/dextromethorphan Oral Liquid 10 milliLiter(s) Oral four times a day  levothyroxine 100 MICROGram(s) Oral daily  melatonin 5 milliGRAM(s) Oral once  methylPREDNISolone sodium succinate Injectable 20 milliGRAM(s) IV Push every 6 hours  nystatin    Suspension [id number] Unit(s) Oral four times a day  oxyCODONE  ER Tablet 10 milliGRAM(s) Oral every 12 hours  pantoprazole    Tablet 40 milliGRAM(s) Oral before breakfast  piperacillin/tazobactam IVPB.. 3.375 Gram(s) IV Intermittent every 8 hours  remdesivir  IVPB   IV Intermittent   remdesivir  IVPB 100 milliGRAM(s) IV Intermittent every 24 hours  sodium chloride 0.9%. 1000 milliLiter(s) (75 mL/Hr) IV Continuous <Continuous>    MEDICATIONS  (PRN):  HYDROmorphone  Injectable 1 milliGRAM(s) IV Push every 6 hours PRN Severe Pain (7 - 10)      Allergies    No Known Allergies        Physical Examination:    Pleasant thin white man, NAD  Neck: no JVD, LAD, accessory muscle use  PULM: Clear to auscultation bilaterally, no wheezes, rales, rhonchi, coughs with any insp effort  CVS: Regular rate and rhythm, S1S2, no murmurs, rubs, or gallops  Abdomen: soft, NT  Extremities: no edema  Neuro: non focal      LABS:                        10.1   4.02  )-----------( 183      ( 16 Sep 2022 05:38 )             32.1     09-17    138  |  102  |  15  ----------------------------<  173<H>  3.6   |  26  |  0.68    Ca    8.8      17 Sep 2022 06:54    TPro  6.2  /  Alb  3.7  /  TBili  0.4  /  DBili  <0.1  /  AST  12  /  ALT  13  /  AlkPhos  54  09-17          CAPILLARY BLOOD GLUCOSE        PT/INR - ( 17 Sep 2022 06:43 )   PT: 14.1 sec;   INR: 1.22 ratio                   CULTURES:  Culture Results:   No growth (09-13 @ 17:29)  Culture Results:   No growth to date. (09-13 @ 10:20)  Culture Results:   No growth to date. (09-13 @ 09:50)    Most recent blood culture -- 09-13 @ 17:29   -- -- Clean Catch Clean Catch (Midstream) 09-13 @ 17:29  Most recent blood culture -- 09-13 @ 10:20   -- -- .Blood Blood-Peripheral 09-13 @ 10:20  Most recent blood culture -- 09-13 @ 09:50   -- -- .Blood Blood-Peripheral 09-13 @ 09:50    
Follow-up Pulm Progress Note    The patient was seen and examined. Notes reviewed and discussed with staff/team as applicable      No new respiratory events overnight. Still coughing, but feels better and off oxygen     Denies: increased SOB, colored phlegm, hemoptysis, N/V/D, neck stiffness, dysuria  ROS chest discomfort with cough    Vital Signs Last 24 Hrs  T(C): 36.6 (16 Sep 2022 09:08), Max: 36.8 (16 Sep 2022 00:10)  T(F): 97.9 (16 Sep 2022 09:08), Max: 98.3 (16 Sep 2022 00:10)  HR: 111 (16 Sep 2022 09:08) (96 - 111)  BP: 119/66 (16 Sep 2022 09:08) (119/66 - 143/82)  BP(mean): --  RR: 18 (16 Sep 2022 09:08) (18 - 18)  SpO2: 95% (16 Sep 2022 09:08) (94% - 95%)    Parameters below as of 16 Sep 2022 09:08  Patient On (Oxygen Delivery Method): room air              09-15 @ 07:01  -  09-16 @ 07:00  --------------------------------------------------------  IN: 1815 mL / OUT: 400 mL / NET: 1415 mL          Medications:  MEDICATIONS  (STANDING):  albuterol/ipratropium for Nebulization 3 milliLiter(s) Nebulizer every 6 hours  atorvastatin 40 milliGRAM(s) Oral at bedtime  benzocaine 15 mG/menthol 3.6 mG Lozenge 1 Lozenge Oral every 2 hours  buDESOnide    Inhalation Suspension 0.5 milliGRAM(s) Inhalation every 12 hours  chlorhexidine 2% Cloths 1 Application(s) Topical daily  enoxaparin Injectable 40 milliGRAM(s) SubCutaneous every 24 hours  escitalopram 10 milliGRAM(s) Oral daily  fenofibrate Tablet 145 milliGRAM(s) Oral daily  finasteride 5 milliGRAM(s) Oral daily  gabapentin 300 milliGRAM(s) Oral two times a day  guaifenesin/dextromethorphan Oral Liquid 10 milliLiter(s) Oral four times a day  levothyroxine 100 MICROGram(s) Oral daily  melatonin 5 milliGRAM(s) Oral once  methylPREDNISolone sodium succinate Injectable 20 milliGRAM(s) IV Push every 6 hours  nystatin    Suspension [id number] Unit(s) Oral four times a day  oxyCODONE  ER Tablet 10 milliGRAM(s) Oral every 12 hours  pantoprazole    Tablet 40 milliGRAM(s) Oral before breakfast  piperacillin/tazobactam IVPB.. 3.375 Gram(s) IV Intermittent every 8 hours  remdesivir  IVPB   IV Intermittent   remdesivir  IVPB 100 milliGRAM(s) IV Intermittent every 24 hours  sodium chloride 0.9%. 1000 milliLiter(s) (75 mL/Hr) IV Continuous <Continuous>    MEDICATIONS  (PRN):  HYDROmorphone  Injectable 1 milliGRAM(s) IV Push every 6 hours PRN Severe Pain (7 - 10)      Allergies    No Known Allergies      Physical Examination:    Pleasant thin white man, NAD on RA  Neck: no JVD, LAD, accessory muscle use  PULM: Few rales scattered, no wheezes  CVS: Regular rate and rhythm, S1S2, no murmurs, rubs, or gallops  Abdomen: soft, NT/ND  Extremities: no edema  Neuro: non focal      LABS:                        10.1   4.02  )-----------( 183      ( 16 Sep 2022 05:38 )             32.1     09-16    138  |  102  |  16  ----------------------------<  160<H>  3.6   |  27  |  0.78    Ca    8.7      16 Sep 2022 05:38    TPro  6.3  /  Alb  3.5  /  TBili  0.4  /  DBili  x   /  AST  15  /  ALT  13  /  AlkPhos  58  09-16          CAPILLARY BLOOD GLUCOSE        PT/INR - ( 16 Sep 2022 03:35 )   PT: 13.9 sec;   INR: 1.20 ratio                   CULTURES:  Culture Results:   No growth (09-13 @ 17:29)  Culture Results:   No growth to date. (09-13 @ 10:20)  Culture Results:   No growth to date. (09-13 @ 09:50)    Most recent blood culture -- 09-13 @ 17:29   -- -- Clean Catch Clean Catch (Midstream) 09-13 @ 17:29  Most recent blood culture -- 09-13 @ 10:20   -- -- .Blood Blood-Peripheral 09-13 @ 10:20  Most recent blood culture -- 09-13 @ 09:50   -- -- .Blood Blood-Peripheral 09-13 @ 09:50

## 2022-09-19 NOTE — PROGRESS NOTE ADULT - PROVIDER SPECIALTY LIST ADULT
Pulmonology
Internal Medicine
Internal Medicine
Pulmonology
Internal Medicine
Internal Medicine
Pulmonology
Pulmonology
Internal Medicine
Internal Medicine

## 2022-10-04 ENCOUNTER — NON-APPOINTMENT (OUTPATIENT)
Age: 52
End: 2022-10-04

## 2022-10-06 ENCOUNTER — APPOINTMENT (OUTPATIENT)
Dept: RHEUMATOLOGY | Facility: CLINIC | Age: 52
End: 2022-10-06

## 2022-10-10 ENCOUNTER — APPOINTMENT (OUTPATIENT)
Dept: SURGICAL ONCOLOGY | Facility: CLINIC | Age: 52
End: 2022-10-10

## 2022-10-10 VITALS
TEMPERATURE: 97.6 F | DIASTOLIC BLOOD PRESSURE: 87 MMHG | HEIGHT: 70 IN | RESPIRATION RATE: 16 BRPM | WEIGHT: 150 LBS | SYSTOLIC BLOOD PRESSURE: 124 MMHG | OXYGEN SATURATION: 98 % | BODY MASS INDEX: 21.47 KG/M2 | HEART RATE: 82 BPM

## 2022-10-10 PROCEDURE — 99205 OFFICE O/P NEW HI 60 MIN: CPT

## 2022-10-10 NOTE — CONSULT LETTER
[Dear  ___] : Dear  [unfilled], [Consult Letter:] : I had the pleasure of evaluating your patient, [unfilled]. [Please see my note below.] : Please see my note below. [Consult Closing:] : Thank you very much for allowing me to participate in the care of this patient.  If you have any questions, please do not hesitate to contact me. [Sincerely,] : Sincerely, [FreeTextEntry2] : Ugo Awan MD [FreeTextEntry1] : I will keep you informed of the final pathology. [FreeTextEntry3] : Sarwat Chandler MD FACS\par Chief of Surgical Oncology

## 2022-10-10 NOTE — HISTORY OF PRESENT ILLNESS
[de-identified] : Mr. CARLOTA CANTU is a 52 year old man, last seen in 2018 & referred by Dr. Ugo Awan, here today to re-establish care. \par \par He is s/p radical resection of scalp melanoma, right posterior modified neck dissection with closure by Dr. Bradley Toussaint (plastics) on 8/29/18. Final pathology revealed metastatic melanoma present in 2 out of 2 lymph nodes, residual melanoma, negative margins, no lymphovascular invasion.  Tumor stage: pT2a pN2a\par \par s/p Right functional neck LN dissection on 10/19/18 with 18 negative LNs and benign skin/tissue findings.  \par \par Received adjuvant therapy (Nivolumab) from 12/2018 - 3/2019, D/C due to thigh fascitis. \par 11/2019 - developed new in-transit & metastatic skin lesions to scalp, restarted immunotherapy (ipilimumab & Nivolumab) from 11/209 - 4/2020, then delayed/stopped due to immune related side effects. \par \par Shave biopsies from 7/2022-\par 1. posterior mid neck- compound dysplastic nevus w/ moderate atypia extending to lateral tissue edges, conservative removal recommended\par below & lateral to neck - irritated compound nevus w/ minimal architectural & cytologic atypia, no further tx required\par 2. left posterior shoulder- irritated compound nevus w/ minimal architectural & cytologic atypia, no further tx required \par 3. mid abdomen - compound dysplastic nevus w/ moderate cytologic atypia extending to the lateral tissue edge, conservative removal recommended \par \par re-shave biopsies on 8/25/22 - \par 1. mid abdomen - recurrent & residual junctional melanocytic proliferation, scar extends to tissue edges, judgement can be made whether there is any residual lesion beyond scar, clinical correlation necessary\par 2. posterior mid neck - focal residual compound melanocytic lesion & scar from previous procedure, scar is extending to tissue edges and no judgment can be made whether there is any residual lesion beyond the scar\par 3. 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  \par PCP: Dr. Ney Crenshaw\par Referring MD/Dermatologist: Dr. Mariah Spring now Dr. Ugo Awan\par Med Onc: previously Dr. Kendall Sands/Dr. Gopi Arredondo (@ [facility]), now Dr. Shawn Mcginnis

## 2022-10-10 NOTE — ASSESSMENT
[FreeTextEntry1] : IMP:\par s/p radical resection of scalp melanoma, right posterior modified neck dissection 8/2018. \par Final pathology-  metastatic melanoma present in 2 out of 2 lymph nodes, pT2a pN2a\par Received adjuvant therapy (Nivolumab) from 12/2018 - 3/2019, D/C due to thigh fascitis. \par 11/2019 - developed new in-transit & metastatic skin lesions to scalp, restarted immunotherapy (ipilimumab & Nivolumab) from 11/209 - 4/2020, then delayed/stopped due to immune related side effects. \par \par re-shave biopsies on 8/25/22 - \par 1. mid abdomen - recurrent & residual junctional melanocytic proliferation, scar extends to tissue edges, judgement can be made whether there is any residual lesion beyond scar, clinical correlation necessary\par 2. posterior mid neck - focal residual compound melanocytic lesion & scar from previous procedure, scar is extending to tissue edges and no judgment can be made whether there is any residual lesion beyond the scar\par 3. right anterior shoulder - melanoma in situ in association w/ a compound melanocytic nevus, proliferation extends to peripheral margin\par \par PLAN:\par WLE of right shoulder melanoma in-situ

## 2022-10-10 NOTE — PHYSICAL EXAM
[Normal Neck Lymph Nodes] : normal neck lymph nodes  [Normal Supraclavicular Lymph Nodes] : normal supraclavicular lymph nodes [Normal Axillary Lymph Nodes] : normal axillary lymph nodes [Normal] : oriented to person, place and time, with appropriate affect [de-identified] : S/P right functional neck dissection without any local recurrence [de-identified] : biopsy site right anterior shoulder over lateral aspect of clavicle without any satellite lesions

## 2022-10-11 NOTE — PROGRESS NOTE ADULT - PROBLEM SELECTOR PLAN 8
Med recs done on admission per list provided by [facility]. Per pharmacist, Gabapentin and Metoprolol are no longer active meds.     ISTOP  Patient Name: Conrad Marie Date: 1970  Address:  [address] [place], [place] [address]Wsk: Male  Rx Written	Rx Dispensed	Drug	Quantity	Days Supply	Prescriber Name	Prescriber Susi #	Payment Method	Dispenser  06/06/2022	06/06/2022	zolpidem tartrate 10 mg tablet	30	30	Jose Antonio Garrison MD	[id number]	Insurance	[facility] #[id number]  05/25/2022	05/25/2022	oxycontin er 10 mg tablet	60	30	Arian Carter	[id number]	Insurance	[facility] #[id number]  04/15/2022	05/24/2022	tramadol hcl 50 mg tablet	90	30	Speedy Carias	[id number]	Insurance	[facility] #[id number]  04/04/2022	05/04/2022	zolpidem tartrate 10 mg tablet	30	30	Speedy Carias	[id number]	Insurance	[facility] #[id number]  04/26/2022	04/26/2022	oxycontin er 10 mg tablet	60	30	Arian Carter	[id number]	Insurance	[facility] #[id number]  04/15/2022	04/15/2022	tramadol hcl 50 mg tablet	90	30	Speedy Carias	[id number]	Insurance	[facility] #[id number]
No
Med recs done on admission per list provided by [facility]. Per pharmacist, Gabapentin and Metoprolol are no longer active meds.     ISTOP  Patient Name: Conrad Marie Date: 1970  Address:  [address] [place], [place] [address]Kmy: Male  Rx Written	Rx Dispensed	Drug	Quantity	Days Supply	Prescriber Name	Prescriber Susi #	Payment Method	Dispenser  06/06/2022	06/06/2022	zolpidem tartrate 10 mg tablet	30	30	Jose Antonio Garrison MD	[id number]	Insurance	[facility] #[id number]  05/25/2022	05/25/2022	oxycontin er 10 mg tablet	60	30	Arian Carter	[id number]	Insurance	[facility] #[id number]  04/15/2022	05/24/2022	tramadol hcl 50 mg tablet	90	30	Speedy Carias	[id number]	Insurance	[facility] #[id number]  04/04/2022	05/04/2022	zolpidem tartrate 10 mg tablet	30	30	Speedy Carias	[id number]	Insurance	[facility] #[id number]  04/26/2022	04/26/2022	oxycontin er 10 mg tablet	60	30	Arian Carter	[id number]	Insurance	[facility] #[id number]  04/15/2022	04/15/2022	tramadol hcl 50 mg tablet	90	30	Speedy Carias	[id number]	Insurance	[facility] #[id number]

## 2022-10-13 ENCOUNTER — APPOINTMENT (OUTPATIENT)
Dept: SURGICAL ONCOLOGY | Facility: CLINIC | Age: 52
End: 2022-10-13
Payer: COMMERCIAL

## 2022-10-13 PROCEDURE — 14020 TIS TRNFR S/A/L 10 SQ CM/<: CPT

## 2022-10-13 PROCEDURE — 23077 RAD RESCJ TUM SHOULDER<5 CM: CPT

## 2022-10-13 NOTE — ASSESSMENT
[FreeTextEntry1] : right shoulder melanoma in-situ excised with 5 mm margins ( 2.5 x 1.5 cm )\par \par Plan:\par check pathology\par RTO 2 weeks

## 2022-10-13 NOTE — PROCEDURE
[Excision Of Lesion] : excision of lesion [Melanoma In Situ Suspected] : melanoma in situ suspected [Patient] : patient [Risks] : risks [___ ml Inj] : [unfilled] ~Uml [1%] : 1% [With Epi] : with epinephrine [Betadine] : using Betadine [Excisional: Margin ___mm] : the lesion was excised with a  [unfilled] ~Umm margin in an elliptical fashion [Chromic] : chromic suture(s) [___ # of Sutures] : [unfilled] [Size: ___-0] : [unfilled]-0 [Running] : running [Vicryl] : Vicryl suture(s) [Dressing Applied] : a sterile dressing was placed [Sent to Pathology] : the excised lesion was place in buffered formalin and sent for pathology [Tolerated Well] : the patient tolerated the procedure well [No Complications] : there were no complications [___ Week(s)] : in [unfilled] week(s) [FreeTextEntry5] : right shoulder

## 2022-10-19 LAB — CORE LAB BIOPSY: NORMAL

## 2022-10-25 ENCOUNTER — APPOINTMENT (OUTPATIENT)
Dept: PAIN MANAGEMENT | Facility: CLINIC | Age: 52
End: 2022-10-25

## 2022-10-25 PROCEDURE — 99213 OFFICE O/P EST LOW 20 MIN: CPT | Mod: 95

## 2022-10-25 NOTE — HISTORY OF PRESENT ILLNESS
[FreeTextEntry1] : 53 y/o M with Pmhx Cluster headaches, Stage II testicular CA 1994 s/p chemotherapy, surgery and autologous bone marrow transplant, non Hodgkins Lymphoma right neck 1998 as well as melanoma with chronic pain including muscles knees> shoulders as well as lower back. Reports no significant change in his pain. he continues to have diffuse pain in muscles, multiple joints including knees, shoulders 5-6/10 on average during the day with pain meds but yp to 6-7/10 later in the afternoons 4-5 pm.  he feels the Oxycontin has been wearing off early and does not last 12 hours.  Pain increased with movement, activity, performing routine tasks, transfers, bathing, dressing etc and impeding on QOL.\par \par medically following up with surg onc s/p excision of melanoma right shoulder recently.  \par BM daily with Movantik \par \par Prior meds:  Oxycodone 5 mg daily, gabapentin low dose no benefit ( would consider trying again) \par Current meds include: Oxycontin 10 mg BID, Tramadol 1-2x/day( Dr. Roberts only med that works), Zolpidem 10 mg q hs ( Dr. Roberts) , Movantik , hydrocortisone 30 mg/day

## 2022-10-25 NOTE — REASON FOR VISIT
[Home] : at home, [unfilled] , at the time of the visit. [Medical Office: ([facility])___] : at the medical office located in  [Patient] : the patient [Self] : self [Follow-Up: _____] : a [unfilled] follow-up visit

## 2022-10-25 NOTE — PHYSICAL EXAM
[General Appearance - Alert] : alert [General Appearance - In No Acute Distress] : in no acute distress [General Appearance - Well Nourished] : well nourished [General Appearance - Well Developed] : well developed [Oriented To Time, Place, And Person] : oriented to person, place, and time [Impaired Insight] : insight and judgment were intact [Affect] : the affect was normal [Mood] : the mood was normal

## 2022-10-25 NOTE — ASSESSMENT
[FreeTextEntry1] : Chronic pain syndrome / Lower back pain stable on current meds. \par \par \par Continue Oxycontin 10 mg 2x/day for now. \par Add duloxetine 30 mg daily and increase to 60 mg daily if tolerated well. \par \par UDS 8/2022 c/w prescribed meds. \par I-Stop reviewed,  reference #: [id number]\par has naloxone for prn use at home \par No signs of aberrant behavior and will continue to monitor for signs of toxicity.\par Reminded to continue to avoid alcohol.\par  [Opioids] : Patient was explained in detail about pain control by using opioids. Patient has signed and fully understands our guidelines for medication and drug screening.  Patient understands the side effects of opioids, including, but not limited to, drug tolerance, dependence, potential for addiction. This class of drugs is habit-forming and MAKEDA regulated. The sedative effects of opioids can be potentiated by taking alcohol or any sleeping pills, along with opioids. The decision to drive is patient’s responsibility, as opioids can affect his/her driving ability and ability to concentrate. The long-term place is not clear, however, patient understands that once the pain control optimizes, the goal will be to wean off the opioids. All the issues regarding opioid treatment have been addressed satisfactorily.

## 2022-10-27 ENCOUNTER — APPOINTMENT (OUTPATIENT)
Dept: SURGICAL ONCOLOGY | Facility: CLINIC | Age: 52
End: 2022-10-27
Payer: COMMERCIAL

## 2022-10-27 PROCEDURE — 14040 TIS TRNFR F/C/C/M/N/A/G/H/F: CPT | Mod: 58

## 2022-10-27 PROCEDURE — 22904 RADICAL RESECT ABD TUMOR<5CM: CPT | Mod: 58

## 2022-10-27 PROCEDURE — 21557 RESECT NECK THORAX TUMOR<5CM: CPT | Mod: 58

## 2022-10-27 PROCEDURE — 14000 TIS TRNFR TRUNK 10 SQ CM/<: CPT | Mod: 58

## 2022-10-27 NOTE — PROCEDURE
[Excision Of Lesion] : excision of lesion [Melanoma In Situ Suspected] : melanoma in situ suspected [Patient] : patient [Risks] : risks [___ ml Inj] : [unfilled] ~Uml [1%] : 1% [With Epi] : with epinephrine [Betadine] : using betadine [Excisional: Margin ___mm] : the lesion was excised with a  [unfilled] ~Umm margin in an elliptical fashion [Simple Sutures] : simple sutures were used for the skin closure [Chromic] : chromic suture(s) [___ # of Sutures] : [unfilled] [Size: ___-0] : [unfilled]-0 [Interrupted] : interrupted [Nylon] : nylon suture(s) [Sent to Pathology] : the excised lesion was place in buffered formalin and sent for pathology [Tolerated Well] : the patient tolerated the procedure well [No Complications] : there were no complications [___ Day(s)] : in [unfilled] day(s) [FreeTextEntry5] : posterior neck [FreeTextEntry8] : abdominal wall

## 2022-10-27 NOTE — ASSESSMENT
[FreeTextEntry1] : -posterior neck lesion ( 2 x 1 cm ) excised and closed with adjacent tissue transfer\par \par - mid abdominal wall lesion excsied ( 1.5 x 1.0 cm ) and closed with adjacent tissue transfer\par \par Plan:\par check pathology\par RTO 10 days for suture removal

## 2022-11-01 LAB — CORE LAB BIOPSY: NORMAL

## 2022-11-07 ENCOUNTER — APPOINTMENT (OUTPATIENT)
Dept: SURGICAL ONCOLOGY | Facility: CLINIC | Age: 52
End: 2022-11-07

## 2022-11-07 VITALS
HEART RATE: 80 BPM | TEMPERATURE: 97.9 F | OXYGEN SATURATION: 96 % | WEIGHT: 144 LBS | BODY MASS INDEX: 20.62 KG/M2 | RESPIRATION RATE: 16 BRPM | HEIGHT: 70 IN | SYSTOLIC BLOOD PRESSURE: 120 MMHG | DIASTOLIC BLOOD PRESSURE: 79 MMHG

## 2022-11-07 PROCEDURE — 99024 POSTOP FOLLOW-UP VISIT: CPT

## 2022-11-07 NOTE — PHYSICAL EXAM
[Normal Neck Lymph Nodes] : normal neck lymph nodes  [Normal Supraclavicular Lymph Nodes] : normal supraclavicular lymph nodes [Normal Axillary Lymph Nodes] : normal axillary lymph nodes [Normal] : oriented to person, place and time, with appropriate affect [de-identified] : S/P right functional neck dissection without any local recurrence [de-identified] : posterior neck and abdominal wall incisions healing nicely; sutures removed today

## 2022-11-07 NOTE — ASSESSMENT
[FreeTextEntry1] : IMP:\par HX; s/p radical resection of scalp melanoma, right posterior modified neck dissection 8/2018. \par Final pathology-  metastatic melanoma present in 2 out of 2 lymph nodes, pT2a pN2a\par Received adjuvant therapy (Nivolumab) from 12/2018 - 3/2019, D/C due to thigh fascitis. \par 11/2019 - developed new in-transit & metastatic skin lesions to scalp, restarted immunotherapy (ipilimumab & Nivolumab) from 11/209 - 4/2020, then delayed/stopped due to immune related side effects. \par \par re-shave biopsies on 8/25/22 - \par 1. mid abdomen - recurrent & residual junctional melanocytic proliferation, scar extends to tissue edges, judgement can be made whether there is any residual lesion beyond scar, clinical correlation necessary\par 2. posterior mid neck - focal residual compound melanocytic lesion & scar from previous procedure, scar is extending to tissue edges and no judgment can be made whether there is any residual lesion beyond the scar\par 3. right anterior shoulder - melanoma in situ in association w/ a compound melanocytic nevus, proliferation extends to peripheral margin\par \par now s/p in-office excision of all three lesions, right shoulder on 10/13/22, mid abdomen & post neck on 10/27/22. Path:\par 1. right shoulder - scar w/ no residual melanocytic lesion\par 2. mid abdomen - no residual melanocytic neoplasm, scar & prior biopsy site change\par 3. posterior neck - no residual melanocytic neoplasm, scar & prior biopsy site change \par \par sutures removed from neck & abdomen \par \par PLAN:\par RTO 3 months\par continue close surveillance w/ Dr. Awan

## 2022-11-07 NOTE — HISTORY OF PRESENT ILLNESS
[de-identified] : Mr. CARLOTA CANTU is a 52 year old man here today for a post-op visit \par \par He is s/p radical resection of scalp melanoma, right posterior modified neck dissection with closure by Dr. Bradley Toussaint (plastics) on 8/29/18. Final pathology revealed metastatic melanoma present in 2 out of 2 lymph nodes, residual melanoma, negative margins, no lymphovascular invasion.  Tumor stage: pT2a pN2a\par \par s/p Right functional neck LN dissection on 10/19/18 with 18 negative LNs and benign skin/tissue findings.  \par \par Received adjuvant therapy (Nivolumab) from 12/2018 - 3/2019, D/C due to thigh fascitis. \par 11/2019 - developed new in-transit & metastatic skin lesions to scalp, restarted immunotherapy (ipilimumab & Nivolumab) from 11/209 - 4/2020, then delayed/stopped due to immune related side effects. \par \par Shave biopsies from 7/2022-\par 1. posterior mid neck- compound dysplastic nevus w/ moderate atypia extending to lateral tissue edges, conservative removal recommended\par below & lateral to neck - irritated compound nevus w/ minimal architectural & cytologic atypia, no further tx required\par 2. left posterior shoulder- irritated compound nevus w/ minimal architectural & cytologic atypia, no further tx required \par 3. mid abdomen - compound dysplastic nevus w/ moderate cytologic atypia extending to the lateral tissue edge, conservative removal recommended \par \par re-shave biopsies on 8/25/22 - \par 1. mid abdomen - recurrent & residual junctional melanocytic proliferation, scar extends to tissue edges, judgement can be made whether there is any residual lesion beyond scar, clinical correlation necessary\par 2. posterior mid neck - focal residual compound melanocytic lesion & scar from previous procedure, scar is extending to tissue edges and no judgment can be made whether there is any residual lesion beyond the scar\par 3. right anterior shoulder - melanoma in situ in association w/ a compound melanocytic nevus, proliferation extends to peripheral margin\par \par now s/p in-office excision of all three lesions, right shoulder on 10/13/22, mid abdomen & post neck on 10/27/22. \par 1. right shoulder - scar w/ no residual melanocytic lesion\par 2. mid abdomen - no residual melanocytic neoplasm, scar & prior biopsy site change\par 3. 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  \par PCP: Dr. Ney Crenshaw\par Referring MD/Dermatologist: Dr. Mariah Spring now Dr. Ugo Awan\par Med Onc: previously Dr. Kendall Sands/Dr. Gopi Arredondo (@ [facility]), now Dr. Shawn Mcginnis

## 2022-11-07 NOTE — CONSULT LETTER
[Dear  ___] : Dear  [unfilled], [Courtesy Letter:] : I had the pleasure of seeing your patient, [unfilled], in my office today. [Please see my note below.] : Please see my note below. [Consult Closing:] : Thank you very much for allowing me to participate in the care of this patient.  If you have any questions, please do not hesitate to contact me. [Sincerely,] : Sincerely, [FreeTextEntry1] : He will continue to follow-up with both of us. [FreeTextEntry3] : Sarwat Chandler MD FACS\par Chief of Surgical Oncology\par \par

## 2022-11-21 ENCOUNTER — APPOINTMENT (OUTPATIENT)
Dept: ORTHOPEDIC SURGERY | Facility: CLINIC | Age: 52
End: 2022-11-21

## 2022-11-21 VITALS — DIASTOLIC BLOOD PRESSURE: 79 MMHG | OXYGEN SATURATION: 100 % | HEART RATE: 116 BPM | SYSTOLIC BLOOD PRESSURE: 111 MMHG

## 2022-11-21 VITALS — WEIGHT: 145 LBS | TEMPERATURE: 97.6 F | HEIGHT: 70 IN | BODY MASS INDEX: 20.76 KG/M2

## 2022-11-21 DIAGNOSIS — M51.36 OTHER INTERVERTEBRAL DISC DEGENERATION, LUMBAR REGION: ICD-10-CM

## 2022-11-21 DIAGNOSIS — M60.9 MYOSITIS, UNSPECIFIED: ICD-10-CM

## 2022-11-21 DIAGNOSIS — M79.10 MYALGIA, UNSPECIFIED SITE: ICD-10-CM

## 2022-11-21 PROCEDURE — 20552 NJX 1/MLT TRIGGER POINT 1/2: CPT

## 2022-11-21 PROCEDURE — 99214 OFFICE O/P EST MOD 30 MIN: CPT | Mod: 25

## 2022-12-30 RX ORDER — HYDROCORTISONE 20 MG/1
20 TABLET ORAL
Qty: 90 | Refills: 1 | Status: ACTIVE | COMMUNITY
Start: 2021-02-23 | End: 1900-01-01

## 2022-12-30 RX ORDER — HYDROCORTISONE 10 MG/1
10 TABLET ORAL
Qty: 90 | Refills: 1 | Status: ACTIVE | COMMUNITY
Start: 1900-01-01 | End: 1900-01-01

## 2022-12-30 NOTE — ASU PATIENT PROFILE, ADULT - MEDICATION ADMINISTRATION INFO, PROFILE
Please see below patient requesting information regarding EEG and MRI states he has been requesting these referrals for a long time and needs test done as soon as possible.  Please advise, no concerns

## 2023-01-04 ENCOUNTER — APPOINTMENT (OUTPATIENT)
Dept: ORTHOPEDIC SURGERY | Facility: CLINIC | Age: 53
End: 2023-01-04

## 2023-01-09 NOTE — H&P ADULT - PROBLEM SELECTOR PLAN 2
Likely an adverse effect of immunotherapy   Worked up by Endocrine as outpatient, and has been treated with pulse dose steroids in the past week   s/p stress dose steroids hydrocortisone 100mg IV, will assess response  Vitals wnl  - Endocrine consult in AM in regards to further steroid dosing Ivermectin Counseling:  Patient instructed to take medication on an empty stomach with a full glass of water.  Patient informed of potential adverse effects including but not limited to nausea, diarrhea, dizziness, itching, and swelling of the extremities or lymph nodes.  The patient verbalized understanding of the proper use and possible adverse effects of ivermectin.  All of the patient's questions and concerns were addressed.

## 2023-01-19 NOTE — H&P PST ADULT - NSANTHGENDERRD_ENT_A_CORE
Bedside and Verbal shift change report given to Chloé Samano RN by ALLY Britt. Report included the following information SBAR and Kardex. Yes

## 2023-01-23 ENCOUNTER — APPOINTMENT (OUTPATIENT)
Dept: PAIN MANAGEMENT | Facility: CLINIC | Age: 53
End: 2023-01-23

## 2023-03-08 ENCOUNTER — APPOINTMENT (OUTPATIENT)
Dept: PAIN MANAGEMENT | Facility: CLINIC | Age: 53
End: 2023-03-08
Payer: COMMERCIAL

## 2023-03-08 PROCEDURE — 99213 OFFICE O/P EST LOW 20 MIN: CPT | Mod: 95

## 2023-03-08 NOTE — REASON FOR VISIT
[Follow-Up: _____] : a [unfilled] follow-up visit [Home] : at home, [unfilled] , at the time of the visit. [Medical Office: ([facility])___] : at the medical office located in  [Patient] : the patient [Self] : self

## 2023-03-08 NOTE — HISTORY OF PRESENT ILLNESS
[FreeTextEntry1] : 54 y/o M with Pmhx Cluster headaches, Stage II testicular CA 1994 s/p chemotherapy, surgery and autologous bone marrow transplant, non Hodgkins Lymphoma right neck 1998 as well as melanoma with chronic pain including muscles knees> shoulders as well as lower back.  Reports no significant change in his pain.  He continues to have diffuse pain in muscles, multiple joints including knees, shoulders 5-6/10 on average during the day with pain meds but up to 6-7/10 later in the afternoons 4-5 pm.  He feels the Oxycontin has been wearing off early 7ish hours and does not last 12 hours.  Pain increased with movement, activity, performing routine tasks, transfers, bathing, dressing etc and impeding on QOL. \par \par medically following up with surg onc s/p excision of melanoma right shoulder recently.  \par BM daily with Movantik \par \par Prior meds:  Oxycodone 5 mg daily, gabapentin low dose no benefit ( would consider trying again) \par Current meds include: Oxycontin 10 mg BID, Tramadol 1-2x/day( Dr. Roberts only med that works), Zolpidem 10 mg q hs ( Dr. Roberts) , Movantik , hydrocortisone 30 mg/day , Duloxetine

## 2023-03-08 NOTE — ASSESSMENT
[Opioids] : Patient was explained in detail about pain control by using opioids. Patient has signed and fully understands our guidelines for medication and drug screening.  Patient understands the side effects of opioids, including, but not limited to, drug tolerance, dependence, potential for addiction. This class of drugs is habit-forming and MAKEDA regulated. The sedative effects of opioids can be potentiated by taking alcohol or any sleeping pills, along with opioids. The decision to drive is patient’s responsibility, as opioids can affect his/her driving ability and ability to concentrate. The long-term place is not clear, however, patient understands that once the pain control optimizes, the goal will be to wean off the opioids. All the issues regarding opioid treatment have been addressed satisfactorily.  [FreeTextEntry1] : Chronic pain syndrome / Lower back pain stable on current meds. \par \par Continue Oxycontin 10 mg 2x/day for now. \par Restart duloxetine 30 mg daily x 7 days and increase to 60 mg daily if tolerated well. \par discussed LDN and will consider at some point if duloxetine not effective. \par UDS 8/2022 c/w prescribed meds. \par I-Stop reviewed,  reference #: [id number]\par has naloxone for prn use at home \par No signs of aberrant behavior and will continue to monitor for signs of toxicity.\par Reminded to continue to avoid alcohol.\par

## 2023-03-10 RX ORDER — TAMSULOSIN HYDROCHLORIDE 0.4 MG/1
0.4 CAPSULE ORAL
Qty: 180 | Refills: 3 | Status: ACTIVE | COMMUNITY
Start: 2021-04-13 | End: 1900-01-01

## 2023-03-31 ENCOUNTER — APPOINTMENT (OUTPATIENT)
Dept: ENDOCRINOLOGY | Facility: CLINIC | Age: 53
End: 2023-03-31

## 2023-03-31 NOTE — HISTORY OF PRESENT ILLNESS
[FreeTextEntry1] : Mr. CANTU is a 53 year old male who presents for endocrine follow up.\par He does have hx of secondary adrenal insufficiency felt to be associated with his immune therapy treatment.\par Had cardiac eval echo/stress and pulm eval with both- neg- however, still with sob. 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 Was unable to tolerate all 13 round of immune therapy. Did undergo 8 rounds. Following with Dr. Mcginnis. \par \par He does have hx of testicular ca along with hx of NHL and was treated for scalp melanoma.\par Appetite is relatively better and his energy improves in spurts but still overall fatigued and notes sob with minimal exertion.\par \par Additional medical history includes that of hyperlipidemia and hypertension\par \par Is on Magnesium 400 mg daily of the oxide type. Taking one a day nature's best vitamin. had been on vit d 50,000 in the past.\par Is on LT4 100 mcg for hypothyroidism \par \par Prior pituitary hormone eval was otherwise negative from laboratory perspective. testosterone lower end normal. \par

## 2023-05-16 ENCOUNTER — NON-APPOINTMENT (OUTPATIENT)
Age: 53
End: 2023-05-16

## 2023-06-29 ENCOUNTER — NON-APPOINTMENT (OUTPATIENT)
Age: 53
End: 2023-06-29

## 2023-06-29 ENCOUNTER — APPOINTMENT (OUTPATIENT)
Dept: PAIN MANAGEMENT | Facility: CLINIC | Age: 53
End: 2023-06-29
Payer: COMMERCIAL

## 2023-06-29 VITALS
SYSTOLIC BLOOD PRESSURE: 137 MMHG | DIASTOLIC BLOOD PRESSURE: 84 MMHG | WEIGHT: 140 LBS | BODY MASS INDEX: 20.04 KG/M2 | HEIGHT: 70 IN | HEART RATE: 83 BPM

## 2023-06-29 PROCEDURE — 99215 OFFICE O/P EST HI 40 MIN: CPT

## 2023-06-29 RX ORDER — TIZANIDINE 2 MG/1
2 TABLET ORAL
Qty: 30 | Refills: 2 | Status: DISCONTINUED | COMMUNITY
Start: 2021-06-22 | End: 2023-06-29

## 2023-07-13 ENCOUNTER — APPOINTMENT (OUTPATIENT)
Dept: SURGICAL ONCOLOGY | Facility: CLINIC | Age: 53
End: 2023-07-13
Payer: COMMERCIAL

## 2023-07-13 VITALS
BODY MASS INDEX: 19.76 KG/M2 | WEIGHT: 138 LBS | HEART RATE: 106 BPM | OXYGEN SATURATION: 95 % | DIASTOLIC BLOOD PRESSURE: 79 MMHG | HEIGHT: 70 IN | SYSTOLIC BLOOD PRESSURE: 109 MMHG

## 2023-07-13 PROCEDURE — 99214 OFFICE O/P EST MOD 30 MIN: CPT

## 2023-07-13 NOTE — HISTORY OF PRESENT ILLNESS
[de-identified] : Mr. CARLOTA CANTU is a 52 year old man here today for a follow up visit \par \par He is s/p radical resection of scalp melanoma, right posterior modified neck dissection with closure by Dr. Bradley Toussaint (plastics) on 8/29/18. Final pathology revealed metastatic melanoma present in 2 out of 2 lymph nodes, residual melanoma, negative margins, no lymphovascular invasion.  Tumor stage: pT2a pN2a\par \par s/p Right functional neck LN dissection on 10/19/18 with 18 negative LNs and benign skin/tissue findings.  \par \par Received adjuvant therapy (Nivolumab) from 12/2018 - 3/2019, D/C due to thigh fascitis. \par 11/2019 - developed new in-transit & metastatic skin lesions to scalp, restarted immunotherapy (ipilimumab & Nivolumab) from 11/209 - 4/2020, then delayed/stopped due to immune related side effects. \par \par Shave biopsies from 7/2022-\par 1. posterior mid neck- compound dysplastic nevus w/ moderate atypia extending to lateral tissue edges, conservative removal recommended\par below & lateral to neck - irritated compound nevus w/ minimal architectural & cytologic atypia, no further tx required\par 2. left posterior shoulder- irritated compound nevus w/ minimal architectural & cytologic atypia, no further tx required \par 3. mid abdomen - compound dysplastic nevus w/ moderate cytologic atypia extending to the lateral tissue edge, conservative removal recommended \par \par re-shave biopsies on 8/25/22 - \par 1. mid abdomen - recurrent & residual junctional melanocytic proliferation, scar extends to tissue edges, judgement can be made whether there is any residual lesion beyond scar, clinical correlation necessary\par 2. posterior mid neck - focal residual compound melanocytic lesion & scar from previous procedure, scar is extending to tissue edges and no judgment can be made whether there is any residual lesion beyond the scar\par 3. right anterior shoulder - melanoma in situ in association w/ a compound melanocytic nevus, proliferation extends to peripheral margin\par \par now s/p in-office excision of all three lesions, right shoulder on 10/13/22, mid abdomen & post neck on 10/27/22. \par 1. right shoulder - scar w/ no residual melanocytic lesion\par 2. mid abdomen - no residual melanocytic neoplasm, scar & prior biopsy site change\par 3. posterior neck - no residual melanocytic neoplasm, scar & prior biopsy site change \par \par 7/13/23: Carlota present today with a new right upper back lesion. Carlota first notice it a few weeks ago. 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  \par PCP: Dr. Ney Crenshaw\par Referring MD/Dermatologist: Dr. Mariah Spring now Dr. Ugo Awan\par Med Onc: previously Dr. Kendall Sands/Dr. Gopi Arredondo (@ [facility]), now Dr. Shawn Mcginnis

## 2023-07-13 NOTE — ASSESSMENT
[FreeTextEntry1] : IMP:\par HX; s/p radical resection of scalp melanoma, right posterior modified neck dissection 8/2018. \par Final pathology-  metastatic melanoma present in 2 out of 2 lymph nodes, pT2a pN2a\par Received adjuvant therapy (Nivolumab) from 12/2018 - 3/2019, D/C due to thigh fascitis. \par 11/2019 - developed new in-transit & metastatic skin lesions to scalp, restarted immunotherapy (ipilimumab & Nivolumab) from 11/209 - 4/2020, then delayed/stopped due to immune related side effects. \par \par re-shave biopsies on 8/25/22 - \par 1. mid abdomen - recurrent & residual junctional melanocytic proliferation, scar extends to tissue edges, judgement can be made whether there is any residual lesion beyond scar, clinical correlation necessary\par 2. posterior mid neck - focal residual compound melanocytic lesion & scar from previous procedure, scar is extending to tissue edges and no judgment can be made whether there is any residual lesion beyond the scar\par 3. right anterior shoulder - melanoma in situ in association w/ a compound melanocytic nevus, proliferation extends to peripheral margin\par \par now s/p in-office excision of all three lesions, right shoulder on 10/13/22, mid abdomen & post neck on 10/27/22. Path:\par 1. right shoulder - scar w/ no residual melanocytic lesion\par 2. mid abdomen - no residual melanocytic neoplasm, scar & prior biopsy site change\par 3. posterior neck - no residual melanocytic neoplasm, scar & prior biopsy site change \par \par 7/13/23: New right posterior shoulder lesion \par \par PLAN:\par WLE of right posterior shoulder lesion.

## 2023-07-13 NOTE — PHYSICAL EXAM
[Normal] : supple, no neck mass and thyroid not enlarged [Normal Neck Lymph Nodes] : normal neck lymph nodes  [Normal Supraclavicular Lymph Nodes] : normal supraclavicular lymph nodes [Normal Axillary Lymph Nodes] : normal axillary lymph nodes [Normal] : oriented to person, place and time, with appropriate affect [de-identified] : new raised pigmented 5 mm lesion right posterior shoulder

## 2023-07-17 ENCOUNTER — APPOINTMENT (OUTPATIENT)
Dept: SURGICAL ONCOLOGY | Facility: CLINIC | Age: 53
End: 2023-07-17
Payer: COMMERCIAL

## 2023-07-17 PROCEDURE — 23077 RAD RESCJ TUM SHOULDER<5 CM: CPT

## 2023-07-17 PROCEDURE — 14000 TIS TRNFR TRUNK 10 SQ CM/<: CPT

## 2023-07-17 NOTE — ASSESSMENT
[FreeTextEntry1] : pigmented lesion excised from the right posterior shoulder ( 4.5 x 2.5 cm ) and closed with adjacent tissue transfer\par \par Plan:\par check pathology\par RTO PRN

## 2023-07-17 NOTE — PROCEDURE
[Excision Of Lesion] : excision of lesion [Melanoma In Situ Suspected] : melanoma in situ suspected [Patient] : patient [Risks] : risks [___ ml Inj] : [unfilled] ~Uml [1%] : 1% [With Epi] : with epinephrine [Excisional: Margin ___mm] : the lesion was excised with a  [unfilled] ~Umm margin in an elliptical fashion [Simple Sutures] : simple sutures were used for the skin closure [Chromic] : chromic suture(s) [___ # of Sutures] : [unfilled] [Size: ___-0] : [unfilled]-0 [Running] : running [Vicryl] : Vicryl suture(s) [Dressing Applied] : a sterile dressing was placed [Sent to Pathology] : the excised lesion was place in buffered formalin and sent for pathology [Tolerated Well] : the patient tolerated the procedure well [No Complications] : there were no complications [PRN] : as needed [FreeTextEntry5] : right posterior shoulder

## 2023-07-19 RX ORDER — DULOXETINE HYDROCHLORIDE 30 MG/1
30 CAPSULE, DELAYED RELEASE PELLETS ORAL DAILY
Qty: 60 | Refills: 5 | Status: DISCONTINUED | COMMUNITY
Start: 2022-10-25 | End: 2023-07-19

## 2023-07-20 RX ORDER — NALTREXONE HCL 100 %
POWDER (GRAM) MISCELLANEOUS
Qty: 63 | Refills: 5 | Status: ACTIVE | COMMUNITY
Start: 2023-07-19 | End: 1900-01-01

## 2023-07-21 ENCOUNTER — NON-APPOINTMENT (OUTPATIENT)
Age: 53
End: 2023-07-21

## 2023-07-21 ENCOUNTER — APPOINTMENT (OUTPATIENT)
Dept: UROLOGY | Facility: CLINIC | Age: 53
End: 2023-07-21
Payer: COMMERCIAL

## 2023-07-21 VITALS — DIASTOLIC BLOOD PRESSURE: 79 MMHG | SYSTOLIC BLOOD PRESSURE: 113 MMHG | HEART RATE: 80 BPM

## 2023-07-21 DIAGNOSIS — R39.9 UNSPECIFIED SYMPTOMS AND SIGNS INVOLVING THE GENITOURINARY SYSTEM: ICD-10-CM

## 2023-07-21 DIAGNOSIS — N39.43 BENIGN PROSTATIC HYPERPLASIA WITH LOWER URINARY TRACT SYMPMS: ICD-10-CM

## 2023-07-21 DIAGNOSIS — N40.1 BENIGN PROSTATIC HYPERPLASIA WITH LOWER URINARY TRACT SYMPMS: ICD-10-CM

## 2023-07-21 DIAGNOSIS — N13.8 BENIGN PROSTATIC HYPERPLASIA WITH LOWER URINARY TRACT SYMPMS: ICD-10-CM

## 2023-07-21 PROCEDURE — 51798 US URINE CAPACITY MEASURE: CPT

## 2023-07-21 PROCEDURE — 99213 OFFICE O/P EST LOW 20 MIN: CPT

## 2023-07-21 RX ORDER — TRAMADOL HYDROCHLORIDE 25 MG/1
TABLET, COATED ORAL
Refills: 0 | Status: ACTIVE | COMMUNITY

## 2023-07-21 NOTE — ASSESSMENT
[FreeTextEntry1] :  53  y.o male with LUTS interested in discussing next steps with Dr Hurtado .\par  Could not provide urine specimen today.\par  Follow up with  Dr Hurtado  [Urinary Symptom or Sign (788.99\R39.89)] : implantation

## 2023-07-21 NOTE — REVIEW OF SYSTEMS
[see HPI] : see HPI [Hesitancy] : urinary hesitancy [Negative] : Heme/Lymph [Constipation] : no constipation [Dysuria] : no dysuria [Incontinence] : no incontinence [Nocturia] : no nocturia

## 2023-07-21 NOTE — HISTORY OF PRESENT ILLNESS
[FreeTextEntry1] :  53  y.o male pt of Dr Hurtado seen as emergent visit for ? inability to void.He voids 2 -3  times per day with delay to start a stream and small volume voids and no nocturia .Denies Dysuria. \par  Today pt reports he voided last this morning and has no urge to void  now.He is on Finasteride and Flomax 0.8 mg.\par  He could not give us a urine specimen.\par PVR 14  ml .\par He will follow up with Dr Hurtado .

## 2023-07-26 LAB — CORE LAB BIOPSY: NORMAL

## 2023-07-26 NOTE — ASSESSMENT
[FreeTextEntry1] : Chronic pain syndrome / Lower back pain stable on current meds. \par \par Continue Oxycontin 10 mg 2x/day for now. \par Increased duloxetine to 60 mg daily w/out significant benefit or AE however given low dose lexapro I do not feel comfortable increasing this further at this time.  Recommend tapering back down to 30 mg daily x 2-4 weeks and monitor response, if pain worsens can go back up to 60 mg daily if no change in pain level may d/c/ \par - consider trial of LDN and will consider at some point if duloxetine not effective. \par \par UDS 8/2022 c/w prescribed meds performed today Jun 29, 2023 \par I-Stop reviewed,  reference #: [id number]\par No signs of aberrant behavior and will continue to monitor for signs of toxicity.\par Reminded to continue to avoid alcohol.\par Patient denies other prescribers. \par Discussed OD prevention education and prescribed naloxone kit \par Safe storage of medication was reviewed. \par Opiate agreement was renewed Jun 29, 2023  \par -Works for [employer] and may have to return to in office visit this fall and requested letter of accommodations with details to be discussed at a later time. \par - requested DMV forms which he will provide .   \par \par \par \par

## 2023-07-26 NOTE — HISTORY OF PRESENT ILLNESS
[FreeTextEntry1] : 54 y/o M with Pmhx Cluster headaches, Stage II testicular CA 1994 s/p chemotherapy, surgery and autologous bone marrow transplant, hypothyroidism, secondary adrenal insufficiency, non Hodgkins Lymphoma right neck 1998 as well as melanoma with chronic pain including muscles knees> shoulders as well as lower back.  Reports no significant change in his pain.  He continues to have diffuse pain in multiple joints including knees, shoulders 6/10 on average during the day with pain meds but up to 6-7/10 later in the afternoons 4-5 pm.  Pain increased with movement, activity, performing routine tasks, transfers, bathing, dressing etc and impeding on QOL.  He has been taking duloxetine 60 mg daily. \par \par BM daily with Movantik \par \par Prior meds:  Oxycodone 5 mg daily, gabapentin low dose no benefit ( would consider trying again) \par Current meds include: Oxycontin 10 mg BID, Tramadol 1-2x/day( Dr. Roberts only med that works), Zolpidem 10 mg q hs ( Dr. Roberts) , Movantik , hydrocortisone 30 mg/day , Duloxetine 60mg daily, Lexapro 10 mg daily \par \par Social: , working as a  in [place] but has been working from home.

## 2023-08-21 ENCOUNTER — APPOINTMENT (OUTPATIENT)
Dept: UROLOGY | Facility: CLINIC | Age: 53
End: 2023-08-21
Payer: COMMERCIAL

## 2023-08-21 VITALS
OXYGEN SATURATION: 97 % | TEMPERATURE: 97.9 F | SYSTOLIC BLOOD PRESSURE: 113 MMHG | HEART RATE: 126 BPM | RESPIRATION RATE: 16 BRPM | DIASTOLIC BLOOD PRESSURE: 76 MMHG

## 2023-08-21 PROCEDURE — 51798 US URINE CAPACITY MEASURE: CPT

## 2023-08-21 PROCEDURE — 99214 OFFICE O/P EST MOD 30 MIN: CPT

## 2023-08-21 NOTE — HISTORY OF PRESENT ILLNESS
[FreeTextEntry1] : 53 y.o male referred from Dr. Hurtado for incomplete emptying.  Sometimes has the urge to urinate but is unable to start the stream then when he does manage to urinate, he feels the urge to go back like 15mins after. Refers that his stream usually starts strong but then become weak and he has dribbling. sometimes it takes 20 mins - 1hr to initiate the stream.  He had tried ditropan in the past, terrible dry mouth without any benefit   DTF: q 3-4 hrs Nocturia: 0 JUD: No  UUI: No  Constipation: No  Intake: 2-3 18 Ozs  bottle water, no coffee, no tea, 4 Oz soda occasionally. He is on Finasteride and Flomax 0.8 mg for 2 years.   PSA: 16 June: 0.44 ng/mL  PVR: 0 ml, Today          14 ml. Jul

## 2023-08-21 NOTE — PHYSICAL EXAM
[Normal Appearance] : normal appearance [General Appearance - In No Acute Distress] : no acute distress [Abdomen Tenderness] : non-tender

## 2023-08-21 NOTE — ASSESSMENT
[FreeTextEntry1] : urge predominant OAB refractory to medication  max dual therapy for BPH   We discussed the r/b/a of Sacral Neuromodulation. The patient understands the procedure is done with an office PNE (peripheral nerve evaluation) or in 2 procedures or stages, and will only get the implanted battery if there is 50% improvement of the most bothersome symptom. The patient understands the non-rechargeable battery has an estimated life of 10-15 years, and the rechargeable battery has a life of 15 years but needs to be charged 1x/week for 20 minutes.  Risks including infection, bleeding, and rare allergy may lead to device removal. Need for revision surgery may be required.  The patient understands that they won't be able to scuba dive with the device.

## 2023-08-29 ENCOUNTER — TRANSCRIPTION ENCOUNTER (OUTPATIENT)
Age: 53
End: 2023-08-29

## 2023-09-11 ENCOUNTER — APPOINTMENT (OUTPATIENT)
Dept: ORTHOPEDIC SURGERY | Facility: CLINIC | Age: 53
End: 2023-09-11
Payer: COMMERCIAL

## 2023-09-11 DIAGNOSIS — M54.2 CERVICALGIA: ICD-10-CM

## 2023-09-11 PROCEDURE — 99447 NTRPROF PH1/NTRNET/EHR 11-20: CPT

## 2023-09-12 ENCOUNTER — APPOINTMENT (OUTPATIENT)
Dept: UROLOGY | Facility: CLINIC | Age: 53
End: 2023-09-12
Payer: COMMERCIAL

## 2023-09-12 ENCOUNTER — OUTPATIENT (OUTPATIENT)
Dept: OUTPATIENT SERVICES | Facility: HOSPITAL | Age: 53
LOS: 1 days | End: 2023-09-12
Payer: COMMERCIAL

## 2023-09-12 VITALS
DIASTOLIC BLOOD PRESSURE: 84 MMHG | TEMPERATURE: 97.6 F | SYSTOLIC BLOOD PRESSURE: 142 MMHG | HEART RATE: 78 BPM | OXYGEN SATURATION: 99 %

## 2023-09-12 DIAGNOSIS — R33.9 RETENTION OF URINE, UNSPECIFIED: ICD-10-CM

## 2023-09-12 DIAGNOSIS — C43.4 MALIGNANT MELANOMA OF SCALP AND NECK: Chronic | ICD-10-CM

## 2023-09-12 DIAGNOSIS — R35.0 FREQUENCY OF MICTURITION: ICD-10-CM

## 2023-09-12 DIAGNOSIS — I89.9 NONINFECTIVE DISORDER OF LYMPHATIC VESSELS AND LYMPH NODES, UNSPECIFIED: Chronic | ICD-10-CM

## 2023-09-12 DIAGNOSIS — R33.8 OTHER RETENTION OF URINE: ICD-10-CM

## 2023-09-12 DIAGNOSIS — Z90.79 ACQUIRED ABSENCE OF OTHER GENITAL ORGAN(S): Chronic | ICD-10-CM

## 2023-09-12 DIAGNOSIS — Z98.89 OTHER SPECIFIED POSTPROCEDURAL STATES: Chronic | ICD-10-CM

## 2023-09-12 PROCEDURE — 64561 IMPLANT NEUROELECTRODES: CPT

## 2023-09-12 PROCEDURE — 95972 ALYS CPLX SP/PN NPGT W/PRGRM: CPT | Mod: 59

## 2023-09-12 PROCEDURE — 64561 IMPLANT NEUROELECTRODES: CPT | Mod: 50

## 2023-09-12 PROCEDURE — 95972 ALYS CPLX SP/PN NPGT W/PRGRM: CPT

## 2023-09-13 ENCOUNTER — INPATIENT (INPATIENT)
Facility: HOSPITAL | Age: 53
LOS: 0 days | Discharge: LEFT BEFORE DISCHARGE | DRG: 392 | End: 2023-09-14
Attending: STUDENT IN AN ORGANIZED HEALTH CARE EDUCATION/TRAINING PROGRAM | Admitting: STUDENT IN AN ORGANIZED HEALTH CARE EDUCATION/TRAINING PROGRAM
Payer: COMMERCIAL

## 2023-09-13 ENCOUNTER — NON-APPOINTMENT (OUTPATIENT)
Age: 53
End: 2023-09-13

## 2023-09-13 VITALS
TEMPERATURE: 98 F | SYSTOLIC BLOOD PRESSURE: 105 MMHG | HEIGHT: 71 IN | OXYGEN SATURATION: 97 % | WEIGHT: 138.01 LBS | DIASTOLIC BLOOD PRESSURE: 71 MMHG | HEART RATE: 90 BPM | RESPIRATION RATE: 20 BRPM

## 2023-09-13 DIAGNOSIS — R39.15 URGENCY OF URINATION: ICD-10-CM

## 2023-09-13 DIAGNOSIS — I89.9 NONINFECTIVE DISORDER OF LYMPHATIC VESSELS AND LYMPH NODES, UNSPECIFIED: Chronic | ICD-10-CM

## 2023-09-13 DIAGNOSIS — Z98.89 OTHER SPECIFIED POSTPROCEDURAL STATES: Chronic | ICD-10-CM

## 2023-09-13 DIAGNOSIS — N32.81 OVERACTIVE BLADDER: ICD-10-CM

## 2023-09-13 DIAGNOSIS — R33.9 RETENTION OF URINE, UNSPECIFIED: ICD-10-CM

## 2023-09-13 DIAGNOSIS — C43.4 MALIGNANT MELANOMA OF SCALP AND NECK: Chronic | ICD-10-CM

## 2023-09-13 DIAGNOSIS — R13.10 DYSPHAGIA, UNSPECIFIED: ICD-10-CM

## 2023-09-13 DIAGNOSIS — R33.8 OTHER RETENTION OF URINE: ICD-10-CM

## 2023-09-13 DIAGNOSIS — Z90.79 ACQUIRED ABSENCE OF OTHER GENITAL ORGAN(S): Chronic | ICD-10-CM

## 2023-09-13 LAB
ALBUMIN SERPL ELPH-MCNC: 4.2 G/DL — SIGNIFICANT CHANGE UP (ref 3.3–5)
ALP SERPL-CCNC: 64 U/L — SIGNIFICANT CHANGE UP (ref 40–120)
ALT FLD-CCNC: 15 U/L — SIGNIFICANT CHANGE UP (ref 10–45)
ANION GAP SERPL CALC-SCNC: 13 MMOL/L — SIGNIFICANT CHANGE UP (ref 5–17)
APTT BLD: 34.2 SEC — SIGNIFICANT CHANGE UP (ref 24.5–35.6)
AST SERPL-CCNC: 27 U/L — SIGNIFICANT CHANGE UP (ref 10–40)
BASOPHILS # BLD AUTO: 0.07 K/UL — SIGNIFICANT CHANGE UP (ref 0–0.2)
BASOPHILS NFR BLD AUTO: 0.9 % — SIGNIFICANT CHANGE UP (ref 0–2)
BILIRUB SERPL-MCNC: 0.6 MG/DL — SIGNIFICANT CHANGE UP (ref 0.2–1.2)
BUN SERPL-MCNC: 12 MG/DL — SIGNIFICANT CHANGE UP (ref 7–23)
CALCIUM SERPL-MCNC: 10 MG/DL — SIGNIFICANT CHANGE UP (ref 8.4–10.5)
CHLORIDE SERPL-SCNC: 101 MMOL/L — SIGNIFICANT CHANGE UP (ref 96–108)
CO2 SERPL-SCNC: 24 MMOL/L — SIGNIFICANT CHANGE UP (ref 22–31)
CREAT SERPL-MCNC: 0.94 MG/DL — SIGNIFICANT CHANGE UP (ref 0.5–1.3)
EGFR: 97 ML/MIN/1.73M2 — SIGNIFICANT CHANGE UP
EOSINOPHIL # BLD AUTO: 0.51 K/UL — HIGH (ref 0–0.5)
EOSINOPHIL NFR BLD AUTO: 6.8 % — HIGH (ref 0–6)
GLUCOSE SERPL-MCNC: 94 MG/DL — SIGNIFICANT CHANGE UP (ref 70–99)
HCT VFR BLD CALC: 40.8 % — SIGNIFICANT CHANGE UP (ref 39–50)
HGB BLD-MCNC: 13.2 G/DL — SIGNIFICANT CHANGE UP (ref 13–17)
IMM GRANULOCYTES NFR BLD AUTO: 0.4 % — SIGNIFICANT CHANGE UP (ref 0–0.9)
INR BLD: 1.07 RATIO — SIGNIFICANT CHANGE UP (ref 0.85–1.18)
LYMPHOCYTES # BLD AUTO: 2.96 K/UL — SIGNIFICANT CHANGE UP (ref 1–3.3)
LYMPHOCYTES # BLD AUTO: 39.5 % — SIGNIFICANT CHANGE UP (ref 13–44)
MCHC RBC-ENTMCNC: 27.8 PG — SIGNIFICANT CHANGE UP (ref 27–34)
MCHC RBC-ENTMCNC: 32.4 GM/DL — SIGNIFICANT CHANGE UP (ref 32–36)
MCV RBC AUTO: 85.9 FL — SIGNIFICANT CHANGE UP (ref 80–100)
MONOCYTES # BLD AUTO: 0.43 K/UL — SIGNIFICANT CHANGE UP (ref 0–0.9)
MONOCYTES NFR BLD AUTO: 5.7 % — SIGNIFICANT CHANGE UP (ref 2–14)
NEUTROPHILS # BLD AUTO: 3.5 K/UL — SIGNIFICANT CHANGE UP (ref 1.8–7.4)
NEUTROPHILS NFR BLD AUTO: 46.7 % — SIGNIFICANT CHANGE UP (ref 43–77)
NRBC # BLD: 0 /100 WBCS — SIGNIFICANT CHANGE UP (ref 0–0)
PLATELET # BLD AUTO: 285 K/UL — SIGNIFICANT CHANGE UP (ref 150–400)
POTASSIUM SERPL-MCNC: 4.2 MMOL/L — SIGNIFICANT CHANGE UP (ref 3.5–5.3)
POTASSIUM SERPL-SCNC: 4.2 MMOL/L — SIGNIFICANT CHANGE UP (ref 3.5–5.3)
PROT SERPL-MCNC: 7.2 G/DL — SIGNIFICANT CHANGE UP (ref 6–8.3)
PROTHROM AB SERPL-ACNC: 11.7 SEC — SIGNIFICANT CHANGE UP (ref 9.5–13)
RBC # BLD: 4.75 M/UL — SIGNIFICANT CHANGE UP (ref 4.2–5.8)
RBC # FLD: 13.3 % — SIGNIFICANT CHANGE UP (ref 10.3–14.5)
SODIUM SERPL-SCNC: 138 MMOL/L — SIGNIFICANT CHANGE UP (ref 135–145)
TROPONIN T, HIGH SENSITIVITY RESULT: <6 NG/L — SIGNIFICANT CHANGE UP (ref 0–51)
WBC # BLD: 7.5 K/UL — SIGNIFICANT CHANGE UP (ref 3.8–10.5)
WBC # FLD AUTO: 7.5 K/UL — SIGNIFICANT CHANGE UP (ref 3.8–10.5)

## 2023-09-13 PROCEDURE — 0042T: CPT | Mod: MA

## 2023-09-13 PROCEDURE — 70450 CT HEAD/BRAIN W/O DYE: CPT | Mod: 26,MA,59

## 2023-09-13 PROCEDURE — 70496 CT ANGIOGRAPHY HEAD: CPT | Mod: 26,MA

## 2023-09-13 PROCEDURE — 99223 1ST HOSP IP/OBS HIGH 75: CPT

## 2023-09-13 PROCEDURE — 99291 CRITICAL CARE FIRST HOUR: CPT

## 2023-09-13 PROCEDURE — 70498 CT ANGIOGRAPHY NECK: CPT | Mod: 26,MA

## 2023-09-13 RX ORDER — LIDOCAINE 4 G/100G
1 CREAM TOPICAL ONCE
Refills: 0 | Status: COMPLETED | OUTPATIENT
Start: 2023-09-13 | End: 2023-09-13

## 2023-09-13 RX ORDER — ACETAMINOPHEN 500 MG
1000 TABLET ORAL ONCE
Refills: 0 | Status: COMPLETED | OUTPATIENT
Start: 2023-09-13 | End: 2023-09-13

## 2023-09-13 RX ORDER — HYDROMORPHONE HYDROCHLORIDE 2 MG/ML
1 INJECTION INTRAMUSCULAR; INTRAVENOUS; SUBCUTANEOUS ONCE
Refills: 0 | Status: DISCONTINUED | OUTPATIENT
Start: 2023-09-13 | End: 2023-09-13

## 2023-09-13 RX ADMIN — Medication 400 MILLIGRAM(S): at 19:33

## 2023-09-13 RX ADMIN — LIDOCAINE 1 PATCH: 4 CREAM TOPICAL at 19:36

## 2023-09-13 RX ADMIN — HYDROMORPHONE HYDROCHLORIDE 1 MILLIGRAM(S): 2 INJECTION INTRAMUSCULAR; INTRAVENOUS; SUBCUTANEOUS at 19:32

## 2023-09-13 NOTE — CONSULT NOTE ADULT - SUBJECTIVE AND OBJECTIVE BOX
Neurology - Consult Note    -  Spectra: [phone] ([facility]), [phone] ([facility])  -    HPI: Patient CARLOTA CANTU is a 53y (1970) right handed man former smoker 20 pack years with a PMH of testicular ca s/p orchiectomy, HTN, HLD, melanoma on immunotherapy (right chest wall mediport--> now removed), migraines, chronic pain (follows Dr. Rosen) hypothyroidism, secondary adrenal insufficiency who presents as code stroke for dysphagia and dysarthria this morning, as well as 3-4 days of word finding difficulty and R sided neck pain. Patient reports he went to bed at 12AM in usual state of health and noticed symptoms when he woke up at 7AM this morning. Patient was eating breakfast and reported having trouble swallowing food. Denies choking or odynophagia but reports requiring more effort "for the food to go down". He called outpatient's neurologists office and was told by the PA that his speech sounded different and to come for emergent evaluation in the ED. Patient also notes new weakness in his right hand last night. Otherwise denies any dizziness, headache, focal sensory deficit. Wife at bedside reports the patient's speech is different from usual. He is not on any AC or AP. Patient notes white lesions in oral mucosa are new.     LKW: 9/13 12 AM   NIHSS: 0  pre MRS: 2    Review of Systems:    CONSTITUTIONAL: No fevers or chills  EYES AND ENT: No visual changes or no throat pain   NECK: No pain or stiffness  RESPIRATORY: No hemoptysis or shortness of breath  CARDIOVASCULAR: No chest pain or palpitations  GASTROINTESTINAL: No melena or hematochezia  GENITOURINARY: No dysuria or hematuria  NEUROLOGICAL: +As stated in HPI above  SKIN: No itching, burning, rashes, or lesions   All other review of systems is negative unless indicated above.    Allergies:  No Known Allergies    PMHx/PSHx/Family Hx: As above, otherwise see below   Testicular Cancer  Colitis  S/P Orchiectomy  S/P Lymph Node Biopsy  Headache, Migraine  [place] (Hard of Hearing)  No pertinent past medical history  HTN (hypertension)  NHL (non-Hodgkin's lymphoma)  GERD (gastroesophageal reflux disease)  Colitis  BPH (benign prostatic hyperplasia)  Osteoarthritis  History of bone marrow transplant  Hypertriglyceridemia  Malignant melanoma of scalp  First degree heart block    Social Hx:  No current use of tobacco, alcohol, or illicit drugs  Lives with wife     Medications:  MEDICATIONS  (STANDING):  MEDICATIONS  (PRN):    Vitals:  T(C): 36.6 (09-13-23 @ 15:21), Max: 36.6 (09-13-23 @ 15:21)  HR: 90 (09-13-23 @ 15:21) (90 - 90)  BP: 105/71 (09-13-23 @ 15:21) (105/71 - 105/71)  RR: 20 (09-13-23 @ 15:21) (20 - 20)  SpO2: 97% (09-13-23 @ 15:21) (97% - 97%)    Physical Examination:   General - NAD  Cardiovascular - Peripheral pulses palpable, no edema  Eyes - Fundoscopy not performed due to safety precautions in the setting of infection risk  Oral Cavity: White plaques noted on palate     Neurologic Exam:  Mental status - Awake, Alert, Oriented to person, place, and time. Speech fluent, repetition and naming intact. Follows simple  commands. Attention/concentration, recent and remote memory (including registration and recall), and fund of knowledge intact    Cranial nerves - PERRL, VFF, EOMI, face sensation (V1-V3) intact b/l, facial strength intact without asymmetry b/l, hearing intact b/l, palate with symmetric elevation, trapezius 4/5 strength b/l, tongue midline on protrusion with full lateral movement    Motor - Normal bulk and tone throughout. No pronator drift.  Strength testing  RUE Proximal 4/5   distal 4-/5  RLE  Proximal 4/5   distal 4/5  LUE Proximal 4/5   distal 4/5  LLE Proximal 4/5   distal 4/5    Sensation - Light touch intact throughout    DTR's -             Biceps      Triceps     Brachioradialis      Patellar       Ankle    Toes/plantar response  R             2+             2+                  2+           2+  brisk        2+                 Down  L              2+             2+                 2+           2+  brisk         2+                 Down    Coordination - Finger to Nose intact b/l. No tremors appreciated    Gait and station - Not assessed due to fall risk    Labs:                        13.2   7.50  )-----------( 285      ( 13 Sep 2023 15:50 )             40.8     09-13    138  |  101  |  12  ----------------------------<  94  4.2   |  24  |  0.94    Ca    10.0      13 Sep 2023 15:50    TPro  7.2  /  Alb  4.2  /  TBili  0.6  /  DBili  x   /  AST  27  /  ALT  15  /  AlkPhos  64  09-13    CAPILLARY BLOOD GLUCOSE  97 (13 Sep 2023 15:53)      POCT Blood Glucose.: 97 mg/dL (13 Sep 2023 15:40)    LIVER FUNCTIONS - ( 13 Sep 2023 15:50 )  Alb: 4.2 g/dL / Pro: 7.2 g/dL / ALK PHOS: 64 U/L / ALT: 15 U/L / AST: 27 U/L / GGT: x           PT/INR - ( 13 Sep 2023 15:50 )   PT: 11.7 sec;   INR: 1.07 ratio      PTT - ( 13 Sep 2023 15:50 )  PTT:34.2 sec    Radiology:    [facility]: 9/13/23  The fourth, third and lateral ventricles are normal size and position. There is no hemorrhage, mass or shift of the midline structures. There is normal gray white matter differentiation. Bone window examination is remarkable for prior plating of anterior maxillary wall fractures.    CT perfusion:  No core infarct or delayed mean transit time is identified.  CBF<30% volume: 0 ml  Tmax> 6.0s volume: 0 ml  Mismatch volume: 0 ml  Mismatch ratio: none    CTA head and neck:    The origins of the carotid and vertebral arteries are normal. The left   vertebral artery is dominant. The carotid bifurcations are normal   bilaterally.    The distal vertebral arteries are well identified as arethe   posterior-inferior cerebellar arteries bilaterally. The region of the   vertebral basilar junction is normal. The basilar artery is normal. The   posterior cerebral and superior cerebellar arteries are normal.    Evaluation of the carotid arteries demonstrate normal appearance to the   distal cervical, petrous, cavernous and supraclinoid internal carotid   arteries. The anterior cerebral arteries, anterior communicating artery   and middle cerebral arteries are normal.    There is no evidence of aneurysm, stenosis, or vessel occlusion.    The normal intracranial venous circulation is identified. The right   transverse sinus is dominant. The superior sagittal sinus, internal   cerebral veins, vein of Wilberto, straight sinus, transverse sinuses,   sigmoid sinuses and internal jugular veins are normal. Cortical veins are   normal.    IMPRESSION: Unremarkable noncontrast brain CT. Normal CTA of the head and   neck. Normal CT perfusion.       MR Head w/wo IV Cont 06.22.22   FINDINGS:  The ventricles and sulci are age appropriate . No abnormal parenchymal   enhancement.There is no evidence of mass, intracranial hemorrhage, or   acute infarction. There is no extra axial collection. No midline shift or   other significant mass effect is noted.  There is normal flow-void within major cranial vessels suggestive of   their patency.  The orbital contents are grossly unremarkable. The paranasal sinuses are   predominantly clear.. The mastoid air cells are predominantly clear.    IMPRESSION:  There is no mass, intracranial hemorrhage, or acute infarction.     Neurology - Consult Note    -  Spectra: [phone] ([facility]), [phone] ([facility])  -    HPI: Patient CARLOTA CANTU is a 53y (1970) right handed man former smoker 20 pack years with a PMH of testicular ca s/p orchiectomy, Non-Hodgkins lymphoma, melanoma on immunotherapy (right chest wall mediport--> now removed), HTN, HLD,  migraines, chronic pain (follows Dr. Rosen) hypothyroidism, secondary adrenal insufficiency who presents as code stroke for dysphagia and dysarthria this morning, as well as 3-4 days of word finding difficulty and R sided neck pain. Patient reports he went to bed at 12AM in usual state of health and noticed symptoms when he woke up at 7AM this morning. Patient was eating breakfast and reported having trouble swallowing food. Denies choking or odynophagia but reports requiring more effort "for the food to go down". He called outpatient's neurologists office and was told by the PA that his speech sounded different and to come for emergent evaluation in the ED. Patient also notes new weakness in his right hand last night. Otherwise denies any dizziness, headache, focal sensory deficit. Wife at bedside reports the patient's speech is different from usual. He is not on any AC or AP. Patient notes white lesions in oral mucosa are new.     LKW: 9/13 12 AM   NIHSS: 0  pre MRS: 2    Review of Systems:    CONSTITUTIONAL: No fevers or chills  EYES AND ENT: No visual changes or no throat pain   NECK: No pain or stiffness  RESPIRATORY: No hemoptysis or shortness of breath  CARDIOVASCULAR: No chest pain or palpitations  GASTROINTESTINAL: No melena or hematochezia  GENITOURINARY: No dysuria or hematuria  NEUROLOGICAL: +As stated in HPI above  SKIN: No itching, burning, rashes, or lesions   All other review of systems is negative unless indicated above.    Allergies:  No Known Allergies    PMHx/PSHx/Family Hx: As above, otherwise see below   Testicular Cancer  Colitis  S/P Orchiectomy  S/P Lymph Node Biopsy  Headache, Migraine  [place] (Hard of Hearing)  No pertinent past medical history  HTN (hypertension)  NHL (non-Hodgkin's lymphoma)  GERD (gastroesophageal reflux disease)  Colitis  BPH (benign prostatic hyperplasia)  Osteoarthritis  History of bone marrow transplant  Hypertriglyceridemia  Malignant melanoma of scalp  First degree heart block    Social Hx:  No current use of tobacco, alcohol, or illicit drugs  Lives with wife     Medications:  MEDICATIONS  (STANDING):  MEDICATIONS  (PRN):    Vitals:  T(C): 36.6 (09-13-23 @ 15:21), Max: 36.6 (09-13-23 @ 15:21)  HR: 90 (09-13-23 @ 15:21) (90 - 90)  BP: 105/71 (09-13-23 @ 15:21) (105/71 - 105/71)  RR: 20 (09-13-23 @ 15:21) (20 - 20)  SpO2: 97% (09-13-23 @ 15:21) (97% - 97%)    Physical Examination:   General - NAD  Cardiovascular - Peripheral pulses palpable, no edema  Eyes - Fundoscopy not performed due to safety precautions in the setting of infection risk  Oral Cavity: White plaques noted on palate     Neurologic Exam:  Mental status - Awake, Alert, Oriented to person, place, and time. Speech fluent, repetition and naming intact. Follows simple  commands. Attention/concentration, recent and remote memory (including registration and recall), and fund of knowledge intact    Cranial nerves - PERRL, VFF, EOMI, face sensation (V1-V3) intact b/l, facial strength intact without asymmetry b/l, hearing intact b/l, palate with symmetric elevation, trapezius 4/5 strength b/l, tongue midline on protrusion with full lateral movement    Motor - Normal bulk and tone throughout. No pronator drift.  Strength testing  RUE Proximal 4/5   distal 4-/5  RLE  Proximal 4/5   distal 4/5  LUE Proximal 4/5   distal 4/5  LLE Proximal 4/5   distal 4/5    Sensation - Light touch intact throughout    DTR's -             Biceps      Triceps     Brachioradialis      Patellar       Ankle    Toes/plantar response  R             2+             2+                  2+           2+  brisk        2+                 Down  L              2+             2+                 2+           2+  brisk         2+                 Down    Coordination - Finger to Nose intact b/l. No tremors appreciated    Gait and station - Not assessed due to fall risk    Labs:                        13.2   7.50  )-----------( 285      ( 13 Sep 2023 15:50 )             40.8     09-13    138  |  101  |  12  ----------------------------<  94  4.2   |  24  |  0.94    Ca    10.0      13 Sep 2023 15:50    TPro  7.2  /  Alb  4.2  /  TBili  0.6  /  DBili  x   /  AST  27  /  ALT  15  /  AlkPhos  64  09-13    CAPILLARY BLOOD GLUCOSE  97 (13 Sep 2023 15:53)      POCT Blood Glucose.: 97 mg/dL (13 Sep 2023 15:40)    LIVER FUNCTIONS - ( 13 Sep 2023 15:50 )  Alb: 4.2 g/dL / Pro: 7.2 g/dL / ALK PHOS: 64 U/L / ALT: 15 U/L / AST: 27 U/L / GGT: x           PT/INR - ( 13 Sep 2023 15:50 )   PT: 11.7 sec;   INR: 1.07 ratio      PTT - ( 13 Sep 2023 15:50 )  PTT:34.2 sec    Radiology:    [facility]: 9/13/23  The fourth, third and lateral ventricles are normal size and position. There is no hemorrhage, mass or shift of the midline structures. There is normal gray white matter differentiation. Bone window examination is remarkable for prior plating of anterior maxillary wall fractures.    CT perfusion:  No core infarct or delayed mean transit time is identified.  CBF<30% volume: 0 ml  Tmax> 6.0s volume: 0 ml  Mismatch volume: 0 ml  Mismatch ratio: none    CTA head and neck:    The origins of the carotid and vertebral arteries are normal. The left   vertebral artery is dominant. The carotid bifurcations are normal   bilaterally.    The distal vertebral arteries are well identified as arethe   posterior-inferior cerebellar arteries bilaterally. The region of the   vertebral basilar junction is normal. The basilar artery is normal. The   posterior cerebral and superior cerebellar arteries are normal.    Evaluation of the carotid arteries demonstrate normal appearance to the   distal cervical, petrous, cavernous and supraclinoid internal carotid   arteries. The anterior cerebral arteries, anterior communicating artery   and middle cerebral arteries are normal.    There is no evidence of aneurysm, stenosis, or vessel occlusion.    The normal intracranial venous circulation is identified. The right   transverse sinus is dominant. The superior sagittal sinus, internal   cerebral veins, vein of Wilberto, straight sinus, transverse sinuses,   sigmoid sinuses and internal jugular veins are normal. Cortical veins are   normal.    IMPRESSION: Unremarkable noncontrast brain CT. Normal CTA of the head and   neck. Normal CT perfusion.       MR Head w/wo IV Cont 06.22.22   FINDINGS:  The ventricles and sulci are age appropriate . No abnormal parenchymal   enhancement.There is no evidence of mass, intracranial hemorrhage, or   acute infarction. There is no extra axial collection. No midline shift or   other significant mass effect is noted.  There is normal flow-void within major cranial vessels suggestive of   their patency.  The orbital contents are grossly unremarkable. The paranasal sinuses are   predominantly clear.. The mastoid air cells are predominantly clear.    IMPRESSION:  There is no mass, intracranial hemorrhage, or acute infarction.

## 2023-09-13 NOTE — ED ADULT NURSE REASSESSMENT NOTE - NS ED NURSE REASSESS COMMENT FT1
Pt observed sitting up in stretcher conversing with RN without difficulty. Breathing spontaneous and unlabored. Pt updated on plan of care awaiting dispo- to be admitted to medicine. No dysphagia complaints at this time.  No acute distress noted. Call bell within reach.

## 2023-09-13 NOTE — ED PROVIDER NOTE - PROGRESS NOTE DETAILS
Kiran Campbell MD PGY-6: Stroke Alert notified. At moment of evaluation patient was found complaining of Rt sided dysphagia and complaining of Rt sided weakness on physical exam with last none well at 11 am today. Labwork at this time is unremarkable and head imaging including HCT, CTA and Brain perfusion showing no evidence of acute hemorrhage or signs of ischemia. Will discuss results with Neurology team and notify the patient. Keith Viveros MD:  Will put to CDU, Emailing GI for evaluation tomorrow Darius Perez MD (PGY3): Patient requiring multiple doses of pain medication, is not a CDU candidate at this time.  Will admit to medicine under telemetry.  Patient endorsed by Dr. Pedro

## 2023-09-13 NOTE — ED PROVIDER NOTE - RAPID ASSESSMENT
52 yo M with h/o malignancy, melanoma presents c/oi word finding difficulty and difficulty swallowing since this morning. Awoke with sxs at 8-9AM this morning. LKN 11PM last night. Pt took ibuprofen 10AM. Pt denies antiPLT and AC use. also c/o stiffness of R side of neck x 4 days, had LN dissection in this area.     Patient was seen as a rapid assessment (QPA) patient. The patient will be seen and further worked up in the main emergency department and their care will be completed by the main emergency department team along with a thorough physical exam. Receiving team will follow up on labs, analgesia, any clinical imaging, reassess and disposition as clinically indicated, all decisions regarding the progression of care will be made at their discretion. 54 yo M with h/o malignancy, melanoma presents c/oi word finding difficulty and difficulty swallowing since this morning. Awoke with sxs at 8-9AM this morning. LKN 11PM last night. Pt took ibuprofen 10AM. Pt denies antiPLT and AC use. also c/o stiffness of R side of neck x 4 days, had LN dissection in this area.     Patient was seen as a rapid assessment (QPA) patient. The patient will be seen and further worked up in the main emergency department and their care will be completed by the main emergency department team along with a thorough physical exam. Receiving team will follow up on labs, analgesia, any clinical imaging, reassess and disposition as clinically indicated, all decisions regarding the progression of care will be made at their discretion. I yudelka myers, didn't participate in the care of this patient, I was available for question by the qPA but none were asked by the acp regarding this case.

## 2023-09-13 NOTE — ED ADULT TRIAGE NOTE - HEIGHT IN INCHES
[FreeTextEntry1] : Patient has a right-sided effusion.  She has had multiple effusions in the past but the one on the right has been present since at least early November.  I discussed the best treatment option would be thoracentesis.\par She is currently unwilling to have any procedures right at this point.  She states that she has been through too much in the past several weeks and would rather wait.\par I informed her that it is unclear as to the origin and may be related to pancreatic cancer.\par It does not appear to have changed since November which makes this slightly less likely that we do not have an ongoing progressive effusion.\par \par She wants to wait at least 2 months or so before having the thoracentesis performed.  She knows that if she worsens at all\par That she needs to go to the emergency room when the thoracentesis can be performed faster.  Otherwise I will bring her back to the office in 2 months and perform an x-ray.  If the effusion is still there which is likely I will arrange for an outpatient procedure.\par \par Her  was in attendance and agrees with the plan.  He also believes that she has been too much and that she just needs a break for a few months.\par  11

## 2023-09-13 NOTE — ED PROVIDER NOTE - OBJECTIVE STATEMENT
Attending note (Keo): 53-year-old male history evaluate prior testicular cancer treated with left orchiectomy and chemotherapy 2094, hypertension, non-Hodgkin's lymphoma status post left neck radiation malignant melanoma with right neck mass dissection 10/2018, presenting with difficulty swallowing that began suddenly this morning upon awakening.  No weakness in extremities.  No fever no vomiting.  No choking episodes reported.  Code stroke activated in triage as per hospital protocol patient evaluated in the CT suite concurrent with neuro/stroke team as per protocol.

## 2023-09-13 NOTE — ED PROVIDER NOTE - CLINICAL SUMMARY MEDICAL DECISION MAKING FREE TEXT BOX
53 Y M presenting with dysphagia, difficulty swallowing, starting yesterday, primary concern R/O CVA, vs. metabolic, vs. primary esophageal irritation

## 2023-09-13 NOTE — ED PROVIDER NOTE - PHYSICAL EXAMINATION
On Physical Exam:  General: well appearing, in NAD, speaking clearly in full sentences and without difficulty; cooperative with exam  HEENT: anicteric sclera, airway patent, no drooling or stridor  Neck: no JVD, no C-spine tenderness.  No nuchal rigidity.  No palpable masses, fluctuance, induration, or crepitance.  Cardiac: regular, s1 s2  Lungs: CTABL  Abdomen: soft nontender/nondistended  : no bladder tenderness or distension  Skin: intact, no rash  Extremities: no peripheral edema, no gross deformities  Neuro Exam:  -Overall muscular tone: Normal bulk and tone throughout.  No gross rigidity/clonus/tremors.  -No pronator drift.  -Gait steady  -CN II-XII grossly intact    -Motor:                      Deltoid    Biceps    Triceps      Wrist Ex   Wrist Flex          Left:          5/5           5/5           5/5            5/5           5/5               5/5    Right:       5/5           5/5           5/5            5/5           5/5               5/5                          Hip Flex    Hip Ext   Knee Flex   Knee Ex   Ankle Dorsiflex   Ankle Plantarflex      Left:          5/5           5/5           5/5             5/5           5/5                      5/5    Right:       5/5           5/5           5/5             5/5           5/5                      5/5          Sensation:                   Upper Arm     Lower Arm    Palm      Lat Hip   Inner Thigh    Shin    Plantar Foot    Left:          +                    +                 +           +                +             +         +    Right:        +                    +                 +           +                +             +         +

## 2023-09-13 NOTE — ED ADULT NURSE NOTE - NSFALLUNIVINTERV_ED_ALL_ED
Bed/Stretcher in lowest position, wheels locked, appropriate side rails in place/Call bell, personal items and telephone in reach/Instruct patient to call for assistance before getting out of bed/chair/stretcher/Non-slip footwear applied when patient is off stretcher/[place] to call system/Physically safe environment - no spills, clutter or unnecessary equipment/Purposeful proactive rounding/Room/bathroom lighting operational, light cord in reach

## 2023-09-13 NOTE — H&P ADULT - PROBLEM SELECTOR PLAN 1
Acute onset of dysarthria & dysphagia, s/p code stroke   - PMHx significant for multiple malignancy, high risk for metastatic dz   - Also noted w/ white lesion on oral mucosa, iso immunocompromised state,   consideration for esophageal candidiasis or esophageal dysmotility as source for dysphagia   - CTH neg for acute neurologic event or new lesions, obtain MRI brain to further eval in this high risk pt   - NPO, aspiration precaution   - SLP consult, GI consult   - Pain control Acute onset of dysarthria & dysphagia, s/p code stroke   - PMHx significant for multiple malignancy, high risk for metastatic dz   - Also noted w/ white lesion on oral mucosa, iso immunocompromised state,   consideration for esophageal candidiasis or esophageal dysmotility as source for dysphagia   - CTH neg for acute neurologic event or new lesions, obtain MRI brain to further eval in this high risk pt   - NPO, aspiration precaution   - SLP consult, GI consult   - Pain control: IV morphine 4/8mg q4prn, lidocaine patch to Rt neck

## 2023-09-13 NOTE — CONSULT NOTE ADULT - ATTENDING COMMENTS
code stroke called and neurology emergently assessed patient   Briefly   53y (1970) right handed man former smoker 20 pack years with a PMH of testicular ca s/p orchiectomy, HTN, HLD, melanoma on immunotherapy, migraines, chronic pain (follows Dr. Rosen) hypothyroidism, secondary adrenal insufficiency who presents as code stroke for dysphagia and dysarthria this morning, as well as 3-4 days of word finding difficulty and R sided neck pain.     LKW: 9/13 12 AM   NIHSS: 0  pre MRS: 2  Not a Teneceteplase candidate due to out of window  Not a thrombectomy candidate due to no LVO    CTH neg .CTA H/N neg     Impression:  1. Dysphagia and altered speech (no dysarthria), less likely 2/2 acute ischemic event. As patient has history of malignancy, will need to evaluate for brain metastasis although no lesions seen on CT head. In setting of immunocompromised state and white lesions seen on oral mucosa, will need to evaluate for esophageal candidiasis. Also possible esophageal motility issues. Less likely 2/2 myasthenia gravis or neuromuscular etiology   2. R sided neck pain, will need to evaluate for MSK etiology vs cervical disc herniation, nerve impingement ; area of prior sx     Recommendations:    - MRI brain w and w/o  -  MRI C spine w and w/o  - Speech and swallow, SLP  - GI eval for dysphagia, as less likely primary neurologic etiology   - If MRI brain negative for infarct, will not pursue further stroke workup while inpatient   - Will not start antiplatelet at this time due to low concern for ischemic event    - Hemoglobin A1c and lipid panel  - TTE if MRI shows stroke  - pain control / getting morphine now   - PT/OT/SS/SLP   - GI/DVT ppx  - Counseling on diet, exercise, and medication adherence was done  - Counseling on smoking cessation and alcohol consumption offered when appropriate.  - Pain assessed and judicious use of narcotics when appropriate was discussed.    - Stroke education given when appropriate.  - Importance of fall prevention discussed.   - Differential diagnosis and plan of care discussed with patient and/or family and primary team  - Thank you for allowing me to participate in the care of this patient. Call with questions.   Scott Interiano MD  Vascular Neurology  Office: [phone]

## 2023-09-13 NOTE — H&P ADULT - HISTORY OF PRESENT ILLNESS
53y M pmh testicular Ca s/p left orchiectomy and chemotherapy, htn, non-Hodgkin's lymphoma s/p left neck radiation, malignant melanoma, right neck mass dissection 10/2018, HTN, HLD,  migraines, chronic pain (follows Dr. Rosen), hypothyroidism, secondary adrenal insufficiency p/w sudden onset difficulty swallowing that began this morning upon awakening as well as dysarthria. Not on AC or antiPLT.  Stroke code initiated in ED, after eval not TPA candidate.          ROS: Denies CP, SOB, palpitation, N/V/D, fever, cough, chills, dizziness, abm pain, recent travel, sick contact, change in bowel and urinary habits     A 10-system ROS was performed and is negative except as noted above and/or in the HPI.       53y M pmh testicular Ca s/p left orchiectomy and chemotherapy, htn, non-Hodgkin's lymphoma s/p left neck radiation, malignant melanoma, right neck mass dissection 10/2018, HTN, HLD,  migraines, chronic pain (follows Dr. Rosen), hypothyroidism, secondary adrenal insufficiency p/w sudden onset difficulty swallowing that began this morning upon awakening as well as dysarthria. Not on AC or anti-PLT.  Stroke code initiated in ED, stroke r/o, not TPA candidate.        ROS: Denies CP, SOB, palpitation, N/V/D, fever, cough, chills, dizziness, abm pain, recent travel, sick contact, change in bowel and urinary habits     A 10-system ROS was performed and is negative except as noted above and/or in the HPI.

## 2023-09-13 NOTE — H&P ADULT - NSHPPHYSICALEXAM_GEN_ALL_CORE
T(C): 36.5 (09-14-23 @ 03:00), Max: 36.6 (09-13-23 @ 15:21)  HR: 78 (09-14-23 @ 03:00) (64 - 90)  BP: 112/72 (09-14-23 @ 03:00) (105/71 - 119/75)  RR: 17 (09-14-23 @ 03:00) (16 - 20)  SpO2: 99% (09-14-23 @ 03:00) (97% - 99%)    CONSTITUTIONAL: thin appearing,  no apparent distress  EYES: PERRLA and symmetric, EOMI  ENMT: MMM. Normal dentition, mild TTP of right neck , no oral lesions noted   RESP: No respiratory distress, no use of accessory muscles; CTA b/l  CV: +S1S2, RRR, no MRG; no peripheral edema  GI: Soft, NTND, no RGR  MSK: normal pain free ROM x4 extremities   SKIN: No rashes or ulcers noted  NEURO: CN II-XII grossly intact; normal reflexes, sensation intact throughout   PSYCH: Appropriate insight/judgment; A+O x 3, mood and affect appropriate

## 2023-09-13 NOTE — CONSULT NOTE ADULT - ASSESSMENT
53y (1970) right handed man former smoker 20 pack years with a PMH of testicular ca s/p orchiectomy, HTN, HLD, melanoma on immunotherapy, migraines, chronic pain (follows Dr. Rosen) hypothyroidism, secondary adrenal insufficiency who presents as code stroke for dysphagia and dysarthria this morning, as well as 3-4 days of word finding difficulty and R sided neck pain.     LKW: 9/13 12 AM   NIHSS: 0  pre MRS: 2  Not a Teneceteplase candidate due to out of window  Not a thrombectomy candidate due to no LVO    Impression:  1. Dysphagia and altered speech (no dysarthria), less likely 2/2 acute ischemic event. As patient has history of malignancy, will need to evaluate for brain metastasis although no lesions seen on CT head. In setting of immunocompromised state and white lesions seen on oral mucosa, will need to evaluate for esophageal candidiasis. Also possible esophageal motility issues. Less likely 2/2 myasthenia gravis or neuromuscular etiology   2. R sided neck pain, will need to evaluate for MSK etiology vs cervical disc herniation, nerve impingement     Recommendations:    [] CDU for MRI brain w and w/o  [] Consider addition of MRI C spine w and w/o  [] Speech and swallow, SLP  [] GI eval for dysphagia, as less likely primary neurologic etiology   [] If MRI brain negative for infarct, will not pursue further stroke workup while inpatient   [] Will not start antiplatelet at this time due to low concern for ischemic event       Case discussed with stroke fellow Cristobal Stone MD. To be seen and discussed with Neurology attending, please await final attending attestation.    53y (1970) right handed man former smoker 20 pack years with a PMH of testicular ca s/p orchiectomy, HTN, HLD, melanoma on immunotherapy, migraines, chronic pain (follows Dr. Rosen) hypothyroidism, secondary adrenal insufficiency who presents as code stroke for dysphagia and dysarthria this morning, as well as 3-4 days of word finding difficulty and R sided neck pain.     LKW: 9/13 12 AM   NIHSS: 0  pre MRS: 2  Not a Teneceteplase candidate due to out of window  Not a thrombectomy candidate due to no LVO    Impression:  1. Dysphagia and altered speech (no dysarthria), less likely 2/2 acute ischemic event. As patient has history of malignancy, will need to evaluate for brain metastasis although no lesions seen on CT head. In setting of immunocompromised state and white lesions seen on oral mucosa, will need to evaluate for esophageal candidiasis. Also possible esophageal motility issues. Less likely 2/2 myasthenia gravis or neuromuscular etiology   2. R sided neck pain, will need to evaluate for MSK etiology vs cervical disc herniation, nerve impingement     Recommendations:    [] MRI brain w and w/o  [] Consider addition of MRI C spine w and w/o  [] Speech and swallow, SLP  [] GI eval for dysphagia, as less likely primary neurologic etiology   [] If MRI brain negative for infarct, will not pursue further stroke workup while inpatient   [] Will not start antiplatelet at this time due to low concern for ischemic event       Case discussed with stroke fellow Cristobal Stone MD. To be seen and discussed with Neurology attending, please await final attending attestation.

## 2023-09-13 NOTE — ED ADULT NURSE NOTE - OBJECTIVE STATEMENT
Pt 54 y/o male, AxOx3, presents to ED from home complaining of worsening dysphagia and concern for speech changes. Pt states he had difficulty swallowing, called his neurologist when he was told his speech did not sound at baseline. Code stroke called in triage, pt taken to CT scan. Pt is well appearing, speaking full sentences without difficulty. Breathing spontaneous and unlabored. Upon assessment, abdomen soft and nontender, +strong peripheral pulses, moving all extremities without difficulty, lungs clear. Pt placed on continuous pulse ox and cardiac monitor, NSR noted. Safety and comfort measures initiated- bed placed in lowest position and side rails raised. Pt oriented to call bell system.

## 2023-09-13 NOTE — H&P ADULT - ASSESSMENT
53y M pmh testicular Ca s/p left orchiectomy and chemotherapy, htn, non-Hodgkin's lymphoma s/p left neck radiation, malignant melanoma, right neck mass dissection 10/2018, HTN, HLD,  migraines, chronic pain (follows Dr. Rosen), hypothyroidism, secondary adrenal insufficiency p/w sudden onset dysphagia and dysarthria s/p code stroke, admitted for dysphagia  w/u

## 2023-09-13 NOTE — ED PROVIDER NOTE - ATTENDING CONTRIBUTION TO CARE
Attending Statement (FRANCINE Crowley MD):    HPI: 53-year-old male history evaluate prior testicular cancer treated with left orchiectomy and chemotherapy 2094, hypertension, non-Hodgkin's lymphoma status post left neck radiation malignant melanoma with right neck mass dissection 10/2018, presenting with difficulty swallowing that began suddenly this morning upon awakening.  No weakness in extremities.  No fever no vomiting.  No choking episodes reported.  Code stroke activated in triage as per hospital protocol patient evaluated in the CT suite concurrent with neuro/stroke team as per protocol.    Review of Systems:  Urgent intervention, ROS deferred    On Physical Exam:  General: well appearing, in NAD, speaking clearly in full sentences and without difficulty; cooperative with exam  HEENT: anicteric sclera, airway patent, no drooling or stridor  Neck: no JVD, no C-spine tenderness.  No nuchal rigidity.  No palpable masses, fluctuance, induration, or crepitance.  Cardiac: regular, s1 s2  Lungs: CTABL  Abdomen: soft nontender/nondistended  : no bladder tenderness or distension  Skin: intact, no rash  Extremities: no peripheral edema, no gross deformities  Neuro Exam:  -Overall muscular tone: Normal bulk and tone throughout.  No gross rigidity/clonus/tremors.  -No pronator drift.  -Gait steady  -CN II-XII grossly intact    -Motor:                      Deltoid    Biceps    Triceps      Wrist Ex   Wrist Flex          Left:          5/5           5/5           5/5            5/5           5/5               5/5    Right:       5/5           5/5           5/5            5/5           5/5               5/5                          Hip Flex    Hip Ext   Knee Flex   Knee Ex   Ankle Dorsiflex   Ankle Plantarflex      Left:          5/5           5/5           5/5             5/5           5/5                      5/5    Right:       5/5           5/5           5/5             5/5           5/5                      5/5          Sensation:                   Upper Arm     Lower Arm    Palm      Lat Hip   Inner Thigh    Shin    Plantar Foot    Left:          +                    +                 +           +                +             +         +    Right:        +                    +                 +           +                +             +         +     MDM: 53-year-old male presenting with sudden onset difficulty swallowing questionable difficulty speaking without speaking clearly during my evaluation and without other focal neurologic deficits.  Code stroke called as per hospital protocol given sudden onset of a potentially neurologic symptom.  Patient evaluated with neurology/stroke team in CT suite.  Hemodynamically stable not hypoglycemic on point-of-care fingerstick.  Initial CT shows no evidence of mass bleeding or infarct.  CTA with no acute actionable findings.  Patient beto stable.  Pending neurology recommendations will consider further evaluation with MRI either through observation unit or as outpatient with close interval neuro follow-up.

## 2023-09-14 ENCOUNTER — TRANSCRIPTION ENCOUNTER (OUTPATIENT)
Age: 53
End: 2023-09-14

## 2023-09-14 VITALS
TEMPERATURE: 98 F | SYSTOLIC BLOOD PRESSURE: 122 MMHG | OXYGEN SATURATION: 98 % | DIASTOLIC BLOOD PRESSURE: 76 MMHG | RESPIRATION RATE: 18 BRPM | HEART RATE: 74 BPM

## 2023-09-14 DIAGNOSIS — E27.40 UNSPECIFIED ADRENOCORTICAL INSUFFICIENCY: ICD-10-CM

## 2023-09-14 DIAGNOSIS — C62.90 MALIGNANT NEOPLASM OF UNSPECIFIED TESTIS, UNSPECIFIED WHETHER DESCENDED OR UNDESCENDED: ICD-10-CM

## 2023-09-14 DIAGNOSIS — E03.9 HYPOTHYROIDISM, UNSPECIFIED: ICD-10-CM

## 2023-09-14 DIAGNOSIS — R13.10 DYSPHAGIA, UNSPECIFIED: ICD-10-CM

## 2023-09-14 DIAGNOSIS — Z29.9 ENCOUNTER FOR PROPHYLACTIC MEASURES, UNSPECIFIED: ICD-10-CM

## 2023-09-14 LAB
ANION GAP SERPL CALC-SCNC: 13 MMOL/L — SIGNIFICANT CHANGE UP (ref 5–17)
BUN SERPL-MCNC: 10 MG/DL — SIGNIFICANT CHANGE UP (ref 7–23)
CALCIUM SERPL-MCNC: 8.8 MG/DL — SIGNIFICANT CHANGE UP (ref 8.4–10.5)
CHLORIDE SERPL-SCNC: 104 MMOL/L — SIGNIFICANT CHANGE UP (ref 96–108)
CO2 SERPL-SCNC: 20 MMOL/L — LOW (ref 22–31)
CREAT SERPL-MCNC: 0.83 MG/DL — SIGNIFICANT CHANGE UP (ref 0.5–1.3)
EGFR: 105 ML/MIN/1.73M2 — SIGNIFICANT CHANGE UP
GLUCOSE SERPL-MCNC: 79 MG/DL — SIGNIFICANT CHANGE UP (ref 70–99)
HCT VFR BLD CALC: 37.1 % — LOW (ref 39–50)
HGB BLD-MCNC: 12.2 G/DL — LOW (ref 13–17)
INR BLD: 1.11 RATIO — SIGNIFICANT CHANGE UP (ref 0.85–1.18)
MCHC RBC-ENTMCNC: 28 PG — SIGNIFICANT CHANGE UP (ref 27–34)
MCHC RBC-ENTMCNC: 32.9 GM/DL — SIGNIFICANT CHANGE UP (ref 32–36)
MCV RBC AUTO: 85.1 FL — SIGNIFICANT CHANGE UP (ref 80–100)
NRBC # BLD: 0 /100 WBCS — SIGNIFICANT CHANGE UP (ref 0–0)
PLATELET # BLD AUTO: 227 K/UL — SIGNIFICANT CHANGE UP (ref 150–400)
POTASSIUM SERPL-MCNC: 3.6 MMOL/L — SIGNIFICANT CHANGE UP (ref 3.5–5.3)
POTASSIUM SERPL-SCNC: 3.6 MMOL/L — SIGNIFICANT CHANGE UP (ref 3.5–5.3)
PROTHROM AB SERPL-ACNC: 12.2 SEC — SIGNIFICANT CHANGE UP (ref 9.5–13)
RBC # BLD: 4.36 M/UL — SIGNIFICANT CHANGE UP (ref 4.2–5.8)
RBC # FLD: 13.3 % — SIGNIFICANT CHANGE UP (ref 10.3–14.5)
SODIUM SERPL-SCNC: 137 MMOL/L — SIGNIFICANT CHANGE UP (ref 135–145)
WBC # BLD: 6.89 K/UL — SIGNIFICANT CHANGE UP (ref 3.8–10.5)
WBC # FLD AUTO: 6.89 K/UL — SIGNIFICANT CHANGE UP (ref 3.8–10.5)

## 2023-09-14 PROCEDURE — 85610 PROTHROMBIN TIME: CPT

## 2023-09-14 PROCEDURE — 84295 ASSAY OF SERUM SODIUM: CPT

## 2023-09-14 PROCEDURE — 82947 ASSAY GLUCOSE BLOOD QUANT: CPT

## 2023-09-14 PROCEDURE — 70496 CT ANGIOGRAPHY HEAD: CPT | Mod: MA

## 2023-09-14 PROCEDURE — 86850 RBC ANTIBODY SCREEN: CPT

## 2023-09-14 PROCEDURE — 84132 ASSAY OF SERUM POTASSIUM: CPT

## 2023-09-14 PROCEDURE — 99233 SBSQ HOSP IP/OBS HIGH 50: CPT

## 2023-09-14 PROCEDURE — 83605 ASSAY OF LACTIC ACID: CPT

## 2023-09-14 PROCEDURE — 0042T: CPT | Mod: MA

## 2023-09-14 PROCEDURE — 82330 ASSAY OF CALCIUM: CPT

## 2023-09-14 PROCEDURE — 36415 COLL VENOUS BLD VENIPUNCTURE: CPT

## 2023-09-14 PROCEDURE — 85730 THROMBOPLASTIN TIME PARTIAL: CPT

## 2023-09-14 PROCEDURE — 70498 CT ANGIOGRAPHY NECK: CPT | Mod: MA

## 2023-09-14 PROCEDURE — 82803 BLOOD GASES ANY COMBINATION: CPT

## 2023-09-14 PROCEDURE — 85025 COMPLETE CBC W/AUTO DIFF WBC: CPT

## 2023-09-14 PROCEDURE — 86901 BLOOD TYPING SEROLOGIC RH(D): CPT

## 2023-09-14 PROCEDURE — 80053 COMPREHEN METABOLIC PANEL: CPT

## 2023-09-14 PROCEDURE — 96374 THER/PROPH/DIAG INJ IV PUSH: CPT

## 2023-09-14 PROCEDURE — 85027 COMPLETE CBC AUTOMATED: CPT

## 2023-09-14 PROCEDURE — 80048 BASIC METABOLIC PNL TOTAL CA: CPT

## 2023-09-14 PROCEDURE — 70450 CT HEAD/BRAIN W/O DYE: CPT | Mod: MA

## 2023-09-14 PROCEDURE — 82435 ASSAY OF BLOOD CHLORIDE: CPT

## 2023-09-14 PROCEDURE — 85018 HEMOGLOBIN: CPT

## 2023-09-14 PROCEDURE — 84484 ASSAY OF TROPONIN QUANT: CPT

## 2023-09-14 PROCEDURE — 82962 GLUCOSE BLOOD TEST: CPT

## 2023-09-14 PROCEDURE — 85014 HEMATOCRIT: CPT

## 2023-09-14 PROCEDURE — 96375 TX/PRO/DX INJ NEW DRUG ADDON: CPT

## 2023-09-14 PROCEDURE — 86900 BLOOD TYPING SEROLOGIC ABO: CPT

## 2023-09-14 PROCEDURE — 99285 EMERGENCY DEPT VISIT HI MDM: CPT

## 2023-09-14 RX ORDER — FINASTERIDE 5 MG/1
5 TABLET, FILM COATED ORAL DAILY
Refills: 0 | Status: DISCONTINUED | OUTPATIENT
Start: 2023-09-14 | End: 2023-09-14

## 2023-09-14 RX ORDER — METOPROLOL TARTRATE 50 MG
50 TABLET ORAL DAILY
Refills: 0 | Status: DISCONTINUED | OUTPATIENT
Start: 2023-09-14 | End: 2023-09-14

## 2023-09-14 RX ORDER — ATORVASTATIN CALCIUM 80 MG/1
40 TABLET, FILM COATED ORAL AT BEDTIME
Refills: 0 | Status: DISCONTINUED | OUTPATIENT
Start: 2023-09-14 | End: 2023-09-14

## 2023-09-14 RX ORDER — SENNA PLUS 8.6 MG/1
2 TABLET ORAL AT BEDTIME
Refills: 0 | Status: DISCONTINUED | OUTPATIENT
Start: 2023-09-14 | End: 2023-09-14

## 2023-09-14 RX ORDER — ENOXAPARIN SODIUM 100 MG/ML
40 INJECTION SUBCUTANEOUS EVERY 24 HOURS
Refills: 0 | Status: DISCONTINUED | OUTPATIENT
Start: 2023-09-14 | End: 2023-09-14

## 2023-09-14 RX ORDER — MAGNESIUM OXIDE 400 MG ORAL TABLET 241.3 MG
400 TABLET ORAL DAILY
Refills: 0 | Status: DISCONTINUED | OUTPATIENT
Start: 2023-09-14 | End: 2023-09-14

## 2023-09-14 RX ORDER — ESCITALOPRAM OXALATE 10 MG/1
10 TABLET, FILM COATED ORAL DAILY
Refills: 0 | Status: DISCONTINUED | OUTPATIENT
Start: 2023-09-14 | End: 2023-09-14

## 2023-09-14 RX ORDER — LANOLIN ALCOHOL/MO/W.PET/CERES
3 CREAM (GRAM) TOPICAL AT BEDTIME
Refills: 0 | Status: DISCONTINUED | OUTPATIENT
Start: 2023-09-14 | End: 2023-09-14

## 2023-09-14 RX ORDER — LIDOCAINE 4 G/100G
1 CREAM TOPICAL EVERY 24 HOURS
Refills: 0 | Status: DISCONTINUED | OUTPATIENT
Start: 2023-09-14 | End: 2023-09-14

## 2023-09-14 RX ORDER — CHOLECALCIFEROL (VITAMIN D3) 125 MCG
2000 CAPSULE ORAL DAILY
Refills: 0 | Status: DISCONTINUED | OUTPATIENT
Start: 2023-09-14 | End: 2023-09-14

## 2023-09-14 RX ORDER — FENOFIBRATE,MICRONIZED 130 MG
145 CAPSULE ORAL DAILY
Refills: 0 | Status: DISCONTINUED | OUTPATIENT
Start: 2023-09-14 | End: 2023-09-14

## 2023-09-14 RX ORDER — MORPHINE SULFATE 50 MG/1
8 CAPSULE, EXTENDED RELEASE ORAL EVERY 4 HOURS
Refills: 0 | Status: DISCONTINUED | OUTPATIENT
Start: 2023-09-14 | End: 2023-09-14

## 2023-09-14 RX ORDER — SODIUM CHLORIDE 9 MG/ML
1000 INJECTION INTRAMUSCULAR; INTRAVENOUS; SUBCUTANEOUS
Refills: 0 | Status: DISCONTINUED | OUTPATIENT
Start: 2023-09-14 | End: 2023-09-14

## 2023-09-14 RX ORDER — ACETAMINOPHEN 500 MG
650 TABLET ORAL EVERY 6 HOURS
Refills: 0 | Status: DISCONTINUED | OUTPATIENT
Start: 2023-09-14 | End: 2023-09-14

## 2023-09-14 RX ORDER — HYDROCORTISONE 20 MG
10 TABLET ORAL
Refills: 0 | Status: DISCONTINUED | OUTPATIENT
Start: 2023-09-14 | End: 2023-09-14

## 2023-09-14 RX ORDER — MORPHINE SULFATE 50 MG/1
4 CAPSULE, EXTENDED RELEASE ORAL EVERY 4 HOURS
Refills: 0 | Status: DISCONTINUED | OUTPATIENT
Start: 2023-09-14 | End: 2023-09-14

## 2023-09-14 RX ORDER — PANTOPRAZOLE SODIUM 20 MG/1
40 TABLET, DELAYED RELEASE ORAL
Refills: 0 | Status: DISCONTINUED | OUTPATIENT
Start: 2023-09-14 | End: 2023-09-14

## 2023-09-14 RX ORDER — GABAPENTIN 400 MG/1
300 CAPSULE ORAL
Refills: 0 | Status: DISCONTINUED | OUTPATIENT
Start: 2023-09-14 | End: 2023-09-14

## 2023-09-14 RX ORDER — HYDROCORTISONE 20 MG
20 TABLET ORAL
Refills: 0 | Status: DISCONTINUED | OUTPATIENT
Start: 2023-09-14 | End: 2023-09-14

## 2023-09-14 RX ORDER — TAMSULOSIN HYDROCHLORIDE 0.4 MG/1
0.4 CAPSULE ORAL DAILY
Refills: 0 | Status: DISCONTINUED | OUTPATIENT
Start: 2023-09-14 | End: 2023-09-14

## 2023-09-14 RX ORDER — ONDANSETRON 8 MG/1
4 TABLET, FILM COATED ORAL EVERY 8 HOURS
Refills: 0 | Status: DISCONTINUED | OUTPATIENT
Start: 2023-09-14 | End: 2023-09-14

## 2023-09-14 RX ORDER — LEVOTHYROXINE SODIUM 125 MCG
100 TABLET ORAL DAILY
Refills: 0 | Status: DISCONTINUED | OUTPATIENT
Start: 2023-09-14 | End: 2023-09-14

## 2023-09-14 RX ADMIN — MORPHINE SULFATE 8 MILLIGRAM(S): 50 CAPSULE, EXTENDED RELEASE ORAL at 05:29

## 2023-09-14 RX ADMIN — PANTOPRAZOLE SODIUM 40 MILLIGRAM(S): 20 TABLET, DELAYED RELEASE ORAL at 07:47

## 2023-09-14 RX ADMIN — MORPHINE SULFATE 4 MILLIGRAM(S): 50 CAPSULE, EXTENDED RELEASE ORAL at 03:01

## 2023-09-14 RX ADMIN — TAMSULOSIN HYDROCHLORIDE 0.4 MILLIGRAM(S): 0.4 CAPSULE ORAL at 11:36

## 2023-09-14 RX ADMIN — MORPHINE SULFATE 8 MILLIGRAM(S): 50 CAPSULE, EXTENDED RELEASE ORAL at 10:49

## 2023-09-14 RX ADMIN — ESCITALOPRAM OXALATE 10 MILLIGRAM(S): 10 TABLET, FILM COATED ORAL at 11:36

## 2023-09-14 RX ADMIN — Medication 20 MILLIGRAM(S): at 07:46

## 2023-09-14 RX ADMIN — Medication 100 MICROGRAM(S): at 05:06

## 2023-09-14 RX ADMIN — SODIUM CHLORIDE 75 MILLILITER(S): 9 INJECTION INTRAMUSCULAR; INTRAVENOUS; SUBCUTANEOUS at 03:00

## 2023-09-14 RX ADMIN — MAGNESIUM OXIDE 400 MG ORAL TABLET 400 MILLIGRAM(S): 241.3 TABLET ORAL at 11:36

## 2023-09-14 RX ADMIN — GABAPENTIN 300 MILLIGRAM(S): 400 CAPSULE ORAL at 05:06

## 2023-09-14 RX ADMIN — Medication 145 MILLIGRAM(S): at 13:25

## 2023-09-14 RX ADMIN — Medication 50 MILLIGRAM(S): at 05:06

## 2023-09-14 RX ADMIN — MORPHINE SULFATE 8 MILLIGRAM(S): 50 CAPSULE, EXTENDED RELEASE ORAL at 09:49

## 2023-09-14 RX ADMIN — FINASTERIDE 5 MILLIGRAM(S): 5 TABLET, FILM COATED ORAL at 11:36

## 2023-09-14 RX ADMIN — ENOXAPARIN SODIUM 40 MILLIGRAM(S): 100 INJECTION SUBCUTANEOUS at 11:36

## 2023-09-14 RX ADMIN — Medication 2000 UNIT(S): at 11:35

## 2023-09-14 NOTE — DISCHARGE NOTE PROVIDER - NSDCMRMEDTOKEN_GEN_ALL_CORE_FT
atorvastatin 40 mg oral tablet: 1 tab(s) orally once a day  dronabinol 2.5 mg oral capsule:   escitalopram 10 mg oral tablet: 1 tab(s) orally once a day  fenofibrate 145 mg oral tablet: 1 tab(s) orally once a day  finasteride 5 mg oral tablet: 1 tab(s) orally once a day  gabapentin 300 mg oral capsule: 1 cap(s) orally 2 times a day  hydrocortisone 10 mg oral tablet: 1 tab(s) orally once a day  hydrocortisone 20 mg oral tablet: 1 tab(s) orally once a day  levothyroxine 100 mcg (0.1 mg) oral tablet: 1 tab(s) orally once a day  Mag-Ox 400 oral tablet: 1 orally once a day  metoprolol succinate 50 mg oral tablet, extended release: 1 tab(s) orally once a day  omeprazole 40 mg oral delayed release capsule: 1 cap(s) orally once a day  oxyCODONE 10 mg oral tablet, extended release: 1 tab(s) orally 2 times a day, As Needed  tamsulosin 0.4 mg oral capsule: 1 orally once a day  Vitamin D2 1.25 mg (50,000 intl units) oral capsule: 1 cap(s) orally once a week  Zofran:   zolpidem 10 mg oral tablet: 1 tab(s) orally once a day (at bedtime)

## 2023-09-14 NOTE — DISCHARGE NOTE PROVIDER - NSDCFUSCHEDAPPT_GEN_ALL_CORE_FT
[facility]  [address]  Scheduled Appointment: 09/18/2023

## 2023-09-14 NOTE — DISCHARGE NOTE PROVIDER - HOSPITAL COURSE
Discharge Summary     Admit Date: 09-13-23  Discharge Date: 9-14-23    Admission diagnoses:     Dysphagia  Dysarthria      Discharge diagnoses:   Dysphagia  Dysarthria    Hospital Course:   For full details, please see H&P, progress notes, consult notes and ancillary notes. Briefly, CARLOTA CANTU is a 53y M pmh testicular Ca s/p left orchiectomy and chemotherapy, htn, non-Hodgkin's lymphoma s/p left neck radiation, malignant melanoma, right neck mass dissection 10/2018, HTN, HLD,  migraines, chronic pain (follows Dr. Rosen), hypothyroidism, secondary adrenal insufficiency p/w sudden onset dysphagia and dysarthria s/p code stroke, admitted for dysphagia  w/u .  Was called by RN due to patient wanting to sign out AMA. Asked patient why he wanted to leave because he was promised to get his MRI this morning and it was not done, he was waiting in the hallway for 2 days and he is cold. Patient is AAO x 3.  ZI explained at length to the patient the risks of signing out AMA , including risks  as he was choking on water and requires speech  and swallow evaluation to evaluate dysphagia , MRI head to r/o acute stroke as recommended by neurology, injury, hospital admission is not covered by insurance as he is leaving AMA  and  death. I explained the risks, benefits and alternatives to treatment  as well as the attendant risks of refusing treatment at this time. I informed both spouse Keeley that - MRI head is scheduled for 10pm today and they continued to refused and refers to sign out AMA. I offered to answer any questions and fully answered any such questions. We believe that the patient fully understands what has been explained and answered. I informed hospitalist Dr. Petersen of this, aware. Patient signed form to sign out AMA and accepts responsibility for any and all results of this decision. Patient and spouse left ER and requested discharge paper mailed to address on file.  On day of discharge, patient is clinically stable with no new exam findings or acute symptoms compared to prior. The patient was seen by the attending physician on the date of discharge and deemed stable and acceptable for discharge. The patient's chronic medical conditions were treated accordingly per the patient's home medication regimen. The patient's medication reconciliation (with changes made to chronic medications), follow up appointments, discharge orders, instructions, and significant lab and diagnostic studies are as noted.     Discharge follow up action items:     1. Follow up with PCP in 1 day of discharge.   2. Follow up labs, path, & imaging ***  3. Medication changes none  4. On hold medications none    Patient's ordered code status: Full code    Patient disposition: home       Discharge Summary     Admit Date: 09-13-23  Discharge Date: 9-14-23    Admission diagnoses:     Dysphagia  Dysarthria      Discharge diagnoses:   Dysphagia  Dysarthria    Hospital Course:   For full details, please see H&P, progress notes, consult notes and ancillary notes. Briefly, CARLOTA CANTU is a 53y M pmh testicular Ca s/p left orchiectomy and chemotherapy, htn, non-Hodgkin's lymphoma s/p left neck radiation, malignant melanoma, right neck mass dissection 10/2018, HTN, HLD,  migraines, chronic pain (follows Dr. Rosen), hypothyroidism, secondary adrenal insufficiency p/w sudden onset dysphagia and dysarthria s/p code stroke, admitted for dysphagia  w/u .  Was called by RN due to patient wanting to sign out AMA. Asked patient why he wanted to leave because he was promised to get his MRI this morning and it was not done, he was waiting in the hallway for 2 days and he is cold. Patient is AAO x 3.  ZI explained at length to the patient the risks of signing out AMA , including risks  as he was choking on water and requires speech  and swallow evaluation to evaluate dysphagia , MRI head to r/o acute stroke as recommended by neurology, injury, hospital admission is not covered by insurance as he is leaving AMA  and  death. I explained the risks, benefits and alternatives to treatment  as well as the attendant risks of refusing treatment at this time. I informed both spouse Keeley that - MRI head is scheduled for 10pm today and they continued to refused and refers to sign out AMA. I offered to answer any questions and fully answered any such questions. We believe that the patient fully understands what has been explained and answered. I informed hospitalist Dr. Petersen of this, aware. Patient signed form to sign out AMA and accepts responsibility for any and all results of this decision. Patient and spouse left ER and requested discharge paper mailed to address on file.

## 2023-09-14 NOTE — DISCHARGE NOTE PROVIDER - CARE PROVIDER_API CALL
Scott Interiano  Neurology  [address], [address]  [place] [address]  Phone: [phone]  Fax: [phone]  Follow Up Time: 1-3 days

## 2023-09-14 NOTE — DISCHARGE NOTE NURSING/CASE MANAGEMENT/SOCIAL WORK - PATIENT PORTAL LINK FT
You can access the FollowMyHealth Patient Portal offered by [facility] by registering at the following website: [email or web address]. By joining [facility]’s FollowMyHealth portal, you will also be able to view your health information using other applications (apps) compatible with our system.

## 2023-09-14 NOTE — DISCHARGE NOTE NURSING/CASE MANAGEMENT/SOCIAL WORK - NSDCPEFALRISK_GEN_ALL_CORE
For information on Fall & Injury Prevention, visit: [email or web address] OR  [email or web address] OR  https://www.cdc.gov/steadi/patient.html

## 2023-09-14 NOTE — PROGRESS NOTE ADULT - SUBJECTIVE AND OBJECTIVE BOX
PROGRESS NOTE:     Patient is a 53y old  Male who presents with a chief complaint of Dysphagia (13 Sep 2023 23:53)      SUBJECTIVE / OVERNIGHT EVENTS:  Patient seen and evaluated at bedside. Patient reports difficulty swallowing, he attempted to drink sips of water this morning and was unable to swallow, otherwise he denies chest pain/SOB/ abdominal pain.     ADDITIONAL REVIEW OF SYSTEMS:    MEDICATIONS  (STANDING):  atorvastatin 40 milliGRAM(s) Oral at bedtime  cholecalciferol 2000 Unit(s) Oral daily  enoxaparin Injectable 40 milliGRAM(s) SubCutaneous every 24 hours  escitalopram 10 milliGRAM(s) Oral daily  fenofibrate Tablet 145 milliGRAM(s) Oral daily  finasteride 5 milliGRAM(s) Oral daily  gabapentin 300 milliGRAM(s) Oral two times a day  hydrocortisone 20 milliGRAM(s) Oral <User Schedule>  hydrocortisone 10 milliGRAM(s) Oral <User Schedule>  levothyroxine 100 MICROGram(s) Oral daily  lidocaine   4% Patch 1 Patch Transdermal every 24 hours  magnesium oxide 400 milliGRAM(s) Oral daily  metoprolol succinate ER 50 milliGRAM(s) Oral daily  pantoprazole    Tablet 40 milliGRAM(s) Oral before breakfast  senna 2 Tablet(s) Oral at bedtime  sodium chloride 0.9%. 1000 milliLiter(s) (75 mL/Hr) IV Continuous <Continuous>  tamsulosin 0.4 milliGRAM(s) Oral daily    MEDICATIONS  (PRN):  acetaminophen     Tablet .. 650 milliGRAM(s) Oral every 6 hours PRN Temp greater or equal to 38C (100.4F), Mild Pain (1 - 3)  aluminum hydroxide/magnesium hydroxide/simethicone Suspension 30 milliLiter(s) Oral every 4 hours PRN Dyspepsia  melatonin 3 milliGRAM(s) Oral at bedtime PRN Insomnia  morphine  - Injectable 4 milliGRAM(s) IV Push every 4 hours PRN Moderate Pain (4 - 6)  morphine  - Injectable 8 milliGRAM(s) IV Push every 4 hours PRN Severe Pain (7 - 10)  ondansetron Injectable 4 milliGRAM(s) IV Push every 8 hours PRN Nausea and/or Vomiting      CAPILLARY BLOOD GLUCOSE  97 (13 Sep 2023 15:53)      POCT Blood Glucose.: 97 mg/dL (13 Sep 2023 15:40)    I&O's Summary      PHYSICAL EXAM:  Vital Signs Last 24 Hrs  T(C): 36.6 (14 Sep 2023 09:01), Max: 36.7 (14 Sep 2023 05:26)  T(F): 97.9 (14 Sep 2023 09:01), Max: 98 (14 Sep 2023 05:26)  HR: 71 (14 Sep 2023 09:01) (64 - 90)  BP: 127/80 (14 Sep 2023 09:01) (105/71 - 127/80)  BP(mean): 90 (14 Sep 2023 02:09) (90 - 90)  RR: 18 (14 Sep 2023 09:01) (16 - 20)  SpO2: 98% (14 Sep 2023 09:01) (97% - 99%)    Parameters below as of 14 Sep 2023 09:01  Patient On (Oxygen Delivery Method): room air        CONSTITUTIONAL: NAD  RESPIRATORY: Normal respiratory effort; lungs are clear to auscultation bilaterally  CARDIOVASCULAR: Regular rate and rhythm, normal S1 and S2, no murmur/rub/gallop; No lower extremity edema;  ABDOMEN: Nontender to palpation, normoactive bowel sounds, no rebound/guarding.  PSYCH: A+O to person, place, and time; affect appropriate    LABS:                        12.2   6.89  )-----------( 227      ( 14 Sep 2023 06:12 )             37.1     09-14    137  |  104  |  10  ----------------------------<  79  3.6   |  20<L>  |  0.83    Ca    8.8      14 Sep 2023 06:12    TPro  7.2  /  Alb  4.2  /  TBili  0.6  /  DBili  x   /  AST  27  /  ALT  15  /  AlkPhos  64  09-13    PT/INR - ( 14 Sep 2023 06:12 )   PT: 12.2 sec;   INR: 1.11 ratio         PTT - ( 13 Sep 2023 15:50 )  PTT:34.2 sec      Urinalysis Basic - ( 14 Sep 2023 06:12 )    Color: x / Appearance: x / SG: x / pH: x  Gluc: 79 mg/dL / Ketone: x  / Bili: x / Urobili: x   Blood: x / Protein: x / Nitrite: x   Leuk Esterase: x / RBC: x / WBC x   Sq Epi: x / Non Sq Epi: x / Bacteria: x          RADIOLOGY & ADDITIONAL TESTS:  < from: CT Brain Perfusion Maps Stroke (09.13.23 @ 16:24) >  IMPRESSION: Unremarkable noncontrast brain CT. Normal CTA of the head and   neck. Normal CT perfusion. Dr. Castellano discussed these findings with on   9/13/2023 3:59 PM with read back.  < end of copied text >

## 2023-09-14 NOTE — DISCHARGE NOTE PROVIDER - NSDCFUADDAPPT_GEN_ALL_CORE_FT
Follow up with neurology for outpatient MRI head.  Follow up with PMD in 24 hrs of discharge.  Follow up with speech therapy to evaluate dyshagia.

## 2023-09-14 NOTE — PROGRESS NOTE ADULT - PROBLEM SELECTOR PLAN 1
Acute onset of dysarthria & dysphagia, s/p code stroke   - PMHx significant for multiple malignancy, high risk for metastatic dz   - Also noted w/ white lesion on oral mucosa, iso immunocompromised state,   consideration for esophageal candidiasis or esophageal dysmotility as source for dysphagia   - CTH neg for acute neurologic event or new lesions, obtain MRI brain to further eval in this high risk pt   - strict NPO, aspiration precaution   - SLP consult, GI consult   - Pain control: IV morphine 4/8mg q4prn, lidocaine patch to Rt neck Acute onset of dysarthria & dysphagia, s/p code stroke   - PMHx significant for multiple malignancy, high risk for metastatic dz   - Also noted w/ white lesion on oral mucosa, iso immunocompromised state,   consideration for esophageal candidiasis or esophageal dysmotility as source for dysphagia   - CTH neg for acute neurologic event or new lesions, obtain MRI brain to further eval in this high risk pt   - NPO, aspiration precaution   - SLP consult, GI consult   - Pain control: IV morphine 4/8mg q4prn, lidocaine patch to Rt neck

## 2023-09-15 ENCOUNTER — TRANSCRIPTION ENCOUNTER (OUTPATIENT)
Age: 53
End: 2023-09-15

## 2023-09-18 ENCOUNTER — NON-APPOINTMENT (OUTPATIENT)
Age: 53
End: 2023-09-18

## 2023-09-18 ENCOUNTER — APPOINTMENT (OUTPATIENT)
Dept: PAIN MANAGEMENT | Facility: CLINIC | Age: 53
End: 2023-09-18
Payer: COMMERCIAL

## 2023-09-18 VITALS
SYSTOLIC BLOOD PRESSURE: 118 MMHG | HEART RATE: 103 BPM | BODY MASS INDEX: 19.33 KG/M2 | DIASTOLIC BLOOD PRESSURE: 84 MMHG | HEIGHT: 70 IN | WEIGHT: 135 LBS

## 2023-09-18 DIAGNOSIS — R13.10 DYSPHAGIA, UNSPECIFIED: ICD-10-CM

## 2023-09-18 PROCEDURE — 99214 OFFICE O/P EST MOD 30 MIN: CPT

## 2023-09-19 ENCOUNTER — APPOINTMENT (OUTPATIENT)
Dept: UROLOGY | Facility: CLINIC | Age: 53
End: 2023-09-19
Payer: COMMERCIAL

## 2023-09-19 VITALS
WEIGHT: 135 LBS | OXYGEN SATURATION: 100 % | HEIGHT: 70 IN | TEMPERATURE: 98 F | HEART RATE: 99 BPM | DIASTOLIC BLOOD PRESSURE: 70 MMHG | SYSTOLIC BLOOD PRESSURE: 102 MMHG | BODY MASS INDEX: 19.33 KG/M2

## 2023-09-19 DIAGNOSIS — R39.15 URGENCY OF URINATION: ICD-10-CM

## 2023-09-19 DIAGNOSIS — N32.81 OVERACTIVE BLADDER: ICD-10-CM

## 2023-09-19 PROCEDURE — 99024 POSTOP FOLLOW-UP VISIT: CPT

## 2023-09-20 ENCOUNTER — APPOINTMENT (OUTPATIENT)
Dept: MRI IMAGING | Facility: CLINIC | Age: 53
End: 2023-09-20

## 2023-09-20 ENCOUNTER — OUTPATIENT (OUTPATIENT)
Dept: OUTPATIENT SERVICES | Facility: HOSPITAL | Age: 53
LOS: 1 days | End: 2023-09-20
Payer: COMMERCIAL

## 2023-09-20 ENCOUNTER — APPOINTMENT (OUTPATIENT)
Dept: MRI IMAGING | Facility: CLINIC | Age: 53
End: 2023-09-20
Payer: COMMERCIAL

## 2023-09-20 DIAGNOSIS — R13.10 DYSPHAGIA, UNSPECIFIED: ICD-10-CM

## 2023-09-20 DIAGNOSIS — Z98.89 OTHER SPECIFIED POSTPROCEDURAL STATES: Chronic | ICD-10-CM

## 2023-09-20 DIAGNOSIS — Z90.79 ACQUIRED ABSENCE OF OTHER GENITAL ORGAN(S): Chronic | ICD-10-CM

## 2023-09-20 PROCEDURE — 70553 MRI BRAIN STEM W/O & W/DYE: CPT

## 2023-09-20 PROCEDURE — 72156 MRI NECK SPINE W/O & W/DYE: CPT

## 2023-09-20 PROCEDURE — 70553 MRI BRAIN STEM W/O & W/DYE: CPT | Mod: 26

## 2023-09-20 PROCEDURE — A9585: CPT

## 2023-09-20 PROCEDURE — 72156 MRI NECK SPINE W/O & W/DYE: CPT | Mod: 26

## 2023-09-22 ENCOUNTER — APPOINTMENT (OUTPATIENT)
Age: 53
End: 2023-09-22
Payer: COMMERCIAL

## 2023-09-22 PROCEDURE — D9110: CPT

## 2023-09-24 ENCOUNTER — NON-APPOINTMENT (OUTPATIENT)
Age: 53
End: 2023-09-24

## 2023-09-26 ENCOUNTER — OUTPATIENT (OUTPATIENT)
Dept: OUTPATIENT SERVICES | Facility: HOSPITAL | Age: 53
LOS: 1 days | Discharge: HOME | End: 2023-09-26

## 2023-09-26 DIAGNOSIS — Z98.89 OTHER SPECIFIED POSTPROCEDURAL STATES: Chronic | ICD-10-CM

## 2023-09-26 DIAGNOSIS — I89.9 NONINFECTIVE DISORDER OF LYMPHATIC VESSELS AND LYMPH NODES, UNSPECIFIED: Chronic | ICD-10-CM

## 2023-09-26 DIAGNOSIS — C43.8 MALIGNANT MELANOMA OF OVERLAPPING SITES OF SKIN: ICD-10-CM

## 2023-09-26 DIAGNOSIS — Z90.79 ACQUIRED ABSENCE OF OTHER GENITAL ORGAN(S): Chronic | ICD-10-CM

## 2023-09-26 DIAGNOSIS — C43.4 MALIGNANT MELANOMA OF SCALP AND NECK: Chronic | ICD-10-CM

## 2023-09-27 ENCOUNTER — APPOINTMENT (OUTPATIENT)
Dept: MRI IMAGING | Facility: CLINIC | Age: 53
End: 2023-09-27
Payer: COMMERCIAL

## 2023-09-27 ENCOUNTER — APPOINTMENT (OUTPATIENT)
Dept: HEMATOLOGY ONCOLOGY | Facility: CLINIC | Age: 53
End: 2023-09-27

## 2023-09-27 ENCOUNTER — OUTPATIENT (OUTPATIENT)
Dept: OUTPATIENT SERVICES | Facility: HOSPITAL | Age: 53
LOS: 1 days | End: 2023-09-27
Payer: COMMERCIAL

## 2023-09-27 DIAGNOSIS — R13.10 DYSPHAGIA, UNSPECIFIED: ICD-10-CM

## 2023-09-27 DIAGNOSIS — I89.9 NONINFECTIVE DISORDER OF LYMPHATIC VESSELS AND LYMPH NODES, UNSPECIFIED: Chronic | ICD-10-CM

## 2023-09-27 DIAGNOSIS — Z98.89 OTHER SPECIFIED POSTPROCEDURAL STATES: Chronic | ICD-10-CM

## 2023-09-27 DIAGNOSIS — Z90.79 ACQUIRED ABSENCE OF OTHER GENITAL ORGAN(S): Chronic | ICD-10-CM

## 2023-09-27 DIAGNOSIS — C43.4 MALIGNANT MELANOMA OF SCALP AND NECK: Chronic | ICD-10-CM

## 2023-09-27 PROCEDURE — 70543 MRI ORBT/FAC/NCK W/O &W/DYE: CPT | Mod: 26

## 2023-09-27 PROCEDURE — A9585: CPT

## 2023-09-27 PROCEDURE — 70543 MRI ORBT/FAC/NCK W/O &W/DYE: CPT

## 2023-09-29 ENCOUNTER — APPOINTMENT (OUTPATIENT)
Dept: ORTHOPEDIC SURGERY | Facility: CLINIC | Age: 53
End: 2023-09-29
Payer: COMMERCIAL

## 2023-09-29 PROCEDURE — 99443: CPT

## 2023-10-01 PROBLEM — Z79.899 MEDICAL MARIJUANA USE: Status: ACTIVE | Noted: 2020-09-10

## 2023-10-02 ENCOUNTER — NON-APPOINTMENT (OUTPATIENT)
Age: 53
End: 2023-10-02

## 2023-10-05 ENCOUNTER — NON-APPOINTMENT (OUTPATIENT)
Age: 53
End: 2023-10-05

## 2023-10-09 ENCOUNTER — APPOINTMENT (OUTPATIENT)
Dept: SURGICAL ONCOLOGY | Facility: CLINIC | Age: 53
End: 2023-10-09
Payer: COMMERCIAL

## 2023-10-09 ENCOUNTER — RESULT REVIEW (OUTPATIENT)
Age: 53
End: 2023-10-09

## 2023-10-09 VITALS
WEIGHT: 135 LBS | BODY MASS INDEX: 19.33 KG/M2 | HEIGHT: 70 IN | DIASTOLIC BLOOD PRESSURE: 79 MMHG | SYSTOLIC BLOOD PRESSURE: 117 MMHG | OXYGEN SATURATION: 99 % | HEART RATE: 77 BPM

## 2023-10-09 PROCEDURE — 99024 POSTOP FOLLOW-UP VISIT: CPT

## 2023-10-27 ENCOUNTER — APPOINTMENT (OUTPATIENT)
Dept: NUCLEAR MEDICINE | Facility: IMAGING CENTER | Age: 53
End: 2023-10-27
Payer: COMMERCIAL

## 2023-10-27 ENCOUNTER — OUTPATIENT (OUTPATIENT)
Dept: OUTPATIENT SERVICES | Facility: HOSPITAL | Age: 53
LOS: 1 days | End: 2023-10-27
Payer: COMMERCIAL

## 2023-10-27 DIAGNOSIS — R59.1 GENERALIZED ENLARGED LYMPH NODES: ICD-10-CM

## 2023-10-27 DIAGNOSIS — Z00.8 ENCOUNTER FOR OTHER GENERAL EXAMINATION: ICD-10-CM

## 2023-10-27 DIAGNOSIS — Z98.89 OTHER SPECIFIED POSTPROCEDURAL STATES: Chronic | ICD-10-CM

## 2023-10-27 DIAGNOSIS — C43.4 MALIGNANT MELANOMA OF SCALP AND NECK: Chronic | ICD-10-CM

## 2023-10-27 DIAGNOSIS — Z90.79 ACQUIRED ABSENCE OF OTHER GENITAL ORGAN(S): Chronic | ICD-10-CM

## 2023-10-27 DIAGNOSIS — I89.9 NONINFECTIVE DISORDER OF LYMPHATIC VESSELS AND LYMPH NODES, UNSPECIFIED: Chronic | ICD-10-CM

## 2023-10-27 PROCEDURE — 78816 PET IMAGE W/CT FULL BODY: CPT | Mod: 26,PI

## 2023-10-27 PROCEDURE — 78816 PET IMAGE W/CT FULL BODY: CPT

## 2023-10-27 PROCEDURE — A9552: CPT

## 2023-10-30 ENCOUNTER — NON-APPOINTMENT (OUTPATIENT)
Age: 53
End: 2023-10-30

## 2023-11-02 ENCOUNTER — OUTPATIENT (OUTPATIENT)
Dept: OUTPATIENT SERVICES | Facility: HOSPITAL | Age: 53
LOS: 1 days | End: 2023-11-02
Payer: COMMERCIAL

## 2023-11-02 VITALS
RESPIRATION RATE: 16 BRPM | TEMPERATURE: 97 F | HEART RATE: 93 BPM | DIASTOLIC BLOOD PRESSURE: 79 MMHG | OXYGEN SATURATION: 97 % | SYSTOLIC BLOOD PRESSURE: 108 MMHG | WEIGHT: 132.06 LBS | HEIGHT: 60 IN

## 2023-11-02 DIAGNOSIS — Z98.890 OTHER SPECIFIED POSTPROCEDURAL STATES: Chronic | ICD-10-CM

## 2023-11-02 DIAGNOSIS — I89.9 NONINFECTIVE DISORDER OF LYMPHATIC VESSELS AND LYMPH NODES, UNSPECIFIED: Chronic | ICD-10-CM

## 2023-11-02 DIAGNOSIS — Z90.79 ACQUIRED ABSENCE OF OTHER GENITAL ORGAN(S): Chronic | ICD-10-CM

## 2023-11-02 DIAGNOSIS — I10 ESSENTIAL (PRIMARY) HYPERTENSION: ICD-10-CM

## 2023-11-02 DIAGNOSIS — R59.1 GENERALIZED ENLARGED LYMPH NODES: ICD-10-CM

## 2023-11-02 DIAGNOSIS — C43.4 MALIGNANT MELANOMA OF SCALP AND NECK: Chronic | ICD-10-CM

## 2023-11-02 DIAGNOSIS — Z98.89 OTHER SPECIFIED POSTPROCEDURAL STATES: Chronic | ICD-10-CM

## 2023-11-02 DIAGNOSIS — E03.9 HYPOTHYROIDISM, UNSPECIFIED: ICD-10-CM

## 2023-11-02 LAB
ALBUMIN SERPL ELPH-MCNC: 4.6 G/DL — SIGNIFICANT CHANGE UP (ref 3.3–5)
ALBUMIN SERPL ELPH-MCNC: 4.6 G/DL — SIGNIFICANT CHANGE UP (ref 3.3–5)
ALP SERPL-CCNC: 59 U/L — SIGNIFICANT CHANGE UP (ref 40–120)
ALP SERPL-CCNC: 59 U/L — SIGNIFICANT CHANGE UP (ref 40–120)
ALT FLD-CCNC: 11 U/L — SIGNIFICANT CHANGE UP (ref 4–41)
ALT FLD-CCNC: 11 U/L — SIGNIFICANT CHANGE UP (ref 4–41)
ANION GAP SERPL CALC-SCNC: 10 MMOL/L — SIGNIFICANT CHANGE UP (ref 7–14)
ANION GAP SERPL CALC-SCNC: 10 MMOL/L — SIGNIFICANT CHANGE UP (ref 7–14)
AST SERPL-CCNC: 23 U/L — SIGNIFICANT CHANGE UP (ref 4–40)
AST SERPL-CCNC: 23 U/L — SIGNIFICANT CHANGE UP (ref 4–40)
BILIRUB SERPL-MCNC: 0.5 MG/DL — SIGNIFICANT CHANGE UP (ref 0.2–1.2)
BILIRUB SERPL-MCNC: 0.5 MG/DL — SIGNIFICANT CHANGE UP (ref 0.2–1.2)
BUN SERPL-MCNC: 15 MG/DL — SIGNIFICANT CHANGE UP (ref 7–23)
BUN SERPL-MCNC: 15 MG/DL — SIGNIFICANT CHANGE UP (ref 7–23)
CALCIUM SERPL-MCNC: 9.4 MG/DL — SIGNIFICANT CHANGE UP (ref 8.4–10.5)
CALCIUM SERPL-MCNC: 9.4 MG/DL — SIGNIFICANT CHANGE UP (ref 8.4–10.5)
CHLORIDE SERPL-SCNC: 99 MMOL/L — SIGNIFICANT CHANGE UP (ref 98–107)
CHLORIDE SERPL-SCNC: 99 MMOL/L — SIGNIFICANT CHANGE UP (ref 98–107)
CO2 SERPL-SCNC: 28 MMOL/L — SIGNIFICANT CHANGE UP (ref 22–31)
CO2 SERPL-SCNC: 28 MMOL/L — SIGNIFICANT CHANGE UP (ref 22–31)
CREAT SERPL-MCNC: 0.95 MG/DL — SIGNIFICANT CHANGE UP (ref 0.5–1.3)
CREAT SERPL-MCNC: 0.95 MG/DL — SIGNIFICANT CHANGE UP (ref 0.5–1.3)
EGFR: 96 ML/MIN/1.73M2 — SIGNIFICANT CHANGE UP
EGFR: 96 ML/MIN/1.73M2 — SIGNIFICANT CHANGE UP
GLUCOSE SERPL-MCNC: 83 MG/DL — SIGNIFICANT CHANGE UP (ref 70–99)
GLUCOSE SERPL-MCNC: 83 MG/DL — SIGNIFICANT CHANGE UP (ref 70–99)
HCT VFR BLD CALC: 43 % — SIGNIFICANT CHANGE UP (ref 39–50)
HCT VFR BLD CALC: 43 % — SIGNIFICANT CHANGE UP (ref 39–50)
HGB BLD-MCNC: 14.1 G/DL — SIGNIFICANT CHANGE UP (ref 13–17)
HGB BLD-MCNC: 14.1 G/DL — SIGNIFICANT CHANGE UP (ref 13–17)
MCHC RBC-ENTMCNC: 27.5 PG — SIGNIFICANT CHANGE UP (ref 27–34)
MCHC RBC-ENTMCNC: 27.5 PG — SIGNIFICANT CHANGE UP (ref 27–34)
MCHC RBC-ENTMCNC: 32.8 GM/DL — SIGNIFICANT CHANGE UP (ref 32–36)
MCHC RBC-ENTMCNC: 32.8 GM/DL — SIGNIFICANT CHANGE UP (ref 32–36)
MCV RBC AUTO: 84 FL — SIGNIFICANT CHANGE UP (ref 80–100)
MCV RBC AUTO: 84 FL — SIGNIFICANT CHANGE UP (ref 80–100)
NRBC # BLD: 0 /100 WBCS — SIGNIFICANT CHANGE UP (ref 0–0)
NRBC # BLD: 0 /100 WBCS — SIGNIFICANT CHANGE UP (ref 0–0)
NRBC # FLD: 0 K/UL — SIGNIFICANT CHANGE UP (ref 0–0)
NRBC # FLD: 0 K/UL — SIGNIFICANT CHANGE UP (ref 0–0)
PLATELET # BLD AUTO: 241 K/UL — SIGNIFICANT CHANGE UP (ref 150–400)
PLATELET # BLD AUTO: 241 K/UL — SIGNIFICANT CHANGE UP (ref 150–400)
POTASSIUM SERPL-MCNC: 3.8 MMOL/L — SIGNIFICANT CHANGE UP (ref 3.5–5.3)
POTASSIUM SERPL-MCNC: 3.8 MMOL/L — SIGNIFICANT CHANGE UP (ref 3.5–5.3)
POTASSIUM SERPL-SCNC: 3.8 MMOL/L — SIGNIFICANT CHANGE UP (ref 3.5–5.3)
POTASSIUM SERPL-SCNC: 3.8 MMOL/L — SIGNIFICANT CHANGE UP (ref 3.5–5.3)
PROT SERPL-MCNC: 7.2 G/DL — SIGNIFICANT CHANGE UP (ref 6–8.3)
PROT SERPL-MCNC: 7.2 G/DL — SIGNIFICANT CHANGE UP (ref 6–8.3)
RBC # BLD: 5.12 M/UL — SIGNIFICANT CHANGE UP (ref 4.2–5.8)
RBC # BLD: 5.12 M/UL — SIGNIFICANT CHANGE UP (ref 4.2–5.8)
RBC # FLD: 13.2 % — SIGNIFICANT CHANGE UP (ref 10.3–14.5)
RBC # FLD: 13.2 % — SIGNIFICANT CHANGE UP (ref 10.3–14.5)
SODIUM SERPL-SCNC: 137 MMOL/L — SIGNIFICANT CHANGE UP (ref 135–145)
SODIUM SERPL-SCNC: 137 MMOL/L — SIGNIFICANT CHANGE UP (ref 135–145)
WBC # BLD: 7.72 K/UL — SIGNIFICANT CHANGE UP (ref 3.8–10.5)
WBC # BLD: 7.72 K/UL — SIGNIFICANT CHANGE UP (ref 3.8–10.5)
WBC # FLD AUTO: 7.72 K/UL — SIGNIFICANT CHANGE UP (ref 3.8–10.5)
WBC # FLD AUTO: 7.72 K/UL — SIGNIFICANT CHANGE UP (ref 3.8–10.5)

## 2023-11-02 PROCEDURE — 93010 ELECTROCARDIOGRAM REPORT: CPT

## 2023-11-02 RX ORDER — DRONABINOL 2.5 MG
0 CAPSULE ORAL
Qty: 0 | Refills: 0 | DISCHARGE

## 2023-11-02 RX ORDER — OXYCODONE HYDROCHLORIDE 5 MG/1
1 TABLET ORAL
Qty: 0 | Refills: 0 | DISCHARGE

## 2023-11-02 RX ORDER — MAGNESIUM OXIDE 400 MG ORAL TABLET 241.3 MG
1 TABLET ORAL
Refills: 0 | DISCHARGE

## 2023-11-02 RX ORDER — ERGOCALCIFEROL 1.25 MG/1
1 CAPSULE ORAL
Qty: 0 | Refills: 0 | DISCHARGE

## 2023-11-02 RX ORDER — GABAPENTIN 400 MG/1
1 CAPSULE ORAL
Qty: 0 | Refills: 0 | DISCHARGE

## 2023-11-02 RX ORDER — FENOFIBRATE,MICRONIZED 130 MG
1 CAPSULE ORAL
Qty: 0 | Refills: 0 | DISCHARGE

## 2023-11-02 RX ORDER — ONDANSETRON 8 MG/1
0 TABLET, FILM COATED ORAL
Qty: 0 | Refills: 0 | DISCHARGE

## 2023-11-02 RX ORDER — OMEPRAZOLE 10 MG/1
1 CAPSULE, DELAYED RELEASE ORAL
Qty: 0 | Refills: 0 | DISCHARGE

## 2023-11-02 RX ORDER — METOPROLOL TARTRATE 50 MG
1 TABLET ORAL
Qty: 0 | Refills: 0 | DISCHARGE

## 2023-11-02 RX ORDER — LEVOTHYROXINE SODIUM 125 MCG
1 TABLET ORAL
Qty: 0 | Refills: 0 | DISCHARGE

## 2023-11-02 RX ORDER — ATORVASTATIN CALCIUM 80 MG/1
1 TABLET, FILM COATED ORAL
Qty: 0 | Refills: 0 | DISCHARGE

## 2023-11-02 RX ORDER — FINASTERIDE 5 MG/1
1 TABLET, FILM COATED ORAL
Qty: 0 | Refills: 0 | DISCHARGE

## 2023-11-02 RX ORDER — TAMSULOSIN HYDROCHLORIDE 0.4 MG/1
1 CAPSULE ORAL
Refills: 0 | DISCHARGE

## 2023-11-02 RX ORDER — ZOLPIDEM TARTRATE 10 MG/1
1 TABLET ORAL
Qty: 0 | Refills: 0 | DISCHARGE

## 2023-11-02 RX ORDER — ESCITALOPRAM OXALATE 10 MG/1
1 TABLET, FILM COATED ORAL
Qty: 0 | Refills: 0 | DISCHARGE

## 2023-11-02 NOTE — H&P PST ADULT - CARDIOVASCULAR
details… regular rate and rhythm/S1 S2 present/normal PMI regular rate and rhythm/S1 S2 present/no gallops/no rub/normal PMI/no pedal edema

## 2023-11-02 NOTE — H&P PST ADULT - LYMPHATIC
No lymphadedenopathy posterior cervical L/posterior cervical R/anterior cervical L/anterior cervical R/supraclavicular L/supraclavicular R

## 2023-11-02 NOTE — H&P PST ADULT - NSICDXPASTSURGICALHX_GEN_ALL_CORE_FT
PAST SURGICAL HISTORY:  History of orchiectomy left 1994    Lymph node disorder s/p abdominal lymph node dissection 1994, 1996    Melanoma of scalp or neck excision 8/18  s/p excision right neck mass & LN dissx    S/P colon resection 1996     PAST SURGICAL HISTORY:  History of infusaport central venous catheter insertion     History of infusaport central venous catheter removal     History of nasal septoplasty     History of orchiectomy left 1994    Lymph node disorder s/p abdominal lymph node dissection 1994, 1996    Melanoma of scalp or neck excision 8/18  s/p excision right neck mass & LN dissx    S/P colon resection 1996

## 2023-11-02 NOTE — ED ADULT NURSE NOTE - NS ED NURSE TRANSPORT WITH
How Many Mls Were Removed From The 40 Mg/Ml (5ml) Vial When Preparing The Injectable Solution?: 0 LR fluids/IV pump

## 2023-11-02 NOTE — H&P PST ADULT - PROBLEM SELECTOR PLAN 1
Schedule for bilateral cervical lymph node biopsies tentatively on 11/08/23. Pre op instructions, famotidine , chlorhexidine gluconate soap given and explained. Pt verbalized understanding.

## 2023-11-02 NOTE — H&P PST ADULT - RESPIRATORY
clear to auscultation bilaterally/no wheezes/no rhonchi/no respiratory distress/no use of accessory muscles/airway patent/breath sounds equal/good air movement/respirations non-labored clear to auscultation bilaterally/no wheezes/no rhonchi/no respiratory distress/no use of accessory muscles/no subcutaneous emphysema/airway patent/breath sounds equal/good air movement/respirations non-labored

## 2023-11-06 PROBLEM — C43.9 METASTATIC MELANOMA: Status: ACTIVE | Noted: 2018-09-10

## 2023-11-07 ENCOUNTER — APPOINTMENT (OUTPATIENT)
Dept: HEMATOLOGY ONCOLOGY | Facility: CLINIC | Age: 53
End: 2023-11-07

## 2023-11-07 ENCOUNTER — TRANSCRIPTION ENCOUNTER (OUTPATIENT)
Age: 53
End: 2023-11-07

## 2023-11-07 VITALS
WEIGHT: 132.06 LBS | DIASTOLIC BLOOD PRESSURE: 69 MMHG | TEMPERATURE: 98 F | SYSTOLIC BLOOD PRESSURE: 102 MMHG | HEIGHT: 60 IN | OXYGEN SATURATION: 100 % | HEART RATE: 92 BPM | RESPIRATION RATE: 16 BRPM

## 2023-11-07 DIAGNOSIS — C43.9 MALIGNANT MELANOMA OF SKIN, UNSPECIFIED: ICD-10-CM

## 2023-11-07 NOTE — ASU PREOPERATIVE ASSESSMENT, ADULT ([facility] ONLY) - FALL HARM RISK - UNIVERSAL INTERVENTIONS
Bed in lowest position, wheels locked, appropriate side rails in place/Call bell, personal items and telephone in reach/Instruct patient to call for assistance before getting out of bed or chair/Non-slip footwear when patient is out of bed/[place] to call system/Physically safe environment - no spills, clutter or unnecessary equipment/Purposeful Proactive Rounding/Room/bathroom lighting operational, light cord in reach

## 2023-11-08 ENCOUNTER — TRANSCRIPTION ENCOUNTER (OUTPATIENT)
Age: 53
End: 2023-11-08

## 2023-11-08 ENCOUNTER — RESULT REVIEW (OUTPATIENT)
Age: 53
End: 2023-11-08

## 2023-11-08 ENCOUNTER — OUTPATIENT (OUTPATIENT)
Dept: OUTPATIENT SERVICES | Facility: HOSPITAL | Age: 53
LOS: 1 days | Discharge: HOME | End: 2023-11-08
Payer: COMMERCIAL

## 2023-11-08 ENCOUNTER — APPOINTMENT (OUTPATIENT)
Dept: SURGICAL ONCOLOGY | Facility: AMBULATORY SURGERY CENTER | Age: 53
End: 2023-11-08
Payer: COMMERCIAL

## 2023-11-08 VITALS
HEART RATE: 86 BPM | TEMPERATURE: 98 F | SYSTOLIC BLOOD PRESSURE: 111 MMHG | OXYGEN SATURATION: 100 % | DIASTOLIC BLOOD PRESSURE: 82 MMHG | RESPIRATION RATE: 16 BRPM

## 2023-11-08 DIAGNOSIS — Z98.890 OTHER SPECIFIED POSTPROCEDURAL STATES: Chronic | ICD-10-CM

## 2023-11-08 DIAGNOSIS — C43.4 MALIGNANT MELANOMA OF SCALP AND NECK: Chronic | ICD-10-CM

## 2023-11-08 DIAGNOSIS — Z90.79 ACQUIRED ABSENCE OF OTHER GENITAL ORGAN(S): Chronic | ICD-10-CM

## 2023-11-08 DIAGNOSIS — I89.9 NONINFECTIVE DISORDER OF LYMPHATIC VESSELS AND LYMPH NODES, UNSPECIFIED: Chronic | ICD-10-CM

## 2023-11-08 DIAGNOSIS — R59.1 GENERALIZED ENLARGED LYMPH NODES: ICD-10-CM

## 2023-11-08 DIAGNOSIS — Z98.89 OTHER SPECIFIED POSTPROCEDURAL STATES: Chronic | ICD-10-CM

## 2023-11-08 PROCEDURE — 88108 CYTOPATH CONCENTRATE TECH: CPT | Mod: 26,59

## 2023-11-08 PROCEDURE — 88360 TUMOR IMMUNOHISTOCHEM/MANUAL: CPT | Mod: 26,59

## 2023-11-08 PROCEDURE — G0452: CPT | Mod: 26

## 2023-11-08 PROCEDURE — 88189 FLOWCYTOMETRY/READ 16 & >: CPT

## 2023-11-08 PROCEDURE — 88307 TISSUE EXAM BY PATHOLOGIST: CPT | Mod: 26

## 2023-11-08 PROCEDURE — 88342 IMHCHEM/IMCYTCHM 1ST ANTB: CPT | Mod: 26

## 2023-11-08 PROCEDURE — 88364 INSITU HYBRIDIZATION (FISH): CPT | Mod: 26

## 2023-11-08 PROCEDURE — 88291 CYTO/MOLECULAR REPORT: CPT | Mod: 59

## 2023-11-08 PROCEDURE — 88341 IMHCHEM/IMCYTCHM EA ADD ANTB: CPT | Mod: 26

## 2023-11-08 PROCEDURE — 88365 INSITU HYBRIDIZATION (FISH): CPT | Mod: 26,59

## 2023-11-08 PROCEDURE — 38510 BIOPSY/REMOVAL LYMPH NODES: CPT | Mod: 50

## 2023-11-08 DEVICE — SURGICEL FIBRILLAR 2 X 4": Type: IMPLANTABLE DEVICE | Site: BILATERAL | Status: FUNCTIONAL

## 2023-11-08 DEVICE — CLIP APPLIER COVIDIEN SURGICLIP 11.5" MEDIUM: Type: IMPLANTABLE DEVICE | Site: BILATERAL | Status: FUNCTIONAL

## 2023-11-08 RX ORDER — OXYCODONE AND ACETAMINOPHEN 5; 325 MG/1; MG/1
5-325 TABLET ORAL
Qty: 10 | Refills: 0 | Status: ACTIVE | COMMUNITY
Start: 2023-11-08 | End: 1900-01-01

## 2023-11-08 NOTE — ASU DISCHARGE PLAN (ADULT/PEDIATRIC) - CARE PROVIDER_API CALL
Sarwat Chandler  Surgery  [address] [place] [id number]  Phone: [phone]  Fax: [phone]  Follow Up Time: 2 weeks

## 2023-11-08 NOTE — ASU DISCHARGE PLAN (ADULT/PEDIATRIC) - NS MD DC FALL RISK RISK
For information on Fall & Injury Prevention, visit: [email or web address] OR  [email or web address] OR  https://www.cdc.gov/steadi/patient.html

## 2023-11-08 NOTE — ASU DISCHARGE PLAN (ADULT/PEDIATRIC) - PHYSICIAN SECTION COMPLETE
----- Message from Jimi Lopez sent at 10/15/2019  3:05 PM CDT -----  Contact: Mariano [phone]  Type: Patient Call Back    Who called:Mariano     What is the request in detail: The patient is requesting a call back from the staff, in regards to receiving pain medication. He stated that he has called several time to speak to Dr. Cortez to get the medication. He reports that he spoke to his son Jonathon, whom also called the office and was told that no info on the patient could be given out, because he is not listed on the chart. The patient stated that he would like to speak to the staff to have his son listed on his chart.     Can the clinic reply by [facility]?no    Would the patient rather a call back or a response via [facility]? Call back    Best call back number:[phone]           Yes

## 2023-11-10 LAB
TM INTERPRETATION: SIGNIFICANT CHANGE UP

## 2023-11-11 ENCOUNTER — INPATIENT (INPATIENT)
Facility: HOSPITAL | Age: 53
LOS: 2 days | Discharge: HOME | End: 2023-11-14
Attending: SURGERY | Admitting: SURGERY
Payer: COMMERCIAL

## 2023-11-11 VITALS
OXYGEN SATURATION: 100 % | RESPIRATION RATE: 18 BRPM | SYSTOLIC BLOOD PRESSURE: 124 MMHG | TEMPERATURE: 98 F | HEART RATE: 112 BPM | DIASTOLIC BLOOD PRESSURE: 78 MMHG | HEIGHT: 60 IN

## 2023-11-11 DIAGNOSIS — Z98.890 OTHER SPECIFIED POSTPROCEDURAL STATES: Chronic | ICD-10-CM

## 2023-11-11 DIAGNOSIS — I89.9 NONINFECTIVE DISORDER OF LYMPHATIC VESSELS AND LYMPH NODES, UNSPECIFIED: Chronic | ICD-10-CM

## 2023-11-11 DIAGNOSIS — Z98.89 OTHER SPECIFIED POSTPROCEDURAL STATES: Chronic | ICD-10-CM

## 2023-11-11 DIAGNOSIS — C43.4 MALIGNANT MELANOMA OF SCALP AND NECK: Chronic | ICD-10-CM

## 2023-11-11 DIAGNOSIS — Z90.79 ACQUIRED ABSENCE OF OTHER GENITAL ORGAN(S): Chronic | ICD-10-CM

## 2023-11-11 PROBLEM — Z92.21 PERSONAL HISTORY OF ANTINEOPLASTIC CHEMOTHERAPY: Chronic | Status: ACTIVE | Noted: 2023-11-02

## 2023-11-11 PROBLEM — E03.9 HYPOTHYROIDISM, UNSPECIFIED: Chronic | Status: ACTIVE | Noted: 2023-11-02

## 2023-11-11 LAB
ALBUMIN SERPL ELPH-MCNC: 4.2 G/DL — SIGNIFICANT CHANGE UP (ref 3.3–5)
ALBUMIN SERPL ELPH-MCNC: 4.2 G/DL — SIGNIFICANT CHANGE UP (ref 3.3–5)
ALP SERPL-CCNC: 77 U/L — SIGNIFICANT CHANGE UP (ref 40–120)
ALP SERPL-CCNC: 77 U/L — SIGNIFICANT CHANGE UP (ref 40–120)
ALT FLD-CCNC: 9 U/L — SIGNIFICANT CHANGE UP (ref 4–41)
ALT FLD-CCNC: 9 U/L — SIGNIFICANT CHANGE UP (ref 4–41)
ANION GAP SERPL CALC-SCNC: 11 MMOL/L — SIGNIFICANT CHANGE UP (ref 7–14)
ANION GAP SERPL CALC-SCNC: 11 MMOL/L — SIGNIFICANT CHANGE UP (ref 7–14)
AST SERPL-CCNC: 12 U/L — SIGNIFICANT CHANGE UP (ref 4–40)
AST SERPL-CCNC: 12 U/L — SIGNIFICANT CHANGE UP (ref 4–40)
BASOPHILS # BLD AUTO: 0.06 K/UL — SIGNIFICANT CHANGE UP (ref 0–0.2)
BASOPHILS # BLD AUTO: 0.06 K/UL — SIGNIFICANT CHANGE UP (ref 0–0.2)
BASOPHILS NFR BLD AUTO: 0.6 % — SIGNIFICANT CHANGE UP (ref 0–2)
BASOPHILS NFR BLD AUTO: 0.6 % — SIGNIFICANT CHANGE UP (ref 0–2)
BILIRUB SERPL-MCNC: 0.8 MG/DL — SIGNIFICANT CHANGE UP (ref 0.2–1.2)
BILIRUB SERPL-MCNC: 0.8 MG/DL — SIGNIFICANT CHANGE UP (ref 0.2–1.2)
BUN SERPL-MCNC: 13 MG/DL — SIGNIFICANT CHANGE UP (ref 7–23)
BUN SERPL-MCNC: 13 MG/DL — SIGNIFICANT CHANGE UP (ref 7–23)
CALCIUM SERPL-MCNC: 9.5 MG/DL — SIGNIFICANT CHANGE UP (ref 8.4–10.5)
CALCIUM SERPL-MCNC: 9.5 MG/DL — SIGNIFICANT CHANGE UP (ref 8.4–10.5)
CHLORIDE SERPL-SCNC: 100 MMOL/L — SIGNIFICANT CHANGE UP (ref 98–107)
CHLORIDE SERPL-SCNC: 100 MMOL/L — SIGNIFICANT CHANGE UP (ref 98–107)
CO2 SERPL-SCNC: 28 MMOL/L — SIGNIFICANT CHANGE UP (ref 22–31)
CO2 SERPL-SCNC: 28 MMOL/L — SIGNIFICANT CHANGE UP (ref 22–31)
CREAT SERPL-MCNC: 0.94 MG/DL — SIGNIFICANT CHANGE UP (ref 0.5–1.3)
CREAT SERPL-MCNC: 0.94 MG/DL — SIGNIFICANT CHANGE UP (ref 0.5–1.3)
EGFR: 97 ML/MIN/1.73M2 — SIGNIFICANT CHANGE UP
EGFR: 97 ML/MIN/1.73M2 — SIGNIFICANT CHANGE UP
EOSINOPHIL # BLD AUTO: 0.18 K/UL — SIGNIFICANT CHANGE UP (ref 0–0.5)
EOSINOPHIL # BLD AUTO: 0.18 K/UL — SIGNIFICANT CHANGE UP (ref 0–0.5)
EOSINOPHIL NFR BLD AUTO: 1.7 % — SIGNIFICANT CHANGE UP (ref 0–6)
EOSINOPHIL NFR BLD AUTO: 1.7 % — SIGNIFICANT CHANGE UP (ref 0–6)
GLUCOSE SERPL-MCNC: 90 MG/DL — SIGNIFICANT CHANGE UP (ref 70–99)
GLUCOSE SERPL-MCNC: 90 MG/DL — SIGNIFICANT CHANGE UP (ref 70–99)
HCT VFR BLD CALC: 39.5 % — SIGNIFICANT CHANGE UP (ref 39–50)
HCT VFR BLD CALC: 39.5 % — SIGNIFICANT CHANGE UP (ref 39–50)
HGB BLD-MCNC: 12.5 G/DL — LOW (ref 13–17)
HGB BLD-MCNC: 12.5 G/DL — LOW (ref 13–17)
IANC: 6.41 K/UL — SIGNIFICANT CHANGE UP (ref 1.8–7.4)
IANC: 6.41 K/UL — SIGNIFICANT CHANGE UP (ref 1.8–7.4)
IMM GRANULOCYTES NFR BLD AUTO: 0.5 % — SIGNIFICANT CHANGE UP (ref 0–0.9)
IMM GRANULOCYTES NFR BLD AUTO: 0.5 % — SIGNIFICANT CHANGE UP (ref 0–0.9)
LYMPHOCYTES # BLD AUTO: 2.8 K/UL — SIGNIFICANT CHANGE UP (ref 1–3.3)
LYMPHOCYTES # BLD AUTO: 2.8 K/UL — SIGNIFICANT CHANGE UP (ref 1–3.3)
LYMPHOCYTES # BLD AUTO: 27 % — SIGNIFICANT CHANGE UP (ref 13–44)
LYMPHOCYTES # BLD AUTO: 27 % — SIGNIFICANT CHANGE UP (ref 13–44)
MCHC RBC-ENTMCNC: 27.4 PG — SIGNIFICANT CHANGE UP (ref 27–34)
MCHC RBC-ENTMCNC: 27.4 PG — SIGNIFICANT CHANGE UP (ref 27–34)
MCHC RBC-ENTMCNC: 31.6 GM/DL — LOW (ref 32–36)
MCHC RBC-ENTMCNC: 31.6 GM/DL — LOW (ref 32–36)
MCV RBC AUTO: 86.4 FL — SIGNIFICANT CHANGE UP (ref 80–100)
MCV RBC AUTO: 86.4 FL — SIGNIFICANT CHANGE UP (ref 80–100)
MONOCYTES # BLD AUTO: 0.88 K/UL — SIGNIFICANT CHANGE UP (ref 0–0.9)
MONOCYTES # BLD AUTO: 0.88 K/UL — SIGNIFICANT CHANGE UP (ref 0–0.9)
MONOCYTES NFR BLD AUTO: 8.5 % — SIGNIFICANT CHANGE UP (ref 2–14)
MONOCYTES NFR BLD AUTO: 8.5 % — SIGNIFICANT CHANGE UP (ref 2–14)
NEUTROPHILS # BLD AUTO: 6.41 K/UL — SIGNIFICANT CHANGE UP (ref 1.8–7.4)
NEUTROPHILS # BLD AUTO: 6.41 K/UL — SIGNIFICANT CHANGE UP (ref 1.8–7.4)
NEUTROPHILS NFR BLD AUTO: 61.7 % — SIGNIFICANT CHANGE UP (ref 43–77)
NEUTROPHILS NFR BLD AUTO: 61.7 % — SIGNIFICANT CHANGE UP (ref 43–77)
NRBC # BLD: 0 /100 WBCS — SIGNIFICANT CHANGE UP (ref 0–0)
NRBC # BLD: 0 /100 WBCS — SIGNIFICANT CHANGE UP (ref 0–0)
NRBC # FLD: 0 K/UL — SIGNIFICANT CHANGE UP (ref 0–0)
NRBC # FLD: 0 K/UL — SIGNIFICANT CHANGE UP (ref 0–0)
PLATELET # BLD AUTO: 252 K/UL — SIGNIFICANT CHANGE UP (ref 150–400)
PLATELET # BLD AUTO: 252 K/UL — SIGNIFICANT CHANGE UP (ref 150–400)
POTASSIUM SERPL-MCNC: 4.3 MMOL/L — SIGNIFICANT CHANGE UP (ref 3.5–5.3)
POTASSIUM SERPL-MCNC: 4.3 MMOL/L — SIGNIFICANT CHANGE UP (ref 3.5–5.3)
POTASSIUM SERPL-SCNC: 4.3 MMOL/L — SIGNIFICANT CHANGE UP (ref 3.5–5.3)
POTASSIUM SERPL-SCNC: 4.3 MMOL/L — SIGNIFICANT CHANGE UP (ref 3.5–5.3)
PROT SERPL-MCNC: 7.3 G/DL — SIGNIFICANT CHANGE UP (ref 6–8.3)
PROT SERPL-MCNC: 7.3 G/DL — SIGNIFICANT CHANGE UP (ref 6–8.3)
RBC # BLD: 4.57 M/UL — SIGNIFICANT CHANGE UP (ref 4.2–5.8)
RBC # BLD: 4.57 M/UL — SIGNIFICANT CHANGE UP (ref 4.2–5.8)
RBC # FLD: 13.5 % — SIGNIFICANT CHANGE UP (ref 10.3–14.5)
RBC # FLD: 13.5 % — SIGNIFICANT CHANGE UP (ref 10.3–14.5)
SODIUM SERPL-SCNC: 139 MMOL/L — SIGNIFICANT CHANGE UP (ref 135–145)
SODIUM SERPL-SCNC: 139 MMOL/L — SIGNIFICANT CHANGE UP (ref 135–145)
WBC # BLD: 10.38 K/UL — SIGNIFICANT CHANGE UP (ref 3.8–10.5)
WBC # BLD: 10.38 K/UL — SIGNIFICANT CHANGE UP (ref 3.8–10.5)
WBC # FLD AUTO: 10.38 K/UL — SIGNIFICANT CHANGE UP (ref 3.8–10.5)
WBC # FLD AUTO: 10.38 K/UL — SIGNIFICANT CHANGE UP (ref 3.8–10.5)

## 2023-11-11 PROCEDURE — 99223 1ST HOSP IP/OBS HIGH 75: CPT

## 2023-11-11 PROCEDURE — 70491 CT SOFT TISSUE NECK W/DYE: CPT | Mod: 26,MA

## 2023-11-11 RX ORDER — HYDROMORPHONE HYDROCHLORIDE 2 MG/ML
1 INJECTION INTRAMUSCULAR; INTRAVENOUS; SUBCUTANEOUS ONCE
Refills: 0 | Status: DISCONTINUED | OUTPATIENT
Start: 2023-11-11 | End: 2023-11-12

## 2023-11-11 RX ORDER — ACETAMINOPHEN 500 MG
1000 TABLET ORAL ONCE
Refills: 0 | Status: COMPLETED | OUTPATIENT
Start: 2023-11-11 | End: 2023-11-11

## 2023-11-11 RX ORDER — AMPICILLIN SODIUM AND SULBACTAM SODIUM 250; 125 MG/ML; MG/ML
3 INJECTION, POWDER, FOR SUSPENSION INTRAMUSCULAR; INTRAVENOUS EVERY 6 HOURS
Refills: 0 | Status: DISCONTINUED | OUTPATIENT
Start: 2023-11-11 | End: 2023-11-14

## 2023-11-11 RX ORDER — ONDANSETRON 8 MG/1
4 TABLET, FILM COATED ORAL EVERY 6 HOURS
Refills: 0 | Status: DISCONTINUED | OUTPATIENT
Start: 2023-11-11 | End: 2023-11-13

## 2023-11-11 RX ORDER — MORPHINE SULFATE 50 MG/1
4 CAPSULE, EXTENDED RELEASE ORAL ONCE
Refills: 0 | Status: DISCONTINUED | OUTPATIENT
Start: 2023-11-11 | End: 2023-11-11

## 2023-11-11 RX ORDER — AMPICILLIN SODIUM AND SULBACTAM SODIUM 250; 125 MG/ML; MG/ML
3 INJECTION, POWDER, FOR SUSPENSION INTRAMUSCULAR; INTRAVENOUS ONCE
Refills: 0 | Status: COMPLETED | OUTPATIENT
Start: 2023-11-11 | End: 2023-11-11

## 2023-11-11 RX ORDER — KETOROLAC TROMETHAMINE 30 MG/ML
15 SYRINGE (ML) INJECTION ONCE
Refills: 0 | Status: DISCONTINUED | OUTPATIENT
Start: 2023-11-11 | End: 2023-11-11

## 2023-11-11 RX ORDER — SODIUM CHLORIDE 9 MG/ML
1000 INJECTION INTRAMUSCULAR; INTRAVENOUS; SUBCUTANEOUS
Refills: 0 | Status: DISCONTINUED | OUTPATIENT
Start: 2023-11-11 | End: 2023-11-13

## 2023-11-11 RX ADMIN — SODIUM CHLORIDE 150 MILLILITER(S): 9 INJECTION INTRAMUSCULAR; INTRAVENOUS; SUBCUTANEOUS at 19:54

## 2023-11-11 RX ADMIN — MORPHINE SULFATE 4 MILLIGRAM(S): 50 CAPSULE, EXTENDED RELEASE ORAL at 18:18

## 2023-11-11 RX ADMIN — AMPICILLIN SODIUM AND SULBACTAM SODIUM 200 GRAM(S): 250; 125 INJECTION, POWDER, FOR SUSPENSION INTRAMUSCULAR; INTRAVENOUS at 16:26

## 2023-11-11 RX ADMIN — Medication 15 MILLIGRAM(S): at 15:11

## 2023-11-11 RX ADMIN — Medication 400 MILLIGRAM(S): at 15:11

## 2023-11-11 RX ADMIN — MORPHINE SULFATE 4 MILLIGRAM(S): 50 CAPSULE, EXTENDED RELEASE ORAL at 22:45

## 2023-11-11 RX ADMIN — AMPICILLIN SODIUM AND SULBACTAM SODIUM 200 GRAM(S): 250; 125 INJECTION, POWDER, FOR SUSPENSION INTRAMUSCULAR; INTRAVENOUS at 21:22

## 2023-11-11 RX ADMIN — MORPHINE SULFATE 4 MILLIGRAM(S): 50 CAPSULE, EXTENDED RELEASE ORAL at 19:11

## 2023-11-11 RX ADMIN — MORPHINE SULFATE 4 MILLIGRAM(S): 50 CAPSULE, EXTENDED RELEASE ORAL at 22:30

## 2023-11-11 NOTE — ED CDU PROVIDER INITIAL DAY NOTE - NS ED ATTENDING STATEMENT MOD
This was a shared visit with the ABDELRAHMAN. I reviewed and verified the documentation and independently performed the documented:

## 2023-11-11 NOTE — ED PROVIDER NOTE - OBJECTIVE STATEMENT
53-year-old male with past medical history of malignancy, melanoma, testicular cancer status post chemo, NHL, BPH, HTN presents today for right-sided pain and trouble swallowing s/p bilateral cervical lymphadenectomy.  Patient states he noticed erythema of the right side yesterday with onset of trouble swallowing earlier this morning.  Contacted his surgeon who told him to get further evaluated in the ED.  Patient denies any fever, chills, nausea, vomiting, trouble breathing, numbness tingling, chest pain, SOB, abdominal pain.  Denies any use of pain medication to provide symptomatic relief. 53-year-old male with past medical history of malignancy, melanoma, testicular cancer status post chemo, NHL, BPH, HTN presents today for right-sided pain and trouble swallowing s/p bilateral cervical lymphadenectomy.  Patient states he noticed erythema of the right side yesterday with onset of trouble swallowing earlier this morning.  Contacted his surgeon who told him to get further evaluated in the ED.  Patient denies any fever, chills, nausea, vomiting, trouble breathing, numbness tingling, chest pain, SOB, abdominal pain.  Denies any use of pain medication to provide symptomatic relief.    Attendin-year-old male presents with pain and redness to the right neck.  He is status post biopsy of a lymph node by Dr. Gonzalez with surgeon on .  He has been having increasing pain and redness for the past 2 days.  He is also having worsening pain when he swallows on the right side.

## 2023-11-11 NOTE — CONSULT NOTE ADULT - ASSESSMENT
ASSESSMENT: 52yo M POD3 from a b/l cervical lymphadenectomy who presents with R neck swelling. CT read for 7cm abscess however given recency of surgery and clinical lack of infection, less likely.    RECOMMENDATIONS:   - would recommend CDU admission for overnight obs  - PO challenge   - Warm compresses to neck  - surgical team to eval x1 overnight and attending team to eval pt in AM    Plan discussed with surgical attending, Dr. Wilson

## 2023-11-11 NOTE — ED ADULT TRIAGE NOTE - CHIEF COMPLAINT QUOTE
Pt reporting to the ED for post op complication.  Pt had R side lymph node removal on nov 8th. Reporting surgical site pain, swelling, redness and can not swallow. Reports mild SOB, 100% on room air. pmh of BPH, skin cancer, testicular cancer, non- hodgkin's lymphoma. not currently on chemo.

## 2023-11-11 NOTE — ED ADULT NURSE NOTE - OBJECTIVE STATEMENT
pt c/o pain to rt side of neck  s/p operation for ca/  iv attempted with no success/  labs sent off  attending aware

## 2023-11-11 NOTE — ED PROVIDER NOTE - PROGRESS NOTE DETAILS
Arianne Ferrera MD PGY3: CT resulted question of abscess vs fluid collection. gas present, possible gas forming organism vs post-surgical finding. Surgery has seen scan, pending recs at this time.

## 2023-11-11 NOTE — ED PROVIDER NOTE - PHYSICAL EXAMINATION
Gen: NAD, non-toxic appearing  Head: normal appearing  HEENT: normal conjunctiva, oral mucosa moist, right peritonsillar swelling, no uvula deviation, erythematous and warm right incision sight w/out purulent drainage on right neck. normal sensation  Lung: no respiratory distress, speaking in full sentences, CTA b/l     CV: regular rate and rhythm, no murmurs  Abd: soft, non distended, non tender   MSK: no visible deformities  Neuro: No focal deficits, AAOx3  Skin: Warm  Psych: normal affect

## 2023-11-11 NOTE — ED CDU PROVIDER INITIAL DAY NOTE - OBJECTIVE STATEMENT
53-year-old male with past medical history of malignancy, melanoma, testicular cancer status post chemo, NHL, BPH, HTN presents today for right-sided pain and trouble swallowing s/p bilateral cervical lymphadenectomy.  Patient states he noticed erythema of the right side yesterday with onset of trouble swallowing earlier this morning.  Contacted his surgeon who told him to get further evaluated in the ED.  Patient denies any fever, chills, nausea, vomiting, trouble breathing, numbness tingling, chest pain, SOB, abdominal pain.  Denies any use of pain medication to provide symptomatic relief.  Attendin-year-old male presents with pain and redness to the right neck.  He is status post biopsy of a lymph node by Dr. Gonzalez with surgeon on .  He has been having increasing pain and redness for the past 2 days.  He is also having worsening pain when he swallows on the right side.    CDU PA Tiberio- HPI as above. Labs stable. CT scan neck revealing: At RIGHT level 2 excisional node biopsy site, gas-containing fluid   collection with rim enhancement dissects anteriorly to involve the   submandibular space. In total, collections spans approximately 7 cm max   AP dimension. This is most consistent with ABSCESS, with surrounding   cellulitis and central edema along pharyngeal wall.  Received IV Unasyn. Patient sent to CDU for IV ABX, pain control, repeat labs in am, surgery following

## 2023-11-11 NOTE — CONSULT NOTE ADULT - ATTENDING COMMENTS
ASSESSMENT: 54yo M POD3 from a b/l cervical lymphadenectomy who presents with R neck swelling. CT read for 7cm abscess however given recency of surgery and clinical lack of infection, less likely.    RECOMMENDATIONS:   - would recommend CDU admission for overnight obs  - PO challenge   - Warm compresses to neck  - surgical team to eval x1 overnight and attending team to eval pt in AM    _______    Seen and agree

## 2023-11-11 NOTE — ED CDU PROVIDER INITIAL DAY NOTE - NSICDXPASTMEDICALHX_GEN_ALL_CORE_FT
PAST MEDICAL HISTORY:  BPH (benign prostatic hyperplasia)     Colitis surgery 1996    GERD (gastroesophageal reflux disease)     Headache, Migraine     History of bone marrow transplant     History of chemotherapy     HTN (hypertension)     Hypertriglyceridemia     Hypothyroidism     Malignant melanoma of scalp RSX 08/18  R neck mass dsx/ LN dsx 10/19/18    NHL (non-Hodgkin's lymphoma) 1999, Radiation to left neck region    Osteoarthritis degenerative disc L3-4    Testicular Cancer 1994, chemotherapy     PAST MEDICAL HISTORY:  BPH (benign prostatic hyperplasia)     Colitis surgery 1996    GERD (gastroesophageal reflux disease)     Headache, Migraine     History of bone marrow transplant     History of chemotherapy     History of Raynaud's syndrome     HTN (hypertension)     Hypertriglyceridemia     Hypothyroidism     Malignant melanoma of scalp RSX 08/18  R neck mass dsx/ LN dsx 10/19/18    NHL (non-Hodgkin's lymphoma) 1999, Radiation to left neck region    Osteoarthritis degenerative disc L3-4    Testicular Cancer 1994, chemotherapy

## 2023-11-11 NOTE — ED CDU PROVIDER INITIAL DAY NOTE - NSICDXPASTSURGICALHX_GEN_ALL_CORE_FT
PAST SURGICAL HISTORY:  History of infusaport central venous catheter insertion     History of infusaport central venous catheter removal     History of nasal septoplasty     History of orchiectomy left 1994    Lymph node disorder s/p abdominal lymph node dissection 1994, 1996    Melanoma of scalp or neck excision 8/18  s/p excision right neck mass & LN dissx    S/P colon resection 1996

## 2023-11-11 NOTE — ED ADULT NURSE REASSESSMENT NOTE - NS ED NURSE REASSESS COMMENT FT1
Break RN: pt a&ox4, endorsing pain to right side of neck and difficulty swallowing. Air way patent, pt speaking in full sentences. Breathing even, unlabored. Pt denies chest pain, sob, n+v, headache, dizziness, excess drooling. Safety maintained.

## 2023-11-11 NOTE — ED PROVIDER NOTE - CLINICAL SUMMARY MEDICAL DECISION MAKING FREE TEXT BOX
53-year-old male with past medical history of malignancy, melanoma, testicular cancer status post chemo, NHL, BPH, HTN presents today for right-sided pain and trouble swallowing s/p bilateral cervical lymphadenectomy.  Patient states he noticed erythema of the right side yesterday with onset of trouble swallowing earlier this morning.  Contacted his surgeon who told him to get further evaluated in the ED.  Patient denies any fever, chills, nausea, vomiting, trouble breathing, numbness tingling, chest pain, SOB, abdominal pain.  Denies any use of pain medication to provide symptomatic relief.    Given recent history of dysphagia with erythema along the right incision site and right peritonsillar swelling on physical exam status post bilateral cervical lymph lymphadenectomy will order CBC, CMP to evaluate for infectious etiology versus abscess formation.  We will contact surgeon 53-year-old male with past medical history of malignancy, melanoma, testicular cancer status post chemo, NHL, BPH, HTN presents today for right-sided pain and trouble swallowing s/p bilateral cervical lymphadenectomy.  Patient states he noticed erythema of the right side yesterday with onset of trouble swallowing earlier this morning.  Contacted his surgeon who told him to get further evaluated in the ED.  Patient denies any fever, chills, nausea, vomiting, trouble breathing, numbness tingling, chest pain, SOB, abdominal pain.  Denies any use of pain medication to provide symptomatic relief.    Given recent history of dysphagia with erythema along the right incision site and right peritonsillar swelling on physical exam status post bilateral cervical lymph lymphadenectomy will order CBC, CMP to evaluate for infectious etiology versus abscess formation.  We will contact surgeon, for further recommendation with possible soft tissue imaging.

## 2023-11-12 DIAGNOSIS — I10 ESSENTIAL (PRIMARY) HYPERTENSION: ICD-10-CM

## 2023-11-12 LAB
ALBUMIN SERPL ELPH-MCNC: 3.4 G/DL — SIGNIFICANT CHANGE UP (ref 3.3–5)
ALBUMIN SERPL ELPH-MCNC: 3.4 G/DL — SIGNIFICANT CHANGE UP (ref 3.3–5)
ALP SERPL-CCNC: 69 U/L — SIGNIFICANT CHANGE UP (ref 40–120)
ALP SERPL-CCNC: 69 U/L — SIGNIFICANT CHANGE UP (ref 40–120)
ALT FLD-CCNC: 8 U/L — SIGNIFICANT CHANGE UP (ref 4–41)
ALT FLD-CCNC: 8 U/L — SIGNIFICANT CHANGE UP (ref 4–41)
ANION GAP SERPL CALC-SCNC: 11 MMOL/L — SIGNIFICANT CHANGE UP (ref 7–14)
ANION GAP SERPL CALC-SCNC: 11 MMOL/L — SIGNIFICANT CHANGE UP (ref 7–14)
AST SERPL-CCNC: 13 U/L — SIGNIFICANT CHANGE UP (ref 4–40)
AST SERPL-CCNC: 13 U/L — SIGNIFICANT CHANGE UP (ref 4–40)
BASE EXCESS BLDV CALC-SCNC: 0.1 MMOL/L — SIGNIFICANT CHANGE UP (ref -2–3)
BASE EXCESS BLDV CALC-SCNC: 0.1 MMOL/L — SIGNIFICANT CHANGE UP (ref -2–3)
BASOPHILS # BLD AUTO: 0.04 K/UL — SIGNIFICANT CHANGE UP (ref 0–0.2)
BASOPHILS # BLD AUTO: 0.04 K/UL — SIGNIFICANT CHANGE UP (ref 0–0.2)
BASOPHILS NFR BLD AUTO: 0.5 % — SIGNIFICANT CHANGE UP (ref 0–2)
BASOPHILS NFR BLD AUTO: 0.5 % — SIGNIFICANT CHANGE UP (ref 0–2)
BILIRUB SERPL-MCNC: 0.8 MG/DL — SIGNIFICANT CHANGE UP (ref 0.2–1.2)
BILIRUB SERPL-MCNC: 0.8 MG/DL — SIGNIFICANT CHANGE UP (ref 0.2–1.2)
BLOOD GAS VENOUS COMPREHENSIVE RESULT: SIGNIFICANT CHANGE UP
BLOOD GAS VENOUS COMPREHENSIVE RESULT: SIGNIFICANT CHANGE UP
BUN SERPL-MCNC: 13 MG/DL — SIGNIFICANT CHANGE UP (ref 7–23)
BUN SERPL-MCNC: 13 MG/DL — SIGNIFICANT CHANGE UP (ref 7–23)
CALCIUM SERPL-MCNC: 8.7 MG/DL — SIGNIFICANT CHANGE UP (ref 8.4–10.5)
CALCIUM SERPL-MCNC: 8.7 MG/DL — SIGNIFICANT CHANGE UP (ref 8.4–10.5)
CHLORIDE BLDV-SCNC: 103 MMOL/L — SIGNIFICANT CHANGE UP (ref 96–108)
CHLORIDE BLDV-SCNC: 103 MMOL/L — SIGNIFICANT CHANGE UP (ref 96–108)
CHLORIDE SERPL-SCNC: 102 MMOL/L — SIGNIFICANT CHANGE UP (ref 98–107)
CHLORIDE SERPL-SCNC: 102 MMOL/L — SIGNIFICANT CHANGE UP (ref 98–107)
CO2 BLDV-SCNC: 26.7 MMOL/L — HIGH (ref 22–26)
CO2 BLDV-SCNC: 26.7 MMOL/L — HIGH (ref 22–26)
CO2 SERPL-SCNC: 26 MMOL/L — SIGNIFICANT CHANGE UP (ref 22–31)
CO2 SERPL-SCNC: 26 MMOL/L — SIGNIFICANT CHANGE UP (ref 22–31)
CREAT SERPL-MCNC: 0.77 MG/DL — SIGNIFICANT CHANGE UP (ref 0.5–1.3)
CREAT SERPL-MCNC: 0.77 MG/DL — SIGNIFICANT CHANGE UP (ref 0.5–1.3)
EGFR: 107 ML/MIN/1.73M2 — SIGNIFICANT CHANGE UP
EGFR: 107 ML/MIN/1.73M2 — SIGNIFICANT CHANGE UP
EOSINOPHIL # BLD AUTO: 0.27 K/UL — SIGNIFICANT CHANGE UP (ref 0–0.5)
EOSINOPHIL # BLD AUTO: 0.27 K/UL — SIGNIFICANT CHANGE UP (ref 0–0.5)
EOSINOPHIL NFR BLD AUTO: 3.3 % — SIGNIFICANT CHANGE UP (ref 0–6)
EOSINOPHIL NFR BLD AUTO: 3.3 % — SIGNIFICANT CHANGE UP (ref 0–6)
GAS PNL BLDV: 132 MMOL/L — LOW (ref 136–145)
GAS PNL BLDV: 132 MMOL/L — LOW (ref 136–145)
GLUCOSE BLDV-MCNC: 60 MG/DL — LOW (ref 70–99)
GLUCOSE BLDV-MCNC: 60 MG/DL — LOW (ref 70–99)
GLUCOSE SERPL-MCNC: 61 MG/DL — LOW (ref 70–99)
GLUCOSE SERPL-MCNC: 61 MG/DL — LOW (ref 70–99)
HCO3 BLDV-SCNC: 25 MMOL/L — SIGNIFICANT CHANGE UP (ref 22–29)
HCO3 BLDV-SCNC: 25 MMOL/L — SIGNIFICANT CHANGE UP (ref 22–29)
HCT VFR BLD CALC: 34.2 % — LOW (ref 39–50)
HCT VFR BLD CALC: 34.2 % — LOW (ref 39–50)
HCT VFR BLDA CALC: 35 % — LOW (ref 39–51)
HCT VFR BLDA CALC: 35 % — LOW (ref 39–51)
HGB BLD CALC-MCNC: 11.5 G/DL — LOW (ref 12.6–17.4)
HGB BLD CALC-MCNC: 11.5 G/DL — LOW (ref 12.6–17.4)
HGB BLD-MCNC: 11 G/DL — LOW (ref 13–17)
HGB BLD-MCNC: 11 G/DL — LOW (ref 13–17)
IANC: 5.36 K/UL — SIGNIFICANT CHANGE UP (ref 1.8–7.4)
IANC: 5.36 K/UL — SIGNIFICANT CHANGE UP (ref 1.8–7.4)
IMM GRANULOCYTES NFR BLD AUTO: 0.4 % — SIGNIFICANT CHANGE UP (ref 0–0.9)
IMM GRANULOCYTES NFR BLD AUTO: 0.4 % — SIGNIFICANT CHANGE UP (ref 0–0.9)
LACTATE BLDV-MCNC: 1.5 MMOL/L — SIGNIFICANT CHANGE UP (ref 0.5–2)
LACTATE BLDV-MCNC: 1.5 MMOL/L — SIGNIFICANT CHANGE UP (ref 0.5–2)
LYMPHOCYTES # BLD AUTO: 1.88 K/UL — SIGNIFICANT CHANGE UP (ref 1–3.3)
LYMPHOCYTES # BLD AUTO: 1.88 K/UL — SIGNIFICANT CHANGE UP (ref 1–3.3)
LYMPHOCYTES # BLD AUTO: 22.9 % — SIGNIFICANT CHANGE UP (ref 13–44)
LYMPHOCYTES # BLD AUTO: 22.9 % — SIGNIFICANT CHANGE UP (ref 13–44)
MAGNESIUM SERPL-MCNC: 1.8 MG/DL — SIGNIFICANT CHANGE UP (ref 1.6–2.6)
MAGNESIUM SERPL-MCNC: 1.8 MG/DL — SIGNIFICANT CHANGE UP (ref 1.6–2.6)
MCHC RBC-ENTMCNC: 27.7 PG — SIGNIFICANT CHANGE UP (ref 27–34)
MCHC RBC-ENTMCNC: 27.7 PG — SIGNIFICANT CHANGE UP (ref 27–34)
MCHC RBC-ENTMCNC: 32.2 GM/DL — SIGNIFICANT CHANGE UP (ref 32–36)
MCHC RBC-ENTMCNC: 32.2 GM/DL — SIGNIFICANT CHANGE UP (ref 32–36)
MCV RBC AUTO: 86.1 FL — SIGNIFICANT CHANGE UP (ref 80–100)
MCV RBC AUTO: 86.1 FL — SIGNIFICANT CHANGE UP (ref 80–100)
MONOCYTES # BLD AUTO: 0.62 K/UL — SIGNIFICANT CHANGE UP (ref 0–0.9)
MONOCYTES # BLD AUTO: 0.62 K/UL — SIGNIFICANT CHANGE UP (ref 0–0.9)
MONOCYTES NFR BLD AUTO: 7.6 % — SIGNIFICANT CHANGE UP (ref 2–14)
MONOCYTES NFR BLD AUTO: 7.6 % — SIGNIFICANT CHANGE UP (ref 2–14)
NEUTROPHILS # BLD AUTO: 5.36 K/UL — SIGNIFICANT CHANGE UP (ref 1.8–7.4)
NEUTROPHILS # BLD AUTO: 5.36 K/UL — SIGNIFICANT CHANGE UP (ref 1.8–7.4)
NEUTROPHILS NFR BLD AUTO: 65.3 % — SIGNIFICANT CHANGE UP (ref 43–77)
NEUTROPHILS NFR BLD AUTO: 65.3 % — SIGNIFICANT CHANGE UP (ref 43–77)
NRBC # BLD: 0 /100 WBCS — SIGNIFICANT CHANGE UP (ref 0–0)
NRBC # BLD: 0 /100 WBCS — SIGNIFICANT CHANGE UP (ref 0–0)
NRBC # FLD: 0 K/UL — SIGNIFICANT CHANGE UP (ref 0–0)
NRBC # FLD: 0 K/UL — SIGNIFICANT CHANGE UP (ref 0–0)
PCO2 BLDV: 43 MMHG — SIGNIFICANT CHANGE UP (ref 42–55)
PCO2 BLDV: 43 MMHG — SIGNIFICANT CHANGE UP (ref 42–55)
PH BLDV: 7.38 — SIGNIFICANT CHANGE UP (ref 7.32–7.43)
PH BLDV: 7.38 — SIGNIFICANT CHANGE UP (ref 7.32–7.43)
PHOSPHATE SERPL-MCNC: 3.6 MG/DL — SIGNIFICANT CHANGE UP (ref 2.5–4.5)
PHOSPHATE SERPL-MCNC: 3.6 MG/DL — SIGNIFICANT CHANGE UP (ref 2.5–4.5)
PLATELET # BLD AUTO: 220 K/UL — SIGNIFICANT CHANGE UP (ref 150–400)
PLATELET # BLD AUTO: 220 K/UL — SIGNIFICANT CHANGE UP (ref 150–400)
PO2 BLDV: 157 MMHG — HIGH (ref 25–45)
PO2 BLDV: 157 MMHG — HIGH (ref 25–45)
POTASSIUM BLDV-SCNC: 3.6 MMOL/L — SIGNIFICANT CHANGE UP (ref 3.5–5.1)
POTASSIUM BLDV-SCNC: 3.6 MMOL/L — SIGNIFICANT CHANGE UP (ref 3.5–5.1)
POTASSIUM SERPL-MCNC: 3.7 MMOL/L — SIGNIFICANT CHANGE UP (ref 3.5–5.3)
POTASSIUM SERPL-MCNC: 3.7 MMOL/L — SIGNIFICANT CHANGE UP (ref 3.5–5.3)
POTASSIUM SERPL-SCNC: 3.7 MMOL/L — SIGNIFICANT CHANGE UP (ref 3.5–5.3)
POTASSIUM SERPL-SCNC: 3.7 MMOL/L — SIGNIFICANT CHANGE UP (ref 3.5–5.3)
PROT SERPL-MCNC: 6.3 G/DL — SIGNIFICANT CHANGE UP (ref 6–8.3)
PROT SERPL-MCNC: 6.3 G/DL — SIGNIFICANT CHANGE UP (ref 6–8.3)
RBC # BLD: 3.97 M/UL — LOW (ref 4.2–5.8)
RBC # BLD: 3.97 M/UL — LOW (ref 4.2–5.8)
RBC # FLD: 13.4 % — SIGNIFICANT CHANGE UP (ref 10.3–14.5)
RBC # FLD: 13.4 % — SIGNIFICANT CHANGE UP (ref 10.3–14.5)
SAO2 % BLDV: 99.2 % — HIGH (ref 67–88)
SAO2 % BLDV: 99.2 % — HIGH (ref 67–88)
SODIUM SERPL-SCNC: 139 MMOL/L — SIGNIFICANT CHANGE UP (ref 135–145)
SODIUM SERPL-SCNC: 139 MMOL/L — SIGNIFICANT CHANGE UP (ref 135–145)
WBC # BLD: 8.2 K/UL — SIGNIFICANT CHANGE UP (ref 3.8–10.5)
WBC # BLD: 8.2 K/UL — SIGNIFICANT CHANGE UP (ref 3.8–10.5)
WBC # FLD AUTO: 8.2 K/UL — SIGNIFICANT CHANGE UP (ref 3.8–10.5)
WBC # FLD AUTO: 8.2 K/UL — SIGNIFICANT CHANGE UP (ref 3.8–10.5)

## 2023-11-12 PROCEDURE — 99238 HOSP IP/OBS DSCHRG MGMT 30/<: CPT

## 2023-11-12 PROCEDURE — 99222 1ST HOSP IP/OBS MODERATE 55: CPT

## 2023-11-12 RX ORDER — HYDROMORPHONE HYDROCHLORIDE 2 MG/ML
0.5 INJECTION INTRAMUSCULAR; INTRAVENOUS; SUBCUTANEOUS EVERY 4 HOURS
Refills: 0 | Status: DISCONTINUED | OUTPATIENT
Start: 2023-11-12 | End: 2023-11-14

## 2023-11-12 RX ORDER — HYDROMORPHONE HYDROCHLORIDE 2 MG/ML
1 INJECTION INTRAMUSCULAR; INTRAVENOUS; SUBCUTANEOUS ONCE
Refills: 0 | Status: DISCONTINUED | OUTPATIENT
Start: 2023-11-12 | End: 2023-11-12

## 2023-11-12 RX ORDER — HYDROMORPHONE HYDROCHLORIDE 2 MG/ML
1 INJECTION INTRAMUSCULAR; INTRAVENOUS; SUBCUTANEOUS EVERY 4 HOURS
Refills: 0 | Status: DISCONTINUED | OUTPATIENT
Start: 2023-11-12 | End: 2023-11-14

## 2023-11-12 RX ORDER — KETOROLAC TROMETHAMINE 30 MG/ML
30 SYRINGE (ML) INJECTION EVERY 6 HOURS
Refills: 0 | Status: DISCONTINUED | OUTPATIENT
Start: 2023-11-12 | End: 2023-11-12

## 2023-11-12 RX ORDER — ACETAMINOPHEN 500 MG
1000 TABLET ORAL EVERY 6 HOURS
Refills: 0 | Status: COMPLETED | OUTPATIENT
Start: 2023-11-12 | End: 2023-11-13

## 2023-11-12 RX ORDER — LEVOTHYROXINE SODIUM 125 MCG
100 TABLET ORAL DAILY
Refills: 0 | Status: DISCONTINUED | OUTPATIENT
Start: 2023-11-12 | End: 2023-11-14

## 2023-11-12 RX ORDER — ENOXAPARIN SODIUM 100 MG/ML
40 INJECTION SUBCUTANEOUS EVERY 24 HOURS
Refills: 0 | Status: DISCONTINUED | OUTPATIENT
Start: 2023-11-12 | End: 2023-11-14

## 2023-11-12 RX ORDER — METOPROLOL TARTRATE 50 MG
50 TABLET ORAL DAILY
Refills: 0 | Status: DISCONTINUED | OUTPATIENT
Start: 2023-11-12 | End: 2023-11-14

## 2023-11-12 RX ADMIN — AMPICILLIN SODIUM AND SULBACTAM SODIUM 200 GRAM(S): 250; 125 INJECTION, POWDER, FOR SUSPENSION INTRAMUSCULAR; INTRAVENOUS at 14:04

## 2023-11-12 RX ADMIN — Medication 30 MILLIGRAM(S): at 04:01

## 2023-11-12 RX ADMIN — HYDROMORPHONE HYDROCHLORIDE 1 MILLIGRAM(S): 2 INJECTION INTRAMUSCULAR; INTRAVENOUS; SUBCUTANEOUS at 09:21

## 2023-11-12 RX ADMIN — HYDROMORPHONE HYDROCHLORIDE 1 MILLIGRAM(S): 2 INJECTION INTRAMUSCULAR; INTRAVENOUS; SUBCUTANEOUS at 13:18

## 2023-11-12 RX ADMIN — Medication 400 MILLIGRAM(S): at 17:46

## 2023-11-12 RX ADMIN — SODIUM CHLORIDE 110 MILLILITER(S): 9 INJECTION INTRAMUSCULAR; INTRAVENOUS; SUBCUTANEOUS at 10:43

## 2023-11-12 RX ADMIN — HYDROMORPHONE HYDROCHLORIDE 1 MILLIGRAM(S): 2 INJECTION INTRAMUSCULAR; INTRAVENOUS; SUBCUTANEOUS at 11:00

## 2023-11-12 RX ADMIN — ENOXAPARIN SODIUM 40 MILLIGRAM(S): 100 INJECTION SUBCUTANEOUS at 22:45

## 2023-11-12 RX ADMIN — HYDROMORPHONE HYDROCHLORIDE 1 MILLIGRAM(S): 2 INJECTION INTRAMUSCULAR; INTRAVENOUS; SUBCUTANEOUS at 00:05

## 2023-11-12 RX ADMIN — AMPICILLIN SODIUM AND SULBACTAM SODIUM 200 GRAM(S): 250; 125 INJECTION, POWDER, FOR SUSPENSION INTRAMUSCULAR; INTRAVENOUS at 08:09

## 2023-11-12 RX ADMIN — HYDROMORPHONE HYDROCHLORIDE 1 MILLIGRAM(S): 2 INJECTION INTRAMUSCULAR; INTRAVENOUS; SUBCUTANEOUS at 01:54

## 2023-11-12 RX ADMIN — Medication 30 MILLIGRAM(S): at 12:46

## 2023-11-12 RX ADMIN — Medication 1000 MILLIGRAM(S): at 23:42

## 2023-11-12 RX ADMIN — HYDROMORPHONE HYDROCHLORIDE 1 MILLIGRAM(S): 2 INJECTION INTRAMUSCULAR; INTRAVENOUS; SUBCUTANEOUS at 13:48

## 2023-11-12 RX ADMIN — HYDROMORPHONE HYDROCHLORIDE 1 MILLIGRAM(S): 2 INJECTION INTRAMUSCULAR; INTRAVENOUS; SUBCUTANEOUS at 04:43

## 2023-11-12 RX ADMIN — HYDROMORPHONE HYDROCHLORIDE 1 MILLIGRAM(S): 2 INJECTION INTRAMUSCULAR; INTRAVENOUS; SUBCUTANEOUS at 19:28

## 2023-11-12 RX ADMIN — Medication 1000 MILLIGRAM(S): at 12:45

## 2023-11-12 RX ADMIN — HYDROMORPHONE HYDROCHLORIDE 1 MILLIGRAM(S): 2 INJECTION INTRAMUSCULAR; INTRAVENOUS; SUBCUTANEOUS at 20:00

## 2023-11-12 RX ADMIN — Medication 400 MILLIGRAM(S): at 12:11

## 2023-11-12 RX ADMIN — SODIUM CHLORIDE 150 MILLILITER(S): 9 INJECTION INTRAMUSCULAR; INTRAVENOUS; SUBCUTANEOUS at 09:24

## 2023-11-12 RX ADMIN — Medication 30 MILLIGRAM(S): at 11:51

## 2023-11-12 RX ADMIN — HYDROMORPHONE HYDROCHLORIDE 1 MILLIGRAM(S): 2 INJECTION INTRAMUSCULAR; INTRAVENOUS; SUBCUTANEOUS at 00:20

## 2023-11-12 RX ADMIN — Medication 400 MILLIGRAM(S): at 23:19

## 2023-11-12 RX ADMIN — AMPICILLIN SODIUM AND SULBACTAM SODIUM 200 GRAM(S): 250; 125 INJECTION, POWDER, FOR SUSPENSION INTRAMUSCULAR; INTRAVENOUS at 20:45

## 2023-11-12 NOTE — ED CDU PROVIDER SUBSEQUENT DAY NOTE - NSICDXPASTMEDICALHX_GEN_ALL_CORE_FT
PAST MEDICAL HISTORY:  BPH (benign prostatic hyperplasia)     Colitis surgery 1996    GERD (gastroesophageal reflux disease)     Headache, Migraine     History of bone marrow transplant     History of chemotherapy     History of Raynaud's syndrome     HTN (hypertension)     Hypertriglyceridemia     Hypothyroidism     Malignant melanoma of scalp RSX 08/18  R neck mass dsx/ LN dsx 10/19/18    NHL (non-Hodgkin's lymphoma) 1999, Radiation to left neck region    Osteoarthritis degenerative disc L3-4    Testicular Cancer 1994, chemotherapy

## 2023-11-12 NOTE — H&P ADULT - ASSESSMENT
53-year-old male status post bilateal cervical lymphadenectomy by Dr. Gonzalez with surgeon on November 8. The patient reports increasing pain and redness, worsening pain when he swallows for the past 2 days.  Physical exam demonstrates erythema and  tenderness on the right side of the neck and radiographic imaging demonstrate possible abscess with surrounding cellulitis. Given findings, the patient will be admitted to the surgical oncology team for further investigation and observation.       Plan:     -Admit the Dr. Wilson  - Upper GI series   - ENT consult  - Continue Antiobiotics   - Pain control prn  - Npo until upper GI series  53-year-old male status post bilateal cervical lymphadenectomy by Dr. Gonzalez with surgeon on November 8. The patient reports increasing pain and redness, worsening pain when he swallows for the past 2 days.  Physical exam demonstrates erythema and  tenderness on the right side of the neck and radiographic imaging demonstrate possible abscess with surrounding cellulitis. Given findings, the patient will be admitted to the surgical oncology team for further investigation and observation.       Plan:     -Admit the Dr. Wilson  - Upper GI series   - ENT consult  - Continue Antibiotics   - Pain control prn  - Npo until upper GI series  53-year-old male status post bilateal cervical lymphadenectomy by Dr. Gonzalez with surgeon on November 8. The patient reports increasing pain and redness, worsening pain when he swallows for the past 2 days.  Physical exam demonstrates erythema and  tenderness on the right side of the neck and radiographic imaging demonstrate possible abscess with surrounding cellulitis. Given findings, the patient will be admitted to the surgical oncology team for further investigation and observation.       Plan:     -Admit to Dr. Wilson  - Upper GI series   - ENT consult  - Continue IV Antibiotics   - Pain control prn  - Npo until upper GI series   - monitor vitals

## 2023-11-12 NOTE — H&P ADULT - HISTORY OF PRESENT ILLNESS
53-year-old male with past medical history of malignancy, melanoma, testicular cancer status post chemo, NHL, BPH, HTN presents today for right-sided pain and trouble swallowing s/p bilateral cervical lymphadenectomy by Dr. Gonzalez with surgeon on November 8.  He has been having increasing pain and redness for the past 2 days.  He is also having worsening pain when he swallows on the right side.    Labs stable. CT scan neck revealing: At RIGHT level 2 excisional node biopsy site, gas-containing fluid.  In total, collections spans approximately 7 cm max  AP dimension. This is most consistent with ABSCESS, with surrounding cellulitis and central edema along pharyngeal wall.    Received IV Unasyn. Patient sent to CDU for IV ABX, pain control, repeat labs in am

## 2023-11-12 NOTE — ED CDU PROVIDER SUBSEQUENT DAY NOTE - CLINICAL SUMMARY MEDICAL DECISION MAKING FREE TEXT BOX
IV Unasyn, analgesia, supportive care, trend labs, recommendations as per Surgery team following pt, general observation care / monitoring.

## 2023-11-12 NOTE — ED CDU PROVIDER SUBSEQUENT DAY NOTE - HISTORY
54 yo male, PMH multiple malignancies (melanoma, testicular cancer s/p chemo, NHL), BPH, HTN p/w right-sided anterior neck pain and trouble swallowing due to pain, pt is s/p bilateral cervical lymphadenectomy/biopsy 11/8/23 w/ surgeon Dr. Gonzalez.  Pt reports noting erythema of the right neck 11/10.  Pt spoke w/ his surgeon who directed pt to ED.  Patient denied any fever, chills, nausea, vomiting, trouble breathing, numbness tingling, chest pain, SOB, abdominal pain.  Denies any use of pain medication to provide symptomatic relief.  In the ED, VSS, pt afebrile.  WBC 10.38, Hb 12.5, platelets 252.  CMP (mildly hemolyzed) grossly unremarkable.  CT neck soft tissue w/ contrast was performed; per radiology report: "...IMPRESSION: At RIGHT level 2 excisional node biopsy site, gas-containing fluid collection with rim enhancement dissects anteriorly to involve the submandibular space. In total, collections spans approximately 7 cm max AP dimension. This is most consistent with ABSCESS, with surrounding cellulitis and central edema along pharyngeal wall. At LEFT level 2 site, smaller gas-containing cavity without similar rim-enhancement to suggest abscess.".  Pt received IV Unasyn, Surgery was consulted; recommendations appreciated. Pt was sent to CDU for continued IV Unasyn, pain control, repeat labs in am, surgery following.  In the interim, pt objectively comfortable appearing though did complain of frequent pain c/w pain he has been having to the neck as above.  Pt did require frequent rounds of narcotic analgesia overnight; pt was started on standing Toradol regimen early this AM for additional supportive care.  No other issues overnight; AM labs are ordered.

## 2023-11-12 NOTE — ED CDU PROVIDER SUBSEQUENT DAY NOTE - ATTENDING APP SHARED VISIT CONTRIBUTION OF CARE
evaluated patient on am rounds  reports his pain has been severe and required multiple rounds of pain meds overnight. Feels like its harder to talk and swallow then it was yesterday. No increase in swelling.  on exam  A & O x 3, nontoxic, mmm, no intraoral lesions, no stridor/drooling/no trismus. +ttp to b/l neck region R>L with mild swelling to R. neck region, no crepitus, no overlying erythema, ; lungs CTAB, heart with reg rhythm without murmur; abdomen soft NTND; extremities with no edema; neuro exam non focal with no motor or sensory deficits  Surgery reevaluated patient right after ed team and recommending admission to their service, additional pain meds ordered to treat his pain, surgery to c/s ent and further  manage. Patient stable at this time, airway patent.

## 2023-11-12 NOTE — ED CDU PROVIDER SUBSEQUENT DAY NOTE - PHYSICAL EXAMINATION
CONSTITUTIONAL:  Well appearing, awake, alert, oriented to person, place, time/situation and in no apparent distress.  Pt. is objectively comfortable appearing and verbalizing in full, clear, effortless sentences.  ENMT: Right anterior cervical region indurated, TTP.  Trachea midline.  Airway patent.  Nasal mucosa clear.  Moist mucous membranes.  Neck supple.  EYES:  Clear OU.  CARDIAC:  Normal rate, regular rhythm.  Heart sounds S1 S2.  No murmurs, gallops, or rubs.  RESPIRATORY:  Breath sounds clear and equal bilaterally.  No wheezes, no rales, no rhonchi.  GASTROINTESTINAL:  Abdomen soft, non-distended, non-tender.  No rebound, no guarding.  NEUROLOGICAL:  Alert and oriented to person/place/time/situation.  No focal deficits; no tremors noted.   MUSCULOSKELETAL:  Range of motion is not limited.    SKIN:  Skin warm, dry.  PSYCHIATRIC:  Alert and oriented to person/place/time/situation.  Mood and affect WNL.  No apparent risk to self or others.

## 2023-11-12 NOTE — H&P ADULT - NSHPPHYSICALEXAM_GEN_ALL_CORE
Gen: NAD, non-toxic appearing  Head: normal appearing  HEENT: erythematous and warm right incision sight w/out purulent drainage on right neck. normal sensation , normal conjunctiva, oral mucosa moist, right peritonsillar swelling, no uvula deviation  Lung: no respiratory distress, speaking in full sentences, CTA b/l     CV: regular rate and rhythm, no murmurs  Abd: soft, non distended, non tender   MSK: no visible deformities  Neuro: No focal deficits, AAOx3, Cranial nerves 2-12 intact   Skin: Warm  Psych: normal affect

## 2023-11-12 NOTE — ED ADULT NURSE REASSESSMENT NOTE - NS ED NURSE REASSESS COMMENT FT1
Break coverage RN: Received patient in CDU5. Patient resting in stretcher, no distress noted. IV fluid and IV antibiotic infusing. Surgery team MD at bedside for evaluation. Side rails up and safety maintained. Call bells within reach. Break coverage RN: Received patient in CDU5. Patient resting in bed, no distress noted. IV fluid and IV antibiotic infusing. Surgery team MD at bedside for evaluation. Side rails up and safety maintained. Call bells within reach.

## 2023-11-12 NOTE — H&P ADULT - ATTENDING COMMENTS
66M with extensive surgical hx including ex lap, cholecystectomy, splenectomy, partial gastrectomy, extended R hemicolectomy for metastatic GIST, excision of abdominal mets x2 03/2022, repair of incisional hernia repair 10/20/23 presents with pneumoperitoneum c/f viscus perforation, moderate ascites, small right pleural effusion and pericardial effusion, now s/p ex-lap, abdominal mass biopsy, washout, drain placements on 10/31. Evening of 11/10 patient spiked a fever. Fever work-up sent with Blood cultures.    Plan  - Diet: full liquids with ensure. Encourage PO intake  - D5LR 100 cc/hour  - C/w zosyn   - f/u with ID given new onset fever in the setting of zosyn   - Pain control  - Monitor drain outputs, replete 0.5 to 1 cc NGT output per shift  - Protonix daily  - DVT prophylaxis  - Per ID, DC caspo and c/w Zosyn 3.375 g IV Q8H while here. Can switch to ertapenem 1g IV Q24H for discharge. No objection to PICC line placement BC negative x 72 hours 11/6      ______________  Seen w team  UGIS/esophagram  Cont ABX   ENT eval

## 2023-11-12 NOTE — PATIENT PROFILE ADULT - FALL HARM RISK - RISK INTERVENTIONS

## 2023-11-12 NOTE — H&P ADULT - NSHPLABSRESULTS_GEN_ALL_CORE
11.0   8.20  )-----------( 220      ( 12 Nov 2023 07:49 )             34.2     11-12    139  |  102  |  13  ----------------------------<  61<L>  3.7   |  26  |  0.77    Ca    8.7      12 Nov 2023 07:49  Phos  3.6     11-12  Mg     1.80     11-12    TPro  6.3  /  Alb  3.4  /  TBili  0.8  /  DBili  x   /  AST  13  /  ALT  8   /  AlkPhos  69  11-12

## 2023-11-12 NOTE — ED CDU PROVIDER DISPOSITION NOTE - CLINICAL COURSE
52 yo male, PMH multiple malignancies (melanoma, testicular cancer s/p chemo, NHL), BPH, HTN p/w right-sided anterior neck pain and trouble swallowing due to pain, pt is s/p bilateral cervical lymphadenectomy/biopsy 11/8/23 w/ surgeon Dr. Gonzalez.  Pt reports noting erythema of the right neck 11/10.  Pt spoke w/ his surgeon who directed pt to ED.  Patient denied any fever, chills, nausea, vomiting, trouble breathing, numbness tingling, chest pain, SOB, abdominal pain.  Denies any use of pain medication to provide symptomatic relief.  In the ED, VSS, pt afebrile.  WBC 10.38, Hb 12.5, platelets 252.  CMP (mildly hemolyzed) grossly unremarkable.  CT neck soft tissue w/ contrast was performed; per radiology report: "...IMPRESSION: At RIGHT level 2 excisional node biopsy site, gas-containing fluid collection with rim enhancement dissects anteriorly to involve the submandibular space. In total, collections spans approximately 7 cm max AP dimension. This is most consistent with ABSCESS, with surrounding cellulitis and central edema along pharyngeal wall. At LEFT level 2 site, smaller gas-containing cavity without similar rim-enhancement to suggest abscess.".  Pt received IV Unasyn, Surgery was consulted; recommendations appreciated. Pt was sent to CDU for continued IV Unasyn, pain control, repeat labs in am, surgery following.  In the interim, pt objectively comfortable appearing though did complain of frequent pain c/w pain he has been having to the neck as above.  Pt did require frequent rounds of narcotic analgesia overnight; pt was started on standing Toradol regimen early this AM for additional supportive care.  No other issues overnight; AM labs are ordered. Pt seen this morning during rounds. Reports feeling worse with throat pain, difficulties swallowing. On exam, no stridor or drooling, no clinical evidence of respiratory distress. Pt seen by surgery this morning, recs appreciated. Admit to Surgeon Dr. Wilson. Pt is agreeable to plan.,

## 2023-11-13 ENCOUNTER — TRANSCRIPTION ENCOUNTER (OUTPATIENT)
Age: 53
End: 2023-11-13

## 2023-11-13 LAB
ANION GAP SERPL CALC-SCNC: 17 MMOL/L — HIGH (ref 7–14)
ANION GAP SERPL CALC-SCNC: 17 MMOL/L — HIGH (ref 7–14)
BUN SERPL-MCNC: 14 MG/DL — SIGNIFICANT CHANGE UP (ref 7–23)
BUN SERPL-MCNC: 14 MG/DL — SIGNIFICANT CHANGE UP (ref 7–23)
CALCIUM SERPL-MCNC: 8.2 MG/DL — LOW (ref 8.4–10.5)
CALCIUM SERPL-MCNC: 8.2 MG/DL — LOW (ref 8.4–10.5)
CHLORIDE SERPL-SCNC: 101 MMOL/L — SIGNIFICANT CHANGE UP (ref 98–107)
CHLORIDE SERPL-SCNC: 101 MMOL/L — SIGNIFICANT CHANGE UP (ref 98–107)
CO2 SERPL-SCNC: 20 MMOL/L — LOW (ref 22–31)
CO2 SERPL-SCNC: 20 MMOL/L — LOW (ref 22–31)
CREAT SERPL-MCNC: 0.71 MG/DL — SIGNIFICANT CHANGE UP (ref 0.5–1.3)
CREAT SERPL-MCNC: 0.71 MG/DL — SIGNIFICANT CHANGE UP (ref 0.5–1.3)
EGFR: 110 ML/MIN/1.73M2 — SIGNIFICANT CHANGE UP
EGFR: 110 ML/MIN/1.73M2 — SIGNIFICANT CHANGE UP
GLUCOSE BLDC GLUCOMTR-MCNC: 109 MG/DL — HIGH (ref 70–99)
GLUCOSE BLDC GLUCOMTR-MCNC: 109 MG/DL — HIGH (ref 70–99)
GLUCOSE BLDC GLUCOMTR-MCNC: 111 MG/DL — HIGH (ref 70–99)
GLUCOSE BLDC GLUCOMTR-MCNC: 111 MG/DL — HIGH (ref 70–99)
GLUCOSE BLDC GLUCOMTR-MCNC: 153 MG/DL — HIGH (ref 70–99)
GLUCOSE BLDC GLUCOMTR-MCNC: 153 MG/DL — HIGH (ref 70–99)
GLUCOSE BLDC GLUCOMTR-MCNC: 36 MG/DL — CRITICAL LOW (ref 70–99)
GLUCOSE BLDC GLUCOMTR-MCNC: 36 MG/DL — CRITICAL LOW (ref 70–99)
GLUCOSE BLDC GLUCOMTR-MCNC: 46 MG/DL — CRITICAL LOW (ref 70–99)
GLUCOSE BLDC GLUCOMTR-MCNC: 46 MG/DL — CRITICAL LOW (ref 70–99)
GLUCOSE BLDC GLUCOMTR-MCNC: 97 MG/DL — SIGNIFICANT CHANGE UP (ref 70–99)
GLUCOSE BLDC GLUCOMTR-MCNC: 97 MG/DL — SIGNIFICANT CHANGE UP (ref 70–99)
GLUCOSE SERPL-MCNC: 36 MG/DL — CRITICAL LOW (ref 70–99)
GLUCOSE SERPL-MCNC: 36 MG/DL — CRITICAL LOW (ref 70–99)
HCT VFR BLD CALC: 32 % — LOW (ref 39–50)
HCT VFR BLD CALC: 32 % — LOW (ref 39–50)
HGB BLD-MCNC: 10.7 G/DL — LOW (ref 13–17)
HGB BLD-MCNC: 10.7 G/DL — LOW (ref 13–17)
MAGNESIUM SERPL-MCNC: 1.8 MG/DL — SIGNIFICANT CHANGE UP (ref 1.6–2.6)
MAGNESIUM SERPL-MCNC: 1.8 MG/DL — SIGNIFICANT CHANGE UP (ref 1.6–2.6)
MCHC RBC-ENTMCNC: 28.4 PG — SIGNIFICANT CHANGE UP (ref 27–34)
MCHC RBC-ENTMCNC: 28.4 PG — SIGNIFICANT CHANGE UP (ref 27–34)
MCHC RBC-ENTMCNC: 33.4 GM/DL — SIGNIFICANT CHANGE UP (ref 32–36)
MCHC RBC-ENTMCNC: 33.4 GM/DL — SIGNIFICANT CHANGE UP (ref 32–36)
MCV RBC AUTO: 84.9 FL — SIGNIFICANT CHANGE UP (ref 80–100)
MCV RBC AUTO: 84.9 FL — SIGNIFICANT CHANGE UP (ref 80–100)
NRBC # BLD: 0 /100 WBCS — SIGNIFICANT CHANGE UP (ref 0–0)
NRBC # BLD: 0 /100 WBCS — SIGNIFICANT CHANGE UP (ref 0–0)
NRBC # FLD: 0 K/UL — SIGNIFICANT CHANGE UP (ref 0–0)
NRBC # FLD: 0 K/UL — SIGNIFICANT CHANGE UP (ref 0–0)
PHOSPHATE SERPL-MCNC: 3.5 MG/DL — SIGNIFICANT CHANGE UP (ref 2.5–4.5)
PHOSPHATE SERPL-MCNC: 3.5 MG/DL — SIGNIFICANT CHANGE UP (ref 2.5–4.5)
PLATELET # BLD AUTO: 212 K/UL — SIGNIFICANT CHANGE UP (ref 150–400)
PLATELET # BLD AUTO: 212 K/UL — SIGNIFICANT CHANGE UP (ref 150–400)
POTASSIUM SERPL-MCNC: 3.8 MMOL/L — SIGNIFICANT CHANGE UP (ref 3.5–5.3)
POTASSIUM SERPL-MCNC: 3.8 MMOL/L — SIGNIFICANT CHANGE UP (ref 3.5–5.3)
POTASSIUM SERPL-SCNC: 3.8 MMOL/L — SIGNIFICANT CHANGE UP (ref 3.5–5.3)
POTASSIUM SERPL-SCNC: 3.8 MMOL/L — SIGNIFICANT CHANGE UP (ref 3.5–5.3)
RBC # BLD: 3.77 M/UL — LOW (ref 4.2–5.8)
RBC # BLD: 3.77 M/UL — LOW (ref 4.2–5.8)
RBC # FLD: 13 % — SIGNIFICANT CHANGE UP (ref 10.3–14.5)
RBC # FLD: 13 % — SIGNIFICANT CHANGE UP (ref 10.3–14.5)
SODIUM SERPL-SCNC: 138 MMOL/L — SIGNIFICANT CHANGE UP (ref 135–145)
SODIUM SERPL-SCNC: 138 MMOL/L — SIGNIFICANT CHANGE UP (ref 135–145)
WBC # BLD: 6.28 K/UL — SIGNIFICANT CHANGE UP (ref 3.8–10.5)
WBC # BLD: 6.28 K/UL — SIGNIFICANT CHANGE UP (ref 3.8–10.5)
WBC # FLD AUTO: 6.28 K/UL — SIGNIFICANT CHANGE UP (ref 3.8–10.5)
WBC # FLD AUTO: 6.28 K/UL — SIGNIFICANT CHANGE UP (ref 3.8–10.5)

## 2023-11-13 PROCEDURE — 74220 X-RAY XM ESOPHAGUS 1CNTRST: CPT | Mod: 26

## 2023-11-13 PROCEDURE — 99232 SBSQ HOSP IP/OBS MODERATE 35: CPT

## 2023-11-13 RX ORDER — TAMSULOSIN HYDROCHLORIDE 0.4 MG/1
0.4 CAPSULE ORAL AT BEDTIME
Refills: 0 | Status: DISCONTINUED | OUTPATIENT
Start: 2023-11-13 | End: 2023-11-14

## 2023-11-13 RX ORDER — HYDROCORTISONE 20 MG
10 TABLET ORAL AT BEDTIME
Refills: 0 | Status: DISCONTINUED | OUTPATIENT
Start: 2023-11-13 | End: 2023-11-14

## 2023-11-13 RX ORDER — SODIUM CHLORIDE 9 MG/ML
1000 INJECTION, SOLUTION INTRAVENOUS
Refills: 0 | Status: DISCONTINUED | OUTPATIENT
Start: 2023-11-13 | End: 2023-11-14

## 2023-11-13 RX ORDER — ACETAMINOPHEN 500 MG
1000 TABLET ORAL EVERY 6 HOURS
Refills: 0 | Status: COMPLETED | OUTPATIENT
Start: 2023-11-13 | End: 2023-11-14

## 2023-11-13 RX ORDER — ATORVASTATIN CALCIUM 80 MG/1
40 TABLET, FILM COATED ORAL AT BEDTIME
Refills: 0 | Status: DISCONTINUED | OUTPATIENT
Start: 2023-11-13 | End: 2023-11-14

## 2023-11-13 RX ORDER — MAGNESIUM SULFATE 500 MG/ML
2 VIAL (ML) INJECTION ONCE
Refills: 0 | Status: COMPLETED | OUTPATIENT
Start: 2023-11-13 | End: 2023-11-13

## 2023-11-13 RX ORDER — POTASSIUM CHLORIDE 20 MEQ
10 PACKET (EA) ORAL
Refills: 0 | Status: COMPLETED | OUTPATIENT
Start: 2023-11-13 | End: 2023-11-13

## 2023-11-13 RX ORDER — HYDROCORTISONE 20 MG
10 TABLET ORAL DAILY
Refills: 0 | Status: DISCONTINUED | OUTPATIENT
Start: 2023-11-13 | End: 2023-11-13

## 2023-11-13 RX ORDER — DEXTROSE 50 % IN WATER 50 %
50 SYRINGE (ML) INTRAVENOUS ONCE
Refills: 0 | Status: COMPLETED | OUTPATIENT
Start: 2023-11-13 | End: 2023-11-13

## 2023-11-13 RX ORDER — HYDROCORTISONE 20 MG
20 TABLET ORAL
Refills: 0 | Status: DISCONTINUED | OUTPATIENT
Start: 2023-11-13 | End: 2023-11-14

## 2023-11-13 RX ADMIN — Medication 400 MILLIGRAM(S): at 05:21

## 2023-11-13 RX ADMIN — Medication 100 MICROGRAM(S): at 05:20

## 2023-11-13 RX ADMIN — Medication 100 MILLIEQUIVALENT(S): at 12:42

## 2023-11-13 RX ADMIN — HYDROMORPHONE HYDROCHLORIDE 1 MILLIGRAM(S): 2 INJECTION INTRAMUSCULAR; INTRAVENOUS; SUBCUTANEOUS at 11:42

## 2023-11-13 RX ADMIN — HYDROMORPHONE HYDROCHLORIDE 1 MILLIGRAM(S): 2 INJECTION INTRAMUSCULAR; INTRAVENOUS; SUBCUTANEOUS at 10:42

## 2023-11-13 RX ADMIN — Medication 10 MILLIGRAM(S): at 21:36

## 2023-11-13 RX ADMIN — ENOXAPARIN SODIUM 40 MILLIGRAM(S): 100 INJECTION SUBCUTANEOUS at 21:36

## 2023-11-13 RX ADMIN — Medication 100 MILLIEQUIVALENT(S): at 10:41

## 2023-11-13 RX ADMIN — AMPICILLIN SODIUM AND SULBACTAM SODIUM 200 GRAM(S): 250; 125 INJECTION, POWDER, FOR SUSPENSION INTRAMUSCULAR; INTRAVENOUS at 08:58

## 2023-11-13 RX ADMIN — HYDROMORPHONE HYDROCHLORIDE 1 MILLIGRAM(S): 2 INJECTION INTRAMUSCULAR; INTRAVENOUS; SUBCUTANEOUS at 14:55

## 2023-11-13 RX ADMIN — TAMSULOSIN HYDROCHLORIDE 0.4 MILLIGRAM(S): 0.4 CAPSULE ORAL at 21:36

## 2023-11-13 RX ADMIN — Medication 100 MILLIEQUIVALENT(S): at 13:50

## 2023-11-13 RX ADMIN — AMPICILLIN SODIUM AND SULBACTAM SODIUM 200 GRAM(S): 250; 125 INJECTION, POWDER, FOR SUSPENSION INTRAMUSCULAR; INTRAVENOUS at 02:09

## 2023-11-13 RX ADMIN — SODIUM CHLORIDE 110 MILLILITER(S): 9 INJECTION, SOLUTION INTRAVENOUS at 08:00

## 2023-11-13 RX ADMIN — Medication 400 MILLIGRAM(S): at 12:37

## 2023-11-13 RX ADMIN — Medication 50 MILLIGRAM(S): at 05:21

## 2023-11-13 RX ADMIN — AMPICILLIN SODIUM AND SULBACTAM SODIUM 200 GRAM(S): 250; 125 INJECTION, POWDER, FOR SUSPENSION INTRAMUSCULAR; INTRAVENOUS at 20:14

## 2023-11-13 RX ADMIN — ATORVASTATIN CALCIUM 40 MILLIGRAM(S): 80 TABLET, FILM COATED ORAL at 21:36

## 2023-11-13 RX ADMIN — HYDROMORPHONE HYDROCHLORIDE 1 MILLIGRAM(S): 2 INJECTION INTRAMUSCULAR; INTRAVENOUS; SUBCUTANEOUS at 20:00

## 2023-11-13 RX ADMIN — HYDROMORPHONE HYDROCHLORIDE 1 MILLIGRAM(S): 2 INJECTION INTRAMUSCULAR; INTRAVENOUS; SUBCUTANEOUS at 05:20

## 2023-11-13 RX ADMIN — HYDROMORPHONE HYDROCHLORIDE 1 MILLIGRAM(S): 2 INJECTION INTRAMUSCULAR; INTRAVENOUS; SUBCUTANEOUS at 23:29

## 2023-11-13 RX ADMIN — HYDROMORPHONE HYDROCHLORIDE 1 MILLIGRAM(S): 2 INJECTION INTRAMUSCULAR; INTRAVENOUS; SUBCUTANEOUS at 00:39

## 2023-11-13 RX ADMIN — Medication 50 MILLILITER(S): at 07:59

## 2023-11-13 RX ADMIN — Medication 400 MILLIGRAM(S): at 23:29

## 2023-11-13 RX ADMIN — Medication 25 GRAM(S): at 10:41

## 2023-11-13 RX ADMIN — HYDROMORPHONE HYDROCHLORIDE 1 MILLIGRAM(S): 2 INJECTION INTRAMUSCULAR; INTRAVENOUS; SUBCUTANEOUS at 15:15

## 2023-11-13 RX ADMIN — Medication 1000 MILLIGRAM(S): at 06:29

## 2023-11-13 RX ADMIN — Medication 400 MILLIGRAM(S): at 17:28

## 2023-11-13 RX ADMIN — HYDROMORPHONE HYDROCHLORIDE 1 MILLIGRAM(S): 2 INJECTION INTRAMUSCULAR; INTRAVENOUS; SUBCUTANEOUS at 01:10

## 2023-11-13 RX ADMIN — AMPICILLIN SODIUM AND SULBACTAM SODIUM 200 GRAM(S): 250; 125 INJECTION, POWDER, FOR SUSPENSION INTRAMUSCULAR; INTRAVENOUS at 13:21

## 2023-11-13 RX ADMIN — HYDROMORPHONE HYDROCHLORIDE 1 MILLIGRAM(S): 2 INJECTION INTRAMUSCULAR; INTRAVENOUS; SUBCUTANEOUS at 19:13

## 2023-11-13 RX ADMIN — HYDROMORPHONE HYDROCHLORIDE 1 MILLIGRAM(S): 2 INJECTION INTRAMUSCULAR; INTRAVENOUS; SUBCUTANEOUS at 04:51

## 2023-11-13 NOTE — PROVIDER CONTACT NOTE (HYPOGLYCEMIA EVENT) - NS PROVIDER CONTACT BACKGROUND-HYPO
Age: 53y    Gender: Male    POCT Blood Glucose:  153 mg/dL (11-13-23 @ 08:25)  46 mg/dL (11-13-23 @ 07:42)  36 mg/dL (11-13-23 @ 07:37)      eMAR:dextrose 50% Injectable   50 milliLiter(s) IV Push (11-13-23 @ 07:59)    levothyroxine   100 MICROGram(s) Oral (11-13-23 @ 05:20)

## 2023-11-13 NOTE — DISCHARGE NOTE PROVIDER - CARE PROVIDER_API CALL
Sarwat Chandler  Surgery  [address] [place] [id number]  Phone: [phone]  Fax: [phone]  Follow Up Time: 2 weeks   Sarwat Chandler  Surgery  [address] [place] [id number]  Phone: [phone]  Fax: [phone]  Follow Up Time: 2 weeks   Sarwat Chandler  Surgery  [address] [place] [id number]  Phone: [phone]  Fax: [phone]  Follow Up Time: 2 weeks

## 2023-11-13 NOTE — DISCHARGE NOTE PROVIDER - NSDCCPCAREPLAN_GEN_ALL_CORE_FT
PRINCIPAL DISCHARGE DIAGNOSIS  Diagnosis: Cellulitis  Assessment and Plan of Treatment:      PRINCIPAL DISCHARGE DIAGNOSIS  Diagnosis: Cellulitis  Assessment and Plan of Treatment: ANTIBIOTIC: Continue to take your antibiotic with food as prescribed until you finish it.  WOUND CARE:  Please keep incisions clean and dry. Please do not Scrub or rub incisions. Do not use lotion or powder on incisions.   BATHING: You may shower and/or sponge bathe. You may use warm soapy water in the shower and rinse, pat dry.  DIET: Regular diet with THICKENED LIQUIDS  NOTIFY YOUR SURGEON IF YOU HAVE: any bleeding that does not stop, any pus draining from your wound(s), any fever (over 100.4 F) persistent nausea/vomiting, or if your pain is not controlled on your discharge pain medications, unable to urinate.  FOLLOW UP:  1. Please follow up with your primary care physician in one week regarding your hospitalization, bring copies of your discharge paperwork.  2. Please follow up with your surgeon, Dr. Chandler. Call [phone] to make an appointment.     PRINCIPAL DISCHARGE DIAGNOSIS  Diagnosis: Cellulitis  Assessment and Plan of Treatment: ANTIBIOTIC: Continue to take your antibiotic with food as prescribed until you finish it.  WOUND CARE:  Please keep incisions clean and dry. Please do not Scrub or rub incisions. Do not use lotion or powder on incisions.   BATHING: You may shower and/or sponge bathe. You may use warm soapy water in the shower and rinse, pat dry.  DIET: Regular diet with THICKENED LIQUIDS  NOTIFY YOUR SURGEON IF YOU HAVE: any bleeding that does not stop, any pus draining from your wound(s), any fever (over 100.4 F) persistent nausea/vomiting, or if your pain is not controlled on your discharge pain medications, unable to urinate.  FOLLOW UP:  1. Please follow up with your primary care physician in one week regarding your hospitalization, bring copies of your discharge paperwork.  2. Please follow up with your surgeon, Dr. Chandler. Call [phone] to make an appointment.     PRINCIPAL DISCHARGE DIAGNOSIS  Diagnosis: Cellulitis  Assessment and Plan of Treatment: ANTIBIOTIC: Continue to take your antibiotic with food as prescribed until you finish it.  WOUND CARE:  Please keep incisions clean and dry. Please do not Scrub or rub incisions. Do not use lotion or powder on incisions.   BATHING: You may shower and/or sponge bathe. You may use warm soapy water in the shower and rinse, pat dry.  DIET: Regular diet with THICKENED LIQUIDS  NOTIFY YOUR SURGEON IF YOU HAVE: any bleeding that does not stop, any pus draining from your wound(s), any fever (over 100.4 F) persistent nausea/vomiting, or if your pain is not controlled on your discharge pain medications, unable to urinate.  FOLLOW UP:  1. Please follow up with your primary care physician in one week regarding your hospitalization, bring copies of your discharge paperwork.  2. Please follow up with your surgeon, Dr. Chandler. Call [phone] to make an appointment.

## 2023-11-13 NOTE — DISCHARGE NOTE PROVIDER - HOSPITAL COURSE
53 year old male with PMH BPH, GERD, HTN, HLD, hypothyroidism, malignant melanoma of scalp 2018, nonHodgkins lymphoma, testicular cancer, recently admitted for bilateral cervical lymphadenectomy 11/8 presents with R sided redness and pain. ENT was consulted and performed flex laryngoscopy demonstrating grossly normal airway with mild edema extending inferiorly glottis causing hoarseness. 11/13 Esophagram: the esophagus demonstrates normal distensibility, contour and course. Contrast passes freely into the stomach.     Diet was restarted and advanced as tolerated. Pain control was transitioned from IV to PO pain meds. At this time, patient is currently ambulating, voiding, tolerating a regular diet. Pain well controlled on PO pain meds. Patient has been deemed stable for discharge home with follow up as an outpatient. 53 year old male with PMH BPH, GERD, HTN, HLD, hypothyroidism, malignant melanoma of scalp 2018, nonHodgkins lymphoma, testicular cancer, recently admitted for bilateral cervical lymphadenectomy 11/8 presents with R sided redness and pain. ENT was consulted and performed flex laryngoscopy demonstrating grossly normal airway with mild edema extending inferiorly glottis causing hoarseness. 11/13 Esophagram: the esophagus demonstrates normal distensibility, contour and course. Contrast passes freely into the stomach.   Diet was restarted as regular with moderately thick liquids. Pain control was transitioned from IV to PO pain meds. 11/14 patient afebrile and with no leukocytosis. Neck pain and swallowing improved. Antibiotics changed from IV Unasyn to PO Keflex. At this time, patient is currently ambulating, voiding, tolerating a regular diet. Pain well controlled on PO pain meds. Patient has been deemed stable for discharge home with follow up as an outpatient.

## 2023-11-13 NOTE — DISCHARGE NOTE PROVIDER - NSDCFUSCHEDAPPT_GEN_ALL_CORE_FT
Azam Jonas  [facility]  [facility]Amb[facility]urg  Scheduled Appointment: 11/28/2023    Azam Jonas  [facility]  UROLOGY [address]  Scheduled Appointment: 12/26/2023     Azam Jonas  [facility]  [facility]AmbSurg  Scheduled Appointment: 11/28/2023    Azam Jonas  [facility]  UROLOGY [address]  Scheduled Appointment: 12/26/2023     Azam Jonas  [facility]  [facility]Amb[facility]urg  Scheduled Appointment: 11/28/2023    Azam Jonas  [facility]  UROLOGY [address]  Scheduled Appointment: 12/26/2023

## 2023-11-13 NOTE — PROGRESS NOTE ADULT - ASSESSMENT
53 year old male with PMH BPH, GERD, HTN, HLD, hypothyroidism, malignant melanoma of scalp 2018, nonHodgkins lymphoma, testicular cancer, recently admitted for bilateral cervical lymphadenectomy 11/8 presents with R sided redness and pain    Plan  - Diet: NPO  - IVF  - Home meds restarted  - Unasyn 3 grams q6  - DVT prophylaxis  - Appreciate ENT recs    D team surgery [id number]

## 2023-11-13 NOTE — CONSULT NOTE ADULT - SUBJECTIVE AND OBJECTIVE BOX
[place] [id number]  53y Male      HPI: 54yo male with past medical history of malignancy, melanoma, testicular cancer status post chemo, NHL, BPH, HTN presents today for right-sided pain and trouble swallowing s/p bilateral cervical lymphadenectomy.  Patient states he noticed erythema of the right side yesterday with onset of pain with swallowing earlier this morning. He called the surgical oncology office who recommended he come to the ED for evaluation. In the ED he is hemodynamically stable, complaining of R sided discomfort and pain with swallowing, "like when I had strep throat". Has had limited PO since onset of sx.     PAST MEDICAL & SURGICAL HISTORY:  Testicular Cancer  1994, chemotherapy      Headache, Migraine      HTN (hypertension)      NHL (non-Hodgkin's lymphoma)  1999, Radiation to left neck region      GERD (gastroesophageal reflux disease)      Colitis  surgery 1996      BPH (benign prostatic hyperplasia)      Osteoarthritis  degenerative disc L3-4      History of bone marrow transplant      Hypertriglyceridemia      Malignant melanoma of scalp  RSX 08/18  R neck mass dsx/ LN dsx 10/19/18      Hypothyroidism      History of chemotherapy      History of orchiectomy  left 1994      S/P colon resection  1996      Lymph node disorder  s/p abdominal lymph node dissection 1994, 1996      Melanoma of scalp or neck  excision 8/18  s/p excision right neck mass & LN dissx      History of nasal septoplasty      History of infusaport central venous catheter insertion      History of infusaport central venous catheter removal            MEDICATIONS  (STANDING):  morphine  - Injectable 4 milliGRAM(s) IV Push Once    MEDICATIONS  (PRN):      Allergies    No Known Allergies    Intolerances        REVIEW OF SYSTEMS    [X] A ten-point review of systems was otherwise negative except as noted.  [ ] Due to altered mental status/intubation, subjective information were not able to be obtained from the patient. History was obtained, to the extent possible, from review of the chart and collateral sources of information.      Vital Signs Last 24 Hrs  T(C): 36.7 (11 Nov 2023 15:24), Max: 36.9 (11 Nov 2023 12:52)  T(F): 98 (11 Nov 2023 15:24), Max: 98.4 (11 Nov 2023 12:52)  HR: 89 (11 Nov 2023 15:24) (89 - 112)  BP: 131/68 (11 Nov 2023 15:24) (124/78 - 131/68)  BP(mean): --  RR: 18 (11 Nov 2023 15:24) (18 - 18)  SpO2: 100% (11 Nov 2023 15:24) (100% - 100%)    Parameters below as of 11 Nov 2023 15:24  Patient On (Oxygen Delivery Method): room air        PHYSICAL EXAM:  GENERAL: NAD, well-appearing  HEENT: R neck palpable swollen mass noted, mobile, firm. Erythema noted. Tender R > L. No saliva pooling noted, swallowing intact.   CHEST/LUNG: Clear to auscultation bilaterally  HEART: Regular rate and rhythm  ABDOMEN: Soft, Nontender, Nondistended;   EXTREMITIES:  No clubbing, cyanosis, or edema      LABS:  Labs:  CAPILLARY BLOOD GLUCOSE                              12.5   10.38 )-----------( 252      ( 11 Nov 2023 14:24 )             39.5       Auto Neutrophil %: 61.7 % (11-11-23 @ 14:24)  Auto Immature Granulocyte %: 0.5 % (11-11-23 @ 14:24)    11-11    139  |  100  |  13  ----------------------------<  90  4.3   |  28  |  0.94      Calcium: 9.5 mg/dL (11-11-23 @ 14:24)      LFTs:             7.3  | 0.8  | 12       ------------------[77      ( 11 Nov 2023 14:24 )  4.2  | x    | 9           Lipase:x      Amylase:x             Coags:            Urinalysis Basic - ( 11 Nov 2023 14:24 )    Color: x / Appearance: x / SG: x / pH: x  Gluc: 90 mg/dL / Ketone: x  / Bili: x / Urobili: x   Blood: x / Protein: x / Nitrite: x   Leuk Esterase: x / RBC: x / WBC x   Sq Epi: x / Non Sq Epi: x / Bacteria: x            RADIOLOGY & ADDITIONAL STUDIES:  < from: CT Neck Soft Tissue w/ IV Cont (11.11.23 @ 16:55) >  IMPRESSION:    At RIGHT level 2 excisional node biopsy site, gas-containing fluid   collection with rim enhancement dissects anteriorly to involve the   submandibular space. In total, collections spans approximately 7 cm max   AP dimension. This is most consistent with ABSCESS, with surrounding   cellulitis and central edema along pharyngeal wall.    At LEFT level 2 site, smaller gas-containing cavity without similar   rim-enhancement to suggest abscess.    --- End of Report ---      < end of copied text >  
HPI:  Patient is a 53y Male with PMH significant for malignancy, melanoma, testicular cancer status post chemo, NHL, BPH, HTN presents today for right-sided pain and trouble swallowing s/p bilateral cervical lymphadenectomy by Dr. Gonzalez with surgeon on 11/8. History of lymphoma. Noted bilateral cervical lymph node enlargement and recently underwent bilateral excisional LN biopsy level 2. First developed right sided swelling and tenderness on 11/11. No fevers. Painful swallow on right side and trouble opening mouth. Some pain to right neck with neck movement. No trouble breathing. No drooling.     Labs normal. CT neck performed (see below).     Physical Exam  T(C): 36.7 (11-12-23 @ 23:52), Max: 37 (11-12-23 @ 09:40)  HR: 66 (11-12-23 @ 23:52) (66 - 78)  BP: 119/61 (11-12-23 @ 23:52) (119/61 - 140/76)  RR: 18 (11-12-23 @ 23:52) (16 - 18)  SpO2: 99% (11-12-23 @ 23:52) (96% - 100%)  General: NAD, A+Ox3  Resp: No respiratory distress, stridor, or stertor  Voice quality: mild hoarseness  Face:  Symmetric without masses or lesions  OU: EOMI  AD: Pinna wnl  AS: Pinna wnl  Nose: nasal cavity clear bilaterally, inferior turbinates normal, mucosa normal without crusting or bleeding  OC/OP: tongue normal, floor of mouth wnl, no masses or lesions, OP clear, no pus from submandibular or parotid duct  Neck: firm induration, no fluctuance to palpation along and below mandible R neck, erythema/cellulitis along right incision, no oozing or drainage, mild tender to palpation; left incision c/d/i soft nontender  CNII-XII intact    LARYNGOSCOPY EXAM:   Verbal consent was obtained from patient prior to procedure.  Indication: neck abscess  Flexible laryngoscopy was performed and revealed the following:    Nasopharynx had no mass or exudate.    Base of tongue was symmetric and not enlarged.    Vallecula was clear    Epiglottis, both aryepiglottic folds and both false vocal folds were normal    Arytenoids both without edema and erythema     + false vocal cord edema. True vocal folds were fully mobile and without lesions.     Post cricoid area was clear + secretions    Interarytenoid edema was absent + secretions  The patient tolerated the procedure well.    IMAGING:    ACC: [id number] EXAM:  CT NECK SOFT TISSUE IC   ORDERED BY: RAFITA FERRER   PROCEDURE DATE:  11/11/2023    INTERPRETATION:  Technique: A thin section axial acquisition was   performed from the skull base to the thoracic inlet.  The data was   reformatted in the sagittal, coronal and axial planes.  Contrast: 90 cc Omnipaque 350 given IV, 10 cc discarded  Clinical Information: Neck redness and swelling. Concern for abscess or   hematoma following lymph node biopsy 11/8.  Prior Studies: MRI neck 09/27/2023, PET/CT 10/27/2023  Findings:  The patient is status post interval excisional node biopsy at bilateral   level 2/infraparotid regions.  On the right there is fluid collection with peripheral enhancement with   extension anteriorly to involve the submandibular gland. Wall enhancement   and somewhat loculated appearance is compatible with abscess. The   posterior fluid pocket measures 1.7 x 3.6 x 3.4 cm (transverse x AP x   CC), and contains gas which may be residual from surgery or sign of   gas-forming organism. The anterior/submandibular space abscess measures   1.6 x 2.8 x 1.7 cm and does not contain gas, also rim enhancing and   inseparable from the gland. In total, AP extent of both sites is nearly 7   cm. The right sternocleidomastoid muscle bordered by collection shows   myositic enlargement and enhancement without gross intramuscular abscess.   The right internal jugular vein is narrowed but not thrombosed   posterior/medial to node resection bed. Cellulitis involving right   lateral pharyngeal wall without significant airway compromise, and there   is edematous appearance of the retropharyngeal space without organized   collection. Larynx appears unremarkable aside from pooling secretions in   hypopharynx.  On the left, there is a smaller pocket of gas at the node resection bed   measuring up to 12 mm diameter without a drainable fluid collection. No   rim-enhancement.  No residual adenopathy is identified on this scan, and mild increase in   size of bilateral jugular chain nodes is likely reactive to above.  Included lung apices are clear aside from apical fibrotic change. Trachea   is midline.  The left submandibular gland is unremarkable/unchanged. No primary   parotid pathology.  No orbital mass or mass effect. The included intracranial compartment is   grossly unremarkable. Skull base is intact. Paranasal sinuses are free of   acute disease with postoperative changes redemonstrated. Mastoids are   clear.  IMPRESSION:  At RIGHT level 2 excisional node biopsy site, gas-containing fluid   collection with rim enhancement dissects anteriorly to involve the   submandibular space. In total, collections spans approximately 7 cm max   AP dimension. This is most consistent with ABSCESS, with surrounding   cellulitis and central edema along pharyngeal wall.  At LEFT level 2 site, smaller gas-containing cavity without similar   rim-enhancement to suggest abscess.  --- End of Report ---  JINA LERMA MD; Attending Radiologist  This document has been electronically signed. Nov 11 2023  5:19PM    A/P: 53y Male PMH significant for malignancy, melanoma, testicular cancer status post chemo, NHL, BPH, HTN p presents today for right-sided pain and trouble swallowing s/p bilateral cervical lymphadenectomy. Symptoms and imaging consistent with a right sided surgical site infection. Currently in no acute distress, afebrile, stable vital signs, no leukocytosis. Flexible laryngoscopy shows grossly normal airway with mild edema extending inferiorly toward glottis causing hoarseness.     Recommendations  - Continue IV abx  - Decision to drain abscesses per primary  - Please update ENT if ENT involvement requested    --------------------------------------------------------------  Thank you for the consult,    Department of Otolaryngology - Head and Neck Surgery  Peds Page #[phone]  Adult Page #[phone]  ---------------------------------------------------------------

## 2023-11-13 NOTE — PROVIDER CONTACT NOTE (HYPOGLYCEMIA EVENT) - NS PROVIDER CONTACT RECOMMEND-HYPO
FS Q6 AS PER ORDERS 
Detail Level: Zone
Benzoyl Peroxide Counseling: Patient counseled that medicine may cause skin irritation and bleach clothing.  In the event of skin irritation, the patient was advised to reduce the amount of the drug applied or use it less frequently.   The patient verbalized understanding of the proper use and possible adverse effects of benzoyl peroxide.  All of the patient's questions and concerns were addressed.
Isotretinoin Counseling: Patient should get monthly blood tests, not donate blood, not drive at night if vision affected, not share medication, and not undergo elective surgery for 6 months after tx completed. Side effects reviewed, pt to contact office should one occur.
Bactrim Counseling:  I discussed with the patient the risks of sulfa antibiotics including but not limited to GI upset, allergic reaction, drug rash, diarrhea, dizziness, photosensitivity, and yeast infections.  Rarely, more serious reactions can occur including but not limited to aplastic anemia, agranulocytosis, methemoglobinemia, blood dyscrasias, liver or kidney failure, lung infiltrates or desquamative/blistering drug rashes.
High Dose Vitamin A Counseling: Side effects reviewed, pt to contact office should one occur.
Use Enhanced Medication Counseling?: No
Topical Sulfur Applications Pregnancy And Lactation Text: This medication is Pregnancy Category C and has an unknown safety profile during pregnancy. It is unknown if this topical medication is excreted in breast milk.
Topical Sulfur Applications Counseling: Topical Sulfur Counseling: Patient counseled that this medication may cause skin irritation or allergic reactions.  In the event of skin irritation, the patient was advised to reduce the amount of the drug applied or use it less frequently.   The patient verbalized understanding of the proper use and possible adverse effects of topical sulfur application.  All of the patient's questions and concerns were addressed.
Tetracycline Pregnancy And Lactation Text: This medication is Pregnancy Category D and not consider safe during pregnancy. It is also excreted in breast milk.
Azithromycin Counseling:  I discussed with the patient the risks of azithromycin including but not limited to GI upset, allergic reaction, drug rash, diarrhea, and yeast infections.
Doxycycline Counseling:  Patient counseled regarding possible photosensitivity and increased risk for sunburn.  Patient instructed to avoid sunlight, if possible.  When exposed to sunlight, patients should wear protective clothing, sunglasses, and sunscreen.  The patient was instructed to call the office immediately if the following severe adverse effects occur:  hearing changes, easy bruising/bleeding, severe headache, or vision changes.  The patient verbalized understanding of the proper use and possible adverse effects of doxycycline.  All of the patient's questions and concerns were addressed.
Birth Control Pills Counseling: Birth Control Pill Counseling: I discussed with the patient the potential side effects of OCPs including but not limited to increased risk of stroke, heart attack, thrombophlebitis, deep venous thrombosis, hepatic adenomas, breast changes, GI upset, headaches, and depression.  The patient verbalized understanding of the proper use and possible adverse effects of OCPs. All of the patient's questions and concerns were addressed.
Isotretinoin Pregnancy And Lactation Text: This medication is Pregnancy Category X and is considered extremely dangerous during pregnancy. It is unknown if it is excreted in breast milk.
Topical Retinoid Pregnancy And Lactation Text: This medication is Pregnancy Category C. It is unknown if this medication is excreted in breast milk.
Erythromycin Counseling:  I discussed with the patient the risks of erythromycin including but not limited to GI upset, allergic reaction, drug rash, diarrhea, increase in liver enzymes, and yeast infections.
Bactrim Pregnancy And Lactation Text: This medication is Pregnancy Category D and is known to cause fetal risk.  It is also excreted in breast milk.
Tazorac Pregnancy And Lactation Text: This medication is not safe during pregnancy. It is unknown if this medication is excreted in breast milk.
Spironolactone Counseling: Patient advised regarding risks of diarrhea, abdominal pain, hyperkalemia, birth defects (for female patients), liver toxicity and renal toxicity. The patient may need blood work to monitor liver and kidney function and potassium levels while on therapy. The patient verbalized understanding of the proper use and possible adverse effects of spironolactone.  All of the patient's questions and concerns were addressed.
Erythromycin Pregnancy And Lactation Text: This medication is Pregnancy Category B and is considered safe during pregnancy. It is also excreted in breast milk.
Azithromycin Pregnancy And Lactation Text: This medication is considered safe during pregnancy and is also secreted in breast milk.
Birth Control Pills Pregnancy And Lactation Text: This medication should be avoided if pregnant and for the first 30 days post-partum.
Topical Retinoid counseling:  Patient advised to apply a pea-sized amount only at bedtime and wait 30 minutes after washing their face before applying.  If too drying, patient may add a non-comedogenic moisturizer. The patient verbalized understanding of the proper use and possible adverse effects of retinoids.  All of the patient's questions and concerns were addressed.
Topical Clindamycin Counseling: Patient counseled that this medication may cause skin irritation or allergic reactions.  In the event of skin irritation, the patient was advised to reduce the amount of the drug applied or use it less frequently.   The patient verbalized understanding of the proper use and possible adverse effects of clindamycin.  All of the patient's questions and concerns were addressed.
Topical Clindamycin Pregnancy And Lactation Text: This medication is Pregnancy Category B and is considered safe during pregnancy. It is unknown if it is excreted in breast milk.
Dapsone Counseling: I discussed with the patient the risks of dapsone including but not limited to hemolytic anemia, agranulocytosis, rashes, methemoglobinemia, kidney failure, peripheral neuropathy, headaches, GI upset, and liver toxicity.  Patients who start dapsone require monitoring including baseline LFTs and weekly CBCs for the first month, then every month thereafter.  The patient verbalized understanding of the proper use and possible adverse effects of dapsone.  All of the patient's questions and concerns were addressed.
High Dose Vitamin A Pregnancy And Lactation Text: High dose vitamin A therapy is contraindicated during pregnancy and breast feeding.
Dapsone Pregnancy And Lactation Text: This medication is Pregnancy Category C and is not considered safe during pregnancy or breast feeding.
Doxycycline Pregnancy And Lactation Text: This medication is Pregnancy Category D and not consider safe during pregnancy. It is also excreted in breast milk but is considered safe for shorter treatment courses.
Benzoyl Peroxide Pregnancy And Lactation Text: This medication is Pregnancy Category C. It is unknown if benzoyl peroxide is excreted in breast milk.
Tazorac Counseling:  Patient advised that medication is irritating and drying.  Patient may need to apply sparingly and wash off after an hour before eventually leaving it on overnight.  The patient verbalized understanding of the proper use and possible adverse effects of tazorac.  All of the patient's questions and concerns were addressed.
Minocycline Counseling: Patient advised regarding possible photosensitivity and discoloration of the teeth, skin, lips, tongue and gums.  Patient instructed to avoid sunlight, if possible.  When exposed to sunlight, patients should wear protective clothing, sunglasses, and sunscreen.  The patient was instructed to call the office immediately if the following severe adverse effects occur:  hearing changes, easy bruising/bleeding, severe headache, or vision changes.  The patient verbalized understanding of the proper use and possible adverse effects of minocycline.  All of the patient's questions and concerns were addressed.
Spironolactone Pregnancy And Lactation Text: This medication can cause feminization of the male fetus and should be avoided during pregnancy. The active metabolite is also found in breast milk.
Tetracycline Counseling: Patient counseled regarding possible photosensitivity and increased risk for sunburn.  Patient instructed to avoid sunlight, if possible.  When exposed to sunlight, patients should wear protective clothing, sunglasses, and sunscreen.  The patient was instructed to call the office immediately if the following severe adverse effects occur:  hearing changes, easy bruising/bleeding, severe headache, or vision changes.  The patient verbalized understanding of the proper use and possible adverse effects of tetracycline.  All of the patient's questions and concerns were addressed. Patient understands to avoid pregnancy while on therapy due to potential birth defects.

## 2023-11-13 NOTE — DISCHARGE NOTE PROVIDER - NSDCMRMEDTOKEN_GEN_ALL_CORE_FT
atorvastatin 40 mg oral tablet: 1 tab(s) orally once a day  finasteride 5 mg oral tablet: 1 tab(s) orally once a day  hydrocortisone 10 mg oral tablet: 1 tab(s) orally once a day (in the afternoon)  hydrocortisone 20 mg oral tablet: 1 tab(s) orally once a day (in the morning)  metoprolol succinate 50 mg oral tablet, extended release: 1 tab(s) orally once a day  OxyCONTIN 10 mg oral tablet, extended release: 1 tab(s) orally 2 times a day  sildenafil 50 mg oral tablet: 1 tab(s) orally once a day  Synthroid 100 mcg (0.1 mg) oral tablet: 1 tab(s) orally once a day  tamsulosin 0.4 mg oral capsule: 1 cap(s) orally once a day  traMADol 50 mg oral tablet: 1 tab(s) orally 2 times a day  Vitamin D2: 1.25 mg orally once a week WEDNESDAY   atorvastatin 40 mg oral tablet: 1 tab(s) orally once a day  cephalexin 500 mg oral capsule: 1 cap(s) orally 4 times a day MDD: 4  finasteride 5 mg oral tablet: 1 tab(s) orally once a day  hydrocortisone 10 mg oral tablet: 1 tab(s) orally once a day (in the afternoon)  hydrocortisone 20 mg oral tablet: 1 tab(s) orally once a day (in the morning)  metoprolol succinate 50 mg oral tablet, extended release: 1 tab(s) orally once a day  OxyCONTIN 10 mg oral tablet, extended release: 1 tab(s) orally 2 times a day  sildenafil 50 mg oral tablet: 1 tab(s) orally once a day  Synthroid 100 mcg (0.1 mg) oral tablet: 1 tab(s) orally once a day  tamsulosin 0.4 mg oral capsule: 1 cap(s) orally once a day  traMADol 50 mg oral tablet: 1 tab(s) orally 2 times a day  Vitamin D2: 1.25 mg orally once a week WEDNESDAY   amoxicillin-clavulanate 875 mg-125 mg oral tablet: 1 tab(s) orally 2 times a day MDD: 2  atorvastatin 40 mg oral tablet: 1 tab(s) orally once a day  finasteride 5 mg oral tablet: 1 tab(s) orally once a day  hydrocortisone 10 mg oral tablet: 1 tab(s) orally once a day (in the afternoon)  hydrocortisone 20 mg oral tablet: 1 tab(s) orally once a day (in the morning)  metoprolol succinate 50 mg oral tablet, extended release: 1 tab(s) orally once a day  OxyCONTIN 10 mg oral tablet, extended release: 1 tab(s) orally 2 times a day  sildenafil 50 mg oral tablet: 1 tab(s) orally once a day  Synthroid 100 mcg (0.1 mg) oral tablet: 1 tab(s) orally once a day  tamsulosin 0.4 mg oral capsule: 1 cap(s) orally once a day  traMADol 50 mg oral tablet: 1 tab(s) orally 2 times a day  Vitamin D2: 1.25 mg orally once a week WEDNESDAY

## 2023-11-13 NOTE — DISCHARGE NOTE PROVIDER - CARE PROVIDERS DIRECT ADDRESSES
[email or web address]

## 2023-11-14 ENCOUNTER — TRANSCRIPTION ENCOUNTER (OUTPATIENT)
Age: 53
End: 2023-11-14

## 2023-11-14 VITALS
OXYGEN SATURATION: 100 % | RESPIRATION RATE: 18 BRPM | SYSTOLIC BLOOD PRESSURE: 117 MMHG | HEART RATE: 73 BPM | TEMPERATURE: 97 F | DIASTOLIC BLOOD PRESSURE: 72 MMHG

## 2023-11-14 LAB
ANION GAP SERPL CALC-SCNC: 10 MMOL/L — SIGNIFICANT CHANGE UP (ref 7–14)
ANION GAP SERPL CALC-SCNC: 10 MMOL/L — SIGNIFICANT CHANGE UP (ref 7–14)
BUN SERPL-MCNC: 4 MG/DL — LOW (ref 7–23)
BUN SERPL-MCNC: 4 MG/DL — LOW (ref 7–23)
CALCIUM SERPL-MCNC: 8.7 MG/DL — SIGNIFICANT CHANGE UP (ref 8.4–10.5)
CALCIUM SERPL-MCNC: 8.7 MG/DL — SIGNIFICANT CHANGE UP (ref 8.4–10.5)
CHLORIDE SERPL-SCNC: 100 MMOL/L — SIGNIFICANT CHANGE UP (ref 98–107)
CHLORIDE SERPL-SCNC: 100 MMOL/L — SIGNIFICANT CHANGE UP (ref 98–107)
CO2 SERPL-SCNC: 25 MMOL/L — SIGNIFICANT CHANGE UP (ref 22–31)
CO2 SERPL-SCNC: 25 MMOL/L — SIGNIFICANT CHANGE UP (ref 22–31)
CREAT SERPL-MCNC: 0.64 MG/DL — SIGNIFICANT CHANGE UP (ref 0.5–1.3)
CREAT SERPL-MCNC: 0.64 MG/DL — SIGNIFICANT CHANGE UP (ref 0.5–1.3)
EGFR: 113 ML/MIN/1.73M2 — SIGNIFICANT CHANGE UP
EGFR: 113 ML/MIN/1.73M2 — SIGNIFICANT CHANGE UP
GLUCOSE BLDC GLUCOMTR-MCNC: 109 MG/DL — HIGH (ref 70–99)
GLUCOSE BLDC GLUCOMTR-MCNC: 109 MG/DL — HIGH (ref 70–99)
GLUCOSE BLDC GLUCOMTR-MCNC: 130 MG/DL — HIGH (ref 70–99)
GLUCOSE BLDC GLUCOMTR-MCNC: 130 MG/DL — HIGH (ref 70–99)
GLUCOSE SERPL-MCNC: 124 MG/DL — HIGH (ref 70–99)
GLUCOSE SERPL-MCNC: 124 MG/DL — HIGH (ref 70–99)
HCT VFR BLD CALC: 32.5 % — LOW (ref 39–50)
HCT VFR BLD CALC: 32.5 % — LOW (ref 39–50)
HEMATOPATHOLOGY REPORT: SIGNIFICANT CHANGE UP
HEMATOPATHOLOGY REPORT: SIGNIFICANT CHANGE UP
HGB BLD-MCNC: 11 G/DL — LOW (ref 13–17)
HGB BLD-MCNC: 11 G/DL — LOW (ref 13–17)
MAGNESIUM SERPL-MCNC: 1.8 MG/DL — SIGNIFICANT CHANGE UP (ref 1.6–2.6)
MAGNESIUM SERPL-MCNC: 1.8 MG/DL — SIGNIFICANT CHANGE UP (ref 1.6–2.6)
MCHC RBC-ENTMCNC: 27.7 PG — SIGNIFICANT CHANGE UP (ref 27–34)
MCHC RBC-ENTMCNC: 27.7 PG — SIGNIFICANT CHANGE UP (ref 27–34)
MCHC RBC-ENTMCNC: 33.8 GM/DL — SIGNIFICANT CHANGE UP (ref 32–36)
MCHC RBC-ENTMCNC: 33.8 GM/DL — SIGNIFICANT CHANGE UP (ref 32–36)
MCV RBC AUTO: 81.9 FL — SIGNIFICANT CHANGE UP (ref 80–100)
MCV RBC AUTO: 81.9 FL — SIGNIFICANT CHANGE UP (ref 80–100)
NRBC # BLD: 0 /100 WBCS — SIGNIFICANT CHANGE UP (ref 0–0)
NRBC # BLD: 0 /100 WBCS — SIGNIFICANT CHANGE UP (ref 0–0)
NRBC # FLD: 0 K/UL — SIGNIFICANT CHANGE UP (ref 0–0)
NRBC # FLD: 0 K/UL — SIGNIFICANT CHANGE UP (ref 0–0)
PHOSPHATE SERPL-MCNC: 2.5 MG/DL — SIGNIFICANT CHANGE UP (ref 2.5–4.5)
PHOSPHATE SERPL-MCNC: 2.5 MG/DL — SIGNIFICANT CHANGE UP (ref 2.5–4.5)
PLATELET # BLD AUTO: 268 K/UL — SIGNIFICANT CHANGE UP (ref 150–400)
PLATELET # BLD AUTO: 268 K/UL — SIGNIFICANT CHANGE UP (ref 150–400)
POTASSIUM SERPL-MCNC: 4 MMOL/L — SIGNIFICANT CHANGE UP (ref 3.5–5.3)
POTASSIUM SERPL-MCNC: 4 MMOL/L — SIGNIFICANT CHANGE UP (ref 3.5–5.3)
POTASSIUM SERPL-SCNC: 4 MMOL/L — SIGNIFICANT CHANGE UP (ref 3.5–5.3)
POTASSIUM SERPL-SCNC: 4 MMOL/L — SIGNIFICANT CHANGE UP (ref 3.5–5.3)
RBC # BLD: 3.97 M/UL — LOW (ref 4.2–5.8)
RBC # BLD: 3.97 M/UL — LOW (ref 4.2–5.8)
RBC # FLD: 12.7 % — SIGNIFICANT CHANGE UP (ref 10.3–14.5)
RBC # FLD: 12.7 % — SIGNIFICANT CHANGE UP (ref 10.3–14.5)
SODIUM SERPL-SCNC: 135 MMOL/L — SIGNIFICANT CHANGE UP (ref 135–145)
SODIUM SERPL-SCNC: 135 MMOL/L — SIGNIFICANT CHANGE UP (ref 135–145)
WBC # BLD: 6.79 K/UL — SIGNIFICANT CHANGE UP (ref 3.8–10.5)
WBC # BLD: 6.79 K/UL — SIGNIFICANT CHANGE UP (ref 3.8–10.5)
WBC # FLD AUTO: 6.79 K/UL — SIGNIFICANT CHANGE UP (ref 3.8–10.5)
WBC # FLD AUTO: 6.79 K/UL — SIGNIFICANT CHANGE UP (ref 3.8–10.5)

## 2023-11-14 PROCEDURE — 99232 SBSQ HOSP IP/OBS MODERATE 35: CPT

## 2023-11-14 RX ORDER — CEPHALEXIN 500 MG
500 CAPSULE ORAL
Refills: 0 | Status: DISCONTINUED | OUTPATIENT
Start: 2023-11-14 | End: 2023-11-14

## 2023-11-14 RX ORDER — OXYCODONE HYDROCHLORIDE 5 MG/1
10 TABLET ORAL EVERY 12 HOURS
Refills: 0 | Status: DISCONTINUED | OUTPATIENT
Start: 2023-11-14 | End: 2023-11-14

## 2023-11-14 RX ORDER — CEPHALEXIN 500 MG
1 CAPSULE ORAL
Qty: 28 | Refills: 0
Start: 2023-11-14 | End: 2023-11-20

## 2023-11-14 RX ADMIN — Medication 1000 MILLIGRAM(S): at 06:11

## 2023-11-14 RX ADMIN — OXYCODONE HYDROCHLORIDE 10 MILLIGRAM(S): 5 TABLET ORAL at 09:00

## 2023-11-14 RX ADMIN — Medication 50 MILLIGRAM(S): at 06:05

## 2023-11-14 RX ADMIN — HYDROMORPHONE HYDROCHLORIDE 1 MILLIGRAM(S): 2 INJECTION INTRAMUSCULAR; INTRAVENOUS; SUBCUTANEOUS at 00:00

## 2023-11-14 RX ADMIN — AMPICILLIN SODIUM AND SULBACTAM SODIUM 200 GRAM(S): 250; 125 INJECTION, POWDER, FOR SUSPENSION INTRAMUSCULAR; INTRAVENOUS at 02:32

## 2023-11-14 RX ADMIN — Medication 400 MILLIGRAM(S): at 06:04

## 2023-11-14 RX ADMIN — Medication 1000 MILLIGRAM(S): at 00:00

## 2023-11-14 RX ADMIN — HYDROMORPHONE HYDROCHLORIDE 1 MILLIGRAM(S): 2 INJECTION INTRAMUSCULAR; INTRAVENOUS; SUBCUTANEOUS at 03:39

## 2023-11-14 RX ADMIN — HYDROMORPHONE HYDROCHLORIDE 1 MILLIGRAM(S): 2 INJECTION INTRAMUSCULAR; INTRAVENOUS; SUBCUTANEOUS at 04:10

## 2023-11-14 RX ADMIN — Medication 1 TABLET(S): at 09:20

## 2023-11-14 RX ADMIN — OXYCODONE HYDROCHLORIDE 10 MILLIGRAM(S): 5 TABLET ORAL at 10:05

## 2023-11-14 RX ADMIN — Medication 100 MICROGRAM(S): at 06:05

## 2023-11-14 RX ADMIN — Medication 20 MILLIGRAM(S): at 06:05

## 2023-11-14 NOTE — PROGRESS NOTE ADULT - SUBJECTIVE AND OBJECTIVE BOX
TEAM [ D ] Surgery Daily Progress Note  =====================================================    SUBJECTIVE: Patient seen and examined at bedside on AM rounds. Patient reports improved patient over the weekend. Reports being able to swallow slowly. Denies fever, chills, N/V, chest pain, SOB    ALLERGIES:  No Known Allergies      --------------------------------------------------------------------------------------    MEDICATIONS:    Neurologic Medications  HYDROmorphone  Injectable 0.5 milliGRAM(s) IV Push every 4 hours PRN Moderate Pain (4 - 6)  HYDROmorphone  Injectable 1 milliGRAM(s) IV Push every 4 hours PRN Severe Pain (7 - 10)  ondansetron Injectable 4 milliGRAM(s) IV Push every 6 hours PRN Nausea    Respiratory Medications    Cardiovascular Medications  metoprolol succinate ER 50 milliGRAM(s) Oral daily    Gastrointestinal Medications  sodium chloride 0.9%. 1000 milliLiter(s) IV Continuous <Continuous>    Genitourinary Medications    Hematologic/Oncologic Medications  enoxaparin Injectable 40 milliGRAM(s) SubCutaneous every 24 hours    Antimicrobial/Immunologic Medications  ampicillin/sulbactam  IVPB 3 Gram(s) IV Intermittent every 6 hours    Endocrine/Metabolic Medications  levothyroxine 100 MICROGram(s) Oral daily    Topical/Other Medications    --------------------------------------------------------------------------------------    VITAL SIGNS:  T(C): 36.6 (11-13-23 @ 04:28), Max: 37 (11-12-23 @ 09:40)  HR: 70 (11-13-23 @ 04:28) (66 - 78)  BP: 128/69 (11-13-23 @ 04:28) (119/61 - 140/76)  RR: 18 (11-13-23 @ 04:28) (16 - 18)  SpO2: 100% (11-13-23 @ 04:28) (99% - 100%)  --------------------------------------------------------------------------------------    INS AND OUTS:    11-12-23 @ 07:01  -  11-13-23 @ 07:00  --------------------------------------------------------  IN: 1430 mL / OUT: 1325 mL / NET: 105 mL      --------------------------------------------------------------------------------------      EXAM    General: NAD, resting in bed comfortably  Neck: b/l surgical incisions c/d/i with steristrips  Cardiac: regular rate, warm and well perfused  Respiratory: Nonlabored respirations, normal cw expansion.  Abdomen: soft, nontender, nondistended  Extremities: normal strength, FROM, no deformities    --------------------------------------------------------------------------------------    LABS                        10.7   6.28  )-----------( 212      ( 13 Nov 2023 04:55 )             32.0   11-13    138  |  101  |  x   ----------------------------<  x   3.8   |  x   |  x     Ca    8.7      12 Nov 2023 07:49  Phos  3.5     11-13  Mg     1.80     11-13    TPro  6.3  /  Alb  3.4  /  TBili  0.8  /  DBili  x   /  AST  13  /  ALT  8   /  AlkPhos  69  11-12      
Vital Signs Last 24 Hrs  T(C): 36.6 (13 Nov 2023 08:50), Max: 36.9 (12 Nov 2023 13:42)  T(F): 97.8 (13 Nov 2023 08:50), Max: 98.4 (12 Nov 2023 13:42)  HR: 61 (13 Nov 2023 08:50) (61 - 76)  BP: 130/71 (13 Nov 2023 08:50) (119/61 - 134/69)  BP(mean): --  RR: 17 (13 Nov 2023 08:50) (16 - 18)  SpO2: 100% (13 Nov 2023 08:50) (99% - 100%)    Parameters below as of 13 Nov 2023 08:50  Patient On (Oxygen Delivery Method): room air        I&O's Detail    12 Nov 2023 07:01  -  13 Nov 2023 07:00  --------------------------------------------------------  IN:    sodium chloride 0.9%: 1430 mL  Total IN: 1430 mL    OUT:    Oral Fluid: 0 mL    Voided (mL): 1325 mL  Total OUT: 1325 mL    Total NET: 105 mL                                10.7   6.28  )-----------( 212      ( 13 Nov 2023 04:55 )             32.0       11-13    138  |  101  |  14  ----------------------------<  36<LL>  3.8   |  20<L>  |  0.71    Ca    8.2<L>      13 Nov 2023 04:55  Phos  3.5     11-13  Mg     1.80     11-13    TPro  6.3  /  Alb  3.4  /  TBili  0.8  /  DBili  x   /  AST  13  /  ALT  8   /  AlkPhos  69  11-12    patient feels subjectively better  UGI shows no esophageal leak          PLAN:  will try regular diet  If patient can tolerate diet, he can be discharged home tomorrow on oral antibiotics  no plans for drainage of the neck at this point        
TEAM [ D ] Surgery Daily Progress Note  =====================================================    SUBJECTIVE: Patient seen and examined at bedside on AM rounds. Patient reports post nasal drip and improved ability to swallow. Denies fever, chills, N/V, chest pain, SOB    ALLERGIES:  No Known Allergies      --------------------------------------------------------------------------------------    MEDICATIONS:    Neurologic Medications  HYDROmorphone  Injectable 1 milliGRAM(s) IV Push every 4 hours PRN Severe Pain (7 - 10)  HYDROmorphone  Injectable 0.5 milliGRAM(s) IV Push every 4 hours PRN Moderate Pain (4 - 6)    Respiratory Medications    Cardiovascular Medications  metoprolol succinate ER 50 milliGRAM(s) Oral daily    Gastrointestinal Medications    Genitourinary Medications  tamsulosin 0.4 milliGRAM(s) Oral at bedtime    Hematologic/Oncologic Medications  enoxaparin Injectable 40 milliGRAM(s) SubCutaneous every 24 hours    Antimicrobial/Immunologic Medications  ampicillin/sulbactam  IVPB 3 Gram(s) IV Intermittent every 6 hours    Endocrine/Metabolic Medications  atorvastatin 40 milliGRAM(s) Oral at bedtime  hydrocortisone 20 milliGRAM(s) Oral <User Schedule>  hydrocortisone 10 milliGRAM(s) Oral at bedtime  levothyroxine 100 MICROGram(s) Oral daily    Topical/Other Medications    --------------------------------------------------------------------------------------    VITAL SIGNS:  T(C): 36.7 (11-14-23 @ 04:40), Max: 37.1 (11-13-23 @ 13:23)  HR: 74 (11-14-23 @ 04:40) (61 - 77)  BP: 135/73 (11-14-23 @ 04:40) (122/71 - 135/73)  RR: 18 (11-14-23 @ 04:40) (17 - 18)  SpO2: 98% (11-14-23 @ 04:40) (97% - 100%)  --------------------------------------------------------------------------------------    INS AND OUTS:    11-13-23 @ 07:01  -  11-14-23 @ 07:00  --------------------------------------------------------  IN: 3560 mL / OUT: 2100 mL / NET: 1460 mL      --------------------------------------------------------------------------------------      EXAM    General: NAD, resting in bed comfortably  Skin: b/l neck steristrips  Cardiac: regular rate, warm and well perfused  Respiratory: Nonlabored respirations, normal cw expansion.  Abdomen: soft, nontender, nondistended  Extremities: normal strength, FROM, no deformities    --------------------------------------------------------------------------------------    LABS                        10.7   6.28  )-----------( 212      ( 13 Nov 2023 04:55 )             32.0     11-13    138  |  101  |  14  ----------------------------<  36<LL>  3.8   |  20<L>  |  0.71    Ca    8.2<L>      13 Nov 2023 04:55  Phos  3.5     11-13  Mg     1.80     11-13    TPro  6.3  /  Alb  3.4  /  TBili  0.8  /  DBili  x   /  AST  13  /  ALT  8   /  AlkPhos  69  11-12

## 2023-11-14 NOTE — DISCHARGE NOTE NURSING/CASE MANAGEMENT/SOCIAL WORK - NSDCPNINST_GEN_ALL_CORE
Take medication as ordered, follow up with MD as advised, call MD if redness, swelling or drainage gets worse, or fever over 100.4F

## 2023-11-14 NOTE — PROGRESS NOTE ADULT - ASSESSMENT
53 year old male with PMH BPH, GERD, HTN, HLD, hypothyroidism, malignant melanoma of scalp 2018, nonHodgkins lymphoma, testicular cancer, recently admitted for bilateral cervical lymphadenectomy 11/8 presents with R sided redness and pain    Plan  - Diet: moderately thickened regular  - Home meds restarted  - Hydrocortisone 20 mg AM and 1 mg PM  - Unasyn 3 grams q6, home with keflex x 7 days  - DVT prophylaxis  - Appreciate ENT recs  - Discharge home today    D team surgery [id number]

## 2023-11-14 NOTE — PROGRESS NOTE ADULT - REASON FOR ADMISSION
right-sided pain and trouble swallowing s/p bilateral cervical lymphadenectomy

## 2023-11-14 NOTE — DISCHARGE NOTE NURSING/CASE MANAGEMENT/SOCIAL WORK - PATIENT PORTAL LINK FT
You can access the FollowMyHealth Patient Portal offered by [facility] by registering at the following website: [email or web address]. By joining [email or web address]’s FollowMyHealth portal, you will also be able to view your health information using other applications (apps) compatible with our system.

## 2023-11-15 ENCOUNTER — TRANSCRIPTION ENCOUNTER (OUTPATIENT)
Age: 53
End: 2023-11-15

## 2023-11-16 LAB
CHROM ANALY OVERALL INTERP SPEC-IMP: SIGNIFICANT CHANGE UP
CHROM ANALY OVERALL INTERP SPEC-IMP: SIGNIFICANT CHANGE UP

## 2023-11-17 ENCOUNTER — TRANSCRIPTION ENCOUNTER (OUTPATIENT)
Age: 53
End: 2023-11-17

## 2023-11-20 NOTE — ED PROVIDER NOTE - PROGRESS NOTE ADDITIONAL3
Attending Deidre: The scribe's documentation has been prepared under my direction and personally reviewed by me in its entirety. I confirm that the note above accurately reflects all work, treatment, procedures, and medical decision making performed by me.
Additional Progress Note...

## 2023-11-22 NOTE — ED ADULT NURSE NOTE - NS ED STAT RN HAND OFF 2
Attempt # 1    Called #   Telephone Information:   Mobile [phone]         Left a non detailed VM    My chart message sent too     Nazia Tena RN, BSN  [place] [facility]          Additional handoff

## 2023-11-24 PROBLEM — Z86.79 PERSONAL HISTORY OF OTHER DISEASES OF THE CIRCULATORY SYSTEM: Chronic | Status: ACTIVE | Noted: 2023-11-11

## 2023-11-28 ENCOUNTER — APPOINTMENT (OUTPATIENT)
Dept: UROLOGY | Facility: HOSPITAL | Age: 53
End: 2023-11-28

## 2023-11-30 ENCOUNTER — APPOINTMENT (OUTPATIENT)
Dept: SURGICAL ONCOLOGY | Facility: CLINIC | Age: 53
End: 2023-11-30

## 2023-12-14 ENCOUNTER — APPOINTMENT (OUTPATIENT)
Dept: SURGICAL ONCOLOGY | Facility: CLINIC | Age: 53
End: 2023-12-14

## 2023-12-18 ENCOUNTER — APPOINTMENT (OUTPATIENT)
Dept: PULMONOLOGY | Facility: CLINIC | Age: 53
End: 2023-12-18
Payer: COMMERCIAL

## 2023-12-18 ENCOUNTER — NON-APPOINTMENT (OUTPATIENT)
Age: 53
End: 2023-12-18

## 2023-12-18 ENCOUNTER — APPOINTMENT (OUTPATIENT)
Dept: INTERNAL MEDICINE | Facility: CLINIC | Age: 53
End: 2023-12-18
Payer: COMMERCIAL

## 2023-12-18 VITALS — SYSTOLIC BLOOD PRESSURE: 90 MMHG | DIASTOLIC BLOOD PRESSURE: 60 MMHG

## 2023-12-18 VITALS
OXYGEN SATURATION: 99 % | WEIGHT: 128 LBS | SYSTOLIC BLOOD PRESSURE: 90 MMHG | TEMPERATURE: 98.1 F | HEART RATE: 56 BPM | DIASTOLIC BLOOD PRESSURE: 60 MMHG | BODY MASS INDEX: 18.32 KG/M2 | HEIGHT: 70 IN

## 2023-12-18 DIAGNOSIS — Z13.6 ENCOUNTER FOR SCREENING FOR CARDIOVASCULAR DISORDERS: ICD-10-CM

## 2023-12-18 DIAGNOSIS — Z12.9 ENCOUNTER FOR SCREENING FOR MALIGNANT NEOPLASM, SITE UNSPECIFIED: ICD-10-CM

## 2023-12-18 DIAGNOSIS — Z13.228 ENCOUNTER FOR SCREENING FOR OTHER METABOLIC DISORDERS: ICD-10-CM

## 2023-12-18 PROCEDURE — 71046 X-RAY EXAM CHEST 2 VIEWS: CPT

## 2023-12-18 PROCEDURE — 99204 OFFICE O/P NEW MOD 45 MIN: CPT | Mod: 25

## 2023-12-18 PROCEDURE — 93000 ELECTROCARDIOGRAM COMPLETE: CPT

## 2023-12-18 RX ORDER — ATORVASTATIN CALCIUM 80 MG/1
80 TABLET, FILM COATED ORAL
Qty: 30 | Refills: 1 | Status: DISCONTINUED | COMMUNITY
End: 2023-12-18

## 2023-12-18 RX ORDER — LEVOTHYROXINE SODIUM 0.09 MG/1
88 TABLET ORAL
Qty: 30 | Refills: 0 | Status: DISCONTINUED | COMMUNITY
Start: 2021-06-23 | End: 2023-12-18

## 2023-12-18 RX ORDER — FINASTERIDE 5 MG/1
5 TABLET, FILM COATED ORAL
Qty: 90 | Refills: 3 | Status: DISCONTINUED | COMMUNITY
Start: 2021-08-04 | End: 2023-12-18

## 2023-12-18 RX ORDER — FENOFIBRATE 145 MG/1
145 TABLET, COATED ORAL DAILY
Qty: 30 | Refills: 0 | Status: DISCONTINUED | COMMUNITY
End: 2023-12-18

## 2023-12-18 NOTE — HISTORY OF PRESENT ILLNESS
[Family Member] : family member [FreeTextEntry1] : establish care [de-identified] : Conrad is a 54 yo male with PMH of testicular ca s/p orchiectomy, HTN, HLD, melanoma, low serum cortisol level presents here today to re-establish care. Pt had lymph node removal surgery on 11/8. Since then, he c/o intermittent EASLEY. Denies chest pain, SOB, dizziness, diaphoresis, palpitations, LE swelling, orthopnea, syncope, n/v, headache.

## 2023-12-18 NOTE — HEALTH RISK ASSESSMENT
[0] : 2) Feeling down, depressed, or hopeless: Not at all (0) [PHQ-2 Negative - No further assessment needed] : PHQ-2 Negative - No further assessment needed [Former] : Former [[id number]] : 0

## 2023-12-19 LAB
25(OH)D3 SERPL-MCNC: 49.7 NG/ML
ALBUMIN SERPL ELPH-MCNC: 4.1 G/DL
ALP BLD-CCNC: 73 U/L
ALT SERPL-CCNC: 19 U/L
ANION GAP SERPL CALC-SCNC: 13 MMOL/L
AST SERPL-CCNC: 27 U/L
BASOPHILS # BLD AUTO: 0.07 K/UL
BASOPHILS NFR BLD AUTO: 0.8 %
BILIRUB SERPL-MCNC: 0.4 MG/DL
BUN SERPL-MCNC: 18 MG/DL
CALCIUM SERPL-MCNC: 9.6 MG/DL
CHLORIDE SERPL-SCNC: 100 MMOL/L
CHOLEST SERPL-MCNC: 145 MG/DL
CK SERPL-CCNC: 157 U/L
CO2 SERPL-SCNC: 25 MMOL/L
CREAT SERPL-MCNC: 1.14 MG/DL
DEPRECATED D DIMER PPP IA-ACNC: <150 NG/ML DDU
EGFR: 77 ML/MIN/1.73M2
EOSINOPHIL # BLD AUTO: 0.49 K/UL
EOSINOPHIL NFR BLD AUTO: 5.7 %
ESTIMATED AVERAGE GLUCOSE: 111 MG/DL
FERRITIN SERPL-MCNC: 385 NG/ML
FOLATE SERPL-MCNC: 8.6 NG/ML
GLUCOSE SERPL-MCNC: 84 MG/DL
HBA1C MFR BLD HPLC: 5.5 %
HCT VFR BLD CALC: 41.3 %
HDLC SERPL-MCNC: 21 MG/DL
HGB BLD-MCNC: 12.9 G/DL
IMM GRANULOCYTES NFR BLD AUTO: 0.2 %
IRON SATN MFR SERPL: 28 %
IRON SERPL-MCNC: 67 UG/DL
LDLC SERPL CALC-MCNC: 72 MG/DL
LYMPHOCYTES # BLD AUTO: 3.97 K/UL
LYMPHOCYTES NFR BLD AUTO: 46.3 %
MAN DIFF?: NORMAL
MCHC RBC-ENTMCNC: 27.3 PG
MCHC RBC-ENTMCNC: 31.2 GM/DL
MCV RBC AUTO: 87.3 FL
MONOCYTES # BLD AUTO: 0.47 K/UL
MONOCYTES NFR BLD AUTO: 5.5 %
NEUTROPHILS # BLD AUTO: 3.55 K/UL
NEUTROPHILS NFR BLD AUTO: 41.5 %
NONHDLC SERPL-MCNC: 123 MG/DL
PLATELET # BLD AUTO: 162 K/UL
POTASSIUM SERPL-SCNC: 4.6 MMOL/L
PROT SERPL-MCNC: 6.7 G/DL
PSA SERPL-MCNC: 0.58 NG/ML
RBC # BLD: 4.73 M/UL
RBC # FLD: 14.1 %
SODIUM SERPL-SCNC: 138 MMOL/L
T4 FREE SERPL-MCNC: 0.9 NG/DL
TIBC SERPL-MCNC: 239 UG/DL
TRIGL SERPL-MCNC: 317 MG/DL
TSH SERPL-ACNC: 9.51 UIU/ML
UIBC SERPL-MCNC: 172 UG/DL
URATE SERPL-MCNC: 4.6 MG/DL
VIT B12 SERPL-MCNC: 519 PG/ML
WBC # FLD AUTO: 8.57 K/UL

## 2023-12-21 RX ORDER — LEVOTHYROXINE SODIUM 0.11 MG/1
112 TABLET ORAL DAILY
Qty: 90 | Refills: 1 | Status: ACTIVE | COMMUNITY
Start: 2022-06-17 | End: 1900-01-01

## 2023-12-26 ENCOUNTER — APPOINTMENT (OUTPATIENT)
Dept: UROLOGY | Facility: HOSPITAL | Age: 53
End: 2023-12-26

## 2023-12-27 ENCOUNTER — APPOINTMENT (OUTPATIENT)
Dept: CARDIOLOGY | Facility: CLINIC | Age: 53
End: 2023-12-27
Payer: COMMERCIAL

## 2023-12-27 PROCEDURE — 93306 TTE W/DOPPLER COMPLETE: CPT

## 2023-12-28 ENCOUNTER — APPOINTMENT (OUTPATIENT)
Age: 53
End: 2023-12-28

## 2024-01-01 ENCOUNTER — NON-APPOINTMENT (OUTPATIENT)
Age: 54
End: 2024-01-01

## 2024-01-02 ENCOUNTER — APPOINTMENT (OUTPATIENT)
Dept: CARDIOLOGY | Facility: CLINIC | Age: 54
End: 2024-01-02

## 2024-01-03 ENCOUNTER — NON-APPOINTMENT (OUTPATIENT)
Age: 54
End: 2024-01-03

## 2024-01-03 ENCOUNTER — TRANSCRIPTION ENCOUNTER (OUTPATIENT)
Age: 54
End: 2024-01-03

## 2024-01-04 ENCOUNTER — APPOINTMENT (OUTPATIENT)
Dept: SURGICAL ONCOLOGY | Facility: CLINIC | Age: 54
End: 2024-01-04

## 2024-01-10 ENCOUNTER — APPOINTMENT (OUTPATIENT)
Dept: CARDIOLOGY | Facility: CLINIC | Age: 54
End: 2024-01-10

## 2024-01-11 ENCOUNTER — APPOINTMENT (OUTPATIENT)
Dept: CARDIOLOGY | Facility: CLINIC | Age: 54
End: 2024-01-11

## 2024-01-12 ENCOUNTER — APPOINTMENT (OUTPATIENT)
Age: 54
End: 2024-01-12

## 2024-01-18 ENCOUNTER — APPOINTMENT (OUTPATIENT)
Dept: CARDIOLOGY | Facility: CLINIC | Age: 54
End: 2024-01-18
Payer: COMMERCIAL

## 2024-01-18 VITALS
HEIGHT: 70 IN | SYSTOLIC BLOOD PRESSURE: 92 MMHG | RESPIRATION RATE: 18 BRPM | HEART RATE: 60 BPM | WEIGHT: 128 LBS | DIASTOLIC BLOOD PRESSURE: 70 MMHG | BODY MASS INDEX: 18.32 KG/M2 | TEMPERATURE: 98.2 F | OXYGEN SATURATION: 99 %

## 2024-01-18 DIAGNOSIS — M48.02 SPINAL STENOSIS, CERVICAL REGION: ICD-10-CM

## 2024-01-18 DIAGNOSIS — D64.9 ANEMIA, UNSPECIFIED: ICD-10-CM

## 2024-01-18 PROCEDURE — 78452 HT MUSCLE IMAGE SPECT MULT: CPT

## 2024-01-18 PROCEDURE — A9500: CPT

## 2024-01-18 PROCEDURE — 93015 CV STRESS TEST SUPVJ I&R: CPT

## 2024-01-18 PROCEDURE — 99214 OFFICE O/P EST MOD 30 MIN: CPT | Mod: 25

## 2024-01-18 PROCEDURE — 93000 ELECTROCARDIOGRAM COMPLETE: CPT | Mod: 59

## 2024-01-18 RX ORDER — REGADENOSON 0.08 MG/ML
0.4 INJECTION, SOLUTION INTRAVENOUS
Qty: 1 | Refills: 0 | Status: COMPLETED | OUTPATIENT
Start: 2024-01-18

## 2024-01-18 RX ORDER — METOPROLOL SUCCINATE 25 MG/1
25 TABLET, EXTENDED RELEASE ORAL
Qty: 30 | Refills: 1 | Status: ACTIVE | COMMUNITY
Start: 1900-01-01 | End: 1900-01-01

## 2024-01-18 RX ORDER — AMLODIPINE BESYLATE 5 MG/1
5 TABLET ORAL DAILY
Qty: 90 | Refills: 1 | Status: DISCONTINUED | COMMUNITY
End: 2024-01-18

## 2024-01-18 RX ORDER — CIDER VINEGAR 300 MG
1000 TABLET ORAL
Qty: 90 | Refills: 1 | Status: ACTIVE | COMMUNITY
Start: 2023-12-21

## 2024-01-18 RX ADMIN — REGADENOSON 0 MG/5ML: 0.08 INJECTION, SOLUTION INTRAVENOUS at 00:00

## 2024-01-18 NOTE — HISTORY OF PRESENT ILLNESS
[FreeTextEntry1] : This is a 53 y/o male with a pmhx of testicular ca s/p orchiectomy, HTN, HLD, melanoma, low serum cortisol level presents today for a follow up. He was seen by Dr. Henderson 12/28/23 reporting EASLEY, echo completed 12/27/23 showing normal EF. STP completed today. He reports dyspnea has improved, but does report symptoms come in waves. He reports 1 month ago he had palpitations which have since resolved.  Patient denies chest pain,  dizziness, syncope, changes in bowel/bladder habits or appetite.

## 2024-01-19 ENCOUNTER — TRANSCRIPTION ENCOUNTER (OUTPATIENT)
Age: 54
End: 2024-01-19

## 2024-01-19 ENCOUNTER — INPATIENT (INPATIENT)
Facility: HOSPITAL | Age: 54
LOS: 0 days | Discharge: HOME | DRG: 322 | End: 2024-01-20
Attending: STUDENT IN AN ORGANIZED HEALTH CARE EDUCATION/TRAINING PROGRAM | Admitting: STUDENT IN AN ORGANIZED HEALTH CARE EDUCATION/TRAINING PROGRAM
Payer: COMMERCIAL

## 2024-01-19 VITALS
HEART RATE: 92 BPM | WEIGHT: 125 LBS | DIASTOLIC BLOOD PRESSURE: 70 MMHG | TEMPERATURE: 98 F | RESPIRATION RATE: 20 BRPM | SYSTOLIC BLOOD PRESSURE: 102 MMHG | OXYGEN SATURATION: 100 % | HEIGHT: 71 IN

## 2024-01-19 DIAGNOSIS — Z98.890 OTHER SPECIFIED POSTPROCEDURAL STATES: Chronic | ICD-10-CM

## 2024-01-19 DIAGNOSIS — Z90.79 ACQUIRED ABSENCE OF OTHER GENITAL ORGAN(S): Chronic | ICD-10-CM

## 2024-01-19 DIAGNOSIS — R94.39 ABNORMAL RESULT OF OTHER CARDIOVASCULAR FUNCTION STUDY: ICD-10-CM

## 2024-01-19 DIAGNOSIS — C43.4 MALIGNANT MELANOMA OF SCALP AND NECK: Chronic | ICD-10-CM

## 2024-01-19 DIAGNOSIS — I89.9 NONINFECTIVE DISORDER OF LYMPHATIC VESSELS AND LYMPH NODES, UNSPECIFIED: Chronic | ICD-10-CM

## 2024-01-19 DIAGNOSIS — G89.4 CHRONIC PAIN SYNDROME: ICD-10-CM

## 2024-01-19 DIAGNOSIS — R07.9 CHEST PAIN, UNSPECIFIED: ICD-10-CM

## 2024-01-19 DIAGNOSIS — Z29.9 ENCOUNTER FOR PROPHYLACTIC MEASURES, UNSPECIFIED: ICD-10-CM

## 2024-01-19 DIAGNOSIS — I10 ESSENTIAL (PRIMARY) HYPERTENSION: ICD-10-CM

## 2024-01-19 DIAGNOSIS — Z98.89 OTHER SPECIFIED POSTPROCEDURAL STATES: Chronic | ICD-10-CM

## 2024-01-19 DIAGNOSIS — E03.9 HYPOTHYROIDISM, UNSPECIFIED: ICD-10-CM

## 2024-01-19 DIAGNOSIS — I25.110 ATHEROSCLEROTIC HEART DISEASE OF NATIVE CORONARY ARTERY WITH UNSTABLE ANGINA PECTORIS: ICD-10-CM

## 2024-01-19 DIAGNOSIS — E27.9 DISORDER OF ADRENAL GLAND, UNSPECIFIED: ICD-10-CM

## 2024-01-19 LAB
ALBUMIN SERPL ELPH-MCNC: 4.2 G/DL — SIGNIFICANT CHANGE UP (ref 3.3–5)
ALP SERPL-CCNC: 70 U/L — SIGNIFICANT CHANGE UP (ref 40–120)
ALT FLD-CCNC: 15 U/L — SIGNIFICANT CHANGE UP (ref 10–45)
ANION GAP SERPL CALC-SCNC: 9 MMOL/L — SIGNIFICANT CHANGE UP (ref 5–17)
APTT BLD: 33.1 SEC — SIGNIFICANT CHANGE UP (ref 24.5–35.6)
AST SERPL-CCNC: 27 U/L — SIGNIFICANT CHANGE UP (ref 10–40)
BASE EXCESS BLDV CALC-SCNC: 2.6 MMOL/L — SIGNIFICANT CHANGE UP (ref -2–3)
BASOPHILS # BLD AUTO: 0.05 K/UL — SIGNIFICANT CHANGE UP (ref 0–0.2)
BASOPHILS NFR BLD AUTO: 0.9 % — SIGNIFICANT CHANGE UP (ref 0–2)
BILIRUB SERPL-MCNC: 0.7 MG/DL — SIGNIFICANT CHANGE UP (ref 0.2–1.2)
BUN SERPL-MCNC: 14 MG/DL — SIGNIFICANT CHANGE UP (ref 7–23)
CA-I SERPL-SCNC: 1.28 MMOL/L — SIGNIFICANT CHANGE UP (ref 1.15–1.33)
CALCIUM SERPL-MCNC: 9.8 MG/DL — SIGNIFICANT CHANGE UP (ref 8.4–10.5)
CHLORIDE BLDV-SCNC: 101 MMOL/L — SIGNIFICANT CHANGE UP (ref 96–108)
CHLORIDE SERPL-SCNC: 101 MMOL/L — SIGNIFICANT CHANGE UP (ref 96–108)
CO2 BLDV-SCNC: 32 MMOL/L — HIGH (ref 22–26)
CO2 SERPL-SCNC: 27 MMOL/L — SIGNIFICANT CHANGE UP (ref 22–31)
CREAT SERPL-MCNC: 0.85 MG/DL — SIGNIFICANT CHANGE UP (ref 0.5–1.3)
EGFR: 103 ML/MIN/1.73M2 — SIGNIFICANT CHANGE UP
EOSINOPHIL # BLD AUTO: 0.26 K/UL — SIGNIFICANT CHANGE UP (ref 0–0.5)
EOSINOPHIL NFR BLD AUTO: 4.5 % — SIGNIFICANT CHANGE UP (ref 0–6)
GAS PNL BLDV: 135 MMOL/L — LOW (ref 136–145)
GAS PNL BLDV: SIGNIFICANT CHANGE UP
GLUCOSE BLDV-MCNC: 82 MG/DL — SIGNIFICANT CHANGE UP (ref 70–99)
GLUCOSE SERPL-MCNC: 89 MG/DL — SIGNIFICANT CHANGE UP (ref 70–99)
HCO3 BLDV-SCNC: 30 MMOL/L — HIGH (ref 22–29)
HCT VFR BLD CALC: 37.9 % — LOW (ref 39–50)
HCT VFR BLDA CALC: 36 % — LOW (ref 39–51)
HGB BLD CALC-MCNC: 12 G/DL — LOW (ref 12.6–17.4)
HGB BLD-MCNC: 12.3 G/DL — LOW (ref 13–17)
IMM GRANULOCYTES NFR BLD AUTO: 0.3 % — SIGNIFICANT CHANGE UP (ref 0–0.9)
INR BLD: 1.08 RATIO — SIGNIFICANT CHANGE UP (ref 0.85–1.18)
LACTATE BLDV-MCNC: 0.7 MMOL/L — SIGNIFICANT CHANGE UP (ref 0.5–2)
LYMPHOCYTES # BLD AUTO: 2.52 K/UL — SIGNIFICANT CHANGE UP (ref 1–3.3)
LYMPHOCYTES # BLD AUTO: 43.2 % — SIGNIFICANT CHANGE UP (ref 13–44)
MCHC RBC-ENTMCNC: 27.6 PG — SIGNIFICANT CHANGE UP (ref 27–34)
MCHC RBC-ENTMCNC: 32.5 GM/DL — SIGNIFICANT CHANGE UP (ref 32–36)
MCV RBC AUTO: 85.2 FL — SIGNIFICANT CHANGE UP (ref 80–100)
MONOCYTES # BLD AUTO: 0.25 K/UL — SIGNIFICANT CHANGE UP (ref 0–0.9)
MONOCYTES NFR BLD AUTO: 4.3 % — SIGNIFICANT CHANGE UP (ref 2–14)
NEUTROPHILS # BLD AUTO: 2.73 K/UL — SIGNIFICANT CHANGE UP (ref 1.8–7.4)
NEUTROPHILS NFR BLD AUTO: 46.8 % — SIGNIFICANT CHANGE UP (ref 43–77)
NRBC # BLD: 0 /100 WBCS — SIGNIFICANT CHANGE UP (ref 0–0)
NT-PROBNP SERPL-SCNC: 317 PG/ML — HIGH (ref 0–300)
PCO2 BLDV: 58 MMHG — HIGH (ref 42–55)
PH BLDV: 7.32 — SIGNIFICANT CHANGE UP (ref 7.32–7.43)
PLATELET # BLD AUTO: 202 K/UL — SIGNIFICANT CHANGE UP (ref 150–400)
PO2 BLDV: 17 MMHG — LOW (ref 25–45)
POTASSIUM BLDV-SCNC: 4 MMOL/L — SIGNIFICANT CHANGE UP (ref 3.5–5.1)
POTASSIUM SERPL-MCNC: 4 MMOL/L — SIGNIFICANT CHANGE UP (ref 3.5–5.3)
POTASSIUM SERPL-SCNC: 4 MMOL/L — SIGNIFICANT CHANGE UP (ref 3.5–5.3)
PROT SERPL-MCNC: 6.8 G/DL — SIGNIFICANT CHANGE UP (ref 6–8.3)
PROTHROM AB SERPL-ACNC: 11.9 SEC — SIGNIFICANT CHANGE UP (ref 9.5–13)
RBC # BLD: 4.45 M/UL — SIGNIFICANT CHANGE UP (ref 4.2–5.8)
RBC # FLD: 14.5 % — SIGNIFICANT CHANGE UP (ref 10.3–14.5)
SAO2 % BLDV: 22.7 % — LOW (ref 67–88)
SODIUM SERPL-SCNC: 137 MMOL/L — SIGNIFICANT CHANGE UP (ref 135–145)
TROPONIN T, HIGH SENSITIVITY RESULT: <6 NG/L — SIGNIFICANT CHANGE UP (ref 0–51)
WBC # BLD: 5.83 K/UL — SIGNIFICANT CHANGE UP (ref 3.8–10.5)
WBC # FLD AUTO: 5.83 K/UL — SIGNIFICANT CHANGE UP (ref 3.8–10.5)

## 2024-01-19 PROCEDURE — 71045 X-RAY EXAM CHEST 1 VIEW: CPT | Mod: 26

## 2024-01-19 PROCEDURE — 99053 MED SERV 10PM-8AM 24 HR FAC: CPT

## 2024-01-19 PROCEDURE — 99285 EMERGENCY DEPT VISIT HI MDM: CPT

## 2024-01-19 PROCEDURE — 99223 1ST HOSP IP/OBS HIGH 75: CPT

## 2024-01-19 PROCEDURE — 92928 PRQ TCAT PLMT NTRAC ST 1 LES: CPT | Mod: LD

## 2024-01-19 PROCEDURE — 99152 MOD SED SAME PHYS/QHP 5/>YRS: CPT

## 2024-01-19 PROCEDURE — 93010 ELECTROCARDIOGRAM REPORT: CPT

## 2024-01-19 PROCEDURE — 93454 CORONARY ARTERY ANGIO S&I: CPT | Mod: 26,59

## 2024-01-19 RX ORDER — SODIUM CHLORIDE 9 MG/ML
1000 INJECTION INTRAMUSCULAR; INTRAVENOUS; SUBCUTANEOUS
Refills: 0 | Status: DISCONTINUED | OUTPATIENT
Start: 2024-01-19 | End: 2024-01-20

## 2024-01-19 RX ORDER — CLOPIDOGREL BISULFATE 75 MG/1
75 TABLET, FILM COATED ORAL DAILY
Refills: 0 | Status: DISCONTINUED | OUTPATIENT
Start: 2024-01-19 | End: 2024-01-20

## 2024-01-19 RX ORDER — FINASTERIDE 5 MG/1
1 TABLET, FILM COATED ORAL
Refills: 0 | DISCHARGE

## 2024-01-19 RX ORDER — ATORVASTATIN CALCIUM 80 MG/1
1 TABLET, FILM COATED ORAL
Refills: 0 | DISCHARGE

## 2024-01-19 RX ORDER — ATORVASTATIN CALCIUM 80 MG/1
40 TABLET, FILM COATED ORAL AT BEDTIME
Refills: 0 | Status: DISCONTINUED | OUTPATIENT
Start: 2024-01-19 | End: 2024-01-20

## 2024-01-19 RX ORDER — HYDROCORTISONE 20 MG
1 TABLET ORAL
Refills: 0 | DISCHARGE

## 2024-01-19 RX ORDER — HYDROCORTISONE 20 MG
20 TABLET ORAL DAILY
Refills: 0 | Status: DISCONTINUED | OUTPATIENT
Start: 2024-01-20 | End: 2024-01-20

## 2024-01-19 RX ORDER — HYDROMORPHONE HYDROCHLORIDE 2 MG/ML
1 INJECTION INTRAMUSCULAR; INTRAVENOUS; SUBCUTANEOUS ONCE
Refills: 0 | Status: DISCONTINUED | OUTPATIENT
Start: 2024-01-19 | End: 2024-01-19

## 2024-01-19 RX ORDER — LEVOTHYROXINE SODIUM 125 MCG
1 TABLET ORAL
Refills: 0 | DISCHARGE

## 2024-01-19 RX ORDER — TRAMADOL HYDROCHLORIDE 50 MG/1
1 TABLET ORAL
Refills: 0 | DISCHARGE

## 2024-01-19 RX ORDER — METOPROLOL TARTRATE 50 MG
1 TABLET ORAL
Refills: 0 | DISCHARGE

## 2024-01-19 RX ORDER — SODIUM CHLORIDE 9 MG/ML
250 INJECTION INTRAMUSCULAR; INTRAVENOUS; SUBCUTANEOUS ONCE
Refills: 0 | Status: DISCONTINUED | OUTPATIENT
Start: 2024-01-19 | End: 2024-01-20

## 2024-01-19 RX ORDER — TRAMADOL HYDROCHLORIDE 50 MG/1
50 TABLET ORAL DAILY
Refills: 0 | Status: DISCONTINUED | OUTPATIENT
Start: 2024-01-19 | End: 2024-01-20

## 2024-01-19 RX ORDER — ZOLPIDEM TARTRATE 10 MG/1
1 TABLET ORAL
Refills: 0 | DISCHARGE

## 2024-01-19 RX ORDER — LEVOTHYROXINE SODIUM 125 MCG
112 TABLET ORAL DAILY
Refills: 0 | Status: DISCONTINUED | OUTPATIENT
Start: 2024-01-19 | End: 2024-01-20

## 2024-01-19 RX ORDER — TAMSULOSIN HYDROCHLORIDE 0.4 MG/1
1 CAPSULE ORAL
Refills: 0 | DISCHARGE

## 2024-01-19 RX ORDER — HYDROMORPHONE HYDROCHLORIDE 2 MG/ML
1 INJECTION INTRAMUSCULAR; INTRAVENOUS; SUBCUTANEOUS ONCE
Refills: 0 | Status: DISCONTINUED | OUTPATIENT
Start: 2024-01-19 | End: 2024-01-20

## 2024-01-19 RX ORDER — FENTANYL CITRATE 50 UG/ML
25 INJECTION INTRAVENOUS ONCE
Refills: 0 | Status: DISCONTINUED | OUTPATIENT
Start: 2024-01-19 | End: 2024-01-19

## 2024-01-19 RX ORDER — OXYCODONE HYDROCHLORIDE 5 MG/1
10 TABLET ORAL EVERY 12 HOURS
Refills: 0 | Status: DISCONTINUED | OUTPATIENT
Start: 2024-01-19 | End: 2024-01-20

## 2024-01-19 RX ORDER — ASPIRIN/CALCIUM CARB/MAGNESIUM 324 MG
81 TABLET ORAL DAILY
Refills: 0 | Status: DISCONTINUED | OUTPATIENT
Start: 2024-01-19 | End: 2024-01-20

## 2024-01-19 RX ORDER — ERGOCALCIFEROL 1.25 MG/1
0 CAPSULE ORAL
Refills: 0 | DISCHARGE

## 2024-01-19 RX ADMIN — OXYCODONE HYDROCHLORIDE 10 MILLIGRAM(S): 5 TABLET ORAL at 23:00

## 2024-01-19 RX ADMIN — SODIUM CHLORIDE 100 MILLILITER(S): 9 INJECTION INTRAMUSCULAR; INTRAVENOUS; SUBCUTANEOUS at 17:37

## 2024-01-19 RX ADMIN — SODIUM CHLORIDE 75 MILLILITER(S): 9 INJECTION INTRAMUSCULAR; INTRAVENOUS; SUBCUTANEOUS at 15:42

## 2024-01-19 RX ADMIN — FENTANYL CITRATE 25 MICROGRAM(S): 50 INJECTION INTRAVENOUS at 19:47

## 2024-01-19 RX ADMIN — HYDROMORPHONE HYDROCHLORIDE 1 MILLIGRAM(S): 2 INJECTION INTRAMUSCULAR; INTRAVENOUS; SUBCUTANEOUS at 09:56

## 2024-01-19 RX ADMIN — ATORVASTATIN CALCIUM 40 MILLIGRAM(S): 80 TABLET, FILM COATED ORAL at 22:02

## 2024-01-19 RX ADMIN — SODIUM CHLORIDE 75 MILLILITER(S): 9 INJECTION INTRAMUSCULAR; INTRAVENOUS; SUBCUTANEOUS at 15:09

## 2024-01-19 RX ADMIN — OXYCODONE HYDROCHLORIDE 10 MILLIGRAM(S): 5 TABLET ORAL at 22:02

## 2024-01-19 RX ADMIN — FENTANYL CITRATE 25 MICROGRAM(S): 50 INJECTION INTRAVENOUS at 20:00

## 2024-01-19 NOTE — DISCHARGE NOTE PROVIDER - ATTENDING DISCHARGE PHYSICAL EXAMINATION:
PHYSICAL EXAM  Vital Signs Last 24 Hrs  T(C): 36.7 (20 Jan 2024 12:10), Max: 36.7 (19 Jan 2024 14:17)  T(F): 98.1 (20 Jan 2024 12:10), Max: 98.1 (19 Jan 2024 16:10)  HR: 80 (20 Jan 2024 12:10) (64 - 90)  BP: 105/59 (20 Jan 2024 12:10) (104/67 - 153/81)  BP(mean): 77 (20 Jan 2024 05:13) (76 - 109)  RR: 18 (20 Jan 2024 12:10) (15 - 20)  SpO2: 98% (20 Jan 2024 12:10) (96% - 100%)    Parameters below as of 20 Jan 2024 08:35  Patient On (Oxygen Delivery Method): room air        01-19-24 @ 07:01  -  01-20-24 @ 07:00  --------------------------------------------------------  IN: 600 mL / OUT: 550 mL / NET: 50 mL      CONSTITUTIONAL: Well-groomed, in no apparent distress;  EYES: No conjunctival or scleral injection, non-icteric;  ENMT: No external nasal lesions; Normal outer ears;  NECK: Trachea midline;  RESPIRATORY: Normal respiratory effort; Decreased breathe sounds bilaterally without wheeze/rhonchi/rales;  CARDIOVASCULAR: Regular rate and rhythm;  GASTROINTESTINAL: Non-distended; No palpable masses; No rebound/guarding;  EXTREMITIES:  No lower extremity edema;  NEUROLOGY: A+O to person, place, and time; Does respond to commands appropriately;  PSYCHIATRY: Mood and Affect appropriate

## 2024-01-19 NOTE — CHART NOTE - NSCHARTNOTEFT_GEN_A_CORE
s/p LHC with PCI ONESIMO x 2 LAD 90% via RFA access, sheath removed @ 1930 manual pressure held good hemostasis VSS SBP 130s HR 60-70s  no hematoma- sit up 2 hours    DAPT with ASA/Plavix  continue atorva 40.    Post procedureal hydration administered  monitor site overnight - remove leonard when able to ambulate
istop reviewed      Prescription Information      PDI Filter:    PDI	My Rx	Current Rx	Drug Type	Rx Written	Rx Dispensed	Drug	Quantity	Days Supply	Prescriber Name	Prescriber MAKEDA #	Payment Method	Dispenser  A	N	Y		01/18/2024	01/18/2024	zolpidem tartrate 10 mg tablet	30	30	Yueh, Carrie	[id number]	Insurance	[facility] #[id number]  A	N	Y	O	01/12/2024	01/13/2024	oxycontin er 10 mg tablet	60	30	Arian Carter	[id number]	Insurance	[facility] #[id number]  A	N	Y	O	11/18/2023	01/02/2024	tramadol hcl 50 mg tablet	90	30	Yueh, Carrie	[id number]	Insurance	[facility] #[id number]  A	N	N	O	12/11/2023	12/14/2023	oxycontin er 10 mg tablet	60	30	Arian Carter	[id number]	Insurance	[facility] #[id number]  A	N	N		10/12/2023	12/13/2023	zolpidem tartrate 10 mg tablet	30	30	Yueh, Carrie	[id number]	Insurance	[facility] #[id number]  A	N	N	O	11/18/2023	12/01/2023	tramadol hcl 50 mg tablet	90	30	Yueh, Carrie	[id number]	Insurance	[facility] #[id number]  A	N	N	O	11/17/2023	11/17/2023	oxycontin er 10 mg tablet	60	30	Arian Carter	[id number]	Insurance	[facility] #[id number]  A	N	N		10/12/2023	11/14/2023	zolpidem tartrate 10 mg tablet	30	30	Yueh, Carrie	[id number]	Insurance	[facility] #[id number]  A	N	N	O	11/08/2023	11/08/2023	oxycodone-acetaminophen 5-325 mg tab	10	3	Mervat Recio	[id number]	Insurance	[facility] #[id number]  A	N	N	O	07/05/2023	11/01/2023	tramadol hcl 50 mg tablet	90	30	Yueh, Carrie	[id number]	Insurance	[facility] #[id number]  A	N	N	O	10/20/2023	10/20/2023	oxycontin er 10 mg tablet	60	30	Arian Carter	[id number]	Insurance	[facility] #[id number]  A	N	N		10/12/2023	10/12/2023	zolpidem tartrate 10 mg tablet	30	30	Yueh, Carrie	[id number]	Insurance	[facility] #[id number]  A	N	N	O	09/18/2023	09/18/2023	oxycontin er 10 mg tablet	60	30	Arian Carter	[id number]	Insurance	[facility] #[id number]  A	N	N		07/05/2023	09/03/2023	zolpidem tartrate 10 mg tablet	30	30	Yueh, Carrie	[id number]	Insurance	[facility] #[id number]  A	N	N	O	07/05/2023	09/03/2023	tramadol hcl 50 mg tablet	90	30	Yueh, Carrie	[id number]	Insurance	[facility] #[id number]  A	N	N	O	08/21/2023	08/21/2023	oxycontin er 10 mg tablet	60	30	Arian Carter	[id number]	Insurance	[facility] #[id number]  A	N	N		07/05/2023	08/03/2023	zolpidem tartrate 10 mg tablet	30	30	Yueh, Carrie	[id number]	Insurance	[facility] #[id number]  A	N	N	O	07/21/2023	07/21/2023	oxycontin er 10 mg tablet	60	30	Arian Carter	[id number]	Insurance	[facility] #[id number]  A	N	N		07/05/2023	07/05/2023	zolpidem tartrate 10 mg tablet	30	30	Yueh, Carrie	[id number]	Insurance	[facility] #[id number]  A	N	N	O	07/05/2023	07/05/2023	tramadol hcl 50 mg tablet	90	30	Yueh, Carrie	[id number]	Insurance	[facility] #[id number]  A	N	N	O	06/19/2023	06/20/2023	oxycontin er 10 mg tablet	60	30	Arian Carter	[id number]	Insurance	[facility] #[id number]  A	N	N	O	03/25/2023	06/01/2023	tramadol hcl 50 mg tablet	90	30	Yueh, Carrie	[id number]	Insurance	[facility] #[id number]  A	N	N		04/03/2023	06/01/2023	zolpidem tartrate 10 mg tablet	30	30	Yueh, Carrie	[id number]	Insurance	[facility] #[id number]  A	N	N	O	05/18/2023	05/18/2023	oxycontin er 10 mg tablet	60	30	Arian Carter	[id number]	Insurance	[facility] #[id number]  A	N	N		04/03/2023	05/05/2023	zolpidem tartrate 10 mg tablet	30	30	Yueh, Carrie	[id number]	Insurance	[facility] #[id number]  A	N	N	O	03/25/2023	04/27/2023	tramadol hcl 50 mg tablet	90	30	Yueh, Carrie	[id number]	Insurance	[facility] #[id number]  A	N	N	O	04/11/2023	04/12/2023	oxycontin er 10 mg tablet	60	30	Arian Carter	[id number]	Insurance	[facility] #[id number]  A	N	N		04/03/2023	04/03/2023	zolpidem tartrate 10 mg tablet	30	30	Yueh, Carrie	[id number]	Insurance	[facility] #[id number]  A	N	N	O	03/25/2023	03/28/2023	tramadol hcl 50 mg tablet	90	30	Yueh, Carrie	[id number]	Insurance	[facility] #[id number]  A	N	N	O	03/08/2023	03/14/2023	oxycontin er 10 mg tablet	60	30	Arian Carter	[id number]	Insurance	[facility] #[id number]  A	N	N		02/01/2023	03/03/2023	zolpidem tartrate 10 mg tablet	30	30	Yueh, Carrie	[id number]	Insurance	[facility] #[id number]  A	N	N	O	12/15/2022	02/21/2023	tramadol hcl 50 mg tablet	90	30	Yueh, Carrie	[id number]	Insurance	[facility] #[id number]  A	N	N	O	02/07/2023	02/14/2023	oxycontin er 10 mg tablet	60	30	Arian Carter	[id number]	Insurance	[facility] #[id number]  A	N	N		02/01/2023	02/01/2023	zolpidem tartrate 10 mg tablet	30	30	Yueh, Carrie	[id number]	Insurance	[facility] #[id number]  A	N	N	O	12/15/2022	01/23/2023	tramadol hcl 50 mg tablet	90	30	Yueh, Carrie	[id number]	Insurance	[facility] #[id number]

## 2024-01-19 NOTE — ED PROVIDER NOTE - CLINICAL SUMMARY MEDICAL DECISION MAKING FREE TEXT BOX
54-year-old male with abnormal nuclear stress test at his cardiologist office yesterday presenting for cath today.  Symptoms include intermittent dyspnea on exertion, fatigue times weeks now with intermittent chest pain since last night.  EKG is nonischemic.  Will send screening labs monitor on telemetry and contact Dr. Shahram Garrison for admission.

## 2024-01-19 NOTE — ED CLERICAL - NS ED CLERK NOTE PRE-ARRIVAL INFORMATION; ADDITIONAL PRE-ARRIVAL INFORMATION
CC/Reason For referral: SOB, Chest Pain   Preferred Consultant(if applicable):  Who admits for you (if needed):Hospitalist  Do you have documents you would like to fax over? No  Would you still like to speak to an ED attending? Please call after patient is seen.

## 2024-01-19 NOTE — DISCHARGE NOTE PROVIDER - CARE PROVIDER_API CALL
Gopi Marks  Cardiology  [address], [address]  [place] [address]  Phone: [phone]  Fax: [phone]  Follow Up Time: 1 week

## 2024-01-19 NOTE — ED ADULT TRIAGE NOTE - HEART RATE (BEATS/MIN)
Pt. Wants to know if she can take Glucosamine Chondroitin for her arthritis? Pt. States her mom takes it and it helps her.   92

## 2024-01-19 NOTE — H&P ADULT - PROBLEM SELECTOR PLAN 5
started during tx for melanoma  -Istop in the chart  -continue long acting oxycodone 10mg q12  -continue tramadol 50mg prn for headache

## 2024-01-19 NOTE — H&P ADULT - HISTORY OF PRESENT ILLNESS
53 y/o M presenting after an abnormal nuclear stress test    53 y/o M with a h/o testicular cancer and NHL in remission, melanoma s/p resection and immunotherapy (no longer receiving/completed?) with resulting hypothyroidism, adrenal insufficiency, HTN and chronic pain presenting with an abnormal stress test. Patient has been having increasing EASLEY and fatigue over the past few weeks. Underwent TTE outpatient wnl. In addition had nuclear stress test with areas of moderate to severe ischemia, Last night developed new onset intermittent chest pain, substernal lasting minutes-hour which self resolved. Denies exacerbating activities at the time. denies syncope/radiation. Given stress test and new chest pain, advised to come to the ED for LHC.    In ED afeb hds. No current cp. Labs notable for cbc bmp wnl trop neg. EKG nonischemic.

## 2024-01-19 NOTE — DISCHARGE NOTE PROVIDER - NSDCCPCAREPLAN_GEN_ALL_CORE_FT
PRINCIPAL DISCHARGE DIAGNOSIS  Diagnosis: CAD (coronary artery disease)  Assessment and Plan of Treatment: Coronary artery disease is a condition where the arteries the supply the heart muscle get clogged with fatty deposits & puts you at risk for a heart attack. You underwent left heart catheterization with 2 stent placement to LAD via Right femoral artery   Call your doctor if you have any new pain, pressure, or discomfort in the center of your chest, pain, tingling or discomfort in arms, back, neck, jaw, or stomach, shortness of breath, nausea, vomiting, burping or heartburn, sweating, cold and clammy skin, racing or abnormal heartbeat for more than 10 minutes or if they keep coming & going.  Call 911 and do not tr to get to hospital by care  You can help yourself with lifestyle changes (quitting smoking if you smoke), eat lots of fruits & vegetables & low fat dairy products, not a lot of meat & fatty foods, walk or some form of physical activity most days of the week, lose weight if you are overweight  Take your cardiac medication as prescribed to lower cholesterol, to lower blood pressure, aspirin to prevent blood clots, and diabetes control  Make sure to keep appointments with doctor for cardiac follow up care        SECONDARY DISCHARGE DIAGNOSES  Diagnosis: HTN (hypertension)  Assessment and Plan of Treatment: Hypertension, also known simply as "high blood pressure" is very common, however can lead to many significant complications if left uncontrolled. When the blood pressure is elevated, the force the blood puts on the walls of the arteries is high and can lead to artery damage. Also, when the heart muscle has to pump blood against a high blood pressure, it thickens and enlarges, just like any muscle does when it has to do more work (think of a weight ). When the blood pressure is very high, people may feel a headache or tired. Some people can feel pounding in their head or have blurry vision. Hearing the heart beating in the ear especially at night can be a sign of high blood pressure. Eventually, symptoms of stroke, heart attack, heart failure or irregular heartbeats can occur  - Exercise: Doing cardiovascular exercise such as running, biking or swimming at least 30 minutes per day most days of the week is recommended to help keep blood pressure healthy  - Lose weight: Maintaining a normal BMI (body mass index) is very important in keeping blood pressure readings normal   - Avoid salt: Sodium in the diet increases the blood pressure in many ways. Salt comes in many foods, so just because you don't add salt to your food it does not mean that you are eating a low salt diet. Read labels and keep sodium intake to less than 2000 mg per day   - Avoid alcohol: Even 1 or 2 alcoholic drinks can significantly increase blood pressure   - DASH Diet: The DASH diet has been shown to reduce blood pressure   - Take all medication as prescribed.   - Follow up with your medical doctor for routine blood pressure monitoring at your next visit.   - Notify your doctor if you have any of the following symptoms:    - Dizziness, Lightheadedness, Blurry vision, Headache, Chest pain, Shortness of breath

## 2024-01-19 NOTE — DISCHARGE NOTE PROVIDER - NSDCFUSCHEDAPPT_GEN_ALL_CORE_FT
[facility]  [address]  Scheduled Appointment: 01/31/2024

## 2024-01-19 NOTE — ED PROVIDER NOTE - OBJECTIVE STATEMENT
54-year-old male past medical history of hypertension hypothyroidism non-Hodgkin's lymphoma and testicular cancer in remission melanoma of the scalp status postresection presenting from home after an abnormal nuclear stress test yesterday and Dr. Marks's office.  Reports that he has been having dyspnea on exertion and fatigue for weeks symptoms have been occurring intermittently.  This is what prompted the stress test yesterday.  States that last night he developed intermittent chest pain substernal aching lasting minutes to hours at a time not actively experiencing pain.  EKG done in triage is nonischemic.  Reports that he is here to see Dr. Garrison for cath.  No active chest pain, shortness of breath, leg swelling, syncope, abdominal pain, nausea, vomiting.

## 2024-01-19 NOTE — H&P ADULT - NSHPLABSRESULTS_GEN_ALL_CORE
EKG personally reviewed: sinus with 1st degree AVB  Stress Test: Moderate to large areas of anteroapical, anterior and anteroseptal ischemia  TTE no WMA, EF wnl

## 2024-01-19 NOTE — H&P ADULT - BP NONINVASIVE SYSTOLIC (MM HG)
Principal Discharge DX:	Unresponsiveness  Goal:	resolution  Instructions for follow-up, activity and diet:	Likely due to low blood sugars. You will stop taking Metformin,  and monitor your blood glucose to ensure it is within normal limits. If your level is below 60 immediately drink a cup of apple or orange juice and re-check your blood glucose 15 minutes later. Follow up with your PCP for continued care.  Secondary Diagnosis:	First degree heart block  Goal:	maintain heart rate at 60 bpm  Instructions for follow-up, activity and diet:	Your metoprolol  and donepezil was stopped due to your low heart rate. Instead of metoprolol, you were started on another blood pressure medication amlodipine. Please follow up with your PCP for blood pressure and heart rate monitoring.
110

## 2024-01-19 NOTE — DISCHARGE NOTE PROVIDER - EXTENDED VTE YES NO FOR MLM ENOXAPARIN
Subjective/Objective:     Overnight event:  Tele event:    MEDICATIONS    apixaban 5 milliGRAM(s) Oral every 12 hours  hydrALAZINE 25 milliGRAM(s) Oral three times a day  sacubitril 24 mG/valsartan 26 mG 1 Tablet(s) Oral two times a day              benzocaine/menthol Lozenge 1 Lozenge Oral four times a day PRN  chlorhexidine 2% Cloths 1 Application(s) Topical <User Schedule>            REVIEW OF SYSTEMS:  Complete 10point ROS negative.    ICU Vital Signs Last 24 Hrs  T(C): 36.2 (08 Jul 2023 06:00), Max: 36.7 (07 Jul 2023 15:48)  T(F): 97.1 (08 Jul 2023 06:00), Max: 98 (07 Jul 2023 15:48)  HR: 82 (08 Jul 2023 08:00) (82 - 197)  BP: 109/84 (08 Jul 2023 08:00) (102/81 - 172/154)  BP(mean): 91 (08 Jul 2023 08:00) (82 - 161)  ABP: --  ABP(mean): --  RR: 14 (08 Jul 2023 08:00) (11 - 34)  SpO2: 96% (08 Jul 2023 08:00) (92% - 100%)    O2 Parameters below as of 08 Jul 2023 06:00  Patient On (Oxygen Delivery Method): room air            PHYSICAL EXAMINATION  Appearance: NAD, no distress  HEENT: Moist Mucous Membranes, Anicteric, PERRL, EOMI  Cardiovascular: Regular rate and rhythm, Normal S1 S2, No JVD, No murmurs  Respiratory: Lungs clear to auscultation. No rales, No rhonchi, No wheezing. No tenderness to palpation  Gastrointestinal:  Soft, Non-tender, + BS  Neurologic: Non-focal, A&Ox3  Skin: Warm and dry, No rashes, No ecchymosis, No cyanosis  Musculoskeletal: No clubbing, No cyanosis, No edema  Vascular: Peripheral pulses palpable 2+ bilaterally  Psychiatry: Mood & affect appropriate      	    		      I&O's Summary    07 Jul 2023 07:01  -  08 Jul 2023 07:00  --------------------------------------------------------  IN: 1150 mL / OUT: 3200 mL / NET: -2050 mL    08 Jul 2023 07:01  -  08 Jul 2023 09:00  --------------------------------------------------------  IN: 150 mL / OUT: 0 mL / NET: 150 mL    	     LABS:	  LABORATORY VALUES	 	                          15.7   5.76  )-----------( 269      ( 08 Jul 2023 02:17 )             48.0       07-08    138  |  102  |  38<H>  ----------------------------<  144<H>  3.8   |  22  |  1.32<H>  07-07    142  |  104  |  36<H>  ----------------------------<  102<H>  4.1   |  23  |  1.42<H>    Ca    9.2      08 Jul 2023 02:17  Ca    9.1      07 Jul 2023 04:24  Phos  3.6     07-08  Phos  4.3     07-07  Mg     1.90     07-08  Mg     1.70     07-07    TPro  6.4  /  Alb  3.4  /  TBili  1.5<H>  /  DBili  0.5<H>  /  AST  80<H>  /  ALT  201<H>  /  AlkPhos  113  07-07    LIVER FUNCTIONS - ( 07 Jul 2023 04:24 )  Alb: 3.4 g/dL / Pro: 6.4 g/dL / ALK PHOS: 113 U/L / ALT: 201 U/L / AST: 80 U/L / GGT: x           Activated Partial Thromboplastin Time: 31.3 sec (07-08 @ 02:17)      CARDIAC MARKERS:            Blood Gas Venous - Lactate: 2.2 mmol/L (07-08 @ 02:17)  Blood Gas Venous - Lactate: 1.4 mmol/L (07-07 @ 04:24)  Blood Gas Venous - Lactate: 1.5 mmol/L (07-06 @ 02:10)    05-04 @ 07:13  Cholesterol, Serum - 193  Direct LDL- --  HDL Cholesterol, Serum- 43  Triglycerides, Serum- 59      Thyroid Stimulating Hormone, Serum: 1.51 uU/mL (05-03 @ 21:20)      Urinalysis Basic - ( 08 Jul 2023 02:17 )    Color: x / Appearance: x / SG: x / pH: x  Gluc: 144 mg/dL / Ketone: x  / Bili: x / Urobili: x   Blood: x / Protein: x / Nitrite: x   Leuk Esterase: x / RBC: x / WBC x   Sq Epi: x / Non Sq Epi: x / Bacteria: x      CAPILLARY BLOOD GLUCOSE            Transthoracic Echocardiogram (07.06.23 @ 08:24)  DIMENSIONS:  Dimensions:     Normal Values:  LA:     5.2 cm    2.0 - 4.0 cm  Ao:     3.0 cm    2.0 - 3.8 cm  SEPTUM: 0.8 cm    0.6 - 1.2 cm  PWT:    1.0 cm    0.6 - 1.1 cm  LVIDd:  5.4 cm    3.0 - 5.6 cm  LVIDs:  5.0 cm    1.8 - 4.0 cm  Derived Variables:  LVMI: 87 g/m2  RWT: 0.37  Fractional short: 7 %  Ejection Fraction (Modified Arreola Rule): 27 %  ------------------------------------------------------------------------  OBSERVATIONS:  Mitral Valve: Mitral annular calcification, otherwise  normal mitral valve. Severe mitral regurgitation. The MR  vena contracta is 0.71 cm. There was no quantitation of MR.    Aortic Root: Aortic Root: 3.0 cm. Sinotubular Junction: 2.7  cm. Ascending Aorta: 3.1 cm.  Aortic Valve: Calcified trileaflet aortic valve with normal  opening. Minimal aortic regurgitation.  Left Atrium: Severely dilated left atrium.  LA volume index  = 56 cc/m2.  Left Ventricle: Severe segmental left ventricular systolic  dysfunction.The septal, apical,  inferior, anterior, and  anteroseptal are akinetic. The remaining walls are  hypokinetic. LVEF calculated using biplane Arreola's method  is 27%. Unable to determine the presence of clot.  Normal  wall thickness and dilated left ventricular cavity.  Severe  reversible diastolic dysfunction (Stage III).  Right Heart: Normal right atrium. Normal right ventricular  size and function. Normal tricuspid valve. Moderate  tricuspid regurgitation. Normal pulmonic valve. Minimal  pulmonic regurgitation.  Pericardium/PleuraNo pericardial effusion seen.  Hemodynamic: Estimated right atrial pressure is 15 mm Hg.  Estimated right ventricular systolicpressure equals 59 mm  Hg, assuming right atrial pressure equals 15 mm Hg,  consistent with moderate pulmonary hypertension.           HPi: 48 year old woman with a pmhx of HTN, CHF, Afib on Eliquis and metoprolol who presents to the ED for dizziness who was found to be in AFIB with RVR and acute decompensated heart failure s/p digoxin load and now starting amiodarone with plans to TROY/DCCV on 7/7/2023  Subjective/Objective: patient alert, awake. Denies chest pain, palpitation, sob    Tele event: afib with frequent PVCs, Bigeminy -120    MEDICATIONS    apixaban 5 milliGRAM(s) Oral every 12 hours  hydrALAZINE 25 milliGRAM(s) Oral three times a day  sacubitril 24 mG/valsartan 26 mG 1 Tablet(s) Oral two times a day  benzocaine/menthol Lozenge 1 Lozenge Oral four times a day PRN  chlorhexidine 2% Cloths 1 Application(s) Topical <User Schedule>              ICU Vital Signs Last 24 Hrs  T(C): 36.2 (08 Jul 2023 06:00), Max: 36.7 (07 Jul 2023 15:48)  T(F): 97.1 (08 Jul 2023 06:00), Max: 98 (07 Jul 2023 15:48)  HR: 82 (08 Jul 2023 08:00) (82 - 197)  BP: 109/84 (08 Jul 2023 08:00) (102/81 - 172/154)  BP(mean): 91 (08 Jul 2023 08:00) (82 - 161)    RR: 14 (08 Jul 2023 08:00) (11 - 34)  SpO2: 96% (08 Jul 2023 08:00) (92% - 100%)    O2 Parameters below as of 08 Jul 2023 06:00  Patient On (Oxygen Delivery Method): room air            PHYSICAL EXAMINATION  Appearance: NAD, no distress  HEENT: Moist Mucous Membranes, Anicteric, PERRL, EOMI  Cardiovascular: Regular rate and rhythm, Normal S1 S2, No JVD, No murmurs  Respiratory: Lungs clear to auscultation. No rales, No rhonchi, No wheezing.  Gastrointestinal:  Soft, Non-tender, + BS  Neurologic: Non-focal, A&Ox3  Skin: Warm and dry, No rashes, No ecchymosis, No cyanosis  Musculoskeletal: No clubbing, No cyanosis, No edema  Vascular: Peripheral pulses palpable 2+ bilaterally  Psychiatry: Mood & affect appropriate      	    		      I&O's Summary    07 Jul 2023 07:01  -  08 Jul 2023 07:00  --------------------------------------------------------  IN: 1150 mL / OUT: 3200 mL / NET: -2050 mL    08 Jul 2023 07:01  -  08 Jul 2023 09:00  --------------------------------------------------------  IN: 150 mL / OUT: 0 mL / NET: 150 mL    	     LABS:	  LABORATORY VALUES	 	                          15.7   5.76  )-----------( 269      ( 08 Jul 2023 02:17 )             48.0       07-08    138  |  102  |  38<H>  ----------------------------<  144<H>  3.8   |  22  |  1.32<H>  07-07    142  |  104  |  36<H>  ----------------------------<  102<H>  4.1   |  23  |  1.42<H>    Ca    9.2      08 Jul 2023 02:17  Ca    9.1      07 Jul 2023 04:24  Phos  3.6     07-08  Phos  4.3     07-07  Mg     1.90     07-08  Mg     1.70     07-07    TPro  6.4  /  Alb  3.4  /  TBili  1.5<H>  /  DBili  0.5<H>  /  AST  80<H>  /  ALT  201<H>  /  AlkPhos  113  07-07    LIVER FUNCTIONS - ( 07 Jul 2023 04:24 )  Alb: 3.4 g/dL / Pro: 6.4 g/dL / ALK PHOS: 113 U/L / ALT: 201 U/L / AST: 80 U/L / GGT: x           Activated Partial Thromboplastin Time: 31.3 sec (07-08 @ 02:17)      CARDIAC MARKERS:            Blood Gas Venous - Lactate: 2.2 mmol/L (07-08 @ 02:17)  Blood Gas Venous - Lactate: 1.4 mmol/L (07-07 @ 04:24)  Blood Gas Venous - Lactate: 1.5 mmol/L (07-06 @ 02:10)    05-04 @ 07:13  Cholesterol, Serum - 193  Direct LDL- --  HDL Cholesterol, Serum- 43  Triglycerides, Serum- 59      Thyroid Stimulating Hormone, Serum: 1.51 uU/mL (05-03 @ 21:20)      Urinalysis Basic - ( 08 Jul 2023 02:17 )    Color: x / Appearance: x / SG: x / pH: x  Gluc: 144 mg/dL / Ketone: x  / Bili: x / Urobili: x   Blood: x / Protein: x / Nitrite: x   Leuk Esterase: x / RBC: x / WBC x   Sq Epi: x / Non Sq Epi: x / Bacteria: x      CAPILLARY BLOOD GLUCOSE            Transthoracic Echocardiogram (07.06.23 @ 08:24)  DIMENSIONS:  Dimensions:     Normal Values:  LA:     5.2 cm    2.0 - 4.0 cm  Ao:     3.0 cm    2.0 - 3.8 cm  SEPTUM: 0.8 cm    0.6 - 1.2 cm  PWT:    1.0 cm    0.6 - 1.1 cm  LVIDd:  5.4 cm    3.0 - 5.6 cm  LVIDs:  5.0 cm    1.8 - 4.0 cm  Derived Variables:  LVMI: 87 g/m2  RWT: 0.37  Fractional short: 7 %  Ejection Fraction (Modified Arreola Rule): 27 %  ------------------------------------------------------------------------  OBSERVATIONS:  Mitral Valve: Mitral annular calcification, otherwise  normal mitral valve. Severe mitral regurgitation. The MR  vena contracta is 0.71 cm. There was no quantitation of MR.    Aortic Root: Aortic Root: 3.0 cm. Sinotubular Junction: 2.7  cm. Ascending Aorta: 3.1 cm.  Aortic Valve: Calcified trileaflet aortic valve with normal  opening. Minimal aortic regurgitation.  Left Atrium: Severely dilated left atrium.  LA volume index  = 56 cc/m2.  Left Ventricle: Severe segmental left ventricular systolic  dysfunction.The septal, apical,  inferior, anterior, and  anteroseptal are akinetic. The remaining walls are  hypokinetic. LVEF calculated using biplane Arreola's method  is 27%. Unable to determine the presence of clot.  Normal  wall thickness and dilated left ventricular cavity.  Severe  reversible diastolic dysfunction (Stage III).  Right Heart: Normal right atrium. Normal right ventricular  size and function. Normal tricuspid valve. Moderate  tricuspid regurgitation. Normal pulmonic valve. Minimal  pulmonic regurgitation.  Pericardium/PleuraNo pericardial effusion seen.  Hemodynamic: Estimated right atrial pressure is 15 mm Hg.  Estimated right ventricular systolicpressure equals 59 mm  Hg, assuming right atrial pressure equals 15 mm Hg,  consistent with moderate pulmonary hypertension.      < from: Cardiac Catheterization (05.08.23 @ 15:18) >  Diagnostic Findings:     Coronary Angiography   The coronary circulation is right dominant.      LM   Left main artery: Angiography shows mild atherosclerosis.      LAD   Left anterior descending artery: There is a 20 % stenosis.      CX   Circumflex: Angiography shows mild atherosclerosis.      RCA   Right coronary artery: There is a 20 % stenosis.      < end of copied text >       HPi: 48 year old woman with a pmhx of HTN, CHF, Afib on Eliquis and metoprolol who presents to the ED for dizziness who was found to be in AFIB with RVR and acute decompensated heart failure s/p digoxin load and now starting amiodarone with plans to TROY/DCCV on 7/7/2023.    Subjective/Objective: patient alert, awake. Denies chest pain, palpitation, sob    Tele event: afib with frequent PVCs, Bigeminy -120    MEDICATIONS    apixaban 5 milliGRAM(s) Oral every 12 hours  hydrALAZINE 25 milliGRAM(s) Oral three times a day  sacubitril 24 mG/valsartan 26 mG 1 Tablet(s) Oral two times a day  benzocaine/menthol Lozenge 1 Lozenge Oral four times a day PRN  chlorhexidine 2% Cloths 1 Application(s) Topical <User Schedule>        ICU Vital Signs Last 24 Hrs  T(C): 36.2 (08 Jul 2023 06:00), Max: 36.7 (07 Jul 2023 15:48)  T(F): 97.1 (08 Jul 2023 06:00), Max: 98 (07 Jul 2023 15:48)  HR: 82 (08 Jul 2023 08:00) (82 - 197)  BP: 109/84 (08 Jul 2023 08:00) (102/81 - 172/154)  BP(mean): 91 (08 Jul 2023 08:00) (82 - 161)    RR: 14 (08 Jul 2023 08:00) (11 - 34)  SpO2: 96% (08 Jul 2023 08:00) (92% - 100%)    O2 Parameters below as of 08 Jul 2023 06:00  Patient On (Oxygen Delivery Method): room air            PHYSICAL EXAMINATION  Appearance: NAD, no distress  HEENT: Moist Mucous Membranes, Anicteric, PERRL, EOMI  Cardiovascular: Regular rate and rhythm, Normal S1 S2, No JVD, No murmurs  Respiratory: Lungs clear to auscultation. No rales, No rhonchi, No wheezing.  Gastrointestinal:  Soft, Non-tender, + BS  Neurologic: Non-focal, A&Ox3  Skin: Warm and dry, No rashes, No ecchymosis, No cyanosis  Musculoskeletal: No clubbing, No cyanosis, No edema  Vascular: Peripheral pulses palpable 2+ bilaterally  Psychiatry: Mood & affect appropriate      	    		      I&O's Summary    07 Jul 2023 07:01  -  08 Jul 2023 07:00  --------------------------------------------------------  IN: 1150 mL / OUT: 3200 mL / NET: -2050 mL    08 Jul 2023 07:01  -  08 Jul 2023 09:00  --------------------------------------------------------  IN: 150 mL / OUT: 0 mL / NET: 150 mL    	     LABS:	  LABORATORY VALUES	 	                          15.7   5.76  )-----------( 269      ( 08 Jul 2023 02:17 )             48.0       07-08    138  |  102  |  38<H>  ----------------------------<  144<H>  3.8   |  22  |  1.32<H>  07-07    142  |  104  |  36<H>  ----------------------------<  102<H>  4.1   |  23  |  1.42<H>    Ca    9.2      08 Jul 2023 02:17  Ca    9.1      07 Jul 2023 04:24  Phos  3.6     07-08  Phos  4.3     07-07  Mg     1.90     07-08  Mg     1.70     07-07    TPro  6.4  /  Alb  3.4  /  TBili  1.5<H>  /  DBili  0.5<H>  /  AST  80<H>  /  ALT  201<H>  /  AlkPhos  113  07-07    LIVER FUNCTIONS - ( 07 Jul 2023 04:24 )  Alb: 3.4 g/dL / Pro: 6.4 g/dL / ALK PHOS: 113 U/L / ALT: 201 U/L / AST: 80 U/L / GGT: x           Activated Partial Thromboplastin Time: 31.3 sec (07-08 @ 02:17)      CARDIAC MARKERS:            Blood Gas Venous - Lactate: 2.2 mmol/L (07-08 @ 02:17)  Blood Gas Venous - Lactate: 1.4 mmol/L (07-07 @ 04:24)  Blood Gas Venous - Lactate: 1.5 mmol/L (07-06 @ 02:10)    05-04 @ 07:13  Cholesterol, Serum - 193  Direct LDL- --  HDL Cholesterol, Serum- 43  Triglycerides, Serum- 59      Thyroid Stimulating Hormone, Serum: 1.51 uU/mL (05-03 @ 21:20)      Urinalysis Basic - ( 08 Jul 2023 02:17 )    Color: x / Appearance: x / SG: x / pH: x  Gluc: 144 mg/dL / Ketone: x  / Bili: x / Urobili: x   Blood: x / Protein: x / Nitrite: x   Leuk Esterase: x / RBC: x / WBC x   Sq Epi: x / Non Sq Epi: x / Bacteria: x      CAPILLARY BLOOD GLUCOSE            Transthoracic Echocardiogram (07.06.23 @ 08:24)  DIMENSIONS:  Dimensions:     Normal Values:  LA:     5.2 cm    2.0 - 4.0 cm  Ao:     3.0 cm    2.0 - 3.8 cm  SEPTUM: 0.8 cm    0.6 - 1.2 cm  PWT:    1.0 cm    0.6 - 1.1 cm  LVIDd:  5.4 cm    3.0 - 5.6 cm  LVIDs:  5.0 cm    1.8 - 4.0 cm  Derived Variables:  LVMI: 87 g/m2  RWT: 0.37  Fractional short: 7 %  Ejection Fraction (Modified Arreola Rule): 27 %  ------------------------------------------------------------------------  OBSERVATIONS:  Mitral Valve: Mitral annular calcification, otherwise  normal mitral valve. Severe mitral regurgitation. The MR  vena contracta is 0.71 cm. There was no quantitation of MR.    Aortic Root: Aortic Root: 3.0 cm. Sinotubular Junction: 2.7  cm. Ascending Aorta: 3.1 cm.  Aortic Valve: Calcified trileaflet aortic valve with normal  opening. Minimal aortic regurgitation.  Left Atrium: Severely dilated left atrium.  LA volume index  = 56 cc/m2.  Left Ventricle: Severe segmental left ventricular systolic  dysfunction.The septal, apical,  inferior, anterior, and  anteroseptal are akinetic. The remaining walls are  hypokinetic. LVEF calculated using biplane Arreola's method  is 27%. Unable to determine the presence of clot.  Normal  wall thickness and dilated left ventricular cavity.  Severe  reversible diastolic dysfunction (Stage III).  Right Heart: Normal right atrium. Normal right ventricular  size and function. Normal tricuspid valve. Moderate  tricuspid regurgitation. Normal pulmonic valve. Minimal  pulmonic regurgitation.  Pericardium/PleuraNo pericardial effusion seen.  Hemodynamic: Estimated right atrial pressure is 15 mm Hg.  Estimated right ventricular systolicpressure equals 59 mm  Hg, assuming right atrial pressure equals 15 mm Hg,  consistent with moderate pulmonary hypertension.      < from: Cardiac Catheterization (05.08.23 @ 15:18) >  Diagnostic Findings:     Coronary Angiography   The coronary circulation is right dominant.      LM   Left main artery: Angiography shows mild atherosclerosis.      LAD   Left anterior descending artery: There is a 20 % stenosis.      CX   Circumflex: Angiography shows mild atherosclerosis.      RCA   Right coronary artery: There is a 20 % stenosis.      < end of copied text >       ,

## 2024-01-19 NOTE — H&P ADULT - PROBLEM SELECTOR PLAN 1
areas of ischemia as above (anterior anteroseptal, anteroapical) with new CP although currently CP free  -Aspirin  -Atorva 40  -LHC today with Dr. Garrison

## 2024-01-19 NOTE — DISCHARGE NOTE PROVIDER - CARE PROVIDERS DIRECT ADDRESSES
[email or web address]

## 2024-01-19 NOTE — H&P ADULT - ASSESSMENT
55 y/o M with a h/o testicular cancer and NHL in remission, melanoma s/p resection and immunotherapy, hypothyroidism, adrenal insufficiency, HTN and chronic pain presenting with cp and an abnormal stress test c/f ACS.

## 2024-01-19 NOTE — ED PROVIDER NOTE - PHYSICAL EXAMINATION
Gen: Thin M NAD   HEENT: NCAT EOMI   Neck: supple R sided neck dissection   CV: RRR, no murmur  Lung: CTA BL  Abd: +BS soft NTND  Ext: wwp, palp pulses, FROMx4, no cce  Neuro: Awake alert no fnd

## 2024-01-19 NOTE — DISCHARGE NOTE PROVIDER - NSDCCPTREATMENT_GEN_ALL_CORE_FT
PRINCIPAL PROCEDURE  Procedure: Coronary angiography in cardiac catheterization laboratory  Findings and Treatment: s/p PCI to LAD x2 ONESIMO

## 2024-01-19 NOTE — DISCHARGE NOTE PROVIDER - NSDCMRMEDTOKEN_GEN_ALL_CORE_FT
Afrin nasal spray: as needed  ergocalciferol 1.25 mg (50,000 intl units) oral capsule: 1 cap(s) orally once a week on wednesday  Fish Oil 1000 mg oral capsule: 1 cap(s) orally once a day  hydrocortisone 20 mg oral tablet: 1 tab(s) orally once a day (in the morning) Note:Last filled in july for 1 month supply  OxyCONTIN 10 mg oral tablet, extended release: 1 tab(s) orally 2 times a day  pantoprazole 40 mg oral delayed release tablet: 1 tab(s) orally as needed  Synthroid 112 mcg (0.112 mg) oral tablet: 1 tab(s) orally once a day  traMADol 50 mg oral tablet: 1 tab(s) orally as needed   Afrin nasal spray: as needed  aspirin 81 mg oral delayed release tablet: 1 tab(s) orally once a day  atorvastatin 40 mg oral tablet: 1 tab(s) orally once a day (at bedtime)  clopidogrel 75 mg oral tablet: 1 tab(s) orally once a day  ergocalciferol 1.25 mg (50,000 intl units) oral capsule: 1 cap(s) orally once a week on wednesday  Fish Oil 1000 mg oral capsule: 1 cap(s) orally once a day  hydrocortisone 20 mg oral tablet: 1 tab(s) orally once a day (in the morning) Note:Last filled in july for 1 month supply  OxyCONTIN 10 mg oral tablet, extended release: 1 tab(s) orally 2 times a day  pantoprazole 40 mg oral delayed release tablet: 1 tab(s) orally as needed  Synthroid 112 mcg (0.112 mg) oral tablet: 1 tab(s) orally once a day  traMADol 50 mg oral tablet: 1 tab(s) orally as needed

## 2024-01-19 NOTE — ED ADULT NURSE NOTE - NSFALLHARMRISKINTERV_ED_ALL_ED

## 2024-01-19 NOTE — ED ADULT NURSE NOTE - OBJECTIVE STATEMENT
0819 pt 54ym aox4 c/o sob x 1month and cp for 2 days sent by dr romeo for inc cp today, pt vss no distress family at bedside pending eval/

## 2024-01-20 ENCOUNTER — TRANSCRIPTION ENCOUNTER (OUTPATIENT)
Age: 54
End: 2024-01-20

## 2024-01-20 VITALS
DIASTOLIC BLOOD PRESSURE: 59 MMHG | HEART RATE: 80 BPM | SYSTOLIC BLOOD PRESSURE: 105 MMHG | TEMPERATURE: 98 F | RESPIRATION RATE: 18 BRPM | OXYGEN SATURATION: 98 %

## 2024-01-20 LAB
ANION GAP SERPL CALC-SCNC: 9 MMOL/L — SIGNIFICANT CHANGE UP (ref 5–17)
BUN SERPL-MCNC: 14 MG/DL — SIGNIFICANT CHANGE UP (ref 7–23)
CALCIUM SERPL-MCNC: 9.2 MG/DL — SIGNIFICANT CHANGE UP (ref 8.4–10.5)
CHLORIDE SERPL-SCNC: 102 MMOL/L — SIGNIFICANT CHANGE UP (ref 96–108)
CHOLEST SERPL-MCNC: 135 MG/DL — SIGNIFICANT CHANGE UP
CO2 SERPL-SCNC: 25 MMOL/L — SIGNIFICANT CHANGE UP (ref 22–31)
CREAT SERPL-MCNC: 0.99 MG/DL — SIGNIFICANT CHANGE UP (ref 0.5–1.3)
EGFR: 91 ML/MIN/1.73M2 — SIGNIFICANT CHANGE UP
GLUCOSE SERPL-MCNC: 106 MG/DL — HIGH (ref 70–99)
HDLC SERPL-MCNC: 28 MG/DL — LOW
LIPID PNL WITH DIRECT LDL SERPL: 64 MG/DL — SIGNIFICANT CHANGE UP
NON HDL CHOLESTEROL: 107 MG/DL — SIGNIFICANT CHANGE UP
POTASSIUM SERPL-MCNC: 3.7 MMOL/L — SIGNIFICANT CHANGE UP (ref 3.5–5.3)
POTASSIUM SERPL-SCNC: 3.7 MMOL/L — SIGNIFICANT CHANGE UP (ref 3.5–5.3)
SODIUM SERPL-SCNC: 136 MMOL/L — SIGNIFICANT CHANGE UP (ref 135–145)
TRIGL SERPL-MCNC: 267 MG/DL — HIGH

## 2024-01-20 PROCEDURE — 85014 HEMATOCRIT: CPT

## 2024-01-20 PROCEDURE — 84132 ASSAY OF SERUM POTASSIUM: CPT

## 2024-01-20 PROCEDURE — 85730 THROMBOPLASTIN TIME PARTIAL: CPT

## 2024-01-20 PROCEDURE — 85018 HEMOGLOBIN: CPT

## 2024-01-20 PROCEDURE — 84295 ASSAY OF SERUM SODIUM: CPT

## 2024-01-20 PROCEDURE — 82435 ASSAY OF BLOOD CHLORIDE: CPT

## 2024-01-20 PROCEDURE — 96374 THER/PROPH/DIAG INJ IV PUSH: CPT

## 2024-01-20 PROCEDURE — 83605 ASSAY OF LACTIC ACID: CPT

## 2024-01-20 PROCEDURE — 93005 ELECTROCARDIOGRAM TRACING: CPT

## 2024-01-20 PROCEDURE — C1894: CPT

## 2024-01-20 PROCEDURE — 76937 US GUIDE VASCULAR ACCESS: CPT

## 2024-01-20 PROCEDURE — 93454 CORONARY ARTERY ANGIO S&I: CPT | Mod: 59

## 2024-01-20 PROCEDURE — 85610 PROTHROMBIN TIME: CPT

## 2024-01-20 PROCEDURE — 80061 LIPID PANEL: CPT

## 2024-01-20 PROCEDURE — C1769: CPT

## 2024-01-20 PROCEDURE — 82330 ASSAY OF CALCIUM: CPT

## 2024-01-20 PROCEDURE — 85025 COMPLETE CBC W/AUTO DIFF WBC: CPT

## 2024-01-20 PROCEDURE — 83880 ASSAY OF NATRIURETIC PEPTIDE: CPT

## 2024-01-20 PROCEDURE — 71045 X-RAY EXAM CHEST 1 VIEW: CPT

## 2024-01-20 PROCEDURE — 80048 BASIC METABOLIC PNL TOTAL CA: CPT

## 2024-01-20 PROCEDURE — 99285 EMERGENCY DEPT VISIT HI MDM: CPT | Mod: 25

## 2024-01-20 PROCEDURE — 36415 COLL VENOUS BLD VENIPUNCTURE: CPT

## 2024-01-20 PROCEDURE — 84484 ASSAY OF TROPONIN QUANT: CPT

## 2024-01-20 PROCEDURE — 80053 COMPREHEN METABOLIC PANEL: CPT

## 2024-01-20 PROCEDURE — 99239 HOSP IP/OBS DSCHRG MGMT >30: CPT

## 2024-01-20 PROCEDURE — 82803 BLOOD GASES ANY COMBINATION: CPT

## 2024-01-20 PROCEDURE — 82947 ASSAY GLUCOSE BLOOD QUANT: CPT

## 2024-01-20 PROCEDURE — C1874: CPT

## 2024-01-20 PROCEDURE — C9600: CPT | Mod: LD

## 2024-01-20 PROCEDURE — C1887: CPT

## 2024-01-20 RX ORDER — POTASSIUM CHLORIDE 20 MEQ
40 PACKET (EA) ORAL ONCE
Refills: 0 | Status: COMPLETED | OUTPATIENT
Start: 2024-01-20 | End: 2024-01-20

## 2024-01-20 RX ORDER — CLOPIDOGREL BISULFATE 75 MG/1
1 TABLET, FILM COATED ORAL
Qty: 90 | Refills: 3
Start: 2024-01-20 | End: 2025-01-13

## 2024-01-20 RX ORDER — ASPIRIN/CALCIUM CARB/MAGNESIUM 324 MG
1 TABLET ORAL
Qty: 90 | Refills: 3
Start: 2024-01-20 | End: 2025-01-13

## 2024-01-20 RX ORDER — ATORVASTATIN CALCIUM 80 MG/1
1 TABLET, FILM COATED ORAL
Qty: 90 | Refills: 0
Start: 2024-01-20 | End: 2024-04-18

## 2024-01-20 RX ORDER — CLOPIDOGREL 75 MG/1
1 TABLET, FILM COATED ORAL
Qty: 90 | Refills: 3 | DISCHARGE
Start: 2024-01-20 | End: 2025-01-13

## 2024-01-20 RX ADMIN — Medication 40 MILLIEQUIVALENT(S): at 05:33

## 2024-01-20 RX ADMIN — Medication 112 MICROGRAM(S): at 05:33

## 2024-01-20 RX ADMIN — Medication 20 MILLIGRAM(S): at 05:33

## 2024-01-20 RX ADMIN — CLOPIDOGREL BISULFATE 75 MILLIGRAM(S): 75 TABLET, FILM COATED ORAL at 05:33

## 2024-01-20 RX ADMIN — Medication 81 MILLIGRAM(S): at 05:33

## 2024-01-20 RX ADMIN — OXYCODONE HYDROCHLORIDE 10 MILLIGRAM(S): 5 TABLET ORAL at 09:40

## 2024-01-20 NOTE — PROGRESS NOTE ADULT - NS ATTEND AMEND GEN_ALL_CORE FT
Patient care and plan discussed and reviewed with Advanced Care Provider. Plan as outlined above edited by me to reflect our discussion. Fifty one minutes spent on encounter, of which more than fifty percent of the encounter was spent on counseling and/or coordinating care by the attending physician.

## 2024-01-20 NOTE — PROGRESS NOTE ADULT - SUBJECTIVE AND OBJECTIVE BOX
DATE OF SERVICE: 01-20-24 @ 08:27    Patient is a 54y old  Male who presents with a chief complaint of Abnormal stress test (19 Jan 2024 21:01)      INTERVAL HISTORY:     REVIEW OF SYSTEMS:  CONSTITUTIONAL: No weakness  EYES/ENT: No visual changes;  No throat pain   NECK: No pain or stiffness  RESPIRATORY: No cough, wheezing; No shortness of breath  CARDIOVASCULAR: No chest pain or palpitations  GASTROINTESTINAL: No abdominal  pain. No nausea, vomiting, or hematemesis  GENITOURINARY: No dysuria, frequency or hematuria  NEUROLOGICAL: No stroke like symptoms  SKIN: No rashes      	  MEDICATIONS:        PHYSICAL EXAM:  T(C): 36.5 (01-20-24 @ 04:45), Max: 36.7 (01-19-24 @ 14:17)  HR: 82 (01-20-24 @ 05:13) (64 - 90)  BP: 104/67 (01-20-24 @ 05:13) (104/67 - 153/81)  RR: 18 (01-20-24 @ 05:13) (15 - 20)  SpO2: 100% (01-20-24 @ 05:13) (96% - 100%)  Wt(kg): --  I&O's Summary    19 Jan 2024 07:01  -  20 Jan 2024 07:00  --------------------------------------------------------  IN: 600 mL / OUT: 550 mL / NET: 50 mL      Height (cm): 180.3 (01-19 @ 14:58)  Weight (kg): 56.7 (01-19 @ 14:58)  BMI (kg/m2): 17.4 (01-19 @ 14:58)  BSA (m2): 1.73 (01-19 @ 14:58)    Appearance: In no distress	  HEENT:    PERRL, EOMI	  Cardiovascular:  S1 S2, No JVD  Respiratory: Lungs clear to auscultation	  Gastrointestinal:  Soft, Non-tender, + BS	  Vascularature:  Right groin incision c/d/i   Psychiatric: Appropriate affect   Neuro: no acute focal deficits                               12.3   5.83  )-----------( 202      ( 19 Jan 2024 08:57 )             37.9     01-20    136  |  102  |  14  ----------------------------<  106<H>  3.7   |  25  |  0.99    Ca    9.2      20 Jan 2024 01:10    TPro  6.8  /  Alb  4.2  /  TBili  0.7  /  DBili  x   /  AST  27  /  ALT  15  /  AlkPhos  70  01-19      Cath event note 1/19/24  s/p C with PCI ONESIMO x 2 LAD 90% via RFA access, sheath removed @ 1930 manual pressure held good hemostasis VSS SBP 130s HR 60-70s  no hematoma- sit up 2 hours    DAPT with ASA/Plavix  continue atorva 40.    Post procedureal hydration administered        Labs personally reviewed      ASSESSMENT/PLAN: 	  54-year-old male past medical history of hypertension hypothyroidism non-Hodgkin's lymphoma and testicular cancer in remission melanoma of the scalp status postresection presenting from home after an abnormal nuclear stress test yesterday and Dr. Marks's office.  Reports that he has been having dyspnea on exertion and fatigue for weeks symptoms have been occurring intermittently. States that last night he developed intermittent chest pain substernal aching lasting minutes to hours at a time not actively experiencing pain. No active chest pain, shortness of breath, leg swelling, syncope, abdominal pain, nausea, vomiting.     1.CAD s/p PCIx2  -Patient is doing well at this time  - recent [facility] note as above  -c/w ASA 81mg , Plavix 75mg , Atorvastatin 40mg   -f/u with Dr. Marks OP    2. Hypertension stable   - BP currently stable off anti-hypertensives    3. HLD  - LDL 64   - c/w atorvastatin 40mg PO daily    4. Chronic Pain  - 2/2 immunotherapy side effects  - Resume home pain medication regimen           SEVERIANO Morgan,  [facility]  Cardiovascular Medicine  [address], [address]  Available through call or text on [employer]  Office: [phone]   DATE OF SERVICE: 01-20-24 @ 08:27    Patient is a 54y old  Male who presents with a chief complaint of Abnormal stress test (19 Jan 2024 21:01)      INTERVAL HISTORY:     REVIEW OF SYSTEMS:  CONSTITUTIONAL: No weakness  EYES/ENT: No visual changes;  No throat pain   NECK: No pain or stiffness  RESPIRATORY: No cough, wheezing; No shortness of breath  CARDIOVASCULAR: No chest pain or palpitations  GASTROINTESTINAL: No abdominal  pain. No nausea, vomiting, or hematemesis  GENITOURINARY: No dysuria, frequency or hematuria  NEUROLOGICAL: No stroke like symptoms  SKIN: No rashes      	  MEDICATIONS:        PHYSICAL EXAM:  T(C): 36.5 (01-20-24 @ 04:45), Max: 36.7 (01-19-24 @ 14:17)  HR: 82 (01-20-24 @ 05:13) (64 - 90)  BP: 104/67 (01-20-24 @ 05:13) (104/67 - 153/81)  RR: 18 (01-20-24 @ 05:13) (15 - 20)  SpO2: 100% (01-20-24 @ 05:13) (96% - 100%)  Wt(kg): --  I&O's Summary    19 Jan 2024 07:01  -  20 Jan 2024 07:00  --------------------------------------------------------  IN: 600 mL / OUT: 550 mL / NET: 50 mL      Height (cm): 180.3 (01-19 @ 14:58)  Weight (kg): 56.7 (01-19 @ 14:58)  BMI (kg/m2): 17.4 (01-19 @ 14:58)  BSA (m2): 1.73 (01-19 @ 14:58)    Appearance: In no distress	  HEENT:    PERRL, EOMI	  Cardiovascular:  S1 S2, No JVD  Respiratory: Lungs clear to auscultation	  Gastrointestinal:  Soft, Non-tender, + BS	  Vascularature:  Right groin incision c/d/i   Psychiatric: Appropriate affect   Neuro: no acute focal deficits                               12.3   5.83  )-----------( 202      ( 19 Jan 2024 08:57 )             37.9     01-20    136  |  102  |  14  ----------------------------<  106<H>  3.7   |  25  |  0.99    Ca    9.2      20 Jan 2024 01:10    TPro  6.8  /  Alb  4.2  /  TBili  0.7  /  DBili  x   /  AST  27  /  ALT  15  /  AlkPhos  70  01-19      Cath event note 1/19/24  s/p LHC with PCI ONESIMO x 2 LAD 90% via RFA access, sheath removed @ 1930 manual pressure held good hemostasis VSS SBP 130s HR 60-70s  no hematoma- sit up 2 hours    DAPT with ASA/Plavix  continue atorva 40.    Post procedureal hydration administered        Labs personally reviewed      ASSESSMENT/PLAN: 	  54-year-old male past medical history of hypertension hypothyroidism non-Hodgkin's lymphoma and testicular cancer in remission melanoma of the scalp status postresection presenting from home after an abnormal nuclear stress test yesterday and Dr. Marks's office.  Reports that he has been having dyspnea on exertion and fatigue for weeks symptoms have been occurring intermittently. States that last night he developed intermittent chest pain substernal aching lasting minutes to hours at a time not actively experiencing pain. No active chest pain, shortness of breath, leg swelling, syncope, abdominal pain, nausea, vomiting.     1. Unstable Angina -    - s/p LHC with PCI ONESIMO x 2 LAD 90% via RFA access  -Patient is doing well at this time   -c/w ASA 81mg , Plavix 75mg , Atorvastatin 40mg   -f/u with Dr. Marks OP    2. Hypertension stable   - BP currently stable off anti-hypertensives    3. HLD  - LDL 64   - c/w atorvastatin 40mg PO daily    4. Chronic Pain  - 2/2 immunotherapy side effects  - Resume home pain medication regimen           SEVERIANO Morgan DO [facility]  Cardiovascular Medicine  [address], [address]  Available through call or text on [employer] TE[employer]  Office: [phone]

## 2024-01-20 NOTE — DISCHARGE NOTE NURSING/CASE MANAGEMENT/SOCIAL WORK - PATIENT PORTAL LINK FT
You can access the FollowMyHealth Patient Portal offered by [facility] by registering at the following website: [email or web address]. By joining [employer]’s FollowMyHealth portal, you will also be able to view your health information using other applications (apps) compatible with our system.

## 2024-01-21 ENCOUNTER — TRANSCRIPTION ENCOUNTER (OUTPATIENT)
Age: 54
End: 2024-01-21

## 2024-01-22 ENCOUNTER — APPOINTMENT (OUTPATIENT)
Dept: INTERNAL MEDICINE | Facility: CLINIC | Age: 54
End: 2024-01-22

## 2024-01-23 LAB
ANION GAP SERPL CALC-SCNC: 14 MMOL/L
BASOPHILS # BLD AUTO: 0.05 K/UL
BASOPHILS NFR BLD AUTO: 0.7 %
BUN SERPL-MCNC: 13 MG/DL
CALCIUM SERPL-MCNC: 10.4 MG/DL
CHLORIDE SERPL-SCNC: 101 MMOL/L
CHOLEST SERPL-MCNC: 153 MG/DL
CO2 SERPL-SCNC: 24 MMOL/L
CREAT SERPL-MCNC: 0.95 MG/DL
EGFR: 95 ML/MIN/1.73M2
EOSINOPHIL # BLD AUTO: 0.33 K/UL
EOSINOPHIL NFR BLD AUTO: 4.4 %
FERRITIN SERPL-MCNC: 362 NG/ML
FOLATE SERPL-MCNC: 6.3 NG/ML
GLUCOSE SERPL-MCNC: 77 MG/DL
HCT VFR BLD CALC: 39.2 %
HDLC SERPL-MCNC: 28 MG/DL
HGB BLD-MCNC: 12.5 G/DL
IMM GRANULOCYTES NFR BLD AUTO: 0.3 %
IRON SATN MFR SERPL: 30 %
IRON SERPL-MCNC: 75 UG/DL
LDLC SERPL CALC-MCNC: 68 MG/DL
LYMPHOCYTES # BLD AUTO: 3.08 K/UL
LYMPHOCYTES NFR BLD AUTO: 41.3 %
MAGNESIUM SERPL-MCNC: 1.9 MG/DL
MAN DIFF?: NORMAL
MCHC RBC-ENTMCNC: 27.6 PG
MCHC RBC-ENTMCNC: 31.9 GM/DL
MCV RBC AUTO: 86.5 FL
MONOCYTES # BLD AUTO: 0.42 K/UL
MONOCYTES NFR BLD AUTO: 5.6 %
NEUTROPHILS # BLD AUTO: 3.55 K/UL
NEUTROPHILS NFR BLD AUTO: 47.7 %
NONHDLC SERPL-MCNC: 126 MG/DL
PLATELET # BLD AUTO: 228 K/UL
POTASSIUM SERPL-SCNC: 5.2 MMOL/L
RBC # BLD: 4.53 M/UL
RBC # FLD: 14.5 %
SODIUM SERPL-SCNC: 139 MMOL/L
T4 FREE SERPL-MCNC: 1.2 NG/DL
TIBC SERPL-MCNC: 249 UG/DL
TRIGL SERPL-MCNC: 361 MG/DL
TSH SERPL-ACNC: 1.93 UIU/ML
UIBC SERPL-MCNC: 175 UG/DL
VIT B12 SERPL-MCNC: 448 PG/ML
WBC # FLD AUTO: 7.45 K/UL

## 2024-01-31 ENCOUNTER — APPOINTMENT (OUTPATIENT)
Dept: PAIN MANAGEMENT | Facility: CLINIC | Age: 54
End: 2024-01-31
Payer: COMMERCIAL

## 2024-01-31 ENCOUNTER — NON-APPOINTMENT (OUTPATIENT)
Age: 54
End: 2024-01-31

## 2024-01-31 ENCOUNTER — APPOINTMENT (OUTPATIENT)
Dept: CARDIOLOGY | Facility: CLINIC | Age: 54
End: 2024-01-31

## 2024-01-31 PROCEDURE — 99213 OFFICE O/P EST LOW 20 MIN: CPT | Mod: 95

## 2024-02-01 ENCOUNTER — APPOINTMENT (OUTPATIENT)
Dept: INTERNAL MEDICINE | Facility: CLINIC | Age: 54
End: 2024-02-01

## 2024-02-10 ENCOUNTER — EMERGENCY (EMERGENCY)
Facility: HOSPITAL | Age: 54
LOS: 1 days | Discharge: HOME | End: 2024-02-10
Attending: EMERGENCY MEDICINE
Payer: COMMERCIAL

## 2024-02-10 VITALS
OXYGEN SATURATION: 100 % | SYSTOLIC BLOOD PRESSURE: 119 MMHG | TEMPERATURE: 98 F | HEART RATE: 92 BPM | DIASTOLIC BLOOD PRESSURE: 94 MMHG | RESPIRATION RATE: 18 BRPM

## 2024-02-10 VITALS
OXYGEN SATURATION: 100 % | HEART RATE: 100 BPM | WEIGHT: 149.91 LBS | HEIGHT: 71 IN | DIASTOLIC BLOOD PRESSURE: 90 MMHG | TEMPERATURE: 98 F | RESPIRATION RATE: 18 BRPM | SYSTOLIC BLOOD PRESSURE: 139 MMHG

## 2024-02-10 DIAGNOSIS — I89.9 NONINFECTIVE DISORDER OF LYMPHATIC VESSELS AND LYMPH NODES, UNSPECIFIED: Chronic | ICD-10-CM

## 2024-02-10 DIAGNOSIS — Z98.890 OTHER SPECIFIED POSTPROCEDURAL STATES: Chronic | ICD-10-CM

## 2024-02-10 DIAGNOSIS — Z90.79 ACQUIRED ABSENCE OF OTHER GENITAL ORGAN(S): Chronic | ICD-10-CM

## 2024-02-10 DIAGNOSIS — Z98.89 OTHER SPECIFIED POSTPROCEDURAL STATES: Chronic | ICD-10-CM

## 2024-02-10 DIAGNOSIS — C43.4 MALIGNANT MELANOMA OF SCALP AND NECK: Chronic | ICD-10-CM

## 2024-02-10 LAB
ALBUMIN SERPL ELPH-MCNC: 4.1 G/DL — SIGNIFICANT CHANGE UP (ref 3.3–5)
ALP SERPL-CCNC: 98 U/L — SIGNIFICANT CHANGE UP (ref 40–120)
ALT FLD-CCNC: 31 U/L — SIGNIFICANT CHANGE UP (ref 10–45)
ANION GAP SERPL CALC-SCNC: 11 MMOL/L — SIGNIFICANT CHANGE UP (ref 5–17)
APAP SERPL-MCNC: <15 UG/ML — SIGNIFICANT CHANGE UP (ref 10–30)
APTT BLD: 31.2 SEC — SIGNIFICANT CHANGE UP (ref 24.5–35.6)
AST SERPL-CCNC: 32 U/L — SIGNIFICANT CHANGE UP (ref 10–40)
BASOPHILS # BLD AUTO: 0.06 K/UL — SIGNIFICANT CHANGE UP (ref 0–0.2)
BASOPHILS NFR BLD AUTO: 0.7 % — SIGNIFICANT CHANGE UP (ref 0–2)
BILIRUB SERPL-MCNC: 0.8 MG/DL — SIGNIFICANT CHANGE UP (ref 0.2–1.2)
BUN SERPL-MCNC: 12 MG/DL — SIGNIFICANT CHANGE UP (ref 7–23)
CALCIUM SERPL-MCNC: 10 MG/DL — SIGNIFICANT CHANGE UP (ref 8.4–10.5)
CHLORIDE SERPL-SCNC: 103 MMOL/L — SIGNIFICANT CHANGE UP (ref 96–108)
CK SERPL-CCNC: 220 U/L — HIGH (ref 30–200)
CO2 SERPL-SCNC: 26 MMOL/L — SIGNIFICANT CHANGE UP (ref 22–31)
CREAT SERPL-MCNC: 0.83 MG/DL — SIGNIFICANT CHANGE UP (ref 0.5–1.3)
EGFR: 104 ML/MIN/1.73M2 — SIGNIFICANT CHANGE UP
EOSINOPHIL # BLD AUTO: 0.42 K/UL — SIGNIFICANT CHANGE UP (ref 0–0.5)
EOSINOPHIL NFR BLD AUTO: 5 % — SIGNIFICANT CHANGE UP (ref 0–6)
ETHANOL SERPL-MCNC: <10 MG/DL — SIGNIFICANT CHANGE UP (ref 0–10)
FLUAV AG NPH QL: SIGNIFICANT CHANGE UP
FLUBV AG NPH QL: SIGNIFICANT CHANGE UP
GAS PNL BLDV: SIGNIFICANT CHANGE UP
GLUCOSE SERPL-MCNC: 91 MG/DL — SIGNIFICANT CHANGE UP (ref 70–99)
HCT VFR BLD CALC: 39.7 % — SIGNIFICANT CHANGE UP (ref 39–50)
HGB BLD-MCNC: 12.4 G/DL — LOW (ref 13–17)
IMM GRANULOCYTES NFR BLD AUTO: 0.4 % — SIGNIFICANT CHANGE UP (ref 0–0.9)
INR BLD: 1.08 RATIO — SIGNIFICANT CHANGE UP (ref 0.85–1.18)
LIDOCAIN IGE QN: 79 U/L — HIGH (ref 7–60)
LYMPHOCYTES # BLD AUTO: 3.31 K/UL — HIGH (ref 1–3.3)
LYMPHOCYTES # BLD AUTO: 39.3 % — SIGNIFICANT CHANGE UP (ref 13–44)
MAGNESIUM SERPL-MCNC: 1.6 MG/DL — SIGNIFICANT CHANGE UP (ref 1.6–2.6)
MCHC RBC-ENTMCNC: 27.7 PG — SIGNIFICANT CHANGE UP (ref 27–34)
MCHC RBC-ENTMCNC: 31.2 GM/DL — LOW (ref 32–36)
MCV RBC AUTO: 88.6 FL — SIGNIFICANT CHANGE UP (ref 80–100)
MONOCYTES # BLD AUTO: 0.57 K/UL — SIGNIFICANT CHANGE UP (ref 0–0.9)
MONOCYTES NFR BLD AUTO: 6.8 % — SIGNIFICANT CHANGE UP (ref 2–14)
NEUTROPHILS # BLD AUTO: 4.03 K/UL — SIGNIFICANT CHANGE UP (ref 1.8–7.4)
NEUTROPHILS NFR BLD AUTO: 47.8 % — SIGNIFICANT CHANGE UP (ref 43–77)
NRBC # BLD: 0 /100 WBCS — SIGNIFICANT CHANGE UP (ref 0–0)
NT-PROBNP SERPL-SCNC: 330 PG/ML — HIGH (ref 0–300)
PLATELET # BLD AUTO: 283 K/UL — SIGNIFICANT CHANGE UP (ref 150–400)
POTASSIUM SERPL-MCNC: 3.3 MMOL/L — LOW (ref 3.5–5.3)
POTASSIUM SERPL-SCNC: 3.3 MMOL/L — LOW (ref 3.5–5.3)
PROT SERPL-MCNC: 6.8 G/DL — SIGNIFICANT CHANGE UP (ref 6–8.3)
PROTHROM AB SERPL-ACNC: 11.3 SEC — SIGNIFICANT CHANGE UP (ref 9.5–13)
RBC # BLD: 4.48 M/UL — SIGNIFICANT CHANGE UP (ref 4.2–5.8)
RBC # FLD: 14.6 % — HIGH (ref 10.3–14.5)
RSV RNA NPH QL NAA+NON-PROBE: SIGNIFICANT CHANGE UP
SALICYLATES SERPL-MCNC: <2 MG/DL — LOW (ref 15–30)
SARS-COV-2 RNA SPEC QL NAA+PROBE: SIGNIFICANT CHANGE UP
SODIUM SERPL-SCNC: 140 MMOL/L — SIGNIFICANT CHANGE UP (ref 135–145)
TROPONIN T, HIGH SENSITIVITY RESULT: 12 NG/L — SIGNIFICANT CHANGE UP (ref 0–51)
WBC # BLD: 8.42 K/UL — SIGNIFICANT CHANGE UP (ref 3.8–10.5)
WBC # FLD AUTO: 8.42 K/UL — SIGNIFICANT CHANGE UP (ref 3.8–10.5)

## 2024-02-10 PROCEDURE — 96374 THER/PROPH/DIAG INJ IV PUSH: CPT

## 2024-02-10 PROCEDURE — 71045 X-RAY EXAM CHEST 1 VIEW: CPT

## 2024-02-10 PROCEDURE — 36415 COLL VENOUS BLD VENIPUNCTURE: CPT

## 2024-02-10 PROCEDURE — 84132 ASSAY OF SERUM POTASSIUM: CPT

## 2024-02-10 PROCEDURE — 84295 ASSAY OF SERUM SODIUM: CPT

## 2024-02-10 PROCEDURE — 71045 X-RAY EXAM CHEST 1 VIEW: CPT | Mod: 26

## 2024-02-10 PROCEDURE — 82962 GLUCOSE BLOOD TEST: CPT

## 2024-02-10 PROCEDURE — 80053 COMPREHEN METABOLIC PANEL: CPT

## 2024-02-10 PROCEDURE — 87040 BLOOD CULTURE FOR BACTERIA: CPT

## 2024-02-10 PROCEDURE — 82803 BLOOD GASES ANY COMBINATION: CPT

## 2024-02-10 PROCEDURE — 84484 ASSAY OF TROPONIN QUANT: CPT

## 2024-02-10 PROCEDURE — 70450 CT HEAD/BRAIN W/O DYE: CPT | Mod: 26,MA

## 2024-02-10 PROCEDURE — 80307 DRUG TEST PRSMV CHEM ANLYZR: CPT

## 2024-02-10 PROCEDURE — 83690 ASSAY OF LIPASE: CPT

## 2024-02-10 PROCEDURE — 85610 PROTHROMBIN TIME: CPT

## 2024-02-10 PROCEDURE — 83880 ASSAY OF NATRIURETIC PEPTIDE: CPT

## 2024-02-10 PROCEDURE — 93005 ELECTROCARDIOGRAM TRACING: CPT

## 2024-02-10 PROCEDURE — 87637 SARSCOV2&INF A&B&RSV AMP PRB: CPT

## 2024-02-10 PROCEDURE — 99285 EMERGENCY DEPT VISIT HI MDM: CPT | Mod: 25

## 2024-02-10 PROCEDURE — 85018 HEMOGLOBIN: CPT

## 2024-02-10 PROCEDURE — 82330 ASSAY OF CALCIUM: CPT

## 2024-02-10 PROCEDURE — 99285 EMERGENCY DEPT VISIT HI MDM: CPT

## 2024-02-10 PROCEDURE — 82947 ASSAY GLUCOSE BLOOD QUANT: CPT

## 2024-02-10 PROCEDURE — 85025 COMPLETE CBC W/AUTO DIFF WBC: CPT

## 2024-02-10 PROCEDURE — 82435 ASSAY OF BLOOD CHLORIDE: CPT

## 2024-02-10 PROCEDURE — 85014 HEMATOCRIT: CPT

## 2024-02-10 PROCEDURE — 83735 ASSAY OF MAGNESIUM: CPT

## 2024-02-10 PROCEDURE — 83605 ASSAY OF LACTIC ACID: CPT

## 2024-02-10 PROCEDURE — 85730 THROMBOPLASTIN TIME PARTIAL: CPT

## 2024-02-10 PROCEDURE — 84443 ASSAY THYROID STIM HORMONE: CPT

## 2024-02-10 PROCEDURE — 70450 CT HEAD/BRAIN W/O DYE: CPT | Mod: MA

## 2024-02-10 PROCEDURE — 82550 ASSAY OF CK (CPK): CPT

## 2024-02-10 RX ORDER — NALOXONE HYDROCHLORIDE 4 MG/.1ML
4 SPRAY NASAL
Qty: 1 | Refills: 0
Start: 2024-02-10

## 2024-02-10 RX ORDER — DIAZEPAM 5 MG
5 TABLET ORAL ONCE
Refills: 0 | Status: DISCONTINUED | OUTPATIENT
Start: 2024-02-10 | End: 2024-02-10

## 2024-02-10 RX ORDER — POTASSIUM CHLORIDE 20 MEQ
40 PACKET (EA) ORAL ONCE
Refills: 0 | Status: COMPLETED | OUTPATIENT
Start: 2024-02-10 | End: 2024-02-10

## 2024-02-10 RX ADMIN — Medication 40 MILLIEQUIVALENT(S): at 14:05

## 2024-02-10 RX ADMIN — Medication 5 MILLIGRAM(S): at 13:03

## 2024-02-10 NOTE — ED ADULT NURSE NOTE - SKIN TEMPERATURE MOISTURE
warm Finasteride Pregnancy And Lactation Text: This medication is absolutely contraindicated during pregnancy. It is unknown if it is excreted in breast milk.

## 2024-02-10 NOTE — CONSULT NOTE ADULT - ATTENDING COMMENTS
MD Rivera:  patient seen and evaluated with the fellow.  I was present for key portions of the history & physical, and I agree with the impression & plan.

## 2024-02-10 NOTE — CONSULT NOTE ADULT - PROBLEM SELECTOR RECOMMENDATION 9
Emergency medicine / Medical Toxicology Attending MD Rivera:    54-year-old male, presented to the ED status post being found sedated appearing by wife.  Received 12 mg intranasal naloxone in the field.  iStop check show the patient has access to zolpidem, oxycodone, and tramadol which the patient is feeling on regular intervals.  While in the ED patient exhibited periodic cycles of sedation as well as myoclonus.  Patient did not exhibit tongue biting, loss of urine.  Patient's mental status appears to be preserved with him being arousable to verbal stimuli.    MDM:  Patient exhibiting signs and symptoms of opioid intoxication -sedation, bradypnea without clinically significant elevation of end-tidal CO2 or acidemia.  Patient has access to opioids and opioid like substances, and had a + improvement in mental status after intranasal naloxone.    Patient's mental status may be a combination of naloxone antagonism with concomitant tramadol toxicity.  It is unclear if tramadol toxicity can reliably be reversed with naloxone.  Furthermore, intranasal naloxone absorption, particularly large doses can exhibit erratic absorption.    Recommendations:  Continue to observe in the ED 4 to 6 hours until patient returns to baseline mental status.  If patient does not exhibit significant clinical improvement during that time, would aggressively pursue alternative etiologies of patient's altered mental status.

## 2024-02-10 NOTE — ED PROVIDER NOTE - PATIENT PORTAL LINK FT
You can access the FollowMyHealth Patient Portal offered by [facility] by registering at the following website: [email or web address]. By joining [employer]’s FollowMyHealth portal, you will also be able to view your health information using other applications (apps) compatible with our system.

## 2024-02-10 NOTE — CONSULT NOTE ADULT - SUBJECTIVE AND OBJECTIVE BOX
MEDICAL TOXICOLOGY CONSULT    HPI:  54M, PMH testicular ca, NHL, both in remission, HTN, CAD with recent cardiac Cath/stents in 2024, presents to the ED with AMS. Reportedly pt took naloxone (intranasally, 8mg; then 4mg when PD/EMS arrived). Pt not a daily alcohol drinker. Pt is on zolpidem BID, oxycontin qd, recently dc’d tramadol rx. Pt reportedly more awake and alert after naloxone though still altered. No hx of acute ingestions.   Vitals: , /89, RR 30, T 99.6F rectal, 100% sat on RA  EKG: QRs 88, QTc 423  Exam: pt not answering questions appropriately, ?slurring speech, occasional myoclonic jerks, pupils mid range and reactive bilaterally, good respiratory drive, no diaphores/flushed skin/tremors/clonus  Labs:     Toxicology consulted for AMS/opioid toxicity and withdrawal    PAST MEDICAL & SURGICAL HISTORY:  Testicular Cancer  , chemotherapy      Headache, Migraine      HTN (hypertension)      NHL (non-Hodgkin's lymphoma)  , Radiation to left neck region      GERD (gastroesophageal reflux disease)      Colitis  surgery       BPH (benign prostatic hyperplasia)      Osteoarthritis  degenerative disc L3-4      History of bone marrow transplant      Hypertriglyceridemia      Malignant melanoma of scalp  RSX   R neck mass dsx/ LN dsx 10/19/18      Hypothyroidism      History of chemotherapy      History of Raynaud's syndrome      History of orchiectomy  left       S/P colon resection        Lymph node disorder  s/p abdominal lymph node dissection ,       Melanoma of scalp or neck  excision   s/p excision right neck mass & LN dissx      History of nasal septoplasty      History of infusaport central venous catheter insertion      History of infusaport central venous catheter removal          MEDICATION HISTORY:      FAMILY HISTORY:  Hypertension (Father)        REVIEW OF SYSTEMS:   _____unable to perform due to intoxication, dementia, or illness      Vital Signs Last 24 Hrs  T(C): 37.6 (10 Feb 2024 13:13), Max: 37.6 (10 Feb 2024 13:13)  T(F): 99.6 (10 Feb 2024 13:13), Max: 99.6 (10 Feb 2024 13:13)  HR: 79 (10 Feb 2024 14:10) (79 - 100)  BP: 130/84 (10 Feb 2024 14:10) (130/84 - 163/89)  BP(mean): 98 (10 Feb 2024 14:10) (98 - 112)  RR: 26 (10 Feb 2024 14:10) (18 - 30)  SpO2: 100% (10 Feb 2024 14:10) (100% - 100%)    Parameters below as of 10 Feb 2024 14:10  Patient On (Oxygen Delivery Method): nasal cannula  O2 Flow (L/min): 2      SIGNIFICANT LABORATORY STUDIES:                        12.4   8.42  )-----------( 283      ( 10 Feb 2024 13:06 )             39.7       02-10    140  |  103  |  12  ----------------------------<  91  3.3<L>   |  26  |  0.83    Ca    10.0      10 Feb 2024 13:06  Mg     1.6     0210    TPro  6.8  /  Alb  4.1  /  TBili  0.8  /  DBili  x   /  AST  32  /  ALT  31  /  AlkPhos  98  02-10      PT/INR - ( 10 Feb 2024 13:06 )   PT: 11.3 sec;   INR: 1.08 ratio         PTT - ( 10 Feb 2024 13:06 )  PTT:31.2 sec    Urinalysis Basic - ( 10 Feb 2024 13:06 )    Color: x / Appearance: x / SG: x / pH: x  Gluc: 91 mg/dL / Ketone: x  / Bili: x / Urobili: x   Blood: x / Protein: x / Nitrite: x   Leuk Esterase: x / RBC: x / WBC x   Sq Epi: x / Non Sq Epi: x / Bacteria: x        Anion Gap: 11 02-10 @ 13:06  CK: 220<H> 02-10 @ 13:06  Troponin:  --  -10 @ 13:06  Pro-BNP:  --  02-10 @ 13:06  VBG:  --  02-10 @ 13:06  Carboxyhemoglobin %:  --  02-10 @ 13:06  Methemoglobin %:  --  02-10 @ 13:06  Osmolality Serum:  --  02-10 @ 13:06  Aspirin Level: --  02-10 @ 13:06  Acetaminophen Level:  --  02-10 @ 13:06  Ethanol Level:  --  02-10 @ 13:06  Digoxin Level:  --  02-10 @ 13:06  Phenytoin Level:  --  02-10 @ 13:06  Carbamazepine level:  --  02-10 @ 13:06  Lamotrigine level:  --  02-10 @ 13:06  Anion Gap: -- 02-10 @ 12:41  CK: -- 02-10 @ 12:41  Troponin:  --  02-10 @ 12:41  Pro-BNP:  --  02-10 @ 12:41  VB  02-10 @ 12:41  Carboxyhemoglobin %:  --  02-10 @ 12:41  Methemoglobin %:  --  02-10 @ 12:41  Osmolality Serum:  --  02-10 @ 12:41  Aspirin Level: --  02-10 @ 12:41  Acetaminophen Level:  --  02-10 @ 12:41  Ethanol Level:  --  02-10 @ 12:41  Digoxin Level:  --  02-10 @ 12:41  Phenytoin Level:  --  02-10 @ 12:41  Carbamazepine level:  --  02-10 @ 12:41  Lamotrigine level:  --  02-10 @ 12:41

## 2024-02-10 NOTE — ED PROVIDER NOTE - OBJECTIVE STATEMENT
Patient is a 54 year old man PMH testicular cancer, melanoma, NHL, hypothyroidism, adrenal insufficiency, HTN and chronic pain presenting with altered mental status. History was limited due to patients altered mental status, with waxing and waning mentation ranging from A&OX3 to only responsive to pain with diffuse spasms. Per EMS, patient takes tramadol and oxycontin at home for chronic pain and had taken 8 mg of narcan intranasally. Afterwards, family became concerned with him not acting right and called EMS, who then gave 4 additional mg narcan intranasally. He occassionally answers questions appropriately and denies pain, CP, SOB, abdominal pain. Patient is a 54 year old man PMH testicular cancer, melanoma, NHL, hypothyroidism, adrenal insufficiency, HTN and chronic pain presenting with altered mental status. History was limited due to patients altered mental status, with waxing and waning mentation ranging from A&OX3 to only responsive to pain with diffuse spasms. Per EMS, patient takes tramadol and oxycontin at home for chronic pain and had taken 8 mg of narcan intranasally. Afterwards, family became concerned with him not acting right and called EMS, who then gave 4 additional mg narcan intranasally. Wife reports he went into bathroom acting normally, came out saying he took narcan because he waqs feeling dizzy, and then started acting strangely in and out of normal consciousness. He occassionally answers questions appropriately and denies pain, CP, SOB, abdominal pain. Patient is a 54 year old man PMH testicular cancer, melanoma, NHL, hypothyroidism, adrenal insufficiency, HTN and chronic pain, CAD s/p PCI (01/2024) presenting with altered mental status. History was limited due to patients mental status. Per EMS, takes tramadol and oxycontin at baseline - family found 8 mg of narcan applicators in BR garbage with pt stating 'he wanted to try it'. Afterwards, family became concerned with him not acting right and called PD, who then gave 4mg narcan intranasally again prior to EMS arrival. Wife reports he went into bathroom acting normally, came out saying he took narcan because he was feeling dizzy, and then started acting strangely in and out of normal consciousness. Currently denies pain, CP, SOB, abdominal pain.

## 2024-02-10 NOTE — CONSULT NOTE ADULT - ASSESSMENT
Assessment:  Likely tramadol toxicity given the myoclonic jerks vs less suspicion for opioid toxicity/withdrawal (withdrawal typically doesn’t present with AMS, pts pCO2 seems baseline) vs zolpidem vs medical etiology    Recommendations:  1. Monitor pt until 4pm, if pts mental status improves, if pts labs and co-ingestants are reassuring, the pt can be cleared from toxicologic standpoint.  2. Given pt got a dose of naloxone, if pt not showing opioid toxicity by 4 hrs from time of administration (11-12pm) pt can be cleared from opioid toxicity standpoint.f  3. Would continue to look for other causes of AMS, especially if pts mental status doesn’t improve by the end of monitoring period.

## 2024-02-10 NOTE — ED ADULT TRIAGE NOTE - CHIEF COMPLAINT QUOTE
Possible narcan use, unwitnessed but identified in patient's garbage. Collateral provided by family states "he wanted to try narcan". Patient altered from baseline. EMS report unclear to patient's complaints.

## 2024-02-10 NOTE — ED PROVIDER NOTE - PHYSICAL EXAMINATION
CONSTITUTIONAL: underweight, waxing and waning mental status, intermittent spasming and yawning  EYES: PERRLA and symmetric, EOMI, No conjunctival or scleral injection, non-icteric  ENMT: Oral mucosa with moist membranes. Normal dentition; no pharyngeal injection or exudates  NECK: Supple, symmetric and without tracheal deviation   RESP: No respiratory distress, no use of accessory muscles; CTA b/l, no WRR  CV: RRR, +S1S2, no MRG; no JVD; no peripheral edema  GI: Soft, NT, ND, no rebound, no guarding; no palpable masses; no hepatosplenomegaly; no hernia palpated  LYMPH: No cervical LAD or tenderness; no axillary LAD or tenderness; no inguinal LAD or tenderness  MSK: Normal gait; No digital clubbing or cyanosis; examination of the (head/neck/spine/ribs/pelvis, RUE, LUE, RLE, LLE) without misalignment,    Normal ROM without pain, no spinal tenderness, normal muscle strength/tone  SKIN: No rashes or ulcers noted; no subcutaneous nodules or induration palpable  NEURO: neuron exam limited but spontaneously moves all 4 extremities, no focal weakness CONSTITUTIONAL: thin appearing, +freq yawning  EYES: PERRLA 3mm b/l and symmetric, EOMI, No conjunctival or scleral injection, non-icteric  ENMT: Oral mucosa with moist membranes. no pharyngeal injection or exudates  NECK: Supple, symmetric and without tracheal deviation , able to flex neck to chest w/o difficulty  RESP: tachypnea, CTA b/l  CV: RRR, +S1S2, no MRG; no JVD; no peripheral edema  GI: Soft, NT, ND, no rebound, no guarding; no palpable masses; no hepatosplenomegaly; no hernia palpated  MSK: general myotonic spasms noted intermittently, no clonus, intact refluxes, neg Babinski b/l, moving all extremities and able ot follow directions   SKIN: No acute rashes noted  NEURO: neuron exam limited but spontaneously moves all 4 extremities, no focal weakness

## 2024-02-10 NOTE — ED ADULT NURSE NOTE - OBJECTIVE STATEMENT
53 yo male A&OX3 PMH testicular cancer, melanoma, NHL, hypothyroidism, adrenal insufficiency, HTN, chronic pain presenting to the ED by EMS from home for withdrawal. As per EMS, pt administered 8mg intranasal narcan following the consumption of tramadol and oxycontin. EMS called by family as pt was found to be unconscious. EMS administered 4mg narcan. Pt presents as restless, easy to arouse but lethargic. Pt poor historian at this time. Denies CP, SOB, pain. 55 yo male A&OX3 PMH testicular cancer, melanoma, NHL, hypothyroidism, adrenal insufficiency, HTN, chronic pain presenting to the ED by EMS from home for withdrawal. As per EMS, pt administered 8mg intranasal narcan following the consumption of tramadol and oxycontin. Wife at bedside states that pt took prescribed oxycontin, felt dizzy, and administered narcan on himself. Wife called EMS and EMS administered an additional 4mg narcan. Pt presents as restless, easy to arouse but lethargic. Pt poor historian at this time. Denies CP, SOB, pain.

## 2024-02-10 NOTE — ED PROVIDER NOTE - PROGRESS NOTE DETAILS
Marko PGY3: spoke to [facility] fellow Dr Egan reviewed case. Agrees with plan to look for secondary sources. Might be benzo wd caused by narcan. If needs pain meds for wd, would suggest short acting fentanyl. Will follow. Mental status now improving - tachypnea and tachy also improving. Marko PGY3:   ISTOP 01/18/24 - zolpidem 10mg q24hr 30 tabs (Carrie Roberts)  01/13/24 - oxycontin 10mg BID 60 tabs (Arian muñoz)  01/02/24 - tramadol 50mg q8h 90 tabs (Carrie Roberts) - per family OFF TRAMADOL currently.   Oxycodone last in 11/8/23, given 10 tabs. Marko PGY3: Patient reassessed at bedside, now AAOx4. Explained that last night he took his ambien, didn't totally work so took more ambien early in morning. Didn't really sleep much and woke up tired, groggy, and 'dizzy'. Thought naloxone would reverse it so tried it; had 2 kits thought he had to use one in each nostril. Now feels at baseline and had no complaints, ambulating comfortably.    Called wife papo and spoke with her and other family - communicated results and plan to dc home if thye are comfortable picking up. Fresh narcan kits sent to home. TBDC. He will discuss meds with his pcp Dr Roberts.

## 2024-02-10 NOTE — ED PROVIDER NOTE - CLINICAL SUMMARY MEDICAL DECISION MAKING FREE TEXT BOX
Patient is a 54 year old man PMH testicular cancer, melanoma, NHL, hypothyroidism, adrenal insufficiency, HTN and chronic pain presenting with altered mental status in the setting of tramadal / narcan use, waxing and waning mentation with intermittent yawning and spasming, concerning for toxic (opioid) / metabolic / neurologic (CVA, brain metastasis) etiology. Patient is a 54 year old man PMH testicular cancer, melanoma, NHL, hypothyroidism, adrenal insufficiency, HTN and chronic pain presenting with altered mental status in the setting of tramadal / narcan use, waxing and waning mentation with intermittent yawning and spasming, concerning for toxic (opioid) / metabolic / neurologic (CVA, brain metastasis) etiology. sepsis workup with CBC, CMP, urine, CXR Patient is a 54 year old man PMH testicular cancer, melanoma, NHL, hypothyroidism, adrenal insufficiency, HTN and chronic pain presenting with altered mental status in the setting of tramadal / narcan use, waxing and waning mentation with intermittent yawning and spasming, concerning for toxic (opioid withdrawal, other toxic ingestion) / metabolic (sepsis less likely with no fever or tachycardia or obvious source)/ neurologic (CVA, brain metastasis, less likely seizures) etiology. workup with CBC, CMP, urine, CXR, CT head, consult toxicology. Patient is a 54 year old man PMH testicular cancer, melanoma, NHL, hypothyroidism, adrenal insufficiency, HTN and chronic pain presenting with altered mental status in the setting of polysubstance ingestion, possibly tramadol, oxycodone, and narcan. Pt and wife only confirming oxy and narcan (12mg IN), on arrival w waxing and waning mentation with intermittent yawning and spasming/stretching movment in extremities x4 and periods of extreme somnolence/bradypnea/snoring, no clonus, no focal seizure like activity, pupils are 3mm and reactive BL, no diaphoresis. No signs of head trauma. Concerning for toxicologic etiology (opioid withdrawal, other toxic ingestion) vs less likely metabolic (electrolyte abn, sepsis) vs less likley neurologic- ICH seizure meningoencephalitis brain mets- pt is afebrile rectally, sx seem to have discrete onset and no obvious source). workup with CBC, CMP, urine, CXR, CT head, EKG, APAP, Salicylate, ethanol levels, consult toxicology. Pt given dose BZP upon initial assessment given spasms/myoclonus. Maintaining airway currently, placed on end tidal. Dispo pendng w/u, imaging, reeval.

## 2024-02-10 NOTE — ED PROVIDER NOTE - NSFOLLOWUPINSTRUCTIONS_ED_ALL_ED_FT
Confused Mental State    - CT and lab work did not reveal any acute cause for your symptoms. Your results are included on this discharge paper-work.   - Narcan kits were sent to [facility], please follow instructions for administration on box.  - Continue medications as prescribed but would hold AMBIEN tonight until you follow-up with primary care provider Dr. Roberts.     Rest, drink plenty of fluids.  Advance activity as tolerated.  Continue all previously prescribed medications as directed.  Follow up with your primary care physician in 48-72 hours- bring copies of your results.  Return to the ER for worsening or persistent symptoms, and/or ANY NEW OR CONCERNING SYMPTOMS.

## 2024-02-11 LAB — TSH SERPL-MCNC: 0.49 UIU/ML — SIGNIFICANT CHANGE UP (ref 0.27–4.2)

## 2024-02-12 DIAGNOSIS — T40.2X1A POISONING BY OTHER OPIOIDS, ACCIDENTAL (UNINTENTIONAL), INITIAL ENCOUNTER: ICD-10-CM

## 2024-02-15 LAB
CULTURE RESULTS: SIGNIFICANT CHANGE UP
CULTURE RESULTS: SIGNIFICANT CHANGE UP
SPECIMEN SOURCE: SIGNIFICANT CHANGE UP
SPECIMEN SOURCE: SIGNIFICANT CHANGE UP

## 2024-02-23 ENCOUNTER — APPOINTMENT (OUTPATIENT)
Dept: CARDIOLOGY | Facility: CLINIC | Age: 54
End: 2024-02-23
Payer: COMMERCIAL

## 2024-02-23 ENCOUNTER — INPATIENT (INPATIENT)
Facility: HOSPITAL | Age: 54
LOS: 0 days | Discharge: HOME | DRG: 287 | End: 2024-02-24
Attending: INTERNAL MEDICINE | Admitting: INTERNAL MEDICINE
Payer: COMMERCIAL

## 2024-02-23 ENCOUNTER — TRANSCRIPTION ENCOUNTER (OUTPATIENT)
Age: 54
End: 2024-02-23

## 2024-02-23 VITALS
BODY MASS INDEX: 18.9 KG/M2 | TEMPERATURE: 98.1 F | HEART RATE: 59 BPM | OXYGEN SATURATION: 99 % | HEIGHT: 70 IN | WEIGHT: 132 LBS

## 2024-02-23 VITALS
OXYGEN SATURATION: 100 % | SYSTOLIC BLOOD PRESSURE: 107 MMHG | HEIGHT: 71 IN | WEIGHT: 134.92 LBS | RESPIRATION RATE: 18 BRPM | HEART RATE: 66 BPM | DIASTOLIC BLOOD PRESSURE: 71 MMHG | TEMPERATURE: 98 F

## 2024-02-23 VITALS — SYSTOLIC BLOOD PRESSURE: 80 MMHG | DIASTOLIC BLOOD PRESSURE: 58 MMHG

## 2024-02-23 DIAGNOSIS — I10 ESSENTIAL (PRIMARY) HYPERTENSION: ICD-10-CM

## 2024-02-23 DIAGNOSIS — R07.9 CHEST PAIN, UNSPECIFIED: ICD-10-CM

## 2024-02-23 DIAGNOSIS — Z98.890 OTHER SPECIFIED POSTPROCEDURAL STATES: Chronic | ICD-10-CM

## 2024-02-23 DIAGNOSIS — Z90.79 ACQUIRED ABSENCE OF OTHER GENITAL ORGAN(S): Chronic | ICD-10-CM

## 2024-02-23 DIAGNOSIS — I89.9 NONINFECTIVE DISORDER OF LYMPHATIC VESSELS AND LYMPH NODES, UNSPECIFIED: Chronic | ICD-10-CM

## 2024-02-23 DIAGNOSIS — Z98.89 OTHER SPECIFIED POSTPROCEDURAL STATES: Chronic | ICD-10-CM

## 2024-02-23 DIAGNOSIS — R00.2 PALPITATIONS: ICD-10-CM

## 2024-02-23 DIAGNOSIS — R41.82 ALTERED MENTAL STATUS, UNSPECIFIED: ICD-10-CM

## 2024-02-23 DIAGNOSIS — C43.4 MALIGNANT MELANOMA OF SCALP AND NECK: Chronic | ICD-10-CM

## 2024-02-23 DIAGNOSIS — R06.02 SHORTNESS OF BREATH: ICD-10-CM

## 2024-02-23 PROCEDURE — 93454 CORONARY ARTERY ANGIO S&I: CPT | Mod: 26

## 2024-02-23 PROCEDURE — 99214 OFFICE O/P EST MOD 30 MIN: CPT

## 2024-02-23 PROCEDURE — 93000 ELECTROCARDIOGRAM COMPLETE: CPT

## 2024-02-23 PROCEDURE — 99152 MOD SED SAME PHYS/QHP 5/>YRS: CPT

## 2024-02-23 PROCEDURE — 99285 EMERGENCY DEPT VISIT HI MDM: CPT

## 2024-02-23 RX ORDER — ERGOCALCIFEROL 1.25 MG/1
1 CAPSULE ORAL
Refills: 0 | DISCHARGE

## 2024-02-23 RX ORDER — ATORVASTATIN CALCIUM 80 MG/1
40 TABLET, FILM COATED ORAL AT BEDTIME
Refills: 0 | Status: DISCONTINUED | OUTPATIENT
Start: 2024-02-23 | End: 2024-02-24

## 2024-02-23 RX ORDER — ASPIRIN 81 MG/1
81 TABLET, DELAYED RELEASE ORAL
Refills: 0 | Status: ACTIVE | COMMUNITY

## 2024-02-23 RX ORDER — PANTOPRAZOLE SODIUM 20 MG/1
1 TABLET, DELAYED RELEASE ORAL
Refills: 0 | DISCHARGE

## 2024-02-23 RX ORDER — CLOPIDOGREL BISULFATE 75 MG/1
75 TABLET, FILM COATED ORAL
Refills: 0 | Status: ACTIVE | COMMUNITY

## 2024-02-23 RX ORDER — POTASSIUM CHLORIDE 20 MEQ
20 PACKET (EA) ORAL
Refills: 0 | Status: COMPLETED | OUTPATIENT
Start: 2024-02-23 | End: 2024-02-23

## 2024-02-23 RX ORDER — OXYCODONE HYDROCHLORIDE 5 MG/1
10 TABLET ORAL EVERY 12 HOURS
Refills: 0 | Status: DISCONTINUED | OUTPATIENT
Start: 2024-02-23 | End: 2024-02-24

## 2024-02-23 RX ORDER — CLOPIDOGREL BISULFATE 75 MG/1
75 TABLET, FILM COATED ORAL DAILY
Refills: 0 | Status: DISCONTINUED | OUTPATIENT
Start: 2024-02-23 | End: 2024-02-24

## 2024-02-23 RX ORDER — ASPIRIN/CALCIUM CARB/MAGNESIUM 324 MG
81 TABLET ORAL DAILY
Refills: 0 | Status: DISCONTINUED | OUTPATIENT
Start: 2024-02-23 | End: 2024-02-24

## 2024-02-23 RX ORDER — ZOLPIDEM TARTRATE 10 MG/1
5 TABLET ORAL AT BEDTIME
Refills: 0 | Status: DISCONTINUED | OUTPATIENT
Start: 2024-02-23 | End: 2024-02-24

## 2024-02-23 RX ORDER — SODIUM CHLORIDE 0.65 %
1 AEROSOL, SPRAY (ML) NASAL THREE TIMES A DAY
Refills: 0 | Status: DISCONTINUED | OUTPATIENT
Start: 2024-02-23 | End: 2024-02-24

## 2024-02-23 RX ORDER — LEVOTHYROXINE SODIUM 125 MCG
112 TABLET ORAL DAILY
Refills: 0 | Status: DISCONTINUED | OUTPATIENT
Start: 2024-02-23 | End: 2024-02-24

## 2024-02-23 RX ORDER — TRAMADOL HYDROCHLORIDE 50 MG/1
50 TABLET ORAL DAILY
Refills: 0 | Status: DISCONTINUED | OUTPATIENT
Start: 2024-02-23 | End: 2024-02-24

## 2024-02-23 RX ORDER — PANTOPRAZOLE SODIUM 20 MG/1
40 TABLET, DELAYED RELEASE ORAL
Refills: 0 | Status: DISCONTINUED | OUTPATIENT
Start: 2024-02-23 | End: 2024-02-24

## 2024-02-23 RX ORDER — HYDROCORTISONE 20 MG
1 TABLET ORAL
Refills: 0 | DISCHARGE

## 2024-02-23 RX ORDER — METOPROLOL TARTRATE 50 MG
1 TABLET ORAL
Refills: 0 | DISCHARGE

## 2024-02-23 RX ORDER — LEVOTHYROXINE SODIUM 125 MCG
1 TABLET ORAL
Refills: 0 | DISCHARGE

## 2024-02-23 RX ADMIN — OXYCODONE HYDROCHLORIDE 10 MILLIGRAM(S): 5 TABLET ORAL at 20:00

## 2024-02-23 RX ADMIN — Medication 20 MILLIEQUIVALENT(S): at 18:45

## 2024-02-23 RX ADMIN — Medication 20 MILLIEQUIVALENT(S): at 22:22

## 2024-02-23 RX ADMIN — OXYCODONE HYDROCHLORIDE 10 MILLIGRAM(S): 5 TABLET ORAL at 19:02

## 2024-02-23 RX ADMIN — Medication 20 MILLIEQUIVALENT(S): at 20:55

## 2024-02-23 RX ADMIN — ATORVASTATIN CALCIUM 40 MILLIGRAM(S): 80 TABLET, FILM COATED ORAL at 20:54

## 2024-02-23 RX ADMIN — ZOLPIDEM TARTRATE 5 MILLIGRAM(S): 10 TABLET ORAL at 22:21

## 2024-02-23 RX ADMIN — Medication 1 SPRAY(S): at 18:45

## 2024-02-23 NOTE — DISCHARGE NOTE PROVIDER - NSDCFUADDINST_GEN_ALL_CORE_FT
No heavy lifting, strenuous activity, bending, straining, or unnecessary stair climbing for 1 weeks. No driving for 2 days. You may shower 24 hours following the procedure but avoid baths/swimming for 1 week. Check your groin site for bleeding and/or swelling daily following procedure and call your doctor immediately if it occurs or if you experience increased pain at the site. Follow up with your cardiologist in 1-2 weeks. You may call [facility] if you have any questions/concerns regarding your procedure [phone].

## 2024-02-23 NOTE — DISCHARGE NOTE PROVIDER - CARE PROVIDER_API CALL
Gopi Marks  Cardiology  [address]  [place] [address]  Phone: [phone]  Fax: [phone]  Follow Up Time: 2 weeks

## 2024-02-23 NOTE — PATIENT PROFILE ADULT - PATIENT REPRESENTATIVE: ( YOU CAN CHOOSE ANY PERSON THAT CAN ASSIST YOU WITH YOUR HEALTH CARE PREFERENCES, DOES NOT HAVE TO BE A SPOUSE, IMMEDIATE FAMILY OR SIGNIFICANT OTHER/PARTNER)
-- CST Phone (Incoming) Bronwyn Correa (Self) [phone] (M)          Alternative phone number: [phone]    Turnaround time given to caller:   \"This message will be sent to [state Provider's name]. The clinical team will fulfill your request as soon as they review your message.\"     yes

## 2024-02-23 NOTE — H&P CARDIOLOGY - HISTORY OF PRESENT ILLNESS
54-year-old gentleman with history of CAD s/p ONESIMO to mLAD 1/2024 brought to emergency department for reportedly abnormal ECG.  Patient was met in the ER by cardiology fellow who request patient be admitted directly for diagnostic left heart cath.  Cardiology fellow specifically did not want any blood work or medications administered to patient and patient wanted to be expedited directly to Cath Lab. Patient states that he was at a routine follow-up appointment with his cardiologist and when ECG was obtained this prompted ER referral.  Patient states that he has no new chest pain no new shortness of breath no nausea vomiting or diaphoresis.  When asked if he has pain he states he has pain all over his body and this is chronic.  No new symptoms today and his office appointment was just a routine follow-up appointment.   Patient was met in the ER by cardiology fellow who request patient be admitted directly for diagnostic left heart cath.  Cardiology fellow specifically did not want any blood work or medications administered to patient and patient wanted to be expedited directly to Cath Lab. Patient states that he was at a routine follow-up appointment with his cardiologist and when ECG was obtained this prompted ER referral.  Patient states that he has no new chest pain no new shortness of breath no nausea vomiting or diaphoresis.  When asked if he has pain he states he has pain all over his body and this is chronic.  No new symptoms today and his office appointment was just a routine follow-up appointment.  He denies any active chest pain at the time of my evaluation.  ECG from office appointment that revealed concern for ST elevations in the inferior leads.  No EKG was obtained in the emergency department as per cardiology fellow's request because patient was expedited to Cath Lab prior to any ED diagnostics other than brief history and examination.     Pt is now s/p diagnotic [facility] revealing patent stent. Plan for limited TTE to r/o effusion and admit for overnight observation.    < from: Cardiac Catheterization (01.19.24 @ 16:27) >  Procedures Performed   Procedures:               1.    ArterialAccess - Right Femoral     2.    Diagnostic Coronary Angiography   3.    Ultrasound Guided Access   4.    PCI: ONESIMO     Indications:               ACS greater than 24 hours   PCI Status:               elective     Conclusions:   Successful PCI with ONESIMO to the mid LAd.  DAPT for 6 mths     < end of copied text >        54-year-old gentleman with history of CAD s/p ONESIMO to mLAD 1/2024 brought to emergency department for reportedly abnormal ECG.  Patient was met in the ER by cardiology fellow who request patient be admitted directly for diagnostic left heart cath.  Cardiology fellow specifically did not want any blood work or medications administered to patient and patient wanted to be expedited directly to Cath Lab. Patient states that he was at a routine follow-up appointment with his cardiologist and when ECG was obtained this prompted ER referral.  Patient states that he has no new chest pain no new shortness of breath no nausea vomiting or diaphoresis.  When asked if he has pain he states he has pain all over his body and this is chronic.  No new symptoms today and his office appointment was just a routine follow-up appointment. Patient was met in the ER by cardiology fellow who request patient be admitted directly for diagnostic left heart cath.    ECG from office appointment that revealed concern for ST elevations in the inferior leads.  No EKG was obtained in the emergency department as per cardiology fellow's request because patient was expedited to Cath Lab prior to any ED diagnostics other than brief history and examination.     Pt is now s/p diagnotic C revealing patent stent. Plan for limited TTE to r/o effusion and admit for overnight observation.    < from: Cardiac Catheterization (01.19.24 @ 16:27) >  Procedures Performed   Procedures:               1.    ArterialAccess - Right Femoral     2.    Diagnostic Coronary Angiography   3.    Ultrasound Guided Access   4.    PCI: ONESIMO     Indications:               ACS greater than 24 hours   PCI Status:               elective     Conclusions:   Successful PCI with ONESIMO to the mid LAd.  DAPT for 6 mths     < end of copied text >        54-year-old gentleman with history of CAD s/p ONESIMO to mLAD 1/2024 brought to emergency department for reportedly abnormal ECG.  Patient was met in the ER by cardiology fellow who request patient be admitted directly for diagnostic left heart cath.  Cardiology fellow specifically did not want any blood work or medications administered to patient and patient wanted to be expedited directly to Cath Lab. Patient states that he was at a routine follow-up appointment with his cardiologist and when ECG was obtained this prompted ER referral.  Denies chest pain or dyspnea.  When asked if he has pain he states he has pain all over his body and this is chronic. Also reports fatigue. No new symptoms today and his office appointment was just a routine follow-up appointment. Patient was met in the ER by cardiology fellow who request patient be admitted directly for diagnostic left heart cath.    ECG from office appointment that revealed concern for ST elevations in the inferior leads.  No EKG was obtained in the emergency department as per cardiology fellow's request because patient was expedited to Cath Lab prior to any ED diagnostics other than brief history and examination.     Pt is now s/p diagnotic LHC revealing patent stent. Plan for limited TTE to r/o effusion and admit for overnight observation.    < from: Cardiac Catheterization (01.19.24 @ 16:27) >  Procedures Performed   Procedures:               1.    ArterialAccess - Right Femoral     2.    Diagnostic Coronary Angiography   3.    Ultrasound Guided Access   4.    PCI: ONESIMO     Indications:               ACS greater than 24 hours   PCI Status:               elective     Conclusions:   Successful PCI with ONESIMO to the mid LAd.  DAPT for 6 mths     < end of copied text >

## 2024-02-23 NOTE — CHART NOTE - NSCHARTNOTEFT_GEN_A_CORE
I-STOP reference #[id number]
Informed by interventional cardiologist Dr Kate that patient was arriving by EMS from Dr Gopi Bragg office due to concern for inferior STEMI. Patient with recent C 1/14/23 with ONESIMO to LAD, remainder of coronaries clean other than ramus with 40% disease. Patient compliant with DAPT since cath. Was at his cardiologists office for follow up, reported EASLEY that has been persistent since before his first cath, no chest pain. ECG with 1mm inferior STEs.    Taken emergently to the cath lab from the ED, [facility] without new disease. Plan to continue on DAPT, Statin, BB for cardiac care.    Tray Neal MD  Cardiology Fellow
Sent in by Dr Marks for possible STEMI  s/p cath  Dr Marks requests  1. Echo   2. Dimer to r/o PE

## 2024-02-23 NOTE — DISCHARGE NOTE PROVIDER - HOSPITAL COURSE
HPI:   54-year-old gentleman with history of CAD s/p ONESIMO to mLAD 1/2024 brought to emergency department for reportedly abnormal ECG.  Patient was met in the ER by cardiology fellow who request patient be admitted directly for diagnostic left heart cath.  Cardiology fellow specifically did not want any blood work or medications administered to patient and patient wanted to be expedited directly to Cath Lab. Patient states that he was at a routine follow-up appointment with his cardiologist and when ECG was obtained this prompted ER referral.  Denies chest pain or dyspnea.  When asked if he has pain he states he has pain all over his body and this is chronic. Also reports fatigue. No new symptoms today and his office appointment was just a routine follow-up appointment. Patient was met in the ER by cardiology fellow who request patient be admitted directly for diagnostic left heart cath.    ECG from office appointment that revealed concern for ST elevations in the inferior leads.  No EKG was obtained in the emergency department as per cardiology fellow's request because patient was expedited to Cath Lab prior to any ED diagnostics other than brief history and examination.     Pt is now s/p diagnostic LHC revealing patent stent via RFA s/p angioseal. Plan for limited TTE to r/o effusion and admit for overnight observation.      Patient seen and examined at the bedside on 2/24/24. No significant events on telemetry overnight. AM labs wnl, VSS/HD stable. Right groin site stable; no hematoma or bruit. Denies any complaints at this time. Patient has been medically cleared for discharge as per Dr. Kate. Patient has been given appropriate discharge instructions including medication regimen, access site management and follow up. Medications that patient needs refills on (+/- new medications) have been e-prescribed to preferred pharmacy. Patient will f/u with Dr. Marks in 1-2 weeks for further management.     VSS  Gen: NAD, A&O x3  Cards: RRR, clear S1 and S2 without murmur  Pulm: CTA B/L without w/r/r  Vascular: Right groin w/o hematoma, ooze or bruit. Peripheral pulses 2+ B/L  Abd: soft, NT  Ext: no LE edema or ulcerations B/L   HPI:   54-year-old gentleman with history of CAD s/p ONESIMO to mLAD 1/2024 brought to emergency department for reportedly abnormal ECG.  Patient was met in the ER by cardiology fellow who request patient be admitted directly for diagnostic left heart cath.  Cardiology fellow specifically did not want any blood work or medications administered to patient and patient wanted to be expedited directly to Cath Lab. Patient states that he was at a routine follow-up appointment with his cardiologist and when ECG was obtained this prompted ER referral.  Denies chest pain or dyspnea.  When asked if he has pain he states he has pain all over his body and this is chronic. Also reports fatigue. No new symptoms today and his office appointment was just a routine follow-up appointment. Patient was met in the ER by cardiology fellow who request patient be admitted directly for diagnostic left heart cath.    ECG from office appointment that revealed concern for ST elevations in the inferior leads.  No EKG was obtained in the emergency department as per cardiology fellow's request because patient was expedited to Cath Lab prior to any ED diagnostics other than brief history and examination.     Pt is now s/p diagnostic LHC revealing patent stent via RFA s/p angioseal. Plan for limited TTE to r/o effusion and admit for overnight observation.      Patient seen and examined at the bedside on 2/24/24. No significant events on telemetry overnight. AM labs wnl, VSS/HD stable. Right groin site stable; no hematoma or bruit. Denies any complaints at this time. Patient has been medically cleared for discharge as per Dr. Kate. Patient has been given appropriate discharge instructions including medication regimen, access site management and follow up. Medications that patient needs refills on (+/- new medications) have been e-prescribed to preferred pharmacy. Patient will f/u with Dr. Marks in 1-2 weeks for further management. limited ECHO shows no effusion     VSS  Gen: NAD, A&O x3  Cards: RRR, clear S1 and S2 without murmur  Pulm: CTA B/L without w/r/r  Vascular: Right groin w/o hematoma, ooze or bruit. Peripheral pulses 2+ B/L  Abd: soft, NT  Ext: no LE edema or ulcerations B/L    TTE limited 2/24/2024: < from: TTE Limited W or WO Ultrasound Enhancing Agent (02.24.24 @ 07:14) >    CONCLUSIONS:      1. Left ventricular cavity is normal in size. Left ventricular systolic function is normal.   2. No pericardial effusion seen.   3. Limited study to evaluate for pericardial effusion.    ________________________________________________________________________________________  FINDINGS:     Left Ventricle:  The left ventricular cavity is normal in size. Left ventricular systolic function is normal.     Pericardium:  No pericardial effusion seen.  ________________________________________________________________________________________  Electronically signed on 2/24/2024 at 10:09:35 AM by Jani Fuentes MD      *** Final ***    < end of copied text >

## 2024-02-23 NOTE — PATIENT PROFILE ADULT - HOW PATIENT ADDRESSED, PROFILE
Average glucose readings for the week:  130 MG/dL Fasting   162 MG/dL 1 hour after breakfast   146 MG/dL 1 hour after lunch   159 MG/dL 1 hour after supper     AM-NPH 65 Lispro 40  Lunch- Lispro 40  Supper-Lispro 40  NPH- NPH 65         Alexi

## 2024-02-23 NOTE — HISTORY OF PRESENT ILLNESS
[FreeTextEntry1] : This is a 55 y/o male with a pmhx of testicular ca s/p orchiectomy, HTN, HLD, melanoma, low serum cortisol level presents today for a follow up. Patient had abnormal STP 1/18/24 and was sent for cardiac cath 1/19/24 with successful PCI with ONESIMO to mid LAD. He was advised for DAPT. He is s/p recent ED visit for altered mental status in the setting of polysubstance ingestion. Patient continues to reports dyspnea on exertion. He also reports intermittent chest pain a few times a day since having stent placed. He reports palpitations have improved. He denies dizziness, syncope.

## 2024-02-23 NOTE — DISCHARGE NOTE PROVIDER - NSDCMRMEDTOKEN_GEN_ALL_CORE_FT
aspirin 81 mg oral delayed release tablet: 1 tab(s) orally once a day  atorvastatin 40 mg oral tablet: 1 tab(s) orally once a day (at bedtime)  clopidogrel 75 mg oral tablet: 1 tab(s) orally once a day  ergocalciferol 1.25 mg (50,000 intl units) oral capsule: 1 cap(s) orally once a week on wednesday  Fish Oil 1000 mg oral capsule: 1 cap(s) orally once a day  metoprolol succinate 25 mg oral capsule, extended release: 1 cap(s) orally once a day  naloxone 4 mg/0.1 mL nasal spray: 4 milligram(s) intranasally once a day as needed for concern for opioid intoxication - in 1 nostril, can repeat in 5 minutes if poor or partial response; call EMS/medical services  OxyCONTIN 10 mg oral tablet, extended release: 1 tab(s) orally 2 times a day  Synthroid 112 mcg (0.112 mg) oral tablet: 1 tab(s) orally once a day  traMADol 50 mg oral tablet: 1 tab(s) orally once a day as needed

## 2024-02-23 NOTE — DISCHARGE NOTE PROVIDER - NSDCFUSCHEDAPPT_GEN_ALL_CORE_FT
[facility]  PULED [address] [facility]  Scheduled Appointment: 03/13/2024

## 2024-02-23 NOTE — DISCHARGE NOTE PROVIDER - NSDCCPCAREPLAN_GEN_ALL_CORE_FT
PRINCIPAL DISCHARGE DIAGNOSIS  Diagnosis: Abnormal EKG  Assessment and Plan of Treatment: You came to the hospital because your EKG was abnormal at your outpatient cardiologist's office. You obtained a cardiac catheterization which revealed patent stents. You underwent an echocardiogram (ultrasound of the heart) revealing ______  Continue taking all medications as prescribed.   The procedure was done through the groin. Please avoid any heavy lifting (no more than 3 to 5 lbs), strenuous activity, bending, straining, or unnecessary stair climbing for 2 weeks. No driving for 2 days. You may shower 24 hours following the procedure but avoid baths/swimming for 1 week. If you develop any swelling, bleeding, hardening of the skin (hematoma formation), acute pain, numbness/tingling in your leg, or have any questions/concerns regarding your procedure, please call [facility] [phone]  Please make a follow up appointment with your cardiologist within 1-2 weeks of your discharge. All of your prescriptions have been sent electronically to your pharmacy.      SECONDARY DISCHARGE DIAGNOSES  Diagnosis: HTN (hypertension)  Assessment and Plan of Treatment: You have high blood pressure. Continue taking your blood pressure medications as prescribed. Eat a heart healthy diet with low salt; exercise regularly (if cleared by your primary care doctor or cardiologist). Maintain a heart healthy weight and include healthy ways to manage stress. If you smoke, quit. If you need assistance to help you stop smoking, please use the following resource: [facility] [employer] [facility] [employer] – ([phone]). Follow up with your primary care doctor to continue having your blood pressure checked on a continual basis.    Diagnosis: HLD (hyperlipidemia)  Assessment and Plan of Treatment: LDL<70   Goal is to keep LDL<70. Continue with your cholesterol medications as prescribed. Eat a heart healthy diet that is low in saturated fats and salt, and includes whole grains, fruits, vegetables and lean protein; exercise regularly (consult with your physician or cardiologist first); maintain a heart healthy weight; if you smoke - quit (A resource to help you stop smoking is the [facility] [employer] for Tobacco Control – phone number [phone].). Continue to follow with your primary physician or cardiologist.

## 2024-02-23 NOTE — DISCHARGE NOTE PROVIDER - NSDCCPTREATMENT_GEN_ALL_CORE_FT
PRINCIPAL PROCEDURE  Procedure: Left heart cardiac catheterization  Findings and Treatment: 2/23/24:

## 2024-02-23 NOTE — ED ADULT NURSE NOTE - OBJECTIVE STATEMENT
Male 54 years old alert and orientedx4 brought in by EMS for abnormal EKG. As per EMS pt was in cardiology office today for follow up  and EKG was abnormal. Cardiology met pt in the ER who requested pt needs to be admitted directly to cardiac cath. Cardiology doesn't want any blood work or medications to patient. pt went to cardiac cath with EMS, ER tech  and cardiology.

## 2024-02-23 NOTE — PHYSICAL EXAM
[Well Developed] : well developed [No Acute Distress] : no acute distress [Well Nourished] : well nourished [Normal Venous Pressure] : normal venous pressure [Normal Conjunctiva] : normal conjunctiva [No Carotid Bruit] : no carotid bruit [Normal S1, S2] : normal S1, S2 [No Rub] : no rub [No Murmur] : no murmur [Clear Lung Fields] : clear lung fields [No Gallop] : no gallop [Good Air Entry] : good air entry [No Respiratory Distress] : no respiratory distress  [Soft] : abdomen soft [Non Tender] : non-tender [No Masses/organomegaly] : no masses/organomegaly [Normal Bowel Sounds] : normal bowel sounds [Normal Gait] : normal gait [No Edema] : no edema [No Clubbing] : no clubbing [No Cyanosis] : no cyanosis [No Varicosities] : no varicosities [No Rash] : no rash [No Skin Lesions] : no skin lesions [Moves all extremities] : moves all extremities [Normal Speech] : normal speech [No Focal Deficits] : no focal deficits [Alert and Oriented] : alert and oriented [Normal memory] : normal memory

## 2024-02-23 NOTE — H&P CARDIOLOGY - NSICDXPASTMEDICALHX_GEN_ALL_CORE_FT
PAST MEDICAL HISTORY:  BPH (benign prostatic hyperplasia)     CAD (coronary artery disease)     Colitis surgery 1996    GERD (gastroesophageal reflux disease)     Headache, Migraine     History of bone marrow transplant     History of chemotherapy     History of Raynaud's syndrome     HTN (hypertension)     Hypertriglyceridemia     Hypothyroidism     Malignant melanoma of scalp RSX 08/18  R neck mass dsx/ LN dsx 10/19/18    NHL (non-Hodgkin's lymphoma) 1999, Radiation to left neck region    Osteoarthritis degenerative disc L3-4    Testicular Cancer 1994, chemotherapy

## 2024-02-24 ENCOUNTER — TRANSCRIPTION ENCOUNTER (OUTPATIENT)
Age: 54
End: 2024-02-24

## 2024-02-24 ENCOUNTER — RESULT REVIEW (OUTPATIENT)
Age: 54
End: 2024-02-24

## 2024-02-24 VITALS
OXYGEN SATURATION: 99 % | TEMPERATURE: 98 F | SYSTOLIC BLOOD PRESSURE: 103 MMHG | DIASTOLIC BLOOD PRESSURE: 53 MMHG | RESPIRATION RATE: 17 BRPM | HEART RATE: 66 BPM

## 2024-02-24 LAB
ANION GAP SERPL CALC-SCNC: 9 MMOL/L — SIGNIFICANT CHANGE UP (ref 5–17)
BUN SERPL-MCNC: 17 MG/DL — SIGNIFICANT CHANGE UP (ref 7–23)
CALCIUM SERPL-MCNC: 9.2 MG/DL — SIGNIFICANT CHANGE UP (ref 8.4–10.5)
CHLORIDE SERPL-SCNC: 102 MMOL/L — SIGNIFICANT CHANGE UP (ref 96–108)
CO2 SERPL-SCNC: 25 MMOL/L — SIGNIFICANT CHANGE UP (ref 22–31)
CREAT SERPL-MCNC: 0.81 MG/DL — SIGNIFICANT CHANGE UP (ref 0.5–1.3)
D DIMER BLD IA.RAPID-MCNC: <150 NG/ML DDU — SIGNIFICANT CHANGE UP
EGFR: 105 ML/MIN/1.73M2 — SIGNIFICANT CHANGE UP
GLUCOSE SERPL-MCNC: 118 MG/DL — HIGH (ref 70–99)
POTASSIUM SERPL-MCNC: 4.3 MMOL/L — SIGNIFICANT CHANGE UP (ref 3.5–5.3)
POTASSIUM SERPL-SCNC: 4.3 MMOL/L — SIGNIFICANT CHANGE UP (ref 3.5–5.3)
SODIUM SERPL-SCNC: 136 MMOL/L — SIGNIFICANT CHANGE UP (ref 135–145)

## 2024-02-24 PROCEDURE — 93321 DOPPLER ECHO F-UP/LMTD STD: CPT | Mod: 26

## 2024-02-24 PROCEDURE — C1760: CPT

## 2024-02-24 PROCEDURE — 85379 FIBRIN DEGRADATION QUANT: CPT

## 2024-02-24 PROCEDURE — 93454 CORONARY ARTERY ANGIO S&I: CPT

## 2024-02-24 PROCEDURE — 93308 TTE F-UP OR LMTD: CPT | Mod: 26

## 2024-02-24 PROCEDURE — 99285 EMERGENCY DEPT VISIT HI MDM: CPT

## 2024-02-24 PROCEDURE — 93308 TTE F-UP OR LMTD: CPT

## 2024-02-24 PROCEDURE — 99232 SBSQ HOSP IP/OBS MODERATE 35: CPT

## 2024-02-24 PROCEDURE — C1769: CPT

## 2024-02-24 PROCEDURE — 93321 DOPPLER ECHO F-UP/LMTD STD: CPT

## 2024-02-24 PROCEDURE — 80048 BASIC METABOLIC PNL TOTAL CA: CPT

## 2024-02-24 PROCEDURE — C1894: CPT

## 2024-02-24 PROCEDURE — C1887: CPT

## 2024-02-24 RX ORDER — METOPROLOL TARTRATE 50 MG
50 TABLET ORAL DAILY
Refills: 0 | Status: DISCONTINUED | OUTPATIENT
Start: 2024-02-24 | End: 2024-02-24

## 2024-02-24 RX ADMIN — Medication 112 MICROGRAM(S): at 05:48

## 2024-02-24 RX ADMIN — CLOPIDOGREL BISULFATE 75 MILLIGRAM(S): 75 TABLET, FILM COATED ORAL at 05:48

## 2024-02-24 RX ADMIN — Medication 50 MILLIGRAM(S): at 05:48

## 2024-02-24 RX ADMIN — Medication 81 MILLIGRAM(S): at 05:47

## 2024-02-24 RX ADMIN — OXYCODONE HYDROCHLORIDE 10 MILLIGRAM(S): 5 TABLET ORAL at 06:40

## 2024-02-24 RX ADMIN — OXYCODONE HYDROCHLORIDE 10 MILLIGRAM(S): 5 TABLET ORAL at 05:48

## 2024-02-24 RX ADMIN — PANTOPRAZOLE SODIUM 40 MILLIGRAM(S): 20 TABLET, DELAYED RELEASE ORAL at 05:48

## 2024-02-24 NOTE — PROGRESS NOTE ADULT - SUBJECTIVE AND OBJECTIVE BOX
[facility] INVASIVE CARDIOLOGY -- Humble Abreu, Tae Garrison, aCsey, Di, Jaren, Osito, Abiel Wilkes-Cindy, JOSEPH Chavarria  Cardiac cath/EP lab - [facility] Team  [phone]     Chief complaint: Patient is a 54y old  Male who presents with a chief complaint of     HPI:   54-year-old gentleman with history of CAD s/p ONESIMO to mLAD 1/2024 brought to emergency department for reportedly abnormal ECG.  Patient was met in the ER by cardiology fellow who request patient be admitted directly for diagnostic left heart cath.  Cardiology fellow specifically did not want any blood work or medications administered to patient and patient wanted to be expedited directly to Cath Lab. Patient states that he was at a routine follow-up appointment with his cardiologist and when ECG was obtained this prompted ER referral.  Denies chest pain or dyspnea.  When asked if he has pain he states he has pain all over his body and this is chronic. Also reports fatigue. No new symptoms today and his office appointment was just a routine follow-up appointment. Patient was met in the ER by cardiology fellow who request patient be admitted directly for diagnostic left heart cath.    ECG from office appointment that revealed concern for ST elevations in the inferior leads.  No EKG was obtained in the emergency department as per cardiology fellow's request because patient was expedited to Cath Lab prior to any ED diagnostics other than brief history and examination.     Pt is now s/p diagnotic LHC revealing patent stent. Plan for limited TTE to r/o effusion and admit for overnight observation.  (23 Feb 2024 17:37)      Subjective/Observations: Patient seen and assessed at bedside. Denies changes in vision, headaches, dizziness, chest pain, palpitations, SOB, abdominal pain, N/V/D, leg pain/edema or any other complaints.       ROS Negative except as per Subjective and HPI      PAST MEDICAL & SURGICAL HISTORY:  Testicular Cancer  1994, chemotherapy      Headache, Migraine      HTN (hypertension)      NHL (non-Hodgkin's lymphoma)  1999, Radiation to left neck region      GERD (gastroesophageal reflux disease)      Colitis  surgery 1996      BPH (benign prostatic hyperplasia)      Osteoarthritis  degenerative disc L3-4      History of bone marrow transplant      Hypertriglyceridemia      Malignant melanoma of scalp  RSX 08/18  R neck mass dsx/ LN dsx 10/19/18      Hypothyroidism      History of chemotherapy      History of Raynaud's syndrome      CAD (coronary artery disease)      History of orchiectomy  left 1994      S/P colon resection  1996      Lymph node disorder  s/p abdominal lymph node dissection 1994, 1996      Melanoma of scalp or neck  excision 8/18  s/p excision right neck mass & LN dissx      History of nasal septoplasty      History of infusaport central venous catheter insertion      History of infusaport central venous catheter removal          ALLERGIES:  No Known Allergies      MEDICATIONS:  oxyCODONE  ER Tablet 10 milliGRAM(s) Oral every 12 hours  traMADol 50 milliGRAM(s) Oral daily PRN  zolpidem 5 milliGRAM(s) Oral at bedtime PRN    pantoprazole    Tablet 40 milliGRAM(s) Oral before breakfast    atorvastatin 40 milliGRAM(s) Oral at bedtime  levothyroxine 112 MICROGram(s) Oral daily    aspirin enteric coated 81 milliGRAM(s) Oral daily  clopidogrel Tablet 75 milliGRAM(s) Oral daily  sodium chloride 0.65% Nasal 1 Spray(s) Both Nostrils three times a day PRN      TELEMETRY:  T(C): 36.3 (02-23-24 @ 21:03), Max: 36.6 (02-23-24 @ 15:52)  HR: 69 (02-23-24 @ 21:30) (64 - 70)  BP: 110/52 (02-23-24 @ 21:30) (88/54 - 110/52)  RR: 18 (02-23-24 @ 21:30) (16 - 18)  SpO2: 100% (02-23-24 @ 21:30) (96% - 100%)  Wt(kg): --  I&O's Summary    Height (cm): 180.3 (02-23 @ 15:52)  Weight (kg): 61.2 (02-23 @ 15:52)  BMI (kg/m2): 18.8 (02-23 @ 15:52)  BSA (m2): 1.78 (02-23 @ 15:52)  [place]:  Central/PICC/Mid Line:                                         FOCUSED PHYSICAL EXAM:  Appearance: Normal  Cardiovascular: Normal S1 S2, No JVD, No murmurs, rubs, gallops or clicks  Respiratory: Lungs clear to auscultation. No Rales, Rhonchi, Wheezing	  Vascular: right groin site stable w/o bleeding or hematoma, soft, + pulses     ECG:  	  RADIOLOGY:   DIAGNOSTIC TESTING:  [ ] Echocardiogram:  [ ] Catheterization:  [ ] Stress Test:    [ ] TITO  [ ] CCTA  OTHER: 	    LABS: All labs reviewed	 	  CARDIAC MARKERS:  proBNP:   Lipid Profile:   HgA1c:   TSH:    [facility] INVASIVE CARDIOLOGY -- Humble Abreu, Tae Garrison, Casey, Di, Jaren, Osito, Abiel Wilkes-Cindy, JOSEPH Chavarria  Cardiac cath/EP lab - [facility] Team  [phone]     Chief complaint: Patient is a 54y old  Male who presents with a chief complaint of     HPI:   54-year-old gentleman with history of CAD s/p ONESIMO to mLAD 1/2024 brought to emergency department for reportedly abnormal ECG.  Patient was met in the ER by cardiology fellow who request patient be admitted directly for diagnostic left heart cath.  Cardiology fellow specifically did not want any blood work or medications administered to patient and patient wanted to be expedited directly to Cath Lab. Patient states that he was at a routine follow-up appointment with his cardiologist and when ECG was obtained this prompted ER referral.  Denies chest pain or dyspnea.  When asked if he has pain he states he has pain all over his body and this is chronic. Also reports fatigue. No new symptoms today and his office appointment was just a routine follow-up appointment. Patient was met in the ER by cardiology fellow who request patient be admitted directly for diagnostic left heart cath.    ECG from office appointment that revealed concern for ST elevations in the inferior leads.  No EKG was obtained in the emergency department as per cardiology fellow's request because patient was expedited to Cath Lab prior to any ED diagnostics other than brief history and examination.     Pt is now s/p diagnotic LHC revealing patent stent. Plan for limited TTE to r/o effusion and admit for overnight observation.  (23 Feb 2024 17:37)      Subjective/Observations: Patient seen and assessed at bedside. Denies changes in vision, headaches, dizziness, chest pain, palpitations, SOB, abdominal pain, N/V/D, leg pain/edema or any other complaints.       ROS Negative except as per Subjective and HPI      PAST MEDICAL & SURGICAL HISTORY:  Testicular Cancer  1994, chemotherapy      Headache, Migraine      HTN (hypertension)      NHL (non-Hodgkin's lymphoma)  1999, Radiation to left neck region      GERD (gastroesophageal reflux disease)      Colitis  surgery 1996      BPH (benign prostatic hyperplasia)      Osteoarthritis  degenerative disc L3-4      History of bone marrow transplant      Hypertriglyceridemia      Malignant melanoma of scalp  RSX 08/18  R neck mass dsx/ LN dsx 10/19/18      Hypothyroidism      History of chemotherapy      History of Raynaud's syndrome      CAD (coronary artery disease)      History of orchiectomy  left 1994      S/P colon resection  1996      Lymph node disorder  s/p abdominal lymph node dissection 1994, 1996      Melanoma of scalp or neck  excision 8/18  s/p excision right neck mass & LN dissx      History of nasal septoplasty      History of infusaport central venous catheter insertion      History of infusaport central venous catheter removal          ALLERGIES:  No Known Allergies      MEDICATIONS:  oxyCODONE  ER Tablet 10 milliGRAM(s) Oral every 12 hours  traMADol 50 milliGRAM(s) Oral daily PRN  zolpidem 5 milliGRAM(s) Oral at bedtime PRN    pantoprazole    Tablet 40 milliGRAM(s) Oral before breakfast    atorvastatin 40 milliGRAM(s) Oral at bedtime  metoprolol succinate 50 milliGRAM(s) Oral daily  levothyroxine 112 MICROGram(s) Oral daily    aspirin enteric coated 81 milliGRAM(s) Oral daily  clopidogrel Tablet 75 milliGRAM(s) Oral daily  sodium chloride 0.65% Nasal 1 Spray(s) Both Nostrils three times a day PRN      TELEMETRY:  T(C): 36.3 (02-23-24 @ 21:03), Max: 36.6 (02-23-24 @ 15:52)  HR: 69 (02-23-24 @ 21:30) (64 - 70)  BP: 110/52 (02-23-24 @ 21:30) (88/54 - 110/52)  RR: 18 (02-23-24 @ 21:30) (16 - 18)  SpO2: 100% (02-23-24 @ 21:30) (96% - 100%)  Wt(kg): --  I&O's Summary    Height (cm): 180.3 (02-23 @ 15:52)  Weight (kg): 61.2 (02-23 @ 15:52)  BMI (kg/m2): 18.8 (02-23 @ 15:52)  BSA (m2): 1.78 (02-23 @ 15:52)  [place]:  Central/PICC/Mid Line:                                         FOCUSED PHYSICAL EXAM:  Appearance: Normal  Cardiovascular: Normal S1 S2, No JVD, No murmurs, rubs, gallops or clicks  Respiratory: Lungs clear to auscultation. No Rales, Rhonchi, Wheezing	  Vascular: right groin site stable w/o bleeding or hematoma, soft, + pulses     ECG:  	  RADIOLOGY:   DIAGNOSTIC TESTING:  [ ] Echocardiogram:  [ ] Catheterization:  [ ] Stress Test:    [ ] TITO  [ ] CCTA  OTHER: 	    LABS: All labs reviewed	 	  CARDIAC MARKERS:  proBNP:   Lipid Profile:   HgA1c:   TSH:

## 2024-02-24 NOTE — DISCHARGE NOTE NURSING/CASE MANAGEMENT/SOCIAL WORK - PATIENT PORTAL LINK FT
You can access the FollowMyHealth Patient Portal offered by [facility] by registering at the following website: [email or web address]. By joining [employer]’s FollowMyHealth portal, you will also be able to view your health information using other applications (apps) compatible with our system.

## 2024-02-24 NOTE — CONSULT NOTE ADULT - SUBJECTIVE AND OBJECTIVE BOX
DATE OF SERVICE: 02-24-24 @ 09:30    CHIEF COMPLAINT:Patient is a 54y old  Male who presents with a chief complaint of     HISTORY OF PRESENT ILLNESS:HPI:   54-year-old gentleman with history of CAD s/p ONESIMO to mLAD 1/2024 brought to emergency department for reportedly abnormal ECG.  Patient was met in the ER by cardiology fellow who request patient be admitted directly for diagnostic left heart cath.  Cardiology fellow specifically did not want any blood work or medications administered to patient and patient wanted to be expedited directly to Cath Lab. Patient states that he was at a routine follow-up appointment with his cardiologist and when ECG was obtained this prompted ER referral.  Denies chest pain or dyspnea.  When asked if he has pain he states he has pain all over his body and this is chronic. Also reports fatigue. No new symptoms today and his office appointment was just a routine follow-up appointment. Patient was met in the ER by cardiology fellow who request patient be admitted directly for diagnostic left heart cath.    ECG from office appointment that revealed concern for ST elevations in the inferior leads.  No EKG was obtained in the emergency department as per cardiology fellow's request because patient was expedited to Cath Lab prior to any ED diagnostics other than brief history and examination.     Pt is now s/p diagnotic LHC revealing patent stent. Plan for limited TTE to r/o effusion and admit for overnight observation.    < from: Cardiac Catheterization (01.19.24 @ 16:27) >  Procedures Performed   Procedures:               1.    ArterialAccess - Right Femoral     2.    Diagnostic Coronary Angiography   3.    Ultrasound Guided Access   4.    PCI: ONESIMO     Indications:               ACS greater than 24 hours   PCI Status:               elective     Conclusions:   Successful PCI with ONESIMO to the mid LAd.  DAPT for 6 mths     < end of copied text >       (23 Feb 2024 17:37)      PAST MEDICAL & SURGICAL HISTORY:  Testicular Cancer  1994, chemotherapy      Headache, Migraine      HTN (hypertension)      NHL (non-Hodgkin's lymphoma)  1999, Radiation to left neck region      GERD (gastroesophageal reflux disease)      Colitis  surgery 1996      BPH (benign prostatic hyperplasia)      Osteoarthritis  degenerative disc L3-4      History of bone marrow transplant      Hypertriglyceridemia      Malignant melanoma of scalp  RSX 08/18  R neck mass dsx/ LN dsx 10/19/18      Hypothyroidism      History of chemotherapy      History of Raynaud's syndrome      CAD (coronary artery disease)      History of orchiectomy  left 1994      S/P colon resection  1996      Lymph node disorder  s/p abdominal lymph node dissection 1994, 1996      Melanoma of scalp or neck  excision 8/18  s/p excision right neck mass & LN dissx      History of nasal septoplasty      History of infusaport central venous catheter insertion      History of infusaport central venous catheter removal              MEDICATIONS:  aspirin enteric coated 81 milliGRAM(s) Oral daily  clopidogrel Tablet 75 milliGRAM(s) Oral daily  metoprolol succinate ER 50 milliGRAM(s) Oral daily        oxyCODONE  ER Tablet 10 milliGRAM(s) Oral every 12 hours  traMADol 50 milliGRAM(s) Oral daily PRN  zolpidem 5 milliGRAM(s) Oral at bedtime PRN    pantoprazole    Tablet 40 milliGRAM(s) Oral before breakfast    atorvastatin 40 milliGRAM(s) Oral at bedtime  levothyroxine 112 MICROGram(s) Oral daily    sodium chloride 0.65% Nasal 1 Spray(s) Both Nostrils three times a day PRN      FAMILY HISTORY:  Hypertension (Father)        Non-contributory    SOCIAL HISTORY:    [ ] Tobacco  [ ] Drugs  [ ] Alcohol    Allergies    No Known Allergies    Intolerances    	    REVIEW OF SYSTEMS:  CONSTITUTIONAL: No fever  EYES: No eye pain, visual disturbances, or discharge  ENMT:  No difficulty hearing, tinnitus  NECK: No pain or stiffness  RESPIRATORY: No cough, wheezing,  CARDIOVASCULAR: No chest pain, palpitations, passing out, dizziness, or leg swelling  GASTROINTESTINAL:  No nausea, vomiting, diarrhea or constipation. No melena.  GENITOURINARY: No dysuria, hematuria  NEUROLOGICAL: No stroke like symptoms  SKIN: No burning or lesions   ENDOCRINE: No heat or cold intolerance  MUSCULOSKELETAL: No joint pain or swelling  PSYCHIATRIC: No  anxiety, mood swings  HEME/LYMPH: No bleeding gums  ALLERGY AND IMMUNOLOGIC: No hives or eczema	    All other ROS negative    PHYSICAL EXAM:  T(C): 36.3 (02-24-24 @ 07:57), Max: 36.6 (02-23-24 @ 15:52)  HR: 95 (02-24-24 @ 07:57) (64 - 95)  BP: 120/59 (02-24-24 @ 07:57) (88/54 - 120/59)  RR: 17 (02-24-24 @ 07:57) (16 - 18)  SpO2: 100% (02-24-24 @ 07:57) (96% - 100%)  Wt(kg): --  I&O's Summary    24 Feb 2024 07:01  -  24 Feb 2024 09:30  --------------------------------------------------------  IN: 240 mL / OUT: 0 mL / NET: 240 mL        Appearance: Normal	  HEENT:   Normal oral mucosa, EOMI	  Cardiovascular:  S1 S2, No JVD,    Respiratory: Lungs clear to auscultation	  Psychiatry: Alert  Gastrointestinal:  Soft, Non-tender, + BS	  Skin: No rashes   Neurologic: Non-focal  Extremities:  No edema  Vascular: Peripheral pulses palpable    	    	  	  CARDIAC MARKERS:  Labs personally reviewed by me              02-24    136  |  102  |  17  ----------------------------<  118<H>  4.3   |  25  |  0.81    Ca    9.2      24 Feb 2024 00:46            Assessment /Plan:    54-year-old gentleman with history of CAD s/p ONESIMO to mLAD 1/2024 brought to emergency department for reportedly abnormal ECG.      1. Abnormal EKG   s/p LHC 2/23 patent LAD stent   TTE done pending read  D-dimer < 150   F/u Dr Marks OP     2. CAD   c/w DAPT  c/w statin       Differential diagnosis and plan of care discussed with patient after the evaluation. Counseling on diet, nutritional counseling, weight management, exercise and medication compliance was done.   Advanced care planning/advanced directives discussed with patient/family. DNR status including forceful chest compressions to attempt to restart the heart, ventilator support/artificial breathing, electric shock, artificial nutrition, health care proxy, Molst form all discussed with pt. Pt wishes to consider. More than fifteen minutes spent on discussing advanced directives.     JOSE Paniagua DO [facility]  Cardiovascular Medicine  [address] [place] [address], [address]  Office [phone]  Available via call or text on [employer] Teams  DATE OF SERVICE: 02-24-24 @ 09:30    CHIEF COMPLAINT:Patient is a 54y old  Male who presents with a chief complaint of     HISTORY OF PRESENT ILLNESS:HPI:   54-year-old gentleman with history of CAD s/p ONESIMO to mLAD 1/2024 brought to emergency department for reportedly abnormal ECG.  Patient was met in the ER by cardiology fellow who request patient be admitted directly for diagnostic left heart cath.  Cardiology fellow specifically did not want any blood work or medications administered to patient and patient wanted to be expedited directly to Cath Lab. Patient states that he was at a routine follow-up appointment with his cardiologist and when ECG was obtained this prompted ER referral.  Denies chest pain or dyspnea.  When asked if he has pain he states he has pain all over his body and this is chronic. Also reports fatigue. No new symptoms today and his office appointment was just a routine follow-up appointment. Patient was met in the ER by cardiology fellow who request patient be admitted directly for diagnostic left heart cath.    ECG from office appointment that revealed concern for ST elevations in the inferior leads.  No EKG was obtained in the emergency department as per cardiology fellow's request because patient was expedited to Cath Lab prior to any ED diagnostics other than brief history and examination.     Pt is now s/p diagnotic LHC revealing patent stent. Plan for limited TTE to r/o effusion and admit for overnight observation.    < from: Cardiac Catheterization (01.19.24 @ 16:27) >  Procedures Performed   Procedures:               1.    ArterialAccess - Right Femoral     2.    Diagnostic Coronary Angiography   3.    Ultrasound Guided Access   4.    PCI: ONESIMO     Indications:               ACS greater than 24 hours   PCI Status:               elective     Conclusions:   Successful PCI with ONESIMO to the mid LAd.  DAPT for 6 mths     < end of copied text >       (23 Feb 2024 17:37)      PAST MEDICAL & SURGICAL HISTORY:  Testicular Cancer  1994, chemotherapy      Headache, Migraine      HTN (hypertension)      NHL (non-Hodgkin's lymphoma)  1999, Radiation to left neck region      GERD (gastroesophageal reflux disease)      Colitis  surgery 1996      BPH (benign prostatic hyperplasia)      Osteoarthritis  degenerative disc L3-4      History of bone marrow transplant      Hypertriglyceridemia      Malignant melanoma of scalp  RSX 08/18  R neck mass dsx/ LN dsx 10/19/18      Hypothyroidism      History of chemotherapy      History of Raynaud's syndrome      CAD (coronary artery disease)      History of orchiectomy  left 1994      S/P colon resection  1996      Lymph node disorder  s/p abdominal lymph node dissection 1994, 1996      Melanoma of scalp or neck  excision 8/18  s/p excision right neck mass & LN dissx      History of nasal septoplasty      History of infusaport central venous catheter insertion      History of infusaport central venous catheter removal              MEDICATIONS:  aspirin enteric coated 81 milliGRAM(s) Oral daily  clopidogrel Tablet 75 milliGRAM(s) Oral daily  metoprolol succinate ER 50 milliGRAM(s) Oral daily        oxyCODONE  ER Tablet 10 milliGRAM(s) Oral every 12 hours  traMADol 50 milliGRAM(s) Oral daily PRN  zolpidem 5 milliGRAM(s) Oral at bedtime PRN    pantoprazole    Tablet 40 milliGRAM(s) Oral before breakfast    atorvastatin 40 milliGRAM(s) Oral at bedtime  levothyroxine 112 MICROGram(s) Oral daily    sodium chloride 0.65% Nasal 1 Spray(s) Both Nostrils three times a day PRN      FAMILY HISTORY:  Hypertension (Father)        Non-contributory    SOCIAL HISTORY:    [ ] Tobacco  [ ] Drugs  [ ] Alcohol    Allergies    No Known Allergies    Intolerances    	    REVIEW OF SYSTEMS:  CONSTITUTIONAL: No fever  EYES: No eye pain, visual disturbances, or discharge  ENMT:  No difficulty hearing, tinnitus  NECK: No pain or stiffness  RESPIRATORY: No cough, wheezing,  CARDIOVASCULAR: No chest pain, palpitations, passing out, dizziness, or leg swelling  GASTROINTESTINAL:  No nausea, vomiting, diarrhea or constipation. No melena.  GENITOURINARY: No dysuria, hematuria  NEUROLOGICAL: No stroke like symptoms  SKIN: No burning or lesions   ENDOCRINE: No heat or cold intolerance  MUSCULOSKELETAL: No joint pain or swelling  PSYCHIATRIC: No  anxiety, mood swings  HEME/LYMPH: No bleeding gums  ALLERGY AND IMMUNOLOGIC: No hives or eczema	    All other ROS negative    PHYSICAL EXAM:  T(C): 36.3 (02-24-24 @ 07:57), Max: 36.6 (02-23-24 @ 15:52)  HR: 95 (02-24-24 @ 07:57) (64 - 95)  BP: 120/59 (02-24-24 @ 07:57) (88/54 - 120/59)  RR: 17 (02-24-24 @ 07:57) (16 - 18)  SpO2: 100% (02-24-24 @ 07:57) (96% - 100%)  Wt(kg): --  I&O's Summary    24 Feb 2024 07:01  -  24 Feb 2024 09:30  --------------------------------------------------------  IN: 240 mL / OUT: 0 mL / NET: 240 mL        Appearance: Normal	  HEENT:   Normal oral mucosa, EOMI	  Cardiovascular:  S1 S2, No JVD,    Respiratory: Lungs clear to auscultation	  Psychiatry: Alert  Gastrointestinal:  Soft, Non-tender, + BS	  Skin: No rashes   Neurologic: Non-focal  Extremities:  No edema  Vascular: Peripheral pulses palpable    	    	  	  CARDIAC MARKERS:  Labs personally reviewed by me              02-24    136  |  102  |  17  ----------------------------<  118<H>  4.3   |  25  |  0.81    Ca    9.2      24 Feb 2024 00:46            Assessment /Plan:    54-year-old gentleman with history of CAD s/p ONESIMO to mLAD 1/2024 brought to emergency department for reportedly abnormal ECG.      1. Abnormal EKG   s/p LHC 2/23 patent LAD stent   TTE 2/24 - no pericardial effusion, LV EF preserved  D-dimer < 150   F/u Dr Marks OP     2. CAD   c/w DAPT  c/w statin       Differential diagnosis and plan of care discussed with patient after the evaluation. Counseling on diet, nutritional counseling, weight management, exercise and medication compliance was done.   Advanced care planning/advanced directives discussed with patient/family. DNR status including forceful chest compressions to attempt to restart the heart, ventilator support/artificial breathing, electric shock, artificial nutrition, health care proxy, Molst form all discussed with pt. Pt wishes to consider. More than fifteen minutes spent on discussing advanced directives.     JOSE Paniagua DO [facility]  Cardiovascular Medicine  [address] [place] [address], [address]  Office [phone]  Available via call or text on [employer] Teams  DATE OF SERVICE: 02-24-24 @ 09:30    CHIEF COMPLAINT:Patient is a 54y old  Male who presents with a chief complaint of     HISTORY OF PRESENT ILLNESS:HPI:   54-year-old gentleman with history of CAD s/p ONESIMO to mLAD 1/2024 brought to emergency department for reportedly abnormal ECG.  Patient was met in the ER by cardiology fellow who request patient be admitted directly for diagnostic left heart cath.  Cardiology fellow specifically did not want any blood work or medications administered to patient and patient wanted to be expedited directly to Cath Lab. Patient states that he was at a routine follow-up appointment with his cardiologist and when ECG was obtained this prompted ER referral.  Denies chest pain or dyspnea.  When asked if he has pain he states he has pain all over his body and this is chronic. Also reports fatigue. No new symptoms today and his office appointment was just a routine follow-up appointment. Patient was met in the ER by cardiology fellow who request patient be admitted directly for diagnostic left heart cath.    ECG from office appointment that revealed concern for ST elevations in the inferior leads.  No EKG was obtained in the emergency department as per cardiology fellow's request because patient was expedited to Cath Lab prior to any ED diagnostics other than brief history and examination.     Pt is now s/p diagnotic LHC revealing patent stent. Plan for limited TTE to r/o effusion and admit for overnight observation.    < from: Cardiac Catheterization (01.19.24 @ 16:27) >  Procedures Performed   Procedures:               1.    ArterialAccess - Right Femoral     2.    Diagnostic Coronary Angiography   3.    Ultrasound Guided Access   4.    PCI: ONESIMO     Indications:               ACS greater than 24 hours   PCI Status:               elective     Conclusions:   Successful PCI with ONESIMO to the mid LAd.  DAPT for 6 mths     < end of copied text >       (23 Feb 2024 17:37)      PAST MEDICAL & SURGICAL HISTORY:  Testicular Cancer  1994, chemotherapy      Headache, Migraine      HTN (hypertension)      NHL (non-Hodgkin's lymphoma)  1999, Radiation to left neck region      GERD (gastroesophageal reflux disease)      Colitis  surgery 1996      BPH (benign prostatic hyperplasia)      Osteoarthritis  degenerative disc L3-4      History of bone marrow transplant      Hypertriglyceridemia      Malignant melanoma of scalp  RSX 08/18  R neck mass dsx/ LN dsx 10/19/18      Hypothyroidism      History of chemotherapy      History of Raynaud's syndrome      CAD (coronary artery disease)      History of orchiectomy  left 1994      S/P colon resection  1996      Lymph node disorder  s/p abdominal lymph node dissection 1994, 1996      Melanoma of scalp or neck  excision 8/18  s/p excision right neck mass & LN dissx      History of nasal septoplasty      History of infusaport central venous catheter insertion      History of infusaport central venous catheter removal              MEDICATIONS:  aspirin enteric coated 81 milliGRAM(s) Oral daily  clopidogrel Tablet 75 milliGRAM(s) Oral daily  metoprolol succinate ER 50 milliGRAM(s) Oral daily        oxyCODONE  ER Tablet 10 milliGRAM(s) Oral every 12 hours  traMADol 50 milliGRAM(s) Oral daily PRN  zolpidem 5 milliGRAM(s) Oral at bedtime PRN    pantoprazole    Tablet 40 milliGRAM(s) Oral before breakfast    atorvastatin 40 milliGRAM(s) Oral at bedtime  levothyroxine 112 MICROGram(s) Oral daily    sodium chloride 0.65% Nasal 1 Spray(s) Both Nostrils three times a day PRN      FAMILY HISTORY:  Hypertension (Father)        Non-contributory    SOCIAL HISTORY:    [ ] not a smoker  Allergies    No Known Allergies    Intolerances    	    REVIEW OF SYSTEMS:  CONSTITUTIONAL: No fever  EYES: No eye pain, visual disturbances, or discharge  ENMT:  No difficulty hearing, tinnitus  NECK: No pain or stiffness  RESPIRATORY: No cough, wheezing,  CARDIOVASCULAR: No chest pain, palpitations, passing out, dizziness, or leg swelling  GASTROINTESTINAL:  No nausea, vomiting, diarrhea or constipation. No melena.  GENITOURINARY: No dysuria, hematuria  NEUROLOGICAL: No stroke like symptoms  SKIN: No burning or lesions   ENDOCRINE: No heat or cold intolerance  MUSCULOSKELETAL: No joint pain or swelling  PSYCHIATRIC: No  anxiety, mood swings  HEME/LYMPH: No bleeding gums  ALLERGY AND IMMUNOLOGIC: No hives or eczema	    All other ROS negative    PHYSICAL EXAM:  T(C): 36.3 (02-24-24 @ 07:57), Max: 36.6 (02-23-24 @ 15:52)  HR: 95 (02-24-24 @ 07:57) (64 - 95)  BP: 120/59 (02-24-24 @ 07:57) (88/54 - 120/59)  RR: 17 (02-24-24 @ 07:57) (16 - 18)  SpO2: 100% (02-24-24 @ 07:57) (96% - 100%)  Wt(kg): --  I&O's Summary    24 Feb 2024 07:01  -  24 Feb 2024 09:30  --------------------------------------------------------  IN: 240 mL / OUT: 0 mL / NET: 240 mL        Appearance: Normal	  HEENT:   Normal oral mucosa, EOMI	  Cardiovascular:  S1 S2, No JVD,    Respiratory: Lungs clear to auscultation	  Psychiatry: Alert  Gastrointestinal:  Soft, Non-tender, + BS	  Skin: No rashes   Neurologic: Non-focal  Extremities:  No edema  Vascular: Peripheral pulses palpable    	    	  	  CARDIAC MARKERS:  Labs personally reviewed by me              02-24    136  |  102  |  17  ----------------------------<  118<H>  4.3   |  25  |  0.81    Ca    9.2      24 Feb 2024 00:46            Assessment /Plan:   54-year-old gentleman with history of CAD s/p ONESIMO to AD 1/2024 brought to emergency department for reportedly abnormal ECG.      1. Abnormal EKG, r/o STEMI  s/p LHC 2/23 patent LAD stent   TTE 2/24 - no pericardial effusion, LV EF preserved  D-dimer < 150   F/u Dr Marks OP     2. CAD   c/w DAPT  c/w statin       Differential diagnosis and plan of care discussed with patient after the evaluation. Counseling on diet, nutritional counseling, weight management, exercise and medication compliance was done.   Advanced care planning/advanced directives discussed with patient/family. DNR status including forceful chest compressions to attempt to restart the heart, ventilator support/artificial breathing, electric shock, artificial nutrition, health care proxy, Molst form all discussed with pt. Pt wishes to consider. More than fifteen minutes spent on discussing advanced directives.     JOSE Paniagua DO [facility]  Cardiovascular Medicine  [address] [place] [address] Dr, [address]  Office [phone]  Available via call or text on [employer] Teams

## 2024-02-24 NOTE — PROGRESS NOTE ADULT - ASSESSMENT
55 y/o Male w/ PMHx of HTN, CAD s/p ONESIMO mLAD 1/2024, Hypothyroidism, Testicular cancer, NHL, melanoma, adrenal insufficiency presented from cardiologist's office d/t concern for STEMI; s/p C 2/23 revealing patent stents. TTE pending    # CAD  - Outpatient EKG revealed REGAN in inferior leads; rushed to cath lab emergently  - Now s/p C 2/23/24 revealing patent stents via RFA s/p angioseal  - Right groin site stable; soft and no hematoma  - Continue ASA 81mg QD, Plavix 75mg QD and Atorvastatin 80mg QHS  - TTE pending to r/o effusion  - D-dimer pending; if positive obtain CTA chest to r/o PE (per Dr. Marks/Dr. Carballo)    # HTN  - Normotensive  - Continue Toprol 25mg QD    # Hypothyroidism  - Continue Synthroid 112mcgh QD    # PPx  - DVT ppx: none  - Diet: DASH/TLC  - Dispo: pending TTE    Yamileth Licea PA-C  Invasive cardiology  [id number] 55 y/o Male w/ PMHx of HTN, CAD s/p ONESIMO mLAD 1/2024, Hypothyroidism, Testicular cancer, NHL, melanoma, adrenal insufficiency presented from cardiologist's office d/t concern for STEMI; s/p C 2/23 revealing patent stents. TTE pending    # CAD  - Outpatient EKG revealed REGAN in inferior leads; rushed to cath lab emergently  - Now s/p C 2/23/24 revealing patent stents via RFA s/p angioseal  - Right groin site stable; soft and no hematoma  - Continue ASA 81mg QD, Plavix 75mg QD and Atorvastatin 80mg QHS  - TTE pending to r/o effusion  - D-dimer negative    # HTN  - Normotensive  - Continue Toprol 25mg QD    # Hypothyroidism  - Continue Synthroid 112mcgh QD    # PPx  - DVT ppx: none  - Diet: DASH/TLC  - Dispo: pending TTE    Yamileth Licea PA-C  Invasive cardiology  [phone]

## 2024-02-25 ENCOUNTER — TRANSCRIPTION ENCOUNTER (OUTPATIENT)
Age: 54
End: 2024-02-25

## 2024-02-29 PROBLEM — I25.10 ATHEROSCLEROTIC HEART DISEASE OF NATIVE CORONARY ARTERY WITHOUT ANGINA PECTORIS: Chronic | Status: ACTIVE | Noted: 2024-02-23

## 2024-03-07 ENCOUNTER — APPOINTMENT (OUTPATIENT)
Dept: CARDIOLOGY | Facility: CLINIC | Age: 54
End: 2024-03-07
Payer: COMMERCIAL

## 2024-03-07 VITALS
RESPIRATION RATE: 17 BRPM | BODY MASS INDEX: 18.68 KG/M2 | SYSTOLIC BLOOD PRESSURE: 100 MMHG | OXYGEN SATURATION: 99 % | WEIGHT: 130.5 LBS | DIASTOLIC BLOOD PRESSURE: 74 MMHG | HEIGHT: 70 IN | TEMPERATURE: 97.9 F | HEART RATE: 106 BPM

## 2024-03-07 DIAGNOSIS — C62.90 MALIGNANT NEOPLASM OF UNSPECIFIED TESTIS, UNSPECIFIED WHETHER DESCENDED OR UNDESCENDED: ICD-10-CM

## 2024-03-07 DIAGNOSIS — R00.0 TACHYCARDIA, UNSPECIFIED: ICD-10-CM

## 2024-03-07 DIAGNOSIS — M25.50 PAIN IN UNSPECIFIED JOINT: ICD-10-CM

## 2024-03-07 DIAGNOSIS — I25.10 ATHEROSCLEROTIC HEART DISEASE OF NATIVE CORONARY ARTERY W/OUT ANGINA PECTORIS: ICD-10-CM

## 2024-03-07 DIAGNOSIS — I10 ESSENTIAL (PRIMARY) HYPERTENSION: ICD-10-CM

## 2024-03-07 DIAGNOSIS — R59.1 GENERALIZED ENLARGED LYMPH NODES: ICD-10-CM

## 2024-03-07 DIAGNOSIS — E03.9 HYPOTHYROIDISM, UNSPECIFIED: ICD-10-CM

## 2024-03-07 DIAGNOSIS — I73.00 RAYNAUD'S SYNDROME W/OUT GANGRENE: ICD-10-CM

## 2024-03-07 DIAGNOSIS — R53.83 OTHER FATIGUE: ICD-10-CM

## 2024-03-07 DIAGNOSIS — E78.00 PURE HYPERCHOLESTEROLEMIA, UNSPECIFIED: ICD-10-CM

## 2024-03-07 DIAGNOSIS — R94.31 ABNORMAL ELECTROCARDIOGRAM [ECG] [EKG]: ICD-10-CM

## 2024-03-07 PROCEDURE — 93000 ELECTROCARDIOGRAM COMPLETE: CPT

## 2024-03-07 PROCEDURE — 99214 OFFICE O/P EST MOD 30 MIN: CPT

## 2024-03-07 NOTE — PHYSICAL EXAM
[Well Developed] : well developed [Well Nourished] : well nourished [Normal Conjunctiva] : normal conjunctiva [No Acute Distress] : no acute distress [No Carotid Bruit] : no carotid bruit [Normal Venous Pressure] : normal venous pressure [Normal S1, S2] : normal S1, S2 [No Murmur] : no murmur [No Rub] : no rub [No Gallop] : no gallop [Good Air Entry] : good air entry [Clear Lung Fields] : clear lung fields [No Respiratory Distress] : no respiratory distress  [Non Tender] : non-tender [Soft] : abdomen soft [Normal Bowel Sounds] : normal bowel sounds [No Masses/organomegaly] : no masses/organomegaly [Normal Gait] : normal gait [No Edema] : no edema [No Cyanosis] : no cyanosis [No Clubbing] : no clubbing [No Varicosities] : no varicosities [No Rash] : no rash [Moves all extremities] : moves all extremities [No Skin Lesions] : no skin lesions [No Focal Deficits] : no focal deficits [Normal Speech] : normal speech [Alert and Oriented] : alert and oriented [Normal memory] : normal memory

## 2024-03-07 NOTE — HISTORY OF PRESENT ILLNESS
[FreeTextEntry1] : This is a 55 y/o male with a pmhx of testicular ca s/p orchiectomy, HTN, HLD, melanoma, low serum cortisol level presents today for hospital follow up. Patient had abnormal STP 1/18/24 and was sent for cardiac cath 1/19/24 with successful PCI with ONESIMO to mid LAD. He was advised for DAPT. He was seen in office on 02/23, found with abnormal EKG, sent to [facility], found to have REGAN in inferior leads. s/p LHC 2/23 patent LAD stent TTE 2/24 - no pericardial effusion, LV EF preserved. His BP meds was stopped during last office visit as well as his BP was 80/58. He is not taking Toprol currently.   Since stent placement, denies any palpitations, or chest pain. Patient does report of EASLEY when climbing up stairs.  Denies chest pain, palpitations, diaphoresis, vision changes, HA, dizziness, syncope, cough, wheezing,  edema, fever, chills, infection.

## 2024-03-13 LAB
ANION GAP SERPL CALC-SCNC: 14 MMOL/L
BASOPHILS # BLD AUTO: 0.09 K/UL
BASOPHILS NFR BLD AUTO: 0.8 %
BUN SERPL-MCNC: 15 MG/DL
CALCIUM SERPL-MCNC: 10.1 MG/DL
CHLORIDE SERPL-SCNC: 99 MMOL/L
CO2 SERPL-SCNC: 24 MMOL/L
CREAT SERPL-MCNC: 0.9 MG/DL
EGFR: 101 ML/MIN/1.73M2
EOSINOPHIL # BLD AUTO: 0.56 K/UL
EOSINOPHIL NFR BLD AUTO: 5.2 %
GLUCOSE SERPL-MCNC: 82 MG/DL
HCT VFR BLD CALC: 40.9 %
HGB BLD-MCNC: 13.3 G/DL
IMM GRANULOCYTES NFR BLD AUTO: 0.1 %
LYMPHOCYTES # BLD AUTO: 4.13 K/UL
LYMPHOCYTES NFR BLD AUTO: 38.7 %
MAN DIFF?: NORMAL
MCHC RBC-ENTMCNC: 28.8 PG
MCHC RBC-ENTMCNC: 32.5 GM/DL
MCV RBC AUTO: 88.5 FL
MONOCYTES # BLD AUTO: 0.69 K/UL
MONOCYTES NFR BLD AUTO: 6.5 %
NEUTROPHILS # BLD AUTO: 5.19 K/UL
NEUTROPHILS NFR BLD AUTO: 48.7 %
PLATELET # BLD AUTO: 308 K/UL
POTASSIUM SERPL-SCNC: 4.1 MMOL/L
RBC # BLD: 4.62 M/UL
RBC # FLD: 14.1 %
SODIUM SERPL-SCNC: 137 MMOL/L
WBC # FLD AUTO: 10.67 K/UL

## 2024-03-14 ENCOUNTER — APPOINTMENT (OUTPATIENT)
Dept: PULMONOLOGY | Facility: CLINIC | Age: 54
End: 2024-03-14

## 2024-03-24 ENCOUNTER — INPATIENT (INPATIENT)
Facility: HOSPITAL | Age: 54
LOS: 3 days | Discharge: HOME | DRG: 392 | End: 2024-03-28
Admitting: SURGERY
Payer: COMMERCIAL

## 2024-03-24 VITALS
SYSTOLIC BLOOD PRESSURE: 91 MMHG | WEIGHT: 139.99 LBS | HEIGHT: 71 IN | RESPIRATION RATE: 16 BRPM | HEART RATE: 128 BPM | TEMPERATURE: 98 F | DIASTOLIC BLOOD PRESSURE: 61 MMHG | OXYGEN SATURATION: 100 %

## 2024-03-24 DIAGNOSIS — Z98.890 OTHER SPECIFIED POSTPROCEDURAL STATES: Chronic | ICD-10-CM

## 2024-03-24 DIAGNOSIS — C43.4 MALIGNANT MELANOMA OF SCALP AND NECK: Chronic | ICD-10-CM

## 2024-03-24 DIAGNOSIS — Z90.79 ACQUIRED ABSENCE OF OTHER GENITAL ORGAN(S): Chronic | ICD-10-CM

## 2024-03-24 DIAGNOSIS — I89.9 NONINFECTIVE DISORDER OF LYMPHATIC VESSELS AND LYMPH NODES, UNSPECIFIED: Chronic | ICD-10-CM

## 2024-03-24 DIAGNOSIS — Z98.89 OTHER SPECIFIED POSTPROCEDURAL STATES: Chronic | ICD-10-CM

## 2024-03-24 LAB
ALBUMIN SERPL ELPH-MCNC: 4 G/DL — SIGNIFICANT CHANGE UP (ref 3.3–5)
ALP SERPL-CCNC: 68 U/L — SIGNIFICANT CHANGE UP (ref 40–120)
ALT FLD-CCNC: 13 U/L — SIGNIFICANT CHANGE UP (ref 10–45)
ANION GAP SERPL CALC-SCNC: 16 MMOL/L — SIGNIFICANT CHANGE UP (ref 5–17)
APTT BLD: 35.5 SEC — SIGNIFICANT CHANGE UP (ref 24.5–35.6)
AST SERPL-CCNC: 20 U/L — SIGNIFICANT CHANGE UP (ref 10–40)
BASE EXCESS BLDV CALC-SCNC: -1.1 MMOL/L — SIGNIFICANT CHANGE UP (ref -2–3)
BASOPHILS # BLD AUTO: 0.06 K/UL — SIGNIFICANT CHANGE UP (ref 0–0.2)
BASOPHILS NFR BLD AUTO: 0.4 % — SIGNIFICANT CHANGE UP (ref 0–2)
BILIRUB SERPL-MCNC: 0.9 MG/DL — SIGNIFICANT CHANGE UP (ref 0.2–1.2)
BUN SERPL-MCNC: 15 MG/DL — SIGNIFICANT CHANGE UP (ref 7–23)
CA-I SERPL-SCNC: 1.29 MMOL/L — SIGNIFICANT CHANGE UP (ref 1.15–1.33)
CALCIUM SERPL-MCNC: 10.4 MG/DL — SIGNIFICANT CHANGE UP (ref 8.4–10.5)
CHLORIDE BLDV-SCNC: 99 MMOL/L — SIGNIFICANT CHANGE UP (ref 96–108)
CHLORIDE SERPL-SCNC: 100 MMOL/L — SIGNIFICANT CHANGE UP (ref 96–108)
CO2 BLDV-SCNC: 26 MMOL/L — SIGNIFICANT CHANGE UP (ref 22–26)
CO2 SERPL-SCNC: 21 MMOL/L — LOW (ref 22–31)
CREAT SERPL-MCNC: 1.12 MG/DL — SIGNIFICANT CHANGE UP (ref 0.5–1.3)
EGFR: 78 ML/MIN/1.73M2 — SIGNIFICANT CHANGE UP
EOSINOPHIL # BLD AUTO: 0.37 K/UL — SIGNIFICANT CHANGE UP (ref 0–0.5)
EOSINOPHIL NFR BLD AUTO: 2.6 % — SIGNIFICANT CHANGE UP (ref 0–6)
FLUAV AG NPH QL: SIGNIFICANT CHANGE UP
FLUBV AG NPH QL: SIGNIFICANT CHANGE UP
GAS PNL BLDV: 133 MMOL/L — LOW (ref 136–145)
GAS PNL BLDV: SIGNIFICANT CHANGE UP
GAS PNL BLDV: SIGNIFICANT CHANGE UP
GLUCOSE BLDV-MCNC: 89 MG/DL — SIGNIFICANT CHANGE UP (ref 70–99)
GLUCOSE SERPL-MCNC: 88 MG/DL — SIGNIFICANT CHANGE UP (ref 70–99)
HCO3 BLDV-SCNC: 25 MMOL/L — SIGNIFICANT CHANGE UP (ref 22–29)
HCT VFR BLD CALC: 42.9 % — SIGNIFICANT CHANGE UP (ref 39–50)
HCT VFR BLDA CALC: 45 % — SIGNIFICANT CHANGE UP (ref 39–51)
HGB BLD CALC-MCNC: 14.9 G/DL — SIGNIFICANT CHANGE UP (ref 12.6–17.4)
HGB BLD-MCNC: 14 G/DL — SIGNIFICANT CHANGE UP (ref 13–17)
IMM GRANULOCYTES NFR BLD AUTO: 0.4 % — SIGNIFICANT CHANGE UP (ref 0–0.9)
INR BLD: 1.26 RATIO — HIGH (ref 0.85–1.18)
LACTATE BLDV-MCNC: 1.6 MMOL/L — SIGNIFICANT CHANGE UP (ref 0.5–2)
LYMPHOCYTES # BLD AUTO: 28.9 % — SIGNIFICANT CHANGE UP (ref 13–44)
LYMPHOCYTES # BLD AUTO: 4.09 K/UL — HIGH (ref 1–3.3)
MCHC RBC-ENTMCNC: 27.8 PG — SIGNIFICANT CHANGE UP (ref 27–34)
MCHC RBC-ENTMCNC: 32.6 GM/DL — SIGNIFICANT CHANGE UP (ref 32–36)
MCV RBC AUTO: 85.1 FL — SIGNIFICANT CHANGE UP (ref 80–100)
MONOCYTES # BLD AUTO: 1.03 K/UL — HIGH (ref 0–0.9)
MONOCYTES NFR BLD AUTO: 7.3 % — SIGNIFICANT CHANGE UP (ref 2–14)
NEUTROPHILS # BLD AUTO: 8.57 K/UL — HIGH (ref 1.8–7.4)
NEUTROPHILS NFR BLD AUTO: 60.4 % — SIGNIFICANT CHANGE UP (ref 43–77)
NRBC # BLD: 0 /100 WBCS — SIGNIFICANT CHANGE UP (ref 0–0)
PCO2 BLDV: 45 MMHG — SIGNIFICANT CHANGE UP (ref 42–55)
PH BLDV: 7.35 — SIGNIFICANT CHANGE UP (ref 7.32–7.43)
PLATELET # BLD AUTO: 268 K/UL — SIGNIFICANT CHANGE UP (ref 150–400)
PO2 BLDV: 18 MMHG — LOW (ref 25–45)
POTASSIUM BLDV-SCNC: 3.5 MMOL/L — SIGNIFICANT CHANGE UP (ref 3.5–5.1)
POTASSIUM SERPL-MCNC: 3.5 MMOL/L — SIGNIFICANT CHANGE UP (ref 3.5–5.3)
POTASSIUM SERPL-SCNC: 3.5 MMOL/L — SIGNIFICANT CHANGE UP (ref 3.5–5.3)
PROT SERPL-MCNC: 6.9 G/DL — SIGNIFICANT CHANGE UP (ref 6–8.3)
PROTHROM AB SERPL-ACNC: 13.8 SEC — HIGH (ref 9.5–13)
RBC # BLD: 5.04 M/UL — SIGNIFICANT CHANGE UP (ref 4.2–5.8)
RBC # FLD: 13.6 % — SIGNIFICANT CHANGE UP (ref 10.3–14.5)
RSV RNA NPH QL NAA+NON-PROBE: SIGNIFICANT CHANGE UP
SAO2 % BLDV: 21.8 % — LOW (ref 67–88)
SARS-COV-2 RNA SPEC QL NAA+PROBE: SIGNIFICANT CHANGE UP
SODIUM SERPL-SCNC: 137 MMOL/L — SIGNIFICANT CHANGE UP (ref 135–145)
WBC # BLD: 14.17 K/UL — HIGH (ref 3.8–10.5)
WBC # FLD AUTO: 14.17 K/UL — HIGH (ref 3.8–10.5)

## 2024-03-24 PROCEDURE — 71045 X-RAY EXAM CHEST 1 VIEW: CPT | Mod: 26

## 2024-03-24 PROCEDURE — 99285 EMERGENCY DEPT VISIT HI MDM: CPT

## 2024-03-24 PROCEDURE — 74177 CT ABD & PELVIS W/CONTRAST: CPT | Mod: 26,MC

## 2024-03-24 RX ORDER — SODIUM CHLORIDE 9 MG/ML
500 INJECTION INTRAMUSCULAR; INTRAVENOUS; SUBCUTANEOUS
Refills: 0 | Status: DISCONTINUED | OUTPATIENT
Start: 2024-03-24 | End: 2024-03-25

## 2024-03-24 RX ORDER — ONDANSETRON 8 MG/1
4 TABLET, FILM COATED ORAL ONCE
Refills: 0 | Status: COMPLETED | OUTPATIENT
Start: 2024-03-24 | End: 2024-03-24

## 2024-03-24 RX ORDER — ACETAMINOPHEN 500 MG
1000 TABLET ORAL ONCE
Refills: 0 | Status: COMPLETED | OUTPATIENT
Start: 2024-03-24 | End: 2024-03-24

## 2024-03-24 RX ORDER — PIPERACILLIN AND TAZOBACTAM 4; .5 G/20ML; G/20ML
3.38 INJECTION, POWDER, LYOPHILIZED, FOR SOLUTION INTRAVENOUS ONCE
Refills: 0 | Status: COMPLETED | OUTPATIENT
Start: 2024-03-24 | End: 2024-03-24

## 2024-03-24 RX ORDER — MORPHINE SULFATE 50 MG/1
2 CAPSULE, EXTENDED RELEASE ORAL ONCE
Refills: 0 | Status: DISCONTINUED | OUTPATIENT
Start: 2024-03-24 | End: 2024-03-24

## 2024-03-24 RX ORDER — SODIUM CHLORIDE 9 MG/ML
500 INJECTION INTRAMUSCULAR; INTRAVENOUS; SUBCUTANEOUS ONCE
Refills: 0 | Status: COMPLETED | OUTPATIENT
Start: 2024-03-24 | End: 2024-03-24

## 2024-03-24 RX ORDER — HYDROMORPHONE HYDROCHLORIDE 2 MG/ML
0.5 INJECTION INTRAMUSCULAR; INTRAVENOUS; SUBCUTANEOUS ONCE
Refills: 0 | Status: DISCONTINUED | OUTPATIENT
Start: 2024-03-24 | End: 2024-03-24

## 2024-03-24 RX ADMIN — PIPERACILLIN AND TAZOBACTAM 200 GRAM(S): 4; .5 INJECTION, POWDER, LYOPHILIZED, FOR SOLUTION INTRAVENOUS at 22:08

## 2024-03-24 RX ADMIN — Medication 400 MILLIGRAM(S): at 21:21

## 2024-03-24 RX ADMIN — ONDANSETRON 4 MILLIGRAM(S): 8 TABLET, FILM COATED ORAL at 21:21

## 2024-03-24 RX ADMIN — MORPHINE SULFATE 2 MILLIGRAM(S): 50 CAPSULE, EXTENDED RELEASE ORAL at 22:28

## 2024-03-24 NOTE — ED PROVIDER NOTE - PROGRESS NOTE DETAILS
Discussed CT results with surgery, TBA, recommending cipro/flagyl to be started.  -Margarito Dunn PGY-2

## 2024-03-24 NOTE — ED PROVIDER NOTE - CLINICAL SUMMARY MEDICAL DECISION MAKING FREE TEXT BOX
RGUJRAL 53 y/o M with a h/o testicular cancer and NHL in remission, melanoma s/p resection and immunotherapy (no longer receiving/completed?) with resulting hypothyroidism, adrenal insufficiency, HTN and chronic pain, CAD s/p ONESIMO to mLAD 1/2024 BIB wife today for abdominal pain, n/v and fever noted in the ED. Pt denies any chest pain, palp, sob. Pt states he was initially constipated and after taking senna had diarrhea. Denies any dysuria, or back pain.   On exam, Patient is awake,alert,oriented x 3. Patient is well appearing and in no acute distress. Dry MM. Patient's chest is clear to ausculation, +s1s2. Abdomen is soft nd/+ diffuse ttp +BS. Extremity with no swelling or calf tenderness.   Check labs, xray chest, CT/UA and viral panel to eval for colitis, SBO. Empiric antibiotics, IVF and monitor.

## 2024-03-24 NOTE — ED ADULT NURSE NOTE - NS ED PATIENT SAFETY CONCERN
[facility] ED Behavioral Health Alert Team:  [phone]     Referral: Legal Hold     Intervention: Patient referral to [facility] inpatient  facillity     Legal Hold Initiated: Date: 1/11/24 Time: 2130     Patient’s Insurance Listed on Face Sheet: [place] [employer]     Referrals sent to: Vivien BARTLETT, [facility]     Referrals faxed by Larry Smalls RN, SOWMYA     This referral contains the following information:  Face sheet __x__  Page 1 and Page 2 of Legal Hold _x___  Alert Team Assessment/Psych Assessment x____  Head to toe physical exam ____  Urine Drug Screen ____  Blood Alcohol __x__  Vital signs __x__  Pregnancy test when applicable ___  Medications list ____  Covid screening ____     Plan: Patient will transfer to mental health facility once acceptance is obtained            No

## 2024-03-24 NOTE — ED PROVIDER NOTE - OBJECTIVE STATEMENT
50-year-old male history of non-Hodgkin's lymphoma, testicular cancer, prior colon resection, presents with 1 day of lower abdominal pain, nausea, vomiting.  Patient took his senna yesterday for chronic constipation with bowel movements, then took 2 Imodium today when he developed symptoms.  Denies fever or is at home, chest pain, shortness of breath, cough, URI symptoms, dysuria, hematuria.  States not currently on any chemotherapy or immunotherapy.

## 2024-03-24 NOTE — ED PROVIDER NOTE - PHYSICAL EXAMINATION
GEN: NAD. A&Ox3. Non-toxic appearing.  HEENT: normocephalic, atraumatic, EOMI, PERRL, no scleral icterus, no conjuntival pallor, moist MM  CARDIAC: tachycardic, regular rhythm, S1, S2, no murmur. Radial pulses present and symmetric b/l  PULM: CTA B/L no wheeze, rhonchi, rales.   ABD: diffuse lower abd pain ttp,   MSK: Moving all extremities, no edema. 5/5 strength and full ROM in all extremities.     NEURO: no focal neurological deficits, CN 2-12 grossly intact  SKIN: prior surgical scars c/d/i

## 2024-03-24 NOTE — ED CLERICAL - BED REQUESTED
Psychotherapy Provided: Individual Psychotherapy 50 minutes     Length of time in session: 50 minutes, follow up in 2 week    Goals addressed in session: Goal 1 and Goal 2     Pain:      none    0    Current suicide risk : Low     D:Amrik spoke Today about his feelings re: his recent vacation and two pending vacations  Once again, he shared at lengthhis desire for his paramour to begin therapy with a new therapist so that she can grow as he believes that he has done  A:  Geovani Vasquez again spoke for the duration of the session  He continues to present as less anxious but spends much of his energy focused on his paramour and his desire for her to change, altough he is more aware and insightful of his lack of control over this   P:  Upcoming sessions will be used to support him with the above  Behavioral Health Treatment Plan [facility]: Diagnosis and Treatment Plan explained to Paula Her relates understanding diagnosis and is agreeable to Treatment Plan   Yes
25-Mar-2024 02:37
36

## 2024-03-24 NOTE — ED ADULT NURSE NOTE - OBJECTIVE STATEMENT
53 y/o male, A&O x4, PMH CAD, Raynaud's, NHL, Colitis, GERD, BPH, HTN, Testicular CA BIBEMS c/o lower abdominal pain for 1 day. Pt endorses taking senna last night which is normal for him to take every 10 days, BM was normal until he had uncontrolled diarrhea. Pt states this morning take 2 Imodium w/ lower abdominal cramping and n/v. Pt affect is calm and appropriate, spontaneous unlabored breathing, abdomen soft nondistended tender to BLQ, skin pale clean dry and intact. Pt denies chest pain, SOB/difficulty breathing, fever/chills, HA, lightheadedness/dizziness, numbness/tingling. Pt placed on continuous cardiac monitoring, safety and comfort measures maintained.

## 2024-03-25 DIAGNOSIS — K52.9 NONINFECTIVE GASTROENTERITIS AND COLITIS, UNSPECIFIED: ICD-10-CM

## 2024-03-25 LAB
ANION GAP SERPL CALC-SCNC: 11 MMOL/L — SIGNIFICANT CHANGE UP (ref 5–17)
BUN SERPL-MCNC: 10 MG/DL — SIGNIFICANT CHANGE UP (ref 7–23)
CALCIUM SERPL-MCNC: 8.6 MG/DL — SIGNIFICANT CHANGE UP (ref 8.4–10.5)
CHLORIDE SERPL-SCNC: 102 MMOL/L — SIGNIFICANT CHANGE UP (ref 96–108)
CO2 SERPL-SCNC: 21 MMOL/L — LOW (ref 22–31)
CREAT SERPL-MCNC: 1.12 MG/DL — SIGNIFICANT CHANGE UP (ref 0.5–1.3)
EGFR: 78 ML/MIN/1.73M2 — SIGNIFICANT CHANGE UP
GLUCOSE BLDC GLUCOMTR-MCNC: 61 MG/DL — LOW (ref 70–99)
GLUCOSE BLDC GLUCOMTR-MCNC: 65 MG/DL — LOW (ref 70–99)
GLUCOSE SERPL-MCNC: 113 MG/DL — HIGH (ref 70–99)
HCT VFR BLD CALC: 34.3 % — LOW (ref 39–50)
HGB BLD-MCNC: 11.2 G/DL — LOW (ref 13–17)
MAGNESIUM SERPL-MCNC: 1.4 MG/DL — LOW (ref 1.6–2.6)
MCHC RBC-ENTMCNC: 27.7 PG — SIGNIFICANT CHANGE UP (ref 27–34)
MCHC RBC-ENTMCNC: 32.7 GM/DL — SIGNIFICANT CHANGE UP (ref 32–36)
MCV RBC AUTO: 84.9 FL — SIGNIFICANT CHANGE UP (ref 80–100)
NRBC # BLD: 0 /100 WBCS — SIGNIFICANT CHANGE UP (ref 0–0)
PHOSPHATE SERPL-MCNC: 3.5 MG/DL — SIGNIFICANT CHANGE UP (ref 2.5–4.5)
PLATELET # BLD AUTO: 178 K/UL — SIGNIFICANT CHANGE UP (ref 150–400)
POTASSIUM SERPL-MCNC: 3.8 MMOL/L — SIGNIFICANT CHANGE UP (ref 3.5–5.3)
POTASSIUM SERPL-SCNC: 3.8 MMOL/L — SIGNIFICANT CHANGE UP (ref 3.5–5.3)
RBC # BLD: 4.04 M/UL — LOW (ref 4.2–5.8)
RBC # FLD: 13.6 % — SIGNIFICANT CHANGE UP (ref 10.3–14.5)
SODIUM SERPL-SCNC: 134 MMOL/L — LOW (ref 135–145)
WBC # BLD: 8.53 K/UL — SIGNIFICANT CHANGE UP (ref 3.8–10.5)
WBC # FLD AUTO: 8.53 K/UL — SIGNIFICANT CHANGE UP (ref 3.8–10.5)

## 2024-03-25 PROCEDURE — 99222 1ST HOSP IP/OBS MODERATE 55: CPT

## 2024-03-25 RX ORDER — SODIUM CHLORIDE 9 MG/ML
1000 INJECTION, SOLUTION INTRAVENOUS
Refills: 0 | Status: DISCONTINUED | OUTPATIENT
Start: 2024-03-25 | End: 2024-03-27

## 2024-03-25 RX ORDER — SODIUM CHLORIDE 9 MG/ML
1000 INJECTION, SOLUTION INTRAVENOUS
Refills: 0 | Status: DISCONTINUED | OUTPATIENT
Start: 2024-03-25 | End: 2024-03-25

## 2024-03-25 RX ORDER — ACETAMINOPHEN 500 MG
1000 TABLET ORAL EVERY 6 HOURS
Refills: 0 | Status: DISCONTINUED | OUTPATIENT
Start: 2024-03-25 | End: 2024-03-25

## 2024-03-25 RX ORDER — MORPHINE SULFATE 50 MG/1
2 CAPSULE, EXTENDED RELEASE ORAL EVERY 6 HOURS
Refills: 0 | Status: DISCONTINUED | OUTPATIENT
Start: 2024-03-25 | End: 2024-03-25

## 2024-03-25 RX ORDER — METRONIDAZOLE 500 MG
500 TABLET ORAL EVERY 8 HOURS
Refills: 0 | Status: DISCONTINUED | OUTPATIENT
Start: 2024-03-25 | End: 2024-03-28

## 2024-03-25 RX ORDER — CLOPIDOGREL BISULFATE 75 MG/1
75 TABLET, FILM COATED ORAL DAILY
Refills: 0 | Status: DISCONTINUED | OUTPATIENT
Start: 2024-03-25 | End: 2024-03-28

## 2024-03-25 RX ORDER — METRONIDAZOLE 500 MG
500 TABLET ORAL ONCE
Refills: 0 | Status: COMPLETED | OUTPATIENT
Start: 2024-03-25 | End: 2024-03-25

## 2024-03-25 RX ORDER — CIPROFLOXACIN LACTATE 400MG/40ML
400 VIAL (ML) INTRAVENOUS ONCE
Refills: 0 | Status: COMPLETED | OUTPATIENT
Start: 2024-03-25 | End: 2024-03-25

## 2024-03-25 RX ORDER — MORPHINE SULFATE 50 MG/1
4 CAPSULE, EXTENDED RELEASE ORAL EVERY 6 HOURS
Refills: 0 | Status: DISCONTINUED | OUTPATIENT
Start: 2024-03-25 | End: 2024-03-25

## 2024-03-25 RX ORDER — METHYLNALTREXONE BROMIDE 12 MG/.6ML
12 INJECTION, SOLUTION SUBCUTANEOUS ONCE
Refills: 0 | Status: COMPLETED | OUTPATIENT
Start: 2024-03-25 | End: 2024-03-25

## 2024-03-25 RX ORDER — OXYCODONE HYDROCHLORIDE 5 MG/1
10 TABLET ORAL EVERY 4 HOURS
Refills: 0 | Status: DISCONTINUED | OUTPATIENT
Start: 2024-03-25 | End: 2024-03-28

## 2024-03-25 RX ORDER — ACETAMINOPHEN 500 MG
1000 TABLET ORAL ONCE
Refills: 0 | Status: COMPLETED | OUTPATIENT
Start: 2024-03-25 | End: 2024-03-25

## 2024-03-25 RX ORDER — ASPIRIN/CALCIUM CARB/MAGNESIUM 324 MG
81 TABLET ORAL DAILY
Refills: 0 | Status: DISCONTINUED | OUTPATIENT
Start: 2024-03-25 | End: 2024-03-28

## 2024-03-25 RX ORDER — HEPARIN SODIUM 5000 [USP'U]/ML
5000 INJECTION INTRAVENOUS; SUBCUTANEOUS EVERY 8 HOURS
Refills: 0 | Status: DISCONTINUED | OUTPATIENT
Start: 2024-03-25 | End: 2024-03-28

## 2024-03-25 RX ORDER — CIPROFLOXACIN LACTATE 400MG/40ML
400 VIAL (ML) INTRAVENOUS EVERY 12 HOURS
Refills: 0 | Status: DISCONTINUED | OUTPATIENT
Start: 2024-03-25 | End: 2024-03-28

## 2024-03-25 RX ORDER — OXYCODONE HYDROCHLORIDE 5 MG/1
5 TABLET ORAL EVERY 4 HOURS
Refills: 0 | Status: DISCONTINUED | OUTPATIENT
Start: 2024-03-25 | End: 2024-03-26

## 2024-03-25 RX ORDER — SODIUM CHLORIDE 9 MG/ML
1000 INJECTION INTRAMUSCULAR; INTRAVENOUS; SUBCUTANEOUS ONCE
Refills: 0 | Status: COMPLETED | OUTPATIENT
Start: 2024-03-25 | End: 2024-03-25

## 2024-03-25 RX ADMIN — METHYLNALTREXONE BROMIDE 12 MILLIGRAM(S): 12 INJECTION, SOLUTION SUBCUTANEOUS at 17:55

## 2024-03-25 RX ADMIN — Medication 200 MILLIGRAM(S): at 03:12

## 2024-03-25 RX ADMIN — HEPARIN SODIUM 5000 UNIT(S): 5000 INJECTION INTRAVENOUS; SUBCUTANEOUS at 13:23

## 2024-03-25 RX ADMIN — SODIUM CHLORIDE 75 MILLILITER(S): 9 INJECTION INTRAMUSCULAR; INTRAVENOUS; SUBCUTANEOUS at 01:39

## 2024-03-25 RX ADMIN — OXYCODONE HYDROCHLORIDE 10 MILLIGRAM(S): 5 TABLET ORAL at 13:52

## 2024-03-25 RX ADMIN — Medication 200 MILLIGRAM(S): at 19:29

## 2024-03-25 RX ADMIN — HEPARIN SODIUM 5000 UNIT(S): 5000 INJECTION INTRAVENOUS; SUBCUTANEOUS at 21:01

## 2024-03-25 RX ADMIN — SODIUM CHLORIDE 1000 MILLILITER(S): 9 INJECTION INTRAMUSCULAR; INTRAVENOUS; SUBCUTANEOUS at 02:30

## 2024-03-25 RX ADMIN — OXYCODONE HYDROCHLORIDE 10 MILLIGRAM(S): 5 TABLET ORAL at 19:28

## 2024-03-25 RX ADMIN — SODIUM CHLORIDE 100 MILLILITER(S): 9 INJECTION, SOLUTION INTRAVENOUS at 06:37

## 2024-03-25 RX ADMIN — Medication 400 MILLIGRAM(S): at 17:55

## 2024-03-25 RX ADMIN — Medication 1000 MILLIGRAM(S): at 18:10

## 2024-03-25 RX ADMIN — Medication 81 MILLIGRAM(S): at 12:01

## 2024-03-25 RX ADMIN — SODIUM CHLORIDE 500 MILLILITER(S): 9 INJECTION INTRAMUSCULAR; INTRAVENOUS; SUBCUTANEOUS at 00:08

## 2024-03-25 RX ADMIN — Medication 100 MILLIGRAM(S): at 02:30

## 2024-03-25 RX ADMIN — OXYCODONE HYDROCHLORIDE 10 MILLIGRAM(S): 5 TABLET ORAL at 13:22

## 2024-03-25 RX ADMIN — CLOPIDOGREL BISULFATE 75 MILLIGRAM(S): 75 TABLET, FILM COATED ORAL at 12:01

## 2024-03-25 RX ADMIN — HEPARIN SODIUM 5000 UNIT(S): 5000 INJECTION INTRAVENOUS; SUBCUTANEOUS at 05:11

## 2024-03-25 RX ADMIN — SODIUM CHLORIDE 100 MILLILITER(S): 9 INJECTION, SOLUTION INTRAVENOUS at 03:12

## 2024-03-25 RX ADMIN — Medication 100 MILLIGRAM(S): at 09:16

## 2024-03-25 RX ADMIN — Medication 100 MILLIGRAM(S): at 17:56

## 2024-03-25 RX ADMIN — OXYCODONE HYDROCHLORIDE 10 MILLIGRAM(S): 5 TABLET ORAL at 19:58

## 2024-03-25 RX ADMIN — HYDROMORPHONE HYDROCHLORIDE 0.5 MILLIGRAM(S): 2 INJECTION INTRAMUSCULAR; INTRAVENOUS; SUBCUTANEOUS at 00:09

## 2024-03-25 NOTE — H&P ADULT - ATTENDING COMMENTS
Pt is a 54 year old male with a medical history significant for  NHDL (s/p immuno and chemotherapy) and ischemic colitis (s/p colon resection) who  presents to [facility] with abdominal pain. The pain is localized to the pelvis. Pt denies N/V. CT imaging is concerning for stercoral colitis. Pt reports a large BM after CT imaging.    WBC  14.2  INR       1.26  Lactate 1.6    CT abd:  Proctocolitis, potentially stercoral given the fecal retention and distention in the proximal sigmoid colon near the anastomosis. However,   the appendix is also abnormal and located in the deep pelvis, unclear if reactive or primary. Pelvic inflammation without fluid, air or focal collection.    A/p  R/o stercoral colitis  Doubt appendicitis  Start IV abx  Colorectal consult in AM  CT imaging reviewed with radiology    I spent 55 min in the E/M of this patient

## 2024-03-25 NOTE — PATIENT PROFILE ADULT - STATED REASON FOR ADMISSION
Telephone Encounter by Bernardo Howard MD at 03/21/18 08:27 AM     Author:  Bernardo Howard MD Service:  (none) Author Type:  Physician     Filed:  03/21/18 08:28 AM Encounter Date:  3/20/2018 Status:  Signed     :  Bernardo Howadr MD (Physician)            Yes, normal hga1c.[RH1.1M]       Revision History        User Key Date/Time User Provider Type Action    > RH1.1 03/21/18 08:28 AM Bernardo Howard MD Physician Sign    M - Manual             sent by Doctors office

## 2024-03-25 NOTE — CONSULT NOTE ADULT - ATTENDING COMMENTS
Mild lower abd tenderness.  Was on Relistor previously and it was stopped.  It was effective when he was taking it.  Will restart Relistor, clears and miralax tid.  Cont IV abx

## 2024-03-25 NOTE — PATIENT PROFILE ADULT - PATIENT'S PREFERRED PRONOUN
Please notify that should call insurance to determine what is covered by her insurance    She can call us back or send us a message through the portal with the more affordable alternatives   Him/He

## 2024-03-25 NOTE — H&P ADULT - NSHPPHYSICALEXAM_GEN_ALL_CORE
General: In no acute distress  Neuro: AOx3  Psych: Normal affect  Resp: Non-labored breathing, saturating well on RA  Abdomen: Ex lap scar is healing well. Soft, non-distended, tender lower quadrants. No guarding or rebound tenderness  Extremities: No visible deformities

## 2024-03-25 NOTE — H&P ADULT - NSHPLABSRESULTS_GEN_ALL_CORE
14.0   14.17 )-----------( 268      ( 24 Mar 2024 21:32 )             42.9       03-24    137  |  100  |  15  ----------------------------<  88  3.5   |  21<L>  |  1.12    Ca    10.4      24 Mar 2024 21:32    TPro  6.9  /  Alb  4.0  /  TBili  0.9  /  DBili  x   /  AST  20  /  ALT  13  /  AlkPhos  68  03-24              PT/INR - ( 24 Mar 2024 21:32 )   PT: 13.8 sec;   INR: 1.26 ratio         PTT - ( 24 Mar 2024 21:32 )  PTT:35.5 sec

## 2024-03-25 NOTE — PROVIDER CONTACT NOTE (OTHER) - ACTION/TREATMENT ORDERED:
Reassess patients vitals in 30-60 minutes. No further interventions at this time. Continue plan of care.

## 2024-03-25 NOTE — H&P ADULT - HISTORY OF PRESENT ILLNESS
A 54 year old male with PMHx of NHDL (s/p immunotherapy and chemotherapy), ischemic colitis s/p colon resection and transverse to rectum anastomosis with Dr. Rice. Patient had a PCI 8-months ago and currently on ASA and Plavix last does Sunday AM. He presents with 1-day of severe 6/10 abdominal pain, nausea and 3x vomiting of non-bilious content.     In the ED, the patient is afebrile, hypotensive 93/54 with episodes of tachycardia to 128 and saturating well on RA.  A 54 year old male with PMHx of NHDL (s/p immunotherapy and chemotherapy), ischemic colitis s/p colon resection and transverse to rectum anastomosis with Dr. Rice. Patient had a PCI 6 weeks ago, 2 LAD stents and currently on ASA and Plavix last does Saturday AM. He presents with 1-day of severe 6/10 abdominal pain, nausea and 3x vomiting of non-bilious content.     In the ED, the patient is afebrile, hypotensive 93/54 with episodes of tachycardia to 128 and saturating well on RA.

## 2024-03-25 NOTE — PROVIDER CONTACT NOTE (OTHER) - ASSESSMENT
Pt denies SOB, dizziness, lightheadedness, pain, chest pain, palpitations. All other VSS. Pt asymptomatic.

## 2024-03-25 NOTE — H&P ADULT - ASSESSMENT
A 54 year old male with PMHx of NHDL s/p immuno and chemotherapy ischemic colitis s/p colon resection with transverse to rectum anastomosis presents with abdominal pain concerning for sterecocolitis.    Plan:  - Admit patient to surgery care of Dr. Padilla  - Consult colorectal surgery in the AM  - NPO  - IVF  - IV antibiotics (Cipro + Flagyl)  - Pain management   - DVT PPx  - AM Labs    ACS  [id number]

## 2024-03-26 LAB
ANION GAP SERPL CALC-SCNC: 12 MMOL/L — SIGNIFICANT CHANGE UP (ref 5–17)
BUN SERPL-MCNC: 7 MG/DL — SIGNIFICANT CHANGE UP (ref 7–23)
CALCIUM SERPL-MCNC: 8.5 MG/DL — SIGNIFICANT CHANGE UP (ref 8.4–10.5)
CHLORIDE SERPL-SCNC: 102 MMOL/L — SIGNIFICANT CHANGE UP (ref 96–108)
CO2 SERPL-SCNC: 20 MMOL/L — LOW (ref 22–31)
CREAT SERPL-MCNC: 1.05 MG/DL — SIGNIFICANT CHANGE UP (ref 0.5–1.3)
EGFR: 84 ML/MIN/1.73M2 — SIGNIFICANT CHANGE UP
GLUCOSE SERPL-MCNC: 96 MG/DL — SIGNIFICANT CHANGE UP (ref 70–99)
HCT VFR BLD CALC: 32 % — LOW (ref 39–50)
HGB BLD-MCNC: 10 G/DL — LOW (ref 13–17)
MAGNESIUM SERPL-MCNC: 1.4 MG/DL — LOW (ref 1.6–2.6)
MCHC RBC-ENTMCNC: 27.3 PG — SIGNIFICANT CHANGE UP (ref 27–34)
MCHC RBC-ENTMCNC: 31.3 GM/DL — LOW (ref 32–36)
MCV RBC AUTO: 87.4 FL — SIGNIFICANT CHANGE UP (ref 80–100)
NRBC # BLD: 0 /100 WBCS — SIGNIFICANT CHANGE UP (ref 0–0)
PHOSPHATE SERPL-MCNC: 3.1 MG/DL — SIGNIFICANT CHANGE UP (ref 2.5–4.5)
PLATELET # BLD AUTO: 164 K/UL — SIGNIFICANT CHANGE UP (ref 150–400)
POTASSIUM SERPL-MCNC: 4 MMOL/L — SIGNIFICANT CHANGE UP (ref 3.5–5.3)
POTASSIUM SERPL-SCNC: 4 MMOL/L — SIGNIFICANT CHANGE UP (ref 3.5–5.3)
RBC # BLD: 3.66 M/UL — LOW (ref 4.2–5.8)
RBC # FLD: 13.7 % — SIGNIFICANT CHANGE UP (ref 10.3–14.5)
SODIUM SERPL-SCNC: 134 MMOL/L — LOW (ref 135–145)
WBC # BLD: 5.72 K/UL — SIGNIFICANT CHANGE UP (ref 3.8–10.5)
WBC # FLD AUTO: 5.72 K/UL — SIGNIFICANT CHANGE UP (ref 3.8–10.5)

## 2024-03-26 PROCEDURE — 99221 1ST HOSP IP/OBS SF/LOW 40: CPT

## 2024-03-26 RX ORDER — METHYLNALTREXONE BROMIDE 12 MG/.6ML
12 INJECTION, SOLUTION SUBCUTANEOUS EVERY OTHER DAY
Refills: 0 | Status: DISCONTINUED | OUTPATIENT
Start: 2024-03-27 | End: 2024-03-28

## 2024-03-26 RX ORDER — LEVOTHYROXINE SODIUM 125 MCG
112 TABLET ORAL DAILY
Refills: 0 | Status: DISCONTINUED | OUTPATIENT
Start: 2024-03-26 | End: 2024-03-28

## 2024-03-26 RX ORDER — OXYCODONE HYDROCHLORIDE 5 MG/1
10 TABLET ORAL EVERY 12 HOURS
Refills: 0 | Status: DISCONTINUED | OUTPATIENT
Start: 2024-03-26 | End: 2024-03-28

## 2024-03-26 RX ORDER — POLYETHYLENE GLYCOL 3350 17 G/17G
17 POWDER, FOR SOLUTION ORAL ONCE
Refills: 0 | Status: COMPLETED | OUTPATIENT
Start: 2024-03-26 | End: 2024-03-26

## 2024-03-26 RX ORDER — METOPROLOL TARTRATE 50 MG
12.5 TABLET ORAL EVERY 12 HOURS
Refills: 0 | Status: DISCONTINUED | OUTPATIENT
Start: 2024-03-26 | End: 2024-03-27

## 2024-03-26 RX ORDER — PANTOPRAZOLE SODIUM 20 MG/1
40 TABLET, DELAYED RELEASE ORAL
Refills: 0 | Status: DISCONTINUED | OUTPATIENT
Start: 2024-03-26 | End: 2024-03-28

## 2024-03-26 RX ORDER — METHYLNALTREXONE BROMIDE 12 MG/.6ML
12 INJECTION, SOLUTION SUBCUTANEOUS ONCE
Refills: 0 | Status: COMPLETED | OUTPATIENT
Start: 2024-03-26 | End: 2024-03-26

## 2024-03-26 RX ORDER — POLYETHYLENE GLYCOL 3350 17 G/17G
17 POWDER, FOR SOLUTION ORAL
Refills: 0 | Status: DISCONTINUED | OUTPATIENT
Start: 2024-03-26 | End: 2024-03-28

## 2024-03-26 RX ORDER — MAGNESIUM SULFATE 500 MG/ML
2 VIAL (ML) INJECTION ONCE
Refills: 0 | Status: COMPLETED | OUTPATIENT
Start: 2024-03-26 | End: 2024-03-26

## 2024-03-26 RX ADMIN — Medication 200 MILLIGRAM(S): at 08:34

## 2024-03-26 RX ADMIN — OXYCODONE HYDROCHLORIDE 10 MILLIGRAM(S): 5 TABLET ORAL at 18:30

## 2024-03-26 RX ADMIN — OXYCODONE HYDROCHLORIDE 10 MILLIGRAM(S): 5 TABLET ORAL at 22:20

## 2024-03-26 RX ADMIN — Medication 100 MILLIGRAM(S): at 01:09

## 2024-03-26 RX ADMIN — Medication 100 MILLIGRAM(S): at 18:31

## 2024-03-26 RX ADMIN — OXYCODONE HYDROCHLORIDE 10 MILLIGRAM(S): 5 TABLET ORAL at 07:32

## 2024-03-26 RX ADMIN — METHYLNALTREXONE BROMIDE 12 MILLIGRAM(S): 12 INJECTION, SOLUTION SUBCUTANEOUS at 08:40

## 2024-03-26 RX ADMIN — HEPARIN SODIUM 5000 UNIT(S): 5000 INJECTION INTRAVENOUS; SUBCUTANEOUS at 13:09

## 2024-03-26 RX ADMIN — Medication 100 MILLIGRAM(S): at 08:34

## 2024-03-26 RX ADMIN — Medication 200 MILLIGRAM(S): at 20:44

## 2024-03-26 RX ADMIN — CLOPIDOGREL BISULFATE 75 MILLIGRAM(S): 75 TABLET, FILM COATED ORAL at 08:33

## 2024-03-26 RX ADMIN — HEPARIN SODIUM 5000 UNIT(S): 5000 INJECTION INTRAVENOUS; SUBCUTANEOUS at 21:08

## 2024-03-26 RX ADMIN — OXYCODONE HYDROCHLORIDE 10 MILLIGRAM(S): 5 TABLET ORAL at 22:50

## 2024-03-26 RX ADMIN — Medication 81 MILLIGRAM(S): at 08:33

## 2024-03-26 RX ADMIN — POLYETHYLENE GLYCOL 3350 17 GRAM(S): 17 POWDER, FOR SOLUTION ORAL at 21:08

## 2024-03-26 RX ADMIN — Medication 25 GRAM(S): at 11:42

## 2024-03-26 RX ADMIN — OXYCODONE HYDROCHLORIDE 10 MILLIGRAM(S): 5 TABLET ORAL at 13:45

## 2024-03-26 RX ADMIN — OXYCODONE HYDROCHLORIDE 10 MILLIGRAM(S): 5 TABLET ORAL at 08:53

## 2024-03-26 RX ADMIN — OXYCODONE HYDROCHLORIDE 10 MILLIGRAM(S): 5 TABLET ORAL at 19:00

## 2024-03-26 RX ADMIN — OXYCODONE HYDROCHLORIDE 10 MILLIGRAM(S): 5 TABLET ORAL at 13:09

## 2024-03-26 RX ADMIN — POLYETHYLENE GLYCOL 3350 17 GRAM(S): 17 POWDER, FOR SOLUTION ORAL at 11:36

## 2024-03-26 RX ADMIN — Medication 112 MICROGRAM(S): at 08:33

## 2024-03-26 RX ADMIN — HEPARIN SODIUM 5000 UNIT(S): 5000 INJECTION INTRAVENOUS; SUBCUTANEOUS at 05:16

## 2024-03-26 RX ADMIN — POLYETHYLENE GLYCOL 3350 17 GRAM(S): 17 POWDER, FOR SOLUTION ORAL at 13:09

## 2024-03-26 NOTE — CONSULT NOTE ADULT - SUBJECTIVE AND OBJECTIVE BOX
General Surgery Consult  Consulting surgical team: [facility]  Consulting attending: Dwayne  Patient seen and examined: 03-25-24 @ 15:13      HPI:  A 54 year old male with PMHx of NHDL (s/p immunotherapy and chemotherapy), ischemic colitis s/p colon resection and transverse to rectum anastomosis with Dr. Rice. Patient had a PCI 6 weeks ago, 2 LAD stents and currently on ASA and Plavix last dose Saturday AM. He presents with 1-day of severe 6/10 abdominal pain, nausea and 3x vomiting of non-bilious content. CT imaging with concern for stercoral colitis. Patoent admitted to [facility] overnight, accepted by Dr. Rice as this is his patient. Patient with persistent abdominal pain, denies N/V.     PAST MEDICAL HISTORY:  Testicular Cancer    Colitis    S/P Orchiectomy    S/P Lymph Node Biopsy    Headache, Migraine    [place]    No pertinent past medical history    HTN (hypertension)    NHL (non-Hodgkin's lymphoma)    GERD (gastroesophageal reflux disease)    Colitis    BPH (benign prostatic hyperplasia)    Osteoarthritis    History of bone marrow transplant    Hypertriglyceridemia    Malignant melanoma of scalp    First degree heart block    Hypothyroidism    History of chemotherapy    History of Raynaud's syndrome    CAD (coronary artery disease)        PAST SURGICAL HISTORY:  No significant past surgical history    History of orchiectomy    S/P colon resection    Lymph node disorder    Melanoma of scalp or neck    History of nasal septoplasty    History of infusaport central venous catheter insertion    History of infusaport central venous catheter removal        ALLERGIES:  No Known Allergies      MEDICATIONS:  acetaminophen   IVPB .. 1000 milliGRAM(s) IV Intermittent once PRN  aspirin  chewable 81 milliGRAM(s) Oral daily  ciprofloxacin   IVPB 400 milliGRAM(s) IV Intermittent every 12 hours  clopidogrel Tablet 75 milliGRAM(s) Oral daily  dextrose 5% + sodium chloride 0.45% 1000 milliLiter(s) IV Continuous <Continuous>  heparin   Injectable 5000 Unit(s) SubCutaneous every 8 hours  methylnaltrexone Injectable 12 milliGRAM(s) SubCutaneous once  metroNIDAZOLE  IVPB 500 milliGRAM(s) IV Intermittent every 8 hours  oxyCODONE    IR 5 milliGRAM(s) Oral every 4 hours PRN  oxyCODONE    IR 10 milliGRAM(s) Oral every 4 hours PRN      VITALS & I/Os:  Vital Signs Last 24 Hrs  T(C): 37.5 (25 Mar 2024 12:11), Max: 38.4 (24 Mar 2024 21:15)  T(F): 99.5 (25 Mar 2024 12:11), Max: 101.2 (24 Mar 2024 21:15)  HR: 97 (25 Mar 2024 12:11) (66 - 128)  BP: 91/52 (25 Mar 2024 12:11) (90/60 - 108/65)  BP(mean): 69 (25 Mar 2024 02:38) (65 - 77)  RR: 18 (25 Mar 2024 12:11) (16 - 20)  SpO2: 97% (25 Mar 2024 12:11) (96% - 100%)    Parameters below as of 25 Mar 2024 12:11  Patient On (Oxygen Delivery Method): room air        I&O's Summary    25 Mar 2024 07:01  -  25 Mar 2024 15:13  --------------------------------------------------------  IN: 100 mL / OUT: 400 mL / NET: -300 mL        PHYSICAL EXAM:  General: In no acute distress  Neuro: AOx3  Abdomen: Ex lap scar is healing well. Soft, non-distended, tender lower quadrants. No guarding or rebound tenderness  Extremities: No visible deformities    LABS:                        11.2   8.53  )-----------( 178      ( 25 Mar 2024 15:05 )             34.3     03-24    137  |  100  |  15  ----------------------------<  88  3.5   |  21<L>  |  1.12    Ca    10.4      24 Mar 2024 21:32    TPro  6.9  /  Alb  4.0  /  TBili  0.9  /  DBili  x   /  AST  20  /  ALT  13  /  AlkPhos  68  03-24    Lactate:  03-24 @ 21:12  1.6    PT/INR - ( 24 Mar 2024 21:32 )   PT: 13.8 sec;   INR: 1.26 ratio         PTT - ( 24 Mar 2024 21:32 )  PTT:35.5 sec          Urinalysis Basic - ( 24 Mar 2024 21:32 )    Color: x / Appearance: x / SG: x / pH: x  Gluc: 88 mg/dL / Ketone: x  / Bili: x / Urobili: x   Blood: x / Protein: x / Nitrite: x   Leuk Esterase: x / RBC: x / WBC x   Sq Epi: x / Non Sq Epi: x / Bacteria: x        IMAGING:      ASSESSMENT:      PLAN:
Chief Complaint:  Patient is a 54y old  Male who presents with a chief complaint of Abdominal Pain (26 Mar 2024 02:38)    HPI:  A 54 year old male with PMHx of NHDL (s/p immunotherapy and chemotherapy), ischemic colitis s/p colon resection and transverse to rectum anastomosis with Dr. Rice. Patient had a PCI 6 weeks ago, 2 LAD stents and currently on ASA and Plavix last does Saturday AM. He presents with 1-day of severe 6/10 abdominal pain, nausea and 3x vomiting of non-bilious content.     In the ED, the patient is afebrile, hypotensive 93/54 with episodes of tachycardia to 128 and saturating well on RA.  (25 Mar 2024 02:20)      Current Pain Score: 7/10    Current out- patient pain regimen: OxyContin 10 mg Q 12 hours    Out Patient Pain Management provider: Amos Rosen MD/ VALERIA Rod    [facility] Prescription Monitoring Program: Reference #[id number]    PAST MEDICAL & SURGICAL HISTORY:  Testicular Cancer  1994, chemotherapy      Headache, Migraine      HTN (hypertension)      NHL (non-Hodgkin's lymphoma)  1999, Radiation to left neck region      GERD (gastroesophageal reflux disease)      Colitis  surgery 1996      BPH (benign prostatic hyperplasia)      Osteoarthritis  degenerative disc L3-4      History of bone marrow transplant      Hypertriglyceridemia      Malignant melanoma of scalp  RSX 08/18  R neck mass dsx/ LN dsx 10/19/18      Hypothyroidism      History of chemotherapy      History of Raynaud's syndrome      CAD (coronary artery disease)      History of orchiectomy  left 1994      S/P colon resection  1996      Lymph node disorder  s/p abdominal lymph node dissection 1994, 1996      Melanoma of scalp or neck  excision 8/18  s/p excision right neck mass & LN dissx      History of nasal septoplasty      History of infusaport central venous catheter insertion      History of infusaport central venous catheter removal        FAMILY HISTORY:  Hypertension (Father)      SOCIAL HISTORY:  Tobacco Use: denies  Alcohol Use: denies  Recreational Marijuana: denies  Illicit Drug Use: denies    Opioid Risk Tool (ORT-OUD) Score: low      Allergies    No Known Allergies    Intolerances        REVIEW OF SYSTEMS:  CONSTITUTIONAL: No fever, weight loss, fatigue, falls  NEURO: No headaches, memory loss, tremors, dizziness or blurred vision  RESP: No shortness of breath, cough  CV: No chest pain, palpitations  GI: + abdominal pain; no nausea, vomiting, diarrhea; + constipation   : No urinary incontinence/retention, dysuria  MSK: + multi joint pains; No neck or back pain; no upper or lower motor strength weakness   SKIN: No itching, burning, rashes   PSYCHIATRIC: No depression, anxiety, mood swings, or difficulty sleeping      MEDICATIONS  (STANDING):  aspirin  chewable 81 milliGRAM(s) Oral daily  ciprofloxacin   IVPB 400 milliGRAM(s) IV Intermittent every 12 hours  clopidogrel Tablet 75 milliGRAM(s) Oral daily  dextrose 5% + sodium chloride 0.45% 1000 milliLiter(s) (100 mL/Hr) IV Continuous <Continuous>  heparin   Injectable 5000 Unit(s) SubCutaneous every 8 hours  levothyroxine 112 MICROGram(s) Oral daily  metoprolol tartrate 12.5 milliGRAM(s) Oral every 12 hours  metroNIDAZOLE  IVPB 500 milliGRAM(s) IV Intermittent every 8 hours  oxyCODONE  ER Tablet 10 milliGRAM(s) Oral every 12 hours  pantoprazole    Tablet 40 milliGRAM(s) Oral before breakfast  polyethylene glycol 3350 17 Gram(s) Oral <User Schedule>    MEDICATIONS  (PRN):  oxyCODONE    IR 10 milliGRAM(s) Oral every 4 hours PRN Severe Pain (7 - 10)      PHYSICAL EXAM  GENERAL: seen at bedside; NAD; cachectic, well groomed; no signs of toxicity  HEENT: head atraumatic, normocephalic;anicteric; speech clear and fluent  NEURO: A + O X 3; good concentration; Cranial Nerves II- XII intact; SILT  GI: abdomen soft, non distended; + bowel sounds; no flatus: no BM; tolerating PO fluids  :  voiding  EXTREMITIES/ PV: DOLAN; 2+ peripheral pulses; no cyanosis, edema or clubbing  MUSCULOSKELETAL: motor strength 5/5 B/L LE; independent ambulator, OOB in room and hallway  SKIN: no rashes, lesions    PSYCH: affect normal; good eye contact; no signs of depression or anxiety  Vital Signs:  T(C): 37.1 (03-26-24 @ 16:34)  HR: 79 (03-26-24 @ 16:34)  BP: 102/59 (03-26-24 @ 16:34)  RR: 18 (03-26-24 @ 16:34)  SpO2: 98% (03-26-24 @ 16:34)    Pertinent labs/radiology:                          10.0   5.72  )-----------( 164      ( 26 Mar 2024 06:50 )             32.0   03-26    134<L>  |  102  |  7   ----------------------------<  96  4.0   |  20<L>  |  1.05    Ca    8.5      26 Mar 2024 06:50  Phos  3.1     03-26  Mg     1.4     03-26    TPro  6.9  /  Alb  4.0  /  TBili  0.9  /  DBili  x   /  AST  20  /  ALT  13  /  AlkPhos  68  03-24    from: CT Abdomen and Pelvis w/ IV Cont (03.24.24 @ 21:49)   IMPRESSION:  Proctocolitis, potentially stercoral given the fecal retention and   distention in the proximal sigmoid colon near the anastomosis. However,   the appendix is also abnormal and located in the deep pelvis, unclear if   reactive or primary. Pelvic inflammation without fluid, air or focal   collection.

## 2024-03-26 NOTE — PHYSICAL THERAPY INITIAL EVALUATION ADULT - GENERAL OBSERVATIONS, REHAB EVAL
Pt received semi-supine in bed in NAD, +IV. BG=65 (pt states his BG is usually low in the morning, pt has no symtpoms of low BG).

## 2024-03-26 NOTE — PHYSICAL THERAPY INITIAL EVALUATION ADULT - ADDITIONAL COMMENTS
Pt states he lives with his wife in a pvt house with no steps to enter and flight of stairs to the bedrooms. Pt states he was independent with all functional mobility prior to admission with cane. Pt works, drives.

## 2024-03-26 NOTE — CONSULT NOTE ADULT - ASSESSMENT
A 54 year old male with PMHx of NHDL (s/p immunotherapy and chemotherapy), ischemic colitis s/p colon resection and transverse to rectum anastomosis with Dr. Rice. Patient had a PCI 6 weeks ago, 2 LAD stents and currently on ASA and Plavix last does Saturday AM. He presents with 1-day of severe 6/10 abdominal pain, nausea and 3x vomiting of non-bilious content.     Current out- patient pain regimen: OxyContin 10 mg Q 12 hours  Out Patient Pain Management provider: Amos Rosen MD/ VALERIA Rod    Pt with chronic joint pain and acute abdominal pain.    Restart OxyContin 10 mg Q 12 hours.  Continue Oxy IR 10 mg Q 4 hours PRN.  Discontinue Oxy IR 5 mg- not utilizing.    Monitor for sedation and respiratory depression.  OOB per surgery team.  Bowel regimen per surgery team.    Follow up with Dr Rosen after discharge for continued pain management in the community.  Discharge with a narcan rescue kit (naloxone 4 mg/ 0.1 ml nasal spray- 1 spray Q 2-3 minutes alternating between nostrils).      Minutes spent on total encounter: 45 minutes      Chronic Pain Service  [phone]
A 54 year old male with PMHx of NHDL s/p immuno and chemotherapy ischemic colitis s/p colon resection with transverse to rectum anastomosis presents with abdominal pain concerning for sterecocolitis.    Plan:  - Admit patient to surgery care of Dr. Rice  - Relistor    - NPO  - IVF  - IV antibiotics (Cipro + Flagyl)  - Pain management ( called)   - DVT PPx  - AM Labs    ACS  [id number]

## 2024-03-26 NOTE — PROGRESS NOTE ADULT - SUBJECTIVE AND OBJECTIVE BOX
Green Team Surgery Daily Progress Note    Subjective:   Patient seen at bedside this AM. Reports feeling well, without complaints. Denies chest pain, SOB. Tolerating diet without N/V.     24h Events:   - Overnight, no acute events    Objective:  Vital Signs  T(C): 36.8 (03-26 @ 00:27), Max: 38 (03-25 @ 16:47)  HR: 78 (03-26 @ 00:27) (66 - 100)  BP: 92/50 (03-25 @ 21:12) (90/56 - 104/53)  RR: 18 (03-26 @ 00:27) (18 - 18)  SpO2: 99% (03-26 @ 00:27) (96% - 99%)  03-25-24 @ 07:01  -  03-26-24 @ 02:38  --------------------------------------------------------  IN:  Total IN: 0 mL    OUT:    Voided (mL): 800 mL  Total OUT: 800 mL    Total NET: -800 mL          Physical Exam:  GEN: resting in bed comfortably in NAD  RESP: no increased WOB  ABD:   EXTR: warm, well-perfused without gross deformities  NEURO: AAOx4    Labs:                        11.2   8.53  )-----------( 178      ( 25 Mar 2024 15:05 )             34.3   03-25    134<L>  |  102  |  10  ----------------------------<  113<H>  3.8   |  21<L>  |  1.12    Ca    8.6      25 Mar 2024 15:04  Phos  3.5     03-25  Mg     1.4     03-25    TPro  6.9  /  Alb  4.0  /  TBili  0.9  /  DBili  x   /  AST  20  /  ALT  13  /  AlkPhos  68  03-24    CAPILLARY BLOOD GLUCOSE      POCT Blood Glucose.: 65 mg/dL (25 Mar 2024 06:25)  POCT Blood Glucose.: 61 mg/dL (25 Mar 2024 06:24)      Medications:   MEDICATIONS  (STANDING):  aspirin  chewable 81 milliGRAM(s) Oral daily  ciprofloxacin   IVPB 400 milliGRAM(s) IV Intermittent every 12 hours  clopidogrel Tablet 75 milliGRAM(s) Oral daily  dextrose 5% + sodium chloride 0.45% 1000 milliLiter(s) (100 mL/Hr) IV Continuous <Continuous>  heparin   Injectable 5000 Unit(s) SubCutaneous every 8 hours  metroNIDAZOLE  IVPB 500 milliGRAM(s) IV Intermittent every 8 hours    MEDICATIONS  (PRN):  oxyCODONE    IR 5 milliGRAM(s) Oral every 4 hours PRN Moderate Pain (4 - 6)  oxyCODONE    IR 10 milliGRAM(s) Oral every 4 hours PRN Severe Pain (7 - 10)      Imaging:

## 2024-03-26 NOTE — PROGRESS NOTE ADULT - ASSESSMENT
A 54 year old male with PMHx of NHDL s/p immuno and chemotherapy ischemic colitis s/p colon resection with transverse to rectum anastomosis presents with abdominal pain concerning for sterecocolitis.    Plan:  - NPO/IVF   - IV antibiotics (Cipro + Flagyl)  - Pain management  - DVT PPx    [facility]  [id number]   A 54 year old male with PMHx of NHDL s/p immuno and chemotherapy ischemic colitis s/p colon resection with transverse to rectum anastomosis presents with abdominal pain concerning for stercoral colitis.    Plan:  - CLD today  - IV antibiotics (Cipro + Flagyl)  - Pain management, Chronic pain consult placed  -Miralax TID and Relistor 12 mg daily for constipation   - DVT PPx    ACS  [id number]

## 2024-03-26 NOTE — PHYSICAL THERAPY INITIAL EVALUATION ADULT - PERTINENT HX OF CURRENT PROBLEM, REHAB EVAL
54 year old male with PMHx of NHDL s/p immuno and chemotherapy ischemic colitis s/p colon resection with transverse to rectum anastomosis presents with abdominal pain concerning for sterecocolitis. CTAbdomen: Proctocolitis, potentially stercoral given the fecal retention and distention in the proximal sigmoid colon near the anastomosis. However, the appendix is also abnormal and located in the deep pelvis, unclear if reactive or primary. Pelvic inflammation without fluid, air or focal collection. Clinical correlation is recommended. CTHead: No acute intracranial hemorrhage, mass effect, or shift of the midline structures. XrayChest: Clear lungs.

## 2024-03-27 ENCOUNTER — TRANSCRIPTION ENCOUNTER (OUTPATIENT)
Age: 54
End: 2024-03-27

## 2024-03-27 LAB
ANION GAP SERPL CALC-SCNC: 11 MMOL/L — SIGNIFICANT CHANGE UP (ref 5–17)
BUN SERPL-MCNC: 4 MG/DL — LOW (ref 7–23)
CALCIUM SERPL-MCNC: 9 MG/DL — SIGNIFICANT CHANGE UP (ref 8.4–10.5)
CHLORIDE SERPL-SCNC: 102 MMOL/L — SIGNIFICANT CHANGE UP (ref 96–108)
CO2 SERPL-SCNC: 21 MMOL/L — LOW (ref 22–31)
CREAT SERPL-MCNC: 0.84 MG/DL — SIGNIFICANT CHANGE UP (ref 0.5–1.3)
EGFR: 104 ML/MIN/1.73M2 — SIGNIFICANT CHANGE UP
GLUCOSE SERPL-MCNC: 91 MG/DL — SIGNIFICANT CHANGE UP (ref 70–99)
HCT VFR BLD CALC: 32.6 % — LOW (ref 39–50)
HGB BLD-MCNC: 10.6 G/DL — LOW (ref 13–17)
MAGNESIUM SERPL-MCNC: 1.8 MG/DL — SIGNIFICANT CHANGE UP (ref 1.6–2.6)
MCHC RBC-ENTMCNC: 28.3 PG — SIGNIFICANT CHANGE UP (ref 27–34)
MCHC RBC-ENTMCNC: 32.5 GM/DL — SIGNIFICANT CHANGE UP (ref 32–36)
MCV RBC AUTO: 87.2 FL — SIGNIFICANT CHANGE UP (ref 80–100)
NRBC # BLD: 0 /100 WBCS — SIGNIFICANT CHANGE UP (ref 0–0)
PHOSPHATE SERPL-MCNC: 3 MG/DL — SIGNIFICANT CHANGE UP (ref 2.5–4.5)
PLATELET # BLD AUTO: 147 K/UL — LOW (ref 150–400)
POTASSIUM SERPL-MCNC: 4 MMOL/L — SIGNIFICANT CHANGE UP (ref 3.5–5.3)
POTASSIUM SERPL-SCNC: 4 MMOL/L — SIGNIFICANT CHANGE UP (ref 3.5–5.3)
RBC # BLD: 3.74 M/UL — LOW (ref 4.2–5.8)
RBC # FLD: 13.5 % — SIGNIFICANT CHANGE UP (ref 10.3–14.5)
SODIUM SERPL-SCNC: 134 MMOL/L — LOW (ref 135–145)
WBC # BLD: 4.44 K/UL — SIGNIFICANT CHANGE UP (ref 3.8–10.5)
WBC # FLD AUTO: 4.44 K/UL — SIGNIFICANT CHANGE UP (ref 3.8–10.5)

## 2024-03-27 RX ORDER — METRONIDAZOLE 500 MG
1 TABLET ORAL
Qty: 21 | Refills: 0
Start: 2024-03-27 | End: 2024-04-02

## 2024-03-27 RX ORDER — NALOXONE HYDROCHLORIDE 4 MG/.1ML
4 SPRAY NASAL
Qty: 1 | Refills: 0
Start: 2024-03-27

## 2024-03-27 RX ORDER — METOPROLOL TARTRATE 50 MG
12.5 TABLET ORAL EVERY 12 HOURS
Refills: 0 | Status: DISCONTINUED | OUTPATIENT
Start: 2024-03-27 | End: 2024-03-28

## 2024-03-27 RX ORDER — CIPROFLOXACIN LACTATE 400MG/40ML
1 VIAL (ML) INTRAVENOUS
Qty: 14 | Refills: 0
Start: 2024-03-27 | End: 2024-04-02

## 2024-03-27 RX ORDER — MAGNESIUM SULFATE 500 MG/ML
1 VIAL (ML) INJECTION ONCE
Refills: 0 | Status: COMPLETED | OUTPATIENT
Start: 2024-03-27 | End: 2024-03-27

## 2024-03-27 RX ORDER — POLYETHYLENE GLYCOL 3350 17 G/17G
17 POWDER, FOR SOLUTION ORAL
Qty: 0 | Refills: 0 | DISCHARGE
Start: 2024-03-27

## 2024-03-27 RX ORDER — OXYCODONE HYDROCHLORIDE 5 MG/1
1 TABLET ORAL
Qty: 14 | Refills: 0
Start: 2024-03-27 | End: 2024-04-02

## 2024-03-27 RX ORDER — TRAMADOL HYDROCHLORIDE 50 MG/1
1 TABLET ORAL
Qty: 0 | Refills: 0 | DISCHARGE

## 2024-03-27 RX ORDER — PANTOPRAZOLE SODIUM 20 MG/1
1 TABLET, DELAYED RELEASE ORAL
Qty: 0 | Refills: 0 | DISCHARGE
Start: 2024-03-27

## 2024-03-27 RX ORDER — METHYLNALTREXONE BROMIDE 12 MG/.6ML
12 INJECTION, SOLUTION SUBCUTANEOUS
Qty: 8 | Refills: 0
Start: 2024-03-27 | End: 2024-04-09

## 2024-03-27 RX ORDER — OXYCODONE HYDROCHLORIDE 5 MG/1
1 TABLET ORAL
Refills: 0 | DISCHARGE

## 2024-03-27 RX ORDER — OMEGA-3 ACID ETHYL ESTERS 1 G
1 CAPSULE ORAL
Refills: 0 | DISCHARGE

## 2024-03-27 RX ADMIN — OXYCODONE HYDROCHLORIDE 10 MILLIGRAM(S): 5 TABLET ORAL at 23:16

## 2024-03-27 RX ADMIN — POLYETHYLENE GLYCOL 3350 17 GRAM(S): 17 POWDER, FOR SOLUTION ORAL at 05:02

## 2024-03-27 RX ADMIN — OXYCODONE HYDROCHLORIDE 10 MILLIGRAM(S): 5 TABLET ORAL at 12:30

## 2024-03-27 RX ADMIN — PANTOPRAZOLE SODIUM 40 MILLIGRAM(S): 20 TABLET, DELAYED RELEASE ORAL at 05:01

## 2024-03-27 RX ADMIN — Medication 100 MILLIGRAM(S): at 17:20

## 2024-03-27 RX ADMIN — OXYCODONE HYDROCHLORIDE 10 MILLIGRAM(S): 5 TABLET ORAL at 11:02

## 2024-03-27 RX ADMIN — POLYETHYLENE GLYCOL 3350 17 GRAM(S): 17 POWDER, FOR SOLUTION ORAL at 13:22

## 2024-03-27 RX ADMIN — Medication 112 MICROGRAM(S): at 05:02

## 2024-03-27 RX ADMIN — Medication 81 MILLIGRAM(S): at 09:06

## 2024-03-27 RX ADMIN — Medication 200 MILLIGRAM(S): at 21:03

## 2024-03-27 RX ADMIN — CLOPIDOGREL BISULFATE 75 MILLIGRAM(S): 75 TABLET, FILM COATED ORAL at 09:06

## 2024-03-27 RX ADMIN — OXYCODONE HYDROCHLORIDE 10 MILLIGRAM(S): 5 TABLET ORAL at 22:46

## 2024-03-27 RX ADMIN — Medication 100 MILLIGRAM(S): at 02:02

## 2024-03-27 RX ADMIN — HEPARIN SODIUM 5000 UNIT(S): 5000 INJECTION INTRAVENOUS; SUBCUTANEOUS at 13:22

## 2024-03-27 RX ADMIN — OXYCODONE HYDROCHLORIDE 10 MILLIGRAM(S): 5 TABLET ORAL at 05:01

## 2024-03-27 RX ADMIN — Medication 100 MILLIGRAM(S): at 09:06

## 2024-03-27 RX ADMIN — POLYETHYLENE GLYCOL 3350 17 GRAM(S): 17 POWDER, FOR SOLUTION ORAL at 21:04

## 2024-03-27 RX ADMIN — HEPARIN SODIUM 5000 UNIT(S): 5000 INJECTION INTRAVENOUS; SUBCUTANEOUS at 05:02

## 2024-03-27 RX ADMIN — Medication 100 GRAM(S): at 11:03

## 2024-03-27 RX ADMIN — Medication 200 MILLIGRAM(S): at 09:06

## 2024-03-27 RX ADMIN — OXYCODONE HYDROCHLORIDE 10 MILLIGRAM(S): 5 TABLET ORAL at 17:13

## 2024-03-27 RX ADMIN — OXYCODONE HYDROCHLORIDE 10 MILLIGRAM(S): 5 TABLET ORAL at 05:31

## 2024-03-27 RX ADMIN — Medication 12.5 MILLIGRAM(S): at 17:13

## 2024-03-27 RX ADMIN — HEPARIN SODIUM 5000 UNIT(S): 5000 INJECTION INTRAVENOUS; SUBCUTANEOUS at 21:04

## 2024-03-27 NOTE — PROGRESS NOTE ADULT - ASSESSMENT
A 54 year old male with PMHx of NHDL s/p immuno and chemotherapy ischemic colitis s/p colon resection with transverse to rectum anastomosis presents with abdominal pain concerning for stercoral colitis.    Plan:  - CLD   - IV antibiotics (Cipro + Flagyl)  - DVT PPx  - methylnaltrexone every other day, miralax TID  -Chronic pain recommendations much appreciated    Green team  [id number]   A 54 year old male with PMHx of NHDL s/p immuno and chemotherapy ischemic colitis s/p colon resection with transverse to rectum anastomosis presents with abdominal pain concerning for stercoral colitis.    Plan:  - regular diet  - IV antibiotics (Cipro + Flagyl) for 1 week  - DVT PPx  - methylnaltrexone every other day, miralax TID  -Chronic pain recommendations much appreciated    Green team  [id number]

## 2024-03-27 NOTE — DISCHARGE NOTE PROVIDER - HOSPITAL COURSE
HPI: 54 year old male with PMHx of NHDL (s/p immunotherapy and chemotherapy), ischemic colitis s/p colon resection and transverse to rectum anastomosis with Dr. Rice. Patient had a PCI 6 weeks ago, 2 LAD stents and currently on ASA and Plavix last does Saturday AM. He presents with 1-day of severe 6/10 abdominal pain, nausea and 3x vomiting of non-bilious content. In the ED, the patient is afebrile, hypotensive 93/54 with episodes of tachycardia to 128 and saturating well on RA.  (25 Mar 2024 02:20)    Pt was admitted under Surgery for further evaluation and management. CT abdomin/pelvis was performed and showed proctocolitis, potentially stercoral given the fecal retention and distention in the proximal sigmoid colon near the anastomosis. However, the appendix is also abnormal and located in the deep pelvis, unclear if reactive or primary. Patient was started on IV antibiotics. Patient was started on Relistor and miralax for constipation. Abdominal pain improved. Diet was advanced as tolerated from clears to LRD.     Patient to go home on oral antibiotics, miralax and needed and Relistor every other day.    Pain management was consulted for chronic joint pain and acute abdominal pain. Current pain regimen: OxyContin 10 mg Q 12 hours. Out Patient Pain Management provider: Amos Rosen MD/ VALERIA Rod. Patient is to follow up with Dr. Rosen after discharge for continued pain management in the community.    PT evaluated patient and patient requires no skilled PT needs.    On the day of discharge, the patient's vitals are stable, pain is controlled, voiding urine, passing gas/stool, tolerating a low fiber diet, and ambulating well. Pt will f/u with Dr. Rice in 1-2 weeks. Pt will f/u with PCP in 1-2 weeks. HPI: 54 year old male with PMHx of NHDL (s/p immunotherapy and chemotherapy), ischemic colitis s/p colon resection and transverse to rectum anastomosis with Dr. Rice. Patient had a PCI 6 weeks ago, 2 LAD stents and currently on ASA and Plavix last does Saturday AM. He presents with 1-day of severe 6/10 abdominal pain, nausea and 3x vomiting of non-bilious content. In the ED, the patient is afebrile, hypotensive 93/54 with episodes of tachycardia to 128 and saturating well on RA.  (25 Mar 2024 02:20)    Pt was admitted under Surgery for further evaluation and management. CT abdomin/pelvis was performed and showed proctocolitis, potentially stercoral given the fecal retention and distention in the proximal sigmoid colon near the anastomosis. However, the appendix is also abnormal and located in the deep pelvis, unclear if reactive or primary. Patient was started on IV antibiotics. Patient was started on Relistor and miralax for constipation. Abdominal pain improved. Diet was advanced as tolerated from clears to LRD.     Patient to go home on oral antibiotics, miralax 3x/day and Movantik every day.    Pain management was consulted for chronic joint pain and acute abdominal pain. Current pain regimen: OxyContin 10 mg Q 12 hours. Out Patient Pain Management provider: Amos Rosen MD/ VALERIA Rod. Patient is to follow up with Dr. Rosen after discharge for continued pain management in the community.    PT evaluated patient and patient requires no skilled PT needs.    On the day of discharge, the patient's vitals are stable, pain is controlled, voiding urine, passing gas/stool, tolerating a low fiber diet, and ambulating well. Pt will f/u with Dr. Rice in 1-2 weeks. Pt will f/u with PCP in 1-2 weeks. HPI: 54 year old male with PMHx of NHDL (s/p immunotherapy and chemotherapy), ischemic colitis s/p colon resection and transverse to rectum anastomosis with Dr. Rice. Patient had a PCI 6 weeks ago, 2 LAD stents and currently on ASA and Plavix last does Saturday AM. He presents with 1-day of severe 6/10 abdominal pain, nausea and 3x vomiting of non-bilious content. In the ED, the patient is afebrile, hypotensive 93/54 with episodes of tachycardia to 128 and saturating well on RA.  (25 Mar 2024 02:20)    Pt was admitted under Surgery for further evaluation and management. CT abdomin/pelvis was performed and showed proctocolitis, potentially stercoral given the fecal retention and distention in the proximal sigmoid colon near the anastomosis. However, the appendix is also abnormal and located in the deep pelvis, unclear if reactive or primary. Patient was started on IV antibiotics. Patient was started on Relistor and miralax for constipation. Abdominal pain improved. Diet was advanced as tolerated from clears to LRD.     Patient to go home on oral antibiotics, miralax 3x/day and Movantik every day.    Pain management was consulted for chronic joint pain and acute abdominal pain. Current pain regimen: OxyContin 10 mg Q 12 hours. Out Patient Pain Management provider: Amos Rosen MD/ VALERIA Rod. Patient is to follow up with Dr. Rosen after discharge for continued pain management in the community.    PT evaluated patient and patient requires no skilled PT needs.    On the day of discharge, the patient's vitals are stable, pain is controlled, voiding urine, passing gas/stool, tolerating a low fiber diet, and ambulating well. Pt will f/u with Dr. Rice in 1-2 weeks. Pt will also f/u with PCP in 1-2 weeks.

## 2024-03-27 NOTE — DISCHARGE NOTE PROVIDER - NSDCMRMEDTOKEN_GEN_ALL_CORE_FT
aspirin 81 mg oral delayed release tablet: 1 tab(s) orally once a day  atorvastatin 40 mg oral tablet: 1 tab(s) orally once a day (at bedtime)  clopidogrel 75 mg oral tablet: 1 tab(s) orally once a day  ergocalciferol 1.25 mg (50,000 intl units) oral capsule: 1 cap(s) orally once a week on wednesday  Fish Oil 1000 mg oral capsule: 1 cap(s) orally once a day  metoprolol succinate 25 mg oral capsule, extended release: 1 cap(s) orally once a day  naloxone 4 mg/0.1 mL nasal spray: 4 milligram(s) intranasally once a day as needed for concern for opioid intoxication - in 1 nostril, can repeat in 5 minutes if poor or partial response; call EMS/medical services  OxyCONTIN 10 mg oral tablet, extended release: 1 tab(s) orally 2 times a day  Synthroid 112 mcg (0.112 mg) oral tablet: 1 tab(s) orally once a day  traMADol 50 mg oral tablet: 1 tab(s) orally once a day as needed   aspirin 81 mg oral delayed release tablet: 1 tab(s) orally once a day  atorvastatin 40 mg oral tablet: 1 tab(s) orally once a day (at bedtime)  clopidogrel 75 mg oral tablet: 1 tab(s) orally once a day  ergocalciferol 1.25 mg (50,000 intl units) oral capsule: 1 cap(s) orally once a week on wednesday  methylnaltrexone 12 mg/0.6 mL subcutaneous solution: 12 milligram(s) subcutaneously every other day  metoprolol succinate 25 mg oral capsule, extended release: 1 cap(s) orally once a day  naloxone 4 mg/0.1 mL nasal spray: 4 milligram(s) intranasally once a day as needed for concern for opioid intoxication - in 1 nostril, can repeat in 5 minutes if poor or partial response; call EMS/medical services  oxyCODONE 10 mg oral tablet, extended release: 1 tab(s) orally every 12 hours as needed for  severe pain MDD: 2  pantoprazole 40 mg oral delayed release tablet: 1 tab(s) orally once a day (before a meal)  polyethylene glycol 3350 oral powder for reconstitution: 17 gram(s) orally  Synthroid 112 mcg (0.112 mg) oral tablet: 1 tab(s) orally once a day   aspirin 81 mg oral delayed release tablet: 1 tab(s) orally once a day  atorvastatin 40 mg oral tablet: 1 tab(s) orally once a day (at bedtime)  Cipro 500 mg oral tablet: 1 tab(s) orally 2 times a day  clopidogrel 75 mg oral tablet: 1 tab(s) orally once a day  ergocalciferol 1.25 mg (50,000 intl units) oral capsule: 1 cap(s) orally once a week on wednesday  methylnaltrexone 12 mg/0.6 mL subcutaneous solution: 12 milligram(s) subcutaneously every other day  metoprolol succinate 25 mg oral capsule, extended release: 1 cap(s) orally once a day  metroNIDAZOLE 500 mg oral tablet: 1 tab(s) orally 3 times a day  naloxone 4 mg/0.1 mL nasal spray: 4 milligram(s) intranasally once a day as needed for concern for opioid intoxication - in 1 nostril, can repeat in 5 minutes if poor or partial response; call EMS/medical services  oxyCODONE 10 mg oral tablet, extended release: 1 tab(s) orally every 12 hours as needed for  severe pain MDD: 2  pantoprazole 40 mg oral delayed release tablet: 1 tab(s) orally once a day (before a meal)  polyethylene glycol 3350 oral powder for reconstitution: 17 gram(s) orally 3 times a day as needed for  constipation Stop if having diarrhea  Synthroid 112 mcg (0.112 mg) oral tablet: 1 tab(s) orally once a day   aspirin 81 mg oral delayed release tablet: 1 tab(s) orally once a day  atorvastatin 40 mg oral tablet: 1 tab(s) orally once a day (at bedtime)  Cipro 500 mg oral tablet: 1 tab(s) orally 2 times a day  clopidogrel 75 mg oral tablet: 1 tab(s) orally once a day  ergocalciferol 1.25 mg (50,000 intl units) oral capsule: 1 cap(s) orally once a week on wednesday  metoprolol succinate 25 mg oral capsule, extended release: 1 cap(s) orally once a day  metroNIDAZOLE 500 mg oral tablet: 1 tab(s) orally 3 times a day  naloxone 4 mg/0.1 mL nasal spray: 4 milligram(s) intranasally once a day as needed for concern for opioid intoxication - in 1 nostril, can repeat in 5 minutes if poor or partial response; call EMS/medical services  oxyCODONE 10 mg oral tablet, extended release: 1 tab(s) orally every 12 hours as needed for  severe pain MDD: 2  pantoprazole 40 mg oral delayed release tablet: 1 tab(s) orally once a day (before a meal)  polyethylene glycol 3350 oral powder for reconstitution: 17 gram(s) orally 3 times a day as needed for  constipation Stop if having diarrhea  Synthroid 112 mcg (0.112 mg) oral tablet: 1 tab(s) orally once a day   aspirin 81 mg oral delayed release tablet: 1 tab(s) orally once a day  atorvastatin 40 mg oral tablet: 1 tab(s) orally once a day (at bedtime)  Cipro 500 mg oral tablet: 1 tab(s) orally 2 times a day  clopidogrel 75 mg oral tablet: 1 tab(s) orally once a day  ergocalciferol 1.25 mg (50,000 intl units) oral capsule: 1 cap(s) orally once a week on wednesday  metoprolol succinate 25 mg oral capsule, extended release: 1 cap(s) orally once a day  metroNIDAZOLE 500 mg oral tablet: 1 tab(s) orally 3 times a day  MiraLax oral powder for reconstitution: 17 gram(s) orally 3 times a day  naloxone 4 mg/0.1 mL nasal spray: 4 milligram(s) intranasally once a day as needed for concern for opioid intoxication - in 1 nostril, can repeat in 5 minutes if poor or partial response; call EMS/medical services  oxyCODONE 10 mg oral tablet, extended release: 1 tab(s) orally every 12 hours as needed for  severe pain MDD: 2  pantoprazole 40 mg oral delayed release tablet: 1 tab(s) orally once a day (before a meal)  polyethylene glycol 3350 oral powder for reconstitution: 17 gram(s) orally 3 times a day as needed for  constipation Stop if having diarrhea  Synthroid 112 mcg (0.112 mg) oral tablet: 1 tab(s) orally once a day   aspirin 81 mg oral delayed release tablet: 1 tab(s) orally once a day  atorvastatin 40 mg oral tablet: 1 tab(s) orally once a day (at bedtime)  Cipro 500 mg oral tablet: 1 tab(s) orally 2 times a day  clopidogrel 75 mg oral tablet: 1 tab(s) orally once a day  ergocalciferol 1.25 mg (50,000 intl units) oral capsule: 1 cap(s) orally once a week on wednesday  metoprolol succinate 25 mg oral capsule, extended release: 1 cap(s) orally once a day  metroNIDAZOLE 500 mg oral tablet: 1 tab(s) orally 3 times a day  MiraLax oral powder for reconstitution: 17 gram(s) orally 3 times a day  Movantik 25 mg oral tablet: 1 tab(s) orally once a day  naloxone 4 mg/0.1 mL nasal spray: 4 milligram(s) intranasally once a day as needed for concern for opioid intoxication - in 1 nostril, can repeat in 5 minutes if poor or partial response; call EMS/medical services  oxyCODONE 10 mg oral tablet, extended release: 1 tab(s) orally every 12 hours as needed for  severe pain MDD: 2  pantoprazole 40 mg oral delayed release tablet: 1 tab(s) orally once a day (before a meal)  polyethylene glycol 3350 oral powder for reconstitution: 17 gram(s) orally 3 times a day as needed for  constipation Stop if having diarrhea  Synthroid 112 mcg (0.112 mg) oral tablet: 1 tab(s) orally once a day

## 2024-03-27 NOTE — PROGRESS NOTE ADULT - SUBJECTIVE AND OBJECTIVE BOX
Green Team Surgery Daily Progress Note    Subjective:   Patient seen at bedside this AM. Reports feeling well, without complaints. Denies chest pain, SOB. Tolerating diet without N/V.     24h Events:   - Overnight, no acute events    Objective:  Vital Signs  T(C): 36.8 (03-27 @ 00:24), Max: 37.7 (03-26 @ 08:49)  HR: 75 (03-27 @ 00:24) (75 - 85)  BP: 102/58 (03-27 @ 00:24) (88/54 - 114/58)  RR: 18 (03-27 @ 00:24) (18 - 18)  SpO2: 97% (03-27 @ 00:24) (94% - 98%)  03-25-24 @ 07:01  -  03-26-24 @ 07:00  --------------------------------------------------------  IN:  Total IN: 0 mL    OUT:    Voided (mL): 800 mL  Total OUT: 800 mL    Total NET: -800 mL      03-26-24 @ 07:01  -  03-27-24 @ 02:43  --------------------------------------------------------  IN:  Total IN: 0 mL    OUT:    Voided (mL): 550 mL  Total OUT: 550 mL    Total NET: -550 mL          Physical Exam:  GEN: resting in bed comfortably in NAD  RESP: no increased WOB  ABD: soft, non-distended,   EXTR: warm, well-perfused without gross deformities  NEURO: AAOx4    Labs:                        10.0   5.72  )-----------( 164      ( 26 Mar 2024 06:50 )             32.0   03-26    134<L>  |  102  |  7   ----------------------------<  96  4.0   |  20<L>  |  1.05    Ca    8.5      26 Mar 2024 06:50  Phos  3.1     03-26  Mg     1.4     03-26      CAPILLARY BLOOD GLUCOSE          Medications:   MEDICATIONS  (STANDING):  aspirin  chewable 81 milliGRAM(s) Oral daily  ciprofloxacin   IVPB 400 milliGRAM(s) IV Intermittent every 12 hours  clopidogrel Tablet 75 milliGRAM(s) Oral daily  dextrose 5% + sodium chloride 0.45% 1000 milliLiter(s) (100 mL/Hr) IV Continuous <Continuous>  heparin   Injectable 5000 Unit(s) SubCutaneous every 8 hours  levothyroxine 112 MICROGram(s) Oral daily  methylnaltrexone Injectable 12 milliGRAM(s) SubCutaneous every other day  metoprolol tartrate 12.5 milliGRAM(s) Oral every 12 hours  metroNIDAZOLE  IVPB 500 milliGRAM(s) IV Intermittent every 8 hours  oxyCODONE  ER Tablet 10 milliGRAM(s) Oral every 12 hours  pantoprazole    Tablet 40 milliGRAM(s) Oral before breakfast  polyethylene glycol 3350 17 Gram(s) Oral <User Schedule>    MEDICATIONS  (PRN):  oxyCODONE    IR 10 milliGRAM(s) Oral every 4 hours PRN Severe Pain (7 - 10)           Green Team Surgery Daily Progress Note    Subjective:   Patient seen at bedside this AM. Reports feeling well, without complaints. Denies chest pain, SOB. Tolerating diet without N/V. Had a large bowel movement overnight    24h Events:   - Overnight, no acute events    Objective:  Vital Signs  T(C): 36.8 (03-27 @ 00:24), Max: 37.7 (03-26 @ 08:49)  HR: 75 (03-27 @ 00:24) (75 - 85)  BP: 102/58 (03-27 @ 00:24) (88/54 - 114/58)  RR: 18 (03-27 @ 00:24) (18 - 18)  SpO2: 97% (03-27 @ 00:24) (94% - 98%)  03-25-24 @ 07:01  -  03-26-24 @ 07:00  --------------------------------------------------------  IN:  Total IN: 0 mL    OUT:    Voided (mL): 800 mL  Total OUT: 800 mL    Total NET: -800 mL      03-26-24 @ 07:01  -  03-27-24 @ 02:43  --------------------------------------------------------  IN:  Total IN: 0 mL    OUT:    Voided (mL): 550 mL  Total OUT: 550 mL    Total NET: -550 mL          Physical Exam:  GEN: resting in bed comfortably in NAD  RESP: no increased WOB  ABD: soft, non-distended,   EXTR: warm, well-perfused without gross deformities  NEURO: AAOx4    Labs:                        10.0   5.72  )-----------( 164      ( 26 Mar 2024 06:50 )             32.0   03-26    134<L>  |  102  |  7   ----------------------------<  96  4.0   |  20<L>  |  1.05    Ca    8.5      26 Mar 2024 06:50  Phos  3.1     03-26  Mg     1.4     03-26      CAPILLARY BLOOD GLUCOSE          Medications:   MEDICATIONS  (STANDING):  aspirin  chewable 81 milliGRAM(s) Oral daily  ciprofloxacin   IVPB 400 milliGRAM(s) IV Intermittent every 12 hours  clopidogrel Tablet 75 milliGRAM(s) Oral daily  dextrose 5% + sodium chloride 0.45% 1000 milliLiter(s) (100 mL/Hr) IV Continuous <Continuous>  heparin   Injectable 5000 Unit(s) SubCutaneous every 8 hours  levothyroxine 112 MICROGram(s) Oral daily  methylnaltrexone Injectable 12 milliGRAM(s) SubCutaneous every other day  metoprolol tartrate 12.5 milliGRAM(s) Oral every 12 hours  metroNIDAZOLE  IVPB 500 milliGRAM(s) IV Intermittent every 8 hours  oxyCODONE  ER Tablet 10 milliGRAM(s) Oral every 12 hours  pantoprazole    Tablet 40 milliGRAM(s) Oral before breakfast  polyethylene glycol 3350 17 Gram(s) Oral <User Schedule>    MEDICATIONS  (PRN):  oxyCODONE    IR 10 milliGRAM(s) Oral every 4 hours PRN Severe Pain (7 - 10)

## 2024-03-27 NOTE — DISCHARGE NOTE PROVIDER - NSDCCPCAREPLAN_GEN_ALL_CORE_FT
PRINCIPAL DISCHARGE DIAGNOSIS  Diagnosis: Proctocolitis  Assessment and Plan of Treatment: MIRALAX- Please take three times a day as needed and stop if having diarrhea.   ANTIBIOTICS- Please finish course as instructed.  RELISTOR- Every other day.  ACTIVITY: No heavy lifting anything more than 10-15lbs or straining. Otherwise, you may return to your usual level of physical activity. If you are taking narcotic pain medication (such as Oxycodone) do NOT drive a car, operate machinery or make important decisions.  DIET: Maintain Low Fiber Diet until your next appointment.  PAIN: A prescription for oxycodone has been sent to the pharmacy. You should only take these for severe pain. For mild or moderate pain, you may take 975mg of tylenol every 6 hours. Do not exceed more than 4G tylenol per day.   NOTIFY YOUR SURGEON IF: You have any bleeding that does not stop, any fever (over 100.4 F) or chills, persistent nausea/vomiting with inability to tolerate food or liquids, persistent diarrhea, or if your pain is not controlled on your discharge pain medications.  FOLLOW-UP:  1. Please call to make a follow-up appointment within one to two weeks of discharge with Dr. Rice.  2. Please follow up with your primary care physician in one week regarding your hospitalization.

## 2024-03-27 NOTE — DISCHARGE NOTE PROVIDER - NSDCADMDATE_GEN_ALL_CORE_FT
25-Mar-2024 02:32 Final Anesthesia Post-op Assessment    Patient: Albin Sherwood  Procedure(s) Performed: COLONOSCOPY  Anesthesia type: Monitor Anesthesia Care    Vitals Value Taken Time   Temp 36.5 °C (97.7 °F) 7/23/2019 11:55 AM   Pulse 54 7/23/2019 12:13 PM   Resp 16 7/23/2019 11:55 AM   /63 7/23/2019 12:09 PM   SpO2 97 % 7/23/2019 12:13 PM   Vitals shown include unvalidated device data.    Last 24 I/O:     Intake/Output Summary (Last 24 hours) at 7/23/2019 1214  Last data filed at 7/23/2019 1129  Gross per 24 hour   Intake 900 ml   Output --   Net 900 ml       POST-OP VITAL SIGNS: stable  LEVEL OF CONSCIOUSNESS: participates in exam, awake, oriented and answers questions appropriately  RESPIRATORY STATUS: spontaneous ventilation  CARDIOVASCULAR: stable  HYDRATION: euvolemic    PAIN MANAGEMENT: adequately controlled  NAUSEA: None  AIRWAY PATENCY: patent  POST-OP ASSESSMENT: no complications, patient tolerated procedure well with no complications, no evidence of recall and sufficiently recovered from acute administration of anesthesia effects and able to participate in evaluation  COMPLICATIONS: none

## 2024-03-27 NOTE — DISCHARGE NOTE PROVIDER - NSDCFUADDAPPT_GEN_ALL_CORE_FT
Please follow up with your primary care provider in 1-2 weeks after discharge.    Please follow up outpatient after discharge:  Patient Pain Management provider: Amos Rosen MD/ VALERIA Rod.   Please follow up with Dr. Rosen after discharge for continued pain management in the community.

## 2024-03-27 NOTE — DISCHARGE NOTE PROVIDER - CARE PROVIDER_API CALL
Boyd Rice  Surgery  [address], [address]  [place] [address]  Phone: [phone]  Fax: [phone]  Follow Up Time: 2 weeks    Amos Rosen  Pain Medicine  [address] [place] [address], [address]  [place] [address]  Phone: [phone]  Fax: [phone]  Follow Up Time: 1 week

## 2024-03-27 NOTE — DISCHARGE NOTE PROVIDER - PROVIDER TOKENS
PROVIDER:[TOKEN:[[id number]],FOLLOWUP:[2 weeks]],PROVIDER:[TOKEN:[[id number]],FOLLOWUP:[1 week]]

## 2024-03-28 ENCOUNTER — TRANSCRIPTION ENCOUNTER (OUTPATIENT)
Age: 54
End: 2024-03-28

## 2024-03-28 VITALS
OXYGEN SATURATION: 95 % | HEART RATE: 73 BPM | SYSTOLIC BLOOD PRESSURE: 118 MMHG | DIASTOLIC BLOOD PRESSURE: 78 MMHG | RESPIRATION RATE: 18 BRPM | TEMPERATURE: 98 F

## 2024-03-28 LAB
ANION GAP SERPL CALC-SCNC: 16 MMOL/L — SIGNIFICANT CHANGE UP (ref 5–17)
BUN SERPL-MCNC: 6 MG/DL — LOW (ref 7–23)
CALCIUM SERPL-MCNC: 9.9 MG/DL — SIGNIFICANT CHANGE UP (ref 8.4–10.5)
CHLORIDE SERPL-SCNC: 100 MMOL/L — SIGNIFICANT CHANGE UP (ref 96–108)
CO2 SERPL-SCNC: 21 MMOL/L — LOW (ref 22–31)
CREAT SERPL-MCNC: 0.83 MG/DL — SIGNIFICANT CHANGE UP (ref 0.5–1.3)
EGFR: 104 ML/MIN/1.73M2 — SIGNIFICANT CHANGE UP
GLUCOSE SERPL-MCNC: 76 MG/DL — SIGNIFICANT CHANGE UP (ref 70–99)
HCT VFR BLD CALC: 37.9 % — LOW (ref 39–50)
HGB BLD-MCNC: 12.5 G/DL — LOW (ref 13–17)
MAGNESIUM SERPL-MCNC: 1.8 MG/DL — SIGNIFICANT CHANGE UP (ref 1.6–2.6)
MCHC RBC-ENTMCNC: 28.2 PG — SIGNIFICANT CHANGE UP (ref 27–34)
MCHC RBC-ENTMCNC: 33 GM/DL — SIGNIFICANT CHANGE UP (ref 32–36)
MCV RBC AUTO: 85.6 FL — SIGNIFICANT CHANGE UP (ref 80–100)
NRBC # BLD: 0 /100 WBCS — SIGNIFICANT CHANGE UP (ref 0–0)
PHOSPHATE SERPL-MCNC: 3.6 MG/DL — SIGNIFICANT CHANGE UP (ref 2.5–4.5)
PLATELET # BLD AUTO: 204 K/UL — SIGNIFICANT CHANGE UP (ref 150–400)
POTASSIUM SERPL-MCNC: 4 MMOL/L — SIGNIFICANT CHANGE UP (ref 3.5–5.3)
POTASSIUM SERPL-SCNC: 4 MMOL/L — SIGNIFICANT CHANGE UP (ref 3.5–5.3)
RBC # BLD: 4.43 M/UL — SIGNIFICANT CHANGE UP (ref 4.2–5.8)
RBC # FLD: 13.2 % — SIGNIFICANT CHANGE UP (ref 10.3–14.5)
SODIUM SERPL-SCNC: 137 MMOL/L — SIGNIFICANT CHANGE UP (ref 135–145)
WBC # BLD: 4.42 K/UL — SIGNIFICANT CHANGE UP (ref 3.8–10.5)
WBC # FLD AUTO: 4.42 K/UL — SIGNIFICANT CHANGE UP (ref 3.8–10.5)

## 2024-03-28 PROCEDURE — 82947 ASSAY GLUCOSE BLOOD QUANT: CPT

## 2024-03-28 PROCEDURE — 80053 COMPREHEN METABOLIC PANEL: CPT

## 2024-03-28 PROCEDURE — 97161 PT EVAL LOW COMPLEX 20 MIN: CPT

## 2024-03-28 PROCEDURE — 84295 ASSAY OF SERUM SODIUM: CPT

## 2024-03-28 PROCEDURE — 82962 GLUCOSE BLOOD TEST: CPT

## 2024-03-28 PROCEDURE — 82330 ASSAY OF CALCIUM: CPT

## 2024-03-28 PROCEDURE — 74177 CT ABD & PELVIS W/CONTRAST: CPT | Mod: MC

## 2024-03-28 PROCEDURE — 85610 PROTHROMBIN TIME: CPT

## 2024-03-28 PROCEDURE — 83605 ASSAY OF LACTIC ACID: CPT

## 2024-03-28 PROCEDURE — 85014 HEMATOCRIT: CPT

## 2024-03-28 PROCEDURE — 84100 ASSAY OF PHOSPHORUS: CPT

## 2024-03-28 PROCEDURE — 85018 HEMOGLOBIN: CPT

## 2024-03-28 PROCEDURE — 84132 ASSAY OF SERUM POTASSIUM: CPT

## 2024-03-28 PROCEDURE — 82435 ASSAY OF BLOOD CHLORIDE: CPT

## 2024-03-28 PROCEDURE — 85027 COMPLETE CBC AUTOMATED: CPT

## 2024-03-28 PROCEDURE — 96375 TX/PRO/DX INJ NEW DRUG ADDON: CPT

## 2024-03-28 PROCEDURE — 99285 EMERGENCY DEPT VISIT HI MDM: CPT | Mod: 25

## 2024-03-28 PROCEDURE — 87040 BLOOD CULTURE FOR BACTERIA: CPT

## 2024-03-28 PROCEDURE — 80048 BASIC METABOLIC PNL TOTAL CA: CPT

## 2024-03-28 PROCEDURE — 85025 COMPLETE CBC W/AUTO DIFF WBC: CPT

## 2024-03-28 PROCEDURE — 36415 COLL VENOUS BLD VENIPUNCTURE: CPT

## 2024-03-28 PROCEDURE — 87637 SARSCOV2&INF A&B&RSV AMP PRB: CPT

## 2024-03-28 PROCEDURE — 83735 ASSAY OF MAGNESIUM: CPT

## 2024-03-28 PROCEDURE — 71045 X-RAY EXAM CHEST 1 VIEW: CPT

## 2024-03-28 PROCEDURE — 85730 THROMBOPLASTIN TIME PARTIAL: CPT

## 2024-03-28 PROCEDURE — 82803 BLOOD GASES ANY COMBINATION: CPT

## 2024-03-28 PROCEDURE — 96374 THER/PROPH/DIAG INJ IV PUSH: CPT

## 2024-03-28 RX ORDER — POLYETHYLENE GLYCOL 3350 17 G/17G
17 POWDER, FOR SOLUTION ORAL
Qty: 0 | Refills: 0 | DISCHARGE

## 2024-03-28 RX ORDER — NALOXEGOL OXALATE 12.5 MG/1
1 TABLET, FILM COATED ORAL
Qty: 30 | Refills: 0
Start: 2024-03-28

## 2024-03-28 RX ORDER — ONDANSETRON 8 MG/1
4 TABLET, FILM COATED ORAL ONCE
Refills: 0 | Status: COMPLETED | OUTPATIENT
Start: 2024-03-28 | End: 2024-03-28

## 2024-03-28 RX ADMIN — OXYCODONE HYDROCHLORIDE 10 MILLIGRAM(S): 5 TABLET ORAL at 10:04

## 2024-03-28 RX ADMIN — Medication 100 MILLIGRAM(S): at 09:56

## 2024-03-28 RX ADMIN — ONDANSETRON 4 MILLIGRAM(S): 8 TABLET, FILM COATED ORAL at 14:23

## 2024-03-28 RX ADMIN — OXYCODONE HYDROCHLORIDE 10 MILLIGRAM(S): 5 TABLET ORAL at 05:11

## 2024-03-28 RX ADMIN — HEPARIN SODIUM 5000 UNIT(S): 5000 INJECTION INTRAVENOUS; SUBCUTANEOUS at 05:11

## 2024-03-28 RX ADMIN — Medication 12.5 MILLIGRAM(S): at 17:53

## 2024-03-28 RX ADMIN — OXYCODONE HYDROCHLORIDE 10 MILLIGRAM(S): 5 TABLET ORAL at 14:20

## 2024-03-28 RX ADMIN — OXYCODONE HYDROCHLORIDE 10 MILLIGRAM(S): 5 TABLET ORAL at 10:34

## 2024-03-28 RX ADMIN — Medication 100 MILLIGRAM(S): at 02:37

## 2024-03-28 RX ADMIN — CLOPIDOGREL BISULFATE 75 MILLIGRAM(S): 75 TABLET, FILM COATED ORAL at 09:56

## 2024-03-28 RX ADMIN — Medication 81 MILLIGRAM(S): at 09:56

## 2024-03-28 RX ADMIN — POLYETHYLENE GLYCOL 3350 17 GRAM(S): 17 POWDER, FOR SOLUTION ORAL at 14:13

## 2024-03-28 RX ADMIN — OXYCODONE HYDROCHLORIDE 10 MILLIGRAM(S): 5 TABLET ORAL at 18:23

## 2024-03-28 RX ADMIN — POLYETHYLENE GLYCOL 3350 17 GRAM(S): 17 POWDER, FOR SOLUTION ORAL at 05:12

## 2024-03-28 RX ADMIN — HEPARIN SODIUM 5000 UNIT(S): 5000 INJECTION INTRAVENOUS; SUBCUTANEOUS at 14:12

## 2024-03-28 RX ADMIN — METHYLNALTREXONE BROMIDE 12 MILLIGRAM(S): 12 INJECTION, SOLUTION SUBCUTANEOUS at 12:16

## 2024-03-28 RX ADMIN — Medication 200 MILLIGRAM(S): at 08:30

## 2024-03-28 RX ADMIN — OXYCODONE HYDROCHLORIDE 10 MILLIGRAM(S): 5 TABLET ORAL at 17:53

## 2024-03-28 RX ADMIN — PANTOPRAZOLE SODIUM 40 MILLIGRAM(S): 20 TABLET, DELAYED RELEASE ORAL at 05:11

## 2024-03-28 RX ADMIN — OXYCODONE HYDROCHLORIDE 10 MILLIGRAM(S): 5 TABLET ORAL at 14:50

## 2024-03-28 RX ADMIN — OXYCODONE HYDROCHLORIDE 10 MILLIGRAM(S): 5 TABLET ORAL at 05:41

## 2024-03-28 RX ADMIN — Medication 12.5 MILLIGRAM(S): at 05:12

## 2024-03-28 RX ADMIN — Medication 112 MICROGRAM(S): at 05:12

## 2024-03-28 NOTE — CHART NOTE - NSCHARTNOTEFT_GEN_A_CORE
A 54 year old male with PMHx of NHDL (s/p immunotherapy and chemotherapy), ischemic colitis s/p colon resection and transverse to rectum anastomosis with Dr. Rice. Patient had a PCI 6 weeks ago, 2 LAD stents and currently on ASA and Plavix last does Saturday AM. He presents with 1-day of severe 6/10 abdominal pain, nausea and 3x vomiting of non-bilious content.     Current out- patient pain regimen: OxyContin 10 mg Q 12 hours  Out Patient Pain Management provider: Amos Rosen MD/ VALERIA Rod    Pt with chronic joint pain and acute abdominal pain.  Pain scores currently 9/10 down to 0/10.    Continue OxyContin 10 mg Q 12 hours.  Continue Oxy IR 10 mg Q 4 hours PRN.  Monitor for sedation and respiratory depression.  OOB per surgery team.  Bowel regimen per surgery team.    Follow up with Dr Rosen after discharge for continued pain management in the community.  Would discharge with Oxy IR 5 mg QID PRN x 3 days.  Discharge with a narcan rescue kit (naloxone 4 mg/ 0.1 ml nasal spray- 1 spray Q 2-3 minutes alternating between nostrils).    Signing off.      [facility]  [phone]

## 2024-03-28 NOTE — DISCHARGE NOTE NURSING/CASE MANAGEMENT/SOCIAL WORK - PATIENT PORTAL LINK FT
You can access the FollowMyHealth Patient Portal offered by [facility] by registering at the following website: [email or web address]. By joining [employer]’s FollowMyHealth portal, you will also be able to view your health information using other applications (apps) compatible with our system.

## 2024-03-28 NOTE — PROGRESS NOTE ADULT - ASSESSMENT
A 54 year old male with PMHx of NHDL s/p immuno and chemotherapy ischemic colitis s/p colon resection with transverse to rectum anastomosis presents with abdominal pain concerning for stercoral colitis.    Plan:  - regular diet  - IV antibiotics (Cipro + Flagyl) for 1 week  - DVT PPx  - methylnaltrexone every other day, miralax TID  -Chronic pain recommendations much appreciated  -Dispo planning    Green team  [id number]

## 2024-03-28 NOTE — PROGRESS NOTE ADULT - SUBJECTIVE AND OBJECTIVE BOX
Green Team Surgery Daily Progress Note    Subjective:   Patient seen at bedside this AM. Reports feeling well, without complaints. Denies chest pain, SOB. Tolerating diet without N/V.     24h Events:   - Overnight, no acute events    Objective:  Vital Signs  T(C): 36.8 (03-28 @ 00:18), Max: 36.9 (03-27 @ 09:14)  HR: 67 (03-28 @ 00:18) (64 - 70)  BP: 96/58 (03-28 @ 00:18) (95/64 - 112/70)  RR: 18 (03-28 @ 00:18) (18 - 18)  SpO2: 98% (03-28 @ 00:18) (98% - 100%)  03-26-24 @ 07:01  -  03-27-24 @ 07:00  --------------------------------------------------------  IN:  Total IN: 0 mL    OUT:    Voided (mL): 1050 mL  Total OUT: 1050 mL    Total NET: -1050 mL          Physical Exam:  GEN: resting in bed comfortably in NAD  RESP: no increased WOB  ABD:   EXTR: warm, well-perfused without gross deformities  NEURO: AAOx4    Labs:                        10.6   4.44  )-----------( 147      ( 27 Mar 2024 08:28 )             32.6   03-27    134<L>  |  102  |  4<L>  ----------------------------<  91  4.0   |  21<L>  |  0.84    Ca    9.0      27 Mar 2024 08:28  Phos  3.0     03-27  Mg     1.8     03-27      CAPILLARY BLOOD GLUCOSE          Medications:   MEDICATIONS  (STANDING):  aspirin  chewable 81 milliGRAM(s) Oral daily  ciprofloxacin   IVPB 400 milliGRAM(s) IV Intermittent every 12 hours  clopidogrel Tablet 75 milliGRAM(s) Oral daily  heparin   Injectable 5000 Unit(s) SubCutaneous every 8 hours  levothyroxine 112 MICROGram(s) Oral daily  methylnaltrexone Injectable 12 milliGRAM(s) SubCutaneous every other day  metoprolol tartrate 12.5 milliGRAM(s) Oral every 12 hours  metroNIDAZOLE  IVPB 500 milliGRAM(s) IV Intermittent every 8 hours  oxyCODONE  ER Tablet 10 milliGRAM(s) Oral every 12 hours  pantoprazole    Tablet 40 milliGRAM(s) Oral before breakfast  polyethylene glycol 3350 17 Gram(s) Oral <User Schedule>    MEDICATIONS  (PRN):  oxyCODONE    IR 10 milliGRAM(s) Oral every 4 hours PRN Severe Pain (7 - 10)      Imaging:       Green Team Surgery Daily Progress Note    Subjective:   Patient seen at bedside this AM. Reports feeling well, without complaints. Denies chest pain, SOB. Tolerating diet without N/V.     24h Events:   - Overnight, no acute events    Objective:  Vital Signs  T(C): 36.8 (03-28 @ 00:18), Max: 36.9 (03-27 @ 09:14)  HR: 67 (03-28 @ 00:18) (64 - 70)  BP: 96/58 (03-28 @ 00:18) (95/64 - 112/70)  RR: 18 (03-28 @ 00:18) (18 - 18)  SpO2: 98% (03-28 @ 00:18) (98% - 100%)  03-26-24 @ 07:01  -  03-27-24 @ 07:00  --------------------------------------------------------  IN:  Total IN: 0 mL    OUT:    Voided (mL): 1050 mL  Total OUT: 1050 mL    Total NET: -1050 mL          Physical Exam:  GEN: resting in bed comfortably in NAD  RESP: no increased WOB  ABD: soft, non-distended, nontender  EXTR: warm, well-perfused without gross deformities  NEURO: AAOx4    Labs:                        10.6   4.44  )-----------( 147      ( 27 Mar 2024 08:28 )             32.6   03-27    134<L>  |  102  |  4<L>  ----------------------------<  91  4.0   |  21<L>  |  0.84    Ca    9.0      27 Mar 2024 08:28  Phos  3.0     03-27  Mg     1.8     03-27      CAPILLARY BLOOD GLUCOSE          Medications:   MEDICATIONS  (STANDING):  aspirin  chewable 81 milliGRAM(s) Oral daily  ciprofloxacin   IVPB 400 milliGRAM(s) IV Intermittent every 12 hours  clopidogrel Tablet 75 milliGRAM(s) Oral daily  heparin   Injectable 5000 Unit(s) SubCutaneous every 8 hours  levothyroxine 112 MICROGram(s) Oral daily  methylnaltrexone Injectable 12 milliGRAM(s) SubCutaneous every other day  metoprolol tartrate 12.5 milliGRAM(s) Oral every 12 hours  metroNIDAZOLE  IVPB 500 milliGRAM(s) IV Intermittent every 8 hours  oxyCODONE  ER Tablet 10 milliGRAM(s) Oral every 12 hours  pantoprazole    Tablet 40 milliGRAM(s) Oral before breakfast  polyethylene glycol 3350 17 Gram(s) Oral <User Schedule>    MEDICATIONS  (PRN):  oxyCODONE    IR 10 milliGRAM(s) Oral every 4 hours PRN Severe Pain (7 - 10)      Imaging:

## 2024-03-28 NOTE — PROGRESS NOTE ADULT - ATTENDING COMMENTS
Mild lower abd tenderness.  Was on Relistor previously and it was stopped.  It was effective when he was taking it.  Will restart Relistor, clears and miralax tid.  Cont IV abx.  Appendix may be secondarily inflamed
Tolerating diet with bowel function.  No abdominal pain or tenderness.  Discharge home when all arrangements made.  Hospital course, medications, and discharge discussed.  All questions answered.
Patient responded to Relistor with large bowel movement.  Pain resolved.  No tenderness on exam today.  Trial of solid diet and if tolerated then discharge home to continue Relistor.  1 more week of Cipro and Flagyl.  Patient informed of the need for Relistor and the risk of recurrent stercoral colitis.  The risk of colon perforation also discussed.  Hospital course and medications reviewed.  All questions answered.

## 2024-03-31 ENCOUNTER — TRANSCRIPTION ENCOUNTER (OUTPATIENT)
Age: 54
End: 2024-03-31

## 2024-04-01 ENCOUNTER — TRANSCRIPTION ENCOUNTER (OUTPATIENT)
Age: 54
End: 2024-04-01

## 2024-04-05 ENCOUNTER — APPOINTMENT (OUTPATIENT)
Dept: AFTER HOURS CARE | Facility: EMERGENCY ROOM | Age: 54
End: 2024-04-05
Payer: COMMERCIAL

## 2024-04-05 ENCOUNTER — TRANSCRIPTION ENCOUNTER (OUTPATIENT)
Age: 54
End: 2024-04-05

## 2024-04-05 DIAGNOSIS — E83.42 HYPOMAGNESEMIA: ICD-10-CM

## 2024-04-05 DIAGNOSIS — R53.1 WEAKNESS: ICD-10-CM

## 2024-04-05 PROCEDURE — 99443: CPT | Mod: 93

## 2024-04-05 NOTE — ASSESSMENT
[FreeTextEntry1] : 54F presents with 24 hours of generalized weakness. DDx: Movantik or Abx side effect, anemia (no report of blood loss), electrolyte derrangment, infection (no fever, on abx for colitis), thyroid of metabolic dysfunction.

## 2024-04-05 NOTE — PLAN
[No new medications perscribed] : Treat in place: No new medications prescribed [FreeTextEntry1] : You were evaluated for general weakness in context of recent hospitalization for colitis. An electronic perscription has been sent to the [facility] to check your blood counts, electrolytes, blood sugar, thyroid level, and to screen for infections such as COVID, influenza, and UTI. If you do not get a callback by tomorrow evening, you may call us to look up status of results. Please seek medical attention if your symptoms worsen or if you have any concerns.

## 2024-04-05 NOTE — HISTORY OF PRESENT ILLNESS
[Home] : at home, [unfilled] , at the time of the visit. [Other Location: e.g. Home (Enter Location, City,State)___] : at [unfilled] [Spouse] : spouse [Verbal consent obtained from patient] : the patient, [unfilled] [FreeTextEntry8] : 54F feels 24 hours of generalized weakness. Able to walk around and perform ADLs. Symptoms startet after taking Movantik to move bowels yesterday after which he had a large BM. No blood or darkness. No BM or diarrhea today. No URI, no . No abdominal pain. Patient was admited to [facility] for colitis end of March and has 7 days of cipro/metro left. Pt had low hemaglobin (10-12.5), CO (21), magnesium (Mg), and glucose (45 while NPO) while in the hospital. No h/o anemia or diabetes.   (RAUL, teleadoc patient not seen on video, audio only)

## 2024-04-12 NOTE — ED ADULT NURSE NOTE - CHIEF COMPLAINT QUOTE
Patient here with back pain acute on chronic   Plan: symptomatic relief.   d/c with return precautions
Possible narcan use, unwitnessed but identified in patient's garbage. Collateral provided by family states "he wanted to try narcan". Patient altered from baseline. EMS report unclear to patient's complaints.

## 2024-04-23 ENCOUNTER — LABORATORY RESULT (OUTPATIENT)
Age: 54
End: 2024-04-23

## 2024-04-23 ENCOUNTER — APPOINTMENT (OUTPATIENT)
Dept: PAIN MANAGEMENT | Facility: CLINIC | Age: 54
End: 2024-04-23
Payer: COMMERCIAL

## 2024-04-23 VITALS
WEIGHT: 124 LBS | DIASTOLIC BLOOD PRESSURE: 80 MMHG | SYSTOLIC BLOOD PRESSURE: 117 MMHG | HEART RATE: 102 BPM | HEIGHT: 70 IN | BODY MASS INDEX: 17.75 KG/M2

## 2024-04-23 PROCEDURE — 99215 OFFICE O/P EST HI 40 MIN: CPT

## 2024-04-23 PROCEDURE — G2211 COMPLEX E/M VISIT ADD ON: CPT

## 2024-04-23 NOTE — ASSESSMENT
[Opioids] : Patient was explained in detail about pain control by using opioids. Patient has signed and fully understands our guidelines for medication and drug screening.  Patient understands the side effects of opioids, including, but not limited to, drug tolerance, dependence, potential for addiction. This class of drugs is habit-forming and MAKEDA regulated. The sedative effects of opioids can be potentiated by taking alcohol or any sleeping pills, along with opioids. The decision to drive is patient's responsibility, as opioids can affect his/her driving ability and ability to concentrate. The long-term place is not clear, however, patient understands that once the pain control optimizes, the goal will be to wean off the opioids. All the issues regarding opioid treatment have been addressed satisfactorily.  [FreeTextEntry1] : Chronic pain syndrome / Lower back pain, acute neck pain, dysphagia, dysphonia, s/p bladder stimulator placement on 9/12. S/p PCI ONESIMO. Pain continues to be chronic and constant and is interfering with QOL and functioning.    Discussed in detail the nature of chronic pain as well as chronic opioid use. Long term use of opioids can potentially lead to hyperalgesia, tolerance and addiction. Additionally, we extensively discussed the risks possible AE when taking opioids alone or in conjunction with benzodiazepines, hypnotics and other sedating medications including respiratory suppression and death.   -Continue Oxycontin 10 mg 2x/day will be increased to 3x/day on next refill.   -Continue duloxetine 60 mg daily w/out significant benefit or AE however given low dose lexapro I do not feel comfortable increasing this further at this time.  -should not be on NSAIDS  -Tramadol should be tapered down.    UDS performed 4/23/2024.  I-Stop reviewed,  reference #: [id number] No signs of aberrant behavior and will continue to monitor for signs of toxicity. Reminded to continue to avoid alcohol. Patient denies other prescribers.  Discussed OD prevention education and prescribed naloxone kit  Safe storage of medication was reviewed.  Opiate agreement was renewed Jun 29, 2023   -Works for [employer] and may have to return to in office visit this fall and requested letter of accommodations with details to be discussed at a later time.   The patient had the opportunity to ask questions and all were answered to their satisfaction.  The patient verbalized understanding of the management plan and agreed with our recommendations.

## 2024-04-23 NOTE — PHYSICAL EXAM
[FreeTextEntry1] : Constitutional: No signs of distress. No signs of toxicity.  MS: Alert and well oriented. Speech fluent. No aphasia. Fund of knowledge intact.  Psychiatric: Mood stable. Motor: Adequate bulk, tone, strength. 5/5 strength DTR: present and symmetrical; no clonus Sensory: intact to primary and secondary modalities; neg Romberg Cerebellar and gait: intact Eyes: no redness or swelling HEENT: intact Neck: No masses noted Pulmonary: no respiratory distress Vascular: no temperature,color changes; no edema Skin: No rash.

## 2024-04-23 NOTE — HISTORY OF PRESENT ILLNESS
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   Since his last visit reports no new medical issues.  Still reports chronic diffuse pain in multiple joints including knees and shoulder . Primary pain  is upper back into shoulders 5-6/10 primarily worsened with prolonged sitting and needs to lay down afterwards x 10-15 min to feel a little better.  Chronic diffuse pain in multiple joints including knees, shoulders 6/10 on average during the day with pain meds but up to 6-7/10 later in the afternoons 4-5 pm.  Pain increased with movement, activity, performing routine tasks, transfers, bathing, dressing etc and impeding on QOL.  HA 2-3x/week typically left sided or over the eyes 4-5/10 on average , taking 2 tramadol prn which helps.  + Bladder stim placed 9/12   BM daily with Movantik   Prior meds:  Oxycodone 5 mg daily, gabapentin low dose no benefit ( would consider trying again), Sumatriptan, Rizatriptan, Ubrelvy  Current meds include: Oxycontin 10 mg BID, Tramadol 1-2x/day( Dr. Roberts only med that works), Zolpidem 10 mg q hs ( Dr. Roberts) , Movantik , hydrocortisone 30 mg/day , Duloxetine 60mg daily, Lexapro 10 mg daily, ASA, Plavix, Lipitor, synthroid 112mcg   Social: , working as a  in [place] but has been working from home.

## 2024-04-26 LAB
O-DESMETHYLTRAMADOL: NORMAL NG/ML
TRAMADOL UR-MCNC: NORMAL NG/ML

## 2024-04-29 ENCOUNTER — APPOINTMENT (OUTPATIENT)
Dept: SURGICAL ONCOLOGY | Facility: CLINIC | Age: 54
End: 2024-04-29
Payer: COMMERCIAL

## 2024-04-29 VITALS
BODY MASS INDEX: 17.04 KG/M2 | HEART RATE: 117 BPM | OXYGEN SATURATION: 97 % | HEIGHT: 70 IN | DIASTOLIC BLOOD PRESSURE: 83 MMHG | WEIGHT: 119 LBS | SYSTOLIC BLOOD PRESSURE: 121 MMHG | RESPIRATION RATE: 17 BRPM

## 2024-04-29 DIAGNOSIS — C43.9 MALIGNANT MELANOMA OF SKIN, UNSPECIFIED: ICD-10-CM

## 2024-04-29 LAB
AMPHET UR-MCNC: NEGATIVE NG/ML
BARBITURATES UR-MCNC: NEGATIVE NG/ML
BENZODIAZ UR-MCNC: NORMAL NG/ML
COCAINE METAB.OTHER UR-MCNC: NEGATIVE NG/ML
CREATININE, URINE: 116.6 MG/DL
FENTANYL, URINE: NEGATIVE NG/ML
METHADONE UR-MCNC: NEGATIVE NG/ML
OPIATES UR-MCNC: NORMAL NG/ML
OXYCODONE/OXYMORPHONE, URINE: NORMAL NG/ML
PCP UR-MCNC: NEGATIVE NG/ML
PH, URINE: 5.1
PLEASE NOTE: DRUGSCRUR: NORMAL
THC UR QL: NEGATIVE NG/ML

## 2024-04-29 PROCEDURE — 99214 OFFICE O/P EST MOD 30 MIN: CPT

## 2024-04-29 NOTE — HISTORY OF PRESENT ILLNESS
[de-identified] : Mr. CARLOTA CANTU is a 54 year old man here for a follow-up visit.   He is s/p radical resection of scalp melanoma, right posterior modified neck dissection with closure by Dr. Bradley Toussaint (plastics) on 8/29/18. Final pathology revealed metastatic melanoma present in 2 out of 2 lymph nodes, residual melanoma, negative margins, no lymphovascular invasion.  Tumor stage: pT2a pN2a  s/p Right functional neck LN dissection on 10/19/18 with 18 negative LNs and benign skin/tissue findings.    Received adjuvant therapy (Nivolumab) from 12/2018 - 3/2019, D/C due to thigh fascitis.  11/2019 - developed new in-transit & metastatic skin lesions to scalp, restarted immunotherapy (ipilimumab & Nivolumab) from 11/209 - 4/2020, then delayed/stopped due to immune related side effects.   Shave biopsies from 7/2022- 1. posterior mid neck- compound dysplastic nevus w/ moderate atypia extending to lateral tissue edges, conservative removal recommended below & lateral to neck - irritated compound nevus w/ minimal architectural & cytologic atypia, no further tx required 2. left posterior shoulder- irritated compound nevus w/ minimal architectural & cytologic atypia, no further tx required  3. mid abdomen - compound dysplastic nevus w/ moderate cytologic atypia extending to the lateral tissue edge, conservative removal recommended   re-shave biopsies on 8/25/22 -  1. mid abdomen - recurrent & residual junctional melanocytic proliferation, scar extends to tissue edges, judgement can be made whether there is any residual lesion beyond scar, clinical correlation necessary 2. posterior mid neck - focal residual compound melanocytic lesion & scar from previous procedure, scar is extending to tissue edges and no judgment can be made whether there is any residual lesion beyond the scar 3. right anterior shoulder - melanoma in situ in association w/ a compound melanocytic nevus, proliferation extends to peripheral margin  now s/p in-office excision of all three lesions, right shoulder on 10/13/22, mid abdomen & post neck on 10/27/22.  1. right shoulder - scar w/ no residual melanocytic lesion 2. mid abdomen - no residual melanocytic neoplasm, scar & prior biopsy site change 3. posterior neck - no residual melanocytic neoplasm, scar & prior biopsy site change   7/13/23: Carlota presents today with a new right upper back lesion. Carlota first noticed it a few weeks ago.  -- s/p in office WLE excision w/ path showing compound nevus & negative margins   In late September he developed sudden onset neck pain, difficulty swallowing & slurred speech -- was sent to ED by neurologist to R/O stroke, imaging negative for stroke which led to MRI of neck.   Neck MRI 9/27/2023 - - enlarged right sided level 2 & prominent sized level 2 cervical LN, no other cervical LAD - nonspecific non enhancing left-sided anterior paraspinal cystic structure at level of T2, serial imaging can be obtained to assess stability or change  s/p B/L cervical lymph node biopsy 11/8/23:  left LN - salivary gland tissue  right cervical LN - lymph nodes with lymphoid hyperplasia and polytypic plasmacytosis. Salivary gland tissue  immunostain negative   PERTINENT HISTORY: His past medical history also includes HTN, migraines, stage II testicular cancer (1994) treated with surgery, chemotherapy and an autologous BMT, and Non Hodgkins lymphoma for which he underwent radiation therapy.   PCP: Dr. Ney Crenshaw Referring MD/Dermatologist: Dr. Mariah Spring now Dr. Ugo Awan Med Onc: previously Dr. Kendall Sands/Dr. Gopi Arredondo (@ [facility]), now Dr. Shawn Mcginnis

## 2024-04-29 NOTE — REASON FOR VISIT
[Follow-Up Visit] : a follow-up visit for [FreeTextEntry2] : s/p b/l cervical lymphadenectomy 11/8/23

## 2024-04-29 NOTE — PHYSICAL EXAM
[Normal Neck Lymph Nodes] : normal neck lymph nodes  [Normal Supraclavicular Lymph Nodes] : normal supraclavicular lymph nodes [Normal] : oriented to person, place and time, with appropriate affect [de-identified] : No masses or adenopathy [de-identified] : normal parotid nodes [de-identified] : laceration on apex of scalp with scab; 2 new 2 mm pigmented lesion on abdominal wall

## 2024-04-29 NOTE — ASSESSMENT
[FreeTextEntry1] : IMP: HX; s/p radical resection of scalp melanoma, right posterior modified neck dissection 8/2018. Final pathology- metastatic melanoma present in 2 out of 2 lymph nodes, pT2a pN2a Received adjuvant therapy (Nivolumab) from 12/2018 - 3/2019, D/C due to thigh fasciitis. 11/2019 - developed new in-transit & metastatic skin lesions to scalp, restarted immunotherapy (ipilimumab & Nivolumab) from 11/209 - 4/2020, then delayed/stopped due to immune related side effects.  In late September he developed sudden onset neck pain, difficulty swallowing & slurred speech -- was sent to ED by neurologist to R/O stroke, imaging negative for stroke which led to MRI of neck.  s/p B/L cervical lymph node biopsy 11/8/23:  left LN - salivary gland tissue  right cervical LN - lymph nodes with lymphoid hyperplasia and polytypic plasmacytosis. Salivary gland tissue  immunostain negative   reports scraping the top of his head last week - now has a scab, thinks it is a melanoma but has not seen derm  PLAN: RTO in 2 weeks after wound has resolved to determine if further treatment is needed and check abdominal wall lesions also

## 2024-04-30 NOTE — ED ADULT TRIAGE NOTE - NS ED NURSE BANDS TYPE
Name band;
Render Risk Assessment In Note?: no
Additional Notes: Recommend hordeolum for lower right eyelid.
Detail Level: Simple

## 2024-05-05 ENCOUNTER — TRANSCRIPTION ENCOUNTER (OUTPATIENT)
Age: 54
End: 2024-05-05

## 2024-05-05 ENCOUNTER — EMERGENCY (EMERGENCY)
Facility: HOSPITAL | Age: 54
LOS: 1 days | Discharge: HOME | End: 2024-05-05
Attending: EMERGENCY MEDICINE
Payer: COMMERCIAL

## 2024-05-05 VITALS
OXYGEN SATURATION: 98 % | RESPIRATION RATE: 17 BRPM | HEART RATE: 89 BPM | DIASTOLIC BLOOD PRESSURE: 62 MMHG | SYSTOLIC BLOOD PRESSURE: 91 MMHG

## 2024-05-05 VITALS
OXYGEN SATURATION: 99 % | RESPIRATION RATE: 20 BRPM | TEMPERATURE: 98 F | HEIGHT: 71 IN | HEART RATE: 129 BPM | DIASTOLIC BLOOD PRESSURE: 61 MMHG | SYSTOLIC BLOOD PRESSURE: 94 MMHG | WEIGHT: 119.93 LBS

## 2024-05-05 DIAGNOSIS — Z98.89 OTHER SPECIFIED POSTPROCEDURAL STATES: Chronic | ICD-10-CM

## 2024-05-05 DIAGNOSIS — Z98.890 OTHER SPECIFIED POSTPROCEDURAL STATES: Chronic | ICD-10-CM

## 2024-05-05 DIAGNOSIS — I89.9 NONINFECTIVE DISORDER OF LYMPHATIC VESSELS AND LYMPH NODES, UNSPECIFIED: Chronic | ICD-10-CM

## 2024-05-05 DIAGNOSIS — C43.4 MALIGNANT MELANOMA OF SCALP AND NECK: Chronic | ICD-10-CM

## 2024-05-05 DIAGNOSIS — Z90.79 ACQUIRED ABSENCE OF OTHER GENITAL ORGAN(S): Chronic | ICD-10-CM

## 2024-05-05 LAB
ALBUMIN SERPL ELPH-MCNC: 4.4 G/DL — SIGNIFICANT CHANGE UP (ref 3.3–5)
ALP SERPL-CCNC: 81 U/L — SIGNIFICANT CHANGE UP (ref 40–120)
ALT FLD-CCNC: 20 U/L — SIGNIFICANT CHANGE UP (ref 10–45)
ANION GAP SERPL CALC-SCNC: 12 MMOL/L — SIGNIFICANT CHANGE UP (ref 5–17)
APPEARANCE UR: CLEAR — SIGNIFICANT CHANGE UP
APTT BLD: 37.7 SEC — HIGH (ref 24.5–35.6)
AST SERPL-CCNC: 24 U/L — SIGNIFICANT CHANGE UP (ref 10–40)
BASE EXCESS BLDV CALC-SCNC: 1.2 MMOL/L — SIGNIFICANT CHANGE UP (ref -2–3)
BASOPHILS # BLD AUTO: 0.07 K/UL — SIGNIFICANT CHANGE UP (ref 0–0.2)
BASOPHILS NFR BLD AUTO: 0.7 % — SIGNIFICANT CHANGE UP (ref 0–2)
BILIRUB SERPL-MCNC: 1 MG/DL — SIGNIFICANT CHANGE UP (ref 0.2–1.2)
BILIRUB UR-MCNC: NEGATIVE — SIGNIFICANT CHANGE UP
BUN SERPL-MCNC: 18 MG/DL — SIGNIFICANT CHANGE UP (ref 7–23)
CA-I SERPL-SCNC: 1.26 MMOL/L — SIGNIFICANT CHANGE UP (ref 1.15–1.33)
CALCIUM SERPL-MCNC: 10.1 MG/DL — SIGNIFICANT CHANGE UP (ref 8.4–10.5)
CHLORIDE BLDV-SCNC: 98 MMOL/L — SIGNIFICANT CHANGE UP (ref 96–108)
CHLORIDE SERPL-SCNC: 99 MMOL/L — SIGNIFICANT CHANGE UP (ref 96–108)
CO2 BLDV-SCNC: 29 MMOL/L — HIGH (ref 22–26)
CO2 SERPL-SCNC: 25 MMOL/L — SIGNIFICANT CHANGE UP (ref 22–31)
COLOR SPEC: YELLOW — SIGNIFICANT CHANGE UP
CREAT SERPL-MCNC: 1.03 MG/DL — SIGNIFICANT CHANGE UP (ref 0.5–1.3)
DIFF PNL FLD: NEGATIVE — SIGNIFICANT CHANGE UP
EGFR: 86 ML/MIN/1.73M2 — SIGNIFICANT CHANGE UP
EOSINOPHIL # BLD AUTO: 0.45 K/UL — SIGNIFICANT CHANGE UP (ref 0–0.5)
EOSINOPHIL NFR BLD AUTO: 4.4 % — SIGNIFICANT CHANGE UP (ref 0–6)
FLUAV AG NPH QL: SIGNIFICANT CHANGE UP
FLUBV AG NPH QL: SIGNIFICANT CHANGE UP
GAS PNL BLDV: 132 MMOL/L — LOW (ref 136–145)
GAS PNL BLDV: SIGNIFICANT CHANGE UP
GAS PNL BLDV: SIGNIFICANT CHANGE UP
GLUCOSE BLDV-MCNC: 92 MG/DL — SIGNIFICANT CHANGE UP (ref 70–99)
GLUCOSE SERPL-MCNC: 98 MG/DL — SIGNIFICANT CHANGE UP (ref 70–99)
GLUCOSE UR QL: NEGATIVE MG/DL — SIGNIFICANT CHANGE UP
HCO3 BLDV-SCNC: 28 MMOL/L — SIGNIFICANT CHANGE UP (ref 22–29)
HCT VFR BLD CALC: 41.4 % — SIGNIFICANT CHANGE UP (ref 39–50)
HCT VFR BLDA CALC: 41 % — SIGNIFICANT CHANGE UP (ref 39–51)
HGB BLD CALC-MCNC: 13.8 G/DL — SIGNIFICANT CHANGE UP (ref 12.6–17.4)
HGB BLD-MCNC: 13.5 G/DL — SIGNIFICANT CHANGE UP (ref 13–17)
IMM GRANULOCYTES NFR BLD AUTO: 0.3 % — SIGNIFICANT CHANGE UP (ref 0–0.9)
INR BLD: 1.24 RATIO — HIGH (ref 0.85–1.18)
KETONES UR-MCNC: ABNORMAL MG/DL
LACTATE BLDV-MCNC: 1.1 MMOL/L — SIGNIFICANT CHANGE UP (ref 0.5–2)
LEUKOCYTE ESTERASE UR-ACNC: NEGATIVE — SIGNIFICANT CHANGE UP
LYMPHOCYTES # BLD AUTO: 3.15 K/UL — SIGNIFICANT CHANGE UP (ref 1–3.3)
LYMPHOCYTES # BLD AUTO: 30.6 % — SIGNIFICANT CHANGE UP (ref 13–44)
MCHC RBC-ENTMCNC: 27.7 PG — SIGNIFICANT CHANGE UP (ref 27–34)
MCHC RBC-ENTMCNC: 32.6 GM/DL — SIGNIFICANT CHANGE UP (ref 32–36)
MCV RBC AUTO: 85 FL — SIGNIFICANT CHANGE UP (ref 80–100)
MONOCYTES # BLD AUTO: 0.66 K/UL — SIGNIFICANT CHANGE UP (ref 0–0.9)
MONOCYTES NFR BLD AUTO: 6.4 % — SIGNIFICANT CHANGE UP (ref 2–14)
NEUTROPHILS # BLD AUTO: 5.92 K/UL — SIGNIFICANT CHANGE UP (ref 1.8–7.4)
NEUTROPHILS NFR BLD AUTO: 57.6 % — SIGNIFICANT CHANGE UP (ref 43–77)
NITRITE UR-MCNC: NEGATIVE — SIGNIFICANT CHANGE UP
NRBC # BLD: 0 /100 WBCS — SIGNIFICANT CHANGE UP (ref 0–0)
PCO2 BLDV: 50 MMHG — SIGNIFICANT CHANGE UP (ref 42–55)
PH BLDV: 7.35 — SIGNIFICANT CHANGE UP (ref 7.32–7.43)
PH UR: 5 — SIGNIFICANT CHANGE UP (ref 5–8)
PLATELET # BLD AUTO: 267 K/UL — SIGNIFICANT CHANGE UP (ref 150–400)
PO2 BLDV: 19 MMHG — LOW (ref 25–45)
POTASSIUM BLDV-SCNC: 3.7 MMOL/L — SIGNIFICANT CHANGE UP (ref 3.5–5.1)
POTASSIUM SERPL-MCNC: 3.6 MMOL/L — SIGNIFICANT CHANGE UP (ref 3.5–5.3)
POTASSIUM SERPL-SCNC: 3.6 MMOL/L — SIGNIFICANT CHANGE UP (ref 3.5–5.3)
PROT SERPL-MCNC: 7.1 G/DL — SIGNIFICANT CHANGE UP (ref 6–8.3)
PROT UR-MCNC: NEGATIVE MG/DL — SIGNIFICANT CHANGE UP
PROTHROM AB SERPL-ACNC: 13.6 SEC — HIGH (ref 9.5–13)
RBC # BLD: 4.87 M/UL — SIGNIFICANT CHANGE UP (ref 4.2–5.8)
RBC # FLD: 13.7 % — SIGNIFICANT CHANGE UP (ref 10.3–14.5)
RSV RNA NPH QL NAA+NON-PROBE: SIGNIFICANT CHANGE UP
SAO2 % BLDV: 24.6 % — LOW (ref 67–88)
SARS-COV-2 RNA SPEC QL NAA+PROBE: SIGNIFICANT CHANGE UP
SODIUM SERPL-SCNC: 136 MMOL/L — SIGNIFICANT CHANGE UP (ref 135–145)
SP GR SPEC: >1.03 — HIGH (ref 1–1.03)
UROBILINOGEN FLD QL: 0.2 MG/DL — SIGNIFICANT CHANGE UP (ref 0.2–1)
WBC # BLD: 10.28 K/UL — SIGNIFICANT CHANGE UP (ref 3.8–10.5)
WBC # FLD AUTO: 10.28 K/UL — SIGNIFICANT CHANGE UP (ref 3.8–10.5)

## 2024-05-05 PROCEDURE — 85018 HEMOGLOBIN: CPT

## 2024-05-05 PROCEDURE — 99285 EMERGENCY DEPT VISIT HI MDM: CPT

## 2024-05-05 PROCEDURE — 99285 EMERGENCY DEPT VISIT HI MDM: CPT | Mod: 25

## 2024-05-05 PROCEDURE — 36415 COLL VENOUS BLD VENIPUNCTURE: CPT

## 2024-05-05 PROCEDURE — 85610 PROTHROMBIN TIME: CPT

## 2024-05-05 PROCEDURE — 93005 ELECTROCARDIOGRAM TRACING: CPT

## 2024-05-05 PROCEDURE — 84295 ASSAY OF SERUM SODIUM: CPT

## 2024-05-05 PROCEDURE — 74177 CT ABD & PELVIS W/CONTRAST: CPT | Mod: MC

## 2024-05-05 PROCEDURE — 81003 URINALYSIS AUTO W/O SCOPE: CPT

## 2024-05-05 PROCEDURE — 85014 HEMATOCRIT: CPT

## 2024-05-05 PROCEDURE — 84132 ASSAY OF SERUM POTASSIUM: CPT

## 2024-05-05 PROCEDURE — 80053 COMPREHEN METABOLIC PANEL: CPT

## 2024-05-05 PROCEDURE — 87040 BLOOD CULTURE FOR BACTERIA: CPT

## 2024-05-05 PROCEDURE — 82803 BLOOD GASES ANY COMBINATION: CPT

## 2024-05-05 PROCEDURE — 82330 ASSAY OF CALCIUM: CPT

## 2024-05-05 PROCEDURE — 85730 THROMBOPLASTIN TIME PARTIAL: CPT

## 2024-05-05 PROCEDURE — 71045 X-RAY EXAM CHEST 1 VIEW: CPT | Mod: 26

## 2024-05-05 PROCEDURE — 87637 SARSCOV2&INF A&B&RSV AMP PRB: CPT

## 2024-05-05 PROCEDURE — 96375 TX/PRO/DX INJ NEW DRUG ADDON: CPT

## 2024-05-05 PROCEDURE — 85025 COMPLETE CBC W/AUTO DIFF WBC: CPT

## 2024-05-05 PROCEDURE — 83605 ASSAY OF LACTIC ACID: CPT

## 2024-05-05 PROCEDURE — 82435 ASSAY OF BLOOD CHLORIDE: CPT

## 2024-05-05 PROCEDURE — 71045 X-RAY EXAM CHEST 1 VIEW: CPT

## 2024-05-05 PROCEDURE — 82947 ASSAY GLUCOSE BLOOD QUANT: CPT

## 2024-05-05 PROCEDURE — 74177 CT ABD & PELVIS W/CONTRAST: CPT | Mod: 26,MC

## 2024-05-05 PROCEDURE — 87086 URINE CULTURE/COLONY COUNT: CPT

## 2024-05-05 PROCEDURE — 96374 THER/PROPH/DIAG INJ IV PUSH: CPT | Mod: XU

## 2024-05-05 RX ORDER — SODIUM CHLORIDE 9 MG/ML
1000 INJECTION, SOLUTION INTRAVENOUS ONCE
Refills: 0 | Status: COMPLETED | OUTPATIENT
Start: 2024-05-05 | End: 2024-05-05

## 2024-05-05 RX ORDER — ONDANSETRON 8 MG/1
4 TABLET, FILM COATED ORAL ONCE
Refills: 0 | Status: COMPLETED | OUTPATIENT
Start: 2024-05-05 | End: 2024-05-05

## 2024-05-05 RX ORDER — ACETAMINOPHEN 500 MG
1000 TABLET ORAL ONCE
Refills: 0 | Status: COMPLETED | OUTPATIENT
Start: 2024-05-05 | End: 2024-05-05

## 2024-05-05 RX ORDER — HYDROMORPHONE HYDROCHLORIDE 2 MG/ML
1 INJECTION INTRAMUSCULAR; INTRAVENOUS; SUBCUTANEOUS ONCE
Refills: 0 | Status: DISCONTINUED | OUTPATIENT
Start: 2024-05-05 | End: 2024-05-05

## 2024-05-05 RX ADMIN — ONDANSETRON 4 MILLIGRAM(S): 8 TABLET, FILM COATED ORAL at 09:41

## 2024-05-05 RX ADMIN — HYDROMORPHONE HYDROCHLORIDE 1 MILLIGRAM(S): 2 INJECTION INTRAMUSCULAR; INTRAVENOUS; SUBCUTANEOUS at 12:48

## 2024-05-05 RX ADMIN — SODIUM CHLORIDE 1000 MILLILITER(S): 9 INJECTION, SOLUTION INTRAVENOUS at 12:48

## 2024-05-05 RX ADMIN — SODIUM CHLORIDE 1000 MILLILITER(S): 9 INJECTION, SOLUTION INTRAVENOUS at 09:41

## 2024-05-05 RX ADMIN — Medication 400 MILLIGRAM(S): at 09:41

## 2024-05-05 NOTE — ED ADULT NURSE NOTE - NSICDXPASTMEDICALHX_GEN_ALL_CORE_FT
Message left for patient to call.   PAST MEDICAL HISTORY:  BPH (benign prostatic hyperplasia)     CAD (coronary artery disease)     Colitis surgery 1996    GERD (gastroesophageal reflux disease)     Headache, Migraine     History of bone marrow transplant     History of chemotherapy     History of Raynaud's syndrome     HTN (hypertension)     Hypertriglyceridemia     Hypothyroidism     Malignant melanoma of scalp RSX 08/18  R neck mass dsx/ LN dsx 10/19/18    NHL (non-Hodgkin's lymphoma) 1999, Radiation to left neck region    Osteoarthritis degenerative disc L3-4    Testicular Cancer 1994, chemotherapy

## 2024-05-05 NOTE — ED ADULT NURSE NOTE - NSFALLHARMRISKINTERV_ED_ALL_ED

## 2024-05-05 NOTE — ED ADULT NURSE NOTE - OBJECTIVE STATEMENT
BPH (benign prostatic hyperplasia)     CAD (coronary artery disease)     Colitis surgery 1996    GERD (gastroesophageal reflux disease)     Headache, Migraine     History of bone marrow transplant     History of chemotherapy     History of Raynaud's syndrome     HTN (hypertension)     Hypertriglyceridemia     Hypothyroidism     Malignant melanoma of scalp RSX 08/18  R neck mass dsx/ LN dsx 10/19/18    NHL (non-Hodgkin's lymphoma) 1999, Radiation to left neck region    Osteoarthritis degenerative disc L3-4    Testicular Cancer 1994, chemotherapy 54M aaox4 ambulatory at home bibems from home with c/o nausea, vomiting and abdominal pain started yesterday also c/o nosebleeding few episodes, no active nose bleed today, tender in the RLQ, had a normal bowel and urine today. No Chills or fever, c/o also of weight loss for 6 months now. VS WDL, patient with h/o BPH (benign prostatic hyperplasia) CAD with stent, on aspirin and plavix, Colitis surgery 1996 GERD (gastroesophageal reflux disease) Headache, Migraine   History of bone marrow transplant History of chemotherapy History of Raynaud's syndrome Hypertriglyceridemia Hypothyroidism Malignant melanoma of scalp RSX 08/18, , R neck mass dsx/ LN dsx 10/19/18, NHL (non-Hodgkin's lymphoma) 1999, Radiation to left neck region, Osteoarthritis degenerative disc L3-4  Testicular Cancer 1994, chemotherapy.  Left forearm 20g IV access inserted, labs sent pending results.

## 2024-05-05 NOTE — ED PROVIDER NOTE - NSFOLLOWUPCLINICS_GEN_ALL_ED_FT
[facility]  Gastroenterology  [address] [place] [id number]  Phone: [phone]  Fax:   Follow Up Time: Routine

## 2024-05-05 NOTE — ED ADULT NURSE NOTE - BREATHING, MLM
[facility] Vancomycin Pharmacy Consult   Vancomycin random level= 15.5 mcg/ml 8 hr 20 min post dose  It predicts an , SS Trough=17.4  This is within goal    Plan: Will continue current regimen, 1250 mg q12h.      Thank you  Jeremiah Alexandra PharmD  [phone] Spontaneous, unlabored and symmetrical

## 2024-05-05 NOTE — ED ADULT NURSE REASSESSMENT NOTE - NS ED NURSE REASSESS COMMENT FT1
Patient tolerated PO challenge without nausea or vomiting. Pain subsided, VS WDL. Patient pending dc by MD.
Patient remains stable while in the ED, VS WDL. Pain is still the same as reported by the patient. Pending abd CT scan.

## 2024-05-05 NOTE — ED PROVIDER NOTE - PHYSICAL EXAMINATION
PHYSICAL EXAM:  GEN: Chronically ill-appearing but in no acute distress  HEAD: Atraumatic  EYES: Clear bilaterally, pupils equal, round and reactive to light.  ENMT: Airway patent, Nasal mucosa clear. Mouth with normal mucosa. Uvula is midline.   CARDIAC: Normal rate, regular rhythm. +S1/S2. No murmurs, rubs or gallops.  RESPIRATORY: Breathing unlabored. Breath sounds clear and equal bilaterally.  ABDOMEN:  soft nondistended right lower quadrant tenderness without rebound or guarding

## 2024-05-05 NOTE — ED PROVIDER NOTE - PATIENT PORTAL LINK FT
You can access the FollowMyHealth Patient Portal offered by [facility] by registering at the following website: [email or web address]. By joining [facility]’s FollowMyHealth portal, you will also be able to view your health information using other applications (apps) compatible with our system.

## 2024-05-05 NOTE — ED PROVIDER NOTE - ATTENDING CONTRIBUTION TO CARE
Patient is a 54-year-old male history of 3 separate cancers all in remission from non-Hodgkin's lymphoma testicular cancer with abdominal surgery lymph node resection has huge scar in his abdomen malignant melanoma presenting today with 1 night of nausea and vomiting associate with decreased p.o. intake lightheadedness weakness on exam has right lower quadrant tenderness states that about a month or so ago had appendicitis but was not operated on says within his records not discharged on any antibiotics patient is afebrile CT was ordered labs IV fluids antiemetics reassess also was having a nosebleed which resolved at this time no other signs of bleeding denies blood per rectum

## 2024-05-05 NOTE — ED ADULT TRIAGE NOTE - HEIGHT IN INCHES
Physical Therapy    Physical Therapy Treatment Note    Room #:  3075/3577-50  Patient Name: Amanda De Guzman  YOB: 1925  MRN: [id number]    Referring Provider:   PAUL Sweet CNP   Date of Service: 2/4/2021  Evaluating Physical Therapist: Ronel Lozano, PT  #[id number]    Diagnosis: Severe sepsis ([address] [A41.9, R65.20] Admitted with fever, UTI    Patient Active Problem List   Diagnosis    DVT (deep venous thrombosis) ([address]    Anemia    Rheumatoid arthritis ([address]    Cervical spondylosis    Lumbar spondylosis    Shoulder joint dysfunction    DDD (degenerative disc disease)    Spinal stenosis    Vertebral compression fracture     Bulging lumbar disc    Cervical spinal canal tumor    Dementia associated with other underlying disease with behavioral disturbance ([address]    COVID-19    Prediabetes    Severe sepsis (HCC)    Elevated troponin    Hypernatremia    Altered mental status    Fecal impaction ([address]      Tentative placement recommendation: Skilled Nursing Facility     Equipment recommendation: Equipment at Nursing Home      Prior Level of Function: unknown  Rehab Potential: fair for baseline    Past medical history:   Past Medical History:   Diagnosis Date    Anemia     Cellulitis     GERD (gastroesophageal reflux disease)     Glaucoma     Hypertension     Hypokalemia     Insomnia     Osteoarthritis     Prediabetes 11/4/2020    Rheumatoid arthritis(714.0)      Past Surgical History:   Procedure Laterality Date    EYE SURGERY      cataracts    JOINT REPLACEMENT Left     OTHER SURGICAL HISTORY Right 8/7/2015    IM nailing right femor     Precautions:  Up with assistance, falls, alarm and O2, hi mira 4 Liters of o2, covid in 11/21  dementia    SUBJECTIVE:    Social history: Patient lives in a skilled nursing facility      Equipment owned: unknown    [facility]   How much difficulty turning over in bed?: Unable How much difficulty sitting down on / standing up from a chair with arms?: Unable  How much difficulty moving from lying on back to sitting on side of bed?: Unable  How much help from another person moving to and from a bed to a chair?: Total  How much help from another person needed to walk in hospital room?: Total  How much help from another person for climbing 3-5 steps with a railing?: Total  AM-PAC Inpatient Mobility Raw Score : 6  AM-PAC Inpatient T-Scale Score : 23.55  Mobility Inpatient CMS 0-100% Score: 100  Mobility Inpatient CMS G-Code Modifier : CN    Nursing cleared patient for PT treatment. OBJECTIVE;   Initial Evaluation  Date: 2/3/2021 Treatment Date:  2/4/2021   Short Term/ Long Term   Goals   Was pt agreeable to Eval/treatment? Yes   Yes To be met in 5 days   Pain level   0/10  Grimaces with shoulder rom None stated but yelled out in pain with rolling    Bed Mobility  Rolling: Dependent assist of 1    Supine to sit: Not assessed     Sit to supine: Not assessed     Scooting: Not assessed    Rolling: Dependent assist of 1   Supine to sit: Not assessed    Sit to supine: Not assessed    Scooting: Not assessed   Rolling: Maximal assist of 1    Supine to sit: Maximal assist of 1    Sit to supine: Maximal assist of 1    Scooting: Maximal assist of 1     ROM Within functional limits with exception of bilateral shoulders and bilateral ankles    Increase range of motion 10% of affected joints    Strength No active movement observed and Does not follow manual muscle testing commands         Patient education  Patient educated on role of Physical Therapy, risks of immobility, safety and plan of care.     Patient response to education:   Pt verbalized understanding Pt demonstrated skill Pt requires further education in this area   No No Yes        Treatment:  Patient practiced and was instructed/facilitated in the following treatment: Performed below exercises. Patient rolled x 3 reps for linen change and assist with hygiene. Patient required maximal assistance of 2 to scoot up in bed, PRAFO boots were applied to bilateral lower extremities and head of bed was elevated. Performed the following supine bilateral lower extremity exercises with PROM: ankle pumps, heel slides, hip abduction, and SLR x 10 reps. Verbal cues were needed for correct technique and to perform 2-3x daily       At end of session, patient in bed with alarm activated, call light and phone within reach, all lines and tubes intact, nursing notified. Patient would benefit from continued skilled Physical Therapy to improve functional independence and quality of life. Patient's/ family goals   none stated        ASSESSMENT: Patient continues to exhibit decreased strength, balance, and coordination that is impairing functional mobility and safety. Patient did not participate with above exercises despite encouragement. Patient would not open eyes and would moan in pain with movement. Plan of Care:   -Bed Mobility: Lower extremity exercises , Upper extremity exercises  and Trunk control activities   -Sitting Balance: Incorporate reaching activities to activate trunk muscles , Hands on support to maintain midline , Facilitate active trunk muscle engagement  and Facilitate postural control in all planes     Patient and or family understand(s) diagnosis, prognosis, and plan of care. Frequency of treatments: Patient will be seen 2-3 times/week.        Time in: 10:47  Time out: 11:10    Total Treatment Time: 23 minutes      CPT codes:  Therapeutic activities (45832)   10 minutes  1 unit(s)  Therapeutic exercises (04907)   13 minutes  1 unit(s)    Kera Donald PTA   LIC# [id number]

## 2024-05-05 NOTE — ED PROVIDER NOTE - NSFOLLOWUPINSTRUCTIONS_ED_ALL_ED_FT
You are seen in the emergency department with abdominal pain.  You had blood work which did not show any concerning findings.  You had a CT scan of your abdomen which did not show any acute findings.  You received  IV fluids pain medication and nausea medication.  See attached results and bring to all follow-up appointments.  You should follow-up with gastroenterology and your primary care physician.  Return to the emergency department for any new or worsening symptoms.

## 2024-05-05 NOTE — ED ADULT NURSE NOTE - NSFALLLASTSIX_ED_ALL_ED
Patient's systolic RT>549 despite receiving scheduled dose of PO coreg at 16:55 and PO clonidine at 20:17. Cardene gtt started per order. No.

## 2024-05-05 NOTE — ED ADULT NURSE NOTE - AS PAIN REST
Patient is requesting medication refill for     amphetamine-dextroamphetamine (ADDERALL) 15 MG Oral Tab    Please send to [facility] in [place], [place] [address] (moderate pain)

## 2024-05-05 NOTE — ED ADULT NURSE NOTE - CAS DISCH CONDITION
Griseofulvin Counseling:  I discussed with the patient the risks of griseofulvin including but not limited to photosensitivity, cytopenia, liver damage, nausea/vomiting and severe allergy.  The patient understands that this medication is best absorbed when taken with a fatty meal (e.g., ice cream or french fries). Stable

## 2024-05-05 NOTE — ED PROVIDER NOTE - OBJECTIVE STATEMENT
54-year-old with a history of CAD testicular cancer non-Hodgkin's lymphoma and a malignant melanoma of the scalp presenting with nausea vomiting and abdominal pain for 2 days.  Patient states that he has had at least 3 episodes of vomiting yesterday.  patient states that he has also had intermittent epistaxis during this time.  Patient is denying active abdominal pain but does feel nauseous.  States he has had normal bowel movements yesterday.  Patient states that he was evaluated a couple of months ago for similar symptoms and found to have an abnormal appendix.  Patient denies chest pain shortness of breath fever cough chills urinary symptoms.

## 2024-05-06 LAB
CULTURE RESULTS: NO GROWTH — SIGNIFICANT CHANGE UP
SPECIMEN SOURCE: SIGNIFICANT CHANGE UP

## 2024-05-10 NOTE — ED ADULT TRIAGE NOTE - PATIENT'S PREFERRED PRONOUN
I sent the medication if he needs anything stronger he needs to talk to the ophthalmologist.  Him/He

## 2024-05-12 NOTE — DISCHARGE NOTE NURSING/CASE MANAGEMENT/SOCIAL WORK - NSCORESITESY/N_GEN_A_CORE_RD
Writer already spoke to patient at 0200.    Writer contacted patient and reminded him that we already spoke and he declined going to ER.    No further needs at this time.     Reason for Disposition   Caller has already spoken with another triager and has no further questions.    Protocols used: No Contact or Duplicate Contact Call-A-     No

## 2024-05-14 NOTE — ED ADULT NURSE NOTE - IS THE PATIENT ABLE TO BE SCREENED?
No Medical Necessity Information: It is in your best interest to select a reason for this procedure from the list below. All of these items fulfill various CMS LCD requirements except the new and changing color options. Medical Necessity Clause: This procedure was medically necessary because the lesion that was treated was: Lab: [id number] Lab Facility: [id number] Detail Level: Detailed Was A Bandage Applied: Yes Size Of Lesion In Cm (Required): 0.8 X Size Of Lesion In Cm (Optional): 0 Depth Of Shave: dermis Biopsy Method: Dermablade Anesthesia Type: 1% lidocaine with epinephrine 1:100,000 buffered with 8.4% sodium bicarbonate (1:9 ratio) Anesthesia Volume In Cc: 2 Hemostasis: Drysol Wound Care: Mupirocin Render Path Notes In Note?: No Consent was obtained from the patient. The risks and benefits to therapy were discussed in detail. Specifically, the risks of infection, scarring, bleeding, prolonged wound healing, incomplete removal, allergy to anesthesia, nerve injury and recurrence were addressed. Prior to the procedure, the treatment site was clearly identified and confirmed by the patient. All components of Universal Protocol/PAUSE Rule completed. Post-Care Instructions: I reviewed with the patient in detail post-care instructions. Patient is to keep the biopsy site dry overnight, and then apply bacitracin twice daily until healed. Patient may apply hydrogen peroxide soaks to remove any crusting. Notification Instructions: Patient will be notified of pathology results. However, patient instructed to call the office if not contacted within 2 weeks. Billing Type: Third-Party Bill

## 2024-05-16 ENCOUNTER — TRANSCRIPTION ENCOUNTER (OUTPATIENT)
Age: 54
End: 2024-05-16

## 2024-05-17 ENCOUNTER — APPOINTMENT (OUTPATIENT)
Dept: INTERNAL MEDICINE | Facility: CLINIC | Age: 54
End: 2024-05-17

## 2024-05-19 NOTE — ASU PREOP CHECKLIST - HAND OFF
Grayson Maldonado is a 56 y.o. male who presents today for the following:  Chief Complaint   Patient presents with    Diarrhea     For around 3 months    Gas         Allergies   Allergen Reactions    Loratadine Palpitations       Current Outpatient Medications   Medication Sig Dispense Refill    polyethylene glycol (GLYCOLAX) 17 GM/SCOOP powder Use as directed by physician. 510 g 0    olmesartan-amLODIPine-HCTZ 40-10-25 MG TABS ONE TABLET BY MOUTH EVERY MORNING 30 tablet 2    pantoprazole (PROTONIX) 40 MG tablet ONE TABLET BY MOUTH EVERY MORNING BEFORE BREAKFAST 30 tablet 2    atorvastatin (LIPITOR) 40 MG tablet ONE TABLET BY MOUTH EVERY MORNING 30 tablet 2    montelukast (SINGULAIR) 10 MG tablet ONE TABLET BY MOUTH EVERY MORNING - ALLERGIES 30 tablet 2    metFORMIN (GLUCOPHAGE) 500 MG tablet ONE TABLET BY MOUTH TWICE DAILY AT 8AM AND 5PM - DM 60 tablet 2     No current facility-administered medications for this visit.       Past Medical History:   Diagnosis Date    Chronic kidney disease     Diabetes mellitus (HCC)     Diarrhea     Hypertension     Mixed hyperlipidemia 03/01/2024       Past Surgical History:   Procedure Laterality Date    CYST REMOVAL      boil in rectum       Family History   Problem Relation Age of Onset    Hypertension Father     Hypertension Mother        Social History     Socioeconomic History    Marital status: Single     Spouse name: Not on file    Number of children: Not on file    Years of education: Not on file    Highest education level: Not on file   Occupational History    Not on file   Tobacco Use    Smoking status: Never    Smokeless tobacco: Never   Vaping Use    Vaping Use: Never used   Substance and Sexual Activity    Alcohol use: No    Drug use: No    Sexual activity: Not on file   Other Topics Concern    Not on file   Social History Narrative    Not on file     Social Determinants of Health     Financial Resource Strain: Medium Risk (3/1/2024)    Overall Financial Resource Strain  Refill: 0    2. Heme + stool                yes

## 2024-05-28 ENCOUNTER — APPOINTMENT (OUTPATIENT)
Dept: ENDOCRINOLOGY | Facility: CLINIC | Age: 54
End: 2024-05-28

## 2024-05-29 ENCOUNTER — EMERGENCY (EMERGENCY)
Facility: HOSPITAL | Age: 54
LOS: 1 days | Discharge: HOME | End: 2024-05-29
Attending: STUDENT IN AN ORGANIZED HEALTH CARE EDUCATION/TRAINING PROGRAM
Payer: COMMERCIAL

## 2024-05-29 VITALS
OXYGEN SATURATION: 98 % | DIASTOLIC BLOOD PRESSURE: 77 MMHG | HEIGHT: 71 IN | WEIGHT: 119.93 LBS | RESPIRATION RATE: 20 BRPM | HEART RATE: 90 BPM | TEMPERATURE: 98 F | SYSTOLIC BLOOD PRESSURE: 111 MMHG

## 2024-05-29 DIAGNOSIS — I89.9 NONINFECTIVE DISORDER OF LYMPHATIC VESSELS AND LYMPH NODES, UNSPECIFIED: Chronic | ICD-10-CM

## 2024-05-29 DIAGNOSIS — Z98.890 OTHER SPECIFIED POSTPROCEDURAL STATES: Chronic | ICD-10-CM

## 2024-05-29 DIAGNOSIS — C43.4 MALIGNANT MELANOMA OF SCALP AND NECK: Chronic | ICD-10-CM

## 2024-05-29 DIAGNOSIS — Z90.79 ACQUIRED ABSENCE OF OTHER GENITAL ORGAN(S): Chronic | ICD-10-CM

## 2024-05-29 DIAGNOSIS — Z98.89 OTHER SPECIFIED POSTPROCEDURAL STATES: Chronic | ICD-10-CM

## 2024-05-29 PROCEDURE — 99285 EMERGENCY DEPT VISIT HI MDM: CPT

## 2024-05-29 PROCEDURE — 99053 MED SERV 10PM-8AM 24 HR FAC: CPT

## 2024-05-30 VITALS
HEART RATE: 75 BPM | RESPIRATION RATE: 18 BRPM | OXYGEN SATURATION: 99 % | DIASTOLIC BLOOD PRESSURE: 69 MMHG | SYSTOLIC BLOOD PRESSURE: 110 MMHG | TEMPERATURE: 98 F

## 2024-05-30 LAB
ALBUMIN SERPL ELPH-MCNC: 4.3 G/DL — SIGNIFICANT CHANGE UP (ref 3.3–5)
ALP SERPL-CCNC: 93 U/L — SIGNIFICANT CHANGE UP (ref 40–120)
ALT FLD-CCNC: 20 U/L — SIGNIFICANT CHANGE UP (ref 10–45)
ANION GAP SERPL CALC-SCNC: 11 MMOL/L — SIGNIFICANT CHANGE UP (ref 5–17)
APPEARANCE UR: CLEAR — SIGNIFICANT CHANGE UP
AST SERPL-CCNC: 27 U/L — SIGNIFICANT CHANGE UP (ref 10–40)
BASE EXCESS BLDV CALC-SCNC: 1.6 MMOL/L — SIGNIFICANT CHANGE UP (ref -2–3)
BASOPHILS # BLD AUTO: 0.06 K/UL — SIGNIFICANT CHANGE UP (ref 0–0.2)
BASOPHILS NFR BLD AUTO: 0.7 % — SIGNIFICANT CHANGE UP (ref 0–2)
BILIRUB SERPL-MCNC: 0.5 MG/DL — SIGNIFICANT CHANGE UP (ref 0.2–1.2)
BILIRUB UR-MCNC: NEGATIVE — SIGNIFICANT CHANGE UP
BUN SERPL-MCNC: 15 MG/DL — SIGNIFICANT CHANGE UP (ref 7–23)
CA-I SERPL-SCNC: 1.34 MMOL/L — HIGH (ref 1.15–1.33)
CALCIUM SERPL-MCNC: 10.7 MG/DL — HIGH (ref 8.4–10.5)
CHLORIDE BLDV-SCNC: 100 MMOL/L — SIGNIFICANT CHANGE UP (ref 96–108)
CHLORIDE SERPL-SCNC: 100 MMOL/L — SIGNIFICANT CHANGE UP (ref 96–108)
CO2 BLDV-SCNC: 31 MMOL/L — HIGH (ref 22–26)
CO2 SERPL-SCNC: 24 MMOL/L — SIGNIFICANT CHANGE UP (ref 22–31)
COLOR SPEC: YELLOW — SIGNIFICANT CHANGE UP
CREAT SERPL-MCNC: 0.85 MG/DL — SIGNIFICANT CHANGE UP (ref 0.5–1.3)
DIFF PNL FLD: NEGATIVE — SIGNIFICANT CHANGE UP
EGFR: 103 ML/MIN/1.73M2 — SIGNIFICANT CHANGE UP
EOSINOPHIL # BLD AUTO: 0.49 K/UL — SIGNIFICANT CHANGE UP (ref 0–0.5)
EOSINOPHIL NFR BLD AUTO: 6.1 % — HIGH (ref 0–6)
GAS PNL BLDV: 131 MMOL/L — LOW (ref 136–145)
GAS PNL BLDV: SIGNIFICANT CHANGE UP
GLUCOSE BLDV-MCNC: 90 MG/DL — SIGNIFICANT CHANGE UP (ref 70–99)
GLUCOSE SERPL-MCNC: 92 MG/DL — SIGNIFICANT CHANGE UP (ref 70–99)
GLUCOSE UR QL: NEGATIVE MG/DL — SIGNIFICANT CHANGE UP
HCO3 BLDV-SCNC: 29 MMOL/L — SIGNIFICANT CHANGE UP (ref 22–29)
HCT VFR BLD CALC: 39.7 % — SIGNIFICANT CHANGE UP (ref 39–50)
HCT VFR BLDA CALC: 40 % — SIGNIFICANT CHANGE UP (ref 39–51)
HGB BLD CALC-MCNC: 13.4 G/DL — SIGNIFICANT CHANGE UP (ref 12.6–17.4)
HGB BLD-MCNC: 13.1 G/DL — SIGNIFICANT CHANGE UP (ref 13–17)
IMM GRANULOCYTES NFR BLD AUTO: 0.2 % — SIGNIFICANT CHANGE UP (ref 0–0.9)
KETONES UR-MCNC: NEGATIVE MG/DL — SIGNIFICANT CHANGE UP
LACTATE BLDV-MCNC: 0.9 MMOL/L — SIGNIFICANT CHANGE UP (ref 0.5–2)
LEUKOCYTE ESTERASE UR-ACNC: NEGATIVE — SIGNIFICANT CHANGE UP
LIDOCAIN IGE QN: 34 U/L — SIGNIFICANT CHANGE UP (ref 7–60)
LYMPHOCYTES # BLD AUTO: 3.3 K/UL — SIGNIFICANT CHANGE UP (ref 1–3.3)
LYMPHOCYTES # BLD AUTO: 40.9 % — SIGNIFICANT CHANGE UP (ref 13–44)
MCHC RBC-ENTMCNC: 27.3 PG — SIGNIFICANT CHANGE UP (ref 27–34)
MCHC RBC-ENTMCNC: 33 GM/DL — SIGNIFICANT CHANGE UP (ref 32–36)
MCV RBC AUTO: 82.9 FL — SIGNIFICANT CHANGE UP (ref 80–100)
MONOCYTES # BLD AUTO: 0.37 K/UL — SIGNIFICANT CHANGE UP (ref 0–0.9)
MONOCYTES NFR BLD AUTO: 4.6 % — SIGNIFICANT CHANGE UP (ref 2–14)
NEUTROPHILS # BLD AUTO: 3.82 K/UL — SIGNIFICANT CHANGE UP (ref 1.8–7.4)
NEUTROPHILS NFR BLD AUTO: 47.5 % — SIGNIFICANT CHANGE UP (ref 43–77)
NITRITE UR-MCNC: NEGATIVE — SIGNIFICANT CHANGE UP
NRBC # BLD: 0 /100 WBCS — SIGNIFICANT CHANGE UP (ref 0–0)
PCO2 BLDV: 58 MMHG — HIGH (ref 42–55)
PH BLDV: 7.31 — LOW (ref 7.32–7.43)
PH UR: 5 — SIGNIFICANT CHANGE UP (ref 5–8)
PLATELET # BLD AUTO: 251 K/UL — SIGNIFICANT CHANGE UP (ref 150–400)
PO2 BLDV: 26 MMHG — SIGNIFICANT CHANGE UP (ref 25–45)
POTASSIUM BLDV-SCNC: 4.6 MMOL/L — SIGNIFICANT CHANGE UP (ref 3.5–5.1)
POTASSIUM SERPL-MCNC: 4.7 MMOL/L — SIGNIFICANT CHANGE UP (ref 3.5–5.3)
POTASSIUM SERPL-SCNC: 4.7 MMOL/L — SIGNIFICANT CHANGE UP (ref 3.5–5.3)
PROT SERPL-MCNC: 7.1 G/DL — SIGNIFICANT CHANGE UP (ref 6–8.3)
PROT UR-MCNC: NEGATIVE MG/DL — SIGNIFICANT CHANGE UP
RBC # BLD: 4.79 M/UL — SIGNIFICANT CHANGE UP (ref 4.2–5.8)
RBC # FLD: 13.3 % — SIGNIFICANT CHANGE UP (ref 10.3–14.5)
SAO2 % BLDV: 33.4 % — LOW (ref 67–88)
SODIUM SERPL-SCNC: 135 MMOL/L — SIGNIFICANT CHANGE UP (ref 135–145)
SP GR SPEC: 1.01 — SIGNIFICANT CHANGE UP (ref 1–1.03)
UROBILINOGEN FLD QL: 0.2 MG/DL — SIGNIFICANT CHANGE UP (ref 0.2–1)
WBC # BLD: 8.06 K/UL — SIGNIFICANT CHANGE UP (ref 3.8–10.5)
WBC # FLD AUTO: 8.06 K/UL — SIGNIFICANT CHANGE UP (ref 3.8–10.5)

## 2024-05-30 PROCEDURE — 85018 HEMOGLOBIN: CPT

## 2024-05-30 PROCEDURE — 82435 ASSAY OF BLOOD CHLORIDE: CPT

## 2024-05-30 PROCEDURE — 74177 CT ABD & PELVIS W/CONTRAST: CPT | Mod: MC

## 2024-05-30 PROCEDURE — 83690 ASSAY OF LIPASE: CPT

## 2024-05-30 PROCEDURE — 96374 THER/PROPH/DIAG INJ IV PUSH: CPT | Mod: XU

## 2024-05-30 PROCEDURE — 74177 CT ABD & PELVIS W/CONTRAST: CPT | Mod: 26,MC

## 2024-05-30 PROCEDURE — 84295 ASSAY OF SERUM SODIUM: CPT

## 2024-05-30 PROCEDURE — 82330 ASSAY OF CALCIUM: CPT

## 2024-05-30 PROCEDURE — 83605 ASSAY OF LACTIC ACID: CPT

## 2024-05-30 PROCEDURE — 81003 URINALYSIS AUTO W/O SCOPE: CPT

## 2024-05-30 PROCEDURE — 82947 ASSAY GLUCOSE BLOOD QUANT: CPT

## 2024-05-30 PROCEDURE — 99284 EMERGENCY DEPT VISIT MOD MDM: CPT | Mod: 25

## 2024-05-30 PROCEDURE — 84132 ASSAY OF SERUM POTASSIUM: CPT

## 2024-05-30 PROCEDURE — 96375 TX/PRO/DX INJ NEW DRUG ADDON: CPT

## 2024-05-30 PROCEDURE — 82803 BLOOD GASES ANY COMBINATION: CPT

## 2024-05-30 PROCEDURE — 85014 HEMATOCRIT: CPT

## 2024-05-30 PROCEDURE — 80053 COMPREHEN METABOLIC PANEL: CPT

## 2024-05-30 PROCEDURE — 87086 URINE CULTURE/COLONY COUNT: CPT

## 2024-05-30 PROCEDURE — 85025 COMPLETE CBC W/AUTO DIFF WBC: CPT

## 2024-05-30 RX ORDER — MORPHINE SULFATE 50 MG/1
8 CAPSULE, EXTENDED RELEASE ORAL ONCE
Refills: 0 | Status: DISCONTINUED | OUTPATIENT
Start: 2024-05-30 | End: 2024-05-30

## 2024-05-30 RX ORDER — OXYCODONE HYDROCHLORIDE 5 MG/1
15 TABLET ORAL ONCE
Refills: 0 | Status: DISCONTINUED | OUTPATIENT
Start: 2024-05-30 | End: 2024-05-30

## 2024-05-30 RX ORDER — PANTOPRAZOLE SODIUM 20 MG/1
80 TABLET, DELAYED RELEASE ORAL ONCE
Refills: 0 | Status: COMPLETED | OUTPATIENT
Start: 2024-05-30 | End: 2024-05-30

## 2024-05-30 RX ORDER — SODIUM CHLORIDE 9 MG/ML
1000 INJECTION, SOLUTION INTRAVENOUS ONCE
Refills: 0 | Status: COMPLETED | OUTPATIENT
Start: 2024-05-30 | End: 2024-05-30

## 2024-05-30 RX ADMIN — SODIUM CHLORIDE 1000 MILLILITER(S): 9 INJECTION, SOLUTION INTRAVENOUS at 00:29

## 2024-05-30 RX ADMIN — OXYCODONE HYDROCHLORIDE 15 MILLIGRAM(S): 5 TABLET ORAL at 04:12

## 2024-05-30 RX ADMIN — PANTOPRAZOLE SODIUM 80 MILLIGRAM(S): 20 TABLET, DELAYED RELEASE ORAL at 00:36

## 2024-05-30 RX ADMIN — MORPHINE SULFATE 8 MILLIGRAM(S): 50 CAPSULE, EXTENDED RELEASE ORAL at 00:29

## 2024-05-30 NOTE — ED PROVIDER NOTE - PATIENT PORTAL LINK FT
You can access the FollowMyHealth Patient Portal offered by [facility] by registering at the following website: [email or web address]. By joining [employer]’s FollowMyHealth portal, you will also be able to view your health information using other applications (apps) compatible with our system.

## 2024-05-30 NOTE — ED PROVIDER NOTE - PROGRESS NOTE DETAILS
Attending Rick Mckinley:   Patient is hemodynamically stable, feels improved. CT with stercoral. No current indication for abxs. Having bowel movements. All w/u, results discussed at length w/ patient. All questions answered. Strict return precautions provided w/ verbal understanding expressed. Stable for dc w/ close outpt f/u. Attending Rick Mckinley:   Patient is hemodynamically stable, feels improved. CT with stercoral. No current indication for abxs. Having bowel movements. All w/u, results discussed at length w/ patient and wife. All questions answered. Strict return precautions provided w/ verbal understanding expressed. Stable for dc w/ close outpt f/u.

## 2024-05-30 NOTE — ED PROVIDER NOTE - ATTENDING APP SHARED VISIT CONTRIBUTION OF CARE
Attending (Rick Mckinley D.O.):  I have personally seen and examined this patient. I have performed a substantive portion of the visit including all aspects of the medical decision making. Resident, fellow, student, and/or ACP note reviewed. I agree on the plan of care except where noted.    see mdm

## 2024-05-30 NOTE — ED ADULT NURSE NOTE - OBJECTIVE STATEMENT
53 y/o male presents to the ED BIBEMS for worsening LLQ abdominal pain. A/Ox4. Ambulatory without assistive devices at baseline. PMH: CAD, testicular cancer non-Hodgkin's lymphoma and a malignant melanoma. Patient endorses recent [facility] on 5/5/24 where he was diagnosed with appendicitis. Patient endorses taking home PO Oxycodone for chronic pain without relief. Patient denies fever, cough, chills, n/v and diarrhea. Upon assessment, abdomen is soft, tender to the LLQ and non-distended. Safety and comfort provided.

## 2024-05-30 NOTE — ED PROVIDER NOTE - NSFOLLOWUPINSTRUCTIONS_ED_ALL_ED_FT
YOU WERE SEEN IN THE ED FOR: abdominal pain    YOUR CT SHOWED EARLY SIGNS OF INFLAMMATION BY THE RECTUM AND LOWER INTESTINE.    CONTINUE WITH YOUR BOWEL REGIMEN.    PLEASE FOLLOW UP WITH YOUR PRIVATE PHYSICIAN WITHIN THE NEXT 48 HOURS. BRING COPIES OF YOUR RESULTS.    RETURN TO THE EMERGENCY DEPARTMENT IF YOU EXPERIENCE ANY NEW/CONCERNING/WORSENING SYMPTOMS.    Proctitis  Body outline showing the digestive organs and noting the rectum.  Proctitis is inflammation of the lining of the rectum. The rectum is at the end of the large intestine. The inflammation can cause pain and discomfort. It may be short-term and sudden (acute) or a long-term (chronic) problem.    What are the causes?  Proctitis may be caused by:  Sexually transmitted infections (STIs).  Infection.  Trauma or injury to the rectum or to the opening of the butt (anus).  Ulcerative colitis or Crohn's disease.  Radiation therapy near the rectum.  Antibiotics.  What are the signs or symptoms?  Symptoms of this condition include:  The sudden need to poop. This may be uncomfortable. It may happen often.  Pain in the anus or rectum.  Bleeding from the rectum.  Pain or cramping in the abdomen.  Feeling like your rectum is full.  Pus or mucus coming from the anus.  Diarrhea or lots of soft, loose poop (stool).  Constipation or pain when pooping.  How is this diagnosed?  This condition may be diagnosed based on your medical history and a physical exam.    You may also have tests, such as:  A test to check for an STI.  Blood tests.  Poop tests.  A culture test. This is when a sample is taken from your rectum to look for bacteria.  Anoscopy. This is a procedure to look at the anal canal.  Colonoscopy or sigmoidoscopy. These are procedures to look at the large intestine.  How is this treated?  Treatment for proctitis depends on the cause. Treatment may include:  Medicines to:  Treat inflammation. These may be ointments or foams you can put on your rectum. They may also be enemas or medicines you can put in your rectum (suppositories).  Treat infection or fight bacteria, such as antibiotics.  Fight a virus. These medicines are called antivirals.  Help with diarrhea, hard poop, and pain.  Reduce the activity of the body's disease-fighting system (immune system).  Supplements.  Staying away from the activity that caused trauma to the rectum.  Heat or laser therapy. This may be used if bleeding does not go away.  A procedure to make a narrow rectum wider.  Surgery to fix the lining of the rectum. This is rare.  If the proctitis was caused by an STI, you may need to get tested again for infection 3 months after treatment.    Follow these instructions at home:  Medicines    Take over-the-counter and prescription medicines only as told by your health care provider.  If you were prescribed antibiotics, take them as told by your provider. Do not stop using the antibiotic even if you start to feel better.  Managing constipation    Proctitis may cause constipation. To prevent or treat constipation, you may need to:  Drink enough fluid to keep your pee (urine) pale yellow.  Take over-the-counter or prescription medicines.  Eat foods that are high in fiber, such as beans, whole grains, and fresh fruits and vegetables.  Limit foods that are high in fat and processed sugars, such as fried or sweet foods.  General instructions    A bathtub partially filled with water.  Take sitz baths as told by your provider. A sitz bath is a shallow, warm water bath that you sit down in. The water should only come up to your hips and should cover your butt.  Try not to eat right before bed.  Keep all follow-up visits. Your provider will need to make sure your condition is improving.  Contact a health care provider if:  Your symptoms do not get better with treatment.  Your symptoms get worse.  You have a fever.  Get help right away if:  You have blood in your poop.  There is blood coming from your rectum.  This information is not intended to replace advice given to you by your health care provider. Make sure you discuss any questions you have with your health care provider.

## 2024-05-30 NOTE — ED PROVIDER NOTE - CLINICAL SUMMARY MEDICAL DECISION MAKING FREE TEXT BOX
Attending Rick Mckinley:  54M with complicated onc hx here for gradual onset, LLQ abdominal pain, constant, achy, worse with foods, transiently improved with BM. Currently having BMs, passing gas. Denies trauma. Denies urinary sxs. Denies infx sx. Hemodynam stable, NAD. AAOx4. No e/o jaundice nor anemia. No inc wob. + mild ttp llq, no peritoneal signs. No rash. No abd wall bruising. Neurovasc intact with full str/rom.    Hx and exam not c/w bowel obstruction. W/ longstanding opiate use, perhaps early stercoral colitis. Prior CT reviewed that showed pancolitis. No e/o infx clinically. Check for metabolic derrangement. Plan for labs, CT a/p with iv contrast, dose equiv home meds.

## 2024-05-31 LAB
CULTURE RESULTS: SIGNIFICANT CHANGE UP
SPECIMEN SOURCE: SIGNIFICANT CHANGE UP

## 2024-06-12 ENCOUNTER — APPOINTMENT (OUTPATIENT)
Dept: SURGERY | Facility: CLINIC | Age: 54
End: 2024-06-12
Payer: COMMERCIAL

## 2024-06-12 VITALS
HEART RATE: 100 BPM | DIASTOLIC BLOOD PRESSURE: 76 MMHG | RESPIRATION RATE: 18 BRPM | WEIGHT: 120 LBS | HEIGHT: 71 IN | BODY MASS INDEX: 16.8 KG/M2 | TEMPERATURE: 97.3 F | SYSTOLIC BLOOD PRESSURE: 106 MMHG | OXYGEN SATURATION: 96 %

## 2024-06-12 DIAGNOSIS — K59.09 OTHER CONSTIPATION: ICD-10-CM

## 2024-06-12 DIAGNOSIS — Z86.010 PERSONAL HISTORY OF COLONIC POLYPS: ICD-10-CM

## 2024-06-12 PROCEDURE — 99213 OFFICE O/P EST LOW 20 MIN: CPT

## 2024-06-12 NOTE — HISTORY OF PRESENT ILLNESS
[FreeTextEntry1] : Conrad is a 53 y/o male being seen for a follow-up visit, was in ED   s/p Laparoscopic-assisted converted to open left colectomy on 10/18/2011 by Dr. Rice for left sided ischemic colitis with partial obstruction.    Colonoscopy 9/25/20 - Hemorrhoids found on perianal exam.  One 4 mm polyp in the descending colon, removed.  One 10 mm polyp in the descending colon, removed.    ED [facility] 5/5/24 for tachycardia, nausea/vomiting, epistaxis, abdominal pain x 2 days.  CT 5/5/24 (RLQ tenderness) - Normal-appearing appendix. Interval resolution of proctocolitis and pelvic inflammatory changes. No CT evidence of acute pathology in the abdomen or pelvis.  Today pt reports feeling no pain.  Formed BMs daily, no straining, no bleeding, takes Miralax and Movantik daily.  Denies prolapsing tissue or swelling.  No episodes of incontinence of stool or flatus.  Good appetite.  No c/o nausea/vomiting.  Denies fever and chills.  Patient is on Plavix for stents x 2.

## 2024-06-12 NOTE — DATA REVIEWED
[FreeTextEntry1] : I Gabriella Jonhson RN, attest that I was present throughout the exam.I Gabriella Johnson RN, attest that I was present throughout the exam.

## 2024-06-12 NOTE — ASSESSMENT
[FreeTextEntry1] : Patient now with regular bowel function on Movantik and MiraLAX.  He is due for a colonoscopy because of the multiple polyps that he has had.  Indications, risks, benefits, alternatives reviewed including but not limited to bleeding, perforation, and incomplete exam.  All questions answered.  Arrangements to be made.

## 2024-06-18 RX ORDER — SODIUM PICOSULFATE, MAGNESIUM OXIDE, AND ANHYDROUS CITRIC ACID 12; 3.5; 1 G/175ML; G/175ML; MG/175ML
10-3.5-12 MG-GM LIQUID ORAL
Qty: 1 | Refills: 0 | Status: ACTIVE | COMMUNITY
Start: 2024-06-18 | End: 1900-01-01

## 2024-07-28 ENCOUNTER — EMERGENCY (EMERGENCY)
Facility: HOSPITAL | Age: 54
LOS: 1 days | Discharge: HOME | End: 2024-07-28
Attending: EMERGENCY MEDICINE
Payer: COMMERCIAL

## 2024-07-28 VITALS
OXYGEN SATURATION: 95 % | HEART RATE: 82 BPM | RESPIRATION RATE: 20 BRPM | DIASTOLIC BLOOD PRESSURE: 87 MMHG | TEMPERATURE: 98 F | SYSTOLIC BLOOD PRESSURE: 128 MMHG | WEIGHT: 130.07 LBS | HEIGHT: 71 IN

## 2024-07-28 DIAGNOSIS — Z98.890 OTHER SPECIFIED POSTPROCEDURAL STATES: Chronic | ICD-10-CM

## 2024-07-28 DIAGNOSIS — Z98.89 OTHER SPECIFIED POSTPROCEDURAL STATES: Chronic | ICD-10-CM

## 2024-07-28 DIAGNOSIS — C43.4 MALIGNANT MELANOMA OF SCALP AND NECK: Chronic | ICD-10-CM

## 2024-07-28 DIAGNOSIS — I89.9 NONINFECTIVE DISORDER OF LYMPHATIC VESSELS AND LYMPH NODES, UNSPECIFIED: Chronic | ICD-10-CM

## 2024-07-28 DIAGNOSIS — Z90.79 ACQUIRED ABSENCE OF OTHER GENITAL ORGAN(S): Chronic | ICD-10-CM

## 2024-07-28 LAB
ALBUMIN SERPL ELPH-MCNC: 4.6 G/DL — SIGNIFICANT CHANGE UP (ref 3.3–5)
ALP SERPL-CCNC: 96 U/L — SIGNIFICANT CHANGE UP (ref 40–120)
ALT FLD-CCNC: 21 U/L — SIGNIFICANT CHANGE UP (ref 10–45)
ANION GAP SERPL CALC-SCNC: 13 MMOL/L — SIGNIFICANT CHANGE UP (ref 5–17)
AST SERPL-CCNC: 49 U/L — HIGH (ref 10–40)
BASE EXCESS BLDV CALC-SCNC: 1.6 MMOL/L — SIGNIFICANT CHANGE UP (ref -2–3)
BASOPHILS # BLD AUTO: 0.05 K/UL — SIGNIFICANT CHANGE UP (ref 0–0.2)
BASOPHILS NFR BLD AUTO: 0.3 % — SIGNIFICANT CHANGE UP (ref 0–2)
BILIRUB SERPL-MCNC: 0.6 MG/DL — SIGNIFICANT CHANGE UP (ref 0.2–1.2)
BUN SERPL-MCNC: 16 MG/DL — SIGNIFICANT CHANGE UP (ref 7–23)
CA-I SERPL-SCNC: 1.25 MMOL/L — SIGNIFICANT CHANGE UP (ref 1.15–1.33)
CALCIUM SERPL-MCNC: 10.3 MG/DL — SIGNIFICANT CHANGE UP (ref 8.4–10.5)
CHLORIDE BLDV-SCNC: 98 MMOL/L — SIGNIFICANT CHANGE UP (ref 96–108)
CHLORIDE SERPL-SCNC: 100 MMOL/L — SIGNIFICANT CHANGE UP (ref 96–108)
CO2 BLDV-SCNC: 31 MMOL/L — HIGH (ref 22–26)
CO2 SERPL-SCNC: 22 MMOL/L — SIGNIFICANT CHANGE UP (ref 22–31)
CREAT SERPL-MCNC: 0.79 MG/DL — SIGNIFICANT CHANGE UP (ref 0.5–1.3)
EGFR: 106 ML/MIN/1.73M2 — SIGNIFICANT CHANGE UP
EOSINOPHIL # BLD AUTO: 0.28 K/UL — SIGNIFICANT CHANGE UP (ref 0–0.5)
EOSINOPHIL NFR BLD AUTO: 2 % — SIGNIFICANT CHANGE UP (ref 0–6)
GAS PNL BLDV: 133 MMOL/L — LOW (ref 136–145)
GAS PNL BLDV: SIGNIFICANT CHANGE UP
GLUCOSE BLDV-MCNC: 94 MG/DL — SIGNIFICANT CHANGE UP (ref 70–99)
GLUCOSE SERPL-MCNC: 94 MG/DL — SIGNIFICANT CHANGE UP (ref 70–99)
HCO3 BLDV-SCNC: 29 MMOL/L — SIGNIFICANT CHANGE UP (ref 22–29)
HCT VFR BLD CALC: 46.6 % — SIGNIFICANT CHANGE UP (ref 39–50)
HCT VFR BLDA CALC: 47 % — SIGNIFICANT CHANGE UP (ref 39–51)
HGB BLD CALC-MCNC: 15.8 G/DL — SIGNIFICANT CHANGE UP (ref 12.6–17.4)
HGB BLD-MCNC: 15.1 G/DL — SIGNIFICANT CHANGE UP (ref 13–17)
IMM GRANULOCYTES NFR BLD AUTO: 0.4 % — SIGNIFICANT CHANGE UP (ref 0–0.9)
LACTATE BLDV-MCNC: 1.7 MMOL/L — SIGNIFICANT CHANGE UP (ref 0.5–2)
LIDOCAIN IGE QN: 35 U/L — SIGNIFICANT CHANGE UP (ref 7–60)
LYMPHOCYTES # BLD AUTO: 28.2 % — SIGNIFICANT CHANGE UP (ref 13–44)
LYMPHOCYTES # BLD AUTO: 4.03 K/UL — HIGH (ref 1–3.3)
MAGNESIUM SERPL-MCNC: 1.9 MG/DL — SIGNIFICANT CHANGE UP (ref 1.6–2.6)
MCHC RBC-ENTMCNC: 27.2 PG — SIGNIFICANT CHANGE UP (ref 27–34)
MCHC RBC-ENTMCNC: 32.4 GM/DL — SIGNIFICANT CHANGE UP (ref 32–36)
MCV RBC AUTO: 83.8 FL — SIGNIFICANT CHANGE UP (ref 80–100)
MONOCYTES # BLD AUTO: 0.77 K/UL — SIGNIFICANT CHANGE UP (ref 0–0.9)
MONOCYTES NFR BLD AUTO: 5.4 % — SIGNIFICANT CHANGE UP (ref 2–14)
NEUTROPHILS # BLD AUTO: 9.11 K/UL — HIGH (ref 1.8–7.4)
NEUTROPHILS NFR BLD AUTO: 63.7 % — SIGNIFICANT CHANGE UP (ref 43–77)
NRBC # BLD: 0 /100 WBCS — SIGNIFICANT CHANGE UP (ref 0–0)
PCO2 BLDV: 55 MMHG — SIGNIFICANT CHANGE UP (ref 42–55)
PH BLDV: 7.33 — SIGNIFICANT CHANGE UP (ref 7.32–7.43)
PHOSPHATE SERPL-MCNC: 3.6 MG/DL — SIGNIFICANT CHANGE UP (ref 2.5–4.5)
PLATELET # BLD AUTO: 261 K/UL — SIGNIFICANT CHANGE UP (ref 150–400)
PO2 BLDV: 17 MMHG — LOW (ref 25–45)
POTASSIUM BLDV-SCNC: 5 MMOL/L — SIGNIFICANT CHANGE UP (ref 3.5–5.1)
POTASSIUM SERPL-MCNC: 5 MMOL/L — SIGNIFICANT CHANGE UP (ref 3.5–5.3)
POTASSIUM SERPL-SCNC: 5 MMOL/L — SIGNIFICANT CHANGE UP (ref 3.5–5.3)
PROT SERPL-MCNC: 7.9 G/DL — SIGNIFICANT CHANGE UP (ref 6–8.3)
RBC # BLD: 5.56 M/UL — SIGNIFICANT CHANGE UP (ref 4.2–5.8)
RBC # FLD: 14.5 % — SIGNIFICANT CHANGE UP (ref 10.3–14.5)
SAO2 % BLDV: 24.9 % — LOW (ref 67–88)
SODIUM SERPL-SCNC: 135 MMOL/L — SIGNIFICANT CHANGE UP (ref 135–145)
WBC # BLD: 14.3 K/UL — HIGH (ref 3.8–10.5)
WBC # FLD AUTO: 14.3 K/UL — HIGH (ref 3.8–10.5)

## 2024-07-28 PROCEDURE — 99285 EMERGENCY DEPT VISIT HI MDM: CPT

## 2024-07-28 RX ORDER — ONDANSETRON HYDROCHLORIDE 2 MG/ML
4 INJECTION INTRAMUSCULAR; INTRAVENOUS ONCE
Refills: 0 | Status: COMPLETED | OUTPATIENT
Start: 2024-07-28 | End: 2024-07-28

## 2024-07-28 RX ORDER — HYDROMORPHONE HCL 0.2 MG/ML
1 INJECTION, SOLUTION INTRAVENOUS ONCE
Refills: 0 | Status: DISCONTINUED | OUTPATIENT
Start: 2024-07-28 | End: 2024-07-28

## 2024-07-28 RX ORDER — DEXTROSE MONOHYDRATE AND SODIUM CHLORIDE 5; .3 G/100ML; G/100ML
500 INJECTION, SOLUTION INTRAVENOUS ONCE
Refills: 0 | Status: COMPLETED | OUTPATIENT
Start: 2024-07-28 | End: 2024-07-28

## 2024-07-28 RX ADMIN — DEXTROSE MONOHYDRATE AND SODIUM CHLORIDE 1000 MILLILITER(S): 5; .3 INJECTION, SOLUTION INTRAVENOUS at 22:19

## 2024-07-28 RX ADMIN — HYDROMORPHONE HCL 1 MILLIGRAM(S): 0.2 INJECTION, SOLUTION INTRAVENOUS at 22:18

## 2024-07-28 NOTE — ED ADULT TRIAGE NOTE - HEART RATE (BEATS/MIN)
Problem: ABCDS Injury Assessment  Goal: Absence of physical injury  7/24/2024 1030 by Sabra Cisneros RN  Outcome: Progressing  7/24/2024 0015 by Giana Beaver  Outcome: Progressing     Problem: Skin/Tissue Integrity  Goal: Absence of new skin breakdown  Description: 1.  Monitor for areas of redness and/or skin breakdown  2.  Assess vascular access sites hourly  3.  Every 4-6 hours minimum:  Change oxygen saturation probe site  4.  Every 4-6 hours:  If on nasal continuous positive airway pressure, respiratory therapy assess nares and determine need for appliance change or resting period.  Outcome: Progressing     Problem: Safety - Adult  Goal: Free from fall injury  Outcome: Progressing     Problem: Chronic Conditions and Co-morbidities  Goal: Patient's chronic conditions and co-morbidity symptoms are monitored and maintained or improved  Outcome: Progressing      82

## 2024-07-28 NOTE — ED PROVIDER NOTE - DIFFERENTIAL DIAGNOSIS
Differential Diagnosis Ddx includes, however, is not limited to: constipation, bowel obstruction, stercoral colitis, other

## 2024-07-28 NOTE — ED ADULT NURSE NOTE - NSFALLUNIVINTERV_ED_ALL_ED
Bed/Stretcher in lowest position, wheels locked, appropriate side rails in place/Call bell, personal items and telephone in reach/Instruct patient to call for assistance before getting out of bed/chair/stretcher/Non-slip footwear applied when patient is off stretcher/[place] to call system/Physically safe environment - no spills, clutter or unnecessary equipment/Purposeful proactive rounding/Room/bathroom lighting operational, light cord in reach

## 2024-07-28 NOTE — ED PROVIDER NOTE - PATIENT PORTAL LINK FT
You can access the FollowMyHealth Patient Portal offered by [facility] by registering at the following website: [email or web address]. By joining [employer]’s FollowMyHealth portal, you will also be able to view your health information using other applications (apps) compatible with our system.

## 2024-07-28 NOTE — ED PROVIDER NOTE - PROGRESS NOTE DETAILS
CT with luminal narrowing at infraumbilical region, General surgery consulted. Patient pain currently controlled with Dilaudid. Sigrid Joshi MD (PGY2) Pt signed out to me. Hx of ischemic colitis, NHL s/p immunotherapy and chemotherapy presenting w/ abd pain. Pending surgery recs. Dr. Sommer: Received signout from Dr. Covington.  54-year-old male history of non-Hodgkin's lymphoma status post immunotherapy and chemotherapy in the past, testicular cancer, malignant melanoma, ischemic colitis status post colon resection and transverse to rectum anastomosis with Dr. Rice, CAD status post 2 stents, presenting with abdominal pain.  Noted to have a colonic stricture.  Surgery consulted overnight, awaiting evaluation.  Patient seen at bedside, cachectic appearing, diffuse abdominal tenderness to palpation, requesting more pain medication.  Another dose of Dilaudid given.  Will follow-up surgery recommendations. Dr. Sommer: left msg for surgery attending Dr. Rice Sigrid Joshi MD (PGY2) Paged surgery at 8 am and again at 10 am. Surgery saw pt and we are awaiting final recs. Dr. Sommer: Spoke with attending surgeon Dr. Rice who is familiar with the pt. Pt has h/o opioid induced constipation and has been on methylnaltrexone. Advised to remind pt to schedule colonoscopy with Dr. Rice as outpatient and DC.

## 2024-07-28 NOTE — ED PROVIDER NOTE - PHYSICAL EXAMINATION
GENERAL: NAD, appears uncomfortable   HEAD:  Atraumatic, Normocephalic  EYES: conjunctiva and sclera clear  ENT: Moist mucous membranes  NECK: Supple, No JVD  CHEST/LUNG: Clear to auscultation bilaterally  HEART: Regular rate and rhythm; No murmur  ABDOMEN: Tender to palpation worst tavon-umbilically, guarding   EXTREMITIES:  2+ Peripheral Pulses, brisk capillary refill. No edema  NERVOUS SYSTEM:  A&Ox3, no focal deficits   SKIN: No rashes or lesions

## 2024-07-28 NOTE — ED ADULT NURSE NOTE - OBJECTIVE STATEMENT
54y M Amos tejeda came in for diffuse abdominal pain. Patient reports that a week ago he was diagnosed with colitis and was given colace to take at home. Patient states that he ran out of his colace and he has been unable to have a bowel movement. Patient states last bowel movement was yesterday morning. Patient is endorsing nausea with multiple episodes of vomiting. Abdomen is soft, non-distended, and tender to palpation diffusely. PMH of cancer, HTN, colitis, osteoarthritis, and BPH. Denies fever, chills, headache, blurry vision, diarrhea, dysuria, dizziness, numbness, tingling, SOB, and chest pain. Wife is present at bedside. Patient on continuous cardiac monitor. Call bell within reach, bed in lowest position.

## 2024-07-28 NOTE — ED ADULT NURSE NOTE - CADM POA PRESS ULCER
1. continue current diet as tolerated of: consistent carbohydrate diet  2. encourage PO intake, protein source with each meal  3. monitor PO intake, weight trend, electrolytes, labs, BMs
No

## 2024-07-28 NOTE — ED PROVIDER NOTE - CLINICAL SUMMARY MEDICAL DECISION MAKING FREE TEXT BOX
54 year old male with PMH of NHL (s/p immunotherapy and chemotherapy), testicular cancer, malignant melanoma, ischemic colitis s/p colon resection and transverse to rectum anastomosis with Dr. Rice, CAD s/p 2 stents who presents to the ED with abdominal pain. Patient reports onset of abdominal pain this morning while laying down. He describes a gradual onset of the pain localized tavon-umbilically. Patient reports a history of abdominal pain and was found to have stercoral colitis in May 2024. Patient states he is on chronic pain medication as well as a bowel regimen. He reports a soft brown bowel movement this morning. Patient reports last colonoscopy 1.5 years ago with polyps, has another scheduled in September 2024. 54 year old male with PMH of NHL (s/p immunotherapy and chemotherapy), testicular cancer, malignant melanoma, ischemic colitis s/p colon resection and transverse to rectum anastomosis with Dr. Rice, CAD s/p 2 stents who presents to the ED with abdominal pain. Patient reports onset of abdominal pain this morning while laying down. He describes a gradual onset of the pain localized tavon-umbilically. Patient reports a history of abdominal pain and was found to have stercoral colitis in May 2024. Patient states he is on chronic pain medication as well as a bowel regimen. He reports a soft brown bowel movement this morning. Patient reports last colonoscopy 1.5 years ago with polyps, has another scheduled in September 2024.    DEBBIE Guerra MD: Agree with resident/ACP MDM, assessment and plan as above. Concern for constipation, bowel obstruction, other. Plan: basic labs, CTAP, sx management

## 2024-07-28 NOTE — ED PROVIDER NOTE - OBJECTIVE STATEMENT
54 year old male with PMH of NHL (s/p immunotherapy and chemotherapy), testicular cancer, malignant melanoma, ischemic colitis s/p colon resection and transverse to rectum anastomosis with Dr. Rice, CAD s/p 2 stents who presents to the ED with abdominal pain. Patient reports onset of abdominal pain this morning while laying down. He describes a gradual onset of the pain localized tavon-umbilically. Patient reports a history of abdominal pain and was found to have stercoral colitis in May 2024. Patient states he is on chronic pain medication as well as a bowel regimen. He reports a soft brown bowel movement this morning. Patient reports last colonoscopy 1.5 years ago with polyps, has another scheduled in September 2024.

## 2024-07-28 NOTE — ED ADULT NURSE NOTE - BREATHING, MLM
[As Noted in HPI] : as noted in HPI [Breast Pain] : breast pain [Breast Lump] : breast lump [Negative] : Heme/Lymph [[id number]] : has kidney stones, one passed, two more remain Spontaneous, unlabored and symmetrical

## 2024-07-28 NOTE — ED PROVIDER NOTE - NSFOLLOWUPINSTRUCTIONS_ED_ALL_ED_FT
It was a pleasure caring for you today!    You were seen in the ER today for abdominal pain. Please take methylnaltrexone for constipation.     Please follow up with your primary care doctor within 1 - 3 days. Call and let them know you were seen in the ER today.   Bring the results of your blood work and imaging with you to your appointment, if applicable.    For pain, please take acetaminophen 650 mg every 6 hours for pain. Additionally, you can also take ibuprofen 400 mg every 6-8 hours for pain.    Return to the ER for any worsening symptoms or concerns, including chest pain, shortness of breath, lightheadedness, weakness, or any other concerns.

## 2024-07-29 VITALS
OXYGEN SATURATION: 98 % | SYSTOLIC BLOOD PRESSURE: 107 MMHG | TEMPERATURE: 99 F | DIASTOLIC BLOOD PRESSURE: 71 MMHG | HEART RATE: 85 BPM | RESPIRATION RATE: 16 BRPM

## 2024-07-29 PROCEDURE — 82435 ASSAY OF BLOOD CHLORIDE: CPT

## 2024-07-29 PROCEDURE — 83690 ASSAY OF LIPASE: CPT

## 2024-07-29 PROCEDURE — 74177 CT ABD & PELVIS W/CONTRAST: CPT | Mod: 26,MC

## 2024-07-29 PROCEDURE — 84132 ASSAY OF SERUM POTASSIUM: CPT

## 2024-07-29 PROCEDURE — 84295 ASSAY OF SERUM SODIUM: CPT

## 2024-07-29 PROCEDURE — 85014 HEMATOCRIT: CPT

## 2024-07-29 PROCEDURE — 85018 HEMOGLOBIN: CPT

## 2024-07-29 PROCEDURE — 82803 BLOOD GASES ANY COMBINATION: CPT

## 2024-07-29 PROCEDURE — 96374 THER/PROPH/DIAG INJ IV PUSH: CPT | Mod: XU

## 2024-07-29 PROCEDURE — 84100 ASSAY OF PHOSPHORUS: CPT

## 2024-07-29 PROCEDURE — 83605 ASSAY OF LACTIC ACID: CPT

## 2024-07-29 PROCEDURE — 85025 COMPLETE CBC W/AUTO DIFF WBC: CPT

## 2024-07-29 PROCEDURE — 80053 COMPREHEN METABOLIC PANEL: CPT

## 2024-07-29 PROCEDURE — 74177 CT ABD & PELVIS W/CONTRAST: CPT | Mod: MC

## 2024-07-29 PROCEDURE — 99284 EMERGENCY DEPT VISIT MOD MDM: CPT | Mod: 25

## 2024-07-29 PROCEDURE — 83735 ASSAY OF MAGNESIUM: CPT

## 2024-07-29 PROCEDURE — 96376 TX/PRO/DX INJ SAME DRUG ADON: CPT

## 2024-07-29 PROCEDURE — 82330 ASSAY OF CALCIUM: CPT

## 2024-07-29 PROCEDURE — 82947 ASSAY GLUCOSE BLOOD QUANT: CPT

## 2024-07-29 PROCEDURE — 96375 TX/PRO/DX INJ NEW DRUG ADDON: CPT

## 2024-07-29 RX ORDER — HYDROMORPHONE HCL 0.2 MG/ML
1 INJECTION, SOLUTION INTRAVENOUS ONCE
Refills: 0 | Status: DISCONTINUED | OUTPATIENT
Start: 2024-07-29 | End: 2024-07-29

## 2024-07-29 RX ORDER — ACETAMINOPHEN 325 MG
1000 TABLET ORAL ONCE
Refills: 0 | Status: COMPLETED | OUTPATIENT
Start: 2024-07-29 | End: 2024-07-29

## 2024-07-29 RX ORDER — METHYLNALTREXONE BROMIDE 150 MG/1
3 TABLET ORAL
Qty: 9 | Refills: 0
Start: 2024-07-29 | End: 2024-07-31

## 2024-07-29 RX ORDER — SODIUM CHLORIDE 0.9 % (FLUSH) 0.9 %
1000 SYRINGE (ML) INJECTION ONCE
Refills: 0 | Status: COMPLETED | OUTPATIENT
Start: 2024-07-29 | End: 2024-07-29

## 2024-07-29 RX ORDER — PREDNISONE 10 MG/1
2 TABLET ORAL
Qty: 10 | Refills: 0
Start: 2024-07-29 | End: 2024-08-02

## 2024-07-29 RX ADMIN — Medication 400 MILLIGRAM(S): at 07:54

## 2024-07-29 RX ADMIN — ONDANSETRON HYDROCHLORIDE 4 MILLIGRAM(S): 2 INJECTION INTRAMUSCULAR; INTRAVENOUS at 00:12

## 2024-07-29 RX ADMIN — HYDROMORPHONE HCL 1 MILLIGRAM(S): 0.2 INJECTION, SOLUTION INTRAVENOUS at 00:58

## 2024-07-29 RX ADMIN — HYDROMORPHONE HCL 1 MILLIGRAM(S): 0.2 INJECTION, SOLUTION INTRAVENOUS at 04:15

## 2024-07-29 RX ADMIN — HYDROMORPHONE HCL 1 MILLIGRAM(S): 0.2 INJECTION, SOLUTION INTRAVENOUS at 07:54

## 2024-07-29 RX ADMIN — Medication 1000 MILLILITER(S): at 11:20

## 2024-07-29 NOTE — CONSULT NOTE ADULT - SUBJECTIVE AND OBJECTIVE BOX
[facility] SURGER CONSULT NOTE    HPI: 54 year old male with PMHx of NHDL (s/p immunotherapy and chemotherapy), PCI s/p LAD stent x2 (2/2024 on Plavix) ischemic colitis s/p L hemicolectomy with transverse colon to rectum anastomosis with Dr. Rice (2011), and recent admission (3/2024) for stercoral colitis presenting to the [facility] ED with one day of abdominal pain and associated emesis x2. Patient reports his last bowel movement was last night. Last colonoscopy in 2020, due to have an outpatient colonoscopy with Dr. Rice. At home patient takes oxycontin 10mg BID for chronic pain, currently on movantic for promotility and miralax. In the ED patient hemodynamically stable. Labs significant only for an elevated WBC to 14.3.    PAST MEDICAL & SURGICAL HISTORY:  Testicular Cancer  1994, chemotherapy  Headache, Migraine  HTN (hypertension)  NHL (non-Hodgkin's lymphoma)  1999, Radiation to left neck region  GERD (gastroesophageal reflux disease)  Colitis  surgery 1996  BPH (benign prostatic hyperplasia)  Osteoarthritis  degenerative disc L3-4  History of bone marrow transplant  Hypertriglyceridemia  Malignant melanoma of scalp  RSX 08/18  R neck mass dsx/ LN dsx 10/19/18  Hypothyroidism  History of chemotherapy  History of Raynaud's syndrome  CAD (coronary artery disease)  History of orchiectomy  left 1994  S/P colon resection  1996  Lymph node disorder  s/p abdominal lymph node dissection 1994, 1996  Melanoma of scalp or neck  excision 8/18  s/p excision right neck mass & LN dissx  History of nasal septoplasty  History of infusaport central venous catheter insertion  History of infusaport central venous catheter removal  Allergies    No Known Allergies    FAMILY HISTORY:  Hypertension (Father)    Vital Signs Last 24 Hrs  T(C): 37 (29 Jul 2024 12:11), Max: 37 (29 Jul 2024 12:11)  T(F): 98.6 (29 Jul 2024 12:11), Max: 98.6 (29 Jul 2024 12:11)  HR: 85 (29 Jul 2024 12:11) (75 - 93)  BP: 107/71 (29 Jul 2024 12:11) (92/60 - 135/93)  BP(mean): 71 (29 Jul 2024 11:12) (71 - 71)  RR: 16 (29 Jul 2024 12:11) (14 - 20)  SpO2: 98% (29 Jul 2024 12:11) (95% - 99%)    Parameters below as of 29 Jul 2024 12:11  Patient On (Oxygen Delivery Method): room air    LABS:                        15.1   14.30 )-----------( 261      ( 28 Jul 2024 22:33 )             46.6     07-28    135  |  100  |  16  ----------------------------<  94  5.0   |  22  |  0.79    Ca    10.3      28 Jul 2024 22:33  Phos  3.6     07-28  Mg     1.9     07-28    TPro  7.9  /  Alb  4.6  /  TBili  0.6  /  DBili  x   /  AST  49<H>  /  ALT  21  /  AlkPhos  96  07-28    LIVER FUNCTIONS - ( 28 Jul 2024 22:33 )  Alb: 4.6 g/dL / Pro: 7.9 g/dL / ALK PHOS: 96 U/L / ALT: 21 U/L / AST: 49 U/L / GGT: x             Urinalysis Basic - ( 28 Jul 2024 22:33 )    Color: x / Appearance: x / SG: x / pH: x  Gluc: 94 mg/dL / Ketone: x  / Bili: x / Urobili: x   Blood: x / Protein: x / Nitrite: x   Leuk Esterase: x / RBC: x / WBC x   Sq Epi: x / Non Sq Epi: x / Bacteria: x    PHYSICAL EXAM:  General: NAD, resting comfortably  Abdominal: Soft, mild lower abdominal tenderness, non-distended  Extremities: Warm, well perfused  Neuro: A/O x 3    RADIOLOGY & ADDITIONAL STUDIES:    CT ABDOMEN AND PELVIS IC ORDERED BY: MORIS DENTON  PROCEDURE DATE: 07/29/2024  INTERPRETATION: CLINICAL INFORMATION: Abdominal pain.    COMPARISON: CT abdomen and pelvis from 5/30/2024.    CONTRAST/COMPLICATIONS:  IV Contrast: Omnipaque 350 90 cc administered 10 cc discarded  Oral Contrast: NONE  Complications: None reported at time of study completion    PROCEDURE:  CT of the Abdomen and Pelvis was performed.  Sagittal and coronal reformats were performed.    FINDINGS:  LOWER CHEST: Within normal limits.    LIVER: Within normal limits.  BILE DUCTS: Normal caliber.  GALLBLADDER: Within normal limits.  SPLEEN: Within normal limits.  PANCREAS: Within normal limits.  ADRENALS: Within normal limits.  KIDNEYS/URETERS: Symmetric renal enhancement. Bilateral renal subcentimeter hypodensities too small to characterize. Right renal cysts. Nonobstructing 3 mm calculus. No hydronephrosis.    BLADDER: Minimally distended.  REPRODUCTIVE ORGANS: Prostate within normal limits.    BOWEL: Low anterior resection. Bowel wall thickening and luminal narrowing over a course of 3.9 cm in the colon at the level of the infraumbilical region with an upstream dilated and stool impacted colon to 6.6 cm. No bowel obstruction. Appendix is normal.  PERITONEUM/RETROPERITONEUM: Retroperitoneal surgical clips.  VESSELS: Atherosclerotic changes.  LYMPH NODES: No lymphadenopathy.  ABDOMINAL WALL: Within normal limits.  BONES: Degenerative changes of the spine. Redemonstrated T10 and L2 bone islands.    IMPRESSION:  Short segment bowel wall thickening and luminal narrowing in the colon at the level of the infraumbilical region with upstream dilated and stool impacted colon which could be due to a colonic stricture; however, an underlying colonic mass is not excluded.

## 2024-07-29 NOTE — CONSULT NOTE ADULT - CONSULT REQUESTED DATE/TIME
29-Jul-2024 13:04
Liver biopsy  07/12/2017    Active  ENASTRO  Gastrectomy, laparoscopic sleeve  07/12/2017  intraoperative endoscopy  Active  ENASTRO

## 2024-07-29 NOTE — ED ADULT NURSE REASSESSMENT NOTE - NS ED NURSE REASSESS COMMENT FT1
Report received from COLTON Garay. Pt found in position of comfort in bed. AOx4, MAEx4, respirations even and unlabored at this time, abd soft nondistended, skin warm dry and normal for race. Vital signs within normal limits. Pt endorsing 7/10 abd pain at this time. MD Mckeon made aware. Patient safety maintained, bed is in lowest position, wheels locked, and side rails raised. Patient oriented to call bell, and call bell is within reach.

## 2024-07-29 NOTE — CONSULT NOTE ADULT - ASSESSMENT
Assessment: 54 year old male with PMHx of NHDL (s/p immunotherapy and chemotherapy), PCI s/p LAD stent x2 (2/2024 on Plavix) ischemic colitis s/p L hemicolectomy with transverse colon to rectum anastomosis with Dr. Rice (2011), and recent admission (3/2024) for stercoral colitis presenting to the [facility] ED with one day of abdominal pain and associated emesis x2. Patient on chronic opioids at home, on movantic for promotility, however reports history of missed doses. Patient large stool burden on CT A/P with questionable area of stricture, although patient not clinically obstructed.    Plan:  - Dose of movantic in the ED  - Outpatient follow-up with Dr. Rice for colonoscopy    Plan discussed with Dr. Rice    [facility] Surgery  [id number]

## 2024-09-03 DIAGNOSIS — G89.4 CHRONIC PAIN SYNDROME: ICD-10-CM

## 2024-09-03 RX ORDER — OXYCODONE HYDROCHLORIDE 10 MG/1
10 TABLET, FILM COATED, EXTENDED RELEASE ORAL
Qty: 90 | Refills: 0 | Status: ACTIVE | COMMUNITY
Start: 2024-09-03 | End: 1900-01-01

## 2024-09-20 ENCOUNTER — APPOINTMENT (OUTPATIENT)
Dept: PAIN MANAGEMENT | Facility: CLINIC | Age: 54
End: 2024-09-20

## 2024-10-02 NOTE — ASU PREOPERATIVE ASSESSMENT, ADULT (IPARK ONLY) - TEACHING/LEARNING CULTURAL CONSIDERATIONS
Next appt 1/9/25    Script pending  
Pls order a Vit D level  
Spoke to pt, she stated that she will go tomorrow at [facility] to get labs done.    Lab order pending  
none

## 2024-10-07 ENCOUNTER — APPOINTMENT (OUTPATIENT)
Dept: PAIN MANAGEMENT | Facility: CLINIC | Age: 54
End: 2024-10-07
Payer: COMMERCIAL

## 2024-10-07 VITALS
HEART RATE: 84 BPM | WEIGHT: 128 LBS | HEIGHT: 71 IN | DIASTOLIC BLOOD PRESSURE: 69 MMHG | BODY MASS INDEX: 17.92 KG/M2 | SYSTOLIC BLOOD PRESSURE: 107 MMHG

## 2024-10-07 PROCEDURE — G2211 COMPLEX E/M VISIT ADD ON: CPT

## 2024-10-07 PROCEDURE — 99214 OFFICE O/P EST MOD 30 MIN: CPT

## 2024-10-15 ENCOUNTER — INPATIENT (INPATIENT)
Facility: HOSPITAL | Age: 54
LOS: 0 days | Discharge: HOME | DRG: 313 | End: 2024-10-16
Attending: INTERNAL MEDICINE | Admitting: INTERNAL MEDICINE
Payer: COMMERCIAL

## 2024-10-15 VITALS
WEIGHT: 130.07 LBS | HEART RATE: 79 BPM | OXYGEN SATURATION: 96 % | HEIGHT: 71 IN | RESPIRATION RATE: 16 BRPM | DIASTOLIC BLOOD PRESSURE: 68 MMHG | TEMPERATURE: 98 F | SYSTOLIC BLOOD PRESSURE: 117 MMHG

## 2024-10-15 DIAGNOSIS — Z98.890 OTHER SPECIFIED POSTPROCEDURAL STATES: Chronic | ICD-10-CM

## 2024-10-15 DIAGNOSIS — Z90.79 ACQUIRED ABSENCE OF OTHER GENITAL ORGAN(S): Chronic | ICD-10-CM

## 2024-10-15 DIAGNOSIS — C43.4 MALIGNANT MELANOMA OF SCALP AND NECK: Chronic | ICD-10-CM

## 2024-10-15 DIAGNOSIS — R07.9 CHEST PAIN, UNSPECIFIED: ICD-10-CM

## 2024-10-15 DIAGNOSIS — Z98.89 OTHER SPECIFIED POSTPROCEDURAL STATES: Chronic | ICD-10-CM

## 2024-10-15 DIAGNOSIS — I89.9 NONINFECTIVE DISORDER OF LYMPHATIC VESSELS AND LYMPH NODES, UNSPECIFIED: Chronic | ICD-10-CM

## 2024-10-15 LAB
ADD ON TEST-SPECIMEN IN LAB: SIGNIFICANT CHANGE UP
ALBUMIN SERPL ELPH-MCNC: 3.6 G/DL — SIGNIFICANT CHANGE UP (ref 3.3–5)
ALP SERPL-CCNC: 65 U/L — SIGNIFICANT CHANGE UP (ref 40–120)
ALT FLD-CCNC: 11 U/L — SIGNIFICANT CHANGE UP (ref 10–45)
ANION GAP SERPL CALC-SCNC: 13 MMOL/L — SIGNIFICANT CHANGE UP (ref 5–17)
APTT BLD: 34.4 SEC — SIGNIFICANT CHANGE UP (ref 24.5–35.6)
AST SERPL-CCNC: 22 U/L — SIGNIFICANT CHANGE UP (ref 10–40)
BASOPHILS # BLD AUTO: 0.06 K/UL — SIGNIFICANT CHANGE UP (ref 0–0.2)
BASOPHILS NFR BLD AUTO: 0.8 % — SIGNIFICANT CHANGE UP (ref 0–2)
BILIRUB SERPL-MCNC: 0.6 MG/DL — SIGNIFICANT CHANGE UP (ref 0.2–1.2)
BUN SERPL-MCNC: 10 MG/DL — SIGNIFICANT CHANGE UP (ref 7–23)
CALCIUM SERPL-MCNC: 8.8 MG/DL — SIGNIFICANT CHANGE UP (ref 8.4–10.5)
CHLORIDE SERPL-SCNC: 104 MMOL/L — SIGNIFICANT CHANGE UP (ref 96–108)
CO2 SERPL-SCNC: 19 MMOL/L — LOW (ref 22–31)
CREAT SERPL-MCNC: 0.84 MG/DL — SIGNIFICANT CHANGE UP (ref 0.5–1.3)
D DIMER BLD IA.RAPID-MCNC: <150 NG/ML DDU — SIGNIFICANT CHANGE UP
EGFR: 104 ML/MIN/1.73M2 — SIGNIFICANT CHANGE UP
EOSINOPHIL # BLD AUTO: 0.2 K/UL — SIGNIFICANT CHANGE UP (ref 0–0.5)
EOSINOPHIL NFR BLD AUTO: 2.5 % — SIGNIFICANT CHANGE UP (ref 0–6)
GLUCOSE SERPL-MCNC: 82 MG/DL — SIGNIFICANT CHANGE UP (ref 70–99)
HCT VFR BLD CALC: 35.3 % — LOW (ref 39–50)
HGB BLD-MCNC: 11.4 G/DL — LOW (ref 13–17)
IMM GRANULOCYTES NFR BLD AUTO: 0.4 % — SIGNIFICANT CHANGE UP (ref 0–0.9)
INR BLD: 1.08 RATIO — SIGNIFICANT CHANGE UP (ref 0.85–1.16)
LIDOCAIN IGE QN: 24 U/L — SIGNIFICANT CHANGE UP (ref 7–60)
LYMPHOCYTES # BLD AUTO: 2.85 K/UL — SIGNIFICANT CHANGE UP (ref 1–3.3)
LYMPHOCYTES # BLD AUTO: 36.3 % — SIGNIFICANT CHANGE UP (ref 13–44)
MAGNESIUM SERPL-MCNC: 1.8 MG/DL — SIGNIFICANT CHANGE UP (ref 1.6–2.6)
MCHC RBC-ENTMCNC: 27.8 PG — SIGNIFICANT CHANGE UP (ref 27–34)
MCHC RBC-ENTMCNC: 32.3 GM/DL — SIGNIFICANT CHANGE UP (ref 32–36)
MCV RBC AUTO: 86.1 FL — SIGNIFICANT CHANGE UP (ref 80–100)
MONOCYTES # BLD AUTO: 0.59 K/UL — SIGNIFICANT CHANGE UP (ref 0–0.9)
MONOCYTES NFR BLD AUTO: 7.5 % — SIGNIFICANT CHANGE UP (ref 2–14)
NEUTROPHILS # BLD AUTO: 4.12 K/UL — SIGNIFICANT CHANGE UP (ref 1.8–7.4)
NEUTROPHILS NFR BLD AUTO: 52.5 % — SIGNIFICANT CHANGE UP (ref 43–77)
NRBC # BLD: 0 /100 WBCS — SIGNIFICANT CHANGE UP (ref 0–0)
PHOSPHATE SERPL-MCNC: 3.6 MG/DL — SIGNIFICANT CHANGE UP (ref 2.5–4.5)
PLATELET # BLD AUTO: 177 K/UL — SIGNIFICANT CHANGE UP (ref 150–400)
POTASSIUM SERPL-MCNC: 3.8 MMOL/L — SIGNIFICANT CHANGE UP (ref 3.5–5.3)
POTASSIUM SERPL-SCNC: 3.8 MMOL/L — SIGNIFICANT CHANGE UP (ref 3.5–5.3)
PROT SERPL-MCNC: 6.3 G/DL — SIGNIFICANT CHANGE UP (ref 6–8.3)
PROTHROM AB SERPL-ACNC: 12.4 SEC — SIGNIFICANT CHANGE UP (ref 9.9–13.4)
RBC # BLD: 4.1 M/UL — LOW (ref 4.2–5.8)
RBC # FLD: 13 % — SIGNIFICANT CHANGE UP (ref 10.3–14.5)
SODIUM SERPL-SCNC: 136 MMOL/L — SIGNIFICANT CHANGE UP (ref 135–145)
WBC # BLD: 7.85 K/UL — SIGNIFICANT CHANGE UP (ref 3.8–10.5)
WBC # FLD AUTO: 7.85 K/UL — SIGNIFICANT CHANGE UP (ref 3.8–10.5)

## 2024-10-15 PROCEDURE — 99285 EMERGENCY DEPT VISIT HI MDM: CPT

## 2024-10-15 PROCEDURE — 92928 PRQ TCAT PLMT NTRAC ST 1 LES: CPT | Mod: LD

## 2024-10-15 PROCEDURE — 0523T NTRAPX C FFR W/3D FUNCJL MAP: CPT

## 2024-10-15 PROCEDURE — 71046 X-RAY EXAM CHEST 2 VIEWS: CPT | Mod: 26

## 2024-10-15 PROCEDURE — 93454 CORONARY ARTERY ANGIO S&I: CPT | Mod: 26,59

## 2024-10-15 PROCEDURE — 99152 MOD SED SAME PHYS/QHP 5/>YRS: CPT

## 2024-10-15 PROCEDURE — 93010 ELECTROCARDIOGRAM REPORT: CPT

## 2024-10-15 RX ORDER — METOPROLOL TARTRATE 50 MG
1 TABLET ORAL
Refills: 0 | DISCHARGE

## 2024-10-15 RX ORDER — PANTOPRAZOLE SODIUM 40 MG/1
1 TABLET, DELAYED RELEASE ORAL
Refills: 0 | DISCHARGE

## 2024-10-15 RX ORDER — OXYCODONE HYDROCHLORIDE 30 MG/1
1 TABLET, FILM COATED, EXTENDED RELEASE ORAL
Refills: 0 | DISCHARGE

## 2024-10-15 RX ORDER — ATORVASTATIN CALCIUM 10 MG/1
40 TABLET, FILM COATED ORAL AT BEDTIME
Refills: 0 | Status: DISCONTINUED | OUTPATIENT
Start: 2024-10-15 | End: 2024-10-16

## 2024-10-15 RX ORDER — METHYLNALTREXONE BROMIDE 12 MG/.6ML
3 INJECTION, SOLUTION SUBCUTANEOUS
Refills: 0 | DISCHARGE

## 2024-10-15 RX ORDER — NALOXEGOL OXALATE 25 MG/1
1 TABLET, FILM COATED ORAL
Refills: 0 | DISCHARGE

## 2024-10-15 RX ORDER — ZOLPIDEM TARTRATE 5 MG
10 TABLET ORAL AT BEDTIME
Refills: 0 | Status: DISCONTINUED | OUTPATIENT
Start: 2024-10-15 | End: 2024-10-16

## 2024-10-15 RX ORDER — ZOLPIDEM TARTRATE 5 MG
5 TABLET ORAL AT BEDTIME
Refills: 0 | Status: DISCONTINUED | OUTPATIENT
Start: 2024-10-15 | End: 2024-10-16

## 2024-10-15 RX ORDER — OXYCODONE HYDROCHLORIDE 30 MG/1
10 TABLET, FILM COATED, EXTENDED RELEASE ORAL THREE TIMES A DAY
Refills: 0 | Status: DISCONTINUED | OUTPATIENT
Start: 2024-10-15 | End: 2024-10-16

## 2024-10-15 RX ORDER — SODIUM CHLORIDE 0.9 % (FLUSH) 0.9 %
250 SYRINGE (ML) INJECTION ONCE
Refills: 0 | Status: DISCONTINUED | OUTPATIENT
Start: 2024-10-15 | End: 2024-10-16

## 2024-10-15 RX ORDER — ASPIRIN 325 MG
81 TABLET ORAL DAILY
Refills: 0 | Status: DISCONTINUED | OUTPATIENT
Start: 2024-10-15 | End: 2024-10-16

## 2024-10-15 RX ORDER — SODIUM CHLORIDE 0.9 % (FLUSH) 0.9 %
1000 SYRINGE (ML) INJECTION
Refills: 0 | Status: DISCONTINUED | OUTPATIENT
Start: 2024-10-15 | End: 2024-10-16

## 2024-10-15 RX ORDER — PANTOPRAZOLE SODIUM 40 MG/1
40 TABLET, DELAYED RELEASE ORAL
Refills: 0 | Status: DISCONTINUED | OUTPATIENT
Start: 2024-10-15 | End: 2024-10-16

## 2024-10-15 RX ORDER — ZOLPIDEM TARTRATE 5 MG
1 TABLET ORAL
Refills: 0 | DISCHARGE

## 2024-10-15 RX ORDER — ATORVASTATIN CALCIUM 10 MG/1
1 TABLET, FILM COATED ORAL
Refills: 0 | DISCHARGE

## 2024-10-15 RX ORDER — ACETAMINOPHEN 325 MG
1000 TABLET ORAL ONCE
Refills: 0 | Status: COMPLETED | OUTPATIENT
Start: 2024-10-15 | End: 2024-10-15

## 2024-10-15 RX ORDER — ASPIRIN 325 MG
1 TABLET ORAL
Refills: 0 | DISCHARGE

## 2024-10-15 RX ORDER — NALOXEGOL OXALATE 25 MG/1
25 TABLET, FILM COATED ORAL DAILY
Refills: 0 | Status: DISCONTINUED | OUTPATIENT
Start: 2024-10-15 | End: 2024-10-16

## 2024-10-15 RX ORDER — METOPROLOL TARTRATE 50 MG
25 TABLET ORAL DAILY
Refills: 0 | Status: DISCONTINUED | OUTPATIENT
Start: 2024-10-15 | End: 2024-10-16

## 2024-10-15 RX ORDER — ERGOCALCIFEROL 1.25 MG/1
1 CAPSULE ORAL
Refills: 0 | DISCHARGE

## 2024-10-15 RX ORDER — FENTANYL CITRATE-0.9 % NACL/PF 300MCG/30
25 PATIENT CONTROLLED ANALGESIA VIAL INJECTION ONCE
Refills: 0 | Status: DISCONTINUED | OUTPATIENT
Start: 2024-10-15 | End: 2024-10-15

## 2024-10-15 RX ADMIN — ATORVASTATIN CALCIUM 40 MILLIGRAM(S): 10 TABLET, FILM COATED ORAL at 23:37

## 2024-10-15 RX ADMIN — OXYCODONE HYDROCHLORIDE 10 MILLIGRAM(S): 30 TABLET, FILM COATED, EXTENDED RELEASE ORAL at 18:31

## 2024-10-15 RX ADMIN — Medication 100 MILLILITER(S): at 17:25

## 2024-10-15 RX ADMIN — OXYCODONE HYDROCHLORIDE 10 MILLIGRAM(S): 30 TABLET, FILM COATED, EXTENDED RELEASE ORAL at 19:31

## 2024-10-15 RX ADMIN — Medication 5 MILLIGRAM(S): at 23:37

## 2024-10-15 RX ADMIN — Medication 25 MICROGRAM(S): at 14:22

## 2024-10-15 RX ADMIN — Medication 25 MICROGRAM(S): at 12:09

## 2024-10-15 RX ADMIN — Medication 400 MILLIGRAM(S): at 10:20

## 2024-10-15 NOTE — ED ADULT NURSE NOTE - OBJECTIVE STATEMENT
55 y/o male with PMH of BPH, Colitis, GERD, migraines, CAD with 2 stents arrives to the ER  by ambulance from home complaining of chest pain. EMS reports given 1 nitro without any relief. Pt reports developing chest pain radiating to his back that woke him up this morning.  Pt denies, N/V/D, urinary symptoms, fevers, chills.  On assessment pt is well appearing, A&Ox4, speaking coherently, airway is patent, breathing spontaneously and unlabored. Skin is dry, warm. Abdomen is soft, no distended, no tender. Full ROM in all extremities.  Patient undressed and placed into gown, EKG performed, 20 G place by EMS on the L hand Pt placed on continuous cardiac monitor, Comfort and safety provided.  side rails up with bed locked and in lowest position for safety.

## 2024-10-15 NOTE — ED ADULT NURSE NOTE - NS ED NOTE ABUSE RESPONSE YN
Yes Preparation Of Recipient Site - Flap Takedown: The eschar and granulation tissue was removed surgically with sharp dissection to facilitate appropriate healing after division and inset of the proximal and distal interpolation flap.

## 2024-10-15 NOTE — CONSULT NOTE ADULT - SUBJECTIVE AND OBJECTIVE BOX
DATE OF SERVICE: 10-15-24     CHIEF COMPLAINT:Patient is a 54y old  Male who presents with a chief complaint of     HISTORY OF PRESENT ILLNESS:HPI:  Cards Dr. Marks     54 year old male former smoker with significant PMHx of testicular cancer non hodgkins lymphoma (s/p immunotherapy and chemotherapy), ischemic colitis s/p colon resection and transverse to rectum anastomosis with Dr. Rice, CAD s/p PCI 2 LAD stents and currently on ASA and Plavix presented to ED this AM for c/o chest pain. > he descrived the pain as "pressing pain to the chest and unable to take a deep breath." Patient now presents to Dr. Garrison for further ischemic evaluation.     Currently denies chest pain, palpitations, orthopnea or PND.     < from: TTE Limited W or WO Ultrasound Enhancing Agent (02.24.24 @ 07:14) >  CONCLUSIONS:      1. Left ventricular cavity is normal in size. Left ventricular systolic function is normal.   2. No pericardial effusion seen.   3. Limited study to evaluate for pericardial effusion.    < end of copied text >  < from: Cardiac Catheterization (01.19.24 @ 16:27) >  Conclusions:   Successful PCI with ONESIMO to the mid LAd.  DAPT for 6 mths     < end of copied text >   (15 Oct 2024 15:49)      PAST MEDICAL & SURGICAL HISTORY:  Testicular Cancer  1994, chemotherapy      Headache, Migraine      HTN (hypertension)      NHL (non-Hodgkin's lymphoma)  1999, Radiation to left neck region      GERD (gastroesophageal reflux disease)      Colitis  surgery 1996      BPH (benign prostatic hyperplasia)      Osteoarthritis  degenerative disc L3-4      History of bone marrow transplant      Hypertriglyceridemia      Malignant melanoma of scalp  RSX 08/18  R neck mass dsx/ LN dsx 10/19/18      Hypothyroidism      History of chemotherapy      History of Raynaud's syndrome      CAD (coronary artery disease)      History of orchiectomy  left 1994      S/P colon resection  1996      Lymph node disorder  s/p abdominal lymph node dissection 1994, 1996      Melanoma of scalp or neck  excision 8/18  s/p excision right neck mass & LN dissx      History of nasal septoplasty      History of infusaport central venous catheter insertion      History of infusaport central venous catheter removal              MEDICATIONS:  aspirin enteric coated 81 milliGRAM(s) Oral daily  clopidogrel Tablet 75 milliGRAM(s) Oral daily  metoprolol succinate ER 25 milliGRAM(s) Oral daily    ciprofloxacin     Tablet 500 milliGRAM(s) Oral two times a day  metroNIDAZOLE    Tablet 500 milliGRAM(s) Oral three times a day      oxyCODONE  ER Tablet 10 milliGRAM(s) Oral three times a day PRN  zolpidem 10 milliGRAM(s) Oral at bedtime PRN  zolpidem 5 milliGRAM(s) Oral at bedtime PRN    naloxegol 25 milliGRAM(s) Oral daily  pantoprazole    Tablet 40 milliGRAM(s) Oral before breakfast  polyethylene glycol 3350 17 Gram(s) Oral daily    atorvastatin 40 milliGRAM(s) Oral at bedtime  levothyroxine 112 MICROGram(s) Oral daily    sodium chloride 0.9% Bolus 250 milliLiter(s) IV Bolus once  sodium chloride 0.9%. 1000 milliLiter(s) IV Continuous <Continuous>  sodium chloride 0.9%. 1000 milliLiter(s) IV Continuous <Continuous>      FAMILY HISTORY:  Hypertension (Father)        Non-contributory    SOCIAL HISTORY:    [ ] former smoker    Allergies    No Known Allergies    Intolerances    	    REVIEW OF SYSTEMS:  CONSTITUTIONAL: No fever  EYES: No eye pain, visual disturbances, or discharge  ENMT:  No difficulty hearing, tinnitus  NECK: No pain or stiffness  RESPIRATORY: No cough, wheezing,  CARDIOVASCULAR: + chest pain, no palpitations, passing out, dizziness, or leg swelling  GASTROINTESTINAL:  No nausea, vomiting, diarrhea or constipation. No melena.  GENITOURINARY: No dysuria, hematuria  NEUROLOGICAL: No stroke like symptoms  SKIN: No burning or lesions   ENDOCRINE: No heat or cold intolerance  MUSCULOSKELETAL: No joint pain or swelling  PSYCHIATRIC: No  anxiety, mood swings  HEME/LYMPH: No bleeding gums  ALLERGY AND IMMUNOLOGIC: No hives or eczema	    All other ROS negative    PHYSICAL EXAM:  T(C): 36.7 (10-15-24 @ 20:00), Max: 36.7 (10-15-24 @ 14:25)  HR: 70 (10-15-24 @ 22:00) (61 - 79)  BP: 111/61 (10-15-24 @ 22:00) (93/52 - 123/68)  RR: 18 (10-15-24 @ 22:00) (16 - 18)  SpO2: 99% (10-15-24 @ 22:00) (96% - 100%)  Wt(kg): --  I&O's Summary      Appearance: Normal	  HEENT:   Normal oral mucosa, EOMI	  Cardiovascular:  S1 S2, No JVD,    Respiratory: Lungs clear to auscultation	  Psychiatry: Alert  Gastrointestinal:  Soft, Non-tender, + BS	  Skin: No rashes   Neurologic: Non-focal  Extremities:  No edema  Vascular: Peripheral pulses palpable    	    	  	  CARDIAC MARKERS:  Labs personally reviewed by me                                  11.4   7.85  )-----------( 177      ( 15 Oct 2024 10:25 )             35.3     10-15    136  |  104  |  10  ----------------------------<  82  3.8   |  19[L]  |  0.84    Ca    8.8      15 Oct 2024 10:25  Phos  3.6     10-15  Mg     1.8     10-15    TPro  6.3  /  Alb  3.6  /  TBili  0.6  /  DBili  x   /  AST  22  /  ALT  11  /  AlkPhos  65  10-15          EKG: Personally reviewed by me - NSR  Radiology: Personally reviewed by me - CXR clear lungs      Assessment /Plan:   54 year old male former smoker with significant PMHx of testicular cancer non hodgkins lymphoma (s/p immunotherapy and chemotherapy), ischemic colitis s/p colon resection and transverse to rectum anastomosis with Dr. Rice, CAD s/p PCI 2 LAD stents and currently on ASA and Plavix presented to ED this AM for c/o chest pain    1. Unstable angina/ACS - progressive chest pain started this am, rest angina  - Risks and benefits of cath discussed with pt, agreeable to proceed  - Known CAD s/p PCI 2 LAD stents  - Resume ASA and Plavix  - BP well controlled  - Resume high intensity statin        Differential diagnosis and plan of care discussed with patient after the evaluation. Counseling on diet, nutritional counseling, weight management, exercise and medication compliance was done.   Advanced care planning/advanced directives discussed with patient/family. DNR status including forceful chest compressions to attempt to restart the heart, ventilator support/artificial breathing, electric shock, artificial nutrition, health care proxy, Molst form all discussed with pt. Pt wishes to consider. Sixteen minutes spent on discussing advanced directives.  Eighty five minutes spent on encounter, of which more than fifty percent of the encounter was spent on counseling and/or coordinating care by the attending physician.        Vic Carballo DO [facility]  Cardiovascular Medicine  [address] [place] [address], [address]  Office [phone]  Available via call/text on [employer] Teams

## 2024-10-15 NOTE — H&P CARDIOLOGY - HISTORY OF PRESENT ILLNESS
Cards Dr. Marks     54 year old male former smoker with significant PMHx of testicular cancer non hodgkins lymphoma (s/p immunotherapy and chemotherapy), ischemic colitis s/p colon resection and transverse to rectum anastomosis with Dr. Rice, CAD s/p PCI 2 LAD stents and currently on ASA and Plavix presented to ED this AM for c/o chest pain. > he descrived the pain as "pressing pain to the chest and unable to take a deep breath." Patient now presents to Dr. Garrison for further ischemic evaluation.     Currently denies chest pain, palpitations, orthopnea or PND.  Cards Dr. Marks     54 year old male former smoker with significant PMHx of testicular cancer non hodgkins lymphoma (s/p immunotherapy and chemotherapy), ischemic colitis s/p colon resection and transverse to rectum anastomosis with Dr. Rice, CAD s/p PCI 2 LAD stents and currently on ASA and Plavix presented to ED this AM for c/o chest pain. > he descrived the pain as "pressing pain to the chest and unable to take a deep breath." Patient now presents to Dr. Garrison for further ischemic evaluation.     Currently denies chest pain, palpitations, orthopnea or PND.     < from: TTE Limited W or WO Ultrasound Enhancing Agent (02.24.24 @ 07:14) >  CONCLUSIONS:      1. Left ventricular cavity is normal in size. Left ventricular systolic function is normal.   2. No pericardial effusion seen.   3. Limited study to evaluate for pericardial effusion.    < end of copied text >  < from: Cardiac Catheterization (01.19.24 @ 16:27) >  Conclusions:   Successful PCI with ONESIMO to the mid LAd.  DAPT for 6 mths     < end of copied text >

## 2024-10-15 NOTE — ED PROVIDER NOTE - ATTENDING APP SHARED VISIT CONTRIBUTION OF CARE
------------ATTENDING NOTE------------  pt brought to ED by EMS c/o chest pain, describing feeling sudden onset of constant mild/moderate central chest ache radiating to back

## 2024-10-15 NOTE — ED PROVIDER NOTE - OBJECTIVE STATEMENT
54-year-old male history of coronary artery disease with stent placement presenting to the emergency department complaining about acute onset of chest pain radiating to his back that woke him up this morning.  Associate with some shortness of breath.  Patient overall says he is not feeling well.  No cough no fevers no chills no belly pain no nausea no vomiting no diarrhea no urinary complaints.

## 2024-10-15 NOTE — ED ADULT NURSE NOTE - NSFALLUNIVINTERV_ED_ALL_ED
Bed/Stretcher in lowest position, wheels locked, appropriate side rails in place/Call bell, personal items and telephone in reach/Instruct patient to call for assistance before getting out of bed/chair/stretcher/Non-slip footwear applied when patient is off stretcher/[place] to call system/Physically safe environment - no spills, clutter or unnecessary equipment/Purposeful proactive rounding/Room/bathroom lighting operational, light cord in reach

## 2024-10-16 ENCOUNTER — TRANSCRIPTION ENCOUNTER (OUTPATIENT)
Age: 54
End: 2024-10-16

## 2024-10-16 VITALS
RESPIRATION RATE: 18 BRPM | OXYGEN SATURATION: 98 % | SYSTOLIC BLOOD PRESSURE: 99 MMHG | HEART RATE: 74 BPM | TEMPERATURE: 98 F | DIASTOLIC BLOOD PRESSURE: 50 MMHG

## 2024-10-16 DIAGNOSIS — I10 ESSENTIAL (PRIMARY) HYPERTENSION: ICD-10-CM

## 2024-10-16 DIAGNOSIS — I25.10 ATHEROSCLEROTIC HEART DISEASE OF NATIVE CORONARY ARTERY WITHOUT ANGINA PECTORIS: ICD-10-CM

## 2024-10-16 DIAGNOSIS — E78.5 HYPERLIPIDEMIA, UNSPECIFIED: ICD-10-CM

## 2024-10-16 PROCEDURE — 96374 THER/PROPH/DIAG INJ IV PUSH: CPT

## 2024-10-16 PROCEDURE — C1874: CPT

## 2024-10-16 PROCEDURE — 84100 ASSAY OF PHOSPHORUS: CPT

## 2024-10-16 PROCEDURE — C1894: CPT

## 2024-10-16 PROCEDURE — 85379 FIBRIN DEGRADATION QUANT: CPT

## 2024-10-16 PROCEDURE — 83735 ASSAY OF MAGNESIUM: CPT

## 2024-10-16 PROCEDURE — C1887: CPT

## 2024-10-16 PROCEDURE — 80053 COMPREHEN METABOLIC PANEL: CPT

## 2024-10-16 PROCEDURE — 83690 ASSAY OF LIPASE: CPT

## 2024-10-16 PROCEDURE — 99238 HOSP IP/OBS DSCHRG MGMT 30/<: CPT

## 2024-10-16 PROCEDURE — 0523T NTRAPX C FFR W/3D FUNCJL MAP: CPT

## 2024-10-16 PROCEDURE — C1769: CPT

## 2024-10-16 PROCEDURE — 36415 COLL VENOUS BLD VENIPUNCTURE: CPT

## 2024-10-16 PROCEDURE — 93005 ELECTROCARDIOGRAM TRACING: CPT

## 2024-10-16 PROCEDURE — 93454 CORONARY ARTERY ANGIO S&I: CPT | Mod: 59

## 2024-10-16 PROCEDURE — 85730 THROMBOPLASTIN TIME PARTIAL: CPT

## 2024-10-16 PROCEDURE — 99285 EMERGENCY DEPT VISIT HI MDM: CPT

## 2024-10-16 PROCEDURE — C9600: CPT | Mod: LD

## 2024-10-16 PROCEDURE — 71046 X-RAY EXAM CHEST 2 VIEWS: CPT

## 2024-10-16 PROCEDURE — 85025 COMPLETE CBC W/AUTO DIFF WBC: CPT

## 2024-10-16 PROCEDURE — 85610 PROTHROMBIN TIME: CPT

## 2024-10-16 RX ORDER — NALOXONE HYDROCHLORIDE 0.4 MG/ML
1 INJECTION, SOLUTION INTRAMUSCULAR; INTRAVENOUS; SUBCUTANEOUS
Qty: 1 | Refills: 0
Start: 2024-10-16 | End: 2024-10-20

## 2024-10-16 RX ORDER — ZOLPIDEM TARTRATE 5 MG
5 TABLET ORAL ONCE
Refills: 0 | Status: DISCONTINUED | OUTPATIENT
Start: 2024-10-16 | End: 2024-10-16

## 2024-10-16 RX ORDER — BUTALB/ACETAMINOPHEN/CAFFEINE 50-325-40
1 TABLET ORAL ONCE
Refills: 0 | Status: COMPLETED | OUTPATIENT
Start: 2024-10-16 | End: 2024-10-16

## 2024-10-16 RX ORDER — SODIUM CHLORIDE 0.9 % (FLUSH) 0.9 %
250 SYRINGE (ML) INJECTION ONCE
Refills: 0 | Status: DISCONTINUED | OUTPATIENT
Start: 2024-10-16 | End: 2024-10-16

## 2024-10-16 RX ORDER — SUMATRIPTAN SUCCINATE 6 MG/0.5ML
50 CARTRIDGE (ML) SUBCUTANEOUS ONCE
Refills: 0 | Status: DISCONTINUED | OUTPATIENT
Start: 2024-10-16 | End: 2024-10-16

## 2024-10-16 RX ADMIN — Medication 1 TABLET(S): at 05:26

## 2024-10-16 RX ADMIN — Medication 75 MILLIGRAM(S): at 05:26

## 2024-10-16 RX ADMIN — Medication 5 MILLIGRAM(S): at 01:01

## 2024-10-16 RX ADMIN — Medication 112 MICROGRAM(S): at 05:25

## 2024-10-16 RX ADMIN — Medication 1 TABLET(S): at 06:26

## 2024-10-16 RX ADMIN — PANTOPRAZOLE SODIUM 40 MILLIGRAM(S): 40 TABLET, DELAYED RELEASE ORAL at 05:26

## 2024-10-16 RX ADMIN — Medication 81 MILLIGRAM(S): at 05:26

## 2024-10-16 NOTE — DISCHARGE NOTE PROVIDER - HOSPITAL COURSE
HPI:  54 year old male former smoker with significant PMHx of testicular cancer non hodgkins lymphoma (s/p immunotherapy and chemotherapy), ischemic colitis s/p colon resection and transverse to rectum anastomosis with Dr. Rice, CAD s/p PCI 2 LAD stents and currently on ASA and Plavix presented to ED this AM for c/o chest pain. > he descrived the pain as "pressing pain to the chest and unable to take a deep breath." Patient now presents to Dr. Garrison for further ischemic evaluation.     Currently denies chest pain, palpitations, orthopnea or PND.     10/15 cardiac cath with one ONESIMO to the LAD via RFA access, site without swelling, bleeding.

## 2024-10-16 NOTE — DISCHARGE NOTE PROVIDER - NSDCFUSCHEDAPPT_GEN_ALL_CORE_FT
[facility] [address]  Scheduled Appointment: 12/02/2024

## 2024-10-16 NOTE — DISCHARGE NOTE PROVIDER - NSDCMRMEDTOKEN_GEN_ALL_CORE_FT
aspirin 81 mg oral capsule: 1 cap(s) orally once a day  atorvastatin 40 mg oral tablet: 1 tab(s) orally once a day  clopidogrel 75 mg oral tablet: 1 tab(s) orally once a day  ergocalciferol 1.25 mg (50,000 intl units) oral tablet: 1 tab(s) orally once a week Thursday  methylnaltrexone 150 mg oral tablet: 3 tab(s) orally once a day  metoprolol succinate 25 mg oral tablet, extended release: 1 tab(s) orally once a day  metroNIDAZOLE 500 mg oral tablet: 1 tab(s) orally 3 times a day  MiraLax oral powder for reconstitution: 17 gram(s) orally once a day  Movantik 25 mg oral tablet: 1 tab(s) orally once a day  OxyCONTIN 10 mg oral tablet, extended release: 1 tab(s) orally 3 times a day as needed  pantoprazole 40 mg oral delayed release tablet: 1 tab(s) orally once a day  Synthroid 112 mcg (0.112 mg) oral tablet: 1 tab(s) orally once a day  Vitamin E Tablet: 1 tablet orally once a week on Thursday  zolpidem 10 mg oral tablet: 1 tab(s) orally once a day (at bedtime) as needed for pain Reference #: [id number] 10/15/24    ambien 10 mg daily  oxycontin ER 10mg tablet TID

## 2024-10-16 NOTE — PROGRESS NOTE ADULT - PROBLEM SELECTOR PLAN 2
Continue taking your blood pressure medications as prescribed. Eat a heart healthy diet with low salt; exercise regularly (if cleared by your primary care doctor or cardiologist). Maintain a heart healthy weight and include healthy ways to manage stress. If you smoke, quit. If you need assistance to help you stop smoking, please use the following resource: [facility] – ([phone]). Follow up with your primary care doctor to continue having your blood pressure checked on a continual basis.

## 2024-10-16 NOTE — DISCHARGE NOTE PROVIDER - CARE PROVIDER_API CALL
Gopi Marks  Cardiology  [address], [address]  [place] [address]  Phone: [phone]  Fax: [phone]  Follow Up Time:

## 2024-10-16 NOTE — PROGRESS NOTE ADULT - PROBLEM SELECTOR PLAN 1
You underwent a cardiac catheterization where you received a stent to your LAD coronary artery. You have been started on Aspirin 81mg daily, Plavix 75mg daily. Continue taking all medications as prescribed.     The procedure was done through the wrist. Please avoid any heavy lifting (no more than 3 to 5 lbs), strenuous activity, bending, straining, or unnecessary stair climbing for 2 weeks. No driving for 2 days. You may shower 24 hours following the procedure but avoid baths/swimming for 1 week. If you develop any swelling, bleeding, hardening of the skin (hematoma formation), acute pain, numbness/tingling in your arm or leg, or have any questions/concerns regarding your procedure, please call [facility] [phone].    NEVER miss a dose of Aspirin and Plavix to ensure your stent does not close. DO NOT STOP THESE MEDICATIONS FOR ANY REASON UNLESS OTHERWISE INDICATED BY YOUR CARDIOLOGIST BECAUSE THIS WILL PUT YOU AT RISK OF YOUR STENT CLOSING AND HAVING A HEART ATTACK OR SUDDEN SEVERE LEG PAIN DUE TO BLOCKAGE OF BLOOD FLOW TO LEG.    You have been given a Stent Card to carry with you in your wallet.  Make Photocopies or take a picture of card so you have a backup copy.  This card has important information for any possible future Radiology/MRI studies.      Please make a follow up appointment with your cardiologist within 1-2 weeks of your discharge. All of your prescriptions have been sent electronically to your pharmacy.

## 2024-10-16 NOTE — PROGRESS NOTE ADULT - SUBJECTIVE AND OBJECTIVE BOX
2- Hours Post Removal of Right Femoral Sheath Assessment of Access Site    Vital signs are stable, neuro-vascular status of the lower extremities is intact, stable and there is no evidence of hematoma on the right lower extremities.     Complications: None    Comments:                        
DATE OF SERVICE: 10-16-24 @ 20:33    Patient is a 54y old  Male who presents with a chief complaint of Chest pain (16 Oct 2024 01:11)      INTERVAL HISTORY: feels ok    TELEMETRY Personally reviewed: no events          PHYSICAL EXAM:  T(C): 36.7 (10-16-24 @ 07:45), Max: 36.7 (10-16-24 @ 07:45)  HR: 74 (10-16-24 @ 07:45) (68 - 80)  BP: 99/50 (10-16-24 @ 07:45) (85/50 - 112/56)  RR: 18 (10-16-24 @ 07:45) (17 - 18)  SpO2: 98% (10-16-24 @ 07:45) (97% - 100%)  Wt(kg): --  I&O's Summary        Appearance: In no distress	  HEENT:    PERRL, EOMI	  Cardiovascular:  S1 S2, No JVD  Respiratory: Lungs clear to auscultation	  Gastrointestinal:  Soft, Non-tender, + BS	  Vascularature:  No edema of LE  Psychiatric: Appropriate affect   Neuro: no acute focal deficits                               11.4   7.85  )-----------( 177      ( 15 Oct 2024 10:25 )             35.3     10-15    136  |  104  |  10  ----------------------------<  82  3.8   |  19[L]  |  0.84    Ca    8.8      15 Oct 2024 10:25  Phos  3.6     10-15  Mg     1.8     10-15    TPro  6.3  /  Alb  3.6  /  TBili  0.6  /  DBili  x   /  AST  22  /  ALT  11  /  AlkPhos  65  10-15        Labs personally reviewed      ASSESSMENT/PLAN: 	  54 year old male former smoker with significant PMHx of testicular cancer non hodgkins lymphoma (s/p immunotherapy and chemotherapy), ischemic colitis s/p colon resection and transverse to rectum anastomosis with Dr. Rice, CAD s/p PCI 2 LAD stents and currently on ASA and Plavix presented to ED this AM for c/o chest pain    1. Unstable angina/ACS - progressive chest pain started this am, rest angina  - 10/15 [facility] with on ONESIMO to the LAD   - Known prior CAD s/p PCI 2 LAD stents  - Resume ASA and Plavix  - BP well controlled  - Resume high intensity statin      Close OP follow up with Dr Selina Carballo, [facility]  Cardiovascular Medicine  [address] [place] [address], [address]  Office: [phone]  Available via Text/call on Microsoft Teams
Removal of Right Femoral Sheath    Pulses in the right lower extremity are palpable. The patient was placed in the supine position. The insertion site was identified and the sutures were removed per protocol.  The 6 Swedish femoral sheath was then removed. Direct pressure was applied for 20 minutes.     Monitoring of the right groin and both lower extremities including neuro-vascular checks and vital signs every 15 minutes x 4, then every 30 minutes x 2, then every 1 hour was ordered.    Complications: None    Comments:                 
Patient is a 54y old  Male who presents with a chief complaint of Chest pain (16 Oct 2024 01:11)          Allergies    No Known Allergies    Intolerances        Medications:  aspirin enteric coated 81 milliGRAM(s) Oral daily  atorvastatin 40 milliGRAM(s) Oral at bedtime  ciprofloxacin     Tablet 500 milliGRAM(s) Oral two times a day  clopidogrel Tablet 75 milliGRAM(s) Oral daily  levothyroxine 112 MICROGram(s) Oral daily  metoprolol succinate ER 25 milliGRAM(s) Oral daily  metroNIDAZOLE    Tablet 500 milliGRAM(s) Oral three times a day  naloxegol 25 milliGRAM(s) Oral daily  oxyCODONE  ER Tablet 10 milliGRAM(s) Oral three times a day PRN  pantoprazole    Tablet 40 milliGRAM(s) Oral before breakfast  polyethylene glycol 3350 17 Gram(s) Oral daily  sodium chloride 0.9% Bolus 250 milliLiter(s) IV Bolus once  sodium chloride 0.9%. 1000 milliLiter(s) IV Continuous <Continuous>  sodium chloride 0.9%. 1000 milliLiter(s) IV Continuous <Continuous>  zolpidem 5 milliGRAM(s) Oral at bedtime PRN  zolpidem 10 milliGRAM(s) Oral at bedtime PRN      Vitals:  T(C): 36.7 (10-15-24 @ 20:00), Max: 36.7 (10-15-24 @ 14:25)  HR: 70 (10-15-24 @ 22:00) (61 - 79)  BP: 111/61 (10-15-24 @ 22:00) (93/52 - 123/68)  BP(mean): 79 (10-15-24 @ 22:00) (67 - 88)  RR: 18 (10-15-24 @ 22:00) (16 - 18)  SpO2: 99% (10-15-24 @ 22:00) (96% - 100%)  Wt(kg): --  Daily Height in cm: 180.34 (15 Oct 2024 09:45)    Daily   I&O's Summary        Physical Exam:  Appearance: Normal  Eyes: PERRL, EOMI  HENT: Normal oral [place], NC/AT  Cardiovascular: S1S2, RRR, No M/R/G, no JVD, No Lower extremity edema  Procedural Access Site: No hematoma, Non-tender to palpation, 2+ pulse, No bruit, No Ecchymosis  Respiratory: Clear to auscultation bilaterally  Gastrointestinal: Soft, Non tender, Normal Bowel Sounds  Musculoskeletal: No clubbing, No joint deformity   Neurologic: Non-focal  Lymphatic: No lymphadenopathy  Psychiatry: AAOx3, Mood & affect appropriate  Skin: No rashes, No ecchymoses, No cyanosis    10-15    136  |  104  |  10  ----------------------------<  82  3.8   |  19[L]  |  0.84    Ca    8.8      15 Oct 2024 10:25  Phos  3.6     10-15  Mg     1.8     10-15    TPro  6.3  /  Alb  3.6  /  TBili  0.6  /  DBili  x   /  AST  22  /  ALT  11  /  AlkPhos  65  10-15    PT/INR - ( 15 Oct 2024 10:45 )   PT: 12.4 sec;   INR: 1.08 ratio         PTT - ( 15 Oct 2024 10:45 )  PTT:34.4 sec        Lipid panel   Hgb A1c                         11.4   7.85  )-----------( 177      ( 15 Oct 2024 10:25 )             35.3         ECG: SR 1st degree 66 bpm

## 2024-10-16 NOTE — PROGRESS NOTE ADULT - NSPROGADDITIONALINFOA_GEN_ALL_CORE
Thirty five minutes spent on encounter, of which more than fifty percent of the encounter was spent on counseling and/or coordinating care by the attending physician.

## 2024-10-16 NOTE — DISCHARGE NOTE PROVIDER - NS AS DC PROVIDER CONTACT Y/N MULTI
Patient, Rocio Landa calling for medication refill. Medication(s) submitted for a refill request and is pending approval from the Provider.    Caller has been advised that their call does not guarantee an immediate refill. This refill will be reviewed within 24-72 hours by a qualified provider who will determine whether he or she can refill the medication.    Patient has contacted the pharmacy?  Yes    Call Back Number: [phone]    Can a detailed message be left? Yes_No_---: Yes    Additional information:     Patient is completely out of medications    Patient’s preferred pharmacy has been noted and populated.       [facility] - [address] [address]  Phone: [phone] Fax: [phone]       Yes

## 2024-10-16 NOTE — PROGRESS NOTE ADULT - PROBLEM SELECTOR PLAN 3
Goal is to keep LDL<70. Continue with your cholesterol medications as prescribed. Eat a heart healthy diet that is low in saturated fats and salt, and includes whole grains, fruits, vegetables and lean protein; exercise regularly (consult with your physician or cardiologist first); maintain a heart healthy weight; if you smoke - quit (A resource to help you stop smoking is the [facility] – phone number [phone].). Continue to follow with your primary physician or cardiologist.

## 2024-10-16 NOTE — DISCHARGE NOTE PROVIDER - ATTENDING ATTESTATION STATEMENT
4 = No assist / stand by assistance
I have personally seen and examined the patient. I have collaborated with and supervised the

## 2024-10-16 NOTE — DISCHARGE NOTE NURSING/CASE MANAGEMENT/SOCIAL WORK - PATIENT PORTAL LINK FT
You can access the FollowMyHealth Patient Portal offered by [facility] by registering at the following website: [email or web address]. By joining [facility]’s FollowMyHealth portal, you will also be able to view your health information using other applications (apps) compatible with our system.

## 2024-10-16 NOTE — PROGRESS NOTE ADULT - ASSESSMENT
53 y/o male with above pmhx, 10/15 [facility] with on ONESIMO to the LAD via RFA access.

## 2024-10-16 NOTE — DISCHARGE NOTE PROVIDER - NSDCCPTREATMENT_GEN_ALL_CORE_FT
PRINCIPAL PROCEDURE  Procedure: Placement of coronary artery stent  Findings and Treatment: One stent to the LAD via RFA access.  DAPT with ASA, Plavix.

## 2024-10-16 NOTE — DISCHARGE NOTE PROVIDER - NSDCCPCAREPLAN_GEN_ALL_CORE_FT
PRINCIPAL DISCHARGE DIAGNOSIS  Diagnosis: CAD (coronary artery disease), native coronary artery  Assessment and Plan of Treatment: You underwent a cardiac catheterization where you received a stent to your LAD coronary artery. You have been started on Aspirin 81mg daily, Plavix 75mg daily. Continue taking all medications as prescribed.   The procedure was done through the wrist. Please avoid any heavy lifting (no more than 3 to 5 lbs), strenuous activity, bending, straining, or unnecessary stair climbing for 2 weeks. No driving for 2 days. You may shower 24 hours following the procedure but avoid baths/swimming for 1 week. If you develop any swelling, bleeding, hardening of the skin (hematoma formation), acute pain, numbness/tingling in your arm or leg, or have any questions/concerns regarding your procedure, please call [facility] [phone].  NEVER miss a dose of Aspirin and Plavix to ensure your stent does not close. DO NOT STOP THESE MEDICATIONS FOR ANY REASON UNLESS OTHERWISE INDICATED BY YOUR CARDIOLOGIST BECAUSE THIS WILL PUT YOU AT RISK OF YOUR STENT CLOSING AND HAVING A HEART ATTACK OR SUDDEN SEVERE LEG PAIN DUE TO BLOCKAGE OF BLOOD FLOW TO LEG.  You have been given a Stent Card to carry with you in your wallet.  Make Photocopies or take a picture of card so you have a backup copy.  This card has important information for any possible future Radiology/MRI studies.    Please make a follow up appointment with your cardiologist within 1-2 weeks of your discharge. All of your prescriptions have been sent electronically to your pharmacy.      SECONDARY DISCHARGE DIAGNOSES  Diagnosis: HTN (hypertension)  Assessment and Plan of Treatment: Continue taking your blood pressure medications as prescribed. Eat a heart healthy diet with low salt; exercise regularly (if cleared by your primary care doctor or cardiologist). Maintain a heart healthy weight and include healthy ways to manage stress. If you smoke, quit. If you need assistance to help you stop smoking, please use the following resource: [facility] – ([phone]). Follow up with your primary care doctor to continue having your blood pressure checked on a continual basis.    Diagnosis: HLD (hyperlipidemia)  Assessment and Plan of Treatment: Goal is to keep LDL<70. Continue with your cholesterol medications as prescribed. Eat a heart healthy diet that is low in saturated fats and salt, and includes whole grains, fruits, vegetables and lean protein; exercise regularly (consult with your physician or cardiologist first); maintain a heart healthy weight; if you smoke - quit (A resource to help you stop smoking is the [facility] – phone number [phone].). Continue to follow with your primary physician or cardiologist.

## 2024-10-18 ENCOUNTER — TRANSCRIPTION ENCOUNTER (OUTPATIENT)
Age: 54
End: 2024-10-18

## 2024-10-22 ENCOUNTER — NON-APPOINTMENT (OUTPATIENT)
Age: 54
End: 2024-10-22

## 2024-10-22 ENCOUNTER — APPOINTMENT (OUTPATIENT)
Dept: INTERNAL MEDICINE | Facility: CLINIC | Age: 54
End: 2024-10-22
Payer: COMMERCIAL

## 2024-10-22 VITALS
DIASTOLIC BLOOD PRESSURE: 62 MMHG | BODY MASS INDEX: 17.5 KG/M2 | TEMPERATURE: 98.3 F | WEIGHT: 125 LBS | SYSTOLIC BLOOD PRESSURE: 90 MMHG | HEIGHT: 71 IN

## 2024-10-22 VITALS — DIASTOLIC BLOOD PRESSURE: 60 MMHG | SYSTOLIC BLOOD PRESSURE: 102 MMHG

## 2024-10-22 DIAGNOSIS — I10 ESSENTIAL (PRIMARY) HYPERTENSION: ICD-10-CM

## 2024-10-22 DIAGNOSIS — E03.9 HYPOTHYROIDISM, UNSPECIFIED: ICD-10-CM

## 2024-10-22 DIAGNOSIS — I25.10 ATHEROSCLEROTIC HEART DISEASE OF NATIVE CORONARY ARTERY W/OUT ANGINA PECTORIS: ICD-10-CM

## 2024-10-22 PROCEDURE — 93000 ELECTROCARDIOGRAM COMPLETE: CPT

## 2024-10-22 PROCEDURE — 99204 OFFICE O/P NEW MOD 45 MIN: CPT

## 2024-10-22 PROCEDURE — 99496 TRANSJ CARE MGMT HIGH F2F 7D: CPT

## 2024-10-29 NOTE — H&P PST ADULT - NSANTHSNORERD_ENT_A_CORE
Spoke with  id # [id number] for Arabic. Left a message for the parent per above notes. Informed her to call us back if there are any other questions.   No

## 2024-11-08 ENCOUNTER — NON-APPOINTMENT (OUTPATIENT)
Age: 54
End: 2024-11-08

## 2024-11-08 ENCOUNTER — APPOINTMENT (OUTPATIENT)
Dept: INTERNAL MEDICINE | Facility: CLINIC | Age: 54
End: 2024-11-08
Payer: COMMERCIAL

## 2024-11-08 VITALS
DIASTOLIC BLOOD PRESSURE: 66 MMHG | OXYGEN SATURATION: 98 % | SYSTOLIC BLOOD PRESSURE: 110 MMHG | HEIGHT: 71 IN | BODY MASS INDEX: 18.2 KG/M2 | HEART RATE: 71 BPM | TEMPERATURE: 97.9 F | WEIGHT: 130 LBS

## 2024-11-08 DIAGNOSIS — G89.4 CHRONIC PAIN SYNDROME: ICD-10-CM

## 2024-11-08 DIAGNOSIS — E78.00 PURE HYPERCHOLESTEROLEMIA, UNSPECIFIED: ICD-10-CM

## 2024-11-08 DIAGNOSIS — D64.9 ANEMIA, UNSPECIFIED: ICD-10-CM

## 2024-11-08 DIAGNOSIS — E03.9 HYPOTHYROIDISM, UNSPECIFIED: ICD-10-CM

## 2024-11-08 DIAGNOSIS — I25.10 ATHEROSCLEROTIC HEART DISEASE OF NATIVE CORONARY ARTERY W/OUT ANGINA PECTORIS: ICD-10-CM

## 2024-11-08 DIAGNOSIS — Z86.0100 PERSONAL HISTORY OF COLON POLYPS, UNSPECIFIED: ICD-10-CM

## 2024-11-08 DIAGNOSIS — Z12.5 ENCOUNTER FOR SCREENING FOR MALIGNANT NEOPLASM OF PROSTATE: ICD-10-CM

## 2024-11-08 DIAGNOSIS — I10 ESSENTIAL (PRIMARY) HYPERTENSION: ICD-10-CM

## 2024-11-08 PROCEDURE — 93000 ELECTROCARDIOGRAM COMPLETE: CPT

## 2024-11-08 PROCEDURE — 99214 OFFICE O/P EST MOD 30 MIN: CPT

## 2024-11-09 ENCOUNTER — NON-APPOINTMENT (OUTPATIENT)
Age: 54
End: 2024-11-09

## 2024-11-10 PROBLEM — Z86.0100 HISTORY OF COLON POLYPS: Status: ACTIVE | Noted: 2019-11-04

## 2024-11-10 PROBLEM — Z12.5 SCREENING FOR PROSTATE CANCER: Status: ACTIVE | Noted: 2024-11-10

## 2024-11-10 LAB
ALBUMIN SERPL ELPH-MCNC: 4 G/DL
ALP BLD-CCNC: 70 U/L
ALT SERPL-CCNC: 9 U/L
ANION GAP SERPL CALC-SCNC: 13 MMOL/L
AST SERPL-CCNC: 24 U/L
BASOPHILS # BLD AUTO: 0.06 K/UL
BASOPHILS NFR BLD AUTO: 0.9 %
BILIRUB SERPL-MCNC: 0.4 MG/DL
BUN SERPL-MCNC: 9 MG/DL
CALCIUM SERPL-MCNC: 9.7 MG/DL
CHLORIDE SERPL-SCNC: 99 MMOL/L
CHOLEST SERPL-MCNC: 141 MG/DL
CK SERPL-CCNC: 265 U/L
CO2 SERPL-SCNC: 25 MMOL/L
CREAT SERPL-MCNC: 0.98 MG/DL
EGFR: 92 ML/MIN/1.73M2
EOSINOPHIL # BLD AUTO: 0.24 K/UL
EOSINOPHIL NFR BLD AUTO: 3.5 %
ESTIMATED AVERAGE GLUCOSE: 103 MG/DL
FERRITIN SERPL-MCNC: 123 NG/ML
FOLATE SERPL-MCNC: 5.7 NG/ML
GLUCOSE SERPL-MCNC: 82 MG/DL
HBA1C MFR BLD HPLC: 5.2 %
HCT VFR BLD CALC: 41.9 %
HDLC SERPL-MCNC: 20 MG/DL
HGB BLD-MCNC: 13.3 G/DL
IMM GRANULOCYTES NFR BLD AUTO: 0.3 %
IRON SATN MFR SERPL: 32 %
IRON SERPL-MCNC: 83 UG/DL
LDLC SERPL CALC-MCNC: 44 MG/DL
LYMPHOCYTES # BLD AUTO: 2.98 K/UL
LYMPHOCYTES NFR BLD AUTO: 43.1 %
MAN DIFF?: NORMAL
MCHC RBC-ENTMCNC: 28.2 PG
MCHC RBC-ENTMCNC: 31.7 G/DL
MCV RBC AUTO: 89 FL
MONOCYTES # BLD AUTO: 0.49 K/UL
MONOCYTES NFR BLD AUTO: 7.1 %
NEUTROPHILS # BLD AUTO: 3.13 K/UL
NEUTROPHILS NFR BLD AUTO: 45.1 %
NONHDLC SERPL-MCNC: 121 MG/DL
PLATELET # BLD AUTO: 257 K/UL
POTASSIUM SERPL-SCNC: 4.2 MMOL/L
PROT SERPL-MCNC: 6.9 G/DL
PSA FREE FLD-MCNC: 40 %
PSA FREE SERPL-MCNC: 0.26 NG/ML
PSA SERPL-MCNC: 0.64 NG/ML
RBC # BLD: 4.71 M/UL
RBC # FLD: 13.7 %
SODIUM SERPL-SCNC: 137 MMOL/L
T4 FREE SERPL-MCNC: 1.4 NG/DL
TIBC SERPL-MCNC: 262 UG/DL
TRIGL SERPL-MCNC: 529 MG/DL
TSH SERPL-ACNC: 0.84 UIU/ML
UIBC SERPL-MCNC: 179 UG/DL
VIT B12 SERPL-MCNC: 472 PG/ML
WBC # FLD AUTO: 6.92 K/UL

## 2024-11-12 RX ORDER — ICOSAPENT ETHYL 1 G/1
1 CAPSULE ORAL
Qty: 180 | Refills: 1 | Status: ACTIVE | COMMUNITY
Start: 2024-11-12 | End: 1900-01-01

## 2024-12-04 ENCOUNTER — APPOINTMENT (OUTPATIENT)
Dept: CARDIOLOGY | Facility: CLINIC | Age: 54
End: 2024-12-04

## 2024-12-04 ENCOUNTER — NON-APPOINTMENT (OUTPATIENT)
Age: 54
End: 2024-12-04

## 2024-12-04 DIAGNOSIS — G89.4 CHRONIC PAIN SYNDROME: ICD-10-CM

## 2024-12-06 ENCOUNTER — APPOINTMENT (OUTPATIENT)
Dept: CARDIOLOGY | Facility: CLINIC | Age: 54
End: 2024-12-06

## 2024-12-06 VITALS
HEIGHT: 71 IN | WEIGHT: 127 LBS | DIASTOLIC BLOOD PRESSURE: 80 MMHG | SYSTOLIC BLOOD PRESSURE: 110 MMHG | TEMPERATURE: 97 F | BODY MASS INDEX: 17.78 KG/M2

## 2024-12-06 DIAGNOSIS — R94.31 ABNORMAL ELECTROCARDIOGRAM [ECG] [EKG]: ICD-10-CM

## 2024-12-06 DIAGNOSIS — I73.00 RAYNAUD'S SYNDROME W/OUT GANGRENE: ICD-10-CM

## 2024-12-06 DIAGNOSIS — R59.1 GENERALIZED ENLARGED LYMPH NODES: ICD-10-CM

## 2024-12-06 DIAGNOSIS — Z71.85 ENCOUNTER FOR IMMUNIZATION SAFETY COUNSELING: ICD-10-CM

## 2024-12-06 DIAGNOSIS — E78.00 PURE HYPERCHOLESTEROLEMIA, UNSPECIFIED: ICD-10-CM

## 2024-12-06 DIAGNOSIS — C43.9 MALIGNANT MELANOMA OF SKIN, UNSPECIFIED: ICD-10-CM

## 2024-12-06 DIAGNOSIS — Z23 ENCOUNTER FOR IMMUNIZATION: ICD-10-CM

## 2024-12-06 DIAGNOSIS — Z13.228 ENCOUNTER FOR SCREENING FOR OTHER METABOLIC DISORDERS: ICD-10-CM

## 2024-12-06 DIAGNOSIS — Z01.818 ENCOUNTER FOR OTHER PREPROCEDURAL EXAMINATION: ICD-10-CM

## 2024-12-06 DIAGNOSIS — M25.50 PAIN IN UNSPECIFIED JOINT: ICD-10-CM

## 2024-12-06 DIAGNOSIS — R00.0 TACHYCARDIA, UNSPECIFIED: ICD-10-CM

## 2024-12-06 DIAGNOSIS — R04.0 EPISTAXIS: ICD-10-CM

## 2024-12-06 DIAGNOSIS — I10 ESSENTIAL (PRIMARY) HYPERTENSION: ICD-10-CM

## 2024-12-06 DIAGNOSIS — I25.10 ATHEROSCLEROTIC HEART DISEASE OF NATIVE CORONARY ARTERY W/OUT ANGINA PECTORIS: ICD-10-CM

## 2024-12-06 DIAGNOSIS — C62.90 MALIGNANT NEOPLASM OF UNSPECIFIED TESTIS, UNSPECIFIED WHETHER DESCENDED OR UNDESCENDED: ICD-10-CM

## 2024-12-06 DIAGNOSIS — R53.83 OTHER FATIGUE: ICD-10-CM

## 2024-12-06 DIAGNOSIS — E03.9 HYPOTHYROIDISM, UNSPECIFIED: ICD-10-CM

## 2024-12-06 PROCEDURE — 90656 IIV3 VACC NO PRSV 0.5 ML IM: CPT

## 2024-12-06 PROCEDURE — 99214 OFFICE O/P EST MOD 30 MIN: CPT | Mod: 25

## 2024-12-06 PROCEDURE — 93000 ELECTROCARDIOGRAM COMPLETE: CPT

## 2024-12-06 PROCEDURE — G0008: CPT

## 2024-12-07 LAB
ALBUMIN SERPL ELPH-MCNC: 4.4 G/DL
ALP BLD-CCNC: 80 U/L
ALT SERPL-CCNC: 13 U/L
AST SERPL-CCNC: 29 U/L
BILIRUB DIRECT SERPL-MCNC: 0.2 MG/DL
BILIRUB INDIRECT SERPL-MCNC: 0.4 MG/DL
BILIRUB SERPL-MCNC: 0.6 MG/DL
CHOLEST SERPL-MCNC: 137 MG/DL
CK SERPL-CCNC: 227 U/L
HDLC SERPL-MCNC: 28 MG/DL
LDLC SERPL CALC-MCNC: 73 MG/DL
LDLC SERPL DIRECT ASSAY-MCNC: 66 MG/DL
NONHDLC SERPL-MCNC: 109 MG/DL
PROT SERPL-MCNC: 7.2 G/DL
TRIGL SERPL-MCNC: 217 MG/DL

## 2024-12-09 ENCOUNTER — OUTPATIENT (OUTPATIENT)
Dept: OUTPATIENT SERVICES | Facility: HOSPITAL | Age: 54
LOS: 1 days | End: 2024-12-09

## 2024-12-09 VITALS
WEIGHT: 126.1 LBS | HEIGHT: 70.5 IN | SYSTOLIC BLOOD PRESSURE: 103 MMHG | DIASTOLIC BLOOD PRESSURE: 72 MMHG | OXYGEN SATURATION: 98 % | TEMPERATURE: 98 F | HEART RATE: 76 BPM | RESPIRATION RATE: 16 BRPM

## 2024-12-09 DIAGNOSIS — Z98.890 OTHER SPECIFIED POSTPROCEDURAL STATES: Chronic | ICD-10-CM

## 2024-12-09 DIAGNOSIS — C43.4 MALIGNANT MELANOMA OF SCALP AND NECK: Chronic | ICD-10-CM

## 2024-12-09 DIAGNOSIS — Z90.79 ACQUIRED ABSENCE OF OTHER GENITAL ORGAN(S): Chronic | ICD-10-CM

## 2024-12-09 DIAGNOSIS — C85.90 NON-HODGKIN LYMPHOMA, UNSPECIFIED, UNSPECIFIED SITE: ICD-10-CM

## 2024-12-09 DIAGNOSIS — Z95.5 PRESENCE OF CORONARY ANGIOPLASTY IMPLANT AND GRAFT: ICD-10-CM

## 2024-12-09 DIAGNOSIS — C44.40 UNSPECIFIED MALIGNANT NEOPLASM OF SKIN OF SCALP AND NECK: ICD-10-CM

## 2024-12-09 DIAGNOSIS — Z98.89 OTHER SPECIFIED POSTPROCEDURAL STATES: Chronic | ICD-10-CM

## 2024-12-09 DIAGNOSIS — I10 ESSENTIAL (PRIMARY) HYPERTENSION: ICD-10-CM

## 2024-12-09 DIAGNOSIS — I89.9 NONINFECTIVE DISORDER OF LYMPHATIC VESSELS AND LYMPH NODES, UNSPECIFIED: Chronic | ICD-10-CM

## 2024-12-09 LAB
HCT VFR BLD CALC: 39.5 % — SIGNIFICANT CHANGE UP (ref 39–50)
HGB BLD-MCNC: 12.7 G/DL — LOW (ref 13–17)
MCHC RBC-ENTMCNC: 27.3 PG — SIGNIFICANT CHANGE UP (ref 27–34)
MCHC RBC-ENTMCNC: 32.2 G/DL — SIGNIFICANT CHANGE UP (ref 32–36)
MCV RBC AUTO: 84.9 FL — SIGNIFICANT CHANGE UP (ref 80–100)
NRBC # BLD: 0 /100 WBCS — SIGNIFICANT CHANGE UP (ref 0–0)
NRBC # FLD: 0 K/UL — SIGNIFICANT CHANGE UP (ref 0–0)
PLATELET # BLD AUTO: 240 K/UL — SIGNIFICANT CHANGE UP (ref 150–400)
RBC # BLD: 4.65 M/UL — SIGNIFICANT CHANGE UP (ref 4.2–5.8)
RBC # FLD: 13.7 % — SIGNIFICANT CHANGE UP (ref 10.3–14.5)
WBC # BLD: 6.11 K/UL — SIGNIFICANT CHANGE UP (ref 3.8–10.5)
WBC # FLD AUTO: 6.11 K/UL — SIGNIFICANT CHANGE UP (ref 3.8–10.5)

## 2024-12-09 RX ORDER — 0.9 % SODIUM CHLORIDE 0.9 %
1000 INTRAVENOUS SOLUTION INTRAVENOUS
Refills: 0 | Status: DISCONTINUED | OUTPATIENT
Start: 2024-12-16 | End: 2024-12-30

## 2024-12-09 RX ORDER — LEVOTHYROXINE SODIUM 150 MCG
1 TABLET ORAL
Refills: 0 | DISCHARGE

## 2024-12-09 NOTE — H&P PST ADULT - HISTORY OF PRESENT ILLNESS
54yr old male with hx testicular ca s/p orchiectomy, abdominal mass, non hodgkins lymphoma (Chemo & radiation- 1999) scalp melanoma (had immunotherapy Oct 2019), cardiac cath 1/19/24 with successful PCI with ONESIMO to mid LAD and preop dx of unspecified malignant neoplasm skin scalp and neck presents to have PST eval for Extraction erupted tooth exposed root x 15.  Patient is s/p successful PCI with GALINDO to the mid LAD restenosis on 10/15/24.

## 2024-12-09 NOTE — H&P PST ADULT - ATTENDING COMMENTS
indicated for removal upper and lower teeth in OR under GA , RBA discussion pre-op, plan for all , dual antiplatelet, noted

## 2024-12-09 NOTE — H&P PST ADULT - ENMT COMMENTS
Nasal bleed for 3 days 2 times each day Preop dx Unspecified malignant neoplasm skin scalp and neck Nasal bleed for 3 days 2 times each day and was seen by ENT as per pt Preop dx Unspecified malignant neoplasm skin scalp and neck, Mallampati II

## 2024-12-09 NOTE — H&P PST ADULT - PROBLEM SELECTOR PLAN 1
Scheduled for Extraction erupted tooth exposed root x 15 on 12/16/24.  Pre-op instructions provided. Pt given verbal and written instructions with pepcid. Pt verbalized understanding.  CBC done as patient had nosebleeds for 3 days last week. Scheduled for Extraction erupted tooth exposed root x 15 on 12/16/24.  Pre-op instructions provided. Pt given verbal and written instructions with pepcid. Pt verbalized understanding.  CBC done as patient had nosebleeds for 3 days last week. Patient was seen by ENT as per patient ENT did wax removal and no further intervention was needed.

## 2024-12-09 NOTE — H&P PST ADULT - PROBLEM SELECTOR PLAN 2
h/o coronary stent. Last stent placed on Oct 15 2024. Patient was seen by Dr Marks on Dec 6 2024. No instructions was provided to patient. Will contact office tomorrow as patient has newly placed stent in Oct 2024 and is on Plavix and Aspirin.  Surgeon notified via email h/o coronary stent. Last stent placed on Oct 15 2024. Patient was seen by Dr Marks on Dec 6 2024. No instructions was provided to patient. Will contact office tomorrow as patient has newly placed stent in Oct 2024 and is on Plavix and Aspirin. Will discuss with anesthesiologist.  Surgeon notified via email

## 2024-12-09 NOTE — H&P PST ADULT - NSICDXPASTMEDICALHX_GEN_ALL_CORE_FT
PAST MEDICAL HISTORY:  BPH (benign prostatic hyperplasia)     CAD (coronary artery disease)     Colitis surgery 1996    GERD (gastroesophageal reflux disease)     Headache, Migraine     History of bone marrow transplant     History of chemotherapy     History of Raynaud's syndrome     HTN (hypertension)     Hypertriglyceridemia     Hypothyroidism     Malignant melanoma of scalp RSX 08/18  R neck mass dsx/ LN dsx 10/19/18    NHL (non-Hodgkin's lymphoma) 1999, Radiation to left neck region    Osteoarthritis degenerative disc L3-4    Testicular Cancer 1994, chemotherapy    Unspecified malignant neoplasm of skin of scalp and neck

## 2024-12-10 ENCOUNTER — NON-APPOINTMENT (OUTPATIENT)
Age: 54
End: 2024-12-10

## 2024-12-13 NOTE — ASU PATIENT PROFILE, ADULT - PAIN LOCATION, PROFILE
Area L Indication Text: Tumors in this location are included in Area L (trunk and extremities).  Mohs surgery is indicated for larger tumors, or tumors with aggressive histologic features, in these anatomic locations. lower back pain

## 2024-12-16 ENCOUNTER — TRANSCRIPTION ENCOUNTER (OUTPATIENT)
Age: 54
End: 2024-12-16

## 2024-12-16 ENCOUNTER — OUTPATIENT (OUTPATIENT)
Dept: INPATIENT UNIT | Facility: HOSPITAL | Age: 54
LOS: 1 days | Discharge: HOME | End: 2024-12-16

## 2024-12-16 VITALS
WEIGHT: 126.1 LBS | SYSTOLIC BLOOD PRESSURE: 112 MMHG | DIASTOLIC BLOOD PRESSURE: 71 MMHG | RESPIRATION RATE: 14 BRPM | TEMPERATURE: 98 F | HEIGHT: 70.5 IN | HEART RATE: 62 BPM | OXYGEN SATURATION: 99 %

## 2024-12-16 VITALS
SYSTOLIC BLOOD PRESSURE: 129 MMHG | TEMPERATURE: 96 F | HEART RATE: 81 BPM | RESPIRATION RATE: 17 BRPM | OXYGEN SATURATION: 98 % | DIASTOLIC BLOOD PRESSURE: 80 MMHG

## 2024-12-16 DIAGNOSIS — Z98.890 OTHER SPECIFIED POSTPROCEDURAL STATES: Chronic | ICD-10-CM

## 2024-12-16 DIAGNOSIS — C85.90 NON-HODGKIN LYMPHOMA, UNSPECIFIED, UNSPECIFIED SITE: ICD-10-CM

## 2024-12-16 DIAGNOSIS — C43.4 MALIGNANT MELANOMA OF SCALP AND NECK: Chronic | ICD-10-CM

## 2024-12-16 DIAGNOSIS — Z90.79 ACQUIRED ABSENCE OF OTHER GENITAL ORGAN(S): Chronic | ICD-10-CM

## 2024-12-16 DIAGNOSIS — Z98.89 OTHER SPECIFIED POSTPROCEDURAL STATES: Chronic | ICD-10-CM

## 2024-12-16 DIAGNOSIS — C44.40 UNSPECIFIED MALIGNANT NEOPLASM OF SKIN OF SCALP AND NECK: ICD-10-CM

## 2024-12-16 DIAGNOSIS — I89.9 NONINFECTIVE DISORDER OF LYMPHATIC VESSELS AND LYMPH NODES, UNSPECIFIED: Chronic | ICD-10-CM

## 2024-12-16 DEVICE — SURGIFOAM PAD 8CM X 12.5CM X 2MM (100C): Type: IMPLANTABLE DEVICE | Status: FUNCTIONAL

## 2024-12-16 RX ORDER — ACETAMINOPHEN 500MG 500 MG/1
1 TABLET, COATED ORAL
Qty: 28 | Refills: 0
Start: 2024-12-16 | End: 2024-12-22

## 2024-12-16 RX ORDER — AMOXICILLIN 250 MG
1 CAPSULE ORAL
Qty: 21 | Refills: 0
Start: 2024-12-16 | End: 2024-12-22

## 2024-12-16 RX ORDER — CHLORHEXIDINE GLUCONATE 1.2 MG/ML
15 RINSE ORAL
Qty: 1 | Refills: 0
Start: 2024-12-16 | End: 2024-12-22

## 2024-12-16 RX ORDER — OXYCODONE AND ACETAMINOPHEN 5; 325 MG/1; MG/1
1 TABLET ORAL
Qty: 12 | Refills: 0
Start: 2024-12-16 | End: 2024-12-18

## 2024-12-16 RX ORDER — HYDROMORPHONE HYDROCHLORIDE 2 MG/1
1 TABLET ORAL
Refills: 0 | Status: DISCONTINUED | OUTPATIENT
Start: 2024-12-16 | End: 2024-12-16

## 2024-12-16 RX ORDER — CYANOCOBALAMIN/FOLIC AC/VIT B6 1-2.2-25MG
1 TABLET ORAL
Refills: 0 | DISCHARGE

## 2024-12-16 RX ORDER — 0.9 % SODIUM CHLORIDE 0.9 %
1000 INTRAVENOUS SOLUTION INTRAVENOUS
Refills: 0 | Status: DISCONTINUED | OUTPATIENT
Start: 2024-12-16 | End: 2024-12-30

## 2024-12-16 RX ORDER — ONDANSETRON HYDROCHLORIDE 4 MG/1
4 TABLET, FILM COATED ORAL ONCE
Refills: 0 | Status: DISCONTINUED | OUTPATIENT
Start: 2024-12-16 | End: 2024-12-30

## 2024-12-16 RX ORDER — HYDROMORPHONE HYDROCHLORIDE 2 MG/1
0.5 TABLET ORAL
Refills: 0 | Status: DISCONTINUED | OUTPATIENT
Start: 2024-12-16 | End: 2024-12-16

## 2024-12-16 NOTE — ASU PREOP CHECKLIST - BP NONINVASIVE SYSTOLIC (MM HG)
CHIEF COMPLAINT:  Chief Complaint   Patient presents with   • Sore Throat       HISTORY OF PRESENT ILLNESS:  Gilberto is a 12 year old male brought in by Father due to sore throat for the last 2-3 days and fever. He was treated with Tylenol or Ibuprofen as needed. He is drinking well fluids, but not eating much as he states that hurts to swallow. He has no headache, no vomiting, no wheezing, no difficulty breathing, and no skin rash. He has mild nasal congesiton, no cough, no wheezing, no difficulty breathing. He was treated with Tylenol or Ibuprofen as needed.     No outpatient prescriptions have been marked as taking for the 1/23/17 encounter (Office Visit) with Halima Castellanos MD.       ALLERGIES:  No Known Allergies    PHYSICAL EXAM:  Blood pressure 110/66, temperature 98.3 °F (36.8 °C), temperature source Oral, height 5' 3.75\" (1.619 m), weight 42.6 kg.   GEN:  The patient is an awake, alert, and well appearing male.  No acute distress.  Nontoxic.  Alert and interactive.   HEAD:  Normocephalic, atraumatic.   EYES:  Pupils reactive to light.  Conjunctiva without injection or icterus.  EOMI.  EARS:  TMs transparent with good landmarks. No effusion or inflammation.  THROAT:  Oropharynx with moist mucus membranes. Oropharynx is congested, bright red, with petechia of the soft palate, no exudate or oral lesions.  NECK:  Supple, no lymphadenopathy or masses.  HEART:  Regular rate and rhythm. Normal S1, S2.  No murmurs, rubs, gallops.   LUNGS:  Clear to auscultation bilaterally.  No wheezes, rales, rhonchi.  Normal work of breathing.  ABD:  Soft, nondistended, nontender.  Bowel sounds normoactive. No organomegaly or masses.  EXT:  Warm, dry, without abnormalities.  SKIN: No rashes or lesions or cyanosis.    ASSESSMENT AND PLAN:  Gilberto is a 12 year old male here for:  1. Acute Pharyngitis, and a RST is positive, we are going to treat him with Amoxicillin at 500 mg tid for 10 days. Can continue with Tylenol or Ibuprofen  as needed.                      112

## 2024-12-16 NOTE — BRIEF OPERATIVE NOTE - NSICDXBRIEFPROCEDURE_GEN_ALL_CORE_FT
PROCEDURES:  Extraction of all teeth 16-Dec-2024 15:59:54  Consuelo Portillo  Alveoplasty 16-Dec-2024 16:00:00  Consuelo Portillo

## 2024-12-16 NOTE — ASU DISCHARGE PLAN (ADULT/PEDIATRIC) - NS MD DC FALL RISK RISK
For information on Fall & Injury Prevention, visit: [email or web address] OR  [email or web address] OR  https://www.cdc.gov/steadi/patient.html

## 2024-12-16 NOTE — ASU DISCHARGE PLAN (ADULT/PEDIATRIC) - FINANCIAL ASSISTANCE
[facility] provides services at a reduced cost to those who are determined to be eligible through [facility]’s financial assistance program. Information regarding [facility]’s financial assistance program can be found by going to [email or web address] or by calling [phone].

## 2024-12-16 NOTE — ASU PREOP CHECKLIST - COMMENTS
metoprolol and famotidine with sip of water 9am metoprolol oxy 10mg and famotidine with sip of water 9am

## 2024-12-16 NOTE — ASU DISCHARGE PLAN (ADULT/PEDIATRIC) - CARE PROVIDER_API CALL
José Miguel Pettit  Oral/Maxillofacial Surgery  [address] [place] [address]  Phone: [phone]  Fax: [phone]  Established Patient  Follow Up Time: 1 week

## 2024-12-16 NOTE — ASU DISCHARGE PLAN (ADULT/PEDIATRIC) - FOLLOW UP APPOINTMENTS
may also call Recovery Room (PACU) 24/7 @ [phone]/[facility]

## 2024-12-16 NOTE — ASU DISCHARGE PLAN (ADULT/PEDIATRIC) - MEDICATION INSTRUCTIONS
You were given 1000mg IV Tylenol for pain management.  Please DO NOT take any Tylenol containing products, such as  Vicodin, Percocet, Excedrin, many cold preparations for the next 6 hours (until 845p).  DO NOT EXCEED 4000MG OF TYLENOL OVER 24 HOURS.

## 2025-01-17 ENCOUNTER — NON-APPOINTMENT (OUTPATIENT)
Age: 55
End: 2025-01-17

## 2025-01-17 PROBLEM — C44.40 UNSPECIFIED MALIGNANT NEOPLASM OF SKIN OF SCALP AND NECK: Chronic | Status: ACTIVE | Noted: 2024-12-09

## 2025-01-17 RX ORDER — OXYCODONE HYDROCHLORIDE 10 MG/1
10 TABLET, FILM COATED, EXTENDED RELEASE ORAL
Qty: 40 | Refills: 0 | Status: ACTIVE | COMMUNITY
Start: 2025-01-16 | End: 1900-01-01

## 2025-01-22 ENCOUNTER — EMERGENCY (EMERGENCY)
Facility: HOSPITAL | Age: 55
LOS: 1 days | Discharge: HOME | End: 2025-01-22
Attending: EMERGENCY MEDICINE | Admitting: EMERGENCY MEDICINE
Payer: COMMERCIAL

## 2025-01-22 VITALS
DIASTOLIC BLOOD PRESSURE: 82 MMHG | RESPIRATION RATE: 18 BRPM | SYSTOLIC BLOOD PRESSURE: 119 MMHG | OXYGEN SATURATION: 96 % | HEIGHT: 70.5 IN | TEMPERATURE: 98 F | HEART RATE: 71 BPM

## 2025-01-22 DIAGNOSIS — Z98.890 OTHER SPECIFIED POSTPROCEDURAL STATES: Chronic | ICD-10-CM

## 2025-01-22 DIAGNOSIS — I89.9 NONINFECTIVE DISORDER OF LYMPHATIC VESSELS AND LYMPH NODES, UNSPECIFIED: Chronic | ICD-10-CM

## 2025-01-22 DIAGNOSIS — Z90.79 ACQUIRED ABSENCE OF OTHER GENITAL ORGAN(S): Chronic | ICD-10-CM

## 2025-01-22 DIAGNOSIS — Z98.89 OTHER SPECIFIED POSTPROCEDURAL STATES: Chronic | ICD-10-CM

## 2025-01-22 DIAGNOSIS — C43.4 MALIGNANT MELANOMA OF SCALP AND NECK: Chronic | ICD-10-CM

## 2025-01-22 LAB
ALBUMIN SERPL ELPH-MCNC: 4.5 G/DL — SIGNIFICANT CHANGE UP (ref 3.3–5)
ALP SERPL-CCNC: 73 U/L — SIGNIFICANT CHANGE UP (ref 40–120)
ALT FLD-CCNC: 13 U/L — SIGNIFICANT CHANGE UP (ref 4–41)
ANION GAP SERPL CALC-SCNC: 12 MMOL/L — SIGNIFICANT CHANGE UP (ref 7–14)
AST SERPL-CCNC: 36 U/L — SIGNIFICANT CHANGE UP (ref 4–40)
BASOPHILS # BLD AUTO: 0.07 K/UL — SIGNIFICANT CHANGE UP (ref 0–0.2)
BASOPHILS NFR BLD AUTO: 1.1 % — SIGNIFICANT CHANGE UP (ref 0–2)
BILIRUB SERPL-MCNC: 0.7 MG/DL — SIGNIFICANT CHANGE UP (ref 0.2–1.2)
BUN SERPL-MCNC: 13 MG/DL — SIGNIFICANT CHANGE UP (ref 7–23)
CALCIUM SERPL-MCNC: 9.6 MG/DL — SIGNIFICANT CHANGE UP (ref 8.4–10.5)
CHLORIDE SERPL-SCNC: 97 MMOL/L — LOW (ref 98–107)
CO2 SERPL-SCNC: 28 MMOL/L — SIGNIFICANT CHANGE UP (ref 22–31)
CREAT SERPL-MCNC: 1.15 MG/DL — SIGNIFICANT CHANGE UP (ref 0.5–1.3)
EGFR: 75 ML/MIN/1.73M2 — SIGNIFICANT CHANGE UP
EGFR: 75 ML/MIN/1.73M2 — SIGNIFICANT CHANGE UP
EOSINOPHIL # BLD AUTO: 0.29 K/UL — SIGNIFICANT CHANGE UP (ref 0–0.5)
EOSINOPHIL NFR BLD AUTO: 4.5 % — SIGNIFICANT CHANGE UP (ref 0–6)
GLUCOSE SERPL-MCNC: 88 MG/DL — SIGNIFICANT CHANGE UP (ref 70–99)
HCT VFR BLD CALC: 42.9 % — SIGNIFICANT CHANGE UP (ref 39–50)
HGB BLD-MCNC: 14.3 G/DL — SIGNIFICANT CHANGE UP (ref 13–17)
IANC: 3.12 K/UL — SIGNIFICANT CHANGE UP (ref 1.8–7.4)
IMM GRANULOCYTES NFR BLD AUTO: 0.2 % — SIGNIFICANT CHANGE UP (ref 0–0.9)
LYMPHOCYTES # BLD AUTO: 2.67 K/UL — SIGNIFICANT CHANGE UP (ref 1–3.3)
LYMPHOCYTES # BLD AUTO: 41.4 % — SIGNIFICANT CHANGE UP (ref 13–44)
MCHC RBC-ENTMCNC: 27.8 PG — SIGNIFICANT CHANGE UP (ref 27–34)
MCHC RBC-ENTMCNC: 33.3 G/DL — SIGNIFICANT CHANGE UP (ref 32–36)
MCV RBC AUTO: 83.5 FL — SIGNIFICANT CHANGE UP (ref 80–100)
MONOCYTES # BLD AUTO: 0.29 K/UL — SIGNIFICANT CHANGE UP (ref 0–0.9)
MONOCYTES NFR BLD AUTO: 4.5 % — SIGNIFICANT CHANGE UP (ref 2–14)
NEUTROPHILS # BLD AUTO: 3.12 K/UL — SIGNIFICANT CHANGE UP (ref 1.8–7.4)
NEUTROPHILS NFR BLD AUTO: 48.3 % — SIGNIFICANT CHANGE UP (ref 43–77)
NRBC # BLD AUTO: 0 K/UL — SIGNIFICANT CHANGE UP (ref 0–0)
NRBC # BLD: 0 /100 WBCS — SIGNIFICANT CHANGE UP (ref 0–0)
NRBC # FLD: 0 K/UL — SIGNIFICANT CHANGE UP (ref 0–0)
NRBC BLD-RTO: 0 /100 WBCS — SIGNIFICANT CHANGE UP (ref 0–0)
PLATELET # BLD AUTO: 220 K/UL — SIGNIFICANT CHANGE UP (ref 150–400)
POTASSIUM SERPL-MCNC: 4.5 MMOL/L — SIGNIFICANT CHANGE UP (ref 3.5–5.3)
POTASSIUM SERPL-SCNC: 4.5 MMOL/L — SIGNIFICANT CHANGE UP (ref 3.5–5.3)
PROT SERPL-MCNC: 7.6 G/DL — SIGNIFICANT CHANGE UP (ref 6–8.3)
RBC # BLD: 5.14 M/UL — SIGNIFICANT CHANGE UP (ref 4.2–5.8)
RBC # FLD: 14.1 % — SIGNIFICANT CHANGE UP (ref 10.3–14.5)
SODIUM SERPL-SCNC: 137 MMOL/L — SIGNIFICANT CHANGE UP (ref 135–145)
WBC # BLD: 6.45 K/UL — SIGNIFICANT CHANGE UP (ref 3.8–10.5)
WBC # FLD AUTO: 6.45 K/UL — SIGNIFICANT CHANGE UP (ref 3.8–10.5)

## 2025-01-22 PROCEDURE — 99284 EMERGENCY DEPT VISIT MOD MDM: CPT

## 2025-01-22 PROCEDURE — 73120 X-RAY EXAM OF HAND: CPT | Mod: 26,LT

## 2025-01-22 PROCEDURE — 70450 CT HEAD/BRAIN W/O DYE: CPT | Mod: 26

## 2025-01-22 PROCEDURE — 73090 X-RAY EXAM OF FOREARM: CPT | Mod: 26,LT

## 2025-01-22 PROCEDURE — 73100 X-RAY EXAM OF WRIST: CPT | Mod: 26,LT

## 2025-01-22 RX ORDER — OXYCODONE HYDROCHLORIDE 30 MG/1
10 TABLET ORAL ONCE
Refills: 0 | Status: DISCONTINUED | OUTPATIENT
Start: 2025-01-22 | End: 2025-01-22

## 2025-01-22 RX ADMIN — OXYCODONE HYDROCHLORIDE 10 MILLIGRAM(S): 30 TABLET ORAL at 20:25

## 2025-01-24 ENCOUNTER — TRANSCRIPTION ENCOUNTER (OUTPATIENT)
Age: 55
End: 2025-01-24

## 2025-01-31 ENCOUNTER — APPOINTMENT (OUTPATIENT)
Dept: INTERNAL MEDICINE | Facility: CLINIC | Age: 55
End: 2025-01-31
Payer: COMMERCIAL

## 2025-01-31 ENCOUNTER — INPATIENT (INPATIENT)
Facility: HOSPITAL | Age: 55
LOS: 0 days | Discharge: HOME | DRG: 204 | End: 2025-02-01
Attending: STUDENT IN AN ORGANIZED HEALTH CARE EDUCATION/TRAINING PROGRAM | Admitting: INTERNAL MEDICINE
Payer: COMMERCIAL

## 2025-01-31 ENCOUNTER — RESULT REVIEW (OUTPATIENT)
Age: 55
End: 2025-01-31

## 2025-01-31 ENCOUNTER — NON-APPOINTMENT (OUTPATIENT)
Age: 55
End: 2025-01-31

## 2025-01-31 VITALS
WEIGHT: 130.07 LBS | OXYGEN SATURATION: 100 % | SYSTOLIC BLOOD PRESSURE: 121 MMHG | HEART RATE: 59 BPM | HEIGHT: 70.5 IN | DIASTOLIC BLOOD PRESSURE: 79 MMHG | RESPIRATION RATE: 18 BRPM | TEMPERATURE: 98 F

## 2025-01-31 VITALS
SYSTOLIC BLOOD PRESSURE: 106 MMHG | WEIGHT: 125 LBS | DIASTOLIC BLOOD PRESSURE: 70 MMHG | HEIGHT: 71 IN | OXYGEN SATURATION: 99 % | TEMPERATURE: 97.5 F | HEART RATE: 61 BPM | BODY MASS INDEX: 17.5 KG/M2

## 2025-01-31 DIAGNOSIS — I10 ESSENTIAL (PRIMARY) HYPERTENSION: ICD-10-CM

## 2025-01-31 DIAGNOSIS — R06.09 OTHER FORMS OF DYSPNEA: ICD-10-CM

## 2025-01-31 DIAGNOSIS — I89.9 NONINFECTIVE DISORDER OF LYMPHATIC VESSELS AND LYMPH NODES, UNSPECIFIED: Chronic | ICD-10-CM

## 2025-01-31 DIAGNOSIS — R06.02 SHORTNESS OF BREATH: ICD-10-CM

## 2025-01-31 DIAGNOSIS — Z98.89 OTHER SPECIFIED POSTPROCEDURAL STATES: Chronic | ICD-10-CM

## 2025-01-31 DIAGNOSIS — C43.4 MALIGNANT MELANOMA OF SCALP AND NECK: Chronic | ICD-10-CM

## 2025-01-31 DIAGNOSIS — Z98.890 OTHER SPECIFIED POSTPROCEDURAL STATES: Chronic | ICD-10-CM

## 2025-01-31 DIAGNOSIS — E03.9 HYPOTHYROIDISM, UNSPECIFIED: ICD-10-CM

## 2025-01-31 DIAGNOSIS — I25.10 ATHEROSCLEROTIC HEART DISEASE OF NATIVE CORONARY ARTERY W/OUT ANGINA PECTORIS: ICD-10-CM

## 2025-01-31 DIAGNOSIS — Z90.79 ACQUIRED ABSENCE OF OTHER GENITAL ORGAN(S): Chronic | ICD-10-CM

## 2025-01-31 LAB
ADD ON TEST-SPECIMEN IN LAB: SIGNIFICANT CHANGE UP
ALBUMIN SERPL ELPH-MCNC: 5 G/DL — SIGNIFICANT CHANGE UP (ref 3.3–5)
ALP SERPL-CCNC: 80 U/L — SIGNIFICANT CHANGE UP (ref 40–120)
ALT FLD-CCNC: 14 U/L — SIGNIFICANT CHANGE UP (ref 10–45)
ANION GAP SERPL CALC-SCNC: 12 MMOL/L — SIGNIFICANT CHANGE UP (ref 5–17)
APTT BLD: 36.2 SEC — HIGH (ref 24.5–35.6)
AST SERPL-CCNC: 32 U/L — SIGNIFICANT CHANGE UP (ref 10–40)
BASOPHILS # BLD AUTO: 0.08 K/UL — SIGNIFICANT CHANGE UP (ref 0–0.2)
BASOPHILS NFR BLD AUTO: 1.1 % — SIGNIFICANT CHANGE UP (ref 0–2)
BILIRUB SERPL-MCNC: 0.7 MG/DL — SIGNIFICANT CHANGE UP (ref 0.2–1.2)
BUN SERPL-MCNC: 19 MG/DL — SIGNIFICANT CHANGE UP (ref 7–23)
CALCIUM SERPL-MCNC: 10.9 MG/DL — HIGH (ref 8.4–10.5)
CHLORIDE SERPL-SCNC: 97 MMOL/L — SIGNIFICANT CHANGE UP (ref 96–108)
CK MB BLD-MCNC: 1.8 % — SIGNIFICANT CHANGE UP (ref 0–3.5)
CK MB CFR SERPL CALC: 8.2 NG/ML — HIGH (ref 0–6.7)
CK SERPL-CCNC: 456 U/L — HIGH (ref 30–200)
CO2 SERPL-SCNC: 25 MMOL/L — SIGNIFICANT CHANGE UP (ref 22–31)
CREAT SERPL-MCNC: 1.14 MG/DL — SIGNIFICANT CHANGE UP (ref 0.5–1.3)
D DIMER BLD IA.RAPID-MCNC: <150 NG/ML DDU — SIGNIFICANT CHANGE UP
EGFR: 76 ML/MIN/1.73M2 — SIGNIFICANT CHANGE UP
EGFR: 76 ML/MIN/1.73M2 — SIGNIFICANT CHANGE UP
EOSINOPHIL # BLD AUTO: 0.22 K/UL — SIGNIFICANT CHANGE UP (ref 0–0.5)
EOSINOPHIL NFR BLD AUTO: 3 % — SIGNIFICANT CHANGE UP (ref 0–6)
FLUAV AG NPH QL: SIGNIFICANT CHANGE UP
FLUBV AG NPH QL: SIGNIFICANT CHANGE UP
GAS PNL BLDV: SIGNIFICANT CHANGE UP
GLUCOSE SERPL-MCNC: 83 MG/DL — SIGNIFICANT CHANGE UP (ref 70–99)
HCT VFR BLD CALC: 43.4 % — SIGNIFICANT CHANGE UP (ref 39–50)
HGB BLD-MCNC: 14.3 G/DL — SIGNIFICANT CHANGE UP (ref 13–17)
IMM GRANULOCYTES NFR BLD AUTO: 0.3 % — SIGNIFICANT CHANGE UP (ref 0–0.9)
INR BLD: 1.14 RATIO — SIGNIFICANT CHANGE UP (ref 0.85–1.16)
LYMPHOCYTES # BLD AUTO: 3.3 K/UL — SIGNIFICANT CHANGE UP (ref 1–3.3)
LYMPHOCYTES # BLD AUTO: 45.4 % — HIGH (ref 13–44)
MCHC RBC-ENTMCNC: 27.3 PG — SIGNIFICANT CHANGE UP (ref 27–34)
MCHC RBC-ENTMCNC: 32.9 G/DL — SIGNIFICANT CHANGE UP (ref 32–36)
MCV RBC AUTO: 82.8 FL — SIGNIFICANT CHANGE UP (ref 80–100)
MONOCYTES # BLD AUTO: 0.25 K/UL — SIGNIFICANT CHANGE UP (ref 0–0.9)
MONOCYTES NFR BLD AUTO: 3.4 % — SIGNIFICANT CHANGE UP (ref 2–14)
NEUTROPHILS # BLD AUTO: 3.4 K/UL — SIGNIFICANT CHANGE UP (ref 1.8–7.4)
NEUTROPHILS NFR BLD AUTO: 46.8 % — SIGNIFICANT CHANGE UP (ref 43–77)
NRBC # BLD: 0 /100 WBCS — SIGNIFICANT CHANGE UP (ref 0–0)
NRBC BLD-RTO: 0 /100 WBCS — SIGNIFICANT CHANGE UP (ref 0–0)
NT-PROBNP SERPL-SCNC: 131 PG/ML — SIGNIFICANT CHANGE UP (ref 0–300)
PLATELET # BLD AUTO: 266 K/UL — SIGNIFICANT CHANGE UP (ref 150–400)
POTASSIUM SERPL-MCNC: 4.2 MMOL/L — SIGNIFICANT CHANGE UP (ref 3.5–5.3)
POTASSIUM SERPL-SCNC: 4.2 MMOL/L — SIGNIFICANT CHANGE UP (ref 3.5–5.3)
PROT SERPL-MCNC: 8.2 G/DL — SIGNIFICANT CHANGE UP (ref 6–8.3)
PROTHROM AB SERPL-ACNC: 13.1 SEC — SIGNIFICANT CHANGE UP (ref 9.9–13.4)
RBC # BLD: 5.24 M/UL — SIGNIFICANT CHANGE UP (ref 4.2–5.8)
RBC # FLD: 14 % — SIGNIFICANT CHANGE UP (ref 10.3–14.5)
RSV RNA NPH QL NAA+NON-PROBE: SIGNIFICANT CHANGE UP
SARS-COV-2 RNA SPEC QL NAA+PROBE: SIGNIFICANT CHANGE UP
SODIUM SERPL-SCNC: 134 MMOL/L — LOW (ref 135–145)
TROPONIN T, HIGH SENSITIVITY RESULT: 9 NG/L — SIGNIFICANT CHANGE UP (ref 0–51)
WBC # BLD: 7.27 K/UL — SIGNIFICANT CHANGE UP (ref 3.8–10.5)
WBC # FLD AUTO: 7.27 K/UL — SIGNIFICANT CHANGE UP (ref 3.8–10.5)

## 2025-01-31 PROCEDURE — 71046 X-RAY EXAM CHEST 2 VIEWS: CPT | Mod: 26

## 2025-01-31 PROCEDURE — 99152 MOD SED SAME PHYS/QHP 5/>YRS: CPT

## 2025-01-31 PROCEDURE — 93454 CORONARY ARTERY ANGIO S&I: CPT | Mod: 26

## 2025-01-31 PROCEDURE — 99291 CRITICAL CARE FIRST HOUR: CPT

## 2025-01-31 PROCEDURE — 93306 TTE W/DOPPLER COMPLETE: CPT | Mod: 26

## 2025-01-31 PROCEDURE — 99223 1ST HOSP IP/OBS HIGH 75: CPT

## 2025-01-31 PROCEDURE — G2211 COMPLEX E/M VISIT ADD ON: CPT

## 2025-01-31 PROCEDURE — 99215 OFFICE O/P EST HI 40 MIN: CPT

## 2025-01-31 PROCEDURE — 93000 ELECTROCARDIOGRAM COMPLETE: CPT

## 2025-01-31 RX ORDER — ASPIRIN 325 MG
81 TABLET ORAL DAILY
Refills: 0 | Status: DISCONTINUED | OUTPATIENT
Start: 2025-01-31 | End: 2025-02-01

## 2025-01-31 RX ORDER — CLOPIDOGREL BISULFATE 75 MG/1
75 TABLET, FILM COATED ORAL DAILY
Refills: 0 | Status: DISCONTINUED | OUTPATIENT
Start: 2025-02-01 | End: 2025-02-01

## 2025-01-31 RX ORDER — OXYCODONE HYDROCHLORIDE 30 MG/1
10 TABLET ORAL EVERY 8 HOURS
Refills: 0 | Status: DISCONTINUED | OUTPATIENT
Start: 2025-01-31 | End: 2025-02-01

## 2025-01-31 RX ORDER — HYDROCORTISONE 20 MG
20 TABLET ORAL
Refills: 0 | Status: DISCONTINUED | OUTPATIENT
Start: 2025-02-01 | End: 2025-02-01

## 2025-01-31 RX ORDER — ACETAMINOPHEN 500 MG/5ML
650 LIQUID (ML) ORAL EVERY 6 HOURS
Refills: 0 | Status: DISCONTINUED | OUTPATIENT
Start: 2025-01-31 | End: 2025-02-01

## 2025-01-31 RX ORDER — ONDANSETRON HCL/PF 4 MG/2 ML
4 VIAL (ML) INJECTION EVERY 8 HOURS
Refills: 0 | Status: DISCONTINUED | OUTPATIENT
Start: 2025-01-31 | End: 2025-02-01

## 2025-01-31 RX ORDER — TAMSULOSIN HYDROCHLORIDE 0.4 MG/1
0.4 CAPSULE ORAL AT BEDTIME
Refills: 0 | Status: DISCONTINUED | OUTPATIENT
Start: 2025-01-31 | End: 2025-02-01

## 2025-01-31 RX ORDER — ATORVASTATIN CALCIUM 80 MG/1
40 TABLET, FILM COATED ORAL AT BEDTIME
Refills: 0 | Status: DISCONTINUED | OUTPATIENT
Start: 2025-01-31 | End: 2025-02-01

## 2025-01-31 RX ORDER — METOPROLOL SUCCINATE 50 MG/1
50 TABLET, EXTENDED RELEASE ORAL DAILY
Refills: 0 | Status: DISCONTINUED | OUTPATIENT
Start: 2025-01-31 | End: 2025-02-01

## 2025-01-31 RX ORDER — MELATONIN 5 MG
3 TABLET ORAL AT BEDTIME
Refills: 0 | Status: DISCONTINUED | OUTPATIENT
Start: 2025-01-31 | End: 2025-02-01

## 2025-01-31 RX ORDER — LEVOTHYROXINE SODIUM 300 MCG
112 TABLET ORAL DAILY
Refills: 0 | Status: DISCONTINUED | OUTPATIENT
Start: 2025-01-31 | End: 2025-02-01

## 2025-01-31 RX ORDER — HYDROCORTISONE 20 MG
10 TABLET ORAL
Refills: 0 | Status: DISCONTINUED | OUTPATIENT
Start: 2025-01-31 | End: 2025-02-01

## 2025-01-31 RX ORDER — MAGNESIUM, ALUMINUM HYDROXIDE 200-200 MG
30 TABLET,CHEWABLE ORAL EVERY 4 HOURS
Refills: 0 | Status: DISCONTINUED | OUTPATIENT
Start: 2025-01-31 | End: 2025-02-01

## 2025-01-31 RX ADMIN — TAMSULOSIN HYDROCHLORIDE 0.4 MILLIGRAM(S): 0.4 CAPSULE ORAL at 20:21

## 2025-01-31 RX ADMIN — Medication 50 MILLILITER(S): at 18:02

## 2025-01-31 RX ADMIN — OXYCODONE HYDROCHLORIDE 10 MILLIGRAM(S): 30 TABLET ORAL at 18:12

## 2025-01-31 RX ADMIN — Medication 81 MILLIGRAM(S): at 18:02

## 2025-01-31 RX ADMIN — OXYCODONE HYDROCHLORIDE 10 MILLIGRAM(S): 30 TABLET ORAL at 19:05

## 2025-02-01 ENCOUNTER — TRANSCRIPTION ENCOUNTER (OUTPATIENT)
Age: 55
End: 2025-02-01

## 2025-02-01 VITALS
SYSTOLIC BLOOD PRESSURE: 95 MMHG | OXYGEN SATURATION: 100 % | HEART RATE: 62 BPM | TEMPERATURE: 97 F | DIASTOLIC BLOOD PRESSURE: 58 MMHG | RESPIRATION RATE: 18 BRPM

## 2025-02-01 DIAGNOSIS — N40.0 BENIGN PROSTATIC HYPERPLASIA WITHOUT LOWER URINARY TRACT SYMPTOMS: ICD-10-CM

## 2025-02-01 DIAGNOSIS — C80.0 DISSEMINATED MALIGNANT NEOPLASM, UNSPECIFIED: ICD-10-CM

## 2025-02-01 DIAGNOSIS — R52 PAIN, UNSPECIFIED: ICD-10-CM

## 2025-02-01 DIAGNOSIS — E27.40 UNSPECIFIED ADRENOCORTICAL INSUFFICIENCY: ICD-10-CM

## 2025-02-01 DIAGNOSIS — I10 ESSENTIAL (PRIMARY) HYPERTENSION: ICD-10-CM

## 2025-02-01 DIAGNOSIS — I25.10 ATHEROSCLEROTIC HEART DISEASE OF NATIVE CORONARY ARTERY WITHOUT ANGINA PECTORIS: ICD-10-CM

## 2025-02-01 DIAGNOSIS — E78.5 HYPERLIPIDEMIA, UNSPECIFIED: ICD-10-CM

## 2025-02-01 LAB
A1C WITH ESTIMATED AVERAGE GLUCOSE RESULT: 5.3 % — SIGNIFICANT CHANGE UP (ref 4–5.6)
ALBUMIN SERPL ELPH-MCNC: 4.2 G/DL — SIGNIFICANT CHANGE UP (ref 3.3–5)
ALP SERPL-CCNC: 69 U/L — SIGNIFICANT CHANGE UP (ref 40–120)
ALT FLD-CCNC: 12 U/L — SIGNIFICANT CHANGE UP (ref 10–45)
ANION GAP SERPL CALC-SCNC: 13 MMOL/L — SIGNIFICANT CHANGE UP (ref 5–17)
APTT BLD: 35.3 SEC — SIGNIFICANT CHANGE UP (ref 24.5–35.6)
AST SERPL-CCNC: 29 U/L — SIGNIFICANT CHANGE UP (ref 10–40)
BASOPHILS # BLD AUTO: 0.07 K/UL — SIGNIFICANT CHANGE UP (ref 0–0.2)
BASOPHILS NFR BLD AUTO: 1.2 % — SIGNIFICANT CHANGE UP (ref 0–2)
BILIRUB SERPL-MCNC: 0.5 MG/DL — SIGNIFICANT CHANGE UP (ref 0.2–1.2)
BUN SERPL-MCNC: 22 MG/DL — SIGNIFICANT CHANGE UP (ref 7–23)
CALCIUM SERPL-MCNC: 9.7 MG/DL — SIGNIFICANT CHANGE UP (ref 8.4–10.5)
CHLORIDE SERPL-SCNC: 97 MMOL/L — SIGNIFICANT CHANGE UP (ref 96–108)
CHOLEST SERPL-MCNC: 143 MG/DL — SIGNIFICANT CHANGE UP
CO2 SERPL-SCNC: 21 MMOL/L — LOW (ref 22–31)
CREAT SERPL-MCNC: 1.06 MG/DL — SIGNIFICANT CHANGE UP (ref 0.5–1.3)
EGFR: 83 ML/MIN/1.73M2 — SIGNIFICANT CHANGE UP
EGFR: 83 ML/MIN/1.73M2 — SIGNIFICANT CHANGE UP
EOSINOPHIL # BLD AUTO: 0.18 K/UL — SIGNIFICANT CHANGE UP (ref 0–0.5)
EOSINOPHIL NFR BLD AUTO: 3.1 % — SIGNIFICANT CHANGE UP (ref 0–6)
ESTIMATED AVERAGE GLUCOSE: 105 MG/DL — SIGNIFICANT CHANGE UP (ref 68–114)
GAS PNL BLDV: SIGNIFICANT CHANGE UP
GLUCOSE SERPL-MCNC: 90 MG/DL — SIGNIFICANT CHANGE UP (ref 70–99)
HCT VFR BLD CALC: 40 % — SIGNIFICANT CHANGE UP (ref 39–50)
HDLC SERPL-MCNC: 24 MG/DL — LOW
HGB BLD-MCNC: 13.3 G/DL — SIGNIFICANT CHANGE UP (ref 13–17)
INR BLD: 1.18 RATIO — HIGH (ref 0.85–1.16)
LDLC SERPL-MCNC: 88 MG/DL — SIGNIFICANT CHANGE UP
LIPID PNL WITH DIRECT LDL SERPL: 88 MG/DL — SIGNIFICANT CHANGE UP
LYMPHOCYTES # BLD AUTO: 2.97 K/UL — SIGNIFICANT CHANGE UP (ref 1–3.3)
LYMPHOCYTES # BLD AUTO: 51.1 % — HIGH (ref 13–44)
MCHC RBC-ENTMCNC: 27.8 PG — SIGNIFICANT CHANGE UP (ref 27–34)
MCHC RBC-ENTMCNC: 33.3 G/DL — SIGNIFICANT CHANGE UP (ref 32–36)
MCV RBC AUTO: 83.7 FL — SIGNIFICANT CHANGE UP (ref 80–100)
MONOCYTES # BLD AUTO: 0.28 K/UL — SIGNIFICANT CHANGE UP (ref 0–0.9)
MONOCYTES NFR BLD AUTO: 4.8 % — SIGNIFICANT CHANGE UP (ref 2–14)
NEUTROPHILS # BLD AUTO: 2.31 K/UL — SIGNIFICANT CHANGE UP (ref 1.8–7.4)
NEUTROPHILS NFR BLD AUTO: 39.8 % — LOW (ref 43–77)
NONHDLC SERPL-MCNC: 119 MG/DL — SIGNIFICANT CHANGE UP
NRBC # BLD: 0 /100 WBCS — SIGNIFICANT CHANGE UP (ref 0–0)
NRBC BLD-RTO: 0 /100 WBCS — SIGNIFICANT CHANGE UP (ref 0–0)
PLATELET # BLD AUTO: 224 K/UL — SIGNIFICANT CHANGE UP (ref 150–400)
POTASSIUM SERPL-MCNC: 4 MMOL/L — SIGNIFICANT CHANGE UP (ref 3.5–5.3)
POTASSIUM SERPL-SCNC: 4 MMOL/L — SIGNIFICANT CHANGE UP (ref 3.5–5.3)
PROT SERPL-MCNC: 6.9 G/DL — SIGNIFICANT CHANGE UP (ref 6–8.3)
PROTHROM AB SERPL-ACNC: 13.5 SEC — HIGH (ref 9.9–13.4)
RBC # BLD: 4.78 M/UL — SIGNIFICANT CHANGE UP (ref 4.2–5.8)
RBC # FLD: 13.8 % — SIGNIFICANT CHANGE UP (ref 10.3–14.5)
SODIUM SERPL-SCNC: 131 MMOL/L — LOW (ref 135–145)
TRIGL SERPL-MCNC: 179 MG/DL — HIGH
WBC # BLD: 5.81 K/UL — SIGNIFICANT CHANGE UP (ref 3.8–10.5)
WBC # FLD AUTO: 5.81 K/UL — SIGNIFICANT CHANGE UP (ref 3.8–10.5)

## 2025-02-01 PROCEDURE — 83036 HEMOGLOBIN GLYCOSYLATED A1C: CPT

## 2025-02-01 PROCEDURE — C1894: CPT

## 2025-02-01 PROCEDURE — 93454 CORONARY ARTERY ANGIO S&I: CPT

## 2025-02-01 PROCEDURE — 71275 CT ANGIOGRAPHY CHEST: CPT | Mod: 26

## 2025-02-01 PROCEDURE — 85730 THROMBOPLASTIN TIME PARTIAL: CPT

## 2025-02-01 PROCEDURE — 85014 HEMATOCRIT: CPT

## 2025-02-01 PROCEDURE — C1887: CPT

## 2025-02-01 PROCEDURE — 82550 ASSAY OF CK (CPK): CPT

## 2025-02-01 PROCEDURE — 85018 HEMOGLOBIN: CPT

## 2025-02-01 PROCEDURE — 82947 ASSAY GLUCOSE BLOOD QUANT: CPT

## 2025-02-01 PROCEDURE — 85025 COMPLETE CBC W/AUTO DIFF WBC: CPT

## 2025-02-01 PROCEDURE — 87637 SARSCOV2&INF A&B&RSV AMP PRB: CPT

## 2025-02-01 PROCEDURE — 85610 PROTHROMBIN TIME: CPT

## 2025-02-01 PROCEDURE — 83605 ASSAY OF LACTIC ACID: CPT

## 2025-02-01 PROCEDURE — 82330 ASSAY OF CALCIUM: CPT

## 2025-02-01 PROCEDURE — 93005 ELECTROCARDIOGRAM TRACING: CPT

## 2025-02-01 PROCEDURE — 99285 EMERGENCY DEPT VISIT HI MDM: CPT

## 2025-02-01 PROCEDURE — 82553 CREATINE MB FRACTION: CPT

## 2025-02-01 PROCEDURE — 99239 HOSP IP/OBS DSCHRG MGMT >30: CPT

## 2025-02-01 PROCEDURE — C8929: CPT

## 2025-02-01 PROCEDURE — 84295 ASSAY OF SERUM SODIUM: CPT

## 2025-02-01 PROCEDURE — 82803 BLOOD GASES ANY COMBINATION: CPT

## 2025-02-01 PROCEDURE — 84484 ASSAY OF TROPONIN QUANT: CPT

## 2025-02-01 PROCEDURE — 85379 FIBRIN DEGRADATION QUANT: CPT

## 2025-02-01 PROCEDURE — 0241U: CPT

## 2025-02-01 PROCEDURE — 71275 CT ANGIOGRAPHY CHEST: CPT | Mod: MC

## 2025-02-01 PROCEDURE — 83880 ASSAY OF NATRIURETIC PEPTIDE: CPT

## 2025-02-01 PROCEDURE — 80061 LIPID PANEL: CPT

## 2025-02-01 PROCEDURE — 82435 ASSAY OF BLOOD CHLORIDE: CPT

## 2025-02-01 PROCEDURE — 80053 COMPREHEN METABOLIC PANEL: CPT

## 2025-02-01 PROCEDURE — 71046 X-RAY EXAM CHEST 2 VIEWS: CPT

## 2025-02-01 PROCEDURE — C1769: CPT

## 2025-02-01 PROCEDURE — 84132 ASSAY OF SERUM POTASSIUM: CPT

## 2025-02-01 RX ORDER — ENOXAPARIN SODIUM 100 MG/ML
40 INJECTION SUBCUTANEOUS EVERY 24 HOURS
Refills: 0 | Status: DISCONTINUED | OUTPATIENT
Start: 2025-02-01 | End: 2025-02-01

## 2025-02-01 RX ORDER — METOPROLOL SUCCINATE 50 MG/1
1 TABLET, EXTENDED RELEASE ORAL
Refills: 0 | DISCHARGE

## 2025-02-01 RX ORDER — IPRATROPIUM BROMIDE AND ALBUTEROL SULFATE .5; 2.5 MG/3ML; MG/3ML
3 SOLUTION RESPIRATORY (INHALATION) EVERY 6 HOURS
Refills: 0 | Status: DISCONTINUED | OUTPATIENT
Start: 2025-02-01 | End: 2025-02-01

## 2025-02-01 RX ADMIN — Medication 5 MILLIGRAM(S): at 01:14

## 2025-02-01 RX ADMIN — Medication 112 MICROGRAM(S): at 06:49

## 2025-02-01 RX ADMIN — CLOPIDOGREL BISULFATE 75 MILLIGRAM(S): 75 TABLET, FILM COATED ORAL at 12:55

## 2025-02-01 RX ADMIN — ENOXAPARIN SODIUM 40 MILLIGRAM(S): 100 INJECTION SUBCUTANEOUS at 06:49

## 2025-02-01 RX ADMIN — Medication 81 MILLIGRAM(S): at 12:55

## 2025-02-10 ENCOUNTER — APPOINTMENT (OUTPATIENT)
Dept: SURGERY | Facility: HOSPITAL | Age: 55
End: 2025-02-10
Payer: COMMERCIAL

## 2025-02-10 ENCOUNTER — TRANSCRIPTION ENCOUNTER (OUTPATIENT)
Age: 55
End: 2025-02-10

## 2025-02-10 ENCOUNTER — RESULT REVIEW (OUTPATIENT)
Age: 55
End: 2025-02-10

## 2025-02-10 ENCOUNTER — OUTPATIENT (OUTPATIENT)
Dept: OUTPATIENT SERVICES | Facility: HOSPITAL | Age: 55
LOS: 1 days | End: 2025-02-10
Payer: COMMERCIAL

## 2025-02-10 VITALS
SYSTOLIC BLOOD PRESSURE: 128 MMHG | WEIGHT: 130.95 LBS | OXYGEN SATURATION: 98 % | HEIGHT: 71 IN | HEART RATE: 75 BPM | RESPIRATION RATE: 20 BRPM | DIASTOLIC BLOOD PRESSURE: 74 MMHG | TEMPERATURE: 98 F

## 2025-02-10 VITALS
OXYGEN SATURATION: 97 % | DIASTOLIC BLOOD PRESSURE: 55 MMHG | SYSTOLIC BLOOD PRESSURE: 92 MMHG | HEART RATE: 63 BPM | RESPIRATION RATE: 18 BRPM

## 2025-02-10 DIAGNOSIS — C43.4 MALIGNANT MELANOMA OF SCALP AND NECK: Chronic | ICD-10-CM

## 2025-02-10 DIAGNOSIS — Z98.890 OTHER SPECIFIED POSTPROCEDURAL STATES: Chronic | ICD-10-CM

## 2025-02-10 DIAGNOSIS — Z90.79 ACQUIRED ABSENCE OF OTHER GENITAL ORGAN(S): Chronic | ICD-10-CM

## 2025-02-10 DIAGNOSIS — Z98.89 OTHER SPECIFIED POSTPROCEDURAL STATES: Chronic | ICD-10-CM

## 2025-02-10 DIAGNOSIS — Z86.010 PERSONAL HISTORY OF COLON POLYPS: ICD-10-CM

## 2025-02-10 PROCEDURE — 45385 COLONOSCOPY W/LESION REMOVAL: CPT

## 2025-02-10 PROCEDURE — 88305 TISSUE EXAM BY PATHOLOGIST: CPT

## 2025-02-10 PROCEDURE — 45385 COLONOSCOPY W/LESION REMOVAL: CPT | Mod: PT

## 2025-02-10 PROCEDURE — 45380 COLONOSCOPY AND BIOPSY: CPT | Mod: XS,PT

## 2025-02-10 PROCEDURE — 45382 COLONOSCOPY W/CONTROL BLEED: CPT | Mod: 59

## 2025-02-10 PROCEDURE — C1889: CPT

## 2025-02-10 PROCEDURE — 88305 TISSUE EXAM BY PATHOLOGIST: CPT | Mod: 26

## 2025-02-10 DEVICE — CLIP RESOLUTION 360 235CM: Type: IMPLANTABLE DEVICE | Status: FUNCTIONAL

## 2025-02-10 DEVICE — NET RETRV ROT ROTH 2.5MMX230CM: Type: IMPLANTABLE DEVICE | Status: FUNCTIONAL

## 2025-02-10 RX ORDER — BACTERIOSTATIC SODIUM CHLORIDE 0.9 %
500 VIAL (ML) INJECTION
Refills: 0 | Status: DISCONTINUED | OUTPATIENT
Start: 2025-02-10 | End: 2025-02-24

## 2025-02-10 RX ORDER — NALOXEGOL OXALATE 12.5 MG/1
1 TABLET, FILM COATED ORAL
Qty: 30 | Refills: 2
Start: 2025-02-10

## 2025-02-10 RX ORDER — TRAMADOL HYDROCHLORIDE 50 MG/1
1 TABLET, FILM COATED ORAL
Refills: 0 | DISCHARGE

## 2025-02-10 RX ORDER — GABAPENTIN 800 MG/1
1 TABLET ORAL
Refills: 0 | DISCHARGE

## 2025-02-10 RX ORDER — HYDROCORTISONE 20 MG
1 TABLET ORAL
Refills: 0 | DISCHARGE

## 2025-02-10 RX ORDER — TAMSULOSIN HYDROCHLORIDE 0.4 MG/1
1 CAPSULE ORAL
Refills: 0 | DISCHARGE

## 2025-02-10 RX ORDER — METOPROLOL SUCCINATE 25 MG
1 TABLET, EXTENDED RELEASE 24 HR ORAL
Refills: 0 | DISCHARGE

## 2025-02-10 RX ORDER — ICOSAPENT ETHYL 500 MG/1
1 CAPSULE ORAL
Refills: 0 | DISCHARGE

## 2025-02-10 RX ORDER — ATORVASTATIN CALCIUM 80 MG/1
1 TABLET, FILM COATED ORAL
Refills: 0 | DISCHARGE

## 2025-02-10 NOTE — ASU DISCHARGE PLAN (ADULT/PEDIATRIC) - FINANCIAL ASSISTANCE
[facility] provides services at a reduced cost to those who are determined to be eligible through [facility]’s financial assistance program. Information regarding [facility]’s financial assistance program can be found by going to [email or web address] or by calling [phone].

## 2025-02-10 NOTE — ASU PATIENT PROFILE, ADULT - PRO ARRIVE FROM
Hi! Per patient appointment notes, need a lab order from you.  Please place future laboratory orders if needed for upcoming appointment on 9/13/21. If labs are not due, please have your care team contact the patient to cancel lab only appointment.     Thank you!  [facility] [place] [facility]  
home

## 2025-02-10 NOTE — ASU DISCHARGE PLAN (ADULT/PEDIATRIC) - ASU DC SPECIAL INSTRUCTIONSFT
Can resume ASA tonight. Please take only ASA for 10 days. Can resume Plavix in 10 days (2/20).     Can start to take movantik 12.5 daily. If you have movantik 25 at home, can split in half. Otherwise, new script for lower dose was sent to the pharmacy.     Please call Dr. Hopper office in 7-10 days to follow up pathology.

## 2025-02-10 NOTE — ASU PATIENT PROFILE, ADULT - FALL HARM RISK - UNIVERSAL INTERVENTIONS
Bed in lowest position, wheels locked, appropriate side rails in place/Call bell, personal items and telephone in reach/Instruct patient to call for assistance before getting out of bed or chair/Non-slip footwear when patient is out of bed/[place] to call system/Physically safe environment - no spills, clutter or unnecessary equipment/Purposeful Proactive Rounding/Room/bathroom lighting operational, light cord in reach

## 2025-02-10 NOTE — PRE PROCEDURE NOTE - PRE PROCEDURE EVALUATION
Attending Physician:                   ct         Procedure:    colonoscopy    Indication for Procedure:   history polyps  ________________________________________________________  PAST MEDICAL & SURGICAL HISTORY:  Testicular Cancer  1994, chemotherapy      Headache, Migraine      HTN (hypertension)      NHL (non-Hodgkin's lymphoma)  1999, Radiation to left neck region      GERD (gastroesophageal reflux disease)      Colitis  surgery 1996      BPH (benign prostatic hyperplasia)      Osteoarthritis  degenerative disc L3-4      History of bone marrow transplant      Hypertriglyceridemia      Malignant melanoma of scalp  RSX 08/18  R neck mass dsx/ LN dsx 10/19/18      Hypothyroidism      History of chemotherapy      History of Raynaud's syndrome      CAD (coronary artery disease)      Unspecified malignant neoplasm of skin of scalp and neck      History of orchiectomy  left 1994      S/P colon resection  1996      Lymph node disorder  s/p abdominal lymph node dissection 1994, 1996      Melanoma of scalp or neck  excision 8/18  s/p excision right neck mass & LN dissx      History of nasal septoplasty      History of infusaport central venous catheter insertion      History of infusaport central venous catheter removal        ALLERGIES:  No Known Allergies    HOME MEDICATIONS:  Ambien 10 mg oral tablet: 1 tab(s) orally once a day (at bedtime) as needed for  insomnia  aspirin 81 mg oral capsule: 1 cap(s) orally once a day PM  atorvastatin 40 mg oral tablet: 1 tab(s) orally once a day (at bedtime)  clopidogrel 75 mg oral tablet: 1 tab(s) orally once a day AM  Flomax 0.4 mg oral capsule: 1 cap(s) orally once a day  hydrocortisone 10 mg oral tablet: 1 tab(s) orally once a day (in the evening)  hydrocortisone 20 mg oral tablet: 1 tab(s) orally once a day (in the morning)  OxyCONTIN 10 mg oral tablet, extended release: 1 tab(s) orally 3 times a day as needed  Synthroid 112 mcg (0.112 mg) oral tablet: 1 tab(s) orally once a day AM  traMADol 50 mg oral tablet: 1 tab(s) orally once a day as needed for  severe pain  Vascepa 1 g oral capsule: 1 cap(s) orally every 12 hours    AICD/PPM: [ ] yes   [x ] no    PERTINENT LAB DATA:                      PHYSICAL EXAMINATION:    T(C): --  HR: --  BP: --  RR: --  SpO2: --    Constitutional: NAD  HEENT: PERRLA, EOMI,    Neck:  No JVD  Respiratory: CTAB/L  Cardiovascular: S1 and S2  Gastrointestinal: BS+, soft, NT/ND  Extremities: No peripheral edema  Neurological: A/O x 3, no focal deficits  Psychiatric: Normal mood, normal affect  Skin: No rashes    ASA Class: I [ ]  II [ ]  III [ ]  IV [ ]as per anesthesia    COMMENTS:    The patient is a suitable candidate for the planned procedure unless box checked [ ]  No, explain:

## 2025-02-11 LAB — SURGICAL PATHOLOGY STUDY: SIGNIFICANT CHANGE UP

## 2025-02-24 ENCOUNTER — APPOINTMENT (OUTPATIENT)
Dept: PAIN MANAGEMENT | Facility: CLINIC | Age: 55
End: 2025-02-24
Payer: COMMERCIAL

## 2025-02-24 VITALS
BODY MASS INDEX: 18.2 KG/M2 | DIASTOLIC BLOOD PRESSURE: 64 MMHG | SYSTOLIC BLOOD PRESSURE: 118 MMHG | WEIGHT: 130 LBS | HEIGHT: 71 IN | HEART RATE: 79 BPM

## 2025-02-24 PROCEDURE — 99214 OFFICE O/P EST MOD 30 MIN: CPT

## 2025-03-06 NOTE — ED ADULT NURSE NOTE - PAIN RATING/NUMBER SCALE (0-10): ACTIVITY
SW team was referred by TYRONE Hill to assist pt with recliner chair order. MSW Intern ZAHRAA Tovar phoned pt and spoke to pt's  Ap. Told Ap that the chair would not be covered by insurance so their 2 options would be to POP or receive financial assistance. Ap said they just bought a new bed for pt and they would need financial assistance for the chair. Told Ap to find the chair that they want and to call me back so we can explore those options together. Mentioned to him the [employer]ive [employer]o and [employer] financial assistance. Will also send info in [employer] per Ap's request to remember everything.   0 (no pain/absence of nonverbal indicators of pain)

## 2025-03-18 NOTE — DISCHARGE NOTE PROVIDER - NSCORESITESY/N_GEN_A_CORE_RD
03/18/25 1030   Evaluation Type   Evaluation Type Inpatient   Problems identified   Problems identified Knowledge deficit   Problems Identified Other Assisted with parent/Lc led feeding, infant last fed <2.5 hours prior, reported as on/off breast and sleepy. No hunger cues observed when placed skin to skin or gentle waking/encouragement with position changes and support. Plan to revisit with infant readiness. Reviewed signs of infant readiness cues, skin to skin for identification of cues/readiness. Report given to MB of follow up plan.   Breastfeeding goal   Breastfeeding goal To maintain breast milk feeding per patient goal        No

## 2025-04-13 ENCOUNTER — INPATIENT (INPATIENT)
Facility: HOSPITAL | Age: 55
LOS: 3 days | Discharge: HOME | DRG: 871 | End: 2025-04-17
Attending: STUDENT IN AN ORGANIZED HEALTH CARE EDUCATION/TRAINING PROGRAM | Admitting: STUDENT IN AN ORGANIZED HEALTH CARE EDUCATION/TRAINING PROGRAM
Payer: COMMERCIAL

## 2025-04-13 VITALS
HEIGHT: 69 IN | DIASTOLIC BLOOD PRESSURE: 63 MMHG | WEIGHT: 130.07 LBS | RESPIRATION RATE: 18 BRPM | HEART RATE: 117 BPM | SYSTOLIC BLOOD PRESSURE: 87 MMHG

## 2025-04-13 DIAGNOSIS — Z98.890 OTHER SPECIFIED POSTPROCEDURAL STATES: Chronic | ICD-10-CM

## 2025-04-13 DIAGNOSIS — Z98.89 OTHER SPECIFIED POSTPROCEDURAL STATES: Chronic | ICD-10-CM

## 2025-04-13 DIAGNOSIS — I89.9 NONINFECTIVE DISORDER OF LYMPHATIC VESSELS AND LYMPH NODES, UNSPECIFIED: Chronic | ICD-10-CM

## 2025-04-13 DIAGNOSIS — Z90.79 ACQUIRED ABSENCE OF OTHER GENITAL ORGAN(S): Chronic | ICD-10-CM

## 2025-04-13 DIAGNOSIS — C43.4 MALIGNANT MELANOMA OF SCALP AND NECK: Chronic | ICD-10-CM

## 2025-04-13 LAB
ALBUMIN SERPL ELPH-MCNC: 4.9 G/DL — SIGNIFICANT CHANGE UP (ref 3.3–5)
ALP SERPL-CCNC: 93 U/L — SIGNIFICANT CHANGE UP (ref 40–120)
ALT FLD-CCNC: 22 U/L — SIGNIFICANT CHANGE UP (ref 10–45)
ANION GAP SERPL CALC-SCNC: 20 MMOL/L — HIGH (ref 5–17)
APTT BLD: 35.2 SEC — SIGNIFICANT CHANGE UP (ref 24.5–35.6)
AST SERPL-CCNC: 32 U/L — SIGNIFICANT CHANGE UP (ref 10–40)
BASOPHILS # BLD AUTO: 0.06 K/UL — SIGNIFICANT CHANGE UP (ref 0–0.2)
BASOPHILS NFR BLD AUTO: 0.4 % — SIGNIFICANT CHANGE UP (ref 0–2)
BILIRUB SERPL-MCNC: 0.8 MG/DL — SIGNIFICANT CHANGE UP (ref 0.2–1.2)
BUN SERPL-MCNC: 19 MG/DL — SIGNIFICANT CHANGE UP (ref 7–23)
CALCIUM SERPL-MCNC: 10.5 MG/DL — SIGNIFICANT CHANGE UP (ref 8.4–10.5)
CHLORIDE SERPL-SCNC: 94 MMOL/L — LOW (ref 96–108)
CO2 SERPL-SCNC: 22 MMOL/L — SIGNIFICANT CHANGE UP (ref 22–31)
CREAT SERPL-MCNC: 1.46 MG/DL — HIGH (ref 0.5–1.3)
EGFR: 56 ML/MIN/1.73M2 — LOW
EGFR: 56 ML/MIN/1.73M2 — LOW
EOSINOPHIL # BLD AUTO: 0.38 K/UL — SIGNIFICANT CHANGE UP (ref 0–0.5)
EOSINOPHIL NFR BLD AUTO: 2.8 % — SIGNIFICANT CHANGE UP (ref 0–6)
GAS PNL BLDV: SIGNIFICANT CHANGE UP
GLUCOSE SERPL-MCNC: 103 MG/DL — HIGH (ref 70–99)
HCT VFR BLD CALC: 46.9 % — SIGNIFICANT CHANGE UP (ref 39–50)
HGB BLD-MCNC: 15.8 G/DL — SIGNIFICANT CHANGE UP (ref 13–17)
IMM GRANULOCYTES NFR BLD AUTO: 0.2 % — SIGNIFICANT CHANGE UP (ref 0–0.9)
INR BLD: 1.03 RATIO — SIGNIFICANT CHANGE UP (ref 0.85–1.16)
LYMPHOCYTES # BLD AUTO: 22.9 % — SIGNIFICANT CHANGE UP (ref 13–44)
LYMPHOCYTES # BLD AUTO: 3.07 K/UL — SIGNIFICANT CHANGE UP (ref 1–3.3)
MAGNESIUM SERPL-MCNC: 1.8 MG/DL — SIGNIFICANT CHANGE UP (ref 1.6–2.6)
MCHC RBC-ENTMCNC: 28.5 PG — SIGNIFICANT CHANGE UP (ref 27–34)
MCHC RBC-ENTMCNC: 33.7 G/DL — SIGNIFICANT CHANGE UP (ref 32–36)
MCV RBC AUTO: 84.5 FL — SIGNIFICANT CHANGE UP (ref 80–100)
MONOCYTES # BLD AUTO: 1.2 K/UL — HIGH (ref 0–0.9)
MONOCYTES NFR BLD AUTO: 8.9 % — SIGNIFICANT CHANGE UP (ref 2–14)
NEUTROPHILS # BLD AUTO: 8.68 K/UL — HIGH (ref 1.8–7.4)
NEUTROPHILS NFR BLD AUTO: 64.8 % — SIGNIFICANT CHANGE UP (ref 43–77)
NRBC BLD AUTO-RTO: 0 /100 WBCS — SIGNIFICANT CHANGE UP (ref 0–0)
PHOSPHATE SERPL-MCNC: 4.4 MG/DL — SIGNIFICANT CHANGE UP (ref 2.5–4.5)
PLATELET # BLD AUTO: 326 K/UL — SIGNIFICANT CHANGE UP (ref 150–400)
POTASSIUM SERPL-MCNC: 3.9 MMOL/L — SIGNIFICANT CHANGE UP (ref 3.5–5.3)
POTASSIUM SERPL-SCNC: 3.9 MMOL/L — SIGNIFICANT CHANGE UP (ref 3.5–5.3)
PROT SERPL-MCNC: 8.4 G/DL — HIGH (ref 6–8.3)
PROTHROM AB SERPL-ACNC: 11.8 SEC — SIGNIFICANT CHANGE UP (ref 9.9–13.4)
RBC # BLD: 5.55 M/UL — SIGNIFICANT CHANGE UP (ref 4.2–5.8)
RBC # FLD: 14 % — SIGNIFICANT CHANGE UP (ref 10.3–14.5)
SODIUM SERPL-SCNC: 136 MMOL/L — SIGNIFICANT CHANGE UP (ref 135–145)
WBC # BLD: 13.42 K/UL — HIGH (ref 3.8–10.5)
WBC # FLD AUTO: 13.42 K/UL — HIGH (ref 3.8–10.5)

## 2025-04-13 PROCEDURE — 99291 CRITICAL CARE FIRST HOUR: CPT

## 2025-04-13 PROCEDURE — 71045 X-RAY EXAM CHEST 1 VIEW: CPT | Mod: 26

## 2025-04-13 PROCEDURE — 93010 ELECTROCARDIOGRAM REPORT: CPT

## 2025-04-13 PROCEDURE — 74177 CT ABD & PELVIS W/CONTRAST: CPT | Mod: 26

## 2025-04-13 RX ORDER — ACETAMINOPHEN 500 MG/5ML
1000 LIQUID (ML) ORAL ONCE
Refills: 0 | Status: COMPLETED | OUTPATIENT
Start: 2025-04-13 | End: 2025-04-13

## 2025-04-13 RX ORDER — SODIUM CHLORIDE 9 G/1000ML
1000 INJECTION, SOLUTION INTRAVENOUS ONCE
Refills: 0 | Status: COMPLETED | OUTPATIENT
Start: 2025-04-13 | End: 2025-04-13

## 2025-04-13 RX ADMIN — SODIUM CHLORIDE 1000 MILLILITER(S): 9 INJECTION, SOLUTION INTRAVENOUS at 22:20

## 2025-04-13 RX ADMIN — Medication 1000 MILLIGRAM(S): at 22:50

## 2025-04-13 RX ADMIN — Medication 500 MILLILITER(S): at 22:06

## 2025-04-13 RX ADMIN — Medication 400 MILLIGRAM(S): at 22:19

## 2025-04-13 NOTE — ED PROVIDER NOTE - CLINICAL SUMMARY MEDICAL DECISION MAKING FREE TEXT BOX
RGUJRAL 56yo male hx of testicular cancer in remission, non-Hodgkin's lymphoma in remission, CAD, HTN, HLD, hypothyroidism, ischemic colitis sp previous admission with cardiac cath 70 days ago BIB EMS from home today for n/v/d and hypotension. Pt states symptoms began last night after eating chicken with episodes of n/v/d no blood and wife gave him imodium. Pt had multiple episodes of diarrhea today. Patient had a fever last week that was self limiting and was flu/covid negative. Pt denies any abdominal pain, has chronic back pain. No recent travel. Pt's wife states his SBP in 80-90s. On exam, Patient is awake,alert,oriented x 2-3.  Patient's chest is clear to ausculation, +s1s2. Abdomen is soft nd/nt +BS. Extremity with no swelling or calf tenderness.   Check labs, cultures, CTA/P to eval, GI PCR. Supportive care with IVF. Patient was previously on steroids and weaned off few months ago. Monitor closely and re eval.

## 2025-04-13 NOTE — ED PROVIDER NOTE - PROGRESS NOTE DETAILS
Brandie GUARDADO, EM/IM PGY-4: blood work with CHANDNI and leukocytosis, pending UA, CXR without consolidation and CT abd showing enterocolitis, infectious vs inflammatory. Pt still with intermittent loose stools in ER, but is at baseline mental status per wife at bedside. A&Ox4 at this time. 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 Source likely viral based on CT scan, hold off on abx at this time. Admitted to medicine service, low threshold to give stress dose steroids.

## 2025-04-13 NOTE — ED ADULT NURSE REASSESSMENT NOTE - NS ED NURSE REASSESS COMMENT FT1
P with 3 episodes of diarrhea; pt voided urine into the diaper. Pt made aware of UA/UC collection needed; urinal provided. Pt refused condom catheter.

## 2025-04-13 NOTE — ED ADULT NURSE NOTE - OBJECTIVE STATEMENT
54 y/o M , AXOX4, with a PMH of HTN, lymphoma, BIB EMS from home for non bloody diarrhea and N/V x 1 day. EMS reports BPs of 80/60 for which they placed a 22G IV in the L hand and administered zofran 8 mg IV at 2040. Pt with red raised rash to L forearm following the administration of the zofran. IV removed. No s/s respiratory distress at this time. Pt denies itching or dyspnea. Pt has received zofran while receiving chemotherapy. Pt febrile 100.7 rectally. Wife reports she administered imodium prior to arrival, pt with gray stools. Pt breathing unlabored on room air, speaking in complete sentences, strong and equal strength in all extremities, sensations intact, abd soft non tender non distended, no edema noted. Safety and comfort measures maintained.

## 2025-04-14 DIAGNOSIS — I25.10 ATHEROSCLEROTIC HEART DISEASE OF NATIVE CORONARY ARTERY WITHOUT ANGINA PECTORIS: ICD-10-CM

## 2025-04-14 DIAGNOSIS — R19.7 DIARRHEA, UNSPECIFIED: ICD-10-CM

## 2025-04-14 DIAGNOSIS — N17.9 ACUTE KIDNEY FAILURE, UNSPECIFIED: ICD-10-CM

## 2025-04-14 DIAGNOSIS — E27.8 OTHER SPECIFIED DISORDERS OF ADRENAL GLAND: ICD-10-CM

## 2025-04-14 DIAGNOSIS — I10 ESSENTIAL (PRIMARY) HYPERTENSION: ICD-10-CM

## 2025-04-14 DIAGNOSIS — E78.5 HYPERLIPIDEMIA, UNSPECIFIED: ICD-10-CM

## 2025-04-14 DIAGNOSIS — E03.9 HYPOTHYROIDISM, UNSPECIFIED: ICD-10-CM

## 2025-04-14 DIAGNOSIS — I95.9 HYPOTENSION, UNSPECIFIED: ICD-10-CM

## 2025-04-14 DIAGNOSIS — A41.9 SEPSIS, UNSPECIFIED ORGANISM: ICD-10-CM

## 2025-04-14 DIAGNOSIS — Z29.9 ENCOUNTER FOR PROPHYLACTIC MEASURES, UNSPECIFIED: ICD-10-CM

## 2025-04-14 LAB
ADD ON TEST-SPECIMEN IN LAB: SIGNIFICANT CHANGE UP
ADD ON TEST-SPECIMEN IN LAB: SIGNIFICANT CHANGE UP
ALBUMIN SERPL ELPH-MCNC: 3.1 G/DL — LOW (ref 3.3–5)
ALBUMIN SERPL ELPH-MCNC: 3.4 G/DL — SIGNIFICANT CHANGE UP (ref 3.3–5)
ALBUMIN SERPL ELPH-MCNC: 4 G/DL — SIGNIFICANT CHANGE UP (ref 3.3–5)
ALP SERPL-CCNC: 60 U/L — SIGNIFICANT CHANGE UP (ref 40–120)
ALP SERPL-CCNC: 63 U/L — SIGNIFICANT CHANGE UP (ref 40–120)
ALP SERPL-CCNC: 80 U/L — SIGNIFICANT CHANGE UP (ref 40–120)
ALT FLD-CCNC: 13 U/L — SIGNIFICANT CHANGE UP (ref 10–45)
ALT FLD-CCNC: 15 U/L — SIGNIFICANT CHANGE UP (ref 10–45)
ALT FLD-CCNC: 19 U/L — SIGNIFICANT CHANGE UP (ref 10–45)
ANION GAP SERPL CALC-SCNC: 12 MMOL/L — SIGNIFICANT CHANGE UP (ref 5–17)
ANION GAP SERPL CALC-SCNC: 13 MMOL/L — SIGNIFICANT CHANGE UP (ref 5–17)
ANION GAP SERPL CALC-SCNC: 15 MMOL/L — SIGNIFICANT CHANGE UP (ref 5–17)
ANION GAP SERPL CALC-SCNC: 15 MMOL/L — SIGNIFICANT CHANGE UP (ref 5–17)
APPEARANCE UR: CLEAR — SIGNIFICANT CHANGE UP
AST SERPL-CCNC: 21 U/L — SIGNIFICANT CHANGE UP (ref 10–40)
AST SERPL-CCNC: 22 U/L — SIGNIFICANT CHANGE UP (ref 10–40)
AST SERPL-CCNC: 29 U/L — SIGNIFICANT CHANGE UP (ref 10–40)
BACTERIA # UR AUTO: NEGATIVE /HPF — SIGNIFICANT CHANGE UP
BASOPHILS # BLD AUTO: 0.05 K/UL — SIGNIFICANT CHANGE UP (ref 0–0.2)
BASOPHILS NFR BLD AUTO: 0.5 % — SIGNIFICANT CHANGE UP (ref 0–2)
BILIRUB SERPL-MCNC: 0.6 MG/DL — SIGNIFICANT CHANGE UP (ref 0.2–1.2)
BILIRUB SERPL-MCNC: 0.6 MG/DL — SIGNIFICANT CHANGE UP (ref 0.2–1.2)
BILIRUB SERPL-MCNC: 0.7 MG/DL — SIGNIFICANT CHANGE UP (ref 0.2–1.2)
BILIRUB UR-MCNC: NEGATIVE — SIGNIFICANT CHANGE UP
BUN SERPL-MCNC: 18 MG/DL — SIGNIFICANT CHANGE UP (ref 7–23)
BUN SERPL-MCNC: 20 MG/DL — SIGNIFICANT CHANGE UP (ref 7–23)
CALCIUM SERPL-MCNC: 7.9 MG/DL — LOW (ref 8.4–10.5)
CALCIUM SERPL-MCNC: 8.4 MG/DL — SIGNIFICANT CHANGE UP (ref 8.4–10.5)
CALCIUM SERPL-MCNC: 8.9 MG/DL — SIGNIFICANT CHANGE UP (ref 8.4–10.5)
CALCIUM SERPL-MCNC: 9.2 MG/DL — SIGNIFICANT CHANGE UP (ref 8.4–10.5)
CAST: 9 /LPF — HIGH (ref 0–4)
CHLORIDE SERPL-SCNC: 100 MMOL/L — SIGNIFICANT CHANGE UP (ref 96–108)
CHLORIDE SERPL-SCNC: 98 MMOL/L — SIGNIFICANT CHANGE UP (ref 96–108)
CHLORIDE SERPL-SCNC: 98 MMOL/L — SIGNIFICANT CHANGE UP (ref 96–108)
CHLORIDE SERPL-SCNC: 99 MMOL/L — SIGNIFICANT CHANGE UP (ref 96–108)
CO2 SERPL-SCNC: 20 MMOL/L — LOW (ref 22–31)
CO2 SERPL-SCNC: 21 MMOL/L — LOW (ref 22–31)
CO2 SERPL-SCNC: 21 MMOL/L — LOW (ref 22–31)
CO2 SERPL-SCNC: 22 MMOL/L — SIGNIFICANT CHANGE UP (ref 22–31)
COLOR SPEC: YELLOW — SIGNIFICANT CHANGE UP
CORTIS AM PEAK SERPL-MCNC: 1.7 UG/DL — LOW (ref 6–18.4)
CREAT ?TM UR-MCNC: 171 MG/DL — SIGNIFICANT CHANGE UP
CREAT SERPL-MCNC: 1.33 MG/DL — HIGH (ref 0.5–1.3)
CREAT SERPL-MCNC: 1.46 MG/DL — HIGH (ref 0.5–1.3)
CREAT SERPL-MCNC: 1.46 MG/DL — HIGH (ref 0.5–1.3)
CREAT SERPL-MCNC: 1.69 MG/DL — HIGH (ref 0.5–1.3)
DIFF PNL FLD: NEGATIVE — SIGNIFICANT CHANGE UP
EGFR: 47 ML/MIN/1.73M2 — LOW
EGFR: 47 ML/MIN/1.73M2 — LOW
EGFR: 56 ML/MIN/1.73M2 — LOW
EGFR: 63 ML/MIN/1.73M2 — SIGNIFICANT CHANGE UP
EGFR: 63 ML/MIN/1.73M2 — SIGNIFICANT CHANGE UP
EOSINOPHIL # BLD AUTO: 0.3 K/UL — SIGNIFICANT CHANGE UP (ref 0–0.5)
EOSINOPHIL NFR BLD AUTO: 2.9 % — SIGNIFICANT CHANGE UP (ref 0–6)
FLUAV AG NPH QL: SIGNIFICANT CHANGE UP
FLUBV AG NPH QL: SIGNIFICANT CHANGE UP
GAS PNL BLDV: SIGNIFICANT CHANGE UP
GI PCR PANEL: SIGNIFICANT CHANGE UP
GLUCOSE BLDC GLUCOMTR-MCNC: 70 MG/DL — SIGNIFICANT CHANGE UP (ref 70–99)
GLUCOSE SERPL-MCNC: 122 MG/DL — HIGH (ref 70–99)
GLUCOSE SERPL-MCNC: 80 MG/DL — SIGNIFICANT CHANGE UP (ref 70–99)
GLUCOSE SERPL-MCNC: 83 MG/DL — SIGNIFICANT CHANGE UP (ref 70–99)
GLUCOSE SERPL-MCNC: 85 MG/DL — SIGNIFICANT CHANGE UP (ref 70–99)
GLUCOSE UR QL: NEGATIVE MG/DL — SIGNIFICANT CHANGE UP
HCT VFR BLD CALC: 40.2 % — SIGNIFICANT CHANGE UP (ref 39–50)
HGB BLD-MCNC: 13.6 G/DL — SIGNIFICANT CHANGE UP (ref 13–17)
HYALINE CASTS # UR AUTO: PRESENT
IMM GRANULOCYTES NFR BLD AUTO: 0.2 % — SIGNIFICANT CHANGE UP (ref 0–0.9)
KETONES UR-MCNC: NEGATIVE MG/DL — SIGNIFICANT CHANGE UP
LEUKOCYTE ESTERASE UR-ACNC: NEGATIVE — SIGNIFICANT CHANGE UP
LYMPHOCYTES # BLD AUTO: 2.75 K/UL — SIGNIFICANT CHANGE UP (ref 1–3.3)
LYMPHOCYTES # BLD AUTO: 26.8 % — SIGNIFICANT CHANGE UP (ref 13–44)
MAGNESIUM SERPL-MCNC: 1.5 MG/DL — LOW (ref 1.6–2.6)
MCHC RBC-ENTMCNC: 28.5 PG — SIGNIFICANT CHANGE UP (ref 27–34)
MCHC RBC-ENTMCNC: 33.8 G/DL — SIGNIFICANT CHANGE UP (ref 32–36)
MCV RBC AUTO: 84.3 FL — SIGNIFICANT CHANGE UP (ref 80–100)
MONOCYTES # BLD AUTO: 1.35 K/UL — HIGH (ref 0–0.9)
MONOCYTES NFR BLD AUTO: 13.1 % — SIGNIFICANT CHANGE UP (ref 2–14)
MRSA PCR RESULT.: SIGNIFICANT CHANGE UP
NEUTROPHILS # BLD AUTO: 5.8 K/UL — SIGNIFICANT CHANGE UP (ref 1.8–7.4)
NEUTROPHILS NFR BLD AUTO: 56.5 % — SIGNIFICANT CHANGE UP (ref 43–77)
NITRITE UR-MCNC: NEGATIVE — SIGNIFICANT CHANGE UP
NRBC BLD AUTO-RTO: 0 /100 WBCS — SIGNIFICANT CHANGE UP (ref 0–0)
OSMOLALITY UR: 599 MOS/KG — SIGNIFICANT CHANGE UP (ref 300–900)
PH UR: 5 — SIGNIFICANT CHANGE UP (ref 5–8)
PHOSPHATE SERPL-MCNC: 3.6 MG/DL — SIGNIFICANT CHANGE UP (ref 2.5–4.5)
PLATELET # BLD AUTO: 250 K/UL — SIGNIFICANT CHANGE UP (ref 150–400)
POTASSIUM SERPL-MCNC: 4.3 MMOL/L — SIGNIFICANT CHANGE UP (ref 3.5–5.3)
POTASSIUM SERPL-MCNC: 4.4 MMOL/L — SIGNIFICANT CHANGE UP (ref 3.5–5.3)
POTASSIUM SERPL-MCNC: 4.8 MMOL/L — SIGNIFICANT CHANGE UP (ref 3.5–5.3)
POTASSIUM SERPL-MCNC: 5 MMOL/L — SIGNIFICANT CHANGE UP (ref 3.5–5.3)
POTASSIUM SERPL-SCNC: 4.3 MMOL/L — SIGNIFICANT CHANGE UP (ref 3.5–5.3)
POTASSIUM SERPL-SCNC: 4.4 MMOL/L — SIGNIFICANT CHANGE UP (ref 3.5–5.3)
POTASSIUM SERPL-SCNC: 4.8 MMOL/L — SIGNIFICANT CHANGE UP (ref 3.5–5.3)
POTASSIUM SERPL-SCNC: 5 MMOL/L — SIGNIFICANT CHANGE UP (ref 3.5–5.3)
POTASSIUM UR-SCNC: 61 MMOL/L — SIGNIFICANT CHANGE UP
PROT ?TM UR-MCNC: 23 MG/DL — HIGH (ref 0–12)
PROT SERPL-MCNC: 5.3 G/DL — LOW (ref 6–8.3)
PROT SERPL-MCNC: 6 G/DL — SIGNIFICANT CHANGE UP (ref 6–8.3)
PROT SERPL-MCNC: 7.1 G/DL — SIGNIFICANT CHANGE UP (ref 6–8.3)
PROT UR-MCNC: SIGNIFICANT CHANGE UP MG/DL
PROT/CREAT UR-RTO: 0.1 RATIO — SIGNIFICANT CHANGE UP (ref 0–0.2)
RBC # BLD: 4.77 M/UL — SIGNIFICANT CHANGE UP (ref 4.2–5.8)
RBC # FLD: 14 % — SIGNIFICANT CHANGE UP (ref 10.3–14.5)
RBC CASTS # UR COMP ASSIST: 2 /HPF — SIGNIFICANT CHANGE UP (ref 0–4)
REVIEW: SIGNIFICANT CHANGE UP
RSV RNA NPH QL NAA+NON-PROBE: SIGNIFICANT CHANGE UP
S AUREUS DNA NOSE QL NAA+PROBE: DETECTED
SARS-COV-2 RNA SPEC QL NAA+PROBE: SIGNIFICANT CHANGE UP
SODIUM SERPL-SCNC: 131 MMOL/L — LOW (ref 135–145)
SODIUM SERPL-SCNC: 134 MMOL/L — LOW (ref 135–145)
SODIUM SERPL-SCNC: 134 MMOL/L — LOW (ref 135–145)
SODIUM SERPL-SCNC: 135 MMOL/L — SIGNIFICANT CHANGE UP (ref 135–145)
SODIUM UR-SCNC: 21 MMOL/L — SIGNIFICANT CHANGE UP
SOURCE RESPIRATORY: SIGNIFICANT CHANGE UP
SP GR SPEC: >1.03 — HIGH (ref 1–1.03)
SQUAMOUS # UR AUTO: 1 /HPF — SIGNIFICANT CHANGE UP (ref 0–5)
UROBILINOGEN FLD QL: 0.2 MG/DL — SIGNIFICANT CHANGE UP (ref 0.2–1)
UUN UR-MCNC: 573 MG/DL — SIGNIFICANT CHANGE UP
WBC # BLD: 10.27 K/UL — SIGNIFICANT CHANGE UP (ref 3.8–10.5)
WBC # FLD AUTO: 10.27 K/UL — SIGNIFICANT CHANGE UP (ref 3.8–10.5)
WBC UR QL: 1 /HPF — SIGNIFICANT CHANGE UP (ref 0–5)

## 2025-04-14 PROCEDURE — 99222 1ST HOSP IP/OBS MODERATE 55: CPT | Mod: GC

## 2025-04-14 PROCEDURE — 99223 1ST HOSP IP/OBS HIGH 75: CPT | Mod: GC

## 2025-04-14 RX ORDER — OXYCODONE HYDROCHLORIDE 30 MG/1
10 TABLET ORAL EVERY 8 HOURS
Refills: 0 | Status: DISCONTINUED | OUTPATIENT
Start: 2025-04-14 | End: 2025-04-17

## 2025-04-14 RX ORDER — MAGNESIUM SULFATE 500 MG/ML
2 SYRINGE (ML) INJECTION ONCE
Refills: 0 | Status: COMPLETED | OUTPATIENT
Start: 2025-04-14 | End: 2025-04-14

## 2025-04-14 RX ORDER — SODIUM CHLORIDE 9 G/1000ML
500 INJECTION, SOLUTION INTRAVENOUS ONCE
Refills: 0 | Status: COMPLETED | OUTPATIENT
Start: 2025-04-14 | End: 2025-04-14

## 2025-04-14 RX ORDER — ACETAMINOPHEN 500 MG/5ML
1000 LIQUID (ML) ORAL ONCE
Refills: 0 | Status: COMPLETED | OUTPATIENT
Start: 2025-04-14 | End: 2025-04-14

## 2025-04-14 RX ORDER — HYDROCORTISONE 20 MG
50 TABLET ORAL ONCE
Refills: 0 | Status: COMPLETED | OUTPATIENT
Start: 2025-04-14 | End: 2025-04-14

## 2025-04-14 RX ORDER — MIDODRINE HYDROCHLORIDE 5 MG/1
20 TABLET ORAL THREE TIMES A DAY
Refills: 0 | Status: DISCONTINUED | OUTPATIENT
Start: 2025-04-14 | End: 2025-04-14

## 2025-04-14 RX ORDER — MIDODRINE HYDROCHLORIDE 5 MG/1
20 TABLET ORAL ONCE
Refills: 0 | Status: COMPLETED | OUTPATIENT
Start: 2025-04-14 | End: 2025-04-14

## 2025-04-14 RX ORDER — MUPIROCIN CALCIUM 20 MG/G
1 CREAM TOPICAL
Refills: 0 | Status: DISCONTINUED | OUTPATIENT
Start: 2025-04-14 | End: 2025-04-17

## 2025-04-14 RX ORDER — METRONIDAZOLE 250 MG
500 TABLET ORAL EVERY 12 HOURS
Refills: 0 | Status: DISCONTINUED | OUTPATIENT
Start: 2025-04-14 | End: 2025-04-15

## 2025-04-14 RX ORDER — SODIUM CHLORIDE 9 G/1000ML
1000 INJECTION, SOLUTION INTRAVENOUS
Refills: 0 | Status: COMPLETED | OUTPATIENT
Start: 2025-04-14 | End: 2025-04-15

## 2025-04-14 RX ORDER — SODIUM CHLORIDE 9 G/1000ML
1000 INJECTION, SOLUTION INTRAVENOUS ONCE
Refills: 0 | Status: COMPLETED | OUTPATIENT
Start: 2025-04-14 | End: 2025-04-14

## 2025-04-14 RX ORDER — HEPARIN SODIUM 1000 [USP'U]/ML
5000 INJECTION INTRAVENOUS; SUBCUTANEOUS EVERY 8 HOURS
Refills: 0 | Status: DISCONTINUED | OUTPATIENT
Start: 2025-04-14 | End: 2025-04-17

## 2025-04-14 RX ORDER — CEFEPIME 2 G/20ML
2000 INJECTION, POWDER, FOR SOLUTION INTRAVENOUS EVERY 12 HOURS
Refills: 0 | Status: DISCONTINUED | OUTPATIENT
Start: 2025-04-14 | End: 2025-04-14

## 2025-04-14 RX ORDER — HYDROCORTISONE 20 MG
50 TABLET ORAL EVERY 6 HOURS
Refills: 0 | Status: DISCONTINUED | OUTPATIENT
Start: 2025-04-14 | End: 2025-04-15

## 2025-04-14 RX ORDER — CEFEPIME 2 G/20ML
2000 INJECTION, POWDER, FOR SOLUTION INTRAVENOUS EVERY 12 HOURS
Refills: 0 | Status: DISCONTINUED | OUTPATIENT
Start: 2025-04-14 | End: 2025-04-15

## 2025-04-14 RX ORDER — ACETAMINOPHEN 500 MG/5ML
650 LIQUID (ML) ORAL EVERY 6 HOURS
Refills: 0 | Status: DISCONTINUED | OUTPATIENT
Start: 2025-04-14 | End: 2025-04-17

## 2025-04-14 RX ORDER — ASPIRIN 325 MG
81 TABLET ORAL DAILY
Refills: 0 | Status: DISCONTINUED | OUTPATIENT
Start: 2025-04-14 | End: 2025-04-15

## 2025-04-14 RX ORDER — MAGNESIUM, ALUMINUM HYDROXIDE 200-200 MG
30 TABLET,CHEWABLE ORAL EVERY 4 HOURS
Refills: 0 | Status: DISCONTINUED | OUTPATIENT
Start: 2025-04-14 | End: 2025-04-17

## 2025-04-14 RX ORDER — TAMSULOSIN HYDROCHLORIDE 0.4 MG/1
0.4 CAPSULE ORAL AT BEDTIME
Refills: 0 | Status: DISCONTINUED | OUTPATIENT
Start: 2025-04-14 | End: 2025-04-17

## 2025-04-14 RX ORDER — MIDODRINE HYDROCHLORIDE 5 MG/1
30 TABLET ORAL THREE TIMES A DAY
Refills: 0 | Status: DISCONTINUED | OUTPATIENT
Start: 2025-04-14 | End: 2025-04-15

## 2025-04-14 RX ORDER — OXYCODONE HYDROCHLORIDE 30 MG/1
5 TABLET ORAL EVERY 6 HOURS
Refills: 0 | Status: DISCONTINUED | OUTPATIENT
Start: 2025-04-14 | End: 2025-04-17

## 2025-04-14 RX ORDER — SODIUM CHLORIDE 9 G/1000ML
1000 INJECTION, SOLUTION INTRAVENOUS
Refills: 0 | Status: DISCONTINUED | OUTPATIENT
Start: 2025-04-14 | End: 2025-04-14

## 2025-04-14 RX ORDER — CLOPIDOGREL BISULFATE 75 MG/1
75 TABLET, FILM COATED ORAL DAILY
Refills: 0 | Status: DISCONTINUED | OUTPATIENT
Start: 2025-04-14 | End: 2025-04-17

## 2025-04-14 RX ORDER — ATORVASTATIN CALCIUM 80 MG/1
40 TABLET, FILM COATED ORAL AT BEDTIME
Refills: 0 | Status: DISCONTINUED | OUTPATIENT
Start: 2025-04-14 | End: 2025-04-17

## 2025-04-14 RX ORDER — MELATONIN 5 MG
3 TABLET ORAL AT BEDTIME
Refills: 0 | Status: DISCONTINUED | OUTPATIENT
Start: 2025-04-14 | End: 2025-04-17

## 2025-04-14 RX ORDER — LEVOTHYROXINE SODIUM 300 MCG
112 TABLET ORAL DAILY
Refills: 0 | Status: DISCONTINUED | OUTPATIENT
Start: 2025-04-14 | End: 2025-04-17

## 2025-04-14 RX ORDER — ONDANSETRON HCL/PF 4 MG/2 ML
4 VIAL (ML) INJECTION EVERY 8 HOURS
Refills: 0 | Status: DISCONTINUED | OUTPATIENT
Start: 2025-04-14 | End: 2025-04-17

## 2025-04-14 RX ADMIN — SODIUM CHLORIDE 75 MILLILITER(S): 9 INJECTION, SOLUTION INTRAVENOUS at 05:47

## 2025-04-14 RX ADMIN — Medication 100 MILLIGRAM(S): at 17:36

## 2025-04-14 RX ADMIN — SODIUM CHLORIDE 75 MILLILITER(S): 9 INJECTION, SOLUTION INTRAVENOUS at 11:14

## 2025-04-14 RX ADMIN — OXYCODONE HYDROCHLORIDE 10 MILLIGRAM(S): 30 TABLET ORAL at 06:08

## 2025-04-14 RX ADMIN — CEFEPIME 100 MILLIGRAM(S): 2 INJECTION, POWDER, FOR SOLUTION INTRAVENOUS at 17:37

## 2025-04-14 RX ADMIN — SODIUM CHLORIDE 500 MILLILITER(S): 9 INJECTION, SOLUTION INTRAVENOUS at 09:50

## 2025-04-14 RX ADMIN — SODIUM CHLORIDE 500 MILLILITER(S): 9 INJECTION, SOLUTION INTRAVENOUS at 03:00

## 2025-04-14 RX ADMIN — Medication 25 GRAM(S): at 10:56

## 2025-04-14 RX ADMIN — Medication 112 MICROGRAM(S): at 05:48

## 2025-04-14 RX ADMIN — Medication 1000 MILLIGRAM(S): at 10:53

## 2025-04-14 RX ADMIN — OXYCODONE HYDROCHLORIDE 10 MILLIGRAM(S): 30 TABLET ORAL at 06:40

## 2025-04-14 RX ADMIN — SODIUM CHLORIDE 100 MILLILITER(S): 9 INJECTION, SOLUTION INTRAVENOUS at 15:36

## 2025-04-14 RX ADMIN — Medication 50 MILLIGRAM(S): at 15:45

## 2025-04-14 RX ADMIN — HEPARIN SODIUM 5000 UNIT(S): 1000 INJECTION INTRAVENOUS; SUBCUTANEOUS at 06:08

## 2025-04-14 RX ADMIN — CLOPIDOGREL BISULFATE 75 MILLIGRAM(S): 75 TABLET, FILM COATED ORAL at 10:26

## 2025-04-14 RX ADMIN — Medication 400 MILLIGRAM(S): at 09:50

## 2025-04-14 RX ADMIN — MIDODRINE HYDROCHLORIDE 30 MILLIGRAM(S): 5 TABLET ORAL at 16:39

## 2025-04-14 RX ADMIN — HEPARIN SODIUM 5000 UNIT(S): 1000 INJECTION INTRAVENOUS; SUBCUTANEOUS at 13:31

## 2025-04-14 RX ADMIN — Medication 81 MILLIGRAM(S): at 10:27

## 2025-04-14 RX ADMIN — OXYCODONE HYDROCHLORIDE 5 MILLIGRAM(S): 30 TABLET ORAL at 12:15

## 2025-04-14 RX ADMIN — Medication 100 MILLIGRAM(S): at 06:08

## 2025-04-14 RX ADMIN — MUPIROCIN CALCIUM 1 APPLICATION(S): 20 CREAM TOPICAL at 17:32

## 2025-04-14 RX ADMIN — OXYCODONE HYDROCHLORIDE 5 MILLIGRAM(S): 30 TABLET ORAL at 11:09

## 2025-04-14 RX ADMIN — Medication 50 MILLIGRAM(S): at 17:32

## 2025-04-14 RX ADMIN — Medication 650 MILLIGRAM(S): at 05:48

## 2025-04-14 RX ADMIN — CEFEPIME 100 MILLIGRAM(S): 2 INJECTION, POWDER, FOR SOLUTION INTRAVENOUS at 06:49

## 2025-04-14 RX ADMIN — SODIUM CHLORIDE 1000 MILLILITER(S): 9 INJECTION, SOLUTION INTRAVENOUS at 10:23

## 2025-04-14 RX ADMIN — SODIUM CHLORIDE 2000 MILLILITER(S): 9 INJECTION, SOLUTION INTRAVENOUS at 14:22

## 2025-04-14 RX ADMIN — Medication 650 MILLIGRAM(S): at 06:30

## 2025-04-14 RX ADMIN — SODIUM CHLORIDE 1000 MILLILITER(S): 9 INJECTION, SOLUTION INTRAVENOUS at 13:58

## 2025-04-14 RX ADMIN — MIDODRINE HYDROCHLORIDE 20 MILLIGRAM(S): 5 TABLET ORAL at 13:31

## 2025-04-14 NOTE — PROGRESS NOTE ADULT - PROBLEM SELECTOR PLAN 9
diet: regular   dvt ppx: heparin   code: full  dispo: pending clinical course DVT ppx: heparin 5000 units subQ q8hr   GI ppx: N/A  Diet: Regular  PT Needs: Pending PT evaluation  Dispo: Pending clinical course  Code Status: FULL CODE

## 2025-04-14 NOTE — PROVIDER CONTACT NOTE (SEPSIS SCREENING) - ACTION/TREATMENT ORDERED:
As per provider, blood cultures already drawn, patient on broad spectrum antibiotics. Plan of care ongoing

## 2025-04-14 NOTE — PROGRESS NOTE ADULT - PROBLEM SELECTOR PLAN 4
- interval increase compared to prior CT of r adrenal mass   - will obtain nonurgent MRI Interval increase in mass 1 cm compared to prior CT. Patient with chronic prednisone use in past, now off of prednisone for months. In setting of chronic fatigue and hypotension.  - cortisol (with morning labs) 1.8    Plan:  - c/w hydrocortisone 50 mg q6hr  - f/u ACTH level, TSH, free T4  - consider MR

## 2025-04-14 NOTE — H&P ADULT - PROBLEM SELECTOR PLAN 3
-SCr 1.46 with baeline SCr .9-1   - most likely prerenal   - s/p 2L bolus   - urine studies   - IVF -SCr 1.46 with baseline SCr .9-1   - most likely prerenal   - s/p 2L bolus   - urine studies   - IVF

## 2025-04-14 NOTE — RAPID RESPONSE TEAM SUMMARY - NSSITUATIONBACKGROUNDRRT_GEN_ALL_CORE
54yo male hx of testicular cancer in remission, non-Hodgkin's lymphoma in remission, CAD s/p stesnt , HTN, HLD, hypothyroidism, ischemic colitis presenting for diarrhea and hypotension x1 day hx. Pt reported having multiple episodes of diarrhea at leat evry 10-15 minutes. Pt's wife gave imodium which did not help/ Also reports fever 1 week ago which self-resolved. Denies sick contacts. Denies chest pain, SOB.     RRT called for hypotensive to 60-70s/30-40s, afebrile 98.9. On arrival patient AAOx4, conversant, denies any acute complaints. 4L of LR bolus already given prior to RRT and 5th liter was running, 20mg midodrine was also given 1 hour prior to RRT. Patient appropriately on cefepime and flagyl for suspected intraabdominal infection, with infectious work up already drawn prior to RRT. Patient remains AAO4 throughout RRT with BP improved to 86/48 (MAP 61) with 5th liter of LR bolus, RRT subsequently ended. Of note, MICU was consulted prior to RRT given persistent hypotension, will follow up final recommendation.

## 2025-04-14 NOTE — PROGRESS NOTE ADULT - PROBLEM SELECTOR PLAN 3
-SCr 1.46 with baseline SCr .9-1   - most likely prerenal   - s/p 2L bolus   - urine studies   - IVF SCr 1.46 with baseline SCr 0.9. Given diarrhea likely pre-renal in setting of hypovolemia.  - urine studies: FENa 0.2 % consistent with pre-renal CHANDNI    Plan:  - c/w IVF

## 2025-04-14 NOTE — PROGRESS NOTE ADULT - SUBJECTIVE AND OBJECTIVE BOX
Ellis Allen MD  EM/IM PGY-1  Contact via TEAMS    SUBJECTIVE / OVERNIGHT EVENTS:  Patient seen and examined at bedside. No acute events overnight.    MEDICATIONS  (STANDING):  aspirin  chewable 81 milliGRAM(s) Oral daily  atorvastatin 40 milliGRAM(s) Oral at bedtime  cefepime   IVPB 2000 milliGRAM(s) IV Intermittent every 12 hours  clopidogrel Tablet 75 milliGRAM(s) Oral daily  heparin   Injectable 5000 Unit(s) SubCutaneous every 8 hours  lactated ringers. 1000 milliLiter(s) (75 mL/Hr) IV Continuous <Continuous>  levothyroxine 112 MICROGram(s) Oral daily  metroNIDAZOLE  IVPB 500 milliGRAM(s) IV Intermittent every 12 hours  oxyCODONE  ER Tablet 10 milliGRAM(s) Oral every 8 hours  tamsulosin 0.4 milliGRAM(s) Oral at bedtime    MEDICATIONS  (PRN):  acetaminophen     Tablet .. 650 milliGRAM(s) Oral every 6 hours PRN Temp greater or equal to 38C (100.4F), Mild Pain (1 - 3)  aluminum hydroxide/magnesium hydroxide/simethicone Suspension 30 milliLiter(s) Oral every 4 hours PRN Dyspepsia  melatonin 3 milliGRAM(s) Oral at bedtime PRN Insomnia  ondansetron Injectable 4 milliGRAM(s) IV Push every 8 hours PRN Nausea and/or Vomiting  oxyCODONE    IR 5 milliGRAM(s) Oral every 6 hours PRN breakthrough pain          PHYSICAL EXAM:  Vital Signs Last 24 Hrs  T(C): 37 (14 Apr 2025 06:00), Max: 38.2 (13 Apr 2025 21:30)  T(F): 98.6 (14 Apr 2025 06:00), Max: 100.7 (13 Apr 2025 21:30)  HR: 101 (14 Apr 2025 06:00) (95 - 118)  BP: 90/55 (14 Apr 2025 06:00) (87/54 - 114/78)  BP(mean): 67 (14 Apr 2025 06:00) (65 - 91)  RR: 16 (14 Apr 2025 06:00) (16 - 20)  SpO2: 98% (14 Apr 2025 06:00) (96% - 98%)    Parameters below as of 14 Apr 2025 06:00  Patient On (Oxygen Delivery Method): room air      CAPILLARY BLOOD GLUCOSE        I&O's Summary      CONSTITUTIONAL: No acute distress, non-toxic appearing  HEENT: Normocephalic, atraumatic  RESPIRATORY: Normal respiratory effort; lungs are clear to auscultation bilaterally  CARDIOVASCULAR: Regular rate and rhythm, normal S1/S2, no murmur/rub/gallops  ABDOMEN: Soft, non-tender, no rebound/guarding/rigidity, no hepatosplenomegaly  MUSCULOSKELETAL: No clubbing or cyanosis of digits; no joint swelling or tenderness to palpation  EXTREMITIES: No lower extremity edema, peripheral pulses are 2+ bilaterally   NEURO: No focal neurological deficits   PSYCH: A&O to person, place, and time; affect appropriate    LABS:                        13.6   10.27 )-----------( 250      ( 14 Apr 2025 06:28 )             40.2     04-14    135  |  98  |  20  ----------------------------<  83  5.0   |  22  |  1.46[H]    Ca    8.9      14 Apr 2025 06:28  Phos  3.6     04-14  Mg     1.5     04-14    TPro  7.1  /  Alb  4.0  /  TBili  0.7  /  DBili  x   /  AST  29  /  ALT  19  /  AlkPhos  80  04-14    PT/INR - ( 13 Apr 2025 21:47 )   PT: 11.8 sec;   INR: 1.03 ratio         PTT - ( 13 Apr 2025 21:47 )  PTT:35.2 sec      Urinalysis Basic - ( 14 Apr 2025 06:28 )    Color: x / Appearance: x / SG: x / pH: x  Gluc: 83 mg/dL / Ketone: x  / Bili: x / Urobili: x   Blood: x / Protein: x / Nitrite: x   Leuk Esterase: x / RBC: x / WBC x   Sq Epi: x / Non Sq Epi: x / Bacteria: x          IMAGING:    [X] All pertinent imaging reviewed by me

## 2025-04-14 NOTE — PHYSICAL THERAPY INITIAL EVALUATION ADULT - PERTINENT HX OF CURRENT PROBLEM, REHAB EVAL
56yo male hx of testicular cancer in remission, non-Hodgkin's lymphoma in remission, CAD s/p stesnt , HTN, HLD, hypothyroidism, ischemic colitis presenting for diarrhea and hypotension x1 day hx. Pt reported having multiple episodes of diarrhea at leat evry 10-15 minutes. Pt's wife gave imodium which did not help/ Also reports fever 1 week ago which self-resolved. Denies sick contacts. Denies chest pain, SOB.  CT Abd: Infectious versus inflammatory enterocolitis/diarrheal disease. A 1 cm nodular soft tissue structure is again seen superomedial to the right kidney, which may be arising the lateral limb of the right adrenal gland, interval increase in size since the prior CT of the abdomen/pelvis.  Chest XR: clear

## 2025-04-14 NOTE — PROVIDER CONTACT NOTE (OTHER) - DATE AND TIME:
Labs are \"Active - In process\" as of 10:32 am.  Will follow-up with lab results in new encounter.     Note to self: labs drawn at [facility] on [address] which are now sent out to [facility].   14-Apr-2025 14:28

## 2025-04-14 NOTE — H&P ADULT - ATTENDING COMMENTS
Pt was seen and examined during key portion of E/M service. Case discussed with resident. H&P reviewed and edited where appropriate. Other than the following, I agree with the above history, exam, assessment, and plan.  56yo male hx of testicular cancer in remission, non-Hodgkin's lymphoma in remission, CAD s/p stent , HTN, HLD, hypothyroidism, ischemic colitis pw severe sepsis 2/2 poss gastroenteritis.  c diff pending. ua. f/u cultures. empiric abx for now. IVF.

## 2025-04-14 NOTE — H&P ADULT - HISTORY OF PRESENT ILLNESS
56yo male hx of testicular cancer in remission, non-Hodgkin's lymphoma in remission, CAD, HTN, HLD, hypothyroidism, ischemic colitis preenting for diarrhea and hypotension x1 day hx. Pt reported having multiple episodes of diarrhea which pt's wife gave imodium with last episode a few hours ago. Also reports fever 1 week ago which self-resolved. Denies sick contacts. Denies chest pain, SOB.     In ED- Tmax 100.7, BP systolic 80s s/p 2L bolus, 1x ofirmev.    56yo male hx of testicular cancer in remission, non-Hodgkin's lymphoma in remission, CAD s/p stesnt , HTN, HLD, hypothyroidism, ischemic colitis presenting for diarrhea and hypotension x1 day hx. Pt reported having multiple episodes of diarrhea at leat evry 10-15 minutes. Pt's wife gave imodium which did not help/ Also reports fever 1 week ago which self-resolved. Denies sick contacts. Denies chest pain, SOB.     In ED- Tmax 100.7, BP systolic 80s s/p 2L bolus, 1x ofirmev.

## 2025-04-14 NOTE — PHYSICAL THERAPY INITIAL EVALUATION ADULT - PLANNED THERAPY INTERVENTIONS, PT EVAL
Goal Pt will negotiate one flight of steps w /one handrail 2 weeks ind/gait training/strengthening/transfer training

## 2025-04-14 NOTE — PROGRESS NOTE ADULT - PROBLEM SELECTOR PLAN 1
- systolic 80s on arrival Tmax 100.7  - most likely iso of diarrhea   - s/p 2L bolus   - fu BCx, UCx, MRSA, flu/covid   - trend fever/WBC  - cefepime and flagyl empirically  - can consider empiric decadron if refractory for fluid resuscitation Patient meeting criteria for severe sepsis based on fever, tachycardia to 120s and systolic 80s   - most likely iso of diarrhea   - s/p 2L bolus   - fu BCx, UCx, MRSA, flu/covid   - trend fever/WBC  - cefepime and flagyl empirically  - can consider empiric decadron if refractory for fluid resuscitation Patient meeting criteria for severe sepsis based on fever, tachycardia to 120s and leukocytosis. Also with BP, systolic 80s concerning for severe sepsis. In setting of multiple bouts of daily diarrhea for few days.  - s/p 2L bolus in ED  - RVP (-), MRSA (-), UA (-), GI PCR (-)    Plan:  - f/u BCx, C. diff  - c/w cefepime and metronidazole empirically  - trend fever/WBC  - cefepime and flagyl empirically  - can consider empiric decadron if refractory for fluid resuscitation

## 2025-04-14 NOTE — PROGRESS NOTE ADULT - ATTENDING COMMENTS
56yo male hx of testicular cancer in remission, non-Hodgkin's lymphoma in remission, CAD s/p stent , HTN, HLD, hypothyroidism, ischemic colitis pw severe sepsis 2/2 enterocolitis.   -GI pcr neg, f/u c diff  -continue cefepime + flagyl  -course c/b persistent hypotension despite multiple fluid boluses, at baseline mental status  -w/u also notable for random cortisol 1.7, will start hydrocort 50 q6 as well as midodrine and wean as tolerated

## 2025-04-14 NOTE — PROGRESS NOTE ADULT - ASSESSMENT
54yo male hx of testicular cancer in remission, non-Hodgkin's lymphoma in remission, CAD s/p stent , HTN, HLD, hypothyroidism, ischemic colitis pw severe sepsis 2/2 poss gastroenteritis

## 2025-04-14 NOTE — CHART NOTE - NSCHARTNOTEFT_GEN_A_CORE
Confidential Drug Utilization Report  Search Terms: bello mclaughlin, 1970Search Date: 04/14/2025 05:12:54 AM  Searching on behalf of: [id number]  The Drug Utilization Report below displays all of the controlled substance prescriptions, if any, that your patient has filled in the last twelve months. The information displayed on this report is compiled from pharmacy submissions to the Department, and accurately reflects the information as submitted by the pharmacies.    This report was requested by: Edelmira Jonas | Reference #: [id number]    Practitioner Count: 2  Pharmacy Count: 2  Current Opioid Prescriptions: 1  Current Benzodiazepine Prescriptions: 0  Current Stimulant Prescriptions: 0      Patient Demographic Information (PDI)       PDI	First Name	Last Name	Birth Date	Gender	Street Address	[facility] Code  A	Bello Mclaughlin	1970	Male	[address]	[place]	[address]  B	Bello Mclaughlin	1970	Male	[address]	[place]	[address]    Prescription Information      PDI Filter:    PDI	Current Rx	Drug Type	Rx Written	Rx Dispensed	Drug	Quantity	Days Supply	Prescriber Name	Prescriber MAKEDA #  A	Y		04/03/2025	04/08/2025	zolpidem tartrate 10 mg tablet	30	30	Yueh, Carrie	[id number]  Payment Method Insurance  Dispenser [facility] #[id number]  A	N	O	01/06/2025	03/12/2025	tramadol hcl 50 mg tablet	90	30	Yueh, Carrie	[id number]  Payment Method Insurance  Dispenser [facility] #[id number]  A	N		01/06/2025	03/12/2025	zolpidem tartrate 10 mg tablet	30	30	Yueh, Carrie	[id number]  Payment Method Insurance  Dispenser [facility] #[id number]  A	N		01/06/2025	02/11/2025	zolpidem tartrate 10 mg tablet	30	30	Yueh, Carrie	[id number]  Payment Method Insurance  Dispenser [facility] #[id number]  A	N		01/06/2025	01/13/2025	zolpidem tartrate 10 mg tablet	30	30	Yueh, Carrie	[id number]  Payment Method Insurance  Dispenser [facility] #[id number]  A	N	O	01/06/2025	01/11/2025	oxycontin er 10 mg tablet	30	10	Arian Carter	[id number]  Payment Method Insurance  Dispenser [facility] #[id number]  A	N	O	01/06/2025	01/07/2025	tramadol hcl 50 mg tablet	90	30	Yueh, Carrie	[id number]  Payment Method Insurance  Dispenser [facility] #[id number]  A	N	O	12/23/2024	12/23/2024	oxycodone-acetaminophen  mg tab	12	2	José Miguel Pettit DDS, MD	[id number]  Payment Method Insurance  Dispenser [facility] #[id number]  A	N	O	12/16/2024	12/16/2024	oxycodone-acetaminophen  mg tab	12	3	[facility]	[id number]  Payment Method Insurance  Dispenser [facility] #[id number]  A	N		10/07/2024	12/11/2024	zolpidem tartrate 10 mg tablet	30	30	Yueh, Carrie	[id number]  Payment Method Insurance  Dispenser [facility] #[id number]  A	N	O	12/09/2024	12/11/2024	oxycontin er 10 mg tablet	90	30	Arian Carter	[id number]  Payment Method Insurance  Dispenser [facility] #[id number]  A	N		10/07/2024	11/10/2024	zolpidem tartrate 10 mg tablet	30	30	Yueh, Carrie	[id number]  Payment Method Insurance  Dispenser [facility] #[id number]  A	N	O	11/08/2024	11/08/2024	oxycontin er 10 mg tablet	90	30	Arian Carter	[id number]  Payment Method Insurance  Dispenser [facility] #[id number]  A	N		10/07/2024	10/10/2024	zolpidem tartrate 10 mg tablet	30	30	Yueh, Carrie	[id number]  Payment Method Insurance  Dispenser [facility] #[id number]  A	N	O	10/07/2024	10/08/2024	oxycontin er 10 mg tablet	90	30	Arian Carter	[id number]  Payment Method Insurance  Dispenser [facility] #[id number]  A	N		07/17/2024	09/12/2024	zolpidem tartrate 10 mg tablet	30	30	YuehCarrie	[id number]  Payment Method Insurance  Dispenser [facility] #[id number]  A	N	O	09/03/2024	09/05/2024	oxycontin er 10 mg tablet	90	30	Arian Carter	[id number]  Payment Method Insurance  Dispenser [facility] #[id number]  A	N		07/17/2024	08/14/2024	zolpidem tartrate 10 mg tablet	30	30	Yueh, Carrie	[id number]  Payment Method Insurance  Dispenser [facility] #[id number]  A	N	O	08/05/2024	08/07/2024	oxycontin er 10 mg tablet	90	30	Arian Carter	[id number]  Payment Method Insurance  Dispenser [facility] #[id number]  A	N		07/17/2024	07/17/2024	zolpidem tartrate 10 mg tablet	30	30	Yueh, Carrie	[id number]  Payment Method Insurance  Dispenser [facility] #[id number]  A	N	O	04/15/2024	07/16/2024	tramadol hcl 50 mg tablet	90	30	Yueh, Carrie	[id number]  Payment Method Insurance  Dispenser [facility] #[id number]  A	N	O	07/02/2024	07/04/2024	oxycontin er 10 mg tablet	90	30	Arian Carter	[id number]  Payment Method Insurance  Dispenser [facility] #[id number]  A	N		04/15/2024	06/18/2024	zolpidem tartrate 10 mg tablet	30	30	Yueh, Carrie	[id number]  Payment Method Insurance  Dispenser [facility] #[id number]  A	N	O	06/04/2024	06/06/2024	oxycontin er 10 mg tablet	90	30	Arian Carter	[id number]  Payment Method Insurance  Dispenser [facility] #[id number]  A	N	O	04/15/2024	05/26/2024	tramadol hcl 50 mg tablet	90	30	Yueh, Carrie	[id number]  Payment Method Insurance  Dispenser [facility] #[id number]  A	N		04/15/2024	05/18/2024	zolpidem tartrate 10 mg tablet	30	30	Yueh, Carrie	[id number]  Payment Method Insurance  Dispenser [facility] #[id number]  A	N	O	05/17/2024	05/18/2024	oxycontin er 10 mg tablet	45	15	Arian Carter	[id number]  Payment Method Insurance  Dispenser [facility] #[id number]  A	N	O	04/19/2024	04/19/2024	oxycontin er 10 mg tablet	60	30	Arian Carter	[id number]  Payment Method Insurance  Dispenser [facility] #[id number]  A	N		04/15/2024	04/18/2024	zolpidem tartrate 10 mg tablet	30	30	Yueh, Carrie	[id number]  Payment Method Insurance  Dispenser [facility] #[id number]  A	N	O	04/15/2024	04/18/2024	tramadol hcl 50 mg tablet	90	30	Armando Carrie	[id number]  Payment Method Insurance  Dispenser [facility] #[id number]  B	Y	O	03/28/2025	03/31/2025	oxycontin er 10 mg tablet	90	30	Arian Carter	[id number]  Payment Method Insurance  Dispenser [facility]	N	O	02/24/2025	02/28/2025	oxycontin er 10 mg tablet	90	30	Arian Carter	[id number]  Payment Method Insurance  Dispenser [facility]	N	O	01/27/2025	01/29/2025	oxycontin er 10 mg tablet	90	30	Arian Carter	[id number]  [facility] Insurance  [facility]	N	O	01/17/2025	01/17/2025	oxycontin er 10 mg tablet	40	14	Arian Carter	[id number]  Payment Method Insurance  Dispenser [facility]

## 2025-04-14 NOTE — H&P ADULT - ASSESSMENT
56yo male hx of testicular cancer in remission, non-Hodgkin's lymphoma in remission, CAD s/p stent , HTN, HLD, hypothyroidism, ischemic colitis presenting for diarrhea and hypotensive to systolic 80s now admitted for further management  56yo male hx of testicular cancer in remission, non-Hodgkin's lymphoma in remission, CAD s/p stent , HTN, HLD, hypothyroidism, ischemic colitis pw severe sepsis 2/2 poss gastroenteritis

## 2025-04-14 NOTE — H&P ADULT - PROBLEM SELECTOR PLAN 4
- s/p stent   - cw aspirin and plavix - interval increase compared to prior CT of r adrenal mass   - will obtain nonurgent MRI

## 2025-04-14 NOTE — PROGRESS NOTE ADULT - PROBLEM SELECTOR PROBLEM 6
Pt is coming in   Future Appointments   Date Time Provider Teresa Paris   10/20/2022 10:20 AM Kristopher Corrigan MD [facility] Paula Fontanez     He is having surgery with Viv Mendez MD on 11/4 Hypothyroidism

## 2025-04-14 NOTE — H&P ADULT - PROBLEM SELECTOR PLAN 1
- systolic 80s on arrival Tmax 100.7  - most likely iso of diarrhea   - s/p 2L bolus   - r/o adrenal insufficiency   - fu BCx, UCx, MRSA, flu/covid   - trend fever/WBC  - cefepime and flagyl - systolic 80s on arrival Tmax 100.7  - most likely iso of diarrhea   - s/p 2L bolus   - fu BCx, UCx, MRSA, flu/covid   - trend fever/WBC  - cefepime and flagyl empirically  - pending cortisol   - can consider empiric decadron if refractory for fluid resuscitation - systolic 80s on arrival Tmax 100.7  - most likely iso of diarrhea   - s/p 2L bolus   - fu BCx, UCx, MRSA, flu/covid   - trend fever/WBC  - cefepime and flagyl empirically  - can consider empiric decadron if refractory for fluid resuscitation

## 2025-04-14 NOTE — H&P ADULT - NSHPPHYSICALEXAM_GEN_ALL_CORE
Vital Signs Last 24 Hrs  T(C): 36.9 (14 Apr 2025 03:37), Max: 38.2 (13 Apr 2025 21:30)  T(F): 98.5 (14 Apr 2025 03:37), Max: 100.7 (13 Apr 2025 21:30)  HR: 95 (14 Apr 2025 03:37) (95 - 118)  BP: 98/55 (14 Apr 2025 03:37) (87/54 - 114/78)  BP(mean): 65 (14 Apr 2025 02:55) (65 - 91)  RR: 16 (14 Apr 2025 03:37) (16 - 20)  SpO2: 98% (14 Apr 2025 03:37) (96% - 98%)    Parameters below as of 14 Apr 2025 03:37  Patient On (Oxygen Delivery Method): room air    GENERAL: NAD, lying in bed comfortably  HEAD:  Atraumatic, normocephalic  EYES: EOMI, PERRLA, conjunctiva and sclera clear  NECK: Supple, trachea midline, no JVD  HEART: Regular rate and rhythm, no murmurs, rubs, or gallops  LUNGS: Unlabored respirations.  Clear to auscultation bilaterally, no crackles, wheezing, or rhonchi  ABDOMEN: Soft, nontender, nondistended, +BS  EXTREMITIES: 2+ peripheral pulses bilaterally. No clubbing, cyanosis, or edema  NERVOUS SYSTEM:  A&Ox3, moving all extremities, no focal deficits   SKIN: No rashes or lesions

## 2025-04-14 NOTE — H&P ADULT - PROBLEM SELECTOR PLAN 2
-multiple episodes of diarrhea  - GI pcr neg   - fu C diff  - start cefepime and flagyl -multiple episodes of diarrhea  - GI pcr neg   - fu C diff  - start cefepime and flagyl empirically for now until UA/culture results

## 2025-04-14 NOTE — CHART NOTE - NSCHARTNOTEFT_GEN_A_CORE
54yo male hx of testicular cancer in remission, non-Hodgkin's lymphoma in remission, CAD s/p stesnt , HTN, HLD, hypothyroidism, ischemic colitis presenting for diarrhea and hypotension x1 day hx found to have Cortisol of 1.7 (drawn 4/14 10AM). Endocrine consulted for adrenal insufficiency.    Vitals and labs reviewed by me    Vital Signs Last 24 Hrs  T(C): 37.3 (14 Apr 2025 11:11), Max: 38.2 (13 Apr 2025 21:30)  T(F): 99.1 (14 Apr 2025 11:11), Max: 100.7 (13 Apr 2025 21:30)  HR: 92 (14 Apr 2025 11:11) (92 - 118)  BP: 69/35 (14 Apr 2025 14:04) (58/38 - 114/78)  BP(mean): 61 (14 Apr 2025 09:49) (59 - 91)  RR: 19 (14 Apr 2025 11:11) (16 - 20)  SpO2: 97% (14 Apr 2025 11:11) (94% - 99%)    Parameters below as of 14 Apr 2025 11:11  Patient On (Oxygen Delivery Method): room air      Recommendations:  - recently received hydrocortione 50mg IV - given patient persistently hypotension give another 50mg IV hydrocortisone STAT and start hydocortisone 50mg IV q6h after  - Monitor vital signs, may need pressor support if persistently hypotensive    Full consult to follow in AM        Discussed recommendations with primary team.    Jose Mistry MD  Endocrine Fellow  Can be reached via [employer] teams.    For follow up questions, discharge recommendations, or new consults, please email [email or web address] ([email or web address] or [email or web address] ([email or web address]) or call answering service at [phone] (weekdays); [phone] (nights/weekends).  For emergencies please page fellow on call.

## 2025-04-14 NOTE — H&P ADULT - NSHPLABSRESULTS_GEN_ALL_CORE
LABS:                          15.8   13.42 )-----------( 326      ( 13 Apr 2025 21:47 )             46.9     04-14    134[L]  |  98  |  20  ----------------------------<  80  4.8   |  21[L]  |  1.33[H]    Ca    9.2      14 Apr 2025 00:41  Phos  4.4     04-13  Mg     1.8     04-13    TPro  8.4[H]  /  Alb  4.9  /  TBili  0.8  /  DBili  x   /  AST  32  /  ALT  22  /  AlkPhos  93  04-13    LIVER FUNCTIONS - ( 13 Apr 2025 21:47 )  Alb: 4.9 g/dL / Pro: 8.4 g/dL / ALK PHOS: 93 U/L / ALT: 22 U/L / AST: 32 U/L / GGT: x           PT/INR - ( 13 Apr 2025 21:47 )   PT: 11.8 sec;   INR: 1.03 ratio         PTT - ( 13 Apr 2025 21:47 )  PTT:35.2 sec  Urinalysis Basic - ( 14 Apr 2025 00:41 )    Color: x / Appearance: x / SG: x / pH: x  Gluc: 80 mg/dL / Ketone: x  / Bili: x / Urobili: x   Blood: x / Protein: x / Nitrite: x   Leuk Esterase: x / RBC: x / WBC x   Sq Epi: x / Non Sq Epi: x / Bacteria: x    < from: CT Abdomen and Pelvis w/ IV Cont (04.13.25 @ 22:03) >    IMPRESSION:  Infectious versus inflammatory enterocolitis/diarrheal disease.    A 1 cm nodular soft tissue structure is again seen superomedial to the   right kidney, which may be arising the lateral limb of the right adrenal   gland, interval increase in size since the prior CT of the   abdomen/pelvis. Nonemergent adrenal mass protocol MRI is recommended for   further evaluation.    < end of copied text >

## 2025-04-14 NOTE — CONSULT NOTE ADULT - SUBJECTIVE AND OBJECTIVE BOX
Patient:  CARLOTA CANTU  [id number]    HPI:  56yo male hx of testicular cancer in remission, non-Hodgkin's lymphoma in remission, CAD s/p stesnt , HTN, HLD, hypothyroidism, ischemic colitis presenting for diarrhea and hypotension x1 day hx. Pt reported having multiple episodes of diarrhea at leat evry 10-15 minutes. Pt's wife gave imodium which did not help/ Also reports fever 1 week ago which self-resolved. Denies sick contacts. Denies chest pain, SOB.     In ED- Tmax 100.7, BP systolic 80s s/p 2L bolus, 1x ofirmev.    (14 Apr 2025 04:43)      MICU consulted for hypotension. RRT called for MAPs below 65. Patient s/p 5 L and on midodrine 30 TID. On exam patient looks well no acute complaints. Mentating at baseline       PAST MEDICAL & SURGICAL HISTORY:  Testicular Cancer  1994, chemotherapy      Headache, Migraine      HTN (hypertension)      NHL (non-Hodgkin's lymphoma)  1999, Radiation to left neck region      GERD (gastroesophageal reflux disease)      Colitis  surgery 1996      BPH (benign prostatic hyperplasia)      Osteoarthritis  degenerative disc L3-4      History of bone marrow transplant      Hypertriglyceridemia      Malignant melanoma of scalp  RSX 08/18  R neck mass dsx/ LN dsx 10/19/18      Hypothyroidism      History of chemotherapy      History of Raynaud's syndrome      CAD (coronary artery disease)      Unspecified malignant neoplasm of skin of scalp and neck      History of orchiectomy  left 1994      S/P colon resection  1996      Lymph node disorder  s/p abdominal lymph node dissection 1994, 1996      Melanoma of scalp or neck  excision 8/18  s/p excision right neck mass & LN dissx      History of nasal septoplasty      History of infusaport central venous catheter insertion      History of infusaport central venous catheter removal          FAMILY HISTORY:  Hypertension (Father)        SOCIAL HISTORY:    Allergies    No Known Allergies    Intolerances        HOME MEDICATIONS:    REVIEW OF SYSTEMS:  [ ] Unable to assess ROS because ______  [X] Negative except as stated in HPI      OBJECTIVE:  T(F): 99.1 (04-14-25 @ 11:11), Max: 100.7 (04-13-25 @ 21:30)  HR: 92 (04-14-25 @ 11:11) (92 - 118)  BP: 96/60 (04-14-25 @ 16:30) (58/38 - 114/78)  BP(mean): 61 (04-14-25 @ 09:49) (59 - 91)  ABP: --  ABP(mean): --  RR: 19 (04-14-25 @ 11:11) (16 - 20)  SpO2: 97% (04-14-25 @ 11:11) (94% - 99%)  CVP(mm Hg): --    I/O Summary 24H    CAPILLARY BLOOD GLUCOSE      POCT Blood Glucose.: 70 mg/dL (14 Apr 2025 14:29)      PHYSICAL EXAM:  GENERAL: NAD, lying in bed comfortably  HEAD:  Atraumatic, Normocephalic  EYES: EOMI, PERRLA, conjunctiva and sclera clear  ENT: Moist mucous membranes  NECK: Supple, No JVD  CHEST/LUNG: Clear to auscultation bilaterally; No rales, rhonchi, wheezing, or rubs. Unlabored respirations  HEART: Regular rate and rhythm; No murmurs, rubs, or gallops  ABDOMEN: Bowel sounds present; Soft, Nontender, Nondistended. No hepatomegaly  EXTREMITIES:  2+ Peripheral Pulses, brisk capillary refill. No clubbing, cyanosis, or edema  NERVOUS SYSTEM:  Alert & Oriented X3, speech clear. No deficits   MSK: FROM all 4 extremities, full and equal strength  SKIN: No rashes or lesions        HOSPITAL MEDICATIONS:  MEDICATIONS  (STANDING):  aspirin  chewable 81 milliGRAM(s) Oral daily  atorvastatin 40 milliGRAM(s) Oral at bedtime  cefepime   IVPB 2000 milliGRAM(s) IV Intermittent every 12 hours  clopidogrel Tablet 75 milliGRAM(s) Oral daily  heparin   Injectable 5000 Unit(s) SubCutaneous every 8 hours  hydrocortisone sodium succinate Injectable 50 milliGRAM(s) IV Push every 6 hours  hydrocortisone sodium succinate Injectable 50 milliGRAM(s) IV Push once  lactated ringers. 1000 milliLiter(s) (100 mL/Hr) IV Continuous <Continuous>  levothyroxine 112 MICROGram(s) Oral daily  metroNIDAZOLE  IVPB 500 milliGRAM(s) IV Intermittent every 12 hours  midodrine. 30 milliGRAM(s) Oral three times a day  mupirocin 2% Nasal 1 Application(s) Both Nostrils two times a day  oxyCODONE  ER Tablet 10 milliGRAM(s) Oral every 8 hours  tamsulosin 0.4 milliGRAM(s) Oral at bedtime    MEDICATIONS  (PRN):  acetaminophen     Tablet .. 650 milliGRAM(s) Oral every 6 hours PRN Temp greater or equal to 38C (100.4F), Mild Pain (1 - 3)  aluminum hydroxide/magnesium hydroxide/simethicone Suspension 30 milliLiter(s) Oral every 4 hours PRN Dyspepsia  melatonin 3 milliGRAM(s) Oral at bedtime PRN Insomnia  ondansetron Injectable 4 milliGRAM(s) IV Push every 8 hours PRN Nausea and/or Vomiting  oxyCODONE    IR 5 milliGRAM(s) Oral every 6 hours PRN breakthrough pain      LABS:  CBC 04-14-25 @ 06:28                        13.6   10.27 )-----------( 250                   40.2     Hgb trend: 13.6 <-- , 15.8 <--   WBC trend: 10.27 <-- , 13.42 <--     CMP 04-14-25 @ 14:39    131[L]  |  99  |  20  ----------------------------<  85  4.4   |  20[L]  |  1.69[H]    Ca    7.9[L]      04-14-25 @ 14:39  Phos  3.6     04-14  Mg     1.5     04-14    TPro  5.3[L]  /  Alb  3.1[L]  /  TBili  0.6  /  DBili  x   /  AST  21  /  ALT  13  /  AlkPhos  60     04-14    Serum Cr (eGFR) trend: 1.69 (47) <-- , 1.46 (56) <-- , 1.33 (63) <-- , 1.46 (56) <--     PT/INR - ( 13 Apr 2025 21:47 )   PT: 11.8 sec;   INR: 1.03 ratio    PTT - ( 13 Apr 2025 21:47 ):35.2 sec    ABG Trend:     VBG Trend:   04-14-25 @ 13:40 - pH: 7.30  | pCO2: 47    | pO2: 45    | HCO3: 23    | Lactate: 1.5    04-14-25 @ 06:15 - pH: 7.29  | pCO2: 56    | pO2: 18    | HCO3: 27    | Lactate: 2.4    04-14-25 @ 00:05 - pH: 7.36  | pCO2: 45    | pO2: 41    | HCO3: 25    | Lactate: 2.1    04-13-25 @ 21:48 - pH: 7.23  | pCO2: 65    | pO2: 20    | HCO3: 27    | Lactate: 4.7        MICROBIOLOGY:       RADIOLOGY:  [ ] Reviewed and interpreted by me    EKG

## 2025-04-14 NOTE — CHART NOTE - NSCHARTNOTEFT_GEN_A_CORE
Patient Demographic Information (PDI)       PDI	First Name	Last Name	Birth Date	Gender	Street Address	[facility]	Zip Code  A	Conrad	Arnoldo	1970	Male	[address]	[place]	[address]  B	Conrad	Arnoldo	1970	Male	[address] [place]	[address]    PDI	Current Rx	Drug Type	Rx Written	Rx Dispensed	Drug	Quantity	Days Supply	Prescriber Name	Prescriber MAKEDA #	Payment Method	Dispenser  A	Y		04/03/2025	04/08/2025	zolpidem tartrate 10 mg tablet	30	30	Yueh, Carrie	[id number]	Insurance	[facility] #[id number]  A	N	O	01/06/2025	03/12/2025	tramadol hcl 50 mg tablet	90	30	Yueh, Carrie	[id number]	Insurance	[facility] #[id number]  A	N		01/06/2025	03/12/2025	zolpidem tartrate 10 mg tablet	30	30	Yueh, Carrie	[id number]	Insurance	[facility] #[id number]  A	N		01/06/2025	02/11/2025	zolpidem tartrate 10 mg tablet	30	30	Yueh, Carrie	[id number]	Insurance	[facility] #[id number]  A	N		01/06/2025	01/13/2025	zolpidem tartrate 10 mg tablet	30	30	Yueh, Carrie	[id number]	Insurance	[facility] #[id number]  A	N	O	01/06/2025	01/11/2025	oxycontin er 10 mg tablet	30	10	Arian Carter	[id number]	Insurance	[facility] #[id number]  A	N	O	01/06/2025	01/07/2025	tramadol hcl 50 mg tablet	90	30	Yueh, Carrie	[id number]	Insurance	[facility] #[id number]  A	N	O	12/23/2024	12/23/2024	oxycodone-acetaminophen  mg tab	12	2	José Miguel Pettit DDS, MD	[id number]	Insurance	[facility] #[id number]  A	N	O	12/16/2024	12/16/2024	oxycodone-acetaminophen  mg tab	12	3	[facility]	[id number]	Insurance	[facility] #[id number]  A	N		10/07/2024	12/11/2024	zolpidem tartrate 10 mg tablet	30	30	Yueh, Carrie	[id number]	Insurance	[facility] #[id number]  A	N	O	12/09/2024	12/11/2024	oxycontin er 10 mg tablet	90	30	Arian Carter	[id number]	Insurance	[facility] #[id number]  A	N		10/07/2024	11/10/2024	zolpidem tartrate 10 mg tablet	30	30	Yueh, Carrie	[id number]	Insurance	[facility] #[id number]  A	N	O	11/08/2024	11/08/2024	oxycontin er 10 mg tablet	90	30	Raul Arian	[id number]	Insurance	[facility] #[id number]  A	N		10/07/2024	10/10/2024	zolpidem tartrate 10 mg tablet	30	30	Yueh, Carrie	[id number]	Insurance	[facility] #[id number]  A	N	O	10/07/2024	10/08/2024	oxycontin er 10 mg tablet	90	30	Raul Arian	[id number]	Insurance	[facility] #[id number]  A	N		07/17/2024	09/12/2024	zolpidem tartrate 10 mg tablet	30	30	YuehCarrie	[id number]	Insurance	[facility] #[id number]  A	N	O	09/03/2024	09/05/2024	oxycontin er 10 mg tablet	90	30	Raul Arian	[id number]	Insurance	[facility] #[id number]  A	N		07/17/2024	08/14/2024	zolpidem tartrate 10 mg tablet	30	30	Yueh, Carrie	[id number]	Insurance	[facility] #[id number]  A	N	O	08/05/2024	08/07/2024	oxycontin er 10 mg tablet	90	30	Raul Arian	[id number]	Insurance	[facility] #[id number]  A	N		07/17/2024	07/17/2024	zolpidem tartrate 10 mg tablet	30	30	Yueh Carrie	[id number]	Insurance	[facility] #[id number]  A	N	O	04/15/2024	07/16/2024	tramadol hcl 50 mg tablet	90	30	Yueh, Carrie	[id number]	Insurance	[facility] #[id number]  A	N	O	07/02/2024	07/04/2024	oxycontin er 10 mg tablet	90	30	Raul Arian	[id number]	Insurance	[facility] #[id number]  A	N		04/15/2024	06/18/2024	zolpidem tartrate 10 mg tablet	30	30	Yueh Carrie	[id number]	Insurance	[facility] #[id number]  A	N	O	06/04/2024	06/06/2024	oxycontin er 10 mg tablet	90	30	Arian Carter	[id number]	Insurance	[facility] #[id number]  A	N	O	04/15/2024	05/26/2024	tramadol hcl 50 mg tablet	90	30	Yueh, Carrie	[id number]	Insurance	[facility] #[id number]  A	N		04/15/2024	05/18/2024	zolpidem tartrate 10 mg tablet	30	30	Yueh, Carrie	[id number]	Insurance	[facility] #[id number]  A	N	O	05/17/2024	05/18/2024	oxycontin er 10 mg tablet	45	15	Arian Carter	[id number]	Insurance	[facility] #[id number]  A	N	O	04/19/2024	04/19/2024	oxycontin er 10 mg tablet	60	30	Arian Carter	[id number]	Insurance	[facility] #[id number]  A	N		04/15/2024	04/18/2024	zolpidem tartrate 10 mg tablet	30	30	Yueh, Carrie	[id number]	Insurance	[facility] #[id number]  A	N	O	04/15/2024	04/18/2024	tramadol hcl 50 mg tablet	90	30	Yueh, Carrie	[id number]	Insurance	[facility] #[id number]  B	Y	O	03/28/2025	03/31/2025	oxycontin er 10 mg tablet	90	30	Arian Carter	[id number]	Insurance	[facility]	N	O	02/24/2025	02/28/2025	oxycontin er 10 mg tablet	90	30	Arian Carter	[id number]	Insurance	[facility]	N	O	01/27/2025	01/29/2025	oxycontin er 10 mg tablet	90	30	Arian Carter	[id number]	Insurance	[facility]	N	O	01/17/2025	01/17/2025	oxycontin er 10 mg tablet	40	14	Arian Carter	[id number]	Insurance	[facility]

## 2025-04-14 NOTE — ED ADULT NURSE REASSESSMENT NOTE - NS ED NURSE REASSESS COMMENT FT1
Received patient from [facility], patient at A&O4 mental status, able to make needs known, NAD, VSS, patient agreeable to plan of care, comfort and safety provided. Pt on cardiac monitor in sinus tachycardia at 107 bpm.

## 2025-04-14 NOTE — H&P ADULT - NSHPREVIEWOFSYSTEMS_GEN_ALL_CORE
REVIEW OF SYSTEMS:    CONSTITUTIONAL:  + fever, No weakness,or chills  EYES/ENT:  No visual changes;  No vertigo or throat pain   NECK:  No pain or stiffness  RESPIRATORY:  No cough, wheezing, hemoptysis; No shortness of breath  CARDIOVASCULAR:  No chest pain or palpitations  GASTROINTESTINAL: +diarrhea  No abdominal or epigastric pain. No nausea, vomiting, or hematemesis; No melena or hematochezia.  GENITOURINARY:  No dysuria, frequency or hematuria  MUSCULOSKELETAL:  FROM all extremities, normal strength, No calf tenderness  NEUROLOGICAL:  No numbness or weakness  SKIN:  No itching, rashes

## 2025-04-14 NOTE — CONSULT NOTE ADULT - TIME BILLING
personal review of data, images old chart and coordinating   with medical team. Excludes teaching time and procedures.

## 2025-04-14 NOTE — PROGRESS NOTE ADULT - PROBLEM SELECTOR PLAN 2
-multiple episodes of diarrhea  - GI pcr neg   - fu C diff  - start cefepime and flagyl empirically for now until UA/culture results Multiple episodes of diarrhea without abdominal pain. CT A/P showing enterocolitis.    Plan:  - infectious workup as above

## 2025-04-14 NOTE — PROGRESS NOTE ADULT - PROBLEM SELECTOR PLAN 5
- s/p stent   - cw aspirin and plavix s/p ONESIMO x4 now on Plavix only.    Plan:  - c/w home clopidogrel

## 2025-04-14 NOTE — PHYSICAL THERAPY INITIAL EVALUATION ADULT - ADDITIONAL COMMENTS
pt lives with his wife and daughter in a  with 1 REGAN and a full flight inside to bedroom/bathroom. pt was ind prior without an assistive device for all ADLs. pt states he owns a rw. pt works at [employer].

## 2025-04-15 DIAGNOSIS — E27.49 OTHER ADRENOCORTICAL INSUFFICIENCY: ICD-10-CM

## 2025-04-15 DIAGNOSIS — I95.9 HYPOTENSION, UNSPECIFIED: ICD-10-CM

## 2025-04-15 LAB
ALBUMIN SERPL ELPH-MCNC: 3.4 G/DL — SIGNIFICANT CHANGE UP (ref 3.3–5)
ALP SERPL-CCNC: 62 U/L — SIGNIFICANT CHANGE UP (ref 40–120)
ALT FLD-CCNC: 15 U/L — SIGNIFICANT CHANGE UP (ref 10–45)
ANION GAP SERPL CALC-SCNC: 14 MMOL/L — SIGNIFICANT CHANGE UP (ref 5–17)
APPEARANCE UR: ABNORMAL
AST SERPL-CCNC: 21 U/L — SIGNIFICANT CHANGE UP (ref 10–40)
BACTERIA # UR AUTO: NEGATIVE /HPF — SIGNIFICANT CHANGE UP
BASOPHILS # BLD AUTO: 0.02 K/UL — SIGNIFICANT CHANGE UP (ref 0–0.2)
BASOPHILS NFR BLD AUTO: 0.3 % — SIGNIFICANT CHANGE UP (ref 0–2)
BILIRUB SERPL-MCNC: 0.5 MG/DL — SIGNIFICANT CHANGE UP (ref 0.2–1.2)
BILIRUB UR-MCNC: NEGATIVE — SIGNIFICANT CHANGE UP
BUN SERPL-MCNC: 17 MG/DL — SIGNIFICANT CHANGE UP (ref 7–23)
C DIFF GDH STL QL: SIGNIFICANT CHANGE UP
C DIFF GDH STL QL: SIGNIFICANT CHANGE UP
CALCIUM SERPL-MCNC: 8.4 MG/DL — SIGNIFICANT CHANGE UP (ref 8.4–10.5)
CAST: 28 /LPF — HIGH (ref 0–4)
CHLORIDE SERPL-SCNC: 99 MMOL/L — SIGNIFICANT CHANGE UP (ref 96–108)
CO2 SERPL-SCNC: 20 MMOL/L — LOW (ref 22–31)
COLOR SPEC: YELLOW — SIGNIFICANT CHANGE UP
CORTIS AM PEAK SERPL-MCNC: 49 UG/DL — HIGH (ref 6–18.4)
CREAT SERPL-MCNC: 1.37 MG/DL — HIGH (ref 0.5–1.3)
DIFF PNL FLD: ABNORMAL
EGFR: 61 ML/MIN/1.73M2 — SIGNIFICANT CHANGE UP
EGFR: 61 ML/MIN/1.73M2 — SIGNIFICANT CHANGE UP
EOSINOPHIL # BLD AUTO: 0.06 K/UL — SIGNIFICANT CHANGE UP (ref 0–0.5)
EOSINOPHIL NFR BLD AUTO: 1 % — SIGNIFICANT CHANGE UP (ref 0–6)
GAS PNL BLDV: SIGNIFICANT CHANGE UP
GLUCOSE SERPL-MCNC: 123 MG/DL — HIGH (ref 70–99)
GLUCOSE UR QL: NEGATIVE MG/DL — SIGNIFICANT CHANGE UP
HCT VFR BLD CALC: 32.1 % — LOW (ref 39–50)
HGB BLD-MCNC: 10.5 G/DL — LOW (ref 13–17)
HYALINE CASTS # UR AUTO: PRESENT
IMM GRANULOCYTES NFR BLD AUTO: 0.3 % — SIGNIFICANT CHANGE UP (ref 0–0.9)
KETONES UR-MCNC: NEGATIVE MG/DL — SIGNIFICANT CHANGE UP
LEGIONELLA AG UR QL: NEGATIVE — SIGNIFICANT CHANGE UP
LEUKOCYTE ESTERASE UR-ACNC: NEGATIVE — SIGNIFICANT CHANGE UP
LYMPHOCYTES # BLD AUTO: 1.09 K/UL — SIGNIFICANT CHANGE UP (ref 1–3.3)
LYMPHOCYTES # BLD AUTO: 17.7 % — SIGNIFICANT CHANGE UP (ref 13–44)
MAGNESIUM SERPL-MCNC: 2.1 MG/DL — SIGNIFICANT CHANGE UP (ref 1.6–2.6)
MCHC RBC-ENTMCNC: 28 PG — SIGNIFICANT CHANGE UP (ref 27–34)
MCHC RBC-ENTMCNC: 32.7 G/DL — SIGNIFICANT CHANGE UP (ref 32–36)
MCV RBC AUTO: 85.6 FL — SIGNIFICANT CHANGE UP (ref 80–100)
MONOCYTES # BLD AUTO: 0.29 K/UL — SIGNIFICANT CHANGE UP (ref 0–0.9)
MONOCYTES NFR BLD AUTO: 4.7 % — SIGNIFICANT CHANGE UP (ref 2–14)
MUCOUS THREADS # UR AUTO: PRESENT
NEUTROPHILS # BLD AUTO: 4.67 K/UL — SIGNIFICANT CHANGE UP (ref 1.8–7.4)
NEUTROPHILS NFR BLD AUTO: 76 % — SIGNIFICANT CHANGE UP (ref 43–77)
NITRITE UR-MCNC: NEGATIVE — SIGNIFICANT CHANGE UP
NRBC BLD AUTO-RTO: 0 /100 WBCS — SIGNIFICANT CHANGE UP (ref 0–0)
PH UR: 5.5 — SIGNIFICANT CHANGE UP (ref 5–8)
PHOSPHATE SERPL-MCNC: 2.8 MG/DL — SIGNIFICANT CHANGE UP (ref 2.5–4.5)
PLATELET # BLD AUTO: 223 K/UL — SIGNIFICANT CHANGE UP (ref 150–400)
POTASSIUM SERPL-MCNC: 4.4 MMOL/L — SIGNIFICANT CHANGE UP (ref 3.5–5.3)
POTASSIUM SERPL-SCNC: 4.4 MMOL/L — SIGNIFICANT CHANGE UP (ref 3.5–5.3)
PROT SERPL-MCNC: 6.1 G/DL — SIGNIFICANT CHANGE UP (ref 6–8.3)
PROT UR-MCNC: 30 MG/DL
RBC # BLD: 3.75 M/UL — LOW (ref 4.2–5.8)
RBC # FLD: 14.4 % — SIGNIFICANT CHANGE UP (ref 10.3–14.5)
RBC CASTS # UR COMP ASSIST: 0 /HPF — SIGNIFICANT CHANGE UP (ref 0–4)
REVIEW: SIGNIFICANT CHANGE UP
S PNEUM AG UR QL: NEGATIVE — SIGNIFICANT CHANGE UP
SODIUM SERPL-SCNC: 133 MMOL/L — LOW (ref 135–145)
SP GR SPEC: 1.01 — SIGNIFICANT CHANGE UP (ref 1–1.03)
SQUAMOUS # UR AUTO: 2 /HPF — SIGNIFICANT CHANGE UP (ref 0–5)
T4 FREE SERPL-MCNC: 0.6 NG/DL — LOW (ref 0.9–1.8)
TSH SERPL-MCNC: 6.16 UIU/ML — HIGH (ref 0.27–4.2)
UROBILINOGEN FLD QL: 0.2 MG/DL — SIGNIFICANT CHANGE UP (ref 0.2–1)
WBC # BLD: 6.15 K/UL — SIGNIFICANT CHANGE UP (ref 3.8–10.5)
WBC # FLD AUTO: 6.15 K/UL — SIGNIFICANT CHANGE UP (ref 3.8–10.5)
WBC UR QL: 1 /HPF — SIGNIFICANT CHANGE UP (ref 0–5)

## 2025-04-15 PROCEDURE — 99233 SBSQ HOSP IP/OBS HIGH 50: CPT | Mod: GC

## 2025-04-15 PROCEDURE — 99255 IP/OBS CONSLTJ NEW/EST HI 80: CPT | Mod: GC

## 2025-04-15 RX ORDER — ACETAMINOPHEN 500 MG/5ML
1000 LIQUID (ML) ORAL ONCE
Refills: 0 | Status: COMPLETED | OUTPATIENT
Start: 2025-04-15 | End: 2025-04-15

## 2025-04-15 RX ORDER — MIDODRINE HYDROCHLORIDE 5 MG/1
20 TABLET ORAL THREE TIMES A DAY
Refills: 0 | Status: DISCONTINUED | OUTPATIENT
Start: 2025-04-15 | End: 2025-04-15

## 2025-04-15 RX ORDER — METOPROLOL SUCCINATE 50 MG/1
25 TABLET, EXTENDED RELEASE ORAL DAILY
Refills: 0 | Status: DISCONTINUED | OUTPATIENT
Start: 2025-04-15 | End: 2025-04-15

## 2025-04-15 RX ORDER — SODIUM CHLORIDE 9 G/1000ML
1000 INJECTION, SOLUTION INTRAVENOUS
Refills: 0 | Status: DISCONTINUED | OUTPATIENT
Start: 2025-04-15 | End: 2025-04-15

## 2025-04-15 RX ORDER — VANCOMYCIN HCL IN 5 % DEXTROSE 1.5G/250ML
125 PLASTIC BAG, INJECTION (ML) INTRAVENOUS EVERY 6 HOURS
Refills: 0 | Status: DISCONTINUED | OUTPATIENT
Start: 2025-04-15 | End: 2025-04-17

## 2025-04-15 RX ORDER — MIDODRINE HYDROCHLORIDE 5 MG/1
10 TABLET ORAL THREE TIMES A DAY
Refills: 0 | Status: DISCONTINUED | OUTPATIENT
Start: 2025-04-16 | End: 2025-04-16

## 2025-04-15 RX ORDER — HYDROCORTISONE 20 MG
50 TABLET ORAL EVERY 12 HOURS
Refills: 0 | Status: DISCONTINUED | OUTPATIENT
Start: 2025-04-15 | End: 2025-04-16

## 2025-04-15 RX ADMIN — SODIUM CHLORIDE 75 MILLILITER(S): 9 INJECTION, SOLUTION INTRAVENOUS at 12:10

## 2025-04-15 RX ADMIN — OXYCODONE HYDROCHLORIDE 10 MILLIGRAM(S): 30 TABLET ORAL at 14:08

## 2025-04-15 RX ADMIN — CEFEPIME 100 MILLIGRAM(S): 2 INJECTION, POWDER, FOR SOLUTION INTRAVENOUS at 06:04

## 2025-04-15 RX ADMIN — OXYCODONE HYDROCHLORIDE 10 MILLIGRAM(S): 30 TABLET ORAL at 01:36

## 2025-04-15 RX ADMIN — Medication 1000 MILLIGRAM(S): at 05:00

## 2025-04-15 RX ADMIN — OXYCODONE HYDROCHLORIDE 5 MILLIGRAM(S): 30 TABLET ORAL at 12:12

## 2025-04-15 RX ADMIN — OXYCODONE HYDROCHLORIDE 5 MILLIGRAM(S): 30 TABLET ORAL at 07:31

## 2025-04-15 RX ADMIN — CLOPIDOGREL BISULFATE 75 MILLIGRAM(S): 75 TABLET, FILM COATED ORAL at 10:39

## 2025-04-15 RX ADMIN — OXYCODONE HYDROCHLORIDE 10 MILLIGRAM(S): 30 TABLET ORAL at 13:08

## 2025-04-15 RX ADMIN — Medication 81 MILLIGRAM(S): at 10:39

## 2025-04-15 RX ADMIN — HEPARIN SODIUM 5000 UNIT(S): 1000 INJECTION INTRAVENOUS; SUBCUTANEOUS at 21:53

## 2025-04-15 RX ADMIN — SODIUM CHLORIDE 100 MILLILITER(S): 9 INJECTION, SOLUTION INTRAVENOUS at 06:55

## 2025-04-15 RX ADMIN — HEPARIN SODIUM 5000 UNIT(S): 1000 INJECTION INTRAVENOUS; SUBCUTANEOUS at 13:09

## 2025-04-15 RX ADMIN — Medication 400 MILLIGRAM(S): at 04:20

## 2025-04-15 RX ADMIN — Medication 50 MILLIGRAM(S): at 17:01

## 2025-04-15 RX ADMIN — MUPIROCIN CALCIUM 1 APPLICATION(S): 20 CREAM TOPICAL at 17:00

## 2025-04-15 RX ADMIN — Medication 50 MILLIGRAM(S): at 12:10

## 2025-04-15 RX ADMIN — HEPARIN SODIUM 5000 UNIT(S): 1000 INJECTION INTRAVENOUS; SUBCUTANEOUS at 08:38

## 2025-04-15 RX ADMIN — OXYCODONE HYDROCHLORIDE 5 MILLIGRAM(S): 30 TABLET ORAL at 06:07

## 2025-04-15 RX ADMIN — Medication 125 MILLIGRAM(S): at 10:39

## 2025-04-15 RX ADMIN — Medication 125 MILLIGRAM(S): at 23:18

## 2025-04-15 RX ADMIN — Medication 50 MILLIGRAM(S): at 06:04

## 2025-04-15 RX ADMIN — OXYCODONE HYDROCHLORIDE 5 MILLIGRAM(S): 30 TABLET ORAL at 13:12

## 2025-04-15 RX ADMIN — ATORVASTATIN CALCIUM 40 MILLIGRAM(S): 80 TABLET, FILM COATED ORAL at 00:18

## 2025-04-15 RX ADMIN — MUPIROCIN CALCIUM 1 APPLICATION(S): 20 CREAM TOPICAL at 06:11

## 2025-04-15 RX ADMIN — OXYCODONE HYDROCHLORIDE 10 MILLIGRAM(S): 30 TABLET ORAL at 21:53

## 2025-04-15 RX ADMIN — OXYCODONE HYDROCHLORIDE 10 MILLIGRAM(S): 30 TABLET ORAL at 22:30

## 2025-04-15 RX ADMIN — Medication 5 MILLIGRAM(S): at 21:53

## 2025-04-15 RX ADMIN — HEPARIN SODIUM 5000 UNIT(S): 1000 INJECTION INTRAVENOUS; SUBCUTANEOUS at 00:17

## 2025-04-15 RX ADMIN — Medication 100 MILLIGRAM(S): at 06:55

## 2025-04-15 RX ADMIN — TAMSULOSIN HYDROCHLORIDE 0.4 MILLIGRAM(S): 0.4 CAPSULE ORAL at 21:53

## 2025-04-15 RX ADMIN — ATORVASTATIN CALCIUM 40 MILLIGRAM(S): 80 TABLET, FILM COATED ORAL at 21:53

## 2025-04-15 RX ADMIN — OXYCODONE HYDROCHLORIDE 10 MILLIGRAM(S): 30 TABLET ORAL at 02:15

## 2025-04-15 RX ADMIN — Medication 125 MILLIGRAM(S): at 17:01

## 2025-04-15 RX ADMIN — Medication 50 MILLIGRAM(S): at 01:21

## 2025-04-15 RX ADMIN — Medication 112 MICROGRAM(S): at 06:06

## 2025-04-15 RX ADMIN — MIDODRINE HYDROCHLORIDE 30 MILLIGRAM(S): 5 TABLET ORAL at 06:06

## 2025-04-15 NOTE — ADVANCED PRACTICE NURSE CONSULT - ASSESSMENT
Patient encountered on 4 [facility]. When wound care RN arrived on unit, patient was found lying in a low air loss pressure redistribution support surface style bed. Isolation precautions maintained. Patient was alert and oriented and gave consent to skin consult. Mr Mclaughlin is able to turn independently and staff assistance x 1 was provided as needed. Once turned, the wound care RN was able to visualize an area of persistent nonblanchable deep red erythema over yumiko sacrum/B/L buttocks, area measures approximately 4cm x 4cm x 0cm - deep tissue injury with incontinence involvement cannot be ruled out at this time. B/L heels with hyperpigmentation measuring approximately 3cm x 3cm x 0cm - initial phases of a deep tissue injury cannot be ruled out at this time. Once consult was complete, patient was educated regarding the need for routine turning and positioning to prevent pressure injuries and patient was assisted to a left side-lying position utilizing pillow positioner assistive devices. Patient encountered on 4 Monti. When wound care RN arrived on unit, patient was found lying in a low air loss pressure redistribution support surface style bed. Isolation precautions maintained. Patient was alert and oriented and gave consent to skin consult. Mr Mclaughlin is able to turn independently and staff assistance x 1 was provided as needed. Once turned, the wound care RN was able to visualize an area of persistent nonblanchable deep red erythema over yumiko sacrum/B/L buttocks, area measures approximately 4cm x 4cm x 0cm - deep tissue injury with incontinence involvement cannot be ruled out at this time. B/L heels with hyperpigmentation measuring approximately 3cm x 3cm x 0cm - initial phases of a deep tissue injury cannot be ruled out at this time. Left plantar foot with callus- will recommend to follow up with podiatry as an outpatient. Once consult was complete, patient was educated regarding the need for routine turning and positioning to prevent pressure injuries and patient was assisted to a left side-lying position utilizing pillow positioner assistive devices.

## 2025-04-15 NOTE — PROGRESS NOTE ADULT - PROBLEM SELECTOR PLAN 3
SCr 1.46 with baseline SCr 0.9. Given diarrhea likely pre-renal in setting of hypovolemia.  - urine studies: FENa 0.2 % consistent with pre-renal CHANDNI    Plan:  - c/w IVF SCr 1.46 with baseline SCr 0.9. Given diarrhea likely pre-renal in setting of hypovolemia.  - urine studies: FENa 0.2 % consistent with pre-renal CHANDNI  - Cr 1.46 > 1.69 > 1.46 > 1.37    Plan:  - c/w LR @ 75 cc/hr Multiple episodes of diarrhea without abdominal pain. CT A/P showing enterocolitis.    Plan:  - infectious workup as above

## 2025-04-15 NOTE — PROGRESS NOTE ADULT - PROBLEM SELECTOR PLAN 8
- c/w home statin Initially hypotensive, however now hypertensive to 150s.     - consider resume home metoprolol succinate 25 mg QD 4/16 if BP remains high

## 2025-04-15 NOTE — PROGRESS NOTE ADULT - PROBLEM SELECTOR PLAN 7
Detail Level: Simple Continue Regimen: Plan to allow the biopsy site to heal completely.  Plan to start the Elocon 2xs daily x 4 weeks.\\nIf you have new areas of involvement.  Take a picture-save on phone to share with me & then start the cream. \\n\\nIf anything does not improve on the cream after 4-6 weeks come for an office visit to check it out. - hold home metoprolol succinate given hypotension Initially hypotensive, however now hypertensive to 150s.     - consider resume home metoprolol succinate 25 mg QD 4/16 if BP remains high TSH 6.16, free T4 0.6, subclinical TSH elevation however unreliable given stress dose steroids.    Plan:  - c/w home levothyroxine

## 2025-04-15 NOTE — PROGRESS NOTE ADULT - PROBLEM SELECTOR PLAN 2
Multiple episodes of diarrhea without abdominal pain. CT A/P showing enterocolitis.    Plan:  - infectious workup as above Multiple episodes of diarrhea without abdominal pain. CT A/P showing enterocolitis.    Plan:  - infectious workup as above  - Start oral vancomycin (4/15) Patient initially systolic 80s in the ED, states this is his baseline however also associated with chronic fatigue. He began decompensating to 60s while awaiting placement for medicine bed however remained AOx3. No chest pain, shortness of breath low concern for ACS. Already receiving significant fluid bolus for hypovolemic c/b septic shock c/b adrenal insufficiency.  - POCUS with grossly normal EF and no RWMA. Good lung sliding and no signs of tamponade    Plan:  - decrease midodrine 30 > 20 TID  - c/w stress dose steroids as below  - c/w maintenance fluids as below

## 2025-04-15 NOTE — CONSULT NOTE ADULT - ATTENDING COMMENTS
Attending Attestation:    Patient seen and examined with resident/fellow.  Agree with above except as noted.   54 yo male with h/o testicular cancer, and Non Hodgkin's lymphoma. who presents to ED with diffuse diarrhea x 2 days and hypotension.  IN ED pt was given 5 liters fluid and then started on 20mg midodrine tid. Pt was doing well until this afternoon when RRT called for Hypotension. Pt says his normal SBP is in the 80's.  Pt has received steroids as part of chemo treatment within the last year.    PE. Pt lying in bed in NAD. BP84/47RR14 SP02 97%  room air.  HEENT: Sclera anicteric  Neck; No JVD  Lungs;Clear To A  Cor: [id number]  ABD: Soft non tender'  EXT- CCE.  Neuro. A and Ox 3.    A/P  54 yo male with diarrhea( Now stopped. with hypotension. Pt 's BP  is in pt's normal range  on 30mg midodrine.  Recommend:  1. Start stress steroids. Solu- cortef 60mg IV q 6 hrs.  2. Follow Up stool PCR  3. Continue Cefepime and Flagyl  4. If pt becomes hypotensive again call MICU    Pt is not a MICU candidate at this time.  This patient is critically ill and required frequent bedside visits with therapy change.  Total critical care time spent was __ minutes.
Reviewed all pertinent labs, imaging studies. Patient seen and examined with the fellow. Agree with note as written above with the following addendum. 56 yo male with multiple endocrine issues. Outpatient endocrinologist Dr. Shahram Sanchez - communicated to him about inpatient stay. 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 Now admitted with colitis and severe hypotension 60s/40s on admission, cortisollevel 1.7. Treated with stress dose steroids and improving clinically - taper steroids and keep on hydrocortisone 20mg qAM and 10 mg qPM. Likely chronic adrenal insufficiency given diagnosis after immunotherapy a few years ago and still with low random cortisol in the setting of acute illness. Also with hypothyroidism - mildly elevated TSH likely from non-thyroidal illness, continue home dose levothyroxine. Needs adrenal adenoma workup outpatient, low concern for pheo or hyperaldo or Cushings based on clinical presentation. Patient would like to follow with Dr. Sanchez after discharge - email sent to help arrange. Complex patient, high level decision making.     Delaney Dawkins MD  Attending Physician   Division of Endocrinology, Diabetes and Metabolism   9AM-5PM: Please contact via [employer] Teams

## 2025-04-15 NOTE — PROGRESS NOTE ADULT - PROBLEM SELECTOR PLAN 1
Patient meeting criteria for severe sepsis based on fever, tachycardia to 120s and leukocytosis. Also with BP, systolic 80s concerning for severe sepsis. In setting of multiple bouts of daily diarrhea for few days.  - s/p 2L bolus in ED  - RVP (-), MRSA (-), UA (-), GI PCR (-)    Plan:  - f/u BCx, C. diff  - c/w cefepime and metronidazole empirically  - trend fever/WBC  - cefepime and flagyl empirically  - can consider empiric decadron if refractory for fluid resuscitation Patient meeting criteria for severe sepsis based on fever, tachycardia to 120s and leukocytosis. Also with BP, systolic 80s concerning for severe sepsis. In setting of multiple bouts of daily diarrhea for few days.  - s/p 2L bolus in ED  - RVP (-), MRSA (-), UA (-), GI PCR (-)    Plan:  - f/u BCx, C. diff  - s/p cefepime and metronidazole empirically. Stop today (4/15)  - Start oral vancomycin (4/15)  - trend fever/WBC  - cefepime and flagyl empirically  - can consider empiric decadron if refractory for fluid resuscitation Patient meeting criteria for severe sepsis based on fever, tachycardia to 120s and leukocytosis. Also with BP, systolic 80s concerning for severe sepsis. In setting of multiple bouts of daily diarrhea for few days.  - s/p 2L bolus in ED  - RVP (-), MRSA (-), UA (-), GI PCR (-)  - 4/15 C. diff (+)    Plan:  - f/u BCx  - s/p cefepime and metronidazole (4/14 - 15)  - Start oral vancomycin (4/15 - ) x 10 day course  - trend fever/WBC

## 2025-04-15 NOTE — PROGRESS NOTE ADULT - PROBLEM SELECTOR PLAN 4
Interval increase in mass 1 cm compared to prior CT. Patient with chronic prednisone use in past, now off of prednisone for months. In setting of chronic fatigue and hypotension.  - cortisol (with morning labs) 1.8    Plan:  - c/w hydrocortisone 50 mg q6hr  - f/u ACTH level, TSH, free T4  - consider MR Interval increase in mass 1 cm compared to prior CT. Patient with chronic prednisone use in past, now off of prednisone for months. In setting of chronic fatigue and hypotension.  - cortisol (with morning labs) 1.8  - ACTH unable to be added on yesterday, now s/p steroids    Plan:  - Endocrine Consulted:   > f/u recs   > c/w hydrocortisone 50 mg q6hr  - consider MR SCr 1.46 with baseline SCr 0.9. Given diarrhea likely pre-renal in setting of hypovolemia.  - urine studies: FENa 0.2 % consistent with pre-renal CHANDNI  - Cr 1.46 > 1.69 > 1.46 > 1.37    Plan:  - c/w LR @ 75 cc/hr

## 2025-04-15 NOTE — PROGRESS NOTE ADULT - PROBLEM SELECTOR PLAN 9
DVT ppx: heparin 5000 units subQ q8hr   GI ppx: N/A  Diet: Regular  PT Needs: Pending PT evaluation  Dispo: Pending clinical course  Code Status: FULL CODE - c/w home statin

## 2025-04-15 NOTE — CONSULT NOTE ADULT - ASSESSMENT
54yo male hx of testicular cancer in remission, non-Hodgkin's lymphoma in remission, CAD s/p stesnt , HTN, HLD, hypothyroidism, ischemic colitis presenting for diarrhea and hypotension x1 day hx found to have enterocolitis on CT scan. MICU consulted for hypotension     #Sepsis  #Enterocolitis   #Hypotension   - Patient with diarrhea with CT showing evidence of enterocolitis likely source   - Patient remains at baseline functional and mental status, history of soft BPs at home 80s/50s  - Agree with cefepime and flagyl for coverage  - F/u infectious workup   - Appreciate fluid resuscitation from primary team   - Cont midodrine 30 TID  - Add IV hydrocortisone 50 q6 hr  - If patient remains below MAP goal, please reconsult MICU    Case d/w Dr. Chandrika Chávez, PGY-2
56yo male hx of testicular cancer in remission, non-Hodgkin's lymphoma in remission, CAD s/p stent , HTN, HLD, hypothyroidism, ischemic colitis presenting for diarrhea and hypotension x1 day found to have enterocolitis. Also found to have Cortisol of 1.7 (drawn 4/14 10AM – before steroids). Endocrine consulted for adrenal insufficiency.    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 Laura Omalley, last saw in 2022  - 5/2020: cortisol was undetectable and acth <1.5, LH, FSH and Prolactin were normal. Testosterone 300s.  --Pt stopped hydrocortisone 6-12 mo ago, he states per Dr. Patel instruction however no documentation noted of this.  - hx of Ipilimumab + Nivolumab x 4 from 11/2019 to 1/15/2020. Afterwards, patient started on single agent Nivolumab x 2 cycles, the second of which was given in 4/2020, and stopped due to apparent immune-related side effects.  - BP initially 50s/20s on admission - improved after stress dose steroids  - pre-steroids cortisol: 1.7 - confirming adrenal insufficiency,   PLAN  - BP now improved, decrease hydrocortisone to 50mg IV q12h  - if patient stays hemodynmically stable with no symptoms adrneal insufficiency tomorrow, can follow steroid taper as below:  - 4/16/25: Decrease hydrocortisone to 25mg BID and change to PO  - 4/17/25: Decrease hydrocortisone (PO) 20mg at 8am and 10mg at 3pm  - endocrinology will be following patient daily to re-evaluate  - Discharge plan: hydrocortisone (PO) 20mg at 8am and 10mg at 3pm  - I emailed Dr. Sanchez on 4/15/25 to schedule patient for follow up appt    #Hypothyroidism  --Home med: synthroid 112 mcg daily, takes it on an empty stomach, - reports missing few doses before admission  - TSH 6, FT4 0.6  - given patient missed a few doses before admission - recommend continuing levothyroxine 112mcg daily, should repeat levels in 4-6 weeks outpatient after critical illness resolves    #Adrenal Adenoma  CT abd w/ IV contrast 4/13/25: A 1 cm nodular soft tissue structure is again seen superomedial to the right kidney, which may be arising the lateral limb of the right adrenal gland, interval increase in size since the prior CT of the Abdomen/pelvis.  --PET scan 10/2023: ADRENAL GLANDS: No abnormal FDG activity. No nodule.  --PRA done outpatient to be 0.89 in 4/2021, no aldosterone checked  PLAN  - outpatient evaluation for hormonal workup as needed      Discussed recommendations with primary team.    Jose Mistry MD  Endocrine Fellow  Can be reached via [employer] teams.    For follow up questions, discharge recommendations, or new consults, please email [email or web address] or [email or web address] ([facility]) or call answering service at [phone] (weekdays); [phone] (nights/weekends).  For emergencies please page fellow on call.

## 2025-04-15 NOTE — PROGRESS NOTE ADULT - ATTENDING COMMENTS
54yo male hx of testicular cancer in remission, non-Hodgkin's lymphoma in remission, CAD s/p stent , hypothyroidism, adrenal insufficiency 2/2 to immunotherapy, ischemic colitis pw severe sepsis 2/2 enterocolitis.   -C diff PCR + continue po vanco  -bp improved with stress dose steroids, will wean  -wean midodrine with hold parameters, likely can d/c.

## 2025-04-15 NOTE — CONSULT NOTE ADULT - SUBJECTIVE AND OBJECTIVE BOX
DATE OF SERVICE: 04-15-25 @ 15:19    CHIEF COMPLAINT:Patient is a 55y old  Male who presents with a chief complaint of hypotension, diarrhea, fever (15 Apr 2025 07:07)      HISTORY OF PRESENT ILLNESS:  54yo male hx of testicular cancer in remission, non-Hodgkin's lymphoma in remission, CAD s/p stesnt , HTN, HLD, hypothyroidism, ischemic colitis presenting for diarrhea and hypotension x1 day hx. Pt reported having multiple episodes of diarrhea at leat evry 10-15 minutes. Pt's wife gave imodium which did not help/ Also reports fever 1 week ago which self-resolved. Denies sick contacts. Denies chest pain, SOB.     In ED- Tmax 100.7, BP systolic 80s s/p 2L bolus, 1x ofirmev.    (14 Apr 2025 04:43)      PAST MEDICAL & SURGICAL HISTORY:  Testicular Cancer  1994, chemotherapy      Headache, Migraine      HTN (hypertension)      NHL (non-Hodgkin's lymphoma)  1999, Radiation to left neck region      GERD (gastroesophageal reflux disease)      Colitis  surgery 1996      BPH (benign prostatic hyperplasia)      Osteoarthritis  degenerative disc L3-4      History of bone marrow transplant      Hypertriglyceridemia      Malignant melanoma of scalp  RSX 08/18  R neck mass dsx/ LN dsx 10/19/18      Hypothyroidism      History of chemotherapy      History of Raynaud's syndrome      CAD (coronary artery disease)      Unspecified malignant neoplasm of skin of scalp and neck      History of orchiectomy  left 1994      S/P colon resection  1996      Lymph node disorder  s/p abdominal lymph node dissection 1994, 1996      Melanoma of scalp or neck  excision 8/18  s/p excision right neck mass & LN dissx      History of nasal septoplasty      History of infusaport central venous catheter insertion      History of infusaport central venous catheter removal              MEDICATIONS:  clopidogrel Tablet 75 milliGRAM(s) Oral daily  heparin   Injectable 5000 Unit(s) SubCutaneous every 8 hours  midodrine. 20 milliGRAM(s) Oral three times a day    vancomycin    Solution 125 milliGRAM(s) Oral every 6 hours      acetaminophen     Tablet .. 650 milliGRAM(s) Oral every 6 hours PRN  melatonin 3 milliGRAM(s) Oral at bedtime PRN  ondansetron Injectable 4 milliGRAM(s) IV Push every 8 hours PRN  oxyCODONE    IR 5 milliGRAM(s) Oral every 6 hours PRN  oxyCODONE  ER Tablet 10 milliGRAM(s) Oral every 8 hours    aluminum hydroxide/magnesium hydroxide/simethicone Suspension 30 milliLiter(s) Oral every 4 hours PRN    atorvastatin 40 milliGRAM(s) Oral at bedtime  hydrocortisone sodium succinate Injectable 50 milliGRAM(s) IV Push every 6 hours  levothyroxine 112 MICROGram(s) Oral daily    mupirocin 2% Nasal 1 Application(s) Both Nostrils two times a day  tamsulosin 0.4 milliGRAM(s) Oral at bedtime      FAMILY HISTORY:  Hypertension (Father)        Non-contributory    SOCIAL HISTORY:    Not an active smoker    Allergies    No Known Allergies    Intolerances    	    REVIEW OF SYSTEMS:  CONSTITUTIONAL: No fever  EYES: No eye pain, visual disturbances, or discharge  ENMT:  No difficulty hearing, tinnitus  NECK: No pain or stiffness  RESPIRATORY: No cough, wheezing,  CARDIOVASCULAR: No chest pain, palpitations, passing out, dizziness, or leg swelling  GASTROINTESTINAL:  No nausea, vomiting, diarrhea or constipation. No melena.  GENITOURINARY: No dysuria, hematuria  NEUROLOGICAL: No stroke like symptoms  SKIN: No burning or lesions   ENDOCRINE: No heat or cold intolerance  MUSCULOSKELETAL: No joint pain or swelling  PSYCHIATRIC: No  anxiety, mood swings  HEME/LYMPH: No bleeding gums  ALLERGY AND IMMUNOLOGIC: No hives or eczema	    All other ROS negative    PHYSICAL EXAM:  T(C): 36.5 (04-15-25 @ 10:30), Max: 37.1 (04-15-25 @ 01:17)  HR: 75 (04-15-25 @ 10:30) (75 - 86)  BP: 158/85 (04-15-25 @ 10:30) (96/60 - 158/85)  RR: 18 (04-15-25 @ 10:30) (18 - 18)  SpO2: 96% (04-15-25 @ 10:30) (96% - 99%)  Wt(kg): --  I&O's Summary    14 Apr 2025 07:01  -  15 Apr 2025 07:00  --------------------------------------------------------  IN: 0 mL / OUT: 400 mL / NET: -400 mL    15 Apr 2025 07:01  -  15 Apr 2025 15:19  --------------------------------------------------------  IN: 0 mL / OUT: 150 mL / NET: -150 mL        Appearance: Normal	  HEENT:   Normal oral mucosa, EOMI	  Cardiovascular:  S1 S2, No JVD,    Respiratory: Lungs clear to auscultation	  Psychiatry: Alert  Gastrointestinal:  Soft, Non-tender, + BS	  Skin: No rashes   Neurologic: Non-focal  Extremities:  No edema  Vascular: Peripheral pulses palpable    	    	  	  CARDIAC MARKERS:  Labs personally reviewed by me                                  10.5   6.15  )-----------( 223      ( 15 Apr 2025 06:46 )             32.1     04-15    133[L]  |  99  |  17  ----------------------------<  123[H]  4.4   |  20[L]  |  1.37[H]    Ca    8.4      15 Apr 2025 06:46  Phos  2.8     04-15  Mg     2.1     04-15    TPro  6.1  /  Alb  3.4  /  TBili  0.5  /  DBili  x   /  AST  21  /  ALT  15  /  AlkPhos  62  04-15          EKG: Personally reviewed by me -   Radiology: Personally reviewed by me -   < from: Xray Chest 1 View AP/PA (04.13.25 @ 22:53) >  FINDINGS:  Coronary artery stent.  The lungs are clear.  There is no pleural effusion or pneumothorax.  The heart is normal in size  The visualized osseous structures demonstrate no acute pathology.    IMPRESSION:  Clear lungs.    < end of copied text >  < from: CT Abdomen and Pelvis w/ IV Cont (04.13.25 @ 22:03) >  IMPRESSION:  Infectious versus inflammatory enterocolitis/diarrheal disease.    A 1 cm nodular soft tissue structure is again seen superomedial to the   right kidney, which may be arising the lateral limb of the right adrenal   gland, interval increase in size since the prior CT of the   abdomen/pelvis. Nonemergent adrenal mass protocol MRI is recommended for   further evaluation.    < end of copied text >  < from: TTE W or WO Ultrasound Enhancing Agent (01.31.25 @ 18:10) >   1. Technically difficult image quality.   2.Left ventricle was not well visualized.   3. Left ventricular systolic function is grossly low normal with an ejection fraction visually estimated at 50 to 55 %. There is poor visualization of the endocardial borders to determine the presence of wallmotion abnormalities.   4. The right ventricle is not well visualized. unable to determine right ventricular function.    < end of copied text >  < from: TTE Limited W or WO Ultrasound Enhancing Agent (02.24.24 @ 07:14) >      1. Left ventricular cavity is normal in size. Left ventricular systolic function is normal.   2. No pericardial effusion seen.   3. Limited study to evaluate for pericardial effusion.    < end of copied text >  < from: Cardiac Catheterization (01.31.25 @ 16:51) >  Mild nonobstructive CAD.  Patent LAD stents.  Medical therapy         Assessment /Plan:     54yo male hx of testicular cancer in remission, non-Hodgkin's lymphoma in remission, CAD s/p stent , HTN, HLD, hypothyroidism, ischemic colitis pw severe sepsis 2/2 poss gastroenteritis.    1. Hypotension  - likely 2/2 sepsis iso gastroenteritis  - c/w midodrine 20mg PO TID- wean as tolerated  - BCx NGTD, lactate now wnl  - c/w IV steroids and IVF as per primary team    2. CAD  - 10/15/2024 with on ONESIMO to the LAD   - Known prior CAD s/p PCI 2 LAD stents  - Continue DAPT (ASA currently DC?) and high intensity statin  - s/pdiagnostic cath 1/31 with patent stents and normal RCA    2. HTN - Resume home Toprol 50mg Daily as BP recovers  - c/w midodrine as noted above    3. HLD - c/w Vascepa, atorvastatin 40mg PO daily    4. DVT PPX - HSQ      Differential diagnosis and plan of care discussed with patient after the evaluation. Counseling on diet, nutritional counseling, weight management, exercise and medication compliance was done.   Advanced care planning/advanced directives discussed with patient/family. DNR status including forceful chest compressions to attempt to restart the heart, ventilator support/artificial breathing, electric shock, artificial nutrition, health care proxy, Molst form all discussed with pt. Pt wishes to consider. More than fifteen minutes spent on discussing advanced directives.     Nuvia Ritchie [facility]  Vic Carballo DO [facility]  Cardiovascular Medicine  [address] [place] [address] Dr, [address]  Available for call or text via [employer] TE[employer]  Office [phone]   DATE OF SERVICE: 04-15-25 @ 15:19    CHIEF COMPLAINT:Patient is a 55y old  Male who presents with a chief complaint of hypotension, diarrhea, fever (15 Apr 2025 07:07)      HISTORY OF PRESENT ILLNESS:  54yo male hx of testicular cancer in remission, non-Hodgkin's lymphoma in remission, CAD s/p stesnt , HTN, HLD, hypothyroidism, ischemic colitis presenting for diarrhea and hypotension x1 day hx. Pt reported having multiple episodes of diarrhea at leat evry 10-15 minutes. Pt's wife gave imodium which did not help/ Also reports fever 1 week ago which self-resolved. Denies sick contacts. Denies chest pain, SOB.     In ED- Tmax 100.7, BP systolic 80s s/p 2L bolus, 1x ofirmev.    (14 Apr 2025 04:43)      PAST MEDICAL & SURGICAL HISTORY:  Testicular Cancer  1994, chemotherapy      Headache, Migraine      HTN (hypertension)      NHL (non-Hodgkin's lymphoma)  1999, Radiation to left neck region      GERD (gastroesophageal reflux disease)      Colitis  surgery 1996      BPH (benign prostatic hyperplasia)      Osteoarthritis  degenerative disc L3-4      History of bone marrow transplant      Hypertriglyceridemia      Malignant melanoma of scalp  RSX 08/18  R neck mass dsx/ LN dsx 10/19/18      Hypothyroidism      History of chemotherapy      History of Raynaud's syndrome      CAD (coronary artery disease)      Unspecified malignant neoplasm of skin of scalp and neck      History of orchiectomy  left 1994      S/P colon resection  1996      Lymph node disorder  s/p abdominal lymph node dissection 1994, 1996      Melanoma of scalp or neck  excision 8/18  s/p excision right neck mass & LN dissx      History of nasal septoplasty      History of infusaport central venous catheter insertion      History of infusaport central venous catheter removal              MEDICATIONS:  clopidogrel Tablet 75 milliGRAM(s) Oral daily  heparin   Injectable 5000 Unit(s) SubCutaneous every 8 hours  midodrine. 20 milliGRAM(s) Oral three times a day    vancomycin    Solution 125 milliGRAM(s) Oral every 6 hours      acetaminophen     Tablet .. 650 milliGRAM(s) Oral every 6 hours PRN  melatonin 3 milliGRAM(s) Oral at bedtime PRN  ondansetron Injectable 4 milliGRAM(s) IV Push every 8 hours PRN  oxyCODONE    IR 5 milliGRAM(s) Oral every 6 hours PRN  oxyCODONE  ER Tablet 10 milliGRAM(s) Oral every 8 hours    aluminum hydroxide/magnesium hydroxide/simethicone Suspension 30 milliLiter(s) Oral every 4 hours PRN    atorvastatin 40 milliGRAM(s) Oral at bedtime  hydrocortisone sodium succinate Injectable 50 milliGRAM(s) IV Push every 6 hours  levothyroxine 112 MICROGram(s) Oral daily    mupirocin 2% Nasal 1 Application(s) Both Nostrils two times a day  tamsulosin 0.4 milliGRAM(s) Oral at bedtime      FAMILY HISTORY:  Hypertension (Father)        Non-contributory    SOCIAL HISTORY:    Not an active smoker    Allergies    No Known Allergies    Intolerances    	    REVIEW OF SYSTEMS:  CONSTITUTIONAL: No fever  EYES: No eye pain, visual disturbances, or discharge  ENMT:  No difficulty hearing, tinnitus  NECK: No pain or stiffness  RESPIRATORY: No cough, wheezing,  CARDIOVASCULAR: No chest pain, palpitations, passing out, dizziness, or leg swelling  GASTROINTESTINAL:  No nausea, vomiting, diarrhea or constipation. No melena.  GENITOURINARY: No dysuria, hematuria  NEUROLOGICAL: No stroke like symptoms  SKIN: No burning or lesions   ENDOCRINE: No heat or cold intolerance  MUSCULOSKELETAL: No joint pain or swelling  PSYCHIATRIC: No  anxiety, mood swings  HEME/LYMPH: No bleeding gums  ALLERGY AND IMMUNOLOGIC: No hives or eczema	    All other ROS negative    PHYSICAL EXAM:  T(C): 36.5 (04-15-25 @ 10:30), Max: 37.1 (04-15-25 @ 01:17)  HR: 75 (04-15-25 @ 10:30) (75 - 86)  BP: 158/85 (04-15-25 @ 10:30) (96/60 - 158/85)  RR: 18 (04-15-25 @ 10:30) (18 - 18)  SpO2: 96% (04-15-25 @ 10:30) (96% - 99%)  Wt(kg): --  I&O's Summary    14 Apr 2025 07:01  -  15 Apr 2025 07:00  --------------------------------------------------------  IN: 0 mL / OUT: 400 mL / NET: -400 mL    15 Apr 2025 07:01  -  15 Apr 2025 15:19  --------------------------------------------------------  IN: 0 mL / OUT: 150 mL / NET: -150 mL        Appearance: Normal	  HEENT:   Normal oral mucosa, EOMI	  Cardiovascular:  S1 S2, No JVD,    Respiratory: Lungs clear to auscultation	  Psychiatry: Alert  Gastrointestinal:  Soft, Non-tender, + BS	  Skin: No rashes   Neurologic: Non-focal  Extremities:  No edema  Vascular: Peripheral pulses palpable    	    	  	  CARDIAC MARKERS:  Labs personally reviewed by me                                  10.5   6.15  )-----------( 223      ( 15 Apr 2025 06:46 )             32.1     04-15    133[L]  |  99  |  17  ----------------------------<  123[H]  4.4   |  20[L]  |  1.37[H]    Ca    8.4      15 Apr 2025 06:46  Phos  2.8     04-15  Mg     2.1     04-15    TPro  6.1  /  Alb  3.4  /  TBili  0.5  /  DBili  x   /  AST  21  /  ALT  15  /  AlkPhos  62  04-15          EKG: Personally reviewed by me -   Radiology: Personally reviewed by me -   < from: Xray Chest 1 View AP/PA (04.13.25 @ 22:53) >  FINDINGS:  Coronary artery stent.  The lungs are clear.  There is no pleural effusion or pneumothorax.  The heart is normal in size  The visualized osseous structures demonstrate no acute pathology.    IMPRESSION:  Clear lungs.    < end of copied text >  < from: CT Abdomen and Pelvis w/ IV Cont (04.13.25 @ 22:03) >  IMPRESSION:  Infectious versus inflammatory enterocolitis/diarrheal disease.    A 1 cm nodular soft tissue structure is again seen superomedial to the   right kidney, which may be arising the lateral limb of the right adrenal   gland, interval increase in size since the prior CT of the   abdomen/pelvis. Nonemergent adrenal mass protocol MRI is recommended for   further evaluation.    < end of copied text >  < from: TTE W or WO Ultrasound Enhancing Agent (01.31.25 @ 18:10) >   1. Technically difficult image quality.   2.Left ventricle was not well visualized.   3. Left ventricular systolic function is grossly low normal with an ejection fraction visually estimated at 50 to 55 %. There is poor visualization of the endocardial borders to determine the presence of wallmotion abnormalities.   4. The right ventricle is not well visualized. unable to determine right ventricular function.    < end of copied text >  < from: TTE Limited W or WO Ultrasound Enhancing Agent (02.24.24 @ 07:14) >      1. Left ventricular cavity is normal in size. Left ventricular systolic function is normal.   2. No pericardial effusion seen.   3. Limited study to evaluate for pericardial effusion.    < end of copied text >  < from: Cardiac Catheterization (01.31.25 @ 16:51) >  Mild nonobstructive CAD.  Patent LAD stents.  Medical therapy         Assessment /Plan:     54yo male hx of testicular cancer in remission, non-Hodgkin's lymphoma in remission, CAD s/p stent , HTN, HLD, hypothyroidism, ischemic colitis pw severe sepsis 2/2 poss gastroenteritis.    1. Hypotension  - likely 2/2 sepsis iso gastroenteritis  - Weaned midodrine 20mg PO TID- wean to 10mg PO TID and eventually off  - BCx NGTD, lactate now wnl  - c/w IV steroids and IVF as per primary team    2. CAD  - 10/15/2024 with on ONESIMO to the LAD   - Known prior CAD s/p PCI 2 LAD stents  - Continue DAPT (ASA currently DC?) and high intensity statin  - s/pdiagnostic cath 1/31 with patent stents and normal RCA    2. HTN - Hold Toprol 50mg Daily   - Will consider Toprol 25mg PO qd if BP recovers   - c/w midodrine as noted above    3. HLD - c/w Vascepa, atorvastatin 40mg PO daily    4. DVT PPX - HSQ        Differential diagnosis and plan of care discussed with patient after the evaluation. Counseling on diet, nutritional counseling, weight management, exercise and medication compliance was done.   Advanced care planning/advanced directives discussed with patient/family. DNR status including forceful chest compressions to attempt to restart the heart, ventilator support/artificial breathing, electric shock, artificial nutrition, health care proxy, Molst form all discussed with pt. Pt wishes to consider. More than fifteen minutes spent on discussing advanced directives.     Nuvia Ritchie AGN-JETT Carballo DO [facility]  Cardiovascular Medicine  [address], [address]  Available for call or text via [employer] TEAM[employer]  Office [phone]

## 2025-04-15 NOTE — ADVANCED PRACTICE NURSE CONSULT - REASON FOR CONSULT
Wound care consult initiated by RN to assess patient's skin for a possible sacral and B/L heel deep tissue injury present on admission     Reason for Admission: hypotension, diarrhea, fever    History of Present Illness:   54yo male hx of testicular cancer in remission, non-Hodgkin's lymphoma in remission, CAD s/p stesnt , HTN, HLD, hypothyroidism, ischemic colitis presenting for diarrhea and hypotension x1 day hx. Pt reported having multiple episodes of diarrhea at leat evry 10-15 minutes. Pt's wife gave imodium which did not help/ Also reports fever 1 week ago which self-resolved. Denies sick contacts. Denies chest pain, SOB.     In ED- Tmax 100.7, BP systolic 80s s/p 2L bolus, 1x ofirmev.

## 2025-04-15 NOTE — PROGRESS NOTE ADULT - PROBLEM SELECTOR PLAN 5
s/p ONESIMO x4 now on Plavix only.    Plan:  - c/w home clopidogrel Interval increase in mass 1 cm compared to prior CT. Patient with chronic prednisone use in past, now off of prednisone for months. In setting of chronic fatigue and hypotension.  - cortisol (with morning labs) 1.8  - ACTH unable to be added on yesterday, now s/p steroids    Plan:  - Endocrine Consulted:   > f/u recs   > c/w hydrocortisone 50 mg q6hr  - consider MR

## 2025-04-15 NOTE — PROGRESS NOTE ADULT - ASSESSMENT
56yo male hx of testicular cancer in remission, non-Hodgkin's lymphoma in remission, CAD s/p stent , HTN, HLD, hypothyroidism, ischemic colitis pw severe sepsis 2/2 poss gastroenteritis

## 2025-04-15 NOTE — PROGRESS NOTE ADULT - PROBLEM SELECTOR PLAN 6
- f/u TSH, free T4  - c/w home levothyroxine TSH 6.16, free T4 0.6, subclinical TSH elevation however unreliable given stress dose steroids.    Plan:  - c/w home levothyroxine s/p ONESIMO x4 now on Plavix only.    Plan:  - c/w home clopidogrel

## 2025-04-15 NOTE — ADVANCED PRACTICE NURSE CONSULT - RECOMMEDATIONS
Impression:    B/L heel hyperpigmentation, cannot rule out a deep tissue injury present on admission  B/L buttocks/sacral erythema, cannot rule out a deep tissue injury present on admission  fecal incontinence   incontinence associated dermatitis     Recommendations:    1) turn and position q2 and PRN utilizing offloading assistive devices  2) routine pericare daily and PRN soiling  3) encourage optimal nutrition  4) waffle cushion or pillow on seat when oob to chair  5) B/L LE complete cair air fluidized boots or alex-lock pillow to offload heels/feet  6) triad protective barrier cream to B/L buttocks/sacrum daily and PRN soiling  7) incontinence management - consider external urinary catheter to divert urine from skin if incontinent  8) sween 24 moisturizer to dry skin daily     Plan discussed with COLTON Walsh on unit      For questions/comments regarding the recommendations in this consult, please contact Jordyn Mar via [employer] Teams. Wound care will not actively follow. For new concerns, please enter new consult. Thank you!     Impression:    left plantar foot callus  B/L heel hyperpigmentation, cannot rule out a deep tissue injury present on admission  B/L buttocks/sacral erythema, cannot rule out a deep tissue injury present on admission  fecal incontinence   incontinence associated dermatitis     Recommendations:    1) turn and position q2 and PRN utilizing offloading assistive devices  2) routine pericare daily and PRN soiling  3) encourage optimal nutrition  4) waffle cushion or pillow on seat when oob to chair  5) B/L LE complete cair air fluidized boots or alex-lock pillow to offload heels/feet  6) triad protective barrier cream to B/L buttocks/sacrum daily and PRN soiling  7) incontinence management - consider external urinary catheter to divert urine from skin if incontinent  8) sween 24 moisturizer to dry skin daily   9) follow up with podiatry as an outpatient for plantar callus. [address] [phone]    Plan discussed with COLTON Walsh on unit      For questions/comments regarding the recommendations in this consult, please contact Jordyn Mar via [employer] Teams. Wound care will not actively follow. For new concerns, please enter new consult. Thank you!

## 2025-04-15 NOTE — PROGRESS NOTE ADULT - PROBLEM SELECTOR PLAN 10
DVT ppx: heparin 5000 units subQ q8hr   GI ppx: N/A  Diet: Regular  PT Needs: Pending PT evaluation  Dispo: Pending clinical course  Code Status: FULL CODE

## 2025-04-15 NOTE — CONSULT NOTE ADULT - SUBJECTIVE AND OBJECTIVE BOX
Jose Mistry MD   |   PGY-5  Endocrinology Fellow  Available on [employer] Teams    HPI:  56yo male hx of testicular cancer in remission, non-Hodgkin's lymphoma in remission, CAD s/p stesnt , HTN, HLD, hypothyroidism, ischemic colitis presenting for diarrhea and hypotension x1 day hx. Pt reported having multiple episodes of diarrhea at leat evry 10-15 minutes. Pt's wife gave imodium which did not help/ Also reports fever 1 week ago which self-resolved. Denies sick contacts. Denies chest pain, SOB.     In ED- Tmax 100.7, BP systolic 80s s/p 2L bolus, 1x ofirmev.    (14 Apr 2025 04:43)      Endocrine History:  56yo male hx of testicular cancer in remission, non-Hodgkin's lymphoma in remission, CAD s/p stent , HTN, HLD, hypothyroidism, ischemic colitis presenting for diarrhea and hypotension x1 day found to have enterocolitis. Also found to have Cortisol of 1.7 (drawn 4/14 10AM – before steroids). Endocrine consulted for adrenal insufficiency.      #Secondary Adrenal insufficiency since 2021  --Followed with Dr. Laura Omalley, last saw in 2022, per his note states Secondary Adrenal insufficiency 2/2 immunotherapy  5/2020: cortisol was undetectable and acth <1.5, LH, FSH and Prolactin were normal. Testosterone 300s.  --Pt stopped hydrocortisone 6-12 mo ago, he states per Dr. Patel instruction however no documentation noted of this.    Cancer history: stage II testicular CA (1994) treated with surgery, chemotherapy (BEP) and an autologous BMT and Non-Hodgkins lymphoma right neck neck (1998) for which he underwent radiation therapy. Also had metastatic melanoma treated with Ipilimumab and Nivolumab: He completed Ipilimumab + Nivolumab x 4 from 11/2019 to 1/15/2020. Afterwards, patient started on single agent Nivolumab x 2 cycles, the second of which was given in 4/2020, and stopped due to apparent immune-related side effects.    --pt reports fatigue and decreased appetite prior to admission, now improved after steroids-  - Denies family history of adrenal insufficiency    #Hypothyroidism  --Diagnosis: Long time ago  --denies hx of thyroid surgery/radiation  --takes synthroid 112 mcg daily, takes it on an empty stomach, - reports missing few doses before admission    #Adrenal Adenoma  CT abd w/ IV contrast 4/13/25: A 1 cm nodular soft tissue structure is again seen superomedial to the right kidney, which may be arising the lateral limb of the right adrenal gland, interval increase in size since the prior CT of the Abdomen/pelvis.  --PET scan 10/2023: ADRENAL GLANDS: No abnormal FDG activity. No nodule.  --PRA done outpatient to be 0.89 in 4/2021, no aldosterone checked    PAST MEDICAL & SURGICAL HISTORY:  Testicular Cancer  1994, chemotherapy      Headache, Migraine      HTN (hypertension)      NHL (non-Hodgkin's lymphoma)  1999, Radiation to left neck region      GERD (gastroesophageal reflux disease)      Colitis  surgery 1996      BPH (benign prostatic hyperplasia)      Osteoarthritis  degenerative disc L3-4      History of bone marrow transplant      Hypertriglyceridemia      Malignant melanoma of scalp  RSX 08/18  R neck mass dsx/ LN dsx 10/19/18      Hypothyroidism      History of chemotherapy      History of Raynaud's syndrome      CAD (coronary artery disease)      Unspecified malignant neoplasm of skin of scalp and neck      History of orchiectomy  left 1994      S/P colon resection  1996      Lymph node disorder  s/p abdominal lymph node dissection 1994, 1996      Melanoma of scalp or neck  excision 8/18  s/p excision right neck mass & LN dissx      History of nasal septoplasty      History of infusaport central venous catheter insertion      History of infusaport central venous catheter removal          MEDICATIONS  (STANDING):  atorvastatin 40 milliGRAM(s) Oral at bedtime  clopidogrel Tablet 75 milliGRAM(s) Oral daily  heparin   Injectable 5000 Unit(s) SubCutaneous every 8 hours  hydrocortisone sodium succinate Injectable 50 milliGRAM(s) IV Push every 6 hours  levothyroxine 112 MICROGram(s) Oral daily  midodrine. 20 milliGRAM(s) Oral three times a day  mupirocin 2% Nasal 1 Application(s) Both Nostrils two times a day  oxyCODONE  ER Tablet 10 milliGRAM(s) Oral every 8 hours  tamsulosin 0.4 milliGRAM(s) Oral at bedtime  vancomycin    Solution 125 milliGRAM(s) Oral every 6 hours    MEDICATIONS  (PRN):  acetaminophen     Tablet .. 650 milliGRAM(s) Oral every 6 hours PRN Temp greater or equal to 38C (100.4F), Mild Pain (1 - 3)  aluminum hydroxide/magnesium hydroxide/simethicone Suspension 30 milliLiter(s) Oral every 4 hours PRN Dyspepsia  melatonin 3 milliGRAM(s) Oral at bedtime PRN Insomnia  ondansetron Injectable 4 milliGRAM(s) IV Push every 8 hours PRN Nausea and/or Vomiting  oxyCODONE    IR 5 milliGRAM(s) Oral every 6 hours PRN breakthrough pain      Allergies    No Known Allergies    Intolerances      Review of Systems:  Constitutional: No fever, +decreased appetite  Eyes: No blurry vision  Neuro: No tremors  HEENT: No pain  Cardiovascular: No chest pain, palpitations  Respiratory: No SOB, no cough  GI: No nausea, vomiting, abdominal pain  : No dysuria  Skin: no rash  Psych: no depression  Endocrine: no polyuria, polydipsia  Hem/lymph: no swelling  Osteoporosis: no fractures    ===================PHYSICAL EXAM=======================  VITALS: T(C): 36.5 (04-15-25 @ 10:30)  T(F): 97.7 (04-15-25 @ 10:30), Max: 98.7 (04-15-25 @ 01:17)  HR: 75 (04-15-25 @ 10:30) (75 - 86)  BP: 158/85 (04-15-25 @ 10:30) (96/60 - 158/85)  RR:  (18 - 18)  SpO2:  (96% - 99%)  Wt(kg): --  GENERAL: NAD, cachectic  EYES: No proptosis, no lid lag, anicteric,   THYROID: Normal size, no palpable nodules  RESPIRATORY: Clear to auscultation bilaterally  CARDIOVASCULAR: Regular rate and rhythm  GI: Soft, nontender, non distended  EXT: b/l feet without wounds; 2+ pulses  PSYCH: Alert and oriented x 3, reactive mood  ======================================================  POCT Blood Glucose.: 70 mg/dL (04-14-25 @ 14:29)                            10.5   6.15  )-----------( 223      ( 15 Apr 2025 06:46 )             32.1       04-15    133[L]  |  99  |  17  ----------------------------<  123[H]  4.4   |  20[L]  |  1.37[H]    eGFR: 61    Ca    8.4      04-15  Mg     2.1     04-15  Phos  2.8     04-15    TPro  6.1  /  Alb  3.4  /  TBili  0.5  /  DBili  x   /  AST  21  /  ALT  15  /  AlkPhos  62  04-15      Thyroid Function Tests:  04-15 @ 06:46 TSH 6.16 FreeT4 0.6 T3 -- Anti TPO -- Anti Thyroglobulin Ab -- TSI --      A1C with Estimated Average Glucose Result: 5.3 % (02-01-25 @ 06:17)      02-01 Chol 143 Direct LDL -- LDL calculated 88 HDL 24[L] Trig 179[H]    Radiology:

## 2025-04-15 NOTE — PROGRESS NOTE ADULT - SUBJECTIVE AND OBJECTIVE BOX
Ellis Allen MD  EM/IM PGY-1  Contact via TEAMS    SUBJECTIVE / OVERNIGHT EVENTS:  Patient seen and examined at bedside. Patient reported discomfort with urinating so much from his maintenance fluids, Granado placed to monitor urine output.     MEDICATIONS  (STANDING):  aspirin  chewable 81 milliGRAM(s) Oral daily  atorvastatin 40 milliGRAM(s) Oral at bedtime  cefepime   IVPB 2000 milliGRAM(s) IV Intermittent every 12 hours  clopidogrel Tablet 75 milliGRAM(s) Oral daily  heparin   Injectable 5000 Unit(s) SubCutaneous every 8 hours  hydrocortisone sodium succinate Injectable 50 milliGRAM(s) IV Push every 6 hours  levothyroxine 112 MICROGram(s) Oral daily  metroNIDAZOLE  IVPB 500 milliGRAM(s) IV Intermittent every 12 hours  midodrine. 30 milliGRAM(s) Oral three times a day  mupirocin 2% Nasal 1 Application(s) Both Nostrils two times a day  oxyCODONE  ER Tablet 10 milliGRAM(s) Oral every 8 hours  tamsulosin 0.4 milliGRAM(s) Oral at bedtime    MEDICATIONS  (PRN):  acetaminophen     Tablet .. 650 milliGRAM(s) Oral every 6 hours PRN Temp greater or equal to 38C (100.4F), Mild Pain (1 - 3)  aluminum hydroxide/magnesium hydroxide/simethicone Suspension 30 milliLiter(s) Oral every 4 hours PRN Dyspepsia  melatonin 3 milliGRAM(s) Oral at bedtime PRN Insomnia  ondansetron Injectable 4 milliGRAM(s) IV Push every 8 hours PRN Nausea and/or Vomiting  oxyCODONE    IR 5 milliGRAM(s) Oral every 6 hours PRN breakthrough pain        25 @ 07:01  -  04-15-25 @ 07:00  --------------------------------------------------------  IN: 0 mL / OUT: 400 mL / NET: -400 mL        PHYSICAL EXAM:  Vital Signs Last 24 Hrs  T(C): 36.8 (15 Apr 2025 05:00), Max: 38.1 (2025 10:15)  T(F): 98.2 (15 Apr 2025 05:00), Max: 100.5 (2025 10:15)  HR: 86 (15 Apr 2025 05:00) (85 - 105)  BP: 119/74 (15 Apr 2025 05:00) (58/38 - 119/74)  BP(mean): 61 (2025 09:49) (59 - 61)  RR: 18 (15 Apr 2025 05:00) (16 - 19)  SpO2: 96% (15 Apr 2025 05:00) (94% - 99%)    Parameters below as of 15 Apr 2025 05:00  Patient On (Oxygen Delivery Method): room air      CAPILLARY BLOOD GLUCOSE      POCT Blood Glucose.: 70 mg/dL (2025 14:29)    I&O's Summary    2025 07:01  -  15 Apr 2025 07:00  --------------------------------------------------------  IN: 0 mL / OUT: 400 mL / NET: -400 mL        CONSTITUTIONAL: No acute distress, non-toxic appearing  HEENT: Normocephalic, atraumatic  RESPIRATORY: Normal respiratory effort; lungs are clear to auscultation bilaterally  CARDIOVASCULAR: Regular rate and rhythm, normal S1/S2, no murmur/rub/gallops  ABDOMEN: Soft, non-tender, no rebound/guarding/rigidity, no hepatosplenomegaly  MUSCULOSKELETAL: No clubbing or cyanosis of digits; no joint swelling or tenderness to palpation  EXTREMITIES: No lower extremity edema, peripheral pulses are 2+ bilaterally   NEURO: No focal neurological deficits   PSYCH: A&O to person, place, and time; affect appropriate    LABS:                        13.6   10.27 )-----------( 250      ( 2025 06:28 )             40.2     04-14    134[L]  |  100  |  18  ----------------------------<  122[H]  4.3   |  21[L]  |  1.46[H]    Ca    8.4      2025 20:08  Phos  3.6     04-14  Mg     1.5     04-14    TPro  6.0  /  Alb  3.4  /  TBili  0.6  /  DBili  x   /  AST  22  /  ALT  15  /  AlkPhos  63  04-14    PT/INR - ( 2025 21:47 )   PT: 11.8 sec;   INR: 1.03 ratio         PTT - ( 2025 21:47 )  PTT:35.2 sec      Urinalysis Basic - ( 15 Apr 2025 00:10 )    Color: Yellow / Appearance: Cloudy / S.012 / pH: x  Gluc: x / Ketone: Negative mg/dL  / Bili: Negative / Urobili: 0.2 mg/dL   Blood: x / Protein: 30 mg/dL / Nitrite: Negative   Leuk Esterase: Negative / RBC: 0 /HPF / WBC 1 /HPF   Sq Epi: x / Non Sq Epi: 2 /HPF / Bacteria: Negative /HPF        Urinalysis with Rflx Culture (collected 15 Apr 2025 00:10)    Culture - Blood (collected 2025 21:45)  Source: Blood Blood-Peripheral  Preliminary Report (15 Apr 2025 02:03):    No growth at 24 hours    Culture - Blood (collected 2025 21:40)  Source: Blood Blood-Peripheral  Preliminary Report (15 Apr 2025 02:03):    No growth at 24 hours        IMAGING:    [X] All pertinent imaging reviewed by me

## 2025-04-16 DIAGNOSIS — E27.40 UNSPECIFIED ADRENOCORTICAL INSUFFICIENCY: ICD-10-CM

## 2025-04-16 DIAGNOSIS — D64.9 ANEMIA, UNSPECIFIED: ICD-10-CM

## 2025-04-16 LAB
ALBUMIN SERPL ELPH-MCNC: 3.5 G/DL — SIGNIFICANT CHANGE UP (ref 3.3–5)
ALP SERPL-CCNC: 58 U/L — SIGNIFICANT CHANGE UP (ref 40–120)
ALT FLD-CCNC: 9 U/L — LOW (ref 10–45)
ANION GAP SERPL CALC-SCNC: 13 MMOL/L — SIGNIFICANT CHANGE UP (ref 5–17)
AST SERPL-CCNC: 21 U/L — SIGNIFICANT CHANGE UP (ref 10–40)
BASOPHILS # BLD AUTO: 0.02 K/UL — SIGNIFICANT CHANGE UP (ref 0–0.2)
BASOPHILS NFR BLD AUTO: 0.2 % — SIGNIFICANT CHANGE UP (ref 0–2)
BILIRUB SERPL-MCNC: 0.3 MG/DL — SIGNIFICANT CHANGE UP (ref 0.2–1.2)
BUN SERPL-MCNC: 22 MG/DL — SIGNIFICANT CHANGE UP (ref 7–23)
CALCIUM SERPL-MCNC: 8.4 MG/DL — SIGNIFICANT CHANGE UP (ref 8.4–10.5)
CHLORIDE SERPL-SCNC: 97 MMOL/L — SIGNIFICANT CHANGE UP (ref 96–108)
CO2 SERPL-SCNC: 20 MMOL/L — LOW (ref 22–31)
CREAT SERPL-MCNC: 1.37 MG/DL — HIGH (ref 0.5–1.3)
EGFR: 61 ML/MIN/1.73M2 — SIGNIFICANT CHANGE UP
EGFR: 61 ML/MIN/1.73M2 — SIGNIFICANT CHANGE UP
EOSINOPHIL # BLD AUTO: 0.02 K/UL — SIGNIFICANT CHANGE UP (ref 0–0.5)
EOSINOPHIL NFR BLD AUTO: 0.2 % — SIGNIFICANT CHANGE UP (ref 0–6)
GLUCOSE SERPL-MCNC: 118 MG/DL — HIGH (ref 70–99)
HCT VFR BLD CALC: 29.4 % — LOW (ref 39–50)
HGB BLD-MCNC: 9.6 G/DL — LOW (ref 13–17)
IMM GRANULOCYTES NFR BLD AUTO: 0.5 % — SIGNIFICANT CHANGE UP (ref 0–0.9)
LYMPHOCYTES # BLD AUTO: 2.59 K/UL — SIGNIFICANT CHANGE UP (ref 1–3.3)
LYMPHOCYTES # BLD AUTO: 30.4 % — SIGNIFICANT CHANGE UP (ref 13–44)
MAGNESIUM SERPL-MCNC: 2 MG/DL — SIGNIFICANT CHANGE UP (ref 1.6–2.6)
MCHC RBC-ENTMCNC: 27.9 PG — SIGNIFICANT CHANGE UP (ref 27–34)
MCHC RBC-ENTMCNC: 32.7 G/DL — SIGNIFICANT CHANGE UP (ref 32–36)
MCV RBC AUTO: 85.5 FL — SIGNIFICANT CHANGE UP (ref 80–100)
MONOCYTES # BLD AUTO: 0.56 K/UL — SIGNIFICANT CHANGE UP (ref 0–0.9)
MONOCYTES NFR BLD AUTO: 6.6 % — SIGNIFICANT CHANGE UP (ref 2–14)
NEUTROPHILS # BLD AUTO: 5.28 K/UL — SIGNIFICANT CHANGE UP (ref 1.8–7.4)
NEUTROPHILS NFR BLD AUTO: 62.1 % — SIGNIFICANT CHANGE UP (ref 43–77)
NRBC BLD AUTO-RTO: 0 /100 WBCS — SIGNIFICANT CHANGE UP (ref 0–0)
PHOSPHATE SERPL-MCNC: 1.8 MG/DL — LOW (ref 2.5–4.5)
PLATELET # BLD AUTO: 224 K/UL — SIGNIFICANT CHANGE UP (ref 150–400)
POTASSIUM SERPL-MCNC: 3.6 MMOL/L — SIGNIFICANT CHANGE UP (ref 3.5–5.3)
POTASSIUM SERPL-SCNC: 3.6 MMOL/L — SIGNIFICANT CHANGE UP (ref 3.5–5.3)
PROT SERPL-MCNC: 5.9 G/DL — LOW (ref 6–8.3)
RBC # BLD: 3.44 M/UL — LOW (ref 4.2–5.8)
RBC # FLD: 14.1 % — SIGNIFICANT CHANGE UP (ref 10.3–14.5)
SODIUM SERPL-SCNC: 130 MMOL/L — LOW (ref 135–145)
WBC # BLD: 8.51 K/UL — SIGNIFICANT CHANGE UP (ref 3.8–10.5)
WBC # FLD AUTO: 8.51 K/UL — SIGNIFICANT CHANGE UP (ref 3.8–10.5)

## 2025-04-16 PROCEDURE — 99254 IP/OBS CNSLTJ NEW/EST MOD 60: CPT

## 2025-04-16 PROCEDURE — 99233 SBSQ HOSP IP/OBS HIGH 50: CPT | Mod: GC

## 2025-04-16 RX ORDER — HYDROCORTISONE 20 MG
20 TABLET ORAL
Refills: 0 | Status: DISCONTINUED | OUTPATIENT
Start: 2025-04-17 | End: 2025-04-17

## 2025-04-16 RX ORDER — ASPIRIN 325 MG
81 TABLET ORAL DAILY
Refills: 0 | Status: DISCONTINUED | OUTPATIENT
Start: 2025-04-16 | End: 2025-04-17

## 2025-04-16 RX ORDER — HYDROCORTISONE 20 MG
20 TABLET ORAL ONCE
Refills: 0 | Status: COMPLETED | OUTPATIENT
Start: 2025-04-16 | End: 2025-04-16

## 2025-04-16 RX ORDER — BUTYROSPERMUM PARKII(SHEA BUTTER), SIMMONDSIA CHINENSIS (JOJOBA) SEED OIL, ALOE BARBADENSIS LEAF EXTRACT .01; 1; 3.5 G/100G; G/100G; G/100G
250 LIQUID TOPICAL
Refills: 0 | Status: DISCONTINUED | OUTPATIENT
Start: 2025-04-16 | End: 2025-04-17

## 2025-04-16 RX ORDER — HYDROCORTISONE 20 MG
25 TABLET ORAL ONCE
Refills: 0 | Status: DISCONTINUED | OUTPATIENT
Start: 2025-04-16 | End: 2025-04-16

## 2025-04-16 RX ORDER — HYDROCORTISONE 20 MG
20 TABLET ORAL ONCE
Refills: 0 | Status: DISCONTINUED | OUTPATIENT
Start: 2025-04-16 | End: 2025-04-16

## 2025-04-16 RX ORDER — HYDROCORTISONE 20 MG
40 TABLET ORAL
Refills: 0 | Status: DISCONTINUED | OUTPATIENT
Start: 2025-04-17 | End: 2025-04-17

## 2025-04-16 RX ADMIN — MUPIROCIN CALCIUM 1 APPLICATION(S): 20 CREAM TOPICAL at 05:46

## 2025-04-16 RX ADMIN — OXYCODONE HYDROCHLORIDE 10 MILLIGRAM(S): 30 TABLET ORAL at 14:30

## 2025-04-16 RX ADMIN — OXYCODONE HYDROCHLORIDE 10 MILLIGRAM(S): 30 TABLET ORAL at 13:56

## 2025-04-16 RX ADMIN — Medication 112 MICROGRAM(S): at 05:46

## 2025-04-16 RX ADMIN — HEPARIN SODIUM 5000 UNIT(S): 1000 INJECTION INTRAVENOUS; SUBCUTANEOUS at 22:40

## 2025-04-16 RX ADMIN — ATORVASTATIN CALCIUM 40 MILLIGRAM(S): 80 TABLET, FILM COATED ORAL at 22:40

## 2025-04-16 RX ADMIN — OXYCODONE HYDROCHLORIDE 10 MILLIGRAM(S): 30 TABLET ORAL at 06:20

## 2025-04-16 RX ADMIN — HEPARIN SODIUM 5000 UNIT(S): 1000 INJECTION INTRAVENOUS; SUBCUTANEOUS at 05:46

## 2025-04-16 RX ADMIN — Medication 125 MILLIGRAM(S): at 17:53

## 2025-04-16 RX ADMIN — Medication 125 MILLIGRAM(S): at 12:52

## 2025-04-16 RX ADMIN — Medication 125 MILLIGRAM(S): at 05:46

## 2025-04-16 RX ADMIN — Medication 50 MILLIGRAM(S): at 05:45

## 2025-04-16 RX ADMIN — Medication 20 MILLIGRAM(S): at 17:25

## 2025-04-16 RX ADMIN — Medication 81 MILLIGRAM(S): at 13:57

## 2025-04-16 RX ADMIN — MUPIROCIN CALCIUM 1 APPLICATION(S): 20 CREAM TOPICAL at 17:58

## 2025-04-16 RX ADMIN — OXYCODONE HYDROCHLORIDE 10 MILLIGRAM(S): 30 TABLET ORAL at 22:39

## 2025-04-16 RX ADMIN — BUTYROSPERMUM PARKII(SHEA BUTTER), SIMMONDSIA CHINENSIS (JOJOBA) SEED OIL, ALOE BARBADENSIS LEAF EXTRACT 250 MILLIGRAM(S): .01; 1; 3.5 LIQUID TOPICAL at 18:49

## 2025-04-16 RX ADMIN — HEPARIN SODIUM 5000 UNIT(S): 1000 INJECTION INTRAVENOUS; SUBCUTANEOUS at 13:53

## 2025-04-16 RX ADMIN — OXYCODONE HYDROCHLORIDE 10 MILLIGRAM(S): 30 TABLET ORAL at 23:30

## 2025-04-16 RX ADMIN — Medication 5 MILLIGRAM(S): at 22:40

## 2025-04-16 RX ADMIN — CLOPIDOGREL BISULFATE 75 MILLIGRAM(S): 75 TABLET, FILM COATED ORAL at 13:53

## 2025-04-16 RX ADMIN — TAMSULOSIN HYDROCHLORIDE 0.4 MILLIGRAM(S): 0.4 CAPSULE ORAL at 22:39

## 2025-04-16 RX ADMIN — OXYCODONE HYDROCHLORIDE 10 MILLIGRAM(S): 30 TABLET ORAL at 05:46

## 2025-04-16 NOTE — PROGRESS NOTE ADULT - SUBJECTIVE AND OBJECTIVE BOX
Ellis Allen MD  EM/IM PGY-1  Contact via TEAMS    SUBJECTIVE / OVERNIGHT EVENTS:  Patient seen and examined at bedside. No acute events overnight.    MEDICATIONS  (STANDING):  atorvastatin 40 milliGRAM(s) Oral at bedtime  clopidogrel Tablet 75 milliGRAM(s) Oral daily  heparin   Injectable 5000 Unit(s) SubCutaneous every 8 hours  hydrocortisone sodium succinate Injectable 50 milliGRAM(s) IV Push every 12 hours  levothyroxine 112 MICROGram(s) Oral daily  midodrine. 10 milliGRAM(s) Oral three times a day  mupirocin 2% Nasal 1 Application(s) Both Nostrils two times a day  oxyCODONE  ER Tablet 10 milliGRAM(s) Oral every 8 hours  tamsulosin 0.4 milliGRAM(s) Oral at bedtime  vancomycin    Solution 125 milliGRAM(s) Oral every 6 hours    MEDICATIONS  (PRN):  acetaminophen     Tablet .. 650 milliGRAM(s) Oral every 6 hours PRN Temp greater or equal to 38C (100.4F), Mild Pain (1 - 3)  aluminum hydroxide/magnesium hydroxide/simethicone Suspension 30 milliLiter(s) Oral every 4 hours PRN Dyspepsia  melatonin 3 milliGRAM(s) Oral at bedtime PRN Insomnia  ondansetron Injectable 4 milliGRAM(s) IV Push every 8 hours PRN Nausea and/or Vomiting  oxyCODONE    IR 5 milliGRAM(s) Oral every 6 hours PRN breakthrough pain        04-15-25 @ 07:01  -  04-16-25 @ 07:00  --------------------------------------------------------  IN: 0 mL / OUT: 1150 mL / NET: -1150 mL        PHYSICAL EXAM:  Vital Signs Last 24 Hrs  T(C): 37 (16 Apr 2025 05:15), Max: 37 (16 Apr 2025 05:15)  T(F): 98.6 (16 Apr 2025 05:15), Max: 98.6 (16 Apr 2025 05:15)  HR: 116 (16 Apr 2025 05:15) (75 - 116)  BP: 112/67 (16 Apr 2025 05:15) (112/67 - 158/85)  BP(mean): --  RR: 18 (16 Apr 2025 05:15) (18 - 18)  SpO2: 94% (16 Apr 2025 05:15) (93% - 96%)    Parameters below as of 16 Apr 2025 05:15  Patient On (Oxygen Delivery Method): room air      CAPILLARY BLOOD GLUCOSE        I&O's Summary    15 Apr 2025 07:01  -  16 Apr 2025 07:00  --------------------------------------------------------  IN: 0 mL / OUT: 1150 mL / NET: -1150 mL        CONSTITUTIONAL: No acute distress, non-toxic appearing  HEENT: Normocephalic, atraumatic  RESPIRATORY: Normal respiratory effort; lungs are clear to auscultation bilaterally  CARDIOVASCULAR: Regular rate and rhythm, normal S1/S2, no murmur/rub/gallops  ABDOMEN: Soft, non-tender, no rebound/guarding/rigidity, no hepatosplenomegaly  MUSCULOSKELETAL: No clubbing or cyanosis of digits; no joint swelling or tenderness to palpation  EXTREMITIES: No lower extremity edema, peripheral pulses are 2+ bilaterally   NEURO: No focal neurological deficits   PSYCH: A&O to person, place, and time; affect appropriate    LABS:                        9.6    8.51  )-----------( 224      ( 16 Apr 2025 05:51 )             29.4     04-16    130[L]  |  97  |  22  ----------------------------<  118[H]  3.6   |  20[L]  |  1.37[H]    Ca    8.4      16 Apr 2025 05:52  Phos  1.8     04-16  Mg     2.0     04-16    TPro  5.9[L]  /  Alb  3.5  /  TBili  0.3  /  DBili  x   /  AST  21  /  ALT  9[L]  /  AlkPhos  58  04-16          Urinalysis Basic - ( 16 Apr 2025 05:52 )    Color: x / Appearance: x / SG: x / pH: x  Gluc: 118 mg/dL / Ketone: x  / Bili: x / Urobili: x   Blood: x / Protein: x / Nitrite: x   Leuk Esterase: x / RBC: x / WBC x   Sq Epi: x / Non Sq Epi: x / Bacteria: x        Urinalysis with Rflx Culture (collected 15 Apr 2025 00:10)    Culture - Blood (collected 13 Apr 2025 21:45)  Source: Blood Blood-Peripheral  Preliminary Report (16 Apr 2025 02:02):    No growth at 48 Hours    Culture - Blood (collected 13 Apr 2025 21:40)  Source: Blood Blood-Peripheral  Preliminary Report (16 Apr 2025 02:02):    No growth at 48 Hours        IMAGING:    [X] All pertinent imaging reviewed by me Ellis Allen MD  EM/IM PGY-1  Contact via TEAMS    SUBJECTIVE / OVERNIGHT EVENTS:  Patient seen and examined at bedside. No acute events overnight. Patient reports no further episodes of bowel movements since yesterday.     MEDICATIONS  (STANDING):  atorvastatin 40 milliGRAM(s) Oral at bedtime  clopidogrel Tablet 75 milliGRAM(s) Oral daily  heparin   Injectable 5000 Unit(s) SubCutaneous every 8 hours  hydrocortisone sodium succinate Injectable 50 milliGRAM(s) IV Push every 12 hours  levothyroxine 112 MICROGram(s) Oral daily  midodrine. 10 milliGRAM(s) Oral three times a day  mupirocin 2% Nasal 1 Application(s) Both Nostrils two times a day  oxyCODONE  ER Tablet 10 milliGRAM(s) Oral every 8 hours  tamsulosin 0.4 milliGRAM(s) Oral at bedtime  vancomycin    Solution 125 milliGRAM(s) Oral every 6 hours    MEDICATIONS  (PRN):  acetaminophen     Tablet .. 650 milliGRAM(s) Oral every 6 hours PRN Temp greater or equal to 38C (100.4F), Mild Pain (1 - 3)  aluminum hydroxide/magnesium hydroxide/simethicone Suspension 30 milliLiter(s) Oral every 4 hours PRN Dyspepsia  melatonin 3 milliGRAM(s) Oral at bedtime PRN Insomnia  ondansetron Injectable 4 milliGRAM(s) IV Push every 8 hours PRN Nausea and/or Vomiting  oxyCODONE    IR 5 milliGRAM(s) Oral every 6 hours PRN breakthrough pain        04-15-25 @ 07:01  -  04-16-25 @ 07:00  --------------------------------------------------------  IN: 0 mL / OUT: 1150 mL / NET: -1150 mL        PHYSICAL EXAM:  Vital Signs Last 24 Hrs  T(C): 37 (16 Apr 2025 05:15), Max: 37 (16 Apr 2025 05:15)  T(F): 98.6 (16 Apr 2025 05:15), Max: 98.6 (16 Apr 2025 05:15)  HR: 116 (16 Apr 2025 05:15) (75 - 116)  BP: 112/67 (16 Apr 2025 05:15) (112/67 - 158/85)  BP(mean): --  RR: 18 (16 Apr 2025 05:15) (18 - 18)  SpO2: 94% (16 Apr 2025 05:15) (93% - 96%)    Parameters below as of 16 Apr 2025 05:15  Patient On (Oxygen Delivery Method): room air      CAPILLARY BLOOD GLUCOSE        I&O's Summary    15 Apr 2025 07:01  -  16 Apr 2025 07:00  --------------------------------------------------------  IN: 0 mL / OUT: 1150 mL / NET: -1150 mL        CONSTITUTIONAL: No acute distress, non-toxic appearing  HEENT: Normocephalic, atraumatic  RESPIRATORY: Normal respiratory effort; lungs are clear to auscultation bilaterally  CARDIOVASCULAR: Regular rate and rhythm, normal S1/S2, no murmur/rub/gallops  ABDOMEN: Soft, non-tender, no rebound/guarding/rigidity, no hepatosplenomegaly  MUSCULOSKELETAL: No clubbing or cyanosis of digits; no joint swelling or tenderness to palpation  EXTREMITIES: No lower extremity edema, peripheral pulses are 2+ bilaterally   NEURO: No focal neurological deficits   PSYCH: A&O to person, place, and time; affect appropriate    LABS:                        9.6    8.51  )-----------( 224      ( 16 Apr 2025 05:51 )             29.4     04-16    130[L]  |  97  |  22  ----------------------------<  118[H]  3.6   |  20[L]  |  1.37[H]    Ca    8.4      16 Apr 2025 05:52  Phos  1.8     04-16  Mg     2.0     04-16    TPro  5.9[L]  /  Alb  3.5  /  TBili  0.3  /  DBili  x   /  AST  21  /  ALT  9[L]  /  AlkPhos  58  04-16          Urinalysis Basic - ( 16 Apr 2025 05:52 )    Color: x / Appearance: x / SG: x / pH: x  Gluc: 118 mg/dL / Ketone: x  / Bili: x / Urobili: x   Blood: x / Protein: x / Nitrite: x   Leuk Esterase: x / RBC: x / WBC x   Sq Epi: x / Non Sq Epi: x / Bacteria: x        Urinalysis with Rflx Culture (collected 15 Apr 2025 00:10)    Culture - Blood (collected 13 Apr 2025 21:45)  Source: Blood Blood-Peripheral  Preliminary Report (16 Apr 2025 02:02):    No growth at 48 Hours    Culture - Blood (collected 13 Apr 2025 21:40)  Source: Blood Blood-Peripheral  Preliminary Report (16 Apr 2025 02:02):    No growth at 48 Hours        IMAGING:    [X] All pertinent imaging reviewed by me

## 2025-04-16 NOTE — PROGRESS NOTE ADULT - PROBLEM SELECTOR PLAN 8
Initially hypotensive, however now hypertensive to 150s.     - consider resume home metoprolol succinate 25 mg QD 4/16 if BP remains high s/p ONESIMO x4 now on Plavix only as per family, however as per cardiology on ASA/Plavix.     Plan:  - c/w home ASA and clopidogrel  - f/u with cardiology regarding single agent APT given 6 months from ONESIMO and new anemia

## 2025-04-16 NOTE — PROGRESS NOTE ADULT - SUBJECTIVE AND OBJECTIVE BOX
DATE OF SERVICE: 04-16-25 @ 15:06    Patient is a 55y old  Male who presents with a chief complaint of hypotension, diarrhea, fever (16 Apr 2025 11:03)      INTERVAL HISTORY: Feels ok.     REVIEW OF SYSTEMS:  CONSTITUTIONAL: No weakness  EYES/ENT: No visual changes;  No throat pain   NECK: No pain or stiffness  RESPIRATORY: No cough, wheezing; No shortness of breath  CARDIOVASCULAR: No chest pain or palpitations  GASTROINTESTINAL: No abdominal  pain. No nausea, vomiting, or hematemesis  GENITOURINARY: No dysuria, frequency or hematuria  NEUROLOGICAL: No stroke like symptoms  SKIN: No rashes    TELEMETRY Personally reviewed: SR 1st AVB   	  MEDICATIONS:        PHYSICAL EXAM:  T(C): 36.4 (04-16-25 @ 11:19), Max: 37 (04-16-25 @ 05:15)  HR: 96 (04-16-25 @ 11:19) (83 - 116)  BP: 100/57 (04-16-25 @ 11:19) (100/57 - 144/87)  RR: 18 (04-16-25 @ 11:19) (18 - 18)  SpO2: 96% (04-16-25 @ 11:19) (93% - 96%)  Wt(kg): --  I&O's Summary    15 Apr 2025 07:01  -  16 Apr 2025 07:00  --------------------------------------------------------  IN: 0 mL / OUT: 1150 mL / NET: -1150 mL          Appearance: In no distress	  HEENT:    PERRL, EOMI	  Cardiovascular:  S1 S2, No JVD  Respiratory: Lungs clear to auscultation	  Gastrointestinal:  Soft, Non-tender, + BS	  Vascularature:  No edema of LE  Psychiatric: Appropriate affect   Neuro: no acute focal deficits                               9.6    8.51  )-----------( 224      ( 16 Apr 2025 05:51 )             29.4     04-16    130[L]  |  97  |  22  ----------------------------<  118[H]  3.6   |  20[L]  |  1.37[H]    Ca    8.4      16 Apr 2025 05:52  Phos  1.8     04-16  Mg     2.0     04-16    TPro  5.9[L]  /  Alb  3.5  /  TBili  0.3  /  DBili  x   /  AST  21  /  ALT  9[L]  /  AlkPhos  58  04-16        Labs personally reviewed      ASSESSMENT/PLAN: 	    56yo male hx of testicular cancer in remission, non-Hodgkin's lymphoma in remission, CAD s/p stent , HTN, HLD, hypothyroidism, ischemic colitis pw severe sepsis 2/2 poss gastroenteritis.    1. Hypotension  - likely 2/2 sepsis iso gastroenteritis  - Weaned midodrine 20mg PO TID- now  weaned to 10mg PO TID and eventually off  - BCx NGTD, lactate now wnl  - c/w IV steroids and IVF as per primary team    2. CAD  - 10/15/2024 with on ONESIMO to the LAD   - Known prior CAD s/p PCI 2 LAD stents  - Continue DAPT (ASA currently DC?) and high intensity statin  - s/p diagnostic cath 1/31 with patent stents and normal RCA    2. HTN - Hold Toprol 50mg Daily   - Will consider Toprol 25mg PO qd if BP recovers   - c/w midodrine as noted above    3. HLD - c/w Vascepa, atorvastatin 40mg PO daily    4. DVT PPX - HSQ        Nuvia Ritchie, AG-NP   Vic Carballo DO [facility]  Cardiovascular Medicine  [address] [place] [address], [address]  Available through call or text on [employer]  Office: [phone]

## 2025-04-16 NOTE — PROGRESS NOTE ADULT - PROBLEM SELECTOR PLAN 7
TSH 6.16, free T4 0.6, subclinical TSH elevation however unreliable given stress dose steroids.    Plan:  - c/w home levothyroxine Interval increase in mass 1 cm compared to prior CT.    Plan:  - f/u outpatient endocrinology with further imaging/biopsy PRN

## 2025-04-16 NOTE — PATIENT PROFILE ADULT - FUNCTIONAL ASSESSMENT - DAILY ACTIVITY 6.
4 = No assist / stand by assistance Fluconazole Pregnancy And Lactation Text: This medication is Pregnancy Category C and it isn't know if it is safe during pregnancy. It is also excreted in breast milk.

## 2025-04-16 NOTE — PROGRESS NOTE ADULT - SUBJECTIVE AND OBJECTIVE BOX
Jose Mistry MD, PGY-5  Endocrinology fellow  Available on [employer] Teams    Interval Events: No acute overnight events. Pt seen and examined. Tolerating PO, no nausea/vomiting. No hypoglycemia symptoms. Denies fevers, chills, CP, SOB, Abdominal pain, constipation, Diarrhea.      MEDICATIONS  (STANDING):  aspirin enteric coated 81 milliGRAM(s) Oral daily  atorvastatin 40 milliGRAM(s) Oral at bedtime  clopidogrel Tablet 75 milliGRAM(s) Oral daily  heparin   Injectable 5000 Unit(s) SubCutaneous every 8 hours  hydrocortisone sodium succinate Injectable 50 milliGRAM(s) IV Push every 12 hours  levothyroxine 112 MICROGram(s) Oral daily  mupirocin 2% Nasal 1 Application(s) Both Nostrils two times a day  oxyCODONE  ER Tablet 10 milliGRAM(s) Oral every 8 hours  tamsulosin 0.4 milliGRAM(s) Oral at bedtime  vancomycin    Solution 125 milliGRAM(s) Oral every 6 hours    MEDICATIONS  (PRN):  acetaminophen     Tablet .. 650 milliGRAM(s) Oral every 6 hours PRN Temp greater or equal to 38C (100.4F), Mild Pain (1 - 3)  aluminum hydroxide/magnesium hydroxide/simethicone Suspension 30 milliLiter(s) Oral every 4 hours PRN Dyspepsia  melatonin 3 milliGRAM(s) Oral at bedtime PRN Insomnia  ondansetron Injectable 4 milliGRAM(s) IV Push every 8 hours PRN Nausea and/or Vomiting  oxyCODONE    IR 5 milliGRAM(s) Oral every 6 hours PRN breakthrough pain      Allergies    No Known Allergies    Intolerances          ================PHYSICAL EXAM======================  VITALS: T(C): 36.4 (04-16-25 @ 11:19)  T(F): 97.6 (04-16-25 @ 11:19), Max: 98.6 (04-16-25 @ 05:15)  HR: 96 (04-16-25 @ 11:19) (83 - 116)  BP: 100/57 (04-16-25 @ 11:19) (100/57 - 144/87)  RR:  (18 - 18)  SpO2:  (93% - 96%)  Wt(kg): --  GENERAL: NAD, appears comfortable  EYES: No proptosis, no lid lag, anicteric  HEENT:  Atraumatic, Normocephalic, moist mucous membranes  RESPIRATORY: nonlabored respirations, no wheezing  PSYCH: Alert and awake  ===================================================    CAPILLARY BLOOD GLUCOSE          04-16    130[L]  |  97  |  22  ----------------------------<  118[H]  3.6   |  20[L]  |  1.37[H]    eGFR: 61    Ca    8.4      04-16  Mg     2.0     04-16  Phos  1.8     04-16    TPro  5.9[L]  /  Alb  3.5  /  TBili  0.3  /  DBili  x   /  AST  21  /  ALT  9[L]  /  AlkPhos  58  04-16      A1C with Estimated Average Glucose Result: 5.3 % (02-01-25 @ 06:17)      Thyroid Function Tests:  04-15 @ 06:46 TSH 6.16 FreeT4 0.6 T3 -- Anti TPO -- Anti Thyroglobulin Ab -- TSI --

## 2025-04-16 NOTE — PROGRESS NOTE ADULT - ATTENDING COMMENTS
54yo male hx of testicular cancer in remission, non-Hodgkin's lymphoma in remission, CAD s/p stent , HTN, HLD, hypothyroidism, ischemic colitis presenting for diarrhea and hypotension x1 day found to have enterocolitis. Also found to have Cortisol of 1.7 (drawn 4/14 10AM – before steroids). Endocrine consulted for adrenal insufficiency.    Decrease hydrocortisone dose to double home dose 40mg/20mg given C-diff infection while admitted. Pt remains hemodynamically stable and no s/s of adrenal insufficiency. Will assess patient daily. For hypothyroidism, c/w home LT4 dose daily. Outpatient follow up for adrenal adenoma. Patient to follow up with Dr. Sanchez as outpatient.

## 2025-04-16 NOTE — PROGRESS NOTE ADULT - PROBLEM SELECTOR PLAN 10
DVT ppx: heparin 5000 units subQ q8hr   GI ppx: N/A  Diet: Regular  PT Needs: Pending PT evaluation  Dispo: Pending clinical course  Code Status: FULL CODE Initially hypotensive, however now hypertensive to 150s.     - consider resume home metoprolol succinate 25 mg QD 4/16 if BP remains high

## 2025-04-16 NOTE — PROGRESS NOTE ADULT - PROBLEM SELECTOR PLAN 3
Multiple episodes of diarrhea without abdominal pain. CT A/P showing enterocolitis.    Plan:  - infectious workup as above

## 2025-04-16 NOTE — PROGRESS NOTE ADULT - ATTENDING COMMENTS
56yo male hx of testicular cancer in remission, non-Hodgkin's lymphoma in remission, CAD s/p stent , hypothyroidism, adrenal insufficiency 2/2 to immunotherapy, ischemic colitis pw severe sepsis 2/2 enterocolitis iso c diff.   #Severe Sepsis 2/2 to C diff  #Adrenal Insufficiency  #Anemia  -C diff PCR + continue po vanco. Per patient report no further episodes of diarrhea, monitor stool count  -bp improved with stress dose steroids, continuing to wean, midodrine d/sruthi  -hgb newly 9-10. No obvious signs of bleeding on exam. Has received multiple IVF boluses but also has been on DAPT. Colonoscopy in february with multiple polyps removed and single colonic angiectasia. CTM.

## 2025-04-16 NOTE — PROGRESS NOTE ADULT - PROBLEM SELECTOR PLAN 6
s/p ONESIMO x4 now on Plavix only.    Plan:  - c/w home clopidogrel SCr 1.46 with baseline SCr 0.9. Given diarrhea likely pre-renal in setting of hypovolemia.  - urine studies: FENa 0.2 % consistent with pre-renal CHANDNI  - Cr 1.46 > 1.69 > 1.46 > 1.37    Plan:  - c/w LR @ 75 cc/hr

## 2025-04-16 NOTE — PROGRESS NOTE ADULT - ASSESSMENT
56yo male hx of testicular cancer in remission, non-Hodgkin's lymphoma in remission, CAD s/p stent , HTN, HLD, hypothyroidism, ischemic colitis presenting for diarrhea and hypotension x1 day found to have enterocolitis. Also found to have Cortisol of 1.7 (drawn 4/14 10AM – before steroids). Endocrine consulted for adrenal insufficiency.    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 Laura Omalley, last saw in 2022  - 5/2020: cortisol was undetectable and acth <1.5, LH, FSH and Prolactin were normal. Testosterone 300s.  --Pt stopped hydrocortisone 6-12 mo ago, he states per Dr. Patel instruction however no documentation noted of this.  - hx of Ipilimumab + Nivolumab x 4 from 11/2019 to 1/15/2020. Afterwards, patient started on single agent Nivolumab x 2 cycles, the second of which was given in 4/2020, and stopped due to apparent immune-related side effects.  - BP initially 50s/20s on admission - improved after stress dose steroids  - pre-steroids cortisol: 1.7 - confirming adrenal insufficiency,   PLAN  *********NOTE INCOMPLETE***********  - endocrinology will be following patient daily to re-evaluate  - Discharge plan: hydrocortisone (PO) 20mg at 8am and 10mg at 3pm  - I emailed Dr. Sanchez on 4/15/25 to schedule patient for follow up appt    #Hypothyroidism  --Home med: synthroid 112 mcg daily, takes it on an empty stomach, - reports missing few doses before admission  - TSH 6, FT4 0.6  - given patient missed a few doses before admission - recommend continuing levothyroxine 112mcg daily, should repeat levels in 4-6 weeks outpatient after critical illness resolves    #Adrenal Adenoma  CT abd w/ IV contrast 4/13/25: A 1 cm nodular soft tissue structure is again seen superomedial to the right kidney, which may be arising the lateral limb of the right adrenal gland, interval increase in size since the prior CT of the Abdomen/pelvis.  --PET scan 10/2023: ADRENAL GLANDS: No abnormal FDG activity. No nodule.  --PRA done outpatient to be 0.89 in 4/2021, no aldosterone checked  PLAN  - outpatient evaluation for hormonal workup as needed      Discussed recommendations with primary team.    Jose Mistry MD  Endocrine Fellow  Can be reached via [employer] teams.    For follow up questions, discharge recommendations, or new consults, please email [email or web address] or [email or web address] ([facility]) or call answering service at [phone] (weekdays); [phone] (nights/weekends).  For emergencies please page fellow on call.       54yo male hx of testicular cancer in remission, non-Hodgkin's lymphoma in remission, CAD s/p stent , HTN, HLD, hypothyroidism, ischemic colitis presenting for diarrhea and hypotension x1 day found to have enterocolitis. Also found to have Cortisol of 1.7 (drawn 4/14 10AM – before steroids). Endocrine consulted for adrenal insufficiency.    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 Laura Omalley, last saw in 2022  - 5/2020: cortisol was undetectable and acth <1.5, LH, FSH and Prolactin were normal. Testosterone 300s.  --Pt stopped hydrocortisone 6-12 mo ago, he states per Dr. Patel instruction however no documentation noted of this.  - hx of Ipilimumab + Nivolumab x 4 from 11/2019 to 1/15/2020. Afterwards, patient started on single agent Nivolumab x 2 cycles, the second of which was given in 4/2020, and stopped due to apparent immune-related side effects.  - BP initially 50s/20s on admission - improved after stress dose steroids  - pre-steroids cortisol: 1.7 - confirming adrenal insufficiency,   PLAN  - decrease hydrocortisone to 40mg at 8am and 20mg at 3pm - will continue this dose while hospitalized  - Discharge plan: hydrocortisone (PO) 20mg at 8am and 10mg at 3pm  - endocrinology will be following patient daily to re-evaluate  - I emailed Dr. Sanchez on 4/15/25 to schedule patient for follow up appt - he responded back saying that he will assist with getting pt a follow up appt    #Hypothyroidism  --Home med: synthroid 112 mcg daily, takes it on an empty stomach, - reports missing few doses before admission  - TSH 6, FT4 0.6  - given patient missed a few doses before admission - recommend continuing levothyroxine 112mcg daily, should repeat levels in 4-6 weeks outpatient after critical illness resolves    #Adrenal Adenoma  CT abd w/ IV contrast 4/13/25: A 1 cm nodular soft tissue structure is again seen superomedial to the right kidney, which may be arising the lateral limb of the right adrenal gland, interval increase in size since the prior CT of the Abdomen/pelvis.  --PET scan 10/2023: ADRENAL GLANDS: No abnormal FDG activity. No nodule.  --PRA done outpatient to be 0.89 in 4/2021, no aldosterone checked  PLAN  - outpatient evaluation for hormonal workup as needed      Discussed recommendations with primary team.    Jose Mistry MD  Endocrine Fellow  Can be reached via [employer] teams.    For follow up questions, discharge recommendations, or new consults, please email [email or web address] ([email or web address] or [email or web address] ([facility]) or call answering service at [phone] (weekdays); [phone] (nights/weekends).  For emergencies please page fellow on call.

## 2025-04-16 NOTE — PROGRESS NOTE ADULT - PROBLEM SELECTOR PLAN 9
- c/w home statin TSH 6.16, free T4 0.6, subclinical TSH elevation however unreliable given stress dose steroids.    Plan:  - c/w home levothyroxine

## 2025-04-16 NOTE — PROGRESS NOTE ADULT - PROBLEM SELECTOR PLAN 2
Patient initially systolic 80s in the ED, states this is his baseline however also associated with chronic fatigue. He began decompensating to 60s while awaiting placement for medicine bed however remained AOx3. No chest pain, shortness of breath low concern for ACS. Already receiving significant fluid bolus for hypovolemic c/b septic shock c/b adrenal insufficiency.  - POCUS with grossly normal EF and no RWMA. Good lung sliding and no signs of tamponade    Plan:  - decrease midodrine 30 > 20 TID  - c/w stress dose steroids as below  - c/w maintenance fluids as below Patient initially systolic 80s in the ED, states this is his baseline however also associated with chronic fatigue. He began decompensating to 60s while awaiting placement for medicine bed however remained AOx3. No chest pain, shortness of breath low concern for ACS. Already receiving significant fluid bolus for hypovolemic c/b septic shock c/b adrenal insufficiency.  - POCUS with grossly normal EF and no RWMA. Good lung sliding and no signs of tamponade    Plan:  - d/c midodrine  - c/w stress dose steroids as below  - c/w maintenance fluids as below

## 2025-04-16 NOTE — PROGRESS NOTE ADULT - PROBLEM SELECTOR PLAN 4
SCr 1.46 with baseline SCr 0.9. Given diarrhea likely pre-renal in setting of hypovolemia.  - urine studies: FENa 0.2 % consistent with pre-renal CHANDNI  - Cr 1.46 > 1.69 > 1.46 > 1.37    Plan:  - c/w LR @ 75 cc/hr Hgb decreased from 13 to 9.6 over the last 2 days. In setting of large fluid bolus ~6 L, however patient also with recent colonoscopy with angiectasia. Differential diagnosis includes but is not limited to: dilutional vs indolent GIB vs iron deficiency vs AOCD.    Plan:  - f/u iron panel, ferritin, reticulocyte count  - if further drop in Hgb, consider CTA A/P > colonoscopy PRN

## 2025-04-16 NOTE — PROGRESS NOTE ADULT - PROBLEM SELECTOR PLAN 5
Interval increase in mass 1 cm compared to prior CT. Patient with chronic prednisone use in past, now off of prednisone for months. In setting of chronic fatigue and hypotension.  - cortisol (with morning labs) 1.8  - ACTH unable to be added on yesterday, now s/p steroids    Plan:  - Endocrine Consulted:   > f/u recs   > c/w hydrocortisone 50 mg q6hr  - consider MR Patient with adrenal insufficiency secondary to chemotherapy, previously on chronic prednisone, now off of prednisone for months. Patient reporting chronic fatigue and "baseline" hypotension to 80s systolic at home.  - 4/15 cortisol (with morning labs) 1.8  - ACTH unable to be added on yesterday, now unreliable s/p steroids    Plan:  - Endocrine Consulted:   > taper hydrocortisone 50 q6hr > 50 q12hr > 25 q12hr > 20 mg @ 0800 and 10 mg @ 1500

## 2025-04-16 NOTE — PROGRESS NOTE ADULT - PROBLEM SELECTOR PLAN 1
Patient meeting criteria for severe sepsis based on fever, tachycardia to 120s and leukocytosis. Also with BP, systolic 80s concerning for severe sepsis. In setting of multiple bouts of daily diarrhea for few days.  - s/p 2L bolus in ED  - RVP (-), MRSA (-), UA (-), GI PCR (-)  - 4/15 C. diff (+)    Plan:  - f/u BCx  - s/p cefepime and metronidazole (4/14 - 15)  - Start oral vancomycin (4/15 - ) x 10 day course  - trend fever/WBC Patient meeting criteria for severe sepsis based on fever, tachycardia to 120s and leukocytosis. Also with BP, systolic 80s concerning for severe sepsis. In setting of multiple bouts of daily diarrhea for few days.  - s/p 2L bolus in ED  - RVP (-), MRSA (-), UA (-), GI PCR (-)  - 4/15 C. diff (+)  - 4/15 BCx: NGTD    Plan:   - s/p cefepime and metronidazole (4/14-15) > oral vancomycin (4/15 - ) x 10 day course  - trend fever/WBC, stool count

## 2025-04-17 ENCOUNTER — TRANSCRIPTION ENCOUNTER (OUTPATIENT)
Age: 55
End: 2025-04-17

## 2025-04-17 VITALS
HEART RATE: 76 BPM | OXYGEN SATURATION: 99 % | SYSTOLIC BLOOD PRESSURE: 130 MMHG | TEMPERATURE: 98 F | DIASTOLIC BLOOD PRESSURE: 75 MMHG | RESPIRATION RATE: 18 BRPM

## 2025-04-17 LAB
ALBUMIN SERPL ELPH-MCNC: 3.4 G/DL — SIGNIFICANT CHANGE UP (ref 3.3–5)
ALP SERPL-CCNC: 56 U/L — SIGNIFICANT CHANGE UP (ref 40–120)
ALT FLD-CCNC: 9 U/L — LOW (ref 10–45)
ANION GAP SERPL CALC-SCNC: 14 MMOL/L — SIGNIFICANT CHANGE UP (ref 5–17)
AST SERPL-CCNC: 18 U/L — SIGNIFICANT CHANGE UP (ref 10–40)
BASOPHILS # BLD AUTO: 0.02 K/UL — SIGNIFICANT CHANGE UP (ref 0–0.2)
BASOPHILS NFR BLD AUTO: 0.4 % — SIGNIFICANT CHANGE UP (ref 0–2)
BILIRUB SERPL-MCNC: 0.3 MG/DL — SIGNIFICANT CHANGE UP (ref 0.2–1.2)
BLD GP AB SCN SERPL QL: NEGATIVE — SIGNIFICANT CHANGE UP
BUN SERPL-MCNC: 20 MG/DL — SIGNIFICANT CHANGE UP (ref 7–23)
CALCIUM SERPL-MCNC: 8.4 MG/DL — SIGNIFICANT CHANGE UP (ref 8.4–10.5)
CHLORIDE SERPL-SCNC: 104 MMOL/L — SIGNIFICANT CHANGE UP (ref 96–108)
CO2 SERPL-SCNC: 21 MMOL/L — LOW (ref 22–31)
CREAT SERPL-MCNC: 1.15 MG/DL — SIGNIFICANT CHANGE UP (ref 0.5–1.3)
EGFR: 75 ML/MIN/1.73M2 — SIGNIFICANT CHANGE UP
EGFR: 75 ML/MIN/1.73M2 — SIGNIFICANT CHANGE UP
EOSINOPHIL # BLD AUTO: 0.07 K/UL — SIGNIFICANT CHANGE UP (ref 0–0.5)
EOSINOPHIL NFR BLD AUTO: 1.5 % — SIGNIFICANT CHANGE UP (ref 0–6)
FERRITIN SERPL-MCNC: 108 NG/ML — SIGNIFICANT CHANGE UP (ref 30–400)
GLUCOSE SERPL-MCNC: 90 MG/DL — SIGNIFICANT CHANGE UP (ref 70–99)
HCT VFR BLD CALC: 27.2 % — LOW (ref 39–50)
HGB BLD-MCNC: 8.8 G/DL — LOW (ref 13–17)
IMM GRANULOCYTES NFR BLD AUTO: 0.6 % — SIGNIFICANT CHANGE UP (ref 0–0.9)
IRON SATN MFR SERPL: 35 % — SIGNIFICANT CHANGE UP (ref 16–55)
IRON SATN MFR SERPL: 72 UG/DL — SIGNIFICANT CHANGE UP (ref 45–165)
LYMPHOCYTES # BLD AUTO: 1.77 K/UL — SIGNIFICANT CHANGE UP (ref 1–3.3)
LYMPHOCYTES # BLD AUTO: 37.7 % — SIGNIFICANT CHANGE UP (ref 13–44)
MAGNESIUM SERPL-MCNC: 2 MG/DL — SIGNIFICANT CHANGE UP (ref 1.6–2.6)
MCHC RBC-ENTMCNC: 27.9 PG — SIGNIFICANT CHANGE UP (ref 27–34)
MCHC RBC-ENTMCNC: 32.4 G/DL — SIGNIFICANT CHANGE UP (ref 32–36)
MCV RBC AUTO: 86.3 FL — SIGNIFICANT CHANGE UP (ref 80–100)
MONOCYTES # BLD AUTO: 0.19 K/UL — SIGNIFICANT CHANGE UP (ref 0–0.9)
MONOCYTES NFR BLD AUTO: 4 % — SIGNIFICANT CHANGE UP (ref 2–14)
NEUTROPHILS # BLD AUTO: 2.62 K/UL — SIGNIFICANT CHANGE UP (ref 1.8–7.4)
NEUTROPHILS NFR BLD AUTO: 55.8 % — SIGNIFICANT CHANGE UP (ref 43–77)
NRBC BLD AUTO-RTO: 0 /100 WBCS — SIGNIFICANT CHANGE UP (ref 0–0)
PHOSPHATE SERPL-MCNC: 2.1 MG/DL — LOW (ref 2.5–4.5)
PLATELET # BLD AUTO: 198 K/UL — SIGNIFICANT CHANGE UP (ref 150–400)
POTASSIUM SERPL-MCNC: 3.7 MMOL/L — SIGNIFICANT CHANGE UP (ref 3.5–5.3)
POTASSIUM SERPL-SCNC: 3.7 MMOL/L — SIGNIFICANT CHANGE UP (ref 3.5–5.3)
PROT SERPL-MCNC: 6 G/DL — SIGNIFICANT CHANGE UP (ref 6–8.3)
RBC # BLD: 3.15 M/UL — LOW (ref 4.2–5.8)
RBC # BLD: 3.15 M/UL — LOW (ref 4.2–5.8)
RBC # FLD: 13.6 % — SIGNIFICANT CHANGE UP (ref 10.3–14.5)
RETICS #: 58.9 K/UL — SIGNIFICANT CHANGE UP (ref 25–125)
RETICS/RBC NFR: 1.9 % — SIGNIFICANT CHANGE UP (ref 0.5–2.5)
RH IG SCN BLD-IMP: POSITIVE — SIGNIFICANT CHANGE UP
SODIUM SERPL-SCNC: 139 MMOL/L — SIGNIFICANT CHANGE UP (ref 135–145)
TIBC SERPL-MCNC: 209 UG/DL — LOW (ref 220–430)
UIBC SERPL-MCNC: 137 UG/DL — SIGNIFICANT CHANGE UP (ref 110–370)
WBC # BLD: 4.7 K/UL — SIGNIFICANT CHANGE UP (ref 3.8–10.5)
WBC # FLD AUTO: 4.7 K/UL — SIGNIFICANT CHANGE UP (ref 3.8–10.5)

## 2025-04-17 PROCEDURE — 82570 ASSAY OF URINE CREATININE: CPT

## 2025-04-17 PROCEDURE — 99291 CRITICAL CARE FIRST HOUR: CPT

## 2025-04-17 PROCEDURE — 85045 AUTOMATED RETICULOCYTE COUNT: CPT

## 2025-04-17 PROCEDURE — 99239 HOSP IP/OBS DSCHRG MGMT >30: CPT | Mod: GC

## 2025-04-17 PROCEDURE — 80053 COMPREHEN METABOLIC PANEL: CPT

## 2025-04-17 PROCEDURE — 87640 STAPH A DNA AMP PROBE: CPT

## 2025-04-17 PROCEDURE — 87449 NOS EACH ORGANISM AG IA: CPT

## 2025-04-17 PROCEDURE — 83735 ASSAY OF MAGNESIUM: CPT

## 2025-04-17 PROCEDURE — 84443 ASSAY THYROID STIM HORMONE: CPT

## 2025-04-17 PROCEDURE — 84295 ASSAY OF SERUM SODIUM: CPT

## 2025-04-17 PROCEDURE — 87641 MR-STAPH DNA AMP PROBE: CPT

## 2025-04-17 PROCEDURE — 84133 ASSAY OF URINE POTASSIUM: CPT

## 2025-04-17 PROCEDURE — 83540 ASSAY OF IRON: CPT

## 2025-04-17 PROCEDURE — 81001 URINALYSIS AUTO W/SCOPE: CPT

## 2025-04-17 PROCEDURE — 74177 CT ABD & PELVIS W/CONTRAST: CPT | Mod: MC

## 2025-04-17 PROCEDURE — 84132 ASSAY OF SERUM POTASSIUM: CPT

## 2025-04-17 PROCEDURE — 85610 PROTHROMBIN TIME: CPT

## 2025-04-17 PROCEDURE — 86850 RBC ANTIBODY SCREEN: CPT

## 2025-04-17 PROCEDURE — 85730 THROMBOPLASTIN TIME PARTIAL: CPT

## 2025-04-17 PROCEDURE — 83550 IRON BINDING TEST: CPT

## 2025-04-17 PROCEDURE — 97110 THERAPEUTIC EXERCISES: CPT

## 2025-04-17 PROCEDURE — 87493 C DIFF AMPLIFIED PROBE: CPT

## 2025-04-17 PROCEDURE — 83935 ASSAY OF URINE OSMOLALITY: CPT

## 2025-04-17 PROCEDURE — 84156 ASSAY OF PROTEIN URINE: CPT

## 2025-04-17 PROCEDURE — 85018 HEMOGLOBIN: CPT

## 2025-04-17 PROCEDURE — 84439 ASSAY OF FREE THYROXINE: CPT

## 2025-04-17 PROCEDURE — 84449 ASSAY OF TRANSCORTIN: CPT

## 2025-04-17 PROCEDURE — 97161 PT EVAL LOW COMPLEX 20 MIN: CPT

## 2025-04-17 PROCEDURE — 36415 COLL VENOUS BLD VENIPUNCTURE: CPT

## 2025-04-17 PROCEDURE — 87507 IADNA-DNA/RNA PROBE TQ 12-25: CPT

## 2025-04-17 PROCEDURE — 84100 ASSAY OF PHOSPHORUS: CPT

## 2025-04-17 PROCEDURE — 86900 BLOOD TYPING SEROLOGIC ABO: CPT

## 2025-04-17 PROCEDURE — 93005 ELECTROCARDIOGRAM TRACING: CPT

## 2025-04-17 PROCEDURE — 83605 ASSAY OF LACTIC ACID: CPT

## 2025-04-17 PROCEDURE — 82330 ASSAY OF CALCIUM: CPT

## 2025-04-17 PROCEDURE — 82962 GLUCOSE BLOOD TEST: CPT

## 2025-04-17 PROCEDURE — 86901 BLOOD TYPING SEROLOGIC RH(D): CPT

## 2025-04-17 PROCEDURE — 80048 BASIC METABOLIC PNL TOTAL CA: CPT

## 2025-04-17 PROCEDURE — 82947 ASSAY GLUCOSE BLOOD QUANT: CPT

## 2025-04-17 PROCEDURE — 87040 BLOOD CULTURE FOR BACTERIA: CPT

## 2025-04-17 PROCEDURE — 87324 CLOSTRIDIUM AG IA: CPT

## 2025-04-17 PROCEDURE — 85025 COMPLETE CBC W/AUTO DIFF WBC: CPT

## 2025-04-17 PROCEDURE — 85014 HEMATOCRIT: CPT

## 2025-04-17 PROCEDURE — 82533 TOTAL CORTISOL: CPT

## 2025-04-17 PROCEDURE — 82435 ASSAY OF BLOOD CHLORIDE: CPT

## 2025-04-17 PROCEDURE — 99232 SBSQ HOSP IP/OBS MODERATE 35: CPT

## 2025-04-17 PROCEDURE — 87637 SARSCOV2&INF A&B&RSV AMP PRB: CPT

## 2025-04-17 PROCEDURE — 84540 ASSAY OF URINE/UREA-N: CPT

## 2025-04-17 PROCEDURE — 82728 ASSAY OF FERRITIN: CPT

## 2025-04-17 PROCEDURE — 82803 BLOOD GASES ANY COMBINATION: CPT

## 2025-04-17 PROCEDURE — 87899 AGENT NOS ASSAY W/OPTIC: CPT

## 2025-04-17 PROCEDURE — 84300 ASSAY OF URINE SODIUM: CPT

## 2025-04-17 PROCEDURE — 97116 GAIT TRAINING THERAPY: CPT

## 2025-04-17 PROCEDURE — 71045 X-RAY EXAM CHEST 1 VIEW: CPT

## 2025-04-17 RX ORDER — SODIUM CHLORIDE 9 G/1000ML
1000 INJECTION, SOLUTION INTRAVENOUS
Refills: 0 | Status: DISCONTINUED | OUTPATIENT
Start: 2025-04-17 | End: 2025-04-17

## 2025-04-17 RX ORDER — SOD PHOS DI, MONO/K PHOS MONO 250 MG
1 TABLET ORAL
Refills: 0 | Status: DISCONTINUED | OUTPATIENT
Start: 2025-04-17 | End: 2025-04-17

## 2025-04-17 RX ORDER — VANCOMYCIN HCL IN 5 % DEXTROSE 1.5G/250ML
1 PLASTIC BAG, INJECTION (ML) INTRAVENOUS
Qty: 8 | Refills: 0
Start: 2025-04-17 | End: 2025-04-24

## 2025-04-17 RX ORDER — HYDROCORTISONE 20 MG
1 TABLET ORAL
Qty: 90 | Refills: 0
Start: 2025-04-17 | End: 2025-05-16

## 2025-04-17 RX ADMIN — OXYCODONE HYDROCHLORIDE 10 MILLIGRAM(S): 30 TABLET ORAL at 16:05

## 2025-04-17 RX ADMIN — Medication 40 MILLIGRAM(S): at 09:15

## 2025-04-17 RX ADMIN — OXYCODONE HYDROCHLORIDE 10 MILLIGRAM(S): 30 TABLET ORAL at 15:35

## 2025-04-17 RX ADMIN — CLOPIDOGREL BISULFATE 75 MILLIGRAM(S): 75 TABLET, FILM COATED ORAL at 12:24

## 2025-04-17 RX ADMIN — Medication 112 MICROGRAM(S): at 06:24

## 2025-04-17 RX ADMIN — MUPIROCIN CALCIUM 1 APPLICATION(S): 20 CREAM TOPICAL at 07:33

## 2025-04-17 RX ADMIN — Medication 125 MILLIGRAM(S): at 00:00

## 2025-04-17 RX ADMIN — OXYCODONE HYDROCHLORIDE 10 MILLIGRAM(S): 30 TABLET ORAL at 07:00

## 2025-04-17 RX ADMIN — HEPARIN SODIUM 5000 UNIT(S): 1000 INJECTION INTRAVENOUS; SUBCUTANEOUS at 06:24

## 2025-04-17 RX ADMIN — Medication 125 MILLIGRAM(S): at 06:23

## 2025-04-17 RX ADMIN — Medication 20 MILLIGRAM(S): at 15:35

## 2025-04-17 RX ADMIN — SODIUM CHLORIDE 100 MILLILITER(S): 9 INJECTION, SOLUTION INTRAVENOUS at 08:15

## 2025-04-17 RX ADMIN — Medication 125 MILLIGRAM(S): at 12:24

## 2025-04-17 RX ADMIN — BUTYROSPERMUM PARKII(SHEA BUTTER), SIMMONDSIA CHINENSIS (JOJOBA) SEED OIL, ALOE BARBADENSIS LEAF EXTRACT 250 MILLIGRAM(S): .01; 1; 3.5 LIQUID TOPICAL at 06:24

## 2025-04-17 RX ADMIN — OXYCODONE HYDROCHLORIDE 10 MILLIGRAM(S): 30 TABLET ORAL at 06:24

## 2025-04-17 RX ADMIN — Medication 1 PACKET(S): at 16:44

## 2025-04-17 NOTE — PROGRESS NOTE ADULT - PROBLEM/PLAN-9
Pt tonsils are largely swollen, still able to speak in full sentences without distress. Pt reports that this happens often and usually gets abx and steroids. Is suppose to have tonsils removed but has to reschedule appt.
DISPLAY PLAN FREE TEXT

## 2025-04-17 NOTE — PROGRESS NOTE ADULT - PROBLEM SELECTOR PLAN 10
Initially hypotensive, however now hypertensive to 150s.     - consider resume home metoprolol succinate 25 mg QD if BP remains high

## 2025-04-17 NOTE — PROGRESS NOTE ADULT - PROBLEM SELECTOR PLAN 1
Patient meeting criteria for severe sepsis based on fever, tachycardia to 120s and leukocytosis. Also with BP, systolic 80s concerning for severe sepsis. In setting of multiple bouts of daily diarrhea for few days.  - s/p 2L bolus in ED  - RVP (-), MRSA (-), UA (-), GI PCR (-)  - 4/15 C. diff (+)  - 4/15 BCx: NGTD    Plan:   - s/p cefepime and metronidazole (4/14-15) > oral vancomycin (4/15-25) x 10 day course  - trend fever/WBC, stool count

## 2025-04-17 NOTE — DISCHARGE NOTE PROVIDER - CARE PROVIDER_API CALL
Carrie Roberts  [facility]  [address] [place] [address]  Phone: [phone]  Fax: [phone]  Established Patient  Follow Up Time: 1 week    Delaney Dawkins  Endocrinology/Metab/Diabetes  [address] [address]  Phone: [phone]  Fax: [phone]  Follow Up Time: 2 weeks   Carrie Roberts Marina-Zjuan  [facility]  [address] [place] [address]  Phone: [phone]  Fax: [phone]  Established Patient  Follow Up Time: 1 week    Delaney Dawkins  Endocrinology/Metab/Diabetes  [address] [facility] [address]  [place] [address]  Phone: [phone]  Fax: [phone]  Follow Up Time: 2 weeks    Shahram Sanchez  Endocrinology/Metab/Diabetes  [address] [facility], [address]  [place] [address]  Phone: [phone]  Fax: [phone]  Follow Up Time: 2 weeks

## 2025-04-17 NOTE — DISCHARGE NOTE PROVIDER - NSDCMRMEDTOKEN_GEN_ALL_CORE_FT
aspirin 81 mg oral capsule: 1 cap(s) orally once a day PM  atorvastatin 40 mg oral tablet: 1 tab(s) orally once a day  clopidogrel 75 mg oral tablet: 1 tab(s) orally once a day AM  metoprolol succinate 25 mg oral tablet, extended release: 1 tab(s) orally once a day  OxyCONTIN 10 mg oral tablet, extended release: 1 tab(s) orally every 8 hours  Synthroid 112 mcg (0.112 mg) oral tablet: 1 tab(s) orally once a day AM  tamsulosin 0.4 mg oral capsule: 1 cap(s) orally once a day   aspirin 81 mg oral capsule: 1 cap(s) orally once a day PM  atorvastatin 40 mg oral tablet: 1 tab(s) orally once a day  clopidogrel 75 mg oral tablet: 1 tab(s) orally once a day AM  hydrocortisone 10 mg oral tablet: 1 tab(s) orally once a day (in the morning) Please take TWO tablets (20 mg total) at 8 AM and ONE tablet (10 mg total) at 3 PM EVERY DAY.  metoprolol succinate 25 mg oral tablet, extended release: 1 tab(s) orally once a day  OxyCONTIN 10 mg oral tablet, extended release: 1 tab(s) orally every 8 hours  Synthroid 112 mcg (0.112 mg) oral tablet: 1 tab(s) orally once a day AM  tamsulosin 0.4 mg oral capsule: 1 cap(s) orally once a day  vancomycin 125 mg oral capsule: 1 cap(s) orally once a day   aspirin 81 mg oral capsule: 1 cap(s) orally once a day PM  atorvastatin 40 mg oral tablet: 1 tab(s) orally once a day  hydrocortisone 10 mg oral tablet: 1 tab(s) orally once a day (in the morning) Please take TWO tablets (20 mg total) at 8 AM and ONE tablet (10 mg total) at 3 PM EVERY DAY.  metoprolol succinate 25 mg oral tablet, extended release: 1 tab(s) orally once a day  OxyCONTIN 10 mg oral tablet, extended release: 1 tab(s) orally every 8 hours  Synthroid 112 mcg (0.112 mg) oral tablet: 1 tab(s) orally once a day AM  tamsulosin 0.4 mg oral capsule: 1 cap(s) orally once a day  vancomycin 125 mg oral capsule: 1 cap(s) orally once a day

## 2025-04-17 NOTE — PROGRESS NOTE ADULT - SUBJECTIVE AND OBJECTIVE BOX
DATE OF SERVICE: 04-17-25 @ 23:23    Patient is a 55y old  Male who presents with a chief complaint of hypotension, diarrhea, fever (17 Apr 2025 12:22)      INTERVAL HISTORY: feels ok    TELEMETRY Personally reviewed: no events  	       PHYSICAL EXAM:  T(C): 36.6 (04-17-25 @ 11:20), Max: 36.6 (04-17-25 @ 11:20)  HR: 76 (04-17-25 @ 11:20) (76 - 84)  BP: 130/75 (04-17-25 @ 11:20) (125/71 - 130/75)  RR: 18 (04-17-25 @ 11:20) (18 - 18)  SpO2: 99% (04-17-25 @ 11:20) (96% - 99%)  Wt(kg): --  I&O's Summary    16 Apr 2025 07:01  -  17 Apr 2025 07:00  --------------------------------------------------------  IN: 1560 mL / OUT: 1700 mL / NET: -140 mL    17 Apr 2025 07:01  -  17 Apr 2025 23:23  --------------------------------------------------------  IN: 700 mL / OUT: 0 mL / NET: 700 mL          Appearance: In no distress	  HEENT:    PERRL, EOMI	  Cardiovascular:  S1 S2, No JVD  Respiratory: Lungs clear to auscultation	  Gastrointestinal:  Soft, Non-tender, + BS	  Vascularature:  No edema of LE  Psychiatric: Appropriate affect   Neuro: no acute focal deficits                               8.8    4.70  )-----------( 198      ( 17 Apr 2025 06:12 )             27.2     04-17    139  |  104  |  20  ----------------------------<  90  3.7   |  21[L]  |  1.15    Ca    8.4      17 Apr 2025 06:12  Phos  2.1     04-17  Mg     2.0     04-17    TPro  6.0  /  Alb  3.4  /  TBili  0.3  /  DBili  x   /  AST  18  /  ALT  9[L]  /  AlkPhos  56  04-17        Labs personally reviewed      ASSESSMENT/PLAN: 	    54yo male hx of testicular cancer in remission, non-Hodgkin's lymphoma in remission, CAD s/p stent , HTN, HLD, hypothyroidism, ischemic colitis pw severe sepsis 2/2 poss gastroenteritis.    1. Hypotension  - likely 2/2 sepsis iso gastroenteritis  - Weaned midodrine 20mg PO TID- now  weaned to 10mg PO TID and eventually off  - BCx NGTD, lactate now wnl  - c/w IV steroids and IVF as per primary team    2. CAD  - 10/15/2024 with on ONESIMO to the LAD   - Known prior CAD s/p PCI 2 LAD stents  - Needs SAPT as more than 6 months post PCI and high intensity statin  - s/p diagnostic cath 1/31 with patent stents and normal RCA    3. HTN - Hold Toprol 50mg Daily   - Will consider Toprol 25mg PO qd if BP recovers   - c/w midodrine as noted above    4. HLD - c/w Vascepa, atorvastatin 40mg PO daily    5. Anemia - Continued drop in Hb, consider GI eval  - d/c DAPT and c/w SAPT              Vic Carballo DO [facility]  Cardiovascular Medicine  [address] [place] [address], [address]  Office: [phone]  Available via Text/call on [employer] Teams

## 2025-04-17 NOTE — PROGRESS NOTE ADULT - ATTENDING COMMENTS
56yo male hx of testicular cancer in remission, non-Hodgkin's lymphoma in remission, CAD s/p stent , hypothyroidism, adrenal insufficiency 2/2 to immunotherapy, ischemic colitis pw severe sepsis 2/2 enterocolitis iso c diff.   #Severe Sepsis 2/2 to C diff  #Adrenal Insufficiency  #Anemia  -C diff PCR complete 10 days po vanco  -bp improved with stress dose steroids, d/c on hydrocort given h/o adrenal insufficiency and low random cortisol, f/u with endo  -Hgb slowly downtrending, asymptomatic. Has been on DAPT since stent. Colonoscopy in february with multiple polyps removed and single colonic angiectasia. d/c on aspirin, discontinue plavix.   -stable for d/c, d/c time spent by provider 45 min 54yo male hx of testicular cancer in remission, non-Hodgkin's lymphoma in remission, CAD s/p stent , hypothyroidism, adrenal insufficiency 2/2 to immunotherapy, ischemic colitis pw severe sepsis 2/2 c diff.   #Severe Sepsis 2/2 to C diff  #Adrenal Insufficiency  #Anemia  -C diff PCR complete 10 days po vanco  -bp improved with stress dose steroids, d/c on hydrocort given h/o adrenal insufficiency and low random cortisol, f/u with endo  -Hgb slowly downtrending, asymptomatic. Has been on DAPT since stent. Colonoscopy in february with multiple polyps removed and single colonic angiectasia. d/c on aspirin, discontinue plavix.   -stable for d/c, d/c time spent by provider 45 min

## 2025-04-17 NOTE — PROGRESS NOTE ADULT - PROBLEM SELECTOR PLAN 8
s/p ONESIMO x4 now on Plavix only as per family, however as per cardiology on ASA/Plavix.     Plan:  - c/w home ASA and clopidogrel  - f/u with cardiology regarding single agent APT given 6 months from ONESIMO and new anemia

## 2025-04-17 NOTE — PROGRESS NOTE ADULT - SUBJECTIVE AND OBJECTIVE BOX
Ellis Allen MD  EM/IM PGY-1  Contact via TEAMS    SUBJECTIVE / OVERNIGHT EVENTS:  Patient seen and examined at bedside. No acute events overnight.    MEDICATIONS  (STANDING):  aspirin enteric coated 81 milliGRAM(s) Oral daily  atorvastatin 40 milliGRAM(s) Oral at bedtime  clopidogrel Tablet 75 milliGRAM(s) Oral daily  heparin   Injectable 5000 Unit(s) SubCutaneous every 8 hours  hydrocortisone 20 milliGRAM(s) Oral <User Schedule>  hydrocortisone 40 milliGRAM(s) Oral <User Schedule>  levothyroxine 112 MICROGram(s) Oral daily  mupirocin 2% Nasal 1 Application(s) Both Nostrils two times a day  oxyCODONE  ER Tablet 10 milliGRAM(s) Oral every 8 hours  saccharomyces boulardii 250 milliGRAM(s) Oral two times a day  tamsulosin 0.4 milliGRAM(s) Oral at bedtime  vancomycin    Solution 125 milliGRAM(s) Oral every 6 hours    MEDICATIONS  (PRN):  acetaminophen     Tablet .. 650 milliGRAM(s) Oral every 6 hours PRN Temp greater or equal to 38C (100.4F), Mild Pain (1 - 3)  aluminum hydroxide/magnesium hydroxide/simethicone Suspension 30 milliLiter(s) Oral every 4 hours PRN Dyspepsia  melatonin 3 milliGRAM(s) Oral at bedtime PRN Insomnia  ondansetron Injectable 4 milliGRAM(s) IV Push every 8 hours PRN Nausea and/or Vomiting  oxyCODONE    IR 5 milliGRAM(s) Oral every 6 hours PRN breakthrough pain  zolpidem 5 milliGRAM(s) Oral at bedtime PRN Insomnia        04-16-25 @ 07:01  -  04-17-25 @ 07:00  --------------------------------------------------------  IN: 1560 mL / OUT: 1700 mL / NET: -140 mL        PHYSICAL EXAM:  Vital Signs Last 24 Hrs  T(C): 36.4 (17 Apr 2025 04:15), Max: 36.7 (16 Apr 2025 21:15)  T(F): 97.5 (17 Apr 2025 04:15), Max: 98 (16 Apr 2025 21:15)  HR: 84 (17 Apr 2025 04:15) (84 - 96)  BP: 125/71 (17 Apr 2025 04:15) (100/57 - 125/71)  BP(mean): --  RR: 18 (17 Apr 2025 04:15) (18 - 18)  SpO2: 96% (17 Apr 2025 04:15) (94% - 97%)    Parameters below as of 17 Apr 2025 04:15  Patient On (Oxygen Delivery Method): room air      CAPILLARY BLOOD GLUCOSE        I&O's Summary    16 Apr 2025 07:01  -  17 Apr 2025 07:00  --------------------------------------------------------  IN: 1560 mL / OUT: 1700 mL / NET: -140 mL        CONSTITUTIONAL: No acute distress, non-toxic appearing  HEENT: Normocephalic, atraumatic  RESPIRATORY: Normal respiratory effort; lungs are clear to auscultation bilaterally  CARDIOVASCULAR: Regular rate and rhythm, normal S1/S2, no murmur/rub/gallops  ABDOMEN: Soft, non-tender, no rebound/guarding/rigidity, no hepatosplenomegaly  MUSCULOSKELETAL: No clubbing or cyanosis of digits; no joint swelling or tenderness to palpation  EXTREMITIES: No lower extremity edema, peripheral pulses are 2+ bilaterally   NEURO: No focal neurological deficits   PSYCH: A&O to person, place, and time; affect appropriate    LABS:                        8.8    4.70  )-----------( 198      ( 17 Apr 2025 06:12 )             27.2     04-17    139  |  104  |  20  ----------------------------<  90  3.7   |  21[L]  |  1.15    Ca    8.4      17 Apr 2025 06:12  Phos  2.1     04-17  Mg     2.0     04-17    TPro  6.0  /  Alb  3.4  /  TBili  0.3  /  DBili  x   /  AST  18  /  ALT  9[L]  /  AlkPhos  56  04-17          Urinalysis Basic - ( 17 Apr 2025 06:12 )    Color: x / Appearance: x / SG: x / pH: x  Gluc: 90 mg/dL / Ketone: x  / Bili: x / Urobili: x   Blood: x / Protein: x / Nitrite: x   Leuk Esterase: x / RBC: x / WBC x   Sq Epi: x / Non Sq Epi: x / Bacteria: x        Urinalysis with Rflx Culture (collected 15 Apr 2025 00:10)        IMAGING:    [X] All pertinent imaging reviewed by me Ellis Allen MD  EM/IM PGY-1  Contact via TEAMS    SUBJECTIVE / OVERNIGHT EVENTS:  Patient seen and examined at bedside. No acute events overnight. Reports having had 1 smaller formed BM then a larger formed BM. No further diarrhea, chills.    MEDICATIONS  (STANDING):  aspirin enteric coated 81 milliGRAM(s) Oral daily  atorvastatin 40 milliGRAM(s) Oral at bedtime  clopidogrel Tablet 75 milliGRAM(s) Oral daily  heparin   Injectable 5000 Unit(s) SubCutaneous every 8 hours  hydrocortisone 20 milliGRAM(s) Oral <User Schedule>  hydrocortisone 40 milliGRAM(s) Oral <User Schedule>  levothyroxine 112 MICROGram(s) Oral daily  mupirocin 2% Nasal 1 Application(s) Both Nostrils two times a day  oxyCODONE  ER Tablet 10 milliGRAM(s) Oral every 8 hours  saccharomyces boulardii 250 milliGRAM(s) Oral two times a day  tamsulosin 0.4 milliGRAM(s) Oral at bedtime  vancomycin    Solution 125 milliGRAM(s) Oral every 6 hours    MEDICATIONS  (PRN):  acetaminophen     Tablet .. 650 milliGRAM(s) Oral every 6 hours PRN Temp greater or equal to 38C (100.4F), Mild Pain (1 - 3)  aluminum hydroxide/magnesium hydroxide/simethicone Suspension 30 milliLiter(s) Oral every 4 hours PRN Dyspepsia  melatonin 3 milliGRAM(s) Oral at bedtime PRN Insomnia  ondansetron Injectable 4 milliGRAM(s) IV Push every 8 hours PRN Nausea and/or Vomiting  oxyCODONE    IR 5 milliGRAM(s) Oral every 6 hours PRN breakthrough pain  zolpidem 5 milliGRAM(s) Oral at bedtime PRN Insomnia        04-16-25 @ 07:01  -  04-17-25 @ 07:00  --------------------------------------------------------  IN: 1560 mL / OUT: 1700 mL / NET: -140 mL        PHYSICAL EXAM:  Vital Signs Last 24 Hrs  T(C): 36.4 (17 Apr 2025 04:15), Max: 36.7 (16 Apr 2025 21:15)  T(F): 97.5 (17 Apr 2025 04:15), Max: 98 (16 Apr 2025 21:15)  HR: 84 (17 Apr 2025 04:15) (84 - 96)  BP: 125/71 (17 Apr 2025 04:15) (100/57 - 125/71)  BP(mean): --  RR: 18 (17 Apr 2025 04:15) (18 - 18)  SpO2: 96% (17 Apr 2025 04:15) (94% - 97%)    Parameters below as of 17 Apr 2025 04:15  Patient On (Oxygen Delivery Method): room air      CAPILLARY BLOOD GLUCOSE        I&O's Summary    16 Apr 2025 07:01  -  17 Apr 2025 07:00  --------------------------------------------------------  IN: 1560 mL / OUT: 1700 mL / NET: -140 mL        CONSTITUTIONAL: No acute distress, non-toxic appearing  HEENT: Normocephalic, atraumatic  RESPIRATORY: Normal respiratory effort; lungs are clear to auscultation bilaterally  CARDIOVASCULAR: Regular rate and rhythm, normal S1/S2, no murmur/rub/gallops  ABDOMEN: Soft, non-tender, no rebound/guarding/rigidity, no hepatosplenomegaly  MUSCULOSKELETAL: No clubbing or cyanosis of digits; no joint swelling or tenderness to palpation  EXTREMITIES: No lower extremity edema, peripheral pulses are 2+ bilaterally   NEURO: No focal neurological deficits   PSYCH: A&O to person, place, and time; affect appropriate    LABS:                        8.8    4.70  )-----------( 198      ( 17 Apr 2025 06:12 )             27.2     04-17    139  |  104  |  20  ----------------------------<  90  3.7   |  21[L]  |  1.15    Ca    8.4      17 Apr 2025 06:12  Phos  2.1     04-17  Mg     2.0     04-17    TPro  6.0  /  Alb  3.4  /  TBili  0.3  /  DBili  x   /  AST  18  /  ALT  9[L]  /  AlkPhos  56  04-17          Urinalysis Basic - ( 17 Apr 2025 06:12 )    Color: x / Appearance: x / SG: x / pH: x  Gluc: 90 mg/dL / Ketone: x  / Bili: x / Urobili: x   Blood: x / Protein: x / Nitrite: x   Leuk Esterase: x / RBC: x / WBC x   Sq Epi: x / Non Sq Epi: x / Bacteria: x        Urinalysis with Rflx Culture (collected 15 Apr 2025 00:10)        IMAGING:    [X] All pertinent imaging reviewed by me

## 2025-04-17 NOTE — DISCHARGE NOTE NURSING/CASE MANAGEMENT/SOCIAL WORK - PATIENT PORTAL LINK FT
You can access the FollowMyHealth Patient Portal offered by [facility] by registering at the following website: [email or web address]. By joining [facility]’s FollowMyHealth portal, you will also be able to view your health information using other applications (apps) compatible with our system.

## 2025-04-17 NOTE — DISCHARGE NOTE PROVIDER - NSDCCPCAREPLAN_GEN_ALL_CORE_FT
PRINCIPAL DISCHARGE DIAGNOSIS  Diagnosis: Enterocolitis  Assessment and Plan of Treatment: You were admitted to the hospital for a severe infection causing systemic inflammation called sepsis which was later found to be due to C. difficile diarrhea. You were adequately treated with antibiotics and with resolution of your diarrhea within a few days. While being treated, you were noted to have significantly low blood pressures which were managed with intravenous re-hydration as well as IV and oral steroid medications.  FOLLOW UP:  1. Please follow up with your primary doctor, Dr. Roberts, within 1 week of discharge.  2. Please follow up with the gastroenterology doctors within 2 weeks of discharge.  MEDICATIONS:  1. Take vancomycin solution 125 mg by mouth ? until 4/25/25.  2. Take  If you notice any worsening diarrhea, fevers, chills, abdominal pain, blood in the stool or urine, nausea, vomiting, or any other concerns please return to be evaluated in the emergency department.      SECONDARY DISCHARGE DIAGNOSES  Diagnosis: Anemia  Assessment and Plan of Treatment: You were also noted to be anemia with a low hemoglobin level around 10 which decreased during your hospitalization. You did not have any obvious sources of bleeding and we ran some initial laboratory tests to check your iron levels which were normal. We recommend you closely follow up with the gastroenterology doctors for possible colonoscopy or EGD outpatient.  FOLLOW UP:  1. Please follow up with the gastroenterology doctors as noted above.  If you have any black, tar-like stools, graciela blood in the stool, coffee-grind consistency vomit, or graciela blood in the vomit, lightheadedness, fainting, weakness, or any other concerns please return to be evaluated in the emergency department.    Diagnosis: Secondary adrenal insufficiency  Assessment and Plan of Treatment:     Diagnosis: CAD (coronary artery disease)  Assessment and Plan of Treatment:      PRINCIPAL DISCHARGE DIAGNOSIS  Diagnosis: Enterocolitis  Assessment and Plan of Treatment: You were admitted to the hospital for a severe infection causing systemic inflammation called sepsis which was later found to be due to C. difficile diarrhea. You were adequately treated with antibiotics and with resolution of your diarrhea within a few days. While being treated, you were noted to have significantly low blood pressures which were managed with intravenous re-hydration as well as IV and oral steroid medications.  FOLLOW UP:  1. Please follow up with your primary doctor, Dr. Roberts, within 1 week of discharge.  2. Please follow up with the gastroenterology doctors within 2 weeks of discharge.  MEDICATIONS:  1. Take vancomycin oral capsule 125 mg by mouth every 6 hours until 4/25/25.  If you notice any worsening diarrhea, fevers, chills, abdominal pain, blood in the stool or urine, nausea, vomiting, or any other concerns please return to be evaluated in the emergency department.      SECONDARY DISCHARGE DIAGNOSES  Diagnosis: Anemia  Assessment and Plan of Treatment: You were also noted to be anemia with a low hemoglobin level around 10 which decreased during your hospitalization. You did not have any obvious sources of bleeding and we ran some initial laboratory tests to check your iron levels which were normal. We recommend you closely follow up with the gastroenterology doctors for possible colonoscopy or EGD outpatient.  FOLLOW UP:  1. Please follow up with the gastroenterology doctors as noted above.  If you have any black, tar-like stools, graciela blood in the stool, coffee-grind consistency vomit, or graciela blood in the vomit, lightheadedness, fainting, weakness, or any other concerns please return to be evaluated in the emergency department.    Diagnosis: Secondary adrenal insufficiency  Assessment and Plan of Treatment: You were noted to have low steroid levels in the blood requiring us to give you steroids via IV and later orally. This is likely due to some of the treatments you had in the past for your cancers.  FOLLOW UP:  1. Please follow up with the endocrinology doctors within 2 weeks of discharge.  MEDICATIONS:  1. Take hydrocortisone 20 mg (2 tablets) at 8AM and 10 mg (1 tablet) at 3 PM every day. Please take DOUBLE the dosage so 40 mg (4 tablets) at 8AM and 20 mg (2 tablets) at 3 PM IF you are sick, if you are unsure call your endocrinologist but at the very least take your NORMAL DOSE EVERY DAY.  If you have low blood pressures (<90/<60 mmHg), pass out, feel extremely lethargic, have a low body temperature, confusion, or any other concerns please return to be evaluated in the emergency department.    Diagnosis: CAD (coronary artery disease)  Assessment and Plan of Treatment: After discussion with your cardiologist Dr. Carballo, the decision was made to discontinue your Plavix (clopidogrel) and to only continue with aspirin.  FOLLOW UP:  1. Please follow up with your cardiologist within 4 weeks of discharge.  MEDICATIONS:  1. STOP taking Plavix (clopidogrel).  2. CONTINUE taking aspirin 81 mg by mouth every day.  If you have any new chest pain, nausea/vomiting, cold sweats, palpitations, difficulty breathing, increased swelling in the legs, inability to walk as much as you're used to, difficulty breathing with lying down or requiring more pillows at night or any other concerns, please return to the emergency department to be evaluated.

## 2025-04-17 NOTE — DISCHARGE NOTE PROVIDER - NSDCFUSCHEDAPPT_GEN_ALL_CORE_FT
[facility] Physician [facility]  CARDIOLOGY [address]   Scheduled Appointment: 06/13/2025

## 2025-04-17 NOTE — PROGRESS NOTE ADULT - PROBLEM SELECTOR PLAN 9
TSH 6.16, free T4 0.6, subclinical TSH elevation however unreliable given stress dose steroids.    Plan:  - c/w home levothyroxine

## 2025-04-17 NOTE — PROGRESS NOTE ADULT - TIME BILLING
reviewing emr, labs, coordination of care, discussion with patient, documentation. Time spent excludes teaching services.
reviewing emr, labs, coordination of care, discussion with patient, consultant, documentation. Time spent excludes teaching services.
reviewing emr, labs, coordination of care, discussion with patient, documentation. Time spent excludes teaching services.

## 2025-04-17 NOTE — PROGRESS NOTE ADULT - ASSESSMENT
56yo male hx of testicular cancer in remission, non-Hodgkin's lymphoma in remission, CAD s/p stent , HTN, HLD, hypothyroidism, ischemic colitis presenting for diarrhea and hypotension x1 day found to have enterocolitis. Also found to have Cortisol of 1.7 (drawn 4/14 10AM – before steroids). Endocrine consulted for adrenal insufficiency.    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 Laura Omalley, last saw in 2022  - 5/2020: cortisol was undetectable and acth <1.5, LH, FSH and Prolactin were normal. Testosterone 300s.  --Pt stopped hydrocortisone 6-12 mo ago, he states per Dr. Patel instruction however no documentation noted of this.  - hx of Ipilimumab + Nivolumab x 4 from 11/2019 to 1/15/2020. Afterwards, patient started on single agent Nivolumab x 2 cycles, the second of which was given in 4/2020, and stopped due to apparent immune-related side effects.  - BP initially 50s/20s on admission - improved after stress dose steroids  - pre-steroids cortisol: 1.7 - confirming adrenal insufficiency,   PLAN  - Continue hydrocortisone to 40mg at 8am and 20mg at 3pm while hospitalized  - Discharge plan: hydrocortisone (PO) 20mg at 8am and 10mg at 3pm  - Emailed Dr. Sanchez on 4/15/25 to schedule patient for follow up appt - he responded back saying that he will assist with getting pt a follow up appt    #Hypothyroidism  --Home med: synthroid 112 mcg daily, takes it on an empty stomach, - reports missing few doses before admission  - TSH 6, FT4 0.6  - given patient missed a few doses before admission - recommend continuing levothyroxine 112mcg daily, should repeat levels in 4-6 weeks outpatient after critical illness resolves    #Adrenal Adenoma  CT abd w/ IV contrast 4/13/25: A 1 cm nodular soft tissue structure is again seen superomedial to the right kidney, which may be arising the lateral limb of the right adrenal gland, interval increase in size since the prior CT of the Abdomen/pelvis.  --PET scan 10/2023: ADRENAL GLANDS: No abnormal FDG activity. No nodule.  --PRA done outpatient to be 0.89 in 4/2021, no aldosterone checked  PLAN  - outpatient evaluation for hormonal workup as needed    Delaney Dawkins MD  Attending Physician   Division of Endocrinology, Diabetes and Metabolism   9AM-5PM: Please contact via [employer] Teams

## 2025-04-17 NOTE — PROGRESS NOTE ADULT - PROBLEM SELECTOR PLAN 3
Hgb decreased from 13 to 9.6 over the last 2 days. In setting of large fluid bolus ~6 L, however patient also with recent colonoscopy with angiectasia. Differential diagnosis includes but is not limited to: dilutional vs indolent GIB vs iron deficiency vs AOCD.  - Reticulocyte Production Index 0.9% (inadequate)    Plan:  - f/u iron panel, ferritin  - if further drop in Hgb, consider CTA A/P > colonoscopy PRN Hgb decreased from 13 to 9.6 over the last 2 days. In setting of large fluid bolus ~6 L, however patient also with recent colonoscopy with angiectasia. Differential diagnosis includes but is not limited to: dilutional vs indolent GIB vs iron deficiency vs AOCD.  - Reticulocyte Production Index 0.9% (inadequate)  - iron panel within normal limits, ferritin within normal limits     Plan:  - further drop in Hgb, consider colonoscopy outpatient Hgb decreased from 13 to 9.6 over the last 2 days. In setting of large fluid bolus ~6 L, however patient also with recent colonoscopy with angiectasia. Differential diagnosis includes but is not limited to: dilutional vs indolent GIB vs iron deficiency vs AOCD.  - Reticulocyte Production Index 0.9% (inadequate)  - iron panel within normal limits, ferritin within normal limits     Plan:  - Consulted GI regarding close follow up outpatient colonoscopy  - Stop plavix

## 2025-04-17 NOTE — DISCHARGE NOTE NURSING/CASE MANAGEMENT/SOCIAL WORK - NSDCFUADDAPPT_GEN_ALL_CORE_FT
APPTS ARE READY TO BE MADE: [X] YES    Best Family or Patient Contact (if needed):  Self    Additional Information about above appointments (if needed):    1: PCP Dr. Roberts within 1 week  2: Gastroenterology Dr. Grier within 2 weeks  3: Endocrinology Dr. Sanchez or Dr. Dawkins within 1-2 weeks    Other comments or requests:

## 2025-04-17 NOTE — DISCHARGE NOTE NURSING/CASE MANAGEMENT/SOCIAL WORK - FINANCIAL ASSISTANCE
[facility] provides services at a reduced cost to those who are determined to be eligible through [facility]’s financial assistance program. Information regarding [facility]’s financial assistance program can be found by going to [email or web address] or by calling [phone].

## 2025-04-17 NOTE — DISCHARGE NOTE PROVIDER - CARE PROVIDERS DIRECT ADDRESSES
,[email or web address]

## 2025-04-17 NOTE — DISCHARGE NOTE PROVIDER - PROVIDER TOKENS
PROVIDER:[TOKEN:[[id number]],FOLLOWUP:[1 week],ESTABLISHEDPATIENT:[T]],PROVIDER:[TOKEN:[[id number]],FOLLOWUP:[2 weeks]] PROVIDER:[TOKEN:[[id number]],FOLLOWUP:[1 week],ESTABLISHEDPATIENT:[T]],PROVIDER:[TOKEN:[[id number]],FOLLOWUP:[2 weeks]],PROVIDER:[[id number]],FOLLOWUP:[2 weeks]]

## 2025-04-17 NOTE — PROGRESS NOTE ADULT - PROBLEM SELECTOR PLAN 5
SCr 1.46 with baseline SCr 0.9. Given diarrhea likely pre-renal in setting of hypovolemia.  - urine studies: FENa 0.2 % consistent with pre-renal CHANDNI  - Cr 1.46 > 1.69 > 1.46 > 1.37 > 1.15    Plan:  - c/w LR @ 100 cc/hr

## 2025-04-17 NOTE — DISCHARGE NOTE PROVIDER - NSDCFUADDAPPT_GEN_ALL_CORE_FT
APPTS ARE READY TO BE MADE: [ ] YES    Best Family or Patient Contact (if needed):  Self    Additional Information about above appointments (if needed):    1: PCP Dr. Roberts within 1 week  2: Gastroenterology  ? within 2 weeks  3: Endocrinology  ? within 2 weeks    Other comments or requests:    APPTS ARE READY TO BE MADE: [X] YES    Best Family or Patient Contact (if needed):  Self    Additional Information about above appointments (if needed):    1: PCP Dr. Roberts within 1 week  2: Gastroenterology Dr. Grier within 2 weeks  3: Endocrinology Dr. Sanchez or Dr. Dawkins within 1-2 weeks    Other comments or requests:

## 2025-04-17 NOTE — PROGRESS NOTE ADULT - PROBLEM SELECTOR PLAN 4
Patient with adrenal insufficiency secondary to chemotherapy, previously on chronic prednisone, now off of prednisone for months. Patient reporting chronic fatigue and "baseline" hypotension to 80s systolic at home.  - 4/15 cortisol (with morning labs) 1.8  - ACTH unable to be added on yesterday, now unreliable s/p steroids    Plan:  - Endocrine Consulted:   > taper hydrocortisone 50 q6hr > 50 q12hr > 25 q12hr > 40 mg @ 0800 and 20 mg @ 1500, 20/10 on discharge

## 2025-04-17 NOTE — DISCHARGE NOTE PROVIDER - NSDCCPTREATMENT_GEN_ALL_CORE_FT
PRINCIPAL PROCEDURE  Procedure: CT abdomen pelvis  Findings and Treatment: Infectious versus inflammatory enterocolitis/diarrheal disease. A 1 cm nodular soft tissue structure is again seen superomedial to the right kidney, which may be arising the lateral limb of the right adrenal gland, interval increase in size since the prior CT of the abdomen/pelvis. Nonemergent adrenal mass protocol MRI is recommended for further evaluation.      SECONDARY PROCEDURE  Procedure: XR chest, 1 view  Findings and Treatment: Clear lungs.

## 2025-04-17 NOTE — DISCHARGE NOTE PROVIDER - NSFOLLOWUPCLINICS_GEN_ALL_ED_FT
Gastroenterology at [facility]  Gastroenterology  [address] [place] [address]  Phone: [phone]  Fax:   Follow Up Time: 2 weeks    [facility]  Gastroenterology  [address] [address]  Phone: [phone]  Fax:   Follow Up Time: 2 weeks

## 2025-04-17 NOTE — PROGRESS NOTE ADULT - PROVIDER SPECIALTY LIST ADULT
Cardiology
Cardiology
Internal Medicine
Endocrinology
Endocrinology
Internal Medicine

## 2025-04-17 NOTE — PROGRESS NOTE ADULT - SUBJECTIVE AND OBJECTIVE BOX
Interval Events: No acute overnight events. Pt seen and examined. Tolerating PO, no nausea/vomiting. No hypoglycemia symptoms. Denies fevers, chills, CP, SOB, Abdominal pain, constipation, Diarrhea.  Ambulating to bathroom - no dizziness or syncope.       MEDICATIONS  (STANDING):  atorvastatin 40 milliGRAM(s) Oral at bedtime  clopidogrel Tablet 75 milliGRAM(s) Oral daily  heparin   Injectable 5000 Unit(s) SubCutaneous every 8 hours  hydrocortisone 20 milliGRAM(s) Oral <User Schedule>  hydrocortisone 40 milliGRAM(s) Oral <User Schedule>  lactated ringers. 1000 milliLiter(s) (100 mL/Hr) IV Continuous <Continuous>  levothyroxine 112 MICROGram(s) Oral daily  mupirocin 2% Nasal 1 Application(s) Both Nostrils two times a day  oxyCODONE  ER Tablet 10 milliGRAM(s) Oral every 8 hours  potassium phosphate / sodium phosphate Powder (PHOS-NaK) 1 Packet(s) Oral two times a day with meals  saccharomyces boulardii 250 milliGRAM(s) Oral two times a day  tamsulosin 0.4 milliGRAM(s) Oral at bedtime  vancomycin    Solution 125 milliGRAM(s) Oral every 6 hours    MEDICATIONS  (PRN):  acetaminophen     Tablet .. 650 milliGRAM(s) Oral every 6 hours PRN Temp greater or equal to 38C (100.4F), Mild Pain (1 - 3)  aluminum hydroxide/magnesium hydroxide/simethicone Suspension 30 milliLiter(s) Oral every 4 hours PRN Dyspepsia  melatonin 3 milliGRAM(s) Oral at bedtime PRN Insomnia  ondansetron Injectable 4 milliGRAM(s) IV Push every 8 hours PRN Nausea and/or Vomiting  oxyCODONE    IR 5 milliGRAM(s) Oral every 6 hours PRN breakthrough pain  zolpidem 5 milliGRAM(s) Oral at bedtime PRN Insomnia        Allergies    No Known Allergies    Intolerances          ================PHYSICAL EXAM======================  Vital Signs Last 24 Hrs  T(C): 36.6 (17 Apr 2025 11:20), Max: 36.7 (16 Apr 2025 21:15)  T(F): 97.9 (17 Apr 2025 11:20), Max: 98 (16 Apr 2025 21:15)  HR: 76 (17 Apr 2025 11:20) (76 - 96)  BP: 130/75 (17 Apr 2025 11:20) (100/70 - 130/75)  BP(mean): --  RR: 18 (17 Apr 2025 11:20) (18 - 18)  SpO2: 99% (17 Apr 2025 11:20) (94% - 99%)    Parameters below as of 17 Apr 2025 11:20  Patient On (Oxygen Delivery Method): room air      GENERAL: NAD, appears comfortable  EYES: No proptosis, no lid lag, anicteric  HEENT:  Atraumatic, Normocephalic, moist mucous membranes  RESPIRATORY: nonlabored respirations, no wheezing  PSYCH: Alert and awake  ===================================================    CAPILLARY BLOOD GLUCOSE          04-16    130[L]  |  97  |  22  ----------------------------<  118[H]  3.6   |  20[L]  |  1.37[H]    eGFR: 61    Ca    8.4      04-16  Mg     2.0     04-16  Phos  1.8     04-16    TPro  5.9[L]  /  Alb  3.5  /  TBili  0.3  /  DBili  x   /  AST  21  /  ALT  9[L]  /  AlkPhos  58  04-16      A1C with Estimated Average Glucose Result: 5.3 % (02-01-25 @ 06:17)      Thyroid Function Tests:  04-15 @ 06:46 TSH 6.16 FreeT4 0.6 T3 -- Anti TPO -- Anti Thyroglobulin Ab -- TSI --

## 2025-04-17 NOTE — PROGRESS NOTE ADULT - REASON FOR ADMISSION
hypotension, diarrhea, fever

## 2025-04-17 NOTE — PROGRESS NOTE ADULT - PROBLEM SELECTOR PLAN 12
DVT ppx: heparin 5000 units subQ q8hr  GI ppx: N/A  Diet: Regular  PT Needs: OPT  Dispo: HOME  Code Status: FULL CODE
DVT ppx: heparin 5000 units subQ q8hr   GI ppx: N/A  Diet: Regular  PT Needs: Pending PT evaluation  Dispo: Pending clinical course  Code Status: FULL CODE

## 2025-04-17 NOTE — DISCHARGE NOTE PROVIDER - HOSPITAL COURSE
HPI:  56 y/o M w/ Hx of prior testicular cancer, prior non-Hodgkin's lymphoma, CAD s/p ONESIMO (10/2024), HTN, HLD, hypothyroidism, adrenal iischemic colitis presenting for diarrhea and hypotension x1 day hx. Pt reported having multiple episodes of diarrhea at leat evry 10-15 minutes. Pt's wife gave imodium which did not help/ Also reports fever 1 week ago which self-resolved. Denies sick contacts. Denies chest pain, SOB.     In ED- Tmax 100.7, BP systolic 80s s/p 2L bolus, 1x ofirmev.     ED Course:    Hospital Course:    On day of discharge, patient is clinically stable with no new exam findings or acute symptoms compared to prior. The patient was seen by the attending physician on the date of discharge and deemed stable and acceptable for discharge. The patient's chronic medical conditions were treated accordingly per the patient's home medication regimen. The patient's medication reconciliation (with changes made to chronic medications), follow up appointments, discharge orders, instructions, and significant lab and diagnostic studies are as noted.    Important Medication Changes and Reason:  1.   2.   3.     Active or Pending Issues Requiring Follow-up:    Discharge Diagnoses:    Advanced Directives:   [ ] Full code  [ ] DNR  [ ] Hospice     HPI:  54 y/o M w/ Hx of prior testicular cancer, prior non-Hodgkin's lymphoma, CAD s/p ONESIMO (10/2024), HTN, HLD, hypothyroidism, adrenal insufficiency 2/2 prior chemotherapy (not on steroids), and ischemic colitis s/p resection who initially presented for diarrhea x 3 days. He describes multiple episodes of non-bloody, non-bilious diarrhea at least every 10-15 minutes with generalized weakness, fatigue, and fever. He was given imodium by his wife without improvement in his symptoms. He denies any nausea, vomiting, abdominal pain, dysuria, hematuria, URI symptoms, cough, chest pain, shortness of breath. No recent sick contacts, antibiotic use, hospitalizations.     ED Course:  Vitals were significant for Tmax 100.7 F, systolic blood pressure in the 80s s/p 2 L NS bolus, Ofirmev. Labs significant for mild leukocytosis to 13, CHANDNI w/ Cr of 1.46 and initial lactate of 4.7. CT A/P significant for enterocolitis. Admitted to medicine for severe sepsis in setting of acute diarrhea.    Hospital Course:  On HD #1, patient was persistently hypotensive to 80s and as low as 60s although he remained AOx3. RRT was called for hypotension potentially requiring vasopressors despite 5 L of resuscitation at this point. He was noted to have low cortisol 1.6 and was started on stress dose steroids, midodrine and additional fluid resuscitation given bedside POCUS showing small, collapsible IVC.    On day of discharge, patient is clinically stable with no new exam findings or acute symptoms compared to prior. The patient was seen by the attending physician on the date of discharge and deemed stable and acceptable for discharge. The patient's chronic medical conditions were treated accordingly per the patient's home medication regimen. The patient's medication reconciliation (with changes made to chronic medications), follow up appointments, discharge orders, instructions, and significant lab and diagnostic studies are as noted.    Important Medication Changes and Reason:  1.   2.   3.     Active or Pending Issues Requiring Follow-up:    Discharge Diagnoses:    Advanced Directives:   [ ] Full code  [ ] DNR  [ ] Hospice     HPI:  56 y/o M w/ Hx of prior testicular cancer, prior non-Hodgkin's lymphoma, CAD s/p ONESIMO (10/2024), HTN, HLD, hypothyroidism, adrenal insufficiency 2/2 prior chemotherapy (not on steroids), and ischemic colitis s/p resection who initially presented for diarrhea x 3 days. He describes multiple episodes of non-bloody, non-bilious diarrhea at least every 10-15 minutes with generalized weakness, fatigue, and fever. He was given imodium by his wife without improvement in his symptoms. He denies any nausea, vomiting, abdominal pain, dysuria, hematuria, URI symptoms, cough, chest pain, shortness of breath. No recent sick contacts, antibiotic use, hospitalizations.     ED Course:  Vitals were significant for Tmax 100.7 F, systolic blood pressure in the 80s s/p 2 L NS bolus, Ofirmev. Labs significant for mild leukocytosis to 13, CHANDNI w/ Cr of 1.46 and initial lactate of 4.7. CT A/P significant for enterocolitis. Admitted to medicine for severe sepsis in setting of acute diarrhea.    Hospital Course:  On HD #1, patient was persistently hypotensive to 80s and as low as 60s although he remained AOx3. RRT was called for hypotension potentially requiring vasopressors despite 5 L of resuscitation at this point. He was noted to have low cortisol 1.6 and was started on stress dose steroids, midodrine and additional fluid resuscitation given bedside POCUS showing small, collapsible IVC. He quickly recovered in terms of his blood pressure and was weaned off of midodrine within 2 days. Endocrinology was consulted and we were able to taper his hydrocortisone to a PO regimen as well. He had 2 solid formed stools on day of discharge and has not been febrile. He was noted to have progressively worsening anemia with Hgb down to 8.8 but without any overt signs of bleeding. Plavix was discontinued after discussion with his cardiologist (now > 6 months from ONESIMO).    On day of discharge, patient is clinically stable with no new exam findings or acute symptoms compared to prior. The patient was seen by the attending physician on the date of discharge and deemed stable and acceptable for discharge. The patient's chronic medical conditions were treated accordingly per the patient's home medication regimen. The patient's medication reconciliation (with changes made to chronic medications), follow up appointments, discharge orders, instructions, and significant lab and diagnostic studies are as noted.    Important Medication Changes and Reason:  1. CONTINUE vancomycin 125 mg every 6 hours PO solution until 4/25/25.  2. STOP Plavix (clopidogrel).  3. START hydrocortisone 20 mg at 8AM and 10 mg at 3 PM QD.    Active or Pending Issues Requiring Follow-up:    Discharge Diagnoses:    Advanced Directives:   [ ] Full code  [ ] DNR  [ ] Hospice     HPI:  56 y/o M w/ Hx of prior testicular cancer, prior non-Hodgkin's lymphoma, CAD s/p ONESIMO (10/2024), HTN, HLD, hypothyroidism, adrenal insufficiency 2/2 prior chemotherapy (not on steroids), and ischemic colitis s/p resection who initially presented for diarrhea x 3 days. He describes multiple episodes of non-bloody, non-bilious diarrhea at least every 10-15 minutes with generalized weakness, fatigue, and fever. He was given imodium by his wife without improvement in his symptoms. He denies any nausea, vomiting, abdominal pain, dysuria, hematuria, URI symptoms, cough, chest pain, shortness of breath. No recent sick contacts, antibiotic use, hospitalizations.     ED Course:  Vitals were significant for Tmax 100.7 F, systolic blood pressure in the 80s s/p 2 L NS bolus, Ofirmev. Labs significant for mild leukocytosis to 13, CHANDNI w/ Cr of 1.46 and initial lactate of 4.7. CT A/P significant for enterocolitis. Admitted to medicine for severe sepsis in setting of acute diarrhea.    Hospital Course:  On HD #1, patient was persistently hypotensive to 80s and as low as 60s although he remained AOx3. RRT was called for hypotension potentially requiring vasopressors despite 5 L of resuscitation at this point. He was noted to have low cortisol 1.6 and was started on stress dose steroids, midodrine and additional fluid resuscitation given bedside POCUS showing small, collapsible IVC. He quickly recovered in terms of his blood pressure and was weaned off of midodrine within 2 days. Endocrinology was consulted and we were able to taper his hydrocortisone to a PO regimen as well. He had 2 solid formed stools on day of discharge and has not been febrile. He was noted to have progressively worsening anemia with Hgb down to 8.8 but without any overt signs of bleeding. Plavix was discontinued after discussion with his cardiologist (now > 6 months from ONESIMO).    On day of discharge, patient is clinically stable with no new exam findings or acute symptoms compared to prior. The patient was seen by the attending physician on the date of discharge and deemed stable and acceptable for discharge. The patient's chronic medical conditions were treated accordingly per the patient's home medication regimen. The patient's medication reconciliation (with changes made to chronic medications), follow up appointments, discharge orders, instructions, and significant lab and diagnostic studies are as noted.    Important Medication Changes and Reason:  1. CONTINUE vancomycin 125 mg every 6 hours PO solution until 4/25/25.  2. STOP Plavix (clopidogrel).  3. START hydrocortisone 20 mg at 8AM and 10 mg at 3 PM QD.    Active or Pending Issues Requiring Follow-up:  Diarrhea 2/2 C. difficile infection  Adrenal insufficiency f/u with endocrinology    Discharge Diagnoses:  C. difficile infection  Adrenal insufficiency     Advanced Directives:   [ ] Full code  [ ] DNR  [ ] Hospice     HPI:  56 y/o M w/ Hx of prior testicular cancer, prior non-Hodgkin's lymphoma, CAD s/p ONESIMO (10/2024), HTN, HLD, hypothyroidism, adrenal insufficiency 2/2 prior chemotherapy (not on steroids), and ischemic colitis s/p resection who initially presented for diarrhea x 3 days. He describes multiple episodes of non-bloody, non-bilious diarrhea at least every 10-15 minutes with generalized weakness, fatigue, and fever. He was given imodium by his wife without improvement in his symptoms. He denies any nausea, vomiting, abdominal pain, dysuria, hematuria, URI symptoms, cough, chest pain, shortness of breath. No recent sick contacts, antibiotic use, hospitalizations.     ED Course:  Vitals were significant for Tmax 100.7 F, systolic blood pressure in the 80s s/p 2 L NS bolus, Ofirmev. Labs significant for mild leukocytosis to 13, CHANDNI w/ Cr of 1.46 and initial lactate of 4.7. CT A/P significant for enterocolitis. Admitted to medicine for severe sepsis in setting of acute diarrhea.    Hospital Course:  On HD #1, patient was persistently hypotensive to 80s and as low as 60s although he remained AOx3. RRT was called for hypotension potentially requiring vasopressors despite 5 L of resuscitation at this point. He was noted to have low cortisol 1.6 and was started on stress dose steroids, midodrine and additional fluid resuscitation given bedside POCUS showing small, collapsible IVC. He quickly recovered in terms of his blood pressure and was weaned off of midodrine within 2 days. Endocrinology was consulted and we were able to taper his hydrocortisone to a PO regimen as well. He had 2 solid formed stools on day of discharge and has not been febrile. He was noted to have progressively worsening anemia with Hgb down to 8.8 but without any overt signs of bleeding. Plavix was discontinued after discussion with his cardiologist (now > 6 months from ONESIMO).    On day of discharge, patient is clinically stable with no new exam findings or acute symptoms compared to prior. The patient was seen by the attending physician on the date of discharge and deemed stable and acceptable for discharge. The patient's chronic medical conditions were treated accordingly per the patient's home medication regimen. The patient's medication reconciliation (with changes made to chronic medications), follow up appointments, discharge orders, instructions, and significant lab and diagnostic studies are as noted.    Important Medication Changes and Reason:  1. CONTINUE vancomycin 125 mg every 6 hours PO solution until 4/25/25.  2. STOP Plavix (clopidogrel).  3. START hydrocortisone 20 mg at 8AM and 10 mg at 3 PM QD.    Active or Pending Issues Requiring Follow-up:  Diarrhea 2/2 C. difficile infection  Adrenal insufficiency f/u with endocrinology    Discharge Diagnoses:  Severe Sepsis 2/2 C. difficile infection  Adrenal insufficiency   Anemia  Advanced Directives:   [ ] Full code  [ ] DNR  [ ] Hospice

## 2025-04-17 NOTE — PROGRESS NOTE ADULT - PROBLEM SELECTOR PLAN 6
**RESOLVED**    Patient initially systolic 80s in the ED, states this is his baseline however also associated with chronic fatigue. He began decompensating to 60s while awaiting placement for medicine bed however remained AOx3. No chest pain, shortness of breath low concern for ACS. Already receiving significant fluid bolus for hypovolemic c/b septic shock c/b adrenal insufficiency.  - POCUS with grossly normal EF and no RWMA. Good lung sliding and no signs of tamponade    Plan:  - d/c midodrine  - c/w stress dose steroids as below  - c/w maintenance fluids as below

## 2025-04-17 NOTE — PROGRESS NOTE ADULT - PROBLEM SELECTOR PLAN 7
Interval increase in mass 1 cm compared to prior CT.    Plan:  - f/u outpatient endocrinology with further imaging/biopsy PRN

## 2025-04-18 ENCOUNTER — INPATIENT (INPATIENT)
Facility: HOSPITAL | Age: 55
LOS: 1 days | Discharge: HOME | DRG: 54 | End: 2025-04-20
Attending: STUDENT IN AN ORGANIZED HEALTH CARE EDUCATION/TRAINING PROGRAM | Admitting: STUDENT IN AN ORGANIZED HEALTH CARE EDUCATION/TRAINING PROGRAM
Payer: COMMERCIAL

## 2025-04-18 VITALS
HEIGHT: 69 IN | HEART RATE: 78 BPM | DIASTOLIC BLOOD PRESSURE: 89 MMHG | TEMPERATURE: 98 F | WEIGHT: 139.99 LBS | OXYGEN SATURATION: 99 % | SYSTOLIC BLOOD PRESSURE: 154 MMHG | RESPIRATION RATE: 18 BRPM

## 2025-04-18 DIAGNOSIS — Z90.79 ACQUIRED ABSENCE OF OTHER GENITAL ORGAN(S): Chronic | ICD-10-CM

## 2025-04-18 DIAGNOSIS — R44.1 VISUAL HALLUCINATIONS: ICD-10-CM

## 2025-04-18 DIAGNOSIS — Z98.89 OTHER SPECIFIED POSTPROCEDURAL STATES: Chronic | ICD-10-CM

## 2025-04-18 DIAGNOSIS — C43.4 MALIGNANT MELANOMA OF SCALP AND NECK: Chronic | ICD-10-CM

## 2025-04-18 DIAGNOSIS — Z29.9 ENCOUNTER FOR PROPHYLACTIC MEASURES, UNSPECIFIED: ICD-10-CM

## 2025-04-18 DIAGNOSIS — E03.9 HYPOTHYROIDISM, UNSPECIFIED: ICD-10-CM

## 2025-04-18 DIAGNOSIS — Z98.890 OTHER SPECIFIED POSTPROCEDURAL STATES: Chronic | ICD-10-CM

## 2025-04-18 DIAGNOSIS — I25.10 ATHEROSCLEROTIC HEART DISEASE OF NATIVE CORONARY ARTERY WITHOUT ANGINA PECTORIS: ICD-10-CM

## 2025-04-18 DIAGNOSIS — Z79.899 OTHER LONG TERM (CURRENT) DRUG THERAPY: ICD-10-CM

## 2025-04-18 DIAGNOSIS — I89.9 NONINFECTIVE DISORDER OF LYMPHATIC VESSELS AND LYMPH NODES, UNSPECIFIED: Chronic | ICD-10-CM

## 2025-04-18 DIAGNOSIS — D64.9 ANEMIA, UNSPECIFIED: ICD-10-CM

## 2025-04-18 DIAGNOSIS — E27.40 UNSPECIFIED ADRENOCORTICAL INSUFFICIENCY: ICD-10-CM

## 2025-04-18 DIAGNOSIS — A49.8 OTHER BACTERIAL INFECTIONS OF UNSPECIFIED SITE: ICD-10-CM

## 2025-04-18 LAB
ALBUMIN SERPL ELPH-MCNC: 4.2 G/DL — SIGNIFICANT CHANGE UP (ref 3.3–5)
ALP SERPL-CCNC: 62 U/L — SIGNIFICANT CHANGE UP (ref 40–120)
ALT FLD-CCNC: 19 U/L — SIGNIFICANT CHANGE UP (ref 10–45)
ANION GAP SERPL CALC-SCNC: 15 MMOL/L — SIGNIFICANT CHANGE UP (ref 5–17)
APTT BLD: 27.4 SEC — SIGNIFICANT CHANGE UP (ref 24.5–35.6)
AST SERPL-CCNC: 32 U/L — SIGNIFICANT CHANGE UP (ref 10–40)
BASOPHILS # BLD AUTO: 0.03 K/UL — SIGNIFICANT CHANGE UP (ref 0–0.2)
BASOPHILS NFR BLD AUTO: 0.6 % — SIGNIFICANT CHANGE UP (ref 0–2)
BILIRUB SERPL-MCNC: 0.6 MG/DL — SIGNIFICANT CHANGE UP (ref 0.2–1.2)
BUN SERPL-MCNC: 8 MG/DL — SIGNIFICANT CHANGE UP (ref 7–23)
CALCIUM SERPL-MCNC: 8.8 MG/DL — SIGNIFICANT CHANGE UP (ref 8.4–10.5)
CHLORIDE SERPL-SCNC: 101 MMOL/L — SIGNIFICANT CHANGE UP (ref 96–108)
CO2 SERPL-SCNC: 22 MMOL/L — SIGNIFICANT CHANGE UP (ref 22–31)
CREAT SERPL-MCNC: 0.86 MG/DL — SIGNIFICANT CHANGE UP (ref 0.5–1.3)
EGFR: 102 ML/MIN/1.73M2 — SIGNIFICANT CHANGE UP
EGFR: 102 ML/MIN/1.73M2 — SIGNIFICANT CHANGE UP
EOSINOPHIL # BLD AUTO: 0.07 K/UL — SIGNIFICANT CHANGE UP (ref 0–0.5)
EOSINOPHIL NFR BLD AUTO: 1.4 % — SIGNIFICANT CHANGE UP (ref 0–6)
GLUCOSE SERPL-MCNC: 88 MG/DL — SIGNIFICANT CHANGE UP (ref 70–99)
HCT VFR BLD CALC: 33.2 % — LOW (ref 39–50)
HGB BLD-MCNC: 10.7 G/DL — LOW (ref 13–17)
IMM GRANULOCYTES NFR BLD AUTO: 1.4 % — HIGH (ref 0–0.9)
INR BLD: 1.05 RATIO — SIGNIFICANT CHANGE UP (ref 0.85–1.16)
LYMPHOCYTES # BLD AUTO: 1.51 K/UL — SIGNIFICANT CHANGE UP (ref 1–3.3)
LYMPHOCYTES # BLD AUTO: 29.2 % — SIGNIFICANT CHANGE UP (ref 13–44)
MCHC RBC-ENTMCNC: 27.4 PG — SIGNIFICANT CHANGE UP (ref 27–34)
MCHC RBC-ENTMCNC: 32.2 G/DL — SIGNIFICANT CHANGE UP (ref 32–36)
MCV RBC AUTO: 85.1 FL — SIGNIFICANT CHANGE UP (ref 80–100)
MONOCYTES # BLD AUTO: 0.25 K/UL — SIGNIFICANT CHANGE UP (ref 0–0.9)
MONOCYTES NFR BLD AUTO: 4.8 % — SIGNIFICANT CHANGE UP (ref 2–14)
NEUTROPHILS # BLD AUTO: 3.24 K/UL — SIGNIFICANT CHANGE UP (ref 1.8–7.4)
NEUTROPHILS NFR BLD AUTO: 62.6 % — SIGNIFICANT CHANGE UP (ref 43–77)
NRBC BLD AUTO-RTO: 0 /100 WBCS — SIGNIFICANT CHANGE UP (ref 0–0)
PLATELET # BLD AUTO: 251 K/UL — SIGNIFICANT CHANGE UP (ref 150–400)
POTASSIUM SERPL-MCNC: 3.3 MMOL/L — LOW (ref 3.5–5.3)
POTASSIUM SERPL-SCNC: 3.3 MMOL/L — LOW (ref 3.5–5.3)
PROT SERPL-MCNC: 7.3 G/DL — SIGNIFICANT CHANGE UP (ref 6–8.3)
PROTHROM AB SERPL-ACNC: 12 SEC — SIGNIFICANT CHANGE UP (ref 9.9–13.4)
RBC # BLD: 3.9 M/UL — LOW (ref 4.2–5.8)
RBC # FLD: 13.7 % — SIGNIFICANT CHANGE UP (ref 10.3–14.5)
SODIUM SERPL-SCNC: 138 MMOL/L — SIGNIFICANT CHANGE UP (ref 135–145)
WBC # BLD: 5.17 K/UL — SIGNIFICANT CHANGE UP (ref 3.8–10.5)
WBC # FLD AUTO: 5.17 K/UL — SIGNIFICANT CHANGE UP (ref 3.8–10.5)

## 2025-04-18 PROCEDURE — 99285 EMERGENCY DEPT VISIT HI MDM: CPT

## 2025-04-18 PROCEDURE — 93010 ELECTROCARDIOGRAM REPORT: CPT

## 2025-04-18 PROCEDURE — 70450 CT HEAD/BRAIN W/O DYE: CPT | Mod: 26

## 2025-04-18 PROCEDURE — 99223 1ST HOSP IP/OBS HIGH 75: CPT

## 2025-04-18 PROCEDURE — 36410 VNPNXR 3YR/> PHY/QHP DX/THER: CPT

## 2025-04-18 PROCEDURE — 99222 1ST HOSP IP/OBS MODERATE 55: CPT | Mod: GC

## 2025-04-18 PROCEDURE — G0545: CPT

## 2025-04-18 PROCEDURE — 99223 1ST HOSP IP/OBS HIGH 75: CPT | Mod: GC

## 2025-04-18 RX ORDER — ASPIRIN 325 MG
81 TABLET ORAL DAILY
Refills: 0 | Status: DISCONTINUED | OUTPATIENT
Start: 2025-04-18 | End: 2025-04-20

## 2025-04-18 RX ORDER — OXYCODONE HYDROCHLORIDE 30 MG/1
10 TABLET ORAL EVERY 8 HOURS
Refills: 0 | Status: DISCONTINUED | OUTPATIENT
Start: 2025-04-18 | End: 2025-04-19

## 2025-04-18 RX ORDER — METOPROLOL SUCCINATE 50 MG/1
25 TABLET, EXTENDED RELEASE ORAL DAILY
Refills: 0 | Status: DISCONTINUED | OUTPATIENT
Start: 2025-04-18 | End: 2025-04-18

## 2025-04-18 RX ORDER — MELATONIN 5 MG
5 TABLET ORAL ONCE
Refills: 0 | Status: DISCONTINUED | OUTPATIENT
Start: 2025-04-18 | End: 2025-04-20

## 2025-04-18 RX ORDER — HYDROCORTISONE 20 MG
10 TABLET ORAL
Refills: 0 | Status: DISCONTINUED | OUTPATIENT
Start: 2025-04-18 | End: 2025-04-20

## 2025-04-18 RX ORDER — ATORVASTATIN CALCIUM 80 MG/1
40 TABLET, FILM COATED ORAL AT BEDTIME
Refills: 0 | Status: DISCONTINUED | OUTPATIENT
Start: 2025-04-18 | End: 2025-04-20

## 2025-04-18 RX ORDER — VANCOMYCIN HCL IN 5 % DEXTROSE 1.5G/250ML
125 PLASTIC BAG, INJECTION (ML) INTRAVENOUS EVERY 6 HOURS
Refills: 0 | Status: DISCONTINUED | OUTPATIENT
Start: 2025-04-18 | End: 2025-04-18

## 2025-04-18 RX ORDER — HYDROMORPHONE/SOD CHLOR,ISO/PF 2 MG/10 ML
0.5 SYRINGE (ML) INJECTION ONCE
Refills: 0 | Status: DISCONTINUED | OUTPATIENT
Start: 2025-04-18 | End: 2025-04-18

## 2025-04-18 RX ORDER — TAMSULOSIN HYDROCHLORIDE 0.4 MG/1
0.4 CAPSULE ORAL AT BEDTIME
Refills: 0 | Status: DISCONTINUED | OUTPATIENT
Start: 2025-04-18 | End: 2025-04-20

## 2025-04-18 RX ORDER — VANCOMYCIN HCL IN 5 % DEXTROSE 1.5G/250ML
125 PLASTIC BAG, INJECTION (ML) INTRAVENOUS EVERY 6 HOURS
Refills: 0 | Status: DISCONTINUED | OUTPATIENT
Start: 2025-04-18 | End: 2025-04-20

## 2025-04-18 RX ORDER — OXYCODONE HYDROCHLORIDE 30 MG/1
1 TABLET ORAL
Refills: 0 | DISCHARGE

## 2025-04-18 RX ORDER — LEVOTHYROXINE SODIUM 300 MCG
112 TABLET ORAL DAILY
Refills: 0 | Status: DISCONTINUED | OUTPATIENT
Start: 2025-04-18 | End: 2025-04-19

## 2025-04-18 RX ORDER — FIDAXOMICIN 200 MG/5ML
200 GRANULE, FOR SUSPENSION ORAL
Refills: 0 | Status: DISCONTINUED | OUTPATIENT
Start: 2025-04-18 | End: 2025-04-18

## 2025-04-18 RX ORDER — BUTYROSPERMUM PARKII(SHEA BUTTER), SIMMONDSIA CHINENSIS (JOJOBA) SEED OIL, ALOE BARBADENSIS LEAF EXTRACT .01; 1; 3.5 G/100G; G/100G; G/100G
250 LIQUID TOPICAL
Refills: 0 | Status: DISCONTINUED | OUTPATIENT
Start: 2025-04-18 | End: 2025-04-20

## 2025-04-18 RX ORDER — HYDROCORTISONE 20 MG
20 TABLET ORAL
Refills: 0 | Status: DISCONTINUED | OUTPATIENT
Start: 2025-04-18 | End: 2025-04-20

## 2025-04-18 RX ADMIN — Medication 0.5 MILLIGRAM(S): at 15:32

## 2025-04-18 RX ADMIN — Medication 125 MILLIGRAM(S): at 18:47

## 2025-04-18 RX ADMIN — Medication 40 MILLIEQUIVALENT(S): at 14:34

## 2025-04-18 RX ADMIN — BUTYROSPERMUM PARKII(SHEA BUTTER), SIMMONDSIA CHINENSIS (JOJOBA) SEED OIL, ALOE BARBADENSIS LEAF EXTRACT 250 MILLIGRAM(S): .01; 1; 3.5 LIQUID TOPICAL at 18:47

## 2025-04-18 RX ADMIN — Medication 10 MILLIGRAM(S): at 18:47

## 2025-04-18 RX ADMIN — OXYCODONE HYDROCHLORIDE 10 MILLIGRAM(S): 30 TABLET ORAL at 21:44

## 2025-04-18 RX ADMIN — Medication 125 MILLIGRAM(S): at 23:45

## 2025-04-18 NOTE — ED ADULT NURSE REASSESSMENT NOTE - NS ED NURSE REASSESS COMMENT FT1
Pt states "I noticed last night I had visual hallucinations of a man sitting in my chair last night, but I knew that wasn't real. I think I had a hallucination the night before that too". MD Drake made aware.

## 2025-04-18 NOTE — H&P ADULT - NEGATIVE ENMT SYMPTOMS
no ear pain/no tinnitus/no vertigo/no sinus symptoms/no nasal congestion/no gum bleeding/no throat pain

## 2025-04-18 NOTE — ED ADULT NURSE NOTE - NSFALLUNIVINTERV_ED_ALL_ED
Bed/Stretcher in lowest position, wheels locked, appropriate side rails in place/Call bell, personal items and telephone in reach/Instruct patient to call for assistance before getting out of bed/chair/stretcher/Non-slip footwear applied when patient is off stretcher/[place] to call system/Physically safe environment - no spills, clutter or unnecessary equipment/Purposeful proactive rounding/Room/bathroom lighting operational, light cord in reach

## 2025-04-18 NOTE — ED ADULT NURSE NOTE - OBJECTIVE STATEMENT
55y Male AOx4 BIBA from home to the ED c/o abnormal lab result. Pt reports he was recently hospitalized and DC for C diff (still receiving treatment), had blood work performed prior to DC and called today for low hgb. Pt endorses mild generalized weakness and SOB. Denies N/V, fever/chills, SOB, chest pain. Spontaneous/unlabored respirations, speaking in full sentences. Side rails up, bed in lowest position, oriented to call bell safety maintained.

## 2025-04-18 NOTE — H&P ADULT - NEGATIVE PSYCHIATRIC SYMPTOMS
no suicidal ideation/no depression/no anxiety/no memory loss/no agitation/no auditory hallucinations

## 2025-04-18 NOTE — CONSULT NOTE ADULT - ASSESSMENT
Impression/Hospital Course: 55M with history of testicular cancer s/p orchiectomy, prior non-Hodgkin's lymphoma, scalp melanoma s/p resection and immunotherapy CAD s/p ONESIMO (10/2024), HTN, HLD, hypothyroidism, adrenal insufficiency  (not on steroids), ischemic colitis s/p resection, overactive bladder s/p bladder stimulator recently discharged on 4/17 presents due to concern for low hemoglobin. Patient states that his cardiologist called him stating that his hemoglobin was ~8 when he was discharged at that he should go back to the hospital for further evaluation. Of note patient presented to the hospital on 4/13/25 with nausea, vomiting and diarrhea after eating chicken. He was febrile and hypotensive requiring IVF resuscitation and steroids. Labs with at the time with leukocytosis, CHANDNI and elevated lactate.  He had a CT A/P with enterocolitis. Patient was empirically on cefepime and falgyll. GI PCR was negative Cdiff PCR noted to be positive and indeterminate GDH. He was started on Vancomycin on 4/15. Patient and his wife feel that since starting the oral vancomycin he has been having visual hallucination. He states he sees animals on his blanket, and people sitting on a chair in front of him. He is now having about four bowel movements which are more formed. Patient currently afebrile and hemodynamically stable. Labs with no leukocytosis. ID asked to help manage.     Antimicrobials:  Vancomycin 4/15-    Assessment:  #Eneterocolitis  #Concern for Cdiff - patient with Cdiff PCR+ and GDH toxin indeterminate. Given patient presented septic on previous admission and immunocompromised status would recommend completing therapy for C.diff  #Visual hallucination - po vancomycin is not systemically absorbed and not likely to cause hallucinations    Recommendations:   -Continue Vancomycin PO 125mg PO Q6H to complete 10 days of therapy (through 4/24)  -Can check blood cultures   -Work up of visual hallucination per primary team   -Monitor for improvement of diarrhea symptoms    Case discussed with attending. ID to sign off at this time. Please re-consult as needed.     Mandeep Robin DO, PGY-4  Infectious Disease Fellow  [employer] Teams Preferred  After 5pm/weekends call [phone]   Impression/Hospital Course: 55M with history of testicular cancer s/p orchiectomy, prior non-Hodgkin's lymphoma, scalp melanoma s/p resection and immunotherapy CAD s/p ONESIMO (10/2024), HTN, HLD, hypothyroidism, adrenal insufficiency  (not on steroids), ischemic colitis s/p resection, overactive bladder s/p bladder stimulator recently discharged on 4/17 presents due to concern for low hemoglobin. Patient states that his cardiologist called him stating that his hemoglobin was ~8 when he was discharged at that he should go back to the hospital for further evaluation. Of note patient presented to the hospital on 4/13/25 with nausea, vomiting and diarrhea after eating chicken. He was febrile and hypotensive requiring IVF resuscitation and steroids. Labs with at the time with leukocytosis, CHANDNI and elevated lactate.  He had a CT A/P with enterocolitis. Patient was empirically on cefepime and falgyll. GI PCR was negative Cdiff PCR noted to be positive and indeterminate GDH. He was started on Vancomycin on 4/15. Patient and his wife feel that since starting the oral vancomycin he has been having visual hallucination. He states he sees animals on his blanket, and people sitting on a chair in front of him. He is now having about four bowel movements which are more formed. Patient currently afebrile and hemodynamically stable. Labs with no leukocytosis. ID asked to help manage.     Antimicrobials:  Vancomycin 4/15-    Assessment:  #Eneterocolitis  #Concern for Cdiff - patient with Cdiff PCR+ and GDH toxin indeterminate. Given patient presented septic on previous admission and immunocompromised status would recommend completing therapy for C.diff  #Visual hallucination - po vancomycin is not systemically absorbed and not likely to cause hallucinations. Patient refusing to continue with po vancomycin therapy    Recommendations:   -If patient is refusing po vancomycin, start fidaxomicin 200mg PO Q12H for 6 days (ordered)  -Can check blood cultures   -Work up of visual hallucination per primary team   -Monitor for improvement of diarrhea symptoms    Case discussed with attending. ID to sign off at this time. Please re-consult as needed.     Mandeep Robin DO, PGY-4  Infectious Disease Fellow  [employer] Teams Preferred  After 5pm/weekends call [phone]   Impression/Hospital Course: 55M with history of testicular cancer s/p orchiectomy, prior non-Hodgkin's lymphoma, scalp melanoma s/p resection and immunotherapy CAD s/p ONESIMO (10/2024), HTN, HLD, hypothyroidism, adrenal insufficiency  (not on steroids), ischemic colitis s/p resection, overactive bladder s/p bladder stimulator recently discharged on 4/17 presents due to concern for low hemoglobin. Patient states that his cardiologist called him stating that his hemoglobin was ~8 when he was discharged at that he should go back to the hospital for further evaluation. Of note patient presented to the hospital on 4/13/25 with nausea, vomiting and diarrhea after eating chicken. He was febrile and hypotensive requiring IVF resuscitation and steroids. Labs with at the time with leukocytosis, CHANDNI and elevated lactate.  He had a CT A/P with enterocolitis. Patient was empirically on cefepime and falgyll. GI PCR was negative Cdiff PCR noted to be positive and indeterminate GDH. He was started on Vancomycin on 4/15. Patient and his wife feel that since starting the oral vancomycin he has been having visual hallucination. He states he sees animals on his blanket, and people sitting on a chair in front of him. He is now having about four bowel movements which are more formed. Patient currently afebrile and hemodynamically stable. Labs with no leukocytosis. ID asked to help manage.     Antimicrobials:  Vancomycin 4/15-    Assessment:  #Eneterocolitis  #Concern for Cdiff - patient with Cdiff PCR+ and GDH toxin indeterminate. Given patient presented septic on previous admission and immunocompromised status would recommend completing therapy for C.diff  #Visual hallucination - po vancomycin is not systemically absorbed and not likely to cause hallucinations.     Recommendations:   -Discussed with patient that po vancomycin is not systemically absorbed. Patient willing to continue with PO vancomycin (complete 6 days of therapy)  -Can check blood cultures   -Work up of visual hallucination per primary team   -Monitor for improvement of diarrhea symptoms    Case discussed with attending. ID to sign off at this time. Please re-consult as needed.     Mandeep Robin DO, PGY-4  Infectious Disease Fellow  [employer] Teams Preferred  After 5pm/weekends call [phone]   No

## 2025-04-18 NOTE — H&P ADULT - HISTORY OF PRESENT ILLNESS
Patient is a 55 year old male with past medical history of testicular cancer s/p orchiectomy, prior non-Hodgkin's lymphoma, scalp melanoma s/p resection and immunotherapy CAD s/p ONESIMO (10/2024), HTN, HLD, hypothyroidism, adrenal insufficiency  (not on steroids), ischemic colitis s/p resection, overactive bladder s/p bladder stimulator recently discharged on 4/17/25 after treatment for N/V, diarrhea, CHANDNI, hypotension, fever, elevated lactate, found to have c diff pcr positive on oral vancomycin, presents due to concern for low hemoglobin. Per patient and his wife at bedside, he has not been having any new symptoms since his discharge yesterday however his outpatient provider was concerned about low hemoglobin and recommended inpatient evaluation. Also complaining of visual hallucinations which he insists started last Sunday (4/13/25) the day he started taking oral vancomycin   (however per records patient was started on oral vancomycin on 4/15/25). He denies having had hallucinations in the past. Patient describes the hallucinations as seeing various people, and animals sitting on objects infront of him/in the room with him, these appear always at night time, he states he is aware that they are not there in reality, and are not associated with any other symptom. He denies tactile or auditory hallucinations. He notes history of chronic hearing impairment (states symptoms started several years ago) but does not use hearing aid device, has prescription glasses for reading. He notes history of insomnia for which he states he has been taking ambien for several years.  He denies history of any mood disorder, including anxiety/depression, denies SI/HI, denies history of seizures. He denies use of any illicit substance or taking any medications other than the prescriptions he was discharged on. He denies headache, dizziness, LOC, syncope, change in hearing, speech, weakness, numbness, tingling, tremors, or difficulty ambulating.  Patient is a 55 year old male with past medical history of testicular cancer s/p orchiectomy, prior non-Hodgkin's lymphoma, scalp melanoma s/p resection and immunotherapy CAD s/p ONESIMO (10/2024), HTN, HLD, hypothyroidism, adrenal insufficiency  (not on steroids), ischemic colitis s/p resection, overactive bladder s/p bladder stimulator recently discharged on 4/17/25 after treatment for N/V, diarrhea, CHANDNI, hypotension, fever, elevated lactate, found to have c diff pcr positive, started on oral vancomycin, presents due to concern for low hemoglobin. Per patient and his wife at bedside, he has not been having any new symptoms since his discharge yesterday however his outpatient provider was concerned about low hemoglobin and recommended inpatient evaluation. He denies fever, chills, dizziness, nausea, vomiting, chest pain, dyspnea, abdominal pain, blood in stool/urine, gum bleeding, nose bleeding. He states he has bruising and wounds on his arms from previous injection sites but no other bleeding wound or bruise. He states he has good appetite and oral intake. He is also complaining of visual hallucinations which he insists started last Sunday (4/13/25) the day he started taking oral vancomycin  (however per records patient was started on oral vancomycin on 4/15/25). He denies having had hallucinations in the past. Patient describes the hallucinations as seeing various people, and animals sitting on objects infront of him/in the room with him, these appear always at night time, he states he is aware that they are not there in reality, and are not associated with any other symptom. He denies tactile or auditory hallucinations. He notes history of chronic hearing impairment (states symptoms started several years ago) but does not use hearing aid device, has prescription glasses for reading. He notes history of insomnia for which he states he has been taking ambien for several years.  He denies history of any mood disorder, including anxiety/depression, denies SI/HI, denies history of seizures. He denies use of any illicit substance or taking any medications other than the prescriptions he was discharged on. He denies headache, dizziness, LOC, syncope, change in hearing, speech, weakness, numbness, tingling, tremors, or difficulty ambulating.  Patient is a 55 year old male with past medical history of testicular cancer s/p orchiectomy, prior non-Hodgkin's lymphoma, scalp melanoma s/p resection and immunotherapy CAD s/p ONESIMO (10/2024) on aspirin, HTN, HLD, hypothyroidism, adrenal insufficiency  (not on steroids), ischemic colitis s/p resection, overactive bladder s/p bladder stimulator, ischemic colitis s/p L hemicolectomy with transverse colon to rectum anastomosis, recently discharged on 4/17/25 after treatment for N/V, diarrhea, CHANDNI, hypotension, fever, elevated lactate, found to have c diff pcr positive, started on oral vancomycin, presents due to concern for low hemoglobin. Per patient and his wife at bedside, he has not been having any new symptoms since his discharge yesterday however his outpatient provider was concerned about low hemoglobin and recommended inpatient evaluation. He denies fever, chills, dizziness, nausea, vomiting, chest pain, dyspnea, abdominal pain, blood in stool/urine, gum bleeding, nose bleeding. He states he has bruising and wounds on his arms from previous injection sites but no other bleeding wound or bruise. He states he has good appetite and oral intake. He is also complaining of visual hallucinations which he insists started last Sunday (4/13/25) the day he started taking oral vancomycin  (however per records patient was started on oral vancomycin on 4/15/25). He denies having had hallucinations in the past. Patient describes the hallucinations as seeing various people, and animals sitting on objects infront of him/in the room with him, these appear always at night time, he states he is aware that they are not there in reality, and are not associated with any other symptom. He denies tactile or auditory hallucinations. He notes history of chronic hearing impairment (states symptoms started several years ago) but does not use hearing aid device, has prescription glasses for reading. He notes history of insomnia for which he states he has been taking ambien for several years.  He denies history of any mood disorder, including anxiety/depression, denies SI/HI, denies history of seizures. He denies use of any illicit substance or taking any medications other than the prescriptions he was discharged on. He denies headache, dizziness, LOC, syncope, change in hearing, speech, weakness, numbness, tingling, tremors, or difficulty ambulating.

## 2025-04-18 NOTE — ED PROVIDER NOTE - PROGRESS NOTE DETAILS
Hemoglobin  currently at 10.7.  Otherwise just mild hypokalemia.  Will replete potassium and discharged with return precautions. Patient states that he is feeling well, no acute complaints at this time.  Analy Drake DO (PGY1) Dr. Bowles:  Pt seen by Dr Carballo, who was called by Dr Marks. Want pt TBA vs CDU for GI eval despite Hgb now back to 10.7, no e/o bleeding and pt has own GI to FU with OP. GI emailed for consult.

## 2025-04-18 NOTE — ED PROVIDER NOTE - CLINICAL SUMMARY MEDICAL DECISION MAKING FREE TEXT BOX
55-year-old male past medical history of non-Hodgkin lymphoma, testicular cancer, CAD s/p stents, HTN, HLD, hypothyroidism presents to the ED with abnormal lab results.  Patient states that he was recently discharged from the hospital for nausea vomiting and diarrhea.  States that they noted his hemoglobin was downtrending and that he wanted to leave.  Patient states that earlier today he was called for downtrending hemoglobin.  Notes his hemoglobin was 8.  Says that prior to admission it was 14.  Patient states that he is still being treated for C. difficile.  Denies any blood in the stool.  Otherwise endorses some subjective weakness.  States that last colonoscopy was 1 month ago, noted some initial bleeding after the colonoscopy that has since resolved.  Denies shortness of breath, headache, nausea, vomiting, chest pain, dysuria, abdominal pain.    Vitals show blood pressure 154/89, otherwise not tachycardic, afebrile.  Physical exam reveals well-appearing male, pale.  Mild conjunctival pallor.  Lungs and heart exam unremarkable.  Belly soft and nontender.    Patient has downtrending hemoglobin, concern for acute bleed, and consider GI bleed. Can also consider hemodilution as patient recieved lots of fluids during his admission.  Will check blood work and trend hgb. Consider infusion if Hgb < 8 given heart history. Dispo pending labs.

## 2025-04-18 NOTE — CONSULT NOTE ADULT - SUBJECTIVE AND OBJECTIVE BOX
DATE OF SERVICE: 04-18-25 @ 17:59    CHIEF COMPLAINT:Patient is a 55y old  Male who presents with a chief complaint of low hemoglobin (18 Apr 2025 16:21)      HISTORY OF PRESENT ILLNESS:HPI:  Patient is a 55 year old male with past medical history of testicular cancer s/p orchiectomy, prior non-Hodgkin's lymphoma, scalp melanoma s/p resection and immunotherapy CAD s/p ONESIMO (10/2024) on aspirin, HTN, HLD, hypothyroidism, adrenal insufficiency  (not on steroids), ischemic colitis s/p resection, overactive bladder s/p bladder stimulator, ischemic colitis s/p L hemicolectomy with transverse colon to rectum anastomosis, recently discharged on 4/17/25 after treatment for N/V, diarrhea, CHANDNI, hypotension, fever, elevated lactate, found to have c diff pcr positive, started on oral vancomycin, presents due to concern for low hemoglobin. Per patient and his wife at bedside, he has not been having any new symptoms since his discharge yesterday however his outpatient provider was concerned about low hemoglobin and recommended inpatient evaluation. He denies fever, chills, dizziness, nausea, vomiting, chest pain, dyspnea, abdominal pain, blood in stool/urine, gum bleeding, nose bleeding. He states he has bruising and wounds on his arms from previous injection sites but no other bleeding wound or bruise. He states he has good appetite and oral intake. He is also complaining of visual hallucinations which he insists started last Sunday (4/13/25) the day he started taking oral vancomycin  (however per records patient was started on oral vancomycin on 4/15/25). He denies having had hallucinations in the past. Patient describes the hallucinations as seeing various people, and animals sitting on objects infront of him/in the room with him, these appear always at night time, he states he is aware that they are not there in reality, and are not associated with any other symptom. He denies tactile or auditory hallucinations. He notes history of chronic hearing impairment (states symptoms started several years ago) but does not use hearing aid device, has prescription glasses for reading. He notes history of insomnia for which he states he has been taking ambien for several years.  He denies history of any mood disorder, including anxiety/depression, denies SI/HI, denies history of seizures. He denies use of any illicit substance or taking any medications other than the prescriptions he was discharged on. He denies headache, dizziness, LOC, syncope, change in hearing, speech, weakness, numbness, tingling, tremors, or difficulty ambulating.  (18 Apr 2025 16:21)      PAST MEDICAL & SURGICAL HISTORY:  Testicular Cancer  1994, chemotherapy      Headache, Migraine      HTN (hypertension)      NHL (non-Hodgkin's lymphoma)  1999, Radiation to left neck region      GERD (gastroesophageal reflux disease)      Colitis  surgery 1996      BPH (benign prostatic hyperplasia)      Osteoarthritis  degenerative disc L3-4      History of bone marrow transplant      Hypertriglyceridemia      Malignant melanoma of scalp  RSX 08/18  R neck mass dsx/ LN dsx 10/19/18      Hypothyroidism      History of chemotherapy      History of Raynaud's syndrome      CAD (coronary artery disease)      Unspecified malignant neoplasm of skin of scalp and neck      History of orchiectomy  left 1994      S/P colon resection  1996      Lymph node disorder  s/p abdominal lymph node dissection 1994, 1996      Melanoma of scalp or neck  excision 8/18  s/p excision right neck mass & LN dissx      History of nasal septoplasty      History of infusaport central venous catheter insertion      History of infusaport central venous catheter removal              MEDICATIONS:  aspirin  chewable 81 milliGRAM(s) Oral daily        oxyCODONE  ER Tablet 10 milliGRAM(s) Oral every 8 hours      atorvastatin 40 milliGRAM(s) Oral at bedtime  hydrocortisone 20 milliGRAM(s) Oral <User Schedule>  hydrocortisone 10 milliGRAM(s) Oral <User Schedule>  levothyroxine 112 MICROGram(s) Oral daily    tamsulosin 0.4 milliGRAM(s) Oral at bedtime      FAMILY HISTORY:  Hypertension (Father)        Non-contributory    SOCIAL HISTORY:    [X] Tobacco - not a current smoker    Allergies    No Known Allergies    Intolerances    	    REVIEW OF SYSTEMS:  CONSTITUTIONAL: No fever  EYES: No eye pain, visual disturbances, or discharge  ENMT:  No difficulty hearing, tinnitus  NECK: No pain or stiffness  RESPIRATORY: No cough, wheezing,  CARDIOVASCULAR: No chest pain, palpitations, passing out, dizziness, or leg swelling  GASTROINTESTINAL:  No nausea, vomiting, diarrhea or constipation. No melena.  GENITOURINARY: No dysuria, hematuria  NEUROLOGICAL: No stroke like symptoms  SKIN: No burning or lesions   ENDOCRINE: No heat or cold intolerance  MUSCULOSKELETAL: No joint pain or swelling  PSYCHIATRIC: No  anxiety, mood swings  HEME/LYMPH: No bleeding gums  ALLERGY AND IMMUNOLOGIC: No hives or eczema	    All other ROS negative    PHYSICAL EXAM:  T(C): 37.1 (04-18-25 @ 14:40), Max: 37.1 (04-18-25 @ 12:55)  HR: 73 (04-18-25 @ 14:40) (73 - 78)  BP: 133/77 (04-18-25 @ 14:40) (133/77 - 154/89)  RR: 17 (04-18-25 @ 14:40) (17 - 18)  SpO2: 100% (04-18-25 @ 14:40) (99% - 100%)  Wt(kg): --  I&O's Summary      Appearance: Normal	  HEENT:   Normal oral mucosa, EOMI	  Cardiovascular:  S1 S2, No JVD,    Respiratory: Lungs clear to auscultation	  Psychiatry: Alert  Gastrointestinal:  Soft, Non-tender, + BS	  Skin: No rashes   Neurologic: Non-focal  Extremities:  No edema  Vascular: Peripheral pulses palpable    	    	  	  CARDIAC MARKERS:  Labs personally reviewed by me                                  10.7   5.17  )-----------( 251      ( 18 Apr 2025 13:26 )             33.2     04-18    138  |  101  |  8   ----------------------------<  88  3.3[L]   |  22  |  0.86    Ca    8.8      18 Apr 2025 13:26  Phos  2.1     04-17  Mg     2.0     04-17    TPro  7.3  /  Alb  4.2  /  TBili  0.6  /  DBili  x   /  AST  32  /  ALT  19  /  AlkPhos  62  04-18          EKG: Personally reviewed by me - 4/13/25 -  bpm  Radiology: Personally reviewed by me -     < from: TTE W or WO Ultrasound Enhancing Agent (01.31.25 @ 18:10) >     CONCLUSIONS:      1. Technically difficult image quality.   2.Left ventricle was not well visualized.   3. Left ventricular systolic function is grossly low normal with an ejection fraction visually estimated at 50 to 55 %. There is poor visualization of the endocardial borders to determine the presence of wallmotion abnormalities.   4. The right ventricle is not well visualized. unable to determine right ventricular function.      < from: Cardiac Catheterization (01.31.25 @ 16:51) >  Mild nonobstructive CAD.  Patent LAD stents.  Medical therapy           Assessment /Plan:   Patient is a 55 year old male with past medical history of testicular cancer s/p orchiectomy, prior non-Hodgkin's lymphoma, scalp melanoma s/p resection and immunotherapy CAD s/p ONESIMO (10/2024) on aspirin, HTN, HLD, hypothyroidism, adrenal insufficiency  (not on steroids), ischemic colitis s/p resection, overactive bladder s/p bladder stimulator, ischemic colitis s/p L hemicolectomy with transverse colon to rectum anastomosis, recently discharged on 4/17/25 after treatment for N/V, diarrhea, CHANDNI, hypotension, fever, elevated lactate, found to have c diff pcr positive, started on oral vancomycin, presents due to concern for low hemoglobin. Per patient and his wife at bedside, he has not been having any new symptoms since his discharge yesterday however his outpatient provider was concerned about low hemoglobin and recommended inpatient evaluation.     1. Anemia  - Monitor HH (10.7 on 4/18)  - GI consulted, appreciate recs    2. CAD  - 10/15/2024 with on ONESIMO to the LAD   - Known prior CAD s/p PCI 2 LAD stents  - s/p diagnostic cath 1/31 with patent stents and normal RCA  - C/w ASA 81mg & statin    3. HTN -   - Hold Toprol 25mg PO qd        Differential diagnosis and plan of care discussed with patient after the evaluation. Counseling on diet, nutritional counseling, weight management, exercise and medication compliance was done.   Advanced care planning/advanced directives discussed with patient/family. DNR status including forceful chest compressions to attempt to restart the heart, ventilator support/artificial breathing, electric shock, artificial nutrition, health care proxy, Molst form all discussed with pt. Pt wishes to consider. Sixteen minutes spent on discussing advanced directives.       JOSE Guaman,  [facility]  Cardiovascular Medicine  [address], [address]  Available for call or text via [employer]  Office [phone]     DATE OF SERVICE: 04-18-25 @ 17:59    CHIEF COMPLAINT:Patient is a 55y old  Male who presents with a chief complaint of low hemoglobin (18 Apr 2025 16:21)      HISTORY OF PRESENT ILLNESS:HPI:  Patient is a 55 year old male with past medical history of testicular cancer s/p orchiectomy, prior non-Hodgkin's lymphoma, scalp melanoma s/p resection and immunotherapy CAD s/p ONESIMO (10/2024) on aspirin, HTN, HLD, hypothyroidism, adrenal insufficiency  (not on steroids), ischemic colitis s/p resection, overactive bladder s/p bladder stimulator, ischemic colitis s/p L hemicolectomy with transverse colon to rectum anastomosis, recently discharged on 4/17/25 after treatment for N/V, diarrhea, CHANDNI, hypotension, fever, elevated lactate, found to have c diff pcr positive, started on oral vancomycin, presents due to concern for low hemoglobin. Per patient and his wife at bedside, he has not been having any new symptoms since his discharge yesterday however his outpatient provider was concerned about low hemoglobin and recommended inpatient evaluation. He denies fever, chills, dizziness, nausea, vomiting, chest pain, dyspnea, abdominal pain, blood in stool/urine, gum bleeding, nose bleeding. He states he has bruising and wounds on his arms from previous injection sites but no other bleeding wound or bruise. He states he has good appetite and oral intake. He is also complaining of visual hallucinations which he insists started last Sunday (4/13/25) the day he started taking oral vancomycin  (however per records patient was started on oral vancomycin on 4/15/25). He denies having had hallucinations in the past. Patient describes the hallucinations as seeing various people, and animals sitting on objects infront of him/in the room with him, these appear always at night time, he states he is aware that they are not there in reality, and are not associated with any other symptom. He denies tactile or auditory hallucinations. He notes history of chronic hearing impairment (states symptoms started several years ago) but does not use hearing aid device, has prescription glasses for reading. He notes history of insomnia for which he states he has been taking ambien for several years.  He denies history of any mood disorder, including anxiety/depression, denies SI/HI, denies history of seizures. He denies use of any illicit substance or taking any medications other than the prescriptions he was discharged on. He denies headache, dizziness, LOC, syncope, change in hearing, speech, weakness, numbness, tingling, tremors, or difficulty ambulating.  (18 Apr 2025 16:21)      PAST MEDICAL & SURGICAL HISTORY:  Testicular Cancer  1994, chemotherapy      Headache, Migraine      HTN (hypertension)      NHL (non-Hodgkin's lymphoma)  1999, Radiation to left neck region      GERD (gastroesophageal reflux disease)      Colitis  surgery 1996      BPH (benign prostatic hyperplasia)      Osteoarthritis  degenerative disc L3-4      History of bone marrow transplant      Hypertriglyceridemia      Malignant melanoma of scalp  RSX 08/18  R neck mass dsx/ LN dsx 10/19/18      Hypothyroidism      History of chemotherapy      History of Raynaud's syndrome      CAD (coronary artery disease)      Unspecified malignant neoplasm of skin of scalp and neck      History of orchiectomy  left 1994      S/P colon resection  1996      Lymph node disorder  s/p abdominal lymph node dissection 1994, 1996      Melanoma of scalp or neck  excision 8/18  s/p excision right neck mass & LN dissx      History of nasal septoplasty      History of infusaport central venous catheter insertion      History of infusaport central venous catheter removal              MEDICATIONS:  aspirin  chewable 81 milliGRAM(s) Oral daily        oxyCODONE  ER Tablet 10 milliGRAM(s) Oral every 8 hours      atorvastatin 40 milliGRAM(s) Oral at bedtime  hydrocortisone 20 milliGRAM(s) Oral <User Schedule>  hydrocortisone 10 milliGRAM(s) Oral <User Schedule>  levothyroxine 112 MICROGram(s) Oral daily    tamsulosin 0.4 milliGRAM(s) Oral at bedtime      FAMILY HISTORY:  Hypertension (Father)        Non-contributory    SOCIAL HISTORY:    [X] Tobacco - not a current smoker    Allergies    No Known Allergies    Intolerances    	    REVIEW OF SYSTEMS:  CONSTITUTIONAL: No fever  EYES: No eye pain, visual disturbances, or discharge  ENMT:  No difficulty hearing, tinnitus  NECK: No pain or stiffness  RESPIRATORY: No cough, wheezing,  CARDIOVASCULAR: No chest pain, palpitations, passing out, dizziness, or leg swelling  GASTROINTESTINAL:  No nausea, vomiting, diarrhea or constipation. No melena.  GENITOURINARY: No dysuria, hematuria  NEUROLOGICAL: No stroke like symptoms  SKIN: No burning or lesions   ENDOCRINE: No heat or cold intolerance  MUSCULOSKELETAL: No joint pain or swelling  PSYCHIATRIC: No  anxiety, mood swings  HEME/LYMPH: No bleeding gums  ALLERGY AND IMMUNOLOGIC: No hives or eczema	    All other ROS negative    PHYSICAL EXAM:  T(C): 37.1 (04-18-25 @ 14:40), Max: 37.1 (04-18-25 @ 12:55)  HR: 73 (04-18-25 @ 14:40) (73 - 78)  BP: 133/77 (04-18-25 @ 14:40) (133/77 - 154/89)  RR: 17 (04-18-25 @ 14:40) (17 - 18)  SpO2: 100% (04-18-25 @ 14:40) (99% - 100%)  Wt(kg): --  I&O's Summary      Appearance: Normal	  HEENT:   Normal oral mucosa, EOMI	  Cardiovascular:  S1 S2, No JVD,    Respiratory: Lungs clear to auscultation	  Psychiatry: Alert  Gastrointestinal:  Soft, Non-tender, + BS	  Skin: No rashes   Neurologic: Non-focal  Extremities:  No edema  Vascular: Peripheral pulses palpable    	    	  	  CARDIAC MARKERS:  Labs personally reviewed by me                                  10.7   5.17  )-----------( 251      ( 18 Apr 2025 13:26 )             33.2     04-18    138  |  101  |  8   ----------------------------<  88  3.3[L]   |  22  |  0.86    Ca    8.8      18 Apr 2025 13:26  Phos  2.1     04-17  Mg     2.0     04-17    TPro  7.3  /  Alb  4.2  /  TBili  0.6  /  DBili  x   /  AST  32  /  ALT  19  /  AlkPhos  62  04-18          EKG: Personally reviewed by me - 4/13/25 -  bpm  Radiology: Personally reviewed by me -     < from: TTE W or WO Ultrasound Enhancing Agent (01.31.25 @ 18:10) >     CONCLUSIONS:      1. Technically difficult image quality.   2.Left ventricle was not well visualized.   3. Left ventricular systolic function is grossly low normal with an ejection fraction visually estimated at 50 to 55 %. There is poor visualization of the endocardial borders to determine the presence of wallmotion abnormalities.   4. The right ventricle is not well visualized. unable to determine right ventricular function.      < from: Cardiac Catheterization (01.31.25 @ 16:51) >  Mild nonobstructive CAD.  Patent LAD stents.  Medical therapy           Assessment /Plan:     Patient is a 55 year old male with past medical history of testicular cancer s/p orchiectomy, prior non-Hodgkin's lymphoma, scalp melanoma s/p resection and immunotherapy CAD s/p ONESIMO (10/2024) on aspirin, HTN, HLD, hypothyroidism, adrenal insufficiency  (not on steroids), ischemic colitis s/p resection, overactive bladder s/p bladder stimulator, ischemic colitis s/p L hemicolectomy with transverse colon to rectum anastomosis, recently discharged on 4/17/25 after treatment for N/V, diarrhea, CHANDNI, hypotension, fever, elevated lactate, found to have c diff pcr positive, started on oral vancomycin, presents due to concern for low hemoglobin. Per patient and his wife at bedside, he has not been having any new symptoms since his discharge yesterday however his outpatient provider was concerned about low hemoglobin and recommended inpatient evaluation.     1. Anemia  - Monitor H/H (10.7 on 4/18)  - GI consulted, appreciate recs    2. CAD  - 10/15/2024 with on ONESIMO to the LAD   - Known prior CAD s/p PCI 2 LAD stents  - s/p diagnostic cath 1/31 with patent stents and normal RCA  - C/w ASA 81mg & statin    3. HTN -   - D/c Toprol 25mg PO qd    4. Hallucinations - New hallucinations for few days that are present at night  - States ? association with Vanco, but discussed with ID who states PO Vanco as not absorbed systemically   ----- Will obtain neuro work up including CT head, MR head, Check TSH, FT4, RPR, WNV, HIV, Tox screen, heavy metal screen, Vit B1, B12, Folate, Vit D 25, blood cx, UA, urine cx     4. HLD - c/w Vascepa as OP, atorvastatin 40mg PO daily          Differential diagnosis and plan of care discussed with patient after the evaluation. Counseling on diet, nutritional counseling, weight management, exercise and medication compliance was done.   Advanced care planning/advanced directives discussed with patient/family. DNR status including forceful chest compressions to attempt to restart the heart, ventilator support/artificial breathing, electric shock, artificial nutrition, health care proxy, Molst form all discussed with pt. Pt wishes to consider. Sixteen minutes spent on discussing advanced directives.       JOSE Guaman, DO [facility]  Cardiovascular Medicine  [address], [address]  Available for call or text via [employer]  Office [phone]

## 2025-04-18 NOTE — ED PROVIDER NOTE - ATTENDING CONTRIBUTION TO CARE
55M hx of CAD w stents, testicular CA, NHL, HTN, chronic back pain on pain mgmt, hypothyroid, adrenal insufficiency with recent admit for C dif colitis DC'd yesterday and returns today at behest of his cardiologist Dr Marks due to downtrending Hgb. Baseline Hgb is 13-14. While admitted Hgb down to 8.8 at time of DC. Denies any active GI bleeding. Had colonoscopy ~1 month ago w polyp excision, deemed benign.  No CP, palp, EASLEY, dizziness, syncope. VS non actionable. PE thin M NAD no pallor prior R neck mass excision and R scalp excision, abd soft ntnd, ext wwp, no fnd. Given hx of CAD goal Hgb 8. Will check Hgb today for continued downtrending. Defer rectal as denying dark tarry or frankly bloody stools, and active tx for C dif. NO signs of sx anemia per hx. Plan to FU HGb and discuss w PCP regarding plan for poss OP FU vs need for admission.

## 2025-04-18 NOTE — PATIENT PROFILE ADULT - FALL HARM RISK - UNIVERSAL INTERVENTIONS
Bed in lowest position, wheels locked, appropriate side rails in place/Call bell, personal items and telephone in reach/Instruct patient to call for assistance before getting out of bed or chair/Non-slip footwear when patient is out of bed/[place] to call system/Physically safe environment - no spills, clutter or unnecessary equipment/Purposeful Proactive Rounding/Room/bathroom lighting operational, light cord in reach Cheiloplasty (Less Than 50%) Text: A decision was made to reconstruct the defect with a  cheiloplasty.  The defect was undermined extensively.  Additional obicularis oris muscle was excised with a 15 blade scalpel.  The defect was converted into a full thickness wedge, of less than 50% of the vertical height of the lip, to facilite a better cosmetic result.  Small vessels were then tied off with 5-0 monocyrl. The obicularis oris, superficial fascia, adipose and dermis were then reapproximated.  After the deeper layers were approximated the epidermis was reapproximated with particular care given to realign the vermilion border.

## 2025-04-18 NOTE — ED PROCEDURE NOTE - CPROC ED PHYSICIAN PRESENCE1
History of HLD and MI, unsure if had stent placed  - c/w ASA 81 mg QD, lipitor 40 mg QHS  - unable to tolerate BBL/ACE/ARB from BP standpoint  - LDL 15 at goal I was present during the key portion of the procedure.

## 2025-04-18 NOTE — H&P ADULT - ASSESSMENT
Patient is a 55 year old male with past medical history of testicular cancer s/p orchiectomy, prior non-Hodgkin's lymphoma, scalp melanoma s/p resection and immunotherapy CAD s/p ONESIMO (10/2024) on aspirin, HTN, HLD, hypothyroidism, adrenal insufficiency  (not on steroids), ischemic colitis s/p resection, overactive bladder s/p bladder stimulator, ischemic colitis s/p L hemicolectomy with transverse colon to rectum anastomosis, recently discharged on 4/17/25 after treatment for N/V, diarrhea, CHANDNI, hypotension, fever, elevated lactate, found to have c diff pcr positive, started on oral vancomycin, presents due to concern for low hemoglobin, also complaining of visual hallucinations. Patient is admitted for further evaluation and management.

## 2025-04-18 NOTE — H&P ADULT - PROBLEM SELECTOR PLAN 3
Discussed with ID attending on admission, they rec to continue oral vancomycin for now.   Monitor stools.  Contact isolation.  F/U ID team for further recs.

## 2025-04-18 NOTE — H&P ADULT - PROBLEM SELECTOR PLAN 4
Discussed with cardiology attending on admission, they rec to stop metoprolol and continue aspirin for now.   Continue lipitor.

## 2025-04-18 NOTE — CONSULT NOTE ADULT - SUBJECTIVE AND OBJECTIVE BOX
HPI:    Mr. Mclaughlin is a 55 yrs old male w/ hx of testicular cancer s/p orchiectomy, prior non-Hodgkin's lymphoma, scalp melanoma s/p resection and immunotherapy CAD s/p ONESIMO (10/2024) on aspirin, HTN, HLD, hypothyroidism, adrenal insufficiency  (not on steroids), ischemic colitis s/p resection, overactive bladder s/p bladder stimulator, ischemic colitis s/p L hemicolectomy with transverse colon to rectum anastomosis, recently discharged on 4/17/25 after treatment for N/V, diarrhea, CHANDNI, hypotension, fever, elevated lactate, found to have c diff pcr positive, started on oral vancomycin, presents due to concern for low hemoglobin. During last admission, patient was in septic shock requiring pressor support likely due to infection and also AI. He also had downtrending hgb levels, from 13 --> 8.8 with no overt signs of GI bleeding. But now he is sent to the hospital for anemia again. On admission, hgb was 10 with no blood transfusion. Pt. reported he has watery brown stools, decreased in frequency. Denied abd pain, nausea, and vomiting. No hematochezia. He follows w/ Dr. Rice, and had last colonoscopy in 2/2025 where he removed polyps and one non bleeding AVM. He has only been taking ASA and Plavix was stopped last admission. Denied AC and NSAID use. GI consulted for anemia.       Allergies:  No Known Allergies      Home Medications:    · 	vancomycin 125 mg oral capsule: Last Dose Taken:  , 1 cap(s) orally once a day  · 	hydrocortisone 10 mg oral tablet: Last Dose Taken:  , 1 tab(s) orally once a day (in the morning) Please take TWO tablets (20 mg total) at 8 AM and ONE tablet (10 mg total) at 3 PM EVERY DAY.  · 	aspirin 81 mg oral delayed release tablet: Last Dose Taken:  , 1 tab(s) orally once a day  · 	metoprolol succinate 25 mg oral tablet, extended release: Last Dose Taken:  , 1 tab(s) orally once a day  · 	Synthroid 112 mcg (0.112 mg) oral tablet: Last Dose Taken:  , 1 tab(s) orally once a day AM  · 	OxyCONTIN 10 mg oral tablet, extended release: Last Dose Taken:  , 1 tab(s) orally every 8 hours as needed for  severe pain  · 	zolpidem 10 mg oral tablet: Last Dose Taken:  , 1 tab(s) orally once a day (at bedtime) as needed for  insomnia  · 	atorvastatin 40 mg oral tablet: Last Dose Taken:  , 1 tab(s) orally once a day  · 	tamsulosin 0.4 mg oral capsule: Last Dose Taken:  , 1 cap(s) orally once a day    Hospital Medications:  aspirin  chewable 81 milliGRAM(s) Oral daily  atorvastatin 40 milliGRAM(s) Oral at bedtime  hydrocortisone 20 milliGRAM(s) Oral <User Schedule>  hydrocortisone 10 milliGRAM(s) Oral <User Schedule>  levothyroxine 112 MICROGram(s) Oral daily  oxyCODONE  ER Tablet 10 milliGRAM(s) Oral every 8 hours  saccharomyces boulardii 250 milliGRAM(s) Oral two times a day  tamsulosin 0.4 milliGRAM(s) Oral at bedtime  vancomycin    Solution 125 milliGRAM(s) Oral every 6 hours      PMHX/PSHX:  Testicular Cancer    Colitis    S/P Orchiectomy    S/P Lymph Node Biopsy    Headache, Migraine    [place]    No pertinent past medical history    HTN (hypertension)    NHL (non-Hodgkin's lymphoma)    GERD (gastroesophageal reflux disease)    Colitis    BPH (benign prostatic hyperplasia)    Osteoarthritis    History of bone marrow transplant    Hypertriglyceridemia    Malignant melanoma of scalp    First degree heart block    Hypothyroidism    History of chemotherapy    History of Raynaud's syndrome    CAD (coronary artery disease)    Unspecified malignant neoplasm of skin of scalp and neck    No significant past surgical history    History of orchiectomy    S/P colon resection    Lymph node disorder    Melanoma of scalp or neck    History of nasal septoplasty    History of infusaport central venous catheter insertion    History of infusaport central venous catheter removal        Family history:  No pertinent family history in first degree relatives    Hypertension (Father)        Denies family history of colon cancer/polyps, stomach cancer/polyps, pancreatic cancer/masses, liver cancer/disease, ovarian cancer and endometrial cancer.    Social History:   Tob: Denies  EtOH: Denies  Illicit Drugs: Denies    ROS:     General:  No wt loss, fevers, chills, night sweats, fatigue  Eyes:  Good vision, no reported pain  ENT:  No sore throat, pain, runny nose, dysphagia  CV:  No pain, palpitations, hypo/hypertension  Pulm:  No dyspnea, cough, tachypnea, wheezing  GI:  see HPI  :  No pain, bleeding, incontinence, nocturia  Muscle:  No pain, weakness  Neuro:  No weakness, tingling, memory problems  Psych:  No fatigue, insomnia, mood problems, depression  Endocrine:  No polyuria, polydipsia, cold/heat intolerance  Heme:  No petechiae, ecchymosis, easy bruisability  Skin:  No rash, tattoos, scars, edema    PHYSICAL EXAM:     GENERAL:  No acute distress, chronically ill appearing, cachectic  HEENT:  NCAT, no scleral icterus, has scar in his head  CHEST:  no respiratory distress  HEART:  Regular rate and rhythm  ABDOMEN:  Soft, non-tender, non-distended,   EXTREMITIES: No LE edema b/l  SKIN:  No rash/erythema/ecchymoses/petechiae/wounds/abscess/warm/dry  NEURO:  Alert and oriented x 3, no tremors.     Vital Signs:  Vital Signs Last 24 Hrs  T(C): 36.7 (18 Apr 2025 18:05), Max: 37.1 (18 Apr 2025 12:55)  T(F): 98.1 (18 Apr 2025 18:05), Max: 98.8 (18 Apr 2025 12:55)  HR: 72 (18 Apr 2025 18:05) (72 - 78)  BP: 150/87 (18 Apr 2025 18:05) (133/77 - 154/89)  BP(mean): --  RR: 17 (18 Apr 2025 18:05) (17 - 18)  SpO2: 100% (18 Apr 2025 18:05) (99% - 100%)    Parameters below as of 18 Apr 2025 18:05  Patient On (Oxygen Delivery Method): room air      Daily Height in cm: 175.26 (18 Apr 2025 11:57)    Daily     LABS:                        10.7   5.17  )-----------( 251      ( 18 Apr 2025 13:26 )             33.2     Mean Cell Volume: 85.1 fl (04-18-25 @ 13:26)    04-18    138  |  101  |  8   ----------------------------<  88  3.3[L]   |  22  |  0.86    Ca    8.8      18 Apr 2025 13:26  Phos  2.1     04-17  Mg     2.0     04-17    TPro  7.3  /  Alb  4.2  /  TBili  0.6  /  DBili  x   /  AST  32  /  ALT  19  /  AlkPhos  62  04-18    LIVER FUNCTIONS - ( 18 Apr 2025 13:26 )  Alb: 4.2 g/dL / Pro: 7.3 g/dL / ALK PHOS: 62 U/L / ALT: 19 U/L / AST: 32 U/L / GGT: x           PT/INR - ( 18 Apr 2025 13:26 )   PT: 12.0 sec;   INR: 1.05 ratio         PTT - ( 18 Apr 2025 13:26 )  PTT:27.4 sec  Urinalysis Basic - ( 18 Apr 2025 13:26 )    Color: x / Appearance: x / SG: x / pH: x  Gluc: 88 mg/dL / Ketone: x  / Bili: x / Urobili: x   Blood: x / Protein: x / Nitrite: x   Leuk Esterase: x / RBC: x / WBC x   Sq Epi: x / Non Sq Epi: x / Bacteria: x                              10.7   5.17  )-----------( 251      ( 18 Apr 2025 13:26 )             33.2                         8.8    4.70  )-----------( 198      ( 17 Apr 2025 06:12 )             27.2                         9.6    8.51  )-----------( 224      ( 16 Apr 2025 05:51 )             29.4       Imaging:    Colonoscopy  Findings:       The perianal and digital rectal examinations were normal.       A 10 mm polyp was found in the ascending colon. The polyp was sessile. The polypwas removed        with a cold snare. Resection and retrieval were complete. To prevent bleeding after the        polypectomy, two hemostatic clips were successfully placed (MR conditional). There was no        bleeding at the end of the procedure.      Two sessile polyps were found in the hepatic flexure. The polyps were 4 to 6 mm in size.        These polyps were removed with a jumbo cold forceps. Resection and retrieval were complete.       A 8 mm polyp was found in the proximal transverse colon. The polyp was sessile. The polyp was        removed with a cold snare. Resection and retrieval were complete.       The terminal ileum appeared normal.       A single medium-sized angioectasia with stigmata of recent bleeding was found in the      ascending colon.

## 2025-04-18 NOTE — H&P ADULT - PROBLEM SELECTOR PLAN 7
DVT PPX: SCDs due to concern for GI bleed.   Diet: Regular. Dietician/ Nutritionist Eval.   OOBTC, ambulate as tolerated. Fall precautions. Aspiration precautions. Delirium prevention measures, Promote sleep hygiene. PT evaluation. Incentive spirometry.   # Dispo: pending clinical improvement, anticipate discharge back to home, F/U CM.   # Medication reconciliation done at the time of admission per list from previous discharge paperwork and patient. DVT PPX: SCDs due to concern for GI bleed.   Diet: Regular. Dietician/ Nutritionist Eval.   OOBTC, ambulate as tolerated. Fall precautions. Aspiration precautions.  PT evaluation. Incentive spirometry.   # Dispo: pending clinical improvement, anticipate discharge back to home, F/U CM.   # Medication reconciliation done at the time of admission per list from previous discharge paperwork and patient.

## 2025-04-18 NOTE — H&P ADULT - NEGATIVE NEUROLOGICAL SYMPTOMS
no weakness/no paresthesias/no generalized seizures/no focal seizures/no syncope/no tremors/no vertigo/no loss of sensation/no difficulty walking/no headache/no loss of consciousness/no hemiparesis/no confusion

## 2025-04-18 NOTE — PATIENT PROFILE ADULT - VISION (WITH CORRECTIVE LENSES IF THE PATIENT USUALLY WEARS THEM):
Negative Partially impaired: cannot see medication labels or newsprint, but can see obstacles in path, and the surrounding layout; can count fingers at arm's length

## 2025-04-18 NOTE — PHARMACOTHERAPY INTERVENTION NOTE - COMMENTS
Reason for Consult: Physician Request    As per restricted antimicrobial review request, approved to change vancomycin PO to fidaxomicin 200 mg PO q12h x 6 days.    Ramos Britton, PharmD, BCIDP  Clinical Pharmacy Specialist, Infectious Diseases  Tele-Antimicrobial Stewardship Program (Tele-ASP)  Available on Microsoft Teams   Reason for Consult: Physician Request    As per restricted antimicrobial review request, approved to change vancomycin PO to fidaxomicin 200 mg PO q12h x 6 days.    Ramos Britton, PharmD, [facility]  Clinical Pharmacy Specialist, Infectious Diseases  Tele-Antimicrobial Stewardship Program (Tele-ASP)  Available on Microsoft Teams    Addendum: As discussed, will change back to vancomycin PO for now.

## 2025-04-18 NOTE — H&P ADULT - NSHPSOCIALHISTORY_GEN_ALL_CORE
Patient lives at home with his wife, denies use of any assistive device for ambulation. He denies use of any illicit substance including alcohol/smoking/THC, etc.

## 2025-04-18 NOTE — CHART NOTE - NSCHARTNOTEFT_GEN_A_CORE
Search Terms: Conrad Mclaughlin, 1970Search Date: 04/18/2025 17:35:25 PM  The Drug Utilization Report below displays all of the controlled substance prescriptions, if any, that your patient has filled in the last twelve months. The information displayed on this report is compiled from pharmacy submissions to the Department, and accurately reflects the information as submitted by the pharmacies.    This report was requested by: Declan Interiano | Reference #: [id number]    Practitioner Count: 2  Pharmacy Count: 2  Current Opioid Prescriptions: 1  Current Benzodiazepine Prescriptions: 0  Current Stimulant Prescriptions: 0      Patient Demographic Information (PDI)       PDI	First Name	Last Name	Birth Date	Gender	Street Address	[facility] Code  A	Conrad Mclaughlin	1970	Male	[address]	[place] [facility]	[place]	[address]  B	Conrad Mclaughlin	1970	Male	[address]	[place]	[address]    Prescription Information      PDI Filter:    PDI	Current Rx	Drug Type	Rx Written	Rx Dispensed	Drug	Quantity	Days Supply	Prescriber Name	Prescriber MAKEDA #	Payment Method	Dispenser  A	Y		04/03/2025	04/08/2025	zolpidem tartrate 10 mg tablet	30	30	Yueh, Carrie	[id number]	Insurance	[facility] #[id number]  A	N	O	01/06/2025	03/12/2025	tramadol hcl 50 mg tablet	90	30	Yueh, Carrie	[id number]	Insurance	[facility] #[id number]  A	N		01/06/2025	03/12/2025	zolpidem tartrate 10 mg tablet	30	30	Yueh, Carrie	[id number]	Insurance	[facility] #[id number]  A	N		01/06/2025	02/11/2025	zolpidem tartrate 10 mg tablet	30	30	Yueh, Carrie	[id number]	Insurance	[facility] #[id number]  A	N		01/06/2025	01/13/2025	zolpidem tartrate 10 mg tablet	30	30	Yueh, Carrie	[id number]	Insurance	[facility] #[id number]  A	N	O	01/06/2025	01/11/2025	oxycontin er 10 mg tablet	30	10	Arian Carter	[id number]	Insurance	[facility] #[id number]  A	N	O	01/06/2025	01/07/2025	tramadol hcl 50 mg tablet	90	30	Yueh, Carrie	[id number]	Insurance	[facility] #[id number]  A	N	O	12/23/2024	12/23/2024	oxycodone-acetaminophen  mg tab	12	2	José Miguel Pettit DDS, MD	[id number]	Insurance	[facility] #[id number]  A	N	O	12/16/2024	12/16/2024	oxycodone-acetaminophen  mg tab	12	3	[facility]	[id number]	Insurance	[facility] #[id number]

## 2025-04-18 NOTE — ED ADULT TRIAGE NOTE - GLASGOW COMA SCALE: SCORE, MLM
Have Your Skin Lesions Been Treated?: not been treated Is This A New Presentation, Or A Follow-Up?: Skin Lesions How Severe Is Your Skin Lesion?: mild 15

## 2025-04-18 NOTE — ED PROVIDER NOTE - NSFOLLOWUPINSTRUCTIONS_ED_ALL_ED_FT
- You were seen in the emergency department today for anemia.    - Your hemoglobin is 10.7 today. You have no acute signs of bleeds.    - Follow up with your doctor in 1 week - bring copies of your results.     - Return to the ED for any new, worsening, or concerning symptoms to you. Please return if you start to feel lightheaded, short of breath, worsening fatigue, or any other worsening symptoms to you.    - Continue all prescribed medications.    - Rest and keep yourself hydrated with fluids.

## 2025-04-18 NOTE — H&P ADULT - NSHPPHYSICALEXAM_GEN_ALL_CORE
Vital Signs Last 24 Hrs  T(C): 37.1 (18 Apr 2025 14:40), Max: 37.1 (18 Apr 2025 12:55)  T(F): 98.7 (18 Apr 2025 14:40), Max: 98.8 (18 Apr 2025 12:55)  HR: 73 (18 Apr 2025 14:40) (73 - 78)  BP: 133/77 (18 Apr 2025 14:40) (133/77 - 154/89)  BP(mean): --  RR: 17 (18 Apr 2025 14:40) (17 - 18)  SpO2: 100% (18 Apr 2025 14:40) (99% - 100%)    Parameters below as of 18 Apr 2025 14:40  Patient On (Oxygen Delivery Method): room air      CONSTITUTIONAL: NAD  EYES: No conjunctival or scleral injection, non-icteric; PERRLA and symmetric  ENMT: oral mucosa with moist membranes  RESPIRATORY: Breathing comfortably; lungs CTABL  CARDIOVASCULAR: +S1S2, RRR, no M/G/R; pedal pulses full and symmetric; no peripheral edema   GASTROINTESTINAL: No palpable masses or tenderness, soft, +BS throughout, no rebound/guarding  MUSCULOSKELETAL: No digital clubbing or cyanosis; no paraspinal tenderness; no joint effusions of upper or lower extremities; normal strength and tone of extremities  SKIN: No rashes or ulcers noted; no subcutaneous nodules or induration palpable. small areas on ecchymosis in upper extremities, right upper arm wound covered in dressing dry and intact.  NEUROLOGIC: sensation intact in LEs b/l to light touch,  motor strength 5/5 all extremities   PSYCHIATRIC: A+O x 3; mood and affect appropriate; appropriate insight and judgment

## 2025-04-18 NOTE — H&P ADULT - PROBLEM SELECTOR PLAN 1
How Severe Is Your Acne?: moderate Is This A New Presentation, Or A Follow-Up?: Acne Females Only: When Was Your Last Menstrual Period?: 03/15/18 GI consulted in the ED, F/U on their recs pending.  Check stool FOB, UA.  Monitor for s/s of bleed.  Monitor HH.  Continue ASA per cardiology recs for now.  Keep active T&S, transfuse PRBC to keep Hgb>8.  Check iron studies, ferritin, B12, folate.

## 2025-04-18 NOTE — ED PROVIDER NOTE - PHYSICAL EXAMINATION
Analy Drake DO (PGY1)   Physical Exam:    Gen: NAD, AOx3  Head: NCAT  HEENT: EOMI, PEERL  Lung: CTAB  CV: RRR, no murmurs, rubs or gallops  Abd: soft, NT, ND, no guarding, no rigidity, no rebound tenderness, no CVA tenderness   MSK: no visible deformities, ROM normal in UE/LE, no back pain  Neuro: No focal sensory or motor deficits. Sensation intact to light touch all extremities.  Skin: Warm, well perfused  Psych: normal affect, calm

## 2025-04-18 NOTE — ED PROVIDER NOTE - PATIENT PORTAL LINK FT
You can access the FollowMyHealth Patient Portal offered by [facility] by registering at the following website: [email or web address]. By joining [facility]’s FollowMyHealth portal, you will also be able to view your health information using other applications (apps) compatible with our system.

## 2025-04-18 NOTE — CONSULT NOTE ADULT - SUBJECTIVE AND OBJECTIVE BOX
INFECTIOUS DISEASE CONSULT NOTE    Patient is a 55y old Male who presents with a chief complaint of anemia    HPI: 55M with history of testicular cancer s/p orchiectomy, prior non-Hodgkin's lymphoma, scalp melanoma s/p resection and immunotherapy CAD s/p ONESIMO (10/2024), HTN, HLD, hypothyroidism, adrenal insufficiency  (not on steroids), ischemic colitis s/p resection, overactive bladder s/p bladder stimulator recently discharged on 4/17 presents due to concern for low hemoglobin. Patient states that his cardiologist called him stating that his hemoglobin was ~8 when he was discharged at that he should go back to the hospital for further evaluation. Of note patient presented to the hospital on 4/13/25 with nausea, vomiting and diarrhea after eating chicken. He was febrile and hypotensive requiring IVF resuscitation and steroids. He had a CT A/P with enterocolitis. Patient was empirically on cefepime and falgyll. GI PCR was negative Cdiff PCR noted to be positive and indeterminate GDH. He was started on Vancomycin on 4/15. Patient and his wife feel that since starting the oral vancomycin he has been having visual hallucination. He states he sees animals on his blanket, and people sitting on a chair in front of him. He is now having about four bowel movements which are more formed. Patient currently afebrile and hemodynamically stable. Labs with no leukocytosis. ID asked to help manage.     REVIEW OF SYSTEMS:  Constitutional: No fevers, No chills  Skin: No rash, no phlebitis	  Eyes: No discharge, No change in vision	  ENMT: No sore throat, No ulcers  Respiratory: No cough, no SOB  Cardiovascular:  No chest pain, No palpitations   Gastrointestinal: No pain, No nausea, No vomiting  Genitourinary: No dysuria, No frequency  MSK: No Joint pain, No back pain, No edema  Neurological: No HA      Prior hospital charts reviewed [Yes]  Primary team notes reviewed [Yes]  Other consultant notes reviewed [Yes]    PAST MEDICAL & SURGICAL HISTORY:  Testicular Cancer 1994, chemotherapy  Headache, Migraine  HTN (hypertension)  NHL (non-Hodgkin's lymphoma)   1999, Radiation to left neck region  GERD (gastroesophageal reflux disease)  Colitis surgery 1996  BPH (benign prostatic hyperplasia)  Osteoarthritis degenerative disc L3-4  History of bone marrow transplant  Hypertriglyceridemia  Malignant melanoma of scalp SX 08/18  R neck mass dsx/ LN dsx 10/19/18  Hypothyroidism  History of chemotherapy  History of Raynaud's syndrome  CAD (coronary artery disease)  Unspecified malignant neoplasm of skin of scalp and neck  History of orchiectomy left 1994  S/P colon resection 1996  Lymph node disorders/p abdominal lymph node dissection 1994, 1996  Melanoma of scalp or neck excision 8/18  History of nasal septoplasty  History of infusaport central venous catheter insertion            SOCIAL HISTORY:  Works in [employer]       FAMILY HISTORY:  Hypertension (Father)        Allergies:  No Known Allergies      ANTIMICROBIALS:      ANTIMICROBIALS (past 90 days):  MEDICATIONS  (STANDING):        OTHER MEDS:   MEDICATIONS  (STANDING):      VITALS:  Vital Signs Last 24 Hrs  T(F): 98.7 (04-18-25 @ 14:40), Max: 100.7 (04-13-25 @ 21:30)    Vital Signs Last 24 Hrs  HR: 73 (04-18-25 @ 14:40) (73 - 78)  BP: 133/77 (04-18-25 @ 14:40) (133/77 - 154/89)  RR: 17 (04-18-25 @ 14:40)  SpO2: 100% (04-18-25 @ 14:40) (99% - 100%)  Wt(kg): --    EXAM:      Labs:                        10.7   5.17  )-----------( 251      ( 18 Apr 2025 13:26 )             33.2     04-18    138  |  101  |  8   ----------------------------<  88  3.3[L]   |  22  |  0.86    Ca    8.8      18 Apr 2025 13:26  Phos  2.1     04-17  Mg     2.0     04-17    TPro  7.3  /  Alb  4.2  /  TBili  0.6  /  DBili  x   /  AST  32  /  ALT  19  /  AlkPhos  62  04-18      WBC Trend:  WBC Count: 5.17 (04-18-25 @ 13:26)  WBC Count: 4.70 (04-17-25 @ 06:12)  WBC Count: 8.51 (04-16-25 @ 05:51)  WBC Count: 6.15 (04-15-25 @ 06:46)      Auto Neutrophil #: 2.31 K/uL (02-01-25 @ 06:17)  Auto Neutrophil #: 3.40 K/uL (01-31-25 @ 16:27)  Auto Neutrophil #: 3.12 K/uL (01-22-25 @ 18:18)  Auto Neutrophil #: 4.12 K/uL (10-15-24 @ 10:25)  Auto Neutrophil #: 9.11 K/uL (07-28-24 @ 22:33)      Creatine Trend:  Creatinine: 0.86 (04-18)  Creatinine: 1.15 (04-17)  Creatinine: 1.37 (04-16)  Creatinine: 1.37 (04-15)      Liver Biochemical Testing Trend:  Alanine Aminotransferase (ALT/SGPT): 19 (04-18)  Alanine Aminotransferase (ALT/SGPT): 9 *L* (04-17)  Alanine Aminotransferase (ALT/SGPT): 9 *L* (04-16)  Alanine Aminotransferase (ALT/SGPT): 15 (04-15)  Alanine Aminotransferase (ALT/SGPT): 15 (04-14)  Aspartate Aminotransferase (AST/SGOT): 32 (04-18-25 @ 13:26)  Aspartate Aminotransferase (AST/SGOT): 18 (04-17-25 @ 06:12)  Aspartate Aminotransferase (AST/SGOT): 21 (04-16-25 @ 05:52)  Aspartate Aminotransferase (AST/SGOT): 21 (04-15-25 @ 06:46)  Aspartate Aminotransferase (AST/SGOT): 22 (04-14-25 @ 20:08)  Bilirubin Total: 0.6 (04-18)  Bilirubin Total: 0.3 (04-17)  Bilirubin Total: 0.3 (04-16)  Bilirubin Total: 0.5 (04-15)  Bilirubin Total: 0.6 (04-14)      Trend LDH          Urinalysis Basic - ( 18 Apr 2025 13:26 )    Urinalysis with Rflx Culture (04.15.25 @ 00:10)   Urine Appearance: Cloudy  Color: Yellow  Specific Gravity: 1.012  pH Urine: 5.5  Protein, Urine: 30 mg/dL  Glucose Qualitative, Urine: Negative mg/dL  Ketone - Urine: Negative mg/dL  Blood, Urine: Trace  Bilirubin: Negative  Urobilinogen: 0.2 mg/dL  Leukocyte Esterase Concentration: Negative  Nitrite: NegativeColor: x / Appearance: x / SG: x / pH: x  Gluc: 88 mg/dL / Ketone: x  / Bili: x / Urobili: x   Blood: x / Protein: x / Nitrite: x   Leuk Esterase: x / RBC: x / WBC x   Sq Epi: x / Non Sq Epi: x / Bacteria: x        MICROBIOLOGY:    MRSA PCR Result.: [place] (04-14-25 @ 04:55)      Urinalysis with Rflx Culture (collected 15 Apr 2025 00:10)    Culture - Blood (collected 13 Apr 2025 21:45)  Source: Blood Blood-Peripheral  Preliminary Report:    No growth at 4 days    Culture - Blood (collected 13 Apr 2025 21:40)  Source: Blood Blood-Peripheral  Preliminary Report:    No growth at 4 days    Culture - Urine (collected 30 May 2024 02:41)  Source: Clean Catch Clean Catch (Midstream)  Final Report:    <10,000 CFU/mL Normal Urogenital Emani    Culture - Urine (collected 05 May 2024 13:57)  Source: Clean Catch Clean Catch (Midstream)  Final Report:    No growth    Culture - Blood (collected 05 May 2024 09:35)  Source: .Blood Blood-Peripheral  Final Report:    No growth at 5 days    Culture - Blood (collected 05 May 2024 09:25)  Source: .Blood Blood-Peripheral  Final Report:    No growth at 5 days    Culture - Blood (collected 24 Mar 2024 21:20)  Source: .Blood Blood-Peripheral  Final Report:    No growth at 5 days    Culture - Blood (collected 24 Mar 2024 21:10)  Source: .Blood Blood-Peripheral  Final Report:    No growth at 5 days    Culture - Blood (collected 10 Feb 2024 12:47)  Source: .Blood Blood-Peripheral  Final Report:    No growth at 5 days                        C Diff by PCR Result: Detected (04-15 @ 00:09)              Ferritin: 108 (04-17)              A1C with Estimated Average Glucose Result: 5.3 % (02-01-25 @ 06:17)      RADIOLOGY:  imaging below personally reviewed   INFECTIOUS DISEASE CONSULT NOTE    Patient is a 55y old Male who presents with a chief complaint of anemia    HPI: 55M with history of testicular cancer s/p orchiectomy, prior non-Hodgkin's lymphoma, scalp melanoma s/p resection and immunotherapy CAD s/p ONESIMO (10/2024), HTN, HLD, hypothyroidism, adrenal insufficiency  (not on steroids), ischemic colitis s/p resection, overactive bladder s/p bladder stimulator recently discharged on 4/17 presents due to concern for low hemoglobin. Patient states that his cardiologist called him stating that his hemoglobin was ~8 when he was discharged at that he should go back to the hospital for further evaluation. Of note patient presented to the hospital on 4/13/25 with nausea, vomiting and diarrhea after eating chicken. He was febrile and hypotensive requiring IVF resuscitation and steroids. Labs with at the time with leukocytosis, CHANDNI and elevated lactate.  He had a CT A/P with enterocolitis. Patient was empirically on cefepime and falgyll. GI PCR was negative Cdiff PCR noted to be positive and indeterminate GDH. He was started on Vancomycin on 4/15. Patient and his wife feel that since starting the oral vancomycin he has been having visual hallucination. He states he sees animals on his blanket, and people sitting on a chair in front of him. He is now having about four bowel movements which are more formed. Patient currently afebrile and hemodynamically stable. Labs with no leukocytosis. ID asked to help manage.     REVIEW OF SYSTEMS:  Constitutional: No fevers, No chills  Skin: No rash, no phlebitis	  Eyes: No discharge, No change in vision	  ENMT: No sore throat, No ulcers  Respiratory: No cough, no SOB  Cardiovascular:  No chest pain, No palpitations   Gastrointestinal: No pain, No nausea, No vomiting  Genitourinary: No dysuria, No frequency  MSK: No Joint pain, No back pain, No edema  Neurological: No HA      Prior hospital charts reviewed [Yes]  Primary team notes reviewed [Yes]  Other consultant notes reviewed [Yes]    PAST MEDICAL & SURGICAL HISTORY:  Testicular Cancer 1994, chemotherapy  Headache, Migraine  HTN (hypertension)  NHL (non-Hodgkin's lymphoma)   1999, Radiation to left neck region  GERD (gastroesophageal reflux disease)  Colitis surgery 1996  BPH (benign prostatic hyperplasia)  Osteoarthritis degenerative disc L3-4  History of bone marrow transplant  Hypertriglyceridemia  Malignant melanoma of scalp SX 08/18  R neck mass dsx/ LN dsx 10/19/18  Hypothyroidism  History of chemotherapy  History of Raynaud's syndrome  CAD (coronary artery disease)  Unspecified malignant neoplasm of skin of scalp and neck  History of orchiectomy left 1994  S/P colon resection 1996  Lymph node disorders/p abdominal lymph node dissection 1994, 1996  Melanoma of scalp or neck excision 8/18  History of nasal septoplasty  History of infusaport central venous catheter insertion            SOCIAL HISTORY:  Works in [employer]       FAMILY HISTORY:  Hypertension (Father)        Allergies:  No Known Allergies      ANTIMICROBIALS:      ANTIMICROBIALS (past 90 days):  MEDICATIONS  (STANDING):        OTHER MEDS:   MEDICATIONS  (STANDING):      VITALS:  Vital Signs Last 24 Hrs  T(F): 98.7 (04-18-25 @ 14:40), Max: 100.7 (04-13-25 @ 21:30)    Vital Signs Last 24 Hrs  HR: 73 (04-18-25 @ 14:40) (73 - 78)  BP: 133/77 (04-18-25 @ 14:40) (133/77 - 154/89)  RR: 17 (04-18-25 @ 14:40)  SpO2: 100% (04-18-25 @ 14:40) (99% - 100%)  Wt(kg): --    EXAM:  General:in no acute distress  HEENT: mucous membranes moist   Neck: Supple  CV: +S1/S2, RRR, no murmurs heard  Lungs: No respiratory distress, CTA b/l  Abd:  BS4+, Soft, NTND, no guarding  : No suprapubic tenderness  Neuro: AAOx3. No focal deficits noted.   Ext: No cyanosis, no edema  Msk: freely moving upper and lower extremities  Skin: No rash, no phlebitis, No erythema      Labs:                        10.7   5.17  )-----------( 251      ( 18 Apr 2025 13:26 )             33.2     04-18    138  |  101  |  8   ----------------------------<  88  3.3[L]   |  22  |  0.86    Ca    8.8      18 Apr 2025 13:26  Phos  2.1     04-17  Mg     2.0     04-17    TPro  7.3  /  Alb  4.2  /  TBili  0.6  /  DBili  x   /  AST  32  /  ALT  19  /  AlkPhos  62  04-18      WBC Trend:  WBC Count: 5.17 (04-18-25 @ 13:26)  WBC Count: 4.70 (04-17-25 @ 06:12)  WBC Count: 8.51 (04-16-25 @ 05:51)  WBC Count: 6.15 (04-15-25 @ 06:46)      Auto Neutrophil #: 2.31 K/uL (02-01-25 @ 06:17)  Auto Neutrophil #: 3.40 K/uL (01-31-25 @ 16:27)  Auto Neutrophil #: 3.12 K/uL (01-22-25 @ 18:18)  Auto Neutrophil #: 4.12 K/uL (10-15-24 @ 10:25)  Auto Neutrophil #: 9.11 K/uL (07-28-24 @ 22:33)      Creatine Trend:  Creatinine: 0.86 (04-18)  Creatinine: 1.15 (04-17)  Creatinine: 1.37 (04-16)  Creatinine: 1.37 (04-15)      Liver Biochemical Testing Trend:  Alanine Aminotransferase (ALT/SGPT): 19 (04-18)  Alanine Aminotransferase (ALT/SGPT): 9 *L* (04-17)  Alanine Aminotransferase (ALT/SGPT): 9 *L* (04-16)  Alanine Aminotransferase (ALT/SGPT): 15 (04-15)  Alanine Aminotransferase (ALT/SGPT): 15 (04-14)  Aspartate Aminotransferase (AST/SGOT): 32 (04-18-25 @ 13:26)  Aspartate Aminotransferase (AST/SGOT): 18 (04-17-25 @ 06:12)  Aspartate Aminotransferase (AST/SGOT): 21 (04-16-25 @ 05:52)  Aspartate Aminotransferase (AST/SGOT): 21 (04-15-25 @ 06:46)  Aspartate Aminotransferase (AST/SGOT): 22 (04-14-25 @ 20:08)  Bilirubin Total: 0.6 (04-18)  Bilirubin Total: 0.3 (04-17)  Bilirubin Total: 0.3 (04-16)  Bilirubin Total: 0.5 (04-15)  Bilirubin Total: 0.6 (04-14)      Trend LDH          Urinalysis Basic - ( 18 Apr 2025 13:26 )    Urinalysis with Rflx Culture (04.15.25 @ 00:10)   Urine Appearance: Cloudy  Color: Yellow  Specific Gravity: 1.012  pH Urine: 5.5  Protein, Urine: 30 mg/dL  Glucose Qualitative, Urine: Negative mg/dL  Ketone - Urine: Negative mg/dL  Blood, Urine: Trace  Bilirubin: Negative  Urobilinogen: 0.2 mg/dL  Leukocyte Esterase Concentration: Negative  Nitrite: Negative      MICROBIOLOGY:    MRSA PCR Result.: NotDetec (04-14-25 @ 04:55)      Urinalysis with Rflx Culture (collected 15 Apr 2025 00:10)    Culture - Blood (collected 13 Apr 2025 21:45)  Source: Blood Blood-Peripheral  Preliminary Report:    No growth at 4 days    Culture - Blood (collected 13 Apr 2025 21:40)  Source: Blood Blood-Peripheral  Preliminary Report:    No growth at 4 days    Culture - Urine (collected 30 May 2024 02:41)  Source: Clean Catch Clean Catch (Midstream)  Final Report:    <10,000 CFU/mL Normal Urogenital Emani    Culture - Urine (collected 05 May 2024 13:57)  Source: Clean Catch Clean Catch (Midstream)  Final Report:    No growth    Culture - Blood (collected 05 May 2024 09:35)  Source: .Blood Blood-Peripheral  Final Report:    No growth at 5 days    Culture - Blood (collected 05 May 2024 09:25)  Source: .Blood Blood-Peripheral  Final Report:    No growth at 5 days    Culture - Blood (collected 24 Mar 2024 21:20)  Source: .Blood Blood-Peripheral  Final Report:    No growth at 5 days    Culture - Blood (collected 24 Mar 2024 21:10)  Source: .Blood Blood-Peripheral  Final Report:    No growth at 5 days    Culture - Blood (collected 10 Feb 2024 12:47)  Source: .Blood Blood-Peripheral  Final Report:    No growth at 5 days                        C Diff by PCR Result: Detected (04-15 @ 00:09)              Ferritin: 108 (04-17)              A1C with Estimated Average Glucose Result: 5.3 % (02-01-25 @ 06:17)      RADIOLOGY:  imaging below personally reviewed   INFECTIOUS DISEASE CONSULT NOTE    Patient is a 55y old Male who presents with a chief complaint of anemia    HPI: 55M with history of testicular cancer s/p orchiectomy, prior non-Hodgkin's lymphoma, scalp melanoma s/p resection and immunotherapy CAD s/p ONESIMO (10/2024), HTN, HLD, hypothyroidism, adrenal insufficiency  (not on steroids), ischemic colitis s/p resection, overactive bladder s/p bladder stimulator recently discharged on 4/17 presents due to concern for low hemoglobin. Patient states that his cardiologist called him stating that his hemoglobin was ~8 when he was discharged at that he should go back to the hospital for further evaluation. Of note patient presented to the hospital on 4/13/25 with nausea, vomiting and diarrhea after eating chicken. He was febrile and hypotensive requiring IVF resuscitation and steroids. Labs with at the time with leukocytosis, CHANDNI and elevated lactate.  He had a CT A/P with enterocolitis. Patient was empirically on cefepime and falgyll. GI PCR was negative Cdiff PCR noted to be positive and indeterminate GDH. He was started on Vancomycin on 4/15. Patient and his wife feel that since starting the oral vancomycin he has been having visual hallucination. He states he sees animals on his blanket, and people sitting on a chair in front of him. He is now having about four bowel movements which are more formed. Patient currently afebrile and hemodynamically stable. Labs with no leukocytosis. ID asked to help manage.       REVIEW OF SYSTEMS:  Constitutional: No fevers, No chills  Skin: No rash, no phlebitis	  Eyes: No discharge, No change in vision	  ENMT: No sore throat, No ulcers  Respiratory: No cough, no SOB  Cardiovascular:  No chest pain, No palpitations   Gastrointestinal: No pain, No nausea, No vomiting  Genitourinary: No dysuria, No frequency  MSK: No Joint pain, No back pain, No edema  Neurological: No HA      Prior hospital charts reviewed [Yes]  Primary team notes reviewed [Yes]  Other consultant notes reviewed [Yes]      PAST MEDICAL & SURGICAL HISTORY:  Testicular Cancer 1994, chemotherapy  Headache, Migraine  HTN (hypertension)  NHL (non-Hodgkin's lymphoma)   1999, Radiation to left neck region  GERD (gastroesophageal reflux disease)  Colitis surgery 1996  BPH (benign prostatic hyperplasia)  Osteoarthritis degenerative disc L3-4  History of bone marrow transplant  Hypertriglyceridemia  Malignant melanoma of scalp SX 08/18  R neck mass dsx/ LN dsx 10/19/18  Hypothyroidism  History of chemotherapy  History of Raynaud's syndrome  CAD (coronary artery disease)  Unspecified malignant neoplasm of skin of scalp and neck  History of orchiectomy left 1994  S/P colon resection 1996  Lymph node disorders/p abdominal lymph node dissection 1994, 1996  Melanoma of scalp or neck excision 8/18  History of nasal septoplasty  History of infusaport central venous catheter insertion            SOCIAL HISTORY:  Works in [employer]       FAMILY HISTORY:  Hypertension (Father)        Allergies:  No Known Allergies      ANTIMICROBIALS:      ANTIMICROBIALS (past 90 days):  MEDICATIONS  (STANDING):        OTHER MEDS:   MEDICATIONS  (STANDING):      VITALS:  Vital Signs Last 24 Hrs  T(F): 98.7 (04-18-25 @ 14:40), Max: 100.7 (04-13-25 @ 21:30)    Vital Signs Last 24 Hrs  HR: 73 (04-18-25 @ 14:40) (73 - 78)  BP: 133/77 (04-18-25 @ 14:40) (133/77 - 154/89)  RR: 17 (04-18-25 @ 14:40)  SpO2: 100% (04-18-25 @ 14:40) (99% - 100%)  Wt(kg): --        EXAM:  General: in no acute distress  HEENT: mucous membranes moist   Neck: Supple  CV: +S1/S2, RRR, no murmurs heard  Lungs: No respiratory distress, CTA b/l  Abd: Soft, NTND  : No suprapubic tenderness  Neuro: AAOx3. No focal deficits noted.   Ext: No edema  Msk: no joint swelling   Skin: No rash      Labs:                        10.7   5.17  )-----------( 251      ( 18 Apr 2025 13:26 )             33.2     04-18    138  |  101  |  8   ----------------------------<  88  3.3[L]   |  22  |  0.86    Ca    8.8      18 Apr 2025 13:26  Phos  2.1     04-17  Mg     2.0     04-17    TPro  7.3  /  Alb  4.2  /  TBili  0.6  /  DBili  x   /  AST  32  /  ALT  19  /  AlkPhos  62  04-18      WBC Trend:  WBC Count: 5.17 (04-18-25 @ 13:26)  WBC Count: 4.70 (04-17-25 @ 06:12)  WBC Count: 8.51 (04-16-25 @ 05:51)  WBC Count: 6.15 (04-15-25 @ 06:46)      Auto Neutrophil #: 2.31 K/uL (02-01-25 @ 06:17)  Auto Neutrophil #: 3.40 K/uL (01-31-25 @ 16:27)  Auto Neutrophil #: 3.12 K/uL (01-22-25 @ 18:18)  Auto Neutrophil #: 4.12 K/uL (10-15-24 @ 10:25)  Auto Neutrophil #: 9.11 K/uL (07-28-24 @ 22:33)      Creatine Trend:  Creatinine: 0.86 (04-18)  Creatinine: 1.15 (04-17)  Creatinine: 1.37 (04-16)  Creatinine: 1.37 (04-15)      Liver Biochemical Testing Trend:  Alanine Aminotransferase (ALT/SGPT): 19 (04-18)  Alanine Aminotransferase (ALT/SGPT): 9 *L* (04-17)  Alanine Aminotransferase (ALT/SGPT): 9 *L* (04-16)  Alanine Aminotransferase (ALT/SGPT): 15 (04-15)  Alanine Aminotransferase (ALT/SGPT): 15 (04-14)  Aspartate Aminotransferase (AST/SGOT): 32 (04-18-25 @ 13:26)  Aspartate Aminotransferase (AST/SGOT): 18 (04-17-25 @ 06:12)  Aspartate Aminotransferase (AST/SGOT): 21 (04-16-25 @ 05:52)  Aspartate Aminotransferase (AST/SGOT): 21 (04-15-25 @ 06:46)  Aspartate Aminotransferase (AST/SGOT): 22 (04-14-25 @ 20:08)  Bilirubin Total: 0.6 (04-18)  Bilirubin Total: 0.3 (04-17)  Bilirubin Total: 0.3 (04-16)  Bilirubin Total: 0.5 (04-15)  Bilirubin Total: 0.6 (04-14)      Trend LDH          Urinalysis Basic - ( 18 Apr 2025 13:26 )    Urinalysis with Rflx Culture (04.15.25 @ 00:10)   Urine Appearance: Cloudy  Color: Yellow  Specific Gravity: 1.012  pH Urine: 5.5  Protein, Urine: 30 mg/dL  Glucose Qualitative, Urine: Negative mg/dL  Ketone - Urine: Negative mg/dL  Blood, Urine: Trace  Bilirubin: Negative  Urobilinogen: 0.2 mg/dL  Leukocyte Esterase Concentration: Negative  Nitrite: Negative      MICROBIOLOGY:    MRSA PCR Result.: NotDetec (04-14-25 @ 04:55)      Urinalysis with Rflx Culture (collected 15 Apr 2025 00:10)    Culture - Blood (collected 13 Apr 2025 21:45)  Source: Blood Blood-Peripheral  Preliminary Report:    No growth at 4 days    Culture - Blood (collected 13 Apr 2025 21:40)  Source: Blood Blood-Peripheral  Preliminary Report:    No growth at 4 days    Culture - Urine (collected 30 May 2024 02:41)  Source: Clean Catch Clean Catch (Midstream)  Final Report:    <10,000 CFU/mL Normal Urogenital Emani    Culture - Urine (collected 05 May 2024 13:57)  Source: Clean Catch Clean Catch (Midstream)  Final Report:    No growth    Culture - Blood (collected 05 May 2024 09:35)  Source: .Blood Blood-Peripheral  Final Report:    No growth at 5 days    Culture - Blood (collected 05 May 2024 09:25)  Source: .Blood Blood-Peripheral  Final Report:    No growth at 5 days    Culture - Blood (collected 24 Mar 2024 21:20)  Source: .Blood Blood-Peripheral  Final Report:    No growth at 5 days    Culture - Blood (collected 24 Mar 2024 21:10)  Source: .Blood Blood-Peripheral  Final Report:    No growth at 5 days    Culture - Blood (collected 10 Feb 2024 12:47)  Source: .Blood Blood-Peripheral  Final Report:    No growth at 5 days      C Diff by PCR Result: Detected (04-15 @ 00:09)      Ferritin: 108 (04-17)      A1C with Estimated Average Glucose Result: 5.3 % (02-01-25 @ 06:17)      RADIOLOGY:  imaging below personally reviewed    < from: CT Head No Cont (04.18.25 @ 20:08) >  IMPRESSION:  No CT evidence of acute intracranial pathology.

## 2025-04-18 NOTE — CONSULT NOTE ADULT - ASSESSMENT
Impression:   55 yrs old male w/ hx of testicular cancer s/p orchiectomy, prior non-Hodgkin's lymphoma, scalp melanoma s/p resection and immunotherapy CAD s/p ONESIMO (10/2024) on aspirin, HTN, HLD, hypothyroidism, adrenal insufficiency  (not on steroids), ischemic colitis s/p resection, overactive bladder s/p bladder stimulator, ischemic colitis s/p L hemicolectomy with transverse colon to rectum anastomosis, recently discharged on 4/17/25 after treatment for N/V, diarrhea, CHANDNI, hypotension, fever, elevated lactate, found to have c diff pcr positive, started on oral vancomycin, presents due to concern for low hemoglobin.     #Acute anemia   - baseline hgb around 13, last admission during septic shock, it decreased to 8.8. Now on admission it was 10. No overt signs of GI bleeding.   - patient had colonoscopies in the past which showed multiple polyps, last colon did show recently bleeding AVM in AC, but now hgb has improved and he has brown stools. Also had EGD 2 years which was normal, no records.   - Likely had anemia in the setting of sepsis, ACD, his iron and ferritin levels are normal. Not concerned for chronic GI blood loss now.     Recommendations:    - No indication for endoscopic intervention at this time as the hgb has improved and he has no overt signs of GI bleeding.   - No contraindications for both ASA and Plavix.   - Monitor CBC daily for now.   - Rest of the care per primary team.     Discussed the case with Dr. Pisano.     GI will plan to sign off at this time. Please feel free to reach out to our team with any follow up questions. Please provide patient with Gastroenterology Clinic number to confirm/arrange appointment; [phone] ([facility] at [address] [facility]) or [phone] ([facility]) or [phone] ([facility] at [address] [facility]).    All recommendations are tentative until the note is attested by an attending.     Mohan Redmond, PGY-6  Gastroenterology/Hepatology Fellow  Available on [employer] Teams  [phone] (Short Range Pager)  [phone] (Long Range Pager)    After 5pm, please contact the on-call GI fellow.

## 2025-04-18 NOTE — H&P ADULT - PROBLEM SELECTOR PLAN 2
Check CT head.  Check MRI brain w/wo contrast, vEEG.  Check TSH, FT4, RPR, WNV, Tox screen, heavy metal screen, Vit B1, B12, Folate, Vit D 25, blood cx, UA, urine cx, A1C.  Per ID, unlikely due to oral vancomycin.   Neurology team consulted on admission, F/U on their recs pending.  Neuro checks.  HOLD ambien. Check CT head.  Check MRI brain w/wo contrast, vEEG.  Check TSH, FT4, RPR, WNV, Tox screen, heavy metal screen, Vit B1, B12, Folate, Vit D 25, blood cx, UA, urine cx, A1C.  Per ID, oral vancomycin unlikely to cause hallucination.   Neurology team consulted on admission, F/U on their recs pending.  Neuro checks.  HOLD ambien. Check CT head.  Check MRI brain w/wo contrast, vEEG.  Check TSH, FT4, RPR, WNV, Tox screen, heavy metal screen, Vit B1, B12, Folate, Vit D 25, blood cx, UA, urine cx, A1C.  Monitor CBC, BMP.  Per ID, oral vancomycin unlikely to cause hallucination.   Neurology team consulted on admission, F/U on their recs pending.  Neuro checks.  HOLD ambien.  Delirium prevention measures, Promote sleep hygiene. Seizure precautions. Check CT head.  Check MRI brain w/wo contrast, vEEG.  Check TSH, FT4, RPR, WNV, HIV, Tox screen, heavy metal screen, Vit B1, B12, Folate, Vit D 25, blood cx, UA, urine cx, A1C.  Monitor CBC, BMP, lactate, blood gas.  Per ID, oral vancomycin unlikely to cause hallucination.   Neurology team consulted on admission, F/U on their recs pending.  Neuro checks.  HOLD ambien.  Delirium prevention measures, Promote sleep hygiene. Seizure precautions. Check CT head.  Check MRI brain w/wo contrast, vEEG.  Check TSH, FT4, RPR, WNV, HIV, Tox screen, heavy metal screen, Vit B1, B12, Folate, Vit D 25, blood cx, UA, urine cx, A1C.  Monitor CBC, BMP, lactate, blood gas.  Per ID, oral vancomycin unlikely to cause hallucination.   Neurology team consulted on admission, F/U on their recs pending.  Neuro checks.  HOLD ambien.  Delirium prevention measures, Promote sleep hygiene. Seizure precautions.  Consider psych eval.

## 2025-04-19 ENCOUNTER — TRANSCRIPTION ENCOUNTER (OUTPATIENT)
Age: 55
End: 2025-04-19

## 2025-04-19 LAB
24R-OH-CALCIDIOL SERPL-MCNC: 33.1 NG/ML — SIGNIFICANT CHANGE UP
A1C WITH ESTIMATED AVERAGE GLUCOSE RESULT: 5.2 % — SIGNIFICANT CHANGE UP (ref 4–5.6)
ALBUMIN SERPL ELPH-MCNC: 4 G/DL — SIGNIFICANT CHANGE UP (ref 3.3–5)
ALP SERPL-CCNC: 57 U/L — SIGNIFICANT CHANGE UP (ref 40–120)
ALT FLD-CCNC: 22 U/L — SIGNIFICANT CHANGE UP (ref 10–45)
ANION GAP SERPL CALC-SCNC: 14 MMOL/L — SIGNIFICANT CHANGE UP (ref 5–17)
APPEARANCE UR: CLEAR — SIGNIFICANT CHANGE UP
AST SERPL-CCNC: 34 U/L — SIGNIFICANT CHANGE UP (ref 10–40)
BASOPHILS # BLD AUTO: 0.04 K/UL — SIGNIFICANT CHANGE UP (ref 0–0.2)
BASOPHILS NFR BLD AUTO: 0.8 % — SIGNIFICANT CHANGE UP (ref 0–2)
BILIRUB SERPL-MCNC: 0.5 MG/DL — SIGNIFICANT CHANGE UP (ref 0.2–1.2)
BILIRUB UR-MCNC: NEGATIVE — SIGNIFICANT CHANGE UP
BUN SERPL-MCNC: 9 MG/DL — SIGNIFICANT CHANGE UP (ref 7–23)
CALCIUM SERPL-MCNC: 8.7 MG/DL — SIGNIFICANT CHANGE UP (ref 8.4–10.5)
CHLORIDE SERPL-SCNC: 104 MMOL/L — SIGNIFICANT CHANGE UP (ref 96–108)
CO2 SERPL-SCNC: 21 MMOL/L — LOW (ref 22–31)
COLOR SPEC: YELLOW — SIGNIFICANT CHANGE UP
CREAT SERPL-MCNC: 0.94 MG/DL — SIGNIFICANT CHANGE UP (ref 0.5–1.3)
CULTURE RESULTS: SIGNIFICANT CHANGE UP
CULTURE RESULTS: SIGNIFICANT CHANGE UP
DIFF PNL FLD: NEGATIVE — SIGNIFICANT CHANGE UP
EGFR: 96 ML/MIN/1.73M2 — SIGNIFICANT CHANGE UP
EGFR: 96 ML/MIN/1.73M2 — SIGNIFICANT CHANGE UP
EOSINOPHIL # BLD AUTO: 0.09 K/UL — SIGNIFICANT CHANGE UP (ref 0–0.5)
EOSINOPHIL NFR BLD AUTO: 1.8 % — SIGNIFICANT CHANGE UP (ref 0–6)
ESTIMATED AVERAGE GLUCOSE: 103 MG/DL — SIGNIFICANT CHANGE UP (ref 68–114)
FERRITIN SERPL-MCNC: 132 NG/ML — SIGNIFICANT CHANGE UP (ref 30–400)
FOLATE SERPL-MCNC: 10.2 NG/ML — SIGNIFICANT CHANGE UP
GLUCOSE SERPL-MCNC: 81 MG/DL — SIGNIFICANT CHANGE UP (ref 70–99)
GLUCOSE UR QL: NEGATIVE MG/DL — SIGNIFICANT CHANGE UP
HCT VFR BLD CALC: 31.8 % — LOW (ref 39–50)
HGB BLD-MCNC: 10.6 G/DL — LOW (ref 13–17)
IMM GRANULOCYTES NFR BLD AUTO: 1.2 % — HIGH (ref 0–0.9)
IRON SATN MFR SERPL: 19 % — SIGNIFICANT CHANGE UP (ref 16–55)
IRON SATN MFR SERPL: 55 UG/DL — SIGNIFICANT CHANGE UP (ref 45–165)
KETONES UR-MCNC: NEGATIVE MG/DL — SIGNIFICANT CHANGE UP
LACTATE SERPL-SCNC: 0.7 MMOL/L — SIGNIFICANT CHANGE UP (ref 0.5–2)
LEUKOCYTE ESTERASE UR-ACNC: NEGATIVE — SIGNIFICANT CHANGE UP
LYMPHOCYTES # BLD AUTO: 1.98 K/UL — SIGNIFICANT CHANGE UP (ref 1–3.3)
LYMPHOCYTES # BLD AUTO: 39.5 % — SIGNIFICANT CHANGE UP (ref 13–44)
MCHC RBC-ENTMCNC: 28.6 PG — SIGNIFICANT CHANGE UP (ref 27–34)
MCHC RBC-ENTMCNC: 33.3 G/DL — SIGNIFICANT CHANGE UP (ref 32–36)
MCV RBC AUTO: 85.7 FL — SIGNIFICANT CHANGE UP (ref 80–100)
MONOCYTES # BLD AUTO: 0.36 K/UL — SIGNIFICANT CHANGE UP (ref 0–0.9)
MONOCYTES NFR BLD AUTO: 7.2 % — SIGNIFICANT CHANGE UP (ref 2–14)
NEUTROPHILS # BLD AUTO: 2.48 K/UL — SIGNIFICANT CHANGE UP (ref 1.8–7.4)
NEUTROPHILS NFR BLD AUTO: 49.5 % — SIGNIFICANT CHANGE UP (ref 43–77)
NITRITE UR-MCNC: NEGATIVE — SIGNIFICANT CHANGE UP
NRBC BLD AUTO-RTO: 0 /100 WBCS — SIGNIFICANT CHANGE UP (ref 0–0)
PH UR: 6.5 — SIGNIFICANT CHANGE UP (ref 5–8)
PLATELET # BLD AUTO: 348 K/UL — SIGNIFICANT CHANGE UP (ref 150–400)
POTASSIUM SERPL-MCNC: 3.6 MMOL/L — SIGNIFICANT CHANGE UP (ref 3.5–5.3)
POTASSIUM SERPL-SCNC: 3.6 MMOL/L — SIGNIFICANT CHANGE UP (ref 3.5–5.3)
PROT SERPL-MCNC: 6.6 G/DL — SIGNIFICANT CHANGE UP (ref 6–8.3)
PROT UR-MCNC: NEGATIVE MG/DL — SIGNIFICANT CHANGE UP
RBC # BLD: 3.71 M/UL — LOW (ref 4.2–5.8)
RBC # FLD: 13.6 % — SIGNIFICANT CHANGE UP (ref 10.3–14.5)
SODIUM SERPL-SCNC: 139 MMOL/L — SIGNIFICANT CHANGE UP (ref 135–145)
SP GR SPEC: 1.01 — SIGNIFICANT CHANGE UP (ref 1–1.03)
SPECIMEN SOURCE: SIGNIFICANT CHANGE UP
SPECIMEN SOURCE: SIGNIFICANT CHANGE UP
T PALLIDUM AB TITR SER: NEGATIVE — SIGNIFICANT CHANGE UP
T4 FREE SERPL-MCNC: 1.2 NG/DL — SIGNIFICANT CHANGE UP (ref 0.9–1.8)
TIBC SERPL-MCNC: 282 UG/DL — SIGNIFICANT CHANGE UP (ref 220–430)
TSH SERPL-MCNC: 9.34 UIU/ML — HIGH (ref 0.27–4.2)
UIBC SERPL-MCNC: 228 UG/DL — SIGNIFICANT CHANGE UP (ref 110–370)
UROBILINOGEN FLD QL: 0.2 MG/DL — SIGNIFICANT CHANGE UP (ref 0.2–1)
VIT B12 SERPL-MCNC: 437 PG/ML — SIGNIFICANT CHANGE UP (ref 232–1245)
WBC # BLD: 5.01 K/UL — SIGNIFICANT CHANGE UP (ref 3.8–10.5)
WBC # FLD AUTO: 5.01 K/UL — SIGNIFICANT CHANGE UP (ref 3.8–10.5)

## 2025-04-19 PROCEDURE — 99233 SBSQ HOSP IP/OBS HIGH 50: CPT

## 2025-04-19 PROCEDURE — 70553 MRI BRAIN STEM W/O & W/DYE: CPT | Mod: 26

## 2025-04-19 RX ORDER — HYDROMORPHONE/SOD CHLOR,ISO/PF 2 MG/10 ML
0.5 SYRINGE (ML) INJECTION EVERY 8 HOURS
Refills: 0 | Status: DISCONTINUED | OUTPATIENT
Start: 2025-04-19 | End: 2025-04-20

## 2025-04-19 RX ORDER — LEVOTHYROXINE SODIUM 300 MCG
125 TABLET ORAL DAILY
Refills: 0 | Status: DISCONTINUED | OUTPATIENT
Start: 2025-04-19 | End: 2025-04-20

## 2025-04-19 RX ORDER — HYDROMORPHONE/SOD CHLOR,ISO/PF 2 MG/10 ML
1 SYRINGE (ML) INJECTION EVERY 8 HOURS
Refills: 0 | Status: DISCONTINUED | OUTPATIENT
Start: 2025-04-19 | End: 2025-04-19

## 2025-04-19 RX ORDER — OXYCODONE HYDROCHLORIDE 30 MG/1
10 TABLET ORAL
Refills: 0 | Status: DISCONTINUED | OUTPATIENT
Start: 2025-04-19 | End: 2025-04-20

## 2025-04-19 RX ADMIN — Medication 125 MILLIGRAM(S): at 11:59

## 2025-04-19 RX ADMIN — OXYCODONE HYDROCHLORIDE 10 MILLIGRAM(S): 30 TABLET ORAL at 06:27

## 2025-04-19 RX ADMIN — Medication 125 MILLIGRAM(S): at 18:16

## 2025-04-19 RX ADMIN — TAMSULOSIN HYDROCHLORIDE 0.4 MILLIGRAM(S): 0.4 CAPSULE ORAL at 21:45

## 2025-04-19 RX ADMIN — Medication 10 MILLIGRAM(S): at 14:30

## 2025-04-19 RX ADMIN — OXYCODONE HYDROCHLORIDE 10 MILLIGRAM(S): 30 TABLET ORAL at 21:45

## 2025-04-19 RX ADMIN — ATORVASTATIN CALCIUM 40 MILLIGRAM(S): 80 TABLET, FILM COATED ORAL at 21:45

## 2025-04-19 RX ADMIN — Medication 125 MILLIGRAM(S): at 06:27

## 2025-04-19 RX ADMIN — Medication 125 MICROGRAM(S): at 18:15

## 2025-04-19 RX ADMIN — OXYCODONE HYDROCHLORIDE 10 MILLIGRAM(S): 30 TABLET ORAL at 13:11

## 2025-04-19 RX ADMIN — Medication 1 APPLICATION(S): at 11:57

## 2025-04-19 RX ADMIN — Medication 20 MILLIGRAM(S): at 08:29

## 2025-04-19 RX ADMIN — BUTYROSPERMUM PARKII(SHEA BUTTER), SIMMONDSIA CHINENSIS (JOJOBA) SEED OIL, ALOE BARBADENSIS LEAF EXTRACT 250 MILLIGRAM(S): .01; 1; 3.5 LIQUID TOPICAL at 06:27

## 2025-04-19 RX ADMIN — BUTYROSPERMUM PARKII(SHEA BUTTER), SIMMONDSIA CHINENSIS (JOJOBA) SEED OIL, ALOE BARBADENSIS LEAF EXTRACT 250 MILLIGRAM(S): .01; 1; 3.5 LIQUID TOPICAL at 18:15

## 2025-04-19 RX ADMIN — Medication 125 MILLIGRAM(S): at 23:08

## 2025-04-19 RX ADMIN — Medication 81 MILLIGRAM(S): at 11:59

## 2025-04-19 RX ADMIN — Medication 112 MICROGRAM(S): at 06:28

## 2025-04-19 RX ADMIN — Medication 0.5 MILLIGRAM(S): at 18:22

## 2025-04-19 NOTE — PROGRESS NOTE ADULT - PROBLEM SELECTOR PLAN 2
CTH neg, afebrile, electrolytes wnl  - Awaiting on MRI brain trport  - Check TSH, FT4, RPR, WNV, HIV, Tox screen, heavy metal screen, Vit B1, B12, Folate, Vit D 25, blood cx, UA, urine cx, A1C.  -  Per ID, oral vancomycin unlikely to cause hallucination.   - Neurology team consulted on admission, F/U on their recs pending. CTH neg, afebrile, electrolytes wnl  - Awaiting on MRI brain trport  -  low FT4, increased TSH,  increase synthroid 125mg/d  - f/u RPR, WNV, HIV, Tox screen, heavy metal screen, Vit B1, B12, Folate, Vit D 25, blood cx, UA, urine cx, A1C.  -  Per ID, oral vancomycin unlikely to cause hallucination.   - Neurology team consulted on admission, F/U on their recs pending.

## 2025-04-19 NOTE — PROGRESS NOTE ADULT - PROBLEM SELECTOR PLAN 6
Continue levothyroxine.   - Check TSH, FT4, TT3. - low FT4, increased TSH  - increase synthroid 125mg/d

## 2025-04-19 NOTE — DISCHARGE NOTE PROVIDER - NSDCMRMEDTOKEN_GEN_ALL_CORE_FT
aspirin 81 mg oral delayed release tablet: 1 tab(s) orally once a day  atorvastatin 40 mg oral tablet: 1 tab(s) orally once a day  hydrocortisone 10 mg oral tablet: 1 tab(s) orally once a day (in the morning) Please take TWO tablets (20 mg total) at 8 AM and ONE tablet (10 mg total) at 3 PM EVERY DAY.  metoprolol succinate 25 mg oral tablet, extended release: 1 tab(s) orally once a day  OxyCONTIN 10 mg oral tablet, extended release: 1 tab(s) orally every 8 hours as needed for  severe pain  Synthroid 112 mcg (0.112 mg) oral tablet: 1 tab(s) orally once a day AM  tamsulosin 0.4 mg oral capsule: 1 cap(s) orally once a day  vancomycin 125 mg oral capsule: 1 cap(s) orally once a day  zolpidem 10 mg oral tablet: 1 tab(s) orally once a day (at bedtime) as needed for  insomnia   aspirin 81 mg oral delayed release tablet: 1 tab(s) orally once a day  atorvastatin 40 mg oral tablet: 1 tab(s) orally once a day  hydrocortisone 10 mg oral tablet: 1 tab(s) orally once a day (in the morning) Please take TWO tablets (20 mg total) at 8 AM and ONE tablet (10 mg total) at 3 PM EVERY DAY.  levothyroxine 125 mcg (0.125 mg) oral tablet: 1 tab(s) orally once a day  metoprolol succinate 25 mg oral tablet, extended release: 1 tab(s) orally once a day  OxyCONTIN 10 mg oral tablet, extended release: 1 tab(s) orally every 8 hours as needed for  severe pain  tamsulosin 0.4 mg oral capsule: 1 cap(s) orally once a day  vancomycin 125 mg oral capsule: 1 cap(s) orally once a day  zolpidem 10 mg oral tablet: 1 tab(s) orally once a day (at bedtime) as needed for  insomnia   aspirin 81 mg oral tablet, chewable: 1 tab(s) orally once a day  atorvastatin 40 mg oral tablet: 1 tab(s) orally once a day (at bedtime)  levETIRAcetam 500 mg oral tablet: 1 tab(s) orally 2 times a day  levothyroxine 125 mcg (0.125 mg) oral tablet: 1 tab(s) orally once a day  OxyCONTIN 10 mg oral tablet, extended release: 1 tab(s) orally every 8 hours as needed for  severe pain  tamsulosin 0.4 mg oral capsule: 1 cap(s) orally once a day (at bedtime)  vancomycin 25 mg/mL oral liquid: 5 milliliter(s) orally every 6 hours x 3 days

## 2025-04-19 NOTE — PHYSICAL THERAPY INITIAL EVALUATION ADULT - ADDITIONAL COMMENTS
Pt lives with his wife and daughter in a house with 1 REGAN and a full flight inside to bedroom/bathroom. pt was ind prior without an assistive device for all ADLs. pt states he owns a rw. pt works at [employer]. Pt lives with his wife and daughter in a house with 1 REGAN and a full flight inside to bedroom/bathroom. PTA pt independent with ambulation and ADLs, does not use assistive device Pt states he owns a cane. Pt works from home.

## 2025-04-19 NOTE — CONSULT NOTE ADULT - ASSESSMENT
55M R handed, hx CAD s/p stents x4 2024 on ASA81, former smoker, testicular cancer s/p orchiectomy, non-Hodgkin's lymphoma in remission, melanoma s/p immunotherapy HTN, HLD, hypothyroidism, ischemic colitis s/p L hemicolectomy with transverse colon to rectum anastomosis, recent hospital (dc/ed last week) for sepsis. Readmitted medicine yday for low Hg (8.8). Pt endorsing episode of hallucinations while he was at home over the weekend. MR brain obtained w/ ~7 mm L medial temporal contrast enhancing lesion w/ surrounding edema c/f met lesion new since September 2023 MRI. CT C/A/P 4/13/25 w/ hepatomegaly, 1cm R adrenal gland enlarged since 2024 CT C/A/P. Exam: awake, Ox3, PERRL, no drift, DOLAN 5/5.  - no acute neurosurgical intervention  - heme/onc and rad/onc consults  - IR consult for adrenal mass bx  - MR c/t/l spine w/wo to assess for other metastatic disease 55M R handed, hx CAD s/p stents x4 2024 on ASA81, former smoker, testicular cancer s/p orchiectomy, non-Hodgkin's lymphoma in remission, melanoma s/p immunotherapy HTN, HLD, hypothyroidism, ischemic colitis s/p L hemicolectomy with transverse colon to rectum anastomosis, recent hospital (dc/ed last week) for sepsis. Readmitted medicine yday for low Hg (8.8). Pt endorsing episode of hallucinations while he was at home over the weekend. MR brain obtained w/ ~7 mm L medial temporal contrast enhancing lesion w/ surrounding edema c/f met lesion new since September 2023 MRI. CT C/A/P 4/13/25 w/ hepatomegaly, 1cm R adrenal gland enlarged since 2024 CT C/A/P. Exam: awake, Ox3, PERRL, no drift, DOLAN 5/5.  - no acute neurosurgical intervention  - heme/onc and rad/onc consults  - IR consult for adrenal mass bx  - MR c/t/l spine w/wo to assess for other metastatic disease  - to be discussed at tumor board Wednesday, 4/23/25

## 2025-04-19 NOTE — CHART NOTE - NSCHARTNOTEFT_GEN_A_CORE
Attending Addendum-  4/19/25, 1:30p    MRI brain shows - There is a 7 mm nodular T2 hyperintense focus in the posterior medial aspect of the left temporal lobe, just medial to the atrium of the left lateral ventricle, which demonstrates uniform contrast enhancement and surrounding vasogenic edema, suspicious for a metastatic lesion.  - spoke to patient and wife at bedside again, Will get NeuroSx opinion. He is known to have Melanoma in the past

## 2025-04-19 NOTE — PHYSICAL THERAPY INITIAL EVALUATION ADULT - GENERAL OBSERVATIONS, REHAB EVAL
Pt rec'd semisupine in bed, +IVL, in NAD, spouse at bedside.  Pt cleared for PT session by COLTON Hamilton.

## 2025-04-19 NOTE — PHYSICAL THERAPY INITIAL EVALUATION ADULT - PERTINENT HX OF CURRENT PROBLEM, REHAB EVAL
Pt is a 55 year old male with PMH significant for testicular cancer s/p orchiectomy, prior non-Hodgkin's lymphoma, scalp melanoma s/p resection and immunotherapy CAD s/p ONESIMO (10/2024) on aspirin, HTN, HLD, hypothyroidism, adrenal insufficiency  (not on steroids), ischemic colitis s/p resection, overactive bladder s/p bladder stimulator, ischemic colitis s/p L hemicolectomy with transverse colon to rectum anastomosis, recently discharged on 4/17/25 after treatment for N/V, diarrhea, CHANDNI, hypotension, fever, elevated lactate, found to have c diff pcr positive, started on oral vancomycin.  Pt presents due to concern for low hemoglobin. Per patient and his wife at bedside, he has not been having any new symptoms since his discharge yesterday however his outpatient provider was concerned about low hemoglobin and recommended inpatient evaluation. He is also complaining of visual hallucinations which he insists started last Sunday (4/13/25) the day he started taking oral vancomycin  (however per records patient was started on oral vancomycin on 4/15/25).  CTH(4/18): No CT evidence of acute intracranial pathology.

## 2025-04-19 NOTE — DISCHARGE NOTE PROVIDER - PROVIDER TOKENS
PROVIDER:[TOKEN:[[id number]],FOLLOWUP:[1-3 days]] PROVIDER:[TOKEN:[[id number]],FOLLOWUP:[1-3 days]],PROVIDER:[TOKEN:[[id number]]] PROVIDER:[TOKEN:[[id number]],FOLLOWUP:[1-3 days]],PROVIDER:[TOKEN:[[id number]]],PROVIDER:[TOKEN:[[id number]]]

## 2025-04-19 NOTE — DISCHARGE NOTE PROVIDER - CARE PROVIDERS DIRECT ADDRESSES
[email or web address]

## 2025-04-19 NOTE — DISCHARGE NOTE PROVIDER - NSDCFUSCHEDAPPT_GEN_ALL_CORE_FT
[facility] Physician [facility]  CARDIOLOGY [address]   Scheduled Appointment: 06/13/2025     Lazaro Lewis  [facility]  SPINECTR [address] [facility]  Scheduled Appointment: 05/28/2025    [facility]  CARDIOLOGY [address]   Scheduled Appointment: 06/13/2025

## 2025-04-19 NOTE — DISCHARGE NOTE PROVIDER - CARE PROVIDER_API CALL
Carrie Roberts  [facility]  [address] [place] [address]  Phone: [phone]  Fax: [phone]  Follow Up Time: 1-3 days   Carrie RobertsSrinivas  [facility]  [address] [place] [address]  Phone: [phone]  Fax: [phone]  Follow Up Time: 1-3 days    Shawn Mcginnis  Medical Oncology  [address] [place] [address]  Phone: [phone]  Fax: [phone]  Follow Up Time:    Carrie RobertsMAGNOLIAjuan  [facility]  [address] [place] [address]  Phone: [phone]  Fax: [phone]  Follow Up Time: 1-3 days    Shawn Mcginnis  Medical Oncology  [address] [place] [address]  Phone: [phone]  Fax: [phone]  Follow Up Time:     Hebert Oconnor  Neurosurgery  [address] [place] [address]  Phone: [phone]  Fax: [phone]  Follow Up Time:

## 2025-04-19 NOTE — DISCHARGE NOTE PROVIDER - NSDCCPCAREPLAN_GEN_ALL_CORE_FT
PRINCIPAL DISCHARGE DIAGNOSIS  Diagnosis: Mild anemia  Assessment and Plan of Treatment: Follow up with gastroenterologist      SECONDARY DISCHARGE DIAGNOSES  Diagnosis: Visual hallucination  Assessment and Plan of Treatment: stable    Diagnosis: Adrenal insufficiency  Assessment and Plan of Treatment: Continue home meds    Diagnosis: Hypothyroidism  Assessment and Plan of Treatment:      PRINCIPAL DISCHARGE DIAGNOSIS  Diagnosis: Mild anemia  Assessment and Plan of Treatment: Follow up with gastroenterologist and hm/onc, if overt bleeding visit ED      SECONDARY DISCHARGE DIAGNOSES  Diagnosis: Clostridium difficile infection  Assessment and Plan of Treatment: stable, complete the course of oral Vanco    Diagnosis: Visual hallucination  Assessment and Plan of Treatment: stable, cont steroid, keppra, f/up with Neuro-onc, hm/onc, PCP,    Diagnosis: Hypothyroidism  Assessment and Plan of Treatment: your synthroid dose was increased here, f/up with PCP,    Diagnosis: Adrenal insufficiency  Assessment and Plan of Treatment: IR consult for adrenal and brain Bx was requested, if unable to do this and you leave the hospital-- make appointment with them and get this done     PRINCIPAL DISCHARGE DIAGNOSIS  Diagnosis: Mild anemia  Assessment and Plan of Treatment: Follow up with gastroenterologist and hm/onc, if overt bleeding visit ED      SECONDARY DISCHARGE DIAGNOSES  Diagnosis: Clostridium difficile infection  Assessment and Plan of Treatment: stable, complete the course of oral Vanco, x 3 more days    Diagnosis: Visual hallucination  Assessment and Plan of Treatment: stable, cont steroid, keppra, op f/up with Neurosurgery for gamma knife therapy f/up with Neuro-onc, hm/onc, PCP,    Diagnosis: Hypothyroidism  Assessment and Plan of Treatment: your synthroid dose was increased here, f/up with PCP,    Diagnosis: Adrenal insufficiency  Assessment and Plan of Treatment: IR consult for adrenal and brain Bx was requested, if unable to do this and you leave the hospital-- make appointment with them and get this done

## 2025-04-19 NOTE — CONSULT NOTE ADULT - SUBJECTIVE AND OBJECTIVE BOX
p (1480)     55M R handed, hx CAD s/p stents x4 2024 on ASA81, former smoker, testicular cancer s/p orchiectomy, non-Hodgkin's lymphoma in remission, melanoma s/p immunotherapy HTN, HLD, hypothyroidism, ischemic colitis s/p L hemicolectomy with transverse colon to rectum anastomosis, recent hospital (dc/ed last week) for sepsis. Readmitted medicine yday for low Hg (8.8). Pt endorsing episode of hallucinations while he was at home over the weekend. MR brain obtained w/ ~7 mm L medial temporal contrast enhancing lesion w/ surrounding edema c/f met lesion new since September 2023 MRI. CT C/A/P 4/13/25 w/ hepatomegaly, 1cm R adrenal gland enlarged since 2024 CT C/A/P. Exam: awake, Ox3, PERRL, no drift, DOLAN 5/5.    --Anticoagulation:  aspirin  chewable 81 milliGRAM(s) Oral daily    =====================  PAST MEDICAL HISTORY   Testicular Cancer    Headache, Migraine    HTN (hypertension)    NHL (non-Hodgkin's lymphoma)    GERD (gastroesophageal reflux disease)    Colitis    BPH (benign prostatic hyperplasia)    Osteoarthritis    History of bone marrow transplant    Hypertriglyceridemia    Malignant melanoma of scalp    Hypothyroidism    History of chemotherapy    History of Raynaud's syndrome    CAD (coronary artery disease)    Unspecified malignant neoplasm of skin of scalp and neck      PAST SURGICAL HISTORY   No significant past surgical history    History of orchiectomy    S/P colon resection    Lymph node disorder    Melanoma of scalp or neck    History of nasal septoplasty    History of infusaport central venous catheter insertion    History of infusaport central venous catheter removal      No Known Allergies      MEDICATIONS:  Antibiotics:  vancomycin    Solution 125 milliGRAM(s) Oral every 6 hours    Neuro:  HYDROmorphone  Injectable 0.5 milliGRAM(s) IV Push every 8 hours PRN  melatonin 5 milliGRAM(s) Oral once  melatonin 5 milliGRAM(s) Oral once PRN  oxyCODONE  ER Tablet 10 milliGRAM(s) Oral <User Schedule>    Other:  atorvastatin 40 milliGRAM(s) Oral at bedtime  hydrocortisone 20 milliGRAM(s) Oral <User Schedule>  hydrocortisone 10 milliGRAM(s) Oral <User Schedule>  levothyroxine 125 MICROGram(s) Oral daily  tamsulosin 0.4 milliGRAM(s) Oral at bedtime      SOCIAL HISTORY:   Occupation:   Marital Status:     FAMILY HISTORY:  No pertinent family history in first degree relatives    Hypertension (Father)        ROS: Negative except per HPI    LABS:  PT/INR - ( 18 Apr 2025 13:26 )   PT: 12.0 sec;   INR: 1.05 ratio         PTT - ( 18 Apr 2025 13:26 )  PTT:27.4 sec                        10.6   5.01  )-----------( 348      ( 19 Apr 2025 07:42 )             31.8     04-19    139  |  104  |  9   ----------------------------<  81  3.6   |  21[L]  |  0.94    Ca    8.7      19 Apr 2025 07:40    TPro  6.6  /  Alb  4.0  /  TBili  0.5  /  DBili  x   /  AST  34  /  ALT  22  /  AlkPhos  57  04-19

## 2025-04-19 NOTE — DISCHARGE NOTE PROVIDER - NSDCFUADDAPPT_GEN_ALL_CORE_FT
Follow up with gastroenterologist and endocrinologist with 14 days of discharge. Follow up with gastroenterologist and endocrinologist with 14 days of discharge.    HEMONC - Prior to outreaching the patient, it was visible that the patient has secured a follow up appointment which was not scheduled by our team. Patient was scheduled for 4/23 at 1:00pm with Dr Shawn Mcginnis [address] [place] [address]    GASTRO/NEUROSURG/INTMED - Patient was outreached but did not answer. A voicemail was left for the patient to return our call. [phone] [voicemail] [phone] [no voicemail able to be left, kept ringing past 1 minute] [phone] [voicemail]

## 2025-04-19 NOTE — DISCHARGE NOTE PROVIDER - HOSPITAL COURSE
HPI:  Patient is a 55 year old male with past medical history of testicular cancer s/p orchiectomy, prior non-Hodgkin's lymphoma, scalp melanoma s/p resection and immunotherapy CAD s/p ONESIMO (10/2024) on aspirin, HTN, HLD, hypothyroidism, adrenal insufficiency  (not on steroids), ischemic colitis s/p resection, overactive bladder s/p bladder stimulator, ischemic colitis s/p L hemicolectomy with transverse colon to rectum anastomosis, recently discharged on 4/17/25 after treatment for N/V, diarrhea, CHANDNI, hypotension, fever, elevated lactate, found to have c diff pcr positive, started on oral vancomycin, presents due to concern for low hemoglobin. Per patient and his wife at bedside, he has not been having any new symptoms since his discharge yesterday however his outpatient provider was concerned about low hemoglobin and recommended inpatient evaluation. He denies fever, chills, dizziness, nausea, vomiting, chest pain, dyspnea, abdominal pain, blood in stool/urine, gum bleeding, nose bleeding. He states he has bruising and wounds on his arms from previous injection sites but no other bleeding wound or bruise. He states he has good appetite and oral intake. He is also complaining of visual hallucinations which he insists started last Sunday (4/13/25) the day he started taking oral vancomycin  (however per records patient was started on oral vancomycin on 4/15/25). He denies having had hallucinations in the past. Patient describes the hallucinations as seeing various people, and animals sitting on objects infront of him/in the room with him, these appear always at night time, he states he is aware that they are not there in reality, and are not associated with any other symptom. He denies tactile or auditory hallucinations. He notes history of chronic hearing impairment (states symptoms started several years ago) but does not use hearing aid device, has prescription glasses for reading. He notes history of insomnia for which he states he has been taking ambien for several years.  He denies history of any mood disorder, including anxiety/depression, denies SI/HI, denies history of seizures. He denies use of any illicit substance or taking any medications other than the prescriptions he was discharged on. He denies headache, dizziness, LOC, syncope, change in hearing, speech, weakness, numbness, tingling, tremors, or difficulty ambulating.  (18 Apr 2025 16:21)    Hospital Course:  The patient's anemia remained stable throughout the hospitalization with a hemoglobin of 10.6, no evidence of active bright red blood per rectum, and stable vital signs. Due to active enterocolitis, GI recommended against endoscopic evaluation. The patient experienced visual hallucinations. A head CT scan was negative, the patient remained afebrile, and electrolytes were within normal limits. Hypothyroidism was noted, with a low free T4 and elevated TSH; Synthroid was increased to 125 mcg daily. The patient's Clostridium difficile infection was treated with oral vancomycin. For coronary artery disease, cardiology recommended discontinuing metoprolol while continuing aspirin and Lipitor. Adrenal insufficiency was managed with hydrocortisone. Neurology was consulted regarding the visual hallucinations, and their recommendations are pending.  patient refused EEG.  MRI-IMPRESSION:  There is a 7 mm nodular T2 hyperintense focus in the posterior medial   aspect of the left temporal lobe, just medial to the atrium of the left   lateral ventricle, which demonstrates uniform contrast enhancement and   surrounding vasogenic edema, suspicious for a metastatic lesion.    Important Medication Changes and Reason:    Active or Pending Issues Requiring Follow-up:    Advanced Directives:   [ x] Full code  [ ] DNR  [ ] Hospice    Discharge Diagnoses:  Anemia  Visual Hallucinations  C-Diff         HPI:  Patient is a 55 year old male with past medical history of testicular cancer s/p orchiectomy, prior non-Hodgkin's lymphoma, scalp melanoma s/p resection and immunotherapy CAD s/p ONESIMO (10/2024) on aspirin, HTN, HLD, hypothyroidism, adrenal insufficiency  (not on steroids), ischemic colitis s/p resection, overactive bladder s/p bladder stimulator, ischemic colitis s/p L hemicolectomy with transverse colon to rectum anastomosis, recently discharged on 4/17/25 after treatment for N/V, diarrhea, CHANDNI, hypotension, fever, elevated lactate, found to have c diff pcr positive, started on oral vancomycin, presents due to concern for low hemoglobin. Per patient and his wife at bedside, he has not been having any new symptoms since his discharge yesterday however his outpatient provider was concerned about low hemoglobin and recommended inpatient evaluation. He denies fever, chills, dizziness, nausea, vomiting, chest pain, dyspnea, abdominal pain, blood in stool/urine, gum bleeding, nose bleeding. He states he has bruising and wounds on his arms from previous injection sites but no other bleeding wound or bruise. He states he has good appetite and oral intake. He is also complaining of visual hallucinations which he insists started last Sunday (4/13/25) the day he started taking oral vancomycin  (however per records patient was started on oral vancomycin on 4/15/25). He denies having had hallucinations in the past. Patient describes the hallucinations as seeing various people, and animals sitting on objects infront of him/in the room with him, these appear always at night time, he states he is aware that they are not there in reality, and are not associated with any other symptom. He denies tactile or auditory hallucinations. He notes history of chronic hearing impairment (states symptoms started several years ago) but does not use hearing aid device, has prescription glasses for reading. He notes history of insomnia for which he states he has been taking ambien for several years.  He denies history of any mood disorder, including anxiety/depression, denies SI/HI, denies history of seizures. He denies use of any illicit substance or taking any medications other than the prescriptions he was discharged on. He denies headache, dizziness, LOC, syncope, change in hearing, speech, weakness, numbness, tingling, tremors, or difficulty ambulating.  (18 Apr 2025 16:21)    Hospital Course:  The patient's anemia remained stable throughout the hospitalization with a hemoglobin of 10.6, no evidence of active bright red blood per rectum, and stable vital signs. Due to active enterocolitis, GI recommended against endoscopic evaluation. The patient experienced visual hallucinations. A head CT scan was negative, the patient remained afebrile, and electrolytes were within normal limits. Hypothyroidism was noted, with a low free T4 and elevated TSH; Synthroid was increased to 125 mcg daily. The patient's Clostridium difficile infection was treated with oral vancomycin. For coronary artery disease, cardiology recommended discontinuing metoprolol while continuing aspirin and Lipitor. Adrenal insufficiency was managed with hydrocortisone. Neurology was consulted regarding the visual hallucinations, and their recommendations are pending.  patient refused EEG.  MRI-IMPRESSION:  There is a 7 mm nodular T2 hyperintense focus in the posterior medial   aspect of the left temporal lobe, just medial to the atrium of the left   lateral ventricle, which demonstrates uniform contrast enhancement and   surrounding vasogenic edema, suspicious for a metastatic lesion.  He was treated with iv and po pain meds, started with po vanco, hydrocortisone, keppra. He was seen by Neurol, NS, Hm/Onc, and IR and Rad-Onc consults were requested.     Important Medication Changes and Reason: Keppra for seizure prophy, steroid for vasogenic edema    Active or Pending Issues Requiring Follow-up: Neuro-onc, hm/onc, PCP    Advanced Directives:   [ x] Full code  [ ] DNR  [ ] Hospice    Discharge Diagnoses:  Anemia  Visual Hallucinations  C-Diff  Brain lesion         HPI:  Patient is a 55 year old male with past medical history of testicular cancer s/p orchiectomy, prior non-Hodgkin's lymphoma, scalp melanoma s/p resection and immunotherapy CAD s/p ONESIMO (10/2024) on aspirin, HTN, HLD, hypothyroidism, adrenal insufficiency  (not on steroids), ischemic colitis s/p resection, overactive bladder s/p bladder stimulator, ischemic colitis s/p L hemicolectomy with transverse colon to rectum anastomosis, recently discharged on 4/17/25 after treatment for N/V, diarrhea, CHANDNI, hypotension, fever, elevated lactate, found to have c diff pcr positive, started on oral vancomycin, presents due to concern for low hemoglobin. Per patient and his wife at bedside, he has not been having any new symptoms since his discharge yesterday however his outpatient provider was concerned about low hemoglobin and recommended inpatient evaluation. He denies fever, chills, dizziness, nausea, vomiting, chest pain, dyspnea, abdominal pain, blood in stool/urine, gum bleeding, nose bleeding. He states he has bruising and wounds on his arms from previous injection sites but no other bleeding wound or bruise. He states he has good appetite and oral intake. He is also complaining of visual hallucinations which he insists started last Sunday (4/13/25) the day he started taking oral vancomycin  (however per records patient was started on oral vancomycin on 4/15/25). He denies having had hallucinations in the past. Patient describes the hallucinations as seeing various people, and animals sitting on objects infront of him/in the room with him, these appear always at night time, he states he is aware that they are not there in reality, and are not associated with any other symptom. He denies tactile or auditory hallucinations. He notes history of chronic hearing impairment (states symptoms started several years ago) but does not use hearing aid device, has prescription glasses for reading. He notes history of insomnia for which he states he has been taking ambien for several years.  He denies history of any mood disorder, including anxiety/depression, denies SI/HI, denies history of seizures. He denies use of any illicit substance or taking any medications other than the prescriptions he was discharged on. He denies headache, dizziness, LOC, syncope, change in hearing, speech, weakness, numbness, tingling, tremors, or difficulty ambulating.  (18 Apr 2025 16:21)    Hospital Course:  The patient's anemia remained stable throughout the hospitalization with a hemoglobin of 10.6, no evidence of active bright red blood per rectum, and stable vital signs. Due to active enterocolitis, GI recommended against endoscopic evaluation. The patient experienced visual hallucinations. A head CT scan was negative, the patient remained afebrile, and electrolytes were within normal limits. Hypothyroidism was noted, with a low free T4 and elevated TSH; Synthroid was increased to 125 mcg daily. The patient's Clostridium difficile infection was treated with oral vancomycin. For coronary artery disease, cardiology recommended discontinuing metoprolol while continuing aspirin and Lipitor. Adrenal insufficiency was managed with hydrocortisone. Neurology was consulted regarding the visual hallucinations, and their recommendations are pending.  patient refused EEG.  MRI-IMPRESSION:  There is a 7 mm nodular T2 hyperintense focus in the posterior medial aspect of the left temporal lobe, just medial to the atrium of the left   lateral ventricle, which demonstrates uniform contrast enhancement and surrounding vasogenic edema, suspicious for a metastatic lesion.  The patient was evaluated by Neurosurgery who recommended gamma knife as outpatient in 4 days with Dr Oconnor and keppra 500mg bid for seizure prophylaxis. Heme/Onc recommended biopsy of tissue, Rad-Onc consult, Neuro-Onc consult. IR evaluated and recommended no biopsy for adrenal lesion. Meanwhile patient underwent for MRI of C/T/L spines. While waiting he decided to go home and planned for outpatient work-up. He remained afebrile, no diarrhea or GI bleed noted and was continued on PO Vancomycin, analgesics and remained stable with Hb 10.6. Plans were discussed with his wife. His cardiologist- Dr Carballo was made aware of his wishes.    Important Medication Changes and Reason: Keppra for seizure prophy    Active or Pending Issues Requiring Follow-up: Neuro-onc, hm/onc, PCP    Advanced Directives:   [ x] Full code  [ ] DNR  [ ] Hospice    Discharge Diagnoses:  Anemia  Visual Hallucinations  C-Diff  Brain lesion         HPI:  Patient is a 55 year old male with past medical history of testicular cancer s/p orchiectomy, prior non-Hodgkin's lymphoma, scalp melanoma s/p resection and immunotherapy CAD s/p ONESIMO (10/2024) on aspirin, HTN, HLD, hypothyroidism, adrenal insufficiency  (not on steroids), ischemic colitis s/p resection, overactive bladder s/p bladder stimulator, ischemic colitis s/p L hemicolectomy with transverse colon to rectum anastomosis, recently discharged on 4/17/25 after treatment for N/V, diarrhea, CHANDNI, hypotension, fever, elevated lactate, found to have c diff pcr positive, started on oral vancomycin, presents due to concern for low hemoglobin. Per patient and his wife at bedside, he has not been having any new symptoms since his discharge yesterday however his outpatient provider was concerned about low hemoglobin and recommended inpatient evaluation. He denies fever, chills, dizziness, nausea, vomiting, chest pain, dyspnea, abdominal pain, blood in stool/urine, gum bleeding, nose bleeding. He states he has bruising and wounds on his arms from previous injection sites but no other bleeding wound or bruise. He states he has good appetite and oral intake. He is also complaining of visual hallucinations which he insists started last Sunday (4/13/25) the day he started taking oral vancomycin  (however per records patient was started on oral vancomycin on 4/15/25). He denies having had hallucinations in the past. Patient describes the hallucinations as seeing various people, and animals sitting on objects infront of him/in the room with him, these appear always at night time, he states he is aware that they are not there in reality, and are not associated with any other symptom. He denies tactile or auditory hallucinations. He notes history of chronic hearing impairment (states symptoms started several years ago) but does not use hearing aid device, has prescription glasses for reading. He notes history of insomnia for which he states he has been taking ambien for several years.  He denies history of any mood disorder, including anxiety/depression, denies SI/HI, denies history of seizures. He denies use of any illicit substance or taking any medications other than the prescriptions he was discharged on. He denies headache, dizziness, LOC, syncope, change in hearing, speech, weakness, numbness, tingling, tremors, or difficulty ambulating.  (18 Apr 2025 16:21)    Hospital Course:  The patient's anemia remained stable throughout the hospitalization with a hemoglobin of 10.6, no evidence of active bright red blood per rectum, and stable vital signs. Due to active enterocolitis, GI recommended against endoscopic evaluation. The patient experienced visual hallucinations. A head CT scan was negative, the patient remained afebrile, and electrolytes were within normal limits. Hypothyroidism was noted, with a low free T4 and elevated TSH; Synthroid was increased to 125 mcg daily. The patient's Clostridium difficile infection was treated with oral vancomycin. For coronary artery disease, cardiology recommended discontinuing metoprolol while continuing aspirin and Lipitor. Adrenal insufficiency was managed with hydrocortisone. Neurology was consulted regarding the visual hallucinations, and their recommendations are pending.  patient refused EEG.  MRI-IMPRESSION:  There is a 7 mm nodular T2 hyperintense focus in the posterior medial aspect of the left temporal lobe, just medial to the atrium of the left   lateral ventricle, which demonstrates uniform contrast enhancement and surrounding vasogenic edema, suspicious for a metastatic lesion.  The patient was evaluated by Neurosurgery who recommended gamma knife as outpatient in 4 days with Dr Oconnor and keppra 500mg bid for seizure prophylaxis. Heme/Onc recommended biopsy of tissue, Rad-Onc consult, Neuro-Onc consult. IR evaluated and recommended no biopsy for adrenal lesion. Meanwhile patient underwent for MRI of C/T/L spines. While waiting he decided to go home and planned for outpatient work-up. He remained afebrile, no diarrhea or GI bleed noted and was continued on PO Vancomycin, analgesics and remained stable with Hb 10.6. Plans were discussed with his wife. His cardiologist- Dr Carballo was made aware of his wishes.    Important Medication Changes and Reason: Keppra for seizure prophy    Active or Pending Issues Requiring Follow-up: Neuro-onc, hm/onc, PCP    Advanced Directives:   [ x] Full code  [ ] DNR  [ ] Hospice    Discharge Diagnoses:  Anemia- multifactorial  Visual Hallucinations  C-Diff  Brain lesion    Post Discharge attending addendum-  5/14/25, 3p    Patient's anemia is muti factorial- in the setting of sepsis, anemia of chronic disease ( testicular Ca, lymphoma etc), his iron and ferritin levels are normal. Not concerned for chronic GI blood loss.  Per GI- baseline hgb around 13, last admission during septic shock, it decreased to 8.8. Now on admission it was 10. No overt signs of GI bleeding. Patient had colonoscopies in the past which showed multiple polyps, last colon did show recently bleeding AVM in AC, but now hgb has improved and he has brown stools. Also had EGD 2 years which was normal.

## 2025-04-20 VITALS
SYSTOLIC BLOOD PRESSURE: 147 MMHG | RESPIRATION RATE: 14 BRPM | HEART RATE: 73 BPM | DIASTOLIC BLOOD PRESSURE: 83 MMHG | OXYGEN SATURATION: 100 % | TEMPERATURE: 98 F

## 2025-04-20 LAB
HCT VFR BLD CALC: 33.1 % — LOW (ref 39–50)
HGB BLD-MCNC: 10.8 G/DL — LOW (ref 13–17)
MCHC RBC-ENTMCNC: 27.8 PG — SIGNIFICANT CHANGE UP (ref 27–34)
MCHC RBC-ENTMCNC: 32.6 G/DL — SIGNIFICANT CHANGE UP (ref 32–36)
MCV RBC AUTO: 85.3 FL — SIGNIFICANT CHANGE UP (ref 80–100)
NRBC BLD AUTO-RTO: 0 /100 WBCS — SIGNIFICANT CHANGE UP (ref 0–0)
PLATELET # BLD AUTO: 303 K/UL — SIGNIFICANT CHANGE UP (ref 150–400)
RBC # BLD: 3.88 M/UL — LOW (ref 4.2–5.8)
RBC # FLD: 14 % — SIGNIFICANT CHANGE UP (ref 10.3–14.5)
WBC # BLD: 7.54 K/UL — SIGNIFICANT CHANGE UP (ref 3.8–10.5)
WBC # FLD AUTO: 7.54 K/UL — SIGNIFICANT CHANGE UP (ref 3.8–10.5)

## 2025-04-20 PROCEDURE — 83605 ASSAY OF LACTIC ACID: CPT

## 2025-04-20 PROCEDURE — 86789 WEST NILE VIRUS ANTIBODY: CPT

## 2025-04-20 PROCEDURE — 83655 ASSAY OF LEAD: CPT

## 2025-04-20 PROCEDURE — 86850 RBC ANTIBODY SCREEN: CPT

## 2025-04-20 PROCEDURE — 82306 VITAMIN D 25 HYDROXY: CPT

## 2025-04-20 PROCEDURE — 72157 MRI CHEST SPINE W/O & W/DYE: CPT | Mod: MC

## 2025-04-20 PROCEDURE — 83540 ASSAY OF IRON: CPT

## 2025-04-20 PROCEDURE — 36415 COLL VENOUS BLD VENIPUNCTURE: CPT

## 2025-04-20 PROCEDURE — 84443 ASSAY THYROID STIM HORMONE: CPT

## 2025-04-20 PROCEDURE — 81003 URINALYSIS AUTO W/O SCOPE: CPT

## 2025-04-20 PROCEDURE — A9585: CPT

## 2025-04-20 PROCEDURE — 86900 BLOOD TYPING SEROLOGIC ABO: CPT

## 2025-04-20 PROCEDURE — 86901 BLOOD TYPING SEROLOGIC RH(D): CPT

## 2025-04-20 PROCEDURE — 82300 ASSAY OF CADMIUM: CPT

## 2025-04-20 PROCEDURE — 72157 MRI CHEST SPINE W/O & W/DYE: CPT | Mod: 26

## 2025-04-20 PROCEDURE — 70553 MRI BRAIN STEM W/O & W/DYE: CPT | Mod: MC

## 2025-04-20 PROCEDURE — 85610 PROTHROMBIN TIME: CPT

## 2025-04-20 PROCEDURE — 82607 VITAMIN B-12: CPT

## 2025-04-20 PROCEDURE — 82728 ASSAY OF FERRITIN: CPT

## 2025-04-20 PROCEDURE — 85027 COMPLETE CBC AUTOMATED: CPT

## 2025-04-20 PROCEDURE — 93005 ELECTROCARDIOGRAM TRACING: CPT

## 2025-04-20 PROCEDURE — 85025 COMPLETE CBC W/AUTO DIFF WBC: CPT

## 2025-04-20 PROCEDURE — 87040 BLOOD CULTURE FOR BACTERIA: CPT

## 2025-04-20 PROCEDURE — 84480 ASSAY TRIIODOTHYRONINE (T3): CPT

## 2025-04-20 PROCEDURE — 97161 PT EVAL LOW COMPLEX 20 MIN: CPT

## 2025-04-20 PROCEDURE — 70450 CT HEAD/BRAIN W/O DYE: CPT | Mod: MC

## 2025-04-20 PROCEDURE — 72156 MRI NECK SPINE W/O & W/DYE: CPT | Mod: MC

## 2025-04-20 PROCEDURE — 84439 ASSAY OF FREE THYROXINE: CPT

## 2025-04-20 PROCEDURE — 83825 ASSAY OF MERCURY: CPT

## 2025-04-20 PROCEDURE — 99285 EMERGENCY DEPT VISIT HI MDM: CPT | Mod: 25

## 2025-04-20 PROCEDURE — 84425 ASSAY OF VITAMIN B-1: CPT

## 2025-04-20 PROCEDURE — 80053 COMPREHEN METABOLIC PANEL: CPT

## 2025-04-20 PROCEDURE — 99239 HOSP IP/OBS DSCHRG MGMT >30: CPT

## 2025-04-20 PROCEDURE — 83550 IRON BINDING TEST: CPT

## 2025-04-20 PROCEDURE — 72158 MRI LUMBAR SPINE W/O & W/DYE: CPT | Mod: MC

## 2025-04-20 PROCEDURE — 85730 THROMBOPLASTIN TIME PARTIAL: CPT

## 2025-04-20 PROCEDURE — 86788 WEST NILE VIRUS AB IGM: CPT

## 2025-04-20 PROCEDURE — 82746 ASSAY OF FOLIC ACID SERUM: CPT

## 2025-04-20 PROCEDURE — 99223 1ST HOSP IP/OBS HIGH 75: CPT | Mod: GC

## 2025-04-20 PROCEDURE — 72158 MRI LUMBAR SPINE W/O & W/DYE: CPT | Mod: 26

## 2025-04-20 PROCEDURE — 83036 HEMOGLOBIN GLYCOSYLATED A1C: CPT

## 2025-04-20 PROCEDURE — 72156 MRI NECK SPINE W/O & W/DYE: CPT | Mod: 26

## 2025-04-20 PROCEDURE — 86780 TREPONEMA PALLIDUM: CPT

## 2025-04-20 PROCEDURE — 82175 ASSAY OF ARSENIC: CPT

## 2025-04-20 PROCEDURE — 96374 THER/PROPH/DIAG INJ IV PUSH: CPT

## 2025-04-20 RX ORDER — ASPIRIN 325 MG
1 TABLET ORAL
Qty: 0 | Refills: 0 | DISCHARGE
Start: 2025-04-20

## 2025-04-20 RX ORDER — METOPROLOL SUCCINATE 50 MG/1
1 TABLET, EXTENDED RELEASE ORAL
Refills: 0 | DISCHARGE

## 2025-04-20 RX ORDER — ASPIRIN 325 MG
1 TABLET ORAL
Refills: 0 | DISCHARGE

## 2025-04-20 RX ORDER — VANCOMYCIN HCL IN 5 % DEXTROSE 1.5G/250ML
5 PLASTIC BAG, INJECTION (ML) INTRAVENOUS
Qty: 0 | Refills: 0 | DISCHARGE

## 2025-04-20 RX ORDER — LEVETIRACETAM 10 MG/ML
500 INJECTION, SOLUTION INTRAVENOUS
Refills: 0 | Status: DISCONTINUED | OUTPATIENT
Start: 2025-04-20 | End: 2025-04-20

## 2025-04-20 RX ORDER — ATORVASTATIN CALCIUM 80 MG/1
1 TABLET, FILM COATED ORAL
Qty: 0 | Refills: 0 | DISCHARGE
Start: 2025-04-20

## 2025-04-20 RX ORDER — ATORVASTATIN CALCIUM 80 MG/1
1 TABLET, FILM COATED ORAL
Refills: 0 | DISCHARGE

## 2025-04-20 RX ORDER — TAMSULOSIN HYDROCHLORIDE 0.4 MG/1
1 CAPSULE ORAL
Qty: 0 | Refills: 0 | DISCHARGE
Start: 2025-04-20

## 2025-04-20 RX ORDER — TAMSULOSIN HYDROCHLORIDE 0.4 MG/1
1 CAPSULE ORAL
Refills: 0 | DISCHARGE

## 2025-04-20 RX ORDER — LEVOTHYROXINE SODIUM 300 MCG
1 TABLET ORAL
Qty: 30 | Refills: 0
Start: 2025-04-20 | End: 2025-05-19

## 2025-04-20 RX ORDER — LEVETIRACETAM 10 MG/ML
1 INJECTION, SOLUTION INTRAVENOUS
Qty: 60 | Refills: 0
Start: 2025-04-20 | End: 2025-05-19

## 2025-04-20 RX ADMIN — OXYCODONE HYDROCHLORIDE 10 MILLIGRAM(S): 30 TABLET ORAL at 15:52

## 2025-04-20 RX ADMIN — Medication 125 MILLIGRAM(S): at 11:10

## 2025-04-20 RX ADMIN — Medication 20 MILLIGRAM(S): at 11:10

## 2025-04-20 RX ADMIN — Medication 125 MILLIGRAM(S): at 04:47

## 2025-04-20 RX ADMIN — Medication 125 MICROGRAM(S): at 04:47

## 2025-04-20 RX ADMIN — Medication 1 APPLICATION(S): at 11:18

## 2025-04-20 RX ADMIN — Medication 10 MILLIGRAM(S): at 15:53

## 2025-04-20 RX ADMIN — Medication 0.5 MILLIGRAM(S): at 02:46

## 2025-04-20 RX ADMIN — Medication 0.5 MILLIGRAM(S): at 12:26

## 2025-04-20 RX ADMIN — Medication 0.5 MILLIGRAM(S): at 11:20

## 2025-04-20 RX ADMIN — Medication 81 MILLIGRAM(S): at 11:10

## 2025-04-20 RX ADMIN — BUTYROSPERMUM PARKII(SHEA BUTTER), SIMMONDSIA CHINENSIS (JOJOBA) SEED OIL, ALOE BARBADENSIS LEAF EXTRACT 250 MILLIGRAM(S): .01; 1; 3.5 LIQUID TOPICAL at 04:47

## 2025-04-20 RX ADMIN — Medication 0.5 MILLIGRAM(S): at 03:41

## 2025-04-20 NOTE — PROGRESS NOTE ADULT - PROBLEM SELECTOR PLAN 4
Discussed with cardiology attending on admission,   - they rec to stop metoprolol and continue aspirin for now.   - Continue lipitor.
Card consult appreciated  - Had CAD s/p PCI 2 LAD stents 10/24  - s/p diagnostic cath 1/31 with patent stents and normal RCA  - C/w ASA 81mg, statin and BB

## 2025-04-20 NOTE — DIETITIAN INITIAL EVALUATION ADULT - PERTINENT LABORATORY DATA
04-19    139  |  104  |  9   ----------------------------<  81  3.6   |  21[L]  |  0.94    Ca    8.7      19 Apr 2025 07:40    TPro  6.6  /  Alb  4.0  /  TBili  0.5  /  DBili  x   /  AST  34  /  ALT  22  /  AlkPhos  57  04-19  A1C with Estimated Average Glucose Result: 5.2 % (04-19-25 @ 08:15)  A1C with Estimated Average Glucose Result: 5.3 % (02-01-25 @ 06:17)

## 2025-04-20 NOTE — DIETITIAN INITIAL EVALUATION ADULT - ADD RECOMMEND
1. Continue current diet order: regular diet   2. RD to add mighty shakes to diet.   3. Monitor and encourage PO intake. Encourage use of daily menus. Honor dietary preferences as expressed as able.

## 2025-04-20 NOTE — DISCHARGE NOTE NURSING/CASE MANAGEMENT/SOCIAL WORK - FINANCIAL ASSISTANCE
[facility] provides services at a reduced cost to those who are determined to be eligible through [facility]’s financial assistance program. Information regarding [facility]’s financial assistance program can be found by going to [email or web address] or by calling [phone].

## 2025-04-20 NOTE — PROGRESS NOTE ADULT - SUBJECTIVE AND OBJECTIVE BOX
DATE OF SERVICE: 04-20-25 @ 23:16    Patient is a 55y old  Male who presents with a chief complaint of low hemoglobin (20 Apr 2025 14:15)      INTERVAL HISTORY: feels ok        PHYSICAL EXAM:  T(C): 36.5 (04-20-25 @ 15:53), Max: 36.6 (04-20-25 @ 07:33)  HR: 73 (04-20-25 @ 15:53) (57 - 73)  BP: 147/83 (04-20-25 @ 15:53) (133/78 - 149/85)  RR: 14 (04-20-25 @ 15:53) (14 - 18)  SpO2: 100% (04-20-25 @ 15:53) (97% - 100%)  Wt(kg): --  I&O's Summary        Appearance: In no distress	  HEENT:    PERRL, EOMI	  Cardiovascular:  S1 S2, No JVD  Respiratory: Lungs clear to auscultation	  Gastrointestinal:  Soft, Non-tender, + BS	  Vascularature:  No edema of LE  Psychiatric: Appropriate affect   Neuro: no acute focal deficits                               10.8   7.54  )-----------( 303      ( 20 Apr 2025 07:16 )             33.1     04-19    139  |  104  |  9   ----------------------------<  81  3.6   |  21[L]  |  0.94    Ca    8.7      19 Apr 2025 07:40    TPro  6.6  /  Alb  4.0  /  TBili  0.5  /  DBili  x   /  AST  34  /  ALT  22  /  AlkPhos  57  04-19        Labs personally reviewed      ASSESSMENT/PLAN: 	    Patient is a 55 year old male with past medical history of testicular cancer s/p orchiectomy, prior non-Hodgkin's lymphoma, scalp melanoma s/p resection and immunotherapy CAD s/p ONESIMO (10/2024) on aspirin, HTN, HLD, hypothyroidism, adrenal insufficiency  (not on steroids), ischemic colitis s/p resection, overactive bladder s/p bladder stimulator, ischemic colitis s/p L hemicolectomy with transverse colon to rectum anastomosis, recently discharged on 4/17/25 after treatment for N/V, diarrhea, CHANDNI, hypotension, fever, elevated lactate, found to have c diff pcr positive, started on oral vancomycin, presents due to concern for low hemoglobin. Per patient and his wife at bedside, he has not been having any new symptoms since his discharge yesterday however his outpatient provider was concerned about low hemoglobin and recommended inpatient evaluation.     1. Anemia  - Monitor H/H (10.7 on 4/18)  - GI consulted, appreciate recs    2. CAD  - 10/15/2024 with on ONESIMO to the LAD   - Known prior CAD s/p PCI 2 LAD stents  - s/p diagnostic cath 1/31 with patent stents and normal RCA  - C/w ASA 81mg & statin    3. HTN -   - D/c Toprol 25mg PO qd    4. Hallucinations - New hallucinations for few days that are present at night  - States ? association with Vanco, but discussed with ID who states PO Vanco as not absorbed systemically   --- MRI head noted with lesion, neurosurgery recs appreciated. Plan to be presented at Tumor board Wednesday  --- Onc recs appreciated, likely melanoma given previous stage and maintenance treatment stopped 04/2020 secondary to endocrinopathy, as no evidence of lymphadenopathy in the chest/abdomen or pelvis to suggest lymphoma.        5.  HLD - c/w Vascepa as OP, atorvastatin 40mg PO daily            Vic Carballo DO [facility]  Cardiovascular Medicine  [address], [address]  Office: [phone]  Available via Text/call on Microsoft Teams
DATE OF SERVICE: 04-19-25 @ 10:56    Patient is a 55y old  Male who presents with a chief complaint of low hemoglobin (18 Apr 2025 19:22)      INTERVAL HISTORY: NAD    REVIEW OF SYSTEMS:  CONSTITUTIONAL: No weakness  EYES/ENT: No visual changes;  No throat pain   NECK: No pain or stiffness  RESPIRATORY: No cough, wheezing; No shortness of breath  CARDIOVASCULAR: No chest pain or palpitations  GASTROINTESTINAL: No abdominal  pain. No nausea, vomiting, or hematemesis  GENITOURINARY: No dysuria, frequency or hematuria  NEUROLOGICAL: No stroke like symptoms  SKIN: No rashes      MEDICATIONS:        PHYSICAL EXAM:  T(C): 36.3 (04-19-25 @ 08:50), Max: 37.1 (04-18-25 @ 12:55)  HR: 74 (04-19-25 @ 08:50) (68 - 78)  BP: 130/82 (04-19-25 @ 08:50) (130/82 - 154/89)  RR: 18 (04-19-25 @ 08:50) (17 - 18)  SpO2: 100% (04-19-25 @ 08:50) (97% - 100%)  Wt(kg): --  I&O's Summary    Height (cm): 175.3 (04-18 @ 11:57)  Weight (kg): 63.5 (04-18 @ 11:57)  BMI (kg/m2): 20.7 (04-18 @ 11:57)  BSA (m2): 1.78 (04-18 @ 11:57)    Appearance: In no distress	  HEENT:    PERRL, EOMI	  Cardiovascular:  S1 S2, No JVD  Respiratory: Lungs clear to auscultation	  Gastrointestinal:  Soft, Non-tender, + BS	  Vascularature:  No edema of LE  Psychiatric: Appropriate affect   Neuro: no acute focal deficits                               10.6   5.01  )-----------( 348      ( 19 Apr 2025 07:42 )             31.8     04-19    139  |  104  |  9   ----------------------------<  81  3.6   |  21[L]  |  0.94    Ca    8.7      19 Apr 2025 07:40    TPro  6.6  /  Alb  4.0  /  TBili  0.5  /  DBili  x   /  AST  34  /  ALT  22  /  AlkPhos  57  04-19        Labs personally reviewed      ASSESSMENT/PLAN: 	    Patient is a 55 year old male with past medical history of testicular cancer s/p orchiectomy, prior non-Hodgkin's lymphoma, scalp melanoma s/p resection and immunotherapy CAD s/p ONESIMO (10/2024) on aspirin, HTN, HLD, hypothyroidism, adrenal insufficiency  (not on steroids), ischemic colitis s/p resection, overactive bladder s/p bladder stimulator, ischemic colitis s/p L hemicolectomy with transverse colon to rectum anastomosis, recently discharged on 4/17/25 after treatment for N/V, diarrhea, CHANDNI, hypotension, fever, elevated lactate, found to have c diff pcr positive, started on oral vancomycin, presents due to concern for low hemoglobin. Per patient and his wife at bedside, he has not been having any new symptoms since his discharge yesterday however his outpatient provider was concerned about low hemoglobin and recommended inpatient evaluation.     1. Anemia  - Monitor H/H (10.7 on 4/18)  - GI consulted, appreciate recs    2. CAD  - 10/15/2024 with on ONESIMO to the LAD   - Known prior CAD s/p PCI 2 LAD stents  - s/p diagnostic cath 1/31 with patent stents and normal RCA  - C/w ASA 81mg & statin    3. HTN -   - D/c Toprol 25mg PO qd    4. Hallucinations - New hallucinations for few days that are present at night  - States ? association with Vanco, but discussed with ID who states PO Vanco as not absorbed systemically   ----- Will obtain neuro work up including CT head, MR head, Check TSH, FT4, RPR, WNV, HIV, Tox screen, heavy metal screen, Vit B1, B12, Folate, Vit D 25, blood cx, UA, urine cx     4. HLD - c/w Vascepa as OP, atorvastatin 40mg PO daily            JOSE Paniagua DO [facility]  Cardiovascular Medicine  [address] [place] [address], [address]  Office: [phone]  Available via call/text on Microsoft Teams 
Patient seen and examined at bedside.    --Anticoagulation--  aspirin  chewable 81 milliGRAM(s) Oral daily    T(C): 36.5 (04-19-25 @ 23:29), Max: 36.9 (04-19-25 @ 17:20)  HR: 57 (04-19-25 @ 23:29) (57 - 75)  BP: 133/78 (04-19-25 @ 23:29) (126/85 - 138/87)  RR: 18 (04-19-25 @ 23:29) (18 - 18)  SpO2: 99% (04-19-25 @ 23:29) (99% - 100%)  Wt(kg): --    Exam:  awake, Ox3, PERRL, no drift, DOLAN 5/5
Mohsin Khan, MD  Attending Physician, Division Of Hospital Medicine  Office: [phone]  Available on Microsoft Teams    Patient is a 55y old  Male who presents with a chief complaint of low hemoglobin     SUBJECTIVE / OVERNIGHT EVENTS:  Seen, examined the patient this am  Upset as he has to stay longer for this brain mass and plans, no GIB, afebrile, VSS    MEDICATIONS  (STANDING):  aspirin  chewable 81 milliGRAM(s) Oral daily  atorvastatin 40 milliGRAM(s) Oral at bedtime  chlorhexidine 2% Cloths 1 Application(s) Topical daily  hydrocortisone 20 milliGRAM(s) Oral <User Schedule>  hydrocortisone 10 milliGRAM(s) Oral <User Schedule>  levothyroxine 125 MICROGram(s) Oral daily  melatonin 5 milliGRAM(s) Oral once  oxyCODONE  ER Tablet 10 milliGRAM(s) Oral <User Schedule>  saccharomyces boulardii 250 milliGRAM(s) Oral two times a day  tamsulosin 0.4 milliGRAM(s) Oral at bedtime  vancomycin    Solution 125 milliGRAM(s) Oral every 6 hours    MEDICATIONS  (PRN):  HYDROmorphone  Injectable 0.5 milliGRAM(s) IV Push every 8 hours PRN breakthrough  melatonin 5 milliGRAM(s) Oral once PRN Sleep      Vital Signs Last 24 Hrs  T(C): 36.6 (20 Apr 2025 07:33), Max: 36.9 (19 Apr 2025 17:20)  T(F): 97.8 (20 Apr 2025 07:33), Max: 98.5 (19 Apr 2025 17:20)  HR: 71 (20 Apr 2025 07:33) (57 - 75)  BP: 149/85 (20 Apr 2025 07:33) (126/85 - 149/85)  BP(mean): --  RR: 14 (20 Apr 2025 07:33) (14 - 18)  SpO2: 97% (20 Apr 2025 07:33) (97% - 100%)    Parameters below as of 20 Apr 2025 07:33  Patient On (Oxygen Delivery Method): room air      CAPILLARY BLOOD GLUCOSE        I&O's Summary      PHYSICAL EXAM:-  GENERAL: NAD, well-developed  EYES: EOMI, PERRLA, conjunctiva and sclera clear  NECK: Supple, No JVD, no thyromegaly  CHEST/LUNG: Clear to auscultation bilaterally; No wheeze  HEART: Regular rate and rhythm; S1, S2 audible, No murmurs, rubs, or gallops  ABDOMEN: Soft, Nontender, Nondistended; Bowel sounds present  EXTREMITIES:  2+ Peripheral Pulses, No clubbing, cyanosis, or edema  NEURO: AAOx3, no focal deficit      LABS:                        10.8   7.54  )-----------( 303      ( 20 Apr 2025 07:16 )             33.1     04-19    139  |  104  |  9   ----------------------------<  81  3.6   |  21[L]  |  0.94    Ca    8.7      19 Apr 2025 07:40    TPro  6.6  /  Alb  4.0  /  TBili  0.5  /  DBili  x   /  AST  34  /  ALT  22  /  AlkPhos  57  04-19    PT/INR - ( 18 Apr 2025 13:26 )   PT: 12.0 sec;   INR: 1.05 ratio         PTT - ( 18 Apr 2025 13:26 )  PTT:27.4 sec      Urinalysis Basic - ( 19 Apr 2025 07:40 )    Color: x / Appearance: x / SG: x / pH: x  Gluc: 81 mg/dL / Ketone: x  / Bili: x / Urobili: x   Blood: x / Protein: x / Nitrite: x   Leuk Esterase: x / RBC: x / WBC x   Sq Epi: x / Non Sq Epi: x / Bacteria: x        RADIOLOGY & ADDITIONAL TESTS:    Imaging Personally Reviewed: MRI brain  Consultant(s) Notes Reviewed: NeuroSx, GI   Care Discussed with Consultants/Other Providers: NeuroSx  
Mohsin Khan, MD  Attending Physician, Division Of Hospital Medicine  Office: [phone]  Available on Microsoft Teams    Patient is a 55y old  Male who presents with a chief complaint of low hemoglobin     SUBJECTIVE / OVERNIGHT EVENTS:  Seen, examined the patient this am  Feels ok, no GIB, VSS, not hallucinating    MEDICATIONS  (STANDING):  aspirin  chewable 81 milliGRAM(s) Oral daily  atorvastatin 40 milliGRAM(s) Oral at bedtime  chlorhexidine 2% Cloths 1 Application(s) Topical daily  hydrocortisone 20 milliGRAM(s) Oral <User Schedule>  hydrocortisone 10 milliGRAM(s) Oral <User Schedule>  levothyroxine 112 MICROGram(s) Oral daily  melatonin 5 milliGRAM(s) Oral once  oxyCODONE  ER Tablet 10 milliGRAM(s) Oral every 8 hours  saccharomyces boulardii 250 milliGRAM(s) Oral two times a day  tamsulosin 0.4 milliGRAM(s) Oral at bedtime  vancomycin    Solution 125 milliGRAM(s) Oral every 6 hours    MEDICATIONS  (PRN):  melatonin 5 milliGRAM(s) Oral once PRN Sleep      Vital Signs Last 24 Hrs  T(C): 36.3 (19 Apr 2025 08:50), Max: 37.1 (18 Apr 2025 12:55)  T(F): 97.4 (19 Apr 2025 08:50), Max: 98.8 (18 Apr 2025 12:55)  HR: 74 (19 Apr 2025 08:50) (68 - 78)  BP: 130/82 (19 Apr 2025 08:50) (130/82 - 154/89)  BP(mean): --  RR: 18 (19 Apr 2025 08:50) (17 - 18)  SpO2: 100% (19 Apr 2025 08:50) (97% - 100%)    Parameters below as of 19 Apr 2025 08:50  Patient On (Oxygen Delivery Method): room air      CAPILLARY BLOOD GLUCOSE        I&O's Summary      PHYSICAL EXAM:-  GENERAL: NAD, well-developed  EYES: EOMI, PERRLA, conjunctiva and sclera clear  NECK: Supple, No JVD, no thyromegaly  CHEST/LUNG: Clear to auscultation bilaterally; No wheeze  HEART: Regular rate and rhythm; S1, S2 audible, No murmurs, rubs, or gallops  ABDOMEN: Soft, Nontender, Nondistended; Bowel sounds present  EXTREMITIES:  2+ Peripheral Pulses, No clubbing, cyanosis, or edema  NEURO: AAOx3, no focal deficit      LABS:                        10.6   5.01  )-----------( 348      ( 19 Apr 2025 07:42 )             31.8     04-19    139  |  104  |  9   ----------------------------<  81  3.6   |  21[L]  |  0.94    Ca    8.7      19 Apr 2025 07:40    TPro  6.6  /  Alb  4.0  /  TBili  0.5  /  DBili  x   /  AST  34  /  ALT  22  /  AlkPhos  57  04-19    PT/INR - ( 18 Apr 2025 13:26 )   PT: 12.0 sec;   INR: 1.05 ratio         PTT - ( 18 Apr 2025 13:26 )  PTT:27.4 sec      Urinalysis Basic - ( 19 Apr 2025 07:40 )    Color: x / Appearance: x / SG: x / pH: x  Gluc: 81 mg/dL / Ketone: x  / Bili: x / Urobili: x   Blood: x / Protein: x / Nitrite: x   Leuk Esterase: x / RBC: x / WBC x   Sq Epi: x / Non Sq Epi: x / Bacteria: x        RADIOLOGY & ADDITIONAL TESTS:    Imaging Personally Reviewed: CXR, CTH  Consultant(s) Notes Reviewed:  GI, Card

## 2025-04-20 NOTE — CONSULT NOTE ADULT - ASSESSMENT
55 year old M with history of metastatic melanoma s/p treatment below who presents with new onset hallucinations over the weekend. MR brain obtained w/ ~7 mm L medial temporal contrast enhancing lesion w/ surrounding edema c/f met lesion new since September 2023 MRI. CT C/A/P 4/13/25 w/ hepatomegaly, 1cm R adrenal gland enlarged since 2024 CT C/A/P.     #Stage 3a, T2aN2a: Melanoma with BRAF V600E mutation diagnosed in      2018. He had seen several oncologists and all have agreed to offer adjuvant      immunotherapy with Nivolumab for 1 year as per the Checkmate 238. He has no history      of autoimmune disorders. He is s/p adjuvant Nivolumab with C#1 on 12/28/2018, and      C#4 on 3/22/2019. Discontinued due to thigh fasciitis (and self limited diarrhea).  ?  In 11/2019, he developed new in-transit and metastatic skin lesions to scalp and chest.   ?      Foundation CDx 11/2019: BRAF V600E, MS-stable, TMB = 4 Muts/Mb  ?      Patient restarted immunotherapy with combination of iplimumab and nivolumab with Dr. Mcginnis. C#1 on      11/12/2019 and C#4 on 1/15/2020. We started nivolumab maintenance C#2 completed      4/2020, then stopped due to endocrinopathy and severe fatigue.  ?  At that point he had no evidence of disease. Last seen in clinic 10/2021- and at that point was recommended to follow every 4 months. He presents with episodes of hallucinations over the weekend and found to have a 7 mm nodular T2 hyperintense focus in the posterior medial aspect of the left temporal lobe, just medial to the atrium of the left lateral ventricle, which demonstrates uniform contrast enhancement and surrounding vasogenic edema, suspicious for a metastatic  lesion (11:13, 16:74 and 19:13). Oncology consulted for evaluation of new focus of metastatic disease.     CT chest, abdomen and pelvis without any lymphadenopathy.     Assessment and Plan:     Differential of metastatic disease to brain: likely melanoma given previous stage and maintenance treatment stopped 04/2020 secondary to endocrinopathy, as no evidence of lymphadenopathy in the chest/abdomen or pelvis to suggest lymphoma.   Recommend neuro oncology to evaluate for evaluation of LP in the setting of possible leptomeningeal disease considering hallucinations and consideration of steroids considering vasogenic edema.   Recommend iron studies: ferritin, transferrin, TIBC and iron level and evaluation of stool occult given significant drop in Hb 15->10 over last week.   Recommend NSG consultation for biopsy vs IR.   No current plan for inpatient chemotherapy for metastatic melanoma. Recommend surgical oncology vs surgery evaluation given recent ischemic colitis and new onset anemia.   Patient to follow up with Dr. Mcginnis on discharge.   Please contact oncology on discharge to coordinate this follow up.     Discussed and seen with Dr. Billings.       Kayli Li MD  Hematology Oncology Fellow, PGY-4    55 year old M with history of metastatic melanoma s/p treatment below who presents with new onset hallucinations over the weekend. MR brain obtained w/ ~7 mm L medial temporal contrast enhancing lesion w/ surrounding edema c/f met lesion new since September 2023 MRI. CT C/A/P 4/13/25 w/ hepatomegaly, 1cm R adrenal gland enlarged since 2024 CT C/A/P.     #Stage 3a, T2aN2a: Melanoma with BRAF V600E mutation diagnosed in      2018. He had seen several oncologists and all have agreed to offer adjuvant      immunotherapy with Nivolumab for 1 year as per the Checkmate 238. He has no history      of autoimmune disorders. He is s/p adjuvant Nivolumab with C#1 on 12/28/2018, and      C#4 on 3/22/2019. Discontinued due to thigh fasciitis (and self limited diarrhea).  ?  In 11/2019, he developed new in-transit and metastatic skin lesions to scalp and chest.   ?      Foundation CDx 11/2019: BRAF V600E, MS-stable, TMB = 4 Muts/Mb  ?      Patient restarted immunotherapy with combination of iplimumab and nivolumab with Dr. Mcginnis. C#1 on      11/12/2019 and C#4 on 1/15/2020. We started nivolumab maintenance C#2 completed      4/2020, then stopped due to endocrinopathy and severe fatigue.  ?  At that point he had no evidence of disease. Last seen in clinic 10/2021- and at that point was recommended to follow every 4 months. He presents with episodes of hallucinations over the weekend and found to have a 7 mm nodular T2 hyperintense focus in the posterior medial aspect of the left temporal lobe, just medial to the atrium of the left lateral ventricle, which demonstrates uniform contrast enhancement and surrounding vasogenic edema, suspicious for a metastatic  lesion (11:13, 16:74 and 19:13). Oncology consulted for evaluation of new focus of metastatic disease.     CT chest, abdomen and pelvis without any lymphadenopathy.     Assessment and Plan:     Differential of metastatic disease to brain: likely melanoma given previous stage and maintenance treatment stopped 04/2020 secondary to endocrinopathy, as no evidence of lymphadenopathy in the chest/abdomen or pelvis to suggest lymphoma.   Recommend neuro oncology to evaluate for evaluation of LP in the setting of possible leptomeningeal disease considering hallucinations and consideration of steroids considering vasogenic edema.   Recommend iron studies: ferritin, transferrin, TIBC and iron level and evaluation of stool occult given significant drop in Hb 15->10 over last week.   Recommend NSG consultation for biopsy vs IR.   Recommend radiation oncology consultation to evaluate for role of palliative RT.   No current plan for inpatient chemotherapy for metastatic melanoma. Recommend surgical oncology vs surgery evaluation given recent ischemic colitis and new onset anemia.   Patient to follow up with Dr. Mcginnis on discharge.   Please contact oncology on discharge to coordinate this follow up.     Discussed and seen with Dr. Billings.       Kayli Li MD  Hematology Oncology Fellow, PGY-4    55 year old M with history of metastatic melanoma s/p treatment below who presents with new onset hallucinations over the weekend. MR brain obtained w/ ~7 mm L medial temporal contrast enhancing lesion w/ surrounding edema c/f met lesion new since September 2023 MRI. CT C/A/P 4/13/25 w/ hepatomegaly, 1cm R adrenal gland enlarged since 2024 CT C/A/P.     #Stage 3a, T2aN2a: Melanoma with BRAF V600E mutation diagnosed in      2018. He had seen several oncologists and all have agreed to offer adjuvant      immunotherapy with Nivolumab for 1 year as per the Checkmate 238. He has no history      of autoimmune disorders. He is s/p adjuvant Nivolumab with C#1 on 12/28/2018, and      C#4 on 3/22/2019. Discontinued due to thigh fasciitis (and self limited diarrhea).  ?  In 11/2019, he developed new in-transit and metastatic skin lesions to scalp and chest.   ?      Foundation CDx 11/2019: BRAF V600E, MS-stable, TMB = 4 Muts/Mb  ?      Patient restarted immunotherapy with combination of iplimumab and nivolumab with Dr. Mcginnis. C#1 on      11/12/2019 and C#4 on 1/15/2020. We started nivolumab maintenance C#2 completed      4/2020, then stopped due to endocrinopathy and severe fatigue.  ?  At that point he had no evidence of disease. Last seen in clinic 10/2021- and at that point was recommended to follow every 4 months. He presents with episodes of hallucinations over the weekend and found to have a 7 mm nodular T2 hyperintense focus in the posterior medial aspect of the left temporal lobe, just medial to the atrium of the left lateral ventricle, which demonstrates uniform contrast enhancement and surrounding vasogenic edema, suspicious for a metastatic  lesion (11:13, 16:74 and 19:13). Oncology consulted for evaluation of new focus of metastatic disease.     CT chest, abdomen and pelvis without any lymphadenopathy.     Assessment and Plan:       Differential of metastatic disease to brain: likely melanoma given previous stage and maintenance treatment stopped 04/2020 secondary to endocrinopathy, as no evidence of lymphadenopathy in the chest/abdomen or pelvis to suggest lymphoma.   Recommend neuro oncology to evaluate for evaluation of LP in the setting of possible leptomeningeal disease considering hallucinations and consideration of steroids considering vasogenic edema.   Recommend iron studies: ferritin, transferrin, TIBC and iron level and evaluation of stool occult given significant drop in Hb 15->10 over last week to evaluate for GI bleed. Recommend hemolysis workup: haptoglobin, LDH, and liver function test) to evaluate for hemolysis. R  Recommend radiation oncology consultation to evaluate for role of palliative RT.   No current plan for inpatient chemotherapy for metastatic melanoma. Recommend surgical oncology vs surgery evaluation given recent ischemic colitis and new onset anemia.   Patient to follow up with Dr. Mcginnis on discharge.   Please contact oncology on discharge to coordinate this follow up.     Discussed and seen with Dr. Billings.       Kayli Li MD  Hematology Oncology Fellow, PGY-4    55 year old M with history of metastatic melanoma s/p treatment below who presents with new onset hallucinations over the weekend. MR brain obtained w/ ~7 mm L medial temporal contrast enhancing lesion w/ surrounding edema c/f met lesion new since September 2023 MRI. CT C/A/P 4/13/25 w/ hepatomegaly, 1cm R adrenal gland enlarged since 2024 CT C/A/P.     #Stage 3a, T2aN2a: Melanoma with BRAF V600E mutation diagnosed in      2018. He had seen several oncologists and all have agreed to offer adjuvant      immunotherapy with Nivolumab for 1 year as per the Checkmate 238. He has no history      of autoimmune disorders. He is s/p adjuvant Nivolumab with C#1 on 12/28/2018, and      C#4 on 3/22/2019. Discontinued due to thigh fasciitis (and self limited diarrhea).  ?  In 11/2019, he developed new in-transit and metastatic skin lesions to scalp and chest.   ?      Foundation CDx 11/2019: BRAF V600E, MS-stable, TMB = 4 Muts/Mb  ?      Patient restarted immunotherapy with combination of iplimumab and nivolumab with Dr. Mcginnis. C#1 on      11/12/2019 and C#4 on 1/15/2020. We started nivolumab maintenance C#2 completed      4/2020, then stopped due to endocrinopathy and severe fatigue.  ?  At that point he had no evidence of disease. Last seen in clinic 10/2021- and at that point was recommended to follow every 4 months. He presents with episodes of hallucinations over the weekend and found to have a 7 mm nodular T2 hyperintense focus in the posterior medial aspect of the left temporal lobe, just medial to the atrium of the left lateral ventricle, which demonstrates uniform contrast enhancement and surrounding vasogenic edema, suspicious for a metastatic  lesion (11:13, 16:74 and 19:13). Oncology consulted for evaluation of new focus of metastatic disease.     CT chest, abdomen and pelvis without any lymphadenopathy.     Patient currently being treated for c. diff.     Assessment and Plan:       Differential of metastatic disease to brain: likely melanoma given previous stage and maintenance treatment stopped 04/2020 secondary to endocrinopathy, as no evidence of lymphadenopathy in the chest/abdomen or pelvis to suggest lymphoma.   Recommend neuro oncology to evaluate for evaluation of LP in the setting of possible leptomeningeal disease considering hallucinations and consideration of steroids considering vasogenic edema.   Recommend iron studies: ferritin, transferrin, TIBC and iron level and evaluation of stool occult given significant drop in Hb 15->10 over last week to evaluate for GI bleed. Recommend hemolysis workup: haptoglobin, LDH, and liver function test) to evaluate for hemolysis. R  Recommend radiation oncology consultation to evaluate for role of palliative RT.   No current plan for inpatient chemotherapy for metastatic melanoma. Patient has limited options for systemic treatment given colitis. Agree with gamma knife, oncology to follow outpatient.   Patient to follow up with Dr. Mcginnis on discharge.     Discussed and seen with Dr. Billings.       Kayli Li MD  Hematology Oncology Fellow, PGY-4

## 2025-04-20 NOTE — DIETITIAN INITIAL EVALUATION ADULT - PROBLEM SELECTOR PLAN 1
GI consulted in the ED, F/U on their recs pending.  Check stool FOB, UA.  Monitor for s/s of bleed.  Monitor HH.  Continue ASA per cardiology recs for now.  Keep active T&S, transfuse PRBC to keep Hgb>8.  Check iron studies, ferritin, B12, folate.

## 2025-04-20 NOTE — CHART NOTE - NSCHARTNOTEFT_GEN_A_CORE
Attending addendum-  4/20/25, 3p    Spoke to patient at length to stay and have work-up and treatment plans from neuro-Onc, biopsy and NeuroSx per recommendation.  He is AAOx3, has full mental capacity to make own medical decision, wants to go home and agreed with neuroSx plans for outpatient Gamma Knife. Also wants to c/w Keppra 500mg bid for seizure ppx, no decadron per recommendation.  Spoke to Onc fellow. Although Neuro-Onc and Radiation Onc needed to see him but he wants to go home at this point.  MRI spine result pending  Can have outpatient f/u with Onc and neuroSx  Plan was discussed with his wife over the phone  Cardiology- Dr Carballo is also aware of the plans.

## 2025-04-20 NOTE — DIETITIAN INITIAL EVALUATION ADULT - PROBLEM SELECTOR PLAN 2
Check CT head.  Check MRI brain w/wo contrast, vEEG.  Check TSH, FT4, RPR, WNV, HIV, Tox screen, heavy metal screen, Vit B1, B12, Folate, Vit D 25, blood cx, UA, urine cx, A1C.  Monitor CBC, BMP, lactate, blood gas.  Per ID, oral vancomycin unlikely to cause hallucination.   Neurology team consulted on admission, F/U on their recs pending.  Neuro checks.  HOLD ambien.  Delirium prevention measures, Promote sleep hygiene. Seizure precautions.  Consider psych eval.

## 2025-04-20 NOTE — CONSULT NOTE ADULT - SUBJECTIVE AND OBJECTIVE BOX
Hematology Consult Note    HPI as per admitting team:   Patient is a 55 year old male with past medical history of testicular cancer s/p orchiectomy, prior non-Hodgkin's lymphoma, scalp melanoma s/p resection and immunotherapy CAD s/p ONESIMO (10/2024) on aspirin, HTN, HLD, hypothyroidism, adrenal insufficiency  (not on steroids), ischemic colitis s/p resection, overactive bladder s/p bladder stimulator, ischemic colitis s/p L hemicolectomy with transverse colon to rectum anastomosis, recently discharged on 4/17/25 after treatment for N/V, diarrhea, CHANDNI, hypotension, fever, elevated lactate, found to have c diff pcr positive, started on oral vancomycin, presents due to concern for low hemoglobin. Per patient and his wife at bedside, he has not been having any new symptoms since his discharge yesterday however his outpatient provider was concerned about low hemoglobin and recommended inpatient evaluation. He denies fever, chills, dizziness, nausea, vomiting, chest pain, dyspnea, abdominal pain, blood in stool/urine, gum bleeding, nose bleeding. He states he has bruising and wounds on his arms from previous injection sites but no other bleeding wound or bruise. He states he has good appetite and oral intake. He is also complaining of visual hallucinations which he insists started last Sunday (4/13/25) the day he started taking oral vancomycin  (however per records patient was started on oral vancomycin on 4/15/25). He denies having had hallucinations in the past. Patient describes the hallucinations as seeing various people, and animals sitting on objects infront of him/in the room with him, these appear always at night time, he states he is aware that they are not there in reality, and are not associated with any other symptom. He denies tactile or auditory hallucinations. He notes history of chronic hearing impairment (states symptoms started several years ago) but does not use hearing aid device, has prescription glasses for reading. He notes history of insomnia for which he states he has been taking ambien for several years.  He denies history of any mood disorder, including anxiety/depression, denies SI/HI, denies history of seizures. He denies use of any illicit substance or taking any medications other than the prescriptions he was discharged on. He denies headache, dizziness, LOC, syncope, change in hearing, speech, weakness, numbness, tingling, tremors, or difficulty ambulating.  (18 Apr 2025 16:21)    Oncology history:  He has a history of stage II testicular CA (1994) treated with surgery, chemotherapy (BEP) and an autologous BMT and Non-Hodgkins lymphoma right neck neck (1998) for which he underwent radiation therapy and history of metastatic melanoma (lymphadenopathy)- [id number] (stage IIIa). He also has a history of adrenal insufficiency: Patient is currently being followed by endocrine, Dr. Sanchez. He in the past has been taking Hydrocortisone 20 mg BID. Patient with recent history of  ischemic colitis s/p L hemicolectomy with transverse colon to rectum anastomosis, recent hospital (dc/ed last week) for sepsis.      He noticed a raised, black, pruritic lesion on his right parietal scalp. Biopsy was performed on 8/14/2018 which was consistent with a 1.1mm melanoma with no significant mitotic figures, no ulceration and no regression.  ?  He was seen by Dr Sarwat Chandler on 8/13/2018 who recommended wide excision of melanoma with SLNB and reconstruction.  ?  On 8/29/2018 patient underwent radical resection of scalp melanoma, right posterior modified neck dissection with closure by Dr Bradley Toussaint (Plastic)  ?  Final Path: metastatic melanoma present in 2/2 LN, residual melanoma, negative margins, no lymphovascular invasion. Tumor stage pT2a pN2a M1a (stage IIIa).     PET/CT 9/15/2018: Minimally FDG avid soft tissue thickening right scalp probably postsurgical.FDG avid focal area of soft tissue thickening right neck, favor postsurgical changes rather than residual disease. Suggest correlation with clinical exam and surgical margins. No FDG avid distant metastatic disease.  ?  On 10/19/2018 underwent right functional neck dissection with Dr Sarwat Chandler  ?  Path: no metastatic melanoma seen in eighteen LN (0/18) and benign skin/tissue findings.  ?  He was referred to Dr Kendall Sands (Medical Oncology) at [facility].  ?  He was originally seen by Dr Kendall Sands who referred the patient to Dr Gopi Arredondo at [facility].  ?  Patient has stage IIIa (T2aN2a) BRAF testing was performed and mutation was detected. The patient was offered adjuvant Nivolumab 480mg every four weeks by Dr Arredondo for 13 cycles.    He completed Ipi/Nivo x 4 on 1/15/2020. Afterwards patient started on single agent Nivolumab x 2 cycles, the second of which was given in 4/2020, and stopped due to apparent immune-related side effects. He did not received the nivolumab on 5/2 due to severe fatigue and ? colitis on PET. he notes no new skin lesions. Patient underwent PET/CT on 4/22/2020 which demonstrated colitis. No evidence of metastatic disease or recurrent disease when last seen on 10/2021. Currently presents with drop of Hb to 9 from 15 over last week.     REVIEW OF SYSTEMS:    CONSTITUTIONAL: No weakness, fevers or chills  EYES/ENT: No visual changes;  No vertigo or throat pain   NECK: No pain or stiffness  RESPIRATORY: No cough, wheezing, hemoptysis; No shortness of breath  CARDIOVASCULAR: No chest pain or palpitations  GASTROINTESTINAL: No abdominal or epigastric pain. No nausea, vomiting, or hematemesis; No diarrhea or constipation. No melena or hematochezia.  GENITOURINARY: No dysuria, frequency or hematuria  NEUROLOGICAL: No numbness or weakness  SKIN: No itching, burning, rashes, or lesions   All other review of systems is negative unless indicated above.    PAST MEDICAL & SURGICAL HISTORY:  Testicular Cancer  1994, chemotherapy      Headache, Migraine      HTN (hypertension)      NHL (non-Hodgkin's lymphoma)  1999, Radiation to left neck region      GERD (gastroesophageal reflux disease)      Colitis  surgery 1996      BPH (benign prostatic hyperplasia)      Osteoarthritis  degenerative disc L3-4      History of bone marrow transplant      Hypertriglyceridemia      Malignant melanoma of scalp  RSX 08/18  R neck mass dsx/ LN dsx 10/19/18      Hypothyroidism      History of chemotherapy      History of Raynaud's syndrome      CAD (coronary artery disease)      Unspecified malignant neoplasm of skin of scalp and neck      History of orchiectomy  left 1994      S/P colon resection  1996      Lymph node disorder  s/p abdominal lymph node dissection 1994, 1996      Melanoma of scalp or neck  excision 8/18  s/p excision right neck mass & LN dissx      History of nasal septoplasty      History of infusaport central venous catheter insertion      History of infusaport central venous catheter removal          FAMILY HISTORY:  Hypertension (Father)        SOCIAL HISTORY:     Allergies    No Known Allergies    Intolerances        MEDICATIONS  (STANDING):  aspirin  chewable 81 milliGRAM(s) Oral daily  atorvastatin 40 milliGRAM(s) Oral at bedtime  chlorhexidine 2% Cloths 1 Application(s) Topical daily  hydrocortisone 20 milliGRAM(s) Oral <User Schedule>  hydrocortisone 10 milliGRAM(s) Oral <User Schedule>  levothyroxine 125 MICROGram(s) Oral daily  melatonin 5 milliGRAM(s) Oral once  oxyCODONE  ER Tablet 10 milliGRAM(s) Oral <User Schedule>  saccharomyces boulardii 250 milliGRAM(s) Oral two times a day  tamsulosin 0.4 milliGRAM(s) Oral at bedtime  vancomycin    Solution 125 milliGRAM(s) Oral every 6 hours    MEDICATIONS  (PRN):  HYDROmorphone  Injectable 0.5 milliGRAM(s) IV Push every 8 hours PRN breakthrough  melatonin 5 milliGRAM(s) Oral once PRN Sleep      OBJECTIVE       T(F): 97.8 (04-20-25 @ 07:33), Max: 98.5 (04-19-25 @ 17:20)  HR: 71 (04-20-25 @ 07:33)  BP: 149/85 (04-20-25 @ 07:33)  RR: 14 (04-20-25 @ 07:33)  SpO2: 97% (04-20-25 @ 07:33)  Wt(kg): --    PHYSICAL EXAM   GENERAL: NAD, well-developed  HEAD:  Atraumatic, Normocephalic  EYES: EOMI, PERRLA, conjunctiva and sclera clear  NECK: Supple, No JVD  CHEST/LUNG: Clear to auscultation bilaterally; No wheeze  HEART: Regular rate and rhythm; No murmurs, rubs, or gallops  ABDOMEN: Soft, Nontender, Nondistended; Bowel sounds present  EXTREMITIES:  2+ Peripheral Pulses, No clubbing, cyanosis, or edema  NEUROLOGY: non-focal  SKIN: No rashes or lesions                          10.8   7.54  )-----------( 303      ( 20 Apr 2025 07:16 )             33.1       04-19    139  |  104  |  9   ----------------------------<  81  3.6   |  21[L]  |  0.94    Ca    8.7      19 Apr 2025 07:40    TPro  6.6  /  Alb  4.0  /  TBili  0.5  /  DBili  x   /  AST  34  /  ALT  22  /  AlkPhos  57  04-19           Hematology Consult Note    HPI as per admitting team:   Patient is a 55 year old male with past medical history of testicular cancer s/p orchiectomy, prior non-Hodgkin's lymphoma, scalp melanoma s/p resection and immunotherapy CAD s/p ONESIMO (10/2024) on aspirin, HTN, HLD, hypothyroidism, adrenal insufficiency  (not on steroids), ischemic colitis s/p resection, overactive bladder s/p bladder stimulator, ischemic colitis s/p L hemicolectomy with transverse colon to rectum anastomosis, recently discharged on 4/17/25 after treatment for N/V, diarrhea, CHANDNI, hypotension, fever, elevated lactate, found to have c diff pcr positive, started on oral vancomycin, presents due to concern for low hemoglobin. Per patient and his wife at bedside, he has not been having any new symptoms since his discharge yesterday however his outpatient provider was concerned about low hemoglobin and recommended inpatient evaluation. He denies fever, chills, dizziness, nausea, vomiting, chest pain, dyspnea, abdominal pain, blood in stool/urine, gum bleeding, nose bleeding. He states he has bruising and wounds on his arms from previous injection sites but no other bleeding wound or bruise. He states he has good appetite and oral intake. He is also complaining of visual hallucinations which he insists started last Sunday (4/13/25) the day he started taking oral vancomycin  (however per records patient was started on oral vancomycin on 4/15/25). He denies having had hallucinations in the past. Patient describes the hallucinations as seeing various people, and animals sitting on objects infront of him/in the room with him, these appear always at night time, he states he is aware that they are not there in reality, and are not associated with any other symptom. He denies tactile or auditory hallucinations. He notes history of chronic hearing impairment (states symptoms started several years ago) but does not use hearing aid device, has prescription glasses for reading. He notes history of insomnia for which he states he has been taking ambien for several years.  He denies history of any mood disorder, including anxiety/depression, denies SI/HI, denies history of seizures. He denies use of any illicit substance or taking any medications other than the prescriptions he was discharged on. He denies headache, dizziness, LOC, syncope, change in hearing, speech, weakness, numbness, tingling, tremors, or difficulty ambulating.  (18 Apr 2025 16:21)    Oncology history:  He has a history of stage II testicular CA (1994) treated with surgery, chemotherapy (BEP) and an autologous BMT and Non-Hodgkins lymphoma right neck neck (1998) for which he underwent radiation therapy and history of metastatic melanoma (lymphadenopathy)- [id number] (stage IIIa). He also has a history of adrenal insufficiency: Patient is currently being followed by endocrine, Dr. Sanchez. He in the past has been taking Hydrocortisone 20 mg BID. Patient with recent history of  ischemic colitis s/p L hemicolectomy with transverse colon to rectum anastomosis, recent hospital (dc/ed last week) for sepsis.      He noticed a raised, black, pruritic lesion on his right parietal scalp. Biopsy was performed on 8/14/2018 which was consistent with a 1.1mm melanoma with no significant mitotic figures, no ulceration and no regression.  ?  He was seen by Dr Sarwat Chandler on 8/13/2018 who recommended wide excision of melanoma with SLNB and reconstruction.  ?  On 8/29/2018 patient underwent radical resection of scalp melanoma, right posterior modified neck dissection with closure by Dr Bradley Toussaint (Plastic)  ?  Final Path: metastatic melanoma present in 2/2 LN, residual melanoma, negative margins, no lymphovascular invasion. Tumor stage pT2a pN2a M1a (stage IIIa).     PET/CT 9/15/2018: Minimally FDG avid soft tissue thickening right scalp probably postsurgical.FDG avid focal area of soft tissue thickening right neck, favor postsurgical changes rather than residual disease. Suggest correlation with clinical exam and surgical margins. No FDG avid distant metastatic disease.  ?  On 10/19/2018 underwent right functional neck dissection with Dr Sarwat Chandler  ?  Path: no metastatic melanoma seen in eighteen LN (0/18) and benign skin/tissue findings.  ?  He was referred to Dr Kendall Sands (Medical Oncology) at [facility].  ?  He was originally seen by Dr Kendall Sands who referred the patient to Dr Gopi Arredondo at [facility].  ?  Patient has stage IIIa (T2aN2a) BRAF testing was performed and mutation was detected. The patient was offered adjuvant Nivolumab 480mg every four weeks by Dr Arredondo for 13 cycles.    He completed Ipi/Nivo x 4 on 1/15/2020. Afterwards patient started on single agent Nivolumab x 2 cycles, the second of which was given in 4/2020, and stopped due to apparent immune-related side effects. He did not received the nivolumab on 5/2 due to severe fatigue and ? colitis on PET. he notes no new skin lesions. Patient underwent PET/CT on 4/22/2020 which demonstrated colitis. No evidence of metastatic disease or recurrent disease when last seen on 10/2021. Currently presents with drop of Hb to 9 from 15 over last week. He is not complaining of any bloody bowel movements. He reports he had some hematochezia prior to his recent colonoscopy 2 months ago, which removed several tubular adenomas. He denies any current hallucinations- but reports they are periodic which visual hallucinations- seeing the clock move, etc.     REVIEW OF SYSTEMS:    CONSTITUTIONAL: No weakness, fevers or chills  EYES/ENT: No visual changes;  No vertigo or throat pain   NECK: No pain or stiffness  RESPIRATORY: No cough, wheezing, hemoptysis; No shortness of breath  CARDIOVASCULAR: No chest pain or palpitations  GASTROINTESTINAL: No abdominal or epigastric pain. No nausea, vomiting, or hematemesis; No diarrhea or constipation. No melena or hematochezia.  GENITOURINARY: No dysuria, frequency or hematuria  NEUROLOGICAL: No numbness or weakness  SKIN: No itching, burning, rashes, or lesions   All other review of systems is negative unless indicated above.    PAST MEDICAL & SURGICAL HISTORY:  Testicular Cancer  1994, chemotherapy      Headache, Migraine      HTN (hypertension)      NHL (non-Hodgkin's lymphoma)  1999, Radiation to left neck region      GERD (gastroesophageal reflux disease)      Colitis  surgery 1996      BPH (benign prostatic hyperplasia)      Osteoarthritis  degenerative disc L3-4      History of bone marrow transplant      Hypertriglyceridemia      Malignant melanoma of scalp  RSX 08/18  R neck mass dsx/ LN dsx 10/19/18      Hypothyroidism      History of chemotherapy      History of Raynaud's syndrome      CAD (coronary artery disease)      Unspecified malignant neoplasm of skin of scalp and neck      History of orchiectomy  left 1994      S/P colon resection  1996      Lymph node disorder  s/p abdominal lymph node dissection 1994, 1996      Melanoma of scalp or neck  excision 8/18  s/p excision right neck mass & LN dissx      History of nasal septoplasty      History of infusaport central venous catheter insertion      History of infusaport central venous catheter removal          FAMILY HISTORY:  Hypertension (Father)        SOCIAL HISTORY:     Allergies    No Known Allergies    Intolerances        MEDICATIONS  (STANDING):  aspirin  chewable 81 milliGRAM(s) Oral daily  atorvastatin 40 milliGRAM(s) Oral at bedtime  chlorhexidine 2% Cloths 1 Application(s) Topical daily  hydrocortisone 20 milliGRAM(s) Oral <User Schedule>  hydrocortisone 10 milliGRAM(s) Oral <User Schedule>  levothyroxine 125 MICROGram(s) Oral daily  melatonin 5 milliGRAM(s) Oral once  oxyCODONE  ER Tablet 10 milliGRAM(s) Oral <User Schedule>  saccharomyces boulardii 250 milliGRAM(s) Oral two times a day  tamsulosin 0.4 milliGRAM(s) Oral at bedtime  vancomycin    Solution 125 milliGRAM(s) Oral every 6 hours    MEDICATIONS  (PRN):  HYDROmorphone  Injectable 0.5 milliGRAM(s) IV Push every 8 hours PRN breakthrough  melatonin 5 milliGRAM(s) Oral once PRN Sleep      OBJECTIVE       T(F): 97.8 (04-20-25 @ 07:33), Max: 98.5 (04-19-25 @ 17:20)  HR: 71 (04-20-25 @ 07:33)  BP: 149/85 (04-20-25 @ 07:33)  RR: 14 (04-20-25 @ 07:33)  SpO2: 97% (04-20-25 @ 07:33)  Wt(kg): --    PHYSICAL EXAM   GENERAL: NAD, well-developed  HEAD:  Atraumatic, Normocephalic  EYES: EOMI, PERRLA, conjunctiva and sclera clear  NECK: Supple, No JVD  CHEST/LUNG: Clear to auscultation bilaterally; No wheeze  HEART: Regular rate and rhythm; No murmurs, rubs, or gallops  ABDOMEN: Soft, Nontender, Nondistended; Bowel sounds present  EXTREMITIES:  2+ Peripheral Pulses, No clubbing, cyanosis, or edema  NEUROLOGY: non-focal  SKIN: No rashes or lesions                          10.8   7.54  )-----------( 303      ( 20 Apr 2025 07:16 )             33.1       04-19    139  |  104  |  9   ----------------------------<  81  3.6   |  21[L]  |  0.94    Ca    8.7      19 Apr 2025 07:40    TPro  6.6  /  Alb  4.0  /  TBili  0.5  /  DBili  x   /  AST  34  /  ALT  22  /  AlkPhos  57  04-19

## 2025-04-20 NOTE — CONSULT NOTE ADULT - ATTENDING COMMENTS
Late Entry - Patient seen afternoon 4/18/24   55M, recent admission and discharge for C diff (d/c 4/17/25), now sent in by outpatient provider due to low hemoglobin.   During most recent hospital stay, hgb downtrended to 8.8. Hgb today is 10.7  Patient reports brown bowel movements. No overt signs of bleeding.     Recommendations  - Low suspicion for active or ongoing GI bleed at this time given uptrending hgb and no overt signs   - Would be high risk for endoscopic evaluation as he is actively being treated for c diff infection  - trend h/h  - monitor bowel movements   - f/u with GI and colorectal surgery as outpatient for anemia w/u, endoscopic eval   - rest of care per primary team  GI to sign off, please call w questions
55M R handed, hx CAD s/p stents x4 2024 on ASA81, former smoker, testicular cancer s/p orchiectomy, non-Hodgkin's lymphoma in remission, melanoma s/p immunotherapy HTN, HLD, hypothyroidism, ischemic colitis s/p L hemicolectomy with transverse colon to rectum anastomosis, recent hospital (dc/ed last week) for sepsis. Readmitted medicine yday for low Hg (8.8). Pt endorsing episode of hallucinations while he was at home over the weekend. MR brain obtained w/ ~7 mm L medial temporal contrast enhancing lesion w/ surrounding edema c/f met lesion new since September 2023 MRI. CT C/A/P 4/13/25 w/ hepatomegaly, 1cm R adrenal gland enlarged since 2024 CT C/A/P. Exam: awake, Ox3, PERRL, no drift, DOLAN 5/5.  - no acute neurosurgical intervention  - heme/onc and rad/onc consults  - IR consult to consider  - MR c/t/l spine w/wo to assess for other metastatic disease  - to be discussed at tumor board Wednesday, 4/23/25  - given single lesion with known prior history of malignancy patient will likely be a candidate for radiosurgery, but will discuss at tumor board
55M with history of testicular cancer s/p orchiectomy, prior non-Hodgkin's lymphoma, scalp melanoma s/p resection and immunotherapy CAD s/p ONESIMO (10/2024), HTN, HLD, hypothyroidism, adrenal insufficiency  (not on steroids), ischemic colitis s/p resection, overactive bladder s/p bladder stimulator recently discharged on 4/17 presents due to concern for low hemoglobin. Patient states that his cardiologist called him stating that his hemoglobin was ~8 when he was discharged at that he should go back to the hospital for further evaluation. Of note patient presented to the hospital on 4/13/25 with nausea, vomiting and diarrhea after eating chicken. He was febrile and hypotensive requiring IVF resuscitation and steroids. Labs with at the time with leukocytosis, CHANDNI and elevated lactate.  He had a CT A/P with enterocolitis. Patient was empirically on cefepime and falgyll. GI PCR was negative Cdiff PCR noted to be positive and indeterminate GDH. He was started on Vancomycin on 4/15. Patient and his wife feel that since starting the oral vancomycin he has been having visual hallucination. He states he sees animals on his blanket, and people sitting on a chair in front of him. He is now having about four bowel movements which are more formed. Patient currently afebrile and hemodynamically stable. Labs with no leukocytosis. ID asked to help manage.     Antimicrobials:  Vancomycin 4/15-    Assessment:  #Enterocolitis  #Concern for Cdiff - patient with Cdiff PCR+ and GDH toxin indeterminate. Given patient presented septic on previous admission and immunocompromised status would recommend completing therapy for C.diff  #Visual hallucination - po vancomycin is not systemically absorbed and not an etiology of hallucinations.       Recommendations:   -Discussed with patient that po vancomycin is not systemically absorbed. Patient willing to continue with PO vancomycin (complete 6 days of therapy)  -pt also restarted on steroids from last admission, he was tapered off steroids in past. could steroids be possible etiology of hallucinations ?   -Can check blood cultures   -Work up of visual hallucination per primary team   -Monitor for improvement of diarrhea symptoms    Plan discussed with consulting team.     Gustavo Alexis  Please contact through MS Teams   If no response or past 5 pm/weekend call [phone].
55 year old M with history of metastatic melanoma s/p treatment below who presents with new onset hallucinations over the weekend. MR brain obtained w/ ~7 mm L medial temporal contrast enhancing lesion w/ surrounding edema c/f met lesion new since September 2023 MRI. CT C/A/P 4/13/25 w/ hepatomegaly, 1cm R adrenal gland enlarged since 2024 CT C/A/P. Plan noted for outpt RT. Pt will f/u with Dr Mcginnis and neuro onc. Noted drop in Hb. Anemia work up in progress, r/o bleeding and hemolysis.       ?

## 2025-04-20 NOTE — DIETITIAN INITIAL EVALUATION ADULT - ORAL INTAKE PTA/DIET HISTORY
-No specific diet hx noted.  -Baseline tolerance to chewing/swallowing, and baseline provision of energy/nutrient intake unclear.   -No documented food allergies.   -No indication of prior vitamins/supplement intake PTA.

## 2025-04-20 NOTE — CHART NOTE - NSCHARTNOTEFT_GEN_A_CORE
MR with small metastasis, no significant mass effect or edema. No leptomeningeal disease. Clinically stable. Will present case at CNS tumor board this Wednesday and plan for gamma knife SRS to lesion. Neuro-onc and rad-onc already aware, will see in clinic. No neurosurgical barriers to discharge.    - No further neurosurgical intervention at this time  - F/u outpatient this Thursday with Dr. Oconnor for outpatient SRS to lesion  - Reconsult neurosurgery as needed MR with small metastasis, no significant mass effect or edema. No leptomeningeal disease. Clinically stable. Will present case at CNS tumor board this Wednesday and plan for gamma knife SRS to lesion. Neuro-onc and rad-onc already aware, will see in clinic. No neurosurgical barriers to discharge.    - No further neurosurgical intervention at this time  - F/u outpatient this Thursday with Dr. Oconnor for outpatient SRS to lesion  - Defer steroids given minimal vasogenic edema, risks > benefits  - keppra 500 BID for sz ppx  - Reconsult neurosurgery as needed

## 2025-04-20 NOTE — DIETITIAN INITIAL EVALUATION ADULT - PERTINENT MEDS FT
MEDICATIONS  (STANDING):  aspirin  chewable 81 milliGRAM(s) Oral daily  atorvastatin 40 milliGRAM(s) Oral at bedtime  chlorhexidine 2% Cloths 1 Application(s) Topical daily  hydrocortisone 20 milliGRAM(s) Oral <User Schedule>  hydrocortisone 10 milliGRAM(s) Oral <User Schedule>  levETIRAcetam 500 milliGRAM(s) Oral two times a day  levothyroxine 125 MICROGram(s) Oral daily  melatonin 5 milliGRAM(s) Oral once  oxyCODONE  ER Tablet 10 milliGRAM(s) Oral <User Schedule>  saccharomyces boulardii 250 milliGRAM(s) Oral two times a day  tamsulosin 0.4 milliGRAM(s) Oral at bedtime  vancomycin    Solution 125 milliGRAM(s) Oral every 6 hours    MEDICATIONS  (PRN):  HYDROmorphone  Injectable 0.5 milliGRAM(s) IV Push every 8 hours PRN breakthrough  melatonin 5 milliGRAM(s) Oral once PRN Sleep

## 2025-04-20 NOTE — DIETITIAN INITIAL EVALUATION ADULT - NSFNSGIIOFT_GEN_A_CORE
- Pt denies nausea, vomiting, diarrhea, or constipation.   - Last BM: 4/19; not currently ordered for bowel regimen

## 2025-04-20 NOTE — CONSULT NOTE ADULT - CONSULT REQUESTED DATE/TIME
18-Apr-2025 15:49
18-Apr-2025 17:59
20-Apr-2025 11:24
20-Apr-2025 09:00
18-Apr-2025 19:22
19-Apr-2025 16:05

## 2025-04-20 NOTE — DIETITIAN INITIAL EVALUATION ADULT - PROBLEM SELECTOR PLAN 7
Telepsychiatry Reassessment Note:    MD received handoff on patient.     MD spoke to RN for updated ED course: patient has been calm throughout night, with no acute events. He is sleeping at this time.     A/P from prior eval: "The patient is a 37-year-old male; domiciled with mother; employed as a ; self-reported PPHx of depression (per [facility]ES also schizoaffective disorder, bipolar disorder, anxiety), past admissions, denies hx of SIB/SA, hx of aggression; BIB EMS activated by parent; psychiatry consulted for psychosis.  Pt with speech latency and possible thought blocking though denies current SI/HI/AVH.  Pt's mother is concerned about pt and does not feel he is back to baseline but does not relay any specific safety concerns.  Pt to hold in ED for further observation and to obtain additional collateral from outpatient ." callback from outpatient team still pending at this time, no beds available for inpatient placement currently.     - psychiatry to follow, continue hold. DVT PPX: SCDs due to concern for GI bleed.   Diet: Regular. Dietician/ Nutritionist Eval.   OOBTC, ambulate as tolerated. Fall precautions. Aspiration precautions.  PT evaluation. Incentive spirometry.   # Dispo: pending clinical improvement, anticipate discharge back to home, F/U CM.   # Medication reconciliation done at the time of admission per list from previous discharge paperwork and patient.

## 2025-04-20 NOTE — PROGRESS NOTE ADULT - ASSESSMENT
Patient is a 55 year old male with past medical history of testicular cancer s/p orchiectomy, prior non-Hodgkin's lymphoma, scalp melanoma s/p resection and immunotherapy CAD s/p ONESIMO (10/2024) on aspirin, HTN, HLD, hypothyroidism, adrenal insufficiency  (not on steroids), ischemic colitis s/p resection, overactive bladder s/p bladder stimulator, ischemic colitis s/p L hemicolectomy with transverse colon to rectum anastomosis, recently discharged on 4/17/25 after treatment for N/V, diarrhea, CHANDNI, hypotension, fever, elevated lactate, found to have c diff pcr positive, started on oral vancomycin, presents due to concern for low hemoglobin, also complaining of visual hallucinations. Patient is admitted for further evaluation and management. 
  - no acute neurosurgical intervention  - heme/onc and rad/onc consults  - IR consult for adrenal mass bx  - MR c/t/l spine w/wo to assess for other metastatic disease  - to be presented at CNS tumor board Wednesday
Patient is a 55 year old male with past medical history of testicular cancer s/p orchiectomy, prior non-Hodgkin's lymphoma, scalp melanoma s/p resection and immunotherapy CAD s/p ONESIMO (10/2024) on aspirin, HTN, HLD, hypothyroidism, adrenal insufficiency  (not on steroids), ischemic colitis s/p resection, overactive bladder s/p bladder stimulator, ischemic colitis s/p L hemicolectomy with transverse colon to rectum anastomosis, recently discharged on 4/17/25 after treatment for N/V, diarrhea, CHANDNI, hypotension, fever, elevated lactate, found to have c diff pcr positive, started on oral vancomycin, presents due to concern for low hemoglobin, also complaining of visual hallucinations. Patient is admitted for further evaluation and management.

## 2025-04-20 NOTE — DIETITIAN INITIAL EVALUATION ADULT - REASON INDICATOR FOR ASSESSMENT
Nutrition consult warranted for: nutrition services assessment and education and MST score 2 or more   Information obtained from: electronic medical record and previous RD notes, pt asleep during RD visit, unable to participate in RD assessment.   Chart reviewed, events noted.

## 2025-04-20 NOTE — PROGRESS NOTE ADULT - PROBLEM SELECTOR PLAN 2
stable Hb 10.8, no active BRBPR, VSS  - GI plans appreciated, no need of Scopes due to active enterocolitis from C. Diff  - c/w PO Vanco

## 2025-04-20 NOTE — DISCHARGE NOTE NURSING/CASE MANAGEMENT/SOCIAL WORK - NSDCPEFALRISK_GEN_ALL_CORE
For information on Fall & Injury Prevention, visit: [email or web address] OR  [email or web address] OR  https://www.cdc.gov/steadi/patient.html

## 2025-04-20 NOTE — DIETITIAN INITIAL EVALUATION ADULT - ENTER TO (CAL/KG)
Ok.  With that information we might be able to get someone out there. Please have visiting nurses eval patient.     I also need the consult notes 35

## 2025-04-20 NOTE — PROGRESS NOTE ADULT - PROBLEM SELECTOR PLAN 3
Discussed with ID attending on admission, they rec to continue oral vancomycin for now.   Monitor stools.  Contact isolation.
Per ID c/w PO Vancomycin 125mg q 6 hrs for total 2 weeks  Contact isolation.

## 2025-04-20 NOTE — DISCHARGE NOTE NURSING/CASE MANAGEMENT/SOCIAL WORK - PATIENT PORTAL LINK FT
You can access the FollowMyHealth Patient Portal offered by [facility] by registering at the following website: [email or web address]. By joining [facility]’s FollowMyHealth portal, you will also be able to view your health information using other applications (apps) compatible with our system.

## 2025-04-20 NOTE — PROGRESS NOTE ADULT - PROBLEM SELECTOR PLAN 1
stable Hb 10.6, no active BRBPR, VSS  - GI plans appreciated, no need of Scopes due to active enterocolitis  - spoke to his wife- agree to d/c
CTH neg, But MRI brain shows - There is a 7 mm nodular T2 hyperintense focus in the posterior medial aspect of the left temporal lobe, just medial to the atrium of the left lateral ventricle, which demonstrates uniform contrast enhancement and surrounding vasogenic edema, suspicious for a metastatic lesion.  - appreciated NeuroSx eval- rec biopsy of adrenal mass biopsy by IR, MRI full spines ( done), Onc and RT consults  - Onc plans noted  - IR consult for biopsy and RT consult called  - Patient wants to go home, spoke to NeuroSx and Attending recommends to see him outpatient this Thursday 4/24/25 for gamma knife and keppra 500mg bid for seizure prophylaxis  - spoke to his wife- Keeley

## 2025-04-20 NOTE — CONSULT NOTE ADULT - ASSESSMENT
-----------------------------------------------------------  Interventional Radiology Brief Consult Note  -----------------------------------------------------------    Reason for Referral: right adrenal mass biopsy     Clinical Summary: 55y Male with history of lymphoma, melanoma s/p resection and immunotherapy, CAD s/p ONESIMO on ASA, adrenal insufficiency (no on steroids) with recent admission for C.Diff, now p/w anemia and visual hallucinations. Patient is found to have a brain lesion and R adrenal mass on CT (recommended MRI adrenal mass protocol and plan for neurosurgery tumor board on Wednesday). IR is consulted for adrenal mass biopsy.      Vitals:  T(F): 97.8 (04-20-25 @ 07:33), Max: 98.5 (04-19-25 @ 17:20)  HR: 71 (04-20-25 @ 07:33) (57 - 75)  BP: 149/85 (04-20-25 @ 07:33) (126/85 - 149/85)  RR: 14 (04-20-25 @ 07:33) (14 - 18)  SpO2: 97% (04-20-25 @ 07:33) (97% - 100%)    Labs:           10.8  7.54)-----(303     (04-20-25 @ 07:16)         33.1     139 | 104 | 9  --------------------< 81     (04-19-25 @ 07:40)  3.6 | 21 | 0.94       PT: 12.0 04-18-25 @ 13:26  aPTT: 27.4 04-18-25 @ 13:26   INR: 1.05 04-18-25 @ 13:26    Imaging: CT abdomen and pelvis 4/13/25 was reviewed     Assessment: 55y Male with history of lymphoma and melanoma with brain lesion and right supra-renal mass, likely arising from the adrenal. IR is consulted for right adrenal mass biopsy.     Recommendations:  - The adrenal mass location is not amenable to percutaneous biopsy. Will follow up MRI abdomen to further assess feasibility for biopsy.     --  Chasity Arnold MD  Interventional Radiology Resident (PGY-6)  Available on [employer] TEAMS    For EMERGENT inquiries/questions:  IR Pager ([facility]): [phone]  IR Pager (J): [phone] ; [phone]    For non-emergent consults/questions:   Please place a sunrise order "Consult- Interventional Radiology" with an appropriate callback number    For questions about scheduling during appropriate work hours, call IR :  [facility]: [phone]  [facility]: [phone]    For outpatient IR booking:  [facility]: [phone]  [facility]: [phone]           -----------------------------------------------------------  Interventional Radiology Brief Consult Note  -----------------------------------------------------------    Reason for Referral: right adrenal mass biopsy     Clinical Summary: 55y Male with history of lymphoma, melanoma s/p resection and immunotherapy, CAD s/p ONESIMO on ASA, adrenal insufficiency (no on steroids) with recent admission for C.Diff, now p/w anemia and visual hallucinations. Patient is found to have a brain lesion and R adrenal mass on CT (recommended MRI adrenal mass protocol and plan for neurosurgery tumor board on Wednesday). IR is consulted for adrenal mass biopsy.      Vitals:  T(F): 97.8 (04-20-25 @ 07:33), Max: 98.5 (04-19-25 @ 17:20)  HR: 71 (04-20-25 @ 07:33) (57 - 75)  BP: 149/85 (04-20-25 @ 07:33) (126/85 - 149/85)  RR: 14 (04-20-25 @ 07:33) (14 - 18)  SpO2: 97% (04-20-25 @ 07:33) (97% - 100%)    Labs:           10.8  7.54)-----(303     (04-20-25 @ 07:16)         33.1     139 | 104 | 9  --------------------< 81     (04-19-25 @ 07:40)  3.6 | 21 | 0.94       PT: 12.0 04-18-25 @ 13:26  aPTT: 27.4 04-18-25 @ 13:26   INR: 1.05 04-18-25 @ 13:26    Imaging: CT abdomen and pelvis 4/13/25 was reviewed     Assessment: 55y Male with history of lymphoma and melanoma with brain lesion and right supra-renal mass, likely arising from the adrenal. IR is consulted for right adrenal mass biopsy.     Recommendations:  - The adrenal mass is very small and the location is not amenable to percutaneous biopsy without high risk. Will follow up MRI abdomen to further assess feasibility for biopsy.     --  Chasity Arnold MD  Interventional Radiology Resident (PGY-6)  Available on [employer] TEAMS    For EMERGENT inquiries/questions:  IR Pager ([facility]): [phone]  IR Pager (J): [phone] ; [phone]    For non-emergent consults/questions:   Please place a sunrise order "Consult- Interventional Radiology" with an appropriate callback number    For questions about scheduling during appropriate work hours, call IR :  [facility]: [phone]  [facility]: [phone]    For outpatient IR booking:  [facility]: [phone]  [facility]: [phone]

## 2025-04-20 NOTE — CONSULT NOTE ADULT - CONSULT REASON
Anemia
suspicion for brain metastatic disease
Anemia
History of Cdiff
intracranial lesion
right adrenal mass biopsy

## 2025-04-20 NOTE — DIETITIAN INITIAL EVALUATION ADULT - PHYSCIAL ASSESSMENT
Weight Hx Per:  - Source: patient   - UBW: unknown     Weight Hx Per [facility] HIE:   - 130 pounds (11/08/2024)  - 125 pounds (1/31/2025)  - 130.1 pounds (4/13/2025)    Current Admission Weights:  - Dosing weight: 140 pounds/63.5 kg (04/18)    Weight Change:  - none noted     **  Will continue to monitor weight trends as available/able.     IBW: 160 pounds   %IBW: 88%

## 2025-04-22 ENCOUNTER — APPOINTMENT (OUTPATIENT)
Dept: CARDIOLOGY | Facility: CLINIC | Age: 55
End: 2025-04-22
Payer: COMMERCIAL

## 2025-04-22 ENCOUNTER — OUTPATIENT (OUTPATIENT)
Dept: OUTPATIENT SERVICES | Facility: HOSPITAL | Age: 55
LOS: 1 days | Discharge: HOME | End: 2025-04-22

## 2025-04-22 VITALS
OXYGEN SATURATION: 99 % | HEART RATE: 80 BPM | DIASTOLIC BLOOD PRESSURE: 60 MMHG | HEIGHT: 71 IN | SYSTOLIC BLOOD PRESSURE: 118 MMHG | WEIGHT: 128 LBS | TEMPERATURE: 98.6 F | BODY MASS INDEX: 17.92 KG/M2

## 2025-04-22 DIAGNOSIS — C62.90 MALIGNANT NEOPLASM OF UNSPECIFIED TESTIS, UNSPECIFIED WHETHER DESCENDED OR UNDESCENDED: ICD-10-CM

## 2025-04-22 DIAGNOSIS — Z98.890 OTHER SPECIFIED POSTPROCEDURAL STATES: Chronic | ICD-10-CM

## 2025-04-22 DIAGNOSIS — R53.83 OTHER FATIGUE: ICD-10-CM

## 2025-04-22 DIAGNOSIS — I73.00 RAYNAUD'S SYNDROME W/OUT GANGRENE: ICD-10-CM

## 2025-04-22 DIAGNOSIS — E27.8 OTHER SPECIFIED DISORDERS OF ADRENAL GLAND: ICD-10-CM

## 2025-04-22 DIAGNOSIS — D64.9 ANEMIA, UNSPECIFIED: ICD-10-CM

## 2025-04-22 DIAGNOSIS — Z13.228 ENCOUNTER FOR SCREENING FOR OTHER METABOLIC DISORDERS: ICD-10-CM

## 2025-04-22 DIAGNOSIS — E03.9 HYPOTHYROIDISM, UNSPECIFIED: ICD-10-CM

## 2025-04-22 DIAGNOSIS — C43.4 MALIGNANT MELANOMA OF SCALP AND NECK: Chronic | ICD-10-CM

## 2025-04-22 DIAGNOSIS — E78.00 PURE HYPERCHOLESTEROLEMIA, UNSPECIFIED: ICD-10-CM

## 2025-04-22 DIAGNOSIS — I89.9 NONINFECTIVE DISORDER OF LYMPHATIC VESSELS AND LYMPH NODES, UNSPECIFIED: Chronic | ICD-10-CM

## 2025-04-22 DIAGNOSIS — I10 ESSENTIAL (PRIMARY) HYPERTENSION: ICD-10-CM

## 2025-04-22 DIAGNOSIS — A04.72 ENTEROCOLITIS DUE TO CLOSTRIDIUM DIFFICILE, NOT SPECIFIED AS RECURRENT: ICD-10-CM

## 2025-04-22 DIAGNOSIS — Z90.79 ACQUIRED ABSENCE OF OTHER GENITAL ORGAN(S): Chronic | ICD-10-CM

## 2025-04-22 DIAGNOSIS — I25.10 ATHEROSCLEROTIC HEART DISEASE OF NATIVE CORONARY ARTERY W/OUT ANGINA PECTORIS: ICD-10-CM

## 2025-04-22 DIAGNOSIS — C43.8 MALIGNANT MELANOMA OF OVERLAPPING SITES OF SKIN: ICD-10-CM

## 2025-04-22 DIAGNOSIS — C79.9 SECONDARY MALIGNANT NEOPLASM OF UNSPECIFIED SITE: ICD-10-CM

## 2025-04-22 DIAGNOSIS — R94.31 ABNORMAL ELECTROCARDIOGRAM [ECG] [EKG]: ICD-10-CM

## 2025-04-22 DIAGNOSIS — R59.1 GENERALIZED ENLARGED LYMPH NODES: ICD-10-CM

## 2025-04-22 DIAGNOSIS — Z98.89 OTHER SPECIFIED POSTPROCEDURAL STATES: Chronic | ICD-10-CM

## 2025-04-22 DIAGNOSIS — R06.02 SHORTNESS OF BREATH: ICD-10-CM

## 2025-04-22 LAB
WNV IGG TITR FLD: NEGATIVE — SIGNIFICANT CHANGE UP
WNV IGM SPEC QL: NEGATIVE — SIGNIFICANT CHANGE UP

## 2025-04-22 PROCEDURE — 93000 ELECTROCARDIOGRAM COMPLETE: CPT

## 2025-04-22 PROCEDURE — G2211 COMPLEX E/M VISIT ADD ON: CPT

## 2025-04-22 PROCEDURE — 99214 OFFICE O/P EST MOD 30 MIN: CPT

## 2025-04-22 RX ORDER — VANCOMYCIN HYDROCHLORIDE 125 MG/1
125 CAPSULE ORAL
Refills: 0 | Status: ACTIVE | COMMUNITY
Start: 2025-04-22

## 2025-04-22 RX ORDER — LEVETIRACETAM 500 MG/1
500 TABLET, FILM COATED ORAL TWICE DAILY
Refills: 0 | Status: ACTIVE | COMMUNITY
Start: 2025-04-22

## 2025-04-23 ENCOUNTER — APPOINTMENT (OUTPATIENT)
Dept: HEMATOLOGY ONCOLOGY | Facility: CLINIC | Age: 55
End: 2025-04-23
Payer: COMMERCIAL

## 2025-04-23 VITALS
DIASTOLIC BLOOD PRESSURE: 87 MMHG | HEIGHT: 71 IN | HEART RATE: 64 BPM | TEMPERATURE: 98 F | WEIGHT: 125.88 LBS | BODY MASS INDEX: 17.62 KG/M2 | SYSTOLIC BLOOD PRESSURE: 135 MMHG | OXYGEN SATURATION: 96 % | RESPIRATION RATE: 17 BRPM

## 2025-04-23 LAB
ARSENIC SERPL-MCNC: 2 UG/L — SIGNIFICANT CHANGE UP (ref 0–9)
CADMIUM SERPL-MCNC: 0.6 UG/L — SIGNIFICANT CHANGE UP (ref 0–1.2)
CORTICOSTEROID BINDING GLOBULIN RESULT: 2.9 MG/DL — SIGNIFICANT CHANGE UP
CORTIS F/TOTAL MFR SERPL: 2.1 % — SIGNIFICANT CHANGE UP
CORTIS SERPL-MCNC: 3.4 UG/DL — LOW
CORTISOL, FREE RESULT: 0.07 UG/DL — LOW
CULTURE RESULTS: SIGNIFICANT CHANGE UP
CULTURE RESULTS: SIGNIFICANT CHANGE UP
LEAD BLD-MCNC: <1 UG/DL — SIGNIFICANT CHANGE UP (ref 0–3.4)
MERCURY SERPL-MCNC: <1 UG/L — SIGNIFICANT CHANGE UP (ref 0–14.9)
SPECIMEN SOURCE: SIGNIFICANT CHANGE UP
SPECIMEN SOURCE: SIGNIFICANT CHANGE UP

## 2025-04-23 PROCEDURE — 99215 OFFICE O/P EST HI 40 MIN: CPT

## 2025-04-23 PROCEDURE — 99417 PROLNG OP E/M EACH 15 MIN: CPT

## 2025-04-23 RX ORDER — LEVETIRACETAM 500 MG/1
500 TABLET, FILM COATED ORAL TWICE DAILY
Refills: 0 | Status: DISCONTINUED | COMMUNITY
Start: 2025-04-22 | End: 2025-04-23

## 2025-04-24 LAB
ALBUMIN SERPL ELPH-MCNC: 4.8 G/DL
ALP BLD-CCNC: 64 U/L
ALT SERPL-CCNC: 23 U/L
ANION GAP SERPL CALC-SCNC: 14 MMOL/L
AST SERPL-CCNC: 25 U/L
BASOPHILS # BLD AUTO: 0.05 K/UL
BASOPHILS NFR BLD AUTO: 0.6 %
BILIRUB DIRECT SERPL-MCNC: 0.1 MG/DL
BILIRUB INDIRECT SERPL-MCNC: 0.4 MG/DL
BILIRUB SERPL-MCNC: 0.5 MG/DL
BUN SERPL-MCNC: 16 MG/DL
CALCIUM SERPL-MCNC: 10 MG/DL
CHLORIDE SERPL-SCNC: 99 MMOL/L
CHOLEST SERPL-MCNC: 142 MG/DL
CK SERPL-CCNC: 110 U/L
CO2 SERPL-SCNC: 24 MMOL/L
CREAT SERPL-MCNC: 0.88 MG/DL
EGFRCR SERPLBLD CKD-EPI 2021: 102 ML/MIN/1.73M2
EOSINOPHIL # BLD AUTO: 0.13 K/UL
EOSINOPHIL NFR BLD AUTO: 1.6 %
ESTIMATED AVERAGE GLUCOSE: 94 MG/DL
FERRITIN SERPL-MCNC: 137 NG/ML
FOLATE SERPL-MCNC: 8.1 NG/ML
GLUCOSE SERPL-MCNC: 95 MG/DL
HBA1C MFR BLD HPLC: 4.9 %
HCT VFR BLD CALC: 42.2 %
HDLC SERPL-MCNC: 51 MG/DL
HGB BLD-MCNC: 13.1 G/DL
IMM GRANULOCYTES NFR BLD AUTO: 0.5 %
IRON SATN MFR SERPL: 15 %
IRON SERPL-MCNC: 50 UG/DL
LDLC SERPL-MCNC: 62 MG/DL
LYMPHOCYTES # BLD AUTO: 2.48 K/UL
LYMPHOCYTES NFR BLD AUTO: 29.8 %
MAGNESIUM SERPL-MCNC: 2 MG/DL
MAN DIFF?: NORMAL
MCHC RBC-ENTMCNC: 28.1 PG
MCHC RBC-ENTMCNC: 31 G/DL
MCV RBC AUTO: 90.6 FL
MONOCYTES # BLD AUTO: 0.34 K/UL
MONOCYTES NFR BLD AUTO: 4.1 %
NEUTROPHILS # BLD AUTO: 5.28 K/UL
NEUTROPHILS NFR BLD AUTO: 63.4 %
NONHDLC SERPL-MCNC: 91 MG/DL
PLATELET # BLD AUTO: 359 K/UL
POTASSIUM SERPL-SCNC: 4.8 MMOL/L
PROT SERPL-MCNC: 7.8 G/DL
RBC # BLD: 4.66 M/UL
RBC # FLD: 15.5 %
SODIUM SERPL-SCNC: 138 MMOL/L
T3FREE SERPL-MCNC: 2.68 PG/ML
T4 FREE SERPL-MCNC: 1.8 NG/DL
TIBC SERPL-MCNC: 332 UG/DL
TRANSFERRIN SERPL-MCNC: 269 MG/DL
TRIGL SERPL-MCNC: 174 MG/DL
TSH SERPL-ACNC: 2.28 UIU/ML
UIBC SERPL-MCNC: 282 UG/DL
VIT B1 SERPL-MCNC: 82.9 NMOL/L — SIGNIFICANT CHANGE UP (ref 66.5–200)
VIT B12 SERPL-MCNC: 564 PG/ML
WBC # FLD AUTO: 8.32 K/UL

## 2025-04-27 LAB — T3 SERPL-MCNC: 100 NG/DL — SIGNIFICANT CHANGE UP

## 2025-04-30 ENCOUNTER — APPOINTMENT (OUTPATIENT)
Dept: SPINE | Facility: CLINIC | Age: 55
End: 2025-04-30
Payer: COMMERCIAL

## 2025-04-30 ENCOUNTER — APPOINTMENT (OUTPATIENT)
Dept: NUCLEAR MEDICINE | Facility: IMAGING CENTER | Age: 55
End: 2025-04-30

## 2025-04-30 ENCOUNTER — NON-APPOINTMENT (OUTPATIENT)
Age: 55
End: 2025-04-30

## 2025-04-30 VITALS
OXYGEN SATURATION: 100 % | HEIGHT: 71 IN | HEART RATE: 84 BPM | DIASTOLIC BLOOD PRESSURE: 78 MMHG | WEIGHT: 125.88 LBS | SYSTOLIC BLOOD PRESSURE: 117 MMHG | BODY MASS INDEX: 17.62 KG/M2

## 2025-04-30 PROCEDURE — 99203 OFFICE O/P NEW LOW 30 MIN: CPT

## 2025-05-01 ENCOUNTER — APPOINTMENT (OUTPATIENT)
Dept: RADIATION ONCOLOGY | Facility: CLINIC | Age: 55
End: 2025-05-01
Payer: COMMERCIAL

## 2025-05-01 VITALS
HEART RATE: 64 BPM | OXYGEN SATURATION: 98 % | WEIGHT: 125.77 LBS | BODY MASS INDEX: 17.54 KG/M2 | DIASTOLIC BLOOD PRESSURE: 82 MMHG | SYSTOLIC BLOOD PRESSURE: 140 MMHG

## 2025-05-01 PROBLEM — G93.9 BRAIN LESION: Status: ACTIVE | Noted: 2025-05-01

## 2025-05-01 PROCEDURE — 99204 OFFICE O/P NEW MOD 45 MIN: CPT | Mod: GC

## 2025-05-02 ENCOUNTER — APPOINTMENT (OUTPATIENT)
Dept: NUCLEAR MEDICINE | Facility: IMAGING CENTER | Age: 55
End: 2025-05-02

## 2025-05-02 ENCOUNTER — OUTPATIENT (OUTPATIENT)
Dept: OUTPATIENT SERVICES | Facility: HOSPITAL | Age: 55
LOS: 1 days | End: 2025-05-02
Payer: COMMERCIAL

## 2025-05-02 DIAGNOSIS — Z98.890 OTHER SPECIFIED POSTPROCEDURAL STATES: Chronic | ICD-10-CM

## 2025-05-02 DIAGNOSIS — C43.4 MALIGNANT MELANOMA OF SCALP AND NECK: Chronic | ICD-10-CM

## 2025-05-02 DIAGNOSIS — C43.9 MALIGNANT MELANOMA OF SKIN, UNSPECIFIED: ICD-10-CM

## 2025-05-02 DIAGNOSIS — Z98.89 OTHER SPECIFIED POSTPROCEDURAL STATES: Chronic | ICD-10-CM

## 2025-05-02 DIAGNOSIS — I89.9 NONINFECTIVE DISORDER OF LYMPHATIC VESSELS AND LYMPH NODES, UNSPECIFIED: Chronic | ICD-10-CM

## 2025-05-02 PROCEDURE — 78816 PET IMAGE W/CT FULL BODY: CPT | Mod: 26,PI

## 2025-05-02 PROCEDURE — 78816 PET IMAGE W/CT FULL BODY: CPT

## 2025-05-02 PROCEDURE — A9552: CPT

## 2025-05-06 ENCOUNTER — APPOINTMENT (OUTPATIENT)
Dept: INTERNAL MEDICINE | Facility: CLINIC | Age: 55
End: 2025-05-06
Payer: COMMERCIAL

## 2025-05-06 VITALS
BODY MASS INDEX: 18.2 KG/M2 | RESPIRATION RATE: 16 BRPM | WEIGHT: 130 LBS | HEART RATE: 68 BPM | TEMPERATURE: 98.3 F | OXYGEN SATURATION: 98 % | DIASTOLIC BLOOD PRESSURE: 64 MMHG | SYSTOLIC BLOOD PRESSURE: 128 MMHG | HEIGHT: 71 IN

## 2025-05-06 DIAGNOSIS — E03.9 HYPOTHYROIDISM, UNSPECIFIED: ICD-10-CM

## 2025-05-06 DIAGNOSIS — E27.8 OTHER SPECIFIED DISORDERS OF ADRENAL GLAND: ICD-10-CM

## 2025-05-06 DIAGNOSIS — E78.00 PURE HYPERCHOLESTEROLEMIA, UNSPECIFIED: ICD-10-CM

## 2025-05-06 DIAGNOSIS — I10 ESSENTIAL (PRIMARY) HYPERTENSION: ICD-10-CM

## 2025-05-06 DIAGNOSIS — I25.10 ATHEROSCLEROTIC HEART DISEASE OF NATIVE CORONARY ARTERY W/OUT ANGINA PECTORIS: ICD-10-CM

## 2025-05-06 DIAGNOSIS — G93.9 DISORDER OF BRAIN, UNSPECIFIED: ICD-10-CM

## 2025-05-06 DIAGNOSIS — A04.72 ENTEROCOLITIS DUE TO CLOSTRIDIUM DIFFICILE, NOT SPECIFIED AS RECURRENT: ICD-10-CM

## 2025-05-06 DIAGNOSIS — C43.9 MALIGNANT MELANOMA OF SKIN, UNSPECIFIED: ICD-10-CM

## 2025-05-06 DIAGNOSIS — E27.40 UNSPECIFIED ADRENOCORTICAL INSUFFICIENCY: ICD-10-CM

## 2025-05-06 DIAGNOSIS — D64.9 ANEMIA, UNSPECIFIED: ICD-10-CM

## 2025-05-06 DIAGNOSIS — C62.90 MALIGNANT NEOPLASM OF UNSPECIFIED TESTIS, UNSPECIFIED WHETHER DESCENDED OR UNDESCENDED: ICD-10-CM

## 2025-05-06 PROCEDURE — 99214 OFFICE O/P EST MOD 30 MIN: CPT

## 2025-05-06 PROCEDURE — 93000 ELECTROCARDIOGRAM COMPLETE: CPT

## 2025-05-06 PROCEDURE — G2211 COMPLEX E/M VISIT ADD ON: CPT

## 2025-05-07 ENCOUNTER — APPOINTMENT (OUTPATIENT)
Dept: PAIN MANAGEMENT | Facility: CLINIC | Age: 55
End: 2025-05-07
Payer: COMMERCIAL

## 2025-05-07 DIAGNOSIS — C43.9 MALIGNANT MELANOMA OF SKIN, UNSPECIFIED: ICD-10-CM

## 2025-05-07 PROBLEM — E27.40 ADRENAL INSUFFICIENCY: Status: ACTIVE | Noted: 2025-05-06

## 2025-05-07 LAB
ALBUMIN SERPL ELPH-MCNC: 5 G/DL
ALP BLD-CCNC: 73 U/L
ALT SERPL-CCNC: 15 U/L
ANION GAP SERPL CALC-SCNC: 15 MMOL/L
AST SERPL-CCNC: 21 U/L
BASOPHILS # BLD AUTO: 0.05 K/UL
BASOPHILS NFR BLD AUTO: 0.6 %
BILIRUB SERPL-MCNC: 0.4 MG/DL
BUN SERPL-MCNC: 16 MG/DL
CALCIUM SERPL-MCNC: 10.5 MG/DL
CHLORIDE SERPL-SCNC: 99 MMOL/L
CK SERPL-CCNC: 112 U/L
CO2 SERPL-SCNC: 24 MMOL/L
CORTISOL: 12.8 UG/DL
CREAT SERPL-MCNC: 0.79 MG/DL
EGFRCR SERPLBLD CKD-EPI 2021: 105 ML/MIN/1.73M2
EOSINOPHIL # BLD AUTO: 0.11 K/UL
EOSINOPHIL NFR BLD AUTO: 1.3 %
GLUCOSE SERPL-MCNC: 91 MG/DL
HCT VFR BLD CALC: 41.2 %
HGB BLD-MCNC: 12.9 G/DL
IMM GRANULOCYTES NFR BLD AUTO: 0.2 %
LYMPHOCYTES # BLD AUTO: 2.04 K/UL
LYMPHOCYTES NFR BLD AUTO: 25 %
MAN DIFF?: NORMAL
MCHC RBC-ENTMCNC: 28.1 PG
MCHC RBC-ENTMCNC: 31.3 G/DL
MCV RBC AUTO: 89.8 FL
MONOCYTES # BLD AUTO: 0.33 K/UL
MONOCYTES NFR BLD AUTO: 4 %
NEUTROPHILS # BLD AUTO: 5.6 K/UL
NEUTROPHILS NFR BLD AUTO: 68.9 %
PLATELET # BLD AUTO: 258 K/UL
POTASSIUM SERPL-SCNC: 5.3 MMOL/L
PROT SERPL-MCNC: 7.3 G/DL
RBC # BLD: 4.59 M/UL
RBC # FLD: 14.7 %
SODIUM SERPL-SCNC: 138 MMOL/L
T4 FREE SERPL-MCNC: 1.8 NG/DL
TSH SERPL-ACNC: 1.52 UIU/ML
WBC # FLD AUTO: 8.15 K/UL

## 2025-05-07 PROCEDURE — 99214 OFFICE O/P EST MOD 30 MIN: CPT | Mod: 93

## 2025-05-07 RX ORDER — OXYCODONE 5 MG/1
5 TABLET ORAL TWICE DAILY
Qty: 60 | Refills: 0 | Status: ACTIVE | COMMUNITY
Start: 2025-05-07 | End: 1900-01-01

## 2025-05-10 ENCOUNTER — NON-APPOINTMENT (OUTPATIENT)
Age: 55
End: 2025-05-10

## 2025-05-14 ENCOUNTER — OUTPATIENT (OUTPATIENT)
Dept: OUTPATIENT SERVICES | Facility: HOSPITAL | Age: 55
LOS: 1 days | End: 2025-05-14
Payer: COMMERCIAL

## 2025-05-14 ENCOUNTER — APPOINTMENT (OUTPATIENT)
Dept: MRI IMAGING | Facility: IMAGING CENTER | Age: 55
End: 2025-05-14

## 2025-05-14 DIAGNOSIS — C43.4 MALIGNANT MELANOMA OF SCALP AND NECK: Chronic | ICD-10-CM

## 2025-05-14 DIAGNOSIS — I89.9 NONINFECTIVE DISORDER OF LYMPHATIC VESSELS AND LYMPH NODES, UNSPECIFIED: Chronic | ICD-10-CM

## 2025-05-14 DIAGNOSIS — G93.9 DISORDER OF BRAIN, UNSPECIFIED: ICD-10-CM

## 2025-05-14 DIAGNOSIS — Z98.89 OTHER SPECIFIED POSTPROCEDURAL STATES: Chronic | ICD-10-CM

## 2025-05-14 DIAGNOSIS — Z98.890 OTHER SPECIFIED POSTPROCEDURAL STATES: Chronic | ICD-10-CM

## 2025-05-14 PROCEDURE — 70553 MRI BRAIN STEM W/O & W/DYE: CPT | Mod: 26

## 2025-05-14 PROCEDURE — 70553 MRI BRAIN STEM W/O & W/DYE: CPT

## 2025-05-14 PROCEDURE — A9585: CPT

## 2025-05-20 RX ORDER — MORPHINE SULFATE 15 MG/1
15 TABLET, FILM COATED, EXTENDED RELEASE ORAL DAILY
Qty: 15 | Refills: 0 | Status: ACTIVE | COMMUNITY
Start: 2025-05-20 | End: 1900-01-01

## 2025-05-23 ENCOUNTER — INPATIENT (INPATIENT)
Facility: HOSPITAL | Age: 55
LOS: 2 days | Discharge: HOME | DRG: 872 | End: 2025-05-26
Attending: STUDENT IN AN ORGANIZED HEALTH CARE EDUCATION/TRAINING PROGRAM | Admitting: STUDENT IN AN ORGANIZED HEALTH CARE EDUCATION/TRAINING PROGRAM
Payer: COMMERCIAL

## 2025-05-23 VITALS
HEIGHT: 69 IN | RESPIRATION RATE: 18 BRPM | HEART RATE: 139 BPM | WEIGHT: 130.07 LBS | OXYGEN SATURATION: 99 % | DIASTOLIC BLOOD PRESSURE: 79 MMHG | SYSTOLIC BLOOD PRESSURE: 112 MMHG

## 2025-05-23 DIAGNOSIS — R52 PAIN, UNSPECIFIED: ICD-10-CM

## 2025-05-23 DIAGNOSIS — Z98.890 OTHER SPECIFIED POSTPROCEDURAL STATES: Chronic | ICD-10-CM

## 2025-05-23 DIAGNOSIS — Z98.89 OTHER SPECIFIED POSTPROCEDURAL STATES: Chronic | ICD-10-CM

## 2025-05-23 DIAGNOSIS — R11.2 NAUSEA WITH VOMITING, UNSPECIFIED: ICD-10-CM

## 2025-05-23 DIAGNOSIS — C79.31 SECONDARY MALIGNANT NEOPLASM OF BRAIN: ICD-10-CM

## 2025-05-23 DIAGNOSIS — I89.9 NONINFECTIVE DISORDER OF LYMPHATIC VESSELS AND LYMPH NODES, UNSPECIFIED: Chronic | ICD-10-CM

## 2025-05-23 DIAGNOSIS — I25.10 ATHEROSCLEROTIC HEART DISEASE OF NATIVE CORONARY ARTERY WITHOUT ANGINA PECTORIS: ICD-10-CM

## 2025-05-23 DIAGNOSIS — E27.40 UNSPECIFIED ADRENOCORTICAL INSUFFICIENCY: ICD-10-CM

## 2025-05-23 DIAGNOSIS — C43.4 MALIGNANT MELANOMA OF SCALP AND NECK: Chronic | ICD-10-CM

## 2025-05-23 DIAGNOSIS — R79.89 OTHER SPECIFIED ABNORMAL FINDINGS OF BLOOD CHEMISTRY: ICD-10-CM

## 2025-05-23 DIAGNOSIS — R00.0 TACHYCARDIA, UNSPECIFIED: ICD-10-CM

## 2025-05-23 DIAGNOSIS — Z29.9 ENCOUNTER FOR PROPHYLACTIC MEASURES, UNSPECIFIED: ICD-10-CM

## 2025-05-23 DIAGNOSIS — A41.89 OTHER SPECIFIED SEPSIS: ICD-10-CM

## 2025-05-23 DIAGNOSIS — R19.7 DIARRHEA, UNSPECIFIED: ICD-10-CM

## 2025-05-23 DIAGNOSIS — Z90.79 ACQUIRED ABSENCE OF OTHER GENITAL ORGAN(S): Chronic | ICD-10-CM

## 2025-05-23 LAB
ALBUMIN SERPL ELPH-MCNC: 4.9 G/DL — SIGNIFICANT CHANGE UP (ref 3.3–5)
ALP SERPL-CCNC: 85 U/L — SIGNIFICANT CHANGE UP (ref 40–120)
ALT FLD-CCNC: 23 U/L — SIGNIFICANT CHANGE UP (ref 10–45)
ANION GAP SERPL CALC-SCNC: 13 MMOL/L — SIGNIFICANT CHANGE UP (ref 5–17)
ANION GAP SERPL CALC-SCNC: 21 MMOL/L — HIGH (ref 5–17)
APTT BLD: 26.1 SEC — SIGNIFICANT CHANGE UP (ref 26.1–36.8)
AST SERPL-CCNC: 50 U/L — HIGH (ref 10–40)
BASOPHILS # BLD AUTO: 0.03 K/UL — SIGNIFICANT CHANGE UP (ref 0–0.2)
BASOPHILS # BLD AUTO: 0.09 K/UL — SIGNIFICANT CHANGE UP (ref 0–0.2)
BASOPHILS NFR BLD AUTO: 0.4 % — SIGNIFICANT CHANGE UP (ref 0–2)
BASOPHILS NFR BLD AUTO: 0.6 % — SIGNIFICANT CHANGE UP (ref 0–2)
BILIRUB SERPL-MCNC: 1 MG/DL — SIGNIFICANT CHANGE UP (ref 0.2–1.2)
BUN SERPL-MCNC: 20 MG/DL — SIGNIFICANT CHANGE UP (ref 7–23)
BUN SERPL-MCNC: 21 MG/DL — SIGNIFICANT CHANGE UP (ref 7–23)
CALCIUM SERPL-MCNC: 10.6 MG/DL — HIGH (ref 8.4–10.5)
CALCIUM SERPL-MCNC: 8.8 MG/DL — SIGNIFICANT CHANGE UP (ref 8.4–10.5)
CHLORIDE SERPL-SCNC: 104 MMOL/L — SIGNIFICANT CHANGE UP (ref 96–108)
CHLORIDE SERPL-SCNC: 97 MMOL/L — SIGNIFICANT CHANGE UP (ref 96–108)
CO2 SERPL-SCNC: 16 MMOL/L — LOW (ref 22–31)
CO2 SERPL-SCNC: 20 MMOL/L — LOW (ref 22–31)
CREAT SERPL-MCNC: 0.88 MG/DL — SIGNIFICANT CHANGE UP (ref 0.5–1.3)
CREAT SERPL-MCNC: 0.92 MG/DL — SIGNIFICANT CHANGE UP (ref 0.5–1.3)
EGFR: 102 ML/MIN/1.73M2 — SIGNIFICANT CHANGE UP
EGFR: 102 ML/MIN/1.73M2 — SIGNIFICANT CHANGE UP
EGFR: 98 ML/MIN/1.73M2 — SIGNIFICANT CHANGE UP
EGFR: 98 ML/MIN/1.73M2 — SIGNIFICANT CHANGE UP
EOSINOPHIL # BLD AUTO: 0.16 K/UL — SIGNIFICANT CHANGE UP (ref 0–0.5)
EOSINOPHIL # BLD AUTO: 0.18 K/UL — SIGNIFICANT CHANGE UP (ref 0–0.5)
EOSINOPHIL NFR BLD AUTO: 1.1 % — SIGNIFICANT CHANGE UP (ref 0–6)
EOSINOPHIL NFR BLD AUTO: 2.2 % — SIGNIFICANT CHANGE UP (ref 0–6)
FLUAV AG NPH QL: SIGNIFICANT CHANGE UP
FLUBV AG NPH QL: SIGNIFICANT CHANGE UP
GAS PNL BLDV: SIGNIFICANT CHANGE UP
GAS PNL BLDV: SIGNIFICANT CHANGE UP
GLUCOSE SERPL-MCNC: 75 MG/DL — SIGNIFICANT CHANGE UP (ref 70–99)
GLUCOSE SERPL-MCNC: 86 MG/DL — SIGNIFICANT CHANGE UP (ref 70–99)
HCT VFR BLD CALC: 45.4 % — SIGNIFICANT CHANGE UP (ref 39–50)
HCT VFR BLD CALC: 52.6 % — HIGH (ref 39–50)
HGB BLD-MCNC: 14.3 G/DL — SIGNIFICANT CHANGE UP (ref 13–17)
HGB BLD-MCNC: 17.4 G/DL — HIGH (ref 13–17)
IMM GRANULOCYTES NFR BLD AUTO: 0.2 % — SIGNIFICANT CHANGE UP (ref 0–0.9)
IMM GRANULOCYTES NFR BLD AUTO: 0.7 % — SIGNIFICANT CHANGE UP (ref 0–0.9)
LACTATE SERPL-SCNC: 2.3 MMOL/L — HIGH (ref 0.5–2)
LIDOCAIN IGE QN: 25 U/L — SIGNIFICANT CHANGE UP (ref 7–60)
LYMPHOCYTES # BLD AUTO: 1.47 K/UL — SIGNIFICANT CHANGE UP (ref 1–3.3)
LYMPHOCYTES # BLD AUTO: 16.1 % — SIGNIFICANT CHANGE UP (ref 13–44)
LYMPHOCYTES # BLD AUTO: 18.3 % — SIGNIFICANT CHANGE UP (ref 13–44)
LYMPHOCYTES # BLD AUTO: 2.41 K/UL — SIGNIFICANT CHANGE UP (ref 1–3.3)
MAGNESIUM SERPL-MCNC: 2 MG/DL — SIGNIFICANT CHANGE UP (ref 1.6–2.6)
MCHC RBC-ENTMCNC: 27.7 PG — SIGNIFICANT CHANGE UP (ref 27–34)
MCHC RBC-ENTMCNC: 28.3 PG — SIGNIFICANT CHANGE UP (ref 27–34)
MCHC RBC-ENTMCNC: 31.5 G/DL — LOW (ref 32–36)
MCHC RBC-ENTMCNC: 33.1 G/DL — SIGNIFICANT CHANGE UP (ref 32–36)
MCV RBC AUTO: 85.5 FL — SIGNIFICANT CHANGE UP (ref 80–100)
MCV RBC AUTO: 88 FL — SIGNIFICANT CHANGE UP (ref 80–100)
MONOCYTES # BLD AUTO: 0.68 K/UL — SIGNIFICANT CHANGE UP (ref 0–0.9)
MONOCYTES # BLD AUTO: 0.99 K/UL — HIGH (ref 0–0.9)
MONOCYTES NFR BLD AUTO: 6.6 % — SIGNIFICANT CHANGE UP (ref 2–14)
MONOCYTES NFR BLD AUTO: 8.5 % — SIGNIFICANT CHANGE UP (ref 2–14)
NEUTROPHILS # BLD AUTO: 11.2 K/UL — HIGH (ref 1.8–7.4)
NEUTROPHILS # BLD AUTO: 5.66 K/UL — SIGNIFICANT CHANGE UP (ref 1.8–7.4)
NEUTROPHILS NFR BLD AUTO: 70.4 % — SIGNIFICANT CHANGE UP (ref 43–77)
NEUTROPHILS NFR BLD AUTO: 74.9 % — SIGNIFICANT CHANGE UP (ref 43–77)
NRBC BLD AUTO-RTO: 0 /100 WBCS — SIGNIFICANT CHANGE UP (ref 0–0)
NRBC BLD AUTO-RTO: 0 /100 WBCS — SIGNIFICANT CHANGE UP (ref 0–0)
PHOSPHATE SERPL-MCNC: 3.9 MG/DL — SIGNIFICANT CHANGE UP (ref 2.5–4.5)
PLATELET # BLD AUTO: 172 K/UL — SIGNIFICANT CHANGE UP (ref 150–400)
PLATELET # BLD AUTO: 219 K/UL — SIGNIFICANT CHANGE UP (ref 150–400)
POTASSIUM SERPL-MCNC: 4.4 MMOL/L — SIGNIFICANT CHANGE UP (ref 3.5–5.3)
POTASSIUM SERPL-MCNC: 4.9 MMOL/L — SIGNIFICANT CHANGE UP (ref 3.5–5.3)
POTASSIUM SERPL-SCNC: 4.4 MMOL/L — SIGNIFICANT CHANGE UP (ref 3.5–5.3)
POTASSIUM SERPL-SCNC: 4.9 MMOL/L — SIGNIFICANT CHANGE UP (ref 3.5–5.3)
PROT SERPL-MCNC: 8.6 G/DL — HIGH (ref 6–8.3)
RBC # BLD: 5.16 M/UL — SIGNIFICANT CHANGE UP (ref 4.2–5.8)
RBC # BLD: 6.15 M/UL — HIGH (ref 4.2–5.8)
RBC # FLD: 14.3 % — SIGNIFICANT CHANGE UP (ref 10.3–14.5)
RBC # FLD: 14.6 % — HIGH (ref 10.3–14.5)
RSV RNA NPH QL NAA+NON-PROBE: SIGNIFICANT CHANGE UP
SARS-COV-2 RNA SPEC QL NAA+PROBE: SIGNIFICANT CHANGE UP
SODIUM SERPL-SCNC: 134 MMOL/L — LOW (ref 135–145)
SODIUM SERPL-SCNC: 137 MMOL/L — SIGNIFICANT CHANGE UP (ref 135–145)
SOURCE RESPIRATORY: SIGNIFICANT CHANGE UP
TROPONIN T, HIGH SENSITIVITY RESULT: <6 NG/L — SIGNIFICANT CHANGE UP (ref 0–51)
WBC # BLD: 14.95 K/UL — HIGH (ref 3.8–10.5)
WBC # BLD: 8.04 K/UL — SIGNIFICANT CHANGE UP (ref 3.8–10.5)
WBC # FLD AUTO: 14.95 K/UL — HIGH (ref 3.8–10.5)
WBC # FLD AUTO: 8.04 K/UL — SIGNIFICANT CHANGE UP (ref 3.8–10.5)

## 2025-05-23 PROCEDURE — 99285 EMERGENCY DEPT VISIT HI MDM: CPT

## 2025-05-23 PROCEDURE — 71045 X-RAY EXAM CHEST 1 VIEW: CPT | Mod: 26

## 2025-05-23 PROCEDURE — 93010 ELECTROCARDIOGRAM REPORT: CPT

## 2025-05-23 PROCEDURE — 99223 1ST HOSP IP/OBS HIGH 75: CPT | Mod: GC

## 2025-05-23 RX ORDER — ENOXAPARIN SODIUM 100 MG/ML
40 INJECTION SUBCUTANEOUS EVERY 24 HOURS
Refills: 0 | Status: DISCONTINUED | OUTPATIENT
Start: 2025-05-23 | End: 2025-05-26

## 2025-05-23 RX ORDER — ACETAMINOPHEN 500 MG/5ML
650 LIQUID (ML) ORAL EVERY 6 HOURS
Refills: 0 | Status: DISCONTINUED | OUTPATIENT
Start: 2025-05-23 | End: 2025-05-23

## 2025-05-23 RX ORDER — HYDROCORTISONE 20 MG
10 TABLET ORAL
Refills: 0 | Status: DISCONTINUED | OUTPATIENT
Start: 2025-05-23 | End: 2025-05-26

## 2025-05-23 RX ORDER — LEVETIRACETAM 10 MG/ML
500 INJECTION, SOLUTION INTRAVENOUS
Refills: 0 | Status: DISCONTINUED | OUTPATIENT
Start: 2025-05-23 | End: 2025-05-26

## 2025-05-23 RX ORDER — OXYCODONE HYDROCHLORIDE 30 MG/1
10 TABLET ORAL EVERY 8 HOURS
Refills: 0 | Status: DISCONTINUED | OUTPATIENT
Start: 2025-05-23 | End: 2025-05-24

## 2025-05-23 RX ORDER — HYDROCORTISONE 20 MG
20 TABLET ORAL
Refills: 0 | Status: DISCONTINUED | OUTPATIENT
Start: 2025-05-24 | End: 2025-05-26

## 2025-05-23 RX ORDER — ASPIRIN 325 MG
81 TABLET ORAL DAILY
Refills: 0 | Status: DISCONTINUED | OUTPATIENT
Start: 2025-05-23 | End: 2025-05-26

## 2025-05-23 RX ORDER — HYDROMORPHONE/SOD CHLOR,ISO/PF 2 MG/10 ML
1 SYRINGE (ML) INJECTION ONCE
Refills: 0 | Status: DISCONTINUED | OUTPATIENT
Start: 2025-05-23 | End: 2025-05-23

## 2025-05-23 RX ORDER — VANCOMYCIN HCL IN 5 % DEXTROSE 1.5G/250ML
1000 PLASTIC BAG, INJECTION (ML) INTRAVENOUS ONCE
Refills: 0 | Status: COMPLETED | OUTPATIENT
Start: 2025-05-23 | End: 2025-05-23

## 2025-05-23 RX ORDER — ACETAMINOPHEN 500 MG/5ML
1000 LIQUID (ML) ORAL ONCE
Refills: 0 | Status: COMPLETED | OUTPATIENT
Start: 2025-05-23 | End: 2025-05-23

## 2025-05-23 RX ORDER — TAMSULOSIN HYDROCHLORIDE 0.4 MG/1
0.4 CAPSULE ORAL AT BEDTIME
Refills: 0 | Status: DISCONTINUED | OUTPATIENT
Start: 2025-05-23 | End: 2025-05-26

## 2025-05-23 RX ORDER — ATORVASTATIN CALCIUM 80 MG/1
40 TABLET, FILM COATED ORAL AT BEDTIME
Refills: 0 | Status: DISCONTINUED | OUTPATIENT
Start: 2025-05-23 | End: 2025-05-26

## 2025-05-23 RX ORDER — SENNA 187 MG
1 TABLET ORAL DAILY
Refills: 0 | Status: DISCONTINUED | OUTPATIENT
Start: 2025-05-23 | End: 2025-05-26

## 2025-05-23 RX ORDER — POLYETHYLENE GLYCOL 3350 17 G/17G
17 POWDER, FOR SOLUTION ORAL DAILY
Refills: 0 | Status: DISCONTINUED | OUTPATIENT
Start: 2025-05-23 | End: 2025-05-26

## 2025-05-23 RX ORDER — ONDANSETRON HCL/PF 4 MG/2 ML
4 VIAL (ML) INJECTION ONCE
Refills: 0 | Status: COMPLETED | OUTPATIENT
Start: 2025-05-23 | End: 2025-05-23

## 2025-05-23 RX ORDER — OXYCODONE HYDROCHLORIDE 30 MG/1
10 TABLET ORAL EVERY 8 HOURS
Refills: 0 | Status: DISCONTINUED | OUTPATIENT
Start: 2025-05-23 | End: 2025-05-23

## 2025-05-23 RX ORDER — OXYCODONE HYDROCHLORIDE 30 MG/1
5 TABLET ORAL EVERY 4 HOURS
Refills: 0 | Status: DISCONTINUED | OUTPATIENT
Start: 2025-05-23 | End: 2025-05-26

## 2025-05-23 RX ORDER — LEVOTHYROXINE SODIUM 300 MCG
125 TABLET ORAL DAILY
Refills: 0 | Status: DISCONTINUED | OUTPATIENT
Start: 2025-05-23 | End: 2025-05-26

## 2025-05-23 RX ORDER — ACETAMINOPHEN 500 MG/5ML
650 LIQUID (ML) ORAL EVERY 6 HOURS
Refills: 0 | Status: DISCONTINUED | OUTPATIENT
Start: 2025-05-23 | End: 2025-05-26

## 2025-05-23 RX ORDER — HYDROCORTISONE 20 MG
1 TABLET ORAL
Refills: 0 | DISCHARGE

## 2025-05-23 RX ORDER — SODIUM CHLORIDE 9 G/1000ML
1000 INJECTION, SOLUTION INTRAVENOUS
Refills: 0 | Status: DISCONTINUED | OUTPATIENT
Start: 2025-05-23 | End: 2025-05-24

## 2025-05-23 RX ORDER — PIPERACILLIN-TAZO-DEXTROSE,ISO 3.375G/5
3.38 IV SOLUTION, PIGGYBACK PREMIX FROZEN(ML) INTRAVENOUS ONCE
Refills: 0 | Status: COMPLETED | OUTPATIENT
Start: 2025-05-23 | End: 2025-05-23

## 2025-05-23 RX ADMIN — Medication 1 MILLIGRAM(S): at 12:30

## 2025-05-23 RX ADMIN — Medication 3.38 GRAM(S): at 14:28

## 2025-05-23 RX ADMIN — Medication 1 MILLIGRAM(S): at 23:08

## 2025-05-23 RX ADMIN — ATORVASTATIN CALCIUM 40 MILLIGRAM(S): 80 TABLET, FILM COATED ORAL at 22:55

## 2025-05-23 RX ADMIN — Medication 650 MILLIGRAM(S): at 23:57

## 2025-05-23 RX ADMIN — Medication 4 MILLIGRAM(S): at 11:44

## 2025-05-23 RX ADMIN — Medication 1 MILLIGRAM(S): at 18:00

## 2025-05-23 RX ADMIN — Medication 1000 MILLILITER(S): at 14:28

## 2025-05-23 RX ADMIN — Medication 1 MILLIGRAM(S): at 19:00

## 2025-05-23 RX ADMIN — LEVETIRACETAM 500 MILLIGRAM(S): 10 INJECTION, SOLUTION INTRAVENOUS at 17:53

## 2025-05-23 RX ADMIN — Medication 500 MILLILITER(S): at 13:00

## 2025-05-23 RX ADMIN — Medication 200 GRAM(S): at 13:00

## 2025-05-23 RX ADMIN — Medication 10 MILLIGRAM(S): at 18:21

## 2025-05-23 RX ADMIN — Medication 5 MILLIGRAM(S): at 22:55

## 2025-05-23 RX ADMIN — Medication 650 MILLIGRAM(S): at 23:27

## 2025-05-23 RX ADMIN — Medication 250 MILLIGRAM(S): at 14:21

## 2025-05-23 RX ADMIN — Medication 1 MILLIGRAM(S): at 23:38

## 2025-05-23 RX ADMIN — SODIUM CHLORIDE 100 MILLILITER(S): 9 INJECTION, SOLUTION INTRAVENOUS at 17:52

## 2025-05-23 RX ADMIN — Medication 1000 MILLILITER(S): at 15:04

## 2025-05-23 RX ADMIN — TAMSULOSIN HYDROCHLORIDE 0.4 MILLIGRAM(S): 0.4 CAPSULE ORAL at 22:55

## 2025-05-23 RX ADMIN — Medication 2000 MILLILITER(S): at 11:44

## 2025-05-23 RX ADMIN — Medication 400 MILLIGRAM(S): at 15:04

## 2025-05-23 RX ADMIN — Medication 81 MILLIGRAM(S): at 17:53

## 2025-05-23 RX ADMIN — ENOXAPARIN SODIUM 40 MILLIGRAM(S): 100 INJECTION SUBCUTANEOUS at 17:53

## 2025-05-23 NOTE — H&P ADULT - PROBLEM SELECTOR PLAN 3
-Tachycardic to 110s-120s on admission, likely i/s/o hypovolemia  -S/p 2L of IVF   -C/w LR maintenance fluids 100cc/hr x 10 hours  -Prior hx of metoprolol succinate 25 mg but this was stopped during last admission; per cardiology not to resume until patient has been adequately fluid resuscitated

## 2025-05-23 NOTE — H&P ADULT - ASSESSMENT
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 diff colitis s/p vancomycin and subsequently for anemia and visual hallucinations, found to have CNS lesion c/f metastasis and recommended gamma knife treatment, presenting today for nausea, vomiting and diarrhea (x4).

## 2025-05-23 NOTE — H&P ADULT - NSHPLABSRESULTS_GEN_ALL_CORE
14.3   8.04  )-----------( 172      ( 23 May 2025 16:20 )             45.4     05-23    137  |  104  |  21  ----------------------------<  75  4.4   |  20[L]  |  0.92    Ca    8.8      23 May 2025 16:20  Phos  3.9     05-23  Mg     2.0     05-23    TPro  8.6[H]  /  Alb  4.9  /  TBili  1.0  /  DBili  x   /  AST  50[H]  /  ALT  23  /  AlkPhos  85  05-23

## 2025-05-23 NOTE — ED PROVIDER NOTE - ATTENDING CONTRIBUTION TO CARE
see mdm    edited by Lisset Her DO - attending physician.   Please see progress notes for status/labs/consult updates and ED course after initial presentation.

## 2025-05-23 NOTE — ED ADULT NURSE REASSESSMENT NOTE - NS ED NURSE REASSESS COMMENT FT1
RN for 3 DSU 58-W unavaile to take report at this time, instructed to call the ED when they're ready for report. RN for 3 DSU 58-W unavailable to take report at this time, instructed to call the ED when they're ready for report.

## 2025-05-23 NOTE — H&P ADULT - PROBLEM SELECTOR PLAN 1
-Presented to the ED with nausea and vomiting, has not had symptoms since arrival  -S/p zofran x1  -Labs significant for hypovolemia (hemoconcentrated with   -C/w zofran q8 PRN  -Initially with Tm 100, no fevers and no leukocytosis on CBC s/p IVF, so will monitor off antibiotics with low threshold to treat with broad spectrum abx if c/f sepsis  -F/u blood cultures, UA

## 2025-05-23 NOTE — H&P ADULT - NSHPREVIEWOFSYSTEMS_GEN_ALL_CORE
REVIEW OF SYSTEMS:    CONSTITUTIONAL:  No weakness, fevers or chills  EYES/ENT:  No visual changes;  No vertigo or throat pain   NECK:  No pain or stiffness  RESPIRATORY:  No cough, wheezing, hemoptysis; No shortness of breath  CARDIOVASCULAR:  No chest pain or palpitations  GASTROINTESTINAL:  +vomiting, +nausea, +diarrhea  GENITOURINARY:  No dysuria, frequency or hematuria  NEUROLOGICAL:  No numbness or weakness  SKIN:  No itching, rashes

## 2025-05-23 NOTE — H&P ADULT - PROBLEM SELECTOR PLAN 2
-Hx of C. diff on prior admission s/p PO vancomycin course   -Reported 4 episodes of loose stool prior to arrival to hospital, none since admission  -Monitor stool count and obtain GI PCR if continued diarrhea

## 2025-05-23 NOTE — ED PROVIDER NOTE - CLINICAL SUMMARY MEDICAL DECISION MAKING FREE TEXT BOX
Addended by: AMADO ESPINAL on: 10/30/2019 12:17 PM     Modules accepted: Orders     Lisset Her, Attending Physician: 54 y/o M with Pmhx CAD s/p PCI w/ ONESIMO on DaPT, hypothyroidism, secondary adrenal insufficiency, Cluster headaches, 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  Patient denied nausea but then proceeded to vomit in the stretcher.  No headaches.  Patient brought in by EMS for which collateral was obtained.  EKG by EMS was concerning that there was ST elevations in the inferior leads however was very very poor baseline.    PE:  Gen: chronically ill appearing  Head: NCAT  ENT: MMM   Chest: dry mucous membranes  Lungs: Symmetrical chest rise, lungs CTAB  Abdomen: soft, NTND, No rebound/guarding, chronic scar to anterior abdomen  Ext: No gross deformities  Neuro: awake and alert, Moving all extremities equally  Skin: no rashes     MDM: 55-year-old male presenting with vomiting and diarrhea.  Differential diagnosis includes not limited to acute gastroenteritis, electrolyte derangements, dehydration as patient is tachycardic on arrival, iatrogenic secondary to gamma knife.  Will give antiemetics, p.o. challenge.  Patient does have a history of C. difficile colitis however patient's abdomen is nontender and patient has only had 4 episodes of diarrhea and thus does not warrant testing at this time but should be considered. Lisset Her, Attending Physician: 56 y/o M with Pmhx CAD s/p PCI w/ ONESIMO on DaPT, hypothyroidism, secondary adrenal insufficiency, Cluster headaches, 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  Patient denied nausea but then proceeded to vomit in the stretcher.  No headaches.  Patient brought in by EMS for which collateral was obtained.  EKG by EMS was concerning that there was ST elevations in the inferior leads however was very poor baseline.    PE:  Gen: chronically ill appearing  Head: NCAT  Chest: dry mucous membranes  Lungs: Symmetrical chest rise, lungs CTAB  Abdomen: soft, NTND, No rebound/guarding, chronic scar to anterior abdomen  Ext: No gross deformities  Neuro: awake and alert, Moving all extremities equally  Skin: no rashes     MDM: 55-year-old male presenting with vomiting and diarrhea.  Differential diagnosis includes not limited to acute gastroenteritis, electrolyte derangements, dehydration as patient is tachycardic on arrival, iatrogenic secondary to gamma knife.  Will give antiemetics, p.o. challenge.  Patient does have a history of C. difficile colitis however patient's abdomen is nontender and patient has only had 4 episodes of diarrhea and thus does not warrant testing at this time but should be considered.

## 2025-05-23 NOTE — PATIENT PROFILE ADULT - FALL HARM RISK - UNIVERSAL INTERVENTIONS
Bed in lowest position, wheels locked, appropriate side rails in place/Call bell, personal items and telephone in reach/Instruct patient to call for assistance before getting out of bed or chair/Non-slip footwear when patient is out of bed/[place] to call system/Physically safe environment - no spills, clutter or unnecessary equipment/Purposeful Proactive Rounding/Room/bathroom lighting operational, light cord in reach

## 2025-05-23 NOTE — ED ADULT NURSE NOTE - NS ED NURSE PATIENT LEFT UNIT TIME
[FreeTextEntry1] : 37 woman with 20 year history of incomplete tetraplegia. She has been healthy. Received 100 u Botox to bladder 2 weeks ago. Takes two anticholinergics at baseline. This is first Botox in many years. No untoward effects. Has not yet tried to reduce oral agents. Bowel is well compensated, uses 4 senna to avoid constipation. No pressure ulcers. 
17:20

## 2025-05-23 NOTE — H&P ADULT - PROBLEM SELECTOR PLAN 5
-MR from April 2025: 7 mm nodular T2 hyperintense focus in the posterior medial aspect of the left temporal lobe, just medial to the atrium of the left lateral ventricle, which demonstrates uniform contrast enhancement and surrounding vasogenic edema, suspicious for a metastatic lesion.  -AED: C/w keppra 500 BID   -S/p gamma knife therapy session on 5/22 with Dr. Hill  -Radiation oncology consulted for recommendations; will obtain additional imaging if necessary for post-treatment cerebral edema but currently patient's symptoms have resolved and he does not have any focal neurological deficits.

## 2025-05-23 NOTE — CONSULT NOTE ADULT - SUBJECTIVE AND OBJECTIVE BOX
DATE OF SERVICE: 05-23-25 @ 15:35    CHIEF COMPLAINT:Patient is a 55y old  Male who presents with a chief complaint of     HISTORY OF PRESENT ILLNESS:HPI:      PAST MEDICAL & SURGICAL HISTORY:  Testicular Cancer  1994, chemotherapy      Headache, Migraine      HTN (hypertension)      NHL (non-Hodgkin's lymphoma)  1999, Radiation to left neck region      GERD (gastroesophageal reflux disease)      Colitis  surgery 1996      BPH (benign prostatic hyperplasia)      Osteoarthritis  degenerative disc L3-4      History of bone marrow transplant      Hypertriglyceridemia      Malignant melanoma of scalp  RSX 08/18  R neck mass dsx/ LN dsx 10/19/18      Hypothyroidism      History of chemotherapy      History of Raynaud's syndrome      CAD (coronary artery disease)      Unspecified malignant neoplasm of skin of scalp and neck      History of orchiectomy  left 1994      S/P colon resection  1996      Lymph node disorder  s/p abdominal lymph node dissection 1994, 1996      Melanoma of scalp or neck  excision 8/18  s/p excision right neck mass & LN dissx      History of nasal septoplasty      History of infusaport central venous catheter insertion      History of infusaport central venous catheter removal              MEDICATIONS:                  FAMILY HISTORY:  Hypertension (Father)        Non-contributory    SOCIAL HISTORY:    [X] Tobacco - not a current smoker    Allergies    No Known Allergies    Intolerances    	    REVIEW OF SYSTEMS:  CONSTITUTIONAL: No fever  EYES: No eye pain, visual disturbances, or discharge  ENMT:  No difficulty hearing, tinnitus  NECK: No pain or stiffness  RESPIRATORY: No cough, wheezing,  CARDIOVASCULAR: No chest pain, palpitations, passing out, dizziness, or leg swelling  GASTROINTESTINAL:  No nausea, vomiting, diarrhea or constipation. No melena.  GENITOURINARY: No dysuria, hematuria  NEUROLOGICAL: No stroke like symptoms  SKIN: No burning or lesions   ENDOCRINE: No heat or cold intolerance  MUSCULOSKELETAL: No joint pain or swelling  PSYCHIATRIC: No  anxiety, mood swings  HEME/LYMPH: No bleeding gums  ALLERGY AND IMMUNOLOGIC: No hives or eczema	    All other ROS negative    PHYSICAL EXAM:  T(C): 37.2 (05-23-25 @ 15:05), Max: 37.8 (05-23-25 @ 12:10)  HR: 122 (05-23-25 @ 15:05) (122 - 139)  BP: 108/61 (05-23-25 @ 15:05) (106/66 - 112/79)  RR: 20 (05-23-25 @ 15:05) (18 - 20)  SpO2: 100% (05-23-25 @ 15:05) (99% - 100%)  Wt(kg): --  I&O's Summary      Appearance: Normal	  HEENT:   Normal oral mucosa, EOMI	  Cardiovascular:  S1 S2, No JVD,    Respiratory: Lungs clear to auscultation	  Psychiatry: Alert  Gastrointestinal:  Soft, Non-tender, + BS	  Skin: No rashes   Neurologic: Non-focal  Extremities:  No edema  Vascular: Peripheral pulses palpable    	    	  	  CARDIAC MARKERS:  Labs personally reviewed by me                                17.4   14.95 )-----------( 219      ( 23 May 2025 11:50 )             52.6     05-23    134[L]  |  97  |  20  ----------------------------<  86  4.9   |  16[L]  |  0.88    Ca    10.6[H]      23 May 2025 11:50  Phos  3.9     05-23  Mg     2.0     05-23    TPro  8.6[H]  /  Alb  4.9  /  TBili  1.0  /  DBili  x   /  AST  50[H]  /  ALT  23  /  AlkPhos  85  05-23          EKG: Personally reviewed by me -   Radiology: Personally reviewed by me -     < from: Xray Chest 1 View- PORTABLE-Urgent (Xray Chest 1 View- PORTABLE-Urgent .) (05.23.25 @ 12:48) >  FINDINGS:    No focal consolidation, large pleural effusion or pneumothorax.  The heart size is normal in size. Coronary stent.  No acute osseous abnormalities.      IMPRESSION:  Clear lungs.      < from: TTE W or WO Ultrasound Enhancing Agent (01.31.25 @ 18:10) >  CONCLUSIONS:      1. Technically difficult image quality.   2.Left ventricle was not well visualized.   3. Left ventricular systolic function is grossly low normal with an ejection fraction visually estimated at 50 to 55 %. There is poor visualization of the endocardial borders to determine the presence of wallmotion abnormalities.   4. The right ventricle is not well visualized. unable to determine right ventricular function.      < from: Cardiac Catheterization (01.31.25 @ 16:51) >    Coronary Angiography   The coronary circulation is right dominant.      LM   Left main artery: Angiography shows minor irregularities.      LAD   Ostial left anterior descending: There is a 40 % stenosis.      CX   Circumflex: Angiography shows mild atherosclerosis.      RCA   Right coronary artery: Angiography shows mild atherosclerosis.        Assessment /Plan:     54 y/o M with Pmhx CAD s/p PCI w/ ONESIMO on DaPT, hypothyroidism, secondary adrenal insufficiency, Cluster headaches, 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  Patient denied nausea but then proceeded to vomit in the stretcher.  No headaches.  Patient brought in by EMS for which collateral was obtained.  EKG by EMS was concerning that there was ST elevations in the inferior leads however was very poor baseline.    1. CAD  - 5/23 Trop negative   - 10/15/2024 with ONESIMO to the LAD   - Known prior CAD s/p PCI 2 LAD stents  - s/p cath 1/31 with patent stents  - C/w ASA 81mg & statin     2.  HLD   - c/w Vascepa as OP, atorvastatin 40mg PO daily    3. HTN  - currently stable off BP meds      Differential diagnosis and plan of care discussed with patient after the evaluation. Counseling on diet, nutritional counseling, weight management, exercise and medication compliance was done.   Advanced care planning/advanced directives discussed with patient/family. DNR status including forceful chest compressions to attempt to restart the heart, ventilator support/artificial breathing, electric shock, artificial nutrition, health care proxy, Molst form all discussed with pt. Pt wishes to consider. Sixteen minutes spent on discussing advanced directives.       JOSE Guaman,  [facility]  Cardiovascular Medicine  [address], [address]  Available for call or text via [employer]  Office [phone]     DATE OF SERVICE: 05-23-25 @ 15:35    CHIEF COMPLAINT:Patient is a 55y old  Male who presents with a chief complaint of     HISTORY OF PRESENT ILLNESS:HPI: 55 year old male with past medical history of  Stage II testicular CA 1994  s/p orchiectomy, prior non-Hodgkin's lymphoma, scalp melanoma s/p resection and immunotherapy, CAD s/p ONESIMO (10/2024) on ASA, HTN, HLD, hypothyroidism, adrenal insufficiency  (not on steroids), ischemic colitis s/p resection, overactive bladder s/p bladder stimulator, recent hospitalizations for C. diff colitis s/p vancomycin and subsequently for anemia and visual hallucinations, found to have CNS lesion c/f metastasis and recommended gamma knife treatment, presenting today for nausea, vomiting and diarrhea (x4). He had his first session of gamma knife therapy yesterday. This morning he felt tired and had episodes of vomiting and nausea, as well as diarrhea - although he notes that these episodes are not similar to when he had C diff infection. Per wife he took dulcolax x3 two days ago because he felt constipated.     In the ED VSS. Received 2L of IVF, dilaudid x1 for back pain, vanco/zosyn.   Labs significant for hemoconcentration and hypercalcemia likely i/s/o hypovolemia. EKG with sinus tachycardia, no acute ischemic changes.       PAST MEDICAL & SURGICAL HISTORY:  Testicular Cancer  1994, chemotherapy      Headache, Migraine      HTN (hypertension)      NHL (non-Hodgkin's lymphoma)  1999, Radiation to left neck region      GERD (gastroesophageal reflux disease)      Colitis  surgery 1996      BPH (benign prostatic hyperplasia)      Osteoarthritis  degenerative disc L3-4      History of bone marrow transplant      Hypertriglyceridemia      Malignant melanoma of scalp  RSX 08/18  R neck mass dsx/ LN dsx 10/19/18      Hypothyroidism      History of chemotherapy      History of Raynaud's syndrome      CAD (coronary artery disease)      Unspecified malignant neoplasm of skin of scalp and neck      History of orchiectomy  left 1994      S/P colon resection  1996      Lymph node disorder  s/p abdominal lymph node dissection 1994, 1996      Melanoma of scalp or neck  excision 8/18  s/p excision right neck mass & LN dissx      History of nasal septoplasty      History of infusaport central venous catheter insertion      History of infusaport central venous catheter removal              MEDICATIONS:                  FAMILY HISTORY:  Hypertension (Father)        Non-contributory    SOCIAL HISTORY:    [X] Tobacco - not a current smoker    Allergies    No Known Allergies    Intolerances    	    REVIEW OF SYSTEMS:  CONSTITUTIONAL: No fever  EYES: No eye pain, visual disturbances, or discharge  ENMT:  No difficulty hearing, tinnitus  NECK: No pain or stiffness  RESPIRATORY: No cough, wheezing,  CARDIOVASCULAR: No chest pain, palpitations, passing out, dizziness, or leg swelling  GASTROINTESTINAL:  + nausea, vomiting, diarrhea, no constipation. No melena.  GENITOURINARY: No dysuria, hematuria  NEUROLOGICAL: No stroke like symptoms  SKIN: No burning or lesions   ENDOCRINE: No heat or cold intolerance  MUSCULOSKELETAL: No joint pain or swelling  PSYCHIATRIC: No  anxiety, mood swings  HEME/LYMPH: No bleeding gums  ALLERGY AND IMMUNOLOGIC: No hives or eczema	    All other ROS negative    PHYSICAL EXAM:  T(C): 37.2 (05-23-25 @ 15:05), Max: 37.8 (05-23-25 @ 12:10)  HR: 122 (05-23-25 @ 15:05) (122 - 139)  BP: 108/61 (05-23-25 @ 15:05) (106/66 - 112/79)  RR: 20 (05-23-25 @ 15:05) (18 - 20)  SpO2: 100% (05-23-25 @ 15:05) (99% - 100%)  Wt(kg): --  I&O's Summary      Appearance: Normal	  HEENT:   Normal oral mucosa, EOMI	  Cardiovascular:  S1 S2, No JVD,    Respiratory: Lungs clear to auscultation	  Psychiatry: Alert  Gastrointestinal:  Soft, Non-tender, + BS	  Skin: No rashes   Neurologic: Non-focal  Extremities:  No edema  Vascular: Peripheral pulses palpable    	    	  	  CARDIAC MARKERS:  Labs personally reviewed by me                                17.4   14.95 )-----------( 219      ( 23 May 2025 11:50 )             52.6     05-23    134[L]  |  97  |  20  ----------------------------<  86  4.9   |  16[L]  |  0.88    Ca    10.6[H]      23 May 2025 11:50  Phos  3.9     05-23  Mg     2.0     05-23    TPro  8.6[H]  /  Alb  4.9  /  TBili  1.0  /  DBili  x   /  AST  50[H]  /  ALT  23  /  AlkPhos  85  05-23          EKG: Personally reviewed by me - NSR, no acute ischemic changes  Radiology: Personally reviewed by me - CXR clear lungs    < from: TTE W or WO Ultrasound Enhancing Agent (01.31.25 @ 18:10) >  CONCLUSIONS:      1. Technically difficult image quality.   2.Left ventricle was not well visualized.   3. Left ventricular systolic function is grossly low normal with an ejection fraction visually estimated at 50 to 55 %. There is poor visualization of the endocardial borders to determine the presence of wallmotion abnormalities.   4. The right ventricle is not well visualized. unable to determine right ventricular function.      < from: Cardiac Catheterization (01.31.25 @ 16:51) >    Coronary Angiography   The coronary circulation is right dominant.      LM   Left main artery: Angiography shows minor irregularities.      LAD   Ostial left anterior descending: There is a 40 % stenosis.      CX   Circumflex: Angiography shows mild atherosclerosis.      RCA   Right coronary artery: Angiography shows mild atherosclerosis.            Assessment /Plan:     56 y/o M with Pmhx CAD s/p PCI w/ ONESIMO on DaPT, hypothyroidism, secondary adrenal insufficiency, Cluster headaches, 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  Patient denied nausea but then proceeded to vomit in the stretcher.  No headaches.  Patient brought in by EMS for which collateral was obtained.  EKG by EMS was concerning that there was ST elevations in the inferior leads however was very poor baseline.    1. Abnormal EKG reported by EMS  - EKG here nonischemic, asymptomatic with no CP or SOB  - known CAD  - 5/23 Trop negative   - 10/15/2024 with ONESIMO to the LAD and prior CAD s/p PCI 2 LAD stents  - s/p cath 1/31 with patent stents  - C/w ASA 81mg & statin     2.  HLD   - c/w Vascepa as OP, atorvastatin 40mg PO daily    3. Gastrointestinal symptoms - Nausea, vomiting and diarrhea  - Labs significant for hypovolemia (hemoconcentrated with   - C/w zofran q8 PRN  - Hx of C. diff on prior admission s/p PO vancomycin course   - Reported 4 episodes of loose stool prior to arrival to hospital, none since admission  - Monitor stool count and obtain GI PCR if continued diarrhea.    4.  Sinus tachycardia.   ·  Plan: Tachycardic to 110s-120s on admission, likely i/s/o hypovolemia  - S/p 2L of IVF, C/w LR maintenance fluids 100cc/hr x 1 liter  - Prior hx of metoprolol succinate 25 mg but this was stopped during last admission; will continue to hold as tachy 2/2 hypovolemia     5.  Metastatic cancer to brain.   ·  Plan: -MR from April 2025: 7 mm nodular T2 hyperintense focus in the posterior medial aspect of the left temporal lobe   - C/w keppra 500 BID   - S/p gamma knife therapy session on 5/22 with Dr. Hill  - Radiation oncology follow up    6.  Adrenal insufficiency.   ·  Plan: C/w hydrocortisone 20 mg in AM and 10 mg in PM      7. Need for prophylactic measure.   ·  Plan: DVT ppx: lovenox 40 qd           Differential diagnosis and plan of care discussed with patient after the evaluation. Counseling on diet, nutritional counseling, weight management, exercise and medication compliance was done.   Advanced care planning/advanced directives discussed with patient/family. DNR status including forceful chest compressions to attempt to restart the heart, ventilator support/artificial breathing, electric shock, artificial nutrition, health care proxy, Molst form all discussed with pt. Pt wishes to consider. Sixteen minutes spent on discussing advanced directives.       JOSE Guaman DO [facility]  Cardiovascular Medicine  [address] [place] [address] Dr, [address]  Available for call or text via [employer]  Office [phone]

## 2025-05-23 NOTE — ED PROVIDER NOTE - PROGRESS NOTE DETAILS
Steve Patel DO (PGY-2) Patient endorsing his baseline back pain for which he takes oxycodone at home and states he normally gets Dilaudid in the hospital, will give a dose. Abd sntnd denies abd pain. Denies HA. Lisset Her, Attending Physician: Patient ~1500cc of IVF here. Will give additional IVF as labs very concentrated compared to prior in setting of diarrhea and vomiting. Steve Patel DO (PGY-2) Patient reevaluated at bedside. Patient is doing well. TBA sepsis endorsed to hospitalist Dr. Olvera.

## 2025-05-23 NOTE — ED PROVIDER NOTE - PRESENTATION SUGGESTIVE OF:
Alert-The patient is alert, awake and responds to voice. The patient is oriented to time, place, and person. The triage nurse is able to obtain subjective information. Bacterial Etiology

## 2025-05-23 NOTE — PATIENT PROFILE ADULT - NSPRESCRALCFREQ_GEN_A_NUR
Sent unable to reach patient letter- Dr Zhu would like to schedule a three month follow up with patient.   Never

## 2025-05-23 NOTE — H&P ADULT - PROBLEM SELECTOR PLAN 4
-Lactate 2.9 --> 2  -Monitor off antibiotics for now, likely i/s/o hypovolemia with improvement after fluids

## 2025-05-23 NOTE — H&P ADULT - ATTENDING COMMENTS
patient is a 55 year old male with past medical history of CAD s/p PCI w/ ONESIMO (2024) on DAPT, hypothyroidism, secondary adrenal insufficiency, cluster headaches, stage II testicular CA 1994 s/p chemotherapy, orchiectomy, autologous bone marrow transplant, non-hodgkins lymphoma R neck 1998, melanoma s/p neck resection, immunotherapy with brain lesion recent gamma knife procedure 5/22/25, right supra renal mass, hypertension, hypothyroidism, chronic pain, overactive bladder s/p bladder stimulator, ischemic colitis s/p L hemicolectomy with transverse colon to rectum anastomosis, discharged on 4/17/25 found to have c diff pcr finished oral vanc course per patient, anemia, presenting with fatigue, nausea, vomiting, multiple loose stools, also noted to have abnormal EKG in the ED with concern for ?ST elevation.    initially tachycardic, Tmax 100 F, not hypotensive, not tachypneic, not hypoxic.   leukocytosis, hgb 17.4 ?hemoconcentrated vs lab error?, hyponatremic, elevated lactate, elevated anion gap, hypercalcemia  in the ED received zosyn, vanc, NS x2L, tylenol, dilaudid, zofran.    - at the time of admission patient feeling much improved, abd soft non-tender, patient asking to eat food. advance diet as tolerated.    - concern for dehydration, ?acute gastroenteritis, ?iatrogenic effect of gamma knife procedure.  - f/u blood cx, check UA, urine cx. would continue empiric antibiotics if febrile or any other s/s of infection.   - check stat cbc, bmp, blood gas, lactate.  - continue IVF if unable to tolerate po.  - check cortisol, tsh, ft4.  - consider CT abd pelvis.  - continue keppra PPX. consult rad onc.   - on hydrocortisone consult 10/20, consider endo consult.  - check stool studies GI pcr, stool cx, c diff if meets criteria, consider ID consult if suspicion for c diff.   - patient requesting dilaudid for chronic pain, consult chronic pain management.  - VTE PPX lovenox patient is a 55 year old male with past medical history of CAD s/p PCI w/ ONESIMO (2024) on DAPT, hypothyroidism, secondary adrenal insufficiency, cluster headaches, stage II testicular CA 1994 s/p chemotherapy, orchiectomy, autologous bone marrow transplant, non-hodgkins lymphoma R neck 1998, melanoma s/p neck resection, immunotherapy with brain lesion recent gamma knife procedure 5/22/25, right supra renal mass, hypertension, hypothyroidism, chronic pain, overactive bladder s/p bladder stimulator, ischemic colitis s/p L hemicolectomy with transverse colon to rectum anastomosis, discharged on 4/17/25 found to have c diff pcr finished oral vanc course per patient, anemia, presenting with fatigue, nausea, vomiting, multiple loose stools, also noted to have abnormal EKG in the ED with concern for ?ST elevation.    initially tachycardic, Tmax 100 F, not hypotensive, not tachypneic, not hypoxic.   leukocytosis, hgb 17.4 ?hemoconcentrated vs lab error?, hyponatremic, elevated lactate, elevated anion gap, hypercalcemia  in the ED received zosyn, vanc, NS x2L, tylenol, dilaudid, zofran.    - at the time of admission patient feeling much improved, abd soft non-tender, patient asking to eat food. advance diet as tolerated.    - concern for dehydration, ?acute gastroenteritis, ?iatrogenic effect of gamma knife procedure.  - f/u blood cx, check UA, urine cx. would continue empiric antibiotics if febrile or any other s/s of infection.   - check stat cbc, bmp, blood gas, lactate.  - continue IVF if unable to tolerate po.  - check cortisol, tsh, ft4.  - consider CT abd pelvis.  - continue keppra PPX. consult rad onc.   - f/u cardiology recs, check repeat EKG -ordered.   - on hydrocortisone consult 10/20, consider endo consult.  - check stool studies GI pcr, stool cx, c diff if meets criteria, consider ID consult if suspicion for c diff.   - patient requesting dilaudid for chronic pain, consult chronic pain management.  - VTE PPX lovenox patient is a 55 year old male with past medical history of CAD s/p PCI w/ ONESIMO (2024) on DAPT, hypothyroidism, secondary adrenal insufficiency, cluster headaches, stage II testicular CA 1994 s/p chemotherapy, orchiectomy, autologous bone marrow transplant, non-hodgkins lymphoma R neck 1998, melanoma s/p neck resection, immunotherapy with brain lesion recent gamma knife procedure 5/22/25, right supra renal mass, hypertension, hypothyroidism, chronic pain, overactive bladder s/p bladder stimulator, ischemic colitis s/p L hemicolectomy with transverse colon to rectum anastomosis, discharged on 4/17/25 found to have c diff pcr finished oral vanc course per patient, anemia, presenting with fatigue, nausea, vomiting, multiple loose stools, also noted to have abnormal EKG in the ED with concern for ?ST elevation.    initially tachycardic, Tmax 100 F, not hypotensive, not tachypneic, not hypoxic.   leukocytosis, hgb 17.4 ?hemoconcentrated vs lab error?, hyponatremic, elevated lactate, elevated anion gap, hypercalcemia  in the ED received zosyn, vanc, NS x2L, tylenol, dilaudid, zofran.    - at the time of admission patient feeling much improved, abd soft non-tender, patient asking to eat food. advance diet as tolerated.    - concern for dehydration, ?acute gastroenteritis, ?iatrogenic effect of gamma knife procedure.  - f/u blood cx, check UA, urine cx. would continue empiric antibiotics if febrile or any other s/s of infection.   - check stat cbc, bmp, blood gas, lactate.  - continue IVF if unable to tolerate po.  - check cortisol, tsh, ft4.  - consider CT abd pelvis.  - continue keppra PPX. consult rad onc.   - f/u cardiology recs, check repeat EKG -ordered.   - on hydrocortisone 20/10, consider endo consult.  - check stool studies GI pcr, stool cx, c diff if meets criteria, consider ID consult if suspicion for c diff.   - patient requesting dilaudid for chronic pain, consult chronic pain management.  - VTE PPX lovenox

## 2025-05-23 NOTE — ED ADULT NURSE NOTE - OBJECTIVE STATEMENT
PT is a 55 year old A&OX4 male with PMH of CAD, hypothyroidism, stage II testicular CA 1994 s/p chemotherapy, surgery and autologous bone marrow transplant, Non-Hodgkin lymphoma right neck 1998 as well as melanoma s/p neck dissection who presents to the ED via EMS with c/o nausea, vomiting, and diarrhea. Per EMS, PT had one episode of vomiting and one episode of diarrhea and called EMS. EMS wanted to give Zofran but thought PT's 12-lead was concerning so they administered 324 ASA instead. PT endorsing chronic pain. PT is resting comfortably in bed, breathing unlabored on room air, and speaking in complete sentences. PT vomiting and having diarrhea in the stretcher during exam. Abdomen is soft, non-tender, and non-distended. Skin is warm and dry, no diaphoresis noted. No edema noted to B/L extremities. Strong strength in B/L extremities, sensation intact. IV access established 20G in left hand and 22G in right hand. PT placed in hospital gown. EKG completed, PT placed on cardiac monitor. Safety and comfort maintained.     with  for episodes of nonbloody diarrhea and 2 episodes of nonbloody emesis.  Patient denied nausea but then proceeded to vomit in the stretcher.  No headaches.  Patient brought in by EMS for which collateral was obtained.  EKG by EMS was concerning that there was ST elevations in the inferior leads however was very very poor baseline.

## 2025-05-23 NOTE — H&P ADULT - PROBLEM SELECTOR PLAN 8
-Home regimen: Oxycodone 10 mg TID; 5 mg for breakthrough   -Breakthrough while in hospital: dilaudid 1mg  -Chronic pain consult  -Bowel regimen while on opioids: senna/miralax with hold parameters for diarrhea

## 2025-05-23 NOTE — ED ADULT NURSE NOTE - NSFALLRISKINTERV_ED_ALL_ED
Assistance OOB with selected safe patient handling equipment if applicable/Assistance with ambulation/Communicate fall risk and risk factors to all staff, patient, and family/Monitor gait and stability/Provide visual cue: yellow wristband, yellow gown, etc/Reinforce activity limits and safety measures with patient and family/Call bell, personal items and telephone in reach/Instruct patient to call for assistance before getting out of bed/chair/stretcher/Non-slip footwear applied when patient is off stretcher/[place] to call system/Physically safe environment - no spills, clutter or unnecessary equipment/Purposeful Proactive Rounding/Room/bathroom lighting operational, light cord in reach

## 2025-05-23 NOTE — ED ADULT TRIAGE NOTE - CHIEF COMPLAINT QUOTE
was at cancer center this morning, pt had episode of nausea/vomiting and diarrhea  abnormal EKG. denies current chest pain.

## 2025-05-23 NOTE — H&P ADULT - NSHPPHYSICALEXAM_GEN_ALL_CORE
GENERAL: NAD, lying in bed comfortably, +cachectic   EYES: EOMI, PERRLA, conjunctiva and sclera clear  NECK: Supple, trachea midline, no JVD  HEART: Regular rate and rhythm, no murmurs, rubs, or gallops  LUNGS: Unlabored respirations.  Clear to auscultation bilaterally, no crackles, wheezing, or rhonchi  ABDOMEN: Soft, nontender, nondistended, +BS. +scar in midline andomen  EXTREMITIES: 2+ peripheral pulses bilaterally. No clubbing, cyanosis, or edema  NERVOUS SYSTEM:  A&Ox3, moving all extremities, no focal deficits   SKIN: No rashes or lesions

## 2025-05-23 NOTE — H&P ADULT - HISTORY OF PRESENT ILLNESS
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 diff colitis s/p vancomycin and subsequently for anemia and visual hallucinations, found to have CNS lesion c/f metastasis and recommended gamma knife treatment, presenting today for nausea, vomiting and diarrhea (x4).   In the ED VSS. Received 2L of IVF, dilaudid x1 for back pain, vanco/zosyn.   Labs significant for hemoconcentration and hypercalcemia likely i/s/o hypovolemia.   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 diff colitis s/p vancomycin and subsequently for anemia and visual hallucinations, found to have CNS lesion c/f metastasis and recommended gamma knife treatment, presenting today for nausea, vomiting and diarrhea (x4).   In the ED VSS. Received 2L of IVF, dilaudid x1 for back pain, vanco/zosyn.   Labs significant for hemoconcentration and hypercalcemia likely i/s/o hypovolemia.   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 diff colitis s/p vancomycin and subsequently for anemia and visual hallucinations, found to have CNS lesion c/f metastasis and recommended gamma knife treatment, presenting today for nausea, vomiting and diarrhea (x4). He had his first session of gamma knife therapy yesterday. This morning he felt tired and had episodes of vomiting and nausea, as well as diarrhea - although he notes that these episodes are not similar to when he had C diff infection. Per wife he took dulcolax x3 two days ago because he felt constipated.     In the ED VSS. Received 2L of IVF, dilaudid x1 for back pain, vanco/zosyn.   Labs significant for hemoconcentration and hypercalcemia likely i/s/o hypovolemia. EKG with sinus tachycardia, no acute ischemic changes.

## 2025-05-23 NOTE — CHART NOTE - NSCHARTNOTEFT_GEN_A_CORE
This report was requested by: Liz Lomax | Reference #: [id number]    Practitioner Count: 2  Pharmacy Count: 2  Current Opioid Prescriptions: 4  Current Benzodiazepine Prescriptions: 0  Current Stimulant Prescriptions: 0      Patient Demographic Information (PDI)       PDI	First Name	Last Name	Birth Date	Gender	Street Address	[facility] Code  A	Conrad	Arnoldo	1970	Male	[address]	[place]	[address]  B	Conrad	Arnoldo	1970	Male	[address]	[place]	[address]    Prescription Information      PDI Filter:    PDI	Current Rx	Drug Type	Rx Written	Rx Dispensed	Drug	Quantity	Days Supply	Prescriber Name	Prescriber MAKEDA #  A	Y	O	01/06/2025	05/06/2025	tramadol hcl 50 mg tablet	90	30	Yueh, Carrie	[id number]  Payment Method Insurance  [facility] #[id number]  A	Y		04/03/2025	05/06/2025	zolpidem tartrate 10 mg tablet	30	30	Yueh, Carrie	[id number]  Payment Method Insurance  [facility] #[id number]  A	N		04/03/2025	04/08/2025	zolpidem tartrate 10 mg tablet	30	30	Yueh, Carrie	[id number]  Payment Method Insurance  [facility] #[id number]  A	N	O	01/06/2025	03/12/2025	tramadol hcl 50 mg tablet	90	30	Yueh, Carrie	[id number]  Payment Method Insurance  [facility] #[id number]  A	N		01/06/2025	03/12/2025	zolpidem tartrate 10 mg tablet	30	30	Yueh, Carrie	[id number]  Payment Method Insurance  [facility] #[id number]  A	N		01/06/2025	02/11/2025	zolpidem tartrate 10 mg tablet	30	30	Yueh, Carrie	[id number]  Payment Method Insurance  [facility] #[id number]  A	N		01/06/2025	01/13/2025	zolpidem tartrate 10 mg tablet	30	30	Yueh, Carrie	[id number]  Payment Method Insurance  [facility] #[id number]  A	N	O	01/06/2025	01/11/2025	oxycontin er 10 mg tablet	30	10	Arian Carter	[id number]  Payment Method Insurance  [facility] #[id number]  A	N	O	01/06/2025	01/07/2025	tramadol hcl 50 mg tablet	90	30	Yueh, Carrie	[id number]  Payment Method Insurance  [facility] #[id number]  A	N	O	12/23/2024	12/23/2024	oxycodone-acetaminophen  mg tab	12	2	José Miguel Pettit DDS, MD	[id number]  Payment Method Insurance  [facility] #[id number]  A	N	O	12/16/2024	12/16/2024	oxycodone-acetaminophen  mg tab	12	3	[facility]	[id number]  Payment Method Insurance  [facility] #[id number]  A	N		10/07/2024	12/11/2024	zolpidem tartrate 10 mg tablet	30	30	Yueh, Carrie	[id number]  Payment Method Insurance  [facility] #[id number]  A	N	O	12/09/2024	12/11/2024	oxycontin er 10 mg tablet	90	30	Arian Carter	[id number]  Payment Method Insurance  [facility] #[id number]  A	N		10/07/2024	11/10/2024	zolpidem tartrate 10 mg tablet	30	30	Yueh, Carrie	[id number]  Payment Method Insurance  [facility] #[id number]  A	N	O	11/08/2024	11/08/2024	oxycontin er 10 mg tablet	90	30	Arian Carter	[id number]  Payment Method Insurance  [facility] #[id number]  A	N		10/07/2024	10/10/2024	zolpidem tartrate 10 mg tablet	30	30	Yueh, Carrie	[id number]  Payment Method Insurance  [facility] #[id number]  A	N	O	10/07/2024	10/08/2024	oxycontin er 10 mg tablet	90	30	Arian Carter	[id number]  Payment Method Insurance  [facility] #[id number]  A	N		07/17/2024	09/12/2024	zolpidem tartrate 10 mg tablet	30	30	YuehCarrie	[id number]  Payment Method Insurance  [facility] #[id number]  A	N	O	09/03/2024	09/05/2024	oxycontin er 10 mg tablet	90	30	Arian Carter	[id number]  Payment Method Insurance  [facility] #[id number]  A	N		07/17/2024	08/14/2024	zolpidem tartrate 10 mg tablet	30	30	Yueh, Carrie	[id number]  Payment Method Insurance  [facility] #[id number]  A	N	O	08/05/2024	08/07/2024	oxycontin er 10 mg tablet	90	30	Arian Carter	[id number]  Payment Method Insurance  [facility] #[id number]  A	N		07/17/2024	07/17/2024	zolpidem tartrate 10 mg tablet	30	30	Yueh, Carrie	[id number]  Payment Method Insurance  [facility] #[id number]  A	N	O	04/15/2024	07/16/2024	tramadol hcl 50 mg tablet	90	30	Yueh, Carrie	[id number]  Payment Method Insurance  [facility] #[id number]  A	N	O	07/02/2024	07/04/2024	oxycontin er 10 mg tablet	90	30	Arian Carter	[id number]  Payment Method Insurance  [facility] #[id number]  A	N		04/15/2024	06/18/2024	zolpidem tartrate 10 mg tablet	30	30	Yueh, Carrie	[id number]  Payment Method Insurance  [facility] #[id number]  A	N	O	06/04/2024	06/06/2024	oxycontin er 10 mg tablet	90	30	Arian Carter	[id number]  Payment Method Insurance  [facility] #[id number]  A	N	O	04/15/2024	05/26/2024	tramadol hcl 50 mg tablet	90	30	Yueh, Carrie	[id number]  Payment Method Insurance  [facility] #[id number]  B	Y	O	05/20/2025	05/22/2025	morphine sulf er 15 mg tablet	15	15	Arian Carter	[id number]  Payment Method Insurance  [facility]	Y	O	05/07/2025	05/08/2025	oxycodone hcl (ir) 5 mg tablet	60	30	Arian Carter	[id number]  Payment Method Insurance  [facility]	Y	O	04/28/2025	05/01/2025	oxycontin er 10 mg tablet	90	30	Arian Carter	[id number]  Payment Method Insurance  [facility]	N	O	03/28/2025	03/31/2025	oxycontin er 10 mg tablet	90	30	Arian Carter	[id number]  Payment Method Insurance  [facility]	N	O	02/24/2025	02/28/2025	oxycontin er 10 mg tablet	90	30	Arian Carter	[id number]  Payment Method Insurance  [facility]	N	O	01/27/2025	01/29/2025	oxycontin er 10 mg tablet	90	30	Arian Carter	[id number]  Payment Method Insurance  [facility]	N	O	01/17/2025	01/17/2025	oxycontin er 10 mg tablet	40	14	Arian Carter	[id number]  Payment Method Insurance  [facility]

## 2025-05-24 ENCOUNTER — TRANSCRIPTION ENCOUNTER (OUTPATIENT)
Age: 55
End: 2025-05-24

## 2025-05-24 LAB
ALBUMIN SERPL ELPH-MCNC: 3.5 G/DL — SIGNIFICANT CHANGE UP (ref 3.3–5)
ALP SERPL-CCNC: 68 U/L — SIGNIFICANT CHANGE UP (ref 40–120)
ALT FLD-CCNC: 15 U/L — SIGNIFICANT CHANGE UP (ref 10–45)
ANION GAP SERPL CALC-SCNC: 10 MMOL/L — SIGNIFICANT CHANGE UP (ref 5–17)
APPEARANCE UR: CLEAR — SIGNIFICANT CHANGE UP
AST SERPL-CCNC: 21 U/L — SIGNIFICANT CHANGE UP (ref 10–40)
BACTERIA # UR AUTO: NEGATIVE /HPF — SIGNIFICANT CHANGE UP
BASOPHILS # BLD AUTO: 0.05 K/UL — SIGNIFICANT CHANGE UP (ref 0–0.2)
BASOPHILS NFR BLD AUTO: 0.5 % — SIGNIFICANT CHANGE UP (ref 0–2)
BILIRUB SERPL-MCNC: 1.1 MG/DL — SIGNIFICANT CHANGE UP (ref 0.2–1.2)
BILIRUB UR-MCNC: NEGATIVE — SIGNIFICANT CHANGE UP
BUN SERPL-MCNC: 14 MG/DL — SIGNIFICANT CHANGE UP (ref 7–23)
CALCIUM SERPL-MCNC: 8.7 MG/DL — SIGNIFICANT CHANGE UP (ref 8.4–10.5)
CAST: 2 /LPF — SIGNIFICANT CHANGE UP (ref 0–4)
CHLORIDE SERPL-SCNC: 105 MMOL/L — SIGNIFICANT CHANGE UP (ref 96–108)
CO2 SERPL-SCNC: 20 MMOL/L — LOW (ref 22–31)
COLOR SPEC: YELLOW — SIGNIFICANT CHANGE UP
CREAT SERPL-MCNC: 1.01 MG/DL — SIGNIFICANT CHANGE UP (ref 0.5–1.3)
DIFF PNL FLD: NEGATIVE — SIGNIFICANT CHANGE UP
EGFR: 88 ML/MIN/1.73M2 — SIGNIFICANT CHANGE UP
EGFR: 88 ML/MIN/1.73M2 — SIGNIFICANT CHANGE UP
EOSINOPHIL # BLD AUTO: 0.24 K/UL — SIGNIFICANT CHANGE UP (ref 0–0.5)
EOSINOPHIL NFR BLD AUTO: 2.5 % — SIGNIFICANT CHANGE UP (ref 0–6)
GAS PNL BLDV: SIGNIFICANT CHANGE UP
GI PCR PANEL: SIGNIFICANT CHANGE UP
GLUCOSE SERPL-MCNC: 79 MG/DL — SIGNIFICANT CHANGE UP (ref 70–99)
GLUCOSE UR QL: NEGATIVE MG/DL — SIGNIFICANT CHANGE UP
HCT VFR BLD CALC: 36.7 % — LOW (ref 39–50)
HGB BLD-MCNC: 12 G/DL — LOW (ref 13–17)
IMM GRANULOCYTES NFR BLD AUTO: 0.3 % — SIGNIFICANT CHANGE UP (ref 0–0.9)
KETONES UR QL: NEGATIVE MG/DL — SIGNIFICANT CHANGE UP
LACTATE SERPL-SCNC: 1 MMOL/L — SIGNIFICANT CHANGE UP (ref 0.5–2)
LEUKOCYTE ESTERASE UR-ACNC: NEGATIVE — SIGNIFICANT CHANGE UP
LYMPHOCYTES # BLD AUTO: 2.37 K/UL — SIGNIFICANT CHANGE UP (ref 1–3.3)
LYMPHOCYTES # BLD AUTO: 24.6 % — SIGNIFICANT CHANGE UP (ref 13–44)
MAGNESIUM SERPL-MCNC: 1.6 MG/DL — SIGNIFICANT CHANGE UP (ref 1.6–2.6)
MCHC RBC-ENTMCNC: 28.3 PG — SIGNIFICANT CHANGE UP (ref 27–34)
MCHC RBC-ENTMCNC: 32.7 G/DL — SIGNIFICANT CHANGE UP (ref 32–36)
MCV RBC AUTO: 86.6 FL — SIGNIFICANT CHANGE UP (ref 80–100)
MONOCYTES # BLD AUTO: 0.67 K/UL — SIGNIFICANT CHANGE UP (ref 0–0.9)
MONOCYTES NFR BLD AUTO: 7 % — SIGNIFICANT CHANGE UP (ref 2–14)
NEUTROPHILS # BLD AUTO: 6.28 K/UL — SIGNIFICANT CHANGE UP (ref 1.8–7.4)
NEUTROPHILS NFR BLD AUTO: 65.1 % — SIGNIFICANT CHANGE UP (ref 43–77)
NITRITE UR-MCNC: NEGATIVE — SIGNIFICANT CHANGE UP
NRBC BLD AUTO-RTO: 0 /100 WBCS — SIGNIFICANT CHANGE UP (ref 0–0)
PH UR: 5 — SIGNIFICANT CHANGE UP (ref 5–8)
PHOSPHATE SERPL-MCNC: 2.5 MG/DL — SIGNIFICANT CHANGE UP (ref 2.5–4.5)
PLATELET # BLD AUTO: 185 K/UL — SIGNIFICANT CHANGE UP (ref 150–400)
POTASSIUM SERPL-MCNC: 4.2 MMOL/L — SIGNIFICANT CHANGE UP (ref 3.5–5.3)
POTASSIUM SERPL-SCNC: 4.2 MMOL/L — SIGNIFICANT CHANGE UP (ref 3.5–5.3)
PROT SERPL-MCNC: 5.5 G/DL — LOW (ref 6–8.3)
PROT UR-MCNC: NEGATIVE MG/DL — SIGNIFICANT CHANGE UP
RBC # BLD: 4.24 M/UL — SIGNIFICANT CHANGE UP (ref 4.2–5.8)
RBC # FLD: 14.8 % — HIGH (ref 10.3–14.5)
RBC CASTS # UR COMP ASSIST: 0 /HPF — SIGNIFICANT CHANGE UP (ref 0–4)
SODIUM SERPL-SCNC: 135 MMOL/L — SIGNIFICANT CHANGE UP (ref 135–145)
SP GR SPEC: 1.02 — SIGNIFICANT CHANGE UP (ref 1–1.03)
SQUAMOUS # UR AUTO: 1 /HPF — SIGNIFICANT CHANGE UP (ref 0–5)
T4 FREE SERPL-MCNC: 1.6 NG/DL — SIGNIFICANT CHANGE UP (ref 0.9–1.8)
TSH SERPL-MCNC: 1.31 UIU/ML — SIGNIFICANT CHANGE UP (ref 0.27–4.2)
UROBILINOGEN FLD QL: 0.2 MG/DL — SIGNIFICANT CHANGE UP (ref 0.2–1)
WBC # BLD: 9.64 K/UL — SIGNIFICANT CHANGE UP (ref 3.8–10.5)
WBC # FLD AUTO: 9.64 K/UL — SIGNIFICANT CHANGE UP (ref 3.8–10.5)
WBC UR QL: 0 /HPF — SIGNIFICANT CHANGE UP (ref 0–5)

## 2025-05-24 PROCEDURE — 99233 SBSQ HOSP IP/OBS HIGH 50: CPT

## 2025-05-24 RX ORDER — SODIUM CHLORIDE 9 G/1000ML
1000 INJECTION, SOLUTION INTRAVENOUS
Refills: 0 | Status: DISCONTINUED | OUTPATIENT
Start: 2025-05-24 | End: 2025-05-24

## 2025-05-24 RX ORDER — HYDROMORPHONE/SOD CHLOR,ISO/PF 2 MG/10 ML
1 SYRINGE (ML) INJECTION ONCE
Refills: 0 | Status: DISCONTINUED | OUTPATIENT
Start: 2025-05-24 | End: 2025-05-24

## 2025-05-24 RX ORDER — ONDANSETRON HCL/PF 4 MG/2 ML
4 VIAL (ML) INJECTION EVERY 8 HOURS
Refills: 0 | Status: DISCONTINUED | OUTPATIENT
Start: 2025-05-24 | End: 2025-05-26

## 2025-05-24 RX ORDER — OXYCODONE HYDROCHLORIDE 30 MG/1
10 TABLET ORAL EVERY 8 HOURS
Refills: 0 | Status: DISCONTINUED | OUTPATIENT
Start: 2025-05-24 | End: 2025-05-26

## 2025-05-24 RX ADMIN — ENOXAPARIN SODIUM 40 MILLIGRAM(S): 100 INJECTION SUBCUTANEOUS at 17:57

## 2025-05-24 RX ADMIN — Medication 20 MILLIGRAM(S): at 07:58

## 2025-05-24 RX ADMIN — OXYCODONE HYDROCHLORIDE 5 MILLIGRAM(S): 30 TABLET ORAL at 13:00

## 2025-05-24 RX ADMIN — OXYCODONE HYDROCHLORIDE 5 MILLIGRAM(S): 30 TABLET ORAL at 09:00

## 2025-05-24 RX ADMIN — Medication 1 MILLIGRAM(S): at 14:46

## 2025-05-24 RX ADMIN — OXYCODONE HYDROCHLORIDE 5 MILLIGRAM(S): 30 TABLET ORAL at 12:31

## 2025-05-24 RX ADMIN — OXYCODONE HYDROCHLORIDE 5 MILLIGRAM(S): 30 TABLET ORAL at 07:59

## 2025-05-24 RX ADMIN — OXYCODONE HYDROCHLORIDE 10 MILLIGRAM(S): 30 TABLET ORAL at 05:33

## 2025-05-24 RX ADMIN — Medication 10 MILLIGRAM(S): at 14:32

## 2025-05-24 RX ADMIN — LEVETIRACETAM 500 MILLIGRAM(S): 10 INJECTION, SOLUTION INTRAVENOUS at 17:58

## 2025-05-24 RX ADMIN — OXYCODONE HYDROCHLORIDE 10 MILLIGRAM(S): 30 TABLET ORAL at 15:00

## 2025-05-24 RX ADMIN — Medication 1 MILLIGRAM(S): at 15:00

## 2025-05-24 RX ADMIN — OXYCODONE HYDROCHLORIDE 10 MILLIGRAM(S): 30 TABLET ORAL at 22:20

## 2025-05-24 RX ADMIN — Medication 81 MILLIGRAM(S): at 12:31

## 2025-05-24 RX ADMIN — ATORVASTATIN CALCIUM 40 MILLIGRAM(S): 80 TABLET, FILM COATED ORAL at 22:20

## 2025-05-24 RX ADMIN — OXYCODONE HYDROCHLORIDE 10 MILLIGRAM(S): 30 TABLET ORAL at 05:03

## 2025-05-24 RX ADMIN — OXYCODONE HYDROCHLORIDE 5 MILLIGRAM(S): 30 TABLET ORAL at 19:55

## 2025-05-24 RX ADMIN — OXYCODONE HYDROCHLORIDE 10 MILLIGRAM(S): 30 TABLET ORAL at 23:06

## 2025-05-24 RX ADMIN — TAMSULOSIN HYDROCHLORIDE 0.4 MILLIGRAM(S): 0.4 CAPSULE ORAL at 22:20

## 2025-05-24 RX ADMIN — LEVETIRACETAM 500 MILLIGRAM(S): 10 INJECTION, SOLUTION INTRAVENOUS at 05:02

## 2025-05-24 RX ADMIN — Medication 125 MICROGRAM(S): at 05:03

## 2025-05-24 RX ADMIN — OXYCODONE HYDROCHLORIDE 10 MILLIGRAM(S): 30 TABLET ORAL at 14:32

## 2025-05-24 RX ADMIN — OXYCODONE HYDROCHLORIDE 5 MILLIGRAM(S): 30 TABLET ORAL at 20:45

## 2025-05-24 NOTE — DISCHARGE NOTE PROVIDER - NSDCCPCAREPLAN_GEN_ALL_CORE_FT
PRINCIPAL DISCHARGE DIAGNOSIS  Diagnosis: Nausea & vomiting  Assessment and Plan of Treatment: You were admitted to the hospital for nausea, vomiting and diarrhea and were found to have significant dehydration requiring IV fluids. We monitored you off antibiotics given your treatment for recent infection with C. difficile, and treated you with symptomatic management. We did collect stool studies to confirm if you had a source of possible gastroenteritis.     PRINCIPAL DISCHARGE DIAGNOSIS  Diagnosis: Nausea & vomiting  Assessment and Plan of Treatment: You were admitted to the hospital for nausea, vomiting and diarrhea and were found to have significant dehydration requiring IV fluids. We monitored you off antibiotics given your treatment for recent infection with C. difficile, and treated you with symptomatic management. We did collect stool studies to confirm if you had a source of possible gastroenteritis. You tested negative for C. difficile infection at this time. You were not found with any significant infection that required antibiotics.   1. Please follow up with your PCP, oncologist, and radiation oncologists in 1-2 weeks  2. Resume your laxatives as needed once your diarrhea resolves  Contact a health care provider if:  - You have a fever.  - Your diarrhea gets worse.  - You have new symptoms.  - You vomit every time you eat or drink.  - You feel light-headed, dizzy, or have a headache.  - You have muscle cramps.  - You have signs of dehydration, such as:    - Dark urine, very little urine, or no urine.    - Cracked lips.    - Dry mouth.    - Sunken eyes.    - Sleepiness.    - Weakness.  - You have bloody or black stools or stools that look like tar.  - You have severe pain, cramping, or bloating in your abdomen.  - Your skin feels cold and clammy.  - You feel confused.  Get help right away if:  - You have chest pain or your heart is beating very quickly.  - You have trouble breathing or you are breathing very quickly.  - You feel extremely weak or you faint.  These symptoms may be an emergency. Get help right away. Call 911.  Do not wait to see if the symptoms will go away.  Do not drive yourself to the hospital

## 2025-05-24 NOTE — DISCHARGE NOTE PROVIDER - NSDCFUADDAPPT_GEN_ALL_CORE_FT
APPTS ARE READY TO BE MADE: [X] YES    Best Family or Patient Contact (if needed):    Additional Information about above appointments (if needed):    1:   2:   3:     Other comments or requests:  APPTS ARE READY TO BE MADE: [X] YES    Best Family or Patient Contact (if needed):    Additional Information about above appointments (if needed):    1:   2:   3:   Patient was outreached but did not answer nor could a voicemail be left.    Other comments or requests:

## 2025-05-24 NOTE — PROGRESS NOTE ADULT - PROBLEM SELECTOR PLAN 9
-Diet: CLD, may advance as patient tolerates  -DVT ppx: lovenox 40 qd  -Dispo: pending -Diet: Regular  -DVT ppx: lovenox 40 qd  -Dispo: pending

## 2025-05-24 NOTE — DISCHARGE NOTE PROVIDER - NSDCMRMEDTOKEN_GEN_ALL_CORE_FT
Ambien 10 mg oral tablet: 1 tab(s) orally once a day  aspirin 81 mg oral tablet, chewable: 1 tab(s) orally once a day  atorvastatin 40 mg oral tablet: 1 tab(s) orally once a day (at bedtime)  hydrocortisone 10 mg oral tablet: 1 tab(s) orally once a day  hydrocortisone 20 mg oral tablet: 1 tab(s) orally once a day  levETIRAcetam 500 mg oral tablet: 1 tab(s) orally 2 times a day  levothyroxine 125 mcg (0.125 mg) oral tablet: 1 tab(s) orally once a day  OxyCONTIN 10 mg oral tablet, extended release: 1 tab(s) orally every 8 hours as needed for  severe pain   Ambien 10 mg oral tablet: 1 tab(s) orally once a day  aspirin 81 mg oral tablet, chewable: 1 tab(s) orally once a day  atorvastatin 40 mg oral tablet: 1 tab(s) orally once a day (at bedtime)  bisacodyl 5 mg oral tablet: 1 tab(s) orally once a day as needed for  constipation  hydrocortisone 10 mg oral tablet: 1 tab(s) orally once a day  hydrocortisone 20 mg oral tablet: 1 tab(s) orally once a day  levETIRAcetam 500 mg oral tablet: 1 tab(s) orally 2 times a day  levothyroxine 125 mcg (0.125 mg) oral tablet: 1 tab(s) orally once a day  naloxone 4 mg/0.1 mL nasal spray: 1 spray(s) intranasally once as needed for opioid overdose  ondansetron 4 mg oral tablet, disintegratin tab(s) orally every 8 hours as needed for  nausea  OxyCONTIN 10 mg oral tablet, extended release: 1 tab(s) orally every 8 hours as needed for  severe pain  polyethylene glycol 3350 oral powder for reconstitution: 17 gram(s) orally once a day as needed for  constipation  senna leaf extract oral tablet: 1 tab(s) orally once a day as needed for  constipation  tamsulosin 0.4 mg oral capsule: 1 cap(s) orally once a day (at bedtime)

## 2025-05-24 NOTE — PROGRESS NOTE ADULT - SUBJECTIVE AND OBJECTIVE BOX
*******************************  Liz Lomax MD (PGY-2)  Internal Medicine  Contact via [employer] TEAMS  *******************************    INTERVAL HPI/OVERNIGHT EVENTS:    REVIEW OF SYSTEMS:       OBJECTIVE  T(C): 36.6 (05-24-25 @ 04:47), Max: 38 (05-23-25 @ 22:47)  HR: 88 (05-24-25 @ 04:47) (88 - 139)  BP: 98/61 (05-24-25 @ 04:47) (93/57 - 112/79)  RR: 18 (05-24-25 @ 04:47) (18 - 20)  SpO2: 99% (05-24-25 @ 04:47) (98% - 100%)      PHYSICAL EXAM  General:  HEENT:  Cardiovascular:  Pulmonary:  GI:  :  Neuro:  Psych:          IMAGING:     *******************************  Liz Lomax MD (PGY-2)  Internal Medicine  Contact via [employer] TEAMS  *******************************    INTERVAL HPI/OVERNIGHT EVENTS:  No acute events overnight  Required IV push of dilaudid for chronic back pain    REVIEW OF SYSTEMS:     CONSTITUTIONAL:  No weakness, fevers or chills  EYES/ENT:  No visual changes;  No vertigo or throat pain   NECK:  No pain or stiffness  RESPIRATORY:  No cough, wheezing, hemoptysis; No shortness of breath  CARDIOVASCULAR:  No chest pain or palpitations  GASTROINTESTINAL:  No abdominal or epigastric pain. No nausea, vomiting, or hematemesis; No diarrhea or constipation. No melena or hematochezia.  GENITOURINARY:  No dysuria, frequency or hematuria  NEUROLOGICAL:  No numbness or weakness  SKIN:  No itching, rashes      OBJECTIVE  T(C): 36.6 (05-24-25 @ 04:47), Max: 38 (05-23-25 @ 22:47)  HR: 88 (05-24-25 @ 04:47) (88 - 139)  BP: 98/61 (05-24-25 @ 04:47) (93/57 - 112/79)  RR: 18 (05-24-25 @ 04:47) (18 - 20)  SpO2: 99% (05-24-25 @ 04:47) (98% - 100%)      PHYSICAL EXAM  GENERAL: NAD, lying in bed comfortably  EYES: EOMI, PERRLA, conjunctiva and sclera clear  NECK: Supple, trachea midline, no JVD  HEART: Regular rate and rhythm, no murmurs, rubs, or gallops  LUNGS: Unlabored respirations.  Clear to auscultation bilaterally, no crackles, wheezing, or rhonchi  ABDOMEN: Soft, nontender, nondistended, +BS  EXTREMITIES: 2+ peripheral pulses bilaterally. No clubbing, cyanosis, or edema  NERVOUS SYSTEM:  A&Ox3, moving all extremities, no focal deficits   SKIN: No rashes or lesions          IMAGING:

## 2025-05-24 NOTE — PROGRESS NOTE ADULT - PROBLEM SELECTOR PLAN 8
-Home regimen: Oxycodone 10 mg TID; 5 mg for breakthrough   -Breakthrough while in hospital: dilaudid 1mg  -Chronic pain consult  -Bowel regimen while on opioids: senna/miralax with hold parameters for diarrhea -Back Pain 2/2 diffuse osseous mets  -Home regimen: Oxycodone 10 mg TID standing; 5 mg for breakthrough   -Breakthrough while in hospital: dilaudid 1mg IVP  -Follows with chronic pain outpatient   -Bowel regimen while on opioids: senna/miralax with hold parameters for diarrhea

## 2025-05-24 NOTE — DISCHARGE NOTE PROVIDER - DETAILS OF MALNUTRITION DIAGNOSIS/DIAGNOSES
This patient has been assessed with a concern for Malnutrition and was treated during this hospitalization for the following Nutrition diagnosis/diagnoses:     -  05/25/2025: Severe protein-calorie malnutrition   -  05/25/2025: Underweight (BMI < 19)

## 2025-05-24 NOTE — DISCHARGE NOTE PROVIDER - PROVIDER TOKENS
PROVIDER:[TOKEN:[[id number]],FOLLOWUP:[2 weeks],ESTABLISHEDPATIENT:[T]],PROVIDER:[TOKEN:[[id number]],FOLLOWUP:[1 month],ESTABLISHEDPATIENT:[T]]

## 2025-05-24 NOTE — DISCHARGE NOTE PROVIDER - NSDCFUSCHEDAPPT_GEN_ALL_CORE_FT
Lazaro Lewis  [facility]  SPINECTR [address] [facility]  Scheduled Appointment: 05/28/2025    [facility]  CARDIOLOGY [address]   Scheduled Appointment: 06/13/2025    [facility]  MRI  [address]  Scheduled Appointment: 07/31/2025    Federico Hill  [facility]  RADMED [address] [facility]  Scheduled Appointment: 08/06/2025     [facility]  CATSCAN [address]  Scheduled Appointment: 06/28/2025    [facility]  MRI  [address]  Scheduled Appointment: 07/31/2025    Federico Hill  [facility]  RADMED [address]  Scheduled Appointment: 08/06/2025    [facility]  CARDIOLOGY [address]   Scheduled Appointment: 08/28/2025

## 2025-05-24 NOTE — DISCHARGE NOTE PROVIDER - CARE PROVIDER_API CALL
Carrie RobertsSrinivas  [facility]  [address] [address] [place] [address]  Phone: [phone]  Fax: [phone]  Established Patient  Follow Up Time: 2 weeks    Federico Hill  Radiation Oncology  [address] [place] [address]  Phone: [phone]  Fax: [phone]  Established Patient  Follow Up Time: 1 month

## 2025-05-24 NOTE — PROGRESS NOTE ADULT - SUBJECTIVE AND OBJECTIVE BOX
DATE OF SERVICE: 05-24-25 @ 12:37    Patient is a 55y old  Male who presents with a chief complaint of Nausea/vomiting (23 May 2025 15:34)      INTERVAL HISTORY: Feels ok. Still reports loose BMs.     REVIEW OF SYSTEMS:  CONSTITUTIONAL: No weakness  EYES/ENT: No visual changes;  No throat pain   NECK: No pain or stiffness  RESPIRATORY: No cough, wheezing; No shortness of breath  CARDIOVASCULAR: No chest pain or palpitations  GASTROINTESTINAL: No abdominal  pain. No nausea, vomiting, or hematemesis. + diarrhea  GENITOURINARY: No dysuria, frequency or hematuria  NEUROLOGICAL: No stroke like symptoms  SKIN: No rashes    TELEMETRY Personally reviewed: SR/ST   	  MEDICATIONS:        PHYSICAL EXAM:  T(C): 36.8 (05-24-25 @ 11:38), Max: 38 (05-23-25 @ 22:47)  HR: 90 (05-24-25 @ 11:38) (88 - 122)  BP: 100/58 (05-24-25 @ 11:38) (93/57 - 108/61)  RR: 18 (05-24-25 @ 11:38) (18 - 20)  SpO2: 95% (05-24-25 @ 11:38) (95% - 100%)  Wt(kg): --  I&O's Summary        Appearance: In no distress	  HEENT:    PERRL, EOMI	  Cardiovascular:  S1 S2, No JVD  Respiratory: Lungs clear to auscultation	  Gastrointestinal:  Soft, Non-tender, + BS	  Vascularature:  No edema of LE  Psychiatric: Appropriate affect   Neuro: no acute focal deficits                               12.0   9.64  )-----------( 185      ( 24 May 2025 08:09 )             36.7     05-24    135  |  105  |  14  ----------------------------<  79  4.2   |  20[L]  |  1.01    Ca    8.7      24 May 2025 08:09  Phos  2.5     05-24  Mg     1.6     05-24    TPro  5.5[L]  /  Alb  3.5  /  TBili  1.1  /  DBili  x   /  AST  21  /  ALT  15  /  AlkPhos  68  05-24        Labs personally reviewed      ASSESSMENT/PLAN: 	  54 y/o M with Pmhx CAD s/p PCI w/ ONESIMO on DaPT, hypothyroidism, secondary adrenal insufficiency, Cluster headaches, 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  Patient denied nausea but then proceeded to vomit in the stretcher.  No headaches.  Patient brought in by EMS for which collateral was obtained.  EKG by EMS was concerning that there was ST elevations in the inferior leads however was very poor baseline.    1. Abnormal EKG reported by EMS  - EKG here nonischemic, asymptomatic with no CP or SOB  - known CAD  - 5/23 Trop negative   - 10/15/2024 with ONESIMO to the LAD and prior CAD s/p PCI 2 LAD stents  - s/p cath 1/31 with patent stents  - C/w ASA 81mg & statin     2.  HLD   - c/w Vascepa as OP, atorvastatin 40mg PO daily    3. Gastrointestinal symptoms - Nausea, vomiting and diarrhea  - Labs significant for hypovolemia (hemoconcentrated with   - C/w zofran q8 PRN  - Hx of C. diff on prior admission s/p PO vancomycin course   - Reported 4 episodes of loose stool prior to arrival to hospital, none since admission  - Monitor stool count and obtain GI PCR if continued diarrhea.    4.  Sinus tachycardia.   ·  Plan: Tachycardic to 110s-120s on admission, likely i/s/o hypovolemia  - S/p 2L of IVF, C/w LR maintenance fluids 100cc/hr x 1 liter  - Prior hx of metoprolol succinate 25 mg but this was stopped during last admission; will continue to hold as tachy 2/2 hypovolemia     5.  Metastatic cancer to brain.   ·  Plan: -MR from April 2025: 7 mm nodular T2 hyperintense focus in the posterior medial aspect of the left temporal lobe   - C/w keppra 500 BID   - S/p gamma knife therapy session on 5/22 with Dr. Hill  - Radiation oncology follow up    6.  Adrenal insufficiency.   ·  Plan: C/w hydrocortisone 20 mg in AM and 10 mg in PM      7. Need for prophylactic measure.   ·  Plan: DVT ppx: lovenox 40 qd             NuviaSERAFIN Charles DO [facility]  Cardiovascular Medicine  [address], [address]  Available through call or text on [employer] TEAMs  Office: [phone]

## 2025-05-24 NOTE — DISCHARGE NOTE PROVIDER - HOSPITAL COURSE
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 diff colitis s/p vancomycin and subsequently for anemia and visual hallucinations, found to have CNS lesion c/f metastasis and recommended gamma knife treatment, presenting today for nausea, vomiting and diarrhea (x4).   In the ED initially found to be tachycardic with elevated lactate; after receiving adequate fluid resuscitation patient's heart rate decreased to normal and lactate resolved. He was also borderline febrile with Tm 100.4, but monitored off antibiotics given no clear signs of infection and recent treatment for C. diff, pending GI PCR and blood cultures.         Discharge Summary     Admit Date: 05-23-25  Discharge Date: 05-26-25    Admission diagnoses:  Other sepsis    Discharge diagnoses:   Failure to thrive  Nausea and Diarrhea    Hospital Course:   For full details, please see H&P, progress notes, consult notes and ancillary notes. Briefly, CARLOTA CANTU is a 55y Male with a history of Stage II testicular CA 1994  s/p orchiectomy, prior non-Hodgkin's lymphoma, scalp melanoma s/p resection and immunotherapy, CAD s/p ONESIMO (10/2024) on ASA, HTN, HLD, hypothyroidism, adrenal insufficiency (not on steroids), ischemic colitis s/p resection, overactive bladder s/p bladder stimulator, recent hospitalizations for C. diff colitis s/p vancomycin and subsequently for anemia and visual hallucinations, found to have CNS lesion c/f metastasis and recommended gamma knife treatment, presenting today for nausea, vomiting and diarrhea (x4).   In the ED initially found to be tachycardic with elevated lactate; after receiving adequate fluid resuscitation patient's heart rate decreased to normal and lactate resolved. He was also borderline febrile with Tm 100.4, but monitored off antibiotics given no clear signs of infection and recent treatment for C. diff. Stool testing performed was negative for GI PCR and C Diff. Leukocytosis resolved. Patient improved while in the hospital with decrease in diarrhea and resolution of nausea; no further fevers while in the hospital. Suspected viral gastroenteritis.      On day of discharge, patient is clinically stable with no new exam findings or acute symptoms compared to prior. The patient was seen by the attending physician on the date of discharge and deemed stable and acceptable for discharge. The patient's chronic medical conditions were treated accordingly per the patient's home medication regimen. The patient's medication reconciliation (with changes made to chronic medications), follow up appointments, discharge orders, instructions, and significant lab and diagnostic studies are as noted.     Discharge follow up action items:     1. Follow up with PCP, MedOnc, RadOnc in 1-2 weeks.   2. Resume laxatives upon resolution of diarrhea    Patient's ordered code status: FULL CODE    Patient disposition: Home, no needs

## 2025-05-24 NOTE — DISCHARGE NOTE PROVIDER - CARE PROVIDERS DIRECT ADDRESSES
[email or web address]

## 2025-05-25 LAB
ALBUMIN SERPL ELPH-MCNC: 3.9 G/DL — SIGNIFICANT CHANGE UP (ref 3.3–5)
ALP SERPL-CCNC: 73 U/L — SIGNIFICANT CHANGE UP (ref 40–120)
ALT FLD-CCNC: 22 U/L — SIGNIFICANT CHANGE UP (ref 10–45)
ANION GAP SERPL CALC-SCNC: 13 MMOL/L — SIGNIFICANT CHANGE UP (ref 5–17)
AST SERPL-CCNC: 29 U/L — SIGNIFICANT CHANGE UP (ref 10–40)
BASOPHILS # BLD AUTO: 0.04 K/UL — SIGNIFICANT CHANGE UP (ref 0–0.2)
BASOPHILS NFR BLD AUTO: 0.6 % — SIGNIFICANT CHANGE UP (ref 0–2)
BILIRUB SERPL-MCNC: 0.7 MG/DL — SIGNIFICANT CHANGE UP (ref 0.2–1.2)
BUN SERPL-MCNC: 15 MG/DL — SIGNIFICANT CHANGE UP (ref 7–23)
C DIFF GDH STL QL: SIGNIFICANT CHANGE UP
C DIFF GDH STL QL: SIGNIFICANT CHANGE UP
CALCIUM SERPL-MCNC: 9 MG/DL — SIGNIFICANT CHANGE UP (ref 8.4–10.5)
CHLORIDE SERPL-SCNC: 102 MMOL/L — SIGNIFICANT CHANGE UP (ref 96–108)
CO2 SERPL-SCNC: 21 MMOL/L — LOW (ref 22–31)
CORTIS AM PEAK SERPL-MCNC: 40 UG/DL — HIGH (ref 6–18.4)
CREAT SERPL-MCNC: 1.18 MG/DL — SIGNIFICANT CHANGE UP (ref 0.5–1.3)
EGFR: 73 ML/MIN/1.73M2 — SIGNIFICANT CHANGE UP
EGFR: 73 ML/MIN/1.73M2 — SIGNIFICANT CHANGE UP
EOSINOPHIL # BLD AUTO: 0.31 K/UL — SIGNIFICANT CHANGE UP (ref 0–0.5)
EOSINOPHIL NFR BLD AUTO: 4.4 % — SIGNIFICANT CHANGE UP (ref 0–6)
GLUCOSE SERPL-MCNC: 95 MG/DL — SIGNIFICANT CHANGE UP (ref 70–99)
HCT VFR BLD CALC: 35.8 % — LOW (ref 39–50)
HGB BLD-MCNC: 11.4 G/DL — LOW (ref 13–17)
IMM GRANULOCYTES NFR BLD AUTO: 0.3 % — SIGNIFICANT CHANGE UP (ref 0–0.9)
LYMPHOCYTES # BLD AUTO: 2.35 K/UL — SIGNIFICANT CHANGE UP (ref 1–3.3)
LYMPHOCYTES # BLD AUTO: 33.3 % — SIGNIFICANT CHANGE UP (ref 13–44)
MAGNESIUM SERPL-MCNC: 1.9 MG/DL — SIGNIFICANT CHANGE UP (ref 1.6–2.6)
MCHC RBC-ENTMCNC: 27.8 PG — SIGNIFICANT CHANGE UP (ref 27–34)
MCHC RBC-ENTMCNC: 31.8 G/DL — LOW (ref 32–36)
MCV RBC AUTO: 87.3 FL — SIGNIFICANT CHANGE UP (ref 80–100)
MONOCYTES # BLD AUTO: 0.39 K/UL — SIGNIFICANT CHANGE UP (ref 0–0.9)
MONOCYTES NFR BLD AUTO: 5.5 % — SIGNIFICANT CHANGE UP (ref 2–14)
NEUTROPHILS # BLD AUTO: 3.95 K/UL — SIGNIFICANT CHANGE UP (ref 1.8–7.4)
NEUTROPHILS NFR BLD AUTO: 55.9 % — SIGNIFICANT CHANGE UP (ref 43–77)
NRBC BLD AUTO-RTO: 0 /100 WBCS — SIGNIFICANT CHANGE UP (ref 0–0)
PHOSPHATE SERPL-MCNC: 2.6 MG/DL — SIGNIFICANT CHANGE UP (ref 2.5–4.5)
PLATELET # BLD AUTO: 208 K/UL — SIGNIFICANT CHANGE UP (ref 150–400)
POTASSIUM SERPL-MCNC: 3.8 MMOL/L — SIGNIFICANT CHANGE UP (ref 3.5–5.3)
POTASSIUM SERPL-SCNC: 3.8 MMOL/L — SIGNIFICANT CHANGE UP (ref 3.5–5.3)
PROT SERPL-MCNC: 6.3 G/DL — SIGNIFICANT CHANGE UP (ref 6–8.3)
RBC # BLD: 4.1 M/UL — LOW (ref 4.2–5.8)
RBC # FLD: 14.2 % — SIGNIFICANT CHANGE UP (ref 10.3–14.5)
SODIUM SERPL-SCNC: 136 MMOL/L — SIGNIFICANT CHANGE UP (ref 135–145)
WBC # BLD: 7.06 K/UL — SIGNIFICANT CHANGE UP (ref 3.8–10.5)
WBC # FLD AUTO: 7.06 K/UL — SIGNIFICANT CHANGE UP (ref 3.8–10.5)

## 2025-05-25 PROCEDURE — 99232 SBSQ HOSP IP/OBS MODERATE 35: CPT

## 2025-05-25 RX ORDER — HYDROMORPHONE/SOD CHLOR,ISO/PF 2 MG/10 ML
1 SYRINGE (ML) INJECTION ONCE
Refills: 0 | Status: DISCONTINUED | OUTPATIENT
Start: 2025-05-25 | End: 2025-05-25

## 2025-05-25 RX ADMIN — OXYCODONE HYDROCHLORIDE 10 MILLIGRAM(S): 30 TABLET ORAL at 23:15

## 2025-05-25 RX ADMIN — LEVETIRACETAM 500 MILLIGRAM(S): 10 INJECTION, SOLUTION INTRAVENOUS at 06:31

## 2025-05-25 RX ADMIN — OXYCODONE HYDROCHLORIDE 5 MILLIGRAM(S): 30 TABLET ORAL at 08:00

## 2025-05-25 RX ADMIN — Medication 10 MILLIGRAM(S): at 15:27

## 2025-05-25 RX ADMIN — ENOXAPARIN SODIUM 40 MILLIGRAM(S): 100 INJECTION SUBCUTANEOUS at 17:03

## 2025-05-25 RX ADMIN — Medication 20 MILLIGRAM(S): at 08:14

## 2025-05-25 RX ADMIN — OXYCODONE HYDROCHLORIDE 5 MILLIGRAM(S): 30 TABLET ORAL at 07:26

## 2025-05-25 RX ADMIN — Medication 81 MILLIGRAM(S): at 14:17

## 2025-05-25 RX ADMIN — Medication 1 MILLIGRAM(S): at 03:30

## 2025-05-25 RX ADMIN — Medication 1 MILLIGRAM(S): at 20:08

## 2025-05-25 RX ADMIN — Medication 1 MILLIGRAM(S): at 20:50

## 2025-05-25 RX ADMIN — Medication 1 MILLIGRAM(S): at 02:19

## 2025-05-25 RX ADMIN — LEVETIRACETAM 500 MILLIGRAM(S): 10 INJECTION, SOLUTION INTRAVENOUS at 17:03

## 2025-05-25 RX ADMIN — OXYCODONE HYDROCHLORIDE 10 MILLIGRAM(S): 30 TABLET ORAL at 15:00

## 2025-05-25 RX ADMIN — Medication 1 MILLIGRAM(S): at 09:37

## 2025-05-25 RX ADMIN — OXYCODONE HYDROCHLORIDE 5 MILLIGRAM(S): 30 TABLET ORAL at 17:39

## 2025-05-25 RX ADMIN — OXYCODONE HYDROCHLORIDE 10 MILLIGRAM(S): 30 TABLET ORAL at 22:31

## 2025-05-25 RX ADMIN — OXYCODONE HYDROCHLORIDE 10 MILLIGRAM(S): 30 TABLET ORAL at 06:31

## 2025-05-25 RX ADMIN — Medication 5 MILLIGRAM(S): at 22:41

## 2025-05-25 RX ADMIN — Medication 1 MILLIGRAM(S): at 10:00

## 2025-05-25 RX ADMIN — OXYCODONE HYDROCHLORIDE 10 MILLIGRAM(S): 30 TABLET ORAL at 14:17

## 2025-05-25 RX ADMIN — Medication 125 MICROGRAM(S): at 06:32

## 2025-05-25 RX ADMIN — OXYCODONE HYDROCHLORIDE 5 MILLIGRAM(S): 30 TABLET ORAL at 17:03

## 2025-05-25 RX ADMIN — OXYCODONE HYDROCHLORIDE 10 MILLIGRAM(S): 30 TABLET ORAL at 07:08

## 2025-05-25 RX ADMIN — TAMSULOSIN HYDROCHLORIDE 0.4 MILLIGRAM(S): 0.4 CAPSULE ORAL at 22:31

## 2025-05-25 RX ADMIN — ATORVASTATIN CALCIUM 40 MILLIGRAM(S): 80 TABLET, FILM COATED ORAL at 22:31

## 2025-05-25 NOTE — PROGRESS NOTE ADULT - ATTENDING COMMENTS
55-year-old male  with history of metastatic melanoma status posttreatment and new metastatic lesion in the brain status post gamma knife therapy on 5/22 presented with nausea vomiting and diarrhea.  Patient's symptoms have improved.  He still has 3 episodes of loose stools since last night.  GI PCR, C DIFF negative.  Monitor off antibiotics.  He was treated with course of antibiotics for C. difficile in April 2025.  Advance diet as tolerated.   DC in am if stable.
55-year-old male  with history of metastatic melanoma status posttreatment and new metastatic lesion in the brain status post gamma knife therapy on 5/22 presented with nausea vomiting and diarrhea.  Patient's symptoms have improved.  He still has 3 episodes of loose stools since last night.  Low-grade fevers.  Follow-up on stool studies, blood culture.  Monitor off antibiotics.  He was treated with course of antibiotics for C. difficile in April 2025.  Advance diet as tolerated.

## 2025-05-25 NOTE — DIETITIAN INITIAL EVALUATION ADULT - REASON INDICATOR FOR ASSESSMENT
Nutrition consult received for:  mst score 2 or >  Pt seen for:  consult  Information obtained from: medical record,  pt

## 2025-05-25 NOTE — DIETITIAN INITIAL EVALUATION ADULT - OTHER INFO
Weight history:  pt reports weight loss in 2021 which he has not been able to gain back  -UBW:  130pounds  -Weight changes PTA:  none  -Weight as per previous RD notes/Billy SUAREZ:  N/A  -Dosing weight:  59kg  -Weight as per flow sheets: N/A

## 2025-05-25 NOTE — PROGRESS NOTE ADULT - SUBJECTIVE AND OBJECTIVE BOX
DATE OF SERVICE: 05-25-25 @ 08:50    Patient is a 55y old  Male who presents with a chief complaint of Visual disturbance, spread of metastatic disease (25 May 2025 07:29)      INTERVAL HISTORY: Feels better today.     REVIEW OF SYSTEMS:  CONSTITUTIONAL: No weakness  EYES/ENT: No visual changes;  No throat pain   NECK: No pain or stiffness  RESPIRATORY: No cough, wheezing; No shortness of breath  CARDIOVASCULAR: No chest pain or palpitations  GASTROINTESTINAL: No abdominal  pain. No nausea, vomiting, or hematemesis  GENITOURINARY: No dysuria, frequency or hematuria  NEUROLOGICAL: No stroke like symptoms  SKIN: No rashes    TELEMETRY Personally reviewed: SR 1st AVB   	  MEDICATIONS:        PHYSICAL EXAM:  T(C): 36.6 (05-25-25 @ 05:56), Max: 36.9 (05-24-25 @ 16:00)  HR: 78 (05-25-25 @ 05:56) (78 - 90)  BP: 100/64 (05-25-25 @ 05:56) (97/44 - 106/68)  RR: 18 (05-25-25 @ 05:56) (18 - 18)  SpO2: 97% (05-25-25 @ 05:56) (95% - 99%)  Wt(kg): --  I&O's Summary        Appearance: In no distress	  HEENT:    PERRL, EOMI	  Cardiovascular:  S1 S2, No JVD  Respiratory: Lungs clear to auscultation	  Gastrointestinal:  Soft, Non-tender, + BS	  Vascularature:  No edema of LE  Psychiatric: Appropriate affect   Neuro: no acute focal deficits                               11.4   7.06  )-----------( 208      ( 25 May 2025 06:59 )             35.8     05-25    136  |  102  |  15  ----------------------------<  95  3.8   |  21[L]  |  1.18    Ca    9.0      25 May 2025 06:59  Phos  2.6     05-25  Mg     1.9     05-25    TPro  6.3  /  Alb  3.9  /  TBili  0.7  /  DBili  x   /  AST  29  /  ALT  22  /  AlkPhos  73  05-25        Labs personally reviewed      ASSESSMENT/PLAN: 	    56 y/o M with Pmhx CAD s/p PCI w/ ONESIMO on DaPT, hypothyroidism, secondary adrenal insufficiency, Cluster headaches, 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  Patient denied nausea but then proceeded to vomit in the stretcher.  No headaches.  Patient brought in by EMS for which collateral was obtained.  EKG by EMS was concerning that there was ST elevations in the inferior leads however was very poor baseline.    1. Abnormal EKG reported by EMS  - EKG here nonischemic, asymptomatic with no CP or SOB  - known CAD  - 5/23 Trop negative   - 10/15/2024 with ONESIMO to the LAD and prior CAD s/p PCI 2 LAD stents  - s/p cath 1/31 with patent stents  - C/w ASA 81mg & statin     2.  HLD   - c/w Vascepa as OP, atorvastatin 40mg PO daily    3. Gastrointestinal symptoms - Nausea, vomiting and diarrhea  - Labs significant for hypovolemia (hemoconcentrated with   - C/w zofran q8 PRN  - Hx of C. diff on prior admission s/p PO vancomycin course   - Reported 4 episodes of loose stool prior to arrival to hospital, none since admission  - GI PCR negative    4.  Sinus tachycardia.   ·  Plan: Tachycardic to 110s-120s on admission, likely i/s/o hypovolemia  - S/p 2L of IVF, C/w LR maintenance fluids 100cc/hr x 1 liter  - Prior hx of metoprolol succinate 25 mg but this was stopped during last admission; will continue to hold as tachy 2/2 hypovolemia     5.  Metastatic cancer to brain.   ·  Plan: -MR from April 2025: 7 mm nodular T2 hyperintense focus in the posterior medial aspect of the left temporal lobe   - C/w keppra 500 BID   - S/p gamma knife therapy session on 5/22 with Dr. Hill  - Radiation oncology follow up    6.  Adrenal insufficiency.   ·  Plan: C/w hydrocortisone 20 mg in AM and 10 mg in PM      7. Need for prophylactic measure.   ·  Plan: DVT ppx: lovenox 40 qd           Nuvia Ritchie, AG-NP   Vicaury Carballo DO [facility]  Cardiovascular Medicine  [address], [address]  Available through call or text on [employer]  Office: [phone]

## 2025-05-25 NOTE — DIETITIAN INITIAL EVALUATION ADULT - ORAL INTAKE PTA/DIET HISTORY
-PO intake/appetite PTA:  eggs, oatmeal for breakfast, pizza and mac and cheese for lunch, chicken, burgers for dinner. snacks on pretzels and chips  -Diet PTA:  nurse reports spouse concerned about pt ability to chew  -Difficulty chewing or swallowing PTA and in hospital:  pt has dentures agrees to easy to chew instead of soft and bite sized  -Food allergies/intolerances:  NKFA  -Vitamins/minerals/oral supplements PTA:  none noted in H&P

## 2025-05-25 NOTE — PROGRESS NOTE ADULT - PROBLEM SELECTOR PLAN 8
-Back Pain 2/2 diffuse osseous mets  -Home regimen: Oxycodone 10 mg TID standing; 5 mg for breakthrough   -Breakthrough while in hospital: dilaudid 1mg IVP  -Follows with chronic pain outpatient   -Bowel regimen while on opioids: senna/miralax with hold parameters for diarrhea

## 2025-05-25 NOTE — PROGRESS NOTE ADULT - TIME BILLING
review of labs, imaging, notes, discussion of plan with patient, family, and consultant
review of labs, imaging, notes, discussion of plan with patient, family, and consultant

## 2025-05-25 NOTE — DIETITIAN INITIAL EVALUATION ADULT - PERTINENT MEDS FT
MEDICATIONS  (STANDING):  aspirin  chewable 81 milliGRAM(s) Oral daily  atorvastatin 40 milliGRAM(s) Oral at bedtime  enoxaparin Injectable 40 milliGRAM(s) SubCutaneous every 24 hours  hydrocortisone 10 milliGRAM(s) Oral <User Schedule>  hydrocortisone 20 milliGRAM(s) Oral <User Schedule>  levETIRAcetam 500 milliGRAM(s) Oral two times a day  levothyroxine 125 MICROGram(s) Oral daily  oxyCODONE  ER Tablet 10 milliGRAM(s) Oral every 8 hours  polyethylene glycol 3350 17 Gram(s) Oral daily  senna 1 Tablet(s) Oral daily  tamsulosin 0.4 milliGRAM(s) Oral at bedtime    MEDICATIONS  (PRN):  acetaminophen     Tablet .. 650 milliGRAM(s) Oral every 6 hours PRN Temp greater or equal to 38C (100.4F)  ondansetron Injectable 4 milliGRAM(s) IV Push every 8 hours PRN Nausea  oxyCODONE    IR 5 milliGRAM(s) Oral every 4 hours PRN Moderate Pain (4 - 6)  zolpidem 5 milliGRAM(s) Oral at bedtime PRN Insomnia  zolpidem 5 milliGRAM(s) Oral at bedtime PRN Insomnia

## 2025-05-25 NOTE — PROGRESS NOTE ADULT - SUBJECTIVE AND OBJECTIVE BOX
Abi Newton MD  PGY 3 Department of Internal Medicine        Patient is a 55y old  Male who presents with a chief complaint of nausea/vomiting (24 May 2025 14:50)      SUBJECTIVE / OVERNIGHT EVENTS: Pt seen and examined. No acute overnight events. Denies fevers, chills, CP, SOB, Abdominal pain, N/V, Constipation, Diarrhea        MEDICATIONS  (STANDING):  aspirin  chewable 81 milliGRAM(s) Oral daily  atorvastatin 40 milliGRAM(s) Oral at bedtime  enoxaparin Injectable 40 milliGRAM(s) SubCutaneous every 24 hours  hydrocortisone 10 milliGRAM(s) Oral <User Schedule>  hydrocortisone 20 milliGRAM(s) Oral <User Schedule>  levETIRAcetam 500 milliGRAM(s) Oral two times a day  levothyroxine 125 MICROGram(s) Oral daily  oxyCODONE  ER Tablet 10 milliGRAM(s) Oral every 8 hours  polyethylene glycol 3350 17 Gram(s) Oral daily  senna 1 Tablet(s) Oral daily  tamsulosin 0.4 milliGRAM(s) Oral at bedtime    MEDICATIONS  (PRN):  acetaminophen     Tablet .. 650 milliGRAM(s) Oral every 6 hours PRN Temp greater or equal to 38C (100.4F)  ondansetron Injectable 4 milliGRAM(s) IV Push every 8 hours PRN Nausea  oxyCODONE    IR 5 milliGRAM(s) Oral every 4 hours PRN Moderate Pain (4 - 6)  zolpidem 5 milliGRAM(s) Oral at bedtime PRN Insomnia  zolpidem 5 milliGRAM(s) Oral at bedtime PRN Insomnia      I&O's Summary      Vital Signs Last 24 Hrs  T(C): 36.6 (25 May 2025 05:56), Max: 36.9 (24 May 2025 16:00)  T(F): 97.8 (25 May 2025 05:56), Max: 98.4 (24 May 2025 16:00)  HR: 78 (25 May 2025 05:56) (78 - 90)  BP: 100/64 (25 May 2025 05:56) (97/44 - 106/68)  BP(mean): --  RR: 18 (25 May 2025 05:56) (18 - 18)  SpO2: 97% (25 May 2025 05:56) (95% - 99%)    Parameters below as of 25 May 2025 05:56  Patient On (Oxygen Delivery Method): room air        CAPILLARY BLOOD GLUCOSE          PHYSICAL EXAM:  GENERAL: NAD, lying in bed comfortably  HEAD:  Atraumatic, normocephalic  EYES: EOMI, PERRLA, conjunctiva clear, no conjunctival pallor, anicteric sclera  NECK: Supple, trachea midline, no JVD  HEART: Regular rate and rhythm, Normal S1 S2, no murmurs, rubs, or gallops  LUNGS: Unlabored respirations. Clear to ascultation bilaterally, no crackles, wheezing, or rhonchi  ABDOMEN: Soft, nondistended, nontender, no rebound or guarding, bowel sounds presents  EXTREMITIES: Warm extremities, no clubbing, cyanosis, or edema, peripheral pulses 2+ bilaterally  MUSCULOSKELETAL: No joint swelling or tenderness to palpation  NEURO: CN 2-12 grossly intact, moves all limbs spontaneously  SKIN: No rashes or lesions         LABS:                        12.0   9.64  )-----------( 185      ( 24 May 2025 08:09 )             36.7     Auto Eosinophil # x     / Auto Eosinophil % x     / Auto Neutrophil # x     / Auto Neutrophil % x     / BANDS % x                            14.3   8.04  )-----------( 172      ( 23 May 2025 16:20 )             45.4     Auto Eosinophil # x     / Auto Eosinophil % x     / Auto Neutrophil # x     / Auto Neutrophil % x     / BANDS % x                            17.4   14.95 )-----------( 219      ( 23 May 2025 11:50 )             52.6     Auto Eosinophil # x     / Auto Eosinophil % x     / Auto Neutrophil # x     / Auto Neutrophil % x     / BANDS % x        05-24    135  |  105  |  14  ----------------------------<  79  4.2   |  20[L]  |  1.01  05-23    137  |  104  |  21  ----------------------------<  75  4.4   |  20[L]  |  0.92  05-23    134[L]  |  97  |  20  ----------------------------<  86  4.9   |  16[L]  |  0.88    Ca    8.7      24 May 2025 08:09  Mg     1.6     05-24  Phos  2.5     05-24  TPro  5.5[L]  /  Alb  3.5  /  TBili  1.1  /  DBili  x   /  AST  21  /  ALT  15  /  AlkPhos  68  05-24  TPro  8.6[H]  /  Alb  4.9  /  TBili  1.0  /  DBili  x   /  AST  50[H]  /  ALT  23  /  AlkPhos  85  05-23    PTT - ( 23 May 2025 11:50 )  PTT:26.1 sec      Urinalysis Basic - ( 24 May 2025 08:09 )    Color: x / Appearance: x / SG: x / pH: x  Gluc: 79 mg/dL / Ketone: x  / Bili: x / Urobili: x   Blood: x / Protein: x / Nitrite: x   Leuk Esterase: x / RBC: x / WBC x   Sq Epi: x / Non Sq Epi: x / Bacteria: x      Lactate, Blood: 1.0 mmol/L (05-24 @ 08:09)  Lactate, Blood: 2.3 mmol/L (05-23 @ 16:20)        RADIOLOGY & ADDITIONAL TESTS:    Imaging Personally Reviewed:    Consultant(s) Notes Reviewed:      Care Discussed with Consultants/Other Providers:   Abi Newton MD  PGY 3 Department of Internal Medicine        Patient is a 55y old  Male who presents with a chief complaint of nausea/vomiting (24 May 2025 14:50)      SUBJECTIVE / OVERNIGHT EVENTS: Pt seen and examined. No acute overnight events. Reports 4 episodes loose BMs over past 24 hours. Denies fevers, chills, CP, SOB, Abdominal pain, N/V, Constipation,        MEDICATIONS  (STANDING):  aspirin  chewable 81 milliGRAM(s) Oral daily  atorvastatin 40 milliGRAM(s) Oral at bedtime  enoxaparin Injectable 40 milliGRAM(s) SubCutaneous every 24 hours  hydrocortisone 10 milliGRAM(s) Oral <User Schedule>  hydrocortisone 20 milliGRAM(s) Oral <User Schedule>  levETIRAcetam 500 milliGRAM(s) Oral two times a day  levothyroxine 125 MICROGram(s) Oral daily  oxyCODONE  ER Tablet 10 milliGRAM(s) Oral every 8 hours  polyethylene glycol 3350 17 Gram(s) Oral daily  senna 1 Tablet(s) Oral daily  tamsulosin 0.4 milliGRAM(s) Oral at bedtime    MEDICATIONS  (PRN):  acetaminophen     Tablet .. 650 milliGRAM(s) Oral every 6 hours PRN Temp greater or equal to 38C (100.4F)  ondansetron Injectable 4 milliGRAM(s) IV Push every 8 hours PRN Nausea  oxyCODONE    IR 5 milliGRAM(s) Oral every 4 hours PRN Moderate Pain (4 - 6)  zolpidem 5 milliGRAM(s) Oral at bedtime PRN Insomnia  zolpidem 5 milliGRAM(s) Oral at bedtime PRN Insomnia      I&O's Summary      Vital Signs Last 24 Hrs  T(C): 36.6 (25 May 2025 05:56), Max: 36.9 (24 May 2025 16:00)  T(F): 97.8 (25 May 2025 05:56), Max: 98.4 (24 May 2025 16:00)  HR: 78 (25 May 2025 05:56) (78 - 90)  BP: 100/64 (25 May 2025 05:56) (97/44 - 106/68)  BP(mean): --  RR: 18 (25 May 2025 05:56) (18 - 18)  SpO2: 97% (25 May 2025 05:56) (95% - 99%)    Parameters below as of 25 May 2025 05:56  Patient On (Oxygen Delivery Method): room air        CAPILLARY BLOOD GLUCOSE      PHYSICAL EXAM:  CONSTITUTIONAL:  No weakness, fevers or chills  EYES/ENT:  No visual changes;  No vertigo or throat pain   NECK:  No pain or stiffness  RESPIRATORY:  No cough, wheezing, hemoptysis; No shortness of breath  CARDIOVASCULAR:  No chest pain or palpitations  GASTROINTESTINAL:  No abdominal or epigastric pain. No nausea, vomiting, or hematemesis; No diarrhea or constipation. No melena or hematochezia.  GENITOURINARY:  No dysuria, frequency or hematuria  NEUROLOGICAL:  No numbness or weakness  SKIN:  No itching, rashes       LABS:                        12.0   9.64  )-----------( 185      ( 24 May 2025 08:09 )             36.7     Auto Eosinophil # x     / Auto Eosinophil % x     / Auto Neutrophil # x     / Auto Neutrophil % x     / BANDS % x                            14.3   8.04  )-----------( 172      ( 23 May 2025 16:20 )             45.4     Auto Eosinophil # x     / Auto Eosinophil % x     / Auto Neutrophil # x     / Auto Neutrophil % x     / BANDS % x                            17.4   14.95 )-----------( 219      ( 23 May 2025 11:50 )             52.6     Auto Eosinophil # x     / Auto Eosinophil % x     / Auto Neutrophil # x     / Auto Neutrophil % x     / BANDS % x        05-24    135  |  105  |  14  ----------------------------<  79  4.2   |  20[L]  |  1.01  05-23    137  |  104  |  21  ----------------------------<  75  4.4   |  20[L]  |  0.92  05-23    134[L]  |  97  |  20  ----------------------------<  86  4.9   |  16[L]  |  0.88    Ca    8.7      24 May 2025 08:09  Mg     1.6     05-24  Phos  2.5     05-24  TPro  5.5[L]  /  Alb  3.5  /  TBili  1.1  /  DBili  x   /  AST  21  /  ALT  15  /  AlkPhos  68  05-24  TPro  8.6[H]  /  Alb  4.9  /  TBili  1.0  /  DBili  x   /  AST  50[H]  /  ALT  23  /  AlkPhos  85  05-23    PTT - ( 23 May 2025 11:50 )  PTT:26.1 sec      Urinalysis Basic - ( 24 May 2025 08:09 )    Color: x / Appearance: x / SG: x / pH: x  Gluc: 79 mg/dL / Ketone: x  / Bili: x / Urobili: x   Blood: x / Protein: x / Nitrite: x   Leuk Esterase: x / RBC: x / WBC x   Sq Epi: x / Non Sq Epi: x / Bacteria: x      Lactate, Blood: 1.0 mmol/L (05-24 @ 08:09)  Lactate, Blood: 2.3 mmol/L (05-23 @ 16:20)

## 2025-05-25 NOTE — DIETITIAN INITIAL EVALUATION ADULT - PERTINENT LABORATORY DATA
05-25    136  |  102  |  15  ----------------------------<  95  3.8   |  21[L]  |  1.18    Ca    9.0      25 May 2025 06:59  Phos  2.6     05-25  Mg     1.9     05-25    TPro  6.3  /  Alb  3.9  /  TBili  0.7  /  DBili  x   /  AST  29  /  ALT  22  /  AlkPhos  73  05-25  A1C with Estimated Average Glucose Result: 5.2 % (04-19-25 @ 08:15)  A1C with Estimated Average Glucose Result: 5.3 % (02-01-25 @ 06:17)

## 2025-05-26 ENCOUNTER — TRANSCRIPTION ENCOUNTER (OUTPATIENT)
Age: 55
End: 2025-05-26

## 2025-05-26 VITALS
OXYGEN SATURATION: 99 % | TEMPERATURE: 98 F | SYSTOLIC BLOOD PRESSURE: 118 MMHG | DIASTOLIC BLOOD PRESSURE: 72 MMHG | RESPIRATION RATE: 18 BRPM | HEART RATE: 74 BPM

## 2025-05-26 LAB
ALBUMIN SERPL ELPH-MCNC: 4.1 G/DL — SIGNIFICANT CHANGE UP (ref 3.3–5)
ALP SERPL-CCNC: 76 U/L — SIGNIFICANT CHANGE UP (ref 40–120)
ALT FLD-CCNC: 19 U/L — SIGNIFICANT CHANGE UP (ref 10–45)
ANION GAP SERPL CALC-SCNC: 15 MMOL/L — SIGNIFICANT CHANGE UP (ref 5–17)
AST SERPL-CCNC: 20 U/L — SIGNIFICANT CHANGE UP (ref 10–40)
BASOPHILS # BLD AUTO: 0.03 K/UL — SIGNIFICANT CHANGE UP (ref 0–0.2)
BASOPHILS NFR BLD AUTO: 0.5 % — SIGNIFICANT CHANGE UP (ref 0–2)
BILIRUB SERPL-MCNC: 0.6 MG/DL — SIGNIFICANT CHANGE UP (ref 0.2–1.2)
BUN SERPL-MCNC: 19 MG/DL — SIGNIFICANT CHANGE UP (ref 7–23)
CALCIUM SERPL-MCNC: 9.4 MG/DL — SIGNIFICANT CHANGE UP (ref 8.4–10.5)
CHLORIDE SERPL-SCNC: 100 MMOL/L — SIGNIFICANT CHANGE UP (ref 96–108)
CO2 SERPL-SCNC: 21 MMOL/L — LOW (ref 22–31)
CREAT SERPL-MCNC: 1.05 MG/DL — SIGNIFICANT CHANGE UP (ref 0.5–1.3)
EGFR: 84 ML/MIN/1.73M2 — SIGNIFICANT CHANGE UP
EGFR: 84 ML/MIN/1.73M2 — SIGNIFICANT CHANGE UP
EOSINOPHIL # BLD AUTO: 0.17 K/UL — SIGNIFICANT CHANGE UP (ref 0–0.5)
EOSINOPHIL NFR BLD AUTO: 2.8 % — SIGNIFICANT CHANGE UP (ref 0–6)
GAS PNL BLDV: SIGNIFICANT CHANGE UP
GLUCOSE SERPL-MCNC: 77 MG/DL — SIGNIFICANT CHANGE UP (ref 70–99)
HCT VFR BLD CALC: 39.4 % — SIGNIFICANT CHANGE UP (ref 39–50)
HGB BLD-MCNC: 12.9 G/DL — LOW (ref 13–17)
IMM GRANULOCYTES NFR BLD AUTO: 0.3 % — SIGNIFICANT CHANGE UP (ref 0–0.9)
LYMPHOCYTES # BLD AUTO: 2.18 K/UL — SIGNIFICANT CHANGE UP (ref 1–3.3)
LYMPHOCYTES # BLD AUTO: 36.1 % — SIGNIFICANT CHANGE UP (ref 13–44)
MAGNESIUM SERPL-MCNC: 1.9 MG/DL — SIGNIFICANT CHANGE UP (ref 1.6–2.6)
MCHC RBC-ENTMCNC: 28 PG — SIGNIFICANT CHANGE UP (ref 27–34)
MCHC RBC-ENTMCNC: 32.7 G/DL — SIGNIFICANT CHANGE UP (ref 32–36)
MCV RBC AUTO: 85.5 FL — SIGNIFICANT CHANGE UP (ref 80–100)
MONOCYTES # BLD AUTO: 0.34 K/UL — SIGNIFICANT CHANGE UP (ref 0–0.9)
MONOCYTES NFR BLD AUTO: 5.6 % — SIGNIFICANT CHANGE UP (ref 2–14)
NEUTROPHILS # BLD AUTO: 3.3 K/UL — SIGNIFICANT CHANGE UP (ref 1.8–7.4)
NEUTROPHILS NFR BLD AUTO: 54.7 % — SIGNIFICANT CHANGE UP (ref 43–77)
NRBC BLD AUTO-RTO: 0 /100 WBCS — SIGNIFICANT CHANGE UP (ref 0–0)
PHOSPHATE SERPL-MCNC: 2.6 MG/DL — SIGNIFICANT CHANGE UP (ref 2.5–4.5)
PLATELET # BLD AUTO: 209 K/UL — SIGNIFICANT CHANGE UP (ref 150–400)
POTASSIUM SERPL-MCNC: 3.7 MMOL/L — SIGNIFICANT CHANGE UP (ref 3.5–5.3)
POTASSIUM SERPL-SCNC: 3.7 MMOL/L — SIGNIFICANT CHANGE UP (ref 3.5–5.3)
PROT SERPL-MCNC: 6.9 G/DL — SIGNIFICANT CHANGE UP (ref 6–8.3)
RBC # BLD: 4.61 M/UL — SIGNIFICANT CHANGE UP (ref 4.2–5.8)
RBC # FLD: 13.8 % — SIGNIFICANT CHANGE UP (ref 10.3–14.5)
SODIUM SERPL-SCNC: 136 MMOL/L — SIGNIFICANT CHANGE UP (ref 135–145)
WBC # BLD: 6.04 K/UL — SIGNIFICANT CHANGE UP (ref 3.8–10.5)
WBC # FLD AUTO: 6.04 K/UL — SIGNIFICANT CHANGE UP (ref 3.8–10.5)

## 2025-05-26 PROCEDURE — 85018 HEMOGLOBIN: CPT

## 2025-05-26 PROCEDURE — 83735 ASSAY OF MAGNESIUM: CPT

## 2025-05-26 PROCEDURE — 84439 ASSAY OF FREE THYROXINE: CPT

## 2025-05-26 PROCEDURE — 84295 ASSAY OF SERUM SODIUM: CPT

## 2025-05-26 PROCEDURE — 85730 THROMBOPLASTIN TIME PARTIAL: CPT

## 2025-05-26 PROCEDURE — 85014 HEMATOCRIT: CPT

## 2025-05-26 PROCEDURE — 83690 ASSAY OF LIPASE: CPT

## 2025-05-26 PROCEDURE — 87040 BLOOD CULTURE FOR BACTERIA: CPT

## 2025-05-26 PROCEDURE — 80053 COMPREHEN METABOLIC PANEL: CPT

## 2025-05-26 PROCEDURE — 81001 URINALYSIS AUTO W/SCOPE: CPT

## 2025-05-26 PROCEDURE — 83605 ASSAY OF LACTIC ACID: CPT

## 2025-05-26 PROCEDURE — 87449 NOS EACH ORGANISM AG IA: CPT

## 2025-05-26 PROCEDURE — 93010 ELECTROCARDIOGRAM REPORT: CPT

## 2025-05-26 PROCEDURE — 82330 ASSAY OF CALCIUM: CPT

## 2025-05-26 PROCEDURE — 82803 BLOOD GASES ANY COMBINATION: CPT

## 2025-05-26 PROCEDURE — 99239 HOSP IP/OBS DSCHRG MGMT >30: CPT

## 2025-05-26 PROCEDURE — 87507 IADNA-DNA/RNA PROBE TQ 12-25: CPT

## 2025-05-26 PROCEDURE — 84132 ASSAY OF SERUM POTASSIUM: CPT

## 2025-05-26 PROCEDURE — 87324 CLOSTRIDIUM AG IA: CPT

## 2025-05-26 PROCEDURE — 82533 TOTAL CORTISOL: CPT

## 2025-05-26 PROCEDURE — 82435 ASSAY OF BLOOD CHLORIDE: CPT

## 2025-05-26 PROCEDURE — 71045 X-RAY EXAM CHEST 1 VIEW: CPT

## 2025-05-26 PROCEDURE — 82947 ASSAY GLUCOSE BLOOD QUANT: CPT

## 2025-05-26 PROCEDURE — 85025 COMPLETE CBC W/AUTO DIFF WBC: CPT

## 2025-05-26 PROCEDURE — 84443 ASSAY THYROID STIM HORMONE: CPT

## 2025-05-26 PROCEDURE — 80048 BASIC METABOLIC PNL TOTAL CA: CPT

## 2025-05-26 PROCEDURE — 87493 C DIFF AMPLIFIED PROBE: CPT

## 2025-05-26 PROCEDURE — 96365 THER/PROPH/DIAG IV INF INIT: CPT

## 2025-05-26 PROCEDURE — 84484 ASSAY OF TROPONIN QUANT: CPT

## 2025-05-26 PROCEDURE — 87637 SARSCOV2&INF A&B&RSV AMP PRB: CPT

## 2025-05-26 PROCEDURE — 96375 TX/PRO/DX INJ NEW DRUG ADDON: CPT

## 2025-05-26 PROCEDURE — 99285 EMERGENCY DEPT VISIT HI MDM: CPT

## 2025-05-26 PROCEDURE — 84100 ASSAY OF PHOSPHORUS: CPT

## 2025-05-26 PROCEDURE — 93005 ELECTROCARDIOGRAM TRACING: CPT

## 2025-05-26 RX ORDER — BISACODYL 5 MG
1 TABLET, DELAYED RELEASE (ENTERIC COATED) ORAL
Qty: 30 | Refills: 0
Start: 2025-05-26 | End: 2025-06-24

## 2025-05-26 RX ORDER — SENNA 187 MG
1 TABLET ORAL
Qty: 30 | Refills: 0
Start: 2025-05-26 | End: 2025-06-24

## 2025-05-26 RX ORDER — POLYETHYLENE GLYCOL 3350 17 G/17G
17 POWDER, FOR SOLUTION ORAL
Qty: 510 | Refills: 0
Start: 2025-05-26 | End: 2025-06-24

## 2025-05-26 RX ORDER — ONDANSETRON HCL/PF 4 MG/2 ML
1 VIAL (ML) INJECTION
Qty: 2 | Refills: 0
Start: 2025-05-26 | End: 2025-06-08

## 2025-05-26 RX ORDER — NALOXONE HYDROCHLORIDE 0.4 MG/ML
1 INJECTION, SOLUTION INTRAMUSCULAR; INTRAVENOUS; SUBCUTANEOUS
Qty: 1 | Refills: 0
Start: 2025-05-26 | End: 2025-06-24

## 2025-05-26 RX ORDER — TAMSULOSIN HYDROCHLORIDE 0.4 MG/1
1 CAPSULE ORAL
Qty: 30 | Refills: 0
Start: 2025-05-26 | End: 2025-06-24

## 2025-05-26 RX ADMIN — OXYCODONE HYDROCHLORIDE 5 MILLIGRAM(S): 30 TABLET ORAL at 09:30

## 2025-05-26 RX ADMIN — OXYCODONE HYDROCHLORIDE 10 MILLIGRAM(S): 30 TABLET ORAL at 13:51

## 2025-05-26 RX ADMIN — LEVETIRACETAM 500 MILLIGRAM(S): 10 INJECTION, SOLUTION INTRAVENOUS at 05:27

## 2025-05-26 RX ADMIN — OXYCODONE HYDROCHLORIDE 10 MILLIGRAM(S): 30 TABLET ORAL at 14:40

## 2025-05-26 RX ADMIN — Medication 81 MILLIGRAM(S): at 12:18

## 2025-05-26 RX ADMIN — Medication 10 MILLIGRAM(S): at 15:54

## 2025-05-26 RX ADMIN — OXYCODONE HYDROCHLORIDE 5 MILLIGRAM(S): 30 TABLET ORAL at 08:48

## 2025-05-26 RX ADMIN — OXYCODONE HYDROCHLORIDE 10 MILLIGRAM(S): 30 TABLET ORAL at 05:27

## 2025-05-26 RX ADMIN — Medication 125 MICROGRAM(S): at 05:27

## 2025-05-26 RX ADMIN — OXYCODONE HYDROCHLORIDE 10 MILLIGRAM(S): 30 TABLET ORAL at 06:00

## 2025-05-26 RX ADMIN — Medication 20 MILLIGRAM(S): at 08:48

## 2025-05-26 NOTE — PROGRESS NOTE ADULT - PROBLEM SELECTOR PLAN 6
-S/p PCI in 10/2024  -C/w ASA alone (plavix stopped due to concern for GIB)  -C/w atorvastatin

## 2025-05-26 NOTE — PROGRESS NOTE ADULT - NS ATTEND AMEND GEN_ALL_CORE FT
Patient care and plan discussed and reviewed with Advanced Care Provider. Plan as outlined above edited by me to reflect our discussion.   In addition, I participated in    - Ordering, reviewing, and interpreting labs, testing, and imaging.  - Reviewing prior hospitalization and outpatient records when necessary  - Counselling and educating patient and/or family regarding interpretation of aforementioned items and plan of care.  - Communicating with other health professionals (when not separately reported), and documenting clinical information in the electronic health record.

## 2025-05-26 NOTE — PROGRESS NOTE ADULT - PROBLEM SELECTOR PLAN 7
-C/w hydrocortisone 20 mg in AM and 10 mg in PM   [] F/u cortisol AM
-C/w hydrocortisone 20 mg in AM and 10 mg in PM   [] F/u cortisol AM
-C/w hydrocortisone 20 mg in AM and 10 mg in PM   -AM cortisone elevated; no need to increase dosage

## 2025-05-26 NOTE — PROGRESS NOTE ADULT - PROBLEM SELECTOR PLAN 3
-Tachycardic to 110s-120s on admission, likely i/s/o hypovolemia  -S/p 2L of IVF   -C/w LR maintenance fluids 100cc/hr x 10 hours  -Prior hx of metoprolol succinate 25 mg but this was stopped during last admission; per cardiology not to resume until patient has been adequately fluid resuscitated
-Tachycardic to 110s-120s on admission, likely i/s/o hypovolemia  -S/p 2L of IVF   -C/w LR maintenance fluids 100cc/hr x 10 hours  -Prior hx of metoprolol succinate 25 mg but this was stopped during last admission; per cardiology not to resume until patient has been adequately fluid resuscitated
resolved.

## 2025-05-26 NOTE — DISCHARGE NOTE NURSING/CASE MANAGEMENT/SOCIAL WORK - PATIENT PORTAL LINK FT
You can access the FollowMyHealth Patient Portal offered by [facility] by registering at the following website: [email or web address]. By joining [employer]’s FollowMyHealth portal, you will also be able to view your health information using other applications (apps) compatible with our system.

## 2025-05-26 NOTE — PROGRESS NOTE ADULT - SUBJECTIVE AND OBJECTIVE BOX
CARLOTA CANTU  55yMale  MRN: [id number]    Patient is a 55y old  Male who presents with a chief complaint of Visual disturbance, spread of metastatic disease (25 May 2025 11:12)    Interval/Overnight Events: no events ON.     Subjective: Pt seen and examined at bedside. Denies fever, CP, SOB, abn pain, N/V, dysuria. Tolerating diet.      MEDICATIONS  (STANDING):  aspirin  chewable 81 milliGRAM(s) Oral daily  atorvastatin 40 milliGRAM(s) Oral at bedtime  enoxaparin Injectable 40 milliGRAM(s) SubCutaneous every 24 hours  hydrocortisone 10 milliGRAM(s) Oral <User Schedule>  hydrocortisone 20 milliGRAM(s) Oral <User Schedule>  levETIRAcetam 500 milliGRAM(s) Oral two times a day  levothyroxine 125 MICROGram(s) Oral daily  oxyCODONE  ER Tablet 10 milliGRAM(s) Oral every 8 hours  polyethylene glycol 3350 17 Gram(s) Oral daily  senna 1 Tablet(s) Oral daily  tamsulosin 0.4 milliGRAM(s) Oral at bedtime    MEDICATIONS  (PRN):  acetaminophen     Tablet .. 650 milliGRAM(s) Oral every 6 hours PRN Temp greater or equal to 38C (100.4F)  ondansetron Injectable 4 milliGRAM(s) IV Push every 8 hours PRN Nausea  oxyCODONE    IR 5 milliGRAM(s) Oral every 4 hours PRN Moderate Pain (4 - 6)  zolpidem 5 milliGRAM(s) Oral at bedtime PRN Insomnia  zolpidem 5 milliGRAM(s) Oral at bedtime PRN Insomnia    Objective:  Vitals: Vital Signs Last 24 Hrs  T(C): 36.4 (05-26-25 @ 05:08), Max: 36.8 (05-25-25 @ 16:00)  T(F): 97.5 (05-26-25 @ 05:08), Max: 98.2 (05-25-25 @ 16:00)  HR: 64 (05-26-25 @ 05:08) (64 - 83)  BP: 132/73 (05-26-25 @ 05:08) (98/56 - 132/73)  BP(mean): --  RR: 18 (05-26-25 @ 05:08) (18 - 18)  SpO2: 98% (05-26-25 @ 05:08) (92% - 98%) on Room Air    I&O's Summary  LABS:                      12.9   6.04  )-----------( 209      ( 26 May 2025 06:54 )             39.4                         11.4   7.06  )-----------( 208      ( 25 May 2025 06:59 )             35.8                         12.0   9.64  )-----------( 185      ( 24 May 2025 08:09 )             36.7     Hgb Trend: 12.9<--, 11.4<--, 12.0<--, 14.3<--, 17.4<--  05-26    136  |  100  |  19  ----------------------------<  77  3.7   |  21[L]  |  1.05  05-25    136  |  102  |  15  ----------------------------<  95  3.8   |  21[L]  |  1.18  05-24    135  |  105  |  14  ----------------------------<  79  4.2   |  20[L]  |  1.01    Ca    9.4      26 May 2025 06:55  Ca    9.0      25 May 2025 06:59  Ca    8.7      24 May 2025 08:09  Phos  2.6     05-26  Mg     1.9     05-26    TPro  6.9  /  Alb  4.1  /  TBili  0.6  /  DBili  x   /  AST  20  /  ALT  19  /  AlkPhos  76  05-26  TPro  6.3  /  Alb  3.9  /  TBili  0.7  /  DBili  x   /  AST  29  /  ALT  22  /  AlkPhos  73  05-25  TPro  5.5[L]  /  Alb  3.5  /  TBili  1.1  /  DBili  x   /  AST  21  /  ALT  15  /  AlkPhos  68  05-24    Creatinine Trend: 1.05<--, 1.18<--, 1.01<--, 0.92<--, 0.88<--  CAPILLARY BLOOD GLUCOSE    Urinalysis Basic - ( 26 May 2025 06:55 )  Color: x / Appearance: x / SG: x / pH: x  Gluc: 77 mg/dL / Ketone: x  / Bili: x / Urobili: x   Blood: x / Protein: x / Nitrite: x   Leuk Esterase: x / RBC: x / WBC x   Sq Epi: x / Non Sq Epi: x / Bacteria: x    RADIOLOGY & ADDITIONAL TESTS:

## 2025-05-26 NOTE — DISCHARGE NOTE NURSING/CASE MANAGEMENT/SOCIAL WORK - FINANCIAL ASSISTANCE
[facility] provides services at a reduced cost to those who are determined to be eligible through [facility]’s financial assistance program. Information regarding [facility]’s financial assistance program can be found by going to [email or web address] or by calling [phone].

## 2025-05-26 NOTE — PROGRESS NOTE ADULT - PROBLEM SELECTOR PLAN 2
-Hx of C. diff on prior admission s/p PO vancomycin course, GI PCR this admission negative  -Reported 4 episodes of loose stool prior to arrival to hospital, none since admission  -Monitor stool count
-Hx of C. diff on prior admission s/p PO vancomycin course   -Reported 4 episodes of loose stool prior to arrival to hospital, none since admission  -Monitor stool count and obtain GI PCR if continued diarrhea
-Hx of C. diff on prior admission s/p PO vancomycin course, GI PCR this admission negative  -Reported 4 episodes of loose stool prior to arrival to hospital, none since admission  -Monitor stool count

## 2025-05-26 NOTE — PROGRESS NOTE ADULT - PROBLEM/PLAN-6
[Time Spent: ___ minutes] : I have spent [unfilled] minutes of time on the encounter which excludes teaching and separately reported services.
DISPLAY PLAN FREE TEXT

## 2025-05-26 NOTE — PROGRESS NOTE ADULT - NUTRITIONAL ASSESSMENT
This patient has been assessed with a concern for Malnutrition and has been determined to have a diagnosis/diagnoses of Severe protein-calorie malnutrition and Underweight (BMI < 19).    This patient is being managed with:   Diet Regular-  Entered: May 25 2025  5:56PM

## 2025-05-26 NOTE — PROGRESS NOTE ADULT - PROBLEM SELECTOR PLAN 1
-Presented to the ED with nausea and vomiting, has not had symptoms since arrival  -S/p zofran x1  -Labs suggestive of hemoconcentration  -C/w zofran q8 PRN  -Initially with Tm 100, no fevers and no leukocytosis on CBC s/p IVF, so will monitor off antibiotics with low threshold to treat with broad spectrum abx if c/f sepsis  -Blood cultures NGTD, UA bland
-Presented to the ED with nausea and vomiting, has not had symptoms since arrival  -S/p zofran x1  -Labs significant for hypovolemia (hemoconcentrated with   -C/w zofran q8 PRN  -Initially with Tm 100, no fevers and no leukocytosis on CBC s/p IVF, so will monitor off antibiotics with low threshold to treat with broad spectrum abx if c/f sepsis  -F/u blood cultures, UA
-Presented to the ED with nausea and vomiting, has not had symptoms since arrival  -S/p zofran x1  -Labs suggestive of hemoconcentration  -C/w zofran q8 PRN  -Initially with Tm 100, no fevers and no leukocytosis on CBC s/p IVF, so will monitor off antibiotics with low threshold to treat with broad spectrum abx if c/f sepsis  -Blood cultures NGTD, UA bland

## 2025-05-26 NOTE — DISCHARGE NOTE NURSING/CASE MANAGEMENT/SOCIAL WORK - NSDCFUADDAPPT_GEN_ALL_CORE_FT
1027 patient had cuff leak. Tried on SBT for a few minutes with doctor at bedside and extubated to 2 lpm nc. IS left at bedside for future training. APPTS ARE READY TO BE MADE: [X] YES    Best Family or Patient Contact (if needed):    Additional Information about above appointments (if needed):    1:   2:   3:     Other comments or requests:

## 2025-05-26 NOTE — PROGRESS NOTE ADULT - SUBJECTIVE AND OBJECTIVE BOX
DATE OF SERVICE: 05-26-25 @ 12:57    Patient is a 55y old  Male who presents with a chief complaint of Visual disturbance, spread of metastatic disease (26 May 2025 08:11)      INTERVAL HISTORY: In no acute distress. Feeling better.     REVIEW OF SYSTEMS:  CONSTITUTIONAL: No weakness  EYES/ENT: No visual changes;  No throat pain   NECK: No pain or stiffness  RESPIRATORY: No cough, wheezing; No shortness of breath  CARDIOVASCULAR: No chest pain or palpitations  GASTROINTESTINAL: No abdominal  pain. No nausea, vomiting, or hematemesis  GENITOURINARY: No dysuria, frequency or hematuria  NEUROLOGICAL: No stroke like symptoms  SKIN: No rashes    TELEMETRY Personally reviewed: SR 60-90  	  MEDICATIONS:        PHYSICAL EXAM:  T(C): 36.7 (05-26-25 @ 12:37), Max: 36.8 (05-25-25 @ 16:00)  HR: 69 (05-26-25 @ 12:37) (64 - 80)  BP: 108/71 (05-26-25 @ 12:37) (100/64 - 132/73)  RR: 18 (05-26-25 @ 12:37) (18 - 18)  SpO2: 98% (05-26-25 @ 12:37) (96% - 99%)  Wt(kg): --  I&O's Summary    26 May 2025 07:01  -  26 May 2025 12:57  --------------------------------------------------------  IN: 120 mL / OUT: 0 mL / NET: 120 mL          Appearance: In no distress	  HEENT:    PERRL, EOMI	  Cardiovascular:  S1 S2, No JVD  Respiratory: Lungs clear to auscultation	  Gastrointestinal:  Soft, Non-tender, + BS	  Vascularature:  No edema of LE  Psychiatric: Appropriate affect   Neuro: no acute focal deficits                               12.9   6.04  )-----------( 209      ( 26 May 2025 06:54 )             39.4     05-26    136  |  100  |  19  ----------------------------<  77  3.7   |  21[L]  |  1.05    Ca    9.4      26 May 2025 06:55  Phos  2.6     05-26  Mg     1.9     05-26    TPro  6.9  /  Alb  4.1  /  TBili  0.6  /  DBili  x   /  AST  20  /  ALT  19  /  AlkPhos  76  05-26        Labs personally reviewed      ASSESSMENT/PLAN: 	      54 y/o M with Pmhx CAD s/p PCI w/ ONESIMO on DaPT, hypothyroidism, secondary adrenal insufficiency, Cluster headaches, 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  Patient denied nausea but then proceeded to vomit in the stretcher.  No headaches.  Patient brought in by EMS for which collateral was obtained.  EKG by EMS was concerning that there was ST elevations in the inferior leads however was very poor baseline.    1. Abnormal EKG reported by EMS  - EKG here nonischemic, asymptomatic with no CP or SOB  - known CAD  - 5/23 Trop negative   - 10/15/2024 with ONESIMO to the LAD and prior CAD s/p PCI 2 LAD stents  - s/p cath 1/31 with patent stents  - C/w ASA 81mg & statin     2.  HLD   - c/w Vascepa as OP, atorvastatin 40mg PO daily    3. Gastrointestinal symptoms - Nausea, vomiting and diarrhea  - Labs significant for hypovolemia (hemoconcentrated with   - C/w zofran q8 PRN  - Hx of C. diff on prior admission s/p PO vancomycin course   - Reported 4 episodes of loose stool prior to arrival to hospital, none since admission  - GI PCR negative  - 5/26 with improvement in symptoms and reduced episodes of loose stools    4.  Sinus tachycardia.   ·  Plan: Tachycardic to 110s-120s on admission, likely i/s/o hypovolemia  - S/p 2L of IVF, C/w LR maintenance fluids 100cc/hr x 1 liter  - Prior hx of metoprolol succinate 25 mg but this was stopped during last admission; will continue to hold as tachy 2/2 hypovolemia     5.  Metastatic cancer to brain.   ·  Plan: -MR from April 2025: 7 mm nodular T2 hyperintense focus in the posterior medial aspect of the left temporal lobe   - C/w keppra 500 BID   - S/p gamma knife therapy session on 5/22 with Dr. Hill  - Radiation oncology follow up    6.  Adrenal insufficiency.   ·  Plan: C/w hydrocortisone 20 mg in AM and 10 mg in PM      7. Need for prophylactic measure.   ·  Plan: DVT ppx: lovenox 40 qd           DUSTY Noyola-NP   Vic Carballo DO [facility]  Cardiovascular Medicine  [address] [place] [address], [address]  Available through call or text on [employer]  Office: [phone]

## 2025-05-26 NOTE — DISCHARGE NOTE NURSING/CASE MANAGEMENT/SOCIAL WORK - NSDCPEFALRISK_GEN_ALL_CORE
For information on Fall & Injury Prevention, visit: [email or web address] OR  [email or web address] OR  https://www.cdc.gov/steadi/patient.html

## 2025-05-26 NOTE — PROGRESS NOTE ADULT - PROBLEM SELECTOR PLAN 4
-Lactate 2.9 --> 2  -Monitor off antibiotics for now, likely i/s/o hypovolemia with improvement after fluids
-Lactate 2.9 --> 2 --> 2.9  -Monitor off antibiotics for now, likely i/s/o hypovolemia with improvement after fluids  -Plan for continued maintenance fluids
resolved.

## 2025-05-28 ENCOUNTER — APPOINTMENT (OUTPATIENT)
Dept: SPINE | Facility: CLINIC | Age: 55
End: 2025-05-28

## 2025-05-28 DIAGNOSIS — G93.9 DISORDER OF BRAIN, UNSPECIFIED: ICD-10-CM

## 2025-06-09 ENCOUNTER — TRANSCRIPTION ENCOUNTER (OUTPATIENT)
Age: 55
End: 2025-06-09

## 2025-06-11 ENCOUNTER — NON-APPOINTMENT (OUTPATIENT)
Age: 55
End: 2025-06-11

## 2025-06-13 ENCOUNTER — APPOINTMENT (OUTPATIENT)
Dept: CARDIOLOGY | Facility: CLINIC | Age: 55
End: 2025-06-13

## 2025-06-13 ENCOUNTER — APPOINTMENT (OUTPATIENT)
Dept: CARDIOLOGY | Facility: CLINIC | Age: 55
End: 2025-06-13
Payer: COMMERCIAL

## 2025-06-13 ENCOUNTER — NON-APPOINTMENT (OUTPATIENT)
Age: 55
End: 2025-06-13

## 2025-06-13 VITALS
WEIGHT: 130 LBS | HEIGHT: 71 IN | OXYGEN SATURATION: 99 % | BODY MASS INDEX: 18.2 KG/M2 | SYSTOLIC BLOOD PRESSURE: 110 MMHG | HEART RATE: 83 BPM | DIASTOLIC BLOOD PRESSURE: 70 MMHG | TEMPERATURE: 98.1 F

## 2025-06-13 PROCEDURE — G2211 COMPLEX E/M VISIT ADD ON: CPT

## 2025-06-13 PROCEDURE — 93306 TTE W/DOPPLER COMPLETE: CPT

## 2025-06-13 PROCEDURE — 99214 OFFICE O/P EST MOD 30 MIN: CPT

## 2025-06-13 PROCEDURE — 99213 OFFICE O/P EST LOW 20 MIN: CPT

## 2025-06-13 PROCEDURE — 93000 ELECTROCARDIOGRAM COMPLETE: CPT

## 2025-06-17 PROBLEM — E83.52 HYPERCALCEMIA: Status: ACTIVE | Noted: 2025-06-17

## 2025-06-17 LAB
ALBUMIN SERPL ELPH-MCNC: 4.9 G/DL
ALP BLD-CCNC: 88 U/L
ALT SERPL-CCNC: 29 U/L
ANION GAP SERPL CALC-SCNC: 15 MMOL/L
AST SERPL-CCNC: 31 U/L
BILIRUB SERPL-MCNC: 0.5 MG/DL
BUN SERPL-MCNC: 20 MG/DL
CALCIUM SERPL-MCNC: 10.9 MG/DL
CHLORIDE SERPL-SCNC: 103 MMOL/L
CO2 SERPL-SCNC: 24 MMOL/L
CREAT SERPL-MCNC: 0.97 MG/DL
EGFRCR SERPLBLD CKD-EPI 2021: 92 ML/MIN/1.73M2
GLUCOSE SERPL-MCNC: 94 MG/DL
POTASSIUM SERPL-SCNC: 4.9 MMOL/L
PROT SERPL-MCNC: 7.5 G/DL
SODIUM SERPL-SCNC: 142 MMOL/L
TROPONIN-T, HIGH SENSITIVITY: <6 NG/L

## 2025-06-19 ENCOUNTER — APPOINTMENT (OUTPATIENT)
Dept: CARDIOLOGY | Facility: CLINIC | Age: 55
End: 2025-06-19
Payer: COMMERCIAL

## 2025-06-19 PROCEDURE — 93015 CV STRESS TEST SUPVJ I&R: CPT

## 2025-06-19 PROCEDURE — A9502: CPT

## 2025-06-19 PROCEDURE — 78452 HT MUSCLE IMAGE SPECT MULT: CPT

## 2025-06-19 NOTE — DISCHARGE NOTE PROVIDER - CARE PROVIDERS DIRECT ADDRESSES
May take Tylenol and ibuprofen as needed for pain.  Tylenol will be more helpful with fevers.  May take up to 4000 mg of Tylenol over 24 hours.  May use salt water gargles, or honey to help with sore throat.  Increase hydration   [email or web address]

## 2025-06-20 PROBLEM — R94.39 ABNORMAL NUCLEAR STRESS TEST: Status: ACTIVE | Noted: 2025-06-20

## 2025-06-24 ENCOUNTER — LABORATORY RESULT (OUTPATIENT)
Age: 55
End: 2025-06-24

## 2025-06-24 ENCOUNTER — NON-APPOINTMENT (OUTPATIENT)
Age: 55
End: 2025-06-24

## 2025-06-24 ENCOUNTER — APPOINTMENT (OUTPATIENT)
Dept: PAIN MANAGEMENT | Facility: CLINIC | Age: 55
End: 2025-06-24
Payer: COMMERCIAL

## 2025-06-24 VITALS
BODY MASS INDEX: 18.76 KG/M2 | SYSTOLIC BLOOD PRESSURE: 151 MMHG | HEART RATE: 95 BPM | WEIGHT: 134 LBS | DIASTOLIC BLOOD PRESSURE: 91 MMHG | HEIGHT: 71 IN

## 2025-06-24 PROCEDURE — 99214 OFFICE O/P EST MOD 30 MIN: CPT

## 2025-06-24 PROCEDURE — G2211 COMPLEX E/M VISIT ADD ON: CPT

## 2025-06-28 ENCOUNTER — APPOINTMENT (OUTPATIENT)
Dept: CT IMAGING | Facility: CLINIC | Age: 55
End: 2025-06-28

## 2025-06-28 ENCOUNTER — OUTPATIENT (OUTPATIENT)
Dept: OUTPATIENT SERVICES | Facility: HOSPITAL | Age: 55
LOS: 1 days | End: 2025-06-28
Payer: COMMERCIAL

## 2025-06-28 DIAGNOSIS — C43.9 MALIGNANT MELANOMA OF SKIN, UNSPECIFIED: ICD-10-CM

## 2025-06-28 DIAGNOSIS — Z98.890 OTHER SPECIFIED POSTPROCEDURAL STATES: Chronic | ICD-10-CM

## 2025-06-28 DIAGNOSIS — I89.9 NONINFECTIVE DISORDER OF LYMPHATIC VESSELS AND LYMPH NODES, UNSPECIFIED: Chronic | ICD-10-CM

## 2025-06-28 DIAGNOSIS — Z00.8 ENCOUNTER FOR OTHER GENERAL EXAMINATION: ICD-10-CM

## 2025-06-28 DIAGNOSIS — C43.4 MALIGNANT MELANOMA OF SCALP AND NECK: Chronic | ICD-10-CM

## 2025-06-28 DIAGNOSIS — Z90.79 ACQUIRED ABSENCE OF OTHER GENITAL ORGAN(S): Chronic | ICD-10-CM

## 2025-06-28 PROCEDURE — 74177 CT ABD & PELVIS W/CONTRAST: CPT | Mod: 26

## 2025-06-28 PROCEDURE — 71260 CT THORAX DX C+: CPT

## 2025-06-28 PROCEDURE — 74177 CT ABD & PELVIS W/CONTRAST: CPT

## 2025-06-28 PROCEDURE — 71260 CT THORAX DX C+: CPT | Mod: 26

## 2025-07-01 PROBLEM — Z92.3 STATUS POST GAMMA KNIFE TREATMENT: Status: ACTIVE | Noted: 2025-07-01

## 2025-07-01 RX ORDER — DEXAMETHASONE 2 MG/1
2 TABLET ORAL
Qty: 5 | Refills: 0 | Status: ACTIVE | COMMUNITY
Start: 2025-07-01 | End: 1900-01-01

## 2025-07-01 RX ORDER — LEVETIRACETAM 500 MG/1
500 TABLET, FILM COATED ORAL TWICE DAILY
Qty: 60 | Refills: 0 | Status: ACTIVE | COMMUNITY
Start: 2025-07-01 | End: 1900-01-01

## 2025-07-03 NOTE — ED ADULT NURSE NOTE - NS ED NOTE ABUSE RESPONSE YN
[facility]  [place]    Basic Information     Identification: Phillip Pratt is a 41 year old male with a MRN [id number]  Consulting Provider: Norma Jeff MD  Reason for Consultation: Concern for ascending cholangitis  Date of Encounter: 07/03/25    History     Source of History: Phillip Pratt and electronic medical record.    Interval Events:   -  Fever yesterday 101.8, afebrile since  - Vital signs stable  - S/p EGD and necrosectomy yesterday  - Feeling much better today, denies abdominal pain, nausea, or vomiting      Review of Systems: Negative unless stated in HPI    Patient Active Problem List   Diagnosis    Right upper quadrant pain    Muscle strain    Essential hypertension    Attention deficit hyperactivity disorder (ADHD), predominantly inattentive type    Chronic gouty arthritis    Necrotizing pancreatitis (CMD)     Past Medical History:   Diagnosis Date    Cervical radiculopathy     Cervical spondylosis     Essential (primary) hypertension     Gout     RAD (reactive airway disease) (CMD)     exercise induced     Past Surgical History:   Procedure Laterality Date    Appendectomy      Ercp      Hernia repair      Vida tooth extraction       Physical Examination     Vitals signs: /84 (BP Location: LUE - Left upper extremity, Patient Position: Supine)   Pulse 93   Temp 97.3 °F (36.3 °C) (Oral)   Resp 18   Ht 5' 9\" (1.753 m)   Wt 118.3 kg (260 lb 12.9 oz)   BMI 38.51 kg/m²   BSA 2.31 m²   General: No acute distress.  Eyes: scleral icterus.  Pulmonary: Normal effort.  Abdomen: Non-distended. Soft. Non-tender.  Musculoskeletal: No lower extremity edema.  Neurological: Alert and oriented to person, place, and time; no gross focal deficits.    Diagnostics and Therapeutics     Laboratory Tests:   WBC 9  Hgb 8.8 (9)  T bili 0.8 (1)  AST 27 (49)  ALT 68 (104)   (242)  Cr 0.5    Diagnostic Imaging:   HIDA 6/5: patent cystic and CBD  MRCP WO 6/2: necrotizing pancreatitis.  diffuse fluid and susceptibility artifact about the pancreatic bed, measuring over an area of 9.7 x 6.5 cm in size distally. A separate prominent fluid locule within and just below the pancreatic head and uncinate process measures 11.7 x 6.3 cm. stent identified on recent CT, difficult to discern on this study. CBD borderline enlarged proximally at 6-7 mm. single round focus within the CBD near the doroteo hepatis 2 m  SBFT 7/1/2025: patent cystenterostomy, suspect communication b/w abscess & R colon lumen    Procedures and Interventions:   PTC drain placement 6/10: no significant intrahepatic biliary ductal dilatation. There is mildly prominent extrahepatic bile duct with a smooth stricture involving the distal common bile duct, presumably from mass effect from the peripancreatic fluid collection.  ERCP 6/4: 7 Fr biliary stent visualized on  film but appears to have internally migrated into the bile duct as it was not visible endoscopically. Markedly edematous duodenum which restricted mobility of the scope and made visualization of the ampulla not feasible.  ERCP 5/12/25: choledocholithiasis s/p sphincterotomy and balloon extraction, plastic stent placement  EUS 6/23/2025: Cyst enterostomy cyst enterostomy placement  6/30 IR cholangio: mid CBD stricture with appropriately positioned biliary drain, no contrast in duodenum  EGD 7/2/2025:   - Esophagitis was found in the distal esophagus.  - Two non-bleeding gastric ulcers with no stigmata of bleeding were found in the gastric body.  - An acquired extrinsic moderate stenosis was found in the second portion of the duodenum. The lumen-apposing metal  cystenteroscopy stent was visualized.  - The scope was advanced through the 15 mm lumen apposing stent into the cyst cavity. The cyst was partially filled with  liquified necrosis. There was no significant solid necrosis. Necrosectomy performed with lavage, irrgation, suctioning to  remove visible necrosis. The  underlying cyst walls were then well visualized and appeared healthy in appearance. There  did not appear to be a significant amount of necrosis remaining. Contrast was injected to visualize extent of cavity to  confirm no additional pockets that were not well visualized. Additional lavage / irrigation / suctioning performed  subsequently to clear contrast.       Pharmacotherapeutics:  Meropenem    Assessment and Plan     Phillip Pratt is a 41 year old male with acute interstitial necrotizing gallstone pancreatitis, choledocholithiasis s/p ERCP w/stent 5/12/25 transferred to [facility] for HLOC. GI consulted due uptrending liver enzymes.     Assessment  Acute interstitial necrotizing gallstone pancreatitis complicated by peripancreatic fluid collection / walled off necrosis  S/p cystenterostomy   Choledocholithiasis s/p ERCP w with stent (5/12/25) with migrated biliary stent, s/p transhepatic biliary drain placement  Liver enzyme elevation 2/2 above, improved  Nausea/vomiting following cyst enterostomy placement, improved    Plan  Recommend repeat CT scan in 1 week for re-assessment.  ERCP for placement of a biliary stent is not feasible at this time given duodenal stenosis/compression  Once there is improvement and healing of cyst size/cavity, this may be possible at that time. Will need to continue to monitor clinical course / status.   Diet per surgical team  Trend LFTs daily  GI will follow peripherally at this time. Please contact if any questions or concerns.     Patient was seen and discussed with attending.    Sis Grier DO  Gastroenterology Fellow, PGY-4       Yes

## 2025-07-08 ENCOUNTER — APPOINTMENT (OUTPATIENT)
Dept: CT IMAGING | Facility: CLINIC | Age: 55
End: 2025-07-08
Payer: COMMERCIAL

## 2025-07-08 ENCOUNTER — OUTPATIENT (OUTPATIENT)
Dept: OUTPATIENT SERVICES | Facility: HOSPITAL | Age: 55
LOS: 1 days | End: 2025-07-08
Payer: COMMERCIAL

## 2025-07-08 DIAGNOSIS — Z98.890 OTHER SPECIFIED POSTPROCEDURAL STATES: Chronic | ICD-10-CM

## 2025-07-08 DIAGNOSIS — Z00.8 ENCOUNTER FOR OTHER GENERAL EXAMINATION: ICD-10-CM

## 2025-07-08 DIAGNOSIS — Z98.89 OTHER SPECIFIED POSTPROCEDURAL STATES: Chronic | ICD-10-CM

## 2025-07-08 DIAGNOSIS — Z90.79 ACQUIRED ABSENCE OF OTHER GENITAL ORGAN(S): Chronic | ICD-10-CM

## 2025-07-08 DIAGNOSIS — R94.39 ABNORMAL RESULT OF OTHER CARDIOVASCULAR FUNCTION STUDY: ICD-10-CM

## 2025-07-08 DIAGNOSIS — I89.9 NONINFECTIVE DISORDER OF LYMPHATIC VESSELS AND LYMPH NODES, UNSPECIFIED: Chronic | ICD-10-CM

## 2025-07-08 DIAGNOSIS — C43.4 MALIGNANT MELANOMA OF SCALP AND NECK: Chronic | ICD-10-CM

## 2025-07-08 PROCEDURE — 75574 CT ANGIO HRT W/3D IMAGE: CPT | Mod: 26

## 2025-07-08 PROCEDURE — 75574 CT ANGIO HRT W/3D IMAGE: CPT

## 2025-07-09 ENCOUNTER — OUTPATIENT (OUTPATIENT)
Dept: OUTPATIENT SERVICES | Facility: HOSPITAL | Age: 55
LOS: 1 days | Discharge: HOME | End: 2025-07-09

## 2025-07-09 DIAGNOSIS — C43.8 MALIGNANT MELANOMA OF OVERLAPPING SITES OF SKIN: ICD-10-CM

## 2025-07-09 DIAGNOSIS — C43.4 MALIGNANT MELANOMA OF SCALP AND NECK: Chronic | ICD-10-CM

## 2025-07-10 ENCOUNTER — RESULT REVIEW (OUTPATIENT)
Age: 55
End: 2025-07-10

## 2025-07-10 ENCOUNTER — APPOINTMENT (OUTPATIENT)
Dept: HEMATOLOGY ONCOLOGY | Facility: CLINIC | Age: 55
End: 2025-07-10
Payer: COMMERCIAL

## 2025-07-10 ENCOUNTER — OUTPATIENT (OUTPATIENT)
Dept: OUTPATIENT SERVICES | Facility: HOSPITAL | Age: 55
LOS: 1 days | End: 2025-07-10
Payer: COMMERCIAL

## 2025-07-10 VITALS
DIASTOLIC BLOOD PRESSURE: 84 MMHG | WEIGHT: 132.28 LBS | SYSTOLIC BLOOD PRESSURE: 123 MMHG | OXYGEN SATURATION: 100 % | HEART RATE: 102 BPM | BODY MASS INDEX: 18.73 KG/M2 | TEMPERATURE: 97.7 F | HEIGHT: 70.47 IN | RESPIRATION RATE: 17 BRPM

## 2025-07-10 DIAGNOSIS — C43.4 MALIGNANT MELANOMA OF SCALP AND NECK: Chronic | ICD-10-CM

## 2025-07-10 DIAGNOSIS — Z98.89 OTHER SPECIFIED POSTPROCEDURAL STATES: Chronic | ICD-10-CM

## 2025-07-10 DIAGNOSIS — Z90.79 ACQUIRED ABSENCE OF OTHER GENITAL ORGAN(S): Chronic | ICD-10-CM

## 2025-07-10 DIAGNOSIS — Z98.890 OTHER SPECIFIED POSTPROCEDURAL STATES: Chronic | ICD-10-CM

## 2025-07-10 DIAGNOSIS — Z00.8 ENCOUNTER FOR OTHER GENERAL EXAMINATION: ICD-10-CM

## 2025-07-10 DIAGNOSIS — I89.9 NONINFECTIVE DISORDER OF LYMPHATIC VESSELS AND LYMPH NODES, UNSPECIFIED: Chronic | ICD-10-CM

## 2025-07-10 PROCEDURE — 75580 N-INVAS EST C FFR SW ALY CTA: CPT | Mod: 26

## 2025-07-10 PROCEDURE — 99215 OFFICE O/P EST HI 40 MIN: CPT

## 2025-07-10 PROCEDURE — 75580 N-INVAS EST C FFR SW ALY CTA: CPT

## 2025-07-10 PROCEDURE — G2211 COMPLEX E/M VISIT ADD ON: CPT | Mod: NC

## 2025-07-17 ENCOUNTER — APPOINTMENT (OUTPATIENT)
Dept: MRI IMAGING | Facility: CLINIC | Age: 55
End: 2025-07-17

## 2025-07-17 ENCOUNTER — OUTPATIENT (OUTPATIENT)
Dept: OUTPATIENT SERVICES | Facility: HOSPITAL | Age: 55
LOS: 1 days | End: 2025-07-17
Payer: COMMERCIAL

## 2025-07-17 ENCOUNTER — APPOINTMENT (OUTPATIENT)
Dept: MRI IMAGING | Facility: CLINIC | Age: 55
End: 2025-07-17
Payer: COMMERCIAL

## 2025-07-17 DIAGNOSIS — Z98.890 OTHER SPECIFIED POSTPROCEDURAL STATES: Chronic | ICD-10-CM

## 2025-07-17 DIAGNOSIS — C43.4 MALIGNANT MELANOMA OF SCALP AND NECK: Chronic | ICD-10-CM

## 2025-07-17 DIAGNOSIS — Z00.8 ENCOUNTER FOR OTHER GENERAL EXAMINATION: ICD-10-CM

## 2025-07-17 PROCEDURE — 72156 MRI NECK SPINE W/O & W/DYE: CPT | Mod: 26

## 2025-07-17 PROCEDURE — A9585: CPT

## 2025-07-17 PROCEDURE — 72158 MRI LUMBAR SPINE W/O & W/DYE: CPT | Mod: 26

## 2025-07-17 PROCEDURE — 72158 MRI LUMBAR SPINE W/O & W/DYE: CPT

## 2025-07-17 PROCEDURE — 72156 MRI NECK SPINE W/O & W/DYE: CPT

## 2025-07-23 ENCOUNTER — APPOINTMENT (OUTPATIENT)
Dept: MRI IMAGING | Facility: CLINIC | Age: 55
End: 2025-07-23
Payer: COMMERCIAL

## 2025-07-23 ENCOUNTER — OUTPATIENT (OUTPATIENT)
Dept: OUTPATIENT SERVICES | Facility: HOSPITAL | Age: 55
LOS: 1 days | End: 2025-07-23
Payer: COMMERCIAL

## 2025-07-23 DIAGNOSIS — C43.4 MALIGNANT MELANOMA OF SCALP AND NECK: Chronic | ICD-10-CM

## 2025-07-23 DIAGNOSIS — C79.9 SECONDARY MALIGNANT NEOPLASM OF UNSPECIFIED SITE: ICD-10-CM

## 2025-07-23 DIAGNOSIS — G93.9 DISORDER OF BRAIN, UNSPECIFIED: ICD-10-CM

## 2025-07-23 DIAGNOSIS — Z98.89 OTHER SPECIFIED POSTPROCEDURAL STATES: Chronic | ICD-10-CM

## 2025-07-23 DIAGNOSIS — Z98.890 OTHER SPECIFIED POSTPROCEDURAL STATES: Chronic | ICD-10-CM

## 2025-07-23 PROCEDURE — 72157 MRI CHEST SPINE W/O & W/DYE: CPT

## 2025-07-23 PROCEDURE — 70553 MRI BRAIN STEM W/O & W/DYE: CPT | Mod: 26

## 2025-07-23 PROCEDURE — 70553 MRI BRAIN STEM W/O & W/DYE: CPT

## 2025-07-23 PROCEDURE — 72157 MRI CHEST SPINE W/O & W/DYE: CPT | Mod: 26

## 2025-07-23 PROCEDURE — A9585: CPT

## 2025-07-24 ENCOUNTER — NON-APPOINTMENT (OUTPATIENT)
Age: 55
End: 2025-07-24

## 2025-07-29 ENCOUNTER — NON-APPOINTMENT (OUTPATIENT)
Age: 55
End: 2025-07-29

## 2025-07-30 ENCOUNTER — APPOINTMENT (OUTPATIENT)
Dept: HEMATOLOGY ONCOLOGY | Facility: CLINIC | Age: 55
End: 2025-07-30

## 2025-07-30 ENCOUNTER — APPOINTMENT (OUTPATIENT)
Dept: NEUROLOGY | Facility: CLINIC | Age: 55
End: 2025-07-30
Payer: COMMERCIAL

## 2025-07-30 VITALS
DIASTOLIC BLOOD PRESSURE: 89 MMHG | TEMPERATURE: 97.9 F | WEIGHT: 133 LBS | RESPIRATION RATE: 16 BRPM | OXYGEN SATURATION: 99 % | BODY MASS INDEX: 19.04 KG/M2 | SYSTOLIC BLOOD PRESSURE: 126 MMHG | HEIGHT: 70 IN | HEART RATE: 97 BPM

## 2025-07-30 DIAGNOSIS — G93.9 DISORDER OF BRAIN, UNSPECIFIED: ICD-10-CM

## 2025-07-30 DIAGNOSIS — G62.9 POLYNEUROPATHY, UNSPECIFIED: ICD-10-CM

## 2025-07-30 PROCEDURE — 99205 OFFICE O/P NEW HI 60 MIN: CPT

## 2025-07-31 ENCOUNTER — APPOINTMENT (OUTPATIENT)
Dept: MRI IMAGING | Facility: IMAGING CENTER | Age: 55
End: 2025-07-31

## 2025-07-31 ENCOUNTER — NON-APPOINTMENT (OUTPATIENT)
Age: 55
End: 2025-07-31

## 2025-07-31 LAB
CK SERPL-CCNC: 116 U/L
DEPRECATED KAPPA LC FREE/LAMBDA SER: 1.46 RATIO
ESTIMATED AVERAGE GLUCOSE: 108 MG/DL
HBA1C MFR BLD HPLC: 5.4 %
KAPPA LC CSF-MCNC: 1.14 MG/DL
KAPPA LC SERPL-MCNC: 1.67 MG/DL
TSH SERPL-ACNC: 1.88 UIU/ML
VIT B12 SERPL-MCNC: 344 PG/ML

## 2025-08-01 LAB
ALBUMIN MFR SERPL ELPH: 62.3 %
ALBUMIN SERPL-MCNC: 4.8 G/DL
ALBUMIN/GLOB SERPL: 1.7 RATIO
ALDOLASE SERPL-CCNC: 4.2 U/L
ALPHA1 GLOB MFR SERPL ELPH: 3.6 %
ALPHA1 GLOB SERPL ELPH-MCNC: 0.3 G/DL
ALPHA2 GLOB MFR SERPL ELPH: 9.5 %
ALPHA2 GLOB SERPL ELPH-MCNC: 0.7 G/DL
B-GLOBULIN MFR SERPL ELPH: 11.4 %
B-GLOBULIN SERPL ELPH-MCNC: 0.9 G/DL
GAMMA GLOB FLD ELPH-MCNC: 1 G/DL
GAMMA GLOB MFR SERPL ELPH: 13.2 %
INTERPRETATION SERPL IEP-IMP: NORMAL
M PROTEIN SPEC IFE-MCNC: NORMAL
PROT SERPL-MCNC: 7.7 G/DL
PROT SERPL-MCNC: 7.7 G/DL

## 2025-08-02 LAB
ASIALO-GM1 ANTIBODIES, IGG/IGM: 7 IV
GD1A ANTIBODIES, IGG/IGM: 10 IV
GD1B ANTIBODIES, IGG/IGM: 17 IV
GM1 ANTIBODIES, IGG/IGM: 15 IV
GM2 ANTIBODIES, IGG/IGM: 15 IV
GQ1B ANTIBODIES, IGG/IGM: 13 IV

## 2025-08-03 LAB — METHYLMALONATE SERPL-SCNC: 224 NMOL/L

## 2025-08-06 ENCOUNTER — APPOINTMENT (OUTPATIENT)
Dept: RADIATION ONCOLOGY | Facility: CLINIC | Age: 55
End: 2025-08-06
Payer: COMMERCIAL

## 2025-08-06 VITALS
OXYGEN SATURATION: 100 % | SYSTOLIC BLOOD PRESSURE: 139 MMHG | HEART RATE: 87 BPM | DIASTOLIC BLOOD PRESSURE: 92 MMHG | BODY MASS INDEX: 19.22 KG/M2 | WEIGHT: 133.93 LBS

## 2025-08-06 DIAGNOSIS — C43.9 MALIGNANT MELANOMA OF SKIN, UNSPECIFIED: ICD-10-CM

## 2025-08-06 PROCEDURE — 99215 OFFICE O/P EST HI 40 MIN: CPT

## 2025-08-08 ENCOUNTER — TRANSCRIPTION ENCOUNTER (OUTPATIENT)
Age: 55
End: 2025-08-08

## 2025-08-14 LAB
AMPHIPHYSIN IGG TITR SER IF: NEGATIVE
ANNOTATION COMMENT IMP: NORMAL
CV2 AB SERPL QL IF: NEGATIVE
GLIAL NUC TYPE 1 AB TITR SER: NEGATIVE
HU1 AB SER QL: NEGATIVE
HU2 AB TITR SER IF: NEGATIVE
HU3 AB TITR SER: NEGATIVE
INTERPRETIVE COMMENTS: NORMAL
LGI1 IGG SER QL CBA IFA: NEGATIVE
PCA-1 AB TITR SER: NEGATIVE
PCA-TR AB TITR SER: NEGATIVE

## 2025-08-28 ENCOUNTER — APPOINTMENT (OUTPATIENT)
Dept: CARDIOLOGY | Facility: CLINIC | Age: 55
End: 2025-08-28
Payer: COMMERCIAL

## 2025-08-28 VITALS
OXYGEN SATURATION: 99 % | TEMPERATURE: 98 F | WEIGHT: 134 LBS | DIASTOLIC BLOOD PRESSURE: 78 MMHG | HEART RATE: 78 BPM | SYSTOLIC BLOOD PRESSURE: 136 MMHG | BODY MASS INDEX: 19.23 KG/M2 | RESPIRATION RATE: 16 BRPM

## 2025-08-28 DIAGNOSIS — E27.8 OTHER SPECIFIED DISORDERS OF ADRENAL GLAND: ICD-10-CM

## 2025-08-28 DIAGNOSIS — C62.90 MALIGNANT NEOPLASM OF UNSPECIFIED TESTIS, UNSPECIFIED WHETHER DESCENDED OR UNDESCENDED: ICD-10-CM

## 2025-08-28 DIAGNOSIS — C79.9 SECONDARY MALIGNANT NEOPLASM OF UNSPECIFIED SITE: ICD-10-CM

## 2025-08-28 DIAGNOSIS — E03.9 HYPOTHYROIDISM, UNSPECIFIED: ICD-10-CM

## 2025-08-28 DIAGNOSIS — R59.1 GENERALIZED ENLARGED LYMPH NODES: ICD-10-CM

## 2025-08-28 DIAGNOSIS — A04.72 ENTEROCOLITIS DUE TO CLOSTRIDIUM DIFFICILE, NOT SPECIFIED AS RECURRENT: ICD-10-CM

## 2025-08-28 DIAGNOSIS — I25.10 ATHEROSCLEROTIC HEART DISEASE OF NATIVE CORONARY ARTERY W/OUT ANGINA PECTORIS: ICD-10-CM

## 2025-08-28 DIAGNOSIS — E83.52 HYPERCALCEMIA: ICD-10-CM

## 2025-08-28 DIAGNOSIS — R06.02 SHORTNESS OF BREATH: ICD-10-CM

## 2025-08-28 DIAGNOSIS — I73.00 RAYNAUD'S SYNDROME W/OUT GANGRENE: ICD-10-CM

## 2025-08-28 DIAGNOSIS — C43.9 MALIGNANT MELANOMA OF SKIN, UNSPECIFIED: ICD-10-CM

## 2025-08-28 DIAGNOSIS — I10 ESSENTIAL (PRIMARY) HYPERTENSION: ICD-10-CM

## 2025-08-28 DIAGNOSIS — R94.31 ABNORMAL ELECTROCARDIOGRAM [ECG] [EKG]: ICD-10-CM

## 2025-08-28 DIAGNOSIS — G93.9 DISORDER OF BRAIN, UNSPECIFIED: ICD-10-CM

## 2025-08-28 DIAGNOSIS — G62.9 POLYNEUROPATHY, UNSPECIFIED: ICD-10-CM

## 2025-08-28 DIAGNOSIS — E27.40 UNSPECIFIED ADRENOCORTICAL INSUFFICIENCY: ICD-10-CM

## 2025-08-28 PROCEDURE — G2211 COMPLEX E/M VISIT ADD ON: CPT

## 2025-08-28 PROCEDURE — 99214 OFFICE O/P EST MOD 30 MIN: CPT

## 2025-08-28 PROCEDURE — 93000 ELECTROCARDIOGRAM COMPLETE: CPT

## 2025-09-03 DIAGNOSIS — K59.00 CONSTIPATION, UNSPECIFIED: ICD-10-CM

## 2025-09-03 DIAGNOSIS — R79.89 OTHER SPECIFIED ABNORMAL FINDINGS OF BLOOD CHEMISTRY: ICD-10-CM

## 2025-09-03 LAB
25(OH)D3 SERPL-MCNC: 86.2 NG/ML
ALBUMIN SERPL ELPH-MCNC: 4.7 G/DL
ALP BLD-CCNC: 85 U/L
ALT SERPL-CCNC: 16 U/L
ANION GAP SERPL CALC-SCNC: 15 MMOL/L
AST SERPL-CCNC: 24 U/L
BASOPHILS # BLD AUTO: 0.07 K/UL
BASOPHILS NFR BLD AUTO: 0.7 %
BILIRUB SERPL-MCNC: 0.6 MG/DL
BUN SERPL-MCNC: 19 MG/DL
CALCIUM SERPL-MCNC: 10.5 MG/DL
CALCIUM SERPL-MCNC: 10.5 MG/DL
CHLORIDE SERPL-SCNC: 101 MMOL/L
CHOLEST SERPL-MCNC: 184 MG/DL
CK SERPL-CCNC: 124 U/L
CO2 SERPL-SCNC: 25 MMOL/L
CREAT SERPL-MCNC: 1.08 MG/DL
EGFRCR SERPLBLD CKD-EPI 2021: 81 ML/MIN/1.73M2
EOSINOPHIL # BLD AUTO: 0.24 K/UL
EOSINOPHIL NFR BLD AUTO: 2.2 %
GLUCOSE SERPL-MCNC: 94 MG/DL
HCT VFR BLD CALC: 45.8 %
HDLC SERPL-MCNC: 32 MG/DL
HGB BLD-MCNC: 14.5 G/DL
IMM GRANULOCYTES NFR BLD AUTO: 0.4 %
LDLC SERPL-MCNC: 69 MG/DL
LYMPHOCYTES # BLD AUTO: 1.94 K/UL
LYMPHOCYTES NFR BLD AUTO: 18.2 %
MAN DIFF?: NORMAL
MCHC RBC-ENTMCNC: 27.9 PG
MCHC RBC-ENTMCNC: 31.7 G/DL
MCV RBC AUTO: 88.2 FL
MONOCYTES # BLD AUTO: 0.44 K/UL
MONOCYTES NFR BLD AUTO: 4.1 %
NEUTROPHILS # BLD AUTO: 7.94 K/UL
NEUTROPHILS NFR BLD AUTO: 74.4 %
NONHDLC SERPL-MCNC: 152 MG/DL
PARATHYROID HORMONE INTACT: 40 PG/ML
PLATELET # BLD AUTO: 228 K/UL
POTASSIUM SERPL-SCNC: 5.2 MMOL/L
PROT SERPL-MCNC: 7.3 G/DL
RBC # BLD: 5.19 M/UL
RBC # FLD: 13.7 %
SODIUM SERPL-SCNC: 140 MMOL/L
TRIGL SERPL-MCNC: 540 MG/DL
WBC # FLD AUTO: 10.67 K/UL

## 2025-09-03 RX ORDER — OMEGA-3/DHA/EPA/FISH OIL 300-1000MG
1000 CAPSULE,DELAYED RELEASE (ENTERIC COATED) ORAL
Qty: 360 | Refills: 1 | Status: ACTIVE | COMMUNITY
Start: 2025-09-03 | End: 1900-01-01

## 2025-09-08 ENCOUNTER — NON-APPOINTMENT (OUTPATIENT)
Age: 55
End: 2025-09-08

## 2025-09-09 ENCOUNTER — APPOINTMENT (OUTPATIENT)
Dept: CT IMAGING | Facility: CLINIC | Age: 55
End: 2025-09-09
Payer: COMMERCIAL

## 2025-09-09 PROCEDURE — 74177 CT ABD & PELVIS W/CONTRAST: CPT | Mod: 26

## 2025-09-16 ENCOUNTER — APPOINTMENT (OUTPATIENT)
Dept: PAIN MANAGEMENT | Facility: CLINIC | Age: 55
End: 2025-09-16
Payer: COMMERCIAL

## 2025-09-16 VITALS
HEIGHT: 71 IN | HEART RATE: 85 BPM | BODY MASS INDEX: 18.9 KG/M2 | WEIGHT: 135 LBS | SYSTOLIC BLOOD PRESSURE: 120 MMHG | DIASTOLIC BLOOD PRESSURE: 72 MMHG

## 2025-09-16 PROCEDURE — 99214 OFFICE O/P EST MOD 30 MIN: CPT

## 2025-09-16 RX ORDER — OXYCODONE HYDROCHLORIDE 10 MG/1
10 TABLET, FILM COATED, EXTENDED RELEASE ORAL TWICE DAILY
Qty: 60 | Refills: 0 | Status: ACTIVE | COMMUNITY
Start: 2025-09-16 | End: 1900-01-01

## 2025-09-20 ENCOUNTER — NON-APPOINTMENT (OUTPATIENT)
Age: 55
End: 2025-09-20

## (undated) DEVICE — ELCTR GROUNDING PAD ADULT COVIDIEN

## (undated) DEVICE — CAM-ESU 1501291: Type: DURABLE MEDICAL EQUIPMENT

## (undated) DEVICE — FORCEP RADIAL JAW 4 JUMBO 2.8MM 3.2MM 240CM ORANGE DISP

## (undated) DEVICE — ELCTR BOVIE TIP BLADE INSULATED 4" EDGE

## (undated) DEVICE — DRSG STERISTRIPS 0.5 X 4"

## (undated) DEVICE — ELCTR ROCKER SWITCH PENCIL BLUE 10FT

## (undated) DEVICE — CATH IV SAFE BC 20G X 1.16" (PINK)

## (undated) DEVICE — PACK MINOR NO DRAPE

## (undated) DEVICE — SENSOR O2 FINGER ADULT

## (undated) DEVICE — TUBING SUCTION CONN 6FT STERILE

## (undated) DEVICE — GLV 8 PROTEXIS (WHITE)

## (undated) DEVICE — LAP PAD W RING 18 X 18"

## (undated) DEVICE — TUBING IV SET GRAVITY 3Y 100" MACRO

## (undated) DEVICE — BRUSH COLONOSCOPY CYTOLOGY

## (undated) DEVICE — POSITIONER PATIENT SAFETY STRAP 3X60"

## (undated) DEVICE — WARMING BLANKET LOWER ADULT

## (undated) DEVICE — DRSG MASTISOL

## (undated) DEVICE — PREP BETADINE KIT

## (undated) DEVICE — VENODYNE/SCD SLEEVE CALF MEDIUM

## (undated) DEVICE — GLV 7 PROTEXIS (WHITE)

## (undated) DEVICE — BIOPSY FORCEP RADIAL JAW 4 STANDARD WITH NEEDLE

## (undated) DEVICE — FOLEY HOLDER STATLOCK 2 WAY ADULT

## (undated) DEVICE — GLV 7.5 PROTEXIS (WHITE)

## (undated) DEVICE — DRAPE LAPAROTOMY TRANSVERSE

## (undated) DEVICE — RADIOGRAPHY DVC SPEC TRANSPEC

## (undated) DEVICE — NDL HYPO SAFE 25G X 1" (ORANGE)

## (undated) DEVICE — POSITIONER FOAM EGG CRATE ULNAR 2PCS (PINK)

## (undated) DEVICE — PACK IV START WITH CHG

## (undated) DEVICE — SOL IRR POUR NS 0.9% 500ML

## (undated) DEVICE — SUT VICRYL 2-0 27" CT-1

## (undated) DEVICE — SUT CHROMIC 4-0 27" RB-1

## (undated) DEVICE — GOWN LG

## (undated) DEVICE — CATH IV SAFE BC 22G X 1" (BLUE)

## (undated) DEVICE — SUT VICRYL 3-0 18" SH (POP-OFF)

## (undated) DEVICE — SUT SILK 2-0 18" FS

## (undated) DEVICE — SOL INJ NS 0.9% 500ML 2 PORT

## (undated) DEVICE — LABELS BLANK W PEN

## (undated) DEVICE — POLY TRAP ETRAP

## (undated) DEVICE — SUT MONOCRYL 4-0 18" PS-2

## (undated) DEVICE — DRAPE TOWEL BLUE 17" X 24"

## (undated) DEVICE — DRAPE 3/4 SHEET 52X76"

## (undated) DEVICE — SOL IRR POUR H2O 500ML

## (undated) DEVICE — TUBING SUCTION 20FT

## (undated) DEVICE — CLAMP BX HOT RAD JAW 3

## (undated) DEVICE — SUT CHROMIC 3-0 27" RB-1

## (undated) DEVICE — SUCTION YANKAUER NO CONTROL VENT

## (undated) DEVICE — SYR LUER LOK 50CC

## (undated) DEVICE — IRRIGATOR BIO SHIELD

## (undated) DEVICE — PACK DENTAL MINOR

## (undated) DEVICE — DRSG TEGADERM 4X4.75"